# Patient Record
Sex: MALE | Race: OTHER | HISPANIC OR LATINO | URBAN - METROPOLITAN AREA
[De-identification: names, ages, dates, MRNs, and addresses within clinical notes are randomized per-mention and may not be internally consistent; named-entity substitution may affect disease eponyms.]

---

## 2017-03-07 ENCOUNTER — INPATIENT (INPATIENT)
Facility: HOSPITAL | Age: 52
LOS: 2 days | Discharge: HOME | End: 2017-03-10
Attending: HOSPITALIST | Admitting: HOSPITALIST
Payer: COMMERCIAL

## 2017-03-07 VITALS
TEMPERATURE: 98 F | HEART RATE: 80 BPM | RESPIRATION RATE: 18 BRPM | DIASTOLIC BLOOD PRESSURE: 88 MMHG | SYSTOLIC BLOOD PRESSURE: 153 MMHG | OXYGEN SATURATION: 98 %

## 2017-03-07 DIAGNOSIS — F10.220 ALCOHOL DEPENDENCE WITH INTOXICATION, UNCOMPLICATED: ICD-10-CM

## 2017-03-07 DIAGNOSIS — H40.9 UNSPECIFIED GLAUCOMA: ICD-10-CM

## 2017-03-07 DIAGNOSIS — Z41.8 ENCOUNTER FOR OTHER PROCEDURES FOR PURPOSES OTHER THAN REMEDYING HEALTH STATE: ICD-10-CM

## 2017-03-07 DIAGNOSIS — F32.9 MAJOR DEPRESSIVE DISORDER, SINGLE EPISODE, UNSPECIFIED: ICD-10-CM

## 2017-03-07 DIAGNOSIS — K29.20 ALCOHOLIC GASTRITIS WITHOUT BLEEDING: ICD-10-CM

## 2017-03-07 DIAGNOSIS — I10 ESSENTIAL (PRIMARY) HYPERTENSION: ICD-10-CM

## 2017-03-07 DIAGNOSIS — F10.239 ALCOHOL DEPENDENCE WITH WITHDRAWAL, UNSPECIFIED: ICD-10-CM

## 2017-03-07 LAB
ALBUMIN SERPL ELPH-MCNC: 4.3 G/DL — SIGNIFICANT CHANGE UP (ref 3.3–5)
ALP SERPL-CCNC: 76 U/L — SIGNIFICANT CHANGE UP (ref 40–120)
ALT FLD-CCNC: 41 U/L — SIGNIFICANT CHANGE UP (ref 4–41)
AMPHET UR-MCNC: NEGATIVE — SIGNIFICANT CHANGE UP
APAP SERPL-MCNC: < 15 UG/ML — LOW (ref 15–25)
APPEARANCE UR: CLEAR — SIGNIFICANT CHANGE UP
AST SERPL-CCNC: 62 U/L — HIGH (ref 4–40)
BACTERIA # UR AUTO: SIGNIFICANT CHANGE UP
BARBITURATES MEASUREMENT: NEGATIVE — SIGNIFICANT CHANGE UP
BARBITURATES UR SCN-MCNC: NEGATIVE — SIGNIFICANT CHANGE UP
BASOPHILS # BLD AUTO: 0.09 K/UL — SIGNIFICANT CHANGE UP (ref 0–0.2)
BASOPHILS NFR BLD AUTO: 1.2 % — SIGNIFICANT CHANGE UP (ref 0–2)
BENZODIAZ SERPL-MCNC: NEGATIVE — SIGNIFICANT CHANGE UP
BENZODIAZ UR-MCNC: NEGATIVE — SIGNIFICANT CHANGE UP
BILIRUB SERPL-MCNC: 2.4 MG/DL — HIGH (ref 0.2–1.2)
BILIRUB UR-MCNC: NEGATIVE — SIGNIFICANT CHANGE UP
BLOOD UR QL VISUAL: NEGATIVE — SIGNIFICANT CHANGE UP
BUN SERPL-MCNC: 15 MG/DL — SIGNIFICANT CHANGE UP (ref 7–23)
CALCIUM SERPL-MCNC: 8.7 MG/DL — SIGNIFICANT CHANGE UP (ref 8.4–10.5)
CANNABINOIDS UR-MCNC: NEGATIVE — SIGNIFICANT CHANGE UP
CHLORIDE SERPL-SCNC: 97 MMOL/L — LOW (ref 98–107)
CK SERPL-CCNC: 559 U/L — HIGH (ref 30–200)
CO2 SERPL-SCNC: 25 MMOL/L — SIGNIFICANT CHANGE UP (ref 22–31)
COCAINE METAB.OTHER UR-MCNC: NEGATIVE — SIGNIFICANT CHANGE UP
COLOR SPEC: SIGNIFICANT CHANGE UP
CREAT SERPL-MCNC: 0.66 MG/DL — SIGNIFICANT CHANGE UP (ref 0.5–1.3)
EOSINOPHIL # BLD AUTO: 0.03 K/UL — SIGNIFICANT CHANGE UP (ref 0–0.5)
EOSINOPHIL NFR BLD AUTO: 0.4 % — SIGNIFICANT CHANGE UP (ref 0–6)
ETHANOL BLD-MCNC: 143 MG/DL — HIGH
GLUCOSE SERPL-MCNC: 144 MG/DL — HIGH (ref 70–99)
GLUCOSE UR-MCNC: NEGATIVE — SIGNIFICANT CHANGE UP
HCT VFR BLD CALC: 41.9 % — SIGNIFICANT CHANGE UP (ref 39–50)
HGB BLD-MCNC: 14.8 G/DL — SIGNIFICANT CHANGE UP (ref 13–17)
IMM GRANULOCYTES NFR BLD AUTO: 0.1 % — SIGNIFICANT CHANGE UP (ref 0–1.5)
KETONES UR-MCNC: NEGATIVE — SIGNIFICANT CHANGE UP
LEUKOCYTE ESTERASE UR-ACNC: NEGATIVE — SIGNIFICANT CHANGE UP
LIDOCAIN IGE QN: 40.3 U/L — SIGNIFICANT CHANGE UP (ref 7–60)
LYMPHOCYTES # BLD AUTO: 2.23 K/UL — SIGNIFICANT CHANGE UP (ref 1–3.3)
LYMPHOCYTES # BLD AUTO: 29 % — SIGNIFICANT CHANGE UP (ref 13–44)
MAGNESIUM SERPL-MCNC: 2 MG/DL — SIGNIFICANT CHANGE UP (ref 1.6–2.6)
MCHC RBC-ENTMCNC: 30.7 PG — SIGNIFICANT CHANGE UP (ref 27–34)
MCHC RBC-ENTMCNC: 35.3 % — SIGNIFICANT CHANGE UP (ref 32–36)
MCV RBC AUTO: 86.9 FL — SIGNIFICANT CHANGE UP (ref 80–100)
METHADONE UR-MCNC: NEGATIVE — SIGNIFICANT CHANGE UP
MONOCYTES # BLD AUTO: 0.41 K/UL — SIGNIFICANT CHANGE UP (ref 0–0.9)
MONOCYTES NFR BLD AUTO: 5.3 % — SIGNIFICANT CHANGE UP (ref 2–14)
NEUTROPHILS # BLD AUTO: 4.91 K/UL — SIGNIFICANT CHANGE UP (ref 1.8–7.4)
NEUTROPHILS NFR BLD AUTO: 64 % — SIGNIFICANT CHANGE UP (ref 43–77)
NITRITE UR-MCNC: NEGATIVE — SIGNIFICANT CHANGE UP
OPIATES UR-MCNC: NEGATIVE — SIGNIFICANT CHANGE UP
OXYCODONE UR-MCNC: NEGATIVE — SIGNIFICANT CHANGE UP
PCP UR-MCNC: NEGATIVE — SIGNIFICANT CHANGE UP
PH UR: 6.5 — SIGNIFICANT CHANGE UP (ref 4.6–8)
PHOSPHATE SERPL-MCNC: 3 MG/DL — SIGNIFICANT CHANGE UP (ref 2.5–4.5)
PLATELET # BLD AUTO: 144 K/UL — LOW (ref 150–400)
PMV BLD: 10.6 FL — SIGNIFICANT CHANGE UP (ref 7–13)
POTASSIUM SERPL-MCNC: 4 MMOL/L — SIGNIFICANT CHANGE UP (ref 3.5–5.3)
POTASSIUM SERPL-SCNC: 4 MMOL/L — SIGNIFICANT CHANGE UP (ref 3.5–5.3)
PROT SERPL-MCNC: 8.2 G/DL — SIGNIFICANT CHANGE UP (ref 6–8.3)
PROT UR-MCNC: 20 — SIGNIFICANT CHANGE UP
RBC # BLD: 4.82 M/UL — SIGNIFICANT CHANGE UP (ref 4.2–5.8)
RBC # FLD: 12.4 % — SIGNIFICANT CHANGE UP (ref 10.3–14.5)
RBC CASTS # UR COMP ASSIST: SIGNIFICANT CHANGE UP (ref 0–?)
SALICYLATES SERPL-MCNC: < 5 MG/DL — LOW (ref 15–30)
SODIUM SERPL-SCNC: 136 MMOL/L — SIGNIFICANT CHANGE UP (ref 135–145)
SP GR SPEC: 1.01 — SIGNIFICANT CHANGE UP (ref 1–1.03)
TROPONIN T SERPL-MCNC: < 0.06 NG/ML — SIGNIFICANT CHANGE UP (ref 0–0.06)
UROBILINOGEN FLD QL: NORMAL E.U. — SIGNIFICANT CHANGE UP (ref 0.1–0.2)
WBC # BLD: 7.68 K/UL — SIGNIFICANT CHANGE UP (ref 3.8–10.5)
WBC # FLD AUTO: 7.68 K/UL — SIGNIFICANT CHANGE UP (ref 3.8–10.5)
WBC UR QL: SIGNIFICANT CHANGE UP (ref 0–?)

## 2017-03-07 PROCEDURE — 99223 1ST HOSP IP/OBS HIGH 75: CPT

## 2017-03-07 PROCEDURE — 71020: CPT | Mod: 26

## 2017-03-07 PROCEDURE — 99223 1ST HOSP IP/OBS HIGH 75: CPT | Mod: AI,GC

## 2017-03-07 RX ORDER — DORZOLAMIDE HYDROCHLORIDE TIMOLOL MALEATE 20; 5 MG/ML; MG/ML
1 SOLUTION/ DROPS OPHTHALMIC
Qty: 0 | Refills: 0 | Status: DISCONTINUED | OUTPATIENT
Start: 2017-03-07 | End: 2017-03-10

## 2017-03-07 RX ORDER — AMLODIPINE BESYLATE 2.5 MG/1
2.5 TABLET ORAL DAILY
Qty: 0 | Refills: 0 | Status: DISCONTINUED | OUTPATIENT
Start: 2017-03-07 | End: 2017-03-10

## 2017-03-07 RX ORDER — SODIUM CHLORIDE 9 MG/ML
1000 INJECTION, SOLUTION INTRAVENOUS
Qty: 0 | Refills: 0 | Status: DISCONTINUED | OUTPATIENT
Start: 2017-03-07 | End: 2017-03-08

## 2017-03-07 RX ORDER — FAMOTIDINE 10 MG/ML
20 INJECTION INTRAVENOUS ONCE
Qty: 0 | Refills: 0 | Status: COMPLETED | OUTPATIENT
Start: 2017-03-07 | End: 2017-03-07

## 2017-03-07 RX ORDER — ASPIRIN/CALCIUM CARB/MAGNESIUM 324 MG
162 TABLET ORAL ONCE
Qty: 0 | Refills: 0 | Status: COMPLETED | OUTPATIENT
Start: 2017-03-07 | End: 2017-03-07

## 2017-03-07 RX ORDER — THIAMINE MONONITRATE (VIT B1) 100 MG
500 TABLET ORAL
Qty: 0 | Refills: 0 | Status: DISCONTINUED | OUTPATIENT
Start: 2017-03-07 | End: 2017-03-10

## 2017-03-07 RX ORDER — SODIUM CHLORIDE 9 MG/ML
1000 INJECTION INTRAMUSCULAR; INTRAVENOUS; SUBCUTANEOUS ONCE
Qty: 0 | Refills: 0 | Status: COMPLETED | OUTPATIENT
Start: 2017-03-07 | End: 2017-03-07

## 2017-03-07 RX ORDER — LATANOPROST 0.05 MG/ML
1 SOLUTION/ DROPS OPHTHALMIC; TOPICAL AT BEDTIME
Qty: 0 | Refills: 0 | Status: DISCONTINUED | OUTPATIENT
Start: 2017-03-07 | End: 2017-03-10

## 2017-03-07 RX ORDER — SODIUM CHLORIDE 9 MG/ML
1000 INJECTION, SOLUTION INTRAVENOUS
Qty: 0 | Refills: 0 | Status: DISCONTINUED | OUTPATIENT
Start: 2017-03-07 | End: 2017-03-07

## 2017-03-07 RX ORDER — THIAMINE MONONITRATE (VIT B1) 100 MG
100 TABLET ORAL ONCE
Qty: 0 | Refills: 0 | Status: DISCONTINUED | OUTPATIENT
Start: 2017-03-07 | End: 2017-03-07

## 2017-03-07 RX ORDER — QUETIAPINE FUMARATE 200 MG/1
50 TABLET, FILM COATED ORAL AT BEDTIME
Qty: 0 | Refills: 0 | Status: DISCONTINUED | OUTPATIENT
Start: 2017-03-07 | End: 2017-03-10

## 2017-03-07 RX ORDER — PANTOPRAZOLE SODIUM 20 MG/1
40 TABLET, DELAYED RELEASE ORAL DAILY
Qty: 0 | Refills: 0 | Status: DISCONTINUED | OUTPATIENT
Start: 2017-03-07 | End: 2017-03-10

## 2017-03-07 RX ORDER — FOLIC ACID 0.8 MG
1 TABLET ORAL ONCE
Qty: 0 | Refills: 0 | Status: COMPLETED | OUTPATIENT
Start: 2017-03-07 | End: 2017-03-07

## 2017-03-07 RX ORDER — ENOXAPARIN SODIUM 100 MG/ML
40 INJECTION SUBCUTANEOUS EVERY 24 HOURS
Qty: 0 | Refills: 0 | Status: DISCONTINUED | OUTPATIENT
Start: 2017-03-07 | End: 2017-03-07

## 2017-03-07 RX ADMIN — Medication 1 TABLET(S): at 12:13

## 2017-03-07 RX ADMIN — DORZOLAMIDE HYDROCHLORIDE TIMOLOL MALEATE 1 DROP(S): 20; 5 SOLUTION/ DROPS OPHTHALMIC at 17:58

## 2017-03-07 RX ADMIN — QUETIAPINE FUMARATE 50 MILLIGRAM(S): 200 TABLET, FILM COATED ORAL at 22:26

## 2017-03-07 RX ADMIN — SODIUM CHLORIDE 1000 MILLILITER(S): 9 INJECTION INTRAMUSCULAR; INTRAVENOUS; SUBCUTANEOUS at 11:42

## 2017-03-07 RX ADMIN — Medication 50 MILLIGRAM(S): at 16:37

## 2017-03-07 RX ADMIN — Medication 30 MILLILITER(S): at 11:42

## 2017-03-07 RX ADMIN — Medication 162 MILLIGRAM(S): at 11:42

## 2017-03-07 RX ADMIN — SODIUM CHLORIDE 1000 MILLILITER(S): 9 INJECTION INTRAMUSCULAR; INTRAVENOUS; SUBCUTANEOUS at 12:12

## 2017-03-07 RX ADMIN — SODIUM CHLORIDE 75 MILLILITER(S): 9 INJECTION, SOLUTION INTRAVENOUS at 17:00

## 2017-03-07 RX ADMIN — Medication 2 MILLIGRAM(S): at 11:37

## 2017-03-07 RX ADMIN — LATANOPROST 1 DROP(S): 0.05 SOLUTION/ DROPS OPHTHALMIC; TOPICAL at 22:26

## 2017-03-07 RX ADMIN — FAMOTIDINE 20 MILLIGRAM(S): 10 INJECTION INTRAVENOUS at 11:42

## 2017-03-07 RX ADMIN — Medication 1 MILLIGRAM(S): at 12:07

## 2017-03-07 RX ADMIN — Medication 50 MILLIGRAM(S): at 17:57

## 2017-03-07 NOTE — H&P ADULT. - PROBLEM SELECTOR PLAN 2
- Binge drinking over the last 10 days with 10-12 drinks   - Drinking due to depression   - Libirum taper   - CIWA low risk   - IVF with thaimine/folic acid/Multivitamin   - Symptom trigger Ativan

## 2017-03-07 NOTE — H&P ADULT. - RS GEN PE MLT RESP DETAILS PC
respirations non-labored/good air movement/airway patent/clear to auscultation bilaterally/breath sounds equal/no intercostal retractions/no chest wall tenderness

## 2017-03-07 NOTE — ED ADULT NURSE NOTE - OBJECTIVE STATEMENT
pt arrived from home c/o diffuse abd pain. pt states he has been drinking alcohol. last drink was this am. pt stated a " few beers". in an outpt program.  stressed about his responsibilities at home. changed into gown, obtained urine specimen, attached to monitor, obtained vs. started iv 20g right ac. blood work drawn.

## 2017-03-07 NOTE — H&P ADULT. - PROBLEM SELECTOR PLAN 1
- Continue with librium taper  - Thiamine/folate  - ETOH abuse counseling - Patient complaining of epigastric pain and multiple episodes of emesis, with poor PO intake  - Had a small amount of blood in the vomit yesterday or earlier this AM   - Started on Protonix 40mg IV qd   - Monitor CBC for possible bleed   - Will consult GI if CBC or VS become unstable   - Clear liquid diet, advance diet as tolerated.

## 2017-03-07 NOTE — H&P ADULT. - LAB RESULTS AND INTERPRETATION
BMP WNL. CBC WNL. Tox serum: elevated EtOH 148mg/dl. Lipase 40 BMP WNL. CBC WNL. Tox serum: elevated EtOH 148mg/dl. Lipase 40 WNL. CK elevated to 559.

## 2017-03-07 NOTE — H&P ADULT. - NEUROLOGICAL DETAILS
responds to verbal commands/sensation intact/deep reflexes intact/cranial nerves intact/alert and oriented x 3

## 2017-03-07 NOTE — ED PROVIDER NOTE - MEDICAL DECISION MAKING DETAILS
pt with epigastric abdominal pain + ETOH withdrawal, r/o pancreatitis vs gastritis. Also mild CP, will check cardaics, EKG.  likely admit for ETOH withdrawal pending labs. pt with epigastric abdominal pain + ETOH withdrawal, r/o pancreatitis vs gastritis. Also mild CP, will check cardaics, EKG.  likely admit for ETOH withdrawal pending labs.  CIWA 11 points

## 2017-03-07 NOTE — H&P ADULT. - ASSESSMENT
51 y.o. man with history of ETOH abuse, essential hypertension, and glaucoma who came to the ER for EtOH abuse and epigastric pain following multiple episodes of emesis, recently with 51 y.o. man with history of ETOH abuse, essential hypertension, and glaucoma who came to the ER for EtOH abuse and epigastric pain following multiple episodes of emesis due to EtOH-induced gastritis.

## 2017-03-07 NOTE — ED PROVIDER NOTE - OBJECTIVE STATEMENT
50yo m pmh ETOH abuse, glaucoma, HTN, p/w epigastric abdominal pain + ETOH withdrawal. Pt has been binge drinking for past 10 days. 10-12 beers per day. No hx of withdrawal seizures. + epigastric pain, unable to tolerate foods. No hx of pancreatitis. Pt vomits every time he eats, also noticed one spot of blood in the emesis the other day. Also endorses yellow watery diarrhea. Mild intermittent CP and palpitations. Last felt palpitation this AM. No cough, fevers or chills. Pt also endorsees nausea with mild headache .

## 2017-03-07 NOTE — PATIENT PROFILE ADULT. - VISION (WITH CORRECTIVE LENSES IF THE PATIENT USUALLY WEARS THEM):
Partially impaired: cannot see medication labels or newsprint, but can see obstacles in path, and the surrounding layout; can count fingers at arm's length/glaucoma rt eye

## 2017-03-07 NOTE — ED PROVIDER NOTE - ATTENDING CONTRIBUTION TO CARE
ED Attending Dr. Walsh: 52 yo male with HTN, EtOH dependence (intermittent drinking)--recently drinking "10-12 beers a day" for "10 days", last drink 2 hours ago, denies withdrawal/seizure history--in ED with epigastric abdominal pain and N/V for few days.  Pt states he cannot eat because of this.  On exam pt uncomfortable appearing, in mild distress, heart RRR, lungs CTAB, abd moderate epigastric TTP, extremities without swelling, strength 5/5 in all extremities and skin without rash.  Mild tremor in extremities and +tongue fasciculation.

## 2017-03-07 NOTE — H&P ADULT. - HISTORY OF PRESENT ILLNESS
51 y.o. man with history of ETOH abuse, essential hypertension, and glaucoma who came to the ER for EtOH abuse and epigastric pain. The patient was previously admitted for EtOH withdrawal in 12/16/16-12/20/16, completing a librium taper and being discharged to outpatient rehab. Patient states he was binge drinking over the last 10 days "up to 10-12 beers a day, along with some tequila and wine." Over the last 4 days the patient has had decreased appetite and eating very little food only and mainly drinking. States whenever he tries to eat, he ends up feeling nauseous  and vomits. Over the last few days the patient has had multiple episodes of vomiting. Today the patient developed severe epigastric pain, stating it was a burning pain "8-9/10" on severity, with no radiation. The patient continued to have episodes of vomiting, with today showing a small amount of blood after vomiting, prompting him to go to the ED. The patient states he started drinking again after feeling depressed about his work and financial situation. Expresses desire to quit again and to stop drinking.       In the ED:  VS: T36.9 HR 80 /88 RR 18 So2 98%    The patient received Ativan 2mg for witnessed tremors of upper extremity In addition received Maalox, Pepcid ac for the epigastric pain, which helped immensely per the patient, making the pain 5/10. 2L of NS bolus also given as well. Serum Tox showed EtOH level of 143 mg/dl. CXR showed clear lungs, no signs of perforation

## 2017-03-07 NOTE — H&P ADULT. - PROBLEM SELECTOR PLAN 3
- Patient endorses feeling depressed   - Was started on hydroxyzine for depression by an outside physician 2 weeks ago, but stopped   - Psych consulted

## 2017-03-07 NOTE — H&P ADULT. - LYMPHATIC
anterior cervical R/supraclavicular R/anterior cervical L/posterior cervical R/posterior cervical L/supraclavicular L

## 2017-03-08 LAB
ALBUMIN SERPL ELPH-MCNC: 3.6 G/DL — SIGNIFICANT CHANGE UP (ref 3.3–5)
ALP SERPL-CCNC: 60 U/L — SIGNIFICANT CHANGE UP (ref 40–120)
ALT FLD-CCNC: 44 U/L — HIGH (ref 4–41)
APTT BLD: 30 SEC — SIGNIFICANT CHANGE UP (ref 27.5–37.4)
AST SERPL-CCNC: 82 U/L — HIGH (ref 4–40)
BILIRUB SERPL-MCNC: 3 MG/DL — HIGH (ref 0.2–1.2)
BUN SERPL-MCNC: 9 MG/DL — SIGNIFICANT CHANGE UP (ref 7–23)
CALCIUM SERPL-MCNC: 8.4 MG/DL — SIGNIFICANT CHANGE UP (ref 8.4–10.5)
CHLORIDE SERPL-SCNC: 102 MMOL/L — SIGNIFICANT CHANGE UP (ref 98–107)
CK SERPL-CCNC: 348 U/L — HIGH (ref 30–200)
CO2 SERPL-SCNC: 22 MMOL/L — SIGNIFICANT CHANGE UP (ref 22–31)
CREAT SERPL-MCNC: 0.57 MG/DL — SIGNIFICANT CHANGE UP (ref 0.5–1.3)
GLUCOSE SERPL-MCNC: 88 MG/DL — SIGNIFICANT CHANGE UP (ref 70–99)
HCT VFR BLD CALC: 37.5 % — LOW (ref 39–50)
HGB BLD-MCNC: 13.9 G/DL — SIGNIFICANT CHANGE UP (ref 13–17)
INR BLD: 0.96 — SIGNIFICANT CHANGE UP (ref 0.87–1.18)
MAGNESIUM SERPL-MCNC: 1.9 MG/DL — SIGNIFICANT CHANGE UP (ref 1.6–2.6)
MCHC RBC-ENTMCNC: 33.2 PG — SIGNIFICANT CHANGE UP (ref 27–34)
MCHC RBC-ENTMCNC: 37.1 % — HIGH (ref 32–36)
MCV RBC AUTO: 89.5 FL — SIGNIFICANT CHANGE UP (ref 80–100)
PHOSPHATE SERPL-MCNC: 3.2 MG/DL — SIGNIFICANT CHANGE UP (ref 2.5–4.5)
PLATELET # BLD AUTO: 121 K/UL — LOW (ref 150–400)
PMV BLD: 12.1 FL — SIGNIFICANT CHANGE UP (ref 7–13)
POTASSIUM SERPL-MCNC: 4.1 MMOL/L — SIGNIFICANT CHANGE UP (ref 3.5–5.3)
POTASSIUM SERPL-SCNC: 4.1 MMOL/L — SIGNIFICANT CHANGE UP (ref 3.5–5.3)
PROT SERPL-MCNC: 7 G/DL — SIGNIFICANT CHANGE UP (ref 6–8.3)
PROTHROM AB SERPL-ACNC: 10.9 SEC — SIGNIFICANT CHANGE UP (ref 10–13.1)
RBC # BLD: 4.19 M/UL — LOW (ref 4.2–5.8)
RBC # FLD: 12.5 % — SIGNIFICANT CHANGE UP (ref 10.3–14.5)
SODIUM SERPL-SCNC: 139 MMOL/L — SIGNIFICANT CHANGE UP (ref 135–145)
WBC # BLD: 5.04 K/UL — SIGNIFICANT CHANGE UP (ref 3.8–10.5)
WBC # FLD AUTO: 5.04 K/UL — SIGNIFICANT CHANGE UP (ref 3.8–10.5)

## 2017-03-08 PROCEDURE — 99233 SBSQ HOSP IP/OBS HIGH 50: CPT

## 2017-03-08 PROCEDURE — 99233 SBSQ HOSP IP/OBS HIGH 50: CPT | Mod: GC

## 2017-03-08 RX ORDER — FOLIC ACID 0.8 MG
1 TABLET ORAL DAILY
Qty: 0 | Refills: 0 | Status: DISCONTINUED | OUTPATIENT
Start: 2017-03-08 | End: 2017-03-10

## 2017-03-08 RX ORDER — IBUPROFEN 200 MG
200 TABLET ORAL EVERY 6 HOURS
Qty: 0 | Refills: 0 | Status: DISCONTINUED | OUTPATIENT
Start: 2017-03-08 | End: 2017-03-09

## 2017-03-08 RX ORDER — LANOLIN ALCOHOL/MO/W.PET/CERES
3 CREAM (GRAM) TOPICAL AT BEDTIME
Qty: 0 | Refills: 0 | Status: COMPLETED | OUTPATIENT
Start: 2017-03-08 | End: 2017-03-08

## 2017-03-08 RX ADMIN — DORZOLAMIDE HYDROCHLORIDE TIMOLOL MALEATE 1 DROP(S): 20; 5 SOLUTION/ DROPS OPHTHALMIC at 17:59

## 2017-03-08 RX ADMIN — Medication 200 MILLIGRAM(S): at 07:07

## 2017-03-08 RX ADMIN — Medication 50 MILLIGRAM(S): at 21:43

## 2017-03-08 RX ADMIN — QUETIAPINE FUMARATE 50 MILLIGRAM(S): 200 TABLET, FILM COATED ORAL at 21:43

## 2017-03-08 RX ADMIN — Medication 200 MILLIGRAM(S): at 17:19

## 2017-03-08 RX ADMIN — PANTOPRAZOLE SODIUM 40 MILLIGRAM(S): 20 TABLET, DELAYED RELEASE ORAL at 12:06

## 2017-03-08 RX ADMIN — Medication 200 MILLIGRAM(S): at 16:31

## 2017-03-08 RX ADMIN — Medication 105 MILLIGRAM(S): at 06:31

## 2017-03-08 RX ADMIN — Medication 50 MILLIGRAM(S): at 06:32

## 2017-03-08 RX ADMIN — Medication 105 MILLIGRAM(S): at 17:58

## 2017-03-08 RX ADMIN — AMLODIPINE BESYLATE 2.5 MILLIGRAM(S): 2.5 TABLET ORAL at 06:32

## 2017-03-08 RX ADMIN — Medication 50 MILLIGRAM(S): at 00:18

## 2017-03-08 RX ADMIN — LATANOPROST 1 DROP(S): 0.05 SOLUTION/ DROPS OPHTHALMIC; TOPICAL at 21:43

## 2017-03-08 RX ADMIN — DORZOLAMIDE HYDROCHLORIDE TIMOLOL MALEATE 1 DROP(S): 20; 5 SOLUTION/ DROPS OPHTHALMIC at 06:32

## 2017-03-08 RX ADMIN — Medication 3 MILLIGRAM(S): at 22:03

## 2017-03-08 RX ADMIN — Medication 200 MILLIGRAM(S): at 08:00

## 2017-03-08 RX ADMIN — Medication 50 MILLIGRAM(S): at 14:06

## 2017-03-09 LAB
ALBUMIN SERPL ELPH-MCNC: 3.6 G/DL — SIGNIFICANT CHANGE UP (ref 3.3–5)
ALP SERPL-CCNC: 65 U/L — SIGNIFICANT CHANGE UP (ref 40–120)
ALT FLD-CCNC: 49 U/L — HIGH (ref 4–41)
AST SERPL-CCNC: 72 U/L — HIGH (ref 4–40)
BILIRUB SERPL-MCNC: 1.3 MG/DL — HIGH (ref 0.2–1.2)
BUN SERPL-MCNC: 10 MG/DL — SIGNIFICANT CHANGE UP (ref 7–23)
CALCIUM SERPL-MCNC: 8.7 MG/DL — SIGNIFICANT CHANGE UP (ref 8.4–10.5)
CHLORIDE SERPL-SCNC: 103 MMOL/L — SIGNIFICANT CHANGE UP (ref 98–107)
CK SERPL-CCNC: 156 U/L — SIGNIFICANT CHANGE UP (ref 30–200)
CO2 SERPL-SCNC: 24 MMOL/L — SIGNIFICANT CHANGE UP (ref 22–31)
CREAT SERPL-MCNC: 0.62 MG/DL — SIGNIFICANT CHANGE UP (ref 0.5–1.3)
GLUCOSE SERPL-MCNC: 122 MG/DL — HIGH (ref 70–99)
MAGNESIUM SERPL-MCNC: 1.9 MG/DL — SIGNIFICANT CHANGE UP (ref 1.6–2.6)
PHOSPHATE SERPL-MCNC: 3.5 MG/DL — SIGNIFICANT CHANGE UP (ref 2.5–4.5)
POTASSIUM SERPL-MCNC: 3.3 MMOL/L — LOW (ref 3.5–5.3)
POTASSIUM SERPL-SCNC: 3.3 MMOL/L — LOW (ref 3.5–5.3)
PROT SERPL-MCNC: 6.9 G/DL — SIGNIFICANT CHANGE UP (ref 6–8.3)
SODIUM SERPL-SCNC: 140 MMOL/L — SIGNIFICANT CHANGE UP (ref 135–145)

## 2017-03-09 PROCEDURE — 99232 SBSQ HOSP IP/OBS MODERATE 35: CPT | Mod: GC

## 2017-03-09 RX ORDER — AMLODIPINE BESYLATE 2.5 MG/1
1 TABLET ORAL
Qty: 30 | Refills: 0 | OUTPATIENT
Start: 2017-03-09 | End: 2017-04-08

## 2017-03-09 RX ORDER — QUETIAPINE FUMARATE 200 MG/1
1 TABLET, FILM COATED ORAL
Qty: 30 | Refills: 0 | OUTPATIENT
Start: 2017-03-09 | End: 2017-04-08

## 2017-03-09 RX ORDER — POTASSIUM CHLORIDE 20 MEQ
20 PACKET (EA) ORAL
Qty: 0 | Refills: 0 | Status: COMPLETED | OUTPATIENT
Start: 2017-03-09 | End: 2017-03-09

## 2017-03-09 RX ORDER — IBUPROFEN 200 MG
200 TABLET ORAL EVERY 6 HOURS
Qty: 0 | Refills: 0 | Status: DISCONTINUED | OUTPATIENT
Start: 2017-03-09 | End: 2017-03-10

## 2017-03-09 RX ADMIN — Medication 20 MILLIEQUIVALENT(S): at 10:12

## 2017-03-09 RX ADMIN — Medication 200 MILLIGRAM(S): at 10:12

## 2017-03-09 RX ADMIN — DORZOLAMIDE HYDROCHLORIDE TIMOLOL MALEATE 1 DROP(S): 20; 5 SOLUTION/ DROPS OPHTHALMIC at 06:08

## 2017-03-09 RX ADMIN — Medication 20 MILLIEQUIVALENT(S): at 17:36

## 2017-03-09 RX ADMIN — Medication 50 MILLIGRAM(S): at 06:08

## 2017-03-09 RX ADMIN — DORZOLAMIDE HYDROCHLORIDE TIMOLOL MALEATE 1 DROP(S): 20; 5 SOLUTION/ DROPS OPHTHALMIC at 17:36

## 2017-03-09 RX ADMIN — Medication 105 MILLIGRAM(S): at 06:08

## 2017-03-09 RX ADMIN — QUETIAPINE FUMARATE 50 MILLIGRAM(S): 200 TABLET, FILM COATED ORAL at 22:48

## 2017-03-09 RX ADMIN — Medication 105 MILLIGRAM(S): at 17:36

## 2017-03-09 RX ADMIN — Medication 200 MILLIGRAM(S): at 10:45

## 2017-03-09 RX ADMIN — PANTOPRAZOLE SODIUM 40 MILLIGRAM(S): 20 TABLET, DELAYED RELEASE ORAL at 12:15

## 2017-03-09 RX ADMIN — Medication 1 MILLIGRAM(S): at 12:13

## 2017-03-09 RX ADMIN — Medication 20 MILLIEQUIVALENT(S): at 12:13

## 2017-03-09 RX ADMIN — Medication 1 TABLET(S): at 12:13

## 2017-03-09 RX ADMIN — LATANOPROST 1 DROP(S): 0.05 SOLUTION/ DROPS OPHTHALMIC; TOPICAL at 22:48

## 2017-03-09 RX ADMIN — AMLODIPINE BESYLATE 2.5 MILLIGRAM(S): 2.5 TABLET ORAL at 06:08

## 2017-03-09 RX ADMIN — Medication 50 MILLIGRAM(S): at 17:38

## 2017-03-09 NOTE — DISCHARGE NOTE ADULT - PLAN OF CARE
outpatient rehab You were monitored in the hospital for alcohol withdrawal after being found to have elevated blood alcohol level. You symptoms resolved with supportive care. Please continue with amlodipine 2.5 mg and follow up with your primary care physician to recheck your blood pressure.

## 2017-03-09 NOTE — DISCHARGE NOTE ADULT - PATIENT PORTAL LINK FT
“You can access the Follow[facility] Patient Portal, offered by [facility], by registering with the following website: [email or web address]”

## 2017-03-09 NOTE — DIETITIAN INITIAL EVALUATION ADULT. - OTHER INFO
Nutrition consult received for nausea and vomiting >3 days PTA; admitted for ETOH abuse. Met with patient, who reports appetite is improving since admission to hospital. He explains that PO intake was suboptimal PTA due to lack of appetite but denied any GI distress (nausea/vomiting/diarrhea/constipation) or any other contributing factors. Subsequently he reports weight loss from usual of 207# to admission weight of 203# per patient. No issues chewing/swallowing or food allergies stated. As of this morning he reports 100% consumption of breakfast meal. Importance of healthful, balanced diet reinforced and patient did not have any nutrition-related questions or concerns for dietitian at time of visit.

## 2017-03-09 NOTE — DISCHARGE NOTE ADULT - CARE PLAN
Principal Discharge DX:	Alcohol intoxication  Goal:	outpatient rehab  Instructions for follow-up, activity and diet:	You were monitored in the hospital for alcohol withdrawal after being found to have elevated blood alcohol level. You symptoms resolved with supportive care.  Secondary Diagnosis:	HTN (hypertension)  Instructions for follow-up, activity and diet:	Please continue with amlodipine 2.5 mg and follow up with your primary care physician to recheck your blood pressure.

## 2017-03-09 NOTE — DISCHARGE NOTE ADULT - MEDICATION SUMMARY - MEDICATIONS TO TAKE
I will START or STAY ON the medications listed below when I get home from the hospital:    QUEtiapine 50 mg oral tablet  -- 1 tab(s) by mouth once a day (at bedtime)  -- Indication: For insomnia    amLODIPine 2.5 mg oral tablet  -- 1 tab(s) by mouth once a day  -- Indication: For Hypertension    dorzolamide-timolol 2.23%-0.68% ophthalmic solution  -- 1 drop(s) to each affected eye 2 times a day  -- Indication: For Glaucoma of both eyes, unspecified glaucoma    prednisoLONE acetate 1% ophthalmic suspension  -- 1 dose(s) to each affected eye 2 times a day  -- Indication: For Glaucoma of both eyes, unspecified glaucoma    latanoprost 0.005% ophthalmic solution  -- 1 drop(s) to each affected eye once a day (in the evening)  -- Indication: For Glaucoma of both eyes, unspecified glaucoma    Multiple Vitamins oral tablet  -- 1 tab(s) by mouth once a day  -- Indication: For supplemeny    folic acid 1 mg oral tablet  -- 1 tab(s) by mouth once a day  -- Indication: For supplemeny    thiamine 100 mg oral tablet  -- 1 tab(s) by mouth once a day  -- Indication: For supplement I will START or STAY ON the medications listed below when I get home from the hospital:    amLODIPine 2.5 mg oral tablet  -- 1 tab(s) by mouth once a day  -- Indication: For Hypertension    dorzolamide-timolol 2.23%-0.68% ophthalmic solution  -- 1 drop(s) to each affected eye 2 times a day  -- Indication: For Glaucoma of both eyes, unspecified glaucoma    prednisoLONE acetate 1% ophthalmic suspension  -- 1 dose(s) to each affected eye 2 times a day  -- Indication: For Glaucoma of both eyes, unspecified glaucoma    latanoprost 0.005% ophthalmic solution  -- 1 drop(s) to each affected eye once a day (in the evening)  -- Indication: For Glaucoma of both eyes, unspecified glaucoma    Multiple Vitamins oral tablet  -- 1 tab(s) by mouth once a day  -- Indication: For supplemeny    folic acid 1 mg oral tablet  -- 1 tab(s) by mouth once a day  -- Indication: For supplemeny    thiamine 100 mg oral tablet  -- 1 tab(s) by mouth once a day  -- Indication: For supplement I will START or STAY ON the medications listed below when I get home from the hospital:    amLODIPine 2.5 mg oral tablet  -- 1 tab(s) by mouth once a day  -- Indication: For Essential hypertension    dorzolamide-timolol 2.23%-0.68% ophthalmic solution  -- 1 drop(s) to each affected eye 2 times a day  -- Indication: For Glaucoma of both eyes, unspecified glaucoma    prednisoLONE acetate 1% ophthalmic suspension  -- 1 dose(s) to each affected eye 2 times a day  -- Indication: For Glaucoma of both eyes, unspecified glaucoma    latanoprost 0.005% ophthalmic solution  -- 1 drop(s) to each affected eye once a day (in the evening)  -- Indication: For Glaucoma of both eyes, unspecified glaucoma    Multiple Vitamins oral tablet  -- 1 tab(s) by mouth once a day  -- Indication: For Supplementation     folic acid 1 mg oral tablet  -- 1 tab(s) by mouth once a day  -- Indication: For Supplementation     thiamine 100 mg oral tablet  -- 1 tab(s) by mouth once a day  -- Indication: For Supplementation

## 2017-03-09 NOTE — DIETITIAN INITIAL EVALUATION ADULT. - NS AS NUTRI INTERV MEALS SNACK
General/healthful diet/Continue DASH/TLC (cholesterol and sodium restricted) diet which remains appropriate and adequate. Continue Multivitamin 1x daily, Folic Acid, Thiamine supplementation/Diets modified for specific foods and ingredients

## 2017-03-09 NOTE — DISCHARGE NOTE ADULT - VISION (WITH CORRECTIVE LENSES IF THE PATIENT USUALLY WEARS THEM):
glaucoma rt eye/Partially impaired: cannot see medication labels or newsprint, but can see obstacles in path, and the surrounding layout; can count fingers at arm's length

## 2017-03-09 NOTE — DIETITIAN INITIAL EVALUATION ADULT. - PROBLEM SELECTOR PLAN 1
- Patient complaining of epigastric pain and multiple episodes of emesis, with poor PO intake  - Had a small amount of blood in the vomit yesterday or earlier this AM   - Started on Protonix 40mg IV qd   - Monitor CBC for possible bleed   - Will consult GI if CBC or VS become unstable   - Clear liquid diet, advance diet as tolerated.

## 2017-03-09 NOTE — DISCHARGE NOTE ADULT - HOSPITAL COURSE
51 y.o. man with history of ETOH abuse, essential hypertension, and glaucoma who came to the ER for EtOH abuse and epigastric pain. The patient was previously admitted for EtOH withdrawal in 12/16/16-12/20/16, completing a librium taper and being discharged to outpatient rehab. Patient states he was binge drinking over the last 10 days "up to 10-12 beers a day, along with some tequila and wine." Over the last 4 days the patient has had decreased appetite and eating very little food only and mainly drinking. States whenever he tries to eat, he ends up feeling nauseous  and vomits. Over the last few days the patient has had multiple episodes of vomiting. Today the patient developed severe epigastric pain, stating it was a burning pain "8-9/10" on severity, with no radiation. The patient continued to have episodes of vomiting, with today showing a small amount of blood after vomiting, prompting him to go to the ED. The patient states he started drinking again after feeling depressed about his work and financial situation. Expresses desire to quit again and to stop drinking.       In the ED:  VS: T36.9 HR 80 /88 RR 18 So2 98%    The patient received Ativan 2mg for witnessed tremors of upper extremity In addition received Maalox, Pepcid ac for the epigastric pain, which helped immensely per the patient, making the pain 5/10. 2L of NS bolus also given as well. Serum Tox showed EtOH level of 143 mg/dl. CXR showed clear lungs, no signs of perforation       Hospital Course:  Patient admitted to medicine service and monitored for withdrawal. He was started on a five day Librium taper and did not require any prn Ativan. His GI symptoms resolved s/p IVF hydration and he was able to tolerate a regular diet. Psych was consulted for medication recommendations and depression and recommended Seroquel 50 mg QHS, high dose thiamine and referral to [facility] program. Patient medically stable on discharge and will follow up with outpatient alcohol rehab.

## 2017-03-10 VITALS
OXYGEN SATURATION: 99 % | RESPIRATION RATE: 17 BRPM | SYSTOLIC BLOOD PRESSURE: 117 MMHG | DIASTOLIC BLOOD PRESSURE: 79 MMHG | TEMPERATURE: 98 F

## 2017-03-10 LAB
BUN SERPL-MCNC: 7 MG/DL — SIGNIFICANT CHANGE UP (ref 7–23)
CALCIUM SERPL-MCNC: 8.9 MG/DL — SIGNIFICANT CHANGE UP (ref 8.4–10.5)
CHLORIDE SERPL-SCNC: 104 MMOL/L — SIGNIFICANT CHANGE UP (ref 98–107)
CO2 SERPL-SCNC: 23 MMOL/L — SIGNIFICANT CHANGE UP (ref 22–31)
CREAT SERPL-MCNC: 0.66 MG/DL — SIGNIFICANT CHANGE UP (ref 0.5–1.3)
GLUCOSE SERPL-MCNC: 141 MG/DL — HIGH (ref 70–99)
MAGNESIUM SERPL-MCNC: 1.6 MG/DL — SIGNIFICANT CHANGE UP (ref 1.6–2.6)
PHOSPHATE SERPL-MCNC: 3.2 MG/DL — SIGNIFICANT CHANGE UP (ref 2.5–4.5)
POTASSIUM SERPL-MCNC: 3.8 MMOL/L — SIGNIFICANT CHANGE UP (ref 3.5–5.3)
POTASSIUM SERPL-SCNC: 3.8 MMOL/L — SIGNIFICANT CHANGE UP (ref 3.5–5.3)
SODIUM SERPL-SCNC: 140 MMOL/L — SIGNIFICANT CHANGE UP (ref 135–145)

## 2017-03-10 PROCEDURE — 99233 SBSQ HOSP IP/OBS HIGH 50: CPT

## 2017-03-10 PROCEDURE — 99239 HOSP IP/OBS DSCHRG MGMT >30: CPT

## 2017-03-10 RX ORDER — THIAMINE MONONITRATE (VIT B1) 100 MG
1 TABLET ORAL
Qty: 30 | Refills: 5 | OUTPATIENT
Start: 2017-03-10 | End: 2017-09-05

## 2017-03-10 RX ORDER — FOLIC ACID 0.8 MG
1 TABLET ORAL
Qty: 30 | Refills: 0 | OUTPATIENT
Start: 2017-03-10 | End: 2017-04-09

## 2017-03-10 RX ORDER — AMLODIPINE BESYLATE 2.5 MG/1
1 TABLET ORAL
Qty: 30 | Refills: 5
Start: 2017-03-10 | End: 2017-09-05

## 2017-03-10 RX ORDER — MAGNESIUM SULFATE 500 MG/ML
1 VIAL (ML) INJECTION ONCE
Qty: 0 | Refills: 0 | Status: COMPLETED | OUTPATIENT
Start: 2017-03-10 | End: 2017-03-10

## 2017-03-10 RX ADMIN — Medication 105 MILLIGRAM(S): at 05:35

## 2017-03-10 RX ADMIN — Medication 50 MILLIGRAM(S): at 12:04

## 2017-03-10 RX ADMIN — Medication 50 MILLIGRAM(S): at 05:36

## 2017-03-10 RX ADMIN — DORZOLAMIDE HYDROCHLORIDE TIMOLOL MALEATE 1 DROP(S): 20; 5 SOLUTION/ DROPS OPHTHALMIC at 05:36

## 2017-03-10 RX ADMIN — AMLODIPINE BESYLATE 2.5 MILLIGRAM(S): 2.5 TABLET ORAL at 05:36

## 2017-03-10 RX ADMIN — Medication 1 TABLET(S): at 12:02

## 2017-03-10 RX ADMIN — Medication 200 MILLIGRAM(S): at 09:20

## 2017-03-10 RX ADMIN — PANTOPRAZOLE SODIUM 40 MILLIGRAM(S): 20 TABLET, DELAYED RELEASE ORAL at 12:02

## 2017-03-10 RX ADMIN — Medication 200 MILLIGRAM(S): at 08:40

## 2017-03-10 RX ADMIN — Medication 1 MILLIGRAM(S): at 12:02

## 2017-03-10 RX ADMIN — Medication 100 GRAM(S): at 10:19

## 2017-06-19 ENCOUNTER — EMERGENCY (EMERGENCY)
Facility: HOSPITAL | Age: 52
LOS: 1 days | Discharge: HOME | End: 2017-06-19
Attending: EMERGENCY MEDICINE | Admitting: EMERGENCY MEDICINE
Payer: MEDICAID

## 2017-06-19 VITALS
TEMPERATURE: 98 F | RESPIRATION RATE: 18 BRPM | OXYGEN SATURATION: 98 % | SYSTOLIC BLOOD PRESSURE: 162 MMHG | DIASTOLIC BLOOD PRESSURE: 112 MMHG | HEART RATE: 121 BPM

## 2017-06-19 VITALS
RESPIRATION RATE: 20 BRPM | OXYGEN SATURATION: 99 % | DIASTOLIC BLOOD PRESSURE: 58 MMHG | SYSTOLIC BLOOD PRESSURE: 109 MMHG | HEART RATE: 82 BPM

## 2017-06-19 LAB
ALBUMIN SERPL ELPH-MCNC: 4.4 G/DL — SIGNIFICANT CHANGE UP (ref 3.3–5)
ALP SERPL-CCNC: 64 U/L — SIGNIFICANT CHANGE UP (ref 40–120)
ALT FLD-CCNC: 132 U/L — HIGH (ref 4–41)
APAP SERPL-MCNC: < 15 UG/ML — LOW (ref 15–25)
APTT BLD: 29.7 SEC — SIGNIFICANT CHANGE UP (ref 27.5–37.4)
AST SERPL-CCNC: 149 U/L — HIGH (ref 4–40)
BARBITURATES MEASUREMENT: NEGATIVE — SIGNIFICANT CHANGE UP
BASOPHILS # BLD AUTO: 0.1 K/UL — SIGNIFICANT CHANGE UP (ref 0–0.2)
BASOPHILS NFR BLD AUTO: 1.8 % — SIGNIFICANT CHANGE UP (ref 0–2)
BENZODIAZ SERPL-MCNC: POSITIVE — SIGNIFICANT CHANGE UP
BILIRUB SERPL-MCNC: 0.9 MG/DL — SIGNIFICANT CHANGE UP (ref 0.2–1.2)
BUN SERPL-MCNC: 10 MG/DL — SIGNIFICANT CHANGE UP (ref 7–23)
CALCIUM SERPL-MCNC: 8.7 MG/DL — SIGNIFICANT CHANGE UP (ref 8.4–10.5)
CHLORIDE SERPL-SCNC: 101 MMOL/L — SIGNIFICANT CHANGE UP (ref 98–107)
CO2 SERPL-SCNC: 24 MMOL/L — SIGNIFICANT CHANGE UP (ref 22–31)
CREAT SERPL-MCNC: 0.74 MG/DL — SIGNIFICANT CHANGE UP (ref 0.5–1.3)
EOSINOPHIL # BLD AUTO: 0.07 K/UL — SIGNIFICANT CHANGE UP (ref 0–0.5)
EOSINOPHIL NFR BLD AUTO: 1.3 % — SIGNIFICANT CHANGE UP (ref 0–6)
ETHANOL BLD-MCNC: 389 MG/DL — HIGH
GLUCOSE SERPL-MCNC: 111 MG/DL — HIGH (ref 70–99)
HCT VFR BLD CALC: 41.9 % — SIGNIFICANT CHANGE UP (ref 39–50)
HGB BLD-MCNC: 14.2 G/DL — SIGNIFICANT CHANGE UP (ref 13–17)
IMM GRANULOCYTES NFR BLD AUTO: 0.2 % — SIGNIFICANT CHANGE UP (ref 0–1.5)
INR BLD: 0.93 — SIGNIFICANT CHANGE UP (ref 0.88–1.17)
LIDOCAIN IGE QN: 28.6 U/L — SIGNIFICANT CHANGE UP (ref 7–60)
LYMPHOCYTES # BLD AUTO: 2.17 K/UL — SIGNIFICANT CHANGE UP (ref 1–3.3)
LYMPHOCYTES # BLD AUTO: 39.8 % — SIGNIFICANT CHANGE UP (ref 13–44)
MCHC RBC-ENTMCNC: 31.8 PG — SIGNIFICANT CHANGE UP (ref 27–34)
MCHC RBC-ENTMCNC: 33.9 % — SIGNIFICANT CHANGE UP (ref 32–36)
MCV RBC AUTO: 93.7 FL — SIGNIFICANT CHANGE UP (ref 80–100)
MONOCYTES # BLD AUTO: 0.23 K/UL — SIGNIFICANT CHANGE UP (ref 0–0.9)
MONOCYTES NFR BLD AUTO: 4.2 % — SIGNIFICANT CHANGE UP (ref 2–14)
NEUTROPHILS # BLD AUTO: 2.87 K/UL — SIGNIFICANT CHANGE UP (ref 1.8–7.4)
NEUTROPHILS NFR BLD AUTO: 52.7 % — SIGNIFICANT CHANGE UP (ref 43–77)
PLATELET # BLD AUTO: 253 K/UL — SIGNIFICANT CHANGE UP (ref 150–400)
PMV BLD: 9.9 FL — SIGNIFICANT CHANGE UP (ref 7–13)
POTASSIUM SERPL-MCNC: 3.8 MMOL/L — SIGNIFICANT CHANGE UP (ref 3.5–5.3)
POTASSIUM SERPL-SCNC: 3.8 MMOL/L — SIGNIFICANT CHANGE UP (ref 3.5–5.3)
PROT SERPL-MCNC: 8.1 G/DL — SIGNIFICANT CHANGE UP (ref 6–8.3)
PROTHROM AB SERPL-ACNC: 10.4 SEC — SIGNIFICANT CHANGE UP (ref 9.8–13.1)
RBC # BLD: 4.47 M/UL — SIGNIFICANT CHANGE UP (ref 4.2–5.8)
RBC # FLD: 13.5 % — SIGNIFICANT CHANGE UP (ref 10.3–14.5)
SALICYLATES SERPL-MCNC: < 5 MG/DL — LOW (ref 15–30)
SODIUM SERPL-SCNC: 141 MMOL/L — SIGNIFICANT CHANGE UP (ref 135–145)
TROPONIN T SERPL-MCNC: < 0.06 NG/ML — SIGNIFICANT CHANGE UP (ref 0–0.06)
WBC # BLD: 5.45 K/UL — SIGNIFICANT CHANGE UP (ref 3.8–10.5)
WBC # FLD AUTO: 5.45 K/UL — SIGNIFICANT CHANGE UP (ref 3.8–10.5)

## 2017-06-19 PROCEDURE — 99285 EMERGENCY DEPT VISIT HI MDM: CPT

## 2017-06-19 RX ORDER — FAMOTIDINE 10 MG/ML
20 INJECTION INTRAVENOUS ONCE
Qty: 0 | Refills: 0 | Status: COMPLETED | OUTPATIENT
Start: 2017-06-19 | End: 2017-06-19

## 2017-06-19 RX ORDER — METOCLOPRAMIDE HCL 10 MG
10 TABLET ORAL ONCE
Qty: 0 | Refills: 0 | Status: COMPLETED | OUTPATIENT
Start: 2017-06-19 | End: 2017-06-19

## 2017-06-19 RX ORDER — SODIUM CHLORIDE 9 MG/ML
1000 INJECTION INTRAMUSCULAR; INTRAVENOUS; SUBCUTANEOUS ONCE
Qty: 0 | Refills: 0 | Status: COMPLETED | OUTPATIENT
Start: 2017-06-19 | End: 2017-06-19

## 2017-06-19 RX ADMIN — FAMOTIDINE 20 MILLIGRAM(S): 10 INJECTION INTRAVENOUS at 12:35

## 2017-06-19 RX ADMIN — Medication 50 MILLIGRAM(S): at 12:35

## 2017-06-19 RX ADMIN — Medication 30 MILLILITER(S): at 12:35

## 2017-06-19 RX ADMIN — SODIUM CHLORIDE 2000 MILLILITER(S): 9 INJECTION INTRAMUSCULAR; INTRAVENOUS; SUBCUTANEOUS at 12:36

## 2017-06-19 RX ADMIN — Medication 10 MILLIGRAM(S): at 12:36

## 2017-06-19 NOTE — ED PROVIDER NOTE - PROGRESS NOTE DETAILS
Lauren: labs showed nil acute, lipase normal. pt now ciwa=0, no tremors. pain improved, po challenged. pt is clinically sober, walking well, alert and oriented, safe for d/c. The patient is clinically sober. The patient is alert and oriented x 3, is clear and coherent in conversation and has a normal gait and shows no signs of acute intoxication. The patient is safe for discharge.

## 2017-06-19 NOTE — ED PROVIDER NOTE - MEDICAL DECISION MAKING DETAILS
pt with ETOH use, mildly tremulous, will observe to sobriety+ librium, also epigastric pain concerning for pancreatitis vs alcohol gastritis will check basics + GI cocktail

## 2017-06-19 NOTE — ED ADULT NURSE NOTE - OBJECTIVE STATEMENT
pt presents to ED hx of gastritis and pancreatitis. pt drinks daily and last drink was "early" this AM when he drank "some beers". patient appears agitated (see CIWA). c/o upper middle quadrant abd pain that worsens on palpation as well as nausea. no current vomiting or bleeding noted, pt states he has not been eating well the last few days. Denies SI or HI.

## 2017-06-19 NOTE — ED PROVIDER NOTE - OBJECTIVE STATEMENT
53yo m pmh ETOH abuse, essential hypertension, and glaucoma presents to ED with epigastric abdominal pain. Endorses hx of gastritis and pancreatitis. + vomiting for past 4 days. Drinks approx 12 beers a day, 6 beers this AM. Mild nausea. No headache. Denies CP, palpitation. Pt endorses mild withdrawal symptoms.

## 2017-06-19 NOTE — ED ADULT TRIAGE NOTE - CHIEF COMPLAINT QUOTE
p/t  with hx ETOH c/o of tremors and abd pain for few days c/o of nausea and vomiting as well p/t is diaphoretic @ present last alcohol intake this am

## 2017-06-19 NOTE — ED ADULT NURSE NOTE - CHPI ED SYMPTOMS NEG
no diarrhea/no vomiting/no abdominal distension/no dysuria/no burning urination/no fever/no hematuria/no chills

## 2017-06-19 NOTE — ED PROVIDER NOTE - ATTENDING CONTRIBUTION TO CARE
52m, pmhx etoh abuse, glaucoma, h/o withdrawls, h/o pancreatitis and alcoholic gastritis. p/w 4 dayus of epigastric pain and vomiting. last etoh this am. c/o feeling shaky. no h/a. diarrhea x 1 episode 4 days ago, none since, no hematemesis, no f/c. no h/a. denies drugs. exam, tachy in triage but resolved to 80s w/o intervention, other vs wnl, nad. gcs 15. old epistaxis right nare, hs and lungs normal. +epigastric tenderness, no g/r. no hallucinations, dry mouth. tremors only when lifting arms, no spontaneous tremors. pt with alcholic gastritis vs pancreatitis, midl w/d. will give librium, maalox, pepcid, bloods, IVF, r/a.

## 2017-06-21 ENCOUNTER — INPATIENT (INPATIENT)
Facility: HOSPITAL | Age: 52
LOS: 3 days | Discharge: HOME | End: 2017-06-25
Attending: INTERNAL MEDICINE | Admitting: INTERNAL MEDICINE
Payer: MEDICAID

## 2017-06-21 VITALS
SYSTOLIC BLOOD PRESSURE: 144 MMHG | TEMPERATURE: 98 F | RESPIRATION RATE: 18 BRPM | OXYGEN SATURATION: 95 % | HEART RATE: 73 BPM | WEIGHT: 199.96 LBS | DIASTOLIC BLOOD PRESSURE: 100 MMHG | HEIGHT: 68 IN

## 2017-06-21 LAB
ALBUMIN SERPL ELPH-MCNC: 3.7 G/DL — SIGNIFICANT CHANGE UP (ref 3.3–5)
ALP SERPL-CCNC: 67 U/L — SIGNIFICANT CHANGE UP (ref 40–120)
ALT FLD-CCNC: 137 U/L — HIGH (ref 12–78)
ANION GAP SERPL CALC-SCNC: 11 MMOL/L — SIGNIFICANT CHANGE UP (ref 5–17)
AST SERPL-CCNC: 148 U/L — HIGH (ref 15–37)
BASOPHILS # BLD AUTO: 0.1 K/UL — SIGNIFICANT CHANGE UP (ref 0–0.2)
BASOPHILS NFR BLD AUTO: 2.2 % — HIGH (ref 0–2)
BILIRUB SERPL-MCNC: 0.7 MG/DL — SIGNIFICANT CHANGE UP (ref 0.2–1.2)
BUN SERPL-MCNC: 9 MG/DL — SIGNIFICANT CHANGE UP (ref 7–23)
CALCIUM SERPL-MCNC: 8.3 MG/DL — LOW (ref 8.5–10.1)
CHLORIDE SERPL-SCNC: 108 MMOL/L — SIGNIFICANT CHANGE UP (ref 96–108)
CO2 SERPL-SCNC: 26 MMOL/L — SIGNIFICANT CHANGE UP (ref 22–31)
CREAT SERPL-MCNC: 0.65 MG/DL — SIGNIFICANT CHANGE UP (ref 0.5–1.3)
EOSINOPHIL # BLD AUTO: 0.1 K/UL — SIGNIFICANT CHANGE UP (ref 0–0.5)
EOSINOPHIL NFR BLD AUTO: 2 % — SIGNIFICANT CHANGE UP (ref 0–6)
ETHANOL SERPL-MCNC: 302 MG/DL — HIGH (ref 0–10)
GLUCOSE SERPL-MCNC: 91 MG/DL — SIGNIFICANT CHANGE UP (ref 70–99)
HCT VFR BLD CALC: 40.9 % — SIGNIFICANT CHANGE UP (ref 39–50)
HGB BLD-MCNC: 14.6 G/DL — SIGNIFICANT CHANGE UP (ref 13–17)
LACTATE SERPL-SCNC: 2.4 MMOL/L — HIGH (ref 0.7–2)
LIDOCAIN IGE QN: 129 U/L — SIGNIFICANT CHANGE UP (ref 73–393)
LYMPHOCYTES # BLD AUTO: 2.5 K/UL — SIGNIFICANT CHANGE UP (ref 1–3.3)
LYMPHOCYTES # BLD AUTO: 53.8 % — HIGH (ref 13–44)
MCHC RBC-ENTMCNC: 32.7 PG — SIGNIFICANT CHANGE UP (ref 27–34)
MCHC RBC-ENTMCNC: 35.8 GM/DL — SIGNIFICANT CHANGE UP (ref 32–36)
MCV RBC AUTO: 91.4 FL — SIGNIFICANT CHANGE UP (ref 80–100)
MONOCYTES # BLD AUTO: 0.2 K/UL — SIGNIFICANT CHANGE UP (ref 0–0.9)
MONOCYTES NFR BLD AUTO: 4.9 % — SIGNIFICANT CHANGE UP (ref 2–14)
NEUTROPHILS # BLD AUTO: 1.7 K/UL — LOW (ref 1.8–7.4)
NEUTROPHILS NFR BLD AUTO: 37 % — LOW (ref 43–77)
PLATELET # BLD AUTO: 196 K/UL — SIGNIFICANT CHANGE UP (ref 150–400)
POTASSIUM SERPL-MCNC: 4 MMOL/L — SIGNIFICANT CHANGE UP (ref 3.5–5.3)
POTASSIUM SERPL-SCNC: 4 MMOL/L — SIGNIFICANT CHANGE UP (ref 3.5–5.3)
PROT SERPL-MCNC: 8.5 GM/DL — HIGH (ref 6–8.3)
RBC # BLD: 4.47 M/UL — SIGNIFICANT CHANGE UP (ref 4.2–5.8)
RBC # FLD: 12.5 % — SIGNIFICANT CHANGE UP (ref 11–15)
SODIUM SERPL-SCNC: 145 MMOL/L — SIGNIFICANT CHANGE UP (ref 135–145)
WBC # BLD: 4.7 K/UL — SIGNIFICANT CHANGE UP (ref 3.8–10.5)
WBC # FLD AUTO: 4.7 K/UL — SIGNIFICANT CHANGE UP (ref 3.8–10.5)

## 2017-06-21 PROCEDURE — 71010: CPT | Mod: 26

## 2017-06-21 PROCEDURE — 74177 CT ABD & PELVIS W/CONTRAST: CPT | Mod: 26

## 2017-06-21 PROCEDURE — 99285 EMERGENCY DEPT VISIT HI MDM: CPT

## 2017-06-21 RX ORDER — SODIUM CHLORIDE 9 MG/ML
1000 INJECTION, SOLUTION INTRAVENOUS
Qty: 0 | Refills: 0 | Status: COMPLETED | OUTPATIENT
Start: 2017-06-21 | End: 2017-06-21

## 2017-06-21 RX ORDER — FAMOTIDINE 10 MG/ML
20 INJECTION INTRAVENOUS ONCE
Qty: 0 | Refills: 0 | Status: COMPLETED | OUTPATIENT
Start: 2017-06-21 | End: 2017-06-21

## 2017-06-21 RX ADMIN — Medication 25 MILLIGRAM(S): at 23:25

## 2017-06-21 RX ADMIN — FAMOTIDINE 20 MILLIGRAM(S): 10 INJECTION INTRAVENOUS at 20:26

## 2017-06-21 RX ADMIN — Medication 1 MILLIGRAM(S): at 17:32

## 2017-06-21 RX ADMIN — Medication 30 MILLILITER(S): at 20:25

## 2017-06-21 RX ADMIN — SODIUM CHLORIDE 250 MILLILITER(S): 9 INJECTION, SOLUTION INTRAVENOUS at 18:29

## 2017-06-21 NOTE — ED ADULT NURSE NOTE - CHIEF COMPLAINT QUOTE
Found wandering around hospital, admits to drinking for a month, depressed not suicidal or homicidal

## 2017-06-21 NOTE — ED PROVIDER NOTE - CONSTITUTIONAL, MLM
normal... Well appearing, well nourished, awake, alert, oriented to person, place, time/situation and in no apparent distress. Well appearing, well nourished, awake, alert, oriented to person, place, time/situation and noted shaking of hands, appears anxious

## 2017-06-21 NOTE — ED ADULT NURSE REASSESSMENT NOTE - NS ED NURSE REASSESS COMMENT FT1
Received pt with c/o alcohol intox , ct scan done, c/o epigastric pain , medicated as per ordered,pt ax ox3, denies any chest pain , CIWA done, librium given ,admitted for alcohol withdrawal without complication, will monitor.

## 2017-06-21 NOTE — ED PROVIDER NOTE - OBJECTIVE STATEMENT
52 year old male with PMH of HTN, alcoholism, glaucoma presenting to ED due to epigastric pain x 1 day, nausea, no vomiting otherwise. Has been drinking for 1 month, feels depressed but not suicidal, states otherwise wanted to be seen for abdominal discomfort and would like to get help for his drinking. Pt last drank this AM.

## 2017-06-21 NOTE — ED ADULT NURSE NOTE - OBJECTIVE STATEMENT
patient c/o of abdominal pain , with diarrhea and mild difficulty breathing , patient also c/o of chest pain , patient stated " I'm here because I need help I drink to muck , I'm drinking every day for 2 weeks " , c/o of depression , denied Si/Hi at this time

## 2017-06-21 NOTE — ED PROVIDER NOTE - PROGRESS NOTE DETAILS
Patient received on sign out from Dr. Mcmahon.  VSS.  Labs and CT reviewed.  Patient termulous, current CIWA score is 3.  Patient really wishes to quit drinking.  Patient is to be admitted to the hospital and the case was discussed with the admitting physician.  Any changes in plan, additional imaging/labs, and further work up will be at the discretion of the admitting physician.  Patient remained stable in my care.

## 2017-06-22 DIAGNOSIS — K70.0 ALCOHOLIC FATTY LIVER: ICD-10-CM

## 2017-06-22 DIAGNOSIS — H40.9 UNSPECIFIED GLAUCOMA: ICD-10-CM

## 2017-06-22 DIAGNOSIS — K29.20 ALCOHOLIC GASTRITIS WITHOUT BLEEDING: ICD-10-CM

## 2017-06-22 DIAGNOSIS — I10 ESSENTIAL (PRIMARY) HYPERTENSION: ICD-10-CM

## 2017-06-22 DIAGNOSIS — F10.230 ALCOHOL DEPENDENCE WITH WITHDRAWAL, UNCOMPLICATED: ICD-10-CM

## 2017-06-22 LAB
ALBUMIN SERPL ELPH-MCNC: 3.1 G/DL — LOW (ref 3.3–5)
ALP SERPL-CCNC: 61 U/L — SIGNIFICANT CHANGE UP (ref 40–120)
ALT FLD-CCNC: 111 U/L — HIGH (ref 12–78)
ANION GAP SERPL CALC-SCNC: 12 MMOL/L — SIGNIFICANT CHANGE UP (ref 5–17)
AST SERPL-CCNC: 139 U/L — HIGH (ref 15–37)
BILIRUB DIRECT SERPL-MCNC: 0.26 MG/DL — HIGH (ref 0.05–0.2)
BILIRUB INDIRECT FLD-MCNC: 0.7 MG/DL — SIGNIFICANT CHANGE UP (ref 0.2–1)
BILIRUB SERPL-MCNC: 1 MG/DL — SIGNIFICANT CHANGE UP (ref 0.2–1.2)
BUN SERPL-MCNC: 8 MG/DL — SIGNIFICANT CHANGE UP (ref 7–23)
CALCIUM SERPL-MCNC: 7.5 MG/DL — LOW (ref 8.5–10.1)
CHLORIDE SERPL-SCNC: 107 MMOL/L — SIGNIFICANT CHANGE UP (ref 96–108)
CO2 SERPL-SCNC: 25 MMOL/L — SIGNIFICANT CHANGE UP (ref 22–31)
CREAT SERPL-MCNC: 0.6 MG/DL — SIGNIFICANT CHANGE UP (ref 0.5–1.3)
GLUCOSE SERPL-MCNC: 89 MG/DL — SIGNIFICANT CHANGE UP (ref 70–99)
HCT VFR BLD CALC: 35.8 % — LOW (ref 39–50)
HGB BLD-MCNC: 12.9 G/DL — LOW (ref 13–17)
LACTATE SERPL-SCNC: 1.1 MMOL/L — SIGNIFICANT CHANGE UP (ref 0.7–2)
LACTATE SERPL-SCNC: 2.1 MMOL/L — HIGH (ref 0.7–2)
MAGNESIUM SERPL-MCNC: 2 MG/DL — SIGNIFICANT CHANGE UP (ref 1.6–2.6)
MCHC RBC-ENTMCNC: 33 PG — SIGNIFICANT CHANGE UP (ref 27–34)
MCHC RBC-ENTMCNC: 35.9 GM/DL — SIGNIFICANT CHANGE UP (ref 32–36)
MCV RBC AUTO: 91.8 FL — SIGNIFICANT CHANGE UP (ref 80–100)
PHOSPHATE SERPL-MCNC: 2.4 MG/DL — LOW (ref 2.5–4.5)
PLATELET # BLD AUTO: 140 K/UL — LOW (ref 150–400)
POTASSIUM SERPL-MCNC: 3.3 MMOL/L — LOW (ref 3.5–5.3)
POTASSIUM SERPL-SCNC: 3.3 MMOL/L — LOW (ref 3.5–5.3)
PROT SERPL-MCNC: 6.9 GM/DL — SIGNIFICANT CHANGE UP (ref 6–8.3)
RBC # BLD: 3.9 M/UL — LOW (ref 4.2–5.8)
RBC # FLD: 12.4 % — SIGNIFICANT CHANGE UP (ref 11–15)
SODIUM SERPL-SCNC: 144 MMOL/L — SIGNIFICANT CHANGE UP (ref 135–145)
WBC # BLD: 3.9 K/UL — SIGNIFICANT CHANGE UP (ref 3.8–10.5)
WBC # FLD AUTO: 3.9 K/UL — SIGNIFICANT CHANGE UP (ref 3.8–10.5)

## 2017-06-22 PROCEDURE — 99223 1ST HOSP IP/OBS HIGH 75: CPT

## 2017-06-22 RX ORDER — DORZOLAMIDE HYDROCHLORIDE TIMOLOL MALEATE 20; 5 MG/ML; MG/ML
1 SOLUTION/ DROPS OPHTHALMIC
Qty: 0 | Refills: 0 | Status: DISCONTINUED | OUTPATIENT
Start: 2017-06-22 | End: 2017-06-25

## 2017-06-22 RX ORDER — AMLODIPINE BESYLATE 2.5 MG/1
2.5 TABLET ORAL DAILY
Qty: 0 | Refills: 0 | Status: DISCONTINUED | OUTPATIENT
Start: 2017-06-22 | End: 2017-06-23

## 2017-06-22 RX ORDER — POTASSIUM CHLORIDE 20 MEQ
20 PACKET (EA) ORAL ONCE
Qty: 0 | Refills: 0 | Status: COMPLETED | OUTPATIENT
Start: 2017-06-22 | End: 2017-06-22

## 2017-06-22 RX ORDER — SODIUM CHLORIDE 9 MG/ML
1000 INJECTION INTRAMUSCULAR; INTRAVENOUS; SUBCUTANEOUS ONCE
Qty: 0 | Refills: 0 | Status: COMPLETED | OUTPATIENT
Start: 2017-06-22 | End: 2017-06-22

## 2017-06-22 RX ORDER — IBUPROFEN 200 MG
600 TABLET ORAL ONCE
Qty: 0 | Refills: 0 | Status: COMPLETED | OUTPATIENT
Start: 2017-06-22 | End: 2017-06-22

## 2017-06-22 RX ORDER — SODIUM CHLORIDE 9 MG/ML
1000 INJECTION INTRAMUSCULAR; INTRAVENOUS; SUBCUTANEOUS
Qty: 0 | Refills: 0 | Status: DISCONTINUED | OUTPATIENT
Start: 2017-06-22 | End: 2017-06-23

## 2017-06-22 RX ORDER — POTASSIUM PHOSPHATE, MONOBASIC POTASSIUM PHOSPHATE, DIBASIC 236; 224 MG/ML; MG/ML
15 INJECTION, SOLUTION INTRAVENOUS ONCE
Qty: 0 | Refills: 0 | Status: COMPLETED | OUTPATIENT
Start: 2017-06-22 | End: 2017-06-22

## 2017-06-22 RX ORDER — LATANOPROST 0.05 MG/ML
1 SOLUTION/ DROPS OPHTHALMIC; TOPICAL AT BEDTIME
Qty: 0 | Refills: 0 | Status: DISCONTINUED | OUTPATIENT
Start: 2017-06-22 | End: 2017-06-25

## 2017-06-22 RX ORDER — PANTOPRAZOLE SODIUM 20 MG/1
40 TABLET, DELAYED RELEASE ORAL DAILY
Qty: 0 | Refills: 0 | Status: DISCONTINUED | OUTPATIENT
Start: 2017-06-22 | End: 2017-06-22

## 2017-06-22 RX ORDER — HEPARIN SODIUM 5000 [USP'U]/ML
5000 INJECTION INTRAVENOUS; SUBCUTANEOUS EVERY 12 HOURS
Qty: 0 | Refills: 0 | Status: DISCONTINUED | OUTPATIENT
Start: 2017-06-22 | End: 2017-06-24

## 2017-06-22 RX ORDER — PANTOPRAZOLE SODIUM 20 MG/1
40 TABLET, DELAYED RELEASE ORAL
Qty: 0 | Refills: 0 | Status: DISCONTINUED | OUTPATIENT
Start: 2017-06-22 | End: 2017-06-25

## 2017-06-22 RX ORDER — PREDNISOLONE SODIUM PHOSPHATE 1 %
1 DROPS OPHTHALMIC (EYE)
Qty: 0 | Refills: 0 | Status: DISCONTINUED | OUTPATIENT
Start: 2017-06-22 | End: 2017-06-25

## 2017-06-22 RX ORDER — THIAMINE MONONITRATE (VIT B1) 100 MG
100 TABLET ORAL DAILY
Qty: 0 | Refills: 0 | Status: DISCONTINUED | OUTPATIENT
Start: 2017-06-22 | End: 2017-06-25

## 2017-06-22 RX ADMIN — Medication 50 MILLIGRAM(S): at 12:14

## 2017-06-22 RX ADMIN — HEPARIN SODIUM 5000 UNIT(S): 5000 INJECTION INTRAVENOUS; SUBCUTANEOUS at 18:16

## 2017-06-22 RX ADMIN — Medication 50 MILLIGRAM(S): at 05:21

## 2017-06-22 RX ADMIN — PANTOPRAZOLE SODIUM 40 MILLIGRAM(S): 20 TABLET, DELAYED RELEASE ORAL at 00:53

## 2017-06-22 RX ADMIN — Medication 1 DROP(S): at 05:22

## 2017-06-22 RX ADMIN — SODIUM CHLORIDE 1000 MILLILITER(S): 9 INJECTION INTRAMUSCULAR; INTRAVENOUS; SUBCUTANEOUS at 02:13

## 2017-06-22 RX ADMIN — Medication 1 DROP(S): at 18:17

## 2017-06-22 RX ADMIN — Medication 100 MILLIGRAM(S): at 09:52

## 2017-06-22 RX ADMIN — Medication 600 MILLIGRAM(S): at 15:40

## 2017-06-22 RX ADMIN — Medication 1 TABLET(S): at 09:53

## 2017-06-22 RX ADMIN — DORZOLAMIDE HYDROCHLORIDE TIMOLOL MALEATE 1 DROP(S): 20; 5 SOLUTION/ DROPS OPHTHALMIC at 05:22

## 2017-06-22 RX ADMIN — Medication 600 MILLIGRAM(S): at 12:15

## 2017-06-22 RX ADMIN — SODIUM CHLORIDE 125 MILLILITER(S): 9 INJECTION INTRAMUSCULAR; INTRAVENOUS; SUBCUTANEOUS at 09:57

## 2017-06-22 RX ADMIN — HEPARIN SODIUM 5000 UNIT(S): 5000 INJECTION INTRAVENOUS; SUBCUTANEOUS at 05:21

## 2017-06-22 RX ADMIN — Medication 2 MILLIGRAM(S): at 09:51

## 2017-06-22 RX ADMIN — SODIUM CHLORIDE 60 MILLILITER(S): 9 INJECTION INTRAMUSCULAR; INTRAVENOUS; SUBCUTANEOUS at 18:16

## 2017-06-22 RX ADMIN — Medication 50 MILLIGRAM(S): at 18:16

## 2017-06-22 RX ADMIN — Medication 20 MILLIEQUIVALENT(S): at 12:14

## 2017-06-22 RX ADMIN — PANTOPRAZOLE SODIUM 40 MILLIGRAM(S): 20 TABLET, DELAYED RELEASE ORAL at 09:51

## 2017-06-22 RX ADMIN — AMLODIPINE BESYLATE 2.5 MILLIGRAM(S): 2.5 TABLET ORAL at 05:21

## 2017-06-22 RX ADMIN — POTASSIUM PHOSPHATE, MONOBASIC POTASSIUM PHOSPHATE, DIBASIC 63.75 MILLIMOLE(S): 236; 224 INJECTION, SOLUTION INTRAVENOUS at 09:51

## 2017-06-22 RX ADMIN — DORZOLAMIDE HYDROCHLORIDE TIMOLOL MALEATE 1 DROP(S): 20; 5 SOLUTION/ DROPS OPHTHALMIC at 18:17

## 2017-06-22 NOTE — CHART NOTE - NSCHARTNOTEFT_GEN_A_CORE
Seen and examined, VSS. Continue Librium taper per CIWA protocol. Lactate normalized. Decrease IVF to 60/h.  ETOH 389 on arriva.

## 2017-06-22 NOTE — H&P ADULT - PROBLEM SELECTOR PLAN 1
admit telemetry, CIWA protocol with benzodiazepine taper, IV fluids with thiamine  electrolyte replacement

## 2017-06-22 NOTE — H&P ADULT - NSHPPHYSICALEXAM_GEN_ALL_CORE
T(C): 36.8, Max: 36.8 (06-21 @ 18:47)  T(F): 98.2, Max: 98.2 (06-21 @ 18:47)  HR: 71 (71 - 73)  BP: 121/78 (121/78 - 144/100)  BP(mean): --  RR: 19 (18 - 19)  SpO2: 97% (95% - 97%)      PHYSICAL EXAM:  GENERAL: NAD, well-groomed, well-developed  HEAD:  Atraumatic, Normocephalic  EYES: EOMI, PERRLA, conjunctiva and sclera clear  ENMT: No tonsillar erythema, exudates, or enlargement;  No lesions  NECK: Supple, No JVD, Normal thyroid  NERVOUS SYSTEM:  Alert & Oriented X3, Good concentration; Motor Strength 5/5 B/L upper and lower extremities; DTRs 2+ intact and symmetric, + tremors  CHEST/LUNG: Clear to percussion bilaterally; No rales, rhonchi, wheezing, or rubs  HEART: Regular rate and rhythm; No murmurs, rubs, or gallops  ABDOMEN: Soft, tender epigastrium and RUQ, Nondistended; Bowel sounds present  EXTREMITIES:  + Peripheral Pulses, No clubbing, cyanosis, or edema  LYMPH: No lymphadenopathy noted  SKIN: No rashes or lesions

## 2017-06-22 NOTE — H&P ADULT - NSHPREVIEWOFSYSTEMS_GEN_ALL_CORE
REVIEW OF SYSTEMS:  CONSTITUTIONAL: No fever, weight loss, or fatigue  EYES: No eye pain, visual disturbances, or discharge  ENMT:  No difficulty hearing, tinnitus, vertigo; No sinus or throat pain  NECK: No pain or stiffness  RESPIRATORY: No cough, wheezing, chills or hemoptysis; No shortness of breath  CARDIOVASCULAR: No chest pain, palpitations, dizziness, or leg swelling  GASTROINTESTINAL: + epigastric pain. + nausea, no vomiting, or hematemesis; No diarrhea or constipation. No melena or hematochezia.  GENITOURINARY: No dysuria, frequency, hematuria, or incontinence  NEUROLOGICAL: +headaches, no memory loss, loss of strength, numbness, + tremors  SKIN: No itching, burning, rashes, or lesions   LYMPH NODES: No enlarged glands  ENDOCRINE: No heat or cold intolerance; No hair loss  MUSCULOSKELETAL: No joint pain or swelling; No muscle, back, or extremity pain  PSYCHIATRIC: + depression, + anxiety, no mood swings, or difficulty sleeping  HEME/LYMPH: No easy bruising, or bleeding gums  ALLERGY AND IMMUNOLOGIC: No hives or eczema

## 2017-06-22 NOTE — H&P ADULT - ASSESSMENT
51 y/o hypertensive man, chronic alcohol abuser, presents with alcohol withdrawal and epigastric pain and nausea. No evidence of pancreatitis, most likely he has alcoholic gastritis. Lab tests and imaging suggest fatty liver probably due to alcohol. Will admit to telemetry, CIWA protocol with fluids and electrolyte replacement.

## 2017-06-22 NOTE — H&P ADULT - NSHPLABSRESULTS_GEN_ALL_CORE
LABS:                        14.6   4.7   )-----------( 196      ( 21 Jun 2017 17:38 )             40.9     06-21    145  |  108  |  9   ----------------------------<  91  4.0   |  26  |  0.65    Ca    8.3<L>      21 Jun 2017 17:38    TPro  8.5<H>  /  Alb  3.7  /  TBili  0.7  /  DBili  x   /  AST  148<H>  /  ALT  137<H>  /  AlkPhos  67  06-21    Lipase, Serum (06.21.17 @ 17:38)    Lipase, Serum: 129 U/L      CAPILLARY BLOOD GLUCOSE  85 (21 Jun 2017 14:43)      RADIOLOGY & ADDITIONAL TESTS:   CXR: Clear  lungs.  No acute airspace disease suggested.    CT abd: Extensive fatty infiltration of the liver. Small gallstone. No ductal   dilatation seen. Pancreas unremarkable. Scattered calcified granulomata in the mesentery.  No acute GI phlegmon abscess or obstruction.    Imaging Personally Reviewed:  [ x] YES  [ ] NO    EKG: sinus@ 74 no acute ST T changes

## 2017-06-22 NOTE — H&P ADULT - HISTORY OF PRESENT ILLNESS
52 year old male with PMH of HTN, alcoholism, glaucoma presenting to ED due to epigastric pain x 1 day, nausea, no vomiting otherwise. He has history of pancreatitis and alcoholic gastritis, and multiple episodes of alcohol withdrawal. He has been drinking for 1 month, a six pack and shot of vodka daily, and feels he needs to stop drinking.   He feels depressed but not suicidal, states otherwise wanted to be seen for abdominal discomfort and would like to get help for his drinking. Pt last drank this AM.  No fever, chills, cough, SOB, wheezing. Has mild headache.

## 2017-06-23 LAB
ALBUMIN SERPL ELPH-MCNC: 3.1 G/DL — LOW (ref 3.3–5)
ALP SERPL-CCNC: 57 U/L — SIGNIFICANT CHANGE UP (ref 40–120)
ALT FLD-CCNC: 113 U/L — HIGH (ref 12–78)
ANION GAP SERPL CALC-SCNC: 8 MMOL/L — SIGNIFICANT CHANGE UP (ref 5–17)
AST SERPL-CCNC: 132 U/L — HIGH (ref 15–37)
BILIRUB DIRECT SERPL-MCNC: 0.58 MG/DL — HIGH (ref 0.05–0.2)
BILIRUB INDIRECT FLD-MCNC: 1.2 MG/DL — HIGH (ref 0.2–1)
BILIRUB SERPL-MCNC: 1.8 MG/DL — HIGH (ref 0.2–1.2)
BUN SERPL-MCNC: 6 MG/DL — LOW (ref 7–23)
CALCIUM SERPL-MCNC: 8.3 MG/DL — LOW (ref 8.5–10.1)
CHLORIDE SERPL-SCNC: 105 MMOL/L — SIGNIFICANT CHANGE UP (ref 96–108)
CO2 SERPL-SCNC: 27 MMOL/L — SIGNIFICANT CHANGE UP (ref 22–31)
CREAT SERPL-MCNC: 0.6 MG/DL — SIGNIFICANT CHANGE UP (ref 0.5–1.3)
GLUCOSE SERPL-MCNC: 102 MG/DL — HIGH (ref 70–99)
HCT VFR BLD CALC: 35.9 % — LOW (ref 39–50)
HGB BLD-MCNC: 13.1 G/DL — SIGNIFICANT CHANGE UP (ref 13–17)
MAGNESIUM SERPL-MCNC: 1.8 MG/DL — SIGNIFICANT CHANGE UP (ref 1.6–2.6)
MCHC RBC-ENTMCNC: 32.7 PG — SIGNIFICANT CHANGE UP (ref 27–34)
MCHC RBC-ENTMCNC: 36.6 GM/DL — HIGH (ref 32–36)
MCV RBC AUTO: 89.2 FL — SIGNIFICANT CHANGE UP (ref 80–100)
PHOSPHATE SERPL-MCNC: 2.6 MG/DL — SIGNIFICANT CHANGE UP (ref 2.5–4.5)
PLATELET # BLD AUTO: 117 K/UL — LOW (ref 150–400)
POTASSIUM SERPL-MCNC: 3.5 MMOL/L — SIGNIFICANT CHANGE UP (ref 3.5–5.3)
POTASSIUM SERPL-SCNC: 3.5 MMOL/L — SIGNIFICANT CHANGE UP (ref 3.5–5.3)
PROT SERPL-MCNC: 7.2 GM/DL — SIGNIFICANT CHANGE UP (ref 6–8.3)
RBC # BLD: 4.02 M/UL — LOW (ref 4.2–5.8)
RBC # FLD: 12.3 % — SIGNIFICANT CHANGE UP (ref 11–15)
SODIUM SERPL-SCNC: 140 MMOL/L — SIGNIFICANT CHANGE UP (ref 135–145)
WBC # BLD: 3.9 K/UL — SIGNIFICANT CHANGE UP (ref 3.8–10.5)
WBC # FLD AUTO: 3.9 K/UL — SIGNIFICANT CHANGE UP (ref 3.8–10.5)

## 2017-06-23 RX ORDER — IBUPROFEN 200 MG
400 TABLET ORAL ONCE
Qty: 0 | Refills: 0 | Status: COMPLETED | OUTPATIENT
Start: 2017-06-23 | End: 2017-06-23

## 2017-06-23 RX ORDER — AMLODIPINE BESYLATE 2.5 MG/1
5 TABLET ORAL DAILY
Qty: 0 | Refills: 0 | Status: DISCONTINUED | OUTPATIENT
Start: 2017-06-23 | End: 2017-06-25

## 2017-06-23 RX ADMIN — DORZOLAMIDE HYDROCHLORIDE TIMOLOL MALEATE 1 DROP(S): 20; 5 SOLUTION/ DROPS OPHTHALMIC at 17:21

## 2017-06-23 RX ADMIN — HEPARIN SODIUM 5000 UNIT(S): 5000 INJECTION INTRAVENOUS; SUBCUTANEOUS at 05:24

## 2017-06-23 RX ADMIN — AMLODIPINE BESYLATE 2.5 MILLIGRAM(S): 2.5 TABLET ORAL at 05:23

## 2017-06-23 RX ADMIN — DORZOLAMIDE HYDROCHLORIDE TIMOLOL MALEATE 1 DROP(S): 20; 5 SOLUTION/ DROPS OPHTHALMIC at 05:23

## 2017-06-23 RX ADMIN — Medication 50 MILLIGRAM(S): at 22:00

## 2017-06-23 RX ADMIN — Medication 400 MILLIGRAM(S): at 09:06

## 2017-06-23 RX ADMIN — Medication 1 TABLET(S): at 11:52

## 2017-06-23 RX ADMIN — LATANOPROST 1 DROP(S): 0.05 SOLUTION/ DROPS OPHTHALMIC; TOPICAL at 22:00

## 2017-06-23 RX ADMIN — HEPARIN SODIUM 5000 UNIT(S): 5000 INJECTION INTRAVENOUS; SUBCUTANEOUS at 17:22

## 2017-06-23 RX ADMIN — Medication 100 MILLIGRAM(S): at 11:52

## 2017-06-23 RX ADMIN — Medication 1 DROP(S): at 17:21

## 2017-06-23 RX ADMIN — Medication 400 MILLIGRAM(S): at 09:50

## 2017-06-23 RX ADMIN — PANTOPRAZOLE SODIUM 40 MILLIGRAM(S): 20 TABLET, DELAYED RELEASE ORAL at 05:26

## 2017-06-23 RX ADMIN — Medication 50 MILLIGRAM(S): at 05:23

## 2017-06-23 RX ADMIN — SODIUM CHLORIDE 60 MILLILITER(S): 9 INJECTION INTRAMUSCULAR; INTRAVENOUS; SUBCUTANEOUS at 11:56

## 2017-06-23 RX ADMIN — Medication 1 DROP(S): at 05:23

## 2017-06-23 RX ADMIN — LATANOPROST 1 DROP(S): 0.05 SOLUTION/ DROPS OPHTHALMIC; TOPICAL at 00:14

## 2017-06-23 RX ADMIN — Medication 50 MILLIGRAM(S): at 00:13

## 2017-06-23 RX ADMIN — Medication 50 MILLIGRAM(S): at 13:16

## 2017-06-24 DIAGNOSIS — F10.10 ALCOHOL ABUSE, UNCOMPLICATED: ICD-10-CM

## 2017-06-24 DIAGNOSIS — D69.6 THROMBOCYTOPENIA, UNSPECIFIED: ICD-10-CM

## 2017-06-24 DIAGNOSIS — R79.89 OTHER SPECIFIED ABNORMAL FINDINGS OF BLOOD CHEMISTRY: ICD-10-CM

## 2017-06-24 PROCEDURE — 99233 SBSQ HOSP IP/OBS HIGH 50: CPT

## 2017-06-24 RX ORDER — DIPHENHYDRAMINE HCL 50 MG
25 CAPSULE ORAL ONCE
Qty: 0 | Refills: 0 | Status: COMPLETED | OUTPATIENT
Start: 2017-06-24 | End: 2017-06-24

## 2017-06-24 RX ORDER — ACETAMINOPHEN 500 MG
650 TABLET ORAL EVERY 6 HOURS
Qty: 0 | Refills: 0 | Status: DISCONTINUED | OUTPATIENT
Start: 2017-06-24 | End: 2017-06-25

## 2017-06-24 RX ADMIN — Medication 1 DROP(S): at 06:28

## 2017-06-24 RX ADMIN — HEPARIN SODIUM 5000 UNIT(S): 5000 INJECTION INTRAVENOUS; SUBCUTANEOUS at 06:28

## 2017-06-24 RX ADMIN — DORZOLAMIDE HYDROCHLORIDE TIMOLOL MALEATE 1 DROP(S): 20; 5 SOLUTION/ DROPS OPHTHALMIC at 06:28

## 2017-06-24 RX ADMIN — Medication 100 MILLIGRAM(S): at 11:26

## 2017-06-24 RX ADMIN — DORZOLAMIDE HYDROCHLORIDE TIMOLOL MALEATE 1 DROP(S): 20; 5 SOLUTION/ DROPS OPHTHALMIC at 18:23

## 2017-06-24 RX ADMIN — Medication 50 MILLIGRAM(S): at 18:22

## 2017-06-24 RX ADMIN — Medication 25 MILLIGRAM(S): at 22:48

## 2017-06-24 RX ADMIN — Medication 1 DROP(S): at 18:22

## 2017-06-24 RX ADMIN — PANTOPRAZOLE SODIUM 40 MILLIGRAM(S): 20 TABLET, DELAYED RELEASE ORAL at 06:27

## 2017-06-24 RX ADMIN — Medication 1 TABLET(S): at 11:26

## 2017-06-24 RX ADMIN — Medication 650 MILLIGRAM(S): at 11:26

## 2017-06-24 RX ADMIN — LATANOPROST 1 DROP(S): 0.05 SOLUTION/ DROPS OPHTHALMIC; TOPICAL at 22:01

## 2017-06-24 RX ADMIN — AMLODIPINE BESYLATE 5 MILLIGRAM(S): 2.5 TABLET ORAL at 06:30

## 2017-06-24 NOTE — PROGRESS NOTE ADULT - SUBJECTIVE AND OBJECTIVE BOX
CC/F/U for: alcohol withdrawal    HPI:  52 year old male with PMH of HTN, alcoholism, glaucoma presenting to ED due to epigastric pain x 1 day, nausea, no vomiting otherwise. He has history of pancreatitis and alcoholic gastritis, and multiple episodes of alcohol withdrawal. He has been drinking for 1 month, a six pack and shot of vodka daily, and feels he needs to stop drinking.   He feels depressed but not suicidal, states otherwise wanted to be seen for abdominal discomfort and would like to get help for his drinking. Pt last drank this AM.  No fever, chills, cough, SOB, wheezing. Has mild headache. (22 Jun 2017 00:22)        INTERVAL HPI/OVERNIGHT EVENTS:  Pt seen and examined at bedside; sitting in chair; denies pain.     Allergies/Intolerance: No Known Allergies      MEDICATIONS  (STANDING):  chlordiazePOXIDE milliGRAM(s) Oral   chlordiazePOXIDE 50milliGRAM(s) Oral every 12 hours  dorzolamide 2%/timolol 0.5% Ophthalmic Solution 1Drop(s) Both EYES two times a day  latanoprost 0.005% Ophthalmic Solution 1Drop(s) Both EYES at bedtime  prednisoLONE acetate 1% Suspension 1Drop(s) Both EYES two times a day  multivitamin 1Tablet(s) Oral daily  thiamine 100milliGRAM(s) Oral daily  pantoprazole    Tablet 40milliGRAM(s) Oral before breakfast  amLODIPine   Tablet 5milliGRAM(s) Oral daily    MEDICATIONS  (PRN):  LORazepam   Injectable 2milliGRAM(s) IV Push every 2 hours PRN CIWA>8  acetaminophen   Tablet. 650milliGRAM(s) Oral every 6 hours PRN Mild Pain (1 - 3)        ROS: as above; all other systems reviewed and wnl      PHYSICAL EXAMINATION:  Vital Signs Last 24 Hrs  T(C): 35.9, Max: 37.3 (06-23 @ 16:20)  T(F): 96.6, Max: 99.2 (06-23 @ 16:20)  HR: 72 (55 - 72)  BP: 114/79 (114/79 - 133/90)  RR: 18 (18 - 18)  SpO2: 99% (98% - 99%)      GENERAL: middle-aged male; NAD  HEAD:  atraumatic, normocephalic  EYES: sclera anicteric  ENMT: mucous membranes dry  NECK: supple, No JVD  CHEST/LUNG: respirations unlabored; air entry symmetric; clear to auscultation bilaterally; no rales, rhonchi, or wheezing b/l  HEART: normal S1, S2  ABDOMEN: BS+, soft, ND, NT   EXTREMITIES:  pulses palpable; no clubbing, cyanosis, or edema b/l LEs  NEURO: awake, alert, interactive; moves all extremities; no asterixis  SKIN: no rashes or lesions      LABS:                        13.1   3.9   )-----------( 117      ( 23 Jun 2017 06:18 )             35.9     06-23    140  |  105  |  6<L>  ----------------------------<  102<H>  3.5   |  27  |  0.60    Ca    8.3<L>      23 Jun 2017 06:18  Phos  2.6     06-23  Mg     1.8     06-23    TPro  7.2  /  Alb  3.1<L>  /  TBili  1.8<H>  /  DBili  .58<H>  /  AST  132<H>  /  ALT  113<H>  /  AlkPhos  57  06-23      ASSESSMENT AND PLAN:  52y Male, HTN, etoh abuse c/b alcoholic gastritis and pancreatitis, adm w/epigastric pain/nausea, being tx'd for etoh w/dwl syndrome; on benzo/CIWA protocol; also transaminitis, increasing Tbili; CTAP w/fatty liver, but no acute intestinal process; no e/o acute infection, CXR clear    NEURO/PSYCH:  *etoh w/dwl - continue on CIWA, Librium taper; will complete tomorrow, and can likey be d/c'd  *etoh abuse - encourage abstinence; vitamin/mineral supplementation    GI: transaminitis and elev Tbili likely 2/2 fatty liver - monitor trend    CV: htn - BP control w/amlodipine    HEME: thrombocytopenia - platelets wnl on admit; d/c heparin; repeat CBC in AM    OTHER:   -dvt prophy w/IPCs given platelets  -PT eval

## 2017-06-25 VITALS
RESPIRATION RATE: 16 BRPM | SYSTOLIC BLOOD PRESSURE: 120 MMHG | DIASTOLIC BLOOD PRESSURE: 65 MMHG | OXYGEN SATURATION: 98 % | TEMPERATURE: 98 F | HEART RATE: 66 BPM

## 2017-06-25 LAB
ALBUMIN SERPL ELPH-MCNC: 3.4 G/DL — SIGNIFICANT CHANGE UP (ref 3.3–5)
ALP SERPL-CCNC: 61 U/L — SIGNIFICANT CHANGE UP (ref 40–120)
ALT FLD-CCNC: 146 U/L — HIGH (ref 12–78)
ANION GAP SERPL CALC-SCNC: 9 MMOL/L — SIGNIFICANT CHANGE UP (ref 5–17)
AST SERPL-CCNC: 131 U/L — HIGH (ref 15–37)
BILIRUB SERPL-MCNC: 0.5 MG/DL — SIGNIFICANT CHANGE UP (ref 0.2–1.2)
BUN SERPL-MCNC: 8 MG/DL — SIGNIFICANT CHANGE UP (ref 7–23)
CALCIUM SERPL-MCNC: 8.9 MG/DL — SIGNIFICANT CHANGE UP (ref 8.5–10.1)
CHLORIDE SERPL-SCNC: 107 MMOL/L — SIGNIFICANT CHANGE UP (ref 96–108)
CO2 SERPL-SCNC: 28 MMOL/L — SIGNIFICANT CHANGE UP (ref 22–31)
CREAT SERPL-MCNC: 0.69 MG/DL — SIGNIFICANT CHANGE UP (ref 0.5–1.3)
GLUCOSE SERPL-MCNC: 104 MG/DL — HIGH (ref 70–99)
HCT VFR BLD CALC: 41.5 % — SIGNIFICANT CHANGE UP (ref 39–50)
HGB BLD-MCNC: 14.9 G/DL — SIGNIFICANT CHANGE UP (ref 13–17)
MCHC RBC-ENTMCNC: 32.6 PG — SIGNIFICANT CHANGE UP (ref 27–34)
MCHC RBC-ENTMCNC: 36 GM/DL — SIGNIFICANT CHANGE UP (ref 32–36)
MCV RBC AUTO: 90.5 FL — SIGNIFICANT CHANGE UP (ref 80–100)
PLATELET # BLD AUTO: 124 K/UL — LOW (ref 150–400)
POTASSIUM SERPL-MCNC: 3.1 MMOL/L — LOW (ref 3.5–5.3)
POTASSIUM SERPL-SCNC: 3.1 MMOL/L — LOW (ref 3.5–5.3)
PROT SERPL-MCNC: 8 GM/DL — SIGNIFICANT CHANGE UP (ref 6–8.3)
RBC # BLD: 4.59 M/UL — SIGNIFICANT CHANGE UP (ref 4.2–5.8)
RBC # FLD: 12.9 % — SIGNIFICANT CHANGE UP (ref 11–15)
SODIUM SERPL-SCNC: 144 MMOL/L — SIGNIFICANT CHANGE UP (ref 135–145)
WBC # BLD: 5.5 K/UL — SIGNIFICANT CHANGE UP (ref 3.8–10.5)
WBC # FLD AUTO: 5.5 K/UL — SIGNIFICANT CHANGE UP (ref 3.8–10.5)

## 2017-06-25 PROCEDURE — 99239 HOSP IP/OBS DSCHRG MGMT >30: CPT

## 2017-06-25 RX ORDER — POTASSIUM CHLORIDE 20 MEQ
20 PACKET (EA) ORAL
Qty: 0 | Refills: 0 | Status: DISCONTINUED | OUTPATIENT
Start: 2017-06-25 | End: 2017-06-25

## 2017-06-25 RX ORDER — POTASSIUM CHLORIDE 20 MEQ
40 PACKET (EA) ORAL ONCE
Qty: 0 | Refills: 0 | Status: COMPLETED | OUTPATIENT
Start: 2017-06-25 | End: 2017-06-25

## 2017-06-25 RX ADMIN — Medication 50 MILLIGRAM(S): at 07:14

## 2017-06-25 RX ADMIN — Medication 40 MILLIEQUIVALENT(S): at 12:25

## 2017-06-25 RX ADMIN — Medication 650 MILLIGRAM(S): at 12:25

## 2017-06-25 RX ADMIN — AMLODIPINE BESYLATE 5 MILLIGRAM(S): 2.5 TABLET ORAL at 07:13

## 2017-06-25 RX ADMIN — Medication 1 TABLET(S): at 12:25

## 2017-06-25 RX ADMIN — Medication 100 MILLIGRAM(S): at 12:25

## 2017-06-25 RX ADMIN — Medication 20 MILLIEQUIVALENT(S): at 10:43

## 2017-06-25 RX ADMIN — Medication 50 MILLIGRAM(S): at 07:45

## 2017-06-25 RX ADMIN — Medication 1 DROP(S): at 07:14

## 2017-06-25 RX ADMIN — PANTOPRAZOLE SODIUM 40 MILLIGRAM(S): 20 TABLET, DELAYED RELEASE ORAL at 07:16

## 2017-06-25 RX ADMIN — Medication 650 MILLIGRAM(S): at 13:25

## 2017-06-25 RX ADMIN — DORZOLAMIDE HYDROCHLORIDE TIMOLOL MALEATE 1 DROP(S): 20; 5 SOLUTION/ DROPS OPHTHALMIC at 07:15

## 2017-06-25 NOTE — PHYSICAL THERAPY INITIAL EVALUATION ADULT - RISK REDUCTION/PREVENTION, PT EVAL
"  Reason for Disposition   [1] Request for URGENT new prescription or refill of "essential" medication (i.e., likelihood of harm to patient if not taken) AND [2] triager unable to fill per unit policy    Protocols used: ST MEDICATION QUESTION CALL-A-  needs insulin refill    Referred to Dougie Ashley RN  "
n/a

## 2017-06-25 NOTE — DISCHARGE NOTE ADULT - PROVIDER TOKENS
FREE:[LAST:[Followup with PMD within 3 days of discharge; STOP DRINKING ALCOHOL],PHONE:[(   )    -],FAX:[(   )    -]]

## 2017-06-25 NOTE — DISCHARGE NOTE ADULT - CARE PROVIDER_API CALL
Followup with PMD within 3 days of discharge; STOP DRINKING ALCOHOL,   Phone: (   )    -  Fax: (   )    -

## 2017-06-25 NOTE — DISCHARGE NOTE ADULT - MEDICATION SUMMARY - MEDICATIONS TO TAKE
I will START or STAY ON the medications listed below when I get home from the hospital:    amLODIPine 2.5 mg oral tablet  -- 1 tab(s) by mouth once a day  -- Indication: For bp    dorzolamide-timolol 2.23%-0.68% ophthalmic solution  -- 1 drop(s) to each affected eye 2 times a day  -- Indication: For Eyes    prednisoLONE acetate 1% ophthalmic suspension  -- 1 dose(s) to each affected eye 2 times a day  -- Indication: For Eyes    latanoprost 0.005% ophthalmic solution  -- 1 drop(s) to each affected eye once a day (in the evening)  -- Indication: For Eyes    Multiple Vitamins oral tablet  -- 1 tab(s) by mouth once a day  -- Indication: For vitamin    thiamine 100 mg oral tablet  -- 1 tab(s) by mouth once a day  -- Indication: For vitamin

## 2017-06-25 NOTE — DISCHARGE NOTE ADULT - HOSPITAL COURSE
52y Male, HTN, etoh abuse c/b alcoholic gastritis and pancreatitis, adm w/epigastric pain/nausea, tx'd for etoh w/dwl syndrome; on benzo/CIWA protocol; also transaminitis, increased Tbili; CTAP w/fatty liver, but no acute intestinal process; no e/o acute infection, CXR clear.    -etoh w/dwl - completed CIWA/Librium taper; clinically improved; for followup with o/p PMD and o/p etoh rehab/support services.     -etoh abuse - encourage abstinence; vitamin/mineral supplementation    - transaminitis and elev Tbili likely 2/2 fatty liver - decreasing; pt to followup with PMD as outpt and is encouraged to STOP DRINKING ALCOHOL    -htn - BP control w/amlodipine    -thrombocytopenia - platelets wnl on admit; heparin d/c'd, and platelets uptrending by time of discharge    -PT eval and pt w/no acute needs; fully independent.

## 2017-06-25 NOTE — DISCHARGE NOTE ADULT - PATIENT PORTAL LINK FT
“You can access the [facility] Patient Portal, offered by [facility], by registering with the following website: [email or web address]”

## 2017-06-25 NOTE — DISCHARGE NOTE ADULT - PLAN OF CARE
resolved; followup with PMD within 3 days of discharge; STOP DRINKING ALCOHOL followup with PMD within 3 days of discharge; STOP DRINKING ALCOHOL decreasing; followup with PMD within 3 days of discharge; STOP DRINKING ALCOHOL improving; followup with PMD within 3 days of discharge continue medications; followup with PMD within 3 days of discharge

## 2017-06-25 NOTE — DISCHARGE NOTE ADULT - CARE PLAN
Principal Discharge DX:	Alcohol withdrawal syndrome without complication  Goal:	resolved; followup with PMD within 3 days of discharge; STOP DRINKING ALCOHOL  Instructions for follow-up, activity and diet:	resolved; followup with PMD within 3 days of discharge; STOP DRINKING ALCOHOL  Secondary Diagnosis:	Fatty liver, alcoholic  Goal:	followup with PMD within 3 days of discharge; STOP DRINKING ALCOHOL  Instructions for follow-up, activity and diet:	followup with PMD within 3 days of discharge; STOP DRINKING ALCOHOL  Secondary Diagnosis:	Abnormal LFTs (liver function tests)  Goal:	decreasing; followup with PMD within 3 days of discharge; STOP DRINKING ALCOHOL  Instructions for follow-up, activity and diet:	decreasing; followup with PMD within 3 days of discharge; STOP DRINKING ALCOHOL  Secondary Diagnosis:	Thrombocytopenia  Goal:	improving; followup with PMD within 3 days of discharge  Instructions for follow-up, activity and diet:	improving; followup with PMD within 3 days of discharge  Secondary Diagnosis:	Essential hypertension  Goal:	continue medications; followup with PMD within 3 days of discharge  Instructions for follow-up, activity and diet:	continue medications; followup with PMD within 3 days of discharge

## 2017-06-27 DIAGNOSIS — K29.20 ALCOHOLIC GASTRITIS WITHOUT BLEEDING: ICD-10-CM

## 2017-06-27 DIAGNOSIS — F10.230 ALCOHOL DEPENDENCE WITH WITHDRAWAL, UNCOMPLICATED: ICD-10-CM

## 2017-06-27 DIAGNOSIS — K70.0 ALCOHOLIC FATTY LIVER: ICD-10-CM

## 2017-06-27 DIAGNOSIS — R74.0 NONSPECIFIC ELEVATION OF LEVELS OF TRANSAMINASE AND LACTIC ACID DEHYDROGENASE [LDH]: ICD-10-CM

## 2017-06-27 DIAGNOSIS — H40.9 UNSPECIFIED GLAUCOMA: ICD-10-CM

## 2017-06-27 DIAGNOSIS — F10.229 ALCOHOL DEPENDENCE WITH INTOXICATION, UNSPECIFIED: ICD-10-CM

## 2017-06-27 DIAGNOSIS — D69.6 THROMBOCYTOPENIA, UNSPECIFIED: ICD-10-CM

## 2017-06-27 DIAGNOSIS — F32.9 MAJOR DEPRESSIVE DISORDER, SINGLE EPISODE, UNSPECIFIED: ICD-10-CM

## 2017-07-07 NOTE — PROGRESS NOTE ADULT - SUBJECTIVE AND OBJECTIVE BOX
CC/F/U for:ETOH withdrawal and alcoholic hepatitis.     INTERVAL HPI/OVERNIGHT EVENTS:  Pt seen and examined at bedside. Stable in bed on CIWA protocol. No excessive agitation or anxiety.     Allergies/Intolerance: No Known Allergies      MEDICATIONS  (STANDING):    MEDICATIONS  (PRN):        ROS: all systems reviewed and wnl      GENERAL: Stable, lying in bed. IVF to be tapered off. On regular diet. Comfortable.    HEAD:    EYES:   ENMT:   NECK: supple, No JVD  CHEST/LUNG: clear to auscultation bilaterally; no rales, rhonchi, or wheezing b/l  HEART: normal S1, S2  ABDOMEN: BS+, soft, ND, NT   EXTREMITIES:  pulses palpable; no clubbing, cyanosis, or edema b/l LEs  NEURO: awake, alert, interactive; moves all extremities  SKIN: no rashes or lesions      LABS: refer to sunrise. Increased LFT noted on arrival from fatty changes due to alcohol.                   RADIOLOGY & ADDITIONAL TESTS:none      ASSESSMENT AND PLAN:

## 2017-07-07 NOTE — PROGRESS NOTE ADULT - PROBLEM SELECTOR PLAN 1
admit telemetry, CIWA protocol with benzodiazepine taper, IV fluids to be tapered off later today. Continue with thiamine replacement, electrolyte replacement.

## 2017-10-01 ENCOUNTER — OUTPATIENT (OUTPATIENT)
Dept: OUTPATIENT SERVICES | Facility: HOSPITAL | Age: 52
LOS: 1 days | End: 2017-10-01
Payer: MEDICAID

## 2017-10-10 ENCOUNTER — EMERGENCY (EMERGENCY)
Facility: HOSPITAL | Age: 52
LOS: 1 days | Discharge: HOME | End: 2017-10-10
Attending: EMERGENCY MEDICINE | Admitting: EMERGENCY MEDICINE
Payer: MEDICAID

## 2017-10-10 VITALS
SYSTOLIC BLOOD PRESSURE: 169 MMHG | DIASTOLIC BLOOD PRESSURE: 89 MMHG | HEART RATE: 89 BPM | RESPIRATION RATE: 19 BRPM | OXYGEN SATURATION: 95 % | TEMPERATURE: 99 F

## 2017-10-10 VITALS
TEMPERATURE: 99 F | HEART RATE: 81 BPM | OXYGEN SATURATION: 94 % | SYSTOLIC BLOOD PRESSURE: 140 MMHG | RESPIRATION RATE: 19 BRPM | DIASTOLIC BLOOD PRESSURE: 90 MMHG

## 2017-10-10 PROCEDURE — 99283 EMERGENCY DEPT VISIT LOW MDM: CPT

## 2017-10-10 RX ADMIN — Medication 50 MILLIGRAM(S): at 22:39

## 2017-10-10 NOTE — ED PROVIDER NOTE - OBJECTIVE STATEMENT
52 yr old male with hx of alcohol dependence, no hx of of seizures, or admission for W/D presents wanting detoxification.  Last drink was at 6 PM and took bus here.  States feels "bad" when he does not drink which at times means diaphoresis and diarrhea.

## 2017-10-10 NOTE — ED PROVIDER NOTE - MEDICAL DECISION MAKING DETAILS
provided list of inpatient and outpatient centers pt can follow up with; given 50mg of librium for any mild withdrawal symptoms pt may develop; stable for d/c

## 2017-10-10 NOTE — ED ADULT NURSE NOTE - OBJECTIVE STATEMENT
A&Ox3, respirations even and unlabored, ambulatory, diaphoretic, speaks in clear and full sentences, mild tremors. Patient states came to ED in need of alcohol detox, states last drink this morning. Reports watery stools x2 today, denies blood in stool. Denies chest pain, SOB, LOC, denies drug abuse, SI/HI. Hx ETOH.

## 2017-10-10 NOTE — ED ADULT TRIAGE NOTE - CHIEF COMPLAINT QUOTE
pt requesting etoh detox, +AOB, states he drinks beer and vodka everyday for past several months, last drink 6pm, states "I had 12 beers." denies illicit drug use. denies any si/hi/ah/vh. denies any falls/ injury while drinking today, no history of withdrawals, no h/a, dizziness, cp or sob. f/s 117.

## 2017-10-26 ENCOUNTER — INPATIENT (INPATIENT)
Facility: HOSPITAL | Age: 52
LOS: 2 days | Discharge: HOME | End: 2017-10-29
Attending: HOSPITALIST | Admitting: HOSPITALIST
Payer: MEDICAID

## 2017-10-26 VITALS
HEART RATE: 69 BPM | RESPIRATION RATE: 18 BRPM | SYSTOLIC BLOOD PRESSURE: 160 MMHG | TEMPERATURE: 98 F | OXYGEN SATURATION: 100 % | DIASTOLIC BLOOD PRESSURE: 98 MMHG

## 2017-10-26 DIAGNOSIS — F10.230 ALCOHOL DEPENDENCE WITH WITHDRAWAL, UNCOMPLICATED: ICD-10-CM

## 2017-10-26 DIAGNOSIS — F10.239 ALCOHOL DEPENDENCE WITH WITHDRAWAL, UNSPECIFIED: ICD-10-CM

## 2017-10-26 DIAGNOSIS — I10 ESSENTIAL (PRIMARY) HYPERTENSION: ICD-10-CM

## 2017-10-26 DIAGNOSIS — K70.10 ALCOHOLIC HEPATITIS WITHOUT ASCITES: ICD-10-CM

## 2017-10-26 DIAGNOSIS — F41.1 GENERALIZED ANXIETY DISORDER: ICD-10-CM

## 2017-10-26 DIAGNOSIS — F32.9 MAJOR DEPRESSIVE DISORDER, SINGLE EPISODE, UNSPECIFIED: ICD-10-CM

## 2017-10-26 DIAGNOSIS — Z29.9 ENCOUNTER FOR PROPHYLACTIC MEASURES, UNSPECIFIED: ICD-10-CM

## 2017-10-26 DIAGNOSIS — H40.9 UNSPECIFIED GLAUCOMA: ICD-10-CM

## 2017-10-26 LAB
ALBUMIN SERPL ELPH-MCNC: 4.4 G/DL — SIGNIFICANT CHANGE UP (ref 3.3–5)
ALP SERPL-CCNC: 57 U/L — SIGNIFICANT CHANGE UP (ref 40–120)
ALT FLD-CCNC: 135 U/L — HIGH (ref 4–41)
AMPHET UR-MCNC: NEGATIVE — SIGNIFICANT CHANGE UP
APAP SERPL-MCNC: < 15 UG/ML — LOW (ref 15–25)
APPEARANCE UR: CLEAR — SIGNIFICANT CHANGE UP
AST SERPL-CCNC: 61 U/L — HIGH (ref 4–40)
BARBITURATES MEASUREMENT: NEGATIVE — SIGNIFICANT CHANGE UP
BARBITURATES UR SCN-MCNC: NEGATIVE — SIGNIFICANT CHANGE UP
BASE EXCESS BLDV CALC-SCNC: 4.2 MMOL/L — SIGNIFICANT CHANGE UP
BASOPHILS # BLD AUTO: 0.09 K/UL — SIGNIFICANT CHANGE UP (ref 0–0.2)
BASOPHILS NFR BLD AUTO: 1.5 % — SIGNIFICANT CHANGE UP (ref 0–2)
BENZODIAZ SERPL-MCNC: NEGATIVE — SIGNIFICANT CHANGE UP
BENZODIAZ UR-MCNC: NEGATIVE — SIGNIFICANT CHANGE UP
BILIRUB SERPL-MCNC: 0.3 MG/DL — SIGNIFICANT CHANGE UP (ref 0.2–1.2)
BILIRUB UR-MCNC: NEGATIVE — SIGNIFICANT CHANGE UP
BLOOD GAS VENOUS - CREATININE: 0.65 MG/DL — SIGNIFICANT CHANGE UP (ref 0.5–1.3)
BLOOD UR QL VISUAL: NEGATIVE — SIGNIFICANT CHANGE UP
BUN SERPL-MCNC: 8 MG/DL — SIGNIFICANT CHANGE UP (ref 7–23)
CALCIUM SERPL-MCNC: 8.9 MG/DL — SIGNIFICANT CHANGE UP (ref 8.4–10.5)
CANNABINOIDS UR-MCNC: NEGATIVE — SIGNIFICANT CHANGE UP
CHLORIDE BLDV-SCNC: 106 MMOL/L — SIGNIFICANT CHANGE UP (ref 96–108)
CHLORIDE SERPL-SCNC: 106 MMOL/L — SIGNIFICANT CHANGE UP (ref 98–107)
CO2 SERPL-SCNC: 27 MMOL/L — SIGNIFICANT CHANGE UP (ref 22–31)
COCAINE METAB.OTHER UR-MCNC: NEGATIVE — SIGNIFICANT CHANGE UP
COLOR SPEC: SIGNIFICANT CHANGE UP
CREAT SERPL-MCNC: 0.72 MG/DL — SIGNIFICANT CHANGE UP (ref 0.5–1.3)
EOSINOPHIL # BLD AUTO: 0.1 K/UL — SIGNIFICANT CHANGE UP (ref 0–0.5)
EOSINOPHIL NFR BLD AUTO: 1.6 % — SIGNIFICANT CHANGE UP (ref 0–6)
ETHANOL BLD-MCNC: < 10 MG/DL — SIGNIFICANT CHANGE UP
GAS PNL BLDV: 141 MMOL/L — SIGNIFICANT CHANGE UP (ref 136–146)
GLUCOSE BLDV-MCNC: 107 — HIGH (ref 70–99)
GLUCOSE SERPL-MCNC: 103 MG/DL — HIGH (ref 70–99)
GLUCOSE UR-MCNC: NEGATIVE — SIGNIFICANT CHANGE UP
HCO3 BLDV-SCNC: 25 MMOL/L — SIGNIFICANT CHANGE UP (ref 20–27)
HCT VFR BLD CALC: 41.3 % — SIGNIFICANT CHANGE UP (ref 39–50)
HCT VFR BLDV CALC: 45.1 % — SIGNIFICANT CHANGE UP (ref 39–51)
HGB BLD-MCNC: 14.1 G/DL — SIGNIFICANT CHANGE UP (ref 13–17)
HGB BLDV-MCNC: 14.7 G/DL — SIGNIFICANT CHANGE UP (ref 13–17)
IMM GRANULOCYTES # BLD AUTO: 0.03 # — SIGNIFICANT CHANGE UP
IMM GRANULOCYTES NFR BLD AUTO: 0.5 % — SIGNIFICANT CHANGE UP (ref 0–1.5)
KETONES UR-MCNC: NEGATIVE — SIGNIFICANT CHANGE UP
LACTATE BLDV-MCNC: 1.2 MMOL/L — SIGNIFICANT CHANGE UP (ref 0.5–2)
LEUKOCYTE ESTERASE UR-ACNC: NEGATIVE — SIGNIFICANT CHANGE UP
LIDOCAIN IGE QN: 22.4 U/L — SIGNIFICANT CHANGE UP (ref 7–60)
LYMPHOCYTES # BLD AUTO: 1.71 K/UL — SIGNIFICANT CHANGE UP (ref 1–3.3)
LYMPHOCYTES # BLD AUTO: 27.8 % — SIGNIFICANT CHANGE UP (ref 13–44)
MCHC RBC-ENTMCNC: 32.2 PG — SIGNIFICANT CHANGE UP (ref 27–34)
MCHC RBC-ENTMCNC: 34.1 % — SIGNIFICANT CHANGE UP (ref 32–36)
MCV RBC AUTO: 94.3 FL — SIGNIFICANT CHANGE UP (ref 80–100)
METHADONE UR-MCNC: NEGATIVE — SIGNIFICANT CHANGE UP
MONOCYTES # BLD AUTO: 0.65 K/UL — SIGNIFICANT CHANGE UP (ref 0–0.9)
MONOCYTES NFR BLD AUTO: 10.6 % — SIGNIFICANT CHANGE UP (ref 2–14)
NEUTROPHILS # BLD AUTO: 3.57 K/UL — SIGNIFICANT CHANGE UP (ref 1.8–7.4)
NEUTROPHILS NFR BLD AUTO: 58 % — SIGNIFICANT CHANGE UP (ref 43–77)
NITRITE UR-MCNC: NEGATIVE — SIGNIFICANT CHANGE UP
NRBC # FLD: 0 — SIGNIFICANT CHANGE UP
OPIATES UR-MCNC: NEGATIVE — SIGNIFICANT CHANGE UP
OXYCODONE UR-MCNC: NEGATIVE — SIGNIFICANT CHANGE UP
PCO2 BLDV: 57 MMHG — HIGH (ref 41–51)
PCP UR-MCNC: NEGATIVE — SIGNIFICANT CHANGE UP
PH BLDV: 7.34 PH — SIGNIFICANT CHANGE UP (ref 7.32–7.43)
PH UR: 7.5 — SIGNIFICANT CHANGE UP (ref 4.6–8)
PLATELET # BLD AUTO: 326 K/UL — SIGNIFICANT CHANGE UP (ref 150–400)
PMV BLD: 9.9 FL — SIGNIFICANT CHANGE UP (ref 7–13)
PO2 BLDV: < 24 MMHG — LOW (ref 35–40)
POTASSIUM BLDV-SCNC: 3.7 MMOL/L — SIGNIFICANT CHANGE UP (ref 3.4–4.5)
POTASSIUM SERPL-MCNC: 4.2 MMOL/L — SIGNIFICANT CHANGE UP (ref 3.5–5.3)
POTASSIUM SERPL-SCNC: 4.2 MMOL/L — SIGNIFICANT CHANGE UP (ref 3.5–5.3)
PROT SERPL-MCNC: 7.8 G/DL — SIGNIFICANT CHANGE UP (ref 6–8.3)
PROT UR-MCNC: NEGATIVE — SIGNIFICANT CHANGE UP
RBC # BLD: 4.38 M/UL — SIGNIFICANT CHANGE UP (ref 4.2–5.8)
RBC # FLD: 13.2 % — SIGNIFICANT CHANGE UP (ref 10.3–14.5)
SALICYLATES SERPL-MCNC: < 5 MG/DL — LOW (ref 15–30)
SAO2 % BLDV: 26.1 % — LOW (ref 60–85)
SODIUM SERPL-SCNC: 145 MMOL/L — SIGNIFICANT CHANGE UP (ref 135–145)
SP GR SPEC: 1.01 — SIGNIFICANT CHANGE UP (ref 1–1.03)
UROBILINOGEN FLD QL: NORMAL E.U. — SIGNIFICANT CHANGE UP (ref 0.1–0.2)
WBC # BLD: 6.15 K/UL — SIGNIFICANT CHANGE UP (ref 3.8–10.5)
WBC # FLD AUTO: 6.15 K/UL — SIGNIFICANT CHANGE UP (ref 3.8–10.5)
WBC UR QL: SIGNIFICANT CHANGE UP (ref 0–?)

## 2017-10-26 PROCEDURE — 99223 1ST HOSP IP/OBS HIGH 75: CPT

## 2017-10-26 PROCEDURE — 71020: CPT | Mod: 26

## 2017-10-26 RX ORDER — METOCLOPRAMIDE HCL 10 MG
10 TABLET ORAL ONCE
Qty: 0 | Refills: 0 | Status: COMPLETED | OUTPATIENT
Start: 2017-10-26 | End: 2017-10-26

## 2017-10-26 RX ORDER — LATANOPROST 0.05 MG/ML
1 SOLUTION/ DROPS OPHTHALMIC; TOPICAL AT BEDTIME
Qty: 0 | Refills: 0 | Status: DISCONTINUED | OUTPATIENT
Start: 2017-10-26 | End: 2017-10-29

## 2017-10-26 RX ORDER — DORZOLAMIDE HYDROCHLORIDE TIMOLOL MALEATE 20; 5 MG/ML; MG/ML
1 SOLUTION/ DROPS OPHTHALMIC EVERY 12 HOURS
Qty: 0 | Refills: 0 | Status: DISCONTINUED | OUTPATIENT
Start: 2017-10-26 | End: 2017-10-29

## 2017-10-26 RX ORDER — THIAMINE MONONITRATE (VIT B1) 100 MG
500 TABLET ORAL ONCE
Qty: 0 | Refills: 0 | Status: COMPLETED | OUTPATIENT
Start: 2017-10-26 | End: 2017-10-26

## 2017-10-26 RX ORDER — SODIUM CHLORIDE 9 MG/ML
1000 INJECTION INTRAMUSCULAR; INTRAVENOUS; SUBCUTANEOUS ONCE
Qty: 0 | Refills: 0 | Status: DISCONTINUED | OUTPATIENT
Start: 2017-10-26 | End: 2017-10-26

## 2017-10-26 RX ORDER — THIAMINE MONONITRATE (VIT B1) 100 MG
100 TABLET ORAL DAILY
Qty: 0 | Refills: 0 | Status: DISCONTINUED | OUTPATIENT
Start: 2017-10-26 | End: 2017-10-26

## 2017-10-26 RX ORDER — THIAMINE MONONITRATE (VIT B1) 100 MG
500 TABLET ORAL
Qty: 0 | Refills: 0 | Status: COMPLETED | OUTPATIENT
Start: 2017-10-26 | End: 2017-10-29

## 2017-10-26 RX ORDER — SODIUM CHLORIDE 9 MG/ML
2000 INJECTION INTRAMUSCULAR; INTRAVENOUS; SUBCUTANEOUS ONCE
Qty: 0 | Refills: 0 | Status: COMPLETED | OUTPATIENT
Start: 2017-10-26 | End: 2017-10-26

## 2017-10-26 RX ORDER — AMLODIPINE BESYLATE 2.5 MG/1
2.5 TABLET ORAL DAILY
Qty: 0 | Refills: 0 | Status: DISCONTINUED | OUTPATIENT
Start: 2017-10-26 | End: 2017-10-29

## 2017-10-26 RX ORDER — PREDNISOLONE SODIUM PHOSPHATE 1 %
1 DROPS OPHTHALMIC (EYE) EVERY 12 HOURS
Qty: 0 | Refills: 0 | Status: DISCONTINUED | OUTPATIENT
Start: 2017-10-26 | End: 2017-10-29

## 2017-10-26 RX ORDER — FOLIC ACID 0.8 MG
1 TABLET ORAL DAILY
Qty: 0 | Refills: 0 | Status: DISCONTINUED | OUTPATIENT
Start: 2017-10-26 | End: 2017-10-29

## 2017-10-26 RX ADMIN — LATANOPROST 1 DROP(S): 0.05 SOLUTION/ DROPS OPHTHALMIC; TOPICAL at 21:18

## 2017-10-26 RX ADMIN — Medication 10 MILLIGRAM(S): at 10:25

## 2017-10-26 RX ADMIN — DORZOLAMIDE HYDROCHLORIDE TIMOLOL MALEATE 1 DROP(S): 20; 5 SOLUTION/ DROPS OPHTHALMIC at 19:36

## 2017-10-26 RX ADMIN — Medication 50 MILLIGRAM(S): at 21:17

## 2017-10-26 RX ADMIN — Medication 100 MILLIGRAM(S): at 19:36

## 2017-10-26 RX ADMIN — Medication 1 MILLIGRAM(S): at 18:10

## 2017-10-26 RX ADMIN — Medication 50 MILLIGRAM(S): at 18:10

## 2017-10-26 RX ADMIN — Medication 105 MILLIGRAM(S): at 19:37

## 2017-10-26 RX ADMIN — Medication 105 MILLIGRAM(S): at 11:59

## 2017-10-26 RX ADMIN — SODIUM CHLORIDE 2000 MILLILITER(S): 9 INJECTION INTRAMUSCULAR; INTRAVENOUS; SUBCUTANEOUS at 10:25

## 2017-10-26 RX ADMIN — AMLODIPINE BESYLATE 2.5 MILLIGRAM(S): 2.5 TABLET ORAL at 18:10

## 2017-10-26 RX ADMIN — Medication 2 MILLIGRAM(S): at 11:58

## 2017-10-26 RX ADMIN — Medication 2 MILLIGRAM(S): at 10:25

## 2017-10-26 RX ADMIN — Medication 1 DROP(S): at 19:37

## 2017-10-26 NOTE — H&P ADULT - PROBLEM SELECTOR PLAN 5
Patient is low risk for DVT. No indication for chemical ppx. Visual acuity is grossly intact.    Will c/w latanoprost, dorzolamide, and prednisolone drops.

## 2017-10-26 NOTE — ED PROVIDER NOTE - CARE PLAN
Principal Discharge DX:	Alcohol withdrawal  Instructions for follow-up, activity and diet:	CIWA, lorazepam  Secondary Diagnosis:	Alcohol dependence

## 2017-10-26 NOTE — H&P ADULT - PROBLEM SELECTOR PLAN 2
Patient with anxiety, especially surrounding previous job as , for several years. Symptoms may be attributed to either alcohol withdrawal or underlying psychiatry disorder. Psychiatry consult is pending. At present, the patient is calm and cooperative.    -f/u psychiatry recommendations

## 2017-10-26 NOTE — ED ADULT TRIAGE NOTE - CHIEF COMPLAINT QUOTE
Pt recently DC'd from detox, last drink 10 days ago reports feeling dizzy and weak x 6 days. Pt saw his PMD yesterday and was told to come to the ED, states he has difficulty sleeping and walking. Tremors noted.

## 2017-10-26 NOTE — H&P ADULT - ASSESSMENT
52 M PMH alcohol abuse, essential HTN, presents with complaints of anxiety, nausea, tremulousness for the last week. VS without tachycardia. On exam, patient is calm and cooperative. Labs without anemia or electrolyte abnormality. ECG NSR.

## 2017-10-26 NOTE — ED PROVIDER NOTE - ATTENDING CONTRIBUTION TO CARE
I, Jennifer Cabot, MD, have performed a history and physical exam of the patient and discussed their management with the resident. I reviewed the resident's note and agree with the documented findings and plan of care. My medical decision making and observations are found above.    Cabot: 52M with PMH of R glaucoma and ETOH abuse s/p rehab (d/c'd 2 days ago), now here with 6 days of weakness, lightheadedness.  Claims last drink was 10 days ago.  Denies F/C/N/V/D/urinary sx/cough/SOB/CP.  + intermittent midepigastric pain.  No blood BMs.  Reports occipital HA of gradual onset.  No neck stiffness, numbness, weakness, speech or vision changes.  On exam, tremulous, + tongue fasciculations, but HDS, AAOx3, lungs CTAB, heart sounds normal, abd benign, no CVAT, LEs without tenderness or edema.  In withdrawal but will also eval for infection, pancreatitis.  Will hydrate, treat HA, and cover with benzos.  Will admit. I, Jennifer Cabot, MD, have performed a history and physical exam of the patient and discussed their management with the PA.  I reviewed the PA's note and agree with the documented findings and plan of care. My medical decision making and observations are found above.    Cabot: 52M with PMH of R glaucoma and ETOH abuse s/p rehab (d/c'd 2 days ago), now here with 6 days of weakness, lightheadedness.  Claims last drink was 10 days ago.  Denies F/C/N/V/D/urinary sx/cough/SOB/CP.  + intermittent midepigastric pain.  No blood BMs.  Reports occipital HA of gradual onset.  No neck stiffness, numbness, weakness, speech or vision changes.  On exam, tremulous, + tongue fasciculations, but HDS, AAOx3, lungs CTAB, heart sounds normal, abd benign, no CVAT, LEs without tenderness or edema.  In withdrawal but will also eval for infection, pancreatitis.  Will hydrate, treat HA, and cover with benzos.  Will admit.

## 2017-10-26 NOTE — BEHAVIORAL HEALTH ASSESSMENT NOTE - SUMMARY
52yoM, unmarried, originally from [place] (emigrated 2010), employed on competitive horse stable for ~28 years, domiciled in [place] with family friends (Jose Miguel Gabriel,  and coworker, [phone]), with PMHx HTN, with PPHx of alcohol abuse (mainly beers, whiskey; multiple 6 packs/day or bottle of whiskey), cannot recall longest period of sobriety, attended multiple AA meetings but no sponsor, 4 detox (prior 3 effective with Librium, last one 4 days Ativan taper in [facility] last week), in process of admittance to [facility] rehab in [place], and treated for depression and anxiety for 1mo with Vistaril (discontinued by pt 2/2 drowsiness), and no hx PPH/SA/paranoia/SI/HI/AH/VH/withdrawal seizures/abuse/PTSD/OCD sx. Pt admitted with reported 11 days since last drink, a/w tremors and anxiety and given Ativan in ED, consulted for EtOH withdrawal and med mgmt. Experienced anxiety and neurovegetative sx prior to [facility] admission, currently tremulous and no SI/HI/AH/VH.     Upon evaluation, patient was laying in bed, AAOx4, cooperative with tremors but calm having received Ativan. 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 Prior to [facility] admission, pt felt anxious, drank many 6 packs of beer, felt nauseous and tremulous, then drank a bottle of whiskey and was taken to [facility] about 18 days ago by friend 2/2 vomiting. As per patient, [facility] said he could not be detoxified in [facility] and was transferred to [facility] for detox. No reported current legal issues, reported no hx senior living, no hx Xanax or Ativan use before [place] admission.  Reported hx of depression and anxiety since 2010 when he experienced somatic pains responding to anxiousness. Pt reported finishing university in [place], stated he is Islam and Bahai because it uplifts his mood when he is in trouble. No known family psychiatric hx, substance abuse. No current SI/HI/AH/VH elicited. Patient agreeable with recommendations for outpatient care and hospital treatment plan.    52yoM with alcohol withdrawal, and possible substance-induced mood disorder  1- Thiamine 500mg IV TID PO  Folate 1mg PO daily  Librium 100mg PO 8AM, 6PM  Ativan 2mg IV Q1H PRN per CIWA  2 –likely discharge home with outpatient referral to [facility]

## 2017-10-26 NOTE — H&P ADULT - PROBLEM SELECTOR PLAN 4
Visual acuity is grossly intact.    Will c/w latanoprost, dorzolamide, and prednisolone drops. BP above goal. Will resume home dose of amlodipine 2.5mg daily.

## 2017-10-26 NOTE — BEHAVIORAL HEALTH ASSESSMENT NOTE - NSBHSUICPROTECTFACT_PSY_A_CORE
High spirituality/Supportive social network or family/Identifies reasons for living/Future oriented/Engaged in work or school/Responsibility to family and others

## 2017-10-26 NOTE — BEHAVIORAL HEALTH ASSESSMENT NOTE - NSBHCONSULTSUBSTANCEALCOHOL_PSY_A_CORE FT
Thiamine 500mg IV TID PO  Folate 1mg PO daily  Librium 100mg PO 8AM, 6PM  Ativan 2mg IV Q1H PRN per ANT Thiamine 500mg IV BID PO  Folate 1mg PO daily  Librium 100mg PO 8AM, 6PM  Ativan 2mg IV Q1H PRN per ANT

## 2017-10-26 NOTE — H&P ADULT - PROBLEM SELECTOR PLAN 3
BP above goal. Will resume home dose of amlodipine 2.5mg daily. Mildly elevated transaminases, AST:ALT > 2:1 in setting of alcohol abuse, consistent with alcoholic hepatitis. No signs of encephalopathy. No clinical signs of end stage liver disease.    Will continue to trend liver tests. Continue to encourage alcohol cessation.

## 2017-10-26 NOTE — ED PROVIDER NOTE - OBJECTIVE STATEMENT
52 yr old male with hx of HTN, R glaucoma, ETOH abuse s/p rehab ( dc'd 1 week ago from [facility]) presents c/o generalized weakness and lightheadness. Pt claims last drink was oct 14th. Pt reports intermittent midepigastric pain and mild occipital headache. Pt denies any f/c/n/v/d, urinary complaints, chest pain, sob, neck stiffness, vision changes, or numbness. Pt denies any other drug use. Denies hitting head or LOC.

## 2017-10-26 NOTE — ED ADULT NURSE NOTE - OBJECTIVE STATEMENT
pt states 10 days ago he went to [facility] for detox from alcohol. he left 4 days later, has not drank since but has felt dizzy, nausea, weakness and headaches every day since.   pt states he feels anxious and sometimes depressed but no thought of suicide.

## 2017-10-26 NOTE — ED PROVIDER NOTE - NS ED ROS FT
tremulous, + tongue fasciculations, but HDS, AAOx3, lungs CTAB, heart sounds normal, abd benign, no CVAT, LEs without tenderness or edema.

## 2017-10-26 NOTE — ED PROVIDER NOTE - MEDICAL DECISION MAKING DETAILS
Cabot: 52M with PMH of R glaucoma and ETOH abuse s/p rehab (d/c'd 2 days ago), now here with 6 days of weakness, lightheadedness.  Claims last drink was 10 days ago.  Denies F/C/N/V/D/urinary sx/cough/SOB/CP.  + intermittent midepigastric pain.  No blood BMs.  Reports occipital HA of gradual onset.  No neck stiffness, numbness, weakness, speech or vision changes.  On exam, tremulous, + tongue fasciculations, but HDS, AAOx3, lungs CTAB, heart sounds normal, abd benign, no CVAT, LEs without tenderness or edema.  In withdrawal but will also eval for infection, pancreatitis.  Will hydrate, treat HA, and cover with benzos.  Will admit.

## 2017-10-26 NOTE — BEHAVIORAL HEALTH ASSESSMENT NOTE - NSBHCHARTREVIEWVS_PSY_A_CORE FT
Vital Signs Last 24 Hrs  T(C): 36.8 (26 Oct 2017 11:55), Max: 36.8 (26 Oct 2017 09:05)  T(F): 98.3 (26 Oct 2017 11:55), Max: 98.3 (26 Oct 2017 09:05)  HR: 58 (26 Oct 2017 11:55) (58 - 75)  BP: 159/91 (26 Oct 2017 11:55) (145/84 - 198/86)  BP(mean): --  RR: 17 (26 Oct 2017 11:55) (16 - 18)  SpO2: 100% (26 Oct 2017 16:39) (100% - 100%)

## 2017-10-26 NOTE — BEHAVIORAL HEALTH ASSESSMENT NOTE - NSBHCONSULTMEDS_PSY_A_CORE FT
Thiamine 500mg IV TID PO  Folate 1mg PO daily  Librium 100mg PO 8AM, 6PM  Ativan 2mg IV Q1H PRN per ANT Thiamine 500mg IV TID PO  Folate 1mg PO daily  Librium 100mg PO 8AM, 6PM  Ativan 2mg IV Q1H PRN per CIWA  Librium taper

## 2017-10-26 NOTE — H&P ADULT - PROBLEM SELECTOR PLAN 1
Patient with longstanding alcohol abuse. Last drink reportedly 11 days ago. Patient reports tremulousness and anxiety, which may represent alcohol withdrawal but presentation would be protracted if 11 days have truly passed since his last drink. I discussed the case with the on-call psychiatrist to help determine whether there symptoms are secondary to alcohol withdrawal vs underlying anxiety disorder.    -symptom-triggered CIWA  -f/u psychiatry recommendations  -thiamine, folate supplementation

## 2017-10-26 NOTE — PATIENT PROFILE ADULT. - ANESTHESIA, PREVIOUS REACTION, PROFILE
Acute Care - Physical Therapy Treatment Note  [facility]     Patient Name: Audrey Roblero  : 6/3/1922  MRN: [id number]  Today's Date: 2017             Admit Date: 2017    Visit Dx:    ICD-10-CM ICD-9-CM   1. Nausea and vomiting, intractability of vomiting not specified, unspecified vomiting type R11.2 787.01   2. Colitis K52.9 558.9   3. Difficulty walking R26.2 719.7     Patient Active Problem List   Diagnosis   • Chronic diastolic heart failure   • Atrial fibrillation   • MR (mitral regurgitation)   • Nausea and vomiting   • Colitis presumed infectious (viral)   • Severe malnutrition due to chronic illness               Adult Rehabilitation Note       17 1346 17 0942       Rehab Assessment/Intervention    Discipline physical therapy assistant  -DONNIE physical therapist  -CN     Document Type therapy note (daily note)  -DONNIE therapy note (daily note)  -CN     Subjective Information agree to therapy  -DONNIE agree to therapy;no complaints  -CN     Patient Effort, Rehab Treatment good  -DONNIE adequate  -CN     Symptoms Noted During/After Treatment  increased pain;fatigue  -CN     Precautions/Limitations fall precautions  -DONNIE      Recorded by [DONNIE] Aec Arango PTA [CN] Bibiana Arrieta, PT     Vital Signs    Pre SpO2 (%)  96  -CN     O2 Delivery Pre Treatment  room air  -CN     Post SpO2 (%) 95  -DONNIE 95  -CN     O2 Delivery Post Treatment room air  -DONNIE room air  -CN     Recorded by [DONNIE] Ace Arango PTA [CN] Bibiana Arrieta, PT     Pain Assessment    Pain Assessment Advanced Dementia  -DONNIE Advanced Dementia   Pt reports no pain prior to session, however c/o HA with amb  -CN     Recorded by [DONNIE] Ace Arango PTA [CN] Bibiana Arrieta, PT     Cognitive Assessment/Intervention    Current Cognitive/Communication Assessment impaired  -DONNIE impaired  -CN     Orientation Status oriented to;person  -DONNIE disoriented to;place;situation  -CN     Follows Commands/Answers Questions 100% of the  time;able to follow single-step instructions;needs cueing;needs increased time;needs repetition  -DONNIE 75% of the time  -CN     Personal Safety mild impairment  -DONNIE mild impairment  -CN     Personal Safety Interventions fall prevention program maintained;gait belt;nonskid shoes/slippers when out of bed  -DONNIE fall prevention program maintained;gait belt;nonskid shoes/slippers when out of bed  -CN     Recorded by [DONNIE] Ace Arango PTA [CN] Bibiana Arrieta, PT     Bed Mobility, Assessment/Treatment    Bed Mobility, Assistive Device bed rails;head of bed elevated  -DONNIE      Bed Mob, Supine to Sit, [place] contact guard assist;verbal cues required;nonverbal cues required (demo/gesture)  -DONNIE contact guard assist;verbal cues required  -CN     Bed Mob, Sit to Supine, [place] not tested  -DONNIE      Bed Mobility, Safety Issues cognitive deficits limit understanding;decreased use of arms for pushing/pulling;decreased use of legs for bridging/pushing  -DONNIE      Bed Mobility, Impairments strength decreased;impaired balance  -DONNIE      Recorded by [DONNIE] Ace Arango PTA [CN] Bibiana Arrieta, PT     Transfer Assessment/Treatment    Transfers, Sit-Stand [place] contact guard assist;verbal cues required;nonverbal cues required (demo/gesture)  -DONNIE contact guard assist;verbal cues required  -CN     Transfers, Stand-Sit [place] contact guard assist;verbal cues required;nonverbal cues required (demo/gesture)  -DONNIE contact guard assist;verbal cues required  -CN     Transfers, Sit-Stand-Sit, Assist Device rolling walker  -DONNIE rolling walker  -CN     Transfer, Safety Issues balance decreased during turns;sequencing ability decreased;step length decreased;weight-shifting ability decreased;loses balance backward  -DONNIE balance decreased during turns;sequencing ability decreased;step length decreased;weight-shifting ability decreased;loses balance backward  -CN     Transfer, Impairments strength decreased;impaired balance   -DONNIE strength decreased;impaired balance  -CN     Recorded by [DONNIE] Ace Arango PTA [CN] Bibiana Arrieta PT     Gait Assessment/Treatment    Gait, [place] Level contact guard assist  -DONNIE contact guard assist  -CN     Gait, Assistive Device rolling walker  -DONNIE rolling walker  -CN     Gait, Distance (Feet) 20  -DONNIE 12  -CN     Gait, Gait Deviations luisa decreased;forward flexed posture;narrow base;step length decreased;stride length decreased  -DONNIE luisa decreased;forward flexed posture;narrow base;step length decreased;stride length decreased  -CN     Gait, Safety Issues step length decreased;balance decreased during turns;weight-shifting ability decreased  -DONNIE step length decreased;balance decreased during turns;weight-shifting ability decreased  -CN     Gait, Impairments impaired balance;strength decreased  -DONNIE impaired balance;strength decreased  -CN     Recorded by [DONNIE] Ace Arango PTA [CN] Bibiana Arrieta PT     Balance Skills Training    Sitting-Level of Assistance  Close supervision  -CN     Sitting-Balance Support  Feet supported  -CN     Standing-Level of Assistance  Contact guard  -CN     Static Standing Balance Support  assistive device  -CN     Gait Balance-Level of Assistance  Contact guard  -CN     Gait Balance Support  assistive device  -CN     Recorded by  [CN] Bibiana Arrieta PT     Therapy Exercises    Bilateral Lower Extremities AROM:;10 reps;sitting;ankle pumps/circles;LAQ;hip flexion  -DONNIE      Recorded by [DONNIE] Ace Arango PTA      Positioning and Restraints    Pre-Treatment Position in bed  -DONNIE in bed  -CN     Post Treatment Position chair  -DONNIE chair  -CN     In Chair sitting;call light within reach;encouraged to call for assist;exit alarm on  -DONNIE notified nsg;reclined;call light within reach;encouraged to call for assist;exit alarm on;legs elevated  -CN     Recorded by [DONNIE] Ace Arango PTA [CN] Bibiana Arrieta PT       User Key  (r) = Recorded By, (t) =  none Taken By, (c) = Cosigned By    Initials Name Effective Dates    CN Bibiana Arrieta, PT 01/27/16 -     DONNIE Arango, GERI 04/24/15 -                 IP PT Goals       07/22/17 1438          Transfer Training PT LTG    Transfer Training PT LTG, Date Established 07/22/17  -MM      Transfer Training PT LTG, Time to Achieve 1 wk  -MM      Transfer Training PT LTG, Activity Type all transfers  -MM      Transfer Training PT LTG, [place] Level contact guard assist  -MM      Gait Training PT LTG    Gait Training Goal PT LTG, Date Established 07/22/17  -MM      Gait Training Goal PT LTG, Time to Achieve 1 wk  -MM      Gait Training Goal PT LTG, [place] Level contact guard assist  -MM      Gait Training Goal PT LTG, Assist Device walker, rolling  -MM      Gait Training Goal PT LTG, Distance to Achieve 100  -MM        User Key  (r) = Recorded By, (t) = Taken By, (c) = Cosigned By    Initials Name Provider Type    MM Kell Abreu, PT Physical Therapist          Physical Therapy Education     Title: PT OT SLP Therapies (Active)     Topic: Physical Therapy (Active)     Point: Mobility training (Active)    Learning Progress Summary    Learner Readiness Method Response Comment Documented by Status   Patient Acceptance E NR  DONNIE 07/24/17 1412 Active    Acceptance E NR  CN 07/23/17 1000 Active    Acceptance E NR  MM 07/22/17 1437 Active                      User Key     Initials Effective Dates Name Provider Type Discipline    CN 01/27/16 -  Bibiana Arrieta, PT Physical Therapist PT    MM 07/05/16 -  Kell Abreu, PT Physical Therapist PT    DONNIE 04/24/15 -  Ace Arango, PTA Physical Therapy Assistant PT                    PT Recommendation and Plan  Anticipated Discharge Disposition: home with 24/7 care  Planned Therapy Interventions: gait training, strengthening, transfer training  PT Frequency: daily  Plan of Care Review  Plan Of Care Reviewed With: patient  Outcome Summary/Follow up Plan: Pt with  improved upright stability and ambulation endurance this date.  Pt remains confused but easily directed, no new concerns.           Outcome Measures       07/24/17 1400 07/23/17 1000 07/22/17 1400    How much help from another person do you currently need...    Turning from your back to your side while in flat bed without using bedrails? 4  -DONNIE 4  -CN 4  -MM    Moving from lying on back to sitting on the side of a flat bed without bedrails? 3  -DONNIE 3  -CN 3  -MM    Moving to and from a bed to a chair (including a wheelchair)? 3  -DONNIE 3  -CN 3  -MM    Standing up from a chair using your arms (e.g., wheelchair, bedside chair)? 3  -DONNIE 3  -CN 3  -MM    Climbing 3-5 steps with a railing? 2  -DONNIE 2  -CN 2  -MM    To walk in hospital room? 3  -DONNIE 3  -CN 3  -MM    AM-PAC 6 Clicks Score 18  -DONNIE 18  -CN 18  -MM    Functional Assessment    Outcome Measure Options AM-PAC 6 Clicks Basic Mobility (PT)  -DONNIE AM-PAC 6 Clicks Basic Mobility (PT)  -CN AM-PAC 6 Clicks Basic Mobility (PT)  -MM      User Key  (r) = Recorded By, (t) = Taken By, (c) = Cosigned By    Initials Name Provider Type    BEAU Arrieta, PT Physical Therapist    JOCELYNE Abreu, PT Physical Therapist    DONNIE Arango PTA Physical Therapy Assistant           Time Calculation:         PT Charges       07/24/17 1412          Time Calculation    Start Time 1346  -DONNIE      Stop Time 1358  -DONNIE      Time Calculation (min) 12 min  -DONNIE      PT Received On 07/24/17  -DONNIE      PT - Next Appointment 07/25/17  -DONNIE        User Key  (r) = Recorded By, (t) = Taken By, (c) = Cosigned By    Initials Name Provider Type    DONNIE Arango PTA Physical Therapy Assistant          Therapy Charges for Today     Code Description Service Date Service Provider Modifiers Qty    56258714113 HC PT THER PROC EA 15 MIN 7/24/2017 Ace Arango PTA GP 1          PT G-Codes  Outcome Measure Options: AM-PAC 6 Clicks Basic Mobility (PT)    Ace Arango PTA  7/24/2017

## 2017-10-26 NOTE — H&P ADULT - HISTORY OF PRESENT ILLNESS
HPI:  52 M PMH alcohol abuse, essential HTN, presents with complaints of anxiety, nausea, tremulousness for the last week. The patient states he initially presented to [facility] last week where he was admitted for 4 days for detoxification. He believes his symptoms were due to alcohol withdrawal. He states he felt somewhat improved during hospitalization at [place] but was still symptomatic on the day of discharge. He states he was tremulous at home, anxious, and at times, nauseated. He waited for the symptoms to improve but they never did so he presented to the hospital today. He reports his last drink was 11 days ago and consisted of "a lot of beer and a shot of vodka"; he does not remember how much beer he consumed. He states he has been drinking heavily for the last 10 years. He would like to quit drinking and is attending support groups. He reports no history of seizures.     Of note, the patient states he had to leave his job as a  because he found it very stressful. 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 He believes this contributed to his alcohol use. But he continues to feel "anxious and depressed". He reports no thoughts of self-harm or taking his life. He reports no visual or auditory hallucinations. He states he has tried to set up an appointment with a psychiatrist as outpatient but it has been difficult because of his insurance.     ED VS: 98.3F oral, 160/98, 69, 18, 100% RA  ED Course: lorazepam 2mg IV x2, metoclopramide, thiamine, IVNS  PAST MEDICAL & SURGICAL HISTORY:  Alcohol dependence  HTN (hypertension)  Glaucoma  No significant past surgical history      Review of Systems:   CONSTITUTIONAL: No fever, weight loss, or fatigue  EYES: No eye pain, visual disturbances, or discharge  ENMT:  No difficulty hearing, tinnitus, vertigo; No sinus or throat pain  NECK: No pain or stiffness  BREASTS: No pain, masses, or nipple discharge  RESPIRATORY: No cough, wheezing, chills or hemoptysis; No shortness of breath  CARDIOVASCULAR: No chest pain, palpitations, dizziness, or leg swelling  GASTROINTESTINAL: No abdominal or epigastric pain. +nausea, no vomiting, or hematemesis; No diarrhea or constipation. No melena or hematochezia.  GENITOURINARY: No dysuria, frequency, hematuria, or incontinence  NEUROLOGICAL: No headaches, memory loss, loss of strength, numbness, or tremors  SKIN: No itching, burning, rashes, or lesions   LYMPH NODES: No enlarged glands  ENDOCRINE: No heat or cold intolerance; No hair loss  MUSCULOSKELETAL: No joint pain or swelling; No muscle, back, or extremity pain  PSYCHIATRIC: +depression, +anxiety, no mood swings, or difficulty sleeping  HEME/LYMPH: No easy bruising, or bleeding gums  ALLERGY AND IMMUNOLOGIC: No hives or eczema    Allergies    No Known Allergies    Intolerances        Social History:   Originally from [place], came to [place] in the 1980s. Lives alone. Quit job as a  recently. Drinks a lot of beer and vodka for at least last 10 years. Reports no drug use.    FAMILY HISTORY:  No pertinent family history in first degree relatives      MEDICATIONS  (STANDING):    MEDICATIONS  (PRN):      T(C): 36.8 (10-26-17 @ 11:55), Max: 36.8 (10-26-17 @ 09:05)  HR: 58 (10-26-17 @ 11:55) (58 - 75)  BP: 159/91 (10-26-17 @ 11:55) (145/84 - 198/86)  RR: 17 (10-26-17 @ 11:55) (16 - 18)  SpO2: 100% (10-26-17 @ 11:55) (100% - 100%)    CAPILLARY BLOOD GLUCOSE        I&O's Summary      PHYSICAL EXAM:  GENERAL: NAD, well-developed  HEAD:  Atraumatic, Normocephalic  EYES: EOMI, PERRLA, conjunctiva and sclera clear  NECK: Supple, No elevated JVD  CHEST/LUNG: Clear to auscultation bilaterally; No wheeze  HEART: Regular rate and rhythm; No murmurs, rubs, or gallops  ABDOMEN: Soft, Nontender, Nondistended; Bowel sounds present  EXTREMITIES:  2+ Peripheral Pulses, No clubbing, cyanosis, or edema  PSYCH: AAOx3  NEUROLOGY: CN II-XII grossly intact, moving all extremities  SKIN: No rashes or lesions    LABS:                        14.1   6.15  )-----------( 326      ( 26 Oct 2017 10:00 )             41.3     10-26    145  |  106  |  8   ----------------------------<  103<H>  4.2   |  27  |  0.72    Ca    8.9      26 Oct 2017 10:00    TPro  7.8  /  Alb  4.4  /  TBili  0.3  /  DBili  x   /  AST  61<H>  /  ALT  135<H>  /  AlkPhos  57  10          Urinalysis Basic - ( 26 Oct 2017 10:00 )    Color: PLYEL / Appearance: CLEAR / S.007 / pH: 7.5  Gluc: NEGATIVE / Ketone: NEGATIVE  / Bili: NEGATIVE / Urobili: NORMAL E.U.   Blood: NEGATIVE / Protein: NEGATIVE / Nitrite: NEGATIVE   Leuk Esterase: NEGATIVE / RBC: x / WBC 0-2   Sq Epi: x / Non Sq Epi: x / Bacteria: x        RADIOLOGY & ADDITIONAL TESTS:    ECG - NSR, TWI in aVL    Imaging Personally Reviewed:    CXR - clear lungs    Consultant(s) Notes Reviewed:      Care Discussed with Consultants/Other Providers:

## 2017-10-26 NOTE — ED PROVIDER NOTE - PROGRESS NOTE DETAILS
HAYDEE: 52 M with PMH HTN, Glaucoma  etoh dependence w/ withdrawl  p/w generalized weakness and tremor x 2 days. Pt reports last drink 5-6 days ago, was in detox in [place] and was discharged 2 days prior. States he has been having shaking, anxiety, and nausea along with weakness for last 2 days. Reports difficulty sleeping. Denies fever, chills, chest pain, abdominal pain. Exam pt tremulous w/ UE and LE tremor, AAox3. Nml abd exam. Likely etoh withdrawl , cbc, cmp, lipase, vbg, IVF, valium 10mg, Serum tox [place]: Patient is much less tremulous now, no sign of infection or metabolic derangement on labs.  Possible mild ETOH hepatitis.  Will admit after an additional 2mg ativan. Improved still mildly tremulous, will give 2mg iv ativan admit for etoh withdrwal ALE Dong: pt symptoms improved. Pt will be admitted for withdrawal. Hospitalist Dr. Lewis accepted pt. MAR text paged.

## 2017-10-26 NOTE — BEHAVIORAL HEALTH ASSESSMENT NOTE - NSBHCHARTREVIEWLAB_PSY_A_CORE FT
14.1   6.15  )-----------( 326      ( 26 Oct 2017 10:00 )             41.3   10-26    145  |  106  |  8   ----------------------------<  103<H>  4.2   |  27  |  0.72    Ca    8.9      26 Oct 2017 10:00    TPro  7.8  /  Alb  4.4  /  TBili  0.3  /  DBili  x   /  AST  61<H>  /  ALT  135<H>  /  AlkPhos  57  10-26

## 2017-10-27 DIAGNOSIS — R69 ILLNESS, UNSPECIFIED: ICD-10-CM

## 2017-10-27 LAB
ALBUMIN SERPL ELPH-MCNC: 4.2 G/DL — SIGNIFICANT CHANGE UP (ref 3.3–5)
ALP SERPL-CCNC: 56 U/L — SIGNIFICANT CHANGE UP (ref 40–120)
ALT FLD-CCNC: 128 U/L — HIGH (ref 4–41)
AST SERPL-CCNC: 67 U/L — HIGH (ref 4–40)
BASOPHILS # BLD AUTO: 0.11 K/UL — SIGNIFICANT CHANGE UP (ref 0–0.2)
BASOPHILS NFR BLD AUTO: 1.5 % — SIGNIFICANT CHANGE UP (ref 0–2)
BILIRUB SERPL-MCNC: 0.3 MG/DL — SIGNIFICANT CHANGE UP (ref 0.2–1.2)
BUN SERPL-MCNC: 10 MG/DL — SIGNIFICANT CHANGE UP (ref 7–23)
CALCIUM SERPL-MCNC: 9.1 MG/DL — SIGNIFICANT CHANGE UP (ref 8.4–10.5)
CHLORIDE SERPL-SCNC: 107 MMOL/L — SIGNIFICANT CHANGE UP (ref 98–107)
CO2 SERPL-SCNC: 27 MMOL/L — SIGNIFICANT CHANGE UP (ref 22–31)
CREAT SERPL-MCNC: 0.85 MG/DL — SIGNIFICANT CHANGE UP (ref 0.5–1.3)
EOSINOPHIL # BLD AUTO: 0.23 K/UL — SIGNIFICANT CHANGE UP (ref 0–0.5)
EOSINOPHIL NFR BLD AUTO: 3.2 % — SIGNIFICANT CHANGE UP (ref 0–6)
GLUCOSE SERPL-MCNC: 131 MG/DL — HIGH (ref 70–99)
HCT VFR BLD CALC: 44.1 % — SIGNIFICANT CHANGE UP (ref 39–50)
HGB BLD-MCNC: 14.3 G/DL — SIGNIFICANT CHANGE UP (ref 13–17)
IMM GRANULOCYTES # BLD AUTO: 0.03 # — SIGNIFICANT CHANGE UP
IMM GRANULOCYTES NFR BLD AUTO: 0.4 % — SIGNIFICANT CHANGE UP (ref 0–1.5)
LYMPHOCYTES # BLD AUTO: 1.85 K/UL — SIGNIFICANT CHANGE UP (ref 1–3.3)
LYMPHOCYTES # BLD AUTO: 26.1 % — SIGNIFICANT CHANGE UP (ref 13–44)
MCHC RBC-ENTMCNC: 31.6 PG — SIGNIFICANT CHANGE UP (ref 27–34)
MCHC RBC-ENTMCNC: 32.4 % — SIGNIFICANT CHANGE UP (ref 32–36)
MCV RBC AUTO: 97.6 FL — SIGNIFICANT CHANGE UP (ref 80–100)
MONOCYTES # BLD AUTO: 0.72 K/UL — SIGNIFICANT CHANGE UP (ref 0–0.9)
MONOCYTES NFR BLD AUTO: 10.1 % — SIGNIFICANT CHANGE UP (ref 2–14)
NEUTROPHILS # BLD AUTO: 4.16 K/UL — SIGNIFICANT CHANGE UP (ref 1.8–7.4)
NEUTROPHILS NFR BLD AUTO: 58.7 % — SIGNIFICANT CHANGE UP (ref 43–77)
NRBC # FLD: 0 — SIGNIFICANT CHANGE UP
PLATELET # BLD AUTO: 342 K/UL — SIGNIFICANT CHANGE UP (ref 150–400)
PMV BLD: 10 FL — SIGNIFICANT CHANGE UP (ref 7–13)
POTASSIUM SERPL-MCNC: 4.3 MMOL/L — SIGNIFICANT CHANGE UP (ref 3.5–5.3)
POTASSIUM SERPL-SCNC: 4.3 MMOL/L — SIGNIFICANT CHANGE UP (ref 3.5–5.3)
PROT SERPL-MCNC: 7.7 G/DL — SIGNIFICANT CHANGE UP (ref 6–8.3)
RBC # BLD: 4.52 M/UL — SIGNIFICANT CHANGE UP (ref 4.2–5.8)
RBC # FLD: 13.3 % — SIGNIFICANT CHANGE UP (ref 10.3–14.5)
SODIUM SERPL-SCNC: 147 MMOL/L — HIGH (ref 135–145)
WBC # BLD: 7.1 K/UL — SIGNIFICANT CHANGE UP (ref 3.8–10.5)
WBC # FLD AUTO: 7.1 K/UL — SIGNIFICANT CHANGE UP (ref 3.8–10.5)

## 2017-10-27 PROCEDURE — 99233 SBSQ HOSP IP/OBS HIGH 50: CPT

## 2017-10-27 RX ADMIN — Medication 105 MILLIGRAM(S): at 06:14

## 2017-10-27 RX ADMIN — Medication 1 MILLIGRAM(S): at 12:43

## 2017-10-27 RX ADMIN — DORZOLAMIDE HYDROCHLORIDE TIMOLOL MALEATE 1 DROP(S): 20; 5 SOLUTION/ DROPS OPHTHALMIC at 18:38

## 2017-10-27 RX ADMIN — Medication 105 MILLIGRAM(S): at 18:38

## 2017-10-27 RX ADMIN — Medication 1 DROP(S): at 18:38

## 2017-10-27 RX ADMIN — Medication 50 MILLIGRAM(S): at 12:43

## 2017-10-27 RX ADMIN — AMLODIPINE BESYLATE 2.5 MILLIGRAM(S): 2.5 TABLET ORAL at 05:49

## 2017-10-27 RX ADMIN — Medication 1 DROP(S): at 05:37

## 2017-10-27 RX ADMIN — Medication 100 MILLIGRAM(S): at 05:49

## 2017-10-27 RX ADMIN — DORZOLAMIDE HYDROCHLORIDE TIMOLOL MALEATE 1 DROP(S): 20; 5 SOLUTION/ DROPS OPHTHALMIC at 05:37

## 2017-10-27 RX ADMIN — LATANOPROST 1 DROP(S): 0.05 SOLUTION/ DROPS OPHTHALMIC; TOPICAL at 22:22

## 2017-10-27 NOTE — PROGRESS NOTE ADULT - PROBLEM SELECTOR PLAN 1
Patient with longstanding alcohol abuse.   Patient reports tremulousness and anxiety.  D/w Psychiatry  c/w librium taper as ordered  c/w thiamine/folic acid.  symptom-triggered CIWA

## 2017-10-27 NOTE — PROGRESS NOTE BEHAVIORAL HEALTH - NSBHCHARTREVIEWVS_PSY_A_CORE FT
Vital Signs Last 24 Hrs  T(C): 36.7 (27 Oct 2017 13:05), Max: 37.1 (26 Oct 2017 21:00)  T(F): 98.1 (27 Oct 2017 13:05), Max: 98.7 (26 Oct 2017 21:00)  HR: 56 (27 Oct 2017 13:05) (49 - 64)  BP: 110/73 (27 Oct 2017 13:05) (110/73 - 150/84)  BP(mean): --  RR: 18 (27 Oct 2017 13:05) (16 - 18)  SpO2: 100% (27 Oct 2017 13:05) (99% - 100%)

## 2017-10-27 NOTE — PROGRESS NOTE BEHAVIORAL HEALTH - NSBHFUPINTERVALHXFT_PSY_A_CORE
No acute events overnight. Cooperative with staff, compliant with meds. Patient finished Librium 100mg PO Noon/6PM/10PM for 3 doses and now on Librium 50mg PO Noon/6PM/10PM for 3 doses. Pt received no Ativan PRN overnight. Vitals remarkable for bradycardia overnight (50s, 60s) and CIWA formerly 3 (anxiety, tremors) but now 0. Upon evaluation, pt AAOx3, cooperative, and reported good appetite/sleep, no medication side effects reported. He voiced waxing and waning occipital headache that is improving. No current SI/HI/AH/VH ellicited. Stated he was future-oriented to avoiding alcohol, attending AA and finding a sponsor, and attending outpt rehab at [facility] because he thinks it will help his mood symptoms in the long-run.

## 2017-10-27 NOTE — PROGRESS NOTE BEHAVIORAL HEALTH - NSBHCHARTREVIEWLAB_PSY_A_CORE FT
14.3   7.10  )-----------( 342      ( 27 Oct 2017 05:50 )             44.1   10-27    147<H>  |  107  |  10  ----------------------------<  131<H>  4.3   |  27  |  0.85    Ca    9.1      27 Oct 2017 05:50    TPro  7.7  /  Alb  4.2  /  TBili  0.3  /  DBili  x   /  AST  67<H>  /  ALT  128<H>  /  AlkPhos  56  10-27

## 2017-10-27 NOTE — PROGRESS NOTE BEHAVIORAL HEALTH - RISK ASSESSMENT
Pt denies SI/HI, has supportive family friends. He is compliant with medical care. He is not an acute risk to self or others and does not require 1:1 observation at this time. He does not meet criteria for inpatient psychiatric stabilization at this time.

## 2017-10-27 NOTE — PROGRESS NOTE BEHAVIORAL HEALTH - NSBHCONSULTSUBSTANCEALCOHOL_PSY_A_CORE FT
Thiamine 500mg IV TID  Folate 1mg PO daily  Librium 50mg PO Noon/6PM/10PM  Ativan 2mg IV Q1H PRN per CIWA

## 2017-10-27 NOTE — PROGRESS NOTE BEHAVIORAL HEALTH - SUMMARY
52yoM with PMHx HTN, PPHx anxiety vs substance-related mood disorder (treated with Visteril x1mo), admitted recently to [facility] for 4d detox, reported 11 days since last drink, a/w tremors and anxiety and given Ativan in ED, consulted for EtOH withdrawal and med mgmt.    No acute events overnight. Cooperative with staff, compliant with meds. Patient finished Librium 100mg PO Noon/6PM/10PM for 3 doses and now on Librium 50mg PO Noon/6PM/10PM for 3 doses. Pt received no Ativan PRN overnight. Vitals remarkable for bradycardia overnight (50s, 60s) and CIWA formerly 3 (anxiety, tremors) but now 0. Upon evaluation, pt AAOx3, cooperative, and reported good appetite/sleep, no medication side effects reported. He voiced waxing and waning occipital headache that is improving. No current SI/HI/AH/VH ellicited. Stated he was future-oriented to avoiding alcohol, attending AA and finding a sponsor, and attending outpt rehab at [facility] because he thinks it will help his mood symptoms in the long-run.    1 – Continue Thiamine 500mg IV TID  Folate 1mg PO daily  Librium 50mg PO Noon/6PM/10PM  Ativan 2mg IV Q1H PRN per CIWA  2) likely discharge to home ([place]) with outpt rehab at [facility]

## 2017-10-27 NOTE — PROGRESS NOTE ADULT - PROBLEM SELECTOR PLAN 2
Mildly elevated transaminases, AST:ALT > 2:1 in setting of alcohol abuse, consistent with alcoholic hepatitis. No signs of encephalopathy. No clinical signs of end stage liver disease.

## 2017-10-28 LAB
ALBUMIN SERPL ELPH-MCNC: 4 G/DL — SIGNIFICANT CHANGE UP (ref 3.3–5)
ALP SERPL-CCNC: 58 U/L — SIGNIFICANT CHANGE UP (ref 40–120)
ALT FLD-CCNC: 115 U/L — HIGH (ref 4–41)
AST SERPL-CCNC: 48 U/L — HIGH (ref 4–40)
BASOPHILS # BLD AUTO: 0.08 K/UL — SIGNIFICANT CHANGE UP (ref 0–0.2)
BASOPHILS NFR BLD AUTO: 1.1 % — SIGNIFICANT CHANGE UP (ref 0–2)
BILIRUB SERPL-MCNC: 0.2 MG/DL — SIGNIFICANT CHANGE UP (ref 0.2–1.2)
BUN SERPL-MCNC: 15 MG/DL — SIGNIFICANT CHANGE UP (ref 7–23)
CALCIUM SERPL-MCNC: 8.8 MG/DL — SIGNIFICANT CHANGE UP (ref 8.4–10.5)
CHLORIDE SERPL-SCNC: 103 MMOL/L — SIGNIFICANT CHANGE UP (ref 98–107)
CO2 SERPL-SCNC: 26 MMOL/L — SIGNIFICANT CHANGE UP (ref 22–31)
CREAT SERPL-MCNC: 0.73 MG/DL — SIGNIFICANT CHANGE UP (ref 0.5–1.3)
EOSINOPHIL # BLD AUTO: 0.18 K/UL — SIGNIFICANT CHANGE UP (ref 0–0.5)
EOSINOPHIL NFR BLD AUTO: 2.5 % — SIGNIFICANT CHANGE UP (ref 0–6)
GLUCOSE SERPL-MCNC: 123 MG/DL — HIGH (ref 70–99)
HCT VFR BLD CALC: 44.3 % — SIGNIFICANT CHANGE UP (ref 39–50)
HGB BLD-MCNC: 14.5 G/DL — SIGNIFICANT CHANGE UP (ref 13–17)
IMM GRANULOCYTES # BLD AUTO: 0.04 # — SIGNIFICANT CHANGE UP
IMM GRANULOCYTES NFR BLD AUTO: 0.6 % — SIGNIFICANT CHANGE UP (ref 0–1.5)
LYMPHOCYTES # BLD AUTO: 1.93 K/UL — SIGNIFICANT CHANGE UP (ref 1–3.3)
LYMPHOCYTES # BLD AUTO: 27.1 % — SIGNIFICANT CHANGE UP (ref 13–44)
MCHC RBC-ENTMCNC: 31.5 PG — SIGNIFICANT CHANGE UP (ref 27–34)
MCHC RBC-ENTMCNC: 32.7 % — SIGNIFICANT CHANGE UP (ref 32–36)
MCV RBC AUTO: 96.1 FL — SIGNIFICANT CHANGE UP (ref 80–100)
MONOCYTES # BLD AUTO: 0.59 K/UL — SIGNIFICANT CHANGE UP (ref 0–0.9)
MONOCYTES NFR BLD AUTO: 8.3 % — SIGNIFICANT CHANGE UP (ref 2–14)
NEUTROPHILS # BLD AUTO: 4.29 K/UL — SIGNIFICANT CHANGE UP (ref 1.8–7.4)
NEUTROPHILS NFR BLD AUTO: 60.4 % — SIGNIFICANT CHANGE UP (ref 43–77)
NRBC # FLD: 0 — SIGNIFICANT CHANGE UP
PLATELET # BLD AUTO: 356 K/UL — SIGNIFICANT CHANGE UP (ref 150–400)
PMV BLD: 10.3 FL — SIGNIFICANT CHANGE UP (ref 7–13)
POTASSIUM SERPL-MCNC: 4.3 MMOL/L — SIGNIFICANT CHANGE UP (ref 3.5–5.3)
POTASSIUM SERPL-SCNC: 4.3 MMOL/L — SIGNIFICANT CHANGE UP (ref 3.5–5.3)
PROT SERPL-MCNC: 7.6 G/DL — SIGNIFICANT CHANGE UP (ref 6–8.3)
RBC # BLD: 4.61 M/UL — SIGNIFICANT CHANGE UP (ref 4.2–5.8)
RBC # FLD: 13.3 % — SIGNIFICANT CHANGE UP (ref 10.3–14.5)
SODIUM SERPL-SCNC: 141 MMOL/L — SIGNIFICANT CHANGE UP (ref 135–145)
WBC # BLD: 7.11 K/UL — SIGNIFICANT CHANGE UP (ref 3.8–10.5)
WBC # FLD AUTO: 7.11 K/UL — SIGNIFICANT CHANGE UP (ref 3.8–10.5)

## 2017-10-28 PROCEDURE — 99233 SBSQ HOSP IP/OBS HIGH 50: CPT

## 2017-10-28 RX ORDER — LANOLIN ALCOHOL/MO/W.PET/CERES
3 CREAM (GRAM) TOPICAL AT BEDTIME
Qty: 0 | Refills: 0 | Status: DISCONTINUED | OUTPATIENT
Start: 2017-10-28 | End: 2017-10-29

## 2017-10-28 RX ORDER — IBUPROFEN 200 MG
400 TABLET ORAL ONCE
Qty: 0 | Refills: 0 | Status: COMPLETED | OUTPATIENT
Start: 2017-10-28 | End: 2017-10-28

## 2017-10-28 RX ADMIN — Medication 50 MILLIGRAM(S): at 05:40

## 2017-10-28 RX ADMIN — Medication 400 MILLIGRAM(S): at 16:10

## 2017-10-28 RX ADMIN — Medication 105 MILLIGRAM(S): at 17:03

## 2017-10-28 RX ADMIN — Medication 1 DROP(S): at 05:32

## 2017-10-28 RX ADMIN — DORZOLAMIDE HYDROCHLORIDE TIMOLOL MALEATE 1 DROP(S): 20; 5 SOLUTION/ DROPS OPHTHALMIC at 05:32

## 2017-10-28 RX ADMIN — Medication 3 MILLIGRAM(S): at 22:47

## 2017-10-28 RX ADMIN — Medication 50 MILLIGRAM(S): at 11:23

## 2017-10-28 RX ADMIN — LATANOPROST 1 DROP(S): 0.05 SOLUTION/ DROPS OPHTHALMIC; TOPICAL at 21:15

## 2017-10-28 RX ADMIN — Medication 400 MILLIGRAM(S): at 15:42

## 2017-10-28 RX ADMIN — DORZOLAMIDE HYDROCHLORIDE TIMOLOL MALEATE 1 DROP(S): 20; 5 SOLUTION/ DROPS OPHTHALMIC at 17:03

## 2017-10-28 RX ADMIN — Medication 1 DROP(S): at 17:03

## 2017-10-28 RX ADMIN — Medication 1 MILLIGRAM(S): at 11:23

## 2017-10-28 RX ADMIN — Medication 105 MILLIGRAM(S): at 05:40

## 2017-10-28 RX ADMIN — AMLODIPINE BESYLATE 2.5 MILLIGRAM(S): 2.5 TABLET ORAL at 05:31

## 2017-10-28 NOTE — PROGRESS NOTE ADULT - PROBLEM SELECTOR PLAN 2
Mildly elevated transaminases, AST:ALT > 2:1 in setting of alcohol abuse, consistent with alcoholic hepatitis. No signs of encephalopathy. No clinical signs of end stage liver disease. Liver enzymes improving.

## 2017-10-28 NOTE — PROGRESS NOTE ADULT - PROBLEM SELECTOR PLAN 1
Patient with longstanding alcohol abuse with outpatient follow up and AA. Still with tremulousness today; denied anxiety. Not requiring Ativan. D/w nurse: last librium dose today.  Patient reports tremulousness and anxiety.  c/w thiamine/folic acid.  symptom-triggered CIWA.   Headache: motrin 400 mg PO x 1 dose given.

## 2017-10-29 ENCOUNTER — TRANSCRIPTION ENCOUNTER (OUTPATIENT)
Age: 52
End: 2017-10-29

## 2017-10-29 VITALS
HEART RATE: 55 BPM | DIASTOLIC BLOOD PRESSURE: 71 MMHG | OXYGEN SATURATION: 99 % | TEMPERATURE: 98 F | RESPIRATION RATE: 17 BRPM | SYSTOLIC BLOOD PRESSURE: 112 MMHG

## 2017-10-29 PROCEDURE — 99239 HOSP IP/OBS DSCHRG MGMT >30: CPT

## 2017-10-29 RX ORDER — FOLIC ACID 0.8 MG
1 TABLET ORAL
Qty: 30 | Refills: 0
Start: 2017-10-29 | End: 2017-11-28

## 2017-10-29 RX ORDER — FOLIC ACID 0.8 MG
1 TABLET ORAL
Qty: 0 | Refills: 0 | COMMUNITY

## 2017-10-29 RX ORDER — THIAMINE MONONITRATE (VIT B1) 100 MG
1 TABLET ORAL
Qty: 30 | Refills: 5
Start: 2017-10-29 | End: 2018-04-26

## 2017-10-29 RX ADMIN — AMLODIPINE BESYLATE 2.5 MILLIGRAM(S): 2.5 TABLET ORAL at 05:13

## 2017-10-29 RX ADMIN — Medication 1 MILLIGRAM(S): at 12:47

## 2017-10-29 RX ADMIN — Medication 1 DROP(S): at 05:13

## 2017-10-29 RX ADMIN — Medication 105 MILLIGRAM(S): at 06:05

## 2017-10-29 RX ADMIN — DORZOLAMIDE HYDROCHLORIDE TIMOLOL MALEATE 1 DROP(S): 20; 5 SOLUTION/ DROPS OPHTHALMIC at 05:13

## 2017-10-29 NOTE — DISCHARGE NOTE ADULT - CARE PLAN
Principal Discharge DX:	Alcohol dependence  Goal:	Refrain from alcohol use  Instructions for follow-up, activity and diet:	Outpatient referral to [facility] in [place].  Continue with thiamine and folic acid.  Secondary Diagnosis:	Alcoholic hepatitis without ascites  Instructions for follow-up, activity and diet:	Follow up with your PMD within one week of discharge.  Liver enzyme levels stable.  Secondary Diagnosis:	Essential hypertension  Instructions for follow-up, activity and diet:	Continue with home medication.  Secondary Diagnosis:	Glaucoma of right eye, unspecified glaucoma type  Instructions for follow-up, activity and diet:	Continue with eye drops.

## 2017-10-29 NOTE — PROGRESS NOTE ADULT - PROBLEM SELECTOR PROBLEM 4
Glaucoma of right eye, unspecified glaucoma type

## 2017-10-29 NOTE — PROGRESS NOTE ADULT - PROBLEM SELECTOR PLAN 4
c/w latanoprost, dorzolamide, and prednisolone drops.

## 2017-10-29 NOTE — PROGRESS NOTE ADULT - SUBJECTIVE AND OBJECTIVE BOX
Patient is a 52y old  Male who presents with a chief complaint of Anxiety (26 Oct 2017 12:04)      SUBJECTIVE / OVERNIGHT EVENTS:  Feels ok. Still a bit tremulous    MEDICATIONS  (STANDING):  amLODIPine   Tablet 2.5 milliGRAM(s) Oral daily  dorzolamide 2%/timolol 0.5% Ophthalmic Solution 1 Drop(s) Right EYE every 12 hours  folic acid 1 milliGRAM(s) Oral daily  latanoprost 0.005% Ophthalmic Solution 1 Drop(s) Right EYE at bedtime  prednisoLONE acetate 1% Suspension 1 Drop(s) Right EYE every 12 hours  thiamine IVPB 500 milliGRAM(s) IV Intermittent two times a day    MEDICATIONS  (PRN):  LORazepam   Injectable 2 milliGRAM(s) IV Push every 1 hour PRN CIWA-Ar score 8 or greater      Vital Signs Last 24 Hrs  T(C): 36.7 (27 Oct 2017 13:05), Max: 37.1 (26 Oct 2017 21:00)  T(F): 98.1 (27 Oct 2017 13:05), Max: 98.7 (26 Oct 2017 21:00)  HR: 56 (27 Oct 2017 13:05) (49 - 64)  BP: 110/73 (27 Oct 2017 13:05) (110/73 - 150/84)  BP(mean): --  RR: 18 (27 Oct 2017 13:05) (16 - 18)  SpO2: 100% (27 Oct 2017 13:05) (99% - 100%)  CAPILLARY BLOOD GLUCOSE          PHYSICAL EXAM:  GENERAL: NAD, well-developed  EYES: Conjunctiva and sclera clear  NECK: Supple, No JVD  CHEST/LUNG: Clear to auscultation bilaterally; No wheeze  HEART: Regular rate and rhythm; No murmurs, rubs, or gallops  ABDOMEN: Soft, Nontender, Nondistended; Bowel sounds present  EXTREMITIES:  2+ Peripheral Pulses, No clubbing, cyanosis, or edema  PSYCH: AAOx3  NEUROLOGY: fine resting tremor    LABS:                        14.3   7.10  )-----------( 342      ( 27 Oct 2017 05:50 )             44.1     10-    147<H>  |  107  |  10  ----------------------------<  131<H>  4.3   |  27  |  0.85    Ca    9.1      27 Oct 2017 05:50    TPro  7.7  /  Alb  4.2  /  TBili  0.3  /  DBili  x   /  AST  67<H>  /  ALT  128<H>  /  AlkPhos  56  10-27          Urinalysis Basic - ( 26 Oct 2017 10:00 )    Color: PLYEL / Appearance: CLEAR / S.007 / pH: 7.5  Gluc: NEGATIVE / Ketone: NEGATIVE  / Bili: NEGATIVE / Urobili: NORMAL E.U.   Blood: NEGATIVE / Protein: NEGATIVE / Nitrite: NEGATIVE   Leuk Esterase: NEGATIVE / RBC: x / WBC 0-2   Sq Epi: x / Non Sq Epi: x / Bacteria: x
Patient is a 52y old  Male who presents with a chief complaint of Anxiety (26 Oct 2017 12:04)      SUBJECTIVE / OVERNIGHT EVENTS:  No overnight complaints. Patient denied visual/auditory hallucinations. Denied any nausea or vomiting.    MEDICATIONS  (STANDING):  amLODIPine   Tablet 2.5 milliGRAM(s) Oral daily  dorzolamide 2%/timolol 0.5% Ophthalmic Solution 1 Drop(s) Right EYE every 12 hours  folic acid 1 milliGRAM(s) Oral daily  latanoprost 0.005% Ophthalmic Solution 1 Drop(s) Right EYE at bedtime  prednisoLONE acetate 1% Suspension 1 Drop(s) Right EYE every 12 hours    MEDICATIONS  (PRN):  LORazepam   Injectable 2 milliGRAM(s) IV Push every 1 hour PRN CIWA-Ar score 8 or greater  melatonin 3 milliGRAM(s) Oral at bedtime PRN Insomnia    PHYSICAL EXAM:  GENERAL: NAD, well-developed  HEAD:  Atraumatic, Normocephalic  EYES: EOMI, PERRLA, conjunctiva and sclera clear  NECK: Supple, No JVD  CHEST/LUNG: Clear to auscultation bilaterally; No wheeze  HEART: Regular rate and rhythm; No murmurs, rubs, or gallops  ABDOMEN: Soft, Nontender, Nondistended; Bowel sounds present  EXTREMITIES:  2+ Peripheral Pulses, No clubbing, cyanosis, or edema  PSYCH: AAOx3  NEUROLOGY: non-focal  SKIN: No rashes or lesions    LABS:                        14.5   7.11  )-----------( 356      ( 28 Oct 2017 06:25 )             44.3     10-28    141  |  103  |  15  ----------------------------<  123<H>  4.3   |  26  |  0.73    Ca    8.8      28 Oct 2017 06:25    TPro  7.6  /  Alb  4.0  /  TBili  0.2  /  DBili  x   /  AST  48<H>  /  ALT  115<H>  /  AlkPhos  58  10-28
Patient is a 52y old  Male who presents with a chief complaint of Anxiety (26 Oct 2017 12:04)      SUBJECTIVE / OVERNIGHT EVENTS:  Patient doing better today. He reported still having mild tremors in his upper extremities and mild unsteadiness upon ambulation yesterday but now a lot better. He reported a mild headache today but denied blurry vision, auditory/visual hallucinations. He reported tolerating PO diet without pain, nausea, vomiting.     MEDICATIONS  (STANDING):  amLODIPine   Tablet 2.5 milliGRAM(s) Oral daily  dorzolamide 2%/timolol 0.5% Ophthalmic Solution 1 Drop(s) Right EYE every 12 hours  folic acid 1 milliGRAM(s) Oral daily  ibuprofen  Tablet 400 milliGRAM(s) Oral once  latanoprost 0.005% Ophthalmic Solution 1 Drop(s) Right EYE at bedtime  prednisoLONE acetate 1% Suspension 1 Drop(s) Right EYE every 12 hours  thiamine IVPB 500 milliGRAM(s) IV Intermittent two times a day    MEDICATIONS  (PRN):  LORazepam   Injectable 2 milliGRAM(s) IV Push every 1 hour PRN CIWA-Ar score 8 or greater    CAPILLARY BLOOD GLUCOSE        I&O's Summary    PHYSICAL EXAM:  GENERAL: NAD, well-developed  HEAD:  Atraumatic, Normocephalic  EYES: EOMI, PERRLA, conjunctiva and sclera clear  NECK: Supple, No JVD  CHEST/LUNG: Clear to auscultation bilaterally; No wheeze  HEART: Regular rate and rhythm; No murmurs, rubs, or gallops  ABDOMEN: Soft, Nontender, Nondistended; Bowel sounds present  EXTREMITIES:  2+ Peripheral Pulses, No clubbing, cyanosis, or edema  PSYCH: AAOx3  NEUROLOGY: non-focal  SKIN: No rashes or lesions    LABS:                        14.5   7.11  )-----------( 356      ( 28 Oct 2017 06:25 )             44.3     10-28    141  |  103  |  15  ----------------------------<  123<H>  4.3   |  26  |  0.73    Ca    8.8      28 Oct 2017 06:25    TPro  7.6  /  Alb  4.0  /  TBili  0.2  /  DBili  x   /  AST  48<H>  /  ALT  115<H>  /  AlkPhos  58  10-28      Consultant(s) Notes Reviewed:  Psychiatry

## 2017-10-29 NOTE — DISCHARGE NOTE ADULT - PATIENT PORTAL LINK FT
“You can access the [facility] Patient Portal, offered by [facility], by registering with the following website: [email or web address]”

## 2017-10-29 NOTE — DISCHARGE NOTE ADULT - PLAN OF CARE
Refrain from alcohol use Outpatient referral to [facility] in [place].  Continue with thiamine and folic acid. Follow up with your PMD within one week of discharge.  Liver enzyme levels stable. Continue with home medication. Continue with eye drops.

## 2017-10-29 NOTE — PROGRESS NOTE ADULT - PROBLEM SELECTOR PROBLEM 2
Alcoholic hepatitis without ascites

## 2017-10-29 NOTE — DISCHARGE NOTE ADULT - SECONDARY DIAGNOSIS.
Alcoholic hepatitis without ascites Essential hypertension Glaucoma of right eye, unspecified glaucoma type

## 2017-10-29 NOTE — PROGRESS NOTE ADULT - PROBLEM SELECTOR PLAN 5
Patient is low risk for DVT. No indication for chemical ppx.

## 2017-10-29 NOTE — PROGRESS NOTE ADULT - PROBLEM SELECTOR PLAN 1
Patient with longstanding alcohol abuse with outpatient follow up and AA. Denied anxiety. Completed librium taper and not requiring PRN symptom trigger dosing. Patient ready for discharge home with outpatient follow up.  c/w thiamine/folic acid.  d/c planning 30 minutes. Patient with longstanding alcohol abuse. Denied anxiety. Completed librium taper and not requiring PRN symptom trigger dosing. Patient ready for discharge home with outpatient referral to [facility].  c/w thiamine/folic acid.  d/c planning 30 minutes.

## 2017-10-29 NOTE — DISCHARGE NOTE ADULT - HOSPITAL COURSE
52 M PMH alcohol abuse, essential HTN, presents with complaints of anxiety, nausea, tremulousness for one week.Admitted with Alcohol dependence and started on Librium taper which he completed.  - Patient with longstanding alcohol abuse. Last drink reportedly 11 days prior to admission.   -Psych consulted  - Thiamine 500mg IV TID,Folate 1mg PO daily transitioned to oral  - [facility]   - outpt rehab at [facility].     Generalized anxiety disorder  - Psych following  -stable    Alcoholic hepatitis without ascites.   - Mildly elevated transaminases, AST:ALT > 2:1 in setting of alcohol abuse, consistent with alcoholic hepatitis. No signs of encephalopathy. No clinical signs of end stage liver disease. Liver enzymes improving.     Essential hypertension  - c/w Amlodipine 2.5mg daily.     Glaucoma of right eye  - Visual acuity is grossly intact.  - Will c/w latanoprost, dorzolamide, and prednisolone drops. 52 M PMH alcohol abuse, essential HTN, presents with complaints of anxiety, nausea, tremulousness for one week.Admitted with Alcohol dependence and started on Librium taper which he completed.  - Patient with longstanding alcohol abuse. Last drink reportedly 11 days prior to admission. Psych consulted during admission and the patient was continued on Thiamine 500mg IV TID, Folate 1mg PO daily transitioned to oral. He was recommended for outpatient referral to [facility].   His Generalized anxiety disorder remained stable. Transaminases were initially elevated in setting of alcoholic hepatitis but he had no signs of encephalopathy, end stage liver disease. His liver enzymes improved towards day of discharge.    On day of admission, his CIWA score remained at zero, no signs of withdrawal, and not requiring any PRN doses of ativan. He was stable at the time of discharge.

## 2017-10-29 NOTE — PROGRESS NOTE ADULT - ASSESSMENT
52 M PMH alcohol abuse, essential HTN, presents with complaints of anxiety, nausea, tremulousness for the last week admitted for alcohol withdrawal on librium taper (completed yesterday) with CIWA score of zero, not requiring PRN dosing.
52 M PMH alcohol abuse, essential HTN, presents with complaints of anxiety, nausea, tremulousness for the last week. VS without tachycardia. On exam, patient is calm and cooperative. Labs without anemia or electrolyte abnormality. ECG NSR.
52 M PMH alcohol abuse, essential HTN, presents with complaints of anxiety, nausea, tremulousness for the last week admitted for alcohol withdrawal on librium taper. Appreciated Psychiatry notes and recommendations. last dose of librium today and has not required Ativan.

## 2017-10-29 NOTE — PROGRESS NOTE ADULT - PROBLEM SELECTOR PLAN 2
No signs of encephalopathy. No clinical signs of end stage liver disease. Liver enzymes improving. No signs of encephalopathy. No clinical signs of end stage liver disease. Liver enzymes improving. Outpatient follow up with PCP

## 2017-10-29 NOTE — DISCHARGE NOTE ADULT - MEDICATION SUMMARY - MEDICATIONS TO TAKE
I will START or STAY ON the medications listed below when I get home from the hospital:    amLODIPine 2.5 mg oral tablet  -- 1 tab(s) by mouth once a day  -- Indication: For Blood pressure    dorzolamide-timolol 2.23%-0.68% ophthalmic solution  -- 1 drop(s) to each affected eye 2 times a day  -- Indication: For Glaucoma of right eye, unspecified glaucoma type    prednisoLONE acetate 1% ophthalmic suspension  -- 1 dose(s) to each affected eye 2 times a day  -- Indication: For Glaucoma of right eye, unspecified glaucoma type    latanoprost 0.005% ophthalmic solution  -- 1 drop(s) to each affected eye once a day (in the evening)  -- Indication: For Glaucoma of right eye, unspecified glaucoma type    Multiple Vitamins oral tablet  -- 1 tab(s) by mouth once a day  -- Indication: For Supplement    folic acid 1 mg oral tablet  -- 1 tab(s) by mouth once a day  -- Indication: For Supplement    thiamine 100 mg oral tablet  -- 1 tab(s) by mouth once a day  -- Indication: For Supplement

## 2017-12-15 ENCOUNTER — EMERGENCY (EMERGENCY)
Facility: HOSPITAL | Age: 52
LOS: 1 days | Discharge: HOME | End: 2017-12-15
Attending: EMERGENCY MEDICINE | Admitting: EMERGENCY MEDICINE
Payer: MEDICAID

## 2017-12-15 VITALS
DIASTOLIC BLOOD PRESSURE: 77 MMHG | SYSTOLIC BLOOD PRESSURE: 117 MMHG | TEMPERATURE: 98 F | RESPIRATION RATE: 18 BRPM | HEART RATE: 83 BPM

## 2017-12-15 PROCEDURE — 73630 X-RAY EXAM OF FOOT: CPT | Mod: 26,RT

## 2017-12-15 PROCEDURE — 73610 X-RAY EXAM OF ANKLE: CPT | Mod: 26,RT

## 2017-12-15 PROCEDURE — 29515 APPLICATION SHORT LEG SPLINT: CPT | Mod: RT

## 2017-12-15 PROCEDURE — 73590 X-RAY EXAM OF LOWER LEG: CPT | Mod: 26,RT

## 2017-12-15 PROCEDURE — 99284 EMERGENCY DEPT VISIT MOD MDM: CPT | Mod: 25

## 2017-12-15 RX ORDER — ACETAMINOPHEN 500 MG
650 TABLET ORAL ONCE
Qty: 0 | Refills: 0 | Status: COMPLETED | OUTPATIENT
Start: 2017-12-15 | End: 2017-12-15

## 2017-12-15 RX ORDER — KETOROLAC TROMETHAMINE 30 MG/ML
30 SYRINGE (ML) INJECTION ONCE
Qty: 0 | Refills: 0 | Status: DISCONTINUED | OUTPATIENT
Start: 2017-12-15 | End: 2017-12-15

## 2017-12-15 RX ADMIN — Medication 30 MILLIGRAM(S): at 17:28

## 2017-12-15 NOTE — ED PROVIDER NOTE - OBJECTIVE STATEMENT
53y/o M with PMHx of HTN (on amlodipine), glaucoma, presents to the ED s/p R ankle injury yesterday with pain today. Pt was walking in the snow when he slipped on his R foot. He had no pain yesterday and woke up with pain this morning. Pt has been walking on his foot but his pain has been worsening. He has taken advil and tylenol for the pain to some relief. Denies fall, head injury, LOC, any other complaints. NKDA.

## 2017-12-15 NOTE — ED PROVIDER NOTE - PROGRESS NOTE DETAILS
ALE Anderson: Discussed pt's status with ortho Franck, pt does not need splinting, can be sent out with surgical boot and air splint, follow up with ortho outpatient ALE Anderson: Discussed pt's status with ortho Franck, pt does not need splinting, can be sent out with surgical boot follow up with ortho outpatient, Patient verbalizes understanding.

## 2017-12-15 NOTE — ED PROVIDER NOTE - PLAN OF CARE
Rest, ice, elevate area.  Take Motrin 600mg every 8 hrs with food for pain.  Follow up with PMD within 48-72 hrs.  Follow up with Orthopedist within 1 week. Referral list provided. Any worsening pain, swelling, weakness, numbness return to ED. Ortho referral list provided if needed.

## 2017-12-15 NOTE — ED PROVIDER NOTE - CARE PLAN
Principal Discharge DX:	Closed fracture of right ankle, initial encounter  Instructions for follow-up, activity and diet:	Rest, ice, elevate area.  Take Motrin 600mg every 8 hrs with food for pain.  Follow up with PMD within 48-72 hrs.  Follow up with Orthopedist within 1 week. Referral list provided. Any worsening pain, swelling, weakness, numbness return to ED. Ortho referral list provided if needed.

## 2017-12-15 NOTE — ED PROCEDURE NOTE - CPROC ED POST PROC CARE GUIDE1
Instructed patient/caregiver regarding signs and symptoms of infection./Verbal/written post procedure instructions were given to patient/caregiver./Instructed patient/caregiver to follow-up with primary care physician./Elevate the injured extremity as instructed./Keep the cast/splint/dressing clean and dry.

## 2017-12-15 NOTE — ED PROVIDER NOTE - LOWER EXTREMITY EXAM, RIGHT
Foot has no obvious deformity, neurovascularly intact. Ankle with bimalleolar swelling and tenderness./TENDERNESS/SWELLING

## 2018-01-03 ENCOUNTER — EMERGENCY (EMERGENCY)
Facility: HOSPITAL | Age: 53
LOS: 1 days | Discharge: HOME | End: 2018-01-03
Attending: EMERGENCY MEDICINE | Admitting: EMERGENCY MEDICINE
Payer: MEDICAID

## 2018-01-03 VITALS
DIASTOLIC BLOOD PRESSURE: 85 MMHG | OXYGEN SATURATION: 100 % | HEART RATE: 98 BPM | RESPIRATION RATE: 17 BRPM | TEMPERATURE: 98 F | SYSTOLIC BLOOD PRESSURE: 133 MMHG

## 2018-01-03 PROCEDURE — 99282 EMERGENCY DEPT VISIT SF MDM: CPT

## 2018-01-03 RX ORDER — ACETAMINOPHEN 500 MG
2 TABLET ORAL
Qty: 120 | Refills: 0
Start: 2018-01-03 | End: 2018-01-17

## 2018-01-03 RX ORDER — ACETAMINOPHEN WITH CODEINE 300MG-30MG
15 TABLET ORAL
Qty: 200 | Refills: 0 | OUTPATIENT
Start: 2018-01-03 | End: 2018-01-12

## 2018-01-03 NOTE — ED POST DISCHARGE NOTE - OTHER COMMUNICATION
Pt called with [place] translation regarding his medication not sent to pharmacy. States he called his pharmacy and Tylenol and Tylenol & codeine were not sent.  According to the prescrition writer in the system, both medicaiton were e-sent successfully. Instructed the patient to call and verify with pharmacy again. Pt understands and he'll try calling tomorrow.

## 2018-01-03 NOTE — ED PROVIDER NOTE - OBJECTIVE STATEMENT
51 y/o M w/ PMHx of HTN (on Amlodipine) and glaucoma, presents to the ED c/o right ankle pain x3 weeks. Pt states he was walking in the snow, slipped and twisted his right foot. Pt was seen in ED on 12/15/17, dx w/ right fibular fracture and dc'd home in walking boot w/ ortho follow up. Pt presents today for persistent pain. Pt states he has not been able to follow up w/ ortho because he does not have time or money. Denies any other complaints. 53 y/o M w/ PMHx of HTN (on Amlodipine) and glaucoma, presents to the ED c/o right ankle pain x3 weeks. Pt states he was walking in the snow, slipped and twisted his right foot. Pt was seen in ED on 12/15/17, dx w/ right fibular fracture and dc'd home in walking boot w/ ortho follow up. Pt presents today for persistent pain. Pt states he has not been able to follow up w/ ortho because he does not have time or money.  Also, lost his XR report and PCP states unable to provide referral without report.  Pt requesting pain medication as he was given ibuprofen but unable to take due to gastric ulcers. Denies any other complaints.

## 2018-01-05 RX ORDER — ACETAMINOPHEN WITH CODEINE 300MG-30MG
15 TABLET ORAL
Qty: 200 | Refills: 0
Start: 2018-01-05 | End: 2018-01-14

## 2018-01-24 ENCOUNTER — EMERGENCY (EMERGENCY)
Facility: HOSPITAL | Age: 53
LOS: 1 days | Discharge: HOME | End: 2018-01-24
Attending: EMERGENCY MEDICINE
Payer: COMMERCIAL

## 2018-01-24 VITALS
OXYGEN SATURATION: 98 % | TEMPERATURE: 99 F | RESPIRATION RATE: 18 BRPM | SYSTOLIC BLOOD PRESSURE: 136 MMHG | DIASTOLIC BLOOD PRESSURE: 77 MMHG | HEART RATE: 93 BPM

## 2018-01-24 PROCEDURE — 73130 X-RAY EXAM OF HAND: CPT

## 2018-01-24 PROCEDURE — 73110 X-RAY EXAM OF WRIST: CPT | Mod: 26,LT

## 2018-01-24 PROCEDURE — 73110 X-RAY EXAM OF WRIST: CPT

## 2018-01-24 PROCEDURE — 29125 APPL SHORT ARM SPLINT STATIC: CPT | Mod: LT

## 2018-01-24 PROCEDURE — 99284 EMERGENCY DEPT VISIT MOD MDM: CPT

## 2018-01-24 PROCEDURE — 73130 X-RAY EXAM OF HAND: CPT | Mod: 26,LT

## 2018-01-24 PROCEDURE — 99284 EMERGENCY DEPT VISIT MOD MDM: CPT | Mod: 25

## 2018-01-24 RX ORDER — IBUPROFEN 200 MG
600 TABLET ORAL ONCE
Refills: 0 | Status: COMPLETED | OUTPATIENT
Start: 2018-01-24 | End: 2018-01-24

## 2018-01-24 RX ADMIN — Medication 600 MILLIGRAM(S): at 20:14

## 2018-01-24 RX ADMIN — Medication 600 MILLIGRAM(S): at 20:27

## 2018-01-24 NOTE — ED PROVIDER NOTE - UPPER EXTREMITY EXAM, LEFT
swelling to the L wrist without focal point of tenderness; pain with ROM of L wrist; no tenderness to L forearm or L hand

## 2018-01-24 NOTE — ED PROVIDER NOTE - OBJECTIVE STATEMENT
53 y/o M pt with no PMHx and no PSHx presents to ED c/o worsening L wrist pain s/p heavy lifting yesterday. Pt reports taking x2 tablets of Advil last night, as well as x1 tablet of Acetaminophen earlier today with no relief of sx's. Pt denies numbness, tingling, or any other complaints. Pt also denies having dropped an object on his L wrist recently. Pt notes he is R hand dominant. NKDA.

## 2018-01-24 NOTE — ED PROVIDER NOTE - MEDICAL DECISION MAKING DETAILS
51 y/o M pt presents with L wrist pain. Will check X-Ray of L wrist, X-Ray of L hand to r/o fracture/dislocation, and reassess. Possible sprain as well. Pt presents with L wrist in splint. 53 y/o M pt presents with L wrist pain. Will check X-Ray of L wrist, X-Ray of L hand to r/o fracture/dislocation, and reassess. Possible sprain as well. Pt presents with L wrist in splint.

## 2018-01-26 NOTE — CONSULT NOTE ADULT - SUBJECTIVE AND OBJECTIVE BOX
HPI: Patient is a 51 y/o M who presents to ED c/o worsening L wrist pain s/p heavy lifting yesterday. Pt denies numbness, tingling, or any other complaints. Pt also denies having dropped an object on his L wrist recently.       PAST MEDICAL & SURGICAL HISTORY:  No pertinent past medical history  No significant past surgical history      Review of systems: Non Contributory    MEDICATIONS  (STANDING):      Allergies: No known Allergies      Physical Examination:    Musculoskeletal:   Left hand examination shows moderate swelling on the dorsal side. There is pain to palpation of the dorsal side of the hand, wrist, forearm area. Mild erythema is also noted. Range of motion is decrease.       Neurovascularly Intact    RADIOLOGY & ADDITIONAL STUDIES: X-ray of left hand is unremarkable.     ASSESSMENT: Left hand sprain/tendinitis.     PLAN/RECOMMENDATION: Short arm splint/immobilizer was applied. Keep elevated. Apply ice. Sling was given. Anti-inflammatory meds was given by ER.    FOLLOW UP: With office in 3-4 days HPI: Patient is a 53 y/o M who presents to ED c/o worsening L wrist pain s/p heavy lifting yesterday. Pt denies numbness, tingling, or any other complaints. Pt also denies having dropped an object on his L wrist recently.       PAST MEDICAL & SURGICAL HISTORY:  No pertinent past medical history  No significant past surgical history      Review of systems: Non Contributory    MEDICATIONS  (STANDING):      Allergies: No known Allergies      Physical Examination:    Musculoskeletal:   Left hand examination shows moderate swelling on the dorsal side. There is pain to palpation of the dorsal side of the hand, wrist, forearm area. Mild erythema is also noted. Range of motion is decrease.       Neurovascularly Intact    RADIOLOGY & ADDITIONAL STUDIES: X-ray of left hand is unremarkable.     ASSESSMENT: Left hand sprain/tendinitis.     PLAN/RECOMMENDATION: Short arm splint/immobilizer was applied. Keep elevated. Apply ice. Sling was given. Anti-inflammatory meds was given by ER.    FOLLOW UP: With office in 3-4 days

## 2018-03-06 ENCOUNTER — APPOINTMENT (OUTPATIENT)
Dept: ORTHOPEDIC SURGERY | Facility: CLINIC | Age: 53
End: 2018-03-06
Payer: COMMERCIAL

## 2018-03-06 DIAGNOSIS — Z78.9 OTHER SPECIFIED HEALTH STATUS: ICD-10-CM

## 2018-03-06 DIAGNOSIS — S82.831A OTHER FRACTURE OF UPPER AND LOWER END OF RIGHT FIBULA, INITIAL ENCOUNTER FOR CLOSED FRACTURE: ICD-10-CM

## 2018-03-06 DIAGNOSIS — F17.200 NICOTINE DEPENDENCE, UNSPECIFIED, UNCOMPLICATED: ICD-10-CM

## 2018-03-06 DIAGNOSIS — Z60.2 PROBLEMS RELATED TO LIVING ALONE: ICD-10-CM

## 2018-03-06 PROCEDURE — 99203 OFFICE O/P NEW LOW 30 MIN: CPT | Mod: 57

## 2018-03-06 PROCEDURE — 73610 X-RAY EXAM OF ANKLE: CPT | Mod: RT

## 2018-03-06 PROCEDURE — 27780 TREATMENT OF FIBULA FRACTURE: CPT | Mod: RT

## 2018-03-06 SDOH — SOCIAL STABILITY - SOCIAL INSECURITY: PROBLEMS RELATED TO LIVING ALONE: Z60.2

## 2018-03-13 PROBLEM — Z78.9 CONSUMES ALCOHOL OCCASIONALLY: Status: ACTIVE | Noted: 2018-03-06

## 2018-03-13 PROBLEM — Z78.9 EXERCISES OCCASIONALLY: Status: ACTIVE | Noted: 2018-03-06

## 2018-03-13 PROBLEM — F17.200 CURRENT EVERY DAY SMOKER: Status: ACTIVE | Noted: 2018-03-06

## 2018-03-13 RX ORDER — IBUPROFEN 200 MG/1
CAPSULE, LIQUID FILLED ORAL
Refills: 0 | Status: ACTIVE | COMMUNITY

## 2018-04-27 ENCOUNTER — EMERGENCY (EMERGENCY)
Facility: HOSPITAL | Age: 53
LOS: 1 days | Discharge: HOME | End: 2018-04-27
Attending: EMERGENCY MEDICINE
Payer: MEDICAID

## 2018-04-27 VITALS
RESPIRATION RATE: 20 BRPM | WEIGHT: 192.9 LBS | TEMPERATURE: 99 F | HEART RATE: 92 BPM | DIASTOLIC BLOOD PRESSURE: 95 MMHG | OXYGEN SATURATION: 97 % | HEIGHT: 68 IN | SYSTOLIC BLOOD PRESSURE: 158 MMHG

## 2018-04-27 VITALS
SYSTOLIC BLOOD PRESSURE: 146 MMHG | RESPIRATION RATE: 20 BRPM | TEMPERATURE: 98 F | DIASTOLIC BLOOD PRESSURE: 88 MMHG | HEART RATE: 89 BPM | OXYGEN SATURATION: 94 %

## 2018-04-27 PROCEDURE — 99283 EMERGENCY DEPT VISIT LOW MDM: CPT

## 2018-04-27 RX ADMIN — Medication 25 MILLIGRAM(S): at 20:47

## 2018-04-27 NOTE — ED ADULT NURSE NOTE - OBJECTIVE STATEMENT
Presents requesting alcohol detox. Denies any other complaints. Pt reports drinking more than 15 beers per day for approx 4 weeks. A&Ox3. HICKS. Ambulates with steady gait. Denies cp/sob. Respirations even/unlabored. Abd soft. No n/v/d. Denies urinary symptoms. Skin WDI.

## 2018-04-27 NOTE — ED PROVIDER NOTE - OBJECTIVE STATEMENT
53M, hx HTN and EToH abuse presents to ED with chief complaint of requesting alcohol detox placement. patient has no other acute complaints at this time  - not endorsing any fevers or chills, nausea or vomiting, headache, dizziness, blurry vision, chest pain, shortness of breath, abdominal pain, diarrhea or constipation. No tremors. Patient states that he has been drinking >15 beers daily for last 4 weeks. Drank 7-8 beers today. States he was in Alcohol Detox last in Oct 2017.

## 2018-04-27 NOTE — ED PROVIDER NOTE - ATTENDING CONTRIBUTION TO CARE
Pt here for request for long-term detox.  Last drink this morning.  No fever, cp, sob, ab pain, vomiting.  Has h/o withdrawal in past.  Last detox 6mo ago.  Pt awake, alert, walks a straight line, clinically sober.  Explained that detox not available emergently on Friday night, will give sheet for detox resources and provide Rx for withdrawal medication.  Pt understands, will call in AM detox, stable for dc, no signs of withdrawal currently (no tremors, no tachycardia with HR 80 on my exam, no fasciculations).

## 2018-04-27 NOTE — ED PROVIDER NOTE - MEDICAL DECISION MAKING DETAILS
Dr. Mackenzie Note: voluntary detox request, for outpt, meds for withdrawal at home while waiting for detox bed

## 2018-04-27 NOTE — ED ADULT NURSE NOTE - CHIEF COMPLAINT QUOTE
pt states was drinking every day for 3 weeks last drink this morning pt stets vomitng x 2 weeks pt states  he wants to detox from alcohol

## 2018-05-13 ENCOUNTER — EMERGENCY (EMERGENCY)
Facility: HOSPITAL | Age: 53
LOS: 0 days | Discharge: HOME | End: 2018-05-13
Attending: EMERGENCY MEDICINE
Payer: COMMERCIAL

## 2018-05-13 VITALS
HEART RATE: 69 BPM | RESPIRATION RATE: 18 BRPM | SYSTOLIC BLOOD PRESSURE: 126 MMHG | DIASTOLIC BLOOD PRESSURE: 75 MMHG | TEMPERATURE: 98 F | OXYGEN SATURATION: 95 %

## 2018-05-13 VITALS
RESPIRATION RATE: 18 BRPM | TEMPERATURE: 98 F | OXYGEN SATURATION: 96 % | HEIGHT: 69 IN | WEIGHT: 199.96 LBS | HEART RATE: 70 BPM | SYSTOLIC BLOOD PRESSURE: 121 MMHG | DIASTOLIC BLOOD PRESSURE: 65 MMHG
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
## 2018-05-13 DIAGNOSIS — S93.401A SPRAIN OF UNSPECIFIED LIGAMENT OF RIGHT ANKLE, INITIAL ENCOUNTER: ICD-10-CM

## 2018-05-13 DIAGNOSIS — M25.561 PAIN IN RIGHT KNEE: ICD-10-CM

## 2018-05-13 DIAGNOSIS — F10.229 ALCOHOL DEPENDENCE WITH INTOXICATION, UNSPECIFIED: ICD-10-CM

## 2018-05-13 DIAGNOSIS — I10 ESSENTIAL (PRIMARY) HYPERTENSION: ICD-10-CM

## 2018-05-13 DIAGNOSIS — S09.90XA UNSPECIFIED INJURY OF HEAD, INITIAL ENCOUNTER: ICD-10-CM

## 2018-05-13 DIAGNOSIS — H40.9 UNSPECIFIED GLAUCOMA: ICD-10-CM

## 2018-05-13 DIAGNOSIS — M25.571 PAIN IN RIGHT ANKLE AND JOINTS OF RIGHT FOOT: ICD-10-CM

## 2018-05-13 LAB
ALBUMIN SERPL ELPH-MCNC: 3.5 G/DL — SIGNIFICANT CHANGE UP (ref 3.3–5)
ALP SERPL-CCNC: 76 U/L — SIGNIFICANT CHANGE UP (ref 40–120)
ALT FLD-CCNC: 108 U/L — HIGH (ref 12–78)
ANION GAP SERPL CALC-SCNC: 10 MMOL/L — SIGNIFICANT CHANGE UP (ref 5–17)
AST SERPL-CCNC: 88 U/L — HIGH (ref 15–37)
BASOPHILS # BLD AUTO: 0.11 K/UL — SIGNIFICANT CHANGE UP (ref 0–0.2)
BASOPHILS NFR BLD AUTO: 1.5 % — SIGNIFICANT CHANGE UP (ref 0–2)
BILIRUB SERPL-MCNC: 0.5 MG/DL — SIGNIFICANT CHANGE UP (ref 0.2–1.2)
BUN SERPL-MCNC: 9 MG/DL — SIGNIFICANT CHANGE UP (ref 7–23)
CALCIUM SERPL-MCNC: 8 MG/DL — LOW (ref 8.5–10.1)
CHLORIDE SERPL-SCNC: 104 MMOL/L — SIGNIFICANT CHANGE UP (ref 96–108)
CO2 SERPL-SCNC: 25 MMOL/L — SIGNIFICANT CHANGE UP (ref 22–31)
CREAT SERPL-MCNC: 0.8 MG/DL — SIGNIFICANT CHANGE UP (ref 0.5–1.3)
EOSINOPHIL # BLD AUTO: 0.15 K/UL — SIGNIFICANT CHANGE UP (ref 0–0.5)
EOSINOPHIL NFR BLD AUTO: 2 % — SIGNIFICANT CHANGE UP (ref 0–6)
ETHANOL SERPL-MCNC: 293 MG/DL — HIGH (ref 0–10)
GLUCOSE SERPL-MCNC: 93 MG/DL — SIGNIFICANT CHANGE UP (ref 70–99)
HCT VFR BLD CALC: 41.5 % — SIGNIFICANT CHANGE UP (ref 39–50)
HGB BLD-MCNC: 14 G/DL — SIGNIFICANT CHANGE UP (ref 13–17)
IMM GRANULOCYTES NFR BLD AUTO: 0.3 % — SIGNIFICANT CHANGE UP (ref 0–1.5)
LYMPHOCYTES # BLD AUTO: 2.25 K/UL — SIGNIFICANT CHANGE UP (ref 1–3.3)
LYMPHOCYTES # BLD AUTO: 30.2 % — SIGNIFICANT CHANGE UP (ref 13–44)
MCHC RBC-ENTMCNC: 31 PG — SIGNIFICANT CHANGE UP (ref 27–34)
MCHC RBC-ENTMCNC: 33.7 GM/DL — SIGNIFICANT CHANGE UP (ref 32–36)
MCV RBC AUTO: 92 FL — SIGNIFICANT CHANGE UP (ref 80–100)
MONOCYTES # BLD AUTO: 0.45 K/UL — SIGNIFICANT CHANGE UP (ref 0–0.9)
MONOCYTES NFR BLD AUTO: 6 % — SIGNIFICANT CHANGE UP (ref 2–14)
NEUTROPHILS # BLD AUTO: 4.46 K/UL — SIGNIFICANT CHANGE UP (ref 1.8–7.4)
NEUTROPHILS NFR BLD AUTO: 60 % — SIGNIFICANT CHANGE UP (ref 43–77)
PCP SPEC-MCNC: SIGNIFICANT CHANGE UP
PLATELET # BLD AUTO: 420 K/UL — HIGH (ref 150–400)
POTASSIUM SERPL-MCNC: 4.6 MMOL/L — SIGNIFICANT CHANGE UP (ref 3.5–5.3)
POTASSIUM SERPL-SCNC: 4.6 MMOL/L — SIGNIFICANT CHANGE UP (ref 3.5–5.3)
PROT SERPL-MCNC: 8.3 GM/DL — SIGNIFICANT CHANGE UP (ref 6–8.3)
RBC # BLD: 4.51 M/UL — SIGNIFICANT CHANGE UP (ref 4.2–5.8)
RBC # FLD: 13.3 % — SIGNIFICANT CHANGE UP (ref 10.3–14.5)
SODIUM SERPL-SCNC: 139 MMOL/L — SIGNIFICANT CHANGE UP (ref 135–145)
WBC # BLD: 7.44 K/UL — SIGNIFICANT CHANGE UP (ref 3.8–10.5)
WBC # FLD AUTO: 7.44 K/UL — SIGNIFICANT CHANGE UP (ref 3.8–10.5)

## 2018-05-13 PROCEDURE — 72125 CT NECK SPINE W/O DYE: CPT | Mod: 26

## 2018-05-13 PROCEDURE — 73610 X-RAY EXAM OF ANKLE: CPT | Mod: 26,RT

## 2018-05-13 PROCEDURE — 70450 CT HEAD/BRAIN W/O DYE: CPT | Mod: 26

## 2018-05-13 PROCEDURE — 73562 X-RAY EXAM OF KNEE 3: CPT | Mod: 26,RT

## 2018-05-13 PROCEDURE — 99284 EMERGENCY DEPT VISIT MOD MDM: CPT

## 2018-05-13 RX ORDER — SODIUM CHLORIDE 9 MG/ML
1000 INJECTION INTRAMUSCULAR; INTRAVENOUS; SUBCUTANEOUS ONCE
Qty: 0 | Refills: 0 | Status: COMPLETED | OUTPATIENT
Start: 2018-05-13 | End: 2018-05-13

## 2018-05-13 RX ORDER — IBUPROFEN 200 MG
2 TABLET ORAL
Qty: 40 | Refills: 0
Start: 2018-05-13 | End: 2018-05-17

## 2018-05-13 RX ADMIN — SODIUM CHLORIDE 2000 MILLILITER(S): 9 INJECTION INTRAMUSCULAR; INTRAVENOUS; SUBCUTANEOUS at 14:17

## 2018-05-13 NOTE — ED PROVIDER NOTE - MEDICAL DECISION MAKING DETAILS
labs and imaging reviewed. right ankle placed in ace bandage. patient observed in ed. patient now clinically sober and ready to be discharged. patient is to follow up with ortho and pmd. Discussed results and outcome of testing with the patient.  Patient advised to please follow up with their primary care doctor within the next 24 hours and return to the Emergency Department for worsening symptoms or any other concerns.  Patient advised that their doctor may call  to follow up on the specific results of the tests performed today in the emergency department.

## 2018-05-13 NOTE — ED PROVIDER NOTE - OBJECTIVE STATEMENT
Pertinent PMH/PSH/FHx/SHx and Review of Systems contained within:  54 yo m with PMH of alcohol abuse presents in ED c/o right knee pain and right ankle pain s/p trip and fall today. Patient reports drinking pint of vodka. No head trauma, No loc. No aggravating or relieving factors, No fever/chills, No photophobia/eye pain/changes in vision, No ear pain/sore throat/dysphagia, No chest pain/palpitations, no SOB/cough/wheeze/stridor, No abdominal pain, No N/V/D, no dysuria/frequency/discharge, No neck/back pain, no rash, no changes in neurological status/function.

## 2018-05-13 NOTE — ED PROVIDER NOTE - MUSCULOSKELETAL, MLM
range of motion is not limited, right lateral maleolus edema with tenderness, joint stable, patient able to ambulate, rom intact, neurovascularly intact

## 2018-05-13 NOTE — ED PROVIDER NOTE - CARE PLAN
Principal Discharge DX:	Alcohol intoxication  Secondary Diagnosis:	Sprain of ankle, unspecified laterality, unspecified ligament, initial encounter

## 2018-05-13 NOTE — ED ADULT NURSE NOTE - OBJECTIVE STATEMENT
biba s/p trip and fall c/o r knee and r ankle pain + hit head denies loc pt intoxicated admits to multiple beers today ,speech unintelligible @ times

## 2018-05-13 NOTE — ED ADULT NURSE REASSESSMENT NOTE - NS ED NURSE REASSESS COMMENT FT1
Discharged to home. Hep lock removed. No bleeding noted. VSS. Discharge instructions & paperwork discussed & given to pt. Pt verbalized understanding.  Stable to discharge. Left ER ambulatory, steady gait noted

## 2018-05-13 NOTE — ED ADULT TRIAGE NOTE - CHIEF COMPLAINT QUOTE
biba s/p trip and fall c/o r knee and r ankle pain + hit head denies loc pt intoxicated admits to multiple beers today

## 2018-06-01 PROCEDURE — G9001: CPT

## 2018-06-01 PROCEDURE — G9005: CPT

## 2018-07-01 ENCOUNTER — OUTPATIENT (OUTPATIENT)
Dept: OUTPATIENT SERVICES | Facility: HOSPITAL | Age: 53
LOS: 1 days | End: 2018-07-01
Payer: MEDICAID

## 2018-07-16 DIAGNOSIS — Z71.89 OTHER SPECIFIED COUNSELING: ICD-10-CM

## 2018-09-01 NOTE — ED PROVIDER NOTE - CPE EDP SKIN NORM
Pt here with report playing football and throwing a pass and felt a pop in his right elbow.
normal...

## 2018-11-24 ENCOUNTER — EMERGENCY (EMERGENCY)
Facility: HOSPITAL | Age: 53
LOS: 1 days | Discharge: HOME | End: 2018-11-24
Attending: EMERGENCY MEDICINE | Admitting: EMERGENCY MEDICINE
Payer: MEDICAID

## 2018-11-24 VITALS
DIASTOLIC BLOOD PRESSURE: 95 MMHG | TEMPERATURE: 98 F | OXYGEN SATURATION: 95 % | SYSTOLIC BLOOD PRESSURE: 153 MMHG | RESPIRATION RATE: 17 BRPM | HEART RATE: 93 BPM

## 2018-11-24 VITALS
DIASTOLIC BLOOD PRESSURE: 75 MMHG | HEART RATE: 81 BPM | RESPIRATION RATE: 20 BRPM | OXYGEN SATURATION: 99 % | SYSTOLIC BLOOD PRESSURE: 149 MMHG

## 2018-11-24 LAB
ALBUMIN SERPL ELPH-MCNC: 4.5 G/DL — SIGNIFICANT CHANGE UP (ref 3.3–5)
ALP SERPL-CCNC: 99 U/L — SIGNIFICANT CHANGE UP (ref 40–120)
ALT FLD-CCNC: 32 U/L — SIGNIFICANT CHANGE UP (ref 4–41)
APPEARANCE UR: CLEAR — SIGNIFICANT CHANGE UP
AST SERPL-CCNC: 54 U/L — HIGH (ref 4–40)
BASE EXCESS BLDV CALC-SCNC: 1 MMOL/L — SIGNIFICANT CHANGE UP
BASE EXCESS BLDV CALC-SCNC: 1.1 MMOL/L — SIGNIFICANT CHANGE UP
BASOPHILS # BLD AUTO: 0.08 K/UL — SIGNIFICANT CHANGE UP (ref 0–0.2)
BASOPHILS NFR BLD AUTO: 0.7 % — SIGNIFICANT CHANGE UP (ref 0–2)
BILIRUB SERPL-MCNC: 0.8 MG/DL — SIGNIFICANT CHANGE UP (ref 0.2–1.2)
BILIRUB UR-MCNC: NEGATIVE — SIGNIFICANT CHANGE UP
BLOOD GAS VENOUS - CREATININE: 0.56 MG/DL — SIGNIFICANT CHANGE UP (ref 0.5–1.3)
BLOOD UR QL VISUAL: NEGATIVE — SIGNIFICANT CHANGE UP
BUN SERPL-MCNC: 5 MG/DL — LOW (ref 7–23)
CALCIUM SERPL-MCNC: 9.3 MG/DL — SIGNIFICANT CHANGE UP (ref 8.4–10.5)
CHLORIDE BLDV-SCNC: 106 MMOL/L — SIGNIFICANT CHANGE UP (ref 96–108)
CHLORIDE SERPL-SCNC: 98 MMOL/L — SIGNIFICANT CHANGE UP (ref 98–107)
CO2 SERPL-SCNC: 24 MMOL/L — SIGNIFICANT CHANGE UP (ref 22–31)
COLOR SPEC: COLORLESS — SIGNIFICANT CHANGE UP
CREAT SERPL-MCNC: 0.66 MG/DL — SIGNIFICANT CHANGE UP (ref 0.5–1.3)
EOSINOPHIL # BLD AUTO: 0.03 K/UL — SIGNIFICANT CHANGE UP (ref 0–0.5)
EOSINOPHIL NFR BLD AUTO: 0.3 % — SIGNIFICANT CHANGE UP (ref 0–6)
GAS PNL BLDV: 139 MMOL/L — SIGNIFICANT CHANGE UP (ref 136–146)
GAS PNL BLDV: 139 MMOL/L — SIGNIFICANT CHANGE UP (ref 136–146)
GLUCOSE BLDV-MCNC: 100 — HIGH (ref 70–99)
GLUCOSE BLDV-MCNC: 120 — HIGH (ref 70–99)
GLUCOSE SERPL-MCNC: 116 MG/DL — HIGH (ref 70–99)
GLUCOSE UR-MCNC: NEGATIVE — SIGNIFICANT CHANGE UP
HCO3 BLDV-SCNC: 24 MMOL/L — SIGNIFICANT CHANGE UP (ref 20–27)
HCO3 BLDV-SCNC: 25 MMOL/L — SIGNIFICANT CHANGE UP (ref 20–27)
HCT VFR BLD CALC: 39.8 % — SIGNIFICANT CHANGE UP (ref 39–50)
HCT VFR BLDV CALC: 42 % — SIGNIFICANT CHANGE UP (ref 39–51)
HCT VFR BLDV CALC: 44 % — SIGNIFICANT CHANGE UP (ref 39–51)
HGB BLD-MCNC: 14 G/DL — SIGNIFICANT CHANGE UP (ref 13–17)
HGB BLDV-MCNC: 13.7 G/DL — SIGNIFICANT CHANGE UP (ref 13–17)
HGB BLDV-MCNC: 14.4 G/DL — SIGNIFICANT CHANGE UP (ref 13–17)
IMM GRANULOCYTES # BLD AUTO: 0.03 # — SIGNIFICANT CHANGE UP
IMM GRANULOCYTES NFR BLD AUTO: 0.3 % — SIGNIFICANT CHANGE UP (ref 0–1.5)
KETONES UR-MCNC: NEGATIVE — SIGNIFICANT CHANGE UP
LACTATE BLDV-MCNC: 2.8 MMOL/L — HIGH (ref 0.5–2)
LACTATE BLDV-MCNC: 3.4 MMOL/L — HIGH (ref 0.5–2)
LEUKOCYTE ESTERASE UR-ACNC: NEGATIVE — SIGNIFICANT CHANGE UP
LIDOCAIN IGE QN: 20 U/L — SIGNIFICANT CHANGE UP (ref 7–60)
LYMPHOCYTES # BLD AUTO: 2.35 K/UL — SIGNIFICANT CHANGE UP (ref 1–3.3)
LYMPHOCYTES # BLD AUTO: 20.2 % — SIGNIFICANT CHANGE UP (ref 13–44)
MCHC RBC-ENTMCNC: 29.9 PG — SIGNIFICANT CHANGE UP (ref 27–34)
MCHC RBC-ENTMCNC: 35.2 % — SIGNIFICANT CHANGE UP (ref 32–36)
MCV RBC AUTO: 84.9 FL — SIGNIFICANT CHANGE UP (ref 80–100)
MONOCYTES # BLD AUTO: 0.68 K/UL — SIGNIFICANT CHANGE UP (ref 0–0.9)
MONOCYTES NFR BLD AUTO: 5.8 % — SIGNIFICANT CHANGE UP (ref 2–14)
NEUTROPHILS # BLD AUTO: 8.49 K/UL — HIGH (ref 1.8–7.4)
NEUTROPHILS NFR BLD AUTO: 72.7 % — SIGNIFICANT CHANGE UP (ref 43–77)
NITRITE UR-MCNC: NEGATIVE — SIGNIFICANT CHANGE UP
NRBC # FLD: 0 — SIGNIFICANT CHANGE UP
PCO2 BLDV: 43 MMHG — SIGNIFICANT CHANGE UP (ref 41–51)
PCO2 BLDV: 46 MMHG — SIGNIFICANT CHANGE UP (ref 41–51)
PH BLDV: 7.37 PH — SIGNIFICANT CHANGE UP (ref 7.32–7.43)
PH BLDV: 7.39 PH — SIGNIFICANT CHANGE UP (ref 7.32–7.43)
PH UR: 7 — SIGNIFICANT CHANGE UP (ref 5–8)
PLATELET # BLD AUTO: 299 K/UL — SIGNIFICANT CHANGE UP (ref 150–400)
PMV BLD: 9.8 FL — SIGNIFICANT CHANGE UP (ref 7–13)
PO2 BLDV: 33 MMHG — LOW (ref 35–40)
PO2 BLDV: 44 MMHG — HIGH (ref 35–40)
POTASSIUM BLDV-SCNC: 3.7 MMOL/L — SIGNIFICANT CHANGE UP (ref 3.4–4.5)
POTASSIUM BLDV-SCNC: 3.8 MMOL/L — SIGNIFICANT CHANGE UP (ref 3.4–4.5)
POTASSIUM SERPL-MCNC: 3.8 MMOL/L — SIGNIFICANT CHANGE UP (ref 3.5–5.3)
POTASSIUM SERPL-SCNC: 3.8 MMOL/L — SIGNIFICANT CHANGE UP (ref 3.5–5.3)
PROT SERPL-MCNC: 8.8 G/DL — HIGH (ref 6–8.3)
PROT UR-MCNC: NEGATIVE — SIGNIFICANT CHANGE UP
RBC # BLD: 4.69 M/UL — SIGNIFICANT CHANGE UP (ref 4.2–5.8)
RBC # FLD: 12.1 % — SIGNIFICANT CHANGE UP (ref 10.3–14.5)
SAO2 % BLDV: 53.4 % — LOW (ref 60–85)
SAO2 % BLDV: 72.6 % — SIGNIFICANT CHANGE UP (ref 60–85)
SODIUM SERPL-SCNC: 139 MMOL/L — SIGNIFICANT CHANGE UP (ref 135–145)
SP GR SPEC: 1 — LOW (ref 1–1.04)
UROBILINOGEN FLD QL: NORMAL — SIGNIFICANT CHANGE UP
WBC # BLD: 11.66 K/UL — HIGH (ref 3.8–10.5)
WBC # FLD AUTO: 11.66 K/UL — HIGH (ref 3.8–10.5)

## 2018-11-24 PROCEDURE — 99285 EMERGENCY DEPT VISIT HI MDM: CPT

## 2018-11-24 PROCEDURE — 76705 ECHO EXAM OF ABDOMEN: CPT | Mod: 26

## 2018-11-24 PROCEDURE — 71046 X-RAY EXAM CHEST 2 VIEWS: CPT | Mod: 26

## 2018-11-24 RX ORDER — SODIUM CHLORIDE 9 MG/ML
1000 INJECTION, SOLUTION INTRAVENOUS ONCE
Qty: 0 | Refills: 0 | Status: COMPLETED | OUTPATIENT
Start: 2018-11-24 | End: 2018-11-24

## 2018-11-24 RX ORDER — ONDANSETRON 8 MG/1
8 TABLET, FILM COATED ORAL ONCE
Qty: 0 | Refills: 0 | Status: DISCONTINUED | OUTPATIENT
Start: 2018-11-24 | End: 2018-11-24

## 2018-11-24 RX ORDER — ONDANSETRON 8 MG/1
8 TABLET, FILM COATED ORAL ONCE
Qty: 0 | Refills: 0 | Status: COMPLETED | OUTPATIENT
Start: 2018-11-24 | End: 2018-11-24

## 2018-11-24 RX ORDER — SODIUM CHLORIDE 9 MG/ML
1000 INJECTION INTRAMUSCULAR; INTRAVENOUS; SUBCUTANEOUS ONCE
Qty: 0 | Refills: 0 | Status: COMPLETED | OUTPATIENT
Start: 2018-11-24 | End: 2018-11-24

## 2018-11-24 RX ADMIN — SODIUM CHLORIDE 1000 MILLILITER(S): 9 INJECTION INTRAMUSCULAR; INTRAVENOUS; SUBCUTANEOUS at 16:28

## 2018-11-24 RX ADMIN — ONDANSETRON 8 MILLIGRAM(S): 8 TABLET, FILM COATED ORAL at 15:20

## 2018-11-24 RX ADMIN — SODIUM CHLORIDE 1000 MILLILITER(S): 9 INJECTION INTRAMUSCULAR; INTRAVENOUS; SUBCUTANEOUS at 15:49

## 2018-11-24 RX ADMIN — Medication 25 MILLIGRAM(S): at 17:07

## 2018-11-24 RX ADMIN — SODIUM CHLORIDE 2000 MILLILITER(S): 9 INJECTION, SOLUTION INTRAVENOUS at 14:17

## 2018-11-24 RX ADMIN — SODIUM CHLORIDE 1000 MILLILITER(S): 9 INJECTION, SOLUTION INTRAVENOUS at 14:54

## 2018-11-24 RX ADMIN — Medication 30 MILLILITER(S): at 14:17

## 2018-11-24 NOTE — ED PROVIDER NOTE - OBJECTIVE STATEMENT
54 yo M w/PMHX ETOH dependency, HTN, glaucoma rt eye presenting with acute intoxication, epigastric abdominal pain, N/V. Pt reports he drinks daily 12-14 beers. Last had a beer and whisky this AM, and had nausea/vomiting (NBNB), with sharp epigastric abdmominal pain radiating to rest of abdomen. Denies F/C, but felt "hot' and sweaty. Denies hallucinations, tremors, seizures. Mildly dizzy. Cousin called ambulance for concern when pt complained of abdominal pain.

## 2018-11-24 NOTE — ED PROVIDER NOTE - MEDICAL DECISION MAKING DETAILS
52 yo M w/PMHX ETOH dependency, HTN, glaucoma rt eye presenting with acute intoxication, epigastric abdominal pain, N/V. Afebrile. US showing no gallstones, no cholecystitis. Will administer fluids, librium, DC home. Lactated initially elevated, but downtrending. Lipase neg.

## 2018-11-24 NOTE — ED ADULT NURSE NOTE - OBJECTIVE STATEMENT
PT brought into room 25. A&OX4 male presenting to the ED today with abdominal pain focusing on upper right quadrant. Patient admit to Etoh use with 10-12 beers per day. Last drink "this morning." where he states he had 1 shot of whisky and "a few beers." Upon assessment patient is diaphoretic and shaking. Denies hallucination both auditory and visual.  Abdomin is tender and painful to touch. Medications given as ordered. EKG ordered. Pt placed on cardiac monitor. Denies CP/SOB.  20G IV placed in left AC LR infusing well. Labs sent. Will continue to monitor.

## 2018-11-24 NOTE — ED PROVIDER NOTE - NSFOLLOWUPINSTRUCTIONS_ED_ALL_ED_FT
Please refrain from alcohol use. You declined offerings of rehabilitation centers in the ED. For any seizures, hallucinations, please seek medical attention immediately. Please refrain from alcohol use. Social work provided you with rehab resources. For any seizures, hallucinations, please seek medical attention immediately.

## 2018-11-24 NOTE — ED ADULT TRIAGE NOTE - CHIEF COMPLAINT QUOTE
pt states " I vomit to much, I shake to much and I drink to much" pt tremulous in triage. last drink was tequila this am. states he finishes 1 bottle/day. pt requesting detox.

## 2018-11-24 NOTE — ED PROVIDER NOTE - ATTENDING CONTRIBUTION TO CARE
53M h/o etoh abuse presents for withdrawal, nausea, vomiting. States last drink was this morning, tequila. Patient denies headache, vision changes, focal weakness/numbness, neck pain, fever, cough, chest pain, shortness of breath, back pain, abd pain, diarrhea, constipation, blood in stool, dysuria, rash, trauma, falls. Patient is well appearing, conversant, cooperative, smiling, head atraumatic, neck supple with full range of motion, oropharynx clear, lungs clear, no crackles or rales, speaking full sentences, heart clear, no murmurs, distal pulses equal in all 4 extremities, abdomen soft with epigastric tenderness, nondistended with no masses, no leg edema, no calf tenderness, nonfocal neurologic exam. No acute signs of trauma, infection, sepsis, toxidrome, intoxication.    Patient with mild signs of etoh withdrawal. Concern for pancreatitis. Lipase. LFTs. US GB. CXR. IVF. Screenign EKG. GI cocktail. Reassess.    The patient had repeat abdominal examinations.  They did not show peritoneal signs.  There were no signs of ischemic bowel, AAA, or perforations. No evidence of Appendicitis, Diverticulitis, Bowel Obstruction, MI, Torsion, acute biliary tract disease or any other acute abdominal condition.

## 2018-11-24 NOTE — ED ADULT NURSE REASSESSMENT NOTE - NS ED NURSE REASSESS COMMENT FT1
PT remains in room 25. CIWA score of 7. At this time pt is calm. Rash noted on sternum. Small red dried lesions noted as per patient he states he has had this "for a few weeks." pts vitals are stable. waiting results. MD aware of findings.

## 2018-11-24 NOTE — PROVIDER CONTACT NOTE (OTHER) - ASSESSMENT
Medical team referred this case as pt has been medically cleared for discharge but needs a list of DETOX. Writer spoke with the pt in ED- Intake area - Pt is 52 y/o male. Case was discussed with the medical team and pt has history substance abuse - alcohol .  Writer provided with education on ill effect on substance abuse- alcohol, information about DETOX, Rehab was discussed with the pt.  Pt stated “ I just need the list   of inpatient or outpatient substance abuse program and pt agreed to follow up in the community”. Pt denies suicidal or homicidal thoughts during this time of interaction. Pt is alert and oriented X4. Medical team was made aware of this social service intervention and medical team agreed to follow up with the case.

## 2018-11-24 NOTE — ED PROVIDER NOTE - NORMAL, MLM
PRE-PROCEDURE GENERAL GUIDELINES    · No anti-inflammatory medications(e.g. Aspirin, Ibuprofen, Celebrex, etc) for 48 hours before procedure.  No aspirin products for 48 hours before procedure.    · Take all other medications (heart, blood pressure, diabetic, etc) as you normally would unless directed otherwise.    · If you develop an infection, illness or fever, please call before coming to appointment.    · Eat a light breakfast or lunch before procedure  (NOTHING BY MOUTH AFTER MIDNIGHT IF YOU ARE SCHEDULED TO RECEIVE IV SEDATION)    · It is recommended that you have a  available for all procedures. (IT IS REQUIRED TO HAVE A  IF YOU ARE SCHEDULED TO RECEIVE IV SEDATION)    · Bathe or shower the evening before or morning of procedure.    · Follow up with primary care physician for all other medical concerns.          SURGERY SCHEDULING REQUIREMENTS INCLUDE:  Facility: [facility]  Admission Type: Day Surgery   Time Needed: 15 minutes  Anesthesia: Local  Special Equipment: None   Procedure:   Facet Injection: (41685) C/T injection, single level , (61381) C/T injection, second level , (04400) C/T injection, third & any additional levels    DX Code: M47.812  Anticoagulation:   Is the patient on Coumadin/Warfarin, Lovenox, Plavix, or Aspirin/Excedrin? No   Diabetic: No   Pre-Op Visit: No    Special Instructions: Under Fluoroscopy  Right Cervical facet joint nerve block at C3-4,C4-5,C5-6        
edson all pertinent systems normal

## 2018-12-23 NOTE — ED PROVIDER NOTE - CONTEXT
Patient removed from [facility] waitlist after  donor liver transplant. [facility] ID [id number]   Donor PHS increased risk: No.   If Yes, MD documentation NA.  
twisting/known (describe)

## 2019-03-01 ENCOUNTER — OUTPATIENT (OUTPATIENT)
Dept: OUTPATIENT SERVICES | Facility: HOSPITAL | Age: 54
LOS: 1 days | End: 2019-03-01
Payer: MEDICAID

## 2019-03-01 PROCEDURE — H0002: CPT

## 2019-08-13 ENCOUNTER — INPATIENT (INPATIENT)
Facility: HOSPITAL | Age: 54
LOS: 4 days | Discharge: HOME HEALTH | End: 2019-08-18
Attending: HOSPITALIST | Admitting: HOSPITALIST
Payer: MEDICAID

## 2019-08-13 VITALS
SYSTOLIC BLOOD PRESSURE: 155 MMHG | RESPIRATION RATE: 16 BRPM | OXYGEN SATURATION: 98 % | DIASTOLIC BLOOD PRESSURE: 94 MMHG | HEART RATE: 76 BPM

## 2019-08-13 DIAGNOSIS — H40.9 UNSPECIFIED GLAUCOMA: ICD-10-CM

## 2019-08-13 DIAGNOSIS — F10.239 ALCOHOL DEPENDENCE WITH WITHDRAWAL, UNSPECIFIED: ICD-10-CM

## 2019-08-13 DIAGNOSIS — Z29.9 ENCOUNTER FOR PROPHYLACTIC MEASURES, UNSPECIFIED: ICD-10-CM

## 2019-08-13 DIAGNOSIS — R10.13 EPIGASTRIC PAIN: ICD-10-CM

## 2019-08-13 DIAGNOSIS — R51 HEADACHE: ICD-10-CM

## 2019-08-13 DIAGNOSIS — F32.9 MAJOR DEPRESSIVE DISORDER, SINGLE EPISODE, UNSPECIFIED: ICD-10-CM

## 2019-08-13 DIAGNOSIS — I10 ESSENTIAL (PRIMARY) HYPERTENSION: ICD-10-CM

## 2019-08-13 DIAGNOSIS — K70.10 ALCOHOLIC HEPATITIS WITHOUT ASCITES: ICD-10-CM

## 2019-08-13 LAB
ALBUMIN SERPL ELPH-MCNC: 4 G/DL — SIGNIFICANT CHANGE UP (ref 3.3–5)
ALP SERPL-CCNC: 72 U/L — SIGNIFICANT CHANGE UP (ref 40–120)
ALT FLD-CCNC: 49 U/L — HIGH (ref 4–41)
ANION GAP SERPL CALC-SCNC: 16 MMO/L — HIGH (ref 7–14)
APAP SERPL-MCNC: < 15 UG/ML — LOW (ref 15–25)
APPEARANCE UR: SIGNIFICANT CHANGE UP
AST SERPL-CCNC: 79 U/L — HIGH (ref 4–40)
BACTERIA # UR AUTO: NEGATIVE — SIGNIFICANT CHANGE UP
BASE EXCESS BLDV CALC-SCNC: 2.2 MMOL/L — SIGNIFICANT CHANGE UP
BASOPHILS # BLD AUTO: 0.11 K/UL — SIGNIFICANT CHANGE UP (ref 0–0.2)
BASOPHILS NFR BLD AUTO: 2 % — SIGNIFICANT CHANGE UP (ref 0–2)
BILIRUB SERPL-MCNC: 0.9 MG/DL — SIGNIFICANT CHANGE UP (ref 0.2–1.2)
BILIRUB UR-MCNC: NEGATIVE — SIGNIFICANT CHANGE UP
BLOOD GAS VENOUS - CREATININE: 0.69 MG/DL — SIGNIFICANT CHANGE UP (ref 0.5–1.3)
BLOOD GAS VENOUS - FIO2: 21 — SIGNIFICANT CHANGE UP
BLOOD UR QL VISUAL: NEGATIVE — SIGNIFICANT CHANGE UP
BUN SERPL-MCNC: 10 MG/DL — SIGNIFICANT CHANGE UP (ref 7–23)
CALCIUM SERPL-MCNC: 8.3 MG/DL — LOW (ref 8.4–10.5)
CHLORIDE BLDV-SCNC: 108 MMOL/L — SIGNIFICANT CHANGE UP (ref 96–108)
CHLORIDE SERPL-SCNC: 100 MMOL/L — SIGNIFICANT CHANGE UP (ref 98–107)
CO2 SERPL-SCNC: 24 MMOL/L — SIGNIFICANT CHANGE UP (ref 22–31)
COLOR SPEC: SIGNIFICANT CHANGE UP
CREAT SERPL-MCNC: 0.72 MG/DL — SIGNIFICANT CHANGE UP (ref 0.5–1.3)
EOSINOPHIL # BLD AUTO: 0.04 K/UL — SIGNIFICANT CHANGE UP (ref 0–0.5)
EOSINOPHIL NFR BLD AUTO: 0.7 % — SIGNIFICANT CHANGE UP (ref 0–6)
ETHANOL BLD-MCNC: 169 MG/DL — HIGH
GAS PNL BLDV: 138 MMOL/L — SIGNIFICANT CHANGE UP (ref 136–146)
GLUCOSE BLDV-MCNC: 94 MG/DL — SIGNIFICANT CHANGE UP (ref 70–99)
GLUCOSE SERPL-MCNC: 94 MG/DL — SIGNIFICANT CHANGE UP (ref 70–99)
GLUCOSE UR-MCNC: NEGATIVE — SIGNIFICANT CHANGE UP
HCO3 BLDV-SCNC: 26 MMOL/L — SIGNIFICANT CHANGE UP (ref 20–27)
HCT VFR BLD CALC: 41.3 % — SIGNIFICANT CHANGE UP (ref 39–50)
HCT VFR BLDV CALC: 44.2 % — SIGNIFICANT CHANGE UP (ref 39–51)
HGB BLD-MCNC: 14.2 G/DL — SIGNIFICANT CHANGE UP (ref 13–17)
HGB BLDV-MCNC: 14.4 G/DL — SIGNIFICANT CHANGE UP (ref 13–17)
HYALINE CASTS # UR AUTO: NEGATIVE — SIGNIFICANT CHANGE UP
IMM GRANULOCYTES NFR BLD AUTO: 0.6 % — SIGNIFICANT CHANGE UP (ref 0–1.5)
KETONES UR-MCNC: NEGATIVE — SIGNIFICANT CHANGE UP
LACTATE BLDV-MCNC: 3 MMOL/L — HIGH (ref 0.5–2)
LEUKOCYTE ESTERASE UR-ACNC: NEGATIVE — SIGNIFICANT CHANGE UP
LIDOCAIN IGE QN: 22.4 U/L — SIGNIFICANT CHANGE UP (ref 7–60)
LYMPHOCYTES # BLD AUTO: 1.75 K/UL — SIGNIFICANT CHANGE UP (ref 1–3.3)
LYMPHOCYTES # BLD AUTO: 32.2 % — SIGNIFICANT CHANGE UP (ref 13–44)
MCHC RBC-ENTMCNC: 30.3 PG — SIGNIFICANT CHANGE UP (ref 27–34)
MCHC RBC-ENTMCNC: 34.4 % — SIGNIFICANT CHANGE UP (ref 32–36)
MCV RBC AUTO: 88.2 FL — SIGNIFICANT CHANGE UP (ref 80–100)
MONOCYTES # BLD AUTO: 0.43 K/UL — SIGNIFICANT CHANGE UP (ref 0–0.9)
MONOCYTES NFR BLD AUTO: 7.9 % — SIGNIFICANT CHANGE UP (ref 2–14)
NEUTROPHILS # BLD AUTO: 3.07 K/UL — SIGNIFICANT CHANGE UP (ref 1.8–7.4)
NEUTROPHILS NFR BLD AUTO: 56.6 % — SIGNIFICANT CHANGE UP (ref 43–77)
NITRITE UR-MCNC: NEGATIVE — SIGNIFICANT CHANGE UP
NRBC # FLD: 0.08 K/UL — SIGNIFICANT CHANGE UP (ref 0–0)
NRBC FLD-RTO: 1.5 — SIGNIFICANT CHANGE UP
PCO2 BLDV: 41 MMHG — SIGNIFICANT CHANGE UP (ref 41–51)
PH BLDV: 7.42 PH — SIGNIFICANT CHANGE UP (ref 7.32–7.43)
PH UR: 7 — SIGNIFICANT CHANGE UP (ref 5–8)
PLATELET # BLD AUTO: 222 K/UL — SIGNIFICANT CHANGE UP (ref 150–400)
PMV BLD: 10.1 FL — SIGNIFICANT CHANGE UP (ref 7–13)
PO2 BLDV: 57 MMHG — HIGH (ref 35–40)
POTASSIUM BLDV-SCNC: 3.4 MMOL/L — SIGNIFICANT CHANGE UP (ref 3.4–4.5)
POTASSIUM SERPL-MCNC: 3.7 MMOL/L — SIGNIFICANT CHANGE UP (ref 3.5–5.3)
POTASSIUM SERPL-SCNC: 3.7 MMOL/L — SIGNIFICANT CHANGE UP (ref 3.5–5.3)
PROT SERPL-MCNC: 7.8 G/DL — SIGNIFICANT CHANGE UP (ref 6–8.3)
PROT UR-MCNC: NEGATIVE — SIGNIFICANT CHANGE UP
RBC # BLD: 4.68 M/UL — SIGNIFICANT CHANGE UP (ref 4.2–5.8)
RBC # FLD: 13.2 % — SIGNIFICANT CHANGE UP (ref 10.3–14.5)
RBC CASTS # UR COMP ASSIST: SIGNIFICANT CHANGE UP (ref 0–?)
SALICYLATES SERPL-MCNC: < 5 MG/DL — LOW (ref 15–30)
SAO2 % BLDV: 86.3 % — HIGH (ref 60–85)
SODIUM SERPL-SCNC: 140 MMOL/L — SIGNIFICANT CHANGE UP (ref 135–145)
SP GR SPEC: 1.01 — SIGNIFICANT CHANGE UP (ref 1–1.04)
SQUAMOUS # UR AUTO: SIGNIFICANT CHANGE UP
UROBILINOGEN FLD QL: NORMAL — SIGNIFICANT CHANGE UP
WBC # BLD: 5.43 K/UL — SIGNIFICANT CHANGE UP (ref 3.8–10.5)
WBC # FLD AUTO: 5.43 K/UL — SIGNIFICANT CHANGE UP (ref 3.8–10.5)
WBC UR QL: SIGNIFICANT CHANGE UP (ref 0–?)

## 2019-08-13 PROCEDURE — 71045 X-RAY EXAM CHEST 1 VIEW: CPT | Mod: 26

## 2019-08-13 PROCEDURE — 74177 CT ABD & PELVIS W/CONTRAST: CPT | Mod: 26

## 2019-08-13 PROCEDURE — 99223 1ST HOSP IP/OBS HIGH 75: CPT

## 2019-08-13 RX ORDER — FAMOTIDINE 10 MG/ML
20 INJECTION INTRAVENOUS ONCE
Refills: 0 | Status: COMPLETED | OUTPATIENT
Start: 2019-08-13 | End: 2019-08-13

## 2019-08-13 RX ORDER — LATANOPROST 0.05 MG/ML
1 SOLUTION/ DROPS OPHTHALMIC; TOPICAL AT BEDTIME
Refills: 0 | Status: DISCONTINUED | OUTPATIENT
Start: 2019-08-13 | End: 2019-08-18

## 2019-08-13 RX ORDER — TIMOLOL 0.5 %
1 DROPS OPHTHALMIC (EYE)
Refills: 0 | Status: DISCONTINUED | OUTPATIENT
Start: 2019-08-13 | End: 2019-08-18

## 2019-08-13 RX ORDER — DORZOLAMIDE HYDROCHLORIDE 20 MG/ML
1 SOLUTION/ DROPS OPHTHALMIC THREE TIMES A DAY
Refills: 0 | Status: DISCONTINUED | OUTPATIENT
Start: 2019-08-13 | End: 2019-08-18

## 2019-08-13 RX ORDER — THIAMINE MONONITRATE (VIT B1) 100 MG
100 TABLET ORAL DAILY
Refills: 0 | Status: DISCONTINUED | OUTPATIENT
Start: 2019-08-13 | End: 2019-08-14

## 2019-08-13 RX ORDER — KETOROLAC TROMETHAMINE 30 MG/ML
30 SYRINGE (ML) INJECTION ONCE
Refills: 0 | Status: DISCONTINUED | OUTPATIENT
Start: 2019-08-13 | End: 2019-08-13

## 2019-08-13 RX ORDER — SODIUM CHLORIDE 9 MG/ML
1000 INJECTION INTRAMUSCULAR; INTRAVENOUS; SUBCUTANEOUS ONCE
Refills: 0 | Status: COMPLETED | OUTPATIENT
Start: 2019-08-13 | End: 2019-08-13

## 2019-08-13 RX ORDER — ONDANSETRON 8 MG/1
4 TABLET, FILM COATED ORAL ONCE
Refills: 0 | Status: COMPLETED | OUTPATIENT
Start: 2019-08-13 | End: 2019-08-13

## 2019-08-13 RX ORDER — HYDROCHLOROTHIAZIDE 25 MG
12.5 TABLET ORAL DAILY
Refills: 0 | Status: DISCONTINUED | OUTPATIENT
Start: 2019-08-13 | End: 2019-08-15

## 2019-08-13 RX ORDER — SODIUM CHLORIDE 5 %
1 DROPS OPHTHALMIC (EYE)
Refills: 0 | Status: DISCONTINUED | OUTPATIENT
Start: 2019-08-13 | End: 2019-08-14

## 2019-08-13 RX ORDER — ACETAMINOPHEN 500 MG
325 TABLET ORAL EVERY 6 HOURS
Refills: 0 | Status: DISCONTINUED | OUTPATIENT
Start: 2019-08-13 | End: 2019-08-16

## 2019-08-13 RX ORDER — FOLIC ACID 0.8 MG
1 TABLET ORAL DAILY
Refills: 0 | Status: DISCONTINUED | OUTPATIENT
Start: 2019-08-13 | End: 2019-08-18

## 2019-08-13 RX ORDER — ONDANSETRON 8 MG/1
4 TABLET, FILM COATED ORAL EVERY 6 HOURS
Refills: 0 | Status: DISCONTINUED | OUTPATIENT
Start: 2019-08-13 | End: 2019-08-16

## 2019-08-13 RX ADMIN — SODIUM CHLORIDE 1000 MILLILITER(S): 9 INJECTION INTRAMUSCULAR; INTRAVENOUS; SUBCUTANEOUS at 21:17

## 2019-08-13 RX ADMIN — ONDANSETRON 4 MILLIGRAM(S): 8 TABLET, FILM COATED ORAL at 17:39

## 2019-08-13 RX ADMIN — FAMOTIDINE 20 MILLIGRAM(S): 10 INJECTION INTRAVENOUS at 17:38

## 2019-08-13 RX ADMIN — Medication 325 MILLIGRAM(S): at 22:27

## 2019-08-13 RX ADMIN — Medication 50 MILLIGRAM(S): at 21:41

## 2019-08-13 RX ADMIN — Medication 30 MILLIGRAM(S): at 21:17

## 2019-08-13 RX ADMIN — Medication 325 MILLIGRAM(S): at 23:34

## 2019-08-13 RX ADMIN — SODIUM CHLORIDE 1000 MILLILITER(S): 9 INJECTION INTRAMUSCULAR; INTRAVENOUS; SUBCUTANEOUS at 17:38

## 2019-08-13 RX ADMIN — Medication 1 MILLIGRAM(S): at 18:51

## 2019-08-13 RX ADMIN — Medication 30 MILLIGRAM(S): at 17:39

## 2019-08-13 RX ADMIN — Medication 25 MILLIGRAM(S): at 17:38

## 2019-08-13 NOTE — H&P ADULT - HISTORY OF PRESENT ILLNESS
54-year-old male with alcohol abuse, HTN, anxiety/depression, glaucoma, blind in R eye, presenting with 10 days of abdominal pain associated with nausea without vomiting and non-bloody diarrhea (4-6x/day).  Patient reports abdominal pain is crampy, was diffuse however after arrival in ED pain is now mild and located in epigastrum.  He complains also of a holocranial headache without vision changes/diplopia/eye pain, denies falls/trauma, denies neck pain/stiffness.  He reports drinking 10-15beers/day, denies hard liquor use.  Last drink was 5AM this morning.  He denies to me any history of withdrawal however noted previous admissions in EMR and reporting 28 day stay at [facility] 4 months ago during which time he received a Vivitrol injection.  He was supposed to continue taking naltrexone however he stopped taking it when he started drinking again.  Patient reports that he began drinking again 3 weeks ago when he was in [place].      In the ED VS:  76-87  155-177/86-94  16-18  98-99%RA, received NS 1L IVF, chlordiazepoxide 25mg PO x1, famotidine 20mg IV x1, ketorolac 30mg IV x1, lorazepam 1mg IV x1, ondansetron 4mg IV x1 54-year-old male with alcohol abuse, HTN, anxiety/depression, glaucoma, blind in R eye, presenting with 10 days of abdominal pain associated with nausea without vomiting and non-bloody diarrhea (4-6x/day).  Patient reports abdominal pain is crampy, was diffuse however after arrival in ED pain is now mild and located in epigastrum.  He complains also of a holocranial headache without vision changes/diplopia/eye pain, denies falls/trauma, denies neck pain/stiffness.  He reports drinking 10-15beers/day, denies hard liquor use.  Last drink was 5AM this morning.  He denies to me any history of withdrawal however noted previous admissions in EMR and reporting 28 day stay at [facility] 4 months ago during which time he received a Vivitrol injection.  He was supposed to continue taking naltrexone however he stopped taking it when he started drinking again.  Patient reports that he began drinking again 3 weeks ago when he was in [place].      Patient reports diarrhea, 4-6x/day, no melena or hematochezia. No recent travel, sick contacts or changes in diet.    In the ED VS:  76-87  155-177/86-94  16-18  98-99%RA, received NS 1L IVF, chlordiazepoxide 25mg PO x1, famotidine 20mg IV x1, ketorolac 30mg IV x1, lorazepam 1mg IV x1, ondansetron 4mg IV x1 54-year-old male with alcohol abuse, HTN, anxiety/depression, glaucoma, blind in R eye, presenting with 10 days of abdominal pain associated with nausea without vomiting and non-bloody diarrhea (4-6x/day).  Patient reports abdominal pain is crampy, was diffuse however after arrival in ED pain is now mild and located in epigastrum.  He complains also of a holocranial headache without vision changes/diplopia/eye pain, denies falls/trauma, denies neck pain/stiffness.  He reports drinking 10-15beers/day, denies hard liquor use.  Last drink was 5AM this morning.  He denies to me any history of withdrawal however noted previous admissions in EMR and reporting 28 day stay at [facility] 4 months ago during which time he received a Vivitrol injection.  He was supposed to continue taking naltrexone however he stopped taking it when he started drinking again.  Patient reports that he began drinking again 3 weeks ago when he was in [place].      Patient reports diarrhea, 4-6x/day, no melena or hematochezia. No recent travel, sick contacts or changes in diet.    In the ED VS:  97.6  76-87  155-177/86-94  16-18  98-99%RA, received NS 1L IVF, chlordiazepoxide 25mg PO x1, famotidine 20mg IV x1, ketorolac 30mg IV x1, lorazepam 1mg IV x1, ondansetron 4mg IV x1

## 2019-08-13 NOTE — H&P ADULT - NSHPSOCIALHISTORY_GEN_ALL_CORE
Lives with friends  Employed as a   Never smoker, denies illicit drug use  Drinks 10-15beers/day; denies hard liquor use; previously went to AA, however is without sponsor; confides in a  at times however states that he cannot always call him because he is busy; longest period of sobriety 6-8months; interested in treatment for alcohol withdrawal

## 2019-08-13 NOTE — H&P ADULT - NSICDXPASTMEDICALHX_GEN_ALL_CORE_FT
PAST MEDICAL HISTORY:  Alcohol dependence     Blindness of right eye     Glaucoma     HTN (hypertension)

## 2019-08-13 NOTE — ED PROVIDER NOTE - CLINICAL SUMMARY MEDICAL DECISION MAKING FREE TEXT BOX
55yo m PMHX ETOH dependency, HTN, glaucoma rt eye presenting with epigastric abdominal pain, N/V, diarrhea for 10 days.. Pt reports he drinks daily 12-14 beers. Last had a beer this AM, and had nausea/vomiting (NBNB), with sharp epigastric abdmominal pain, will get labs, fluids, ct abd analgesia, also concern for activa withdrawal with tremors, psychomotor agitation, will give ativan ciwa, reassess

## 2019-08-13 NOTE — ED ADULT NURSE NOTE - CHPI ED NUR SYMPTOMS NEG
no abdominal distension/no hematuria/no dysuria/no fever/no blood in stool/no burning urination/no chills

## 2019-08-13 NOTE — ED ADULT NURSE NOTE - OBJECTIVE STATEMENT
patient reports long history of ETOH abuse. patient states he was recently up in [facility] 3 weeks ago which started a kaycee of drinking 10 beers /day. patient states he was doing well before that going to  and  however the bar scene in [place] set him off. patient states for the last week he has been having diarrhea and abd pain. UMQ is TTP and report nausea but no active vomiting at this time. abd otherwise soft. VSS IV 20 g LAC labs sent and meds given as per orders. will record CIWA and monitor. Verbal report to MARTÍNEZ Noyola.

## 2019-08-13 NOTE — H&P ADULT - PROBLEM SELECTOR PLAN 1
CIWA scores 10->10->10  thiamine, MV, folic acid supplementation  ondansetron PRN nausea/vomiting  chlordiazepoxide taper (can increase to 100mg taper if continues to have elevated CIWA scores, previously recommended by  on last [facility] admission)  PRN lorazepam/chlordiazepoxide  fall precautions CIWA scores 10->10->10  thiamine, MV, folic acid supplementation  ondansetron PRN nausea/vomiting  chlordiazepoxide taper (can increase to 100mg taper if continues to have elevated CIWA scores, previously recommended by  on last [facility] admission)  PRN lorazepam/chlordiazepoxide  fall precautions  diarrhea likely secondary to withdrawal - stool studies pending CIWA scores 10->10->10  thiamine, MV, folic acid supplementation  ondansetron PRN nausea/vomiting  chlordiazepoxide taper (can increase to 100mg taper if continues to have elevated CIWA scores, previously recommended by  on last [facility] admission)  PRN lorazepam/chlordiazepoxide  fall precautions  diarrhea likely secondary to withdrawal - stool studies pending (Cdiff, O&P given works with horses, PCR)

## 2019-08-13 NOTE — ED ADULT NURSE NOTE - NSIMPLEMENTINTERV_GEN_ALL_ED
Implemented All Fall Risk Interventions:  [place] to call system. Call bell, personal items and telephone within reach. Instruct patient to call for assistance. Room bathroom lighting operational. Non-slip footwear when patient is off stretcher. Physically safe environment: no spills, clutter or unnecessary equipment. Stretcher in lowest position, wheels locked, appropriate side rails in place. Provide visual cue, wrist band, yellow gown, etc. Monitor gait and stability. Monitor for mental status changes and reorient to person, place, and time. Review medications for side effects contributing to fall risk. Reinforce activity limits and safety measures with patient and family.

## 2019-08-13 NOTE — ED PROVIDER NOTE - NS ED ROS FT
ROS:  GENERAL: No fever, no chills  EYES: no change in vision  HEENT: no trouble swallowing, no trouble speaking  CARDIAC: no chest pain  PULMONARY: no cough, no shortness of breath  GI: +abdominal pain, +nausea, + vomiting, +diarrhea, no constipation  : No dysuria, no frequency, no change in appearance, or odor of urine  SKIN: no rashes  NEURO: no headache, +weakness  MSK: No joint pain  ~Luis Crane D.O. -Resident

## 2019-08-13 NOTE — H&P ADULT - ASSESSMENT
54-year-old male with alcohol abuse, HTN, anxiety/depression, glaucoma, blind in R eye, presenting with 10 days of abdominal pain associated with nausea without vomiting and non-bloody diarrhea (4-6x/day).

## 2019-08-13 NOTE — ED PROVIDER NOTE - ATTENDING CONTRIBUTION TO CARE
Thom: 54 yom with daily alcohol use complaining of 1 week of multiple loose bms and abd pain and nausea and vomiting, no fever. No bleeding. Pt only had 1 beer today. Complaining of feeling shaky and weak.  No cp, no sob, no fever, no cough, no back pain. On exam, pt is diaphoretic, tremors in tongue and hand, clear lungs, normal cardiac, abd soft with epigastric and umbilical tn, trace edema bilateral lower ext. Concern for pancreatitis, infectious, withdrawal. Fluids, benzos, Ct, labs, likely admit.

## 2019-08-13 NOTE — ED PROVIDER NOTE - OBJECTIVE STATEMENT
55yo m PMHX ETOH dependency, HTN, glaucoma rt eye presenting with epigastric abdominal pain, N/V, diarrhea for 10 days.. Pt reports he drinks daily 12-14 beers. Last had a beer this AM, and had nausea/vomiting (NBNB), with sharp epigastric abdmominal pain radiating to rest of abdomen. Denies hallucinations,  seizures, but feels tremors. recently binging the last 3 weeks. diarrhea watery. Denies recent trauma, headache, dizziness, dysuria, freq, hematuria, constipation, chest pain, shortness of breath, cough.

## 2019-08-13 NOTE — H&P ADULT - NSHPREVIEWOFSYSTEMS_GEN_ALL_CORE
REVIEW OF SYSTEMS:    CONSTITUTIONAL: No weakness, fevers or chills, reports waking up diaphoretic for 3 days however relates this to his alcohol use  EYES/ENT: No visual changes;  No dysphagia; blind in R eye  NECK: No pain or stiffness  RESPIRATORY: No cough, wheezing, hemoptysis; No shortness of breath  CARDIOVASCULAR: No chest pain or palpitations; (+) history of lower extremity edema  GASTROINTESTINAL: (+) abdominal/epigastric pain. (+) nausea, No vomiting; (+) diarrhea; No constipation. No melena or hematochezia.  GENITOURINARY: No dysuria or hematuria; frequent urination related to alcohol use  NEUROLOGICAL: No numbness or weakness; No syncope; HA as above  MSK: ambulates without aid, no falls  SKIN: No itching, burning, rashes, or lesions   All other review of systems is negative unless indicated above.

## 2019-08-13 NOTE — H&P ADULT - PROBLEM SELECTOR PLAN 7
was taking Zoloft and Visteril however stopped taking when he started drinking  currently no SI/HI  may benefit from  eval for referral on discharge c/w Zoloft and Vistaril   may benefit from  eval for referral on discharge

## 2019-08-13 NOTE — H&P ADULT - NSHPPHYSICALEXAM_GEN_ALL_CORE
PHYSICAL EXAM:  GENERAL: NAD, well-developed, well-nourished  HEAD:  Atraumatic, Normocephalic  EYES: opacification of R cornea; injected R sclera; L conjunctiva and sclera clear; L eye reactive to light  NECK: Supple, No JVD  CHEST/LUNG: Clear to auscultation bilaterally; No wheezes, rales or rhonchi, good air movement  HEART: Regular rate and rhythm; No murmurs, rubs, or gallops, (+)S1, S2  ABDOMEN: Soft, Nondistended; Normal Bowel sounds; (+)TTP diffusely, most tender in RUQ and epigastrum  EXTREMITIES:  2+ Peripheral Pulses, No clubbing, cyanosis; L ankle edema and lateral dorsal ankle resolving 3x4cm ecchymosis without pain to palpation of malleoli and with full ROM of L ankle   PSYCH: normal mood and affect; denies SI/HI, denies AH/VH  NEUROLOGY: AAOx3, non-focal, tremor on outstretched hands  SKIN: No rashes or lesions, ecchymosis as above

## 2019-08-13 NOTE — ED PROVIDER NOTE - PHYSICAL EXAMINATION
Physical Exam:  Gen: NAD, AOx3, tremulous appearing, able to ambulate without assistance  Head: NCAT  HEENT: EOMI, PEERLA, normal conjunctiva, tongue midline, oral mucosa dry  Lung: CTAB, no respiratory distress, no wheezes/rhonchi/rales B/L, speaking in full sentences  CV: RRR, no murmurs, rubs or gallops  Abd: soft, ttp in epigastric region, ND, no guarding, no rigidity, no rebound tenderness, no CVA tenderness   MSK: no visible deformities, ROM normal in UE/LE, no back pain  Neuro: No focal sensory or motor deficits  Skin: Warm, well perfused, no rash, no leg swelling  Psych: normal affect, calm  ~Luis Crane D.O. -Resident

## 2019-08-13 NOTE — ED ADULT NURSE REASSESSMENT NOTE - NS ED NURSE REASSESS COMMENT FT1
Pt at baseline mental status, AxOx4, calm and cooperative. breathing is even and unlabored. Pt continues to be tremulous, c/o H/A and is nauseous without active vomiting noted. Ativan given as per MD orders. MD at bedside, will continue to monitor.

## 2019-08-13 NOTE — ED PROVIDER NOTE - CARE PLAN
Principal Discharge DX:	Epigastric pain Principal Discharge DX:	Alcohol withdrawal  Secondary Diagnosis:	Epigastric pain

## 2019-08-13 NOTE — H&P ADULT - NSHPLABSRESULTS_GEN_ALL_CORE
140  |  100  |  10  ----------------------------<  94  3.7   |  24  |  0.72    Ca    8.3<L>      13 Aug 2019 18:00    TPro  7.8  /  Alb  4.0  /  TBili  0.9  /  DBili  x   /  AST  79<H>  /  ALT  49<H>  /  AlkPhos  72                          14.2   5.43  )-----------( 222      ( 13 Aug 2019 18:00 )             41.3     Urinalysis Basic - ( 13 Aug 2019 18:00 )  Color: LIGHT YELLOW / Appearance: Lt TURBID / S.010 / pH: 7.0  Gluc: NEGATIVE / Ketone: NEGATIVE  / Bili: NEGATIVE / Urobili: NORMAL   Blood: NEGATIVE / Protein: NEGATIVE / Nitrite: NEGATIVE   Leuk Esterase: NEGATIVE / RBC: 0-2 / WBC 0-2   Sq Epi: OCC / Non Sq Epi: x / Bacteria: NEGATIVE    18:00 - VBG - pH: 7.42  | pCO2: 41    | pO2: 57    | Lactate: 3.0    Toxicology Screen, Drugs of Abuse, Serum (19 @ 18:00)    Acetaminophen Level, Serum: < 15.0 ug/mL    Salicylate Level, Serum: < 5.0 mg/dL    Ethanol, Whole Blood: 169 mg/dL    Lipase, Serum: 22.4 U/L (19 @ 18:00)    < from: CT Abdomen and Pelvis w/ IV Cont (19 @ 18:25) >  LOWER CHEST: Lung bases are clear. The visualized heart is normal in size.  LIVER: Hepatic steatosis.  BILE DUCTS: Normal caliber.  GALLBLADDER: Cholelithiasis.  SPLEEN: Within normal limits.  PANCREAS: Within normal limits.  ADRENALS: Within normal limits.  KIDNEYS/URETERS: Within normal limits.  BLADDER: Within normal limits.  REPRODUCTIVE ORGANS: Prostate within normal limits.  BOWEL: No bowel obstruction. Appendix is normal.  PERITONEUM: Unchanged calcified granuloma in the base of the mesentery.  VESSELS: The aorta is normal in caliber. Atheromatous disease of the right common iliac artery. The major branches of the aorta are proximally patent. The portal vein and hepatic veins are patent.  RETROPERITONEUM/LYMPH NODES: No lymphadenopathy.    ABDOMINAL WALL: Within normal limits.  BONES: Degenerative changes.  IMPRESSION: 1.  No bowel obstruction. 2.  Normal appendix.  < end of copied text >    CXR personally reviewed - no focal consolidations     EKG personally reviewed - 61bpm NSR, Q in III; TWI in V1, V3; QTc 471ms

## 2019-08-13 NOTE — ED ADULT NURSE NOTE - CHIEF COMPLAINT
The patient is a 54y Male complaining of The patient is a 54y Male complaining of diarrhea and ETOH withdrawal

## 2019-08-14 DIAGNOSIS — R19.7 DIARRHEA, UNSPECIFIED: ICD-10-CM

## 2019-08-14 DIAGNOSIS — K76.0 FATTY (CHANGE OF) LIVER, NOT ELSEWHERE CLASSIFIED: ICD-10-CM

## 2019-08-14 LAB
ALBUMIN SERPL ELPH-MCNC: 3.9 G/DL — SIGNIFICANT CHANGE UP (ref 3.3–5)
ALP SERPL-CCNC: 74 U/L — SIGNIFICANT CHANGE UP (ref 40–120)
ALT FLD-CCNC: 58 U/L — HIGH (ref 4–41)
ANION GAP SERPL CALC-SCNC: 14 MMO/L — SIGNIFICANT CHANGE UP (ref 7–14)
AST SERPL-CCNC: 112 U/L — HIGH (ref 4–40)
BILIRUB DIRECT SERPL-MCNC: 0.4 MG/DL — HIGH (ref 0.1–0.2)
BILIRUB SERPL-MCNC: 1.5 MG/DL — HIGH (ref 0.2–1.2)
BUN SERPL-MCNC: 10 MG/DL — SIGNIFICANT CHANGE UP (ref 7–23)
C DIFF TOX GENS STL QL NAA+PROBE: SIGNIFICANT CHANGE UP
CALCIUM SERPL-MCNC: 8 MG/DL — LOW (ref 8.4–10.5)
CHLORIDE SERPL-SCNC: 99 MMOL/L — SIGNIFICANT CHANGE UP (ref 98–107)
CO2 SERPL-SCNC: 25 MMOL/L — SIGNIFICANT CHANGE UP (ref 22–31)
CREAT SERPL-MCNC: 0.73 MG/DL — SIGNIFICANT CHANGE UP (ref 0.5–1.3)
GLUCOSE SERPL-MCNC: 82 MG/DL — SIGNIFICANT CHANGE UP (ref 70–99)
HCT VFR BLD CALC: 43.4 % — SIGNIFICANT CHANGE UP (ref 39–50)
HCV AB S/CO SERPL IA: 0.12 S/CO — SIGNIFICANT CHANGE UP (ref 0–0.99)
HCV AB SERPL-IMP: SIGNIFICANT CHANGE UP
HGB BLD-MCNC: 14.3 G/DL — SIGNIFICANT CHANGE UP (ref 13–17)
INR BLD: 1.11 — SIGNIFICANT CHANGE UP (ref 0.88–1.17)
LACTATE SERPL-SCNC: 1.2 MMOL/L — SIGNIFICANT CHANGE UP (ref 0.5–2)
MAGNESIUM SERPL-MCNC: 1.6 MG/DL — SIGNIFICANT CHANGE UP (ref 1.6–2.6)
MCHC RBC-ENTMCNC: 29.9 PG — SIGNIFICANT CHANGE UP (ref 27–34)
MCHC RBC-ENTMCNC: 32.9 % — SIGNIFICANT CHANGE UP (ref 32–36)
MCV RBC AUTO: 90.8 FL — SIGNIFICANT CHANGE UP (ref 80–100)
NRBC # FLD: 0 K/UL — SIGNIFICANT CHANGE UP (ref 0–0)
PHOSPHATE SERPL-MCNC: 2.6 MG/DL — SIGNIFICANT CHANGE UP (ref 2.5–4.5)
PLATELET # BLD AUTO: 201 K/UL — SIGNIFICANT CHANGE UP (ref 150–400)
PMV BLD: 10.5 FL — SIGNIFICANT CHANGE UP (ref 7–13)
POTASSIUM SERPL-MCNC: 3.7 MMOL/L — SIGNIFICANT CHANGE UP (ref 3.5–5.3)
POTASSIUM SERPL-SCNC: 3.7 MMOL/L — SIGNIFICANT CHANGE UP (ref 3.5–5.3)
PROT SERPL-MCNC: 7.6 G/DL — SIGNIFICANT CHANGE UP (ref 6–8.3)
PROTHROM AB SERPL-ACNC: 12.4 SEC — SIGNIFICANT CHANGE UP (ref 9.8–13.1)
RBC # BLD: 4.78 M/UL — SIGNIFICANT CHANGE UP (ref 4.2–5.8)
RBC # FLD: 12.9 % — SIGNIFICANT CHANGE UP (ref 10.3–14.5)
SODIUM SERPL-SCNC: 138 MMOL/L — SIGNIFICANT CHANGE UP (ref 135–145)
SPECIMEN SOURCE: SIGNIFICANT CHANGE UP
SPECIMEN SOURCE: SIGNIFICANT CHANGE UP
WBC # BLD: 5.88 K/UL — SIGNIFICANT CHANGE UP (ref 3.8–10.5)
WBC # FLD AUTO: 5.88 K/UL — SIGNIFICANT CHANGE UP (ref 3.8–10.5)

## 2019-08-14 PROCEDURE — 99233 SBSQ HOSP IP/OBS HIGH 50: CPT | Mod: GC

## 2019-08-14 RX ORDER — HYDROXYZINE HCL 10 MG
25 TABLET ORAL
Refills: 0 | Status: DISCONTINUED | OUTPATIENT
Start: 2019-08-14 | End: 2019-08-18

## 2019-08-14 RX ORDER — HYDROXYZINE HCL 10 MG
1 TABLET ORAL
Qty: 0 | Refills: 0 | DISCHARGE

## 2019-08-14 RX ORDER — THIAMINE MONONITRATE (VIT B1) 100 MG
500 TABLET ORAL THREE TIMES A DAY
Refills: 0 | Status: COMPLETED | OUTPATIENT
Start: 2019-08-14 | End: 2019-08-15

## 2019-08-14 RX ORDER — SODIUM CHLORIDE 5 %
1 DROPS OPHTHALMIC (EYE)
Refills: 0 | Status: DISCONTINUED | OUTPATIENT
Start: 2019-08-14 | End: 2019-08-18

## 2019-08-14 RX ORDER — SERTRALINE 25 MG/1
50 TABLET, FILM COATED ORAL DAILY
Refills: 0 | Status: DISCONTINUED | OUTPATIENT
Start: 2019-08-14 | End: 2019-08-18

## 2019-08-14 RX ADMIN — Medication 325 MILLIGRAM(S): at 04:57

## 2019-08-14 RX ADMIN — Medication 325 MILLIGRAM(S): at 05:30

## 2019-08-14 RX ADMIN — ONDANSETRON 4 MILLIGRAM(S): 8 TABLET, FILM COATED ORAL at 22:05

## 2019-08-14 RX ADMIN — SERTRALINE 50 MILLIGRAM(S): 25 TABLET, FILM COATED ORAL at 14:30

## 2019-08-14 RX ADMIN — Medication 12.5 MILLIGRAM(S): at 06:01

## 2019-08-14 RX ADMIN — Medication 1 TABLET(S): at 14:30

## 2019-08-14 RX ADMIN — Medication 1 DROP(S): at 18:09

## 2019-08-14 RX ADMIN — Medication 105 MILLIGRAM(S): at 18:09

## 2019-08-14 RX ADMIN — Medication 1 DROP(S): at 06:01

## 2019-08-14 RX ADMIN — LATANOPROST 1 DROP(S): 0.05 SOLUTION/ DROPS OPHTHALMIC; TOPICAL at 03:05

## 2019-08-14 RX ADMIN — Medication 1 MILLIGRAM(S): at 14:30

## 2019-08-14 RX ADMIN — Medication 4 MILLIGRAM(S): at 22:07

## 2019-08-14 RX ADMIN — DORZOLAMIDE HYDROCHLORIDE 1 DROP(S): 20 SOLUTION/ DROPS OPHTHALMIC at 05:59

## 2019-08-14 RX ADMIN — Medication 4 MILLIGRAM(S): at 18:09

## 2019-08-14 RX ADMIN — Medication 1 DROP(S): at 08:29

## 2019-08-14 RX ADMIN — DORZOLAMIDE HYDROCHLORIDE 1 DROP(S): 20 SOLUTION/ DROPS OPHTHALMIC at 22:08

## 2019-08-14 RX ADMIN — ONDANSETRON 4 MILLIGRAM(S): 8 TABLET, FILM COATED ORAL at 10:06

## 2019-08-14 RX ADMIN — LATANOPROST 1 DROP(S): 0.05 SOLUTION/ DROPS OPHTHALMIC; TOPICAL at 22:08

## 2019-08-14 RX ADMIN — Medication 25 MILLIGRAM(S): at 18:09

## 2019-08-14 RX ADMIN — Medication 50 MILLIGRAM(S): at 03:20

## 2019-08-14 RX ADMIN — ONDANSETRON 4 MILLIGRAM(S): 8 TABLET, FILM COATED ORAL at 14:35

## 2019-08-14 RX ADMIN — Medication 325 MILLIGRAM(S): at 22:07

## 2019-08-14 RX ADMIN — DORZOLAMIDE HYDROCHLORIDE 1 DROP(S): 20 SOLUTION/ DROPS OPHTHALMIC at 14:31

## 2019-08-14 RX ADMIN — DORZOLAMIDE HYDROCHLORIDE 1 DROP(S): 20 SOLUTION/ DROPS OPHTHALMIC at 02:58

## 2019-08-14 RX ADMIN — Medication 4 MILLIGRAM(S): at 14:31

## 2019-08-14 RX ADMIN — ONDANSETRON 4 MILLIGRAM(S): 8 TABLET, FILM COATED ORAL at 03:21

## 2019-08-14 RX ADMIN — Medication 325 MILLIGRAM(S): at 23:00

## 2019-08-14 RX ADMIN — Medication 4 MILLIGRAM(S): at 10:05

## 2019-08-14 RX ADMIN — Medication 105 MILLIGRAM(S): at 10:05

## 2019-08-14 NOTE — ED ADULT NURSE REASSESSMENT NOTE - NS ED NURSE REASSESS COMMENT FT1
pt a&Ox4, breathing even & unlabored, VSS, ambulated to bathroom, c/o nausea, zofran administered as per MD order, report given to floor, pending transport pickup, will continue to monitor.

## 2019-08-14 NOTE — PROGRESS NOTE ADULT - PROBLEM SELECTOR PLAN 7
was taking Zoloft and Visteril however stopped taking when he started drinking  currently no SI/HI  may benefit from  eval for referral on discharge DVT ppx: IMPROVE Score 0  Fall precautions  Diet: DASH/TLC

## 2019-08-14 NOTE — PROGRESS NOTE ADULT - ATTENDING COMMENTS
Patient seen and examined by me. Case discussed with resident and agree with the resident's findings and plan as documented in the resident's note. 54M h/o alcohol abuse, previous admissions for withdrawals p/w abdominal pain for 10 days and alcohol withdrawal.     1. Alcohol withdrawal:  -CIWA scores 5  -c/w thiamine, MV, folic acid supplementation  -ondansetron PRN nausea/vomiting  -c/w ativan taper given transaminitis    2. Diarrhea:  -Cdiff negative  -diarrhea likely secondary to withdrawal - f/u stool studies pending (O&P given works with horses, PCR)    3. Abdominal pain:  -CT unremarkable but evidence of gallstone not choledocholithiasis  -if abdominal pain persists, recommend RUQ ultrasound  -trend bilirubin

## 2019-08-14 NOTE — PROGRESS NOTE ADULT - PROBLEM SELECTOR PLAN 6
acetaminophen PRN  will c/w IVF overnight -Restart home Zoloft and Vistaril, currently no SI/HI  -Patient has therapist he sees regularly outside of hospital  -At this time does not appear anxiety/depression are clouding patient's judgement or motivation to seek treatment

## 2019-08-14 NOTE — PROGRESS NOTE ADULT - PROBLEM SELECTOR PLAN 1
CIWA scores 10->10->10  thiamine, MV, folic acid supplementation  ondansetron PRN nausea/vomiting  chlordiazepoxide taper (can increase to 100mg taper if continues to have elevated CIWA scores, previously recommended by  on last [facility] admission)  PRN lorazepam/chlordiazepoxide  fall precautions  diarrhea likely secondary to withdrawal - stool studies pending (Cdiff, O&P given works with horses, PCR) Patient without hallucinations, seizures. CIWA scores 10 x2 on admission, then downtrending. Overnight was 5 x2.   -D/C Chlori  thiamine, MV, folic acid supplementation  ondansetron PRN nausea/vomiting  chlordiazepoxide taper (can increase to 100mg taper if continues to have elevated CIWA scores, previously recommended by  on last [facility] admission)  PRN lorazepam/chlordiazepoxide  fall precautions  diarrhea likely secondary to withdrawal - stool studies pending (Cdiff, O&P given works with horses, PCR) Patient without hallucinations, seizures. CIWA scores 10 x2 on admission, then downtrending. Overnight was 5 x2.   -D/C Chlordiazepoxide, start Ativan taper. Librium longer-acting, may be contributing to elevated LFTs  -500mg IV thiamine for 2 days, then transition to oral  -PRN Ativan available for elevated CIWA scores

## 2019-08-14 NOTE — PROGRESS NOTE ADULT - PROBLEM SELECTOR PLAN 3
c/w eye gtt Likely secondary to prolonged alcohol use. LFT's elevated above those on admission, though previous admissions showed elevated LFTs and baseline likely above normal. Elevated bilirubin, CT A/P showed gallstones.   - Total Bilirubin up to 1.5 from 0.9 on admission, dBili 0.4.   - ?RUQ U/S  - Morning CMP

## 2019-08-14 NOTE — PROGRESS NOTE ADULT - ASSESSMENT
53 y/o M with history of alcohol abuse, previous admissions for withdrawals who presents with nausea, diarrhea, abdominal pain for 10 days. 55 y/o M with history of alcohol abuse, previous admissions for withdrawals who presents with nausea, diarrhea, abdominal pain for 10 days. Likely currently in alcohol withdrawal, on Ativan taper and monitoring CIWA scores.

## 2019-08-14 NOTE — PROGRESS NOTE ADULT - PROBLEM SELECTOR PLAN 2
ondansetron PRN N/V  acetaminophen PRN pain  CT as above  diet as tolerated Likely secondary to alcohol withdrawal, combined with epigastric pain and timeline of alcohol use. Reassuring no blood in stool, no f/c  -CDiff, Stool O&P, GI PCR sent this morning  -Encourage PO given diarrhea

## 2019-08-14 NOTE — PROGRESS NOTE ADULT - SUBJECTIVE AND OBJECTIVE BOX
Subjective:    Mr. Olson is a 54 year old male with a history of alcohol abuse, previous admissions for withdrawal, HTN, anxiety/depression, glaucoma (R eye blindness) who presents with ten day of abdominal pain and nausea. Patient also endorses non-bloody diarrhea 4-6 times per day during this span.     Medications:  acetaminophen   Tablet .. 325 milliGRAM(s) Oral every 6 hours PRN  chlordiazePOXIDE 50 milliGRAM(s) Oral every 6 hours  chlordiazePOXIDE 50 milliGRAM(s) Oral every 8 hours  chlordiazePOXIDE   Oral   chlordiazePOXIDE 50 milliGRAM(s) Oral every 1 hour PRN  dorzolamide 2% Ophthalmic Solution 1 Drop(s) Both EYES three times a day  folic acid 1 milliGRAM(s) Oral daily  hydrochlorothiazide 12.5 milliGRAM(s) Oral daily  hydrOXYzine hydrochloride 25 milliGRAM(s) Oral two times a day  latanoprost 0.005% Ophthalmic Solution 1 Drop(s) Both EYES at bedtime  LORazepam     Tablet 2 milliGRAM(s) Oral every 2 hours PRN  multivitamin 1 Tablet(s) Oral daily  ondansetron Injectable 4 milliGRAM(s) IV Push every 6 hours  sertraline 50 milliGRAM(s) Oral daily  sodium chloride 5% Solution 1 Drop(s) Right EYE two times a day  thiamine IVPB 500 milliGRAM(s) IV Intermittent three times a day  timolol 0.5% Solution 1 Drop(s) Both EYES two times a day      PHYSICAL EXAM:  Vital Signs Last 24 Hrs  T(C): 36.4 (14 Aug 2019 07:57), Max: 36.8 (14 Aug 2019 02:00)  T(F): 97.5 (14 Aug 2019 07:57), Max: 98.2 (14 Aug 2019 02:00)  HR: 66 (14 Aug 2019 07:57) (58 - 87)  BP: 133/75 (14 Aug 2019 07:57) (130/72 - 177/86)  BP(mean): --  RR: 18 (14 Aug 2019 07:57) (16 - 18)  SpO2: 100% (14 Aug 2019 07:57) (97% - 100%)  Daily Height in cm: 172.72 (14 Aug 2019 04:46)    Daily   I&O's Detail      General: A/ox 3, No acute Distress  Neck: Supple, NO JVD  Cardiac: S1 S2, No M/R/G  Pulmonary: CTAB, Breathing unlabored, No Rhonchi/Rales/Wheezing  Abdomen: Soft, Non -tender, +BS   Extremities: No Rashes, No edema  Neuro: A/o x 3, No focal deficits  Psch: normal mood , normal affect    LABS:               14.2   5.43  )-----------( 222      ( 13 Aug 2019 18:00 )             41.3         140  |  100  |  10  ----------------------------<  94  3.7   |  24  |  0.72    Ca    8.3<L>      13 Aug 2019 18:00    TPro  7.8  /  Alb  4.0  /  TBili  0.9  /  DBili  x   /  AST  79<H>  /  ALT  49<H>  /  AlkPhos  72      	Urinalysis Basic - ( 13 Aug 2019 18:00 )  	Color: LIGHT YELLOW / Appearance: Lt TURBID / S.010 / pH: 7.0  	Gluc: NEGATIVE / Ketone: NEGATIVE  / Bili: NEGATIVE / Urobili: NORMAL   	Blood: NEGATIVE / Protein: NEGATIVE / Nitrite: NEGATIVE   	Leuk Esterase: NEGATIVE / RBC: 0-2 / WBC 0-2   	Sq Epi: OCC / Non Sq Epi: x / Bacteria: NEGATIVE      18:00 - VBG - pH: 7.42  | pCO2: 41    | pO2: 57    | Lactate: 3.0    	Toxicology Screen, Drugs of Abuse, Serum (19 @ 18:00)  	  Acetaminophen Level, Serum: < 15.0 ug/mL  	  Salicylate Level, Serum: < 5.0 mg/dL      Ethanol, Whole Blood: 169 mg/dL    IMAGING:    CT A/P w IV Cont:  IMPRESSION: 1.  No bowel obstruction. 2.  Normal appendix. Subjective:    Mr. Olson is a 54 year old male with a history of alcohol abuse, previous admissions for withdrawal, HTN, anxiety/depression, glaucoma (R eye blindness) who presents with ten day of abdominal pain and nausea. Patient also endorses non-bloody diarrhea 4-6 times per day during this span. Says that three weeks ago he began drinking again, around 12 beers per day, after visiting [facility]. Patient has prolonged history of alcohol use, has been in treatment programs in the past, in February spent 28 days inpatient in [facility]. Was taking Vivitrol at that time and was off alcohol for two months but then relapsed.     Has multiple past ED visits for abdominal pain, vomiting, presumed alcohol withdrawal. Says he lives with friends, they do not drink. Has interest in being in an inpatient rehab program and states he is having trouble finding one at this time. This morning, continues to have abdominal pain and diarrhea. Endorses SOB, tremors. States he has never had a seizure or hallucinations in the past for alcohol withdrawal. Currently denies AVH, SI, HI    Otherwise, patient denies f/c, constipation, chest pain.     Medications:  acetaminophen   Tablet .. 325 milliGRAM(s) Oral every 6 hours PRN  chlordiazePOXIDE 50 milliGRAM(s) Oral every 6 hours  chlordiazePOXIDE 50 milliGRAM(s) Oral every 8 hours  chlordiazePOXIDE   Oral   chlordiazePOXIDE 50 milliGRAM(s) Oral every 1 hour PRN  dorzolamide 2% Ophthalmic Solution 1 Drop(s) Both EYES three times a day  folic acid 1 milliGRAM(s) Oral daily  hydrochlorothiazide 12.5 milliGRAM(s) Oral daily  hydrOXYzine hydrochloride 25 milliGRAM(s) Oral two times a day  latanoprost 0.005% Ophthalmic Solution 1 Drop(s) Both EYES at bedtime  LORazepam     Tablet 2 milliGRAM(s) Oral every 2 hours PRN  multivitamin 1 Tablet(s) Oral daily  ondansetron Injectable 4 milliGRAM(s) IV Push every 6 hours  sertraline 50 milliGRAM(s) Oral daily  sodium chloride 5% Solution 1 Drop(s) Right EYE two times a day  thiamine IVPB 500 milliGRAM(s) IV Intermittent three times a day  timolol 0.5% Solution 1 Drop(s) Both EYES two times a day      PHYSICAL EXAM:  Vital Signs Last 24 Hrs  T(C): 36.4 (14 Aug 2019 07:57), Max: 36.8 (14 Aug 2019 02:00)  T(F): 97.5 (14 Aug 2019 07:57), Max: 98.2 (14 Aug 2019 02:00)  HR: 66 (14 Aug 2019 07:57) (58 - 87)  BP: 133/75 (14 Aug 2019 07:57) (130/72 - 177/86)  BP(mean): --  RR: 18 (14 Aug 2019 07:57) (16 - 18)  SpO2: 100% (14 Aug 2019 07:57) (97% - 100%)  Daily Height in cm: 172.72 (14 Aug 2019 04:46)    Daily   I&O's Detail      General: A/ox 3, Tremors bilateral hands when outstretched, tremor of lips  Neck: Supple, NO JVD  Cardiac: S1 S2, No M/R/G  Pulmonary: Breathing unlabored. Inspiratory and Expiratory Wheezing R middle lung, no crackles.   Abdomen: Soft, +BS, tenderness to palpation epigastric. No Bowles's sign. No rebound or guarding  Extremities: No Rashes, No edema  Neuro: A/o x 3, No focal deficits besides aforementioned tremor. Strength 5/5 bilateral upper and LE  Psych: normal mood , normal affect. Expresses he wants to get coordinated with inpatient rehab programs    LABS:  CBC Full  -  ( 14 Aug 2019 07:14 )  WBC Count : 5.88 K/uL  RBC Count : 4.78 M/uL  Hemoglobin : 14.3 g/dL  Hematocrit : 43.4 %  Platelet Count - Automated : 201 K/uL  Mean Cell Volume : 90.8 fL  Mean Cell Hemoglobin : 29.9 pg  Mean Cell Hemoglobin Concentration : 32.9 %        138  |  99  |  10  ----------------------------<  82  3.7   |  25  |  0.73    Ca    8.0<L>      14 Aug 2019 07:14  Phos  2.6     08-14  Mg     1.6     14    TPro  7.6  /  Alb  3.9  /  TBili  1.5<H>  /  DBili  x   /  AST  112<H>  /  ALT  58<H>  /  AlkPhos  74  08-14    LIVER FUNCTIONS - ( 14 Aug 2019 07:14 )  Alb: 3.9 g/dL / Pro: 7.6 g/dL / ALK PHOS: 74 u/L / ALT: 58 u/L / AST: 112 u/L / GGT: x           Urinalysis Basic - ( 13 Aug 2019 18:00 )    Color: LIGHT YELLOW / Appearance: Lt TURBID / S.010 / pH: 7.0  Gluc: NEGATIVE / Ketone: NEGATIVE  / Bili: NEGATIVE / Urobili: NORMAL   Blood: NEGATIVE / Protein: NEGATIVE / Nitrite: NEGATIVE   Leuk Esterase: NEGATIVE / RBC: 0-2 / WBC 0-2   Sq Epi: OCC / Non Sq Epi: x / Bacteria: NEGATIVE        18:00 - VBG - pH: 7.42  | pCO2: 41    | pO2: 57    | Lactate: 3.0-->1.2 this morning    	Toxicology Screen, Drugs of Abuse, Serum (19 @ 18:00)  	  Acetaminophen Level, Serum: < 15.0 ug/mL  	  Salicylate Level, Serum: < 5.0 mg/dL      Ethanol, Whole Blood: 169 mg/dL    IMAGING:    CT A/P w IV Cont:  IMPRESSION: 1.  No bowel obstruction. 2.  Normal appendix. Subjective:    Mr. Olson is a 54 year old male with a history of alcohol abuse, previous admissions for withdrawal, HTN, anxiety/depression, glaucoma (R eye blindness) who presents with ten day of abdominal pain and nausea. Patient also endorses non-bloody diarrhea 4-6 times per day during this span. Says that three weeks ago he began drinking again, around 12 beers per day, after visiting [facility]. Patient has prolonged history of alcohol use, has been in treatment programs in the past, in February spent 28 days inpatient in [facility]. Was taking Vivitrol at that time and was off alcohol for two months but then relapsed.     Has multiple past ED visits for abdominal pain, vomiting, presumed alcohol withdrawal. Says he lives with friends, they do not drink. Has interest in being in an inpatient rehab program and states he is having trouble finding one at this time. This morning, continues to have abdominal pain and diarrhea. Endorses SOB, tremors. States he has never had a seizure or hallucinations in the past for alcohol withdrawal. Currently denies AVH, SI, HI    Otherwise, patient denies f/c, constipation, chest pain.     Medications:  acetaminophen   Tablet .. 325 milliGRAM(s) Oral every 6 hours PRN  chlordiazePOXIDE 50 milliGRAM(s) Oral every 6 hours  chlordiazePOXIDE 50 milliGRAM(s) Oral every 8 hours  chlordiazePOXIDE   Oral   chlordiazePOXIDE 50 milliGRAM(s) Oral every 1 hour PRN  dorzolamide 2% Ophthalmic Solution 1 Drop(s) Both EYES three times a day  folic acid 1 milliGRAM(s) Oral daily  hydrochlorothiazide 12.5 milliGRAM(s) Oral daily  hydrOXYzine hydrochloride 25 milliGRAM(s) Oral two times a day  latanoprost 0.005% Ophthalmic Solution 1 Drop(s) Both EYES at bedtime  LORazepam     Tablet 2 milliGRAM(s) Oral every 2 hours PRN  multivitamin 1 Tablet(s) Oral daily  ondansetron Injectable 4 milliGRAM(s) IV Push every 6 hours  sertraline 50 milliGRAM(s) Oral daily  sodium chloride 5% Solution 1 Drop(s) Right EYE two times a day  thiamine IVPB 500 milliGRAM(s) IV Intermittent three times a day  timolol 0.5% Solution 1 Drop(s) Both EYES two times a day      PHYSICAL EXAM:  Vital Signs Last 24 Hrs  T(C): 36.4 (14 Aug 2019 07:57), Max: 36.8 (14 Aug 2019 02:00)  T(F): 97.5 (14 Aug 2019 07:57), Max: 98.2 (14 Aug 2019 02:00)  HR: 66 (14 Aug 2019 07:57) (58 - 87)  BP: 133/75 (14 Aug 2019 07:57) (130/72 - 177/86)  BP(mean): --  RR: 18 (14 Aug 2019 07:57) (16 - 18)  SpO2: 100% (14 Aug 2019 07:57) (97% - 100%)  Daily Height in cm: 172.72 (14 Aug 2019 04:46)    Daily   I&O's Detail      General: A/ox 3, Tremors bilateral hands when outstretched, tremor of lips  Eyes: R eye erythematous. Pupils dilated bilaterally, reactive to light  Neck: Supple, NO JVD  Cardiac: S1 S2, No M/R/G  Pulmonary: Breathing unlabored. Inspiratory and Expiratory Wheezing R middle lung, no crackles.   Abdomen: Soft, +BS, tenderness to palpation epigastric. No Bowles's sign. No rebound or guarding  Extremities: No Rashes, No edema  Neuro: A/o x 3, No focal deficits besides aforementioned tremor. Strength 5/5 bilateral upper and LE  Psych: normal mood , normal affect. Expresses he wants to get coordinated with inpatient rehab programs    LABS:  CBC Full  -  ( 14 Aug 2019 07:14 )  WBC Count : 5.88 K/uL  RBC Count : 4.78 M/uL  Hemoglobin : 14.3 g/dL  Hematocrit : 43.4 %  Platelet Count - Automated : 201 K/uL  Mean Cell Volume : 90.8 fL  Mean Cell Hemoglobin : 29.9 pg  Mean Cell Hemoglobin Concentration : 32.9 %        138  |  99  |  10  ----------------------------<  82  3.7   |  25  |  0.73    Ca    8.0<L>      14 Aug 2019 07:14  Phos  2.6     08-14  Mg     1.6     -14    TPro  7.6  /  Alb  3.9  /  TBili  1.5<H>  /  DBili  x   /  AST  112<H>  /  ALT  58<H>  /  AlkPhos  74  08-14    LIVER FUNCTIONS - ( 14 Aug 2019 07:14 )  Alb: 3.9 g/dL / Pro: 7.6 g/dL / ALK PHOS: 74 u/L / ALT: 58 u/L / AST: 112 u/L / GGT: x           Urinalysis Basic - ( 13 Aug 2019 18:00 )    Color: LIGHT YELLOW / Appearance: Lt TURBID / S.010 / pH: 7.0  Gluc: NEGATIVE / Ketone: NEGATIVE  / Bili: NEGATIVE / Urobili: NORMAL   Blood: NEGATIVE / Protein: NEGATIVE / Nitrite: NEGATIVE   Leuk Esterase: NEGATIVE / RBC: 0-2 / WBC 0-2   Sq Epi: OCC / Non Sq Epi: x / Bacteria: NEGATIVE        18:00 - VBG - pH: 7.42  | pCO2: 41    | pO2: 57    | Lactate: 3.0-->1.2 this morning    	Toxicology Screen, Drugs of Abuse, Serum (19 @ 18:00)  	  Acetaminophen Level, Serum: < 15.0 ug/mL  	  Salicylate Level, Serum: < 5.0 mg/dL      Ethanol, Whole Blood: 169 mg/dL    IMAGING:    CT A/P w IV Cont:  IMPRESSION: 1.  No bowel obstruction. 2.  Normal appendix.

## 2019-08-15 DIAGNOSIS — E87.6 HYPOKALEMIA: ICD-10-CM

## 2019-08-15 DIAGNOSIS — E80.6 OTHER DISORDERS OF BILIRUBIN METABOLISM: ICD-10-CM

## 2019-08-15 LAB
ALBUMIN SERPL ELPH-MCNC: 3.7 G/DL — SIGNIFICANT CHANGE UP (ref 3.3–5)
ALP SERPL-CCNC: 71 U/L — SIGNIFICANT CHANGE UP (ref 40–120)
ALT FLD-CCNC: 68 U/L — HIGH (ref 4–41)
ANION GAP SERPL CALC-SCNC: 13 MMO/L — SIGNIFICANT CHANGE UP (ref 7–14)
AST SERPL-CCNC: 136 U/L — HIGH (ref 4–40)
BACTERIA UR CULT: SIGNIFICANT CHANGE UP
BILIRUB DIRECT SERPL-MCNC: 0.6 MG/DL — HIGH (ref 0.1–0.2)
BILIRUB SERPL-MCNC: 2.1 MG/DL — HIGH (ref 0.2–1.2)
BILIRUB SERPL-MCNC: 2.1 MG/DL — HIGH (ref 0.2–1.2)
BUN SERPL-MCNC: 8 MG/DL — SIGNIFICANT CHANGE UP (ref 7–23)
CALCIUM SERPL-MCNC: 8.4 MG/DL — SIGNIFICANT CHANGE UP (ref 8.4–10.5)
CHLORIDE SERPL-SCNC: 98 MMOL/L — SIGNIFICANT CHANGE UP (ref 98–107)
CO2 SERPL-SCNC: 25 MMOL/L — SIGNIFICANT CHANGE UP (ref 22–31)
CREAT SERPL-MCNC: 0.76 MG/DL — SIGNIFICANT CHANGE UP (ref 0.5–1.3)
GLUCOSE SERPL-MCNC: 106 MG/DL — HIGH (ref 70–99)
MAGNESIUM SERPL-MCNC: 1.6 MG/DL — SIGNIFICANT CHANGE UP (ref 1.6–2.6)
O+P SPEC CONC: SIGNIFICANT CHANGE UP
PHOSPHATE SERPL-MCNC: 2.9 MG/DL — SIGNIFICANT CHANGE UP (ref 2.5–4.5)
POTASSIUM SERPL-MCNC: 3 MMOL/L — LOW (ref 3.5–5.3)
POTASSIUM SERPL-SCNC: 3 MMOL/L — LOW (ref 3.5–5.3)
PROT SERPL-MCNC: 7.3 G/DL — SIGNIFICANT CHANGE UP (ref 6–8.3)
SODIUM SERPL-SCNC: 136 MMOL/L — SIGNIFICANT CHANGE UP (ref 135–145)
SPECIMEN SOURCE: SIGNIFICANT CHANGE UP

## 2019-08-15 PROCEDURE — 99233 SBSQ HOSP IP/OBS HIGH 50: CPT | Mod: GC

## 2019-08-15 PROCEDURE — 76705 ECHO EXAM OF ABDOMEN: CPT | Mod: 26

## 2019-08-15 RX ORDER — POTASSIUM CHLORIDE 20 MEQ
10 PACKET (EA) ORAL
Refills: 0 | Status: COMPLETED | OUTPATIENT
Start: 2019-08-15 | End: 2019-08-15

## 2019-08-15 RX ORDER — THIAMINE MONONITRATE (VIT B1) 100 MG
100 TABLET ORAL DAILY
Refills: 0 | Status: DISCONTINUED | OUTPATIENT
Start: 2019-08-16 | End: 2019-08-18

## 2019-08-15 RX ORDER — LANOLIN ALCOHOL/MO/W.PET/CERES
3 CREAM (GRAM) TOPICAL AT BEDTIME
Refills: 0 | Status: DISCONTINUED | OUTPATIENT
Start: 2019-08-15 | End: 2019-08-18

## 2019-08-15 RX ORDER — LANOLIN ALCOHOL/MO/W.PET/CERES
1 CREAM (GRAM) TOPICAL AT BEDTIME
Refills: 0 | Status: DISCONTINUED | OUTPATIENT
Start: 2019-08-15 | End: 2019-08-15

## 2019-08-15 RX ORDER — POTASSIUM CHLORIDE 20 MEQ
40 PACKET (EA) ORAL ONCE
Refills: 0 | Status: COMPLETED | OUTPATIENT
Start: 2019-08-15 | End: 2019-08-15

## 2019-08-15 RX ADMIN — Medication 4 MILLIGRAM(S): at 02:00

## 2019-08-15 RX ADMIN — ONDANSETRON 4 MILLIGRAM(S): 8 TABLET, FILM COATED ORAL at 09:25

## 2019-08-15 RX ADMIN — Medication 1 MILLIGRAM(S): at 13:41

## 2019-08-15 RX ADMIN — LATANOPROST 1 DROP(S): 0.05 SOLUTION/ DROPS OPHTHALMIC; TOPICAL at 22:26

## 2019-08-15 RX ADMIN — Medication 325 MILLIGRAM(S): at 23:00

## 2019-08-15 RX ADMIN — Medication 25 MILLIGRAM(S): at 06:20

## 2019-08-15 RX ADMIN — Medication 325 MILLIGRAM(S): at 04:07

## 2019-08-15 RX ADMIN — Medication 3 MILLIGRAM(S): at 17:35

## 2019-08-15 RX ADMIN — Medication 325 MILLIGRAM(S): at 22:24

## 2019-08-15 RX ADMIN — DORZOLAMIDE HYDROCHLORIDE 1 DROP(S): 20 SOLUTION/ DROPS OPHTHALMIC at 13:42

## 2019-08-15 RX ADMIN — DORZOLAMIDE HYDROCHLORIDE 1 DROP(S): 20 SOLUTION/ DROPS OPHTHALMIC at 22:26

## 2019-08-15 RX ADMIN — DORZOLAMIDE HYDROCHLORIDE 1 DROP(S): 20 SOLUTION/ DROPS OPHTHALMIC at 06:21

## 2019-08-15 RX ADMIN — Medication 40 MILLIEQUIVALENT(S): at 09:22

## 2019-08-15 RX ADMIN — ONDANSETRON 4 MILLIGRAM(S): 8 TABLET, FILM COATED ORAL at 22:25

## 2019-08-15 RX ADMIN — Medication 1 DROP(S): at 06:22

## 2019-08-15 RX ADMIN — Medication 100 MILLIEQUIVALENT(S): at 10:54

## 2019-08-15 RX ADMIN — Medication 1 DROP(S): at 17:30

## 2019-08-15 RX ADMIN — Medication 105 MILLIGRAM(S): at 17:29

## 2019-08-15 RX ADMIN — Medication 1 TABLET(S): at 13:41

## 2019-08-15 RX ADMIN — Medication 325 MILLIGRAM(S): at 05:11

## 2019-08-15 RX ADMIN — Medication 1 DROP(S): at 17:29

## 2019-08-15 RX ADMIN — Medication 3 MILLIGRAM(S): at 22:59

## 2019-08-15 RX ADMIN — Medication 100 MILLIEQUIVALENT(S): at 13:42

## 2019-08-15 RX ADMIN — Medication 105 MILLIGRAM(S): at 22:27

## 2019-08-15 RX ADMIN — Medication 100 MILLIEQUIVALENT(S): at 09:22

## 2019-08-15 RX ADMIN — Medication 3 MILLIGRAM(S): at 09:24

## 2019-08-15 RX ADMIN — Medication 12.5 MILLIGRAM(S): at 06:20

## 2019-08-15 RX ADMIN — SERTRALINE 50 MILLIGRAM(S): 25 TABLET, FILM COATED ORAL at 13:41

## 2019-08-15 RX ADMIN — Medication 105 MILLIGRAM(S): at 01:59

## 2019-08-15 RX ADMIN — Medication 1 DROP(S): at 06:21

## 2019-08-15 RX ADMIN — Medication 25 MILLIGRAM(S): at 17:30

## 2019-08-15 RX ADMIN — Medication 4 MILLIGRAM(S): at 06:20

## 2019-08-15 RX ADMIN — Medication 3 MILLIGRAM(S): at 13:45

## 2019-08-15 RX ADMIN — Medication 105 MILLIGRAM(S): at 09:21

## 2019-08-15 RX ADMIN — Medication 3 MILLIGRAM(S): at 22:24

## 2019-08-15 RX ADMIN — ONDANSETRON 4 MILLIGRAM(S): 8 TABLET, FILM COATED ORAL at 02:09

## 2019-08-15 NOTE — PROGRESS NOTE ADULT - PROBLEM SELECTOR PLAN 6
-Restart home Zoloft and Vistaril, currently no SI/HI  -Patient has therapist he sees regularly outside of hospital  -At this time does not appear anxiety/depression are clouding patient's judgement or motivation to seek treatment Right eye blindness  -Continue with home eye medications -Holding home HCTZ given Hypokalemia

## 2019-08-15 NOTE — PROGRESS NOTE ADULT - PROBLEM SELECTOR PLAN 2
Likely secondary to alcohol withdrawal, combined with epigastric pain and timeline of alcohol use. Reassuring no blood in stool, no f/c  -CDiff, Stool O&P, GI PCR sent this morning  -Encourage PO given diarrhea Also has vomiting, differential includes alcohol withdrawal vs. viral gastroenteritis vs. gallbladder pathology given rising bili. Reassuring no blood in stool, no f/c  - CDiff negative  - Stool O&P, GI PCR pending  - Encourage PO given diarrhea, as tolerated

## 2019-08-15 NOTE — PROGRESS NOTE ADULT - SUBJECTIVE AND OBJECTIVE BOX
Subjective:    Mr. Olson is a 53 y/o M with history of alcohol abuse, previous admissions for withdrawals who presents with nausea, diarrhea, abdominal pain for 10 days. Likely currently in alcohol withdrawal, on Ativan taper and monitoring CIWA scores. No acute events overnight. This morning,     Medications:  acetaminophen   Tablet .. 325 milliGRAM(s) Oral every 6 hours PRN  dorzolamide 2% Ophthalmic Solution 1 Drop(s) Both EYES three times a day  folic acid 1 milliGRAM(s) Oral daily  hydrochlorothiazide 12.5 milliGRAM(s) Oral daily  hydrOXYzine hydrochloride 25 milliGRAM(s) Oral two times a day  latanoprost 0.005% Ophthalmic Solution 1 Drop(s) Both EYES at bedtime  LORazepam     Tablet 3 milliGRAM(s) Oral every 4 hours  LORazepam     Tablet   Oral   LORazepam     Tablet 2 milliGRAM(s) Oral every 2 hours PRN  multivitamin 1 Tablet(s) Oral daily  ondansetron Injectable 4 milliGRAM(s) IV Push every 6 hours  sertraline 50 milliGRAM(s) Oral daily  sodium chloride 5% Solution 1 Drop(s) Right EYE two times a day  thiamine IVPB 500 milliGRAM(s) IV Intermittent three times a day  timolol 0.5% Solution 1 Drop(s) Both EYES two times a day      PHYSICAL EXAM:  Vital Signs Last 24 Hrs  T(C): 36.4 (15 Aug 2019 05:55), Max: 36.8 (14 Aug 2019 11:55)  T(F): 97.6 (15 Aug 2019 05:55), Max: 98.2 (14 Aug 2019 11:55)  HR: 62 (15 Aug 2019 05:55) (60 - 72)  BP: 119/77 (15 Aug 2019 05:55) (116/68 - 147/75)  BP(mean): --  RR: 16 (15 Aug 2019 05:55) (16 - 18)  SpO2: 98% (15 Aug 2019 05:55) (96% - 100%)  Daily     Daily   I&O's Detail    14 Aug 2019 07:01  -  15 Aug 2019 07:00  --------------------------------------------------------  IN:  Total IN: 0 mL    OUT:    Stool: 3 mL  Total OUT: 3 mL    Total NET: -3 mL      General: A/ox 3, No acute Distress  Neck: Supple, NO JVD  Cardiac: S1 S2, No M/R/G  Pulmonary: CTAB, Breathing unlabored, No Rhonchi/Rales/Wheezing  Abdomen: Soft, Non -tender, +BS   Extremities: No Rashes, No edema  Neuro: A/o x 3, No focal deficits  Psch: normal mood , normal affect    LABS:                          14.3   5.88  )-----------( 201      ( 14 Aug 2019 07:14 )             43.4     08-14    138  |  99  |  10  ----------------------------<  82  3.7   |  25  |  0.73    Ca    8.0<L>      14 Aug 2019 07:14  Phos  2.6     08-14  Mg     1.6     08-14    TPro  7.6  /  Alb  3.9  /  TBili  1.5<H>  /  DBili  0.4<H>  /  AST  112<H>  /  ALT  58<H>  /  AlkPhos  74  08-14    PT/INR - ( 14 Aug 2019 07:14 )   PT: 12.4 SEC;   INR: 1.11 Subjective:    Mr. Olson is a 55 y/o M with history of alcohol abuse, previous admissions for withdrawals who presents with nausea, diarrhea, abdominal pain for 10 days. Likely currently in alcohol withdrawal, on Ativan taper and monitoring CIWA scores. No acute events overnight. This morning, endorses that he's still having diarrhea. Vomited 3 times yesterday, yellow colored. Received Zofran once overnight. No blood in stool or emesis. This morning endorses mild epigastric abdominal pain. Denies R shoulder pain. Pain not worse after eating.     Medications:  acetaminophen   Tablet .. 325 milliGRAM(s) Oral every 6 hours PRN  dorzolamide 2% Ophthalmic Solution 1 Drop(s) Both EYES three times a day  folic acid 1 milliGRAM(s) Oral daily  hydrochlorothiazide 12.5 milliGRAM(s) Oral daily  hydrOXYzine hydrochloride 25 milliGRAM(s) Oral two times a day  latanoprost 0.005% Ophthalmic Solution 1 Drop(s) Both EYES at bedtime  LORazepam     Tablet 3 milliGRAM(s) Oral every 4 hours  LORazepam     Tablet   Oral   LORazepam     Tablet 2 milliGRAM(s) Oral every 2 hours PRN  multivitamin 1 Tablet(s) Oral daily  ondansetron Injectable 4 milliGRAM(s) IV Push every 6 hours  sertraline 50 milliGRAM(s) Oral daily  sodium chloride 5% Solution 1 Drop(s) Right EYE two times a day  thiamine IVPB 500 milliGRAM(s) IV Intermittent three times a day  timolol 0.5% Solution 1 Drop(s) Both EYES two times a day      PHYSICAL EXAM:  Vital Signs Last 24 Hrs  T(C): 36.4 (15 Aug 2019 05:55), Max: 36.8 (14 Aug 2019 11:55)  T(F): 97.6 (15 Aug 2019 05:55), Max: 98.2 (14 Aug 2019 11:55)  HR: 62 (15 Aug 2019 05:55) (60 - 72)  BP: 119/77 (15 Aug 2019 05:55) (116/68 - 147/75)  BP(mean): --  RR: 16 (15 Aug 2019 05:55) (16 - 18)  SpO2: 98% (15 Aug 2019 05:55) (96% - 100%)  Daily     Daily   I&O's Detail    14 Aug 2019 07:01  -  15 Aug 2019 07:00  --------------------------------------------------------  IN:  Total IN: 0 mL    OUT:    Stool: 3 mL  Total OUT: 3 mL    Total NET: -3 mL      General: A/ox 3, No acute Distress. Not diaphoretic  Neck: Supple, NO JVD  Cardiac: S1 S2, No M/R/G  Pulmonary: CTAB, Breathing unlabored, No Rhonchi/Rales/Wheezing  Abdomen: Soft, Tender to deep palpation in epigastric region. Mild tenderness on right side when taking deep breath, says mostly epigastric pain  Extremities: No Rashes, No edema  Neuro: A/o x 3, Mild tremor of hands when outstretched  Psch: normal mood , normal affect    LABS:                          14.3   5.88  )-----------( 201      ( 14 Aug 2019 07:14 )             43.4     08-14    138  |  99  |  10  ----------------------------<  82  3.7   |  25  |  0.73    Ca    8.0<L>      14 Aug 2019 07:14  Phos  2.6     08-14  Mg     1.6     08-14    TPro  7.6  /  Alb  3.9  /  TBili  1.5<H>  /  DBili  0.4<H>  /  AST  112<H>  /  ALT  58<H>  /  AlkPhos  74  08-14    PT/INR - ( 14 Aug 2019 07:14 )   PT: 12.4 SEC;   INR: 1.11 Subjective:    Mr. Olson is a 55 y/o M with history of alcohol abuse, previous admissions for withdrawals who presents with nausea, diarrhea, abdominal pain for 10 days. Likely currently in alcohol withdrawal, on Ativan taper and monitoring CIWA scores. No acute events overnight. This morning, endorses that he's still having diarrhea. Vomited 3 times yesterday, yellow colored. Received Zofran once overnight. No blood in stool or emesis. This morning endorses mild epigastric abdominal pain. Denies R shoulder pain. Pain not worse after eating.     Medications:  acetaminophen   Tablet .. 325 milliGRAM(s) Oral every 6 hours PRN  dorzolamide 2% Ophthalmic Solution 1 Drop(s) Both EYES three times a day  folic acid 1 milliGRAM(s) Oral daily  hydrochlorothiazide 12.5 milliGRAM(s) Oral daily  hydrOXYzine hydrochloride 25 milliGRAM(s) Oral two times a day  latanoprost 0.005% Ophthalmic Solution 1 Drop(s) Both EYES at bedtime  LORazepam     Tablet 3 milliGRAM(s) Oral every 4 hours  LORazepam     Tablet   Oral   LORazepam     Tablet 2 milliGRAM(s) Oral every 2 hours PRN  multivitamin 1 Tablet(s) Oral daily  ondansetron Injectable 4 milliGRAM(s) IV Push every 6 hours  sertraline 50 milliGRAM(s) Oral daily  sodium chloride 5% Solution 1 Drop(s) Right EYE two times a day  thiamine IVPB 500 milliGRAM(s) IV Intermittent three times a day  timolol 0.5% Solution 1 Drop(s) Both EYES two times a day      PHYSICAL EXAM:  Vital Signs Last 24 Hrs  T(C): 36.4 (15 Aug 2019 05:55), Max: 36.8 (14 Aug 2019 11:55)  T(F): 97.6 (15 Aug 2019 05:55), Max: 98.2 (14 Aug 2019 11:55)  HR: 62 (15 Aug 2019 05:55) (60 - 72)  BP: 119/77 (15 Aug 2019 05:55) (116/68 - 147/75)  BP(mean): --  RR: 16 (15 Aug 2019 05:55) (16 - 18)  SpO2: 98% (15 Aug 2019 05:55) (96% - 100%)  Daily     Daily   I&O's Detail    14 Aug 2019 07:01  -  15 Aug 2019 07:00  --------------------------------------------------------  IN:  Total IN: 0 mL    OUT:    Stool: 3 mL  Total OUT: 3 mL    Total NET: -3 mL      General: A/ox 3, No acute Distress. Not diaphoretic  Neck: Supple, NO JVD  Cardiac: S1 S2, No M/R/G  Pulmonary: CTAB, Breathing unlabored, No Rhonchi/Rales/Wheezing  Abdomen: Soft, Tender to deep palpation in epigastric region. Mild tenderness on right side when taking deep breath, says mostly epigastric pain  Extremities: No Rashes, No edema  Neuro: A/o x 3, Mild tremor of hands when outstretched  Psch: normal mood , normal affect    LABS:  CBC Full  -  ( 14 Aug 2019 07:14 )  WBC Count : 5.88 K/uL  RBC Count : 4.78 M/uL  Hemoglobin : 14.3 g/dL  Hematocrit : 43.4 %  Platelet Count - Automated : 201 K/uL  Mean Cell Volume : 90.8 fL  Mean Cell Hemoglobin : 29.9 pg  Mean Cell Hemoglobin Concentration : 32.9 %  Auto Neutrophil # : x      08-15    136  |  98  |  8   ----------------------------<  106<H>  3.0<L>   |  25  |  0.76    Ca    8.4      15 Aug 2019 07:30  Phos  2.9     -15  Mg     1.6     -15    TPro  7.3  /  Alb  3.7  /  TBili  2.1<H>  /  DBili  0.6<H>  /  AST  136<H>  /  ALT  68<H>  /  AlkPhos  71  -15    LIVER FUNCTIONS - ( 15 Aug 2019 07:30 )  Alb: 3.7 g/dL / Pro: 7.3 g/dL / ALK PHOS: 71 u/L / ALT: 68 u/L / AST: 136 u/L / GGT: x           Urinalysis Basic - ( 13 Aug 2019 18:00 )    Color: LIGHT YELLOW / Appearance: Lt TURBID / S.010 / pH: 7.0  Gluc: NEGATIVE / Ketone: NEGATIVE  / Bili: NEGATIVE / Urobili: NORMAL   Blood: NEGATIVE / Protein: NEGATIVE / Nitrite: NEGATIVE   Leuk Esterase: NEGATIVE / RBC: 0-2 / WBC 0-2   Sq Epi: OCC / Non Sq Epi: x / Bacteria: NEGATIVE    PT/INR - ( 14 Aug 2019 07:14 )   PT: 12.4 SEC;   INR: 1.11

## 2019-08-15 NOTE — PROGRESS NOTE ADULT - PROBLEM SELECTOR PLAN 5
Right eye blindness  -Continue with home eye medications -Continue with home HCTZ, hold for SBP < 110 Likely secondary to prolonged alcohol use. LFT's elevated above those on admission, though previous admissions showed elevated LFTs and baseline likely above normal. Also may be secondary to gallbladder pathology, possible stone moving from gallbladder to duct  - Morning CMP

## 2019-08-15 NOTE — PROGRESS NOTE ADULT - PROBLEM SELECTOR PLAN 7
DVT ppx: IMPROVE Score 0  Fall precautions  Diet: DASH/TLC -Restart home Zoloft and Vistaril, currently no SI/HI  -Patient has therapist he sees regularly outside of hospital  -At this time does not appear anxiety/depression are clouding patient's judgement or motivation to seek treatment Right eye blindness  -Continue with home eye medications

## 2019-08-15 NOTE — PROGRESS NOTE ADULT - PROBLEM SELECTOR PLAN 8
DVT ppx: IMPROVE Score 0  Fall precautions  Diet: DASH/TLC -Restart home Zoloft and Vistaril, currently no SI/HI  -Patient has therapist he sees regularly outside of hospital  -At this time does not appear anxiety/depression are clouding patient's judgement or motivation to seek treatment

## 2019-08-15 NOTE — PROGRESS NOTE ADULT - ATTENDING COMMENTS
Patient seen and examined by me. Case discussed with resident and agree with the resident's findings and plan as documented in the resident's note. 54M h/o alcohol abuse, previous admissions for withdrawals p/w abdominal pain for 10 days and alcohol withdrawal.     1. Alcohol withdrawal:  -CIWA scores 5  -c/w thiamine, MV, folic acid supplementation  -ondansetron PRN nausea/vomiting  -c/w ativan taper given transaminitis day #2/6 (Ends of Monday 8/19)    2. Diarrhea:  -Cdiff negative  -diarrhea likely secondary to withdrawal - f/u stool studies pending (O&P given works with horses, PCR)    3. Abdominal pain:  -CT unremarkable but evidence of gallstone not choledocholithiasis  -f/u RUQ ultrasound; if CBD dilated will likely need ERCP and GI consult  -bilirubin rising    4. Hypokalemia:  -d/c HCTZ for now  -will give 2gm of MagS04 followed KCL 10MEq x3 runs + 40mEq Ktab PO

## 2019-08-15 NOTE — PROGRESS NOTE ADULT - PROBLEM SELECTOR PLAN 1
Patient without hallucinations, seizures. CIWA scores 10 x2 on admission, then downtrending. Overnight was 5 x2.   -D/C Chlordiazepoxide, start Ativan taper. Librium longer-acting, may be contributing to elevated LFTs  -500mg IV thiamine for 2 days, then transition to oral  -PRN Ativan available for elevated CIWA scores

## 2019-08-15 NOTE — PROGRESS NOTE ADULT - ASSESSMENT
55 y/o M with history of alcohol abuse, previous admissions for withdrawals who presents with nausea, diarrhea, abdominal pain for 10 days. Likely currently in alcohol withdrawal, on Ativan taper and monitoring CIWA scores. 53 y/o M with history of alcohol abuse, previous admissions for withdrawals who presents with nausea, diarrhea, abdominal pain for 10 days. Likely currently in alcohol withdrawal, on Ativan taper and monitoring CIWA scores. 1-6 overnight, no extra doses of ativan given for CIWA scores

## 2019-08-15 NOTE — PROGRESS NOTE ADULT - PROBLEM SELECTOR PLAN 4
-Continue with home HCTZ, hold for SBP < 110 Likely secondary to prolonged alcohol use. LFT's elevated above those on admission, though previous admissions showed elevated LFTs and baseline likely above normal. Also may be secondary to gallbladder pathology, possible stone moving from gallbladder to duct  - Morning CMP Down from 3.7-->3.0 this morning, likely in the setting of re-feeding like picture. Patient has not been eating much at all last three weeks, just drinking  -Repleting  -Morning CMP

## 2019-08-15 NOTE — PROGRESS NOTE ADULT - PROBLEM SELECTOR PLAN 3
Likely secondary to prolonged alcohol use. LFT's elevated above those on admission, though previous admissions showed elevated LFTs and baseline likely above normal. Elevated bilirubin, CT A/P showed gallstones.   - Total Bilirubin up to 1.5 from 0.9 on admission, dBili 0.4.   - ?RUQ U/S  - Morning CMP Elevated bilirubin, CT A/P showed gallstones. tBili up to 2.1 this morning from 1.5. Direct 0.6.  -RUQ U/S  -Morning CMP Elevated bilirubin, CT A/P showed gallstones. tBili up to 2.1 this morning from 1.5. Direct 0.6.  -RUQ U/S today, pending read  -May require MRCP + GI Consultation if RUQ U/S + for dilated CBD  -Morning CMP

## 2019-08-16 LAB
ALBUMIN SERPL ELPH-MCNC: 3.6 G/DL — SIGNIFICANT CHANGE UP (ref 3.3–5)
ALP SERPL-CCNC: 67 U/L — SIGNIFICANT CHANGE UP (ref 40–120)
ALT FLD-CCNC: 96 U/L — HIGH (ref 4–41)
ANION GAP SERPL CALC-SCNC: 13 MMO/L — SIGNIFICANT CHANGE UP (ref 7–14)
AST SERPL-CCNC: 179 U/L — HIGH (ref 4–40)
BILIRUB DIRECT SERPL-MCNC: 0.5 MG/DL — HIGH (ref 0.1–0.2)
BILIRUB SERPL-MCNC: 1.4 MG/DL — HIGH (ref 0.2–1.2)
BUN SERPL-MCNC: 9 MG/DL — SIGNIFICANT CHANGE UP (ref 7–23)
CALCIUM SERPL-MCNC: 8.7 MG/DL — SIGNIFICANT CHANGE UP (ref 8.4–10.5)
CHLORIDE SERPL-SCNC: 100 MMOL/L — SIGNIFICANT CHANGE UP (ref 98–107)
CO2 SERPL-SCNC: 23 MMOL/L — SIGNIFICANT CHANGE UP (ref 22–31)
CREAT SERPL-MCNC: 0.75 MG/DL — SIGNIFICANT CHANGE UP (ref 0.5–1.3)
GLUCOSE SERPL-MCNC: 107 MG/DL — HIGH (ref 70–99)
HCT VFR BLD CALC: 40.1 % — SIGNIFICANT CHANGE UP (ref 39–50)
HGB BLD-MCNC: 14.1 G/DL — SIGNIFICANT CHANGE UP (ref 13–17)
MAGNESIUM SERPL-MCNC: 1.7 MG/DL — SIGNIFICANT CHANGE UP (ref 1.6–2.6)
MCHC RBC-ENTMCNC: 31.1 PG — SIGNIFICANT CHANGE UP (ref 27–34)
MCHC RBC-ENTMCNC: 35.2 % — SIGNIFICANT CHANGE UP (ref 32–36)
MCV RBC AUTO: 88.3 FL — SIGNIFICANT CHANGE UP (ref 80–100)
NRBC # FLD: 0 K/UL — SIGNIFICANT CHANGE UP (ref 0–0)
PHOSPHATE SERPL-MCNC: 3.4 MG/DL — SIGNIFICANT CHANGE UP (ref 2.5–4.5)
PLATELET # BLD AUTO: 152 K/UL — SIGNIFICANT CHANGE UP (ref 150–400)
PMV BLD: 10.7 FL — SIGNIFICANT CHANGE UP (ref 7–13)
POTASSIUM SERPL-MCNC: 3.4 MMOL/L — LOW (ref 3.5–5.3)
POTASSIUM SERPL-SCNC: 3.4 MMOL/L — LOW (ref 3.5–5.3)
PROT SERPL-MCNC: 7.4 G/DL — SIGNIFICANT CHANGE UP (ref 6–8.3)
RBC # BLD: 4.54 M/UL — SIGNIFICANT CHANGE UP (ref 4.2–5.8)
RBC # FLD: 13 % — SIGNIFICANT CHANGE UP (ref 10.3–14.5)
SODIUM SERPL-SCNC: 136 MMOL/L — SIGNIFICANT CHANGE UP (ref 135–145)
WBC # BLD: 6.84 K/UL — SIGNIFICANT CHANGE UP (ref 3.8–10.5)
WBC # FLD AUTO: 6.84 K/UL — SIGNIFICANT CHANGE UP (ref 3.8–10.5)

## 2019-08-16 PROCEDURE — 99233 SBSQ HOSP IP/OBS HIGH 50: CPT | Mod: GC

## 2019-08-16 RX ORDER — POTASSIUM CHLORIDE 20 MEQ
40 PACKET (EA) ORAL ONCE
Refills: 0 | Status: COMPLETED | OUTPATIENT
Start: 2019-08-16 | End: 2019-08-16

## 2019-08-16 RX ORDER — ONDANSETRON 8 MG/1
4 TABLET, FILM COATED ORAL EVERY 6 HOURS
Refills: 0 | Status: DISCONTINUED | OUTPATIENT
Start: 2019-08-16 | End: 2019-08-18

## 2019-08-16 RX ADMIN — Medication 1 MILLIGRAM(S): at 13:33

## 2019-08-16 RX ADMIN — Medication 40 MILLIEQUIVALENT(S): at 09:26

## 2019-08-16 RX ADMIN — Medication 25 MILLIGRAM(S): at 17:27

## 2019-08-16 RX ADMIN — DORZOLAMIDE HYDROCHLORIDE 1 DROP(S): 20 SOLUTION/ DROPS OPHTHALMIC at 06:12

## 2019-08-16 RX ADMIN — Medication 1 TABLET(S): at 13:33

## 2019-08-16 RX ADMIN — Medication 25 MILLIGRAM(S): at 06:11

## 2019-08-16 RX ADMIN — Medication 2 MILLIGRAM(S): at 09:26

## 2019-08-16 RX ADMIN — DORZOLAMIDE HYDROCHLORIDE 1 DROP(S): 20 SOLUTION/ DROPS OPHTHALMIC at 22:33

## 2019-08-16 RX ADMIN — LATANOPROST 1 DROP(S): 0.05 SOLUTION/ DROPS OPHTHALMIC; TOPICAL at 22:32

## 2019-08-16 RX ADMIN — Medication 1 DROP(S): at 17:27

## 2019-08-16 RX ADMIN — Medication 2 MILLIGRAM(S): at 22:32

## 2019-08-16 RX ADMIN — Medication 2 MILLIGRAM(S): at 17:27

## 2019-08-16 RX ADMIN — ONDANSETRON 4 MILLIGRAM(S): 8 TABLET, FILM COATED ORAL at 02:05

## 2019-08-16 RX ADMIN — Medication 3 MILLIGRAM(S): at 06:11

## 2019-08-16 RX ADMIN — Medication 325 MILLIGRAM(S): at 07:00

## 2019-08-16 RX ADMIN — Medication 100 MILLIGRAM(S): at 13:33

## 2019-08-16 RX ADMIN — Medication 2 MILLIGRAM(S): at 13:32

## 2019-08-16 RX ADMIN — DORZOLAMIDE HYDROCHLORIDE 1 DROP(S): 20 SOLUTION/ DROPS OPHTHALMIC at 13:33

## 2019-08-16 RX ADMIN — Medication 3 MILLIGRAM(S): at 02:08

## 2019-08-16 RX ADMIN — SERTRALINE 50 MILLIGRAM(S): 25 TABLET, FILM COATED ORAL at 13:33

## 2019-08-16 RX ADMIN — Medication 3 MILLIGRAM(S): at 22:32

## 2019-08-16 RX ADMIN — Medication 325 MILLIGRAM(S): at 06:11

## 2019-08-16 RX ADMIN — Medication 1 DROP(S): at 06:15

## 2019-08-16 RX ADMIN — Medication 1 DROP(S): at 06:12

## 2019-08-16 NOTE — PROGRESS NOTE ADULT - PROBLEM SELECTOR PLAN 8
-Restart home Zoloft and Vistaril, currently no SI/HI  -Patient has therapist he sees regularly outside of hospital  -At this time does not appear anxiety/depression are clouding patient's judgement or motivation to seek treatment

## 2019-08-16 NOTE — PROGRESS NOTE ADULT - PROBLEM SELECTOR PLAN 3
Elevated bilirubin, CT A/P showed gallstones. tBili down to 1.4 this morning  -RUQ U/S yesterday normal, without CBD dilation  -Morning CMP

## 2019-08-16 NOTE — PROGRESS NOTE ADULT - SUBJECTIVE AND OBJECTIVE BOX
Subjective:    55 y/o M with history of alcohol abuse, previous admissions for withdrawals who presents with nausea, diarrhea, abdominal pain for 10 days. No acute events overnight. CIWA scores 1-5, did not require extra Ativan doses. Denies current abdominal pain, f/c, d/c, n/v, chest pain, SOB.     Medications:  acetaminophen   Tablet .. 325 milliGRAM(s) Oral every 6 hours PRN  dorzolamide 2% Ophthalmic Solution 1 Drop(s) Both EYES three times a day  folic acid 1 milliGRAM(s) Oral daily  hydrOXYzine hydrochloride 25 milliGRAM(s) Oral two times a day  latanoprost 0.005% Ophthalmic Solution 1 Drop(s) Both EYES at bedtime  LORazepam     Tablet 2 milliGRAM(s) Oral every 4 hours  LORazepam     Tablet   Oral   LORazepam     Tablet 2 milliGRAM(s) Oral every 2 hours PRN  melatonin 3 milliGRAM(s) Oral at bedtime  multivitamin 1 Tablet(s) Oral daily  ondansetron Injectable 4 milliGRAM(s) IV Push every 6 hours  potassium chloride    Tablet ER 40 milliEquivalent(s) Oral once  sertraline 50 milliGRAM(s) Oral daily  sodium chloride 5% Solution 1 Drop(s) Right EYE two times a day  thiamine 100 milliGRAM(s) Oral daily  timolol 0.5% Solution 1 Drop(s) Both EYES two times a day      PHYSICAL EXAM:  Vital Signs Last 24 Hrs  T(C): 36.3 (16 Aug 2019 06:00), Max: 36.6 (15 Aug 2019 13:49)  T(F): 97.4 (16 Aug 2019 06:00), Max: 97.9 (15 Aug 2019 22:00)  HR: 61 (16 Aug 2019 06:00) (59 - 72)  BP: 116/71 (16 Aug 2019 06:00) (115/67 - 131/71)  BP(mean): --  RR: 16 (16 Aug 2019 06:00) (16 - 20)  SpO2: 100% (16 Aug 2019 06:00) (98% - 100%)  Daily     Daily   I&O's Detail      General: A/ox 3, No acute Distress  Neck: Supple, NO JVD  Cardiac: S1 S2, No M/R/G  Pulmonary: CTAB, Breathing unlabored, No Rhonchi/Rales/Wheezing  Abdomen: Soft, Non -tender, +BS   Extremities: No Rashes, No edema  Neuro: A/o x 3, No focal deficits  Psch: normal mood , normal affect    LABS:                          14.1   6.84  )-----------( 152      ( 16 Aug 2019 04:55 )             40.1     08-16    136  |  100  |  9   ----------------------------<  107<H>  3.4<L>   |  23  |  0.75    Ca    8.7      16 Aug 2019 04:55  Phos  3.4     08-16  Mg     1.7     08-16    TPro  7.4  /  Alb  3.6  /  TBili  1.4<H>  /  DBili  0.5<H>  /  AST  179<H>  /  ALT  96<H>  /  AlkPhos  67  08-16      IMAGING:  U/S Abdomen:  FINDINGS:    Liver: Steatosis.  Bile ducts: Normal caliber. Common hepatic duct measures 3 mm.   Gallbladder: Cholelithiasis. No gallbladder wall thickening or para   cholecystic fluid. Negative sonographic Bowles sign.      Pancreas: Obscured by overlying bowel gas.  Right kidney: 11.0 cm. No hydronephrosis.  Ascites: None.  IVC: Visualized portions are within normal limits.    IMPRESSION:     Cholelithiasis.

## 2019-08-16 NOTE — PROGRESS NOTE ADULT - PROBLEM SELECTOR PLAN 1
Patient without hallucinations, seizures. CIWA scores 10 x2 on admission, then downtrending. Overnight was 1-5.   -Continue Ativan taper, to finish 8/19.   -Transition to oral thiamine today  -PRN Ativan available for elevated CIWA scores

## 2019-08-16 NOTE — PROGRESS NOTE ADULT - PROBLEM SELECTOR PLAN 5
Likely secondary to prolonged alcohol use. LFT's elevated above those on admission, though previous admissions showed elevated LFTs and baseline likely above normal. Also may be secondary to gallbladder pathology, possible stone moving from gallbladder to duct  - Morning CMP

## 2019-08-16 NOTE — PROGRESS NOTE ADULT - PROBLEM SELECTOR PLAN 2
Resolving, no vomiting or diarrhea since mid-day yesterday. differential includes alcohol withdrawal vs. viral gastroenteritis vs. less likely gallbladder pathology. Reassuring no blood in stool, no f/c.   - CDiff negative  - Stool O&P negative x1, GI PCR pending  - Encourage PO given diarrhea, as tolerated

## 2019-08-16 NOTE — PROGRESS NOTE ADULT - ASSESSMENT
53 y/o M with history of alcohol abuse, previous admissions for withdrawals who presents with nausea, diarrhea, abdominal pain for 10 days. Likely currently in alcohol withdrawal, on Ativan taper and monitoring CIWA scores. 1-5 overnight, no extra doses of ativan given for CIWA scores

## 2019-08-16 NOTE — PROGRESS NOTE ADULT - ATTENDING COMMENTS
Patient seen and examined by me. Case discussed with resident and agree with the resident's findings and plan as documented in the resident's note. 54M h/o alcohol abuse, previous admissions for withdrawals p/w abdominal pain for 10 days and alcohol withdrawal.     1. Alcohol withdrawal:  -CIWA scores 1-5  -c/w thiamine, MV, folic acid supplementation  -ondansetron PRN nausea/vomiting  -c/w ativan taper given transaminitis day #3/6 (Ends of Monday 8/19)    2. 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 Abdominal pain:  -resolved  -CT unremarkable but evidence of gallstone not choledocholithiasis  -abdominal ultrasound - reviewed and WNL; CBD not dilated   -bilirubin decreasing  -trend LFTs    4. Hypokalemia:  -d/c HCTZ for now  -will give 2gm of MagS04 followed + 40mEq Ktab PO

## 2019-08-17 ENCOUNTER — TRANSCRIPTION ENCOUNTER (OUTPATIENT)
Age: 54
End: 2019-08-17

## 2019-08-17 LAB
ALBUMIN SERPL ELPH-MCNC: 3.6 G/DL — SIGNIFICANT CHANGE UP (ref 3.3–5)
ALP SERPL-CCNC: 59 U/L — SIGNIFICANT CHANGE UP (ref 40–120)
ALT FLD-CCNC: 113 U/L — HIGH (ref 4–41)
ANION GAP SERPL CALC-SCNC: 10 MMO/L — SIGNIFICANT CHANGE UP (ref 7–14)
AST SERPL-CCNC: 152 U/L — HIGH (ref 4–40)
BILIRUB SERPL-MCNC: 1 MG/DL — SIGNIFICANT CHANGE UP (ref 0.2–1.2)
BUN SERPL-MCNC: 10 MG/DL — SIGNIFICANT CHANGE UP (ref 7–23)
CALCIUM SERPL-MCNC: 8.7 MG/DL — SIGNIFICANT CHANGE UP (ref 8.4–10.5)
CHLORIDE SERPL-SCNC: 102 MMOL/L — SIGNIFICANT CHANGE UP (ref 98–107)
CO2 SERPL-SCNC: 25 MMOL/L — SIGNIFICANT CHANGE UP (ref 22–31)
CREAT SERPL-MCNC: 0.69 MG/DL — SIGNIFICANT CHANGE UP (ref 0.5–1.3)
GLUCOSE SERPL-MCNC: 99 MG/DL — SIGNIFICANT CHANGE UP (ref 70–99)
MAGNESIUM SERPL-MCNC: 1.6 MG/DL — SIGNIFICANT CHANGE UP (ref 1.6–2.6)
PHOSPHATE SERPL-MCNC: 3.3 MG/DL — SIGNIFICANT CHANGE UP (ref 2.5–4.5)
POTASSIUM SERPL-MCNC: 3.2 MMOL/L — LOW (ref 3.5–5.3)
POTASSIUM SERPL-SCNC: 3.2 MMOL/L — LOW (ref 3.5–5.3)
PROT SERPL-MCNC: 7.1 G/DL — SIGNIFICANT CHANGE UP (ref 6–8.3)
SODIUM SERPL-SCNC: 137 MMOL/L — SIGNIFICANT CHANGE UP (ref 135–145)
TRI STN SPEC: SIGNIFICANT CHANGE UP

## 2019-08-17 PROCEDURE — 99232 SBSQ HOSP IP/OBS MODERATE 35: CPT | Mod: GC

## 2019-08-17 RX ORDER — POTASSIUM CHLORIDE 20 MEQ
10 PACKET (EA) ORAL
Refills: 0 | Status: COMPLETED | OUTPATIENT
Start: 2019-08-17 | End: 2019-08-17

## 2019-08-17 RX ORDER — POTASSIUM CHLORIDE 20 MEQ
40 PACKET (EA) ORAL ONCE
Refills: 0 | Status: COMPLETED | OUTPATIENT
Start: 2019-08-17 | End: 2019-08-17

## 2019-08-17 RX ADMIN — Medication 1 TABLET(S): at 12:54

## 2019-08-17 RX ADMIN — Medication 1 MILLIGRAM(S): at 22:02

## 2019-08-17 RX ADMIN — DORZOLAMIDE HYDROCHLORIDE 1 DROP(S): 20 SOLUTION/ DROPS OPHTHALMIC at 14:41

## 2019-08-17 RX ADMIN — SERTRALINE 50 MILLIGRAM(S): 25 TABLET, FILM COATED ORAL at 12:54

## 2019-08-17 RX ADMIN — Medication 25 MILLIGRAM(S): at 17:21

## 2019-08-17 RX ADMIN — Medication 1 MILLIGRAM(S): at 12:54

## 2019-08-17 RX ADMIN — DORZOLAMIDE HYDROCHLORIDE 1 DROP(S): 20 SOLUTION/ DROPS OPHTHALMIC at 06:20

## 2019-08-17 RX ADMIN — Medication 1 MILLIGRAM(S): at 14:40

## 2019-08-17 RX ADMIN — Medication 100 MILLIEQUIVALENT(S): at 09:51

## 2019-08-17 RX ADMIN — Medication 2 MILLIGRAM(S): at 06:19

## 2019-08-17 RX ADMIN — LATANOPROST 1 DROP(S): 0.05 SOLUTION/ DROPS OPHTHALMIC; TOPICAL at 22:03

## 2019-08-17 RX ADMIN — Medication 1 DROP(S): at 06:20

## 2019-08-17 RX ADMIN — Medication 1 MILLIGRAM(S): at 09:56

## 2019-08-17 RX ADMIN — Medication 2 MILLIGRAM(S): at 02:15

## 2019-08-17 RX ADMIN — Medication 1 DROP(S): at 17:20

## 2019-08-17 RX ADMIN — Medication 25 MILLIGRAM(S): at 06:19

## 2019-08-17 RX ADMIN — Medication 40 MILLIEQUIVALENT(S): at 07:48

## 2019-08-17 RX ADMIN — Medication 3 MILLIGRAM(S): at 22:02

## 2019-08-17 RX ADMIN — Medication 100 MILLIGRAM(S): at 12:54

## 2019-08-17 RX ADMIN — Medication 100 MILLIEQUIVALENT(S): at 08:51

## 2019-08-17 RX ADMIN — Medication 100 MILLIEQUIVALENT(S): at 10:51

## 2019-08-17 RX ADMIN — DORZOLAMIDE HYDROCHLORIDE 1 DROP(S): 20 SOLUTION/ DROPS OPHTHALMIC at 22:03

## 2019-08-17 RX ADMIN — Medication 1 MILLIGRAM(S): at 17:46

## 2019-08-17 NOTE — PROGRESS NOTE ADULT - PROBLEM SELECTOR PLAN 1
Patient without hallucinations, seizures. CIWA scores 10 x2 on admission, then downtrending. Overnight was 1-2.   -Continue Ativan taper, to finish 8/19--may be able to finish tomorrow as patient's CIWA and sxs currently sigificantly improved since admission.   -Continue oral thiamine 100mg daily  -PRN Ativan available for elevated CIWA scores

## 2019-08-17 NOTE — PROGRESS NOTE ADULT - PROBLEM SELECTOR PLAN 3
Resolved: Elevated bilirubin, CT A/P showed gallstones. tBili down to 1.0 this morning  -RUQ U/S yesterday normal, without CBD dilation  -Morning CMP

## 2019-08-17 NOTE — PROGRESS NOTE ADULT - ATTENDING COMMENTS
Patient seen and examined by me. Case discussed with resident and agree with the resident's findings and plan as documented in the resident's note. 54M h/o alcohol abuse, previous admissions for withdrawals p/w abdominal pain for 10 days and alcohol withdrawal.     1. Alcohol withdrawal:  -CIWA scores 1-2  -c/w thiamine, MV, folic acid supplementation  -ondansetron PRN nausea/vomiting  -c/w ativan taper given transaminitis day #4/6 (Ends of Monday 8/19)  -f/u with AA meetings    2. 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 Abdominal pain:  -resolved  -CT unremarkable but evidence of gallstone not choledocholithiasis  -abdominal ultrasound - reviewed and WNL; CBD not dilated   -bilirubin decreasing  -trend LFTs  -f/u with GI as outpatient to monitor intermittent episodes of vomiting if persisting    4. Hypokalemia:  -d/c HCTZ for now  -will replete 2gm of MagS04 followed + KCL x3 runs + 40mEq Ktab PO

## 2019-08-17 NOTE — PROGRESS NOTE ADULT - PROBLEM SELECTOR PLAN 2
Resolving, did have three episodes of small yellow vomit yesterday. No blood or bile noted. Reassuring no blood in stool, no f/c. Vomiting likely 2/2 resolving gastroenteritis vs withdrawal. Expect it to continue to improve going forward  - CDiff negative  - Stool O&P negative x1, GI PCR pending  - Zofran PRN

## 2019-08-17 NOTE — PROGRESS NOTE ADULT - PROBLEM SELECTOR PLAN 5
Likely secondary to prolonged alcohol use and hepatocyte injury. LFT's downtrending. previous admissions showed elevated LFTs and baseline likely above normal.   - Morning CMP

## 2019-08-17 NOTE — PROGRESS NOTE ADULT - SUBJECTIVE AND OBJECTIVE BOX
Subjective:    Mr. Olson is a 55 y/o M with history of alcohol abuse, previous admissions for withdrawals who presents with nausea, diarrhea, abdominal pain for 10 days. CIWA scores 1-2 overnight, no extra doses of ativan given for CIWA. Reports he had three episodes of small yellow vomiting yesterday, denies current n/v, abdominal pain, d/c, f/c. No chest pain or SOB. Denies dizziness.    Medications:  dorzolamide 2% Ophthalmic Solution 1 Drop(s) Both EYES three times a day  folic acid 1 milliGRAM(s) Oral daily  hydrOXYzine hydrochloride 25 milliGRAM(s) Oral two times a day  latanoprost 0.005% Ophthalmic Solution 1 Drop(s) Both EYES at bedtime  LORazepam     Tablet 1 milliGRAM(s) Oral every 4 hours  LORazepam     Tablet   Oral   LORazepam     Tablet 2 milliGRAM(s) Oral every 2 hours PRN  melatonin 3 milliGRAM(s) Oral at bedtime  multivitamin 1 Tablet(s) Oral daily  ondansetron Injectable 4 milliGRAM(s) IV Push every 6 hours PRN  potassium chloride    Tablet ER 40 milliEquivalent(s) Oral once  potassium chloride  10 mEq/100 mL IVPB 10 milliEquivalent(s) IV Intermittent every 1 hour  sertraline 50 milliGRAM(s) Oral daily  sodium chloride 5% Solution 1 Drop(s) Right EYE two times a day  thiamine 100 milliGRAM(s) Oral daily  timolol 0.5% Solution 1 Drop(s) Both EYES two times a day      PHYSICAL EXAM:  Vital Signs Last 24 Hrs  T(C): 36.8 (17 Aug 2019 06:00), Max: 36.9 (16 Aug 2019 22:00)  T(F): 98.2 (17 Aug 2019 06:00), Max: 98.4 (16 Aug 2019 22:00)  HR: 60 (17 Aug 2019 06:00) (55 - 62)  BP: 110/68 (17 Aug 2019 06:00) (105/70 - 125/80)  BP(mean): --  RR: 18 (17 Aug 2019 06:00) (16 - 18)  SpO2: 97% (17 Aug 2019 06:00) (97% - 100%)  Daily     Daily   I&O's Detail      General: A/ox 3, No acute Distress.   Neck: Supple, NO JVD  Cardiac: S1 S2, No M/R/G  Pulmonary: CTAB, Breathing unlabored, No Rhonchi/Rales/Wheezing  Abdomen: Soft, Non -tender, +BS   Extremities: No Rashes, No edema  Neuro: A/o x 3. Mild tremor of hands when outstretched. No other tremors noted  Psch: normal mood , normal affect    LABS:                          14.1   6.84  )-----------( 152      ( 16 Aug 2019 04:55 )             40.1     08-17    137  |  102  |  10  ----------------------------<  99  3.2<L>   |  25  |  0.69    Ca    8.7      17 Aug 2019 04:30  Phos  3.3     08-17  Mg     1.6     08-17    TPro  7.1  /  Alb  3.6  /  TBili  1.0  /  DBili  x   /  AST  152<H>  /  ALT  113<H>  /  AlkPhos  59  08-17

## 2019-08-17 NOTE — DISCHARGE NOTE PROVIDER - NSDCFUADDINST_GEN_ALL_CORE_FT
Please follow up with Dr. Miranda within a week of discharge. Please attend AA meetings as they have helped you in the past. If you notice tremors, abdominal pain then please return to the ED.

## 2019-08-17 NOTE — DISCHARGE NOTE PROVIDER - NSDCCPCAREPLAN_GEN_ALL_CORE_FT
PRINCIPAL DISCHARGE DIAGNOSIS  Diagnosis: Alcohol withdrawal  Assessment and Plan of Treatment: You presented to the hospital with signs and symptoms consistent with alcohol withdrawal. We treated you with an Ativan taper and your symptoms significantly improved during your stay. You did not have any seizures or hallucinations related to your alcohol withdrawal. Our  worked with you and discussed how best to help you treat your alcohol use disorder, and you wanted to re-start attending AA meetings which had helped you in the past. Signs of withdrawing include tremors, hallucinations, headache, fast heart rate or seizures      SECONDARY DISCHARGE DIAGNOSES  Diagnosis: Hyperbilirubinemia  Assessment and Plan of Treatment: Your bilirubin was initially elevated on admission, which can sometimes signify that a gallstone is in one of the ducts and causing blockage there. However after performing an ultrasound and seeing your bilirubin level return to normal, it told us that it was high most likely because of alcohol induced injury to your liver. Alcohol use has caused your liver to have some fatty deposits--damage which is reversible if you stop drinking. If you have increasing right-sided or abdominal pain, can't tolerate a normal diet, light colored greasy stools, pain after eating, then please call your doctor.    Diagnosis: Diarrhea, unspecified type  Assessment and Plan of Treatment: Your diarrhea was likely caused from withdrawal, although it may have also been a non-specific GI bug. It improved during your stay and was reassuring that there was never blood. When you are withdrawing from alcohol, you can have increased GI activity, including diarrhea and vomiting.    Diagnosis: Glaucoma  Assessment and Plan of Treatment: We continued your home eyedrop medications for your Right eye glaucoma PRINCIPAL DISCHARGE DIAGNOSIS  Diagnosis: Alcohol withdrawal  Assessment and Plan of Treatment: You presented to the hospital with signs and symptoms consistent with alcohol withdrawal. We treated you with an Ativan taper and your symptoms significantly improved during your stay. You did not have any seizures or hallucinations related to your alcohol withdrawal. Our  worked with you and discussed how best to help you treat your alcohol use disorder, and you wanted to re-start attending AA meetings which had helped you in the past. Signs of withdrawing include tremors, hallucinations, headache, fast heart rate or seizures. Please continue to take thiamine, folic acid, multivitamin for nutrition supplement.      SECONDARY DISCHARGE DIAGNOSES  Diagnosis: Glaucoma  Assessment and Plan of Treatment: We continued your home eyedrop medications for your Right eye glaucoma    Diagnosis: Diarrhea, unspecified type  Assessment and Plan of Treatment: Your diarrhea was likely caused from withdrawal, although it may have also been a non-specific GI bug. It improved during your stay and was reassuring that there was never blood. When you are withdrawing from alcohol, you can have increased GI activity, including diarrhea and vomiting.    Diagnosis: Hypokalemia  Assessment and Plan of Treatment: You were found to have low potassium due to diarrhea as well as poor oral intake. We have hold your home medication Hydrochlorothiazide during your admission due to this medication can potentially lower your potassium further. Please follow up with your primary care doctor and repeat potassium level before resume this medication.    Diagnosis: Hyperbilirubinemia  Assessment and Plan of Treatment: Your bilirubin was initially elevated on admission, which can sometimes signify that a gallstone is in one of the ducts and causing blockage there. However after performing an ultrasound and seeing your bilirubin level return to normal, it told us that it was high most likely because of alcohol induced injury to your liver. Alcohol use has caused your liver to have some fatty deposits--damage which is reversible if you stop drinking. If you have increasing right-sided or abdominal pain, can't tolerate a normal diet, light colored greasy stools, pain after eating, then please call your doctor. Please follow up with your primary care doctor for further monitoring of your liver enzymes.

## 2019-08-18 ENCOUNTER — TRANSCRIPTION ENCOUNTER (OUTPATIENT)
Age: 54
End: 2019-08-18

## 2019-08-18 VITALS
SYSTOLIC BLOOD PRESSURE: 138 MMHG | OXYGEN SATURATION: 98 % | RESPIRATION RATE: 18 BRPM | DIASTOLIC BLOOD PRESSURE: 77 MMHG | HEART RATE: 52 BPM | TEMPERATURE: 97 F

## 2019-08-18 LAB
ALBUMIN SERPL ELPH-MCNC: 3.7 G/DL — SIGNIFICANT CHANGE UP (ref 3.3–5)
ALP SERPL-CCNC: 60 U/L — SIGNIFICANT CHANGE UP (ref 40–120)
ALT FLD-CCNC: 136 U/L — HIGH (ref 4–41)
ANION GAP SERPL CALC-SCNC: 13 MMO/L — SIGNIFICANT CHANGE UP (ref 7–14)
AST SERPL-CCNC: 145 U/L — HIGH (ref 4–40)
BACTERIA BLD CULT: SIGNIFICANT CHANGE UP
BACTERIA BLD CULT: SIGNIFICANT CHANGE UP
BILIRUB SERPL-MCNC: 0.7 MG/DL — SIGNIFICANT CHANGE UP (ref 0.2–1.2)
BUN SERPL-MCNC: 7 MG/DL — SIGNIFICANT CHANGE UP (ref 7–23)
CALCIUM SERPL-MCNC: 9 MG/DL — SIGNIFICANT CHANGE UP (ref 8.4–10.5)
CHLORIDE SERPL-SCNC: 102 MMOL/L — SIGNIFICANT CHANGE UP (ref 98–107)
CO2 SERPL-SCNC: 23 MMOL/L — SIGNIFICANT CHANGE UP (ref 22–31)
CREAT SERPL-MCNC: 0.66 MG/DL — SIGNIFICANT CHANGE UP (ref 0.5–1.3)
GLUCOSE SERPL-MCNC: 106 MG/DL — HIGH (ref 70–99)
MAGNESIUM SERPL-MCNC: 1.7 MG/DL — SIGNIFICANT CHANGE UP (ref 1.6–2.6)
PHOSPHATE SERPL-MCNC: 2.8 MG/DL — SIGNIFICANT CHANGE UP (ref 2.5–4.5)
POTASSIUM SERPL-MCNC: 3.7 MMOL/L — SIGNIFICANT CHANGE UP (ref 3.5–5.3)
POTASSIUM SERPL-SCNC: 3.7 MMOL/L — SIGNIFICANT CHANGE UP (ref 3.5–5.3)
PROT SERPL-MCNC: 7.1 G/DL — SIGNIFICANT CHANGE UP (ref 6–8.3)
SODIUM SERPL-SCNC: 138 MMOL/L — SIGNIFICANT CHANGE UP (ref 135–145)

## 2019-08-18 PROCEDURE — 99239 HOSP IP/OBS DSCHRG MGMT >30: CPT

## 2019-08-18 RX ORDER — FOLIC ACID 0.8 MG
1 TABLET ORAL
Qty: 30 | Refills: 0
Start: 2019-08-18 | End: 2019-09-16

## 2019-08-18 RX ORDER — THIAMINE MONONITRATE (VIT B1) 100 MG
1 TABLET ORAL
Qty: 30 | Refills: 0
Start: 2019-08-18 | End: 2019-09-16

## 2019-08-18 RX ADMIN — Medication 1 MILLIGRAM(S): at 01:52

## 2019-08-18 RX ADMIN — Medication 1 MILLIGRAM(S): at 12:01

## 2019-08-18 RX ADMIN — SERTRALINE 50 MILLIGRAM(S): 25 TABLET, FILM COATED ORAL at 12:00

## 2019-08-18 RX ADMIN — Medication 1 DROP(S): at 06:02

## 2019-08-18 RX ADMIN — Medication 25 MILLIGRAM(S): at 06:02

## 2019-08-18 RX ADMIN — DORZOLAMIDE HYDROCHLORIDE 1 DROP(S): 20 SOLUTION/ DROPS OPHTHALMIC at 13:13

## 2019-08-18 RX ADMIN — Medication 100 MILLIGRAM(S): at 12:00

## 2019-08-18 RX ADMIN — DORZOLAMIDE HYDROCHLORIDE 1 DROP(S): 20 SOLUTION/ DROPS OPHTHALMIC at 06:01

## 2019-08-18 RX ADMIN — Medication 1 TABLET(S): at 12:00

## 2019-08-18 RX ADMIN — Medication 1 MILLIGRAM(S): at 06:02

## 2019-08-18 RX ADMIN — Medication 1 DROP(S): at 06:03

## 2019-08-18 NOTE — PROGRESS NOTE ADULT - ASSESSMENT
Patient is a 54 year old man with history of alcohol abuse, previous admissions for withdrawals who presented with nausea, diarrhea, abdominal pain for 10 days, admitted for alcohol withdrawal, on Ativan taper and monitoring CIWA scores. Currently CIWA 1-2. Pending discharge today.

## 2019-08-18 NOTE — PROGRESS NOTE ADULT - PROBLEM SELECTOR PLAN 5
- Likely secondary to prolonged alcohol use and hepatocyte injury. LFT's downtrending. previous admissions showed elevated LFTs and baseline likely above normal.   - Monitor outpatient

## 2019-08-18 NOTE — PROGRESS NOTE ADULT - PROBLEM SELECTOR PLAN 1
- Patient without hallucinations, seizures. CIWA scores 10 on admission, now 1-2  - Continue Ativan taper, to finish 8/19   - Continue oral thiamine 100mg daily  - PRN Ativan available for elevated CIWA scores

## 2019-08-18 NOTE — PROGRESS NOTE ADULT - ATTENDING COMMENTS
Patient seen and examined by me. Case discussed with resident and agree with the resident's findings and plan as documented in the resident's note. 54M h/o alcohol abuse, previous admissions for withdrawals p/w abdominal pain for 10 days and alcohol withdrawal.     1. Alcohol withdrawal:  -CIWA scores 1-2  -c/w thiamine, MV, folic acid supplementation  -ondansetron PRN nausea/vomiting  -c/w ativan taper given transaminitis day #4/6 (Ends of Monday 8/19)  -f/u with AA meetings    2. 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 Abdominal pain:  -resolved  -CT unremarkable but evidence of gallstone not choledocholithiasis  -abdominal ultrasound - reviewed and WNL; CBD not dilated   -bilirubin decreasing  -trend LFTs  -f/u with GI as outpatient to monitor intermittent episodes of vomiting if persisting    4. Hypokalemia:  -d/c HCTZ for now  -will replete 2gm of MagS04 followed + KCL x3 runs + 40mEq Ktab PO Patient seen and examined by me. Case discussed with resident and agree with the resident's findings and plan as documented in the resident's note. 54M h/o alcohol abuse, previous admissions for withdrawals p/w abdominal pain for 10 days and alcohol withdrawal.     1. Alcohol withdrawal:  -CIWA scores 1-2  -c/w thiamine, MV, folic acid supplementation  -ondansetron PRN nausea/vomiting  -c/w ativan taper given transaminitis day #5/6  -f/u with AA meetings    2. 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 Abdominal pain:  -resolved  -CT unremarkable but evidence of gallstone not choledocholithiasis  -abdominal ultrasound - reviewed and WNL; CBD not dilated   -bilirubin decreasing  -LFTs improving  -f/u with GI as outpatient to monitor intermittent episodes of vomiting if persisting    4. Hypokalemia:  -d/c HCTZ for now; f/u with PCP regarding whether to resume or not  -resolved    D/C home to day  -time spent discharging patient = 38min  -see discharge summary in sunrise

## 2019-08-18 NOTE — PROGRESS NOTE ADULT - SUBJECTIVE AND OBJECTIVE BOX
CHIEF COMPLAINT: Alcohol withdrawal, abdominal pain     Interval Events: No acute event overnight. Patient is afebrile and hemodynamically stable overnight. Patient is seen and examined at the bedside this morning. He feels well in general, denies abdominal pain, still endorses intermittent nausea but no vomit. He denies fever or chills. He is tolerating diet well. CIWA has been 1-2.     REVIEW OF SYSTEMS:  Constitutional: No fever, or chills.    HEENT:   No postnasal drip or nasal congestion.  CV: No chest pain   Resp: No cough, or sputum production. No shortness of breath   GI: No nausea or vomiting. No diarrhea or constipation. No abdominal pain.    Skin: No rash     OBJECTIVE:  Vital Signs Last 24 Hrs  T(C): 36.4 (18 Aug 2019 09:49), Max: 36.7 (17 Aug 2019 21:38)  T(F): 97.6 (18 Aug 2019 09:49), Max: 98.1 (17 Aug 2019 21:38)  HR: 52 (18 Aug 2019 09:49) (52 - 65)  BP: 126/69 (18 Aug 2019 09:49) (107/64 - 128/83)  BP(mean): --  RR: 18 (18 Aug 2019 09:49) (16 - 18)  SpO2: 98% (18 Aug 2019 09:49) (97% - 100%)    CAPILLARY BLOOD GLUCOSE          PHYSICAL EXAM:  General: Alert and cooperative. Not in acute stress. Well developed, well nourished.   Head: Normocephalic    Neck: No lymphadenopathy   Respiratory: Bilateral lung clear to auscultation, no crackles, no wheezes, no rhonchi.   Cardiovascular: S1/S2 auscultated, no murmur, or gallop. Rhythm is regular. There is no peripheral edema   Abdomen: Soft, non-tender, nondistended, no guarding or rebound tenderness. Active bowel sounds in all 4 quadrants   Musculoskeletal: Adequately aligned spine   Skin: Intact, no rash   Neurological: AOAx4. CN2-12 grosslly intact.  Minimum tremor in bilateral hands, no tongue fasciculations     LINES:    HOSPITAL MEDICATIONS:            hydrOXYzine hydrochloride 25 milliGRAM(s) Oral two times a day  LORazepam     Tablet   Oral   LORazepam     Tablet 1 milliGRAM(s) Oral every 12 hours  LORazepam     Tablet 2 milliGRAM(s) Oral every 2 hours PRN  melatonin 3 milliGRAM(s) Oral at bedtime  ondansetron Injectable 4 milliGRAM(s) IV Push every 6 hours PRN  sertraline 50 milliGRAM(s) Oral daily          folic acid 1 milliGRAM(s) Oral daily  multivitamin 1 Tablet(s) Oral daily  thiamine 100 milliGRAM(s) Oral daily      dorzolamide 2% Ophthalmic Solution 1 Drop(s) Both EYES three times a day  latanoprost 0.005% Ophthalmic Solution 1 Drop(s) Both EYES at bedtime  sodium chloride 5% Solution 1 Drop(s) Right EYE two times a day  timolol 0.5% Solution 1 Drop(s) Both EYES two times a day        LABS:    Hgb Trend: 14.1<--, 14.3<--, 14.2<--  08-18    138  |  102  |  7   ----------------------------<  106<H>  3.7   |  23  |  0.66    Ca    9.0      18 Aug 2019 06:18  Phos  2.8     08-18  Mg     1.7     08-18    TPro  7.1  /  Alb  3.7  /  TBili  0.7  /  DBili  x   /  AST  145<H>  /  ALT  136<H>  /  AlkPhos  60  08-18    Creatinine Trend: 0.66<--, 0.69<--, 0.75<--, 0.76<--, 0.73<--, 0.72<--             RADIOLOGY:   < from: US Abdomen Limited (08.15.19 @ 10:29) >  FINDINGS:    Liver: Steatosis.  Bile ducts: Normal caliber. Common hepatic duct measures 3 mm.   Gallbladder: Cholelithiasis. No gallbladder wall thickening or para   cholecystic fluid. Negative sonographic Bowles sign.      Pancreas: Obscured by overlying bowel gas.  Right kidney: 11.0 cm. No hydronephrosis.  Ascites: None.  IVC: Visualized portions are within normal limits.    IMPRESSION:     Cholelithiasis.      < end of copied text >

## 2019-08-18 NOTE — PROGRESS NOTE ADULT - PROBLEM SELECTOR PLAN 8
- Currently no SI/HI  - Continue with home Zoloft and Vistaril  - Patient has therapist he sees regularly outside of hospital  - At this time does not appear anxiety/depression are clouding patient's judgement or motivation to seek treatment

## 2019-08-18 NOTE — PROGRESS NOTE ADULT - PROBLEM SELECTOR PLAN 3
- Resolved  - Elevated bilirubin, CT A/P showed gallstones. tBili down to 1.0   - RUQ U/S normal, without CBD dilation  - ALT/AST still mildly elevated  - Monitor LFT as outpatient

## 2019-08-18 NOTE — PROGRESS NOTE ADULT - REASON FOR ADMISSION
alcohol withdrawal, abdominal pain

## 2019-08-18 NOTE — DISCHARGE NOTE NURSING/CASE MANAGEMENT/SOCIAL WORK - NSDCPNINST_GEN_ALL_CORE
Pt admitted with nausea, vomiting, non-bloody diarrhea and abdominal pain which as resolved. Patient was on CIWA which improved with Ativan taper. Pt stable for discharge. Discharge instruction given

## 2019-08-18 NOTE — PROGRESS NOTE ADULT - PROBLEM SELECTOR PLAN 2
- Resolved   - Likely in the setting of gastroenteritis vs withdrawal  - GI PCR pending, Stool O&P negative x1   - Continue with Zofran PRN - Resolved   - Likely in the setting of gastroenteritis vs withdrawal  - GI PCR pending (called the lab, the sample is lost on their end), Stool O&P negative x1   - Continue with Zofran PRN

## 2019-08-18 NOTE — PROGRESS NOTE ADULT - PROBLEM SELECTOR PLAN 9
- DVT prophylaxis: IMPROVE Score 0, no need for pharm VTE  - Fall precautions  - Diet: DASH/TLC  - Dispo: Discharge to home today    Jesusita Keith PGY-3  Internal medicine [facility]  [id number]
DVT ppx: IMPROVE Score 0  Fall precautions  Diet: DASH/TLC

## 2019-08-20 LAB — GI PCR PANEL, STOOL: SIGNIFICANT CHANGE UP

## 2019-09-11 NOTE — ED PROCEDURE NOTE - CPROC ED TOLERANCE1
Anesthesia ROS/Med Hx    Overall Review:  Pts. EKG was reviewed   Pt. has history of anesthetic complications (PONV)    Pulmonary Review:    Negative for pulmonary    Neuro/Psych Review:    Negative for all neuro/psych ROS    Cardiovascular Review:    Pt. positive for hypertension  Pt. positive for hyperlipidemia    GI/HEPATIC/RENAL Review:    Pt. positive for GERD - well controlled    End/Other Review:    Pt. negative for endo/other ROS      Anesthesia Plan     ASA Status: 2  Anesthesia Type: MAC  Reviewed: Medications, Problem List, Past Med History, NPO Status, Pre-Induction Reassessment, Patient Summary and Allergies  The proposed anesthetic plan, including its risks and benefits, have been discussed with the Patient and Spouse - along with the risks and benefits of alternatives.  Questions were encouraged and answered and the patient and/or representative agrees to proceed.  Plan Comments: EXPLAINED TO PATIENT THAT IV PROPOFOL WOULD KEEP HIM UNAWARE DURING SURGERY, PLUS HOPEFULLY HELP PREVENT POST OP N/V.  MINIMIZE NARCS.  PRE-OP MEDS:  VERSED, DECADRON  GIVE ZOFRAN IN CASE    PATIENT CANCELLED BECAUSE K IS 2.8.  HE UNDERSTANDS THE RATIONALLE.  WE ARE CONTACTING DR. RICARDO'S OFFICE SO THAT HE CAN SUPPLEMENT.      Physical Exam  Mallampati: I  TM Distance: >3 FB  Neck ROM: Full  cardiovascular exam normal  pulmonary exam normal  abdominal exam normal  dental exam normal                  
Patient tolerated procedure well.

## 2019-10-01 PROCEDURE — G9005: CPT

## 2019-10-02 PROBLEM — Z60.2 PERSON LIVING ALONE: Status: ACTIVE | Noted: 2018-03-06

## 2019-10-29 DIAGNOSIS — Z71.89 OTHER SPECIFIED COUNSELING: ICD-10-CM

## 2020-02-28 NOTE — ED PROCEDURE NOTE - NS ED FORMED SPLINTS 1
-- DO NOT REPLY / DO NOT REPLY ALL --  -- Message is from the [facility]--    General Patient Message      Reason for Call: patient's daughter has called back 3 times, plaese call back today with lab results.    Caller Information       Type Contact Phone    02/28/2020 12:54 PM Phone (Incoming) EDY ROMANO (Emergency Contact) [phone]          Alternative phone number: none    Turnaround time given to caller:   \"This message will be sent to [state Provider's name]. The clinical team will fulfill your request as soon as they review your message.\"}     surgical boot/Other

## 2020-08-25 NOTE — ED PROVIDER NOTE - CROS ED NEURO ALL NEG
60 feet ; steady gait,  O2 sat mostly 92-97% during ambulation; after ambulating 60 feet O2 sat was 90% for a few seconds and then quickly estiven to 97-98% on room air - - -

## 2020-09-27 NOTE — PROGRESS NOTE ADULT - PROBLEM/PLAN-8
(1) Oriented to own ability
DISPLAY PLAN FREE TEXT

## 2020-11-19 NOTE — ED ADULT TRIAGE NOTE - BMI (KG/M2)
[Curative] : Goals of care discussed with patient: Curative [FreeTextEntry1] : She is a 57 y/o AAF Stage III left invasive lobular carcinoma who was on anastrozole while awaiting surgery s/p lumpectomy with axillary lymph node dissection in 5/5/2020 followed by mastectomy 6/30/2020 with additional lymph nodes removed. She has tolerated AC dose dense x 4. WAs to start weekly paclitaxel on 11/4. Treatment held secondary to neutropenia.\par CBC reviewed: Counts ok today and ok to proceed with treatment.\par AGain reviewed with her the potential toxicities from taxol. Will give IV dexamethasone pre-treatment today. Advised to call for any new/acute symptoms.\par F/up vsiit in 2 weeks, sooner if needed. 29.3

## 2020-12-18 NOTE — PHYSICAL THERAPY INITIAL EVALUATION ADULT - DIAGNOSIS, PT EVAL
97.2
Performs ADLs including ambulation and transfers independently and safely without need of any assistive device.

## 2021-05-03 NOTE — ED ADULT TRIAGE NOTE - CHIEF COMPLAINT QUOTE
Pt c/o diarrhea ,  epigastric pain , N/V and weakness  x 1 week;.  Pt drank 10 beers today./  Pt with tremors at triage and c/o headache.  Pt was called twice by previous RN, but no answer.
23

## 2021-07-30 ENCOUNTER — INPATIENT (INPATIENT)
Facility: HOSPITAL | Age: 56
LOS: 3 days | Discharge: HOME | End: 2021-08-03
Attending: HOSPITALIST | Admitting: HOSPITALIST
Payer: MEDICAID

## 2021-07-30 VITALS
TEMPERATURE: 98 F | HEIGHT: 68 IN | OXYGEN SATURATION: 95 % | DIASTOLIC BLOOD PRESSURE: 105 MMHG | HEART RATE: 97 BPM | RESPIRATION RATE: 20 BRPM | SYSTOLIC BLOOD PRESSURE: 152 MMHG

## 2021-07-30 DIAGNOSIS — F10.230 ALCOHOL DEPENDENCE WITH WITHDRAWAL, UNCOMPLICATED: ICD-10-CM

## 2021-07-30 DIAGNOSIS — F10.239 ALCOHOL DEPENDENCE WITH WITHDRAWAL, UNSPECIFIED: ICD-10-CM

## 2021-07-30 DIAGNOSIS — I10 ESSENTIAL (PRIMARY) HYPERTENSION: ICD-10-CM

## 2021-07-30 DIAGNOSIS — Z29.9 ENCOUNTER FOR PROPHYLACTIC MEASURES, UNSPECIFIED: ICD-10-CM

## 2021-07-30 DIAGNOSIS — R21 RASH AND OTHER NONSPECIFIC SKIN ERUPTION: ICD-10-CM

## 2021-07-30 DIAGNOSIS — R60.0 LOCALIZED EDEMA: ICD-10-CM

## 2021-07-30 PROBLEM — H54.40 BLINDNESS, ONE EYE, UNSPECIFIED EYE: Chronic | Status: ACTIVE | Noted: 2019-08-13

## 2021-07-30 LAB
ALBUMIN SERPL ELPH-MCNC: 4.5 G/DL — SIGNIFICANT CHANGE UP (ref 3.3–5)
ALP SERPL-CCNC: 58 U/L — SIGNIFICANT CHANGE UP (ref 40–120)
ALT FLD-CCNC: 59 U/L — HIGH (ref 4–41)
ANION GAP SERPL CALC-SCNC: 18 MMOL/L — HIGH (ref 7–14)
AST SERPL-CCNC: 65 U/L — HIGH (ref 4–40)
BASOPHILS # BLD AUTO: 0.11 K/UL — SIGNIFICANT CHANGE UP (ref 0–0.2)
BASOPHILS NFR BLD AUTO: 1.8 % — SIGNIFICANT CHANGE UP (ref 0–2)
BILIRUB SERPL-MCNC: 0.6 MG/DL — SIGNIFICANT CHANGE UP (ref 0.2–1.2)
BUN SERPL-MCNC: 9 MG/DL — SIGNIFICANT CHANGE UP (ref 7–23)
CALCIUM SERPL-MCNC: 8.4 MG/DL — SIGNIFICANT CHANGE UP (ref 8.4–10.5)
CHLORIDE SERPL-SCNC: 103 MMOL/L — SIGNIFICANT CHANGE UP (ref 98–107)
CO2 SERPL-SCNC: 19 MMOL/L — LOW (ref 22–31)
CREAT SERPL-MCNC: 0.68 MG/DL — SIGNIFICANT CHANGE UP (ref 0.5–1.3)
EOSINOPHIL # BLD AUTO: 0.27 K/UL — SIGNIFICANT CHANGE UP (ref 0–0.5)
EOSINOPHIL NFR BLD AUTO: 4.5 % — SIGNIFICANT CHANGE UP (ref 0–6)
GLUCOSE SERPL-MCNC: 138 MG/DL — HIGH (ref 70–99)
HCT VFR BLD CALC: 39.4 % — SIGNIFICANT CHANGE UP (ref 39–50)
HGB BLD-MCNC: 13.3 G/DL — SIGNIFICANT CHANGE UP (ref 13–17)
IANC: 2.45 K/UL — SIGNIFICANT CHANGE UP (ref 1.5–8.5)
IMM GRANULOCYTES NFR BLD AUTO: 0.3 % — SIGNIFICANT CHANGE UP (ref 0–1.5)
LYMPHOCYTES # BLD AUTO: 2.59 K/UL — SIGNIFICANT CHANGE UP (ref 1–3.3)
LYMPHOCYTES # BLD AUTO: 43.5 % — SIGNIFICANT CHANGE UP (ref 13–44)
MCHC RBC-ENTMCNC: 30.4 PG — SIGNIFICANT CHANGE UP (ref 27–34)
MCHC RBC-ENTMCNC: 33.8 GM/DL — SIGNIFICANT CHANGE UP (ref 32–36)
MCV RBC AUTO: 90 FL — SIGNIFICANT CHANGE UP (ref 80–100)
MONOCYTES # BLD AUTO: 0.52 K/UL — SIGNIFICANT CHANGE UP (ref 0–0.9)
MONOCYTES NFR BLD AUTO: 8.7 % — SIGNIFICANT CHANGE UP (ref 2–14)
NEUTROPHILS # BLD AUTO: 2.45 K/UL — SIGNIFICANT CHANGE UP (ref 1.8–7.4)
NEUTROPHILS NFR BLD AUTO: 41.2 % — LOW (ref 43–77)
NRBC # BLD: 0 /100 WBCS — SIGNIFICANT CHANGE UP
NRBC # FLD: 0 K/UL — SIGNIFICANT CHANGE UP
NT-PROBNP SERPL-SCNC: 13 PG/ML — SIGNIFICANT CHANGE UP
PLATELET # BLD AUTO: 311 K/UL — SIGNIFICANT CHANGE UP (ref 150–400)
POTASSIUM SERPL-MCNC: 3.8 MMOL/L — SIGNIFICANT CHANGE UP (ref 3.5–5.3)
POTASSIUM SERPL-SCNC: 3.8 MMOL/L — SIGNIFICANT CHANGE UP (ref 3.5–5.3)
PROT SERPL-MCNC: 7.8 G/DL — SIGNIFICANT CHANGE UP (ref 6–8.3)
RBC # BLD: 4.38 M/UL — SIGNIFICANT CHANGE UP (ref 4.2–5.8)
RBC # FLD: 13.5 % — SIGNIFICANT CHANGE UP (ref 10.3–14.5)
SARS-COV-2 RNA SPEC QL NAA+PROBE: SIGNIFICANT CHANGE UP
SODIUM SERPL-SCNC: 140 MMOL/L — SIGNIFICANT CHANGE UP (ref 135–145)
WBC # BLD: 5.96 K/UL — SIGNIFICANT CHANGE UP (ref 3.8–10.5)
WBC # FLD AUTO: 5.96 K/UL — SIGNIFICANT CHANGE UP (ref 3.8–10.5)

## 2021-07-30 PROCEDURE — 99221 1ST HOSP IP/OBS SF/LOW 40: CPT

## 2021-07-30 PROCEDURE — 99223 1ST HOSP IP/OBS HIGH 75: CPT | Mod: GC

## 2021-07-30 PROCEDURE — 99285 EMERGENCY DEPT VISIT HI MDM: CPT

## 2021-07-30 PROCEDURE — 70450 CT HEAD/BRAIN W/O DYE: CPT | Mod: 26

## 2021-07-30 PROCEDURE — 93970 EXTREMITY STUDY: CPT | Mod: 26

## 2021-07-30 RX ORDER — IBUPROFEN 200 MG
600 TABLET ORAL EVERY 6 HOURS
Refills: 0 | Status: DISCONTINUED | OUTPATIENT
Start: 2021-07-30 | End: 2021-07-30

## 2021-07-30 RX ORDER — SERTRALINE 25 MG/1
1 TABLET, FILM COATED ORAL
Qty: 0 | Refills: 0 | DISCHARGE

## 2021-07-30 RX ORDER — CALAMINE AND ZINC OXIDE AND PHENOL 160; 10 MG/ML; MG/ML
1 LOTION TOPICAL THREE TIMES A DAY
Refills: 0 | Status: DISCONTINUED | OUTPATIENT
Start: 2021-07-30 | End: 2021-08-03

## 2021-07-30 RX ORDER — FOLIC ACID 0.8 MG
1 TABLET ORAL DAILY
Refills: 0 | Status: DISCONTINUED | OUTPATIENT
Start: 2021-07-30 | End: 2021-08-03

## 2021-07-30 RX ORDER — IBUPROFEN 200 MG
400 TABLET ORAL ONCE
Refills: 0 | Status: COMPLETED | OUTPATIENT
Start: 2021-07-30 | End: 2021-07-30

## 2021-07-30 RX ORDER — DORZOLAMIDE HYDROCHLORIDE 20 MG/ML
1 SOLUTION/ DROPS OPHTHALMIC
Qty: 0 | Refills: 0 | DISCHARGE

## 2021-07-30 RX ORDER — ACETAMINOPHEN 500 MG
650 TABLET ORAL EVERY 6 HOURS
Refills: 0 | Status: DISCONTINUED | OUTPATIENT
Start: 2021-07-30 | End: 2021-08-01

## 2021-07-30 RX ORDER — THIAMINE MONONITRATE (VIT B1) 100 MG
100 TABLET ORAL DAILY
Refills: 0 | Status: DISCONTINUED | OUTPATIENT
Start: 2021-07-30 | End: 2021-07-31

## 2021-07-30 RX ORDER — HYDROXYZINE HCL 10 MG
1 TABLET ORAL
Qty: 0 | Refills: 0 | DISCHARGE

## 2021-07-30 RX ORDER — THIAMINE MONONITRATE (VIT B1) 100 MG
100 TABLET ORAL DAILY
Refills: 0 | Status: DISCONTINUED | OUTPATIENT
Start: 2021-07-30 | End: 2021-07-30

## 2021-07-30 RX ORDER — LATANOPROST 0.05 MG/ML
1 SOLUTION/ DROPS OPHTHALMIC; TOPICAL
Qty: 0 | Refills: 0 | DISCHARGE

## 2021-07-30 RX ORDER — ACETAMINOPHEN 500 MG
650 TABLET ORAL ONCE
Refills: 0 | Status: COMPLETED | OUTPATIENT
Start: 2021-07-30 | End: 2021-07-30

## 2021-07-30 RX ORDER — PERMETHRIN CREAM 5% W/W 50 MG/G
1 CREAM TOPICAL ONCE
Refills: 0 | Status: COMPLETED | OUTPATIENT
Start: 2021-07-30 | End: 2021-07-30

## 2021-07-30 RX ORDER — ENOXAPARIN SODIUM 100 MG/ML
40 INJECTION SUBCUTANEOUS DAILY
Refills: 0 | Status: DISCONTINUED | OUTPATIENT
Start: 2021-07-30 | End: 2021-07-31

## 2021-07-30 RX ORDER — TIMOLOL 0.5 %
1 DROPS OPHTHALMIC (EYE)
Qty: 0 | Refills: 0 | DISCHARGE

## 2021-07-30 RX ORDER — ACETAMINOPHEN 500 MG
650 TABLET ORAL EVERY 6 HOURS
Refills: 0 | Status: DISCONTINUED | OUTPATIENT
Start: 2021-07-30 | End: 2021-07-30

## 2021-07-30 RX ORDER — FOLIC ACID 0.8 MG
1 TABLET ORAL DAILY
Refills: 0 | Status: DISCONTINUED | OUTPATIENT
Start: 2021-07-30 | End: 2021-07-30

## 2021-07-30 RX ORDER — SODIUM CHLORIDE 5 %
1 DROPS OPHTHALMIC (EYE)
Qty: 0 | Refills: 0 | DISCHARGE

## 2021-07-30 RX ADMIN — PERMETHRIN CREAM 5% W/W 1 APPLICATION(S): 50 CREAM TOPICAL at 23:27

## 2021-07-30 RX ADMIN — CALAMINE AND ZINC OXIDE AND PHENOL 1 APPLICATION(S): 160; 10 LOTION TOPICAL at 17:43

## 2021-07-30 RX ADMIN — Medication 400 MILLIGRAM(S): at 08:09

## 2021-07-30 RX ADMIN — Medication 650 MILLIGRAM(S): at 23:00

## 2021-07-30 RX ADMIN — Medication 650 MILLIGRAM(S): at 22:04

## 2021-07-30 RX ADMIN — Medication 2 MILLIGRAM(S): at 08:09

## 2021-07-30 RX ADMIN — CALAMINE AND ZINC OXIDE AND PHENOL 1 APPLICATION(S): 160; 10 LOTION TOPICAL at 22:27

## 2021-07-30 RX ADMIN — Medication 2 MILLIGRAM(S): at 22:03

## 2021-07-30 RX ADMIN — Medication 2 MILLIGRAM(S): at 13:49

## 2021-07-30 RX ADMIN — ENOXAPARIN SODIUM 40 MILLIGRAM(S): 100 INJECTION SUBCUTANEOUS at 13:46

## 2021-07-30 RX ADMIN — Medication 2 MILLIGRAM(S): at 18:04

## 2021-07-30 RX ADMIN — Medication 650 MILLIGRAM(S): at 08:09

## 2021-07-30 RX ADMIN — Medication 1 TABLET(S): at 18:04

## 2021-07-30 NOTE — ED ADULT TRIAGE NOTE - CHIEF COMPLAINT QUOTE
Bilateral leg pain and swelling since two weeks ago. Legs are grossly swollen. Had been taking pain medicines at home ( Tylenol/ Advil) without any relief. PMH of HTN, glaucoma right eye

## 2021-07-30 NOTE — ED ADULT NURSE NOTE - NSIMPLEMENTINTERV_GEN_ALL_ED
Implemented All Universal Safety Interventions:  [place] to call system. Call bell, personal items and telephone within reach. Instruct patient to call for assistance. Room bathroom lighting operational. Non-slip footwear when patient is off stretcher. Physically safe environment: no spills, clutter or unnecessary equipment. Stretcher in lowest position, wheels locked, appropriate side rails in place.

## 2021-07-30 NOTE — ED PROVIDER NOTE - ATTENDING CONTRIBUTION TO CARE
57yo M PMHX etoh abuse with prior withdrawal, HTN, and R eye blindness, p/w b/l leg pain and swelling as swell as etoh withdrawal (last drink ~6hrs ago).  Pt states he has been sober for 2yrs from etoh but leg pain was so bad and made it difficult to sleep so he began drinking again to manage the pain and also stopped his HCTZ he takes for HTN.  No sob or chest pains. Normal urine output. No fevers    General: Patient with mild tremors, otherwise in no distress, AAO x 3  Skin: Dry and intact. b/l lower legs with shiny appearance but no significant redness  HEENT: Oral mucosa moist. +tongue fasciculations  Eyes: Conjunctiva normal  Cardiac: Regular rhythm and rate.  2+ pretibial edema b/l with symmetric leg tenderness  Respiratory: Lungs clear b/l and symmetric. No respiratory distress. Able to speak in complete sentences.  Gastrointestinal: Abdomen soft, nondistended, nontender  Musculoskeletal: Moves all extremities spontaneously  Neurological: alert and oriented to person, place, and time  Psychiatric: Cooperative    a/p  etoh withdrawl- benzos  leg swelling- ?from liver or renal dysfunction vs venous insufficiency vs DVTs less likely or stopping HCTZ  will get labs with liver function, doppler venous ultrasound  likely will need admission d/t withdrawal

## 2021-07-30 NOTE — H&P ADULT - ASSESSMENT
56 year old male w/ past medical hx significant for HTN, R. eye glaucoma, R. eye blindness, ?venous insufficiency, etoh dependence, undomiciled p/w b/l calf tenderness admitted for etoh withdrawal and LE edema work up.

## 2021-07-30 NOTE — ED PROVIDER NOTE - PROGRESS NOTE DETAILS
Dr. Landon: Pt was signed out to me awaiting labs, US, and CT. Pt was noted to have some tongue fasciculations on initial exam and given ativan. Will continue to monitor. US WNL. Slight elevated LFTs. Will admit for alcohol withdrawal.

## 2021-07-30 NOTE — ED ADULT NURSE REASSESSMENT NOTE - NS ED NURSE REASSESS COMMENT FT1
Report received from night RN. Pt A&Ox4. Sleeping on initial encounter but arousal. No visible tremors. Denies CP, SOB, any pain. Legs swollen and red. NSR on cardiac monitor. Will continue to monitor.

## 2021-07-30 NOTE — H&P ADULT - PROBLEM SELECTOR PLAN 1
initial CIWA 4, although given +b/l hand tremors, tongue fasciculations, HA, anxiety start standing high risk CIWA w/ PO lorazepam taper  MVI/folic acid/thiamine  SW consult  monitor CIWA closely  c/w pulse ox initial CIWA 4, although given +b/l hand tremors, tongue fasciculations, HA, anxiety start standing high risk CIWA w/ PO lorazepam taper  MVI/folic acid/thiamine  SW consult  monitor CIWA closely  Check HIV  c/w pulse ox initial CIWA 4, although given +b/l hand tremors, tongue fasciculations, HA, anxiety start standing high risk CIWA w/ PO lorazepam taper  MVI/folic acid/thiamine  SW consult  monitor CIWA closely  Check HIV and Hep C  c/w pulse ox

## 2021-07-30 NOTE — PHARMACOTHERAPY INTERVENTION NOTE - COMMENTS
Medication history is incomplete. Medication history confirmed with [facility]. Patient presents with several medications at bedside, however reports he has not taken any prescription medications in several weeks. Please call spectra [phone] if you have any questions.

## 2021-07-30 NOTE — H&P ADULT - PROBLEM SELECTOR PLAN 3
possibly lice vs bed bug rash on UE and LE  calamine lotion prn for pruritus  dermatology consult possibly lice vs bed bug rash vs scabies on UE and LE  calamine lotion prn for pruritus  dermatology consult

## 2021-07-30 NOTE — CONSULT NOTE ADULT - ASSESSMENT
1. Dermatitis, given significantly pruritic excoriated papules on forearms, and undomiciled history would treat empirically for scabies, though no burrows seen clinically on exam. Can also consider porphyria cutanea tarda given erosion and involvement of photosensitive location on dorsal hands but no scarring or milia seen on exam today. Risk factors for PCT include use of alcohol, HIV, chronic HCV infection. Pts with oxidative liver damage (ie hepatic steatosis 2/2 drinking) are also at risk for development of PCT.  DDx can also include actinic prurigo which is a photo-induced disorder triggered by exposure to UV radiation, and presents with intensely pruritic papules, plaques, or nodules that are typically excoriated  - Please treat empirically for scabies with permethrin 5% neck down to entire body surface (including interwebs, under nails, perineum) today and repeat in 1 week.  - Please obtain serum and urine porphyrins to r/o PCT. Also recommend obtaining hepatitis c serologies, HIV testing.     2. Chronic venous stasis of b/l LE- Stasis dermatitis is a type of eczema that occurs in the setting of venous valve insufficiency and venous hypertension, which leads to an inflammatory response and results in acute or chronic dermatitis and pigmentary change of the lower legs.  - If no concern for peripheral arterial disease, recommend starting daily compression stockings  - After pt has completed treatment for scabies, recommend starting triamcinolone 0.1% ointment to affected areas twice daily for up to 2 weeks at a time.   - Elevate legs when possible      The patient's chart was reviewed in addition to being seen and examined at bedside with the dermatology attending Dr. Jacquelyn Jarrett. Recommendations were communicated with the primary team.  Please page [phone] w/10 digit call back number for further related questions.    Nayla Reynoso MD  Resident Physician, PGY3  [facility]  Pager: [phone]  Office: [phone]

## 2021-07-30 NOTE — H&P ADULT - PROBLEM SELECTOR PLAN 2
possibly d/t undiagnosed venous insufficiency vs new onset systolic vs diastolic HF vs liver disease vs nephrotic syndrome  VA duplex showed no evidence of DVT above the knee b/l, unable to visualize calf veins therefore check D-dimer, if significantly elevated can consider repeating VA duplex for below the knee  check TTE   Check US abdomen to evaluate signs of cirrhosis  check UA and Urine protein/scr ratio to r/o nephrotic syndrome  Check TSH to evaluate myxedema  Check A1c  Check EKG STAT  hold off on diuretics until clear diagnosis  b/l lower extremity compression stockings ordered possibly d/t undiagnosed venous insufficiency vs new onset systolic vs diastolic HF vs liver disease vs nephrotic syndrome vs PAD  VA duplex showed no evidence of DVT above the knee b/l, unable to visualize calf veins therefore check D-dimer, if significantly elevated can consider repeating VA duplex for below the knee  check TTE   Check US abdomen to evaluate signs of cirrhosis  Check ANDREINA/PVR  check UA and Urine protein/scr ratio to r/o nephrotic syndrome  Check TSH to evaluate myxedema  Check A1c  Check EKG STAT  hold off on diuretics until clear diagnosis  b/l lower extremity compression stockings ordered

## 2021-07-30 NOTE — PATIENT PROFILE ADULT - VISION (WITH CORRECTIVE LENSES IF THE PATIENT USUALLY WEARS THEM):
Normal vision: sees adequately in most situations; can see medication labels, newsprint R eye blindness/Partially impaired: cannot see medication labels or newsprint, but can see obstacles in path, and the surrounding layout; can count fingers at arm's length

## 2021-07-30 NOTE — H&P ADULT - NSHPPHYSICALEXAM_GEN_ALL_CORE
Vital Signs Last 24 Hrs  T(C): 36.6 (30 Jul 2021 11:53), Max: 36.9 (30 Jul 2021 08:10)  T(F): 97.8 (30 Jul 2021 11:53), Max: 98.5 (30 Jul 2021 08:10)  HR: 79 (30 Jul 2021 11:53) (79 - 97)  BP: 117/67 (30 Jul 2021 11:53) (117/67 - 152/105)  BP(mean): --  RR: 16 (30 Jul 2021 11:53) (16 - 20)  SpO2: 98% (30 Jul 2021 11:53) (95% - 99%)    General: disheveled, no acute respiratory distress   Neurology: A&Ox3, nonfocal, HICKS x 4, +tongue fasciculation, +b/l hand tremors  Eyes: PERRL on L. eye, R. eye blindness w/ R. eye ptosis  ENT/Neck: Neck supple, trachea midline, No gross JVD  Respiratory: CTA B/L, No wheezing, rales, rhonchi  CV: RRR, S1S2, no murmurs, rubs or gallops  Abdominal: Soft, NT, ND +BS,   Extremities: 2+ pitting edema b/l LE upto knee +DP pulses present b/l  Skin: +b/l distal forearm w/ small pustules w/ excoriation. +b/l anterior LE w/ vesiluar rash, no overlying cellulitis, no ulcers  Psych: mildly anxious, coherent speech, normal mood and affect, denies SI/HI

## 2021-07-30 NOTE — ED PROVIDER NOTE - PHYSICAL EXAMINATION
Gen: AAOx3, non-toxic  Head: Small abrasion on the left forehead.   HEENT: EOMI, oral mucosa moist, normal conjunctiva. Dilated pupils with cataracts on the R side.   Lung: CTAB, no respiratory distress, no wheezes/rhonchi/rales B/L, speaking in full sentences  CV: RRR, no murmurs, rubs or gallops. Blt symmetrical pitting edema  Abd: soft, NTND, no guarding, no CVA tenderness  MSK: No c-spine ttp.   Neuro: No focal sensory or motor deficits. Blt hand tremors.   Skin: Blt anterior lower leg erythema with no warmth.   Psych: normal affect.

## 2021-07-30 NOTE — H&P ADULT - HISTORY OF PRESENT ILLNESS
56 year old male w/ past medical hx significant for HTN, R. eye glaucoma, R. eye blindness, ?venous insufficiency, etoh dependence, undomiciled p/w worsening b/l calf pain. Pt is a poor historian often changing duration of symptoms and reports inconsistent social history. Pt mentioned he has been experiencing b/l LE edema for 2-3 months, it has been worsening and for past 3 weeks has severe b/l calf pain which prompted him to resume etoh consumption. He reports undergoing "vein injection 2 months ago on both legs" for ?venous insufficiency. He also reports being sober for 2 years however for past 3 weeks d/t pain has resumed drinking 7-9 beers daily. He reports prior hx of withdrawal requiring hospitalization, denied seizure, DTs, intubation, ICU admission. His last drink was 2 AM 7/30/21. He reports slight HA, anxiety, denies n/v/abd pain. In addition he states "my doctor says my heart function is slightly low", he experiences RICHTER, can walk only 2 blocks at a time, denies orthopnea, chest pain, cough, fever, palpitations, lightheadedness. He was on HCTZ for HTN although stopped taking medication several months ago. In addition endorses b/l upper distal forearm pruritus with excoriation and b/l anterior LE pruritus w/ vesicular rash. He takes no medications currently.    In the ED CIWA 4, /67, HR 85, Tmax 97.8 RR 16 SaO2 98% on RA  He received IV ativan x 2, iburpofen 400 mg x 1, tylenol 675 mg  x 1 w/ improvement in b/l calf pain

## 2021-07-30 NOTE — H&P ADULT - NSHPSOCIALHISTORY_GEN_ALL_CORE
Unemployed, homeless, lives on the streets in [place], estranged from family, drinks 7-9 beers daily x 3 weeks, last drink 2 AM 7/30/21, denies using marijuana, cocaine, heroin or other illicit drugs, denies tobacco use.

## 2021-07-30 NOTE — ED PROVIDER NOTE - OBJECTIVE STATEMENT
55 yo with hx of alcohol use, htn, sober for 2 years, began drinking alcohol 2 weeks ago after developing blt leg edema and pain. Reports a blt burning pain and edema with no SOB, chest pain, hx of renal or liver disease. Denies fevers or chills. No hx of DVTs, smoking, recent travel or surgeries. Haf a fall 2 weeks ago hitting the left side of his head, no LOC. Drinks 4-6 beers daily. Last drink was at 2AM today.

## 2021-07-30 NOTE — ED ADULT NURSE NOTE - OBJECTIVE STATEMENT
55yo male received in room 2. pt A&Ox3, hx of glaucoma to right eye and htn, c/o bilateral calf swelling and pain for three weeks. legs appears red and swollen. pt denies trauma/injury. no apparent injury noted. Pt offered no other complains at present. Pt denies chest pain, shortness of breath, LOC, syncope, fevers, chills, numbness, tingling, headache, change in vision, dizziness, lightheadedness. Denies dysuria, hematuria. respiration even and non-labored. Side rails up, bed at lowest position, call bell within reach, patient oriented to the unit, safety maintained. 57yo male received in room 2. pt A&Ox3, hx of glaucoma to right eye and htn, c/o bilateral calf swelling and pain for three weeks. legs appears red and swollen, states taking pain med without relief. pt denies trauma/injury. no apparent injury noted. Pt offered no other complains at present. Pt denies chest pain, shortness of breath, LOC, syncope, fevers, chills, numbness, tingling, headache, change in vision, dizziness, lightheadedness. Denies dysuria, hematuria. respiration even and non-labored. Side rails up, bed at lowest position, call bell within reach, patient oriented to the unit, safety maintained. 57yo male received in room 2. pt A&Ox3, hx of glaucoma to right eye, htn, and alcohol dependence, states he haven't drank for the past three years, and recently started drinking due to pain, last drank was last night.  c/o bilateral calf swelling and pain for three weeks. legs appears red and swollen, states taking pain med without relief. pt denies trauma/injury. no apparent injury noted. Pt offered no other complains at present. Pt denies chest pain, shortness of breath, LOC, syncope, fevers, chills, numbness, tingling, headache, change in vision, dizziness, lightheadedness. Denies dysuria, hematuria. no withdrawal symptoms noted. respiration even and non-labored. Side rails up, bed at lowest position, call bell within reach, patient oriented to the unit, safety maintained. 57yo male received in room 2. pt A&Ox3, hx of glaucoma to right eye, htn, and alcohol dependence, states he haven't drank for the past three years, and recently started drinking due to pain, last drank was last night.  c/o bilateral calf swelling and pain for three weeks. legs appears red and swollen, states taking pain med without relief. pt denies trauma/injury. no apparent injury noted. Pt offered no other complains at present. pt appears anxious and slight trembling noted. respiration even and non-labored. Side rails up, bed at lowest position, call bell within reach, patient oriented to the unit, safety maintained.

## 2021-07-30 NOTE — H&P ADULT - NSHPREVIEWOFSYSTEMS_GEN_ALL_CORE
ROS:    Constitutional: [ ] fevers [ ] chills [ ] weight loss [ ] weight gain  HEENT: [ ] dry eyes [ ] eye irritation [ ] postnasal drip [ ] nasal congestion  CV: [ ] chest pain [ ] orthopnea [ ] palpitations [ ] murmur  Resp: [ ] cough [ X] shortness of breath [ ] dyspnea [ ] wheezing [ ] sputum [ ] hemoptysis  GI: [ ] nausea [ ] vomiting [ ] diarrhea [ ] constipation [ ] abd pain [ ] dysphagia   : [ ] dysuria [ ] nocturia [ ] hematuria [ ] increased urinary frequency  Musculoskeletal: [X ] back pain [ ] myalgias [ ] arthralgias [ ] fracture  Skin: [X ] rash [X] itch  Neurological: [X ] headache [ ] dizziness [ ] syncope [ ] weakness [ ] numbness  Psychiatric: [ ] anxiety [ ] depression  Endocrine: [ ] diabetes [ ] thyroid problem  Hematologic/Lymphatic: [ ] anemia [ ] bleeding problem  Allergic/Immunologic: [ ] itchy eyes [ ] nasal discharge [ ] hives [ ] angioedema  [ ] All other systems negative  [ ] Unable to assess ROS because ________

## 2021-07-30 NOTE — ED PROVIDER NOTE - CLINICAL SUMMARY MEDICAL DECISION MAKING FREE TEXT BOX
55 yo with hx of alcohol use, htn, sober for 2 years, began drinking alcohol 2 weeks ago after developing blt leg edema and pain. VS WNL Small abrasion to left forehead, blt hand tremors and tongue fasciculations. Blt pitting edema with no signs of infection. Ddx edema likely secondary to varicose veins vs liver disease vs less likely renal related. Do not suspect CHF. Less likely DVT.  Also with signs of mild alcohol withdrawal. Labs, UA, head CT, blt duplex, benzos, pain control, likely admit.

## 2021-07-30 NOTE — CONSULT NOTE ADULT - SUBJECTIVE AND OBJECTIVE BOX
HPI:  56 year old male w/ past medical hx significant for HTN, R. eye glaucoma, R. eye blindness, ?venous insufficiency, etoh dependence, undomiciled p/w worsening b/l calf pain. Pt is a poor historian often changing duration of symptoms and reports inconsistent social history. Pt mentioned he has been experiencing b/l LE edema for 2-3 months, it has been worsening and for past 3 weeks has severe b/l calf pain which prompted him to resume etoh consumption. He reports undergoing "vein injection 2 months ago on both legs" for ?venous insufficiency. He also reports being sober for 2 years however for past 3 weeks d/t pain has resumed drinking 7-9 beers daily. He reports prior hx of withdrawal requiring hospitalization, denied seizure, DTs, intubation, ICU admission. His last drink was 2 AM 7/30/21. He reports slight HA, anxiety, denies n/v/abd pain. In addition he states "my doctor says my heart function is slightly low", he experiences RICHTER, can walk only 2 blocks at a time, denies orthopnea, chest pain, cough, fever, palpitations, lightheadedness. He was on HCTZ for HTN although stopped taking medication several months ago. In addition endorses b/l upper distal forearm pruritus with excoriation and b/l anterior LE pruritus w/ vesicular rash. He takes no medications currently.    In the ED CIWA 4, /67, HR 85, Tmax 97.8 RR 16 SaO2 98% on RA  He received IV ativan x 2, iburpofen 400 mg x 1, tylenol 675 mg  x 1 w/ improvement in b/l calf pain (30 Jul 2021 13:09)    DERM CONSULT/HPI: Dermatology consulted for rash x unknown duration, pt poor historian, unsure how long it has been there. Describes rash to be extremely pruritic on b/l forearms, and b/l lower legs. Pt feels that rash on forearms gets worse with sunlight. Notes recent fall at which time he sustained wound to R knee and R hand. Pt has noted increased swelling of b/l LE over the past few months. Denies rash in the groin. Pt unsure of what medications he takes at home.     Labs reviewed:   wbc 5.96, hgb 13, plt 311, abs eos 0.27  cr 0.68, AST 65, ALT 59, AlK 58, total serum bilirubin 0.6  COVID 19 neg     PAST MEDICAL & SURGICAL HISTORY:  Glaucoma    HTN (hypertension)    Alcohol dependence    Blindness of right eye    No significant past surgical history        REVIEW OF SYSTEMS:  General: no fevers/chills; no lethargy  Skin/Breast: see HPI  Ophthalmologic: no eye pain or changes in vision  ENMT: no dysphagia or changes in haering  Respiratory: no SOB or cough  Cardiovascular: no palpitations or chest pain  Gastroinestinal: no abdominal pain or blood in stool  Genitourinary: no dysuria or frequency  Musculoskeletal: no joint pains or weakness  Neurological: no weakness, numbness, or tingling    MEDICATIONS  (STANDING):  calamine/zinc oxide Lotion 1 Application(s) Topical three times a day  enoxaparin Injectable 40 milliGRAM(s) SubCutaneous daily  folic acid 1 milliGRAM(s) Oral daily  LORazepam     Tablet   Oral   LORazepam     Tablet 2 milliGRAM(s) Oral every 4 hours  multivitamin 1 Tablet(s) Oral daily  thiamine 100 milliGRAM(s) Oral daily    MEDICATIONS  (PRN):  acetaminophen   Tablet .. 650 milliGRAM(s) Oral every 6 hours PRN Mild Pain (1 - 3), Moderate Pain (4 - 6)  acetaminophen   Tablet .. 650 milliGRAM(s) Oral every 6 hours PRN Mild Pain (1 - 3), Moderate Pain (4 - 6)      Allergies    No Known Allergies    Intolerances        SOCIAL HISTORY:    FAMILY HISTORY:  FH: type 2 diabetes        Vital Signs Last 24 Hrs  T(C): 36.8 (30 Jul 2021 13:42), Max: 36.9 (30 Jul 2021 08:10)  T(F): 98.3 (30 Jul 2021 13:42), Max: 98.5 (30 Jul 2021 08:10)  HR: 95 (30 Jul 2021 13:42) (79 - 97)  BP: 141/76 (30 Jul 2021 13:42) (117/67 - 152/105)  BP(mean): --  RR: 16 (30 Jul 2021 13:42) (16 - 20)  SpO2: 98% (30 Jul 2021 13:42) (95% - 99%)    PHYSICAL EXAM:  The patient was AOx3, well nourished, and in NAD.  OP showed no ulcerations.  There was no visible LAD.  Conjunctiva were non-injected.  There was no clubbing or edema of extremities.   The scalp, hair, face, eyebrows, lips, OP, neck, chest, back, extremities x4, and nails were examined.  There was no hyperhidrosis or bromhidrosis.     Of note on skin exam:   excoriated papules w/hemorrhagic crusts scattered on b/l sun-exposed forearms and dorsal hands, erosion noted on R thumb  excoriated plaque on R knee w/overlying hemorrhagic crusts  pink erythematous plaques on background of 2+ pitting edema on b/l LE   face, palms, soles, trunk spared     LABS:                        13.3   5.96  )-----------( 311      ( 30 Jul 2021 08:23 )             39.4     07-30    140  |  103  |  9   ----------------------------<  138<H>  3.8   |  19<L>  |  0.68    Ca    8.4      30 Jul 2021 08:23    TPro  7.8  /  Alb  4.5  /  TBili  0.6  /  DBili  x   /  AST  65<H>  /  ALT  59<H>  /  AlkPhos  58  07-30          RADIOLOGY & ADDITIONAL STUDIES:

## 2021-07-31 DIAGNOSIS — R74.01 ELEVATION OF LEVELS OF LIVER TRANSAMINASE LEVELS: ICD-10-CM

## 2021-07-31 DIAGNOSIS — H40.9 UNSPECIFIED GLAUCOMA: ICD-10-CM

## 2021-07-31 DIAGNOSIS — F10.239 ALCOHOL DEPENDENCE WITH WITHDRAWAL, UNSPECIFIED: ICD-10-CM

## 2021-07-31 LAB
A1C WITH ESTIMATED AVERAGE GLUCOSE RESULT: 6.3 % — HIGH (ref 4–5.6)
ALBUMIN SERPL ELPH-MCNC: 3.7 G/DL — SIGNIFICANT CHANGE UP (ref 3.3–5)
ALP SERPL-CCNC: 48 U/L — SIGNIFICANT CHANGE UP (ref 40–120)
ALT FLD-CCNC: 49 U/L — HIGH (ref 4–41)
ANION GAP SERPL CALC-SCNC: 14 MMOL/L — SIGNIFICANT CHANGE UP (ref 7–14)
APTT BLD: 28.5 SEC — SIGNIFICANT CHANGE UP (ref 27–36.3)
AST SERPL-CCNC: 49 U/L — HIGH (ref 4–40)
BILIRUB SERPL-MCNC: 0.9 MG/DL — SIGNIFICANT CHANGE UP (ref 0.2–1.2)
BUN SERPL-MCNC: 11 MG/DL — SIGNIFICANT CHANGE UP (ref 7–23)
CALCIUM SERPL-MCNC: 8.1 MG/DL — LOW (ref 8.4–10.5)
CHLORIDE SERPL-SCNC: 100 MMOL/L — SIGNIFICANT CHANGE UP (ref 98–107)
CO2 SERPL-SCNC: 22 MMOL/L — SIGNIFICANT CHANGE UP (ref 22–31)
COVID-19 SPIKE DOMAIN AB INTERP: POSITIVE
COVID-19 SPIKE DOMAIN ANTIBODY RESULT: >250 U/ML — HIGH
CREAT SERPL-MCNC: 0.69 MG/DL — SIGNIFICANT CHANGE UP (ref 0.5–1.3)
D DIMER BLD IA.RAPID-MCNC: <150 NG/ML DDU — SIGNIFICANT CHANGE UP
ESTIMATED AVERAGE GLUCOSE: 134 — SIGNIFICANT CHANGE UP
GLUCOSE SERPL-MCNC: 174 MG/DL — HIGH (ref 70–99)
HCV AB S/CO SERPL IA: 0.16 S/CO — SIGNIFICANT CHANGE UP (ref 0–0.99)
HCV AB SERPL-IMP: SIGNIFICANT CHANGE UP
HIV 1+2 AB+HIV1 P24 AG SERPL QL IA: SIGNIFICANT CHANGE UP
INR BLD: 1 RATIO — SIGNIFICANT CHANGE UP (ref 0.88–1.16)
LACTATE SERPL-SCNC: 1.7 MMOL/L — SIGNIFICANT CHANGE UP (ref 0.5–2)
MAGNESIUM SERPL-MCNC: 2 MG/DL — SIGNIFICANT CHANGE UP (ref 1.6–2.6)
PHOSPHATE SERPL-MCNC: 1.9 MG/DL — LOW (ref 2.5–4.5)
POTASSIUM SERPL-MCNC: 3.5 MMOL/L — SIGNIFICANT CHANGE UP (ref 3.5–5.3)
POTASSIUM SERPL-SCNC: 3.5 MMOL/L — SIGNIFICANT CHANGE UP (ref 3.5–5.3)
PROT SERPL-MCNC: 6.9 G/DL — SIGNIFICANT CHANGE UP (ref 6–8.3)
PROTHROM AB SERPL-ACNC: 11.5 SEC — SIGNIFICANT CHANGE UP (ref 10.6–13.6)
SARS-COV-2 IGG+IGM SERPL QL IA: >250 U/ML — HIGH
SARS-COV-2 IGG+IGM SERPL QL IA: POSITIVE
SODIUM SERPL-SCNC: 136 MMOL/L — SIGNIFICANT CHANGE UP (ref 135–145)
TSH SERPL-MCNC: 2.54 UIU/ML — SIGNIFICANT CHANGE UP (ref 0.27–4.2)

## 2021-07-31 PROCEDURE — 99233 SBSQ HOSP IP/OBS HIGH 50: CPT | Mod: GC

## 2021-07-31 RX ORDER — DONEPEZIL HYDROCHLORIDE 10 MG/1
5 TABLET, FILM COATED ORAL AT BEDTIME
Refills: 0 | Status: DISCONTINUED | OUTPATIENT
Start: 2021-07-31 | End: 2021-08-03

## 2021-07-31 RX ORDER — DORZOLAMIDE HYDROCHLORIDE TIMOLOL MALEATE 20; 5 MG/ML; MG/ML
1 SOLUTION/ DROPS OPHTHALMIC EVERY 12 HOURS
Refills: 0 | Status: DISCONTINUED | OUTPATIENT
Start: 2021-07-31 | End: 2021-08-03

## 2021-07-31 RX ORDER — THIAMINE MONONITRATE (VIT B1) 100 MG
500 TABLET ORAL EVERY 8 HOURS
Refills: 0 | Status: COMPLETED | OUTPATIENT
Start: 2021-07-31 | End: 2021-08-03

## 2021-07-31 RX ORDER — POTASSIUM CHLORIDE 20 MEQ
20 PACKET (EA) ORAL ONCE
Refills: 0 | Status: COMPLETED | OUTPATIENT
Start: 2021-07-31 | End: 2021-07-31

## 2021-07-31 RX ORDER — SODIUM,POTASSIUM PHOSPHATES 278-250MG
1 POWDER IN PACKET (EA) ORAL
Refills: 0 | Status: COMPLETED | OUTPATIENT
Start: 2021-07-31 | End: 2021-08-01

## 2021-07-31 RX ORDER — LATANOPROST 0.05 MG/ML
1 SOLUTION/ DROPS OPHTHALMIC; TOPICAL AT BEDTIME
Refills: 0 | Status: DISCONTINUED | OUTPATIENT
Start: 2021-07-31 | End: 2021-08-03

## 2021-07-31 RX ORDER — CHOLECALCIFEROL (VITAMIN D3) 125 MCG
2000 CAPSULE ORAL DAILY
Refills: 0 | Status: DISCONTINUED | OUTPATIENT
Start: 2021-07-31 | End: 2021-08-03

## 2021-07-31 RX ADMIN — CALAMINE AND ZINC OXIDE AND PHENOL 1 APPLICATION(S): 160; 10 LOTION TOPICAL at 06:06

## 2021-07-31 RX ADMIN — Medication 2 MILLIGRAM(S): at 02:00

## 2021-07-31 RX ADMIN — Medication 2 MILLIGRAM(S): at 09:52

## 2021-07-31 RX ADMIN — Medication 1 TABLET(S): at 13:47

## 2021-07-31 RX ADMIN — CALAMINE AND ZINC OXIDE AND PHENOL 1 APPLICATION(S): 160; 10 LOTION TOPICAL at 13:48

## 2021-07-31 RX ADMIN — Medication 20 MILLIEQUIVALENT(S): at 09:56

## 2021-07-31 RX ADMIN — Medication 2 MILLIGRAM(S): at 06:06

## 2021-07-31 RX ADMIN — Medication 1 MILLIGRAM(S): at 13:46

## 2021-07-31 RX ADMIN — Medication 1 DROP(S): at 13:47

## 2021-07-31 RX ADMIN — Medication 1 PACKET(S): at 13:46

## 2021-07-31 RX ADMIN — LATANOPROST 1 DROP(S): 0.05 SOLUTION/ DROPS OPHTHALMIC; TOPICAL at 21:55

## 2021-07-31 RX ADMIN — Medication 650 MILLIGRAM(S): at 14:46

## 2021-07-31 RX ADMIN — Medication 1.5 MILLIGRAM(S): at 22:02

## 2021-07-31 RX ADMIN — DORZOLAMIDE HYDROCHLORIDE TIMOLOL MALEATE 1 DROP(S): 20; 5 SOLUTION/ DROPS OPHTHALMIC at 18:00

## 2021-07-31 RX ADMIN — Medication 1 DROP(S): at 18:00

## 2021-07-31 RX ADMIN — Medication 1 PACKET(S): at 21:54

## 2021-07-31 RX ADMIN — DONEPEZIL HYDROCHLORIDE 5 MILLIGRAM(S): 10 TABLET, FILM COATED ORAL at 21:54

## 2021-07-31 RX ADMIN — Medication 650 MILLIGRAM(S): at 13:46

## 2021-07-31 RX ADMIN — Medication 1 PACKET(S): at 09:52

## 2021-07-31 RX ADMIN — Medication 105 MILLIGRAM(S): at 21:56

## 2021-07-31 RX ADMIN — CALAMINE AND ZINC OXIDE AND PHENOL 1 APPLICATION(S): 160; 10 LOTION TOPICAL at 21:55

## 2021-07-31 RX ADMIN — Medication 650 MILLIGRAM(S): at 06:06

## 2021-07-31 RX ADMIN — Medication 2000 UNIT(S): at 13:51

## 2021-07-31 RX ADMIN — Medication 1.5 MILLIGRAM(S): at 18:00

## 2021-07-31 RX ADMIN — Medication 650 MILLIGRAM(S): at 07:06

## 2021-07-31 RX ADMIN — Medication 1.5 MILLIGRAM(S): at 13:46

## 2021-07-31 RX ADMIN — Medication 100 MILLIGRAM(S): at 13:47

## 2021-07-31 NOTE — PROGRESS NOTE ADULT - ATTENDING COMMENTS
56 yr old undomiciled man PMH HTN, R. eye glaucoma and right eye blindness, ?venous insufficiency, ETOH dependence p/w b/l calf tenderness and swelling. Admitted for ETOH withdrawal and workup of LE edema.    obese man with right eye ptosis and cloudy lens  LE with equal b/l swelling and erythematous plaques, skin excoriations  2+ pedal pulses intact b/l    c/w CIWA protocol. Switch to IV thiamine 500mg TID x 2-3 days, IV Ativan taper (low threshold to increase to high risk taper if CIWA rising)  proBNP 13, no JVD, lower suspicion for acute heart failure. Hold off TTE for now  LE swelling with rash appears to be consistent with chronic venous stasis dermatitis. LE US negative for DVT. No need for Abx presently  Keep LE elevated b/l, add compression stockings, f/u ANDREINA/PVR  C/w empiric treatment of scabies with permethrin per derm and f/u serum and urine porphyrins to r/o PCT per derm recs  F/U PT recs

## 2021-07-31 NOTE — PROGRESS NOTE ADULT - PROBLEM SELECTOR PLAN 4
BP improved with pain control  hold off on HCTZ. - BP improved with pain control  - hold off on HCTZ. - Remote History of Hepatic Steatosis  - AST/ALT on admission 65/59. Trended down to 49/49

## 2021-07-31 NOTE — PROGRESS NOTE ADULT - PROBLEM SELECTOR PLAN 6
DVT ppx: lovenox subqdaily  Diet: low sodium diet  PT consult. DVT ppx: Pt up and out of bed  Diet: low sodium diet  PT consult. - Right eye Glaucoma with complete blindness  - On eye drops. Timolol? Takes one drop In each eye in the morning and at night

## 2021-07-31 NOTE — PROGRESS NOTE ADULT - SUBJECTIVE AND OBJECTIVE BOX
NAEO overnight. He complains of B/L lower leg pain but states pain has improved a bit since being admitted. He denies any confusion, changes in vision, CP, SOB, fever, chills, nausea, numbness and tingling in the extremities.     REVIEW OF SYSTEMS:  EYES: PEERLA  ENT: No visual changes;  No vertigo or throat pain   RESPIRATORY: No cough, wheezing, hemoptysis; No shortness of breath  CARDIOVASCULAR: No chest pain or palpitations  GASTROINTESTINAL: No abdominal or epigastric pain. No nausea, vomiting, or hematemesis; No diarrhea or constipation. No melena or hematochezia  SKIN: No itching, + rashes      MEDICATIONS  (STANDING):  artificial  tears Solution 1 Drop(s) Both EYES four times a day  calamine/zinc oxide Lotion 1 Application(s) Topical three times a day  cholecalciferol 2000 Unit(s) Oral daily  donepezil 5 milliGRAM(s) Oral at bedtime  dorzolamide 2%/timolol 0.5% Ophthalmic Solution 1 Drop(s) Both EYES every 12 hours  enoxaparin Injectable 40 milliGRAM(s) SubCutaneous daily  folic acid 1 milliGRAM(s) Oral daily  latanoprost 0.005% Ophthalmic Solution 1 Drop(s) Both EYES at bedtime  LORazepam     Tablet 2 milliGRAM(s) Oral every 4 hours  LORazepam     Tablet 1.5 milliGRAM(s) Oral every 4 hours  LORazepam     Tablet   Oral   multivitamin 1 Tablet(s) Oral daily  thiamine 100 milliGRAM(s) Oral daily    MEDICATIONS  (PRN):  acetaminophen   Tablet .. 650 milliGRAM(s) Oral every 6 hours PRN Mild Pain (1 - 3), Moderate Pain (4 - 6)    VITALS:   T(C): 36.4 (07-31-21 @ 07:45), Max: 37 (07-30-21 @ 17:44)  HR: 74 (07-31-21 @ 07:45) (74 - 95)  BP: 122/78 (07-31-21 @ 07:45) (117/67 - 149/95)  RR: 17 (07-31-21 @ 07:45) (16 - 19)  SpO2: 99% (07-31-21 @ 07:45) (95% - 100%)    GENERAL: NAD, lying in bed comfortably  EYES: Mild conjunctival injection bilaterally. Right eyelid is slighting drooping. No discharge. No tearing.  HEART: Regular rate and rhythm, no murmurs, rubs, or gallops  LUNGS: Unlabored respirations.  Clear to auscultation bilaterally, no crackles, wheezing, or rhonchi  EXTREMITIES: 2+ peripheral pulses bilaterally. 2+ Bilateral pitting edema of the up to the mid calf  NERVOUS SYSTEM:  A&Ox3. Bilateral tremors of the UE. No LE tremors. Strength 5/5 in both upper and lower extremities.    SKIN: Warmth of the B/L lower legs with some healing scabs. Some tenderness present to B/L anterior and posterior lower leg. No streaking. B/L erythematous and mildly scaly rash on upper and lower extremities.  Psych: Normal. normal behavior. normal speech    .  LABS:                         13.3   5.96  )-----------( 311      ( 30 Jul 2021 08:23 )             39.4     07-31    136  |  100  |  x   ----------------------------<  x   3.5   |  x   |  x     Ca    8.4      30 Jul 2021 08:23  Phos  1.9     07-31  Mg     2.00     07-31    TPro  7.8  /  Alb  4.5  /  TBili  0.6  /  DBili  x   /  AST  65<H>  /  ALT  59<H>  /  AlkPhos  58  07-30    PT/INR - ( 31 Jul 2021 07:30 )   PT: 11.5 sec;   INR: 1.00 ratio         PTT - ( 31 Jul 2021 07:30 )  PTT:28.5 sec      Lactate, Blood: 1.7 mmol/L (07-31 @ 07:30)      RADIOLOGY, EKG & ADDITIONAL TESTS: Reviewed.      NAEO overnight. He complains of B/L lower leg pain but states pain has improved a bit since being admitted after getting Tylenol. He denies any confusion, changes in vision, CP, SOB, fever, chills, nausea, numbness and tingling in the extremities.     REVIEW OF SYSTEMS:  EYES: PEERLA  ENT: No visual changes;  No vertigo or throat pain   RESPIRATORY: No cough, wheezing, hemoptysis; No shortness of breath  CARDIOVASCULAR: No chest pain or palpitations  GASTROINTESTINAL: No abdominal or epigastric pain. No nausea, vomiting, or hematemesis; No diarrhea or constipation. No melena or hematochezia  SKIN: No itching, + rashes      MEDICATIONS  (STANDING):  artificial  tears Solution 1 Drop(s) Both EYES four times a day  calamine/zinc oxide Lotion 1 Application(s) Topical three times a day  cholecalciferol 2000 Unit(s) Oral daily  donepezil 5 milliGRAM(s) Oral at bedtime  dorzolamide 2%/timolol 0.5% Ophthalmic Solution 1 Drop(s) Both EYES every 12 hours  enoxaparin Injectable 40 milliGRAM(s) SubCutaneous daily  folic acid 1 milliGRAM(s) Oral daily  latanoprost 0.005% Ophthalmic Solution 1 Drop(s) Both EYES at bedtime  LORazepam     Tablet 2 milliGRAM(s) Oral every 4 hours  LORazepam     Tablet 1.5 milliGRAM(s) Oral every 4 hours  LORazepam     Tablet   Oral   multivitamin 1 Tablet(s) Oral daily  thiamine 100 milliGRAM(s) Oral daily    MEDICATIONS  (PRN):  acetaminophen   Tablet .. 650 milliGRAM(s) Oral every 6 hours PRN Mild Pain (1 - 3), Moderate Pain (4 - 6)    VITALS:   T(C): 36.4 (07-31-21 @ 07:45), Max: 37 (07-30-21 @ 17:44)  HR: 74 (07-31-21 @ 07:45) (74 - 95)  BP: 122/78 (07-31-21 @ 07:45) (117/67 - 149/95)  RR: 17 (07-31-21 @ 07:45) (16 - 19)  SpO2: 99% (07-31-21 @ 07:45) (95% - 100%)    GENERAL: NAD, lying in bed comfortably  EYES: Mild conjunctival injection bilaterally. Right eyelid is slighting drooping. No discharge. No tearing.  HEART: Regular rate and rhythm, no murmurs, rubs, or gallops  LUNGS: Unlabored respirations.  Clear to auscultation bilaterally, no crackles, wheezing, or rhonchi  EXTREMITIES: 2+ peripheral pulses bilaterally. 2+ Bilateral pitting edema of the up to the mid calf  NERVOUS SYSTEM:  A&Ox3. Bilateral tremors of the UE. No LE tremors. Strength 5/5 in both upper and lower extremities.    SKIN: Warmth of the B/L lower legs with some healing scabs. Some tenderness present to B/L anterior and posterior lower leg. No streaking. B/L erythematous and mildly scaly rash on upper and lower extremities.  Psych: Normal. normal behavior. normal speech    .  LABS:                         13.3   5.96  )-----------( 311      ( 30 Jul 2021 08:23 )             39.4     07-31    136  |  100  |  x   ----------------------------<  x   3.5   |  x   |  x     Ca    8.4      30 Jul 2021 08:23  Phos  1.9     07-31  Mg     2.00     07-31    TPro  7.8  /  Alb  4.5  /  TBili  0.6  /  DBili  x   /  AST  65<H>  /  ALT  59<H>  /  AlkPhos  58  07-30    PT/INR - ( 31 Jul 2021 07:30 )   PT: 11.5 sec;   INR: 1.00 ratio         PTT - ( 31 Jul 2021 07:30 )  PTT:28.5 sec      Lactate, Blood: 1.7 mmol/L (07-31 @ 07:30)      RADIOLOGY, EKG & ADDITIONAL TESTS: Reviewed.      NAEO overnight. He complains of B/L lower leg pain but states pain has improved a bit since being admitted after getting Tylenol. He denies any confusion, changes in vision, CP, SOB, fever, chills, nausea, numbness and tingling in the extremities.     REVIEW OF SYSTEMS:  EYES: PEERLA  ENT: No visual changes;  No vertigo or throat pain   RESPIRATORY: No cough, wheezing, hemoptysis; No shortness of breath  CARDIOVASCULAR: No chest pain or palpitations  GASTROINTESTINAL: No abdominal or epigastric pain. No nausea, vomiting, or hematemesis; No diarrhea or constipation. No melena or hematochezia  SKIN: No itching, + rashes      MEDICATIONS  (STANDING):  artificial  tears Solution 1 Drop(s) Both EYES four times a day  calamine/zinc oxide Lotion 1 Application(s) Topical three times a day  cholecalciferol 2000 Unit(s) Oral daily  donepezil 5 milliGRAM(s) Oral at bedtime  dorzolamide 2%/timolol 0.5% Ophthalmic Solution 1 Drop(s) Both EYES every 12 hours  enoxaparin Injectable 40 milliGRAM(s) SubCutaneous daily  folic acid 1 milliGRAM(s) Oral daily  latanoprost 0.005% Ophthalmic Solution 1 Drop(s) Both EYES at bedtime  LORazepam     Tablet 2 milliGRAM(s) Oral every 4 hours  LORazepam     Tablet 1.5 milliGRAM(s) Oral every 4 hours  LORazepam     Tablet   Oral   multivitamin 1 Tablet(s) Oral daily  thiamine 100 milliGRAM(s) Oral daily    MEDICATIONS  (PRN):  acetaminophen   Tablet .. 650 milliGRAM(s) Oral every 6 hours PRN Mild Pain (1 - 3), Moderate Pain (4 - 6)    VITALS:   T(C): 36.4 (07-31-21 @ 07:45), Max: 37 (07-30-21 @ 17:44)  HR: 74 (07-31-21 @ 07:45) (74 - 95)  BP: 122/78 (07-31-21 @ 07:45) (117/67 - 149/95)  RR: 17 (07-31-21 @ 07:45) (16 - 19)  SpO2: 99% (07-31-21 @ 07:45) (95% - 100%)    GENERAL: NAD, lying in bed comfortably  EYES: Mild conjunctival injection bilaterally. Right eyelid is slighting drooping with cloudy lens. No discharge. No tearing.  HEART: Regular rate and rhythm, no murmurs, rubs, or gallops  LUNGS: Unlabored respirations.  Clear to auscultation bilaterally, no crackles, wheezing, or rhonchi  EXTREMITIES: 2+ peripheral pulses bilaterally. 2+ Bilateral pitting edema of the up to the mid calf  NERVOUS SYSTEM:  A&Ox3. Bilateral tremors of the UE. No LE tremors. Strength 5/5 in both upper and lower extremities.    SKIN: Warmth of the B/L lower legs with some healing scabs. Some tenderness present to B/L anterior and posterior lower leg. No streaking. B/L erythematous and mildly scaly rash on upper and lower extremities.  Psych: Normal. normal behavior. normal speech    .  LABS:                         13.3   5.96  )-----------( 311      ( 30 Jul 2021 08:23 )             39.4     07-31    136  |  100  |  x   ----------------------------<  x   3.5   |  x   |  x     Ca    8.4      30 Jul 2021 08:23  Phos  1.9     07-31  Mg     2.00     07-31    TPro  7.8  /  Alb  4.5  /  TBili  0.6  /  DBili  x   /  AST  65<H>  /  ALT  59<H>  /  AlkPhos  58  07-30    PT/INR - ( 31 Jul 2021 07:30 )   PT: 11.5 sec;   INR: 1.00 ratio         PTT - ( 31 Jul 2021 07:30 )  PTT:28.5 sec      Lactate, Blood: 1.7 mmol/L (07-31 @ 07:30)      RADIOLOGY, EKG & ADDITIONAL TESTS: Reviewed.

## 2021-07-31 NOTE — PHYSICAL THERAPY INITIAL EVALUATION ADULT - ADDITIONAL COMMENTS
Patient undomiciled. Patient reports being independent in ADLs and ambulation prior to admission.    Patient was left safely in bed, all lines/tubes intact and call bell within reach, RN aware

## 2021-07-31 NOTE — PHYSICAL THERAPY INITIAL EVALUATION ADULT - PERTINENT HX OF CURRENT PROBLEM, REHAB EVAL
Patient is a 51 year old  admitted to [facility] with worsening b/l calf pain. PMH: HTN, Right eye glaucoma, Right eye blindness, ?venous insufficiency, etoh dependence.

## 2021-07-31 NOTE — PROGRESS NOTE ADULT - PROBLEM SELECTOR PLAN 3
possibly lice vs bed bug rash vs scabies on UE and LE  calamine lotion prn for pruritus  dermatology consult. - Possibly bed bug rash vs scabies on UE and LE considering hx of undomiciled  - Calamine lotion prn for pruritus  - Dermatology onboard. Per recs, concerning for scabies and will need to start Permethrin powder applied on total body surface area from head to toe.   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

## 2021-07-31 NOTE — PROGRESS NOTE ADULT - PROBLEM SELECTOR PLAN 1
Initial CIWA 4, although given +b/l hand tremors, tongue fasciculations, HA, anxiety start standing high risk CIWA w/ PO lorazepam taper  MVI/folic acid/thiamine  SW consult  Monitor CIWA closely  Check HIV and Hep C  c/w pulse ox. - Initial CIWA 4, although given +b/l hand tremors, tongue fasciculations, HA, anxiety start standing high risk CIWA w/ PO lorazepam taper  - MVI/folic acid/thiamine  - SW consult  - Monitor CIWA closely  - Check HIV and Hep C - Pt with relapse of alcohol dependence. Initially sober for about 2 years per history. Relapsed due to intolerable leg pain. Was in AA for 2 years and completed program  - Initial CIWA 4 on admission, +b/l hand tremors, tongue fasciculations, HA, anxiety start standing high risk CIWA w/ PO lorazepam taper  - MVI/folic acid/thiamine  - SW consult  - Monitor CIWA closely

## 2021-07-31 NOTE — PROGRESS NOTE ADULT - PROBLEM SELECTOR PLAN 2
possibly d/t undiagnosed venous insufficiency resulting in venous stasis dermatitis vs new onset systolic vs diastolic HF vs liver disease vs nephrotic syndrome vs PAD  VA duplex showed no evidence of DVT above the knee b/l, unable to visualize calf veins therefore check D-dimer, if significantly elevated can consider repeating VA duplex for below the knee  check TTE   Check US abdomen to evaluate signs of cirrhosis  Check ANDREINA/PVR  check UA and Urine protein/scr ratio to r/o nephrotic syndrome  Check TSH to evaluate myxedema  Check A1c  Check EKG STAT  hold off on diuretics until clear diagnosis  b/l lower extremity compression stockings ordered. - Associated with worsening pain whenn walking.  - Possibly d/t undiagnosed venous insufficiency resulting in venous stasis dermatitis vs new onset systolic vs diastolic HF vs liver disease vs nephrotic syndrome vs PAD  VA duplex showed no evidence of DVT above the knee b/l, unable to visualize calf veins therefore check D-dimer, if significantly elevated can consider repeating VA duplex for below the knee  check TTE   Check US abdomen to evaluate signs of cirrhosis  Check ANDREINA/PVR  check UA and Urine protein/scr ratio to r/o nephrotic syndrome  Check TSH to evaluate myxedema  Check A1c  Check EKG STAT  hold off on diuretics until clear diagnosis  b/l lower extremity compression stockings ordered. - Associated with worsening pain when walking.  - Possibly d/t undiagnosed venous insufficiency resulting in venous stasis dermatitis vs new onset systolic vs diastolic HF vs liver disease vs nephrotic syndrome vs PAD  VA duplex showed no evidence of DVT above the knee b/l, unable to visualize calf veins therefore check D-dimer, if significantly elevated can consider repeating VA duplex for below the knee  - Check ANDREINA/PVR  Out patient workup to consider:          - Check US abdomen to evaluate signs of cirrhosis          - Check UA and Urine protein/scr ratio to r/o nephrotic syndrome          - hold off on diuretics until clear diagnosis  - b/l lower extremity compression stockings ordered.  - Elevate b/l legs - Associated with intermittent claudication.  - Possibly d/t undiagnosed venous insufficiency resulting in venous stasis dermatitis vs new onset systolic vs diastolic HF vs liver disease vs nephrotic syndrome vs PAD  VA duplex showed no evidence of DVT above the knee b/l, unable to visualize calf veins therefore check D-dimer, if significantly elevated can consider repeating VA duplex for below the knee  - Check ANDREINA/PVR  Out patient workup to consider:          - Check US abdomen to evaluate signs of cirrhosis          - Check UA and Urine protein/scr ratio to r/o nephrotic syndrome          - hold off on diuretics until clear diagnosis  - b/l lower extremity compression stockings ordered.  - Elevate b/l legs

## 2021-07-31 NOTE — PROGRESS NOTE ADULT - PROBLEM SELECTOR PLAN 5
DVT ppx: lovenox subqdaily  Diet: low sodium diet  PT consult. - Right eye Glaucoma with complete blindness  - On eye drops. Timolol? Takes one drop In each eye in the morning and at night - BP improved with pain control  - hold off on HCTZ.

## 2021-07-31 NOTE — PHYSICAL THERAPY INITIAL EVALUATION ADULT - PATIENT PROFILE REVIEW, REHAB EVAL
PT orders received: no formal activity order. Consult with MARTÍNEZ SEBASTIAN, pt may participate in PT evaluation./yes

## 2021-08-01 LAB
ANION GAP SERPL CALC-SCNC: 13 MMOL/L — SIGNIFICANT CHANGE UP (ref 7–14)
APPEARANCE UR: CLEAR — SIGNIFICANT CHANGE UP
APTT BLD: 27.4 SEC — SIGNIFICANT CHANGE UP (ref 27–36.3)
BILIRUB UR-MCNC: NEGATIVE — SIGNIFICANT CHANGE UP
BUN SERPL-MCNC: 8 MG/DL — SIGNIFICANT CHANGE UP (ref 7–23)
CALCIUM SERPL-MCNC: 8.2 MG/DL — LOW (ref 8.4–10.5)
CHLORIDE SERPL-SCNC: 103 MMOL/L — SIGNIFICANT CHANGE UP (ref 98–107)
CO2 SERPL-SCNC: 21 MMOL/L — LOW (ref 22–31)
COLOR SPEC: COLORLESS — SIGNIFICANT CHANGE UP
CREAT SERPL-MCNC: 0.64 MG/DL — SIGNIFICANT CHANGE UP (ref 0.5–1.3)
DIFF PNL FLD: NEGATIVE — SIGNIFICANT CHANGE UP
GLUCOSE SERPL-MCNC: 124 MG/DL — HIGH (ref 70–99)
GLUCOSE UR QL: NEGATIVE — SIGNIFICANT CHANGE UP
HCT VFR BLD CALC: 36.7 % — LOW (ref 39–50)
HGB BLD-MCNC: 12.4 G/DL — LOW (ref 13–17)
INR BLD: 0.99 RATIO — SIGNIFICANT CHANGE UP (ref 0.88–1.16)
KETONES UR-MCNC: NEGATIVE — SIGNIFICANT CHANGE UP
LEUKOCYTE ESTERASE UR-ACNC: NEGATIVE — SIGNIFICANT CHANGE UP
MAGNESIUM SERPL-MCNC: 2 MG/DL — SIGNIFICANT CHANGE UP (ref 1.6–2.6)
MCHC RBC-ENTMCNC: 30.9 PG — SIGNIFICANT CHANGE UP (ref 27–34)
MCHC RBC-ENTMCNC: 33.8 GM/DL — SIGNIFICANT CHANGE UP (ref 32–36)
MCV RBC AUTO: 91.5 FL — SIGNIFICANT CHANGE UP (ref 80–100)
NITRITE UR-MCNC: NEGATIVE — SIGNIFICANT CHANGE UP
NRBC # BLD: 0 /100 WBCS — SIGNIFICANT CHANGE UP
NRBC # FLD: 0 K/UL — SIGNIFICANT CHANGE UP
PH UR: 7.5 — SIGNIFICANT CHANGE UP (ref 5–8)
PHOSPHATE SERPL-MCNC: 2.3 MG/DL — LOW (ref 2.5–4.5)
PLATELET # BLD AUTO: 250 K/UL — SIGNIFICANT CHANGE UP (ref 150–400)
POTASSIUM SERPL-MCNC: 3.6 MMOL/L — SIGNIFICANT CHANGE UP (ref 3.5–5.3)
POTASSIUM SERPL-SCNC: 3.6 MMOL/L — SIGNIFICANT CHANGE UP (ref 3.5–5.3)
PROT UR-MCNC: NEGATIVE — SIGNIFICANT CHANGE UP
PROTHROM AB SERPL-ACNC: 11.3 SEC — SIGNIFICANT CHANGE UP (ref 10.6–13.6)
RBC # BLD: 4.01 M/UL — LOW (ref 4.2–5.8)
RBC # FLD: 13.4 % — SIGNIFICANT CHANGE UP (ref 10.3–14.5)
SODIUM SERPL-SCNC: 137 MMOL/L — SIGNIFICANT CHANGE UP (ref 135–145)
SP GR SPEC: 1 — LOW (ref 1.01–1.02)
UROBILINOGEN FLD QL: SIGNIFICANT CHANGE UP
WBC # BLD: 6.32 K/UL — SIGNIFICANT CHANGE UP (ref 3.8–10.5)
WBC # FLD AUTO: 6.32 K/UL — SIGNIFICANT CHANGE UP (ref 3.8–10.5)

## 2021-08-01 PROCEDURE — 99233 SBSQ HOSP IP/OBS HIGH 50: CPT | Mod: GC

## 2021-08-01 RX ORDER — ACETAMINOPHEN 500 MG
650 TABLET ORAL EVERY 6 HOURS
Refills: 0 | Status: DISCONTINUED | OUTPATIENT
Start: 2021-08-01 | End: 2021-08-03

## 2021-08-01 RX ORDER — POLYETHYLENE GLYCOL 3350 17 G/17G
17 POWDER, FOR SOLUTION ORAL DAILY
Refills: 0 | Status: DISCONTINUED | OUTPATIENT
Start: 2021-08-01 | End: 2021-08-03

## 2021-08-01 RX ORDER — SODIUM,POTASSIUM PHOSPHATES 278-250MG
1 POWDER IN PACKET (EA) ORAL ONCE
Refills: 0 | Status: COMPLETED | OUTPATIENT
Start: 2021-08-01 | End: 2021-08-01

## 2021-08-01 RX ORDER — POTASSIUM CHLORIDE 20 MEQ
40 PACKET (EA) ORAL ONCE
Refills: 0 | Status: COMPLETED | OUTPATIENT
Start: 2021-08-01 | End: 2021-08-01

## 2021-08-01 RX ORDER — SENNA PLUS 8.6 MG/1
2 TABLET ORAL AT BEDTIME
Refills: 0 | Status: DISCONTINUED | OUTPATIENT
Start: 2021-08-01 | End: 2021-08-03

## 2021-08-01 RX ADMIN — CALAMINE AND ZINC OXIDE AND PHENOL 1 APPLICATION(S): 160; 10 LOTION TOPICAL at 14:05

## 2021-08-01 RX ADMIN — Medication 1.5 MILLIGRAM(S): at 10:20

## 2021-08-01 RX ADMIN — Medication 1 TABLET(S): at 12:17

## 2021-08-01 RX ADMIN — Medication 1 MILLIGRAM(S): at 22:42

## 2021-08-01 RX ADMIN — SENNA PLUS 2 TABLET(S): 8.6 TABLET ORAL at 23:37

## 2021-08-01 RX ADMIN — Medication 2000 UNIT(S): at 12:18

## 2021-08-01 RX ADMIN — LATANOPROST 1 DROP(S): 0.05 SOLUTION/ DROPS OPHTHALMIC; TOPICAL at 22:45

## 2021-08-01 RX ADMIN — Medication 1.5 MILLIGRAM(S): at 02:02

## 2021-08-01 RX ADMIN — Medication 1 DROP(S): at 18:18

## 2021-08-01 RX ADMIN — Medication 1 PACKET(S): at 09:42

## 2021-08-01 RX ADMIN — Medication 1.5 MILLIGRAM(S): at 06:21

## 2021-08-01 RX ADMIN — Medication 1 PACKET(S): at 02:02

## 2021-08-01 RX ADMIN — Medication 1 DROP(S): at 00:13

## 2021-08-01 RX ADMIN — CALAMINE AND ZINC OXIDE AND PHENOL 1 APPLICATION(S): 160; 10 LOTION TOPICAL at 06:20

## 2021-08-01 RX ADMIN — Medication 1 DROP(S): at 06:21

## 2021-08-01 RX ADMIN — CALAMINE AND ZINC OXIDE AND PHENOL 1 APPLICATION(S): 160; 10 LOTION TOPICAL at 22:44

## 2021-08-01 RX ADMIN — Medication 105 MILLIGRAM(S): at 06:21

## 2021-08-01 RX ADMIN — Medication 1 MILLIGRAM(S): at 14:05

## 2021-08-01 RX ADMIN — DORZOLAMIDE HYDROCHLORIDE TIMOLOL MALEATE 1 DROP(S): 20; 5 SOLUTION/ DROPS OPHTHALMIC at 06:22

## 2021-08-01 RX ADMIN — Medication 40 MILLIEQUIVALENT(S): at 09:42

## 2021-08-01 RX ADMIN — Medication 105 MILLIGRAM(S): at 14:05

## 2021-08-01 RX ADMIN — Medication 1 DROP(S): at 12:17

## 2021-08-01 RX ADMIN — Medication 105 MILLIGRAM(S): at 23:37

## 2021-08-01 RX ADMIN — Medication 1 MILLIGRAM(S): at 18:19

## 2021-08-01 RX ADMIN — DONEPEZIL HYDROCHLORIDE 5 MILLIGRAM(S): 10 TABLET, FILM COATED ORAL at 22:42

## 2021-08-01 RX ADMIN — DORZOLAMIDE HYDROCHLORIDE TIMOLOL MALEATE 1 DROP(S): 20; 5 SOLUTION/ DROPS OPHTHALMIC at 18:18

## 2021-08-01 RX ADMIN — POLYETHYLENE GLYCOL 3350 17 GRAM(S): 17 POWDER, FOR SOLUTION ORAL at 18:19

## 2021-08-01 RX ADMIN — Medication 1 MILLIGRAM(S): at 12:18

## 2021-08-01 NOTE — PROGRESS NOTE ADULT - PROBLEM SELECTOR PLAN 3
LM for pt asking her to call office to go over echo results. Did say that there was a new finding and made sure to say if pt was feeling any sx to go ahead and go to ER in the meantime. Also informed her that Dr Lisbeth Wheat would further read her echo on Wednesday.  LM with office number to call if before 5pm. - Possibly bed bug rash vs scabies on UE and LE considering hx of undomiciled  - Calamine lotion prn for pruritus  - Dermatology onboard. Per recs, concerning for scabies and will need to start Permethrin powder applied on total body surface area from head to toe.   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

## 2021-08-01 NOTE — PROGRESS NOTE ADULT - PROBLEM SELECTOR PLAN 2
- Associated with intermittent claudication.  - Possibly d/t undiagnosed venous insufficiency resulting in venous stasis dermatitis vs new onset systolic vs diastolic HF vs liver disease vs nephrotic syndrome vs PAD  VA duplex showed no evidence of DVT above the knee b/l, unable to visualize calf veins therefore check D-dimer, if significantly elevated can consider repeating VA duplex for below the knee  - Check ANDREINA/PVR  Out patient workup to consider:          - Check US abdomen to evaluate signs of cirrhosis          - Check UA and Urine protein/scr ratio to r/o nephrotic syndrome          - hold off on diuretics until clear diagnosis  - b/l lower extremity compression stockings ordered.  - Elevate b/l legs

## 2021-08-01 NOTE — PROGRESS NOTE ADULT - PROBLEM SELECTOR PLAN 6
- Right eye Glaucoma with complete blindness  - On eye drops. Timolol? Takes one drop In each eye in the morning and at night

## 2021-08-01 NOTE — PROGRESS NOTE ADULT - SUBJECTIVE AND OBJECTIVE BOX
Malcolm Byrd MD  Internal Medicine, PGY3  Universal pager: [phone] / [facility]: [phone]  Page Night Float after 19:00    Patient is a 56y old  Male who presents with a chief complaint of Worsening LE edema (31 Jul 2021 08:39)    OVERNIGHT EVENTS: NAEON    SUBJECTIVE:  - denies F/C, lightheadedness, HA, CP, SOB, abd pain, N/V/D/C, burning w/ urination, leg swelling    focused ROS as above    MEDICATIONS  (STANDING):  artificial  tears Solution 1 Drop(s) Both EYES four times a day  calamine/zinc oxide Lotion 1 Application(s) Topical three times a day  cholecalciferol 2000 Unit(s) Oral daily  donepezil 5 milliGRAM(s) Oral at bedtime  dorzolamide 2%/timolol 0.5% Ophthalmic Solution 1 Drop(s) Both EYES every 12 hours  folic acid 1 milliGRAM(s) Oral daily  latanoprost 0.005% Ophthalmic Solution 1 Drop(s) Both EYES at bedtime  LORazepam   Injectable   IV Push   LORazepam   Injectable 1.5 milliGRAM(s) IV Push every 4 hours  LORazepam   Injectable 1 milliGRAM(s) IV Push every 4 hours  multivitamin 1 Tablet(s) Oral daily  thiamine IVPB 500 milliGRAM(s) IV Intermittent every 8 hours    MEDICATIONS  (PRN):  acetaminophen   Tablet .. 650 milliGRAM(s) Oral every 6 hours PRN Mild Pain (1 - 3), Moderate Pain (4 - 6)      OBJECTIVE:  T(C): 36.9 (08-01-21 @ 02:00), Max: 36.9 (07-31-21 @ 13:42)  HR: 68 (08-01-21 @ 02:00) (68 - 80)  BP: 142/94 (08-01-21 @ 02:00) (122/78 - 152/90)  RR: 18 (08-01-21 @ 02:00) (17 - 18)  SpO2: 98% (08-01-21 @ 02:00) (96% - 100%)    PHYSICAL EXAM  GENERAL: NAD   HEAD:  Atraumatic, normocephalic  EYES: EOMI, sclera clear  CHEST/LUNG: Clear to auscultation bilaterally; no wheeze  HEART: RRR; S1, S2; no M/R/G  ABDOMEN: soft, non-distended; non-tender; BS present  EXTREMITIES:  2+ Peripheral Pulses; no edema  NEURO: non-focal, AAOx  PSYCH: appropriate affect  SKIN: No rashes or lesions    LABS:  CAPILLARY BLOOD GLUCOSE        I&O's Summary    30 Jul 2021 07:01  -  31 Jul 2021 07:00  --------------------------------------------------------  IN: 240 mL / OUT: 200 mL / NET: 40 mL    31 Jul 2021 07:01  -  01 Aug 2021 05:43  --------------------------------------------------------  IN: 0 mL / OUT: 600 mL / NET: -600 mL                            13.3   5.96  )-----------( 311      ( 30 Jul 2021 08:23 )             39.4     07-31    136  |  100  |  11  ----------------------------<  174<H>  3.5   |  22  |  0.69    Ca    8.1<L>      31 Jul 2021 07:30  Phos  1.9     07-31  Mg     2.00     07-31    TPro  6.9  /  Alb  3.7  /  TBili  0.9  /  DBili  x   /  AST  49<H>  /  ALT  49<H>  /  AlkPhos  48  07-31    PT/INR - ( 31 Jul 2021 07:30 )   PT: 11.5 sec;   INR: 1.00 ratio         PTT - ( 31 Jul 2021 07:30 )  PTT:28.5 sec          Microbiology:      RADIOLOGY & ADDITIONAL TESTS:    Imaging Personally Reviewed:  Consultant(s) Notes Reviewed:    Care Discussed with Consultants/Other Providers:

## 2021-08-01 NOTE — PROGRESS NOTE ADULT - ATTENDING COMMENTS
56 yr old undomiciled man PMH HTN, R. eye glaucoma and right eye blindness, ?venous insufficiency, ETOH dependence p/w b/l calf tenderness and swelling. Admitted for ETOH withdrawal and workup of LE edema. DVT ruled out    obese man with right eye ptosis and cloudy lens  LE with equal b/l swelling and erythematous plaques, skin excoriations  2+ pedal pulses intact b/l    c/w CIWA protocol, c/w IV thiamine 500mg TID x 2-3 days, IV Ativan taper (low threshold to increase to high risk taper if CIWA rising)  Add Ativan PRN symptom triggered for increase in CIWA  LE swelling with rash appears to be consistent with chronic venous stasis dermatitis. No need for Abx presently  Keep LE elevated b/l, add compression stockings, f/u ANDREINA/PVR  C/w empiric treatment of scabies with permethrin per derm and f/u serum and urine porphyrins to r/o PCT per derm recs  F/U PT recs: awaiting gait assessment  Add bowel regimen for constipation 56 yr old undomiciled man PMH HTN, R. eye glaucoma and right eye blindness, ?venous insufficiency, ETOH dependence p/w b/l calf tenderness and swelling. Admitted for ETOH withdrawal and workup of LE edema. DVT ruled out    Patient is awake, alert, + asterixis  LE erythema appears slightly improved, + 2-3 pitting edema    c/w CIWA protocol: CIWA 4, c/w IV thiamine 500mg TID x 2-3 days, IV Ativan taper (low threshold to increase to high risk taper if CIWA rising)  Add Ativan PRN symptom triggered for increase in CIWA  LE swelling with rash appears to be consistent with chronic venous stasis dermatitis. No need for Abx presently  Keep LE elevated b/l, add compression stockings, f/u ANDREINA/PVR  C/w empiric treatment of scabies with permethrin per derm and f/u serum and urine porphyrins to r/o PCT per derm recs  F/U PT recs: awaiting gait assessment  Add bowel regimen for constipation

## 2021-08-02 ENCOUNTER — TRANSCRIPTION ENCOUNTER (OUTPATIENT)
Age: 56
End: 2021-08-02

## 2021-08-02 DIAGNOSIS — I10 ESSENTIAL (PRIMARY) HYPERTENSION: ICD-10-CM

## 2021-08-02 PROCEDURE — 93923 UPR/LXTR ART STDY 3+ LVLS: CPT | Mod: 26

## 2021-08-02 PROCEDURE — 99232 SBSQ HOSP IP/OBS MODERATE 35: CPT | Mod: GC

## 2021-08-02 PROCEDURE — 99233 SBSQ HOSP IP/OBS HIGH 50: CPT | Mod: GC

## 2021-08-02 RX ORDER — LANOLIN ALCOHOL/MO/W.PET/CERES
3 CREAM (GRAM) TOPICAL AT BEDTIME
Refills: 0 | Status: DISCONTINUED | OUTPATIENT
Start: 2021-08-02 | End: 2021-08-03

## 2021-08-02 RX ORDER — LACTULOSE 10 G/15ML
20 SOLUTION ORAL ONCE
Refills: 0 | Status: COMPLETED | OUTPATIENT
Start: 2021-08-02 | End: 2021-08-02

## 2021-08-02 RX ADMIN — Medication 1 MILLIGRAM(S): at 02:11

## 2021-08-02 RX ADMIN — LATANOPROST 1 DROP(S): 0.05 SOLUTION/ DROPS OPHTHALMIC; TOPICAL at 21:30

## 2021-08-02 RX ADMIN — Medication 0.5 MILLIGRAM(S): at 13:56

## 2021-08-02 RX ADMIN — LACTULOSE 20 GRAM(S): 10 SOLUTION ORAL at 10:07

## 2021-08-02 RX ADMIN — Medication 105 MILLIGRAM(S): at 13:54

## 2021-08-02 RX ADMIN — Medication 1 DROP(S): at 02:11

## 2021-08-02 RX ADMIN — CALAMINE AND ZINC OXIDE AND PHENOL 1 APPLICATION(S): 160; 10 LOTION TOPICAL at 06:19

## 2021-08-02 RX ADMIN — Medication 1 DROP(S): at 06:19

## 2021-08-02 RX ADMIN — DORZOLAMIDE HYDROCHLORIDE TIMOLOL MALEATE 1 DROP(S): 20; 5 SOLUTION/ DROPS OPHTHALMIC at 06:20

## 2021-08-02 RX ADMIN — Medication 1 MILLIGRAM(S): at 06:18

## 2021-08-02 RX ADMIN — Medication 105 MILLIGRAM(S): at 21:30

## 2021-08-02 RX ADMIN — Medication 1 DROP(S): at 17:31

## 2021-08-02 RX ADMIN — Medication 1 DROP(S): at 13:54

## 2021-08-02 RX ADMIN — DORZOLAMIDE HYDROCHLORIDE TIMOLOL MALEATE 1 DROP(S): 20; 5 SOLUTION/ DROPS OPHTHALMIC at 17:31

## 2021-08-02 RX ADMIN — Medication 2000 UNIT(S): at 13:55

## 2021-08-02 RX ADMIN — DONEPEZIL HYDROCHLORIDE 5 MILLIGRAM(S): 10 TABLET, FILM COATED ORAL at 21:29

## 2021-08-02 RX ADMIN — CALAMINE AND ZINC OXIDE AND PHENOL 1 APPLICATION(S): 160; 10 LOTION TOPICAL at 13:55

## 2021-08-02 RX ADMIN — POLYETHYLENE GLYCOL 3350 17 GRAM(S): 17 POWDER, FOR SOLUTION ORAL at 13:54

## 2021-08-02 RX ADMIN — Medication 1 MILLIGRAM(S): at 10:07

## 2021-08-02 RX ADMIN — Medication 105 MILLIGRAM(S): at 06:18

## 2021-08-02 RX ADMIN — Medication 1 TABLET(S): at 13:55

## 2021-08-02 RX ADMIN — Medication 1 MILLIGRAM(S): at 13:55

## 2021-08-02 RX ADMIN — Medication 3 MILLIGRAM(S): at 23:37

## 2021-08-02 RX ADMIN — CALAMINE AND ZINC OXIDE AND PHENOL 1 APPLICATION(S): 160; 10 LOTION TOPICAL at 21:28

## 2021-08-02 RX ADMIN — Medication 1 DROP(S): at 23:38

## 2021-08-02 NOTE — DISCHARGE NOTE PROVIDER - HOSPITAL COURSE
History of Presenting Illness:  56 year old male w/ past medical hx significant for HTN, R. eye glaucoma, R. eye blindness, remote hx of venous insufficiency, ETOH dependence, undomiciled p/w worsening b/l calf pain. Pt is a poor historian often changing duration of symptoms and reports inconsistent social history. Pt mentioned he has been experiencing b/l LE edema for 2-3 months, it has been worsening and for past 3 weeks has severe b/l calf pain which prompted him to resume ETOH consumption. Leg pain is worse with walking and he can not walk for extended periods/distances. He reports undergoing "vein injection 2 months ago on both legs" for ?venous insufficiency. He also reports being sober for 2 years however for past 3 weeks d/t pain has resumed drinking 7-9 beers daily. He reports prior hx of withdrawal requiring hospitalization, denied seizure, DTs, intubation, ICU admission. His last drink was 2 AM 7/30/21. He reports slight HA, anxiety, denies n/v/abd pain. In addition he states "my doctor says my heart function is slightly low", he experiences RICHTER, can walk only 2 blocks at a time, denies orthopnea, chest pain, cough, fever, palpitations, lightheadedness. He was on HCTZ for HTN although stopped taking medication several months ago. In addition endorses b/l upper distal forearm pruritus with excoriation and b/l anterior LE pruritus w/ vesicular rash. He takes no medications currently. In the ED CIWA 4, /67, HR 85, T.max 97.8 RR 16 SaO2 98% on RA He received IV ativan x 2, and ibuprofen 400 mg x 1, Tylenol 675 mg  x 1 w/ improvement in b/l calf pain.    HOSPITAL COURSE:  He was admitted to Medicine and started on CIWA protocol with Ativan Taper 2mg. He was also started on IV Thiamine and folic acid. Dermatology was consulted for diffuse rash and recommendation was for empiric treatment for Scabies with Permethrin powder. Pt also underwent workup for Porphyria Cutanea Tarda due to distribution of rash in photosensitive areas. His CIWA remained between 0-4 for the duration of hospitalization, due continuing tremors. He was accessed for DVT with venous doppler of B/L lower extremities which was found to be to be negative, and he was given compression stockings. Considering symptoms of intermittent claudication, ankle brachial index study was conducted and vessels in B/L leg were noted as patent with no findings of PAD. Permethrin was deescalated after further examination of rashes proved not concerning for scabies, and he was started on triamcinolone ointment for venous stasis dermatitis. Patient was discharged safely once detoxification was completed.    History of Presenting Illness:  56 year old male w/ past medical hx significant for HTN, R. eye glaucoma, R. eye blindness, remote hx of venous insufficiency, ETOH dependence, undomiciled p/w worsening b/l calf pain. Pt is a poor historian often changing duration of symptoms and reports inconsistent social history. Pt mentioned he has been experiencing b/l LE edema for 2-3 months, it has been worsening and for past 3 weeks has severe b/l calf pain which prompted him to resume ETOH consumption. Leg pain is worse with walking and he can not walk for extended periods/distances. He reports undergoing "vein injection 2 months ago on both legs" for ?venous insufficiency. He also reports being sober for 2 years however for past 3 weeks d/t pain has resumed drinking 7-9 beers daily. He reports prior hx of withdrawal requiring hospitalization, denied seizure, DTs, intubation, ICU admission. His last drink was 2 AM 7/30/21. He reports slight HA, anxiety, denies n/v/abd pain. In addition he states "my doctor says my heart function is slightly low", he experiences RICHTER, can walk only 2 blocks at a time, denies orthopnea, chest pain, cough, fever, palpitations, lightheadedness. He was on HCTZ for HTN although stopped taking medication several months ago. In addition endorses b/l upper distal forearm pruritus with excoriation and b/l anterior LE pruritus w/ vesicular rash. He takes no medications currently. In the ED CIWA 4, /67, HR 85, T.max 97.8 RR 16 SaO2 98% on RA He received IV ativan x 2, and ibuprofen 400 mg x 1, Tylenol 675 mg  x 1 w/ improvement in b/l calf pain.    HOSPITAL COURSE:  He was admitted to Medicine and started on CIWA protocol with Ativan Taper 2mg. He was also started on IV Thiamine and folic acid. Dermatology was consulted for diffuse rash and recommendation was for empiric treatment for Scabies with Permethrin powder. Pt also underwent workup for Porphyria Cutanea Tarda due to distribution of rash in photosensitive areas. His CIWA remained between 0-4 for the duration of hospitalization, due to continuing tremors. He was assessed for DVT with venous doppler of B/L lower extremities which was found to be to be negative, and he was given compression stockings. Considering symptoms of intermittent claudication, ankle brachial index study was conducted and vessels in B/L leg were noted as patent with no findings of PAD. Permethrin was deescalated after further examination of rashes proved not concerning for scabies, and he was started on triamcinolone ointment for venous stasis dermatitis. Patient was discharged safely to a [facility] house once detoxification was completed.    History of Presenting Illness:  56 year old male w/ past medical hx significant for HTN, R. eye glaucoma, R. eye blindness, remote hx of venous insufficiency, ETOH dependence, undomiciled p/w worsening b/l calf pain and b/l LE edema for 2-3 months, worsening for past 3 weeks has severe b/l calf pain which prompted him to resume ETOH consumption.     HOSPITAL COURSE:  He was started on CIWA protocol with Ativan Taper with IV Thiamine and folic acid. Dermatology was consulted for diffuse rash and recommendation was for empiric treatment for Scabies with Permethrin powder and underwent workup for Porphyria Cutanea Tarda due to distribution of rash in photosensitive areas. His CIWA remained between 0-4 for the duration of hospitalization, due to continuing tremors. Venous doppler of B/L lower extremities was negative for blood clots, and he was given compression stockings. Considering symptoms of intermittent claudication, ankle brachial index study was conducted and vessels in B/L leg were noted as patent with no findings of PAD. Permethrin was deescalated after further examination of rashes proved not concerning for scabies, and he was started on triamcinolone ointment for venous stasis dermatitis. Patient was discharged safely to a friends house once detoxification was completed.

## 2021-08-02 NOTE — PROGRESS NOTE ADULT - PROBLEM SELECTOR PLAN 3
Possibly bed bug rash vs scabies on UE and LE considering hx of undomiciled  - Calamine lotion prn for pruritus  - Dermatology onboard. Per recs, concerning for scabies and will need to start Permethrin powder applied on total body surface area from head to toe.   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

## 2021-08-02 NOTE — DISCHARGE NOTE PROVIDER - CARE PROVIDER_API CALL
[facility],   [address] [place] [address]  Phone: (   )    -  Fax: (   )    -  Follow Up Time:

## 2021-08-02 NOTE — PROGRESS NOTE ADULT - PROBLEM SELECTOR PLAN 4
- Right eye Glaucoma with complete blindness  - Per patient, On eye drops. Timolol? Takes one drop In each eye in the morning and at night.  - Currently on Latanoprost and Dorzolamide. Will continue

## 2021-08-02 NOTE — DISCHARGE NOTE PROVIDER - NSDCCPTREATMENT_GEN_ALL_CORE_FT
PRINCIPAL PROCEDURE  Procedure: ANDREINA (ankle brachial index)  Findings and Treatment: Right Findings: The ankle-brachial index (ANDREINA) on the  right is normal (1.29).  The toe-brachial index (TBI) on the right is normal  (1.09).  The right toe pressure is 153 mmHg.  Pulse volume recording (PVR) waveforms appear triphasic  with normal amplitudes throughout the right lower  extremity.  There is no significant decrease in segmental  pressures between arterial segments.  Left Findings: The ankle-brachial index (ANDREINA) on the left  is normal (1.27).  The toe-brachial index (TBI) on the left is normal (1.17).   The left toe pressure is 164 mmHg.  Pulse volume recording (PVR) waveforms appear triphasic  with normal amplitudes throughout the left lower  extremity.   There is no significant decrease in segmental  pressures between arterial segments.  ------------------------------------------------------------------------  Summary/Impressions:  Normal ANDREINA/PVR study.        SECONDARY PROCEDURE  Procedure: Venous doppler lower extremity  Findings and Treatment: RIGHT:  Normal compressibility of the RIGHT common femoral, femoral and popliteal veins.  Doppler examination shows normal spontaneous and phasic flow.  Right calf veins were unable to visualized.  LEFT:  Normal compressibility of the LEFT common femoral, femoral and popliteal veins.  Doppler examination shows normal spontaneous and phasic flow.  Left calf veins were unable to be visualized.  IMPRESSION:  No evidence of deep venous thrombosis in either proximal lower extremity. Calf veins cannot be visualized bilaterally.

## 2021-08-02 NOTE — PROGRESS NOTE ADULT - ATTENDING COMMENTS
56 yr old undomiciled man PMH HTN, R. eye glaucoma and right eye blindness, ?venous insufficiency, ETOH dependence p/w b/l calf tenderness and swelling. Admitted for ETOH withdrawal and workup of LE edema. DVT ruled out    Patient is awake, alert, tremors of hands is improving  LE erythema appears improved, + 2-3 pitting edema    c/w CIWA protocol: CIWA 4 from tremors, c/w IV thiamine 500mg TID x 2-3 days, shorten Ativan taper  LE swelling with rash appears to be consistent with chronic venous stasis dermatitis. No need for Abx presently  Keep LE elevated b/l, compression stockings, ANDREINA/PVR normal  PT recs: outpatient PT, rolling walker  DC planning for tmw. Patient to stay with friend in [place] at time of DC per  notes

## 2021-08-02 NOTE — PROGRESS NOTE ADULT - SUBJECTIVE AND OBJECTIVE BOX
INTERVAL HPI/OVERNIGHT EVENTS:  Patient notes significant improvement in pruritus, only minimal pruritus of b/l lower legs  S/p permethrin 1x application n   Using calamine lotion which helps pruritus   Hiv, hep C negative   Urine porphyrins wnl    MEDICATIONS  (STANDING):  artificial  tears Solution 1 Drop(s) Both EYES four times a day  calamine/zinc oxide Lotion 1 Application(s) Topical three times a day  cholecalciferol 2000 Unit(s) Oral daily  donepezil 5 milliGRAM(s) Oral at bedtime  dorzolamide 2%/timolol 0.5% Ophthalmic Solution 1 Drop(s) Both EYES every 12 hours  folic acid 1 milliGRAM(s) Oral daily  latanoprost 0.005% Ophthalmic Solution 1 Drop(s) Both EYES at bedtime  multivitamin 1 Tablet(s) Oral daily  polyethylene glycol 3350 17 Gram(s) Oral daily  senna 2 Tablet(s) Oral at bedtime  thiamine IVPB 500 milliGRAM(s) IV Intermittent every 8 hours    MEDICATIONS  (PRN):  acetaminophen   Tablet .. 650 milliGRAM(s) Oral every 6 hours PRN Mild Pain (1 - 3), Moderate Pain (4 - 6)  LORazepam     Tablet 2 milliGRAM(s) Oral every 2 hours PRN Symptom-triggered 2 point increase in CIWA-Ar  LORazepam   Injectable 2 milliGRAM(s) IV Push every 1 hour PRN Symptom-triggered: each CIWA -Ar score 8 or GREATER  triamcinolone 0.1% Ointment 1 Application(s) Topical two times a day PRN For itching      Allergies    No Known Allergies    Intolerances        REVIEW OF SYSTEMS      General: no fevers/chills, no NS	    Skin: see HPI    Musculoskeletal: no joint pains or weakness	    Neurological: no weakness or tingling          Vital Signs Last 24 Hrs  T(C): 36.2 (02 Aug 2021 14:00), Max: 36.6 (01 Aug 2021 22:40)  T(F): 97.2 (02 Aug 2021 14:00), Max: 97.9 (02 Aug 2021 06:15)  HR: 70 (02 Aug 2021 14:00) (64 - 73)  BP: 121/85 (02 Aug 2021 14:00) (121/85 - 140/85)  BP(mean): --  RR: 17 (02 Aug 2021 14:00) (16 - 17)  SpO2: 100% (02 Aug 2021 14:00) (97% - 100%)    PHYSICAL EXAM:   The patient was alert and oriented X 3, well nourished, and in no apparent distress.  There was no visible lymphadenopathy.  Conjunctiva were non injected  There was no clubbing or edema of extremities.    Of note on skin exam:   Few pink papules scattered over b/l dorsal forearms and dorsal hands, difficult to appreciate given overlying dried calamine lotion   Base of right thumb with patch of hemorrhagic crusting  B/l lower legs with hyperpigmented patches and 1+ pitting edema b/l   Dorsalis pedis pulses appreciated b/l     LABS:                        12.4   6.32  )-----------( 250      ( 01 Aug 2021 07:46 )             36.7     08-01    137  |  103  |  8   ----------------------------<  124<H>  3.6   |  21<L>  |  0.64    Ca    8.2<L>      01 Aug 2021 07:46  Phos  2.3     08-  Mg     2.00     08-01      PT/INR - ( 01 Aug 2021 07:46 )   PT: 11.3 sec;   INR: 0.99 ratio         PTT - ( 01 Aug 2021 07:46 )  PTT:27.4 sec  Urinalysis Basic - ( 01 Aug 2021 15:51 )    Color: Colorless / Appearance: Clear / S.004 / pH: x  Gluc: x / Ketone: Negative  / Bili: Negative / Urobili: <2 mg/dL   Blood: x / Protein: Negative / Nitrite: Negative   Leuk Esterase: Negative / RBC: x / WBC x   Sq Epi: x / Non Sq Epi: x / Bacteria: x        RADIOLOGY & ADDITIONAL TESTS:           INTERVAL HPI/OVERNIGHT EVENTS:  Patient notes significant improvement in pruritus, only minimal pruritus of b/l lower legs  S/p permethrin 1x application n   Using calamine lotion which helps pruritus   Hiv, hep C negative   Urine porphyrins wnl    MEDICATIONS  (STANDING):  artificial  tears Solution 1 Drop(s) Both EYES four times a day  calamine/zinc oxide Lotion 1 Application(s) Topical three times a day  cholecalciferol 2000 Unit(s) Oral daily  donepezil 5 milliGRAM(s) Oral at bedtime  dorzolamide 2%/timolol 0.5% Ophthalmic Solution 1 Drop(s) Both EYES every 12 hours  folic acid 1 milliGRAM(s) Oral daily  latanoprost 0.005% Ophthalmic Solution 1 Drop(s) Both EYES at bedtime  multivitamin 1 Tablet(s) Oral daily  polyethylene glycol 3350 17 Gram(s) Oral daily  senna 2 Tablet(s) Oral at bedtime  thiamine IVPB 500 milliGRAM(s) IV Intermittent every 8 hours    MEDICATIONS  (PRN):  acetaminophen   Tablet .. 650 milliGRAM(s) Oral every 6 hours PRN Mild Pain (1 - 3), Moderate Pain (4 - 6)  LORazepam     Tablet 2 milliGRAM(s) Oral every 2 hours PRN Symptom-triggered 2 point increase in CIWA-Ar  LORazepam   Injectable 2 milliGRAM(s) IV Push every 1 hour PRN Symptom-triggered: each CIWA -Ar score 8 or GREATER  triamcinolone 0.1% Ointment 1 Application(s) Topical two times a day PRN For itching      Allergies    No Known Allergies    Intolerances        REVIEW OF SYSTEMS      General: no fevers/chills, no NS	    Skin: see HPI    Musculoskeletal: no joint pains or weakness	    Neurological: no weakness or tingling          Vital Signs Last 24 Hrs  T(C): 36.2 (02 Aug 2021 14:00), Max: 36.6 (01 Aug 2021 22:40)  T(F): 97.2 (02 Aug 2021 14:00), Max: 97.9 (02 Aug 2021 06:15)  HR: 70 (02 Aug 2021 14:00) (64 - 73)  BP: 121/85 (02 Aug 2021 14:00) (121/85 - 140/85)  BP(mean): --  RR: 17 (02 Aug 2021 14:00) (16 - 17)  SpO2: 100% (02 Aug 2021 14:00) (97% - 100%)    PHYSICAL EXAM:   The patient was alert and oriented X 3, well nourished, and in no apparent distress.  There was no visible lymphadenopathy.  Conjunctiva were non injected  There was no clubbing or edema of extremities.    Of note on skin exam:   Few pink papules scattered over b/l dorsal forearms and dorsal hands, difficult to appreciate given overlying dried calamine lotion   Base of right thumb with patch of hemorrhagic crusting  B/l lower legs with hyperpigmented patches and 1+ pitting edema b/l   Dorsalis pedis pulses appreciated b/l     LABS:                        12.4   6.32  )-----------( 250      ( 01 Aug 2021 07:46 )             36.7     08-01    137  |  103  |  8   ----------------------------<  124<H>  3.6   |  21<L>  |  0.64    Ca    8.2<L>      01 Aug 2021 07:46  Phos  2.3     08-  Mg     2.00     08-01      PT/INR - ( 01 Aug 2021 07:46 )   PT: 11.3 sec;   INR: 0.99 ratio         PTT - ( 01 Aug 2021 07:46 )  PTT:27.4 sec  Urinalysis Basic - ( 01 Aug 2021 15:51 )    Color: Colorless / Appearance: Clear / S.004 / pH: x  Gluc: x / Ketone: Negative  / Bili: Negative / Urobili: <2 mg/dL   Blood: x / Protein: Negative / Nitrite: Negative   Leuk Esterase: Negative / RBC: x / WBC x   Sq Epi: x / Non Sq Epi: x / Bacteria: x        RADIOLOGY & ADDITIONAL TESTS:  no additional relevant studies

## 2021-08-02 NOTE — PROGRESS NOTE ADULT - PROBLEM SELECTOR PLAN 7
- Pt up and out of bed  - Diet: low sodium diet  - PT on board. Appreciate recs  - Dispo: Will need  on board for safe discharge
DVT ppx: Pt up and out of bed  Diet: low sodium diet  PT consult.
DVT ppx: Pt up and out of bed  Diet: low sodium diet  PT consult.

## 2021-08-02 NOTE — PROGRESS NOTE ADULT - PROBLEM SELECTOR PLAN 2
- Associated with intermittent claudication.  - Possibly d/t undiagnosed venous insufficiency resulting in venous stasis dermatitis vs new onset systolic vs diastolic HF vs liver disease vs nephrotic syndrome vs PAD  VA duplex showed no evidence of DVT above the knee b/l, unable to visualize calf veins therefore check D-dimer, if significantly elevated can consider repeating VA duplex for below the knee  - Check ANDREINA/PVR  Out patient workup to consider:          - Check US abdomen to evaluate signs of cirrhosis          - Check UA and Urine protein/scr ratio to r/o nephrotic syndrome          - hold off on diuretics until clear diagnosis  - b/l lower extremity compression stockings ordered.  - Elevate b/l legs.

## 2021-08-02 NOTE — PROGRESS NOTE ADULT - PROBLEM SELECTOR PLAN 1
- Pt with relapse of alcohol dependence. Initially sober for about 2 years per history. Relapsed due to intolerable leg pain. Was in AA for 2 years and completed program  - Initial CIWA 4 on admission, +b/l hand tremors, tongue fasciculations, HA, anxiety start standing high risk CIWA w/ PO lorazepam taper  - MVI/folic acid/thiamine  - SW consult  - Monitor CIWA closely.

## 2021-08-02 NOTE — DISCHARGE NOTE PROVIDER - NSDCCPCAREPLAN_GEN_ALL_CORE_FT
PRINCIPAL DISCHARGE DIAGNOSIS  Diagnosis: Alcohol withdrawal  Assessment and Plan of Treatment:       SECONDARY DISCHARGE DIAGNOSES  Diagnosis: Leg edema  Assessment and Plan of Treatment:     Diagnosis: Venous stasis dermatitis  Assessment and Plan of Treatment:      PRINCIPAL DISCHARGE DIAGNOSIS  Diagnosis: Alcohol withdrawal  Assessment and Plan of Treatment: You were admittd to the hospital so you could safely and effectively withdraw from  Alcohol withdrawal can have dangerous effects on your health. Common symptoms can include tremors, loss of balance, and agitation. Other more serious symptoms can include, halluicinations, seizures, confusion, cerebellar dysfunction, and Delerium tremens. Delirium tremens is a life threatening condition. Delirium Tremens usually starts two to five days after the last drink, and it can be fatal. Symptoms include confusion, high blood pressure, fever, and hallucinations. It is safest to withdraw under close observation. You were started on some medication to help control your symptoms, and to prevent further complications of withdrwal . Once you were deemed to no longer be in withdrawal, we coordinated with social work to ensure safe discharge.  If you ever have concerns about your alcohol dependence please go to the crisis center to get help. You can walk in anytime.   We held your welbutrin medication to to its abiity to lower your seizure risk while withdrawal from  Please follow up with your PCP regarding adjusting your current dose      SECONDARY DISCHARGE DIAGNOSES  Diagnosis: Venous stasis dermatitis  Assessment and Plan of Treatment: Leg swelling, also called edema can be due to many causes, somoe of which can be chrionic or even life threatening,. While you were admitted we conducted to laboratory tests to evaluate the cause of your leg swelling. Some of the things we considered blood clots, infection, arterial and venous disease. We ran test to help rule out some of these conditions. In your case, we determined your leg swelling was due to a venous disease called venous stasis dermatitis which causes inflammation of the surreounding skin and your were given compression stockings to help alleviate these symptoms and prevent reccurence.  If your develop severe worsening leg pain, fever, weakness, numbness and tingling in your legs please proceed to the ED for further evaluation

## 2021-08-02 NOTE — DISCHARGE NOTE PROVIDER - NSFOLLOWUPCLINICS_GEN_ALL_ED_FT
[facility] [place]  Internal Medicine  [address] [place] [address]  Phone: [phone]  Fax: [phone]    [facility]  Behavioral Health  [address] [place] [address]  Phone: [phone]  Fax:

## 2021-08-02 NOTE — PROGRESS NOTE ADULT - ATTENDING COMMENTS
Venous stasis dermatitis and non-specific pruritic eruption. s/p 1 course of permetherin. Can finish 2nd course of permetherin and use triamcinolone prn itch. Overall improved today. Please reconsult dermatology if rash recurs.    Almas Hughes MD, PharmD, MPH  Co-Director, Inpatient Dermatology Consultation Service, [facility]

## 2021-08-02 NOTE — PROGRESS NOTE ADULT - SUBJECTIVE AND OBJECTIVE BOX
PROGRESS NOTE:   Authored by Sherwin Thompson MD  Pager:  [phone]    Patient is a 56y old  Male who presents with a chief complaint of worsening LE edema (01 Aug 2021 05:43)      SUBJECTIVE / OVERNIGHT EVENTS:  No acute Events overnight    ADDITIONAL REVIEW OF SYSTEMS:    REVIEW OF SYSTEMS:    CONSTITUTIONAL: No weakness, fevers or chills  RESPIRATORY: No cough, wheezing, hemoptysis; No shortness of breath  CARDIOVASCULAR: No chest pain or palpitations  GASTROINTESTINAL: No abdominal or epigastric pain. No nausea, vomiting, or hematemesis; No diarrhea or constipation. No melena or hematochezia.  SKIN: No itching, rashes      MEDICATIONS  (STANDING):  artificial  tears Solution 1 Drop(s) Both EYES four times a day  calamine/zinc oxide Lotion 1 Application(s) Topical three times a day  cholecalciferol 2000 Unit(s) Oral daily  donepezil 5 milliGRAM(s) Oral at bedtime  dorzolamide 2%/timolol 0.5% Ophthalmic Solution 1 Drop(s) Both EYES every 12 hours  folic acid 1 milliGRAM(s) Oral daily  latanoprost 0.005% Ophthalmic Solution 1 Drop(s) Both EYES at bedtime  LORazepam   Injectable 0.5 milliGRAM(s) IV Push every 4 hours  LORazepam   Injectable   IV Push   LORazepam   Injectable 1 milliGRAM(s) IV Push every 4 hours  multivitamin 1 Tablet(s) Oral daily  polyethylene glycol 3350 17 Gram(s) Oral daily  senna 2 Tablet(s) Oral at bedtime  thiamine IVPB 500 milliGRAM(s) IV Intermittent every 8 hours    MEDICATIONS  (PRN):  acetaminophen   Tablet .. 650 milliGRAM(s) Oral every 6 hours PRN Mild Pain (1 - 3), Moderate Pain (4 - 6)  LORazepam     Tablet 2 milliGRAM(s) Oral every 2 hours PRN Symptom-triggered 2 point increase in CIWA-Ar  LORazepam   Injectable 2 milliGRAM(s) IV Push every 1 hour PRN Symptom-triggered: each CIWA -Ar score 8 or GREATER      CAPILLARY BLOOD GLUCOSE        I&O's Summary    01 Aug 2021 07:01  -  02 Aug 2021 07:00  --------------------------------------------------------  IN: 0 mL / OUT: 700 mL / NET: -700 mL        PHYSICAL EXAM:  Vital Signs Last 24 Hrs  T(C): 36.6 (02 Aug 2021 06:15), Max: 36.6 (01 Aug 2021 22:40)  T(F): 97.9 (02 Aug 2021 06:15), Max: 97.9 (02 Aug 2021 06:15)  HR: 68 (02 Aug 2021 06:15) (68 - 73)  BP: 132/90 (02 Aug 2021 06:15) (129/76 - 152/92)  BP(mean): --  RR: 16 (02 Aug 2021 06:15) (16 - 18)  SpO2: 99% (02 Aug 2021 06:15) (96% - 99%)    GENERAL: No acute distress, well-developed  CHEST/LUNG: CTAB; No wheezes, rales, or rhonchi  HEART: Regular rate and rhythm; No murmurs, rubs, or gallops  ABDOMEN: Soft, non-tender, non-distended; normal bowel sounds, no organomegaly  EXTREMITIES:  2+ peripheral pulses b/l, No clubbing, cyanosis, or edema  NEUROLOGY: A&O x 3, no focal deficits  SKIN: No rashes or lesions    LABS:                        12.4   6.32  )-----------( 250      ( 01 Aug 2021 07:46 )             36.7     08-01    137  |  103  |  8   ----------------------------<  124<H>  3.6   |  21<L>  |  0.64    Ca    8.2<L>      01 Aug 2021 07:46  Phos  2.3     08-  Mg     2.00     08-01      PT/INR - ( 01 Aug 2021 07:46 )   PT: 11.3 sec;   INR: 0.99 ratio         PTT - ( 01 Aug 2021 07:46 )  PTT:27.4 sec      Urinalysis Basic - ( 01 Aug 2021 15:51 )    Color: Colorless / Appearance: Clear / S.004 / pH: x  Gluc: x / Ketone: Negative  / Bili: Negative / Urobili: <2 mg/dL   Blood: x / Protein: Negative / Nitrite: Negative   Leuk Esterase: Negative / RBC: x / WBC x   Sq Epi: x / Non Sq Epi: x / Bacteria: x          RADIOLOGY & ADDITIONAL TESTS:  Results Reviewed:   Imaging Personally Reviewed:  Electrocardiogram Personally Reviewed:    COORDINATION OF CARE:  Care Discussed with Consultants/Other Providers [Y/N]:  Prior or Outpatient Records Reviewed [Y/N]:   PROGRESS NOTE:   Authored by Sherwin Thompson MD  Pager:  [phone]    Patient is a 56y old  Male who presents with a chief complaint of worsening LE edema (01 Aug 2021 05:43)      SUBJECTIVE / OVERNIGHT EVENTS:  No acute Events overnight    ADDITIONAL REVIEW OF SYSTEMS:    REVIEW OF SYSTEMS:    CONSTITUTIONAL: No weakness, fevers or chills  RESPIRATORY: No cough, wheezing, hemoptysis; No shortness of breath  CARDIOVASCULAR: No chest pain or palpitations  GASTROINTESTINAL: No abdominal or epigastric pain. No nausea, vomiting, or hematemesis; No diarrhea or constipation. No melena or hematochezia.  SKIN: No itching, rashes      MEDICATIONS  (STANDING):  artificial  tears Solution 1 Drop(s) Both EYES four times a day  calamine/zinc oxide Lotion 1 Application(s) Topical three times a day  cholecalciferol 2000 Unit(s) Oral daily  donepezil 5 milliGRAM(s) Oral at bedtime  dorzolamide 2%/timolol 0.5% Ophthalmic Solution 1 Drop(s) Both EYES every 12 hours  folic acid 1 milliGRAM(s) Oral daily  latanoprost 0.005% Ophthalmic Solution 1 Drop(s) Both EYES at bedtime  LORazepam   Injectable 0.5 milliGRAM(s) IV Push every 4 hours  LORazepam   Injectable   IV Push   LORazepam   Injectable 1 milliGRAM(s) IV Push every 4 hours  multivitamin 1 Tablet(s) Oral daily  polyethylene glycol 3350 17 Gram(s) Oral daily  senna 2 Tablet(s) Oral at bedtime  thiamine IVPB 500 milliGRAM(s) IV Intermittent every 8 hours    MEDICATIONS  (PRN):  acetaminophen   Tablet .. 650 milliGRAM(s) Oral every 6 hours PRN Mild Pain (1 - 3), Moderate Pain (4 - 6)  LORazepam     Tablet 2 milliGRAM(s) Oral every 2 hours PRN Symptom-triggered 2 point increase in CIWA-Ar  LORazepam   Injectable 2 milliGRAM(s) IV Push every 1 hour PRN Symptom-triggered: each CIWA -Ar score 8 or GREATER      CAPILLARY BLOOD GLUCOSE        I&O's Summary    01 Aug 2021 07:01  -  02 Aug 2021 07:00  --------------------------------------------------------  IN: 0 mL / OUT: 700 mL / NET: -700 mL        PHYSICAL EXAM:  Vital Signs Last 24 Hrs  T(C): 36.6 (02 Aug 2021 06:15), Max: 36.6 (01 Aug 2021 22:40)  T(F): 97.9 (02 Aug 2021 06:15), Max: 97.9 (02 Aug 2021 06:15)  HR: 68 (02 Aug 2021 06:15) (68 - 73)  BP: 132/90 (02 Aug 2021 06:15) (129/76 - 152/92)  BP(mean): --  RR: 16 (02 Aug 2021 06:15) (16 - 18)  SpO2: 99% (02 Aug 2021 06:15) (96% - 99%)    GENERAL: No acute distress, well-developed  CHEST/LUNG: CTAB; No wheezes, rales, or rhonchi  HEART: Regular rate and rhythm; No murmurs, rubs, or gallops  ABDOMEN: Soft, non-tender, non-distended; normal bowel sounds, no organomegaly  EXTREMITIES:  2+ peripheral pulses b/l, No clubbing, cyanosis, or edema  NEUROLOGY: A&Ox3. Bilateral tremors of the B/L UE. No LE tremors. Strength 5/5 in both upper and lower extremities. No asterixes appreciated  SKIN: B/L lower legs with some healing scabs. No erythema. minimal tenderness present to B/L anterior shin. No streaking. B/L erythematous and mildly scaly rash on upper and lower extremities. No elena, No blisters. No drainage/discharge    LABS:                        12.4   6.32  )-----------( 250      ( 01 Aug 2021 07:46 )             36.7     08-01    137  |  103  |  8   ----------------------------<  124<H>  3.6   |  21<L>  |  0.64    Ca    8.2<L>      01 Aug 2021 07:46  Phos  2.3     08-  Mg     2.00     08-      PT/INR - ( 01 Aug 2021 07:46 )   PT: 11.3 sec;   INR: 0.99 ratio         PTT - ( 01 Aug 2021 07:46 )  PTT:27.4 sec      Urinalysis Basic - ( 01 Aug 2021 15:51 )    Color: Colorless / Appearance: Clear / S.004 / pH: x  Gluc: x / Ketone: Negative  / Bili: Negative / Urobili: <2 mg/dL   Blood: x / Protein: Negative / Nitrite: Negative   Leuk Esterase: Negative / RBC: x / WBC x   Sq Epi: x / Non Sq Epi: x / Bacteria: x          RADIOLOGY & ADDITIONAL TESTS:  Results Reviewed:   Imaging Personally Reviewed:  Electrocardiogram Personally Reviewed:    COORDINATION OF CARE:  Care Discussed with Consultants/Other Providers [Y/N]:  Prior or Outpatient Records Reviewed [Y/N]:   PROGRESS NOTE:   Authored by Sherwin Thompson MD  Pager:  [phone]    Patient is a 56y old  Male who presents with a chief complaint of worsening LE edema (01 Aug 2021 05:43)      SUBJECTIVE / OVERNIGHT EVENTS:  No acute Events overnight. Says he feels better overall. Though he still endorses some leg pain, he says it is greatly improved from admission    ADDITIONAL REVIEW OF SYSTEMS:    REVIEW OF SYSTEMS:    CONSTITUTIONAL: No weakness, fevers or chills  RESPIRATORY: No cough, wheezing, hemoptysis; No shortness of breath  CARDIOVASCULAR: No chest pain or palpitations  GASTROINTESTINAL: No abdominal or epigastric pain. No nausea, vomiting, or hematemesis; No diarrhea or constipation. No melena or hematochezia.  SKIN: +Rash. No discharge      MEDICATIONS  (STANDING):  artificial  tears Solution 1 Drop(s) Both EYES four times a day  calamine/zinc oxide Lotion 1 Application(s) Topical three times a day  cholecalciferol 2000 Unit(s) Oral daily  donepezil 5 milliGRAM(s) Oral at bedtime  dorzolamide 2%/timolol 0.5% Ophthalmic Solution 1 Drop(s) Both EYES every 12 hours  folic acid 1 milliGRAM(s) Oral daily  latanoprost 0.005% Ophthalmic Solution 1 Drop(s) Both EYES at bedtime  LORazepam   Injectable 0.5 milliGRAM(s) IV Push every 4 hours  LORazepam   Injectable   IV Push   LORazepam   Injectable 1 milliGRAM(s) IV Push every 4 hours  multivitamin 1 Tablet(s) Oral daily  polyethylene glycol 3350 17 Gram(s) Oral daily  senna 2 Tablet(s) Oral at bedtime  thiamine IVPB 500 milliGRAM(s) IV Intermittent every 8 hours    MEDICATIONS  (PRN):  acetaminophen   Tablet .. 650 milliGRAM(s) Oral every 6 hours PRN Mild Pain (1 - 3), Moderate Pain (4 - 6)  LORazepam     Tablet 2 milliGRAM(s) Oral every 2 hours PRN Symptom-triggered 2 point increase in CIWA-Ar  LORazepam   Injectable 2 milliGRAM(s) IV Push every 1 hour PRN Symptom-triggered: each CIWA -Ar score 8 or GREATER      CAPILLARY BLOOD GLUCOSE        I&O's Summary    01 Aug 2021 07:01  -  02 Aug 2021 07:00  --------------------------------------------------------  IN: 0 mL / OUT: 700 mL / NET: -700 mL        PHYSICAL EXAM:  Vital Signs Last 24 Hrs  T(C): 36.6 (02 Aug 2021 06:15), Max: 36.6 (01 Aug 2021 22:40)  T(F): 97.9 (02 Aug 2021 06:15), Max: 97.9 (02 Aug 2021 06:15)  HR: 68 (02 Aug 2021 06:15) (68 - 73)  BP: 132/90 (02 Aug 2021 06:15) (129/76 - 152/92)  BP(mean): --  RR: 16 (02 Aug 2021 06:15) (16 - 18)  SpO2: 99% (02 Aug 2021 06:15) (96% - 99%)    GENERAL: No acute distress, well-developed  CHEST/LUNG: CTAB; No wheezes, rales, or rhonchi  HEART: Regular rate and rhythm; No murmurs, rubs, or gallops  ABDOMEN: Soft, non-tender, non-distended; normal bowel sounds, no organomegaly  EXTREMITIES:  2+ peripheral pulses b/l, No clubbing, cyanosis, or edema  NEUROLOGY: A&Ox3. Bilateral tremors of the B/L UE. No LE tremors. Strength 5/5 in both upper and lower extremities. No asterixes appreciated  SKIN: B/L lower legs with some healing scabs. No erythema. minimal tenderness present to B/L anterior shin. No streaking. B/L erythematous and mildly scaly rash on upper and lower extremities. No elena, No blisters. No drainage/discharge    LABS:                        12.4   6.32  )-----------( 250      ( 01 Aug 2021 07:46 )             36.7     08-    137  |  103  |  8   ----------------------------<  124<H>  3.6   |  21<L>  |  0.64    Ca    8.2<L>      01 Aug 2021 07:46  Phos  2.3     08-01  Mg     2.00     08-      PT/INR - ( 01 Aug 2021 07:46 )   PT: 11.3 sec;   INR: 0.99 ratio         PTT - ( 01 Aug 2021 07:46 )  PTT:27.4 sec      Urinalysis Basic - ( 01 Aug 2021 15:51 )    Color: Colorless / Appearance: Clear / S.004 / pH: x  Gluc: x / Ketone: Negative  / Bili: Negative / Urobili: <2 mg/dL   Blood: x / Protein: Negative / Nitrite: Negative   Leuk Esterase: Negative / RBC: x / WBC x   Sq Epi: x / Non Sq Epi: x / Bacteria: x          RADIOLOGY & ADDITIONAL TESTS:  Results Reviewed:   Imaging Personally Reviewed:  Electrocardiogram Personally Reviewed:    COORDINATION OF CARE:  Care Discussed with Consultants/Other Providers [Y/N]:  Prior or Outpatient Records Reviewed [Y/N]:   PROGRESS NOTE:   Authored by Sherwin Thompson MD  Pager:  [phone]    Patient is a 56y old  Male who presents with a chief complaint of worsening LE edema (01 Aug 2021 05:43)      SUBJECTIVE / OVERNIGHT EVENTS:  No acute Events overnight. Says he feels better overall. Though he still endorses some leg pain, he says it is greatly improved from admission. He is complaining of constipation    ADDITIONAL REVIEW OF SYSTEMS:    REVIEW OF SYSTEMS:    CONSTITUTIONAL: No weakness, fevers or chills  RESPIRATORY: No cough, wheezing, hemoptysis; No shortness of breath  CARDIOVASCULAR: No chest pain or palpitations  GASTROINTESTINAL: No abdominal or epigastric pain. No nausea, vomiting, or hematemesis; No diarrhea or constipation. No melena or hematochezia.  SKIN: +Rash. No discharge      MEDICATIONS  (STANDING):  artificial  tears Solution 1 Drop(s) Both EYES four times a day  calamine/zinc oxide Lotion 1 Application(s) Topical three times a day  cholecalciferol 2000 Unit(s) Oral daily  donepezil 5 milliGRAM(s) Oral at bedtime  dorzolamide 2%/timolol 0.5% Ophthalmic Solution 1 Drop(s) Both EYES every 12 hours  folic acid 1 milliGRAM(s) Oral daily  latanoprost 0.005% Ophthalmic Solution 1 Drop(s) Both EYES at bedtime  LORazepam   Injectable 0.5 milliGRAM(s) IV Push every 4 hours  LORazepam   Injectable   IV Push   LORazepam   Injectable 1 milliGRAM(s) IV Push every 4 hours  multivitamin 1 Tablet(s) Oral daily  polyethylene glycol 3350 17 Gram(s) Oral daily  senna 2 Tablet(s) Oral at bedtime  thiamine IVPB 500 milliGRAM(s) IV Intermittent every 8 hours    MEDICATIONS  (PRN):  acetaminophen   Tablet .. 650 milliGRAM(s) Oral every 6 hours PRN Mild Pain (1 - 3), Moderate Pain (4 - 6)  LORazepam     Tablet 2 milliGRAM(s) Oral every 2 hours PRN Symptom-triggered 2 point increase in CIWA-Ar  LORazepam   Injectable 2 milliGRAM(s) IV Push every 1 hour PRN Symptom-triggered: each CIWA -Ar score 8 or GREATER      CAPILLARY BLOOD GLUCOSE        I&O's Summary    01 Aug 2021 07:01  -  02 Aug 2021 07:00  --------------------------------------------------------  IN: 0 mL / OUT: 700 mL / NET: -700 mL        PHYSICAL EXAM:  Vital Signs Last 24 Hrs  T(C): 36.6 (02 Aug 2021 06:15), Max: 36.6 (01 Aug 2021 22:40)  T(F): 97.9 (02 Aug 2021 06:15), Max: 97.9 (02 Aug 2021 06:15)  HR: 68 (02 Aug 2021 06:15) (68 - 73)  BP: 132/90 (02 Aug 2021 06:15) (129/76 - 152/92)  BP(mean): --  RR: 16 (02 Aug 2021 06:15) (16 - 18)  SpO2: 99% (02 Aug 2021 06:15) (96% - 99%)    GENERAL: No acute distress, well-developed  CHEST/LUNG: CTAB; No wheezes, rales, or rhonchi  HEART: Regular rate and rhythm; No murmurs, rubs, or gallops  ABDOMEN: Soft, non-tender, non-distended; normal bowel sounds, no organomegaly  EXTREMITIES:  2+ peripheral pulses b/l, No clubbing, cyanosis, or edema  NEUROLOGY: A&Ox3. Bilateral tremors of the B/L UE. No LE tremors. Strength 5/5 in both upper and lower extremities. No asterixes appreciated  SKIN: B/L lower legs with some healing scabs. No erythema. minimal tenderness present to B/L anterior shin. No streaking. B/L erythematous and mildly scaly rash on upper and lower extremities. No elena, No blisters. No drainage/discharge    LABS:                        12.4   6.32  )-----------( 250      ( 01 Aug 2021 07:46 )             36.7     08-    137  |  103  |  8   ----------------------------<  124<H>  3.6   |  21<L>  |  0.64    Ca    8.2<L>      01 Aug 2021 07:46  Phos  2.3     08  Mg     2.00     08-      PT/INR - ( 01 Aug 2021 07:46 )   PT: 11.3 sec;   INR: 0.99 ratio         PTT - ( 01 Aug 2021 07:46 )  PTT:27.4 sec      Urinalysis Basic - ( 01 Aug 2021 15:51 )    Color: Colorless / Appearance: Clear / S.004 / pH: x  Gluc: x / Ketone: Negative  / Bili: Negative / Urobili: <2 mg/dL   Blood: x / Protein: Negative / Nitrite: Negative   Leuk Esterase: Negative / RBC: x / WBC x   Sq Epi: x / Non Sq Epi: x / Bacteria: x          RADIOLOGY & ADDITIONAL TESTS:  Results Reviewed:   Imaging Personally Reviewed:  Electrocardiogram Personally Reviewed:    COORDINATION OF CARE:  Care Discussed with Consultants/Other Providers [Y/N]:  Prior or Outpatient Records Reviewed [Y/N]:

## 2021-08-02 NOTE — DISCHARGE NOTE PROVIDER - NSDCMRMEDTOKEN_GEN_ALL_CORE_FT
Cosopt 2.23%-0.68% ophthalmic solution: 1 drop(s) to each affected eye 2 times a day  donepezil 5 mg oral tablet: 1 tab(s) orally once a day (at bedtime)  hydroCHLOROthiazide 12.5 mg oral capsule: 1 cap(s) orally once a day  hydrOXYzine pamoate 50 mg oral capsule: 1 cap(s) orally once a day (at bedtime), As Needed  latanoprost 0.005% ophthalmic solution: 1 drop(s) to each affected eye once a day (in the evening)  Systane ophthalmic solution: 1 drop(s) to each affected eye 2 -3 times daily as needed for dry eyes  thiamine 100 mg oral tablet: 1 tab(s) orally once a day  Vitamin D3 50 mcg (2000 intl units) oral tablet: 1 tab(s) orally once a day  Wellbutrin  mg/24 hours oral tablet, extended release: 1 tab(s) orally every 24 hours   calamine topical lotion: 1 application topically 3 times a day  Cosopt 2.23%-0.68% ophthalmic solution: 1 drop(s) to each affected eye 2 times a day  donepezil 5 mg oral tablet: 1 tab(s) orally once a day (at bedtime)  folic acid 1 mg oral tablet: 1 tab(s) orally once a day  latanoprost 0.005% ophthalmic solution: 1 drop(s) to each affected eye once a day (in the evening)  Multiple Vitamins oral tablet: 1 tab(s) orally once a day  Systane ophthalmic solution: 1 drop(s) to each affected eye 3 times a day  2 -3 times daily as needed for dry eyes   thiamine 100 mg oral tablet: 1 tab(s) orally once a day  triamcinolone 0.1% topical ointment: 1 application topically 2 times a day, As needed, For itching  Vitamin D3 50 mcg (2000 intl units) oral tablet: 1 tab(s) orally once a day

## 2021-08-03 ENCOUNTER — TRANSCRIPTION ENCOUNTER (OUTPATIENT)
Age: 56
End: 2021-08-03

## 2021-08-03 VITALS
DIASTOLIC BLOOD PRESSURE: 85 MMHG | OXYGEN SATURATION: 97 % | SYSTOLIC BLOOD PRESSURE: 133 MMHG | RESPIRATION RATE: 18 BRPM | HEART RATE: 73 BPM | TEMPERATURE: 98 F

## 2021-08-03 PROCEDURE — 99239 HOSP IP/OBS DSCHRG MGMT >30: CPT | Mod: GC

## 2021-08-03 RX ORDER — CHOLECALCIFEROL (VITAMIN D3) 125 MCG
1 CAPSULE ORAL
Qty: 30 | Refills: 0
Start: 2021-08-03 | End: 2021-09-01

## 2021-08-03 RX ORDER — LATANOPROST 0.05 MG/ML
1 SOLUTION/ DROPS OPHTHALMIC; TOPICAL
Qty: 1 | Refills: 0
Start: 2021-08-03 | End: 2021-09-01

## 2021-08-03 RX ORDER — DONEPEZIL HYDROCHLORIDE 10 MG/1
1 TABLET, FILM COATED ORAL
Qty: 30 | Refills: 0
Start: 2021-08-03 | End: 2021-09-01

## 2021-08-03 RX ORDER — DORZOLAMIDE HYDROCHLORIDE TIMOLOL MALEATE 20; 5 MG/ML; MG/ML
1 SOLUTION/ DROPS OPHTHALMIC
Qty: 1 | Refills: 0
Start: 2021-08-03 | End: 2021-09-01

## 2021-08-03 RX ORDER — THIAMINE MONONITRATE (VIT B1) 100 MG
1 TABLET ORAL
Qty: 30 | Refills: 0
Start: 2021-08-03 | End: 2021-09-01

## 2021-08-03 RX ORDER — FOLIC ACID 0.8 MG
1 TABLET ORAL
Qty: 30 | Refills: 0
Start: 2021-08-03 | End: 2021-09-01

## 2021-08-03 RX ORDER — CALAMINE AND ZINC OXIDE AND PHENOL 160; 10 MG/ML; MG/ML
1 LOTION TOPICAL
Qty: 1 | Refills: 0
Start: 2021-08-03 | End: 2021-09-01

## 2021-08-03 RX ORDER — CHOLECALCIFEROL (VITAMIN D3) 125 MCG
1 CAPSULE ORAL
Qty: 0 | Refills: 0 | DISCHARGE

## 2021-08-03 RX ORDER — THIAMINE MONONITRATE (VIT B1) 100 MG
1 TABLET ORAL
Qty: 0 | Refills: 0 | DISCHARGE

## 2021-08-03 RX ORDER — HYDROXYZINE HCL 10 MG
1 TABLET ORAL
Qty: 0 | Refills: 0 | DISCHARGE

## 2021-08-03 RX ORDER — BUPROPION HYDROCHLORIDE 150 MG/1
1 TABLET, EXTENDED RELEASE ORAL
Qty: 0 | Refills: 0 | DISCHARGE

## 2021-08-03 RX ORDER — DONEPEZIL HYDROCHLORIDE 10 MG/1
1 TABLET, FILM COATED ORAL
Qty: 0 | Refills: 0 | DISCHARGE

## 2021-08-03 RX ORDER — DORZOLAMIDE HYDROCHLORIDE TIMOLOL MALEATE 20; 5 MG/ML; MG/ML
1 SOLUTION/ DROPS OPHTHALMIC
Qty: 0 | Refills: 0 | DISCHARGE

## 2021-08-03 RX ORDER — LATANOPROST 0.05 MG/ML
1 SOLUTION/ DROPS OPHTHALMIC; TOPICAL
Qty: 0 | Refills: 0 | DISCHARGE

## 2021-08-03 RX ADMIN — Medication 2000 UNIT(S): at 12:01

## 2021-08-03 RX ADMIN — Medication 105 MILLIGRAM(S): at 06:18

## 2021-08-03 RX ADMIN — Medication 0.5 MILLIGRAM(S): at 06:17

## 2021-08-03 RX ADMIN — Medication 1 TABLET(S): at 12:01

## 2021-08-03 RX ADMIN — POLYETHYLENE GLYCOL 3350 17 GRAM(S): 17 POWDER, FOR SOLUTION ORAL at 12:02

## 2021-08-03 RX ADMIN — DORZOLAMIDE HYDROCHLORIDE TIMOLOL MALEATE 1 DROP(S): 20; 5 SOLUTION/ DROPS OPHTHALMIC at 06:18

## 2021-08-03 RX ADMIN — Medication 105 MILLIGRAM(S): at 13:08

## 2021-08-03 RX ADMIN — CALAMINE AND ZINC OXIDE AND PHENOL 1 APPLICATION(S): 160; 10 LOTION TOPICAL at 06:17

## 2021-08-03 RX ADMIN — Medication 1 DROP(S): at 12:00

## 2021-08-03 RX ADMIN — Medication 1 MILLIGRAM(S): at 12:02

## 2021-08-03 RX ADMIN — CALAMINE AND ZINC OXIDE AND PHENOL 1 APPLICATION(S): 160; 10 LOTION TOPICAL at 13:08

## 2021-08-03 RX ADMIN — Medication 1 DROP(S): at 06:17

## 2021-08-03 NOTE — PROGRESS NOTE ADULT - PROBLEM SELECTOR PLAN 2
- Associated with intermittent claudication.  - Most likelye 2/2 venous stasis dermatitis. Abx unnecessary  - VA duplex showed no evidence of DVT above the knee b/l, unable to visualize calf veins  - ANDREINA/PRV with normal findings  Out patient workup to consider:          - Check US abdomen to evaluate signs of cirrhosis          - Check UA and Urine protein/scr ratio to r/o nephrotic syndrome  - b/l lower extremity compression stockings  - Elevate b/l legs when possible

## 2021-08-03 NOTE — DISCHARGE NOTE NURSING/CASE MANAGEMENT/SOCIAL WORK - PATIENT PORTAL LINK FT
You can access the FollowMyHealth Patient Portal offered by [facility] by registering at the following website: [email or web address]. By joining [facility]’s FollowMyHealth portal, you will also be able to view your health information using other applications (apps) compatible with our system.

## 2021-08-03 NOTE — PROGRESS NOTE ADULT - ATTENDING COMMENTS
56 yr old undomiciled man PMH HTN, R. eye glaucoma and right eye blindness, ?venous insufficiency, ETOH dependence p/w b/l calf tenderness and swelling. Admitted for ETOH withdrawal and workup of LE edema. DVT ruled out    Patient is awake, alert, tremors of hands improved  LE erythema  improved, + 2 pitting edema    CIWA improved to 2 and mostly from tremors, will complete Ativan taper today, c/w folic acid, thiamine, MVI  LE swelling with rash consistent with chronic venous stasis dermatitis. C/w compression stockings, elevate legs, c/w triamcinolone per derm recs  ANDREINA/PVR normal  PT recs: outpatient PT, rolling walker  DC planning for tmw. Patient plans to stay with friend in [place]  Recommended followup with PCP: Dr. Carlita Lyles within 1 week of DC.  Stable for DC home. DC time: 32 min

## 2021-08-03 NOTE — PROVIDER CONTACT NOTE (OTHER) - ASSESSMENT
Per , adrianna Millard will be picked up by Donna Lima at 5:15 p at [facility].  Floor Rn made aware.
alert and oriented x4; last CIWA at 1745: score 4; LE red and +3 edema. denies any chills, SOB chest pain or HA. aferile.

## 2021-08-03 NOTE — PROGRESS NOTE ADULT - PROBLEM SELECTOR PLAN 3
- Initially treated empirically for Scabies with Permethrin. Deescalated to triamcinolone ointment as scabies less likely.   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  - Per derm diffusely distributed rash most likely, non-specific pruritic eruption.  - Porphyrin labs pending

## 2021-08-03 NOTE — PROGRESS NOTE ADULT - PROBLEM SELECTOR PLAN 4
Pt up and out of bed  - Diet: low sodium diet  - PT on board. Appreciate recs  - Dispo: SW on board. Patient will be discharged to a [facility] in [place]. Transportation arranged

## 2021-08-03 NOTE — PROGRESS NOTE ADULT - PROBLEM SELECTOR PLAN 1
- Pt with relapse of alcohol dependence. Initially sober for about 2 years per history. Relapsed due to intolerable leg pain. Was in AA for 2 years and completed program  - Initial CIWA 4 on admission, +b/l hand tremors, tongue fasciculations, HA, anxiety start standing high risk CIWA w/ PO lorazepam taper  - MVI/folic acid/thiamine  - SW on SIRBT on board, working with patient for safe dispo and oupatient help with Alcohol dependence   - Okay to diascharge

## 2021-08-03 NOTE — PROGRESS NOTE ADULT - ASSESSMENT
1. Dermatitis- clinically dermatitis appears non-specific, with ddx including xerosis with subsequent excoriations/early prurigo nodules vs actinic prurigo vs polymorphous light eruption (a photo-induced disorder) vs less likely scabies or porphyria cutanea tarda.  Given significantly pruritic excoriated papules on forearms, and undomiciled history would treat empirically for scabies, though no burrows seen clinically on exam and distribution is atypical. Urine porphyrins wnl, hepatitis C and HIV negative.  - Please treat empirically for scabies with permethrin 5% neck down to entire body surface (including interwebs, under nails, perineum). Patient received first treatment on 7/30. Please repeat in one week  - F/u serum porphyrins  - Can start triamcinolone 0.1% ointment to AA bid as needed for itch for up to 2 weeks at a time    2. Chronic venous stasis of b/l LE- Stasis dermatitis is a type of eczema that occurs in the setting of venous valve insufficiency and venous hypertension, which leads to an inflammatory response and results in acute or chronic dermatitis and pigmentary change of the lower legs.  - If no concern for peripheral arterial disease (b/l DP pulses palpable), recommend starting daily compression stockings  - Elevate legs when possible  - Can start triamcinolone 0.1% oint to AA bid as needed for itch for up to 2 weeks at a time    Patient can follow up with us in the [facility] located at [address] [place] [address]. [address] [address] upon discharge. Our office will call to schedule an appointment but if patient does not hear from us within a few days of discharge, please instruct patient to call our office. Office phone number is [phone].    Ami Napoles MD  Resident Physician, PGY3  [facility]  Pager: [phone]  Office: [phone]    The patient's chart was reviewed in addition to being seen and examined at bedside with the dermatology attending Dr. Almas Hughes  Recommendations were communicated with the primary team.  Please page [phone] for further related questions.    
56 yr old undomiciled man PMH HTN, R. eye glaucoma and right eye blindness, ?venous insufficiency, ETOH dependence p/w b/l calf tenderness and swelling. Admitted for ETOH withdrawal and workup of LE edema. DVT ruled out
56 yr old undomiciled man PMH HTN, R. eye glaucoma and right eye blindness, ?venous insufficiency, ETOH dependence p/w b/l calf tenderness and swelling. Admitted for ETOH withdrawal and workup of LE edema. DVT ruled out
56 year old male w/ past medical hx significant for HTN, R. eye glaucoma, R. eye blindness, ?venous insufficiency, ETOH dependence, undomiciled p/w b/l calf tenderness admitted for ETOH withdrawal,  LE edema work up, and scaly rash concerning for scabies being treated with Permethrin 
56 year old male w/ past medical hx significant for HTN, R. eye glaucoma, R. eye blindness, ?venous insufficiency, ETOH dependence, undomiciled p/w b/l calf tenderness admitted for ETOH withdrawal,  LE edema work up, and scaly rash concerning for scabies being treated with Permethrin

## 2021-08-03 NOTE — PROGRESS NOTE ADULT - SUBJECTIVE AND OBJECTIVE BOX
PROGRESS NOTE:   Authored by Sherwin Thompson MD  Pager:  [phone]    Patient is a 56y old  Male who presents with a chief complaint of worsening LE edema (02 Aug 2021 17:38)      SUBJECTIVE / OVERNIGHT EVENTS:    ADDITIONAL REVIEW OF SYSTEMS:    MEDICATIONS  (STANDING):  artificial  tears Solution 1 Drop(s) Both EYES four times a day  calamine/zinc oxide Lotion 1 Application(s) Topical three times a day  cholecalciferol 2000 Unit(s) Oral daily  donepezil 5 milliGRAM(s) Oral at bedtime  dorzolamide 2%/timolol 0.5% Ophthalmic Solution 1 Drop(s) Both EYES every 12 hours  folic acid 1 milliGRAM(s) Oral daily  latanoprost 0.005% Ophthalmic Solution 1 Drop(s) Both EYES at bedtime  LORazepam     Tablet 0.5 milliGRAM(s) Oral two times a day  melatonin 3 milliGRAM(s) Oral at bedtime  multivitamin 1 Tablet(s) Oral daily  polyethylene glycol 3350 17 Gram(s) Oral daily  senna 2 Tablet(s) Oral at bedtime  thiamine IVPB 500 milliGRAM(s) IV Intermittent every 8 hours    MEDICATIONS  (PRN):  acetaminophen   Tablet .. 650 milliGRAM(s) Oral every 6 hours PRN Mild Pain (1 - 3), Moderate Pain (4 - 6)  LORazepam     Tablet 2 milliGRAM(s) Oral every 2 hours PRN Symptom-triggered 2 point increase in CIWA-Ar  LORazepam   Injectable 2 milliGRAM(s) IV Push every 1 hour PRN Symptom-triggered: each CIWA -Ar score 8 or GREATER  triamcinolone 0.1% Ointment 1 Application(s) Topical two times a day PRN For itching      CAPILLARY BLOOD GLUCOSE        I&O's Summary    02 Aug 2021 07:01  -  03 Aug 2021 07:00  --------------------------------------------------------  IN: 0 mL / OUT: 600 mL / NET: -600 mL        PHYSICAL EXAM:  Vital Signs Last 24 Hrs  T(C): 36.1 (03 Aug 2021 06:13), Max: 36.9 (02 Aug 2021 21:22)  T(F): 97 (03 Aug 2021 06:13), Max: 98.5 (02 Aug 2021 21:22)  HR: 60 (03 Aug 2021 06:13) (60 - 71)  BP: 115/78 (03 Aug 2021 06:13) (115/78 - 140/86)  BP(mean): --  RR: 18 (03 Aug 2021 06:13) (17 - 18)  SpO2: 97% (03 Aug 2021 06:13) (96% - 100%)    GENERAL: No acute distress, well-developed  HEAD:  Atraumatic, Normocephalic  EYES: EOMI, PERRLA, conjunctiva and sclera clear  NECK: Supple, no lymphadenopathy, no JVD  CHEST/LUNG: CTAB; No wheezes, rales, or rhonchi  HEART: Regular rate and rhythm; No murmurs, rubs, or gallops  ABDOMEN: Soft, non-tender, non-distended; normal bowel sounds, no organomegaly  EXTREMITIES:  2+ peripheral pulses b/l, No clubbing, cyanosis, or edema  NEUROLOGY: A&O x 3, no focal deficits  SKIN: No rashes or lesions    LABS:                        12.4   6.32  )-----------( 250      ( 01 Aug 2021 07:46 )             36.7     08-01    137  |  103  |  8   ----------------------------<  124<H>  3.6   |  21<L>  |  0.64    Ca    8.2<L>      01 Aug 2021 07:46  Phos  2.3     08-  Mg     2.00     08-01      PT/INR - ( 01 Aug 2021 07:46 )   PT: 11.3 sec;   INR: 0.99 ratio         PTT - ( 01 Aug 2021 07:46 )  PTT:27.4 sec      Urinalysis Basic - ( 01 Aug 2021 15:51 )    Color: Colorless / Appearance: Clear / S.004 / pH: x  Gluc: x / Ketone: Negative  / Bili: Negative / Urobili: <2 mg/dL   Blood: x / Protein: Negative / Nitrite: Negative   Leuk Esterase: Negative / RBC: x / WBC x   Sq Epi: x / Non Sq Epi: x / Bacteria: x          RADIOLOGY & ADDITIONAL TESTS:  Results Reviewed:   Imaging Personally Reviewed:  Electrocardiogram Personally Reviewed:    COORDINATION OF CARE:  Care Discussed with Consultants/Other Providers [Y/N]:  Prior or Outpatient Records Reviewed [Y/N]:   PROGRESS NOTE:   Authored by Sherwin Thompson MD  Pager:  [phone]    Patient is a 56y old  Male who presents with a chief complaint of worsening LE edema (02 Aug 2021 17:38)      SUBJECTIVE / OVERNIGHT EVENTS:  NAEO. Pt does not have any particular complaints this morning. He endorses a bowel movement yesterday and resolution of his constipation.     ADDITIONAL REVIEW OF SYSTEMS:    REVIEW OF SYSTEMS:    CONSTITUTIONAL: No weakness, fevers or chills  RESPIRATORY: No cough, wheezing, hemoptysis; No shortness of breath  CARDIOVASCULAR: No chest pain or palpitations  GASTROINTESTINAL: No abdominal or epigastric pain. No nausea, vomiting, or hematemesis; No diarrhea or constipation. No melena or hematochezia.  SKIN: No itching, + rashes      MEDICATIONS  (STANDING):  artificial  tears Solution 1 Drop(s) Both EYES four times a day  calamine/zinc oxide Lotion 1 Application(s) Topical three times a day  cholecalciferol 2000 Unit(s) Oral daily  donepezil 5 milliGRAM(s) Oral at bedtime  dorzolamide 2%/timolol 0.5% Ophthalmic Solution 1 Drop(s) Both EYES every 12 hours  folic acid 1 milliGRAM(s) Oral daily  latanoprost 0.005% Ophthalmic Solution 1 Drop(s) Both EYES at bedtime  LORazepam     Tablet 0.5 milliGRAM(s) Oral two times a day  melatonin 3 milliGRAM(s) Oral at bedtime  multivitamin 1 Tablet(s) Oral daily  polyethylene glycol 3350 17 Gram(s) Oral daily  senna 2 Tablet(s) Oral at bedtime  thiamine IVPB 500 milliGRAM(s) IV Intermittent every 8 hours    MEDICATIONS  (PRN):  acetaminophen   Tablet .. 650 milliGRAM(s) Oral every 6 hours PRN Mild Pain (1 - 3), Moderate Pain (4 - 6)  LORazepam     Tablet 2 milliGRAM(s) Oral every 2 hours PRN Symptom-triggered 2 point increase in CIWA-Ar  LORazepam   Injectable 2 milliGRAM(s) IV Push every 1 hour PRN Symptom-triggered: each CIWA -Ar score 8 or GREATER  triamcinolone 0.1% Ointment 1 Application(s) Topical two times a day PRN For itching      CAPILLARY BLOOD GLUCOSE        I&O's Summary    02 Aug 2021 07:01  -  03 Aug 2021 07:00  --------------------------------------------------------  IN: 0 mL / OUT: 600 mL / NET: -600 mL        PHYSICAL EXAM:  Vital Signs Last 24 Hrs  T(C): 36.1 (03 Aug 2021 06:13), Max: 36.9 (02 Aug 2021 21:22)  T(F): 97 (03 Aug 2021 06:13), Max: 98.5 (02 Aug 2021 21:22)  HR: 60 (03 Aug 2021 06:13) (60 - 71)  BP: 115/78 (03 Aug 2021 06:13) (115/78 - 140/86)  BP(mean): --  RR: 18 (03 Aug 2021 06:13) (17 - 18)  SpO2: 97% (03 Aug 2021 06:13) (96% - 100%)    GENERAL: No acute distress, well-developed  CHEST/LUNG: CTAB; No wheezes, rales, or rhonchi  HEART: Regular rate and rhythm; No murmurs, rubs, or gallops  ABDOMEN: Soft, non-tender, non-distended; normal bowel sounds, no organomegaly  NEUROLOGY: A&O x 3, no focal deficits, No tremors  SKIN: Chronic skin changes with dimpling noted on the B/L lower extremities, no erythema. No warmth. Multiple erythematous, and excoriated skin lesions spread over the B/L upper and lower extremities.     LABS:                        12.4   6.32  )-----------( 250      ( 01 Aug 2021 07:46 )             36.7     08-    137  |  103  |  8   ----------------------------<  124<H>  3.6   |  21<L>  |  0.64    Ca    8.2<L>      01 Aug 2021 07:46  Phos  2.3     08-  Mg     2.00     08-      PT/INR - ( 01 Aug 2021 07:46 )   PT: 11.3 sec;   INR: 0.99 ratio         PTT - ( 01 Aug 2021 07:46 )  PTT:27.4 sec      Urinalysis Basic - ( 01 Aug 2021 15:51 )    Color: Colorless / Appearance: Clear / S.004 / pH: x  Gluc: x / Ketone: Negative  / Bili: Negative / Urobili: <2 mg/dL   Blood: x / Protein: Negative / Nitrite: Negative   Leuk Esterase: Negative / RBC: x / WBC x   Sq Epi: x / Non Sq Epi: x / Bacteria: x          RADIOLOGY & ADDITIONAL TESTS:  Results Reviewed:     COORDINATION OF CARE:  Care Discussed with Consultants/Other Providers [Y]:

## 2021-08-04 LAB
CLINICAL BIOCHEMIST REVIEW: SIGNIFICANT CHANGE UP
PORPHYRINS SERPL-MCNC: <1 MCG/DL — SIGNIFICANT CHANGE UP
PTP REVIEWED BY: SIGNIFICANT CHANGE UP

## 2021-08-07 LAB
COPROPORPHYRIN I - RANDOM URINE: 68.3 — HIGH (ref 6.5–33.2)
COPROPORPHYRIN III - RANDOM URINE: 88.5 — SIGNIFICANT CHANGE UP (ref 4.8–88.6)
HEPTA-CP UR-MCNC: 1.1 — SIGNIFICANT CHANGE UP
HEXA-CP UR-MCNC: SIGNIFICANT CHANGE UP
PENTACARBOXYPORPHRIN, RANDOM URINE: 0.6 — SIGNIFICANT CHANGE UP
PORPHYRINS RANDOM URINE INTERPRETATION: SIGNIFICANT CHANGE UP
PORPHYRINS UR-MCNC: 179.7 — HIGH (ref 27–153.6)
UROPORPHYRIN I - RANDOM URINE: 17 — SIGNIFICANT CHANGE UP (ref 3.6–21.1)
UROPORPHYRIN III - RANDOM URINE: 4.2 — SIGNIFICANT CHANGE UP

## 2021-09-15 NOTE — PROGRESS NOTE ADULT - PROBLEM SELECTOR PROBLEM 2
Asma en niños, cuidados ambulatorios   INFORMACIÓN GENERAL:   El asma  es gema enfermedad pulmonar que hace que a roach chandni se le dificulte la respiración  La inflamación crónica y las reacciones intensas a los desencadenantes hacen que las vías respiratorias de los pulmones se ino más pequeñas  Si el asma de roach chandni no se controla, puede que sharron síntomas se conviertan en crónicos o mortales  Síntomas comunes incluyen los siguientes:   · Falta de aliento    · Presión en el pecho    · Tos     · Sibilancias  Busque cuidados inmediatos para los siguientes síntomas:   · Los números del flujo kev del chandni están más bajos de lo que deberían estar (en la trung abi de roach plan para el asma)     · Dificultad para hablar o caminar debido a la falta de aliento     · La falta de aliento es tan severa que roach chandni no puede dormir o hacer sharron actividades usuales     · La falta de aliento es igual o peor aún después que roach chandni se eldon los medicamentos    · Los labios o uñas del chandni se ponen azules o grises     · La piel del colton y la caja torácica de roach chandni se hunden con cada respiro  El tratamiento para el asma  puede incluir cualquiera de los siguientes:  · Los medicamentos  disminuyen la inflamación, abren las vías respiratorias y facilitan la respiración  Puede que roach chandni necesite medicamentos que funcionan rápidamente madeline un ataque de asma, o que funcionan con el tiempo para prevenir ataques  Asegúrese de que roach chandni sepa cómo usar un inhalador  Programe gema alexandra con el proveedor de kaylin de roach chandni para asegurarse de que roach chandni continúe usando el inhalador correctamente  · Los exámenes de alergias  pueden revelar los desencadenantes de un ataque de asma  Es probable que roach chandni necesite inyecciones o medicamentos para controlar las alergias que empeoran roach asma  Maneje el asma de roach chandni:   · Siga el plan de acción del asma (AAP, por sharron siglas en inglés)    El plan explica cuáles medicamentos necesita rivera chandni y cuándo cambiar las dosis si es necesario  Además explica perico usted y rivera chandni pueden monitorear síntomas y usar un medidor de flujo kev  El medidor mide lo waldemar que el aire se mueve dentro y fuera de los pulmones de rivera chandni  · Comparta el plan de acción del asma con los proveedores de kaylin de rivera chandni  El plan de acción da instrucciones sobre lo que se debe hacer en clarisa de un ataque de asma  · Identifique y evite los desencadenantes conocidos  Mantenga rivera casa lane[place] de [place], ácaros y moho  · Explíquele a rivera chandni los peligros de fumar  El humo del tabaco aumenta el riesgo de que rivera chandni tenga ataques de asma  Mantenga a rivera chandni alejado del tabaquismo indirecto  · [address] rievra chandni  [place], obesidad y reflujo gástrico pueden empeorar el asma  · Pregunte sobre vacunas  Puede que rivera chandni necesite de gema vacuna anual para la gripe  La gripe puede empeorar el asma de rivera chandni  Programe gema alexandra con rivera proveedor de kaylin de rivera chandni perico se le haya indicado: Anote sharron preguntas para que se acuerde de [employer] citas de rivera chandni  ACUERDOS SOBRE RIVERA CUIDADO:   Usted tiene el derecho de participar en la planificación del cuidado de rivera chandni  Informarse acerca del Naeem de kaylin del chandni y Vivi Luther la forma perico puede tratarse  Discuta con los médicos de rivera chandni las opciones de tratamiento para decidir el cuidado que se usted desea para él  Esta información es sólo para uso en educación  Rivera intención no es darle un consejo médico sobre enfermedades o tratamientos  Colsulte con rivera Benuel Soriano farmacéutico antes de seguir cualquier régimen médico para saber si es seguro y efectivo para usted  © 2014 [address] is for End User's use only and may not be sold, redistributed or otherwise used for commercial purposes   All illustrations and images included in CareNotes® are the copyrighted property of [employer]  or Micheal Connor  Epigastric pain Diarrhea, unspecified type

## 2021-10-04 NOTE — ED ADULT NURSE NOTE - PRIMARY CARE PROVIDER
Due to patient being non-English speaking/uses sign language, an  was used for this visit. Only for face-to-face interpretation by an external agency, date and length of interpretation can be found on the scanned worksheet.     name: Ania Manjarrez  Language: Ghanaian  Agency: [employer]  Phone number:   Type of interpretation: Telephone, spoken          
.

## 2021-10-10 ENCOUNTER — INPATIENT (INPATIENT)
Facility: HOSPITAL | Age: 56
LOS: 3 days | Discharge: HOME | End: 2021-10-14
Attending: INTERNAL MEDICINE | Admitting: INTERNAL MEDICINE
Payer: MEDICAID

## 2021-10-10 VITALS
HEIGHT: 68 IN | HEART RATE: 84 BPM | TEMPERATURE: 98 F | RESPIRATION RATE: 16 BRPM | DIASTOLIC BLOOD PRESSURE: 88 MMHG | SYSTOLIC BLOOD PRESSURE: 162 MMHG | WEIGHT: 197.98 LBS

## 2021-10-10 DIAGNOSIS — F10.230 ALCOHOL DEPENDENCE WITH WITHDRAWAL, UNCOMPLICATED: ICD-10-CM

## 2021-10-10 DIAGNOSIS — L03.119 CELLULITIS OF UNSPECIFIED PART OF LIMB: ICD-10-CM

## 2021-10-10 LAB
ALBUMIN SERPL ELPH-MCNC: 3.5 G/DL — SIGNIFICANT CHANGE UP (ref 3.3–5)
ALP SERPL-CCNC: 81 U/L — SIGNIFICANT CHANGE UP (ref 40–120)
ALT FLD-CCNC: 86 U/L — HIGH (ref 12–78)
AMPHET UR-MCNC: NEGATIVE — SIGNIFICANT CHANGE UP
ANION GAP SERPL CALC-SCNC: 10 MMOL/L — SIGNIFICANT CHANGE UP (ref 5–17)
APPEARANCE UR: CLEAR — SIGNIFICANT CHANGE UP
APTT BLD: 30.5 SEC — SIGNIFICANT CHANGE UP (ref 27.5–35.5)
AST SERPL-CCNC: 79 U/L — HIGH (ref 15–37)
BARBITURATES UR SCN-MCNC: NEGATIVE — SIGNIFICANT CHANGE UP
BASOPHILS # BLD AUTO: 0.13 K/UL — SIGNIFICANT CHANGE UP (ref 0–0.2)
BASOPHILS NFR BLD AUTO: 1.5 % — SIGNIFICANT CHANGE UP (ref 0–2)
BENZODIAZ UR-MCNC: NEGATIVE — SIGNIFICANT CHANGE UP
BILIRUB SERPL-MCNC: 1.1 MG/DL — SIGNIFICANT CHANGE UP (ref 0.2–1.2)
BILIRUB UR-MCNC: NEGATIVE — SIGNIFICANT CHANGE UP
BUN SERPL-MCNC: 14 MG/DL — SIGNIFICANT CHANGE UP (ref 7–23)
CALCIUM SERPL-MCNC: 8.1 MG/DL — LOW (ref 8.5–10.1)
CHLORIDE SERPL-SCNC: 104 MMOL/L — SIGNIFICANT CHANGE UP (ref 96–108)
CO2 SERPL-SCNC: 24 MMOL/L — SIGNIFICANT CHANGE UP (ref 22–31)
COCAINE METAB.OTHER UR-MCNC: NEGATIVE — SIGNIFICANT CHANGE UP
COLOR SPEC: YELLOW — SIGNIFICANT CHANGE UP
CREAT SERPL-MCNC: 0.85 MG/DL — SIGNIFICANT CHANGE UP (ref 0.5–1.3)
DIFF PNL FLD: NEGATIVE — SIGNIFICANT CHANGE UP
EOSINOPHIL # BLD AUTO: 0.44 K/UL — SIGNIFICANT CHANGE UP (ref 0–0.5)
EOSINOPHIL NFR BLD AUTO: 5.2 % — SIGNIFICANT CHANGE UP (ref 0–6)
ETHANOL SERPL-MCNC: 230 MG/DL — HIGH (ref 0–10)
FLUAV AG NPH QL: SIGNIFICANT CHANGE UP
FLUBV AG NPH QL: SIGNIFICANT CHANGE UP
GLUCOSE BLDC GLUCOMTR-MCNC: 105 MG/DL — HIGH (ref 70–99)
GLUCOSE SERPL-MCNC: 106 MG/DL — HIGH (ref 70–99)
GLUCOSE UR QL: NEGATIVE MG/DL — SIGNIFICANT CHANGE UP
HCT VFR BLD CALC: 39 % — SIGNIFICANT CHANGE UP (ref 39–50)
HGB BLD-MCNC: 13.9 G/DL — SIGNIFICANT CHANGE UP (ref 13–17)
IMM GRANULOCYTES NFR BLD AUTO: 0.2 % — SIGNIFICANT CHANGE UP (ref 0–1.5)
INR BLD: 0.99 RATIO — SIGNIFICANT CHANGE UP (ref 0.88–1.16)
KETONES UR-MCNC: NEGATIVE — SIGNIFICANT CHANGE UP
LACTATE SERPL-SCNC: 2.8 MMOL/L — HIGH (ref 0.7–2)
LEUKOCYTE ESTERASE UR-ACNC: NEGATIVE — SIGNIFICANT CHANGE UP
LYMPHOCYTES # BLD AUTO: 3.26 K/UL — SIGNIFICANT CHANGE UP (ref 1–3.3)
LYMPHOCYTES # BLD AUTO: 38.5 % — SIGNIFICANT CHANGE UP (ref 13–44)
MAGNESIUM SERPL-MCNC: 2.1 MG/DL — SIGNIFICANT CHANGE UP (ref 1.6–2.6)
MCHC RBC-ENTMCNC: 31.4 PG — SIGNIFICANT CHANGE UP (ref 27–34)
MCHC RBC-ENTMCNC: 35.6 GM/DL — SIGNIFICANT CHANGE UP (ref 32–36)
MCV RBC AUTO: 88 FL — SIGNIFICANT CHANGE UP (ref 80–100)
METHADONE UR-MCNC: NEGATIVE — SIGNIFICANT CHANGE UP
MONOCYTES # BLD AUTO: 0.55 K/UL — SIGNIFICANT CHANGE UP (ref 0–0.9)
MONOCYTES NFR BLD AUTO: 6.5 % — SIGNIFICANT CHANGE UP (ref 2–14)
NEUTROPHILS # BLD AUTO: 4.06 K/UL — SIGNIFICANT CHANGE UP (ref 1.8–7.4)
NEUTROPHILS NFR BLD AUTO: 48.1 % — SIGNIFICANT CHANGE UP (ref 43–77)
NITRITE UR-MCNC: NEGATIVE — SIGNIFICANT CHANGE UP
NRBC # BLD: 0 /100 WBCS — SIGNIFICANT CHANGE UP (ref 0–0)
OPIATES UR-MCNC: NEGATIVE — SIGNIFICANT CHANGE UP
PCP SPEC-MCNC: SIGNIFICANT CHANGE UP
PCP UR-MCNC: NEGATIVE — SIGNIFICANT CHANGE UP
PH UR: 6.5 — SIGNIFICANT CHANGE UP (ref 5–8)
PLATELET # BLD AUTO: 212 K/UL — SIGNIFICANT CHANGE UP (ref 150–400)
POTASSIUM SERPL-MCNC: 3.6 MMOL/L — SIGNIFICANT CHANGE UP (ref 3.5–5.3)
POTASSIUM SERPL-SCNC: 3.6 MMOL/L — SIGNIFICANT CHANGE UP (ref 3.5–5.3)
PROCALCITONIN SERPL-MCNC: 0.05 NG/ML — SIGNIFICANT CHANGE UP (ref 0.02–0.1)
PROT SERPL-MCNC: 8.4 GM/DL — HIGH (ref 6–8.3)
PROT UR-MCNC: NEGATIVE MG/DL — SIGNIFICANT CHANGE UP
PROTHROM AB SERPL-ACNC: 11.5 SEC — SIGNIFICANT CHANGE UP (ref 10.6–13.6)
RBC # BLD: 4.43 M/UL — SIGNIFICANT CHANGE UP (ref 4.2–5.8)
RBC # FLD: 13.2 % — SIGNIFICANT CHANGE UP (ref 10.3–14.5)
SARS-COV-2 RNA SPEC QL NAA+PROBE: SIGNIFICANT CHANGE UP
SODIUM SERPL-SCNC: 138 MMOL/L — SIGNIFICANT CHANGE UP (ref 135–145)
SP GR SPEC: 1 — LOW (ref 1.01–1.02)
THC UR QL: NEGATIVE — SIGNIFICANT CHANGE UP
TROPONIN I SERPL-MCNC: <.015 NG/ML — SIGNIFICANT CHANGE UP (ref 0.01–0.04)
UROBILINOGEN FLD QL: NEGATIVE MG/DL — SIGNIFICANT CHANGE UP
WBC # BLD: 8.46 K/UL — SIGNIFICANT CHANGE UP (ref 3.8–10.5)
WBC # FLD AUTO: 8.46 K/UL — SIGNIFICANT CHANGE UP (ref 3.8–10.5)

## 2021-10-10 PROCEDURE — 71045 X-RAY EXAM CHEST 1 VIEW: CPT | Mod: 26

## 2021-10-10 PROCEDURE — 99291 CRITICAL CARE FIRST HOUR: CPT

## 2021-10-10 RX ORDER — THIAMINE MONONITRATE (VIT B1) 100 MG
100 TABLET ORAL DAILY
Refills: 0 | Status: COMPLETED | OUTPATIENT
Start: 2021-10-10 | End: 2021-10-13

## 2021-10-10 RX ORDER — INFLUENZA VIRUS VACCINE 15; 15; 15; 15 UG/.5ML; UG/.5ML; UG/.5ML; UG/.5ML
0.5 SUSPENSION INTRAMUSCULAR ONCE
Refills: 0 | Status: COMPLETED | OUTPATIENT
Start: 2021-10-10 | End: 2021-10-14

## 2021-10-10 RX ORDER — PANTOPRAZOLE SODIUM 20 MG/1
40 TABLET, DELAYED RELEASE ORAL DAILY
Refills: 0 | Status: DISCONTINUED | OUTPATIENT
Start: 2021-10-10 | End: 2021-10-14

## 2021-10-10 RX ORDER — AMPICILLIN SODIUM AND SULBACTAM SODIUM 250; 125 MG/ML; MG/ML
3 INJECTION, POWDER, FOR SUSPENSION INTRAMUSCULAR; INTRAVENOUS EVERY 6 HOURS
Refills: 0 | Status: DISCONTINUED | OUTPATIENT
Start: 2021-10-10 | End: 2021-10-14

## 2021-10-10 RX ORDER — LATANOPROST 0.05 MG/ML
1 SOLUTION/ DROPS OPHTHALMIC; TOPICAL AT BEDTIME
Refills: 0 | Status: DISCONTINUED | OUTPATIENT
Start: 2021-10-10 | End: 2021-10-14

## 2021-10-10 RX ORDER — FOLIC ACID 0.8 MG
1 TABLET ORAL DAILY
Refills: 0 | Status: DISCONTINUED | OUTPATIENT
Start: 2021-10-10 | End: 2021-10-14

## 2021-10-10 RX ORDER — THIAMINE MONONITRATE (VIT B1) 100 MG
500 TABLET ORAL ONCE
Refills: 0 | Status: COMPLETED | OUTPATIENT
Start: 2021-10-10 | End: 2021-10-10

## 2021-10-10 RX ORDER — PHENOBARBITAL 60 MG
260 TABLET ORAL ONCE
Refills: 0 | Status: DISCONTINUED | OUTPATIENT
Start: 2021-10-10 | End: 2021-10-10

## 2021-10-10 RX ORDER — ONDANSETRON 8 MG/1
4 TABLET, FILM COATED ORAL EVERY 6 HOURS
Refills: 0 | Status: DISCONTINUED | OUTPATIENT
Start: 2021-10-10 | End: 2021-10-14

## 2021-10-10 RX ORDER — SODIUM CHLORIDE 9 MG/ML
2000 INJECTION, SOLUTION INTRAVENOUS ONCE
Refills: 0 | Status: COMPLETED | OUTPATIENT
Start: 2021-10-10 | End: 2021-10-10

## 2021-10-10 RX ORDER — SODIUM CHLORIDE 9 MG/ML
1000 INJECTION INTRAMUSCULAR; INTRAVENOUS; SUBCUTANEOUS
Refills: 0 | Status: DISCONTINUED | OUTPATIENT
Start: 2021-10-10 | End: 2021-10-14

## 2021-10-10 RX ORDER — ENOXAPARIN SODIUM 100 MG/ML
40 INJECTION SUBCUTANEOUS DAILY
Refills: 0 | Status: DISCONTINUED | OUTPATIENT
Start: 2021-10-10 | End: 2021-10-14

## 2021-10-10 RX ORDER — AMPICILLIN SODIUM AND SULBACTAM SODIUM 250; 125 MG/ML; MG/ML
3 INJECTION, POWDER, FOR SUSPENSION INTRAMUSCULAR; INTRAVENOUS ONCE
Refills: 0 | Status: COMPLETED | OUTPATIENT
Start: 2021-10-10 | End: 2021-10-10

## 2021-10-10 RX ADMIN — Medication 1 MILLIGRAM(S): at 14:25

## 2021-10-10 RX ADMIN — Medication 500 MILLIGRAM(S): at 13:04

## 2021-10-10 RX ADMIN — PANTOPRAZOLE SODIUM 40 MILLIGRAM(S): 20 TABLET, DELAYED RELEASE ORAL at 14:25

## 2021-10-10 RX ADMIN — Medication 1 DROP(S): at 21:37

## 2021-10-10 RX ADMIN — LATANOPROST 1 DROP(S): 0.05 SOLUTION/ DROPS OPHTHALMIC; TOPICAL at 21:37

## 2021-10-10 RX ADMIN — Medication 105 MILLIGRAM(S): at 11:55

## 2021-10-10 RX ADMIN — Medication 2 MILLIGRAM(S): at 18:04

## 2021-10-10 RX ADMIN — Medication 2 MILLIGRAM(S): at 14:25

## 2021-10-10 RX ADMIN — Medication 2 MILLIGRAM(S): at 21:36

## 2021-10-10 RX ADMIN — AMPICILLIN SODIUM AND SULBACTAM SODIUM 200 GRAM(S): 250; 125 INJECTION, POWDER, FOR SUSPENSION INTRAMUSCULAR; INTRAVENOUS at 13:06

## 2021-10-10 RX ADMIN — Medication 260 MILLIGRAM(S): at 11:47

## 2021-10-10 RX ADMIN — SODIUM CHLORIDE 2000 MILLILITER(S): 9 INJECTION, SOLUTION INTRAVENOUS at 13:02

## 2021-10-10 RX ADMIN — SODIUM CHLORIDE 100 MILLILITER(S): 9 INJECTION INTRAMUSCULAR; INTRAVENOUS; SUBCUTANEOUS at 14:33

## 2021-10-10 RX ADMIN — Medication 1 TABLET(S): at 14:25

## 2021-10-10 RX ADMIN — SODIUM CHLORIDE 2000 MILLILITER(S): 9 INJECTION, SOLUTION INTRAVENOUS at 11:47

## 2021-10-10 RX ADMIN — ENOXAPARIN SODIUM 40 MILLIGRAM(S): 100 INJECTION SUBCUTANEOUS at 18:03

## 2021-10-10 RX ADMIN — AMPICILLIN SODIUM AND SULBACTAM SODIUM 200 GRAM(S): 250; 125 INJECTION, POWDER, FOR SUSPENSION INTRAMUSCULAR; INTRAVENOUS at 18:04

## 2021-10-10 NOTE — ED PROVIDER NOTE - CRITICAL CARE ATTENDING CONTRIBUTION TO CARE
active etoh withdrawal ciwa 19, fluids, thiamine, phenobarb active etoh withdrawal ciwa 19, fluids, thiamine, phenobarb, cellulitis abx.

## 2021-10-10 NOTE — ED ADULT NURSE NOTE - SUICIDE SCREENING QUESTION 3
Dr Dilcia Guardado office called to request this patient be called to schedule with Dr. Deana Shah for continued care. Please check todays office notes.
No

## 2021-10-10 NOTE — ED PROVIDER NOTE - CLINICAL SUMMARY MEDICAL DECISION MAKING FREE TEXT BOX
pt pw etoh withdrawal. will need fluids, thiamine, phenobarb. has bl le cellulitis, start abx. admit  I read ekg as nsr rate 88, no st elevation or depression, qtc 462, narrow qrs, normal axis.

## 2021-10-10 NOTE — PATIENT PROFILE ADULT - NSPROPTRIGHTNOTIFY_GEN_A_NUR
Goal Outcome Evaluation:   Pt is resting in bed. PRN pain meds given to relieve pain. Pt has been shaky today, vomiting still, zofran has helped. Pt states her nerves make her shake. BP has been elevated today, decreased with Meds. No other issues at this time. Will continue to monitor                declines

## 2021-10-10 NOTE — ED ADULT NURSE NOTE - NSSEPSISCRITERIAMET_ED_A_ED
i'm ok to refill till next appt but we'll have to discuss what to do after that.  This is a mess.   Abnormal Lactate

## 2021-10-10 NOTE — ED ADULT NURSE NOTE - NSIMPLEMENTINTERV_GEN_ALL_ED
Implemented All Fall with Harm Risk Interventions:  [place] to call system. Call bell, personal items and telephone within reach. Instruct patient to call for assistance. Room bathroom lighting operational. Non-slip footwear when patient is off stretcher. Physically safe environment: no spills, clutter or unnecessary equipment. Stretcher in lowest position, wheels locked, appropriate side rails in place. Provide visual cue, wrist band, yellow gown, etc. Monitor gait and stability. Monitor for mental status changes and reorient to person, place, and time. Review medications for side effects contributing to fall risk. Reinforce activity limits and safety measures with patient and family. Provide visual clues: red socks.

## 2021-10-10 NOTE — ED PROVIDER NOTE - OBJECTIVE STATEMENT
56m hx htn and daily etoh consumption pw weakness and feeling ill. notes didn't want to drink this morning but had to because he was going to withdrawal. he drinks beers and vodka. heavy drinking last night, nothing much this morning. notes trembling. no cp, sob, fever, chills, vomiting. says he feels bad in his mind but no si. ros neg for ha, vision loss, rhinorrhea, rash, bleeding, numbness, dysuria, testicular pain.

## 2021-10-10 NOTE — PATIENT PROFILE ADULT - LEGAL HELP
Subjective   Patient ID: Adria is a 21 month old male who is accompanied by:mother     Well Child Assessment:  History was provided by the mother. Adria lives with his mother and brother.   Nutrition  Types of intake include cereals, cow's milk, eggs, fruits, juices, meats, junk food and vegetables. Junk food includes candy and chips.   Dental  The patient does not have a dental home.   Elimination  Elimination problems do not include diarrhea.   Sleep  The patient sleeps in his crib. There are no sleep problems.   Safety  Home is child-proofed? yes. There is no smoking in the home. Home has working smoke alarms? yes. Home has working carbon monoxide alarms? yes. There is an appropriate car seat in use.   Screening  Immunizations are up-to-date. There are no risk factors for hearing loss. There are no risk factors for anemia. There are no risk factors for tuberculosis.       21 month male for HSV.  He will start  in the future.    Additional concerns today: None     Review of Systems   Constitutional: Negative for activity change, appetite change, crying, fatigue, fever and irritability.   HENT: Negative for congestion and ear pain.    Eyes: Negative for discharge and redness.   Respiratory: Negative for cough.    Cardiovascular: Negative for chest pain and palpitations.   Gastrointestinal: Negative for abdominal distention, abdominal pain, diarrhea, nausea and vomiting.   Endocrine: Negative for polydipsia, polyphagia and polyuria.   Genitourinary: Negative for enuresis and frequency.   Musculoskeletal: Negative for back pain and gait problem.   Skin: Negative for rash.   Allergic/Immunologic: Negative for environmental allergies and food allergies.   Neurological: Negative for seizures, syncope, weakness and headaches.   Hematological: Does not bruise/bleed easily.   Psychiatric/Behavioral: Negative for behavioral problems and sleep disturbance.       Adria has a past medical history of Allergy and No  known health problems.  Adria has Encounter for routine child health examination without abnormal findings and Need for vaccination on their problem list.  Adria has a past surgical history that includes Circumcision, primary.  His family history includes Diabetes in his maternal grandmother.  Adria reports that he has never smoked. He has never used smokeless tobacco.  Adria currently has no medications in their medication list.  Adria   No current outpatient medications on file.     No current facility-administered medications for this visit.     Adria has No Known Allergies.    Objective   Vitals: Temp 97.8 °F (36.6 °C) (Tympanic)   Ht 35.04\" (89 cm)   Wt (!) 14.9 kg (32 lb 13.6 oz)   HC 51.5 cm (20.28\")   BMI 18.81 kg/m²   BSA 0.59 m²   Growth parameters are noted and are appropriate for age.  Physical Exam  Vitals and nursing note reviewed.   Constitutional:       General: He is active.      Appearance: Normal appearance. He is well-developed and normal weight.   HENT:      Head: Normocephalic and atraumatic.      Right Ear: Tympanic membrane normal.      Left Ear: Tympanic membrane normal.      Nose: Nose normal.      Mouth/Throat:      Mouth: Mucous membranes are moist.      Pharynx: Oropharynx is clear.   Eyes:      Conjunctiva/sclera: Conjunctivae normal.      Pupils: Pupils are equal, round, and reactive to light.   Cardiovascular:      Rate and Rhythm: Normal rate and regular rhythm.      Pulses: Normal pulses.      Heart sounds: Normal heart sounds, S1 normal and S2 normal.   Pulmonary:      Effort: Pulmonary effort is normal.      Breath sounds: Normal breath sounds.   Abdominal:      Palpations: Abdomen is soft. There is no mass.      Tenderness: There is no abdominal tenderness.      Hernia: No hernia is present.   Genitourinary:     Penis: Normal and circumcised.       Testes: Normal.   Musculoskeletal:         General: No deformity. Normal range of motion.      Cervical back: Neck supple.    Skin:     General: Skin is warm.      Capillary Refill: Capillary refill takes less than 2 seconds.      Findings: No rash.   Neurological:      General: No focal deficit present.      Mental Status: He is alert.      Motor: No weakness.      Gait: Gait normal.         Assessment   Problem List Items Addressed This Visit        Other    Encounter for routine child health examination without abnormal findings - Primary    Relevant Orders    CBC WITH DIFFERENTIAL    LEAD BLOOD/VENOUS    SICKLING SCREEN W/O REFLEX    Need for vaccination    Relevant Orders    HEPATITIS A VACCINE PEDIATRIC / ADOLESCENT 2 DOSE IM (Completed)          Screening tests  ASQ Score: score in chart  MCHAT-R Score: score in chart  Developmental milestones were reviewed and were: normal based on age    Immunizations: per orders.  History of previous adverse reactions to immunizations? no  Immunizations given today and vaccine counseling including benefits, risks, and adverse reactions were provided by myself during the visit.    Well-Child Checkup: 18 Months     Put latches on cabinet doors to help keep your child safe.    At the 18-month checkup, your healthcare provider will examine your child and ask how it’s going at home. This sheet describes some of what you can expect.   Development and milestones  The healthcare provider will ask questions about your child. He or she will observe your toddler to get an idea of the child’s development. By this visit, your child is likely doing some of the following:   · Pointing at things so you know what he or she wants  · Shaking head to mean \"no\"  · Using a spoon  · Drinking from a cup  · Following 1-step commands (such as \"please bring me a toy\")  · Walking alone, and may be running  · Becoming more stubborn. For example, crying for no apparent reason, getting angry, or acting out.  · Being afraid of strangers  Feeding tips  You may have noticed your child becoming pickier about food. This is  normal. How much your child eats at one meal or in one day is less important than the pattern over a few days or weeks. It’s also normal for a child of this age to thin out and look leaner, as long as he or she isn’t losing weight. If you have concerns about your child’s weight or eating habits, bring these up with the healthcare provider. Here are some tips for feeding your child:   · Keep serving a variety of finger foods at meals. Don't give up on offering new foods. It often takes several tries before a child starts to like a new taste.  · If your child is hungry between meals, offer healthy foods. Cut-up vegetables and fruit, cheese, peanut butter, and crackers are good choices. Save snack foods, such as chips or cookies, for a special treat.  · Your child may prefer to eat small amounts often throughout the day instead of sitting down for a full meal. This is normal.  · Don’t force your child to eat. A child of this age will eat when hungry. He or she will likely eat more some days than others.  · Your child should drink less of whole milk each day. Most calories should be from solid foods.  · Besides drinking milk, water is best. Limit fruit juice. It should be 100% juice. You can also add water to the juice. And don’t give your toddler soda.  · Don’t let your child walk around with food or bottles. This is a choking risk and can also lead to overeating as your child gets older.  Hygiene tips  · Brush your child’s teeth at least once a day. Twice a day is ideal, such as after breakfast and before bed. Use a small amount of fluoride toothpaste, no larger than a grain of rice. Use a baby’s toothbrush with soft bristles.  · Ask the healthcare provider when your child should have his or her first dental visit. Most pediatric dentists recommend that the first dental visit happen within 6 months after the first tooth erupts above the gums, but no later than the child's first birthday.     Sleeping tips  By 18  months of age, your child may be down to 1 nap and is likely sleeping about 10 to 12 hours at night. If he or she sleeps more or less than this but seems healthy, it’s not a concern. To help your child sleep:   · See that your child gets enough physical activity during the day. This helps your child sleep well. Talk with the healthcare provider if you need ideas for active types of play.  · Follow a bedtime routine each night, such as brushing teeth followed by reading a book. Try to stick to the same bedtime each night.  · Don't put your child to bed with anything to drink.  · If getting your child to sleep through the night is a problem, ask the healthcare provider for tips.  Safety tips  Recommendations for keeping your child safe include:    · Don’t let your child play outdoors without supervision. Teach caution around cars. Your child should always hold an adult’s hand when crossing the street or in a parking lot.  · Protect your toddler from falls with sturdy screens on windows and ng at the tops and bottoms of staircases. Supervise the child on the stairs.  · If you have a swimming pool, it should be fenced. Ng or doors leading to the pool should be closed and locked.  · At this age, children are very curious. They are likely to get into items that can be dangerous. Keep latches on cabinets. Keep products like cleansers and medicines out of reach.  · Watch out for items that are small enough to choke on. As a rule, an item small enough to fit inside a toilet paper tube can cause a child to choke.  · In the car, always put your child in a car seat in the back seat. Babies and toddlers should ride in a rear-facing car safety seat for as long as possible,. That means until they reach the top weight or height allowed by their seat. Check your safety seat instructions. Most convertible safety seats have height and weight limits that will allow children to ride rear-facing for 2 years or more.  · Teach your  child to be gentle and cautious with dogs, cats, and other animals. Always supervise your child around animals, even familiar family pets.  · Keep this Poison Control phone number in an easy-to-see place, such as on the refrigerator: 291.986.6743.  Vaccines  Based on recommendations from the CDC, at this visit your child may receive the following vaccines:   · Diphtheria, tetanus, and pertussis  · Hepatitis A  · Hepatitis B  · Influenza (flu)  · Polio  Get ready for the “terrible twos”  You’ve probably heard stories about the “terrible twos.” Many children become fussier and harder to handle at around age 2. In fact, you may have started to notice behavior changes already. Here’s some of what you can expect, and tips for coping:   · Your child will become more independent and more stubborn. It’s common to test limits, to see just how much he or she can get away with. You may hear the word “no” a lot, even when the child seems to mean yes! Be clear and consistent. Keep in mind that you’re the parent, and you make the rules. Remember, you're the adult, so try to maintain a calm temper even when your child is having a tantrum.  · This is an age when children often don’t have the words to ask for what they want. Instead, they may respond with frustration. Your child may whine, cry, scream, kick, bite, or hit. Depending on the child’s personality, tantrums may be rare or often. Tantrums happen less as children learn how to express themselves with words. Most tantrums last only a few minutes. If your child’s tantrums last much longer than this, talk to the healthcare provider.  · Do your best to ignore a tantrum. See that the child is in a safe place and keep an eye on him or her. But don’t interact until the tantrum is over. This teaches the child that throwing a tantrum is not the way to get attention. Often moving your child to a private area away from the attention of others will help resolve the tantrum.   · Keep  your cool and try not to get angry. Remember, you’re the adult. Set a good example of how to behave when frustrated. Never hit or yell at your child during or after a tantrum.  · When you want your child to stop what he or she is doing, try distracting him or her with a new activity or object. You could also  the child and move him or her to another place.  · Choose your battles. Not everything is worth a fight. An issue is most important if the health or safety of your child or another child is at risk.  · Talk with the healthcare provider for other tips on dealing with your child’s behavior.  [employer] last reviewed this educational content on 5/1/2020  © 6964-0185 [employer]. All rights reserved. This information is not intended as a substitute for professional medical care. Always follow your healthcare professional's instructions.         ANTICIPATORY GUIDANCE BASED ON ABOVE HO PROVIDED TO PATIENT/PARENT DISCUSSED IN DETAIL WITH PARENT/PATIENT    Follow-up visit in 6 months for the 24 month well child visit, or sooner as needed.   no

## 2021-10-10 NOTE — H&P ADULT - NSHPLABSRESULTS_GEN_ALL_CORE
LABS:                        13.9   8.46  )-----------( 212      ( 10 Oct 2021 12:03 )             39.0     10-10    138  |  104  |  14  ----------------------------<  106<H>  3.6   |  24  |  0.85    Ca    8.1<L>      10 Oct 2021 12:03  Mg     2.1     10-10    TPro  8.4<H>  /  Alb  3.5  /  TBili  1.1  /  DBili  x   /  AST  79<H>  /  ALT  86<H>  /  AlkPhos  81  10-10    PT/INR - ( 10 Oct 2021 12:03 )   PT: 11.5 sec;   INR: 0.99 ratio         PTT - ( 10 Oct 2021 12:03 )  PTT:30.5 sec  Urinalysis Basic - ( 10 Oct 2021 12:04 )    Color: Yellow / Appearance: Clear / S.005 / pH: x  Gluc: x / Ketone: Negative  / Bili: Negative / Urobili: Negative mg/dL   Blood: x / Protein: Negative mg/dL / Nitrite: Negative   Leuk Esterase: Negative / RBC: x / WBC x   Sq Epi: x / Non Sq Epi: x / Bacteria: x      CAPILLARY BLOOD GLUCOSE      POCT Blood Glucose.: 105 mg/dL (10 Oct 2021 11:42)    CARDIAC MARKERS ( 10 Oct 2021 12:03 )  <.015 ng/mL / x     / x     / x     / x      Lactate, Blood: 2.8: Elevated lactate. Consider ordering follow-up lactate to trend. mmol/L (10.10.21 @ 12:03)   Alcohol, Blood: 230: TOXIC CONCENTRATIONS (mg/dL):   Procalcitonin, Serum: 0.05: Test      < from: Xray Chest 1 View-PORTABLE IMMEDIATE (Xray Chest 1 View-PORTABLE IMMEDIATE .) (10.10.21 @ 12:38) >    COMPARISON: 2019    The cardiomediastinal silhouette is normal and the mike are not enlarged. The trachea is midline. There is no focal infiltrate or pleural effusion. The osseous structures are intact. Degenerative changes of the visualized spine.    IMPRESSION:    Unremarkable frontal chest x ray    < end of copied text >

## 2021-10-10 NOTE — ED ADULT NURSE NOTE - OBJECTIVE STATEMENT
55 y/o male comes in with alcohol withdrawal, CIWA score 19. PMH of R eye blindness, alcohol dependence, HTN, glaucoma. pt appears diaphoretic and anxious. tremors noted. wound noted on L lower extremity. denies chest pain or sob.

## 2021-10-10 NOTE — H&P ADULT - HISTORY OF PRESENT ILLNESS
57yo,  with h/o Alcohol Use, HTN presents with  weakness and feeling ill for several days.  Notes  didn't want to drink this morning but had to because he was going to withdrawal and drinks beers and vodka. States  heavy drinking last night, nothing much this morning. Notes N/V and shaking.  Also noted increasing redness, and pain in legs for weeks.  Denies CP, SOB, PND, cough, palpitation,, fever, chills,  abdominal pain ,dysuria ,HA ,dizziness.

## 2021-10-11 LAB
ALBUMIN SERPL ELPH-MCNC: 2.6 G/DL — LOW (ref 3.3–5)
ALP SERPL-CCNC: 61 U/L — SIGNIFICANT CHANGE UP (ref 40–120)
ALT FLD-CCNC: 75 U/L — SIGNIFICANT CHANGE UP (ref 12–78)
ANION GAP SERPL CALC-SCNC: 6 MMOL/L — SIGNIFICANT CHANGE UP (ref 5–17)
AST SERPL-CCNC: 105 U/L — HIGH (ref 15–37)
BILIRUB SERPL-MCNC: 1.3 MG/DL — HIGH (ref 0.2–1.2)
BUN SERPL-MCNC: 8 MG/DL — SIGNIFICANT CHANGE UP (ref 7–23)
CALCIUM SERPL-MCNC: 6.8 MG/DL — LOW (ref 8.5–10.1)
CHLORIDE SERPL-SCNC: 106 MMOL/L — SIGNIFICANT CHANGE UP (ref 96–108)
CO2 SERPL-SCNC: 28 MMOL/L — SIGNIFICANT CHANGE UP (ref 22–31)
COVID-19 SPIKE DOMAIN AB INTERP: POSITIVE
COVID-19 SPIKE DOMAIN ANTIBODY RESULT: >250 U/ML — HIGH
CREAT SERPL-MCNC: 0.6 MG/DL — SIGNIFICANT CHANGE UP (ref 0.5–1.3)
GLUCOSE SERPL-MCNC: 92 MG/DL — SIGNIFICANT CHANGE UP (ref 70–99)
HCT VFR BLD CALC: 33 % — LOW (ref 39–50)
HGB BLD-MCNC: 11.5 G/DL — LOW (ref 13–17)
LACTATE SERPL-SCNC: 0.5 MMOL/L — LOW (ref 0.7–2)
MCHC RBC-ENTMCNC: 30.9 PG — SIGNIFICANT CHANGE UP (ref 27–34)
MCHC RBC-ENTMCNC: 34.8 GM/DL — SIGNIFICANT CHANGE UP (ref 32–36)
MCV RBC AUTO: 88.7 FL — SIGNIFICANT CHANGE UP (ref 80–100)
NRBC # BLD: 0 /100 WBCS — SIGNIFICANT CHANGE UP (ref 0–0)
PLATELET # BLD AUTO: 143 K/UL — LOW (ref 150–400)
POTASSIUM SERPL-MCNC: 3.1 MMOL/L — LOW (ref 3.5–5.3)
POTASSIUM SERPL-SCNC: 3.1 MMOL/L — LOW (ref 3.5–5.3)
PROT SERPL-MCNC: 6.6 GM/DL — SIGNIFICANT CHANGE UP (ref 6–8.3)
RBC # BLD: 3.72 M/UL — LOW (ref 4.2–5.8)
RBC # FLD: 13.2 % — SIGNIFICANT CHANGE UP (ref 10.3–14.5)
SARS-COV-2 IGG+IGM SERPL QL IA: >250 U/ML — HIGH
SARS-COV-2 IGG+IGM SERPL QL IA: POSITIVE
SODIUM SERPL-SCNC: 140 MMOL/L — SIGNIFICANT CHANGE UP (ref 135–145)
WBC # BLD: 4.85 K/UL — SIGNIFICANT CHANGE UP (ref 3.8–10.5)
WBC # FLD AUTO: 4.85 K/UL — SIGNIFICANT CHANGE UP (ref 3.8–10.5)

## 2021-10-11 RX ORDER — ACETAMINOPHEN 500 MG
650 TABLET ORAL EVERY 6 HOURS
Refills: 0 | Status: DISCONTINUED | OUTPATIENT
Start: 2021-10-11 | End: 2021-10-14

## 2021-10-11 RX ORDER — POTASSIUM CHLORIDE 20 MEQ
40 PACKET (EA) ORAL EVERY 4 HOURS
Refills: 0 | Status: COMPLETED | OUTPATIENT
Start: 2021-10-11 | End: 2021-10-11

## 2021-10-11 RX ADMIN — Medication 1.5 MILLIGRAM(S): at 22:13

## 2021-10-11 RX ADMIN — Medication 1 MILLIGRAM(S): at 11:46

## 2021-10-11 RX ADMIN — Medication 2 MILLIGRAM(S): at 01:10

## 2021-10-11 RX ADMIN — Medication 1 DROP(S): at 11:47

## 2021-10-11 RX ADMIN — Medication 1 DROP(S): at 05:55

## 2021-10-11 RX ADMIN — Medication 40 MILLIEQUIVALENT(S): at 11:46

## 2021-10-11 RX ADMIN — AMPICILLIN SODIUM AND SULBACTAM SODIUM 200 GRAM(S): 250; 125 INJECTION, POWDER, FOR SUSPENSION INTRAMUSCULAR; INTRAVENOUS at 01:10

## 2021-10-11 RX ADMIN — ENOXAPARIN SODIUM 40 MILLIGRAM(S): 100 INJECTION SUBCUTANEOUS at 11:46

## 2021-10-11 RX ADMIN — AMPICILLIN SODIUM AND SULBACTAM SODIUM 200 GRAM(S): 250; 125 INJECTION, POWDER, FOR SUSPENSION INTRAMUSCULAR; INTRAVENOUS at 11:46

## 2021-10-11 RX ADMIN — AMPICILLIN SODIUM AND SULBACTAM SODIUM 200 GRAM(S): 250; 125 INJECTION, POWDER, FOR SUSPENSION INTRAMUSCULAR; INTRAVENOUS at 05:55

## 2021-10-11 RX ADMIN — AMPICILLIN SODIUM AND SULBACTAM SODIUM 200 GRAM(S): 250; 125 INJECTION, POWDER, FOR SUSPENSION INTRAMUSCULAR; INTRAVENOUS at 17:24

## 2021-10-11 RX ADMIN — Medication 1 TABLET(S): at 11:46

## 2021-10-11 RX ADMIN — Medication 650 MILLIGRAM(S): at 19:37

## 2021-10-11 RX ADMIN — SODIUM CHLORIDE 100 MILLILITER(S): 9 INJECTION INTRAMUSCULAR; INTRAVENOUS; SUBCUTANEOUS at 22:14

## 2021-10-11 RX ADMIN — Medication 1.5 MILLIGRAM(S): at 14:48

## 2021-10-11 RX ADMIN — Medication 1 DROP(S): at 22:13

## 2021-10-11 RX ADMIN — Medication 650 MILLIGRAM(S): at 08:23

## 2021-10-11 RX ADMIN — LATANOPROST 1 DROP(S): 0.05 SOLUTION/ DROPS OPHTHALMIC; TOPICAL at 22:14

## 2021-10-11 RX ADMIN — SODIUM CHLORIDE 100 MILLILITER(S): 9 INJECTION INTRAMUSCULAR; INTRAVENOUS; SUBCUTANEOUS at 11:47

## 2021-10-11 RX ADMIN — Medication 2 MILLIGRAM(S): at 11:46

## 2021-10-11 RX ADMIN — Medication 650 MILLIGRAM(S): at 20:22

## 2021-10-11 RX ADMIN — PANTOPRAZOLE SODIUM 40 MILLIGRAM(S): 20 TABLET, DELAYED RELEASE ORAL at 11:46

## 2021-10-11 RX ADMIN — Medication 650 MILLIGRAM(S): at 09:23

## 2021-10-11 RX ADMIN — Medication 2 MILLIGRAM(S): at 05:55

## 2021-10-11 RX ADMIN — Medication 100 MILLIGRAM(S): at 11:47

## 2021-10-11 RX ADMIN — Medication 40 MILLIEQUIVALENT(S): at 08:24

## 2021-10-11 RX ADMIN — AMPICILLIN SODIUM AND SULBACTAM SODIUM 200 GRAM(S): 250; 125 INJECTION, POWDER, FOR SUSPENSION INTRAMUSCULAR; INTRAVENOUS at 23:16

## 2021-10-11 RX ADMIN — Medication 1.5 MILLIGRAM(S): at 17:23

## 2021-10-12 LAB
ANION GAP SERPL CALC-SCNC: 5 MMOL/L — SIGNIFICANT CHANGE UP (ref 5–17)
BUN SERPL-MCNC: 5 MG/DL — LOW (ref 7–23)
CALCIUM SERPL-MCNC: 7.7 MG/DL — LOW (ref 8.5–10.1)
CHLORIDE SERPL-SCNC: 107 MMOL/L — SIGNIFICANT CHANGE UP (ref 96–108)
CO2 SERPL-SCNC: 27 MMOL/L — SIGNIFICANT CHANGE UP (ref 22–31)
CREAT SERPL-MCNC: 0.4 MG/DL — LOW (ref 0.5–1.3)
GLUCOSE SERPL-MCNC: 115 MG/DL — HIGH (ref 70–99)
HCT VFR BLD CALC: 34 % — LOW (ref 39–50)
HGB BLD-MCNC: 11.4 G/DL — LOW (ref 13–17)
MCHC RBC-ENTMCNC: 30.7 PG — SIGNIFICANT CHANGE UP (ref 27–34)
MCHC RBC-ENTMCNC: 33.5 GM/DL — SIGNIFICANT CHANGE UP (ref 32–36)
MCV RBC AUTO: 91.6 FL — SIGNIFICANT CHANGE UP (ref 80–100)
NRBC # BLD: 0 /100 WBCS — SIGNIFICANT CHANGE UP (ref 0–0)
PLATELET # BLD AUTO: 147 K/UL — LOW (ref 150–400)
POTASSIUM SERPL-MCNC: 3.2 MMOL/L — LOW (ref 3.5–5.3)
POTASSIUM SERPL-SCNC: 3.2 MMOL/L — LOW (ref 3.5–5.3)
RBC # BLD: 3.71 M/UL — LOW (ref 4.2–5.8)
RBC # FLD: 13.2 % — SIGNIFICANT CHANGE UP (ref 10.3–14.5)
SODIUM SERPL-SCNC: 139 MMOL/L — SIGNIFICANT CHANGE UP (ref 135–145)
WBC # BLD: 3.83 K/UL — SIGNIFICANT CHANGE UP (ref 3.8–10.5)
WBC # FLD AUTO: 3.83 K/UL — SIGNIFICANT CHANGE UP (ref 3.8–10.5)

## 2021-10-12 RX ORDER — POTASSIUM CHLORIDE 20 MEQ
40 PACKET (EA) ORAL EVERY 4 HOURS
Refills: 0 | Status: COMPLETED | OUTPATIENT
Start: 2021-10-12 | End: 2021-10-12

## 2021-10-12 RX ADMIN — Medication 40 MILLIEQUIVALENT(S): at 11:52

## 2021-10-12 RX ADMIN — Medication 1 DROP(S): at 22:09

## 2021-10-12 RX ADMIN — Medication 1 MILLIGRAM(S): at 11:52

## 2021-10-12 RX ADMIN — Medication 1.5 MILLIGRAM(S): at 11:52

## 2021-10-12 RX ADMIN — Medication 1 DROP(S): at 11:53

## 2021-10-12 RX ADMIN — Medication 40 MILLIEQUIVALENT(S): at 17:46

## 2021-10-12 RX ADMIN — AMPICILLIN SODIUM AND SULBACTAM SODIUM 200 GRAM(S): 250; 125 INJECTION, POWDER, FOR SUSPENSION INTRAMUSCULAR; INTRAVENOUS at 23:19

## 2021-10-12 RX ADMIN — PANTOPRAZOLE SODIUM 40 MILLIGRAM(S): 20 TABLET, DELAYED RELEASE ORAL at 11:52

## 2021-10-12 RX ADMIN — Medication 1.5 MILLIGRAM(S): at 06:16

## 2021-10-12 RX ADMIN — Medication 1 MILLIGRAM(S): at 17:46

## 2021-10-12 RX ADMIN — SODIUM CHLORIDE 100 MILLILITER(S): 9 INJECTION INTRAMUSCULAR; INTRAVENOUS; SUBCUTANEOUS at 23:21

## 2021-10-12 RX ADMIN — Medication 1 DROP(S): at 06:17

## 2021-10-12 RX ADMIN — Medication 1.5 MILLIGRAM(S): at 02:12

## 2021-10-12 RX ADMIN — Medication 100 MILLIGRAM(S): at 11:52

## 2021-10-12 RX ADMIN — Medication 650 MILLIGRAM(S): at 07:25

## 2021-10-12 RX ADMIN — Medication 1 TABLET(S): at 11:52

## 2021-10-12 RX ADMIN — AMPICILLIN SODIUM AND SULBACTAM SODIUM 200 GRAM(S): 250; 125 INJECTION, POWDER, FOR SUSPENSION INTRAMUSCULAR; INTRAVENOUS at 06:17

## 2021-10-12 RX ADMIN — Medication 1 MILLIGRAM(S): at 21:59

## 2021-10-12 RX ADMIN — AMPICILLIN SODIUM AND SULBACTAM SODIUM 200 GRAM(S): 250; 125 INJECTION, POWDER, FOR SUSPENSION INTRAMUSCULAR; INTRAVENOUS at 17:46

## 2021-10-12 RX ADMIN — ENOXAPARIN SODIUM 40 MILLIGRAM(S): 100 INJECTION SUBCUTANEOUS at 11:53

## 2021-10-12 RX ADMIN — Medication 1 MILLIGRAM(S): at 15:26

## 2021-10-12 RX ADMIN — LATANOPROST 1 DROP(S): 0.05 SOLUTION/ DROPS OPHTHALMIC; TOPICAL at 22:00

## 2021-10-12 RX ADMIN — Medication 650 MILLIGRAM(S): at 06:25

## 2021-10-12 RX ADMIN — AMPICILLIN SODIUM AND SULBACTAM SODIUM 200 GRAM(S): 250; 125 INJECTION, POWDER, FOR SUSPENSION INTRAMUSCULAR; INTRAVENOUS at 11:53

## 2021-10-13 ENCOUNTER — TRANSCRIPTION ENCOUNTER (OUTPATIENT)
Age: 56
End: 2021-10-13

## 2021-10-13 LAB
ANION GAP SERPL CALC-SCNC: 4 MMOL/L — LOW (ref 5–17)
BUN SERPL-MCNC: 5 MG/DL — LOW (ref 7–23)
CALCIUM SERPL-MCNC: 7.8 MG/DL — LOW (ref 8.5–10.1)
CHLORIDE SERPL-SCNC: 109 MMOL/L — HIGH (ref 96–108)
CO2 SERPL-SCNC: 28 MMOL/L — SIGNIFICANT CHANGE UP (ref 22–31)
CREAT SERPL-MCNC: 0.74 MG/DL — SIGNIFICANT CHANGE UP (ref 0.5–1.3)
GLUCOSE SERPL-MCNC: 116 MG/DL — HIGH (ref 70–99)
HCT VFR BLD CALC: 34.4 % — LOW (ref 39–50)
HGB BLD-MCNC: 11.8 G/DL — LOW (ref 13–17)
MCHC RBC-ENTMCNC: 31.3 PG — SIGNIFICANT CHANGE UP (ref 27–34)
MCHC RBC-ENTMCNC: 34.3 GM/DL — SIGNIFICANT CHANGE UP (ref 32–36)
MCV RBC AUTO: 91.2 FL — SIGNIFICANT CHANGE UP (ref 80–100)
NRBC # BLD: 0 /100 WBCS — SIGNIFICANT CHANGE UP (ref 0–0)
PLATELET # BLD AUTO: 154 K/UL — SIGNIFICANT CHANGE UP (ref 150–400)
POTASSIUM SERPL-MCNC: 4.5 MMOL/L — SIGNIFICANT CHANGE UP (ref 3.5–5.3)
POTASSIUM SERPL-SCNC: 4.5 MMOL/L — SIGNIFICANT CHANGE UP (ref 3.5–5.3)
RBC # BLD: 3.77 M/UL — LOW (ref 4.2–5.8)
RBC # FLD: 13.6 % — SIGNIFICANT CHANGE UP (ref 10.3–14.5)
SODIUM SERPL-SCNC: 141 MMOL/L — SIGNIFICANT CHANGE UP (ref 135–145)
WBC # BLD: 4.9 K/UL — SIGNIFICANT CHANGE UP (ref 3.8–10.5)
WBC # FLD AUTO: 4.9 K/UL — SIGNIFICANT CHANGE UP (ref 3.8–10.5)

## 2021-10-13 RX ORDER — POTASSIUM CHLORIDE 20 MEQ
20 PACKET (EA) ORAL
Refills: 0 | Status: COMPLETED | OUTPATIENT
Start: 2021-10-13 | End: 2021-10-13

## 2021-10-13 RX ADMIN — Medication 1 MILLIGRAM(S): at 12:40

## 2021-10-13 RX ADMIN — Medication 650 MILLIGRAM(S): at 16:26

## 2021-10-13 RX ADMIN — Medication 0.5 MILLIGRAM(S): at 14:19

## 2021-10-13 RX ADMIN — Medication 0.5 MILLIGRAM(S): at 18:02

## 2021-10-13 RX ADMIN — Medication 20 MILLIEQUIVALENT(S): at 12:54

## 2021-10-13 RX ADMIN — LATANOPROST 1 DROP(S): 0.05 SOLUTION/ DROPS OPHTHALMIC; TOPICAL at 21:37

## 2021-10-13 RX ADMIN — ENOXAPARIN SODIUM 40 MILLIGRAM(S): 100 INJECTION SUBCUTANEOUS at 12:40

## 2021-10-13 RX ADMIN — Medication 1 TABLET(S): at 12:40

## 2021-10-13 RX ADMIN — SODIUM CHLORIDE 100 MILLILITER(S): 9 INJECTION INTRAMUSCULAR; INTRAVENOUS; SUBCUTANEOUS at 18:19

## 2021-10-13 RX ADMIN — Medication 1 MILLIGRAM(S): at 10:02

## 2021-10-13 RX ADMIN — Medication 100 MILLIGRAM(S): at 12:40

## 2021-10-13 RX ADMIN — Medication 1 MILLIGRAM(S): at 02:43

## 2021-10-13 RX ADMIN — Medication 1 DROP(S): at 05:29

## 2021-10-13 RX ADMIN — SODIUM CHLORIDE 100 MILLILITER(S): 9 INJECTION INTRAMUSCULAR; INTRAVENOUS; SUBCUTANEOUS at 08:52

## 2021-10-13 RX ADMIN — Medication 650 MILLIGRAM(S): at 15:56

## 2021-10-13 RX ADMIN — Medication 1 DROP(S): at 14:24

## 2021-10-13 RX ADMIN — AMPICILLIN SODIUM AND SULBACTAM SODIUM 200 GRAM(S): 250; 125 INJECTION, POWDER, FOR SUSPENSION INTRAMUSCULAR; INTRAVENOUS at 18:02

## 2021-10-13 RX ADMIN — Medication 20 MILLIEQUIVALENT(S): at 08:52

## 2021-10-13 RX ADMIN — Medication 0.5 MILLIGRAM(S): at 21:36

## 2021-10-13 RX ADMIN — AMPICILLIN SODIUM AND SULBACTAM SODIUM 200 GRAM(S): 250; 125 INJECTION, POWDER, FOR SUSPENSION INTRAMUSCULAR; INTRAVENOUS at 12:40

## 2021-10-13 RX ADMIN — Medication 20 MILLIEQUIVALENT(S): at 10:02

## 2021-10-13 RX ADMIN — PANTOPRAZOLE SODIUM 40 MILLIGRAM(S): 20 TABLET, DELAYED RELEASE ORAL at 12:54

## 2021-10-13 RX ADMIN — Medication 1 DROP(S): at 21:37

## 2021-10-13 RX ADMIN — AMPICILLIN SODIUM AND SULBACTAM SODIUM 200 GRAM(S): 250; 125 INJECTION, POWDER, FOR SUSPENSION INTRAMUSCULAR; INTRAVENOUS at 05:29

## 2021-10-13 RX ADMIN — Medication 1 MILLIGRAM(S): at 05:29

## 2021-10-13 NOTE — DISCHARGE NOTE PROVIDER - CARE PROVIDER_API CALL
[place] [address]  Phone: [phone]  Fax: [phone]  Follow Up Time:

## 2021-10-13 NOTE — DISCHARGE NOTE PROVIDER - HOSPITAL COURSE
55yo,  with h/o Alcohol Use, HTN presents with  weakness and feeling ill for several days.  Notes  didn't want to drink this morning but had to because he was going to withdrawal and drinks beers and vodka. States  heavy drinking last night, nothing much this morning. Notes N/V and shaking.  Also noted increasing redness, and pain in legs for weeks.  Denies CP, SOB, PND, cough, palpitation,, fever, chills,  abdominal pain ,dysuria ,HA ,dizziness.     In ED: IV fluids, thiamine, phenobarb, b/l LE cellulitis, start abx, admit ekg as nsr rate 88, no ST elevation/depression, qtc 462, narrow qrs. normal axis.    CIWA protocol, ativan taper, LLE Cellulitis on Unasyn, 57yo,  with h/o Alcohol Use, HTN presents with  weakness and feeling ill for several days.  Notes  didn't want to drink this morning but had to because he was going to withdrawal and drinks beers and vodka. States  heavy drinking last night, nothing much this morning. Notes N/V and shaking.  Also noted increasing redness, and pain in legs for weeks.  Denies CP, SOB, PND, cough, palpitation,, fever, chills,  abdominal pain ,dysuria ,HA ,dizziness.     In ED: IV fluids, thiamine, phenobarb, b/l LE cellulitis, start abx, admit ekg as nsr rate 88, no ST elevation/depression, qtc 462, narrow qrs. normal axis.    CIWA protocol, ativan taper, LLE Cellulitis on Unasyn for 3 days, will be discharged on Keflex -      55yo,  with h/o Alcohol Use, HTN presents with  weakness and feeling ill for several days.  Notes  didn't want to drink this morning but had to because he was going to withdrawal and drinks beers and vodka. States  heavy drinking last night, nothing much this morning. Notes N/V and shaking.  Also noted increasing redness, and pain in legs for weeks.  Denies CP, SOB, PND, cough, palpitation,, fever, chills,  abdominal pain ,dysuria ,HA ,dizziness.     In ED: IV fluids, thiamine, phenobarb, b/l LE cellulitis, start abx, admit ekg as nsr rate 88, no ST elevation/depression, qtc 462, narrow qrs. normal axis.    CIWA protocol, ativan taper, LLE Cellulitis on Unasyn for 3 days, will be discharged on Keflex 500mg QID for 1 week

## 2021-10-13 NOTE — DISCHARGE NOTE PROVIDER - NSDCCPCAREPLAN_GEN_ALL_CORE_FT
PRINCIPAL DISCHARGE DIAGNOSIS  Diagnosis: Uncomplicated alcohol withdrawal  Assessment and Plan of Treatment: CIWA, thiamine, folate        SECONDARY DISCHARGE DIAGNOSES  Diagnosis: Cellulitis, leg  Assessment and Plan of Treatment:

## 2021-10-13 NOTE — DISCHARGE NOTE PROVIDER - NSDCFUADDAPPT_GEN_ALL_CORE_FT
It is important to see your primary physician as well as the physicians noted below within the next week to perform a comprehensive medical review.  Call their offices for an appointment as soon as you leave the hospital.  You will also need to see them for renewal of your medications.  If you do not have a primary physician, contact the [facility] Physician Referral Service at [phone].  To obtain your results, you can access the [facility] Patient Portal at [email or web address].  Your medical issues appear to be stable at this time, but if your symptoms recur or worsen, contact your physicians and/or return to the hospital if necessary.  If you encounter any issues or questions with your medication, call your physicians before stopping the medication.

## 2021-10-13 NOTE — DISCHARGE NOTE PROVIDER - NSDCMRMEDTOKEN_GEN_ALL_CORE_FT
calamine topical lotion: 1 application topically 3 times a day  Cosopt 2.23%-0.68% ophthalmic solution: 1 drop(s) to each affected eye 2 times a day  donepezil 5 mg oral tablet: 1 tab(s) orally once a day (at bedtime)  folic acid 1 mg oral tablet: 1 tab(s) orally once a day  latanoprost 0.005% ophthalmic solution: 1 drop(s) to each affected eye once a day (in the evening)  Multiple Vitamins oral tablet: 1 tab(s) orally once a day  Systane ophthalmic solution: 1 drop(s) to each affected eye 3 times a day  2 -3 times daily as needed for dry eyes   thiamine 100 mg oral tablet: 1 tab(s) orally once a day  triamcinolone 0.1% topical ointment: 1 application topically 2 times a day, As needed, For itching  Vitamin D3 50 mcg (2000 intl units) oral tablet: 1 tab(s) orally once a day   calamine topical lotion: 1 application topically 3 times a day  Cosopt 2.23%-0.68% ophthalmic solution: 1 drop(s) to each affected eye 2 times a day  donepezil 5 mg oral tablet: 1 tab(s) orally once a day (at bedtime)  folic acid 1 mg oral tablet: 1 tab(s) orally once a day  latanoprost 0.005% ophthalmic solution: 1 drop(s) to each affected eye once a day (in the evening)  Multiple Vitamins oral tablet: 1 tab(s) orally once a day  ocular lubricant ophthalmic solution: 1 drop(s) to each affected eye 3 times a day  thiamine 100 mg oral tablet: 1 tab(s) orally once a day  triamcinolone 0.1% topical ointment: 1 application topically 2 times a day, As needed, For itching  Vitamin D3 50 mcg (2000 intl units) oral tablet: 1 tab(s) orally once a day

## 2021-10-13 NOTE — PROGRESS NOTE ADULT - PROBLEM SELECTOR PROBLEM 1
Alcohol withdrawal syndrome, uncomplicated

## 2021-10-13 NOTE — DISCHARGE NOTE PROVIDER - NSDCACTIVITY_GEN_ALL_CORE
SW  Note. Pt has no insurance. Pt completed [place] application with [facility], per report the application has been faxed to [facility]. Rosemarie COSME, [employer], [facility] [phone]     No restrictions

## 2021-10-13 NOTE — PROGRESS NOTE ADULT - SUBJECTIVE AND OBJECTIVE BOX
Patient is a 56y old  Male who presents with a chief complaint of Weakness and tremors (11 Oct 2021 13:25)      SUBJECTIVE/OBJECTIVE:    Without complaints. Patient resting comfortably.     MEDICATIONS  (STANDING):  ampicillin/sulbactam  IVPB 3 Gram(s) IV Intermittent every 6 hours  artificial  tears Solution 1 Drop(s) Both EYES three times a day  enoxaparin Injectable 40 milliGRAM(s) SubCutaneous daily  folic acid 1 milliGRAM(s) Oral daily  influenza   Vaccine 0.5 milliLiter(s) IntraMuscular once  latanoprost 0.005% Ophthalmic Solution 1 Drop(s) Both EYES at bedtime  LORazepam   Injectable   IV Push   LORazepam   Injectable 1 milliGRAM(s) IV Push every 4 hours  multivitamin 1 Tablet(s) Oral daily  pantoprazole  Injectable 40 milliGRAM(s) IV Push daily  potassium chloride    Tablet ER 40 milliEquivalent(s) Oral every 4 hours  sodium chloride 0.9%. 1000 milliLiter(s) (100 mL/Hr) IV Continuous <Continuous>  thiamine 100 milliGRAM(s) Oral daily    MEDICATIONS  (PRN):  acetaminophen   Tablet .. 650 milliGRAM(s) Oral every 6 hours PRN Mild Pain (1 - 3), Moderate Pain (4 - 6)  ondansetron Injectable 4 milliGRAM(s) IV Push every 6 hours PRN Nausea and/or Vomiting      Allergies    Ativan (Vomiting)    Intolerances          Vital Signs Last 24 Hrs  T(C): 36.6 (12 Oct 2021 12:06), Max: 37.5 (11 Oct 2021 16:55)  T(F): 97.9 (12 Oct 2021 12:06), Max: 99.5 (11 Oct 2021 16:55)  HR: 70 (12 Oct 2021 12:06) (68 - 82)  BP: 116/76 (12 Oct 2021 12:06) (110/71 - 125/77)  BP(mean): --  RR: 19 (12 Oct 2021 12:06) (18 - 19)  SpO2: 100% (12 Oct 2021 12:06) (96% - 100%)    PHYSICAL EXAM:  GENERAL: NAD, well-groomed, well-developed  HEAD:  Atraumatic, Normocephalic  EYES: EOMI, PERRLA, conjunctiva and sclera clear  ENMT: No tonsillar erythema, exudates, or enlargement; Moist mucous membranes, No lesions  NECK: Supple, No JVD, Normal thyroid  NERVOUS SYSTEM:  Alert & Oriented X3; Motor Strength 5/5 B/L; + Tremor  CHEST/LUNG: Clear bilaterally; No rales, rhonchi, wheezing, or rubs  HEART: Regular rate and rhythm; No murmurs, rubs, or gallops  ABDOMEN: Soft, Nontender, Nondistended; Bowel sounds present  EXTREMITIES:   No clubbing, cyanosis, or edema  SKIN: No rashes or lesions    LABS:                        11.4   3.83  )-----------( 147      ( 12 Oct 2021 07:33 )             34.0     10-12    139  |  107  |  5<L>  ----------------------------<  115<H>  3.2<L>   |  27  |  0.40<L>    Ca    7.7<L>      12 Oct 2021 07:33    TPro  6.6  /  Alb  2.6<L>  /  TBili  1.3<H>  /  DBili  x   /  AST  105<H>  /  ALT  75  /  AlkPhos  61  10-11        CAPILLARY BLOOD GLUCOSE              RADIOLOGY & ADDITIONAL TESTS:        Consultant(s) Notes Reviewed:  [ ] YES  [ ] NO  
Patient is a 56y old  Male who presents with a chief complaint of Weakness and tremors (12 Oct 2021 12:46)      SUBJECTIVE/OBJECTIVE:    Without complaints. Patient resting comfortably.     MEDICATIONS  (STANDING):  ampicillin/sulbactam  IVPB 3 Gram(s) IV Intermittent every 6 hours  artificial  tears Solution 1 Drop(s) Both EYES three times a day  enoxaparin Injectable 40 milliGRAM(s) SubCutaneous daily  folic acid 1 milliGRAM(s) Oral daily  influenza   Vaccine 0.5 milliLiter(s) IntraMuscular once  latanoprost 0.005% Ophthalmic Solution 1 Drop(s) Both EYES at bedtime  LORazepam   Injectable   IV Push   LORazepam   Injectable 0.5 milliGRAM(s) IV Push every 4 hours  multivitamin 1 Tablet(s) Oral daily  pantoprazole  Injectable 40 milliGRAM(s) IV Push daily  potassium chloride    Tablet ER 20 milliEquivalent(s) Oral every 2 hours  sodium chloride 0.9%. 1000 milliLiter(s) (100 mL/Hr) IV Continuous <Continuous>  thiamine 100 milliGRAM(s) Oral daily    MEDICATIONS  (PRN):  acetaminophen   Tablet .. 650 milliGRAM(s) Oral every 6 hours PRN Mild Pain (1 - 3), Moderate Pain (4 - 6)  ondansetron Injectable 4 milliGRAM(s) IV Push every 6 hours PRN Nausea and/or Vomiting      Allergies    Ativan (Vomiting)    Intolerances          Vital Signs Last 24 Hrs  T(C): 36.4 (13 Oct 2021 11:10), Max: 36.7 (12 Oct 2021 16:24)  T(F): 97.6 (13 Oct 2021 11:10), Max: 98 (12 Oct 2021 16:24)  HR: 65 (13 Oct 2021 11:10) (65 - 73)  BP: 112/73 (13 Oct 2021 11:10) (112/73 - 148/90)  BP(mean): --  RR: 18 (13 Oct 2021 11:10) (17 - 18)  SpO2: 98% (13 Oct 2021 11:10) (95% - 98%)    PHYSICAL EXAM:  GENERAL: NAD, well-groomed, well-developed  HEAD:  Atraumatic, Normocephalic  EYES: EOMI, PERRLA, conjunctiva and sclera clear  ENMT: No tonsillar erythema, exudates, or enlargement; Moist mucous membranes, No lesions  NECK: Supple, No JVD, Normal thyroid  NERVOUS SYSTEM:  Alert & Oriented X3; Motor Strength 5/5; Slight tremor  CHEST/LUNG: Clear bilaterally; No rales, rhonchi, wheezing, or rubs  HEART: Regular rate and rhythm; No murmurs, rubs, or gallops  ABDOMEN: Soft, Nontender, Nondistended; Bowel sounds present  EXTREMITIES:  2+ Peripheral Pulses, No clubbing, cyanosis, or edema  LYMPH: No lymphadenopathy noted  SKIN: No rashes or lesions    LABS:                        11.8   4.90  )-----------( 154      ( 13 Oct 2021 07:24 )             34.4     10-13    141  |  109<H>  |  5<L>  ----------------------------<  116<H>  4.5   |  28  |  0.74    Ca    7.8<L>      13 Oct 2021 07:24          CAPILLARY BLOOD GLUCOSE              RADIOLOGY & ADDITIONAL TESTS:        Consultant(s) Notes Reviewed:  [ ] YES  [ ] NO  
Patient is a 56y old  Male who presents with a chief complaint of Weakness and tremors (10 Oct 2021 18:17)      SUBJECTIVE/OBJECTIVE:      Without complaints. Patient resting comfortably.     MEDICATIONS  (STANDING):  ampicillin/sulbactam  IVPB 3 Gram(s) IV Intermittent every 6 hours  artificial  tears Solution 1 Drop(s) Both EYES three times a day  enoxaparin Injectable 40 milliGRAM(s) SubCutaneous daily  folic acid 1 milliGRAM(s) Oral daily  influenza   Vaccine 0.5 milliLiter(s) IntraMuscular once  latanoprost 0.005% Ophthalmic Solution 1 Drop(s) Both EYES at bedtime  LORazepam   Injectable   IV Push   LORazepam   Injectable 1.5 milliGRAM(s) IV Push every 4 hours  multivitamin 1 Tablet(s) Oral daily  pantoprazole  Injectable 40 milliGRAM(s) IV Push daily  sodium chloride 0.9%. 1000 milliLiter(s) (100 mL/Hr) IV Continuous <Continuous>  thiamine 100 milliGRAM(s) Oral daily    MEDICATIONS  (PRN):  acetaminophen   Tablet .. 650 milliGRAM(s) Oral every 6 hours PRN Mild Pain (1 - 3), Moderate Pain (4 - 6)  ondansetron Injectable 4 milliGRAM(s) IV Push every 6 hours PRN Nausea and/or Vomiting      Allergies    Ativan (Vomiting)    Intolerances          Vital Signs Last 24 Hrs  T(C): 36.8 (11 Oct 2021 10:54), Max: 37 (11 Oct 2021 04:33)  T(F): 98.2 (11 Oct 2021 10:54), Max: 98.6 (11 Oct 2021 04:33)  HR: 84 (11 Oct 2021 10:54) (75 - 84)  BP: 113/74 (11 Oct 2021 10:54) (105/70 - 137/78)  BP(mean): --  RR: 17 (11 Oct 2021 10:54) (17 - 20)  SpO2: 98% (11 Oct 2021 10:54) (95% - 99%)    PHYSICAL EXAM:      Physical Exam: PHYSICAL EXAM:  GENERAL: NAD, well-groomed, well-developed  HEAD:  Atraumatic, Normocephalic  EYES: PERRLA, conjunctiva and sclera clear  ENMT: Intact  NECK: Supple,   NERVOUS SYSTEM:  Alert & Oriented X3; Motor Strength 5/5; + Tremors  CHEST/LUNG: Clear bilaterally; No rales, rhonchi, wheezing, or rubs  HEART: Regular rate and rhythm; No murmurs, rubs, or gallops  ABDOMEN: Soft, Nontender, Nondistended; Bowel sounds present  EXTREMITIES:  2+ Peripheral Pulses, No clubbing, cyanosis.   2 +edema with erthyma and tenderness R/LLE  SKIN: Cellulitis    LABS:                        11.5   4.85  )-----------( 143      ( 11 Oct 2021 07:16 )             33.0     10-11    140  |  106  |  8   ----------------------------<  92  3.1<L>   |  28  |  0.60    Ca    6.8<L>      11 Oct 2021 07:16  Mg     2.1     10-10    TPro  6.6  /  Alb  2.6<L>  /  TBili  1.3<H>  /  DBili  x   /  AST  105<H>  /  ALT  75  /  AlkPhos  61  10-11    PT/INR - ( 10 Oct 2021 12:03 )   PT: 11.5 sec;   INR: 0.99 ratio         PTT - ( 10 Oct 2021 12:03 )  PTT:30.5 sec  Urinalysis Basic - ( 10 Oct 2021 12:04 )    Color: Yellow / Appearance: Clear / S.005 / pH: x  Gluc: x / Ketone: Negative  / Bili: Negative / Urobili: Negative mg/dL   Blood: x / Protein: Negative mg/dL / Nitrite: Negative   Leuk Esterase: Negative / RBC: x / WBC x   Sq Epi: x / Non Sq Epi: x / Bacteria: x      CAPILLARY BLOOD GLUCOSE        CARDIAC MARKERS ( 10 Oct 2021 12:03 )  <.015 ng/mL / x     / x     / x     / x            RADIOLOGY & ADDITIONAL TESTS:        Consultant(s) Notes Reviewed:  [ ] YES  [ ] NO

## 2021-10-13 NOTE — PROGRESS NOTE ADULT - PROBLEM SELECTOR PLAN 1
Ativan protocol  IVF  MVI, Thiamine, Folate  Monitoring CIWA

## 2021-10-13 NOTE — PROGRESS NOTE ADULT - ASSESSMENT
Admitted for Alcohol Withdrawal and Bilateral Cellulitis Lower Extremities

## 2021-10-13 NOTE — PROGRESS NOTE ADULT - TIME BILLING
K replacement  Continue current care and treatment.
Continue Ativan protocol  Discharge planning  Continue current care and treatment.
Continue current care and treatment.

## 2021-10-13 NOTE — PROGRESS NOTE ADULT - PROBLEM SELECTOR PLAN 2
IN Unasyn  Venous Doppler   Resolving
IN Unasyn  Venous Doppler
IN Unasyn  Venous Doppler   Resolving

## 2021-10-14 ENCOUNTER — TRANSCRIPTION ENCOUNTER (OUTPATIENT)
Age: 56
End: 2021-10-14

## 2021-10-14 VITALS
RESPIRATION RATE: 19 BRPM | TEMPERATURE: 98 F | HEART RATE: 63 BPM | OXYGEN SATURATION: 97 % | DIASTOLIC BLOOD PRESSURE: 76 MMHG | SYSTOLIC BLOOD PRESSURE: 116 MMHG

## 2021-10-14 RX ADMIN — Medication 1 DROP(S): at 05:32

## 2021-10-14 RX ADMIN — PANTOPRAZOLE SODIUM 40 MILLIGRAM(S): 20 TABLET, DELAYED RELEASE ORAL at 11:35

## 2021-10-14 RX ADMIN — AMPICILLIN SODIUM AND SULBACTAM SODIUM 200 GRAM(S): 250; 125 INJECTION, POWDER, FOR SUSPENSION INTRAMUSCULAR; INTRAVENOUS at 01:15

## 2021-10-14 RX ADMIN — Medication 0.5 MILLIGRAM(S): at 01:34

## 2021-10-14 RX ADMIN — Medication 0.5 MILLIGRAM(S): at 05:35

## 2021-10-14 RX ADMIN — SODIUM CHLORIDE 100 MILLILITER(S): 9 INJECTION INTRAMUSCULAR; INTRAVENOUS; SUBCUTANEOUS at 08:08

## 2021-10-14 RX ADMIN — Medication 1 DROP(S): at 13:23

## 2021-10-14 RX ADMIN — Medication 0.5 MILLIGRAM(S): at 09:59

## 2021-10-14 RX ADMIN — Medication 1 MILLIGRAM(S): at 11:35

## 2021-10-14 RX ADMIN — Medication 650 MILLIGRAM(S): at 10:10

## 2021-10-14 RX ADMIN — AMPICILLIN SODIUM AND SULBACTAM SODIUM 200 GRAM(S): 250; 125 INJECTION, POWDER, FOR SUSPENSION INTRAMUSCULAR; INTRAVENOUS at 05:32

## 2021-10-14 RX ADMIN — Medication 1 TABLET(S): at 11:35

## 2021-10-14 RX ADMIN — INFLUENZA VIRUS VACCINE 0.5 MILLILITER(S): 15; 15; 15; 15 SUSPENSION INTRAMUSCULAR at 13:35

## 2021-10-14 RX ADMIN — AMPICILLIN SODIUM AND SULBACTAM SODIUM 200 GRAM(S): 250; 125 INJECTION, POWDER, FOR SUSPENSION INTRAMUSCULAR; INTRAVENOUS at 11:35

## 2021-10-14 RX ADMIN — Medication 650 MILLIGRAM(S): at 11:00

## 2021-10-14 RX ADMIN — ENOXAPARIN SODIUM 40 MILLIGRAM(S): 100 INJECTION SUBCUTANEOUS at 11:35

## 2021-10-14 NOTE — DISCHARGE NOTE NURSING/CASE MANAGEMENT/SOCIAL WORK - NSDCFUADDAPPT_GEN_ALL_CORE_FT
It is important to see your primary physician as well as the physicians noted below within the next week to perform a comprehensive medical review.  Call their offices for an appointment as soon as you leave the hospital.  You will also need to see them for renewal of your medications.  If you do not have a primary physician, contact the [facility] Physician Referral Service at [phone].  To obtain your results, you can access the [facility] Patient Portal at [email or web address].  Your medical issues appear to be stable at this time, but if your symptoms recur or worsen, contact your physicians and/or return to the hospital if necessary.  If you encounter any issues or questions with your medication, call your physicians before stopping the medication.

## 2021-10-14 NOTE — DISCHARGE NOTE NURSING/CASE MANAGEMENT/SOCIAL WORK - PATIENT PORTAL LINK FT
You can access the FollowMyHealth Patient Portal offered by [facility] by registering at the following website: [email or web address]. By joining [employer]’s FollowMyHealth portal, you will also be able to view your health information using other applications (apps) compatible with our system.

## 2021-10-15 LAB
CULTURE RESULTS: SIGNIFICANT CHANGE UP
CULTURE RESULTS: SIGNIFICANT CHANGE UP
SPECIMEN SOURCE: SIGNIFICANT CHANGE UP
SPECIMEN SOURCE: SIGNIFICANT CHANGE UP

## 2021-10-20 DIAGNOSIS — F10.239 ALCOHOL DEPENDENCE WITH WITHDRAWAL, UNSPECIFIED: ICD-10-CM

## 2021-10-20 DIAGNOSIS — H40.9 UNSPECIFIED GLAUCOMA: ICD-10-CM

## 2021-10-20 DIAGNOSIS — L03.115 CELLULITIS OF RIGHT LOWER LIMB: ICD-10-CM

## 2021-10-20 DIAGNOSIS — L03.116 CELLULITIS OF LEFT LOWER LIMB: ICD-10-CM

## 2021-10-20 DIAGNOSIS — H54.7 UNSPECIFIED VISUAL LOSS: ICD-10-CM

## 2021-10-20 DIAGNOSIS — I10 ESSENTIAL (PRIMARY) HYPERTENSION: ICD-10-CM

## 2021-11-15 NOTE — ED PROVIDER NOTE - DATA REVIEWED, MDM
BENEFITS: LT L4-5 MARSHA    Insurance: [facility]  Phone: [phone]  Contact Name: Rita Martinez  Effective Date: 1.1.2021     Plan year: YES-CALENDAR  Deductible: 1000.00      Deductible Met: 1000.00  Allowed/benefits paid at: 100% AFTER DEDUCTIBLE  OOP: 3000.00 MET $3000.00  Freq Limits: [id number] ON MEDICAL NECESSITY  Prior Auth Requirement: NO    Notes: NO PRE-EX CLAUSE    Call Reference #: [id number]    Time of call: 7:40AM
vital signs

## 2021-11-19 ENCOUNTER — INPATIENT (INPATIENT)
Facility: HOSPITAL | Age: 56
LOS: 4 days | Discharge: HOME | End: 2021-11-24
Attending: STUDENT IN AN ORGANIZED HEALTH CARE EDUCATION/TRAINING PROGRAM | Admitting: STUDENT IN AN ORGANIZED HEALTH CARE EDUCATION/TRAINING PROGRAM
Payer: MEDICAID

## 2021-11-19 VITALS
TEMPERATURE: 98 F | DIASTOLIC BLOOD PRESSURE: 125 MMHG | SYSTOLIC BLOOD PRESSURE: 180 MMHG | OXYGEN SATURATION: 100 % | HEART RATE: 122 BPM | HEIGHT: 68 IN | RESPIRATION RATE: 17 BRPM

## 2021-11-19 LAB
ALBUMIN SERPL ELPH-MCNC: 4.1 G/DL — SIGNIFICANT CHANGE UP (ref 3.3–5)
ALP SERPL-CCNC: 71 U/L — SIGNIFICANT CHANGE UP (ref 40–120)
ALT FLD-CCNC: 59 U/L — HIGH (ref 4–41)
ANION GAP SERPL CALC-SCNC: 19 MMOL/L — HIGH (ref 7–14)
AST SERPL-CCNC: 94 U/L — HIGH (ref 4–40)
BASE EXCESS BLDV CALC-SCNC: -2.2 MMOL/L — LOW (ref -2–3)
BASOPHILS # BLD AUTO: 0.13 K/UL — SIGNIFICANT CHANGE UP (ref 0–0.2)
BASOPHILS NFR BLD AUTO: 2.2 % — HIGH (ref 0–2)
BILIRUB SERPL-MCNC: 0.6 MG/DL — SIGNIFICANT CHANGE UP (ref 0.2–1.2)
BLOOD GAS VENOUS COMPREHENSIVE RESULT: SIGNIFICANT CHANGE UP
BLOOD GAS VENOUS COMPREHENSIVE RESULT: SIGNIFICANT CHANGE UP
BUN SERPL-MCNC: 9 MG/DL — SIGNIFICANT CHANGE UP (ref 7–23)
CALCIUM SERPL-MCNC: 8.4 MG/DL — SIGNIFICANT CHANGE UP (ref 8.4–10.5)
CHLORIDE BLDV-SCNC: 106 MMOL/L — SIGNIFICANT CHANGE UP (ref 96–108)
CHLORIDE SERPL-SCNC: 102 MMOL/L — SIGNIFICANT CHANGE UP (ref 98–107)
CO2 BLDV-SCNC: 24.5 MMOL/L — SIGNIFICANT CHANGE UP (ref 22–26)
CO2 SERPL-SCNC: 20 MMOL/L — LOW (ref 22–31)
CREAT SERPL-MCNC: 0.85 MG/DL — SIGNIFICANT CHANGE UP (ref 0.5–1.3)
EOSINOPHIL # BLD AUTO: 0.14 K/UL — SIGNIFICANT CHANGE UP (ref 0–0.5)
EOSINOPHIL NFR BLD AUTO: 2.4 % — SIGNIFICANT CHANGE UP (ref 0–6)
ETHANOL SERPL-MCNC: 325 MG/DL — HIGH
GAS PNL BLDV: 141 MMOL/L — SIGNIFICANT CHANGE UP (ref 136–145)
GLUCOSE BLDV-MCNC: 109 MG/DL — HIGH (ref 70–99)
GLUCOSE SERPL-MCNC: 108 MG/DL — HIGH (ref 70–99)
HCO3 BLDV-SCNC: 23 MMOL/L — SIGNIFICANT CHANGE UP (ref 22–29)
HCT VFR BLD CALC: 40.6 % — SIGNIFICANT CHANGE UP (ref 39–50)
HCT VFR BLDA CALC: 43 % — SIGNIFICANT CHANGE UP (ref 39–51)
HGB BLD CALC-MCNC: 14.2 G/DL — SIGNIFICANT CHANGE UP (ref 13–17)
HGB BLD-MCNC: 13.9 G/DL — SIGNIFICANT CHANGE UP (ref 13–17)
IANC: 2.35 K/UL — SIGNIFICANT CHANGE UP (ref 1.5–8.5)
IMM GRANULOCYTES NFR BLD AUTO: 0.2 % — SIGNIFICANT CHANGE UP (ref 0–1.5)
LACTATE BLDV-MCNC: 4.6 MMOL/L — CRITICAL HIGH (ref 0.5–2)
LIDOCAIN IGE QN: 23 U/L — SIGNIFICANT CHANGE UP (ref 7–60)
LYMPHOCYTES # BLD AUTO: 2.83 K/UL — SIGNIFICANT CHANGE UP (ref 1–3.3)
LYMPHOCYTES # BLD AUTO: 48.3 % — HIGH (ref 13–44)
MAGNESIUM SERPL-MCNC: 1.9 MG/DL — SIGNIFICANT CHANGE UP (ref 1.6–2.6)
MCHC RBC-ENTMCNC: 31 PG — SIGNIFICANT CHANGE UP (ref 27–34)
MCHC RBC-ENTMCNC: 34.2 GM/DL — SIGNIFICANT CHANGE UP (ref 32–36)
MCV RBC AUTO: 90.4 FL — SIGNIFICANT CHANGE UP (ref 80–100)
MONOCYTES # BLD AUTO: 0.4 K/UL — SIGNIFICANT CHANGE UP (ref 0–0.9)
MONOCYTES NFR BLD AUTO: 6.8 % — SIGNIFICANT CHANGE UP (ref 2–14)
NEUTROPHILS # BLD AUTO: 2.35 K/UL — SIGNIFICANT CHANGE UP (ref 1.8–7.4)
NEUTROPHILS NFR BLD AUTO: 40.1 % — LOW (ref 43–77)
NRBC # BLD: 0 /100 WBCS — SIGNIFICANT CHANGE UP
NRBC # FLD: 0 K/UL — SIGNIFICANT CHANGE UP
PCO2 BLDV: 41 MMHG — LOW (ref 42–55)
PH BLDV: 7.36 — SIGNIFICANT CHANGE UP (ref 7.32–7.43)
PHOSPHATE SERPL-MCNC: 2.7 MG/DL — SIGNIFICANT CHANGE UP (ref 2.5–4.5)
PLATELET # BLD AUTO: 231 K/UL — SIGNIFICANT CHANGE UP (ref 150–400)
PO2 BLDV: 78 MMHG — SIGNIFICANT CHANGE UP
POTASSIUM BLDV-SCNC: 3.7 MMOL/L — SIGNIFICANT CHANGE UP (ref 3.5–5.1)
POTASSIUM SERPL-MCNC: 3.6 MMOL/L — SIGNIFICANT CHANGE UP (ref 3.5–5.3)
POTASSIUM SERPL-SCNC: 3.6 MMOL/L — SIGNIFICANT CHANGE UP (ref 3.5–5.3)
PROT SERPL-MCNC: 7.7 G/DL — SIGNIFICANT CHANGE UP (ref 6–8.3)
RBC # BLD: 4.49 M/UL — SIGNIFICANT CHANGE UP (ref 4.2–5.8)
RBC # FLD: 13.5 % — SIGNIFICANT CHANGE UP (ref 10.3–14.5)
SAO2 % BLDV: 95.1 % — SIGNIFICANT CHANGE UP
SODIUM SERPL-SCNC: 141 MMOL/L — SIGNIFICANT CHANGE UP (ref 135–145)
WBC # BLD: 5.86 K/UL — SIGNIFICANT CHANGE UP (ref 3.8–10.5)
WBC # FLD AUTO: 5.86 K/UL — SIGNIFICANT CHANGE UP (ref 3.8–10.5)

## 2021-11-19 PROCEDURE — 99285 EMERGENCY DEPT VISIT HI MDM: CPT

## 2021-11-19 RX ORDER — ONDANSETRON 8 MG/1
4 TABLET, FILM COATED ORAL ONCE
Refills: 0 | Status: COMPLETED | OUTPATIENT
Start: 2021-11-19 | End: 2021-11-19

## 2021-11-19 RX ORDER — SODIUM CHLORIDE 9 MG/ML
1000 INJECTION, SOLUTION INTRAVENOUS ONCE
Refills: 0 | Status: COMPLETED | OUTPATIENT
Start: 2021-11-19 | End: 2021-11-19

## 2021-11-19 RX ORDER — THIAMINE MONONITRATE (VIT B1) 100 MG
100 TABLET ORAL ONCE
Refills: 0 | Status: COMPLETED | OUTPATIENT
Start: 2021-11-19 | End: 2021-11-19

## 2021-11-19 RX ORDER — DIAZEPAM 5 MG
10 TABLET ORAL ONCE
Refills: 0 | Status: DISCONTINUED | OUTPATIENT
Start: 2021-11-19 | End: 2021-11-19

## 2021-11-19 RX ORDER — FOLIC ACID 0.8 MG
1 TABLET ORAL ONCE
Refills: 0 | Status: COMPLETED | OUTPATIENT
Start: 2021-11-19 | End: 2021-11-19

## 2021-11-19 RX ADMIN — Medication 10 MILLIGRAM(S): at 19:00

## 2021-11-19 RX ADMIN — SODIUM CHLORIDE 1000 MILLILITER(S): 9 INJECTION, SOLUTION INTRAVENOUS at 21:00

## 2021-11-19 RX ADMIN — Medication 100 MILLIGRAM(S): at 19:00

## 2021-11-19 RX ADMIN — Medication 1 MILLIGRAM(S): at 19:20

## 2021-11-19 RX ADMIN — ONDANSETRON 4 MILLIGRAM(S): 8 TABLET, FILM COATED ORAL at 19:00

## 2021-11-19 RX ADMIN — SODIUM CHLORIDE 1000 MILLILITER(S): 9 INJECTION, SOLUTION INTRAVENOUS at 18:59

## 2021-11-19 NOTE — ED ADULT NURSE NOTE - OBJECTIVE STATEMENT
Pt rec'd in 22, c/o of being in alcohol withdrawal, reports daily drinking of vodka and tequila. Reports last drink this morning. Tremulous in room, c/o headache and nausea, alert and oriented.

## 2021-11-19 NOTE — ED PROVIDER NOTE - NS ED MD DISPO DIVISION
Patient called and wants to know if there is a generic to the viibryd or if you can prescribe something similar. Insurance is not paying for it and it's $300 for this medication. Please advise, thank you!   RICH

## 2021-11-19 NOTE — ED PROVIDER NOTE - PHYSICAL EXAMINATION
General: tremulous  HEENT: NCAT, R eye w/ lateral deviation (states chronic)  Cardiac: Tachy, no murmurs, 2+ peripheral pulses  Chest: CTA  Abdomen: Distended but non-tender, soft.   Extremities: no peripheral edema, calf tenderness, or leg size discrepancies  Skin: no rashes  Neuro: AAOx3, motor and sensory grossly intact, +tremulous  Psych: mood and affect appropriate

## 2021-11-19 NOTE — ED PROVIDER NOTE - OBJECTIVE STATEMENT
55yo M hx of ETOH abuse comes to ED for withdraw. Pt normally drinks at least a bottle of hard liquor a day, has chronic L/epigastic abdominal pain. Today had nausea and vomiting, unable to keep down his alcohol, now shaky and tachy. Has had hospitalization for withdraw before, no seizures. Denies fevers, cp, change in urine or bowel. No other drugs.

## 2021-11-19 NOTE — ED PROVIDER NOTE - CLINICAL SUMMARY MEDICAL DECISION MAKING FREE TEXT BOX
Chronic alcohol w/ withdraw. Tachy. Exam w/ tremulous pt. Will give valium (ativan allergy is not a true allergy, he vomited oral ativanx1), labs, IVF, ekg, covid, tba.

## 2021-11-19 NOTE — ED PROVIDER NOTE - ATTENDING CONTRIBUTION TO CARE
I performed a face-to-face evaluation of the patient and performed a history and physical examination. I agree with the history and physical examination.    H/o ETOH abuse and withdrawal. P/w chronic LUQ abd pain, tremors, tachycardia. Suspect pancreatitis or ETOH gastritis complicating alcohol withdrawal. Give benzos. Check labs. IVF. Admit.

## 2021-11-19 NOTE — ED PROVIDER NOTE - PROGRESS NOTE DETAILS
GREGG Quiñonez (PGY-2) - pt improved s/p valium 10mg. HR 80s, less tremulous. Resting comfortably Tom PGY3: Patient developed tremulousness and ha. Valium 10mg given. Will admit for alcohol withdrawal.

## 2021-11-19 NOTE — ED ADULT TRIAGE NOTE - CHIEF COMPLAINT QUOTE
Pt c/o alcohol withdrawal. Pt tremulous in triage. Pt states last drink was a few minuets before arrival to ED. Pt admits to drinking everyday

## 2021-11-19 NOTE — ED ADULT NURSE REASSESSMENT NOTE - NS ED NURSE REASSESS COMMENT FT1
Received report from day shift RN. Patient received A&Ox4 with 20G IV. Pt offering no complaints and is in no acute distress at this time.  CIWA as per flowsheet at this time. Pt with calm affect. Remains on cardiac monitor. Respirations even and unlabored.  Stretcher in lowest position, wheels locked, side rails up as per protocol. Vital signs are per flowsheet.

## 2021-11-20 DIAGNOSIS — H40.9 UNSPECIFIED GLAUCOMA: ICD-10-CM

## 2021-11-20 DIAGNOSIS — Z29.9 ENCOUNTER FOR PROPHYLACTIC MEASURES, UNSPECIFIED: ICD-10-CM

## 2021-11-20 DIAGNOSIS — F10.239 ALCOHOL DEPENDENCE WITH WITHDRAWAL, UNSPECIFIED: ICD-10-CM

## 2021-11-20 DIAGNOSIS — R10.9 UNSPECIFIED ABDOMINAL PAIN: ICD-10-CM

## 2021-11-20 DIAGNOSIS — I10 ESSENTIAL (PRIMARY) HYPERTENSION: ICD-10-CM

## 2021-11-20 LAB
ANION GAP SERPL CALC-SCNC: 14 MMOL/L — SIGNIFICANT CHANGE UP (ref 7–14)
BASE EXCESS BLDV CALC-SCNC: -0.4 MMOL/L — SIGNIFICANT CHANGE UP (ref -2–3)
BLOOD GAS VENOUS COMPREHENSIVE RESULT: SIGNIFICANT CHANGE UP
BUN SERPL-MCNC: 8 MG/DL — SIGNIFICANT CHANGE UP (ref 7–23)
CALCIUM SERPL-MCNC: 8 MG/DL — LOW (ref 8.4–10.5)
CHLORIDE BLDV-SCNC: 106 MMOL/L — SIGNIFICANT CHANGE UP (ref 96–108)
CHLORIDE SERPL-SCNC: 101 MMOL/L — SIGNIFICANT CHANGE UP (ref 98–107)
CO2 BLDV-SCNC: 26.8 MMOL/L — HIGH (ref 22–26)
CO2 SERPL-SCNC: 25 MMOL/L — SIGNIFICANT CHANGE UP (ref 22–31)
CREAT SERPL-MCNC: 0.83 MG/DL — SIGNIFICANT CHANGE UP (ref 0.5–1.3)
GAS PNL BLDV: 137 MMOL/L — SIGNIFICANT CHANGE UP (ref 136–145)
GLUCOSE BLDV-MCNC: 72 MG/DL — SIGNIFICANT CHANGE UP (ref 70–99)
GLUCOSE SERPL-MCNC: 78 MG/DL — SIGNIFICANT CHANGE UP (ref 70–99)
HCO3 BLDV-SCNC: 25 MMOL/L — SIGNIFICANT CHANGE UP (ref 22–29)
HCT VFR BLDA CALC: 38 % — LOW (ref 39–51)
HGB BLD CALC-MCNC: 12.6 G/DL — LOW (ref 13–17)
LACTATE BLDV-MCNC: 3 MMOL/L — HIGH (ref 0.5–2)
MAGNESIUM SERPL-MCNC: 1.7 MG/DL — SIGNIFICANT CHANGE UP (ref 1.6–2.6)
PCO2 BLDV: 45 MMHG — SIGNIFICANT CHANGE UP (ref 42–55)
PH BLDV: 7.36 — SIGNIFICANT CHANGE UP (ref 7.32–7.43)
PHOSPHATE SERPL-MCNC: 2.5 MG/DL — SIGNIFICANT CHANGE UP (ref 2.5–4.5)
PO2 BLDV: 57 MMHG — SIGNIFICANT CHANGE UP
POTASSIUM BLDV-SCNC: 3.6 MMOL/L — SIGNIFICANT CHANGE UP (ref 3.5–5.1)
POTASSIUM SERPL-MCNC: 3.8 MMOL/L — SIGNIFICANT CHANGE UP (ref 3.5–5.3)
POTASSIUM SERPL-SCNC: 3.8 MMOL/L — SIGNIFICANT CHANGE UP (ref 3.5–5.3)
SAO2 % BLDV: 87.4 % — SIGNIFICANT CHANGE UP
SARS-COV-2 RNA SPEC QL NAA+PROBE: SIGNIFICANT CHANGE UP
SODIUM SERPL-SCNC: 140 MMOL/L — SIGNIFICANT CHANGE UP (ref 135–145)

## 2021-11-20 PROCEDURE — 12345: CPT | Mod: NC

## 2021-11-20 PROCEDURE — 99223 1ST HOSP IP/OBS HIGH 75: CPT

## 2021-11-20 RX ORDER — THIAMINE MONONITRATE (VIT B1) 100 MG
100 TABLET ORAL DAILY
Refills: 0 | Status: DISCONTINUED | OUTPATIENT
Start: 2021-11-20 | End: 2021-11-24

## 2021-11-20 RX ORDER — LATANOPROST 0.05 MG/ML
1 SOLUTION/ DROPS OPHTHALMIC; TOPICAL AT BEDTIME
Refills: 0 | Status: DISCONTINUED | OUTPATIENT
Start: 2021-11-20 | End: 2021-11-24

## 2021-11-20 RX ORDER — DIAZEPAM 5 MG
10 TABLET ORAL ONCE
Refills: 0 | Status: DISCONTINUED | OUTPATIENT
Start: 2021-11-20 | End: 2021-11-20

## 2021-11-20 RX ORDER — DORZOLAMIDE HYDROCHLORIDE TIMOLOL MALEATE 20; 5 MG/ML; MG/ML
1 SOLUTION/ DROPS OPHTHALMIC
Refills: 0 | Status: DISCONTINUED | OUTPATIENT
Start: 2021-11-20 | End: 2021-11-24

## 2021-11-20 RX ORDER — ACETAMINOPHEN 500 MG
650 TABLET ORAL EVERY 6 HOURS
Refills: 0 | Status: DISCONTINUED | OUTPATIENT
Start: 2021-11-20 | End: 2021-11-24

## 2021-11-20 RX ORDER — ONDANSETRON 8 MG/1
4 TABLET, FILM COATED ORAL EVERY 8 HOURS
Refills: 0 | Status: DISCONTINUED | OUTPATIENT
Start: 2021-11-20 | End: 2021-11-24

## 2021-11-20 RX ORDER — SODIUM CHLORIDE 9 MG/ML
1000 INJECTION, SOLUTION INTRAVENOUS
Refills: 0 | Status: DISCONTINUED | OUTPATIENT
Start: 2021-11-20 | End: 2021-11-21

## 2021-11-20 RX ORDER — FOLIC ACID 0.8 MG
1 TABLET ORAL DAILY
Refills: 0 | Status: DISCONTINUED | OUTPATIENT
Start: 2021-11-20 | End: 2021-11-24

## 2021-11-20 RX ORDER — DONEPEZIL HYDROCHLORIDE 10 MG/1
5 TABLET, FILM COATED ORAL AT BEDTIME
Refills: 0 | Status: DISCONTINUED | OUTPATIENT
Start: 2021-11-20 | End: 2021-11-24

## 2021-11-20 RX ADMIN — DONEPEZIL HYDROCHLORIDE 5 MILLIGRAM(S): 10 TABLET, FILM COATED ORAL at 21:59

## 2021-11-20 RX ADMIN — DORZOLAMIDE HYDROCHLORIDE TIMOLOL MALEATE 1 DROP(S): 20; 5 SOLUTION/ DROPS OPHTHALMIC at 17:49

## 2021-11-20 RX ADMIN — DORZOLAMIDE HYDROCHLORIDE TIMOLOL MALEATE 1 DROP(S): 20; 5 SOLUTION/ DROPS OPHTHALMIC at 06:51

## 2021-11-20 RX ADMIN — Medication 650 MILLIGRAM(S): at 09:08

## 2021-11-20 RX ADMIN — Medication 50 MILLIGRAM(S): at 08:14

## 2021-11-20 RX ADMIN — Medication 50 MILLIGRAM(S): at 17:28

## 2021-11-20 RX ADMIN — Medication 650 MILLIGRAM(S): at 22:21

## 2021-11-20 RX ADMIN — LATANOPROST 1 DROP(S): 0.05 SOLUTION/ DROPS OPHTHALMIC; TOPICAL at 22:50

## 2021-11-20 RX ADMIN — Medication 50 MILLIGRAM(S): at 06:51

## 2021-11-20 RX ADMIN — Medication 650 MILLIGRAM(S): at 22:51

## 2021-11-20 RX ADMIN — Medication 10 MILLIGRAM(S): at 00:33

## 2021-11-20 RX ADMIN — Medication 1 MILLIGRAM(S): at 12:09

## 2021-11-20 RX ADMIN — Medication 1 TABLET(S): at 12:09

## 2021-11-20 RX ADMIN — SODIUM CHLORIDE 100 MILLILITER(S): 9 INJECTION, SOLUTION INTRAVENOUS at 05:19

## 2021-11-20 RX ADMIN — Medication 100 MILLIGRAM(S): at 12:09

## 2021-11-20 RX ADMIN — Medication 1 DROP(S): at 22:00

## 2021-11-20 RX ADMIN — Medication 650 MILLIGRAM(S): at 09:49

## 2021-11-20 RX ADMIN — Medication 1 DROP(S): at 06:51

## 2021-11-20 RX ADMIN — Medication 50 MILLIGRAM(S): at 12:09

## 2021-11-20 RX ADMIN — Medication 50 MILLIGRAM(S): at 05:13

## 2021-11-20 RX ADMIN — ONDANSETRON 4 MILLIGRAM(S): 8 TABLET, FILM COATED ORAL at 06:51

## 2021-11-20 NOTE — H&P ADULT - NSHPLABSRESULTS_GEN_ALL_CORE
I have personally reviewed this patient's labs below:                        13.9   5.86  )-----------( 231      ( 19 Nov 2021 19:16 )             40.6     11-19-21 @ 19:16    141  |  102  |  9             --------------------------< 108<H>     3.6  |  20<L>  | 0.85    eGFR AA: 111  eGFR N-AA: 96    Calcium: 8.4  Phosphorus: 2.7  Magnesium: 1.90    AST: 94<H>    ALT: 59<H>  AlkPhos: 71  Protein: 7.7  Albumin: 4.1  TBili: 0.6  D-Bili: --    VBG - ( 19 Nov 2021 22:49 )  pH: 7.36  /  pCO2: 45    /  pO2: 67    / HCO3: 25    / Base Excess: -0.3  /  SvO2: 92.0  / Lactate: 3.8            I have personally reviewed this patient's EKG and my independent interpretation is sinus tachycardia without ST changes

## 2021-11-20 NOTE — H&P ADULT - NSHPREVIEWOFSYSTEMS_GEN_ALL_CORE
ROS:    Constitutional: [ ] fevers [ ] chills   HEENT: [ ] dry eyes [ ] eye irritation   CV: [ ] chest pain [ ] orthopnea [ ] palpitations   Resp: [ ] cough [ ] shortness of breath [ ] dyspnea [ ] wheezing   GI: [x ] nausea [x ] vomiting [ ] diarrhea [ ] constipation [x ] abd pain   : [ ] dysuria [ ] nocturia [ ] hematuria   Musculoskeletal: [ ] back pain [ ] myalgias  Skin: [ ] rash [ ] itch  Neurological: [ ] headache [ ] dizziness [ ] syncope   Endocrine: [ ] diabetes [ ] thyroid problem  Hematologic/Lymphatic: [ ] anemia [ ] bleeding problem  Allergic/Immunologic: [ ] itchy eyes [ ] nasal discharge   [x ] All other systems negative

## 2021-11-20 NOTE — H&P ADULT - HISTORY OF PRESENT ILLNESS
56M with PMHx ETOH abuse, glaucoma, R eye blindness, venous insufficiency, HTN presenting with alcohol withdrawal symptoms and abdominal pain. Patient began having worsened epigastric abdominal pain today, nonradiating, worse with PO intake and multiple episodes of NBNB vomiting. He has been drinking about 2-3 pints of hard liquor per day for the past several weeks. He feels as though this alcohol intake has caused his abdominal pain and he wants to detox so that he can get back to work. His last drink was earlier today. He now has arm tremors, HA and nausea. His abdominal pain has resolved. He notes an intolerance to ativan in the past with not feeling well, HA, vomiting. Denies allergies to librium.    In ED CIWA 13--6--10--11  EKG sinus tachycardia. Given IV valium 10mg x2 with some improvement. S/p 2L IVF

## 2021-11-20 NOTE — H&P ADULT - PROBLEM SELECTOR PLAN 1
CIWA ~11. Severe ETOH withdrawal with HA, tremors, nausea. ETOH 325  -high risk CIWA monitoring  -librium taper  -patient states he has an intolerance to ativan - HA, vomiting and not feeling well and prefers not to have this. However it appears he tolerated it well on prior admission  -MV, folic acid, thiamine  -MICU consult if CIWA uptrends >15 on above regimen

## 2021-11-20 NOTE — ED ADULT NURSE REASSESSMENT NOTE - NS ED NURSE REASSESS COMMENT FT1
ACP PA made aware of CIWA score at this time. Pt remains in no acute distress. Respirations even and unlabored. Vitals per flowsheet.  Awaiting further orders at this time from ACP provider.

## 2021-11-20 NOTE — H&P ADULT - ASSESSMENT
56M with PMHx ETOH abuse, glaucoma, R eye blindness, venous insufficiency, HTN presenting with alcohol withdrawal symptoms and abdominal pain.

## 2021-11-20 NOTE — H&P ADULT - NSHPPHYSICALEXAM_GEN_ALL_CORE
PHYSICAL EXAM:  Vital Signs Last 24 Hrs  T(C): 36.7 (11-20-21 @ 03:47)  T(F): 98 (11-20-21 @ 03:47), Max: 98.5 (11-19-21 @ 22:32)  HR: 74 (11-20-21 @ 03:47) (74 - 122)  BP: 132/80 (11-20-21 @ 03:47)  BP(mean): --  RR: 18 (11-20-21 @ 03:47) (17 - 20)  SpO2: 100% (11-20-21 @ 03:47) (98% - 100%)  Wt(kg): --    Constitutional: NAD, awake and alert, well developed  EYES: EOMI, conjunctiva clear  ENT:  Normal Hearing, no tonsillar exudates   Neck: Soft and supple , no thyromegaly   Respiratory: Breath sounds are clear bilaterally, No wheezing, rales or rhonchi, no tachypnea, no accessory muscle use  Cardiovascular: S1 and S2, regular rate and rhythm, no Murmurs, gallops or rubs, no JVD, no leg edema  Gastrointestinal: Bowel Sounds present, soft, nontender, nondistended, no guarding, no rebound  Extremities: No cyanosis or clubbing; warm to touch  Vascular: 2+ peripheral pulses lower ex  Neurological: No focal deficits, sensation to light touch intact in all extremities. Pupils are equally reactive to light and symmetrical in size.  R eye laterally deviated and blind (chronic)  Arm tremors with extension   Musculoskeletal: 5/5 strength b/l upper and lower extremities; no joint swelling.  Skin: Chronic venous insufficiency skin changes. No ulcers

## 2021-11-21 LAB
COVID-19 SPIKE DOMAIN AB INTERP: POSITIVE
COVID-19 SPIKE DOMAIN ANTIBODY RESULT: >250 U/ML — HIGH
SARS-COV-2 IGG+IGM SERPL QL IA: >250 U/ML — HIGH
SARS-COV-2 IGG+IGM SERPL QL IA: POSITIVE

## 2021-11-21 PROCEDURE — 99233 SBSQ HOSP IP/OBS HIGH 50: CPT

## 2021-11-21 RX ADMIN — Medication 1 MILLIGRAM(S): at 13:09

## 2021-11-21 RX ADMIN — Medication 50 MILLIGRAM(S): at 13:20

## 2021-11-21 RX ADMIN — Medication 1 DROP(S): at 21:19

## 2021-11-21 RX ADMIN — DONEPEZIL HYDROCHLORIDE 5 MILLIGRAM(S): 10 TABLET, FILM COATED ORAL at 21:19

## 2021-11-21 RX ADMIN — Medication 100 MILLIGRAM(S): at 13:08

## 2021-11-21 RX ADMIN — DORZOLAMIDE HYDROCHLORIDE TIMOLOL MALEATE 1 DROP(S): 20; 5 SOLUTION/ DROPS OPHTHALMIC at 17:19

## 2021-11-21 RX ADMIN — Medication 50 MILLIGRAM(S): at 00:25

## 2021-11-21 RX ADMIN — Medication 50 MILLIGRAM(S): at 21:19

## 2021-11-21 RX ADMIN — Medication 650 MILLIGRAM(S): at 10:45

## 2021-11-21 RX ADMIN — LATANOPROST 1 DROP(S): 0.05 SOLUTION/ DROPS OPHTHALMIC; TOPICAL at 21:20

## 2021-11-21 RX ADMIN — Medication 650 MILLIGRAM(S): at 21:34

## 2021-11-21 RX ADMIN — Medication 1 TABLET(S): at 13:09

## 2021-11-21 RX ADMIN — Medication 650 MILLIGRAM(S): at 06:05

## 2021-11-21 RX ADMIN — Medication 1 DROP(S): at 13:11

## 2021-11-21 RX ADMIN — DORZOLAMIDE HYDROCHLORIDE TIMOLOL MALEATE 1 DROP(S): 20; 5 SOLUTION/ DROPS OPHTHALMIC at 05:31

## 2021-11-21 RX ADMIN — Medication 650 MILLIGRAM(S): at 11:30

## 2021-11-21 RX ADMIN — Medication 1 DROP(S): at 05:31

## 2021-11-21 RX ADMIN — Medication 650 MILLIGRAM(S): at 05:35

## 2021-11-22 ENCOUNTER — TRANSCRIPTION ENCOUNTER (OUTPATIENT)
Age: 56
End: 2021-11-22

## 2021-11-22 DIAGNOSIS — F41.9 ANXIETY DISORDER, UNSPECIFIED: ICD-10-CM

## 2021-11-22 LAB
ANION GAP SERPL CALC-SCNC: 10 MMOL/L — SIGNIFICANT CHANGE UP (ref 7–14)
BUN SERPL-MCNC: 9 MG/DL — SIGNIFICANT CHANGE UP (ref 7–23)
CALCIUM SERPL-MCNC: 8.8 MG/DL — SIGNIFICANT CHANGE UP (ref 8.4–10.5)
CHLORIDE SERPL-SCNC: 103 MMOL/L — SIGNIFICANT CHANGE UP (ref 98–107)
CO2 SERPL-SCNC: 23 MMOL/L — SIGNIFICANT CHANGE UP (ref 22–31)
CREAT SERPL-MCNC: 0.7 MG/DL — SIGNIFICANT CHANGE UP (ref 0.5–1.3)
GLUCOSE SERPL-MCNC: 113 MG/DL — HIGH (ref 70–99)
HCT VFR BLD CALC: 40.6 % — SIGNIFICANT CHANGE UP (ref 39–50)
HGB BLD-MCNC: 13.6 G/DL — SIGNIFICANT CHANGE UP (ref 13–17)
MAGNESIUM SERPL-MCNC: 1.8 MG/DL — SIGNIFICANT CHANGE UP (ref 1.6–2.6)
MCHC RBC-ENTMCNC: 30.8 PG — SIGNIFICANT CHANGE UP (ref 27–34)
MCHC RBC-ENTMCNC: 33.5 GM/DL — SIGNIFICANT CHANGE UP (ref 32–36)
MCV RBC AUTO: 92.1 FL — SIGNIFICANT CHANGE UP (ref 80–100)
NRBC # BLD: 0 /100 WBCS — SIGNIFICANT CHANGE UP
NRBC # FLD: 0 K/UL — SIGNIFICANT CHANGE UP
PHOSPHATE SERPL-MCNC: 2.5 MG/DL — SIGNIFICANT CHANGE UP (ref 2.5–4.5)
PLATELET # BLD AUTO: 146 K/UL — LOW (ref 150–400)
POTASSIUM SERPL-MCNC: 3.2 MMOL/L — LOW (ref 3.5–5.3)
POTASSIUM SERPL-SCNC: 3.2 MMOL/L — LOW (ref 3.5–5.3)
RBC # BLD: 4.41 M/UL — SIGNIFICANT CHANGE UP (ref 4.2–5.8)
RBC # FLD: 13 % — SIGNIFICANT CHANGE UP (ref 10.3–14.5)
SODIUM SERPL-SCNC: 136 MMOL/L — SIGNIFICANT CHANGE UP (ref 135–145)
WBC # BLD: 5.03 K/UL — SIGNIFICANT CHANGE UP (ref 3.8–10.5)
WBC # FLD AUTO: 5.03 K/UL — SIGNIFICANT CHANGE UP (ref 3.8–10.5)

## 2021-11-22 PROCEDURE — 99232 SBSQ HOSP IP/OBS MODERATE 35: CPT

## 2021-11-22 RX ORDER — POTASSIUM CHLORIDE 20 MEQ
40 PACKET (EA) ORAL ONCE
Refills: 0 | Status: COMPLETED | OUTPATIENT
Start: 2021-11-22 | End: 2021-11-22

## 2021-11-22 RX ADMIN — Medication 100 MILLIGRAM(S): at 12:07

## 2021-11-22 RX ADMIN — Medication 650 MILLIGRAM(S): at 09:47

## 2021-11-22 RX ADMIN — Medication 1 DROP(S): at 21:15

## 2021-11-22 RX ADMIN — Medication 650 MILLIGRAM(S): at 10:30

## 2021-11-22 RX ADMIN — Medication 1 DROP(S): at 05:15

## 2021-11-22 RX ADMIN — DORZOLAMIDE HYDROCHLORIDE TIMOLOL MALEATE 1 DROP(S): 20; 5 SOLUTION/ DROPS OPHTHALMIC at 17:31

## 2021-11-22 RX ADMIN — Medication 40 MILLIEQUIVALENT(S): at 09:47

## 2021-11-22 RX ADMIN — Medication 50 MILLIGRAM(S): at 17:32

## 2021-11-22 RX ADMIN — Medication 50 MILLIGRAM(S): at 05:20

## 2021-11-22 RX ADMIN — Medication 1 DROP(S): at 13:54

## 2021-11-22 RX ADMIN — DORZOLAMIDE HYDROCHLORIDE TIMOLOL MALEATE 1 DROP(S): 20; 5 SOLUTION/ DROPS OPHTHALMIC at 05:15

## 2021-11-22 RX ADMIN — LATANOPROST 1 DROP(S): 0.05 SOLUTION/ DROPS OPHTHALMIC; TOPICAL at 21:15

## 2021-11-22 RX ADMIN — DONEPEZIL HYDROCHLORIDE 5 MILLIGRAM(S): 10 TABLET, FILM COATED ORAL at 21:14

## 2021-11-22 RX ADMIN — Medication 1 TABLET(S): at 12:07

## 2021-11-22 RX ADMIN — Medication 650 MILLIGRAM(S): at 17:32

## 2021-11-22 RX ADMIN — Medication 1 MILLIGRAM(S): at 12:07

## 2021-11-22 NOTE — DISCHARGE NOTE NURSING/CASE MANAGEMENT/SOCIAL WORK - NSDCVIVACCINE_GEN_ALL_CORE_FT
influenza, injectable, quadrivalent, preservative free; 14-Oct-2021 13:35; Nany Guerrero (RN); [employer]; [id number] (Exp. Date: 30-Jun-2022); IntraMuscular; Deltoid Right.; 0.5 milliLiter(s); VIS (VIS Published: 06-Aug-2021, VIS Presented: 14-Oct-2021);

## 2021-11-22 NOTE — DISCHARGE NOTE NURSING/CASE MANAGEMENT/SOCIAL WORK - NSDPDISTO_GEN_ALL_CORE
Pt A+O X 4, VS stable Afebrile. No episodes of  seizure activity. pt with positive bowel sounds erum po diet./Sub-Acute rehab Pt A+O X 4, VS stable Afebrile. No episodes of  seizure activity. pt with positive bowel sounds erum po diet./Home

## 2021-11-22 NOTE — DISCHARGE NOTE NURSING/CASE MANAGEMENT/SOCIAL WORK - NSDCPNINST_GEN_ALL_CORE
Follow-up with your PMD for health management and continuity of care. Limit and/or Avoid consuming beverages containing alcohol. Call MD with any signs of alcohol withdrawal, such as seizures, hallucinations, or if you vomit blood.  For support resources and more information:  *Alcoholics Anonymous at http://www.aa.org  * Substance Abuse and Mental Health Services Administration     [address] [place] [address] or http://www.sama.gov

## 2021-11-22 NOTE — DISCHARGE NOTE NURSING/CASE MANAGEMENT/SOCIAL WORK - NSDCPECAREGIVERED_GEN_ALL_CORE
Medline and carenotes for Alcohol Withdrawal, Librium, as well as DC Medications and side effects literature for patient reference.

## 2021-11-22 NOTE — DISCHARGE NOTE NURSING/CASE MANAGEMENT/SOCIAL WORK - PATIENT PORTAL LINK FT
You can access the FollowMyHealth Patient Portal offered by [facility] by registering at the following website: [email or web address]. By joining [employer]’s FollowMyHealth portal, you will also be able to view your health information using other applications (apps) compatible with our system.

## 2021-11-23 ENCOUNTER — TRANSCRIPTION ENCOUNTER (OUTPATIENT)
Age: 56
End: 2021-11-23

## 2021-11-23 LAB
ANION GAP SERPL CALC-SCNC: 12 MMOL/L — SIGNIFICANT CHANGE UP (ref 7–14)
BUN SERPL-MCNC: 11 MG/DL — SIGNIFICANT CHANGE UP (ref 7–23)
CALCIUM SERPL-MCNC: 8.9 MG/DL — SIGNIFICANT CHANGE UP (ref 8.4–10.5)
CHLORIDE SERPL-SCNC: 106 MMOL/L — SIGNIFICANT CHANGE UP (ref 98–107)
CO2 SERPL-SCNC: 20 MMOL/L — LOW (ref 22–31)
COVID-19 NUCLEOCAPSID GAM AB INTERP: POSITIVE
COVID-19 NUCLEOCAPSID TOTAL GAM ANTIBODY RESULT: 43.5 INDEX — HIGH
CREAT SERPL-MCNC: 0.64 MG/DL — SIGNIFICANT CHANGE UP (ref 0.5–1.3)
GLUCOSE SERPL-MCNC: 125 MG/DL — HIGH (ref 70–99)
HCT VFR BLD CALC: 39.7 % — SIGNIFICANT CHANGE UP (ref 39–50)
HGB BLD-MCNC: 13.4 G/DL — SIGNIFICANT CHANGE UP (ref 13–17)
MAGNESIUM SERPL-MCNC: 1.6 MG/DL — SIGNIFICANT CHANGE UP (ref 1.6–2.6)
MCHC RBC-ENTMCNC: 30.9 PG — SIGNIFICANT CHANGE UP (ref 27–34)
MCHC RBC-ENTMCNC: 33.8 GM/DL — SIGNIFICANT CHANGE UP (ref 32–36)
MCV RBC AUTO: 91.7 FL — SIGNIFICANT CHANGE UP (ref 80–100)
NRBC # BLD: 0 /100 WBCS — SIGNIFICANT CHANGE UP
NRBC # FLD: 0 K/UL — SIGNIFICANT CHANGE UP
PHOSPHATE SERPL-MCNC: 2.5 MG/DL — SIGNIFICANT CHANGE UP (ref 2.5–4.5)
PLATELET # BLD AUTO: 159 K/UL — SIGNIFICANT CHANGE UP (ref 150–400)
POTASSIUM SERPL-MCNC: 3.5 MMOL/L — SIGNIFICANT CHANGE UP (ref 3.5–5.3)
POTASSIUM SERPL-SCNC: 3.5 MMOL/L — SIGNIFICANT CHANGE UP (ref 3.5–5.3)
RBC # BLD: 4.33 M/UL — SIGNIFICANT CHANGE UP (ref 4.2–5.8)
RBC # FLD: 13.1 % — SIGNIFICANT CHANGE UP (ref 10.3–14.5)
SARS-COV-2 IGG+IGM SERPL QL IA: 43.5 INDEX — HIGH
SARS-COV-2 IGG+IGM SERPL QL IA: POSITIVE
SODIUM SERPL-SCNC: 138 MMOL/L — SIGNIFICANT CHANGE UP (ref 135–145)
WBC # BLD: 5.07 K/UL — SIGNIFICANT CHANGE UP (ref 3.8–10.5)
WBC # FLD AUTO: 5.07 K/UL — SIGNIFICANT CHANGE UP (ref 3.8–10.5)

## 2021-11-23 PROCEDURE — 99232 SBSQ HOSP IP/OBS MODERATE 35: CPT

## 2021-11-23 RX ORDER — MAGNESIUM SULFATE 500 MG/ML
2 VIAL (ML) INJECTION ONCE
Refills: 0 | Status: COMPLETED | OUTPATIENT
Start: 2021-11-23 | End: 2021-11-23

## 2021-11-23 RX ORDER — POTASSIUM CHLORIDE 20 MEQ
40 PACKET (EA) ORAL ONCE
Refills: 0 | Status: COMPLETED | OUTPATIENT
Start: 2021-11-23 | End: 2021-11-23

## 2021-11-23 RX ORDER — BUPROPION HYDROCHLORIDE 150 MG/1
1 TABLET, EXTENDED RELEASE ORAL
Qty: 30 | Refills: 0
Start: 2021-11-23 | End: 2021-12-22

## 2021-11-23 RX ORDER — AMLODIPINE BESYLATE 2.5 MG/1
1 TABLET ORAL
Qty: 0 | Refills: 0 | DISCHARGE

## 2021-11-23 RX ORDER — ACETAMINOPHEN 500 MG
2 TABLET ORAL
Qty: 0 | Refills: 0 | DISCHARGE
Start: 2021-11-23

## 2021-11-23 RX ADMIN — Medication 1 MILLIGRAM(S): at 11:58

## 2021-11-23 RX ADMIN — Medication 40 MILLIEQUIVALENT(S): at 10:11

## 2021-11-23 RX ADMIN — Medication 50 GRAM(S): at 10:11

## 2021-11-23 RX ADMIN — DORZOLAMIDE HYDROCHLORIDE TIMOLOL MALEATE 1 DROP(S): 20; 5 SOLUTION/ DROPS OPHTHALMIC at 06:16

## 2021-11-23 RX ADMIN — Medication 100 MILLIGRAM(S): at 11:57

## 2021-11-23 RX ADMIN — Medication 1 TABLET(S): at 11:58

## 2021-11-23 RX ADMIN — Medication 1 DROP(S): at 06:16

## 2021-11-23 RX ADMIN — DORZOLAMIDE HYDROCHLORIDE TIMOLOL MALEATE 1 DROP(S): 20; 5 SOLUTION/ DROPS OPHTHALMIC at 17:22

## 2021-11-23 RX ADMIN — Medication 50 MILLIGRAM(S): at 06:16

## 2021-11-23 RX ADMIN — DONEPEZIL HYDROCHLORIDE 5 MILLIGRAM(S): 10 TABLET, FILM COATED ORAL at 22:39

## 2021-11-23 RX ADMIN — Medication 1 DROP(S): at 21:22

## 2021-11-23 RX ADMIN — Medication 650 MILLIGRAM(S): at 21:23

## 2021-11-23 RX ADMIN — LATANOPROST 1 DROP(S): 0.05 SOLUTION/ DROPS OPHTHALMIC; TOPICAL at 21:23

## 2021-11-23 NOTE — DISCHARGE NOTE PROVIDER - NSDCMRMEDTOKEN_GEN_ALL_CORE_FT
acetaminophen 325 mg oral tablet: 2 tab(s) orally every 6 hours, As needed, Temp greater or equal to 38C (100.4F), Mild Pain (1 - 3)  amLODIPine 10 mg oral tablet: 1 tab(s) orally once a day  Cosopt 2.23%-0.68% ophthalmic solution: 1 drop(s) to each affected eye 2 times a day  donepezil 5 mg oral tablet: 1 tab(s) orally once a day (at bedtime)  folic acid 1 mg oral tablet: 1 tab(s) orally once a day  latanoprost 0.005% ophthalmic solution: 1 drop(s) to each affected eye once a day (in the evening)  Multiple Vitamins oral tablet: 1 tab(s) orally once a day  ocular lubricant ophthalmic solution: 1 drop(s) to each affected eye 3 times a day  thiamine 100 mg oral tablet: 1 tab(s) orally once a day   acetaminophen 325 mg oral tablet: 2 tab(s) orally every 6 hours, As needed, Temp greater or equal to 38C (100.4F), Mild Pain (1 - 3)  Cosopt 2.23%-0.68% ophthalmic solution: 1 drop(s) to each affected eye 2 times a day  donepezil 5 mg oral tablet: 1 tab(s) orally once a day (at bedtime)  folic acid 1 mg oral tablet: 1 tab(s) orally once a day  latanoprost 0.005% ophthalmic solution: 1 drop(s) to each affected eye once a day (in the evening)  Multiple Vitamins oral tablet: 1 tab(s) orally once a day  ocular lubricant ophthalmic solution: 1 drop(s) to each affected eye 3 times a day  thiamine 100 mg oral tablet: 1 tab(s) orally once a day  Wellbutrin  mg/24 hours oral tablet, extended release: 1 tab(s) orally once a day

## 2021-11-23 NOTE — DISCHARGE NOTE PROVIDER - CARE PROVIDER_API CALL
Damir Montalvo (MD)  Neurosurgery  [address], [address]  [place] [address]  Phone: [phone]  Fax: [phone]  Follow Up Time: 1 week   Your PCP,   Phone: (   )    -  Fax: (   )    -  Follow Up Time:

## 2021-11-23 NOTE — DISCHARGE NOTE PROVIDER - HOSPITAL COURSE
56M with PMHx ETOH abuse, glaucoma, R eye blindness, venous insufficiency, HTN presenting with alcohol withdrawal symptoms and abdominal pain.     Alcohol withdrawal.   s/p Librium taper   -MV, folic acid, thiamine  - no depressive symptoms or SI.    Anxiety.    Hx of anxiety, previously on Wellbutrin 100, ran out of medication about 1 month ago - resumed upon DC.     Abdominal pain.   Now resolved and likely in the setting of excessive ETOH use. Abdominal exam benign  -zofran PRN.    HTN (hypertension).    BP elevated initially and now normotensive. Will hold off on resuming amlodipine for now.     Glaucoma.    Cosopt, latanoprost, artificial tears.    On 11/23/21 this case was reviewed with Dr. Merlin Mathew, the patient is medically stable and optimized for discharge. All medications were reviewed and prescriptions were sent to mutually agreed upon pharmacy. The patient agrees to follow up with providers as recommended.       56M with PMHx ETOH abuse, glaucoma, R eye blindness, venous insufficiency, HTN presenting with alcohol withdrawal symptoms and abdominal pain.     Alcohol withdrawal.   s/p Librium taper   -MV, folic acid, thiamine  - no depressive symptoms or SI.    Anxiety.    Hx of anxiety, previously on Wellbutrin, ran out of medication about 1 month ago - resumed upon DC. Rx sent to Pharmacy     Abdominal pain.   Now resolved and likely in the setting of excessive ETOH use. Abdominal exam benign  -zofran PRN.    HTN (hypertension).    BP elevated initially and now normotensive. Will hold off on resuming amlodipine upon DC.     Glaucoma.    Cosopt, latanoprost, artificial tears.    On 11/24/21 this case was reviewed with ______________ the patient is medically stable and optimized for discharge. All medications were reviewed and prescriptions were sent to mutually agreed upon pharmacy. The patient agrees to follow up with providers as recommended.       56M with PMHx ETOH abuse, glaucoma, R eye blindness, venous insufficiency, HTN presenting with alcohol withdrawal symptoms and abdominal pain.     Alcohol withdrawal- s/p Librium taper   -MV, folic acid, thiamine  - no depressive symptoms or SI.    Anxiety.    Hx of anxiety, previously on Wellbutrin, ran out of medication about 1 month ago - resumed upon DC. Rx sent to Pharmacy     Abdominal pain.   Now resolved and likely in the setting of excessive ETOH use. Abdominal exam benign  -zofran PRN.    HTN (hypertension).    BP elevated initially and now normotensive. Will hold off on resuming amlodipine upon DC.    On 11/24/21 this case was reviewed with Dr. Merlin, the patient is medically stable and optimized for discharge. All medications were reviewed and prescriptions were sent to mutually agreed upon pharmacy. The patient agrees to follow up with providers as recommended. 56M with PMHx ETOH abuse, glaucoma, R eye blindness, venous insufficiency, HTN presenting with alcohol withdrawal. Patient completed a Librium taper. Patient was previously on Wellbutrin for anxiety, was advised to resume a lower dose the day after discharge.      Alcohol withdrawal- s/p Librium taper   -MV, folic acid, thiamine  - no depressive symptoms or SI.    Anxiety.    Hx of anxiety, previously on Wellbutrin, ran out of medication about 1 month ago - resumed upon DC. Rx sent to Pharmacy     Abdominal pain.   Now resolved and likely in the setting of excessive ETOH use. Abdominal exam benign  -zofran PRN.    HTN (hypertension).    BP elevated initially and now normotensive. Will hold off on resuming amlodipine upon DC.    On 11/24/21 this case was reviewed with Dr. Merlin, the patient is medically stable and optimized for discharge. All medications were reviewed and prescriptions were sent to mutually agreed upon pharmacy. The patient agrees to follow up with providers as recommended.

## 2021-11-23 NOTE — DISCHARGE NOTE PROVIDER - DISCHARGE DATE
Chief Complaint   Patient presents with    Physical     1. Have you been to the ER, urgent care clinic since your last visit? Hospitalized since your last visit? No    2. Have you seen or consulted any other health care providers outside of the [address] [place] [address] since your last visit? Include any pap smears or colon screening.  No 23-Nov-2021 24-Nov-2021

## 2021-11-23 NOTE — DISCHARGE NOTE PROVIDER - NSDCFUADDAPPT_GEN_ALL_CORE_FT
If you are in need of a general medicine physician and post-discharge medical follow-up for further care/recommendations you may contact the [facility] for an appointment [phone]

## 2021-11-23 NOTE — DISCHARGE NOTE PROVIDER - PROVIDER TOKENS
PROVIDER:[TOKEN:[[id number]],FOLLOWUP:[1 week]] FREE:[LAST:[Your PCP],PHONE:[(   )    -],FAX:[(   )    -]]

## 2021-11-23 NOTE — DISCHARGE NOTE PROVIDER - NSDCCPCAREPLAN_GEN_ALL_CORE_FT
PRINCIPAL DISCHARGE DIAGNOSIS  Diagnosis: Alcohol withdrawal  Assessment and Plan of Treatment: At [facility] you were treated with a Librium taper for alcohol withdrawal. You are encouraged to quit using alcohol. Please consider going to Alcoholics Anonymous for support & help in quitting.      SECONDARY DISCHARGE DIAGNOSES  Diagnosis: Anxiety  Assessment and Plan of Treatment: You had been taking Wellbutrin outpatient but stopped as you had run out of your prescription. We are starting you on Wellbutrin 100mg daily. Please see your PCP outpatient for potential dose uptitration.     PRINCIPAL DISCHARGE DIAGNOSIS  Diagnosis: Alcohol withdrawal  Assessment and Plan of Treatment: At [facility] you were treated with a Librium taper for alcohol withdrawal. You are encouraged to quit using alcohol. Please consider going to Alcoholics Anonymous for support & help in quitting.      SECONDARY DISCHARGE DIAGNOSES  Diagnosis: HTN (hypertension)  Assessment and Plan of Treatment: Please do not take Amlodipine as you had been taking previously as your blood pressure has been well controlled. Please have your PCP monitor your blood pressure outpatient and resume Amlodipine if deemed necessary.    Diagnosis: Anxiety  Assessment and Plan of Treatment: You had been taking Wellbutrin outpatient but stopped as you had run out of your prescription. We are starting you on Wellbutrin 150mg daily. Please see your PCP outpatient for potential dose uptitration.

## 2021-11-24 VITALS
DIASTOLIC BLOOD PRESSURE: 78 MMHG | SYSTOLIC BLOOD PRESSURE: 133 MMHG | TEMPERATURE: 98 F | HEART RATE: 60 BPM | OXYGEN SATURATION: 100 % | RESPIRATION RATE: 18 BRPM

## 2021-11-24 LAB
ALBUMIN SERPL ELPH-MCNC: 3.7 G/DL — SIGNIFICANT CHANGE UP (ref 3.3–5)
ALP SERPL-CCNC: 53 U/L — SIGNIFICANT CHANGE UP (ref 40–120)
ALT FLD-CCNC: 98 U/L — HIGH (ref 4–41)
ANION GAP SERPL CALC-SCNC: 10 MMOL/L — SIGNIFICANT CHANGE UP (ref 7–14)
AST SERPL-CCNC: 132 U/L — HIGH (ref 4–40)
BILIRUB SERPL-MCNC: 0.5 MG/DL — SIGNIFICANT CHANGE UP (ref 0.2–1.2)
BUN SERPL-MCNC: 15 MG/DL — SIGNIFICANT CHANGE UP (ref 7–23)
CALCIUM SERPL-MCNC: 8.6 MG/DL — SIGNIFICANT CHANGE UP (ref 8.4–10.5)
CHLORIDE SERPL-SCNC: 106 MMOL/L — SIGNIFICANT CHANGE UP (ref 98–107)
CO2 SERPL-SCNC: 22 MMOL/L — SIGNIFICANT CHANGE UP (ref 22–31)
CREAT SERPL-MCNC: 0.67 MG/DL — SIGNIFICANT CHANGE UP (ref 0.5–1.3)
GLUCOSE SERPL-MCNC: 129 MG/DL — HIGH (ref 70–99)
HCT VFR BLD CALC: 40.2 % — SIGNIFICANT CHANGE UP (ref 39–50)
HGB BLD-MCNC: 13.6 G/DL — SIGNIFICANT CHANGE UP (ref 13–17)
MAGNESIUM SERPL-MCNC: 2 MG/DL — SIGNIFICANT CHANGE UP (ref 1.6–2.6)
MCHC RBC-ENTMCNC: 31.6 PG — SIGNIFICANT CHANGE UP (ref 27–34)
MCHC RBC-ENTMCNC: 33.8 GM/DL — SIGNIFICANT CHANGE UP (ref 32–36)
MCV RBC AUTO: 93.3 FL — SIGNIFICANT CHANGE UP (ref 80–100)
NRBC # BLD: 0 /100 WBCS — SIGNIFICANT CHANGE UP
NRBC # FLD: 0 K/UL — SIGNIFICANT CHANGE UP
PHOSPHATE SERPL-MCNC: 2.7 MG/DL — SIGNIFICANT CHANGE UP (ref 2.5–4.5)
PLATELET # BLD AUTO: 145 K/UL — LOW (ref 150–400)
POTASSIUM SERPL-MCNC: 3.6 MMOL/L — SIGNIFICANT CHANGE UP (ref 3.5–5.3)
POTASSIUM SERPL-SCNC: 3.6 MMOL/L — SIGNIFICANT CHANGE UP (ref 3.5–5.3)
PROT SERPL-MCNC: 7.1 G/DL — SIGNIFICANT CHANGE UP (ref 6–8.3)
RBC # BLD: 4.31 M/UL — SIGNIFICANT CHANGE UP (ref 4.2–5.8)
RBC # FLD: 13.2 % — SIGNIFICANT CHANGE UP (ref 10.3–14.5)
SODIUM SERPL-SCNC: 138 MMOL/L — SIGNIFICANT CHANGE UP (ref 135–145)
WBC # BLD: 5.95 K/UL — SIGNIFICANT CHANGE UP (ref 3.8–10.5)
WBC # FLD AUTO: 5.95 K/UL — SIGNIFICANT CHANGE UP (ref 3.8–10.5)

## 2021-11-24 PROCEDURE — 99238 HOSP IP/OBS DSCHRG MGMT 30/<: CPT

## 2021-11-24 RX ORDER — BUPROPION HYDROCHLORIDE 150 MG/1
1 TABLET, EXTENDED RELEASE ORAL
Qty: 30 | Refills: 0
Start: 2021-11-24 | End: 2021-12-23

## 2021-11-24 RX ADMIN — Medication 1 MILLIGRAM(S): at 11:40

## 2021-11-24 RX ADMIN — DORZOLAMIDE HYDROCHLORIDE TIMOLOL MALEATE 1 DROP(S): 20; 5 SOLUTION/ DROPS OPHTHALMIC at 05:48

## 2021-11-24 RX ADMIN — Medication 100 MILLIGRAM(S): at 11:40

## 2021-11-24 RX ADMIN — Medication 1 DROP(S): at 05:48

## 2021-11-24 RX ADMIN — Medication 50 MILLIGRAM(S): at 05:48

## 2021-11-24 RX ADMIN — Medication 1 TABLET(S): at 11:40

## 2021-11-24 NOTE — PROGRESS NOTE ADULT - PROBLEM SELECTOR PLAN 3
BP elevated initially and now normotensive. Will hold off on resuming amlodipine for now.
BP elevated initially and now normotensive. Will hold off on resuming amlodipine for now but if
Now resolved and likely in the setting of excessive ETOH use. Abdominal exam benign  -zofran PRN

## 2021-11-24 NOTE — PROGRESS NOTE ADULT - PROBLEM SELECTOR PLAN 1
-high risk CIWA monitoring  -librium taper, cw/ at this time still in window for w/d  -MV, folic acid, thiamine  -MICU consult if CIWA uptrends >15 on above regimen  -  eval for rehab  - no depressive symptoms or SI
Completed Librium taper, CIWA monitoring   -MV, folic acid, thiamine  - no depressive symptoms or SI  - Patient does not want to drink when he returns home
-high risk CIWA monitoring  -librium taper  -MV, folic acid, thiamine  -MICU consult if CIWA uptrends >15 on above regimen  -  eval for rehab  - no depressive symptoms or SI
Cont with Librium taper, CIWA monitoring   -MV, folic acid, thiamine  [ ] SBIRT   - no depressive symptoms or SI
Cont with Librium taper, CIWA monitoring   -MV, folic acid, thiamine  [ ] SBIRT   - no depressive symptoms or SI

## 2021-11-24 NOTE — PROGRESS NOTE ADULT - PROBLEM SELECTOR PLAN 5
Cosopt, latanoprost, artificial tears
Lovenox 40mg qd  DASH/TLC diet
Lovenox 40mg qd  DASH/TLC diet

## 2021-11-24 NOTE — PROGRESS NOTE ADULT - PROBLEM SELECTOR PLAN 2
Hx of anxiety, previously on Wellbutrin , ran out of medication about 1 month ago. In setting of etoh withdrawal, will hold off on re-starting.  - D/w patient- can re-start Wellbutrin  on Thursday AM
Now resolved and likely in the setting of excessive ETOH use. Abdominal exam benign  -zofran PRN
Hx of anxiety, previously on Wellbutrin 100, ran out of medication about 1 month ago. In setting of etoh withdrawal, will hold off on re-starting. Can resume on DC
Hx of anxiety, previously on Wellbutrin , ran out of medication about 1 month ago.   - D/w patient- can re-start Wellbutrin  on Thursday AM
Now resolved and likely in the setting of excessive ETOH use. Abdominal exam benign  -zofran PRN

## 2021-11-24 NOTE — PROGRESS NOTE ADULT - PROBLEM SELECTOR PLAN 4
Cosopt, latanoprost, artificial tears
BP elevated initially and now normotensive. Will hold off on resuming amlodipine for now.
Cosopt, latanoprost, artificial tears
BP elevated initially and now normotensive. Will hold off on resuming amlodipine for now.
BP elevated initially and now normotensive. Will hold off on resuming amlodipine for now.

## 2021-11-24 NOTE — PROGRESS NOTE ADULT - SUBJECTIVE AND OBJECTIVE BOX
Patient is a 56y old  Male who presents with a chief complaint of ETOH withdrawal (20 Nov 2021 16:59)      SUBJECTIVE / OVERNIGHT EVENTS:    Patient feels well, reporting anxiety to nursing staff. Was previously on Wellbutrin but ran out of medication about 1 month ago. Is interested in restarting to manage anxiety.     MEDICATIONS  (STANDING):  artificial  tears Solution 1 Drop(s) Both EYES three times a day  chlordiazePOXIDE 50 milliGRAM(s) Oral every 8 hours  chlordiazePOXIDE   Oral   donepezil 5 milliGRAM(s) Oral at bedtime  dorzolamide 2%/timolol 0.5% Ophthalmic Solution 1 Drop(s) Both EYES two times a day  folic acid 1 milliGRAM(s) Oral daily  latanoprost 0.005% Ophthalmic Solution 1 Drop(s) Both EYES at bedtime  multivitamin 1 Tablet(s) Oral daily  thiamine 100 milliGRAM(s) Oral daily    MEDICATIONS  (PRN):  acetaminophen     Tablet .. 650 milliGRAM(s) Oral every 6 hours PRN Temp greater or equal to 38C (100.4F), Mild Pain (1 - 3)  aluminum hydroxide/magnesium hydroxide/simethicone Suspension 30 milliLiter(s) Oral every 4 hours PRN Dyspepsia  chlordiazePOXIDE 50 milliGRAM(s) Oral every 1 hour PRN Symptom-triggered: each CIWA -Ar score 8 or GREATER  ondansetron Injectable 4 milliGRAM(s) IV Push every 8 hours PRN Nausea and/or Vomiting      Vital Signs Last 24 Hrs  T(C): 36.6 (21 Nov 2021 13:21), Max: 36.9 (20 Nov 2021 22:00)  T(F): 97.9 (21 Nov 2021 13:21), Max: 98.5 (20 Nov 2021 22:00)  HR: 63 (21 Nov 2021 13:21) (59 - 79)  BP: 125/73 (21 Nov 2021 13:21) (120/67 - 146/78)  BP(mean): --  RR: 17 (21 Nov 2021 13:21) (17 - 18)  SpO2: 100% (21 Nov 2021 13:21) (95% - 100%)  CAPILLARY BLOOD GLUCOSE        I&O's Summary    20 Nov 2021 07:01  -  21 Nov 2021 07:00  --------------------------------------------------------  IN: 0 mL / OUT: 950 mL / NET: -950 mL    21 Nov 2021 07:01  -  21 Nov 2021 16:09  --------------------------------------------------------  IN: 480 mL / OUT: 0 mL / NET: 480 mL        PHYSICAL EXAM:  Vital Signs Last 24 Hrs  T(C): 36.6 (11-21-21 @ 13:21)  T(F): 97.9 (11-21-21 @ 13:21), Max: 98.5 (11-20-21 @ 22:00)  HR: 63 (11-21-21 @ 13:21) (59 - 79)  BP: 125/73 (11-21-21 @ 13:21)  BP(mean): --  RR: 17 (11-21-21 @ 13:21) (17 - 18)  SpO2: 100% (11-21-21 @ 13:21) (95% - 100%)  Wt(kg): --    11-20 @ 07:01  -  11-21 @ 07:00  --------------------------------------------------------  IN: 0 mL / OUT: 950 mL / NET: -950 mL    11-21 @ 07:01  -  11-21 @ 16:09  --------------------------------------------------------  IN: 480 mL / OUT: 0 mL / NET: 480 mL      Constitutional: NAD, awake and alert  EYES: EOMI  ENT:  Normal Hearing, no tonsillar exudates   Neck: Soft and supple , no thyromegaly   Respiratory: Breath sounds are clear bilaterally, No wheezing, rales or rhonchi  Cardiovascular: S1 and S2, regular rate and rhythm, no Murmurs, gallops or rubs, no JVD,    Gastrointestinal: Bowel Sounds present, soft, nontender, nondistended, no guarding, no rebound  Extremities: No cyanosis or clubbing; warm to touch  Vascular: 2+ peripheral pulses lower ex  Neurological: No focal deficits, CN II-XII intact bilaterally, sensation to light touch intact in all extremities. Pupils are equally reactive to light and symmetrical in size.   Musculoskeletal: mild tremors and tongue fasciculations   Skin: No rashes  Psych: no depression or anhedonia, AAOx3  HEME: no bruises, no nose bleeds    LABS:                        13.6   5.03  )-----------( 146      ( 22 Nov 2021 06:56 )             40.6     22 Nov 2021 06:56    136    |  103    |  9      ----------------------------<  113    3.2     |  23     |  0.70     Ca    8.8        22 Nov 2021 06:56  Phos  2.5       22 Nov 2021 06:56  Mg     1.80      22 Nov 2021 06:56          RADIOLOGY & ADDITIONAL TESTS:    Imaging Personally Reviewed:    Consultant(s) Notes Reviewed:      Care Discussed with Consultants/Other Providers:  
Patient is a 56y old  Male who presents with a chief complaint of ETOH withdrawal (20 Nov 2021 16:59)      SUBJECTIVE / OVERNIGHT EVENTS:    Patient seen and examined at bedside. Mild tremors. No CP/dyspnea.     MEDICATIONS  (STANDING):  artificial  tears Solution 1 Drop(s) Both EYES three times a day  chlordiazePOXIDE 50 milliGRAM(s) Oral every 8 hours  chlordiazePOXIDE   Oral   donepezil 5 milliGRAM(s) Oral at bedtime  dorzolamide 2%/timolol 0.5% Ophthalmic Solution 1 Drop(s) Both EYES two times a day  folic acid 1 milliGRAM(s) Oral daily  latanoprost 0.005% Ophthalmic Solution 1 Drop(s) Both EYES at bedtime  multivitamin 1 Tablet(s) Oral daily  thiamine 100 milliGRAM(s) Oral daily    MEDICATIONS  (PRN):  acetaminophen     Tablet .. 650 milliGRAM(s) Oral every 6 hours PRN Temp greater or equal to 38C (100.4F), Mild Pain (1 - 3)  aluminum hydroxide/magnesium hydroxide/simethicone Suspension 30 milliLiter(s) Oral every 4 hours PRN Dyspepsia  chlordiazePOXIDE 50 milliGRAM(s) Oral every 1 hour PRN Symptom-triggered: each CIWA -Ar score 8 or GREATER  ondansetron Injectable 4 milliGRAM(s) IV Push every 8 hours PRN Nausea and/or Vomiting      Vital Signs Last 24 Hrs  T(C): 36.6 (21 Nov 2021 13:21), Max: 36.9 (20 Nov 2021 22:00)  T(F): 97.9 (21 Nov 2021 13:21), Max: 98.5 (20 Nov 2021 22:00)  HR: 63 (21 Nov 2021 13:21) (59 - 79)  BP: 125/73 (21 Nov 2021 13:21) (120/67 - 146/78)  BP(mean): --  RR: 17 (21 Nov 2021 13:21) (17 - 18)  SpO2: 100% (21 Nov 2021 13:21) (95% - 100%)  CAPILLARY BLOOD GLUCOSE        I&O's Summary    20 Nov 2021 07:01  -  21 Nov 2021 07:00  --------------------------------------------------------  IN: 0 mL / OUT: 950 mL / NET: -950 mL    21 Nov 2021 07:01  -  21 Nov 2021 16:09  --------------------------------------------------------  IN: 480 mL / OUT: 0 mL / NET: 480 mL        PHYSICAL EXAM:  Vital Signs Last 24 Hrs  T(C): 36.6 (11-21-21 @ 13:21)  T(F): 97.9 (11-21-21 @ 13:21), Max: 98.5 (11-20-21 @ 22:00)  HR: 63 (11-21-21 @ 13:21) (59 - 79)  BP: 125/73 (11-21-21 @ 13:21)  BP(mean): --  RR: 17 (11-21-21 @ 13:21) (17 - 18)  SpO2: 100% (11-21-21 @ 13:21) (95% - 100%)  Wt(kg): --    11-20 @ 07:01  -  11-21 @ 07:00  --------------------------------------------------------  IN: 0 mL / OUT: 950 mL / NET: -950 mL    11-21 @ 07:01  -  11-21 @ 16:09  --------------------------------------------------------  IN: 480 mL / OUT: 0 mL / NET: 480 mL      Constitutional: NAD, awake and alert  EYES: EOMI  ENT:  Normal Hearing, no tonsillar exudates   Neck: Soft and supple , no thyromegaly   Respiratory: Breath sounds are clear bilaterally, No wheezing, rales or rhonchi  Cardiovascular: S1 and S2, regular rate and rhythm, no Murmurs, gallops or rubs, no JVD,    Gastrointestinal: Bowel Sounds present, soft, nontender, nondistended, no guarding, no rebound  Extremities: No cyanosis or clubbing; warm to touch  Vascular: 2+ peripheral pulses lower ex  Neurological: No focal deficits, CN II-XII intact bilaterally, sensation to light touch intact in all extremities. Pupils are equally reactive to light and symmetrical in size.   Musculoskeletal: mild tremors and tongue fasciculations   Skin: No rashes  Psych: no depression or anhedonia, AAOx3  HEME: no bruises, no nose bleeds      LABS:                        13.9   5.86  )-----------( 231      ( 19 Nov 2021 19:16 )             40.6     11-20    140  |  101  |  8   ----------------------------<  78  3.8   |  25  |  0.83    Ca    8.0<L>      20 Nov 2021 07:01  Phos  2.5     11-20  Mg     1.70     11-20    TPro  7.7  /  Alb  4.1  /  TBili  0.6  /  DBili  x   /  AST  94<H>  /  ALT  59<H>  /  AlkPhos  71  11-19              RADIOLOGY & ADDITIONAL TESTS:    Imaging Personally Reviewed:    Consultant(s) Notes Reviewed:      Care Discussed with Consultants/Other Providers:  
Patient is a 56y old  Male who presents with a chief complaint of ETOH withdrawal (20 Nov 2021 16:59)      SUBJECTIVE / OVERNIGHT EVENTS:  Reports anxiety to nursing staff. Wants to resume Wellbutrin   MEDICATIONS  (STANDING):  artificial  tears Solution 1 Drop(s) Both EYES three times a day  chlordiazePOXIDE 50 milliGRAM(s) Oral every 8 hours  chlordiazePOXIDE   Oral   donepezil 5 milliGRAM(s) Oral at bedtime  dorzolamide 2%/timolol 0.5% Ophthalmic Solution 1 Drop(s) Both EYES two times a day  folic acid 1 milliGRAM(s) Oral daily  latanoprost 0.005% Ophthalmic Solution 1 Drop(s) Both EYES at bedtime  multivitamin 1 Tablet(s) Oral daily  thiamine 100 milliGRAM(s) Oral daily    MEDICATIONS  (PRN):  acetaminophen     Tablet .. 650 milliGRAM(s) Oral every 6 hours PRN Temp greater or equal to 38C (100.4F), Mild Pain (1 - 3)  aluminum hydroxide/magnesium hydroxide/simethicone Suspension 30 milliLiter(s) Oral every 4 hours PRN Dyspepsia  chlordiazePOXIDE 50 milliGRAM(s) Oral every 1 hour PRN Symptom-triggered: each CIWA -Ar score 8 or GREATER  ondansetron Injectable 4 milliGRAM(s) IV Push every 8 hours PRN Nausea and/or Vomiting      Vital Signs Last 24 Hrs  T(C): 36.6 (21 Nov 2021 13:21), Max: 36.9 (20 Nov 2021 22:00)  T(F): 97.9 (21 Nov 2021 13:21), Max: 98.5 (20 Nov 2021 22:00)  HR: 63 (21 Nov 2021 13:21) (59 - 79)  BP: 125/73 (21 Nov 2021 13:21) (120/67 - 146/78)  BP(mean): --  RR: 17 (21 Nov 2021 13:21) (17 - 18)  SpO2: 100% (21 Nov 2021 13:21) (95% - 100%)  CAPILLARY BLOOD GLUCOSE        I&O's Summary    20 Nov 2021 07:01  -  21 Nov 2021 07:00  --------------------------------------------------------  IN: 0 mL / OUT: 950 mL / NET: -950 mL    21 Nov 2021 07:01  -  21 Nov 2021 16:09  --------------------------------------------------------  IN: 480 mL / OUT: 0 mL / NET: 480 mL        PHYSICAL EXAM:  Vital Signs Last 24 Hrs  T(C): 36.6 (11-21-21 @ 13:21)  T(F): 97.9 (11-21-21 @ 13:21), Max: 98.5 (11-20-21 @ 22:00)  HR: 63 (11-21-21 @ 13:21) (59 - 79)  BP: 125/73 (11-21-21 @ 13:21)  BP(mean): --  RR: 17 (11-21-21 @ 13:21) (17 - 18)  SpO2: 100% (11-21-21 @ 13:21) (95% - 100%)  Wt(kg): --    11-20 @ 07:01  -  11-21 @ 07:00  --------------------------------------------------------  IN: 0 mL / OUT: 950 mL / NET: -950 mL    11-21 @ 07:01  -  11-21 @ 16:09  --------------------------------------------------------  IN: 480 mL / OUT: 0 mL / NET: 480 mL      Constitutional: NAD, awake and alert  EYES: EOMI  ENT:  Normal Hearing, no tonsillar exudates   Neck: Soft and supple , no thyromegaly   Respiratory: Breath sounds are clear bilaterally, No wheezing, rales or rhonchi  Cardiovascular: S1 and S2, regular rate and rhythm, no Murmurs, gallops or rubs, no JVD,    Gastrointestinal: Bowel Sounds present, soft, nontender, nondistended, no guarding, no rebound  Extremities: No cyanosis or clubbing; warm to touch  Vascular: 2+ peripheral pulses lower ex  Neurological: No focal deficits, CN II-XII intact bilaterally, sensation to light touch intact in all extremities. Pupils are equally reactive to light and symmetrical in size.   Musculoskeletal: mild tremors and tongue fasciculations   Skin: No rashes  Psych: no depression or anhedonia, AAOx3  HEME: no bruises, no nose bleeds    LABS:                        13.4   5.07  )-----------( 159      ( 23 Nov 2021 08:24 )             39.7     23 Nov 2021 08:24    138    |  106    |  11     ----------------------------<  125    3.5     |  20     |  0.64     Ca    8.9        23 Nov 2021 08:24  Phos  2.5       23 Nov 2021 08:24  Mg     1.60      23 Nov 2021 08:24            RADIOLOGY & ADDITIONAL TESTS:    Imaging Personally Reviewed:    Consultant(s) Notes Reviewed:      Care Discussed with Consultants/Other Providers:  
Patient is a 56y old  Male who presents with a chief complaint of ETOH withdrawal (20 Nov 2021 04:40)      SUBJECTIVE / OVERNIGHT EVENTS:    Patient seen and examined at bedside. Mild tremors, no N/V, no abd pain. No CP.    MEDICATIONS  (STANDING):  artificial  tears Solution 1 Drop(s) Both EYES three times a day  chlordiazePOXIDE   Oral   chlordiazePOXIDE 50 milliGRAM(s) Oral every 6 hours  donepezil 5 milliGRAM(s) Oral at bedtime  dorzolamide 2%/timolol 0.5% Ophthalmic Solution 1 Drop(s) Both EYES two times a day  folic acid 1 milliGRAM(s) Oral daily  lactated ringers. 1000 milliLiter(s) (100 mL/Hr) IV Continuous <Continuous>  latanoprost 0.005% Ophthalmic Solution 1 Drop(s) Both EYES at bedtime  multivitamin 1 Tablet(s) Oral daily  thiamine 100 milliGRAM(s) Oral daily    MEDICATIONS  (PRN):  acetaminophen     Tablet .. 650 milliGRAM(s) Oral every 6 hours PRN Temp greater or equal to 38C (100.4F), Mild Pain (1 - 3)  aluminum hydroxide/magnesium hydroxide/simethicone Suspension 30 milliLiter(s) Oral every 4 hours PRN Dyspepsia  chlordiazePOXIDE 50 milliGRAM(s) Oral every 1 hour PRN Symptom-triggered: each CIWA -Ar score 8 or GREATER  ondansetron Injectable 4 milliGRAM(s) IV Push every 8 hours PRN Nausea and/or Vomiting      Vital Signs Last 24 Hrs  T(C): 37.1 (20 Nov 2021 13:16), Max: 37.1 (20 Nov 2021 11:19)  T(F): 98.8 (20 Nov 2021 13:16), Max: 98.8 (20 Nov 2021 11:19)  HR: 88 (20 Nov 2021 13:16) (74 - 105)  BP: 131/70 (20 Nov 2021 13:16) (115/76 - 155/85)  BP(mean): --  RR: 17 (20 Nov 2021 13:16) (17 - 20)  SpO2: 97% (20 Nov 2021 13:16) (97% - 100%)  CAPILLARY BLOOD GLUCOSE        I&O's Summary    20 Nov 2021 07:01  -  20 Nov 2021 17:00  --------------------------------------------------------  IN: 0 mL / OUT: 400 mL / NET: -400 mL        PHYSICAL EXAM:  Vital Signs Last 24 Hrs  T(C): 37.1 (11-20-21 @ 13:16)  T(F): 98.8 (11-20-21 @ 13:16), Max: 98.8 (11-20-21 @ 11:19)  HR: 88 (11-20-21 @ 13:16) (74 - 105)  BP: 131/70 (11-20-21 @ 13:16)  BP(mean): --  RR: 17 (11-20-21 @ 13:16) (17 - 20)  SpO2: 97% (11-20-21 @ 13:16) (97% - 100%)  Wt(kg): --    11-20 @ 07:01  -  11-20 @ 17:00  --------------------------------------------------------  IN: 0 mL / OUT: 400 mL / NET: -400 mL      Constitutional: NAD, awake and alert  EYES: EOMI  ENT:  Normal Hearing, no tonsillar exudates   Neck: Soft and supple , no thyromegaly   Respiratory: Breath sounds are clear bilaterally, No wheezing, rales or rhonchi  Cardiovascular: S1 and S2, regular rate and rhythm, no Murmurs, gallops or rubs, no JVD,    Gastrointestinal: Bowel Sounds present, soft, nontender, nondistended, no guarding, no rebound  Extremities: No cyanosis or clubbing; warm to touch  Vascular: 2+ peripheral pulses lower ex  Neurological: No focal deficits, CN II-XII intact bilaterally, sensation to light touch intact in all extremities, t. Pupils are equally reactive to light and symmetrical in size. Mild tremors and tongue fasciculations  Musculoskeletal: 5/5 strength b/l upper and lower extremities; no joint swelling.  Skin: No rashes  Psych: no depression or anhedonia, AAOx3  HEME: no bruises, no nose bleeds      LABS:                        13.9   5.86  )-----------( 231      ( 19 Nov 2021 19:16 )             40.6     11-20    140  |  101  |  8   ----------------------------<  78  3.8   |  25  |  0.83    Ca    8.0<L>      20 Nov 2021 07:01  Phos  2.5     11-20  Mg     1.70     11-20    TPro  7.7  /  Alb  4.1  /  TBili  0.6  /  DBili  x   /  AST  94<H>  /  ALT  59<H>  /  AlkPhos  71  11-19              RADIOLOGY & ADDITIONAL TESTS:    Imaging Personally Reviewed:    Consultant(s) Notes Reviewed:      Care Discussed with Consultants/Other Providers:  
Patient is a 56y old  Male who presents with a chief complaint of ETOH withdrawal (20 Nov 2021 16:59)      SUBJECTIVE / OVERNIGHT EVENTS:  Feels well, received last dose of Librium   Wants to resume Wellbutrin tomorrow   No concerns or complaints     MEDICATIONS  (STANDING):  artificial  tears Solution 1 Drop(s) Both EYES three times a day  chlordiazePOXIDE 50 milliGRAM(s) Oral every 8 hours  chlordiazePOXIDE   Oral   donepezil 5 milliGRAM(s) Oral at bedtime  dorzolamide 2%/timolol 0.5% Ophthalmic Solution 1 Drop(s) Both EYES two times a day  folic acid 1 milliGRAM(s) Oral daily  latanoprost 0.005% Ophthalmic Solution 1 Drop(s) Both EYES at bedtime  multivitamin 1 Tablet(s) Oral daily  thiamine 100 milliGRAM(s) Oral daily    MEDICATIONS  (PRN):  acetaminophen     Tablet .. 650 milliGRAM(s) Oral every 6 hours PRN Temp greater or equal to 38C (100.4F), Mild Pain (1 - 3)  aluminum hydroxide/magnesium hydroxide/simethicone Suspension 30 milliLiter(s) Oral every 4 hours PRN Dyspepsia  chlordiazePOXIDE 50 milliGRAM(s) Oral every 1 hour PRN Symptom-triggered: each CIWA -Ar score 8 or GREATER  ondansetron Injectable 4 milliGRAM(s) IV Push every 8 hours PRN Nausea and/or Vomiting      Vital Signs Last 24 Hrs  T(C): 36.6 (21 Nov 2021 13:21), Max: 36.9 (20 Nov 2021 22:00)  T(F): 97.9 (21 Nov 2021 13:21), Max: 98.5 (20 Nov 2021 22:00)  HR: 63 (21 Nov 2021 13:21) (59 - 79)  BP: 125/73 (21 Nov 2021 13:21) (120/67 - 146/78)  BP(mean): --  RR: 17 (21 Nov 2021 13:21) (17 - 18)  SpO2: 100% (21 Nov 2021 13:21) (95% - 100%)  CAPILLARY BLOOD GLUCOSE        I&O's Summary    20 Nov 2021 07:01  -  21 Nov 2021 07:00  --------------------------------------------------------  IN: 0 mL / OUT: 950 mL / NET: -950 mL    21 Nov 2021 07:01  -  21 Nov 2021 16:09  --------------------------------------------------------  IN: 480 mL / OUT: 0 mL / NET: 480 mL        PHYSICAL EXAM:  Vital Signs Last 24 Hrs  T(C): 36.6 (11-21-21 @ 13:21)  T(F): 97.9 (11-21-21 @ 13:21), Max: 98.5 (11-20-21 @ 22:00)  HR: 63 (11-21-21 @ 13:21) (59 - 79)  BP: 125/73 (11-21-21 @ 13:21)  BP(mean): --  RR: 17 (11-21-21 @ 13:21) (17 - 18)  SpO2: 100% (11-21-21 @ 13:21) (95% - 100%)  Wt(kg): --    11-20 @ 07:01  -  11-21 @ 07:00  --------------------------------------------------------  IN: 0 mL / OUT: 950 mL / NET: -950 mL    11-21 @ 07:01  -  11-21 @ 16:09  --------------------------------------------------------  IN: 480 mL / OUT: 0 mL / NET: 480 mL      Constitutional: NAD, awake and alert  EYES: EOMI  ENT:  Normal Hearing, no tonsillar exudates   Neck: Soft and supple , no thyromegaly   Respiratory: Breath sounds are clear bilaterally, No wheezing, rales or rhonchi  Cardiovascular: S1 and S2, regular rate and rhythm, no Murmurs, gallops or rubs, no JVD,    Gastrointestinal: Bowel Sounds present, soft, nontender, nondistended, no guarding, no rebound  Extremities: No cyanosis or clubbing; warm to touch  Vascular: 2+ peripheral pulses lower ex  Neurological: No focal deficits, CN II-XII intact bilaterally, sensation to light touch intact in all extremities. Pupils are equally reactive to light and symmetrical in size.   Musculoskeletal: mild tremors and tongue fasciculations   Skin: No rashes  Psych: no depression or anhedonia, AAOx3  HEME: no bruises, no nose bleeds    LABS:                        13.6   5.95  )-----------( 145      ( 24 Nov 2021 07:54 )             40.2     24 Nov 2021 07:54    138    |  106    |  15     ----------------------------<  129    3.6     |  22     |  0.67     Ca    8.6        24 Nov 2021 07:54  Phos  2.7       24 Nov 2021 07:54  Mg     2.00      24 Nov 2021 07:54    TPro  7.1    /  Alb  3.7    /  TBili  0.5    /  DBili  x      /  AST  132    /  ALT  98     /  AlkPhos  53     24 Nov 2021 07:54            RADIOLOGY & ADDITIONAL TESTS:    Imaging Personally Reviewed:    Consultant(s) Notes Reviewed:      Care Discussed with Consultants/Other Providers:

## 2021-12-22 ENCOUNTER — INPATIENT (INPATIENT)
Facility: HOSPITAL | Age: 56
LOS: 5 days | Discharge: HOME | End: 2021-12-28
Attending: INTERNAL MEDICINE | Admitting: INTERNAL MEDICINE
Payer: MEDICAID

## 2021-12-22 VITALS
DIASTOLIC BLOOD PRESSURE: 95 MMHG | OXYGEN SATURATION: 97 % | SYSTOLIC BLOOD PRESSURE: 155 MMHG | WEIGHT: 149.91 LBS | HEIGHT: 68 IN | HEART RATE: 70 BPM | TEMPERATURE: 98 F | RESPIRATION RATE: 20 BRPM

## 2021-12-22 LAB
ALBUMIN SERPL ELPH-MCNC: 3.6 G/DL — SIGNIFICANT CHANGE UP (ref 3.3–5)
ALP SERPL-CCNC: 116 U/L — SIGNIFICANT CHANGE UP (ref 40–120)
ALT FLD-CCNC: 63 U/L — SIGNIFICANT CHANGE UP (ref 12–78)
ANION GAP SERPL CALC-SCNC: 10 MMOL/L — SIGNIFICANT CHANGE UP (ref 5–17)
APPEARANCE UR: CLEAR — SIGNIFICANT CHANGE UP
AST SERPL-CCNC: 69 U/L — HIGH (ref 15–37)
BASOPHILS # BLD AUTO: 0.12 K/UL — SIGNIFICANT CHANGE UP (ref 0–0.2)
BASOPHILS NFR BLD AUTO: 1.9 % — SIGNIFICANT CHANGE UP (ref 0–2)
BILIRUB SERPL-MCNC: 0.4 MG/DL — SIGNIFICANT CHANGE UP (ref 0.2–1.2)
BILIRUB UR-MCNC: NEGATIVE — SIGNIFICANT CHANGE UP
BUN SERPL-MCNC: 7 MG/DL — SIGNIFICANT CHANGE UP (ref 7–23)
CALCIUM SERPL-MCNC: 8.6 MG/DL — SIGNIFICANT CHANGE UP (ref 8.5–10.1)
CHLORIDE SERPL-SCNC: 107 MMOL/L — SIGNIFICANT CHANGE UP (ref 96–108)
CO2 SERPL-SCNC: 25 MMOL/L — SIGNIFICANT CHANGE UP (ref 22–31)
COLOR SPEC: YELLOW — SIGNIFICANT CHANGE UP
CREAT SERPL-MCNC: 0.79 MG/DL — SIGNIFICANT CHANGE UP (ref 0.5–1.3)
DIFF PNL FLD: NEGATIVE — SIGNIFICANT CHANGE UP
EOSINOPHIL # BLD AUTO: 0.09 K/UL — SIGNIFICANT CHANGE UP (ref 0–0.5)
EOSINOPHIL NFR BLD AUTO: 1.4 % — SIGNIFICANT CHANGE UP (ref 0–6)
FLUAV AG NPH QL: SIGNIFICANT CHANGE UP
FLUBV AG NPH QL: SIGNIFICANT CHANGE UP
GLUCOSE BLDC GLUCOMTR-MCNC: 128 MG/DL — HIGH (ref 70–99)
GLUCOSE SERPL-MCNC: 123 MG/DL — HIGH (ref 70–99)
GLUCOSE UR QL: NEGATIVE MG/DL — SIGNIFICANT CHANGE UP
HCT VFR BLD CALC: 48.7 % — SIGNIFICANT CHANGE UP (ref 39–50)
HGB BLD-MCNC: 16.3 G/DL — SIGNIFICANT CHANGE UP (ref 13–17)
IMM GRANULOCYTES NFR BLD AUTO: 0.2 % — SIGNIFICANT CHANGE UP (ref 0–1.5)
KETONES UR-MCNC: NEGATIVE — SIGNIFICANT CHANGE UP
LEUKOCYTE ESTERASE UR-ACNC: NEGATIVE — SIGNIFICANT CHANGE UP
LIDOCAIN IGE QN: 92 U/L — SIGNIFICANT CHANGE UP (ref 73–393)
LYMPHOCYTES # BLD AUTO: 2.74 K/UL — SIGNIFICANT CHANGE UP (ref 1–3.3)
LYMPHOCYTES # BLD AUTO: 42.9 % — SIGNIFICANT CHANGE UP (ref 13–44)
MAGNESIUM SERPL-MCNC: 2.3 MG/DL — SIGNIFICANT CHANGE UP (ref 1.6–2.6)
MCHC RBC-ENTMCNC: 30.9 PG — SIGNIFICANT CHANGE UP (ref 27–34)
MCHC RBC-ENTMCNC: 33.5 GM/DL — SIGNIFICANT CHANGE UP (ref 32–36)
MCV RBC AUTO: 92.2 FL — SIGNIFICANT CHANGE UP (ref 80–100)
MONOCYTES # BLD AUTO: 0.21 K/UL — SIGNIFICANT CHANGE UP (ref 0–0.9)
MONOCYTES NFR BLD AUTO: 3.3 % — SIGNIFICANT CHANGE UP (ref 2–14)
NEUTROPHILS # BLD AUTO: 3.21 K/UL — SIGNIFICANT CHANGE UP (ref 1.8–7.4)
NEUTROPHILS NFR BLD AUTO: 50.3 % — SIGNIFICANT CHANGE UP (ref 43–77)
NITRITE UR-MCNC: NEGATIVE — SIGNIFICANT CHANGE UP
NRBC # BLD: 0 /100 WBCS — SIGNIFICANT CHANGE UP (ref 0–0)
PCP SPEC-MCNC: SIGNIFICANT CHANGE UP
PH UR: 6 — SIGNIFICANT CHANGE UP (ref 5–8)
PLATELET # BLD AUTO: 338 K/UL — SIGNIFICANT CHANGE UP (ref 150–400)
POTASSIUM SERPL-MCNC: 4.7 MMOL/L — SIGNIFICANT CHANGE UP (ref 3.5–5.3)
POTASSIUM SERPL-SCNC: 4.7 MMOL/L — SIGNIFICANT CHANGE UP (ref 3.5–5.3)
PROT SERPL-MCNC: 9.3 GM/DL — HIGH (ref 6–8.3)
PROT UR-MCNC: NEGATIVE MG/DL — SIGNIFICANT CHANGE UP
RBC # BLD: 5.28 M/UL — SIGNIFICANT CHANGE UP (ref 4.2–5.8)
RBC # FLD: 13.8 % — SIGNIFICANT CHANGE UP (ref 10.3–14.5)
SARS-COV-2 RNA SPEC QL NAA+PROBE: SIGNIFICANT CHANGE UP
SODIUM SERPL-SCNC: 142 MMOL/L — SIGNIFICANT CHANGE UP (ref 135–145)
SP GR SPEC: 1.01 — SIGNIFICANT CHANGE UP (ref 1.01–1.02)
UROBILINOGEN FLD QL: NEGATIVE MG/DL — SIGNIFICANT CHANGE UP
WBC # BLD: 6.38 K/UL — SIGNIFICANT CHANGE UP (ref 3.8–10.5)
WBC # FLD AUTO: 6.38 K/UL — SIGNIFICANT CHANGE UP (ref 3.8–10.5)

## 2021-12-22 PROCEDURE — 99285 EMERGENCY DEPT VISIT HI MDM: CPT

## 2021-12-22 PROCEDURE — 70450 CT HEAD/BRAIN W/O DYE: CPT | Mod: 26,MG

## 2021-12-22 PROCEDURE — G1004: CPT

## 2021-12-22 PROCEDURE — 73130 X-RAY EXAM OF HAND: CPT | Mod: 26,LT

## 2021-12-22 PROCEDURE — 73564 X-RAY EXAM KNEE 4 OR MORE: CPT | Mod: 26,50

## 2021-12-22 PROCEDURE — 93010 ELECTROCARDIOGRAM REPORT: CPT

## 2021-12-22 RX ORDER — DIAZEPAM 5 MG
5 TABLET ORAL ONCE
Refills: 0 | Status: DISCONTINUED | OUTPATIENT
Start: 2021-12-22 | End: 2021-12-22

## 2021-12-22 RX ORDER — TETANUS TOXOID, REDUCED DIPHTHERIA TOXOID AND ACELLULAR PERTUSSIS VACCINE, ADSORBED 5; 2.5; 8; 8; 2.5 [IU]/.5ML; [IU]/.5ML; UG/.5ML; UG/.5ML; UG/.5ML
0.5 SUSPENSION INTRAMUSCULAR ONCE
Refills: 0 | Status: COMPLETED | OUTPATIENT
Start: 2021-12-22 | End: 2021-12-22

## 2021-12-22 RX ADMIN — Medication 50 MILLIGRAM(S): at 19:30

## 2021-12-22 RX ADMIN — TETANUS TOXOID, REDUCED DIPHTHERIA TOXOID AND ACELLULAR PERTUSSIS VACCINE, ADSORBED 0.5 MILLILITER(S): 5; 2.5; 8; 8; 2.5 SUSPENSION INTRAMUSCULAR at 19:23

## 2021-12-22 NOTE — ED PROVIDER NOTE - SKIN COLOR
abrasions with scabbing to bilateral knees; right knee with 4x3cm. <1cm abrasion to right fifth finger with dried blood. ecchymosis to left thigh
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
## 2021-12-22 NOTE — ED PROVIDER NOTE - PROGRESS NOTE DETAILS
SILVIA [place]: arms now tremulous - librium ordered NP Santiago: arms now tremulous - librium ordered. Knees cleansed with normal saline; left knee scabbed - bandaide placed; right knee dressed with bacitracin and gauze. Fingers cleansed with water.

## 2021-12-22 NOTE — ED PROVIDER NOTE - CLINICAL SUMMARY MEDICAL DECISION MAKING FREE TEXT BOX
55 yo m with PMH ETOH abuse, glaucoma, R eye blindness, venous insufficiency, HTN, anxiety presents after being [place] free[place]. On exam, unkempt, oriented x2-3, b/l knee wounds. Unsure of last tetanus - will update. Altered mental status will order CT head.

## 2021-12-22 NOTE — ED PROVIDER NOTE - PELVIS
H/O lithotripsy    H/O:  Section x 1  in   History of Cholecystectomy  in   Kidney stone on right side  Oct 2016  S/P nasal septoplasty  in 
stable

## 2021-12-22 NOTE — ED PROVIDER NOTE - ATTENDING CONTRIBUTION TO CARE
55 yo m with PMH ETOH abuse, glaucoma, R eye blindness, venous insufficiency, HTN, anxiety presents [place].  Pt admits to drinking alcohol. Pt has bad abrasions to b/l knee, states felll 2 weeks ago. Pt has no complaints. + mild swelling to rt head. + mild tremors.  Pt currently AOX3.     Gen: Alert, Well appearing. NAD    Head: NC, AT, PERRL, normal lids/conjunctiva   ENT: patent oropharynx without erythema/exudate, uvula midline  Neck: supple, no tenderness/meningismus  Pulm: Bilateral clear BS, normal resp effort  CV: RRR, no M/R/G, +dist pulses   Abd: soft, NT/ND, +BS, no guarding/rebound tenderness  Mskel: + abrasions b/l knees,   Skin: no rash, no bruising  Neuro: AAOx3, no sensory/motor deficits, CN 2-12 intact , + mild tremors    plan: pt is clinically sober, but likely early withdrawal. pending labs, ct, xray knee, reassess. Patient signed out to incoming physician.  All decisions regarding the progression of care will be made at their discretion.

## 2021-12-22 NOTE — ED PROVIDER NOTE - NSFOLLOWUPINSTRUCTIONS_ED_ALL_ED_FT
Advance activity as tolerated.      Continue all previously prescribed medications as directed.      It is highly recommended that you taper down and quit drinking alcohol - please reach out for assistance.    Follow up with your primary care provider in 48-72 hours - bring copies of your results.      Return to the ER for worsening or persistent symptoms, and/or ANY NEW OR CONCERNING SYMPTOMS.     If you have issues obtaining follow up, please call: [phone] to obtain a doctor or specialist who takes your insurance in your area.  You may call [phone] to make an appointment with the internal medicine clinic.

## 2021-12-23 DIAGNOSIS — F41.9 ANXIETY DISORDER, UNSPECIFIED: ICD-10-CM

## 2021-12-23 DIAGNOSIS — F10.230 ALCOHOL DEPENDENCE WITH WITHDRAWAL, UNCOMPLICATED: ICD-10-CM

## 2021-12-23 DIAGNOSIS — H40.9 UNSPECIFIED GLAUCOMA: ICD-10-CM

## 2021-12-23 DIAGNOSIS — G30.9 ALZHEIMER'S DISEASE, UNSPECIFIED: ICD-10-CM

## 2021-12-23 DIAGNOSIS — Z29.9 ENCOUNTER FOR PROPHYLACTIC MEASURES, UNSPECIFIED: ICD-10-CM

## 2021-12-23 LAB
ANION GAP SERPL CALC-SCNC: 7 MMOL/L — SIGNIFICANT CHANGE UP (ref 5–17)
BUN SERPL-MCNC: 10 MG/DL — SIGNIFICANT CHANGE UP (ref 7–23)
CALCIUM SERPL-MCNC: 8.2 MG/DL — LOW (ref 8.5–10.1)
CHLORIDE SERPL-SCNC: 107 MMOL/L — SIGNIFICANT CHANGE UP (ref 96–108)
CO2 SERPL-SCNC: 28 MMOL/L — SIGNIFICANT CHANGE UP (ref 22–31)
CREAT SERPL-MCNC: 0.82 MG/DL — SIGNIFICANT CHANGE UP (ref 0.5–1.3)
GLUCOSE SERPL-MCNC: 106 MG/DL — HIGH (ref 70–99)
POTASSIUM SERPL-MCNC: 3.8 MMOL/L — SIGNIFICANT CHANGE UP (ref 3.5–5.3)
POTASSIUM SERPL-SCNC: 3.8 MMOL/L — SIGNIFICANT CHANGE UP (ref 3.5–5.3)
SODIUM SERPL-SCNC: 142 MMOL/L — SIGNIFICANT CHANGE UP (ref 135–145)

## 2021-12-23 PROCEDURE — 99232 SBSQ HOSP IP/OBS MODERATE 35: CPT

## 2021-12-23 PROCEDURE — 99222 1ST HOSP IP/OBS MODERATE 55: CPT

## 2021-12-23 RX ORDER — ACETAMINOPHEN 500 MG
650 TABLET ORAL EVERY 6 HOURS
Refills: 0 | Status: DISCONTINUED | OUTPATIENT
Start: 2021-12-23 | End: 2021-12-28

## 2021-12-23 RX ORDER — BUPROPION HYDROCHLORIDE 150 MG/1
150 TABLET, EXTENDED RELEASE ORAL DAILY
Refills: 0 | Status: DISCONTINUED | OUTPATIENT
Start: 2021-12-23 | End: 2021-12-28

## 2021-12-23 RX ORDER — LATANOPROST 0.05 MG/ML
1 SOLUTION/ DROPS OPHTHALMIC; TOPICAL AT BEDTIME
Refills: 0 | Status: DISCONTINUED | OUTPATIENT
Start: 2021-12-23 | End: 2021-12-28

## 2021-12-23 RX ORDER — DONEPEZIL HYDROCHLORIDE 10 MG/1
5 TABLET, FILM COATED ORAL AT BEDTIME
Refills: 0 | Status: DISCONTINUED | OUTPATIENT
Start: 2021-12-23 | End: 2021-12-26

## 2021-12-23 RX ORDER — THIAMINE MONONITRATE (VIT B1) 100 MG
100 TABLET ORAL DAILY
Refills: 0 | Status: DISCONTINUED | OUTPATIENT
Start: 2021-12-23 | End: 2021-12-28

## 2021-12-23 RX ORDER — ENOXAPARIN SODIUM 100 MG/ML
40 INJECTION SUBCUTANEOUS DAILY
Refills: 0 | Status: DISCONTINUED | OUTPATIENT
Start: 2021-12-23 | End: 2021-12-28

## 2021-12-23 RX ORDER — DORZOLAMIDE HYDROCHLORIDE TIMOLOL MALEATE 20; 5 MG/ML; MG/ML
1 SOLUTION/ DROPS OPHTHALMIC
Refills: 0 | Status: DISCONTINUED | OUTPATIENT
Start: 2021-12-23 | End: 2021-12-28

## 2021-12-23 RX ORDER — FOLIC ACID 0.8 MG
1 TABLET ORAL DAILY
Refills: 0 | Status: DISCONTINUED | OUTPATIENT
Start: 2021-12-23 | End: 2021-12-28

## 2021-12-23 RX ADMIN — Medication 1 TABLET(S): at 11:22

## 2021-12-23 RX ADMIN — Medication 1 DROP(S): at 13:33

## 2021-12-23 RX ADMIN — LATANOPROST 1 DROP(S): 0.05 SOLUTION/ DROPS OPHTHALMIC; TOPICAL at 21:24

## 2021-12-23 RX ADMIN — Medication 100 MILLIGRAM(S): at 11:22

## 2021-12-23 RX ADMIN — ENOXAPARIN SODIUM 40 MILLIGRAM(S): 100 INJECTION SUBCUTANEOUS at 11:23

## 2021-12-23 RX ADMIN — BUPROPION HYDROCHLORIDE 150 MILLIGRAM(S): 150 TABLET, EXTENDED RELEASE ORAL at 13:33

## 2021-12-23 RX ADMIN — DORZOLAMIDE HYDROCHLORIDE TIMOLOL MALEATE 1 DROP(S): 20; 5 SOLUTION/ DROPS OPHTHALMIC at 05:08

## 2021-12-23 RX ADMIN — Medication 50 MILLIGRAM(S): at 05:08

## 2021-12-23 RX ADMIN — Medication 1 DROP(S): at 05:08

## 2021-12-23 RX ADMIN — Medication 650 MILLIGRAM(S): at 15:48

## 2021-12-23 RX ADMIN — Medication 50 MILLIGRAM(S): at 17:08

## 2021-12-23 RX ADMIN — Medication 50 MILLIGRAM(S): at 11:22

## 2021-12-23 RX ADMIN — Medication 50 MILLIGRAM(S): at 01:45

## 2021-12-23 RX ADMIN — DORZOLAMIDE HYDROCHLORIDE TIMOLOL MALEATE 1 DROP(S): 20; 5 SOLUTION/ DROPS OPHTHALMIC at 17:09

## 2021-12-23 RX ADMIN — Medication 650 MILLIGRAM(S): at 16:40

## 2021-12-23 RX ADMIN — DONEPEZIL HYDROCHLORIDE 5 MILLIGRAM(S): 10 TABLET, FILM COATED ORAL at 21:24

## 2021-12-23 RX ADMIN — Medication 1 DROP(S): at 21:25

## 2021-12-23 RX ADMIN — Medication 2 MILLIGRAM(S): at 03:29

## 2021-12-23 RX ADMIN — Medication 1 MILLIGRAM(S): at 11:23

## 2021-12-23 NOTE — PATIENT PROFILE ADULT - DOMESTIC TRAVEL HIGH RISK QUESTION
Encounter Date: 2017       History     Chief Complaint   Patient presents with    Numbness     reports shooting pain in the left eye w/intermittent numbness to LUE and LLE x 1 week. left sided h/a reported. reports recently had blood work drawn to check electrolytes and they were WNL     Review of patient's allergies indicates:   Allergen Reactions    Keflex [cephalexin] Itching     HPI Comments: Patient presenting with 1 week of intermittent left-sided headache that is sharp in nature and radiates from the left retro-orbital area to the left occipital area.  Not associated with nausea, nuchal rigidity, fever or facial droop.  She does report associated left upper and left lower extremity subjective numbness that is also intermittent.  One day ago she reports an additional symptom of left lower extremity subjective weakness.  Her symptoms have completely resolved PTA.  She has no past medical history of vascular disease, stroke or heart disease.    The history is provided by the patient.     Past Medical History   Diagnosis Date    Abnormal Pap smear      pt states 13yrs ago colpo was done    Anemia     Fibroid     Hypertension      No past medical history pertinent negatives.  Past Surgical History   Procedure Laterality Date    Tubal ligation      Tonsillectomy       section       Family History   Problem Relation Age of Onset    Arthritis Mother     Diabetes Mother     Heart disease Mother      CHF, CAD , 2 stents    Hypertension Mother     Hyperlipidemia Mother     No Known Problems Sister     Hypertension Brother     No Known Problems Daughter     Asthma Daughter      Social History   Substance Use Topics    Smoking status: Never Smoker    Smokeless tobacco: Never Used    Alcohol use 0.0 oz/week     0 Standard drinks or equivalent per week      Comment: socially     Review of Systems   Constitutional: Negative for chills and fever.   HENT: Negative for facial swelling.          No jaw claudication with chewing.   Eyes: Negative for redness and visual disturbance.   Respiratory: Negative for shortness of breath.    Cardiovascular: Negative for chest pain.   Gastrointestinal: Negative for vomiting.   Genitourinary: Negative for dysuria.   Musculoskeletal: Negative for gait problem.   Skin: Negative for pallor.   Neurological: Positive for weakness, numbness and headaches. Negative for facial asymmetry.   All other systems reviewed and are negative.      Physical Exam   Initial Vitals   BP Pulse Resp Temp SpO2   02/02/17 1655 02/02/17 1655 02/02/17 1655 02/02/17 1655 02/02/17 1655   157/77 80 18 97.6 °F (36.4 °C) 100 %     Physical Exam    Nursing note and vitals reviewed.  Constitutional: She is not diaphoretic. No distress.   HENT:   Head: Normocephalic.   No pain over her temporal artery.   Eyes: EOM are normal.   Neck: Normal range of motion.   Pulmonary/Chest: No respiratory distress.   Abdominal: She exhibits no distension.   Musculoskeletal: Normal range of motion.   Neurological: She is alert and oriented to person, place, and time. No cranial nerve deficit.   Normal finger to nose bilaterally.    5 out of 5 strength in bilateral hip flexion, bilateral dorsiflexion and bilateral plantar flexion.    Normal sensation in both upper and both lower extremities.   Skin: Skin is warm and dry.   Psychiatric: She has a normal mood and affect.         ED Course   Procedures  Labs Reviewed   POCT CBC   POCT URINE PREGNANCY   POCT CMP   POCT CMP          Update: After patient was informed of her negative results and the plan for discharge, with close outpatient follow-up.  She walked up to the nurse's station demanding to be discharged immediately, refusing to be touched by one of the nurses who she felt have been reviewed.  I apologized again extensively for her delay in care today and removed her IV personally.  She was discharged with her follow-up information and prescription for  Shaniqua.    Gallo Rey MD,   Emergency Medicine  02/02/2017 9:38 PM    [This note was written with voice recognition software.  Please excuse any grammatical errors.]        Medical Decision Making:   Initial Assessment:   Patient presenting with a left retro-orbital headache and reports of numbness to her left upper and left lower extremity.  Differential Diagnosis:   Atypical migraine, headache, TIA, neurological disorder NOS.  ED Management:  I discussed at length with the patient that in light of her normal neurological exam and lack of vascular risk factors, the likelihood that a TIA is the source of her symptoms is very rare.  However the patient stated again her concerned that the symptoms, although completely resolved, are more than a simple headache.  I obtained a head CT which was negative for any acute or previous ischemic incident.    I explained at length to the patient that a negative head CT and a normal neuro exam still did not rule out the possibility of a TIA, however because she is extremely low risk I did not feel that she warranted emergent transfer for neurologic consultation and admission for a TIA workup.    I gave her strict return precautions and instructed her to follow up with her PCP tomorrow to discuss outpatient workup.  She stated understanding and was discharged in stable condition.    Gallo Rey MD,   Emergency Medicine  02/03/2017 5:51 AM    (This note was written with voice recognition software.  Please excuse any grammatical errors.)                    ED Course   Comment By Time   CMP all within normal limits, apart from a mildly low albumin and mildly elevated total protein.CBC significant for a very mildly elevated white blood cell count at 10.6 and an H&H of 11.1 and 33.6.EKG does not show any evidence of A. fib or arrhythmias, nor any evidence of acute ischemia. Gallo Rey MD 02/02 2023     Clinical Impression:   The primary encounter diagnosis was  Nonintractable headache, unspecified chronicity pattern, unspecified headache type. A diagnosis of Left sided numbness was also pertinent to this visit.          Gallo Rey MD  02/03/17 0533     No

## 2021-12-23 NOTE — H&P ADULT - NSHPREVIEWOFSYSTEMS_GEN_ALL_CORE
Constitutional: no fever, chills, night sweats  Ears: no hearing changes or ear pain,   Nose: no nasal congestion, sinus pain, or rhinorrhea  Cardio: no chest pain, orthopnea, edema, or palpitations  Resp: no dyspnea, cough, wheezing  GI: no nausea, vomiting, diarrhea, constipation, hematochezia, or melena  : no dysuria, urinary frequency, hematuria  MSK: no back pain, neck pain  Skin: no rash, pruritis   Neuro: tremulousness positive.  No weakness, dizziness, lightheadedness, syncope   Heme/Lymph: no bruising or bleeding
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
## 2021-12-23 NOTE — H&P ADULT - NSHPLABSRESULTS_GEN_ALL_CORE
Recent Vitals  T(C): 36.7 (21 @ 01:04), Max: 36.8 (21 @ 23:16)  HR: 99 (21 @ 01:04) (70 - 99)  BP: 108/75 (21 @ 01:04) (108/75 - 158/100)  RR: 18 (21 @ 01:04) (18 - 20)  SpO2: 98% (21 @ 01:04) (97% - 98%)                        16.3   6.38  )-----------( 338      ( 22 Dec 2021 19:13 )             48.7         142  |  107  |  7   ----------------------------<  123<H>  4.7   |  25  |  0.79    Ca    8.6      22 Dec 2021 19:13  Mg     2.3         TPro  9.3<H>  /  Alb  3.6  /  TBili  0.4  /  DBili  x   /  AST  69<H>  /  ALT  63  /  AlkPhos  116  12-      LIVER FUNCTIONS - ( 22 Dec 2021 19:13 )  Alb: 3.6 g/dL / Pro: 9.3 gm/dL / ALK PHOS: 116 U/L / ALT: 63 U/L / AST: 69 U/L / GGT: x           Urinalysis Basic - ( 22 Dec 2021 20:18 )    Color: Yellow / Appearance: Clear / S.010 / pH: x  Gluc: x / Ketone: Negative  / Bili: Negative / Urobili: Negative mg/dL   Blood: x / Protein: Negative mg/dL / Nitrite: Negative   Leuk Esterase: Negative / RBC: x / WBC x   Sq Epi: x / Non Sq Epi: x / Bacteria: x        Home Medications:  acetaminophen 325 mg oral tablet: 2 tab(s) orally every 6 hours, As needed, Temp greater or equal to 38C (100.4F), Mild Pain (1 - 3) (2021 10:59)  ocular lubricant ophthalmic solution: 1 drop(s) to each affected eye 3 times a day (2021 04:30)

## 2021-12-23 NOTE — H&P ADULT - NSHPPHYSICALEXAM_GEN_ALL_CORE
Physical exam:  General: patient in no acute distress, resting comfortably  Head:  Atraumatic, Normocephalic  Eyes: EOMI, PERRLA, clear sclera  Neck: Supple, thyroid nontender, non enlarged  Cardio: S1/S2 +ve, regular rate and rhythm, no M/G/R  Resp: clear to ausculation bilaterally, no rales or wheezes  GI: abdomen soft, nontender, non distended, no guarding, BS +ve x 4  Ext: no significant pedal edema  Neuro: CN 2-12 intact, no significant motor or sensory deficits.  Skin: dry open scratches to knees and shins bilaterally

## 2021-12-23 NOTE — H&P ADULT - PROBLEM SELECTOR PLAN 1
ANT currently 3, recently received librium   Will continue withdrawal protocol with Librium 50 q6 standing  Ativan 2 mg IVP PRN for acute withdrawal symptoms  Thiamine, Folate, MVI daily

## 2021-12-23 NOTE — PATIENT PROFILE ADULT - FALL HARM RISK - HARM RISK INTERVENTIONS
Assistance with ambulation/Assistance OOB with selected safe patient handling equipment/Communicate Risk of Fall with Harm to all staff/Discuss with provider need for PT consult/Monitor for mental status changes/Monitor gait and stability/Move patient closer to nurses' station/Reinforce activity limits and safety measures with patient and family/Reorient to person, place and time as needed/Tailored Fall Risk Interventions/Toileting schedule using arm’s reach rule for commode and bathroom/Use of alarms - bed, chair and/or voice tab/Visual Cue: Yellow wristband and red socks/Bed in lowest position, wheels locked, appropriate side rails in place/Call bell, personal items and telephone in reach/Instruct patient to call for assistance before getting out of bed or chair/Non-slip footwear when patient is out of bed/[place] to call system/Physically safe environment - no spills, clutter or unnecessary equipment/Purposeful Proactive Rounding/Room/bathroom lighting operational, light cord in reach

## 2021-12-24 DIAGNOSIS — T14.8XXA OTHER INJURY OF UNSPECIFIED BODY REGION, INITIAL ENCOUNTER: ICD-10-CM

## 2021-12-24 LAB
ALBUMIN SERPL ELPH-MCNC: 2.8 G/DL — LOW (ref 3.3–5)
ALP SERPL-CCNC: 93 U/L — SIGNIFICANT CHANGE UP (ref 40–120)
ALT FLD-CCNC: 59 U/L — SIGNIFICANT CHANGE UP (ref 12–78)
ANION GAP SERPL CALC-SCNC: 9 MMOL/L — SIGNIFICANT CHANGE UP (ref 5–17)
AST SERPL-CCNC: 68 U/L — HIGH (ref 15–37)
BILIRUB SERPL-MCNC: 1.2 MG/DL — SIGNIFICANT CHANGE UP (ref 0.2–1.2)
BUN SERPL-MCNC: 12 MG/DL — SIGNIFICANT CHANGE UP (ref 7–23)
CALCIUM SERPL-MCNC: 8.4 MG/DL — LOW (ref 8.5–10.1)
CHLORIDE SERPL-SCNC: 101 MMOL/L — SIGNIFICANT CHANGE UP (ref 96–108)
CO2 SERPL-SCNC: 27 MMOL/L — SIGNIFICANT CHANGE UP (ref 22–31)
CREAT SERPL-MCNC: 0.88 MG/DL — SIGNIFICANT CHANGE UP (ref 0.5–1.3)
GLUCOSE SERPL-MCNC: 87 MG/DL — SIGNIFICANT CHANGE UP (ref 70–99)
MAGNESIUM SERPL-MCNC: 2 MG/DL — SIGNIFICANT CHANGE UP (ref 1.6–2.6)
PHOSPHATE SERPL-MCNC: 2.5 MG/DL — SIGNIFICANT CHANGE UP (ref 2.5–4.5)
POTASSIUM SERPL-MCNC: 3.4 MMOL/L — LOW (ref 3.5–5.3)
POTASSIUM SERPL-SCNC: 3.4 MMOL/L — LOW (ref 3.5–5.3)
PROT SERPL-MCNC: 7.4 GM/DL — SIGNIFICANT CHANGE UP (ref 6–8.3)
SODIUM SERPL-SCNC: 137 MMOL/L — SIGNIFICANT CHANGE UP (ref 135–145)

## 2021-12-24 PROCEDURE — 99232 SBSQ HOSP IP/OBS MODERATE 35: CPT

## 2021-12-24 RX ORDER — POTASSIUM CHLORIDE 20 MEQ
10 PACKET (EA) ORAL
Refills: 0 | Status: COMPLETED | OUTPATIENT
Start: 2021-12-24 | End: 2021-12-25

## 2021-12-24 RX ADMIN — LATANOPROST 1 DROP(S): 0.05 SOLUTION/ DROPS OPHTHALMIC; TOPICAL at 21:24

## 2021-12-24 RX ADMIN — Medication 50 MILLIGRAM(S): at 05:26

## 2021-12-24 RX ADMIN — BUPROPION HYDROCHLORIDE 150 MILLIGRAM(S): 150 TABLET, EXTENDED RELEASE ORAL at 11:02

## 2021-12-24 RX ADMIN — Medication 1 DROP(S): at 21:24

## 2021-12-24 RX ADMIN — Medication 10 MILLIEQUIVALENT(S): at 17:14

## 2021-12-24 RX ADMIN — Medication 1 DROP(S): at 13:12

## 2021-12-24 RX ADMIN — Medication 50 MILLIGRAM(S): at 13:10

## 2021-12-24 RX ADMIN — Medication 650 MILLIGRAM(S): at 07:41

## 2021-12-24 RX ADMIN — Medication 1 MILLIGRAM(S): at 10:59

## 2021-12-24 RX ADMIN — Medication 1 TABLET(S): at 10:59

## 2021-12-24 RX ADMIN — Medication 100 MILLIGRAM(S): at 10:59

## 2021-12-24 RX ADMIN — Medication 650 MILLIGRAM(S): at 08:41

## 2021-12-24 RX ADMIN — Medication 50 MILLIGRAM(S): at 21:25

## 2021-12-24 RX ADMIN — Medication 1 DROP(S): at 05:26

## 2021-12-24 RX ADMIN — ENOXAPARIN SODIUM 40 MILLIGRAM(S): 100 INJECTION SUBCUTANEOUS at 10:59

## 2021-12-24 RX ADMIN — DONEPEZIL HYDROCHLORIDE 5 MILLIGRAM(S): 10 TABLET, FILM COATED ORAL at 21:25

## 2021-12-24 RX ADMIN — DORZOLAMIDE HYDROCHLORIDE TIMOLOL MALEATE 1 DROP(S): 20; 5 SOLUTION/ DROPS OPHTHALMIC at 05:26

## 2021-12-24 RX ADMIN — DORZOLAMIDE HYDROCHLORIDE TIMOLOL MALEATE 1 DROP(S): 20; 5 SOLUTION/ DROPS OPHTHALMIC at 17:13

## 2021-12-24 RX ADMIN — Medication 1 APPLICATION(S): at 18:09

## 2021-12-24 NOTE — PHYSICAL THERAPY INITIAL EVALUATION ADULT - ADDITIONAL COMMENTS
As per pt, he lives in a basement apartment with 1 flight of stairs with left rail going down, He was independent in all functional mobility using no AD, PTA

## 2021-12-24 NOTE — PHYSICAL THERAPY INITIAL EVALUATION ADULT - DID THE PATIENT HAVE SURGERY?
[place], was [place] to ED. PMH : HTN, venous insufficiency, ETOH abuse/yes

## 2021-12-24 NOTE — PHYSICAL THERAPY INITIAL EVALUATION ADULT - CRITERIA FOR SKILLED THERAPEUTIC INTERVENTIONS
Home with outpatient services, No DME needed/impairments found/functional limitations in following categories/risk reduction/prevention/rehab potential/therapy frequency/predicted duration of therapy intervention/anticipated equipment needs at discharge/anticipated discharge recommendation

## 2021-12-25 LAB
ANION GAP SERPL CALC-SCNC: 8 MMOL/L — SIGNIFICANT CHANGE UP (ref 5–17)
BUN SERPL-MCNC: 10 MG/DL — SIGNIFICANT CHANGE UP (ref 7–23)
CALCIUM SERPL-MCNC: 8.3 MG/DL — LOW (ref 8.5–10.1)
CHLORIDE SERPL-SCNC: 104 MMOL/L — SIGNIFICANT CHANGE UP (ref 96–108)
CO2 SERPL-SCNC: 26 MMOL/L — SIGNIFICANT CHANGE UP (ref 22–31)
CREAT SERPL-MCNC: 0.79 MG/DL — SIGNIFICANT CHANGE UP (ref 0.5–1.3)
GLUCOSE SERPL-MCNC: 108 MG/DL — HIGH (ref 70–99)
MAGNESIUM SERPL-MCNC: 2.1 MG/DL — SIGNIFICANT CHANGE UP (ref 1.6–2.6)
POTASSIUM SERPL-MCNC: 3.6 MMOL/L — SIGNIFICANT CHANGE UP (ref 3.5–5.3)
POTASSIUM SERPL-SCNC: 3.6 MMOL/L — SIGNIFICANT CHANGE UP (ref 3.5–5.3)
SODIUM SERPL-SCNC: 138 MMOL/L — SIGNIFICANT CHANGE UP (ref 135–145)

## 2021-12-25 PROCEDURE — 99232 SBSQ HOSP IP/OBS MODERATE 35: CPT

## 2021-12-25 RX ADMIN — Medication 650 MILLIGRAM(S): at 11:23

## 2021-12-25 RX ADMIN — Medication 1 APPLICATION(S): at 05:05

## 2021-12-25 RX ADMIN — ENOXAPARIN SODIUM 40 MILLIGRAM(S): 100 INJECTION SUBCUTANEOUS at 13:31

## 2021-12-25 RX ADMIN — DORZOLAMIDE HYDROCHLORIDE TIMOLOL MALEATE 1 DROP(S): 20; 5 SOLUTION/ DROPS OPHTHALMIC at 17:50

## 2021-12-25 RX ADMIN — Medication 100 MILLIGRAM(S): at 13:29

## 2021-12-25 RX ADMIN — LATANOPROST 1 DROP(S): 0.05 SOLUTION/ DROPS OPHTHALMIC; TOPICAL at 21:24

## 2021-12-25 RX ADMIN — DONEPEZIL HYDROCHLORIDE 5 MILLIGRAM(S): 10 TABLET, FILM COATED ORAL at 21:25

## 2021-12-25 RX ADMIN — Medication 1 DROP(S): at 13:30

## 2021-12-25 RX ADMIN — Medication 1 MILLIGRAM(S): at 13:29

## 2021-12-25 RX ADMIN — DORZOLAMIDE HYDROCHLORIDE TIMOLOL MALEATE 1 DROP(S): 20; 5 SOLUTION/ DROPS OPHTHALMIC at 05:02

## 2021-12-25 RX ADMIN — Medication 1 APPLICATION(S): at 17:50

## 2021-12-25 RX ADMIN — Medication 1 DROP(S): at 05:02

## 2021-12-25 RX ADMIN — Medication 10 MILLIEQUIVALENT(S): at 05:02

## 2021-12-25 RX ADMIN — Medication 50 MILLIGRAM(S): at 19:05

## 2021-12-25 RX ADMIN — BUPROPION HYDROCHLORIDE 150 MILLIGRAM(S): 150 TABLET, EXTENDED RELEASE ORAL at 13:30

## 2021-12-25 RX ADMIN — Medication 1 TABLET(S): at 13:28

## 2021-12-25 RX ADMIN — Medication 650 MILLIGRAM(S): at 10:19

## 2021-12-25 RX ADMIN — Medication 1 DROP(S): at 21:26

## 2021-12-25 NOTE — PROGRESS NOTE ADULT - PROBLEM SELECTOR PLAN 6
DVT prop: Lovenox 40 daily    Disp: home soon with outpatient P.T.; initially we were told he's homeless, but he now tells me he has a friend who owns a restaurant and will allow him to stay in the basement there.
DVT prop: Lovenox 40 daily    Disp: home soon with outpatient P.T.; initially we were told he's homeless, but he now tells, he has a friend who owns a restaurant and will allow him to stay in the basement there.

## 2021-12-25 NOTE — PROGRESS NOTE ADULT - PROBLEM SELECTOR PLAN 1
CIWA currently 0 at times, but about 3 near end of dosing intervals  Will continue withdrawal protocol with Librium 50 q6 and tapering q8, q12, qd...  Ativan 2 mg IVP PRN for acute withdrawal symptoms  Thiamine, Folate, MVI daily  K replaced po prn  Likely will be ready for discharge 12/25
CIWA currently 3-6  Will continue withdrawal protocol with Librium 50 q6 and tapering q8, q12, qd...  Ativan 2 mg IVP PRN for acute withdrawal symptoms  Thiamine, Folate, MVI daily
CIWA currently 0 at times, but last about 2 near end of dosing intervals  Will continue withdrawal protocol with Librium 50 q6 and tapering q8, q12, qd...  Ativan 2 mg IVP PRN for acute withdrawal symptoms  Thiamine, Folate, MVI daily  K replaced po prn

## 2021-12-25 NOTE — PROGRESS NOTE ADULT - PROBLEM SELECTOR PLAN 2
wellbutrin
Multiple, in various sizes and stages of healing; R knee > L knee > shins  PT wound-care eval pending, but staffing over holiday weekend limited  Will treat w/ topical Silvadene which I think will be adequate.   None of the wounds look infected; no weeping.
Multiple, in various sizes and stages of healing; R knee > L knee > shins  PT wound-care eval pending, but staffing over holiday weekend limited  Will treat w/ topical Silvadene which I think will be adequate.   None of the wounds look infected; no weeping.

## 2021-12-26 LAB
ANION GAP SERPL CALC-SCNC: 9 MMOL/L — SIGNIFICANT CHANGE UP (ref 5–17)
BUN SERPL-MCNC: 12 MG/DL — SIGNIFICANT CHANGE UP (ref 7–23)
CALCIUM SERPL-MCNC: 8.6 MG/DL — SIGNIFICANT CHANGE UP (ref 8.5–10.1)
CHLORIDE SERPL-SCNC: 107 MMOL/L — SIGNIFICANT CHANGE UP (ref 96–108)
CO2 SERPL-SCNC: 23 MMOL/L — SIGNIFICANT CHANGE UP (ref 22–31)
CREAT SERPL-MCNC: 0.77 MG/DL — SIGNIFICANT CHANGE UP (ref 0.5–1.3)
GLUCOSE SERPL-MCNC: 123 MG/DL — HIGH (ref 70–99)
POTASSIUM SERPL-MCNC: 3.4 MMOL/L — LOW (ref 3.5–5.3)
POTASSIUM SERPL-SCNC: 3.4 MMOL/L — LOW (ref 3.5–5.3)
SODIUM SERPL-SCNC: 139 MMOL/L — SIGNIFICANT CHANGE UP (ref 135–145)

## 2021-12-26 PROCEDURE — 99232 SBSQ HOSP IP/OBS MODERATE 35: CPT

## 2021-12-26 RX ORDER — SODIUM CHLORIDE 9 MG/ML
1000 INJECTION INTRAMUSCULAR; INTRAVENOUS; SUBCUTANEOUS
Refills: 0 | Status: DISCONTINUED | OUTPATIENT
Start: 2021-12-26 | End: 2021-12-28

## 2021-12-26 RX ORDER — POTASSIUM CHLORIDE 20 MEQ
40 PACKET (EA) ORAL ONCE
Refills: 0 | Status: COMPLETED | OUTPATIENT
Start: 2021-12-26 | End: 2021-12-26

## 2021-12-26 RX ADMIN — Medication 100 MILLIGRAM(S): at 12:07

## 2021-12-26 RX ADMIN — Medication 1 APPLICATION(S): at 17:21

## 2021-12-26 RX ADMIN — DORZOLAMIDE HYDROCHLORIDE TIMOLOL MALEATE 1 DROP(S): 20; 5 SOLUTION/ DROPS OPHTHALMIC at 17:20

## 2021-12-26 RX ADMIN — SODIUM CHLORIDE 70 MILLILITER(S): 9 INJECTION INTRAMUSCULAR; INTRAVENOUS; SUBCUTANEOUS at 13:32

## 2021-12-26 RX ADMIN — BUPROPION HYDROCHLORIDE 150 MILLIGRAM(S): 150 TABLET, EXTENDED RELEASE ORAL at 12:12

## 2021-12-26 RX ADMIN — Medication 40 MILLIEQUIVALENT(S): at 22:11

## 2021-12-26 RX ADMIN — Medication 50 MILLIGRAM(S): at 06:12

## 2021-12-26 RX ADMIN — Medication 1 DROP(S): at 13:20

## 2021-12-26 RX ADMIN — Medication 1 TABLET(S): at 12:07

## 2021-12-26 RX ADMIN — Medication 1 MILLIGRAM(S): at 12:07

## 2021-12-26 RX ADMIN — DORZOLAMIDE HYDROCHLORIDE TIMOLOL MALEATE 1 DROP(S): 20; 5 SOLUTION/ DROPS OPHTHALMIC at 06:15

## 2021-12-26 RX ADMIN — LATANOPROST 1 DROP(S): 0.05 SOLUTION/ DROPS OPHTHALMIC; TOPICAL at 21:04

## 2021-12-26 RX ADMIN — Medication 1 DROP(S): at 21:04

## 2021-12-26 RX ADMIN — Medication 1 DROP(S): at 06:14

## 2021-12-26 RX ADMIN — Medication 1 APPLICATION(S): at 06:15

## 2021-12-26 RX ADMIN — ENOXAPARIN SODIUM 40 MILLIGRAM(S): 100 INJECTION SUBCUTANEOUS at 12:07
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
## 2021-12-27 PROCEDURE — 99232 SBSQ HOSP IP/OBS MODERATE 35: CPT

## 2021-12-27 RX ORDER — LANOLIN ALCOHOL/MO/W.PET/CERES
3 CREAM (GRAM) TOPICAL ONCE
Refills: 0 | Status: COMPLETED | OUTPATIENT
Start: 2021-12-27 | End: 2021-12-27

## 2021-12-27 RX ORDER — POTASSIUM CHLORIDE 20 MEQ
40 PACKET (EA) ORAL ONCE
Refills: 0 | Status: COMPLETED | OUTPATIENT
Start: 2021-12-27 | End: 2021-12-27

## 2021-12-27 RX ADMIN — Medication 1 DROP(S): at 13:25

## 2021-12-27 RX ADMIN — Medication 1 APPLICATION(S): at 05:00

## 2021-12-27 RX ADMIN — Medication 100 MILLIGRAM(S): at 11:53

## 2021-12-27 RX ADMIN — Medication 1 MILLIGRAM(S): at 11:53

## 2021-12-27 RX ADMIN — Medication 1 DROP(S): at 05:00

## 2021-12-27 RX ADMIN — DORZOLAMIDE HYDROCHLORIDE TIMOLOL MALEATE 1 DROP(S): 20; 5 SOLUTION/ DROPS OPHTHALMIC at 05:00

## 2021-12-27 RX ADMIN — Medication 1 APPLICATION(S): at 17:07

## 2021-12-27 RX ADMIN — Medication 50 MILLIGRAM(S): at 11:53

## 2021-12-27 RX ADMIN — BUPROPION HYDROCHLORIDE 150 MILLIGRAM(S): 150 TABLET, EXTENDED RELEASE ORAL at 11:53

## 2021-12-27 RX ADMIN — Medication 1 TABLET(S): at 11:53

## 2021-12-27 RX ADMIN — Medication 1 DROP(S): at 21:36

## 2021-12-27 RX ADMIN — DORZOLAMIDE HYDROCHLORIDE TIMOLOL MALEATE 1 DROP(S): 20; 5 SOLUTION/ DROPS OPHTHALMIC at 17:07

## 2021-12-27 RX ADMIN — ENOXAPARIN SODIUM 40 MILLIGRAM(S): 100 INJECTION SUBCUTANEOUS at 11:53

## 2021-12-27 RX ADMIN — Medication 3 MILLIGRAM(S): at 22:17

## 2021-12-27 RX ADMIN — LATANOPROST 1 DROP(S): 0.05 SOLUTION/ DROPS OPHTHALMIC; TOPICAL at 21:36

## 2021-12-27 RX ADMIN — Medication 40 MILLIEQUIVALENT(S): at 11:53
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
## 2021-12-28 ENCOUNTER — TRANSCRIPTION ENCOUNTER (OUTPATIENT)
Age: 56
End: 2021-12-28

## 2021-12-28 VITALS
DIASTOLIC BLOOD PRESSURE: 77 MMHG | SYSTOLIC BLOOD PRESSURE: 129 MMHG | OXYGEN SATURATION: 99 % | HEART RATE: 61 BPM | RESPIRATION RATE: 18 BRPM

## 2021-12-28 PROCEDURE — 99238 HOSP IP/OBS DSCHRG MGMT 30/<: CPT

## 2021-12-28 RX ADMIN — SODIUM CHLORIDE 70 MILLILITER(S): 9 INJECTION INTRAMUSCULAR; INTRAVENOUS; SUBCUTANEOUS at 05:32

## 2021-12-28 RX ADMIN — Medication 1 DROP(S): at 05:33

## 2021-12-28 RX ADMIN — Medication 1 MILLIGRAM(S): at 11:40

## 2021-12-28 RX ADMIN — BUPROPION HYDROCHLORIDE 150 MILLIGRAM(S): 150 TABLET, EXTENDED RELEASE ORAL at 11:41

## 2021-12-28 RX ADMIN — ENOXAPARIN SODIUM 40 MILLIGRAM(S): 100 INJECTION SUBCUTANEOUS at 11:39

## 2021-12-28 RX ADMIN — DORZOLAMIDE HYDROCHLORIDE TIMOLOL MALEATE 1 DROP(S): 20; 5 SOLUTION/ DROPS OPHTHALMIC at 05:33

## 2021-12-28 RX ADMIN — Medication 1 APPLICATION(S): at 05:33
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
## 2021-12-28 NOTE — DISCHARGE NOTE PROVIDER - NSDCCPCAREPLAN_GEN_ALL_CORE_FT
PRINCIPAL DISCHARGE DIAGNOSIS  Diagnosis: Alcohol intoxication  Assessment and Plan of Treatment: stop drinking alcohol

## 2021-12-28 NOTE — DISCHARGE NOTE PROVIDER - NSDCMRMEDTOKEN_GEN_ALL_CORE_FT
acetaminophen 325 mg oral tablet: 2 tab(s) orally every 6 hours, As needed, Temp greater or equal to 38C (100.4F), Mild Pain (1 - 3)  Cosopt 2.23%-0.68% ophthalmic solution: 1 drop(s) to each affected eye 2 times a day  donepezil 5 mg oral tablet: 1 tab(s) orally once a day (at bedtime)  folic acid 1 mg oral tablet: 1 tab(s) orally once a day  latanoprost 0.005% ophthalmic solution: 1 drop(s) to each affected eye once a day (in the evening)  Multiple Vitamins oral tablet: 1 tab(s) orally once a day  ocular lubricant ophthalmic solution: 1 drop(s) to each affected eye 3 times a day  thiamine 100 mg oral tablet: 1 tab(s) orally once a day  Wellbutrin  mg/24 hours oral tablet, extended release: 1 tab(s) orally once a day    acetaminophen 325 mg oral tablet: 2 tab(s) orally every 6 hours, As needed, Temp greater or equal to 38C (100.4F), Mild Pain (1 - 3)  Cosopt 2.23%-0.68% ophthalmic solution: 1 drop(s) to each affected eye 2 times a day  donepezil 5 mg oral tablet: 1 tab(s) orally once a day (at bedtime)  folic acid 1 mg oral tablet: 1 tab(s) orally once a day  latanoprost 0.005% ophthalmic solution: 1 drop(s) to each affected eye once a day (in the evening)  Multiple Vitamins oral tablet: 1 tab(s) orally once a day  ocular lubricant ophthalmic solution: 1 drop(s) to each affected eye 3 times a day  silver sulfADIAZINE 1% topical cream: 1 application topically 2 times a day  thiamine 100 mg oral tablet: 1 tab(s) orally once a day  Wellbutrin  mg/24 hours oral tablet, extended release: 1 tab(s) orally once a day
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
## 2021-12-28 NOTE — DISCHARGE NOTE PROVIDER - DISCHARGE DATE
28-Dec-2021 Pt is not acutely at risk for purposeful harm to herself. However, given her advanced neurocognitive decline, she is at risk for unintentionally hurting herself.

## 2021-12-28 NOTE — DISCHARGE NOTE NURSING/CASE MANAGEMENT/SOCIAL WORK - NSDCPEFALRISK_GEN_ALL_CORE
For information on Fall & Injury Prevention, visit: [email or web address] OR  [email or web address] OR  https://www.cdc.gov/steadi/patient.html

## 2021-12-28 NOTE — DISCHARGE NOTE NURSING/CASE MANAGEMENT/SOCIAL WORK - PATIENT PORTAL LINK FT
You can access the FollowMyHealth Patient Portal offered by [facility] by registering at the following website: [email or web address]. By joining [facility]’s FollowMyHealth portal, you will also be able to view your health information using other applications (apps) compatible with our system.
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
## 2022-01-04 DIAGNOSIS — I87.2 VENOUS INSUFFICIENCY (CHRONIC) (PERIPHERAL): ICD-10-CM
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
## 2022-01-04 DIAGNOSIS — I10 ESSENTIAL (PRIMARY) HYPERTENSION: ICD-10-CM

## 2022-01-04 DIAGNOSIS — H54.413A BLINDNESS RIGHT EYE CATEGORY 3, NORMAL VISION LEFT EYE: ICD-10-CM

## 2022-01-04 DIAGNOSIS — S81.019A LACERATION WITHOUT FOREIGN BODY, UNSPECIFIED KNEE, INITIAL ENCOUNTER: ICD-10-CM

## 2022-01-04 DIAGNOSIS — F02.80 DEMENTIA IN OTHER DISEASES CLASSIFIED ELSEWHERE, UNSPECIFIED SEVERITY, WITHOUT BEHAVIORAL DISTURBANCE, PSYCHOTIC DISTURBANCE, MOOD DISTURBANCE, AND ANXIETY: ICD-10-CM

## 2022-01-04 DIAGNOSIS — F10.220 ALCOHOL DEPENDENCE WITH INTOXICATION, UNCOMPLICATED: ICD-10-CM

## 2022-01-04 DIAGNOSIS — F41.9 ANXIETY DISORDER, UNSPECIFIED: ICD-10-CM

## 2022-01-04 DIAGNOSIS — Z88.8 ALLERGY STATUS TO OTHER DRUGS, MEDICAMENTS AND BIOLOGICAL SUBSTANCES: ICD-10-CM

## 2022-01-04 DIAGNOSIS — F10.230 ALCOHOL DEPENDENCE WITH WITHDRAWAL, UNCOMPLICATED: ICD-10-CM
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
## 2022-01-04 DIAGNOSIS — G30.9 ALZHEIMER'S DISEASE, UNSPECIFIED: ICD-10-CM

## 2022-01-04 DIAGNOSIS — Z59.00 HOMELESSNESS UNSPECIFIED: ICD-10-CM

## 2022-01-04 DIAGNOSIS — H40.9 UNSPECIFIED GLAUCOMA: ICD-10-CM

## 2022-01-04 DIAGNOSIS — F32.A DEPRESSION, UNSPECIFIED: ICD-10-CM

## 2022-01-04 SDOH — ECONOMIC STABILITY - HOUSING INSECURITY: HOMELESSNESS UNSPECIFIED: Z59.00

## 2022-01-07 ENCOUNTER — OUTPATIENT (OUTPATIENT)
Dept: OUTPATIENT SERVICES | Facility: HOSPITAL | Age: 57
LOS: 1 days | End: 2022-01-07

## 2022-01-18 ENCOUNTER — OUTPATIENT (OUTPATIENT)
Dept: OUTPATIENT SERVICES | Facility: HOSPITAL | Age: 57
LOS: 1 days | End: 2022-01-18

## 2022-01-18 ENCOUNTER — APPOINTMENT (OUTPATIENT)
Age: 57
End: 2022-01-18

## 2022-02-12 ENCOUNTER — INPATIENT (INPATIENT)
Facility: HOSPITAL | Age: 57
LOS: 3 days | Discharge: HOME | End: 2022-02-16
Attending: HOSPITALIST | Admitting: HOSPITALIST
Payer: MEDICAID

## 2022-02-12 VITALS
DIASTOLIC BLOOD PRESSURE: 85 MMHG | TEMPERATURE: 98 F | SYSTOLIC BLOOD PRESSURE: 141 MMHG | HEIGHT: 68 IN | RESPIRATION RATE: 16 BRPM | OXYGEN SATURATION: 100 % | HEART RATE: 92 BPM

## 2022-02-12 DIAGNOSIS — F10.230 ALCOHOL DEPENDENCE WITH WITHDRAWAL, UNCOMPLICATED: ICD-10-CM

## 2022-02-12 LAB
ALBUMIN SERPL ELPH-MCNC: 3.6 G/DL — SIGNIFICANT CHANGE UP (ref 3.3–5)
ALBUMIN SERPL ELPH-MCNC: 4.3 G/DL — SIGNIFICANT CHANGE UP (ref 3.3–5)
ALP SERPL-CCNC: 67 U/L — SIGNIFICANT CHANGE UP (ref 40–120)
ALP SERPL-CCNC: 77 U/L — SIGNIFICANT CHANGE UP (ref 40–120)
ALT FLD-CCNC: 34 U/L — SIGNIFICANT CHANGE UP (ref 4–41)
ALT FLD-CCNC: 40 U/L — SIGNIFICANT CHANGE UP (ref 4–41)
ANION GAP SERPL CALC-SCNC: 15 MMOL/L — HIGH (ref 7–14)
ANION GAP SERPL CALC-SCNC: 17 MMOL/L — HIGH (ref 7–14)
AST SERPL-CCNC: 46 U/L — HIGH (ref 4–40)
AST SERPL-CCNC: 53 U/L — HIGH (ref 4–40)
BASOPHILS # BLD AUTO: 0.1 K/UL — SIGNIFICANT CHANGE UP (ref 0–0.2)
BASOPHILS NFR BLD AUTO: 1.6 % — SIGNIFICANT CHANGE UP (ref 0–2)
BILIRUB DIRECT SERPL-MCNC: <0.2 MG/DL — SIGNIFICANT CHANGE UP (ref 0–0.3)
BILIRUB INDIRECT FLD-MCNC: >0.3 MG/DL — SIGNIFICANT CHANGE UP (ref 0–1)
BILIRUB SERPL-MCNC: 0.4 MG/DL — SIGNIFICANT CHANGE UP (ref 0.2–1.2)
BILIRUB SERPL-MCNC: 0.5 MG/DL — SIGNIFICANT CHANGE UP (ref 0.2–1.2)
BUN SERPL-MCNC: 10 MG/DL — SIGNIFICANT CHANGE UP (ref 7–23)
BUN SERPL-MCNC: 8 MG/DL — SIGNIFICANT CHANGE UP (ref 7–23)
CALCIUM SERPL-MCNC: 7.9 MG/DL — LOW (ref 8.4–10.5)
CALCIUM SERPL-MCNC: 8.7 MG/DL — SIGNIFICANT CHANGE UP (ref 8.4–10.5)
CHLORIDE SERPL-SCNC: 102 MMOL/L — SIGNIFICANT CHANGE UP (ref 98–107)
CHLORIDE SERPL-SCNC: 104 MMOL/L — SIGNIFICANT CHANGE UP (ref 98–107)
CO2 SERPL-SCNC: 22 MMOL/L — SIGNIFICANT CHANGE UP (ref 22–31)
CO2 SERPL-SCNC: 23 MMOL/L — SIGNIFICANT CHANGE UP (ref 22–31)
CREAT SERPL-MCNC: 0.66 MG/DL — SIGNIFICANT CHANGE UP (ref 0.5–1.3)
CREAT SERPL-MCNC: 0.73 MG/DL — SIGNIFICANT CHANGE UP (ref 0.5–1.3)
EOSINOPHIL # BLD AUTO: 0.24 K/UL — SIGNIFICANT CHANGE UP (ref 0–0.5)
EOSINOPHIL NFR BLD AUTO: 3.9 % — SIGNIFICANT CHANGE UP (ref 0–6)
ETHANOL SERPL-MCNC: 294 MG/DL — HIGH
GLUCOSE SERPL-MCNC: 71 MG/DL — SIGNIFICANT CHANGE UP (ref 70–99)
GLUCOSE SERPL-MCNC: 86 MG/DL — SIGNIFICANT CHANGE UP (ref 70–99)
HCT VFR BLD CALC: 40.6 % — SIGNIFICANT CHANGE UP (ref 39–50)
HGB BLD-MCNC: 13.8 G/DL — SIGNIFICANT CHANGE UP (ref 13–17)
IANC: 2.46 K/UL — SIGNIFICANT CHANGE UP (ref 1.5–8.5)
IMM GRANULOCYTES NFR BLD AUTO: 0.3 % — SIGNIFICANT CHANGE UP (ref 0–1.5)
LIDOCAIN IGE QN: 21 U/L — SIGNIFICANT CHANGE UP (ref 7–60)
LYMPHOCYTES # BLD AUTO: 2.82 K/UL — SIGNIFICANT CHANGE UP (ref 1–3.3)
LYMPHOCYTES # BLD AUTO: 46.2 % — HIGH (ref 13–44)
MAGNESIUM SERPL-MCNC: 1.6 MG/DL — SIGNIFICANT CHANGE UP (ref 1.6–2.6)
MAGNESIUM SERPL-MCNC: 1.9 MG/DL — SIGNIFICANT CHANGE UP (ref 1.6–2.6)
MCHC RBC-ENTMCNC: 30.5 PG — SIGNIFICANT CHANGE UP (ref 27–34)
MCHC RBC-ENTMCNC: 34 GM/DL — SIGNIFICANT CHANGE UP (ref 32–36)
MCV RBC AUTO: 89.6 FL — SIGNIFICANT CHANGE UP (ref 80–100)
MONOCYTES # BLD AUTO: 0.46 K/UL — SIGNIFICANT CHANGE UP (ref 0–0.9)
MONOCYTES NFR BLD AUTO: 7.5 % — SIGNIFICANT CHANGE UP (ref 2–14)
NEUTROPHILS # BLD AUTO: 2.46 K/UL — SIGNIFICANT CHANGE UP (ref 1.8–7.4)
NEUTROPHILS NFR BLD AUTO: 40.5 % — LOW (ref 43–77)
NRBC # BLD: 0 /100 WBCS — SIGNIFICANT CHANGE UP
NRBC # FLD: 0 K/UL — SIGNIFICANT CHANGE UP
PHOSPHATE SERPL-MCNC: 2.8 MG/DL — SIGNIFICANT CHANGE UP (ref 2.5–4.5)
PLATELET # BLD AUTO: 275 K/UL — SIGNIFICANT CHANGE UP (ref 150–400)
POTASSIUM SERPL-MCNC: 3.5 MMOL/L — SIGNIFICANT CHANGE UP (ref 3.5–5.3)
POTASSIUM SERPL-MCNC: 3.5 MMOL/L — SIGNIFICANT CHANGE UP (ref 3.5–5.3)
POTASSIUM SERPL-SCNC: 3.5 MMOL/L — SIGNIFICANT CHANGE UP (ref 3.5–5.3)
POTASSIUM SERPL-SCNC: 3.5 MMOL/L — SIGNIFICANT CHANGE UP (ref 3.5–5.3)
PROT SERPL-MCNC: 6.8 G/DL — SIGNIFICANT CHANGE UP (ref 6–8.3)
PROT SERPL-MCNC: 7.8 G/DL — SIGNIFICANT CHANGE UP (ref 6–8.3)
RBC # BLD: 4.53 M/UL — SIGNIFICANT CHANGE UP (ref 4.2–5.8)
RBC # FLD: 13.3 % — SIGNIFICANT CHANGE UP (ref 10.3–14.5)
SARS-COV-2 RNA SPEC QL NAA+PROBE: SIGNIFICANT CHANGE UP
SODIUM SERPL-SCNC: 139 MMOL/L — SIGNIFICANT CHANGE UP (ref 135–145)
SODIUM SERPL-SCNC: 144 MMOL/L — SIGNIFICANT CHANGE UP (ref 135–145)
TOXICOLOGY SCREEN, DRUGS OF ABUSE, SERUM RESULT: SIGNIFICANT CHANGE UP
WBC # BLD: 6.1 K/UL — SIGNIFICANT CHANGE UP (ref 3.8–10.5)
WBC # FLD AUTO: 6.1 K/UL — SIGNIFICANT CHANGE UP (ref 3.8–10.5)

## 2022-02-12 PROCEDURE — 74177 CT ABD & PELVIS W/CONTRAST: CPT | Mod: 26,ME

## 2022-02-12 PROCEDURE — 99223 1ST HOSP IP/OBS HIGH 75: CPT

## 2022-02-12 PROCEDURE — 99285 EMERGENCY DEPT VISIT HI MDM: CPT

## 2022-02-12 PROCEDURE — G1004: CPT

## 2022-02-12 RX ORDER — DORZOLAMIDE HYDROCHLORIDE TIMOLOL MALEATE 20; 5 MG/ML; MG/ML
1 SOLUTION/ DROPS OPHTHALMIC
Refills: 0 | Status: DISCONTINUED | OUTPATIENT
Start: 2022-02-12 | End: 2022-02-16

## 2022-02-12 RX ORDER — ACETAMINOPHEN 500 MG
650 TABLET ORAL EVERY 6 HOURS
Refills: 0 | Status: DISCONTINUED | OUTPATIENT
Start: 2022-02-12 | End: 2022-02-16

## 2022-02-12 RX ORDER — FOLIC ACID 0.8 MG
1 TABLET ORAL ONCE
Refills: 0 | Status: COMPLETED | OUTPATIENT
Start: 2022-02-12 | End: 2022-02-12

## 2022-02-12 RX ORDER — LIDOCAINE 4 G/100G
1 CREAM TOPICAL DAILY
Refills: 0 | Status: DISCONTINUED | OUTPATIENT
Start: 2022-02-12 | End: 2022-02-16

## 2022-02-12 RX ORDER — LATANOPROST 0.05 MG/ML
1 SOLUTION/ DROPS OPHTHALMIC; TOPICAL AT BEDTIME
Refills: 0 | Status: DISCONTINUED | OUTPATIENT
Start: 2022-02-12 | End: 2022-02-16

## 2022-02-12 RX ORDER — ONDANSETRON 8 MG/1
4 TABLET, FILM COATED ORAL ONCE
Refills: 0 | Status: COMPLETED | OUTPATIENT
Start: 2022-02-12 | End: 2022-02-12

## 2022-02-12 RX ORDER — BUPROPION HYDROCHLORIDE 150 MG/1
150 TABLET, EXTENDED RELEASE ORAL DAILY
Refills: 0 | Status: DISCONTINUED | OUTPATIENT
Start: 2022-02-12 | End: 2022-02-16

## 2022-02-12 RX ORDER — SODIUM CHLORIDE 9 MG/ML
1000 INJECTION, SOLUTION INTRAVENOUS ONCE
Refills: 0 | Status: COMPLETED | OUTPATIENT
Start: 2022-02-12 | End: 2022-02-12

## 2022-02-12 RX ORDER — FOLIC ACID 0.8 MG
1 TABLET ORAL DAILY
Refills: 0 | Status: DISCONTINUED | OUTPATIENT
Start: 2022-02-12 | End: 2022-02-16

## 2022-02-12 RX ORDER — THIAMINE MONONITRATE (VIT B1) 100 MG
100 TABLET ORAL DAILY
Refills: 0 | Status: DISCONTINUED | OUTPATIENT
Start: 2022-02-12 | End: 2022-02-16

## 2022-02-12 RX ORDER — LANOLIN ALCOHOL/MO/W.PET/CERES
6 CREAM (GRAM) TOPICAL AT BEDTIME
Refills: 0 | Status: DISCONTINUED | OUTPATIENT
Start: 2022-02-12 | End: 2022-02-16

## 2022-02-12 RX ORDER — THIAMINE MONONITRATE (VIT B1) 100 MG
100 TABLET ORAL ONCE
Refills: 0 | Status: COMPLETED | OUTPATIENT
Start: 2022-02-12 | End: 2022-02-12

## 2022-02-12 RX ORDER — SODIUM CHLORIDE 9 MG/ML
1000 INJECTION INTRAMUSCULAR; INTRAVENOUS; SUBCUTANEOUS ONCE
Refills: 0 | Status: COMPLETED | OUTPATIENT
Start: 2022-02-12 | End: 2022-02-12

## 2022-02-12 RX ADMIN — SODIUM CHLORIDE 1000 MILLILITER(S): 9 INJECTION, SOLUTION INTRAVENOUS at 17:59

## 2022-02-12 RX ADMIN — ONDANSETRON 4 MILLIGRAM(S): 8 TABLET, FILM COATED ORAL at 21:49

## 2022-02-12 RX ADMIN — Medication 2 MILLIGRAM(S): at 21:49

## 2022-02-12 RX ADMIN — Medication 100 MILLIGRAM(S): at 15:29

## 2022-02-12 RX ADMIN — ONDANSETRON 4 MILLIGRAM(S): 8 TABLET, FILM COATED ORAL at 15:29

## 2022-02-12 RX ADMIN — Medication 1 TABLET(S): at 16:19

## 2022-02-12 RX ADMIN — Medication 50 MILLIGRAM(S): at 21:49

## 2022-02-12 RX ADMIN — Medication 1 MILLIGRAM(S): at 15:29

## 2022-02-12 RX ADMIN — Medication 2 MILLIGRAM(S): at 15:27

## 2022-02-12 RX ADMIN — SODIUM CHLORIDE 1000 MILLILITER(S): 9 INJECTION INTRAMUSCULAR; INTRAVENOUS; SUBCUTANEOUS at 16:04

## 2022-02-12 RX ADMIN — SODIUM CHLORIDE 1000 MILLILITER(S): 9 INJECTION, SOLUTION INTRAVENOUS at 21:11

## 2022-02-12 NOTE — H&P ADULT - NSHPLABSRESULTS_GEN_ALL_CORE
Labs:                        13.8   6.10  )-----------( 275      ( 12 Feb 2022 15:28 )             40.6     02-12    139  |  102  |  8   ----------------------------<  71  3.5   |  22  |  0.66    Ca    7.9<L>      12 Feb 2022 23:00  Phos  2.8     02-12  Mg     1.60     02-12    TPro  6.8  /  Alb  3.6  /  TBili  0.5  /  DBili  <0.2  /  AST  46<H>  /  ALT  34  /  AlkPhos  67  02-12      Urinanalysis Basic (02-13-22 @ 00:04):    CAPILLARY BLOOD GLUCOSE:  POCT Blood Glucose: 83 mg/dL (02-12-22 @ 14:16)      COVID-19 PCR:  NotDetec (02-12-22 @ 15:29)  NotDetec (11-19-21 @ 19:59)    < from: CT Abdomen and Pelvis w/ IV Cont (02.12.22 @ 17:45) >    IMPRESSION: No acute abnormality.    < end of copied text >

## 2022-02-12 NOTE — H&P ADULT - ASSESSMENT
57 y/o M w/ PMHx of HTN, R eye glaucoma (blind in right eye), Venous insufficiency, Anxiety/Depression presents with alcohol withdrawal.

## 2022-02-12 NOTE — H&P ADULT - NSHPSOCIALHISTORY_GEN_ALL_CORE
Drinks 8 beers and 1-2 bottles of hard liquor per day. Denies tobacco, illicit drug use  Lives in his friends basement

## 2022-02-12 NOTE — ED PROVIDER NOTE - NS ED ROS FT
GENERAL: no fever, chills  HEENT: no throat pain, cough, congestion, dysphagia  CARDIAC: no chest pain, palpitations  PULM: no shortness of breath, cough, wheezing   GI: +abd pain, nausea, vomiting; no diarrhea, constipation  : no dysuria, frequency  NEURO: +headache; no lightheadedness  MSK: no joint or muscle pain  SKIN: no rashes, no ulcers  HEME: no active bleeding, no supraclavicular LAD

## 2022-02-12 NOTE — ED PROVIDER NOTE - PROGRESS NOTE DETAILS
Resident Shakila: called to bedside by RN for pt endorsing worsening nausea, vomiting, headache, and tremulousness. CIWA > 9. Tremulous at rest, tongue fasiculations, Tachy. Will re-dose ativan 2 mg IV and Librium load. Case endorsed to Hospitalist (MD Amisha). ED Attending: Dwayne Galarza signed out to me from Dr. Dewitt to f/u and reassess.  As pt did not appear to be in withdrawal earlier we observed to assess for sobriety.  Currently pt appears to be in florid withdrawal - nausea , diaphoretic, HA.  Admitted for EtOH w/drawal.

## 2022-02-12 NOTE — ED ADULT TRIAGE NOTE - CHIEF COMPLAINT QUOTE
pt arriving with steady gait, c/o intox. Pt calm and cooperative. States last drink was prior to arrival to ER. Pt not elaborating further. Appears tremulous in triage. pt arriving with steady gait, c/o intox. Pt calm and cooperative. States last drink was prior to arrival to ER. Pt not elaborating further. Appears tremulous in triage. pt brought over to ambay on stretcher and undressed by EDT.

## 2022-02-12 NOTE — H&P ADULT - PROBLEM SELECTOR PLAN 2
- Likely 2/2 acute alcohol withdrawal   - Lipase 21  - CT A/P: Neg for acute abdominal pathology   - Abd pain resolved in ED however still with tenderness on exam, will continue to monitor throughout treatment

## 2022-02-12 NOTE — H&P ADULT - NSHPREVIEWOFSYSTEMS_GEN_ALL_CORE
ROS:    Constitutional: Denies F/C, malaise, fatigue, diaphoresis, weakness, weight loss  Eyes: Denies visual changes, eye pain, double vision  ENT: Denies rhinorrhea, congestion, sinus pain/pressure, ear pain, tinnitus, sore throat, odynophagia  Cardio: Denies CP, palpitations, edema, paroxysmal nocturnal dyspnea, orthopnea   Pulm: Denies cough, wheezing, hemoptysis, SOB  GI: + Nausea, Abd pain Denies vomiting, Diarrhea, constipation,  dysphagia, anorexia, hematemesis, BRBPR, melena  : Denies, dysuria, frequency, incontinence, change in urine color, difficulty urinating  MSK: Denies arthralgia, joint swelling, decreased ROM  Skin: Denies pruritus, rashes, lesions, wounds  Neuro: + HA Denies seizures, paresthesia, numbness, weakness  Psych: + Anxiety, depression, Denies difficulty concentrating, labile mood, lack of energy, trouble sleeping  Endocrine: Denies heat/cold intolerance, polydipsia, polyuria  Hematologic: Denies bleeding, easy bruising, painful/swollen lymph nodes    Positives and pertinent negatives noted, all other systems negative.

## 2022-02-12 NOTE — ED PROVIDER NOTE - PHYSICAL EXAMINATION
CONSTITUTIONAL: appears uncomfortable  HEENT: head atraumatic; normal cephalic shape; no conjunctivitis or scleral icterus; EOMI; neck supple w/ FROM  CARDIAC: regular rate & rhythm; normal S1, S2; no murmurs, rubs or gallops.  RESPIRATORY: breath sounds clear to auscultation bilaterally; no distress present, no crackles, wheezes, rales, rhonchi, retractions, or tachypnea; normal rate and effort.  GASTROINTESTINAL: +abd tender to palpation diffusely; normoactive bowel sounds.  SKIN: skin globally erythematous; cap refill brisk; skin warm, dry and intact; no evidence of rash.  BACK: no vertebral or paraspinal tenderness along entire spine; no CVAT.  MSK: no joint effusion or tenderness; FROM of all joints; no deformities or erythema noted; 2+ peripheral pulses.  NEURO: alert; interactive; no focal deficits.

## 2022-02-12 NOTE — H&P ADULT - HISTORY OF PRESENT ILLNESS
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 Pt reports drinking 6-8 beers and 1-2 bottle of vodka and/or tequila per day. His Last drink was this morning prior to coming to the hospital. He notes moderate tremors, severe abdominal pain, and moderate nausea that resolved after Ativan and Librium in the ED. He states that these are all his typical EtoH withdrawal symptoms. Patient still endorses 8/10 occipital headache. Of note, he also reports a mechanical fall a few weeks ago with residual moderate left sided paraspinal back pain provoked by movement. Patient says he came to the ED because he got a job at the [facility] area and needs help getting sober before he can start. Denies F/C, Vomiting, CP, SOB, palpitations, cough, diaphoresis, vision changes, sore throat, Hx seizure, ICU admission, diarrhea, numbness, weakness, rashes, LE edema, saddle paresthesia, loss of bowel/bladder, dysuria, AVH, SI/HI.     In ED, VSS, CIWA 8-9, received 50 Librium x 1, 2mg Ativan x 2, 3L IVF, and Zofran 4mg x 2.

## 2022-02-12 NOTE — H&P ADULT - PROBLEM SELECTOR PLAN 4
- Not on antihypertensives outpatient  - Highest BP of 150/92 inpatient, possibly in setting of pain, withdrawal, now lower   - Will continue to monitor, consider Amlodipine if persistently hypertensive despite treatment and symptom control

## 2022-02-12 NOTE — ED ADULT NURSE NOTE - CHIEF COMPLAINT QUOTE
pt arriving with steady gait, c/o intox. Pt calm and cooperative. States last drink was prior to arrival to ER. Pt not elaborating further. Appears tremulous in triage. pt brought over to ambay on stretcher and undressed by EDT.

## 2022-02-12 NOTE — H&P ADULT - ATTENDING COMMENTS
56 yr old male with a pmh of HTN, R glaucoma, alcohol abuse on 1 bottle of hard liquor/day who presents with alcohol withdrawal – last drink was earlier today. Wants to stop drinking so he can get a job. Reports headache, nausea, nonbloody emesis, tremors. He also reports having a fall a couple of weeks ago that resulted in back pain.  Denies  headache, dizziness, chest pain, palpitations, SOB,  joint pain, diarrhea/constipation, urinary symptoms.   Vitals in ED: T 98.4, HR 94, /92, RR 20 satting 100% RA  CT abod/pelvis interpreted by radiology: No acute abnormality.    REVIEW OF SYSTEMS:    CONSTITUTIONAL: No weakness, fevers or chills  EYES/ENT: No visual changes;  No dysphagia; No sore throat; No rhinorrhea; No sinus pain/pressure  NECK: No pain or stiffness  RESPIRATORY: No cough, wheezing, hemoptysis; No shortness of breath  CARDIOVASCULAR: No chest pain or palpitations; No lower extremity edema  GASTROINTESTINAL: + abdominal/epigastric pain. + nausea/ vomiting, no hematemesis; No diarrhea or constipation. No melena or hematochezia.  GENITOURINARY: No dysuria, frequency or hematuria  NEUROLOGICAL: No numbness or weakness, + tremors  MSK: ambulates independently + back pain  SKIN: No itching, burning, rashes, or lesions   All other review of systems is negative unless indicated above.    PHYSICAL EXAM:  GENERAL: NAD, well-developed, well-nourished  HEAD:  Atraumatic, Normocephalic  EYES: EOMI, PERRL, conjunctiva and sclera clear  NECK: Supple, No JVD  CHEST/LUNG: Clear to auscultation bilaterally; No wheezes, rales or rhonchi  HEART: Regular rate and rhythm; No murmurs, rubs, or gallops, (+)S1, S2  ABDOMEN: Soft, epigastric tenderness, Nondistended; Normal Bowel sounds   EXTREMITIES:  2+ Peripheral Pulses, No clubbing, cyanosis, or edema  PSYCH: normal mood and affect  NEUROLOGY: AAOx3, non-focal, tremor on exam  MSK: left paraspinal tenderness  SKIN: No rashes or lesions, chronic venous insufficiency changes of lower extremities    Alcohol withdrawal  New  Alcohol level: 294  s/p ativan in the ED  CIWA protocol-Librium taper – previously worked well for the patient   Monitor    Abdominal pain  New  Nausea and nonbloody emesis  CT abdo/pelvis: No acute abnormality.  Antiemetics prn  Monitor    Back pain  New  Mechanical fall within the last couple of weeks  Lumbar paraspinal tenderness   Lidocaine patch     Remainder as above

## 2022-02-12 NOTE — ED PROVIDER NOTE - OBJECTIVE STATEMENT
55 yo M hx of HTN, R glaucoma presenting with alcohol withdrawal. Pt reports normally drinking around 1 bottle of vodka/tequila per day - last drink was prior to coming to the hospital. He states that he does not feel well; endorses headache, nausea, nonbloody vomit, tremors. Also endorses severe epigastric abdominal pain.

## 2022-02-12 NOTE — ED PROVIDER NOTE - ATTENDING CONTRIBUTION TO CARE
56M w h/o HTN, R sided glaucoma, p/w etoh withdrawal. States he drinks 1 bottle of liquor a day, last drink this morning. States he has had n/v, headache, tremors. Reports epigastric pain similar to prior pancreatitis.     Except as documented in the HPI,  all other systems are negative    CONSTITUTIONAL: NAD, awake, alert  HEAD: Normocephalic; atraumatic  ENMT: External appears normal, MMM  NECK: no tenderness, FROM  CARD: Normal Sl, S2; no audible murmurs  RESP: normal wob, lungs ctab  ABD: soft, non-distended; + epigastric tenderness   MSK: no edema, normal ROM in all four extremities  SKIN: Warm, dry, no rashes  NEURO: aaox3, moving all extremities spontaneously, bilateral upper extremity tremors, tongue fasciculations    56M w h/o HTN, R sided glaucoma, p/w etoh withdrawal and abdominal pain, will check labs, lipase, ct a/p, very tender on exam, gastritis vs pancreatitis, given tenderness less likely acs, will provide benzos, antiemetics, to be admitted

## 2022-02-12 NOTE — H&P ADULT - NSHPPHYSICALEXAM_GEN_ALL_CORE
Physical Exam:     General: NAD, A&Ox3, WN/WD  Eyes: PERRLA, EOMI, Vision grossly intact b/l  ENT: MMM, no stridor, no pharyngeal/tonsilar erythema/edema, hearing grossly intact  Neck: Supple, trachea midline, no JVD, no thyromegaly/nodules, no lymphademopathy, no ttp  Resp: CTA b/l, no wheezing, rales, rhonchi  Cardio: RRR, nl S1/2, no murmurs, rubs, or gallops  Abd: moderate epigastric, RUQ tenderness to palpation, Soft, ND, +BS  Extremities: + darkened pigmentation b/l c/w venous stasis changes, no edema, radial/DP pulses 2+ b/l, no acrocyanosis, Cap refill<3 sec  Neuro: Strength 5/5 in UE/LE b/l, sensation equal/intact b/l, CN 2-12 intact  Skin: + [id number] superficial abrasion on R knee and [id number] small superficial skin tear on left anterior lower leg, no erythema, edema, discharge, no rashes, ecchymosis, normal temp, turgur  Lymph: no neck, axilla, groin LAD  Psych: appropriate mood/affect

## 2022-02-12 NOTE — ED PROVIDER NOTE - CLINICAL SUMMARY MEDICAL DECISION MAKING FREE TEXT BOX
55 yo M hx of etoh use disorder, htn, R glaucoma presenting with etoh withdrawal. Pt appears uncomfortable, erythematous, +abd pain. Plan to f/u labs, ativan 2mg, vitamin/fluid resuscitation.

## 2022-02-12 NOTE — ED ADULT NURSE NOTE - OBJECTIVE STATEMENT
Pt to bed room 22A. Pt walked through walk in triage. Pt admits to daily drinking. Pt drank this morning. Pt tremulous, red in the face. Slow to answer questions. Pt medicated as per MD order for withdrawal symptoms. IVL placed. Bloods drawn. Will continue to monitor.

## 2022-02-12 NOTE — H&P ADULT - PROBLEM SELECTOR PLAN 1
- Blood alcohol level 294. Last drink 2/12.   - initial CIWA score 8, received 50 Librium, 2mg Ativan x 2 in ED  - Continue high risk Librium taper and CIWA with symptom-triggered Ativan   - Continue IVF with thiamine, folic acid.  - Thiamine, Folic acid, and multivitamin supplementation  - Monitor for DTs  - SW/CM consult  - fall risk protocol, seizure precautions

## 2022-02-13 DIAGNOSIS — F10.239 ALCOHOL DEPENDENCE WITH WITHDRAWAL, UNSPECIFIED: ICD-10-CM

## 2022-02-13 DIAGNOSIS — M54.9 DORSALGIA, UNSPECIFIED: ICD-10-CM

## 2022-02-13 DIAGNOSIS — H40.9 UNSPECIFIED GLAUCOMA: ICD-10-CM

## 2022-02-13 DIAGNOSIS — F41.9 ANXIETY DISORDER, UNSPECIFIED: ICD-10-CM

## 2022-02-13 DIAGNOSIS — I87.2 VENOUS INSUFFICIENCY (CHRONIC) (PERIPHERAL): ICD-10-CM

## 2022-02-13 DIAGNOSIS — Z29.9 ENCOUNTER FOR PROPHYLACTIC MEASURES, UNSPECIFIED: ICD-10-CM

## 2022-02-13 DIAGNOSIS — I10 ESSENTIAL (PRIMARY) HYPERTENSION: ICD-10-CM

## 2022-02-13 DIAGNOSIS — R10.9 UNSPECIFIED ABDOMINAL PAIN: ICD-10-CM

## 2022-02-13 LAB
A1C WITH ESTIMATED AVERAGE GLUCOSE RESULT: 5.6 % — SIGNIFICANT CHANGE UP (ref 4–5.6)
ALBUMIN SERPL ELPH-MCNC: 3.5 G/DL — SIGNIFICANT CHANGE UP (ref 3.3–5)
ALP SERPL-CCNC: 65 U/L — SIGNIFICANT CHANGE UP (ref 40–120)
ALT FLD-CCNC: 33 U/L — SIGNIFICANT CHANGE UP (ref 4–41)
ANION GAP SERPL CALC-SCNC: 14 MMOL/L — SIGNIFICANT CHANGE UP (ref 7–14)
APAP SERPL-MCNC: <10 UG/ML — LOW (ref 15–25)
AST SERPL-CCNC: 41 U/L — HIGH (ref 4–40)
BILIRUB SERPL-MCNC: 0.8 MG/DL — SIGNIFICANT CHANGE UP (ref 0.2–1.2)
BUN SERPL-MCNC: 7 MG/DL — SIGNIFICANT CHANGE UP (ref 7–23)
CALCIUM SERPL-MCNC: 8 MG/DL — LOW (ref 8.4–10.5)
CHLORIDE SERPL-SCNC: 100 MMOL/L — SIGNIFICANT CHANGE UP (ref 98–107)
CHOLEST SERPL-MCNC: 258 MG/DL — HIGH
CO2 SERPL-SCNC: 23 MMOL/L — SIGNIFICANT CHANGE UP (ref 22–31)
CREAT SERPL-MCNC: 0.71 MG/DL — SIGNIFICANT CHANGE UP (ref 0.5–1.3)
ESTIMATED AVERAGE GLUCOSE: 114 — SIGNIFICANT CHANGE UP
ETHANOL SERPL-MCNC: 127 MG/DL — HIGH
FOLATE SERPL-MCNC: 20 NG/ML — HIGH (ref 3.1–17.5)
GLUCOSE SERPL-MCNC: 101 MG/DL — HIGH (ref 70–99)
HCT VFR BLD CALC: 35.8 % — LOW (ref 39–50)
HDLC SERPL-MCNC: 88 MG/DL — SIGNIFICANT CHANGE UP
HGB BLD-MCNC: 12.5 G/DL — LOW (ref 13–17)
LIPID PNL WITH DIRECT LDL SERPL: 139 MG/DL — HIGH
MAGNESIUM SERPL-MCNC: 1.5 MG/DL — LOW (ref 1.6–2.6)
MCHC RBC-ENTMCNC: 30.9 PG — SIGNIFICANT CHANGE UP (ref 27–34)
MCHC RBC-ENTMCNC: 34.9 GM/DL — SIGNIFICANT CHANGE UP (ref 32–36)
MCV RBC AUTO: 88.6 FL — SIGNIFICANT CHANGE UP (ref 80–100)
NON HDL CHOLESTEROL: 170 MG/DL — HIGH
NRBC # BLD: 0 /100 WBCS — SIGNIFICANT CHANGE UP
NRBC # FLD: 0 K/UL — SIGNIFICANT CHANGE UP
PHOSPHATE SERPL-MCNC: 2.5 MG/DL — SIGNIFICANT CHANGE UP (ref 2.5–4.5)
PLATELET # BLD AUTO: 232 K/UL — SIGNIFICANT CHANGE UP (ref 150–400)
POTASSIUM SERPL-MCNC: 3.8 MMOL/L — SIGNIFICANT CHANGE UP (ref 3.5–5.3)
POTASSIUM SERPL-SCNC: 3.8 MMOL/L — SIGNIFICANT CHANGE UP (ref 3.5–5.3)
PROT SERPL-MCNC: 6.8 G/DL — SIGNIFICANT CHANGE UP (ref 6–8.3)
RBC # BLD: 4.04 M/UL — LOW (ref 4.2–5.8)
RBC # FLD: 13.2 % — SIGNIFICANT CHANGE UP (ref 10.3–14.5)
SALICYLATES SERPL-MCNC: <0.3 MG/DL — LOW (ref 15–30)
SODIUM SERPL-SCNC: 137 MMOL/L — SIGNIFICANT CHANGE UP (ref 135–145)
TRIGL SERPL-MCNC: 156 MG/DL — HIGH
TSH SERPL-MCNC: 2.14 UIU/ML — SIGNIFICANT CHANGE UP (ref 0.27–4.2)
VIT B12 SERPL-MCNC: 381 PG/ML — SIGNIFICANT CHANGE UP (ref 200–900)
WBC # BLD: 5.19 K/UL — SIGNIFICANT CHANGE UP (ref 3.8–10.5)
WBC # FLD AUTO: 5.19 K/UL — SIGNIFICANT CHANGE UP (ref 3.8–10.5)

## 2022-02-13 PROCEDURE — 99233 SBSQ HOSP IP/OBS HIGH 50: CPT

## 2022-02-13 RX ORDER — ENOXAPARIN SODIUM 100 MG/ML
40 INJECTION SUBCUTANEOUS DAILY
Refills: 0 | Status: DISCONTINUED | OUTPATIENT
Start: 2022-02-13 | End: 2022-02-16

## 2022-02-13 RX ORDER — SODIUM CHLORIDE 9 MG/ML
1000 INJECTION, SOLUTION INTRAVENOUS
Refills: 0 | Status: DISCONTINUED | OUTPATIENT
Start: 2022-02-13 | End: 2022-02-16

## 2022-02-13 RX ADMIN — BUPROPION HYDROCHLORIDE 150 MILLIGRAM(S): 150 TABLET, EXTENDED RELEASE ORAL at 14:15

## 2022-02-13 RX ADMIN — Medication 1 TABLET(S): at 11:53

## 2022-02-13 RX ADMIN — Medication 650 MILLIGRAM(S): at 03:02

## 2022-02-13 RX ADMIN — Medication 2 MILLIGRAM(S): at 06:18

## 2022-02-13 RX ADMIN — SODIUM CHLORIDE 75 MILLILITER(S): 9 INJECTION, SOLUTION INTRAVENOUS at 17:02

## 2022-02-13 RX ADMIN — SODIUM CHLORIDE 75 MILLILITER(S): 9 INJECTION, SOLUTION INTRAVENOUS at 02:50

## 2022-02-13 RX ADMIN — Medication 50 MILLIGRAM(S): at 16:55

## 2022-02-13 RX ADMIN — LATANOPROST 1 DROP(S): 0.05 SOLUTION/ DROPS OPHTHALMIC; TOPICAL at 22:51

## 2022-02-13 RX ADMIN — Medication 1 MILLIGRAM(S): at 11:53

## 2022-02-13 RX ADMIN — ENOXAPARIN SODIUM 40 MILLIGRAM(S): 100 INJECTION SUBCUTANEOUS at 11:53

## 2022-02-13 RX ADMIN — Medication 100 MILLIGRAM(S): at 11:53

## 2022-02-13 RX ADMIN — Medication 50 MILLIGRAM(S): at 22:19

## 2022-02-13 RX ADMIN — DORZOLAMIDE HYDROCHLORIDE TIMOLOL MALEATE 1 DROP(S): 20; 5 SOLUTION/ DROPS OPHTHALMIC at 05:48

## 2022-02-13 RX ADMIN — Medication 50 MILLIGRAM(S): at 10:57

## 2022-02-13 RX ADMIN — Medication 650 MILLIGRAM(S): at 15:10

## 2022-02-13 RX ADMIN — Medication 650 MILLIGRAM(S): at 14:18

## 2022-02-13 RX ADMIN — DORZOLAMIDE HYDROCHLORIDE TIMOLOL MALEATE 1 DROP(S): 20; 5 SOLUTION/ DROPS OPHTHALMIC at 17:56

## 2022-02-13 RX ADMIN — Medication 50 MILLIGRAM(S): at 04:16

## 2022-02-13 RX ADMIN — Medication 1 DROP(S): at 05:48

## 2022-02-13 RX ADMIN — Medication 1 DROP(S): at 17:58

## 2022-02-13 RX ADMIN — Medication 650 MILLIGRAM(S): at 02:32

## 2022-02-13 NOTE — PATIENT PROFILE ADULT - VISION (WITH CORRECTIVE LENSES IF THE PATIENT USUALLY WEARS THEM):
Pt have R eye blindness/Partially impaired: cannot see medication labels or newsprint, but can see obstacles in path, and the surrounding layout; can count fingers at arm's length

## 2022-02-13 NOTE — PATIENT PROFILE ADULT - FALL HARM RISK - HARM RISK INTERVENTIONS
Assistance with ambulation/Assistance OOB with selected safe patient handling equipment/Communicate Risk of Fall with Harm to all staff/Discuss with provider need for PT consult/Monitor for mental status changes/Monitor gait and stability/Reinforce activity limits and safety measures with patient and family/Tailored Fall Risk Interventions/Toileting schedule using arm’s reach rule for commode and bathroom/Use of alarms - bed, chair and/or voice tab/Visual Cue: Yellow wristband and red socks/Bed in lowest position, wheels locked, appropriate side rails in place/Call bell, personal items and telephone in reach/Instruct patient to call for assistance before getting out of bed or chair/Non-slip footwear when patient is out of bed/[place] to call system/Physically safe environment - no spills, clutter or unnecessary equipment/Purposeful Proactive Rounding/Room/bathroom lighting operational, light cord in reach

## 2022-02-14 LAB
ALBUMIN SERPL ELPH-MCNC: 3.7 G/DL — SIGNIFICANT CHANGE UP (ref 3.3–5)
ALP SERPL-CCNC: 63 U/L — SIGNIFICANT CHANGE UP (ref 40–120)
ALT FLD-CCNC: 30 U/L — SIGNIFICANT CHANGE UP (ref 4–41)
ANION GAP SERPL CALC-SCNC: 10 MMOL/L — SIGNIFICANT CHANGE UP (ref 7–14)
AST SERPL-CCNC: 35 U/L — SIGNIFICANT CHANGE UP (ref 4–40)
BILIRUB SERPL-MCNC: 1.2 MG/DL — SIGNIFICANT CHANGE UP (ref 0.2–1.2)
BUN SERPL-MCNC: 6 MG/DL — LOW (ref 7–23)
CALCIUM SERPL-MCNC: 8.5 MG/DL — SIGNIFICANT CHANGE UP (ref 8.4–10.5)
CHLORIDE SERPL-SCNC: 103 MMOL/L — SIGNIFICANT CHANGE UP (ref 98–107)
CO2 SERPL-SCNC: 25 MMOL/L — SIGNIFICANT CHANGE UP (ref 22–31)
CREAT SERPL-MCNC: 0.74 MG/DL — SIGNIFICANT CHANGE UP (ref 0.5–1.3)
GLUCOSE SERPL-MCNC: 117 MG/DL — HIGH (ref 70–99)
HCT VFR BLD CALC: 38.8 % — LOW (ref 39–50)
HGB BLD-MCNC: 13 G/DL — SIGNIFICANT CHANGE UP (ref 13–17)
MAGNESIUM SERPL-MCNC: 1.9 MG/DL — SIGNIFICANT CHANGE UP (ref 1.6–2.6)
MCHC RBC-ENTMCNC: 30.2 PG — SIGNIFICANT CHANGE UP (ref 27–34)
MCHC RBC-ENTMCNC: 33.5 GM/DL — SIGNIFICANT CHANGE UP (ref 32–36)
MCV RBC AUTO: 90.2 FL — SIGNIFICANT CHANGE UP (ref 80–100)
NRBC # BLD: 0 /100 WBCS — SIGNIFICANT CHANGE UP
NRBC # FLD: 0 K/UL — SIGNIFICANT CHANGE UP
PHOSPHATE SERPL-MCNC: 2.6 MG/DL — SIGNIFICANT CHANGE UP (ref 2.5–4.5)
PLATELET # BLD AUTO: 199 K/UL — SIGNIFICANT CHANGE UP (ref 150–400)
POTASSIUM SERPL-MCNC: 3.8 MMOL/L — SIGNIFICANT CHANGE UP (ref 3.5–5.3)
POTASSIUM SERPL-SCNC: 3.8 MMOL/L — SIGNIFICANT CHANGE UP (ref 3.5–5.3)
PROT SERPL-MCNC: 6.8 G/DL — SIGNIFICANT CHANGE UP (ref 6–8.3)
RBC # BLD: 4.3 M/UL — SIGNIFICANT CHANGE UP (ref 4.2–5.8)
RBC # FLD: 12.9 % — SIGNIFICANT CHANGE UP (ref 10.3–14.5)
SODIUM SERPL-SCNC: 138 MMOL/L — SIGNIFICANT CHANGE UP (ref 135–145)
WBC # BLD: 4.22 K/UL — SIGNIFICANT CHANGE UP (ref 3.8–10.5)
WBC # FLD AUTO: 4.22 K/UL — SIGNIFICANT CHANGE UP (ref 3.8–10.5)

## 2022-02-14 PROCEDURE — 99233 SBSQ HOSP IP/OBS HIGH 50: CPT

## 2022-02-14 RX ORDER — MAGNESIUM OXIDE 400 MG ORAL TABLET 241.3 MG
400 TABLET ORAL
Refills: 0 | Status: COMPLETED | OUTPATIENT
Start: 2022-02-14 | End: 2022-02-15

## 2022-02-14 RX ADMIN — LATANOPROST 1 DROP(S): 0.05 SOLUTION/ DROPS OPHTHALMIC; TOPICAL at 21:28

## 2022-02-14 RX ADMIN — Medication 50 MILLIGRAM(S): at 05:57

## 2022-02-14 RX ADMIN — Medication 100 MILLIGRAM(S): at 11:26

## 2022-02-14 RX ADMIN — MAGNESIUM OXIDE 400 MG ORAL TABLET 400 MILLIGRAM(S): 241.3 TABLET ORAL at 18:03

## 2022-02-14 RX ADMIN — Medication 50 MILLIGRAM(S): at 21:28

## 2022-02-14 RX ADMIN — Medication 1 DROP(S): at 05:07

## 2022-02-14 RX ADMIN — DORZOLAMIDE HYDROCHLORIDE TIMOLOL MALEATE 1 DROP(S): 20; 5 SOLUTION/ DROPS OPHTHALMIC at 18:02

## 2022-02-14 RX ADMIN — ENOXAPARIN SODIUM 40 MILLIGRAM(S): 100 INJECTION SUBCUTANEOUS at 11:25

## 2022-02-14 RX ADMIN — Medication 6 MILLIGRAM(S): at 21:28

## 2022-02-14 RX ADMIN — Medication 1 DROP(S): at 18:04

## 2022-02-14 RX ADMIN — DORZOLAMIDE HYDROCHLORIDE TIMOLOL MALEATE 1 DROP(S): 20; 5 SOLUTION/ DROPS OPHTHALMIC at 05:08

## 2022-02-14 RX ADMIN — Medication 50 MILLIGRAM(S): at 13:56

## 2022-02-14 RX ADMIN — BUPROPION HYDROCHLORIDE 150 MILLIGRAM(S): 150 TABLET, EXTENDED RELEASE ORAL at 11:25

## 2022-02-14 RX ADMIN — Medication 1 TABLET(S): at 11:26

## 2022-02-14 RX ADMIN — Medication 650 MILLIGRAM(S): at 14:43

## 2022-02-14 RX ADMIN — Medication 650 MILLIGRAM(S): at 13:57

## 2022-02-14 RX ADMIN — Medication 1 MILLIGRAM(S): at 11:25

## 2022-02-15 LAB
ANION GAP SERPL CALC-SCNC: 11 MMOL/L — SIGNIFICANT CHANGE UP (ref 7–14)
BUN SERPL-MCNC: 10 MG/DL — SIGNIFICANT CHANGE UP (ref 7–23)
CALCIUM SERPL-MCNC: 8.9 MG/DL — SIGNIFICANT CHANGE UP (ref 8.4–10.5)
CHLORIDE SERPL-SCNC: 102 MMOL/L — SIGNIFICANT CHANGE UP (ref 98–107)
CO2 SERPL-SCNC: 23 MMOL/L — SIGNIFICANT CHANGE UP (ref 22–31)
CREAT SERPL-MCNC: 0.75 MG/DL — SIGNIFICANT CHANGE UP (ref 0.5–1.3)
GLUCOSE SERPL-MCNC: 112 MG/DL — HIGH (ref 70–99)
MAGNESIUM SERPL-MCNC: 1.9 MG/DL — SIGNIFICANT CHANGE UP (ref 1.6–2.6)
PHOSPHATE SERPL-MCNC: 2.9 MG/DL — SIGNIFICANT CHANGE UP (ref 2.5–4.5)
POTASSIUM SERPL-MCNC: 3.5 MMOL/L — SIGNIFICANT CHANGE UP (ref 3.5–5.3)
POTASSIUM SERPL-SCNC: 3.5 MMOL/L — SIGNIFICANT CHANGE UP (ref 3.5–5.3)
SODIUM SERPL-SCNC: 136 MMOL/L — SIGNIFICANT CHANGE UP (ref 135–145)

## 2022-02-15 PROCEDURE — 93923 UPR/LXTR ART STDY 3+ LVLS: CPT | Mod: 26

## 2022-02-15 PROCEDURE — 99233 SBSQ HOSP IP/OBS HIGH 50: CPT

## 2022-02-15 PROCEDURE — 72020 X-RAY EXAM OF SPINE 1 VIEW: CPT | Mod: 26

## 2022-02-15 PROCEDURE — 72100 X-RAY EXAM L-S SPINE 2/3 VWS: CPT | Mod: 26

## 2022-02-15 RX ADMIN — Medication 650 MILLIGRAM(S): at 09:59

## 2022-02-15 RX ADMIN — Medication 50 MILLIGRAM(S): at 18:43

## 2022-02-15 RX ADMIN — BUPROPION HYDROCHLORIDE 150 MILLIGRAM(S): 150 TABLET, EXTENDED RELEASE ORAL at 13:08

## 2022-02-15 RX ADMIN — Medication 1 MILLIGRAM(S): at 13:07

## 2022-02-15 RX ADMIN — MAGNESIUM OXIDE 400 MG ORAL TABLET 400 MILLIGRAM(S): 241.3 TABLET ORAL at 09:57

## 2022-02-15 RX ADMIN — Medication 100 MILLIGRAM(S): at 13:08

## 2022-02-15 RX ADMIN — LATANOPROST 1 DROP(S): 0.05 SOLUTION/ DROPS OPHTHALMIC; TOPICAL at 22:03

## 2022-02-15 RX ADMIN — DORZOLAMIDE HYDROCHLORIDE TIMOLOL MALEATE 1 DROP(S): 20; 5 SOLUTION/ DROPS OPHTHALMIC at 06:09

## 2022-02-15 RX ADMIN — Medication 2 MILLIGRAM(S): at 10:22

## 2022-02-15 RX ADMIN — Medication 1 TABLET(S): at 13:08

## 2022-02-15 RX ADMIN — ENOXAPARIN SODIUM 40 MILLIGRAM(S): 100 INJECTION SUBCUTANEOUS at 13:09

## 2022-02-15 RX ADMIN — Medication 1 DROP(S): at 06:10

## 2022-02-15 RX ADMIN — Medication 6 MILLIGRAM(S): at 22:02

## 2022-02-15 RX ADMIN — Medication 650 MILLIGRAM(S): at 10:29

## 2022-02-15 NOTE — SBIRT NOTE ADULT - NSSBIRTBRIEFINTDET_GEN_A_CORE
Audit tool completed, results shared. Education provided on healthy guidelines and the benefits of stopping/decreasing usage.   Pt not interested in substance abuse treatment referrals.  Pt focused on being discharged home so he can begin a  new

## 2022-02-16 ENCOUNTER — TRANSCRIPTION ENCOUNTER (OUTPATIENT)
Age: 57
End: 2022-02-16

## 2022-02-16 VITALS
DIASTOLIC BLOOD PRESSURE: 74 MMHG | RESPIRATION RATE: 17 BRPM | SYSTOLIC BLOOD PRESSURE: 116 MMHG | OXYGEN SATURATION: 100 % | TEMPERATURE: 98 F | HEART RATE: 64 BPM

## 2022-02-16 DIAGNOSIS — I10 ESSENTIAL (PRIMARY) HYPERTENSION: ICD-10-CM

## 2022-02-16 LAB
ANION GAP SERPL CALC-SCNC: 13 MMOL/L — SIGNIFICANT CHANGE UP (ref 7–14)
BUN SERPL-MCNC: 12 MG/DL — SIGNIFICANT CHANGE UP (ref 7–23)
CALCIUM SERPL-MCNC: 9 MG/DL — SIGNIFICANT CHANGE UP (ref 8.4–10.5)
CHLORIDE SERPL-SCNC: 102 MMOL/L — SIGNIFICANT CHANGE UP (ref 98–107)
CO2 SERPL-SCNC: 22 MMOL/L — SIGNIFICANT CHANGE UP (ref 22–31)
CREAT SERPL-MCNC: 0.77 MG/DL — SIGNIFICANT CHANGE UP (ref 0.5–1.3)
GLUCOSE SERPL-MCNC: 104 MG/DL — HIGH (ref 70–99)
HCT VFR BLD CALC: 41.1 % — SIGNIFICANT CHANGE UP (ref 39–50)
HGB BLD-MCNC: 14.2 G/DL — SIGNIFICANT CHANGE UP (ref 13–17)
MAGNESIUM SERPL-MCNC: 1.9 MG/DL — SIGNIFICANT CHANGE UP (ref 1.6–2.6)
MCHC RBC-ENTMCNC: 31.2 PG — SIGNIFICANT CHANGE UP (ref 27–34)
MCHC RBC-ENTMCNC: 34.5 GM/DL — SIGNIFICANT CHANGE UP (ref 32–36)
MCV RBC AUTO: 90.3 FL — SIGNIFICANT CHANGE UP (ref 80–100)
NRBC # BLD: 0 /100 WBCS — SIGNIFICANT CHANGE UP
NRBC # FLD: 0 K/UL — SIGNIFICANT CHANGE UP
PHOSPHATE SERPL-MCNC: 2.9 MG/DL — SIGNIFICANT CHANGE UP (ref 2.5–4.5)
PLATELET # BLD AUTO: 202 K/UL — SIGNIFICANT CHANGE UP (ref 150–400)
POTASSIUM SERPL-MCNC: 3.6 MMOL/L — SIGNIFICANT CHANGE UP (ref 3.5–5.3)
POTASSIUM SERPL-SCNC: 3.6 MMOL/L — SIGNIFICANT CHANGE UP (ref 3.5–5.3)
RBC # BLD: 4.55 M/UL — SIGNIFICANT CHANGE UP (ref 4.2–5.8)
RBC # FLD: 12.9 % — SIGNIFICANT CHANGE UP (ref 10.3–14.5)
SODIUM SERPL-SCNC: 137 MMOL/L — SIGNIFICANT CHANGE UP (ref 135–145)
WBC # BLD: 6.47 K/UL — SIGNIFICANT CHANGE UP (ref 3.8–10.5)
WBC # FLD AUTO: 6.47 K/UL — SIGNIFICANT CHANGE UP (ref 3.8–10.5)

## 2022-02-16 PROCEDURE — 99233 SBSQ HOSP IP/OBS HIGH 50: CPT

## 2022-02-16 RX ORDER — THIAMINE MONONITRATE (VIT B1) 100 MG
1 TABLET ORAL
Qty: 0 | Refills: 0 | DISCHARGE
Start: 2022-02-16

## 2022-02-16 RX ORDER — BUPROPION HYDROCHLORIDE 150 MG/1
1 TABLET, EXTENDED RELEASE ORAL
Qty: 30 | Refills: 0
Start: 2022-02-16 | End: 2022-03-17

## 2022-02-16 RX ORDER — FOLIC ACID 0.8 MG
1 TABLET ORAL
Qty: 0 | Refills: 0 | DISCHARGE
Start: 2022-02-16

## 2022-02-16 RX ADMIN — ENOXAPARIN SODIUM 40 MILLIGRAM(S): 100 INJECTION SUBCUTANEOUS at 13:34

## 2022-02-16 RX ADMIN — BUPROPION HYDROCHLORIDE 150 MILLIGRAM(S): 150 TABLET, EXTENDED RELEASE ORAL at 13:34

## 2022-02-16 RX ADMIN — Medication 1 TABLET(S): at 13:34

## 2022-02-16 RX ADMIN — Medication 50 MILLIGRAM(S): at 06:34

## 2022-02-16 RX ADMIN — DORZOLAMIDE HYDROCHLORIDE TIMOLOL MALEATE 1 DROP(S): 20; 5 SOLUTION/ DROPS OPHTHALMIC at 06:35

## 2022-02-16 RX ADMIN — Medication 1 DROP(S): at 06:34

## 2022-02-16 RX ADMIN — Medication 100 MILLIGRAM(S): at 13:33

## 2022-02-16 RX ADMIN — Medication 1 MILLIGRAM(S): at 13:33

## 2022-02-16 NOTE — DISCHARGE NOTE NURSING/CASE MANAGEMENT/SOCIAL WORK - NSDCPNINST_GEN_ALL_CORE
Patient A&Ox4, no c/o pain, patient remained free from fall, patient discharged to home this afternoon, no distress noted.

## 2022-02-16 NOTE — DISCHARGE NOTE PROVIDER - HOSPITAL COURSE
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 Patient monitored on CIWA protocol with librium taper without complications. Will continue thiamine, folic acid and multivitamin on discharge. Is connected with an ETOH counseling group through a friend and will be going on the day of discharge. Complained of abdominal pain which resolved- CTAP negative and likely in setting of ETOH withdrawal. Also complained of back pain following a mechanical fall while intoxicated a few weeks ago. Able to ambulate without difficulty and XR T/L spine with age indeterminant decreased height of T10. Outpatient MRI recommended for further evaluation.     Patient seen and evaluated. Reviewed discharge medications with patient and attending. All new medications requiring new prescriptions were sent to the pharmacy of patient's choice. Reviewed need for prescription for previous home medications and new prescriptions sent if requested. Medically cleared/stable for discharge as per Dr. Huerta on 2/16/22 with appropriate follow up. Patient understands and agrees with plan of care.      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 Patient monitored on CIWA protocol with librium taper without complications. Will continue thiamine, folic acid and multivitamin on discharge. Is connected with an ETOH counseling group through a friend and will be going on the day of discharge. Complained of abdominal pain which resolved- CTAP negative and likely in setting of ETOH withdrawal. Also complained of back pain following a mechanical fall while intoxicated a few weeks ago. Able to ambulate without difficulty and XR T/L spine with age indeterminant decreased height of T10. Outpatient MRI recommended for further evaluation.     Patient seen and evaluated. Reviewed discharge medications with patient and attending. All new medications requiring new prescriptions were sent to the pharmacy of patient's choice. Reviewed need for prescription for previous home medications and new prescriptions sent if requested. Medically cleared/stable for discharge as per Dr. Lebron on 2/16/22 with appropriate follow up. Patient understands and agrees with plan of care.

## 2022-02-16 NOTE — PROGRESS NOTE ADULT - SUBJECTIVE AND OBJECTIVE BOX
Patient is a 56y old  Male who presents with a chief complaint of EtOH withdrawal (14 Feb 2022 14:32)      SUBJECTIVE / OVERNIGHT EVENTS: Pt continues to improve. Ambulating to bathroom without difficulty. Eating more.    MEDICATIONS  (STANDING):  artificial tears (preservative free) Ophthalmic Solution 1 Drop(s) Both EYES two times a day  buPROPion XL (24-Hour) . 150 milliGRAM(s) Oral daily  chlordiazePOXIDE   Oral   chlordiazePOXIDE 50 milliGRAM(s) Oral every 12 hours  dorzolamide 2%/timolol 0.5% Ophthalmic Solution 1 Drop(s) Both EYES two times a day  enoxaparin Injectable 40 milliGRAM(s) SubCutaneous daily  folic acid 1 milliGRAM(s) Oral daily  lactated ringers 1000 milliLiter(s) (75 mL/Hr) IV Continuous <Continuous>  latanoprost 0.005% Ophthalmic Solution 1 Drop(s) Both EYES at bedtime  multivitamin 1 Tablet(s) Oral daily  thiamine 100 milliGRAM(s) Oral daily    MEDICATIONS  (PRN):  acetaminophen     Tablet .. 650 milliGRAM(s) Oral every 6 hours PRN Mild Pain (1 - 3), Moderate Pain (4 - 6)  lidocaine   4% Patch 1 Patch Transdermal daily PRN back pain  LORazepam     Tablet 2 milliGRAM(s) Oral every 2 hours PRN Symptom-triggered 2 point increase in CIWA-Ar  LORazepam   Injectable 2 milliGRAM(s) IV Push every 1 hour PRN Symptom-triggered: each CIWA -Ar score 8 or GREATER  melatonin 6 milliGRAM(s) Oral at bedtime PRN Insomnia      CAPILLARY BLOOD GLUCOSE        I&O's Summary      PHYSICAL EXAM:  Vital Signs Last 24 Hrs  T(C): 37 (15 Feb 2022 09:55), Max: 37 (15 Feb 2022 09:55)  T(F): 98.6 (15 Feb 2022 09:55), Max: 98.6 (15 Feb 2022 09:55)  HR: 59 (15 Feb 2022 09:55) (58 - 61)  BP: 139/78 (15 Feb 2022 09:55) (117/76 - 150/81)  BP(mean): --  RR: 17 (15 Feb 2022 09:55) (17 - 19)  SpO2: 97% (15 Feb 2022 09:55) (97% - 99%)  CONSTITUTIONAL: NAD, well-developed, well-groomed  EYES: PERRLA; conjunctiva and sclera clear  ENMT: Moist oral mucosa, no pharyngeal injection or exudates; normal dentition  NECK: Supple, no palpable masses; no thyromegaly  RESPIRATORY: Normal respiratory effort; lungs are clear to auscultation bilaterally  CARDIOVASCULAR: Regular rate and rhythm, normal S1 and S2, no murmur/rub/gallop; No lower extremity edema; Peripheral pulses are 2+ bilaterally  ABDOMEN: Nontender to palpation, normoactive bowel sounds, no rebound/guarding; No hepatosplenomegaly    LABS:                        13.0   4.22  )-----------( 199      ( 14 Feb 2022 07:09 )             38.8     02-15    136  |  102  |  10  ----------------------------<  112<H>  3.5   |  23  |  0.75    Ca    8.9      15 Feb 2022 07:10  Phos  2.9     02-15  Mg     1.90     02-15    TPro  6.8  /  Alb  3.7  /  TBili  1.2  /  DBili  x   /  AST  35  /  ALT  30  /  AlkPhos  63  02-14            
Patient is a 56y old  Male who presents with a chief complaint of EtOH withdrawal (12 Feb 2022 23:30)      Interval History: Pt examined at bedside this morning.  Denies fevers, chills, nausea or vomiting.  Denies headaches this morning.  Patient states he wants to go back to AA this morning.    MEDICATIONS  (STANDING):  artificial tears (preservative free) Ophthalmic Solution 1 Drop(s) Both EYES two times a day  buPROPion XL (24-Hour) . 150 milliGRAM(s) Oral daily  chlordiazePOXIDE   Oral   chlordiazePOXIDE 50 milliGRAM(s) Oral every 6 hours  dorzolamide 2%/timolol 0.5% Ophthalmic Solution 1 Drop(s) Both EYES two times a day  enoxaparin Injectable 40 milliGRAM(s) SubCutaneous daily  folic acid 1 milliGRAM(s) Oral daily  lactated ringers 1000 milliLiter(s) (75 mL/Hr) IV Continuous <Continuous>  latanoprost 0.005% Ophthalmic Solution 1 Drop(s) Both EYES at bedtime  multivitamin 1 Tablet(s) Oral daily  thiamine 100 milliGRAM(s) Oral daily    MEDICATIONS  (PRN):  acetaminophen     Tablet .. 650 milliGRAM(s) Oral every 6 hours PRN Mild Pain (1 - 3), Moderate Pain (4 - 6)  lidocaine   4% Patch 1 Patch Transdermal daily PRN back pain  LORazepam     Tablet 2 milliGRAM(s) Oral every 2 hours PRN Symptom-triggered 2 point increase in CIWA-Ar  LORazepam   Injectable 2 milliGRAM(s) IV Push every 1 hour PRN Symptom-triggered: each CIWA -Ar score 8 or GREATER  melatonin 6 milliGRAM(s) Oral at bedtime PRN Insomnia      Objective    Vital Signs Last 24 Hrs  T(C): 36.7 (13 Feb 2022 15:00), Max: 37 (12 Feb 2022 23:18)  T(F): 98 (13 Feb 2022 15:00), Max: 98.6 (12 Feb 2022 23:18)  HR: 63 (13 Feb 2022 15:00) (63 - 94)  BP: 150/76 (13 Feb 2022 15:00) (119/71 - 150/92)  BP(mean): --  RR: 18 (13 Feb 2022 15:00) (16 - 20)  SpO2: 95% (13 Feb 2022 15:00) (95% - 100%)      CAPILLARY BLOOD GLUCOSE              Appearance: Sitting in bed, NAD  Cardiovascular: RRR, No murmurs, gallops or rubs appreciated  Respiratory: Lungs clear to auscultation bilaterally, no wheezes, crackles appreciated  Gastrointestinal:  Soft, nondistended, minimally tender to palpation in epigastric and RUQ  Skin: excoriations noted on skin  Neurologic: AOx3, tremor noted in hands and tongue  Extremities: Moving all extremities equally  Vascular: no edema  Psych:  depressed mood this morning, anxious, stating he would like to get help and get back to AA      02-13    137  |  100  |  7   ----------------------------<  101<H>  3.8   |  23  |  0.71  02-12    139  |  102  |  8   ----------------------------<  71  3.5   |  22  |  0.66  02-12    144  |  104  |  10  ----------------------------<  86  3.5   |  23  |  0.73    Ca    8.0<L>      13 Feb 2022 07:38  Ca    7.9<L>      12 Feb 2022 23:00  Ca    8.7      12 Feb 2022 15:28  Phos  2.5     02-13  Mg     1.50     02-13    TPro  6.8  /  Alb  3.5  /  TBili  0.8  /  DBili  x   /  AST  41<H>  /  ALT  33  /  AlkPhos  65  02-13  TPro  6.8  /  Alb  3.6  /  TBili  0.5  /  DBili  <0.2  /  AST  46<H>  /  ALT  34  /  AlkPhos  67  02-12  TPro  7.8  /  Alb  4.3  /  TBili  0.4  /  DBili  x   /  AST  53<H>  /  ALT  40  /  AlkPhos  77  02-12                                              12.5   5.19  )-----------( 232      ( 13 Feb 2022 07:38 )             35.8                         13.8   6.10  )-----------( 275      ( 12 Feb 2022 15:28 )             40.6     CAPILLARY BLOOD GLUCOSE              Radiology & Imaging    Imaging Personally Reviewed:    Consultant Notes Reviewed
    Patient is a 56y old  Male who presents with a chief complaint of EtOH withdrawal (13 Feb 2022 16:27)      SUBJECTIVE / OVERNIGHT EVENTS: Pt feeling much better today. Eating a little, drinking water, ambulating without difficulty to the bathroom.    MEDICATIONS  (STANDING):  artificial tears (preservative free) Ophthalmic Solution 1 Drop(s) Both EYES two times a day  buPROPion XL (24-Hour) . 150 milliGRAM(s) Oral daily  chlordiazePOXIDE 50 milliGRAM(s) Oral every 8 hours  chlordiazePOXIDE   Oral   dorzolamide 2%/timolol 0.5% Ophthalmic Solution 1 Drop(s) Both EYES two times a day  enoxaparin Injectable 40 milliGRAM(s) SubCutaneous daily  folic acid 1 milliGRAM(s) Oral daily  lactated ringers 1000 milliLiter(s) (75 mL/Hr) IV Continuous <Continuous>  latanoprost 0.005% Ophthalmic Solution 1 Drop(s) Both EYES at bedtime  multivitamin 1 Tablet(s) Oral daily  thiamine 100 milliGRAM(s) Oral daily    MEDICATIONS  (PRN):  acetaminophen     Tablet .. 650 milliGRAM(s) Oral every 6 hours PRN Mild Pain (1 - 3), Moderate Pain (4 - 6)  lidocaine   4% Patch 1 Patch Transdermal daily PRN back pain  LORazepam     Tablet 2 milliGRAM(s) Oral every 2 hours PRN Symptom-triggered 2 point increase in CIWA-Ar  LORazepam   Injectable 2 milliGRAM(s) IV Push every 1 hour PRN Symptom-triggered: each CIWA -Ar score 8 or GREATER  melatonin 6 milliGRAM(s) Oral at bedtime PRN Insomnia      CAPILLARY BLOOD GLUCOSE        I&O's Summary    13 Feb 2022 07:01  -  14 Feb 2022 07:00  --------------------------------------------------------  IN: 0 mL / OUT: 400 mL / NET: -400 mL        PHYSICAL EXAM:  Vital Signs Last 24 Hrs  T(C): 36.6 (14 Feb 2022 05:00), Max: 36.7 (13 Feb 2022 15:00)  T(F): 97.8 (14 Feb 2022 05:00), Max: 98 (13 Feb 2022 15:00)  HR: 63 (14 Feb 2022 05:00) (61 - 65)  BP: 130/80 (14 Feb 2022 05:00) (114/78 - 150/76)  BP(mean): --  RR: 18 (14 Feb 2022 05:56) (16 - 19)  SpO2: 98% (14 Feb 2022 05:00) (95% - 100%)  CONSTITUTIONAL: NAD, well-developed, well-groomed  EYES: PERRLA; conjunctiva and sclera clear  ENMT: Moist oral mucosa, no pharyngeal injection or exudates; normal dentition  NECK: Supple, no palpable masses; no thyromegaly  RESPIRATORY: Normal respiratory effort; lungs are clear to auscultation bilaterally  CARDIOVASCULAR: Regular rate and rhythm, normal S1 and S2, no murmur/rub/gallop; No lower extremity edema; Peripheral pulses are 2+ bilaterally  ABDOMEN: Nontender to palpation, normoactive bowel sounds, no rebound/guarding; No hepatosplenomegaly    LABS:                        13.0   4.22  )-----------( 199      ( 14 Feb 2022 07:09 )             38.8     02-14    138  |  103  |  6<L>  ----------------------------<  117<H>  3.8   |  25  |  0.74    Ca    8.5      14 Feb 2022 07:09  Phos  2.6     02-14  Mg     1.90     02-14    TPro  6.8  /  Alb  3.7  /  TBili  1.2  /  DBili  x   /  AST  35  /  ALT  30  /  AlkPhos  63  02-14                RADIOLOGY & ADDITIONAL TESTS:  Results Reviewed:   Imaging Personally Reviewed:  Electrocardiogram Personally Reviewed:    COORDINATION OF CARE:  Care Discussed with Consultants/Other Providers [Y/N]:  Prior or Outpatient Records Reviewed [Y/N]:  
    Patient is a 56y old  Male who presents with a chief complaint of EtOH withdrawal (16 Feb 2022 11:21)      SUBJECTIVE / OVERNIGHT EVENTS: Pt feeling well without complaint. Eating full diet, ambulating without difficulty.    MEDICATIONS  (STANDING):  artificial tears (preservative free) Ophthalmic Solution 1 Drop(s) Both EYES two times a day  buPROPion XL (24-Hour) . 150 milliGRAM(s) Oral daily  dorzolamide 2%/timolol 0.5% Ophthalmic Solution 1 Drop(s) Both EYES two times a day  enoxaparin Injectable 40 milliGRAM(s) SubCutaneous daily  folic acid 1 milliGRAM(s) Oral daily  lactated ringers 1000 milliLiter(s) (75 mL/Hr) IV Continuous <Continuous>  latanoprost 0.005% Ophthalmic Solution 1 Drop(s) Both EYES at bedtime  multivitamin 1 Tablet(s) Oral daily  thiamine 100 milliGRAM(s) Oral daily    MEDICATIONS  (PRN):  acetaminophen     Tablet .. 650 milliGRAM(s) Oral every 6 hours PRN Mild Pain (1 - 3), Moderate Pain (4 - 6)  lidocaine   4% Patch 1 Patch Transdermal daily PRN back pain  LORazepam     Tablet 2 milliGRAM(s) Oral every 2 hours PRN Symptom-triggered 2 point increase in CIWA-Ar  LORazepam   Injectable 2 milliGRAM(s) IV Push every 1 hour PRN Symptom-triggered: each CIWA -Ar score 8 or GREATER  melatonin 6 milliGRAM(s) Oral at bedtime PRN Insomnia      CAPILLARY BLOOD GLUCOSE        I&O's Summary      PHYSICAL EXAM:  Vital Signs Last 24 Hrs  T(C): 36.9 (16 Feb 2022 09:55), Max: 36.9 (15 Feb 2022 13:55)  T(F): 98.4 (16 Feb 2022 09:55), Max: 98.5 (15 Feb 2022 13:55)  HR: 64 (16 Feb 2022 09:55) (59 - 71)  BP: 116/74 (16 Feb 2022 09:55) (116/74 - 133/68)  BP(mean): --  RR: 17 (16 Feb 2022 09:55) (16 - 18)  SpO2: 100% (16 Feb 2022 09:55) (97% - 100%)  CONSTITUTIONAL: NAD, well-developed, well-groomed  EYES: PERRLA; conjunctiva and sclera clear  ENMT: Moist oral mucosa, no pharyngeal injection or exudates; normal dentition  RESPIRATORY: Normal respiratory effort; lungs are clear to auscultation bilaterally  CARDIOVASCULAR: Regular rate and rhythm, normal S1 and S2, no murmur/rub/gallop; No lower extremity edema; Peripheral pulses are 2+ bilaterally  ABDOMEN: Nontender to palpation, normoactive bowel sounds, no rebound/guarding; No hepatosplenomegaly    LABS:                        14.2   6.47  )-----------( 202      ( 16 Feb 2022 07:44 )             41.1     02-16    137  |  102  |  12  ----------------------------<  104<H>  3.6   |  22  |  0.77    Ca    9.0      16 Feb 2022 07:44  Phos  2.9     02-16  Mg     1.90     02-16

## 2022-02-16 NOTE — DISCHARGE NOTE PROVIDER - NSDCCPCAREPLAN_GEN_ALL_CORE_FT
PRINCIPAL DISCHARGE DIAGNOSIS  Diagnosis: Alcohol dependence with withdrawal, uncomplicated  Assessment and Plan of Treatment: You were treated for alcohol withdrawal with a librium taper and hae improved. Please attend counseling and stop drinking alcohol. Take a multivitamin and vitamins folic acid and thiamine daily.      SECONDARY DISCHARGE DIAGNOSES  Diagnosis: Back pain  Assessment and Plan of Treatment: You complained of back pain after sufferring a fall a few weeks ago. You are able to walk without difficulty. Your XR of the spine shows an age-indeterminate slightly decreased height of T10 (one of the spine vertebra). This can be further evaluated with an MRI as outpatient if needed.       Diagnosis: Abdominal pain  Assessment and Plan of Treatment: This has resolved. Your CT scan was negative. This was likely from your alcohol use. If abdominal pain recurs, please follow up with your primary care provider.

## 2022-02-16 NOTE — DISCHARGE NOTE NURSING/CASE MANAGEMENT/SOCIAL WORK - NSDCVIVACCINE_GEN_ALL_CORE_FT
influenza, injectable, quadrivalent, preservative free; 14-Oct-2021 13:35; Nany Guerrero (RN); [employer]; [id number] (Exp. Date: 30-Jun-2022); IntraMuscular; Deltoid Right.; 0.5 milliLiter(s); VIS (VIS Published: 06-Aug-2021, VIS Presented: 14-Oct-2021);   Tdap; 22-Dec-2021 19:23; Clementina Koehler (MARTÍNEZ); [employer]; [id number] (Exp. Date: 09-Sep-2023); IntraMuscular; Deltoid Left.; 0.5 milliLiter(s); VIS (VIS Published: 09-May-2013, VIS Presented: 22-Dec-2021);

## 2022-02-16 NOTE — PROGRESS NOTE ADULT - PROBLEM SELECTOR PLAN 3
- s/p mechanical fall while intoxicated, weeks ago   - low suspicion for fracture  - Lumbar/Thoracic XRay still pending.  - Lidocaine Patch, pain control   - Able to ambulate without difficulty, no indication for Pt c/s currently
- s/p mechanical fall while intoxicated, weeks ago   - low suspicion for fracture  - Lumbar/Thoracic XRay still pending.  - Lidocaine Patch, pain control   - Able to ambulate without difficulty, no indication for PT currently

## 2022-02-16 NOTE — PROGRESS NOTE ADULT - PROBLEM SELECTOR PLAN 7
- ANDREINA/PVR  - consider Compression stockings

## 2022-02-16 NOTE — PROGRESS NOTE ADULT - PROBLEM SELECTOR PLAN 1
- Blood alcohol level 294. Last drink 2/12.   - initial CIWA score 8, received 50 Librium, 2mg Ativan x 2 in ED, now scores around 1  - Continue high risk Librium taper and CIWA with symptom-triggered Ativan   - Continue IVF with thiamine, folic acid.  - Thiamine, Folic acid, and multivitamin supplementation  - Monitor for DTs  - SW/CM consult  - fall risk protocol, seizure precautions
- Low CIWA scores x 48 hours on librium taper  - Pt to be discharged home today with outpatient EtOH counseling
- Blood alcohol level 294. Last drink 2/12.   - initial CIWA score 8, received 50 Librium, 2mg Ativan x 2 in ED  - Continue high risk Librium taper and CIWA with symptom-triggered Ativan   - Continue IVF with thiamine, folic acid.  - Thiamine, Folic acid, and multivitamin supplementation  - Monitor for DTs  - SW/CM consult  - fall risk protocol, seizure precautions
- Blood alcohol level on presentation 294. Last drink 2/12.   - initial CIWA score 8, received 50 Librium, 2mg Ativan x 2 in ED, now scores around 1  - Continue high risk Librium taper and CIWA with symptom-triggered Ativan   - Continue IVF with thiamine, folic acid.  - Thiamine, Folic acid, and multivitamin supplementation  - Monitor for DTs  - Appreciate SW/CM consult -- pt states he has been connected to group EtOH counseling via a friend  - fall risk protocol, seizure precautions

## 2022-02-16 NOTE — PROGRESS NOTE ADULT - PROBLEM SELECTOR PLAN 6
- C/w home Wellbutrin   - Outpatient Psych f/u

## 2022-02-16 NOTE — DISCHARGE NOTE PROVIDER - NSDCMRMEDTOKEN_GEN_ALL_CORE_FT
Cosopt 2.23%-0.68% ophthalmic solution: 1 drop(s) to each affected eye 2 times a day  folic acid 1 mg oral tablet: 1 tab(s) orally once a day  latanoprost 0.005% ophthalmic solution: 1 drop(s) to each affected eye once a day (in the evening)  Multiple Vitamins oral tablet: 1 tab(s) orally once a day  ocular lubricant ophthalmic solution: 1 drop(s) to each affected eye 3 times a day  thiamine 100 mg oral tablet: 1 tab(s) orally once a day  Wellbutrin  mg/24 hours oral tablet, extended release: 1 tab(s) orally once a day

## 2022-02-16 NOTE — DISCHARGE NOTE NURSING/CASE MANAGEMENT/SOCIAL WORK - NSDCPEFALRISK_GEN_ALL_CORE
For information on Fall & Injury Prevention, visit: [email or web address] OR  [email or web address] OR  https://www.cdc.gov/steadi/patient.html

## 2022-02-16 NOTE — PROGRESS NOTE ADULT - ASSESSMENT
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 

## 2022-02-16 NOTE — PROGRESS NOTE ADULT - PROBLEM SELECTOR PLAN 5
- Continue home Latanoprost, Dorzolamide/Timolol eye drops  - Outpatient Ophtho f/u

## 2022-02-16 NOTE — PROGRESS NOTE ADULT - PROBLEM SELECTOR PLAN 8
DVT ppx: Lovenox SQ
DVT ppx: Lovenox SQ
DVT ppx: Lovenox SQ    Time planning discharge 35 minutes.
DVT ppx: Lovenox SQ

## 2022-02-16 NOTE — PROGRESS NOTE ADULT - PROBLEM SELECTOR PLAN 2
- Likely 2/2 acute alcohol withdrawal, no evidence of alcoholic hepatitis, pancreatitis  - CT A/P: Neg for acute abdominal pathology

## 2022-02-16 NOTE — PROGRESS NOTE ADULT - PROBLEM SELECTOR PROBLEM 7
Venous insufficiency of lower extremity

## 2022-02-16 NOTE — DISCHARGE NOTE NURSING/CASE MANAGEMENT/SOCIAL WORK - PATIENT PORTAL LINK FT
You can access the FollowMyHealth Patient Portal offered by [facility] by registering at the following website: [email or web address]. By joining [employer]’s FollowMyHealth portal, you will also be able to view your health information using other applications (apps) compatible with our system.

## 2022-02-16 NOTE — PROGRESS NOTE ADULT - PROBLEM SELECTOR PLAN 4
- Not on antihypertensives outpatient  - Highest BP of 150/92 inpatient, possibly in setting of pain, withdrawal  - Will continue to monitor, consider Amlodipine if persistently hypertensive despite treatment and symptom control

## 2022-02-18 ENCOUNTER — APPOINTMENT (OUTPATIENT)
Age: 57
End: 2022-02-18
Payer: MEDICAID

## 2022-02-18 ENCOUNTER — OUTPATIENT (OUTPATIENT)
Dept: OUTPATIENT SERVICES | Facility: HOSPITAL | Age: 57
LOS: 1 days | End: 2022-02-18

## 2022-02-18 ENCOUNTER — RESULT CHARGE (OUTPATIENT)
Age: 57
End: 2022-02-18

## 2022-02-18 DIAGNOSIS — R39.9 UNSPECIFIED SYMPTOMS AND SIGNS INVOLVING THE GENITOURINARY SYSTEM: ICD-10-CM

## 2022-02-18 DIAGNOSIS — Z20.822 CONTACT WITH AND (SUSPECTED) EXPOSURE TO COVID-19: ICD-10-CM

## 2022-02-18 LAB — SARS-COV-2 AG RESP QL IA.RAPID: NEGATIVE

## 2022-02-18 PROCEDURE — 99213 OFFICE O/P EST LOW 20 MIN: CPT

## 2022-02-24 ENCOUNTER — APPOINTMENT (OUTPATIENT)
Age: 57
End: 2022-02-24

## 2022-03-03 NOTE — DISCHARGE NOTE NURSING/CASE MANAGEMENT/SOCIAL WORK - NSDCDPATPORTLINK_GEN_ALL_CORE
You can access the [facility], offered by [facility], by registering with the following website: [email or web address] Adequate: hears normal conversation without difficulty

## 2022-03-09 ENCOUNTER — INPATIENT (INPATIENT)
Facility: HOSPITAL | Age: 57
LOS: 1 days | Discharge: HOME | End: 2022-03-11
Attending: STUDENT IN AN ORGANIZED HEALTH CARE EDUCATION/TRAINING PROGRAM | Admitting: STUDENT IN AN ORGANIZED HEALTH CARE EDUCATION/TRAINING PROGRAM
Payer: MEDICAID

## 2022-03-09 VITALS
DIASTOLIC BLOOD PRESSURE: 84 MMHG | OXYGEN SATURATION: 96 % | TEMPERATURE: 99 F | HEART RATE: 93 BPM | HEIGHT: 68 IN | SYSTOLIC BLOOD PRESSURE: 181 MMHG | WEIGHT: 179.9 LBS | RESPIRATION RATE: 19 BRPM

## 2022-03-09 LAB
ALBUMIN SERPL ELPH-MCNC: 3.9 G/DL — SIGNIFICANT CHANGE UP (ref 3.3–5)
ALP SERPL-CCNC: 73 U/L — SIGNIFICANT CHANGE UP (ref 40–120)
ALT FLD-CCNC: 108 U/L — HIGH (ref 12–78)
ANION GAP SERPL CALC-SCNC: 4 MMOL/L — LOW (ref 5–17)
AST SERPL-CCNC: 71 U/L — HIGH (ref 15–37)
BASOPHILS # BLD AUTO: 0.11 K/UL — SIGNIFICANT CHANGE UP (ref 0–0.2)
BASOPHILS NFR BLD AUTO: 2.2 % — HIGH (ref 0–2)
BILIRUB SERPL-MCNC: 0.3 MG/DL — SIGNIFICANT CHANGE UP (ref 0.2–1.2)
BUN SERPL-MCNC: 7 MG/DL — SIGNIFICANT CHANGE UP (ref 7–23)
CALCIUM SERPL-MCNC: 8.9 MG/DL — SIGNIFICANT CHANGE UP (ref 8.5–10.1)
CHLORIDE SERPL-SCNC: 109 MMOL/L — HIGH (ref 96–108)
CO2 SERPL-SCNC: 30 MMOL/L — SIGNIFICANT CHANGE UP (ref 22–31)
CREAT SERPL-MCNC: 0.78 MG/DL — SIGNIFICANT CHANGE UP (ref 0.5–1.3)
EGFR: 103 ML/MIN/1.73M2 — SIGNIFICANT CHANGE UP
EOSINOPHIL # BLD AUTO: 0.37 K/UL — SIGNIFICANT CHANGE UP (ref 0–0.5)
EOSINOPHIL NFR BLD AUTO: 7.5 % — HIGH (ref 0–6)
FLUAV AG NPH QL: SIGNIFICANT CHANGE UP
FLUBV AG NPH QL: SIGNIFICANT CHANGE UP
GLUCOSE BLDC GLUCOMTR-MCNC: 111 MG/DL — HIGH (ref 70–99)
GLUCOSE SERPL-MCNC: 108 MG/DL — HIGH (ref 70–99)
HCT VFR BLD CALC: 45.6 % — SIGNIFICANT CHANGE UP (ref 39–50)
HGB BLD-MCNC: 15.3 G/DL — SIGNIFICANT CHANGE UP (ref 13–17)
IMM GRANULOCYTES NFR BLD AUTO: 0.2 % — SIGNIFICANT CHANGE UP (ref 0–1.5)
LIDOCAIN IGE QN: 71 U/L — LOW (ref 73–393)
LYMPHOCYTES # BLD AUTO: 2.61 K/UL — SIGNIFICANT CHANGE UP (ref 1–3.3)
LYMPHOCYTES # BLD AUTO: 52.6 % — HIGH (ref 13–44)
MCHC RBC-ENTMCNC: 30.7 PG — SIGNIFICANT CHANGE UP (ref 27–34)
MCHC RBC-ENTMCNC: 33.6 G/DL — SIGNIFICANT CHANGE UP (ref 32–36)
MCV RBC AUTO: 91.6 FL — SIGNIFICANT CHANGE UP (ref 80–100)
MONOCYTES # BLD AUTO: 0.42 K/UL — SIGNIFICANT CHANGE UP (ref 0–0.9)
MONOCYTES NFR BLD AUTO: 8.5 % — SIGNIFICANT CHANGE UP (ref 2–14)
NEUTROPHILS # BLD AUTO: 1.44 K/UL — LOW (ref 1.8–7.4)
NEUTROPHILS NFR BLD AUTO: 29 % — LOW (ref 43–77)
NRBC # BLD: 0 /100 WBCS — SIGNIFICANT CHANGE UP (ref 0–0)
PLATELET # BLD AUTO: 302 K/UL — SIGNIFICANT CHANGE UP (ref 150–400)
POTASSIUM SERPL-MCNC: 4.1 MMOL/L — SIGNIFICANT CHANGE UP (ref 3.5–5.3)
POTASSIUM SERPL-SCNC: 4.1 MMOL/L — SIGNIFICANT CHANGE UP (ref 3.5–5.3)
PROT SERPL-MCNC: 8.7 GM/DL — HIGH (ref 6–8.3)
RBC # BLD: 4.98 M/UL — SIGNIFICANT CHANGE UP (ref 4.2–5.8)
RBC # FLD: 14.5 % — SIGNIFICANT CHANGE UP (ref 10.3–14.5)
SARS-COV-2 RNA SPEC QL NAA+PROBE: SIGNIFICANT CHANGE UP
SODIUM SERPL-SCNC: 143 MMOL/L — SIGNIFICANT CHANGE UP (ref 135–145)
WBC # BLD: 4.96 K/UL — SIGNIFICANT CHANGE UP (ref 3.8–10.5)
WBC # FLD AUTO: 4.96 K/UL — SIGNIFICANT CHANGE UP (ref 3.8–10.5)

## 2022-03-09 PROCEDURE — G1004: CPT

## 2022-03-09 PROCEDURE — 99285 EMERGENCY DEPT VISIT HI MDM: CPT

## 2022-03-09 PROCEDURE — 70450 CT HEAD/BRAIN W/O DYE: CPT | Mod: 26,MG

## 2022-03-09 PROCEDURE — 93010 ELECTROCARDIOGRAM REPORT: CPT

## 2022-03-09 PROCEDURE — 74177 CT ABD & PELVIS W/CONTRAST: CPT | Mod: 26,MG

## 2022-03-09 PROCEDURE — 99222 1ST HOSP IP/OBS MODERATE 55: CPT

## 2022-03-09 RX ORDER — FAMOTIDINE 10 MG/ML
20 INJECTION INTRAVENOUS ONCE
Refills: 0 | Status: COMPLETED | OUTPATIENT
Start: 2022-03-09 | End: 2022-03-09

## 2022-03-09 RX ORDER — THIAMINE MONONITRATE (VIT B1) 100 MG
100 TABLET ORAL DAILY
Refills: 0 | Status: DISCONTINUED | OUTPATIENT
Start: 2022-03-09 | End: 2022-03-11

## 2022-03-09 RX ORDER — BUPROPION HYDROCHLORIDE 150 MG/1
150 TABLET, EXTENDED RELEASE ORAL DAILY
Refills: 0 | Status: DISCONTINUED | OUTPATIENT
Start: 2022-03-09 | End: 2022-03-11

## 2022-03-09 RX ORDER — SODIUM CHLORIDE 9 MG/ML
1000 INJECTION INTRAMUSCULAR; INTRAVENOUS; SUBCUTANEOUS ONCE
Refills: 0 | Status: COMPLETED | OUTPATIENT
Start: 2022-03-09 | End: 2022-03-09

## 2022-03-09 RX ORDER — FOLIC ACID 0.8 MG
1 TABLET ORAL DAILY
Refills: 0 | Status: DISCONTINUED | OUTPATIENT
Start: 2022-03-09 | End: 2022-03-11

## 2022-03-09 RX ORDER — ONDANSETRON 8 MG/1
8 TABLET, FILM COATED ORAL
Refills: 0 | Status: DISCONTINUED | OUTPATIENT
Start: 2022-03-09 | End: 2022-03-11

## 2022-03-09 RX ORDER — SODIUM CHLORIDE 9 MG/ML
1000 INJECTION INTRAMUSCULAR; INTRAVENOUS; SUBCUTANEOUS
Refills: 0 | Status: DISCONTINUED | OUTPATIENT
Start: 2022-03-09 | End: 2022-03-11

## 2022-03-09 RX ORDER — PANTOPRAZOLE SODIUM 20 MG/1
40 TABLET, DELAYED RELEASE ORAL
Refills: 0 | Status: DISCONTINUED | OUTPATIENT
Start: 2022-03-09 | End: 2022-03-11

## 2022-03-09 RX ADMIN — Medication 30 MILLILITER(S): at 22:13

## 2022-03-09 RX ADMIN — SODIUM CHLORIDE 1000 MILLILITER(S): 9 INJECTION INTRAMUSCULAR; INTRAVENOUS; SUBCUTANEOUS at 17:25

## 2022-03-09 RX ADMIN — Medication 2 MILLIGRAM(S): at 22:58

## 2022-03-09 RX ADMIN — FAMOTIDINE 20 MILLIGRAM(S): 10 INJECTION INTRAVENOUS at 22:13

## 2022-03-09 RX ADMIN — Medication 50 MILLIGRAM(S): at 22:36

## 2022-03-09 NOTE — ED ADULT NURSE REASSESSMENT NOTE - NS ED NURSE REASSESS COMMENT FT1
Patient aox3. Received patient form Brennen Ma. Nursing care continue. Safety measures maintained. VSS

## 2022-03-09 NOTE — H&P ADULT - ASSESSMENT
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    1) Alcohol Withdrawal, Alcohol gastritis  - CIWA w/ ativan taper  - IVF  - clears and adv as tolerated  - zofran prn  - PPI  - thiamine, folic acid    2) Anxiety/Depression  - c/w Bupropion    3) DVT ppx - Lovneox subq

## 2022-03-09 NOTE — H&P ADULT - NSHPPHYSICALEXAM_GEN_ALL_CORE
PHYSICAL EXAM:    Vital Signs Last 24 Hrs  T(C): 36.8 (10 Mar 2022 05:11), Max: 37.1 (09 Mar 2022 16:31)  T(F): 98.2 (10 Mar 2022 05:11), Max: 98.7 (09 Mar 2022 16:31)  HR: 73 (10 Mar 2022 05:11) (73 - 93)  BP: 103/63 (10 Mar 2022 05:11) (103/63 - 181/84)  BP(mean): --  RR: 17 (10 Mar 2022 05:11) (16 - 20)  SpO2: 98% (10 Mar 2022 05:11) (96% - 98%)    GENERAL: Pt lying in bed poor hygiene  HEENT:  Atraumatic, EOMI, MMM  NECK: Supple  CHEST/LUNG: Clear to auscultation bilaterally  HEART: Regular rate and rhythm;  ABDOMEN: Bowel sounds present; Soft, tender in epigastrium, Nondistended. No guarding or rigidity    EXTREMITIES:  2+ Peripheral Pulses, brisk capillary refill. No edema  NEUROLOGICAL:  Alert & Oriented X3, No deficits   MSK: FROM x 4 extremities   SKIN: No rashes or lesions

## 2022-03-09 NOTE — ED ADULT TRIAGE NOTE - CHIEF COMPLAINT QUOTE
pt biba from the street c/o feeling cold nausea and vomiting pt admits to drinking everyday last drink was this morning

## 2022-03-09 NOTE — H&P ADULT - HISTORY OF PRESENT ILLNESS
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 Pt reports he been drinks at least 10 beers and 2 large bottles of vodka daily with intermittent cocaine usage (reported to the ED staff, denied to me) last drink was earlier today. Pt reports nauseas, NB/NB emesis as 2 days and noted "the shakes today". Pt also c/o epigastric pain, denies diarrhea, fever or chills.

## 2022-03-09 NOTE — ED PROVIDER NOTE - ATTENDING CONTRIBUTION TO CARE
57 yo m with PMH HTN, ETOH abuse, anxiety/depression, venous insufficiency, glaucoma and R eye blindness presents with nausea, vomiting and abdominal pain- CTH s/p fall, CT abd r/o intra-abdominal pathology, dispo per findings, likely d home when clinically sober

## 2022-03-09 NOTE — ED PROVIDER NOTE - CARE PLAN
1 Principal Discharge DX:	Nausea & vomiting  Secondary Diagnosis:	Epigastric abdominal pain   Principal Discharge DX:	Alcohol withdrawal  Secondary Diagnosis:	Epigastric abdominal pain

## 2022-03-09 NOTE — ED PROVIDER NOTE - CLINICAL SUMMARY MEDICAL DECISION MAKING FREE TEXT BOX
55 yo m with PMH HTN, ETOH abuse, anxiety/depression, venous insufficiency, glaucoma and R eye blindness presents with nausea, vomiting and abdominal pain. On exam, arms tremulous when outstretched, epigastric tenderness, superficial abrasions to forehead. Likely gastritis secondary to ETOH use. Will CT head to r/o ICH.

## 2022-03-09 NOTE — ED ADULT NURSE NOTE - OBJECTIVE STATEMENT
Patient received via stretcher c/o abdominal pain and vomiting since this afternoon. Patient states he drinks alcohol daily about 10 beers today and vodka. Patient is currently aox3 v/s wnl lung sounds  clear helio, abdomen soft with generalized  tenderness on palpation, skin warm, dry and intact with abrasion to forehead and patient is flushed. Patient is being monitored closely at present  time.

## 2022-03-09 NOTE — SBIRT NOTE ADULT - NSSBIRTALCPASSREFTXDET_GEN_A_CORE
Provided SBIRT services: Full screen positive. Patient reports daily drinking since discharge June 14th from 2-year involvement with [facility] (residential, long-term [facility] program). Referral to Treatment attempted. Screening results were reviewed with the patient and patient was provided information about healthy guidelines and potential negative consequences associated with level of risk. Motivation and readiness to reduce or stop use was discussed and goals and activities to make changes were suggested/offered. Patient reports wanting to stop drinking and that he has been attending AA/NA weekly. Options discussed for further evaluation and treatment (specifically detox), but referral to treatment was not completed because: Patient refused- states he wants to either be admitted to [facility] or go home. States he has supportive friends in [place] that he can stay with while continuing to attend AA/NA meetings 2-3 times a week.

## 2022-03-09 NOTE — H&P ADULT - NSHPLABSRESULTS_GEN_ALL_CORE
T(C): 36.8 (03-10-22 @ 05:11), Max: 37.1 (03-09-22 @ 16:31)  HR: 73 (03-10-22 @ 05:11) (73 - 93)  BP: 103/63 (03-10-22 @ 05:11) (103/63 - 181/84)  RR: 17 (03-10-22 @ 05:11) (16 - 20)  SpO2: 98% (03-10-22 @ 05:11) (96% - 98%)                        13.3   5.15  )-----------( 256      ( 10 Mar 2022 07:45 )             39.8     03-10    143  |  108  |  7   ----------------------------<  73  3.9   |  28  |  0.71    Ca    7.5<L>      10 Mar 2022 07:45  Phos  2.1     03-10  Mg     1.9     03-10    TPro  7.1  /  Alb  3.1<L>  /  TBili  0.6  /  DBili  x   /  AST  59<H>  /  ALT  82<H>  /  AlkPhos  64  03-10    LIVER FUNCTIONS - ( 10 Mar 2022 07:45 )  Alb: 3.1 g/dL / Pro: 7.1 gm/dL / ALK PHOS: 64 U/L / ALT: 82 U/L / AST: 59 U/L / GGT: x             < from: CT Abdomen and Pelvis w/ IV Cont (03.09.22 @ 19:40) >    IMPRESSION:  Cholelithiasis  Markedly distended bladder      < end of copied text >    artificial  tears Solution 1 Drop(s) Right EYE three times a day  buPROPion XL (24-Hour) . 150 milliGRAM(s) Oral daily  dorzolamide 2%/timolol 0.5% Ophthalmic Solution 1 Drop(s) Right EYE two times a day  folic acid 1 milliGRAM(s) Oral daily  latanoprost 0.005% Ophthalmic Solution 1 Drop(s) Right EYE at bedtime  LORazepam     Tablet 2 milliGRAM(s) Oral every 4 hours  LORazepam     Tablet 1.5 milliGRAM(s) Oral every 4 hours  LORazepam     Tablet   Oral   LORazepam   Injectable 2 milliGRAM(s) IV Push every 1 hour PRN  multivitamin 1 Tablet(s) Oral daily  ondansetron Injectable 8 milliGRAM(s) IV Push two times a day  pantoprazole    Tablet 40 milliGRAM(s) Oral two times a day  sodium chloride 0.9%. 1000 milliLiter(s) IV Continuous <Continuous>  thiamine 100 milliGRAM(s) Oral daily

## 2022-03-09 NOTE — ED PROVIDER NOTE - PROGRESS NOTE DETAILS
Mingo Worley DO: pt signed out to me by day team pending CT results. Making CIWA of 8-10 on my assessment. endorses past admissions for etoh withdrawal, will medicate with libirum and admit.

## 2022-03-09 NOTE — ED PROVIDER NOTE - OBJECTIVE STATEMENT
57 yo m with PMH HTN, ETOH abuse, anxiety/depression, venous insufficiency, glaucoma and R eye blindness presents with nausea, vomiting and abdominal pain. Pt reports he has been drinking 10 beers and 2 large bottles of vodka daily with intermittent cocaine usage and sleeping on the streets. 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 He began having n/v yesterday with ~2 episodes of yellow emesis today and 2 loose yellow stools today, also c/o epigastric discomfort and inability to tolerate food, and decreased urine output with dysuria. Denies CP, SOB, fever, chills, recent illness, SI/HI. 57 yo m with PMH HTN, ETOH abuse, anxiety/depression, venous insufficiency, glaucoma and R eye blindness presents with nausea, vomiting and abdominal pain. Pt reports he has been drinking 10 beers and 2 large bottles of vodka daily with intermittent cocaine usage and sleeping on the streets. 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 He began having n/v yesterday with ~2 episodes of yellow emesis today and 2 loose yellow stools today, also c/o epigastric discomfort and inability to tolerate food, and decreased urine output with dysuria. Denies CP, SOB, fever, chills, recent illness, SI/HI.

## 2022-03-09 NOTE — ED PROVIDER NOTE - NS ED ATTENDING STATEMENT MOD
This was a shared visit with the CAMILO. I reviewed and verified the documentation and independently performed the documented:

## 2022-03-09 NOTE — ED ADULT NURSE NOTE - NSIMPLEMENTINTERV_GEN_ALL_ED
Implemented All Fall Risk Interventions:  [place] to call system. Call bell, personal items and telephone within reach. Instruct patient to call for assistance. Room bathroom lighting operational. Non-slip footwear when patient is off stretcher. Physically safe environment: no spills, clutter or unnecessary equipment. Stretcher in lowest position, wheels locked, appropriate side rails in place. Provide visual cue, wrist band, yellow gown, etc. Monitor gait and stability. Monitor for mental status changes and reorient to person, place, and time. Review medications for side effects contributing to fall risk. Reinforce activity limits and safety measures with patient and family.

## 2022-03-10 DIAGNOSIS — K29.70 GASTRITIS, UNSPECIFIED, WITHOUT BLEEDING: ICD-10-CM

## 2022-03-10 DIAGNOSIS — F10.239 ALCOHOL DEPENDENCE WITH WITHDRAWAL, UNSPECIFIED: ICD-10-CM

## 2022-03-10 LAB
ALBUMIN SERPL ELPH-MCNC: 3.1 G/DL — LOW (ref 3.3–5)
ALP SERPL-CCNC: 64 U/L — SIGNIFICANT CHANGE UP (ref 40–120)
ALT FLD-CCNC: 82 U/L — HIGH (ref 12–78)
ANION GAP SERPL CALC-SCNC: 7 MMOL/L — SIGNIFICANT CHANGE UP (ref 5–17)
AST SERPL-CCNC: 59 U/L — HIGH (ref 15–37)
BASOPHILS # BLD AUTO: 0.08 K/UL — SIGNIFICANT CHANGE UP (ref 0–0.2)
BASOPHILS NFR BLD AUTO: 1.6 % — SIGNIFICANT CHANGE UP (ref 0–2)
BILIRUB SERPL-MCNC: 0.6 MG/DL — SIGNIFICANT CHANGE UP (ref 0.2–1.2)
BUN SERPL-MCNC: 7 MG/DL — SIGNIFICANT CHANGE UP (ref 7–23)
CALCIUM SERPL-MCNC: 7.5 MG/DL — LOW (ref 8.5–10.1)
CHLORIDE SERPL-SCNC: 108 MMOL/L — SIGNIFICANT CHANGE UP (ref 96–108)
CO2 SERPL-SCNC: 28 MMOL/L — SIGNIFICANT CHANGE UP (ref 22–31)
CREAT SERPL-MCNC: 0.71 MG/DL — SIGNIFICANT CHANGE UP (ref 0.5–1.3)
EGFR: 106 ML/MIN/1.73M2 — SIGNIFICANT CHANGE UP
EOSINOPHIL # BLD AUTO: 0.42 K/UL — SIGNIFICANT CHANGE UP (ref 0–0.5)
EOSINOPHIL NFR BLD AUTO: 8.2 % — HIGH (ref 0–6)
GLUCOSE SERPL-MCNC: 73 MG/DL — SIGNIFICANT CHANGE UP (ref 70–99)
HCT VFR BLD CALC: 39.8 % — SIGNIFICANT CHANGE UP (ref 39–50)
HGB BLD-MCNC: 13.3 G/DL — SIGNIFICANT CHANGE UP (ref 13–17)
IMM GRANULOCYTES NFR BLD AUTO: 0.2 % — SIGNIFICANT CHANGE UP (ref 0–1.5)
LYMPHOCYTES # BLD AUTO: 1.61 K/UL — SIGNIFICANT CHANGE UP (ref 1–3.3)
LYMPHOCYTES # BLD AUTO: 31.3 % — SIGNIFICANT CHANGE UP (ref 13–44)
MAGNESIUM SERPL-MCNC: 1.9 MG/DL — SIGNIFICANT CHANGE UP (ref 1.6–2.6)
MCHC RBC-ENTMCNC: 30.9 PG — SIGNIFICANT CHANGE UP (ref 27–34)
MCHC RBC-ENTMCNC: 33.4 G/DL — SIGNIFICANT CHANGE UP (ref 32–36)
MCV RBC AUTO: 92.3 FL — SIGNIFICANT CHANGE UP (ref 80–100)
MONOCYTES # BLD AUTO: 0.67 K/UL — SIGNIFICANT CHANGE UP (ref 0–0.9)
MONOCYTES NFR BLD AUTO: 13 % — SIGNIFICANT CHANGE UP (ref 2–14)
NEUTROPHILS # BLD AUTO: 2.36 K/UL — SIGNIFICANT CHANGE UP (ref 1.8–7.4)
NEUTROPHILS NFR BLD AUTO: 45.7 % — SIGNIFICANT CHANGE UP (ref 43–77)
NRBC # BLD: 0 /100 WBCS — SIGNIFICANT CHANGE UP (ref 0–0)
PHOSPHATE SERPL-MCNC: 2.1 MG/DL — LOW (ref 2.5–4.5)
PLATELET # BLD AUTO: 256 K/UL — SIGNIFICANT CHANGE UP (ref 150–400)
POTASSIUM SERPL-MCNC: 3.9 MMOL/L — SIGNIFICANT CHANGE UP (ref 3.5–5.3)
POTASSIUM SERPL-SCNC: 3.9 MMOL/L — SIGNIFICANT CHANGE UP (ref 3.5–5.3)
PROT SERPL-MCNC: 7.1 GM/DL — SIGNIFICANT CHANGE UP (ref 6–8.3)
RBC # BLD: 4.31 M/UL — SIGNIFICANT CHANGE UP (ref 4.2–5.8)
RBC # FLD: 14.4 % — SIGNIFICANT CHANGE UP (ref 10.3–14.5)
SODIUM SERPL-SCNC: 143 MMOL/L — SIGNIFICANT CHANGE UP (ref 135–145)
WBC # BLD: 5.15 K/UL — SIGNIFICANT CHANGE UP (ref 3.8–10.5)
WBC # FLD AUTO: 5.15 K/UL — SIGNIFICANT CHANGE UP (ref 3.8–10.5)

## 2022-03-10 PROCEDURE — 99233 SBSQ HOSP IP/OBS HIGH 50: CPT

## 2022-03-10 RX ORDER — SODIUM,POTASSIUM PHOSPHATES 278-250MG
2 POWDER IN PACKET (EA) ORAL ONCE
Refills: 0 | Status: COMPLETED | OUTPATIENT
Start: 2022-03-10 | End: 2022-03-10

## 2022-03-10 RX ORDER — DORZOLAMIDE HYDROCHLORIDE TIMOLOL MALEATE 20; 5 MG/ML; MG/ML
1 SOLUTION/ DROPS OPHTHALMIC
Refills: 0 | Status: DISCONTINUED | OUTPATIENT
Start: 2022-03-10 | End: 2022-03-11

## 2022-03-10 RX ORDER — LATANOPROST 0.05 MG/ML
1 SOLUTION/ DROPS OPHTHALMIC; TOPICAL AT BEDTIME
Refills: 0 | Status: DISCONTINUED | OUTPATIENT
Start: 2022-03-10 | End: 2022-03-11

## 2022-03-10 RX ORDER — ENOXAPARIN SODIUM 100 MG/ML
40 INJECTION SUBCUTANEOUS EVERY 24 HOURS
Refills: 0 | Status: DISCONTINUED | OUTPATIENT
Start: 2022-03-10 | End: 2022-03-11

## 2022-03-10 RX ORDER — TAMSULOSIN HYDROCHLORIDE 0.4 MG/1
0.4 CAPSULE ORAL ONCE
Refills: 0 | Status: COMPLETED | OUTPATIENT
Start: 2022-03-10 | End: 2022-03-10

## 2022-03-10 RX ADMIN — Medication 2 MILLIGRAM(S): at 05:53

## 2022-03-10 RX ADMIN — Medication 1 MILLIGRAM(S): at 11:35

## 2022-03-10 RX ADMIN — SODIUM CHLORIDE 150 MILLILITER(S): 9 INJECTION INTRAMUSCULAR; INTRAVENOUS; SUBCUTANEOUS at 02:18

## 2022-03-10 RX ADMIN — Medication 1 DROP(S): at 14:34

## 2022-03-10 RX ADMIN — Medication 2 PACKET(S): at 10:02

## 2022-03-10 RX ADMIN — Medication 1.5 MILLIGRAM(S): at 21:48

## 2022-03-10 RX ADMIN — Medication 2 MILLIGRAM(S): at 02:18

## 2022-03-10 RX ADMIN — DORZOLAMIDE HYDROCHLORIDE TIMOLOL MALEATE 1 DROP(S): 20; 5 SOLUTION/ DROPS OPHTHALMIC at 05:53

## 2022-03-10 RX ADMIN — Medication 1 DROP(S): at 21:49

## 2022-03-10 RX ADMIN — Medication 1 DROP(S): at 05:54

## 2022-03-10 RX ADMIN — Medication 2 MILLIGRAM(S): at 18:00

## 2022-03-10 RX ADMIN — Medication 1 TABLET(S): at 11:35

## 2022-03-10 RX ADMIN — SODIUM CHLORIDE 150 MILLILITER(S): 9 INJECTION INTRAMUSCULAR; INTRAVENOUS; SUBCUTANEOUS at 18:00

## 2022-03-10 RX ADMIN — Medication 2 MILLIGRAM(S): at 11:36

## 2022-03-10 RX ADMIN — DORZOLAMIDE HYDROCHLORIDE TIMOLOL MALEATE 1 DROP(S): 20; 5 SOLUTION/ DROPS OPHTHALMIC at 18:00

## 2022-03-10 RX ADMIN — ENOXAPARIN SODIUM 40 MILLIGRAM(S): 100 INJECTION SUBCUTANEOUS at 10:02

## 2022-03-10 RX ADMIN — BUPROPION HYDROCHLORIDE 150 MILLIGRAM(S): 150 TABLET, EXTENDED RELEASE ORAL at 11:36

## 2022-03-10 RX ADMIN — Medication 100 MILLIGRAM(S): at 11:35

## 2022-03-10 RX ADMIN — PANTOPRAZOLE SODIUM 40 MILLIGRAM(S): 20 TABLET, DELAYED RELEASE ORAL at 18:00

## 2022-03-10 RX ADMIN — TAMSULOSIN HYDROCHLORIDE 0.4 MILLIGRAM(S): 0.4 CAPSULE ORAL at 10:01

## 2022-03-10 RX ADMIN — PANTOPRAZOLE SODIUM 40 MILLIGRAM(S): 20 TABLET, DELAYED RELEASE ORAL at 05:55

## 2022-03-10 RX ADMIN — Medication 2 MILLIGRAM(S): at 14:34

## 2022-03-10 NOTE — PATIENT PROFILE ADULT - VISION (WITH CORRECTIVE LENSES IF THE PATIENT USUALLY WEARS THEM):
right eye blind due to glaucoma as per pt/Partially impaired: cannot see medication labels or newsprint, but can see obstacles in path, and the surrounding layout; can count fingers at arm's length

## 2022-03-10 NOTE — PROGRESS NOTE ADULT - SUBJECTIVE AND OBJECTIVE BOX
Patient is a 56y old  Male who presents with a chief complaint of Alcohol Withdrawal, Alcohol gastritis (09 Mar 2022 21:34)    INTERVAL HPI/OVERNIGHT EVENTS: Patients seen and examined at bedside this morning. No acute events overnight. Pt reports    MEDICATIONS  (STANDING):  artificial  tears Solution 1 Drop(s) Right EYE three times a day  buPROPion XL (24-Hour) . 150 milliGRAM(s) Oral daily  dorzolamide 2%/timolol 0.5% Ophthalmic Solution 1 Drop(s) Right EYE two times a day  enoxaparin Injectable 40 milliGRAM(s) SubCutaneous every 24 hours  folic acid 1 milliGRAM(s) Oral daily  latanoprost 0.005% Ophthalmic Solution 1 Drop(s) Right EYE at bedtime  LORazepam     Tablet 2 milliGRAM(s) Oral every 4 hours  LORazepam     Tablet 1.5 milliGRAM(s) Oral every 4 hours  LORazepam     Tablet   Oral   multivitamin 1 Tablet(s) Oral daily  ondansetron Injectable 8 milliGRAM(s) IV Push two times a day  pantoprazole    Tablet 40 milliGRAM(s) Oral two times a day  sodium chloride 0.9%. 1000 milliLiter(s) (150 mL/Hr) IV Continuous <Continuous>  thiamine 100 milliGRAM(s) Oral daily    MEDICATIONS  (PRN):  LORazepam   Injectable 2 milliGRAM(s) IV Push every 1 hour PRN Symptom-triggered: each CIWA -Ar score 8 or GREATER    Allergies    No Known Allergies    Intolerances      REVIEW OF SYSTEMS:  All other systems reviewed and are negative    Vital Signs Last 24 Hrs  T(C): 36.5 (10 Mar 2022 10:40), Max: 37.1 (09 Mar 2022 16:31)  T(F): 97.7 (10 Mar 2022 10:40), Max: 98.7 (09 Mar 2022 16:31)  HR: 87 (10 Mar 2022 10:40) (73 - 93)  BP: 122/68 (10 Mar 2022 10:40) (103/63 - 181/84)  BP(mean): --  RR: 17 (10 Mar 2022 10:40) (16 - 20)  SpO2: 95% (10 Mar 2022 10:40) (95% - 98%)  Daily Height in cm: 172.72 (10 Mar 2022 01:55)    Daily   I&O's Summary    09 Mar 2022 07:01  -  10 Mar 2022 07:00  --------------------------------------------------------  IN: 1180 mL / OUT: 400 mL / NET: 780 mL      CAPILLARY BLOOD GLUCOSE      POCT Blood Glucose.: 111 mg/dL (09 Mar 2022 16:37)    PHYSICAL EXAM:  GENERAL: NAD, well-groomed, well-developed  HEAD:  Atraumatic, Normocephalic  EYES: EOMI, PERRLA, conjunctiva and sclera clear, Right eye blindness   ENMT: No tonsillar erythema, exudates, or enlargement; Moist mucous membranes, Good dentition, No lesions. + tongue fasciculations   NECK: Supple, No JVD, Normal thyroid  NERVOUS SYSTEM:  Alert & Oriented X3, Poor concentration; Motor Strength 5/5 B/L upper and lower extremities; DTRs 2+ intact and symmetric  CHEST/LUNG: Clear to percussion bilaterally; No rales, rhonchi, wheezing, or rubs  HEART: Regular rate and rhythm; 3/6 SM   ABDOMEN: Soft, Nontender, Nondistended; Bowel sounds present  EXTREMITIES:  2+ Peripheral Pulses, No clubbing, cyanosis, or edema. b/l hand tremors  LYMPH: No lymphadenopathy noted  SKIN: No rashes or lesions    Labs                          13.3   5.15  )-----------( 256      ( 10 Mar 2022 07:45 )             39.8     03-10    143  |  108  |  7   ----------------------------<  73  3.9   |  28  |  0.71    Ca    7.5<L>      10 Mar 2022 07:45  Phos  2.1     03-10  Mg     1.9     03-10    TPro  7.1  /  Alb  3.1<L>  /  TBili  0.6  /  DBili  x   /  AST  59<H>  /  ALT  82<H>  /  AlkPhos  64  03-10

## 2022-03-11 ENCOUNTER — TRANSCRIPTION ENCOUNTER (OUTPATIENT)
Age: 57
End: 2022-03-11

## 2022-03-11 VITALS
SYSTOLIC BLOOD PRESSURE: 130 MMHG | TEMPERATURE: 98 F | RESPIRATION RATE: 18 BRPM | DIASTOLIC BLOOD PRESSURE: 70 MMHG | OXYGEN SATURATION: 98 % | HEART RATE: 70 BPM

## 2022-03-11 LAB
ALBUMIN SERPL ELPH-MCNC: 2.5 G/DL — LOW (ref 3.3–5)
ALP SERPL-CCNC: 52 U/L — SIGNIFICANT CHANGE UP (ref 40–120)
ALT FLD-CCNC: 63 U/L — SIGNIFICANT CHANGE UP (ref 12–78)
ANION GAP SERPL CALC-SCNC: 5 MMOL/L — SIGNIFICANT CHANGE UP (ref 5–17)
AST SERPL-CCNC: 44 U/L — HIGH (ref 15–37)
BILIRUB SERPL-MCNC: 0.8 MG/DL — SIGNIFICANT CHANGE UP (ref 0.2–1.2)
BUN SERPL-MCNC: 5 MG/DL — LOW (ref 7–23)
CALCIUM SERPL-MCNC: 8 MG/DL — LOW (ref 8.5–10.1)
CHLORIDE SERPL-SCNC: 106 MMOL/L — SIGNIFICANT CHANGE UP (ref 96–108)
CO2 SERPL-SCNC: 27 MMOL/L — SIGNIFICANT CHANGE UP (ref 22–31)
CREAT SERPL-MCNC: 0.71 MG/DL — SIGNIFICANT CHANGE UP (ref 0.5–1.3)
EGFR: 106 ML/MIN/1.73M2 — SIGNIFICANT CHANGE UP
GLUCOSE SERPL-MCNC: 99 MG/DL — SIGNIFICANT CHANGE UP (ref 70–99)
POTASSIUM SERPL-MCNC: 3.4 MMOL/L — LOW (ref 3.5–5.3)
POTASSIUM SERPL-SCNC: 3.4 MMOL/L — LOW (ref 3.5–5.3)
PROT SERPL-MCNC: 6.3 GM/DL — SIGNIFICANT CHANGE UP (ref 6–8.3)
SODIUM SERPL-SCNC: 138 MMOL/L — SIGNIFICANT CHANGE UP (ref 135–145)

## 2022-03-11 PROCEDURE — 99239 HOSP IP/OBS DSCHRG MGMT >30: CPT

## 2022-03-11 RX ORDER — FOLIC ACID 0.8 MG
1 TABLET ORAL
Qty: 30 | Refills: 0
Start: 2022-03-11 | End: 2022-04-09

## 2022-03-11 RX ORDER — BUPROPION HYDROCHLORIDE 150 MG/1
1 TABLET, EXTENDED RELEASE ORAL
Qty: 30 | Refills: 0
Start: 2022-03-11 | End: 2022-04-09

## 2022-03-11 RX ORDER — THIAMINE MONONITRATE (VIT B1) 100 MG
1 TABLET ORAL
Qty: 30 | Refills: 0
Start: 2022-03-11 | End: 2022-04-09

## 2022-03-11 RX ORDER — PANTOPRAZOLE SODIUM 20 MG/1
1 TABLET, DELAYED RELEASE ORAL
Qty: 60 | Refills: 0
Start: 2022-03-11 | End: 2022-04-09

## 2022-03-11 RX ORDER — ACETAMINOPHEN 500 MG
650 TABLET ORAL EVERY 6 HOURS
Refills: 0 | Status: DISCONTINUED | OUTPATIENT
Start: 2022-03-11 | End: 2022-03-11

## 2022-03-11 RX ORDER — POTASSIUM CHLORIDE 20 MEQ
40 PACKET (EA) ORAL ONCE
Refills: 0 | Status: COMPLETED | OUTPATIENT
Start: 2022-03-11 | End: 2022-03-11

## 2022-03-11 RX ADMIN — Medication 1 DROP(S): at 05:55

## 2022-03-11 RX ADMIN — Medication 1.5 MILLIGRAM(S): at 05:54

## 2022-03-11 RX ADMIN — Medication 1.5 MILLIGRAM(S): at 15:01

## 2022-03-11 RX ADMIN — BUPROPION HYDROCHLORIDE 150 MILLIGRAM(S): 150 TABLET, EXTENDED RELEASE ORAL at 11:08

## 2022-03-11 RX ADMIN — DORZOLAMIDE HYDROCHLORIDE TIMOLOL MALEATE 1 DROP(S): 20; 5 SOLUTION/ DROPS OPHTHALMIC at 05:55

## 2022-03-11 RX ADMIN — Medication 650 MILLIGRAM(S): at 15:30

## 2022-03-11 RX ADMIN — SODIUM CHLORIDE 150 MILLILITER(S): 9 INJECTION INTRAMUSCULAR; INTRAVENOUS; SUBCUTANEOUS at 01:08

## 2022-03-11 RX ADMIN — Medication 1 MILLIGRAM(S): at 11:08

## 2022-03-11 RX ADMIN — Medication 1.5 MILLIGRAM(S): at 11:08

## 2022-03-11 RX ADMIN — ENOXAPARIN SODIUM 40 MILLIGRAM(S): 100 INJECTION SUBCUTANEOUS at 11:08

## 2022-03-11 RX ADMIN — PANTOPRAZOLE SODIUM 40 MILLIGRAM(S): 20 TABLET, DELAYED RELEASE ORAL at 05:55

## 2022-03-11 RX ADMIN — Medication 1.5 MILLIGRAM(S): at 01:08

## 2022-03-11 RX ADMIN — LATANOPROST 1 DROP(S): 0.05 SOLUTION/ DROPS OPHTHALMIC; TOPICAL at 00:27

## 2022-03-11 RX ADMIN — Medication 100 MILLIGRAM(S): at 11:08

## 2022-03-11 RX ADMIN — Medication 40 MILLIEQUIVALENT(S): at 09:01

## 2022-03-11 RX ADMIN — Medication 650 MILLIGRAM(S): at 15:00

## 2022-03-11 NOTE — DISCHARGE NOTE NURSING/CASE MANAGEMENT/SOCIAL WORK - NSDCPEFALRISK_GEN_ALL_CORE
For information on Fall & Injury Prevention, visit: [email or web address] OR  [email or web address] OR  https://www.cdc.gov/steadi/patient.html

## 2022-03-11 NOTE — DISCHARGE NOTE PROVIDER - ATTENDING DISCHARGE PHYSICAL EXAMINATION:
GENERAL: NAD, well-groomed, well-developed  HEAD:  Atraumatic, Normocephalic  EYES: EOMI, PERRLA, conjunctiva and sclera clear, Right eye blindness   ENMT: No tonsillar erythema, exudates, or enlargement; Moist mucous membranes, Good dentition, No lesions. + tongue fasciculations   NECK: Supple, No JVD, Normal thyroid  NERVOUS SYSTEM:  Alert & Oriented X3, Poor concentration; Motor Strength 5/5 B/L upper and lower extremities; DTRs 2+ intact and symmetric  CHEST/LUNG: Clear to percussion bilaterally; No rales, rhonchi, wheezing, or rubs  HEART: Regular rate and rhythm; 3/6 SM   ABDOMEN: Soft, Nontender, Nondistended; Bowel sounds present  EXTREMITIES:  2+ Peripheral Pulses, No clubbing, cyanosis, or edema. b/l hand tremors  LYMPH: No lymphadenopathy noted  SKIN: No rashes or lesions

## 2022-03-11 NOTE — DISCHARGE NOTE NURSING/CASE MANAGEMENT/SOCIAL WORK - NSDCVIVACCINE_GEN_ALL_CORE_FT
influenza, injectable, quadrivalent, preservative free; 14-Oct-2021 13:35; Nany Guerrero (RN); [employer]; [id number] (Exp. Date: 30-Jun-2022); IntraMuscular; Deltoid Right.; 0.5 milliLiter(s); VIS (VIS Published: 06-Aug-2021, VIS Presented: 14-Oct-2021);   Tdap; 22-Dec-2021 19:23; Clementina Koehler (MARTÍNEZ); [employer]; [id number] (Exp. Date: 09-Sep-2023); IntraMuscular; Deltoid Left.; 0.5 milliLiter(s); VIS (VIS Published: 09-May-2013, VIS Presented: 22-Dec-2021);

## 2022-03-11 NOTE — DISCHARGE NOTE NURSING/CASE MANAGEMENT/SOCIAL WORK - PATIENT PORTAL LINK FT
You can access the FollowMyHealth Patient Portal offered by [facility] by registering at the following website: [email or web address]. By joining [employer]’s FollowMyHealth portal, you will also be able to view your health information using other applications (apps) compatible with our system.

## 2022-03-11 NOTE — DISCHARGE NOTE PROVIDER - NSDCCPCAREPLAN_GEN_ALL_CORE_FT
PRINCIPAL DISCHARGE DIAGNOSIS  Diagnosis: Alcohol withdrawal  Assessment and Plan of Treatment:       SECONDARY DISCHARGE DIAGNOSES  Diagnosis: Epigastric abdominal pain  Assessment and Plan of Treatment:

## 2022-03-11 NOTE — DISCHARGE NOTE PROVIDER - NSDCMRMEDTOKEN_GEN_ALL_CORE_FT
buPROPion 150 mg/24 hours (XL) oral tablet, extended release: 1 tab(s) orally once a day  Cosopt 2.23%-0.68% ophthalmic solution: 1 drop(s) to each affected eye 2 times a day  folic acid 1 mg oral tablet: 1 tab(s) orally once a day  latanoprost 0.005% ophthalmic solution: 1 drop(s) to each affected eye once a day (in the evening)  ocular lubricant ophthalmic solution: 1 drop(s) to each affected eye 3 times a day  pantoprazole 40 mg oral delayed release tablet: 1 tab(s) orally 2 times a day  thiamine 100 mg oral tablet: 1 tab(s) orally once a day

## 2022-03-11 NOTE — DISCHARGE NOTE PROVIDER - HOSPITAL COURSE
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 Patient educated on the importance of abstaining from alcohol. Patient to follow up with outpatient alcohol anonymous and PCP.     1) Alcohol Withdrawal, Alcohol gastritis  - CIWA w/ ativan taper  - IVF  - clears and adv as tolerated  - zofran prn  - PPI  - thiamine, folic acid    2) Anxiety/Depression  - c/w Bupropion    3) DVT ppx - Lovneox subq     57 y/o M with PMHx of HTN, R eye glaucoma (blind in right eye), Venous insufficiency, Anxiety/Depression presents with alcohol withdrawal and gastritis. Patient educated on the importance of abstaining from alcohol. Patient to follow up with outpatient alcohol anonymous and PCP.     1) Alcohol Withdrawal, Alcohol gastritis  - CIWA w/ ativan taper  - IVF  - clears and adv as tolerated  - zofran prn  - PPI  - thiamine, folic acid    2) Anxiety/Depression  - c/w Bupropion    3) Hypophosphatemia  repleted    DVT ppx - Lovneox subq

## 2022-03-15 DIAGNOSIS — I10 ESSENTIAL (PRIMARY) HYPERTENSION: ICD-10-CM

## 2022-03-15 DIAGNOSIS — E83.39 OTHER DISORDERS OF PHOSPHORUS METABOLISM: ICD-10-CM

## 2022-03-15 DIAGNOSIS — F10.239 ALCOHOL DEPENDENCE WITH WITHDRAWAL, UNSPECIFIED: ICD-10-CM

## 2022-03-15 DIAGNOSIS — F41.9 ANXIETY DISORDER, UNSPECIFIED: ICD-10-CM

## 2022-03-15 DIAGNOSIS — R39.9 UNSPECIFIED SYMPTOMS AND SIGNS INVOLVING THE GENITOURINARY SYSTEM: ICD-10-CM

## 2022-03-15 DIAGNOSIS — H54.61 UNQUALIFIED VISUAL LOSS, RIGHT EYE, NORMAL VISION LEFT EYE: ICD-10-CM

## 2022-03-15 DIAGNOSIS — F32.A DEPRESSION, UNSPECIFIED: ICD-10-CM

## 2022-03-15 DIAGNOSIS — H40.9 UNSPECIFIED GLAUCOMA: ICD-10-CM

## 2022-03-15 DIAGNOSIS — Z20.822 CONTACT WITH AND (SUSPECTED) EXPOSURE TO COVID-19: ICD-10-CM

## 2022-03-15 DIAGNOSIS — I87.2 VENOUS INSUFFICIENCY (CHRONIC) (PERIPHERAL): ICD-10-CM

## 2022-03-15 DIAGNOSIS — K29.20 ALCOHOLIC GASTRITIS WITHOUT BLEEDING: ICD-10-CM

## 2022-03-15 NOTE — HISTORY OF PRESENT ILLNESS
[FreeTextEntry8] : 57 YO M sent from [facility] office for COVID testing as well as complaint of urinary discomfort. He denies fever, chills.

## 2022-03-15 NOTE — PLAN
[FreeTextEntry1] : 56 y o m sent from [facility] office for a COVID testing\par \par 1. Encounter for testing for COVID \par - Binax Antigen testing was performed\par - Patient provided with test results - COVID NEGATIVE\par - f/u in 1 wk if symptoms develop \par \par 2. Urinary symptom or sign\par - Patient reports urinary discomfort but no dysuria\par - Urine was collected \par - Patient afebrile today \par - Patient told to return if symptoms persist

## 2022-03-24 ENCOUNTER — APPOINTMENT (OUTPATIENT)
Age: 57
End: 2022-03-24

## 2022-04-12 ENCOUNTER — EMERGENCY (EMERGENCY)
Facility: HOSPITAL | Age: 57
LOS: 0 days | Discharge: HOME | End: 2022-04-12
Attending: STUDENT IN AN ORGANIZED HEALTH CARE EDUCATION/TRAINING PROGRAM
Payer: MEDICAID

## 2022-04-12 VITALS
DIASTOLIC BLOOD PRESSURE: 93 MMHG | OXYGEN SATURATION: 99 % | HEART RATE: 83 BPM | TEMPERATURE: 98 F | HEIGHT: 68 IN | SYSTOLIC BLOOD PRESSURE: 156 MMHG | WEIGHT: 195.11 LBS | RESPIRATION RATE: 18 BRPM

## 2022-04-12 VITALS
SYSTOLIC BLOOD PRESSURE: 119 MMHG | OXYGEN SATURATION: 100 % | DIASTOLIC BLOOD PRESSURE: 75 MMHG | TEMPERATURE: 98 F | RESPIRATION RATE: 18 BRPM | HEART RATE: 67 BPM

## 2022-04-12 DIAGNOSIS — M79.605 PAIN IN LEFT LEG: ICD-10-CM

## 2022-04-12 DIAGNOSIS — Z87.891 PERSONAL HISTORY OF NICOTINE DEPENDENCE: ICD-10-CM

## 2022-04-12 DIAGNOSIS — H40.9 UNSPECIFIED GLAUCOMA: ICD-10-CM

## 2022-04-12 DIAGNOSIS — R06.02 SHORTNESS OF BREATH: ICD-10-CM

## 2022-04-12 DIAGNOSIS — R10.11 RIGHT UPPER QUADRANT PAIN: ICD-10-CM

## 2022-04-12 DIAGNOSIS — M79.604 PAIN IN RIGHT LEG: ICD-10-CM

## 2022-04-12 DIAGNOSIS — F41.8 OTHER SPECIFIED ANXIETY DISORDERS: ICD-10-CM

## 2022-04-12 DIAGNOSIS — Z20.822 CONTACT WITH AND (SUSPECTED) EXPOSURE TO COVID-19: ICD-10-CM

## 2022-04-12 DIAGNOSIS — F10.129 ALCOHOL ABUSE WITH INTOXICATION, UNSPECIFIED: ICD-10-CM

## 2022-04-12 LAB
ALBUMIN SERPL ELPH-MCNC: 3.5 G/DL — SIGNIFICANT CHANGE UP (ref 3.3–5)
ALP SERPL-CCNC: 84 U/L — SIGNIFICANT CHANGE UP (ref 40–120)
ALT FLD-CCNC: 51 U/L — SIGNIFICANT CHANGE UP (ref 12–78)
ANION GAP SERPL CALC-SCNC: 11 MMOL/L — SIGNIFICANT CHANGE UP (ref 5–17)
APAP SERPL-MCNC: < 2 UG/ML (ref 10–30)
AST SERPL-CCNC: 80 U/L — HIGH (ref 15–37)
BASOPHILS # BLD AUTO: 0.11 K/UL — SIGNIFICANT CHANGE UP (ref 0–0.2)
BASOPHILS NFR BLD AUTO: 1.8 % — SIGNIFICANT CHANGE UP (ref 0–2)
BILIRUB SERPL-MCNC: 0.5 MG/DL — SIGNIFICANT CHANGE UP (ref 0.2–1.2)
BUN SERPL-MCNC: 12 MG/DL — SIGNIFICANT CHANGE UP (ref 7–23)
CALCIUM SERPL-MCNC: 8 MG/DL — LOW (ref 8.5–10.1)
CHLORIDE SERPL-SCNC: 104 MMOL/L — SIGNIFICANT CHANGE UP (ref 96–108)
CO2 SERPL-SCNC: 27 MMOL/L — SIGNIFICANT CHANGE UP (ref 22–31)
CREAT SERPL-MCNC: 0.92 MG/DL — SIGNIFICANT CHANGE UP (ref 0.5–1.3)
D DIMER BLD IA.RAPID-MCNC: 335 NG/ML DDU — HIGH
EGFR: 96 ML/MIN/1.73M2 — SIGNIFICANT CHANGE UP
EOSINOPHIL # BLD AUTO: 0.19 K/UL — SIGNIFICANT CHANGE UP (ref 0–0.5)
EOSINOPHIL NFR BLD AUTO: 3.2 % — SIGNIFICANT CHANGE UP (ref 0–6)
ETHANOL SERPL-MCNC: 287 MG/DL — HIGH (ref 0–10)
FLUAV AG NPH QL: SIGNIFICANT CHANGE UP
FLUBV AG NPH QL: SIGNIFICANT CHANGE UP
GLUCOSE SERPL-MCNC: 83 MG/DL — SIGNIFICANT CHANGE UP (ref 70–99)
HCT VFR BLD CALC: 38.8 % — LOW (ref 39–50)
HGB BLD-MCNC: 13.3 G/DL — SIGNIFICANT CHANGE UP (ref 13–17)
IMM GRANULOCYTES NFR BLD AUTO: 0.2 % — SIGNIFICANT CHANGE UP (ref 0–1.5)
LYMPHOCYTES # BLD AUTO: 1.8 K/UL — SIGNIFICANT CHANGE UP (ref 1–3.3)
LYMPHOCYTES # BLD AUTO: 30.2 % — SIGNIFICANT CHANGE UP (ref 13–44)
MAGNESIUM SERPL-MCNC: 2.2 MG/DL — SIGNIFICANT CHANGE UP (ref 1.6–2.6)
MCHC RBC-ENTMCNC: 31.4 PG — SIGNIFICANT CHANGE UP (ref 27–34)
MCHC RBC-ENTMCNC: 34.3 G/DL — SIGNIFICANT CHANGE UP (ref 32–36)
MCV RBC AUTO: 91.7 FL — SIGNIFICANT CHANGE UP (ref 80–100)
MONOCYTES # BLD AUTO: 0.39 K/UL — SIGNIFICANT CHANGE UP (ref 0–0.9)
MONOCYTES NFR BLD AUTO: 6.5 % — SIGNIFICANT CHANGE UP (ref 2–14)
NEUTROPHILS # BLD AUTO: 3.47 K/UL — SIGNIFICANT CHANGE UP (ref 1.8–7.4)
NEUTROPHILS NFR BLD AUTO: 58.1 % — SIGNIFICANT CHANGE UP (ref 43–77)
NRBC # BLD: 0 /100 WBCS — SIGNIFICANT CHANGE UP (ref 0–0)
NT-PROBNP SERPL-SCNC: 20 PG/ML — SIGNIFICANT CHANGE UP (ref 0–125)
PLATELET # BLD AUTO: 209 K/UL — SIGNIFICANT CHANGE UP (ref 150–400)
POTASSIUM SERPL-MCNC: 3.7 MMOL/L — SIGNIFICANT CHANGE UP (ref 3.5–5.3)
POTASSIUM SERPL-SCNC: 3.7 MMOL/L — SIGNIFICANT CHANGE UP (ref 3.5–5.3)
PROT SERPL-MCNC: 8.4 GM/DL — HIGH (ref 6–8.3)
RBC # BLD: 4.23 M/UL — SIGNIFICANT CHANGE UP (ref 4.2–5.8)
RBC # FLD: 14.6 % — HIGH (ref 10.3–14.5)
SARS-COV-2 RNA SPEC QL NAA+PROBE: SIGNIFICANT CHANGE UP
SODIUM SERPL-SCNC: 142 MMOL/L — SIGNIFICANT CHANGE UP (ref 135–145)
TROPONIN I, HIGH SENSITIVITY RESULT: 12.4 NG/L — SIGNIFICANT CHANGE UP
WBC # BLD: 5.97 K/UL — SIGNIFICANT CHANGE UP (ref 3.8–10.5)
WBC # FLD AUTO: 5.97 K/UL — SIGNIFICANT CHANGE UP (ref 3.8–10.5)

## 2022-04-12 PROCEDURE — 71045 X-RAY EXAM CHEST 1 VIEW: CPT | Mod: 26

## 2022-04-12 PROCEDURE — 76705 ECHO EXAM OF ABDOMEN: CPT | Mod: 26

## 2022-04-12 PROCEDURE — 99285 EMERGENCY DEPT VISIT HI MDM: CPT

## 2022-04-12 PROCEDURE — 93970 EXTREMITY STUDY: CPT | Mod: 26

## 2022-04-12 RX ADMIN — Medication 100 MILLIGRAM(S): at 07:36

## 2022-04-12 NOTE — ED PROVIDER NOTE - NS ED ROS FT
Constitutional: See HPI.  Eyes: No visual changes, eye pain or discharge. No Photophobia  ENMT: No hearing changes, pain, discharge or infections. No neck pain or stiffness. No limited ROM  Cardiac: see hpi   Respiratory: No cough or respiratory distress.   GI: No nausea, vomiting, diarrhea or abdominal pain.  : No dysuria, frequency or burning. No Discharge  MS: see hpi   Neuro: No headache or weakness. No LOC.  Skin: see hpi   Except as documented in the HPI, all other systems are negative.

## 2022-04-12 NOTE — ED ADULT TRIAGE NOTE - CHIEF COMPLAINT QUOTE
Patient c/o headache, abdominal pain, n/v. Bilateral edema x 2 weeks. Admits to drinking daily. Last drink 2:00am this morning. Visible tremors noted in triage. Blind in right eye due to glaucoma.

## 2022-04-12 NOTE — ED ADULT NURSE NOTE - CAS TRG GEN SKIN COLOR
Chief complaint:   Chief Complaint   Patient presents with   • Pacemaker     Medtronic dual chamber       Vitals:  Visit Vitals  /60   Ht 5' 11\" (1.803 m)   Wt 96.1 kg   BMI 29.54 kg/m²       HISTORY OF PRESENT ILLNESS     HPI    Other significant problems:  Patient Active Problem List    Diagnosis Date Noted   • Complete heart block (CMS/HCC) 10/03/2017     Priority: Low   • Dual Chamber Pacemaker-Medtronic 10/03/2017     Priority: Low     Implanted for complete heart block.       • Current use of long term anticoagulation 09/15/2017     Priority: Low   • Elevated prolactin level (CMS/HCC) 11/07/2016     Priority: Low   • Cerebrovascular accident (CVA), unspecified mechanism (CMS/HCC) 08/18/2016     Priority: Low   • Cerebral infarction (CMS/HCC) 04/08/2014     Priority: Low   • PFO (patent foramen ovale) 02/21/2014     Priority: Low   • Malignant neoplasm of prostate (CMS/HCC) 01/25/2013     Priority: Low   • CVA (cerebral infarction) 06/18/2012     Priority: Low   • CVA (cerebral vascular accident) (CMS/HCC) 09/29/2011     Priority: Low   • Hypertension      Priority: Low   • Lower extremity edema      Priority: Low   • S/P patent foramen ovale closure      Priority: Low   • At risk for falls 08/15/2011     Priority: Low   • Pain      Priority: Low       PAST MEDICAL, FAMILY AND SOCIAL HISTORY     Medications:  Current Outpatient Prescriptions   Medication   • glimepiride (AMARYL) 1 MG tablet   • cabergoline (DOSTINEX) 0.5 MG tablet   • hydrALAZINE (APRESOLINE) 25 MG tablet   • warfarin (COUMADIN) 5 MG tablet   • hydrochlorothiazide (HYDRODIURIL) 50 MG tablet   • COZAAR 100 MG tablet   • PRANDIN 2 MG tablet   • carbidopa-levodopa (SINEMET)  MG per tablet   • cholecalciferol (VITAMIN D3) 1000 UNITS tablet     No current facility-administered medications for this visit.        Allergies:  ALLERGIES:   Allergen Reactions   • Metformin SWELLING   • Glucotrol [Glipizide]      Abdominal pain.    • Keppra       Confusion     • Lipitor [Atorvastatin Calcium] SWELLING   • Lisinopril SWELLING   • Zetia [Ezetimibe] SWELLING   • Questran    • Simvastatin        Past Medical  History/Surgeries:  Past Medical History:   Diagnosis Date   • Arterial ischemic stroke, vertebrobasilar, brainstem, remote, resolved 2008    multiple strokes, R side weakness   • Atrial fibrillation (CMS/HCC)    • Blood clot associated with vein wall inflammation 1985    Kidney    • Chronic renal failure    • Diabetes mellitus (CMS/HCC)    • Esophageal reflux    • Failed moderate sedation during procedure    • HTN (hypertension)     SP PFO closure in 2008   • Hyperlipidemia    • Hypertension    • Inguinal hernia     right side   • Lower extremity edema    • PFO (patent foramen ovale)     closure   • Prostate carcinoma (CMS/HCC)    • S/P patent foramen ovale closure    • Swelling of extremity     lower       Past Surgical History:   Procedure Laterality Date   • CARDIAC SURGERY  11/2008    PFO closure   • HERNIA REPAIR     • KNEE SCOPE,DIAGNOSTIC          • MYOCARDIAL PERFUSION REST OR STRESS MULT ST  2/14/2014   • PROSTATE SURGERY  2010       Family History:  Family History   Problem Relation Age of Onset   • Hypertension Mother    • Hypertension Sister    • Stroke Maternal Uncle        Social History:  Social History   Substance Use Topics   • Smoking status: Former Smoker     Years: 5.00     Types: Cigarettes     Quit date: 5/9/1972   • Smokeless tobacco: Never Used   • Alcohol use No       REVIEW OF SYSTEMS     Review of Systems    PHYSICAL EXAM     Physical Exam    ASSESSMENT/PLAN     ***   Flushed

## 2022-04-12 NOTE — ED ADULT NURSE REASSESSMENT NOTE - NS ED NURSE REASSESS COMMENT FT1
Patient been and back from US; reports feeling better. Placed back on cardiac monitor. All needs attended.

## 2022-04-12 NOTE — ED PROVIDER NOTE - PHYSICAL EXAMINATION
VITAL SIGNS: I have reviewed nursing notes and confirm.  CONSTITUTIONAL: well-appearing, non-toxic  SKIN: Warm dry, normal skin turgor  HEAD: NCAT  EYES: EOMI, PERRLA, no scleral icterus  ENT: Moist mucous membranes, normal pharynx with no erythema or exudates  NECK: Supple; non tender. Full ROM. No cervical LAD  CARD: RRR, no murmurs, rubs or gallops  RESP: clear to ausculation b/l.  No rales, rhonchi, or wheezing.  ABD: soft, + BS, non-tender, non-distended, no rebound or guarding. No CVA tenderness  EXT: Full ROM, no bony tenderness, b/l leg swelling R>L, left shin eschar   NEURO: normal motor. normal sensory. CN II-XII intact. Cerebellar testing normal. Normal gait. b/l tremors, tongue fasciculations   PSYCH: Cooperative, appropriate.

## 2022-04-12 NOTE — ED ADULT NURSE NOTE - OBJECTIVE STATEMENT
Pt AOx4 came in for pain in legs that has started gradually during the last few months, unknown amount of time. 2020 Doctor visit, injection in veins on legs for varicose veins. Pt appears flushed with b/l leg swelling with known alcohol intox hx. Reports last drink was 7am yesterday for pain. Uncompliant with medications for past few weeks, pt states because he did not want interaction with alcohol. Hasn't sleep well for past two weeks.

## 2022-04-12 NOTE — ED PROVIDER NOTE - OBJECTIVE STATEMENT
57M with hx of alcohol abuse drinks daily for many years with stated last drink 2 am on triage note but notes yesterday on initial evaluation, hx of HTN, glaucoma in right eye, hx of anxiety and depression non-adherent with medications, poor historian, no reported withdrawal seizures in the past presenting to ED for complaints of b/l leg pain ongoing for several days - unable to recall how long swelling has been going on in legs, reports shortness of breath w/ exertion, right upper quadrant abdominal pain. denies fever, chills, nausea, vomiting, chest pain.

## 2022-04-12 NOTE — ED ADULT TRIAGE NOTE - HEIGHT IN INCHES
- On periactin at home for urethral pain and retention. Was seen by urology last admission  - Seen in urology clinic 9/25 for incomplete bladder emptying; hennessy removed on 10/6. While at rehab, UA with positive nitrate, 2+ leukocytes, and many bacteria (10/7). Pt was on Cipro.   - Repeat UA 10/15 with nitrites only. Will defer further abx treatment for now, monitor pt for any symptoms.   8

## 2022-04-12 NOTE — ED PROVIDER NOTE - PATIENT PORTAL LINK FT
You can access the FollowMyHealth Patient Portal offered by [facility] by registering at the following website: [email or web address]. By joining [employer]’s FollowMyHealth portal, you will also be able to view your health information using other applications (apps) compatible with our system.

## 2022-05-07 ENCOUNTER — EMERGENCY (EMERGENCY)
Facility: HOSPITAL | Age: 57
LOS: 0 days | Discharge: LEFT BEFORE DISCHARGE | End: 2022-05-07
Attending: EMERGENCY MEDICINE
Payer: MEDICAID

## 2022-05-07 VITALS
HEART RATE: 89 BPM | OXYGEN SATURATION: 98 % | WEIGHT: 199.96 LBS | TEMPERATURE: 98 F | SYSTOLIC BLOOD PRESSURE: 153 MMHG | RESPIRATION RATE: 18 BRPM | HEIGHT: 68 IN | DIASTOLIC BLOOD PRESSURE: 98 MMHG

## 2022-05-07 DIAGNOSIS — F10.129 ALCOHOL ABUSE WITH INTOXICATION, UNSPECIFIED: ICD-10-CM
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
## 2022-05-07 DIAGNOSIS — I10 ESSENTIAL (PRIMARY) HYPERTENSION: ICD-10-CM

## 2022-05-07 DIAGNOSIS — Z53.29 PROCEDURE AND TREATMENT NOT CARRIED OUT BECAUSE OF PATIENT'S DECISION FOR OTHER REASONS: ICD-10-CM
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
## 2022-05-07 DIAGNOSIS — S00.83XA CONTUSION OF OTHER PART OF HEAD, INITIAL ENCOUNTER: ICD-10-CM

## 2022-05-07 PROCEDURE — 71045 X-RAY EXAM CHEST 1 VIEW: CPT | Mod: 26

## 2022-05-07 PROCEDURE — 99285 EMERGENCY DEPT VISIT HI MDM: CPT

## 2022-05-07 PROCEDURE — 72125 CT NECK SPINE W/O DYE: CPT | Mod: 26,MA

## 2022-05-07 PROCEDURE — 72170 X-RAY EXAM OF PELVIS: CPT | Mod: 26

## 2022-05-07 PROCEDURE — 70450 CT HEAD/BRAIN W/O DYE: CPT | Mod: 26,MA

## 2022-05-07 NOTE — ED ADULT TRIAGE NOTE - CHIEF COMPLAINT QUOTE
biba c/o alcohol intoxication drank vodka today admits to daily alcohol use denies other drug use hx etoh abuse hx htn non compliant with rx amlodipine c/o ruq/r ribs area pain

## 2022-05-07 NOTE — ED PROVIDER NOTE - CLINICAL SUMMARY MEDICAL DECISION MAKING FREE TEXT BOX
DDx: EtOH intoxication  Plan: CT Head, C-spine, CXR, Pelvic X-ray, watch to clinical sobriety, reassess.

## 2022-05-07 NOTE — ED ADULT TRIAGE NOTE - NS ED NURSE AMBULANCES
D/C with Keflex. Final sensitivities pending.   Carmelina Rothman PA-C [facility]

## 2022-05-07 NOTE — ED ADULT NURSE NOTE - OBJECTIVE STATEMENT
pt presents to the ED with c/o intoxication, states he fell, abrasions present to the hands, states he is having rib pain, and head pain

## 2022-05-07 NOTE — ED PROVIDER NOTE - OBJECTIVE STATEMENT
56 y/o M with PMHx of HTN (Amlodipine) and EtOH abuse presents to the ED BIBEMS for EtOH intoxication. As per pt, drank some vodka today, drinks everyday but is unsure if he gets withdrawal tremors. As per pt, states he fell and endorses head-strike but denies ingestion of other drugs or any other complaints at this time. Pt is noncompliant with his daily medications today.

## 2022-05-07 NOTE — ED ADULT NURSE NOTE - NSIMPLEMENTINTERV_GEN_ALL_ED
Implemented All Fall with Harm Risk Interventions:  [place] to call system. Call bell, personal items and telephone within reach. Instruct patient to call for assistance. Room bathroom lighting operational. Non-slip footwear when patient is off stretcher. Physically safe environment: no spills, clutter or unnecessary equipment. Stretcher in lowest position, wheels locked, appropriate side rails in place. Provide visual cue, wrist band, yellow gown, etc. Monitor gait and stability. Monitor for mental status changes and reorient to person, place, and time. Review medications for side effects contributing to fall risk. Reinforce activity limits and safety measures with patient and family. Provide visual clues: red socks.

## 2022-05-07 NOTE — ED ADULT NURSE REASSESSMENT NOTE - NS ED NURSE REASSESS COMMENT FT1
pt resting comfortably in bed at this time. pt doesn't appear to be in any acute distress. discharge pending sobriety.

## 2022-05-11 ENCOUNTER — INPATIENT (INPATIENT)
Facility: HOSPITAL | Age: 57
LOS: 4 days | Discharge: HOME | End: 2022-05-16
Attending: HOSPITALIST | Admitting: HOSPITALIST
Payer: MEDICAID

## 2022-05-11 VITALS
DIASTOLIC BLOOD PRESSURE: 94 MMHG | HEART RATE: 100 BPM | TEMPERATURE: 97 F | RESPIRATION RATE: 18 BRPM | OXYGEN SATURATION: 100 % | SYSTOLIC BLOOD PRESSURE: 147 MMHG

## 2022-05-11 DIAGNOSIS — Z29.9 ENCOUNTER FOR PROPHYLACTIC MEASURES, UNSPECIFIED: ICD-10-CM

## 2022-05-11 DIAGNOSIS — F10.239 ALCOHOL DEPENDENCE WITH WITHDRAWAL, UNSPECIFIED: ICD-10-CM

## 2022-05-11 DIAGNOSIS — R10.9 UNSPECIFIED ABDOMINAL PAIN: ICD-10-CM

## 2022-05-11 DIAGNOSIS — I10 ESSENTIAL (PRIMARY) HYPERTENSION: ICD-10-CM

## 2022-05-11 DIAGNOSIS — F10.10 ALCOHOL ABUSE, UNCOMPLICATED: ICD-10-CM

## 2022-05-11 DIAGNOSIS — R79.89 OTHER SPECIFIED ABNORMAL FINDINGS OF BLOOD CHEMISTRY: ICD-10-CM

## 2022-05-11 DIAGNOSIS — M79.89 OTHER SPECIFIED SOFT TISSUE DISORDERS: ICD-10-CM

## 2022-05-11 LAB
ALBUMIN SERPL ELPH-MCNC: 3.9 G/DL — SIGNIFICANT CHANGE UP (ref 3.3–5)
ALP SERPL-CCNC: 60 U/L — SIGNIFICANT CHANGE UP (ref 40–120)
ALT FLD-CCNC: 56 U/L — HIGH (ref 4–41)
ANION GAP SERPL CALC-SCNC: 15 MMOL/L — HIGH (ref 7–14)
APPEARANCE UR: CLEAR — SIGNIFICANT CHANGE UP
AST SERPL-CCNC: 90 U/L — HIGH (ref 4–40)
BASOPHILS # BLD AUTO: 0.11 K/UL — SIGNIFICANT CHANGE UP (ref 0–0.2)
BASOPHILS NFR BLD AUTO: 2.3 % — HIGH (ref 0–2)
BILIRUB SERPL-MCNC: 0.4 MG/DL — SIGNIFICANT CHANGE UP (ref 0.2–1.2)
BILIRUB UR-MCNC: NEGATIVE — SIGNIFICANT CHANGE UP
BUN SERPL-MCNC: 8 MG/DL — SIGNIFICANT CHANGE UP (ref 7–23)
CALCIUM SERPL-MCNC: 8.1 MG/DL — LOW (ref 8.4–10.5)
CHLORIDE SERPL-SCNC: 103 MMOL/L — SIGNIFICANT CHANGE UP (ref 98–107)
CO2 SERPL-SCNC: 25 MMOL/L — SIGNIFICANT CHANGE UP (ref 22–31)
COLOR SPEC: COLORLESS — SIGNIFICANT CHANGE UP
CREAT SERPL-MCNC: 0.73 MG/DL — SIGNIFICANT CHANGE UP (ref 0.5–1.3)
DIFF PNL FLD: NEGATIVE — SIGNIFICANT CHANGE UP
EGFR: 105 ML/MIN/1.73M2 — SIGNIFICANT CHANGE UP
EOSINOPHIL # BLD AUTO: 0.26 K/UL — SIGNIFICANT CHANGE UP (ref 0–0.5)
EOSINOPHIL NFR BLD AUTO: 5.5 % — SIGNIFICANT CHANGE UP (ref 0–6)
FLUAV AG NPH QL: SIGNIFICANT CHANGE UP
FLUBV AG NPH QL: SIGNIFICANT CHANGE UP
GLUCOSE SERPL-MCNC: 112 MG/DL — HIGH (ref 70–99)
GLUCOSE UR QL: NEGATIVE — SIGNIFICANT CHANGE UP
HCT VFR BLD CALC: 39 % — SIGNIFICANT CHANGE UP (ref 39–50)
HGB BLD-MCNC: 13.1 G/DL — SIGNIFICANT CHANGE UP (ref 13–17)
HIV 1+2 AB+HIV1 P24 AG SERPL QL IA: SIGNIFICANT CHANGE UP
IANC: 1.94 K/UL — SIGNIFICANT CHANGE UP (ref 1.8–7.4)
IMM GRANULOCYTES NFR BLD AUTO: 0 % — SIGNIFICANT CHANGE UP (ref 0–1.5)
KETONES UR-MCNC: NEGATIVE — SIGNIFICANT CHANGE UP
LEUKOCYTE ESTERASE UR-ACNC: NEGATIVE — SIGNIFICANT CHANGE UP
LIDOCAIN IGE QN: 31 U/L — SIGNIFICANT CHANGE UP (ref 7–60)
LYMPHOCYTES # BLD AUTO: 2.01 K/UL — SIGNIFICANT CHANGE UP (ref 1–3.3)
LYMPHOCYTES # BLD AUTO: 42.3 % — SIGNIFICANT CHANGE UP (ref 13–44)
MCHC RBC-ENTMCNC: 31.3 PG — SIGNIFICANT CHANGE UP (ref 27–34)
MCHC RBC-ENTMCNC: 33.6 GM/DL — SIGNIFICANT CHANGE UP (ref 32–36)
MCV RBC AUTO: 93.3 FL — SIGNIFICANT CHANGE UP (ref 80–100)
MONOCYTES # BLD AUTO: 0.43 K/UL — SIGNIFICANT CHANGE UP (ref 0–0.9)
MONOCYTES NFR BLD AUTO: 9.1 % — SIGNIFICANT CHANGE UP (ref 2–14)
NEUTROPHILS # BLD AUTO: 1.94 K/UL — SIGNIFICANT CHANGE UP (ref 1.8–7.4)
NEUTROPHILS NFR BLD AUTO: 40.8 % — LOW (ref 43–77)
NITRITE UR-MCNC: NEGATIVE — SIGNIFICANT CHANGE UP
NRBC # BLD: 0 /100 WBCS — SIGNIFICANT CHANGE UP
NRBC # FLD: 0 K/UL — SIGNIFICANT CHANGE UP
PCP SPEC-MCNC: SIGNIFICANT CHANGE UP
PH UR: 6.5 — SIGNIFICANT CHANGE UP (ref 5–8)
PLATELET # BLD AUTO: 217 K/UL — SIGNIFICANT CHANGE UP (ref 150–400)
POTASSIUM SERPL-MCNC: 4 MMOL/L — SIGNIFICANT CHANGE UP (ref 3.5–5.3)
POTASSIUM SERPL-SCNC: 4 MMOL/L — SIGNIFICANT CHANGE UP (ref 3.5–5.3)
PROT SERPL-MCNC: 7.8 G/DL — SIGNIFICANT CHANGE UP (ref 6–8.3)
PROT UR-MCNC: NEGATIVE — SIGNIFICANT CHANGE UP
RBC # BLD: 4.18 M/UL — LOW (ref 4.2–5.8)
RBC # FLD: 13.8 % — SIGNIFICANT CHANGE UP (ref 10.3–14.5)
RSV RNA NPH QL NAA+NON-PROBE: SIGNIFICANT CHANGE UP
SARS-COV-2 RNA SPEC QL NAA+PROBE: SIGNIFICANT CHANGE UP
SODIUM SERPL-SCNC: 143 MMOL/L — SIGNIFICANT CHANGE UP (ref 135–145)
SP GR SPEC: 1.01 — SIGNIFICANT CHANGE UP (ref 1–1.05)
TOXICOLOGY SCREEN, DRUGS OF ABUSE, SERUM RESULT: SIGNIFICANT CHANGE UP
TSH SERPL-MCNC: 1.28 UIU/ML — SIGNIFICANT CHANGE UP (ref 0.27–4.2)
UROBILINOGEN FLD QL: SIGNIFICANT CHANGE UP
WBC # BLD: 4.75 K/UL — SIGNIFICANT CHANGE UP (ref 3.8–10.5)
WBC # FLD AUTO: 4.75 K/UL — SIGNIFICANT CHANGE UP (ref 3.8–10.5)

## 2022-05-11 PROCEDURE — 93970 EXTREMITY STUDY: CPT | Mod: 26

## 2022-05-11 PROCEDURE — 99285 EMERGENCY DEPT VISIT HI MDM: CPT

## 2022-05-11 PROCEDURE — 76700 US EXAM ABDOM COMPLETE: CPT | Mod: 26

## 2022-05-11 PROCEDURE — 99223 1ST HOSP IP/OBS HIGH 75: CPT

## 2022-05-11 RX ORDER — AMLODIPINE BESYLATE 2.5 MG/1
10 TABLET ORAL DAILY
Refills: 0 | Status: DISCONTINUED | OUTPATIENT
Start: 2022-05-11 | End: 2022-05-16

## 2022-05-11 RX ORDER — DORZOLAMIDE HYDROCHLORIDE TIMOLOL MALEATE 20; 5 MG/ML; MG/ML
1 SOLUTION/ DROPS OPHTHALMIC
Refills: 0 | Status: DISCONTINUED | OUTPATIENT
Start: 2022-05-11 | End: 2022-05-16

## 2022-05-11 RX ORDER — PREGABALIN 225 MG/1
1000 CAPSULE ORAL DAILY
Refills: 0 | Status: DISCONTINUED | OUTPATIENT
Start: 2022-05-11 | End: 2022-05-16

## 2022-05-11 RX ORDER — ENOXAPARIN SODIUM 100 MG/ML
40 INJECTION SUBCUTANEOUS EVERY 24 HOURS
Refills: 0 | Status: DISCONTINUED | OUTPATIENT
Start: 2022-05-11 | End: 2022-05-16

## 2022-05-11 RX ORDER — SODIUM CHLORIDE 9 MG/ML
1000 INJECTION INTRAMUSCULAR; INTRAVENOUS; SUBCUTANEOUS ONCE
Refills: 0 | Status: COMPLETED | OUTPATIENT
Start: 2022-05-11 | End: 2022-05-11

## 2022-05-11 RX ORDER — BUPROPION HYDROCHLORIDE 150 MG/1
100 TABLET, EXTENDED RELEASE ORAL DAILY
Refills: 0 | Status: DISCONTINUED | OUTPATIENT
Start: 2022-05-11 | End: 2022-05-16

## 2022-05-11 RX ORDER — ACETAMINOPHEN 500 MG
650 TABLET ORAL EVERY 6 HOURS
Refills: 0 | Status: DISCONTINUED | OUTPATIENT
Start: 2022-05-11 | End: 2022-05-16

## 2022-05-11 RX ORDER — LATANOPROST 0.05 MG/ML
1 SOLUTION/ DROPS OPHTHALMIC; TOPICAL AT BEDTIME
Refills: 0 | Status: DISCONTINUED | OUTPATIENT
Start: 2022-05-11 | End: 2022-05-16

## 2022-05-11 RX ORDER — THIAMINE MONONITRATE (VIT B1) 100 MG
100 TABLET ORAL DAILY
Refills: 0 | Status: DISCONTINUED | OUTPATIENT
Start: 2022-05-14 | End: 2022-05-16

## 2022-05-11 RX ORDER — THIAMINE MONONITRATE (VIT B1) 100 MG
500 TABLET ORAL EVERY 8 HOURS
Refills: 0 | Status: COMPLETED | OUTPATIENT
Start: 2022-05-11 | End: 2022-05-14

## 2022-05-11 RX ORDER — SODIUM CHLORIDE 9 MG/ML
1000 INJECTION INTRAMUSCULAR; INTRAVENOUS; SUBCUTANEOUS
Refills: 0 | Status: DISCONTINUED | OUTPATIENT
Start: 2022-05-11 | End: 2022-05-16

## 2022-05-11 RX ORDER — FOLIC ACID 0.8 MG
1 TABLET ORAL DAILY
Refills: 0 | Status: DISCONTINUED | OUTPATIENT
Start: 2022-05-11 | End: 2022-05-16

## 2022-05-11 RX ORDER — SIMVASTATIN 20 MG/1
10 TABLET, FILM COATED ORAL AT BEDTIME
Refills: 0 | Status: DISCONTINUED | OUTPATIENT
Start: 2022-05-11 | End: 2022-05-15

## 2022-05-11 RX ORDER — CHOLECALCIFEROL (VITAMIN D3) 125 MCG
2000 CAPSULE ORAL DAILY
Refills: 0 | Status: DISCONTINUED | OUTPATIENT
Start: 2022-05-11 | End: 2022-05-16

## 2022-05-11 RX ORDER — PANTOPRAZOLE SODIUM 20 MG/1
40 TABLET, DELAYED RELEASE ORAL
Refills: 0 | Status: DISCONTINUED | OUTPATIENT
Start: 2022-05-11 | End: 2022-05-16

## 2022-05-11 RX ADMIN — Medication 1 TABLET(S): at 14:41

## 2022-05-11 RX ADMIN — SODIUM CHLORIDE 75 MILLILITER(S): 9 INJECTION INTRAMUSCULAR; INTRAVENOUS; SUBCUTANEOUS at 14:42

## 2022-05-11 RX ADMIN — Medication 1 DROP(S): at 17:48

## 2022-05-11 RX ADMIN — Medication 2 MILLIGRAM(S): at 10:54

## 2022-05-11 RX ADMIN — SODIUM CHLORIDE 1000 MILLILITER(S): 9 INJECTION INTRAMUSCULAR; INTRAVENOUS; SUBCUTANEOUS at 07:41

## 2022-05-11 RX ADMIN — Medication 105 MILLIGRAM(S): at 22:13

## 2022-05-11 RX ADMIN — SIMVASTATIN 10 MILLIGRAM(S): 20 TABLET, FILM COATED ORAL at 23:00

## 2022-05-11 RX ADMIN — Medication 1 MILLIGRAM(S): at 07:41

## 2022-05-11 RX ADMIN — Medication 105 MILLIGRAM(S): at 15:34

## 2022-05-11 RX ADMIN — SODIUM CHLORIDE 1000 MILLILITER(S): 9 INJECTION INTRAMUSCULAR; INTRAVENOUS; SUBCUTANEOUS at 09:05

## 2022-05-11 RX ADMIN — Medication 650 MILLIGRAM(S): at 15:30

## 2022-05-11 RX ADMIN — LATANOPROST 1 DROP(S): 0.05 SOLUTION/ DROPS OPHTHALMIC; TOPICAL at 22:14

## 2022-05-11 RX ADMIN — PANTOPRAZOLE SODIUM 40 MILLIGRAM(S): 20 TABLET, DELAYED RELEASE ORAL at 14:42

## 2022-05-11 RX ADMIN — Medication 2 MILLIGRAM(S): at 14:44

## 2022-05-11 RX ADMIN — Medication 2 MILLIGRAM(S): at 23:22

## 2022-05-11 RX ADMIN — Medication 2000 UNIT(S): at 14:41

## 2022-05-11 RX ADMIN — BUPROPION HYDROCHLORIDE 100 MILLIGRAM(S): 150 TABLET, EXTENDED RELEASE ORAL at 17:48

## 2022-05-11 RX ADMIN — Medication 650 MILLIGRAM(S): at 14:41

## 2022-05-11 RX ADMIN — DORZOLAMIDE HYDROCHLORIDE TIMOLOL MALEATE 1 DROP(S): 20; 5 SOLUTION/ DROPS OPHTHALMIC at 17:48

## 2022-05-11 RX ADMIN — Medication 1 MILLIGRAM(S): at 14:41

## 2022-05-11 RX ADMIN — PREGABALIN 1000 MICROGRAM(S): 225 CAPSULE ORAL at 17:48

## 2022-05-11 RX ADMIN — AMLODIPINE BESYLATE 10 MILLIGRAM(S): 2.5 TABLET ORAL at 17:48

## 2022-05-11 NOTE — ED ADULT TRIAGE NOTE - CHIEF COMPLAINT QUOTE
pt requesting rehab for ETOH abuse. Pt states last drink was "big bottle" of tequila this morning. Pt appears intoxicated, endorses hx withdrawals. C/o b/l leg pain

## 2022-05-11 NOTE — ED PROVIDER NOTE - MUSCULOSKELETAL, MLM
Spine appears normal, range of motion is not limited, no muscle or joint tenderness. Abrasions noted to helio shins, pt attributes to "work in construction"

## 2022-05-11 NOTE — ED PROVIDER NOTE - ATTENDING APP SHARED VISIT CONTRIBUTION OF CARE
I performed a face-to-face evaluation of the patient and performed a history and physical examination along with the resident or ACP, and/or medical student above.  I agree with the history and physical examination as documented by the resident or ACP, and/or medical student above.  Youssef:  58yo undomiciled M w/ pmh inclusive of htn and etoh abuse w/ prior hospitalizations for etoh withdrawal, presents feeling anxious and tremulous. Drinks vodka daily, last vodka consumption was last night and then had 2 beers this morning because he was "feeling shaky". +tongue fasciculations and tremulous at rest, tachycardic. H&P most consistent w/ acute withdrawal. meds, labs, ecg, hydration, CIWA, tba.

## 2022-05-11 NOTE — ED PROVIDER NOTE - CPE EDP [facility] NORM
Bruno Candelario is a 48year old male. HPI:   Patient presents for recheck of  Hypertension, HLD., insomnia. , hypokalemia.  Pt has been taking medications as instructed, no medication side effects, home BP monitoring in the range of 711F systolic an exertion  CARDIOVASCULAR: denies chest pain on exertion, dizzy spells, PND. GI: denies abdominal pain and denies heartburn  NEURO: denies headaches  MSK : pain of left shin during jogging.   EXAM:   /80 (BP Location: Right arm, Patient Position: Sitt unusual low back pain  Avoid heavy weight lifting of more than 15 pounds. The patient indicates understanding of these issues and agrees to the plan. The patient is asked to return in 6 mos . normal...

## 2022-05-11 NOTE — ED ADULT NURSE NOTE - OBJECTIVE STATEMENT
Flor RN: pt received in rm #23, 57Y M AOx4, ambulatory requesting detox. PMHX ETOH abuse. Pt received in hospital gown. Belongings previously secured by cabinet by rm #21. Pt reports last drank "a big bottle of tequila this morning". Admits to hx of ETOH withdrawal. 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 Denies recent falls. VS as charted. Report to primary RN. Flor RN: pt received in rm #23, 57Y M AOx4, ambulatory requesting detox. PMHX ETOH abuse. Pt received in hospital gown. Belongings previously secured by cabinet by rm #21. Pt reports last drank "a big bottle of tequila this morning". Admits to hx of ETOH withdrawal. 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 Denies recent falls. VS as charted. IV placed, labs sent, Report to primary RN.

## 2022-05-11 NOTE — H&P ADULT - PROBLEM SELECTOR PLAN 1
- CIWA score 7 --> 3 --> 7, s/p ativan 1 mg x2  - continue CIWA monitoring  - ativan PRN   - MVI, folic acid  - thiamine 500 mg IV q8 for 3 days then daily

## 2022-05-11 NOTE — H&P ADULT - NSHPSOCIALHISTORY_GEN_ALL_CORE
Social history:  Smoke - never smoked   Drugs - denies illicit drug use   ETOH - drinks approximate 1 pint of pf tequila or vodka with 24 ounces of beer.    Family history:  Mother and Father  from old age.

## 2022-05-11 NOTE — PATIENT PROFILE ADULT - BILL PAYMENT
Team Conference held today. Team reviewed progress and goals. Team reports he is walking without device. Team recommends DC to home tomorrow 4- with home care orders for SN/PT/OT. LSW met with patient to review. He agrees with this plan. He reports he is to have his dialysis today. Discussion held regarding NEw HD spot at [facility] starting on Wednesday. He reports it is very close to his home. He will have transportation from friends and family. Discussion held regarding home care orders; he wants to resume [facility]. IMM letter presented. Modesta Morales [facility]     Case Management   [phone]    4/15/2019  2:49 PM  Call placed to [facility] Phan to confirm HD spot.   Modesta Morales [facility]     Case Management   [phone]    4/15/2019  2:49 PM no
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
## 2022-05-11 NOTE — H&P ADULT - NS ATTEND AMEND GEN_ALL_CORE FT
57M hx etoh abuse admitted for etoh w/d  -no hx complicated withdrawal, [facility]  [facility] for [facility]

## 2022-05-11 NOTE — ED ADULT NURSE REASSESSMENT NOTE - NS ED NURSE REASSESS COMMENT FT1
DONALWA score 7 at this time. Ativan 2 mg oral dose given as ordered for score 7 or less. Pepe So NP made aware. NP currently in exam room evaluating pt.
Pt belongings collected in ambulance bay and placed across from room 21 in closet.
SBIRT member here to larry welsh
Pt sleeping in stretcher. NS bolus completed at this time. Pt awoken via verbal stimuli of calling pt's name 3 times before pt opened eyes. Pt oriented x 4, answering questions appropriately. Vitals stable at this time. When pt asked if he is having any pain, pt states he is having a headache. Pt declines any pain meds at this time.

## 2022-05-11 NOTE — PATIENT PROFILE ADULT - FALL HARM RISK - HARM RISK INTERVENTIONS
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
## 2022-05-12 LAB
ALBUMIN SERPL ELPH-MCNC: 3.6 G/DL — SIGNIFICANT CHANGE UP (ref 3.3–5)
ALP SERPL-CCNC: 59 U/L — SIGNIFICANT CHANGE UP (ref 40–120)
ALT FLD-CCNC: 46 U/L — HIGH (ref 4–41)
ANION GAP SERPL CALC-SCNC: 13 MMOL/L — SIGNIFICANT CHANGE UP (ref 7–14)
AST SERPL-CCNC: 64 U/L — HIGH (ref 4–40)
BILIRUB SERPL-MCNC: 0.9 MG/DL — SIGNIFICANT CHANGE UP (ref 0.2–1.2)
BUN SERPL-MCNC: 6 MG/DL — LOW (ref 7–23)
CALCIUM SERPL-MCNC: 7.9 MG/DL — LOW (ref 8.4–10.5)
CHLORIDE SERPL-SCNC: 102 MMOL/L — SIGNIFICANT CHANGE UP (ref 98–107)
CO2 SERPL-SCNC: 23 MMOL/L — SIGNIFICANT CHANGE UP (ref 22–31)
CREAT SERPL-MCNC: 0.74 MG/DL — SIGNIFICANT CHANGE UP (ref 0.5–1.3)
EGFR: 104 ML/MIN/1.73M2 — SIGNIFICANT CHANGE UP
GLUCOSE SERPL-MCNC: 75 MG/DL — SIGNIFICANT CHANGE UP (ref 70–99)
HAV IGM SER-ACNC: SIGNIFICANT CHANGE UP
HBV CORE IGM SER-ACNC: SIGNIFICANT CHANGE UP
HBV SURFACE AG SER-ACNC: SIGNIFICANT CHANGE UP
HCT VFR BLD CALC: 36.5 % — LOW (ref 39–50)
HCV AB S/CO SERPL IA: 0.13 S/CO — SIGNIFICANT CHANGE UP (ref 0–0.99)
HCV AB S/CO SERPL IA: 0.14 S/CO — SIGNIFICANT CHANGE UP (ref 0–0.99)
HCV AB SERPL-IMP: SIGNIFICANT CHANGE UP
HCV AB SERPL-IMP: SIGNIFICANT CHANGE UP
HGB BLD-MCNC: 12.3 G/DL — LOW (ref 13–17)
MAGNESIUM SERPL-MCNC: 1.7 MG/DL — SIGNIFICANT CHANGE UP (ref 1.6–2.6)
MCHC RBC-ENTMCNC: 31.6 PG — SIGNIFICANT CHANGE UP (ref 27–34)
MCHC RBC-ENTMCNC: 33.7 GM/DL — SIGNIFICANT CHANGE UP (ref 32–36)
MCV RBC AUTO: 93.8 FL — SIGNIFICANT CHANGE UP (ref 80–100)
NRBC # BLD: 0 /100 WBCS — SIGNIFICANT CHANGE UP
NRBC # FLD: 0 K/UL — SIGNIFICANT CHANGE UP
PHOSPHATE SERPL-MCNC: 2.1 MG/DL — LOW (ref 2.5–4.5)
PLATELET # BLD AUTO: 160 K/UL — SIGNIFICANT CHANGE UP (ref 150–400)
POTASSIUM SERPL-MCNC: 3.4 MMOL/L — LOW (ref 3.5–5.3)
POTASSIUM SERPL-SCNC: 3.4 MMOL/L — LOW (ref 3.5–5.3)
PROT SERPL-MCNC: 6.7 G/DL — SIGNIFICANT CHANGE UP (ref 6–8.3)
RBC # BLD: 3.89 M/UL — LOW (ref 4.2–5.8)
RBC # FLD: 13.3 % — SIGNIFICANT CHANGE UP (ref 10.3–14.5)
SODIUM SERPL-SCNC: 138 MMOL/L — SIGNIFICANT CHANGE UP (ref 135–145)
WBC # BLD: 4.6 K/UL — SIGNIFICANT CHANGE UP (ref 3.8–10.5)
WBC # FLD AUTO: 4.6 K/UL — SIGNIFICANT CHANGE UP (ref 3.8–10.5)

## 2022-05-12 PROCEDURE — 99233 SBSQ HOSP IP/OBS HIGH 50: CPT

## 2022-05-12 RX ORDER — LANOLIN ALCOHOL/MO/W.PET/CERES
3 CREAM (GRAM) TOPICAL ONCE
Refills: 0 | Status: COMPLETED | OUTPATIENT
Start: 2022-05-12 | End: 2022-05-12

## 2022-05-12 RX ORDER — SODIUM,POTASSIUM PHOSPHATES 278-250MG
1 POWDER IN PACKET (EA) ORAL
Refills: 0 | Status: COMPLETED | OUTPATIENT
Start: 2022-05-12 | End: 2022-05-13

## 2022-05-12 RX ORDER — LANOLIN ALCOHOL/MO/W.PET/CERES
5 CREAM (GRAM) TOPICAL AT BEDTIME
Refills: 0 | Status: DISCONTINUED | OUTPATIENT
Start: 2022-05-12 | End: 2022-05-12

## 2022-05-12 RX ORDER — POTASSIUM CHLORIDE 20 MEQ
40 PACKET (EA) ORAL ONCE
Refills: 0 | Status: COMPLETED | OUTPATIENT
Start: 2022-05-12 | End: 2022-05-12

## 2022-05-12 RX ADMIN — DORZOLAMIDE HYDROCHLORIDE TIMOLOL MALEATE 1 DROP(S): 20; 5 SOLUTION/ DROPS OPHTHALMIC at 17:35

## 2022-05-12 RX ADMIN — Medication 40 MILLIEQUIVALENT(S): at 13:19

## 2022-05-12 RX ADMIN — AMLODIPINE BESYLATE 10 MILLIGRAM(S): 2.5 TABLET ORAL at 06:19

## 2022-05-12 RX ADMIN — Medication 650 MILLIGRAM(S): at 06:41

## 2022-05-12 RX ADMIN — SIMVASTATIN 10 MILLIGRAM(S): 20 TABLET, FILM COATED ORAL at 22:10

## 2022-05-12 RX ADMIN — Medication 105 MILLIGRAM(S): at 22:10

## 2022-05-12 RX ADMIN — Medication 1 MILLIGRAM(S): at 13:12

## 2022-05-12 RX ADMIN — Medication 1 DROP(S): at 17:35

## 2022-05-12 RX ADMIN — Medication 3 MILLIGRAM(S): at 22:16

## 2022-05-12 RX ADMIN — BUPROPION HYDROCHLORIDE 100 MILLIGRAM(S): 150 TABLET, EXTENDED RELEASE ORAL at 17:34

## 2022-05-12 RX ADMIN — Medication 2 MILLIGRAM(S): at 13:11

## 2022-05-12 RX ADMIN — Medication 2 MILLIGRAM(S): at 10:46

## 2022-05-12 RX ADMIN — Medication 1 PACKET(S): at 17:34

## 2022-05-12 RX ADMIN — Medication 1 DROP(S): at 06:20

## 2022-05-12 RX ADMIN — Medication 2 MILLIGRAM(S): at 22:10

## 2022-05-12 RX ADMIN — ENOXAPARIN SODIUM 40 MILLIGRAM(S): 100 INJECTION SUBCUTANEOUS at 06:19

## 2022-05-12 RX ADMIN — PREGABALIN 1000 MICROGRAM(S): 225 CAPSULE ORAL at 13:12

## 2022-05-12 RX ADMIN — Medication 105 MILLIGRAM(S): at 13:11

## 2022-05-12 RX ADMIN — Medication 2 MILLIGRAM(S): at 17:34

## 2022-05-12 RX ADMIN — LATANOPROST 1 DROP(S): 0.05 SOLUTION/ DROPS OPHTHALMIC; TOPICAL at 22:11

## 2022-05-12 RX ADMIN — Medication 105 MILLIGRAM(S): at 06:11

## 2022-05-12 RX ADMIN — DORZOLAMIDE HYDROCHLORIDE TIMOLOL MALEATE 1 DROP(S): 20; 5 SOLUTION/ DROPS OPHTHALMIC at 06:21

## 2022-05-12 RX ADMIN — PANTOPRAZOLE SODIUM 40 MILLIGRAM(S): 20 TABLET, DELAYED RELEASE ORAL at 06:19

## 2022-05-12 RX ADMIN — SODIUM CHLORIDE 75 MILLILITER(S): 9 INJECTION INTRAMUSCULAR; INTRAVENOUS; SUBCUTANEOUS at 06:09

## 2022-05-12 RX ADMIN — Medication 2000 UNIT(S): at 13:12

## 2022-05-12 RX ADMIN — Medication 1 TABLET(S): at 13:12

## 2022-05-12 NOTE — PROVIDER CONTACT NOTE (CHANGE IN STATUS NOTIFICATION) - ASSESSMENT
Pt presenting with increased anxiety. 8/10. Increased CIWA score from an 8 to now 11. Visible tremors. Denies visual/auditory disturbances

## 2022-05-12 NOTE — PROGRESS NOTE ADULT - PROBLEM SELECTOR PLAN 2
- he has diffuse abdominal pain with occasional N/V  - check US abdomen   - start PPI - reportedly on admission pt had  diffuse abdominal pain with occasional N/V, pt at this time denies abd pain, nausea and vomiting, reports 5 loose, BM, denies outside food,   - US abdomen noted, Cholelithiasis, without evidence of acute cholecystitis. Fatty infiltration of liver.  -will check stool studies   - start PPI

## 2022-05-12 NOTE — PROVIDER CONTACT NOTE (CHANGE IN STATUS NOTIFICATION) - ASSESSMENT
Pt states there have been an improvement in anxiety. 5/10 of anxiety. Tremors still visibly present. Vital signs stable

## 2022-05-12 NOTE — PROGRESS NOTE ADULT - SUBJECTIVE AND OBJECTIVE BOX
Patient is a 57y old  Male who presents with a chief complaint of "I drink too much." (11 May 2022 11:25)      SUBJECTIVE / OVERNIGHT EVENTS:    MEDICATIONS  (STANDING):  amLODIPine   Tablet 10 milliGRAM(s) Oral daily  artificial tears (preservative free) Ophthalmic Solution 1 Drop(s) Both EYES two times a day  buPROPion SR (12-Hour) 100 milliGRAM(s) Oral daily  cholecalciferol 2000 Unit(s) Oral daily  cyanocobalamin 1000 MICROGram(s) Oral daily  dorzolamide 2%/timolol 0.5% Ophthalmic Solution 1 Drop(s) Both EYES two times a day  enoxaparin Injectable 40 milliGRAM(s) SubCutaneous every 24 hours  folic acid 1 milliGRAM(s) Oral daily  latanoprost 0.005% Ophthalmic Solution 1 Drop(s) Both EYES at bedtime  multivitamin 1 Tablet(s) Oral daily  pantoprazole    Tablet 40 milliGRAM(s) Oral before breakfast  potassium chloride    Tablet ER 40 milliEquivalent(s) Oral once  potassium phosphate / sodium phosphate Powder (PHOS-NaK) 1 Packet(s) Oral two times a day  simvastatin 10 milliGRAM(s) Oral at bedtime  sodium chloride 0.9%. 1000 milliLiter(s) (75 mL/Hr) IV Continuous <Continuous>  thiamine IVPB 500 milliGRAM(s) IV Intermittent every 8 hours    MEDICATIONS  (PRN):  acetaminophen     Tablet .. 650 milliGRAM(s) Oral every 6 hours PRN Temp greater or equal to 38C (100.4F), Mild Pain (1 - 3)  LORazepam     Tablet 2 milliGRAM(s) Oral every 2 hours PRN CIWA-Ar score increase by 2 points and a total score of 7 or less  LORazepam   Injectable 2 milliGRAM(s) IV Push every 1 hour PRN CIWA-Ar score 8 or greater      Vital Signs Last 24 Hrs  T(C): 37 (12 May 2022 06:05), Max: 37 (12 May 2022 06:05)  T(F): 98.6 (12 May 2022 06:05), Max: 98.6 (12 May 2022 06:05)  HR: 72 (12 May 2022 06:05) (62 - 91)  BP: 132/73 (12 May 2022 06:05) (116/68 - 151/94)  BP(mean): --  RR: 18 (12 May 2022 06:05) (16 - 18)  SpO2: 100% (12 May 2022 06:05) (100% - 100%)  CAPILLARY BLOOD GLUCOSE        I&O's Summary      PHYSICAL EXAM:  GENERAL: NAD, well-developed  HEAD:  Atraumatic, Normocephalic  EYES: EOMI, PERRLA, conjunctiva and sclera clear  NECK: Supple, No JVD  CHEST/LUNG: Clear to auscultation bilaterally; No wheeze  HEART: Regular rate and rhythm; No murmurs, rubs, or gallops  ABDOMEN: Soft, Nontender, Nondistended; Bowel sounds present  EXTREMITIES:  2+ Peripheral Pulses, No clubbing, cyanosis, or edema  PSYCH: AAOx3  NEUROLOGY: non-focal  SKIN: No rashes or lesions    LABS:                        12.3   4.60  )-----------( 160      ( 12 May 2022 05:33 )             36.5     05-12    138  |  102  |  6<L>  ----------------------------<  75  3.4<L>   |  23  |  0.74    Ca    7.9<L>      12 May 2022 05:33  Phos  2.1     05-12  Mg     1.70     -12    TPro  6.7  /  Alb  3.6  /  TBili  0.9  /  DBili  x   /  AST  64<H>  /  ALT  46<H>  /  AlkPhos  59  05-12          Urinalysis Basic - ( 11 May 2022 07:51 )    Color: Colorless / Appearance: Clear / S.006 / pH: x  Gluc: x / Ketone: Negative  / Bili: Negative / Urobili: <2 mg/dL   Blood: x / Protein: Negative / Nitrite: Negative   Leuk Esterase: Negative / RBC: x / WBC x   Sq Epi: x / Non Sq Epi: x / Bacteria: x        RADIOLOGY & ADDITIONAL TESTS:    Imaging Personally Reviewed:    Consultant(s) Notes Reviewed:      Care Discussed with Consultants/Other Providers:   Patient is a 57y old  Male who presents with a chief complaint of "I drink too much." (11 May 2022 11:25)      SUBJECTIVE / OVERNIGHT EVENTS: patient seen and examined by bedside, pt reports feeling better, reports 5 loose BM, denies fever, chills, headache, dizziness, SOB, CP, Palpitations , N/V/, abdominal pain  CIWA8-11     MEDICATIONS  (STANDING):  amLODIPine   Tablet 10 milliGRAM(s) Oral daily  artificial tears (preservative free) Ophthalmic Solution 1 Drop(s) Both EYES two times a day  buPROPion SR (12-Hour) 100 milliGRAM(s) Oral daily  cholecalciferol 2000 Unit(s) Oral daily  cyanocobalamin 1000 MICROGram(s) Oral daily  dorzolamide 2%/timolol 0.5% Ophthalmic Solution 1 Drop(s) Both EYES two times a day  enoxaparin Injectable 40 milliGRAM(s) SubCutaneous every 24 hours  folic acid 1 milliGRAM(s) Oral daily  latanoprost 0.005% Ophthalmic Solution 1 Drop(s) Both EYES at bedtime  multivitamin 1 Tablet(s) Oral daily  pantoprazole    Tablet 40 milliGRAM(s) Oral before breakfast  potassium chloride    Tablet ER 40 milliEquivalent(s) Oral once  potassium phosphate / sodium phosphate Powder (PHOS-NaK) 1 Packet(s) Oral two times a day  simvastatin 10 milliGRAM(s) Oral at bedtime  sodium chloride 0.9%. 1000 milliLiter(s) (75 mL/Hr) IV Continuous <Continuous>  thiamine IVPB 500 milliGRAM(s) IV Intermittent every 8 hours    MEDICATIONS  (PRN):  acetaminophen     Tablet .. 650 milliGRAM(s) Oral every 6 hours PRN Temp greater or equal to 38C (100.4F), Mild Pain (1 - 3)  LORazepam     Tablet 2 milliGRAM(s) Oral every 2 hours PRN CIWA-Ar score increase by 2 points and a total score of 7 or less  LORazepam   Injectable 2 milliGRAM(s) IV Push every 1 hour PRN CIWA-Ar score 8 or greater      Vital Signs Last 24 Hrs  T(C): 37 (12 May 2022 06:05), Max: 37 (12 May 2022 06:05)  T(F): 98.6 (12 May 2022 06:05), Max: 98.6 (12 May 2022 06:05)  HR: 72 (12 May 2022 06:05) (62 - 91)  BP: 132/73 (12 May 2022 06:05) (116/68 - 151/94)  BP(mean): --  RR: 18 (12 May 2022 06:05) (16 - 18)  SpO2: 100% (12 May 2022 06:05) (100% - 100%)  CAPILLARY BLOOD GLUCOSE        I&O's Summary      PHYSICAL EXAM:  GENERAL: NAD, well-developed  HEAD:  Atraumatic, Normocephalic  EYES: EOMI, PERRLA, conjunctiva and sclera clear  NECK: Supple, No JVD  CHEST/LUNG: Clear to auscultation bilaterally; No wheeze  HEART: Regular rate and rhythm; No murmurs, rubs, or gallops  ABDOMEN: Soft, Nontender, Nondistended; Bowel sounds present  EXTREMITIES:  2+ Peripheral Pulses, No clubbing, cyanosis, or edema  PSYCH: AAOx3  NEUROLOGY: non-focal, tremors++  SKIN:  old healed lacerations on B/L shins with scabs     LABS:                        12.3   4.60  )-----------( 160      ( 12 May 2022 05:33 )             36.5     05-12    138  |  102  |  6<L>  ----------------------------<  75  3.4<L>   |  23  |  0.74    Ca    7.9<L>      12 May 2022 05:33  Phos  2.1     12  Mg     1.70         TPro  6.7  /  Alb  3.6  /  TBili  0.9  /  DBili  x   /  AST  64<H>  /  ALT  46<H>  /  AlkPhos  59  05-12          Urinalysis Basic - ( 11 May 2022 07:51 )    Color: Colorless / Appearance: Clear / S.006 / pH: x  Gluc: x / Ketone: Negative  / Bili: Negative / Urobili: <2 mg/dL   Blood: x / Protein: Negative / Nitrite: Negative   Leuk Esterase: Negative / RBC: x / WBC x   Sq Epi: x / Non Sq Epi: x / Bacteria: x        RADIOLOGY & ADDITIONAL TESTS:  < from: US Duplex Venous Lower Ext Complete, Bilateral (22 @ 14:13) >    RIGHT:  Normal compressibility of the RIGHT common femoral, femoral and popliteal   veins.  Doppler examination shows normal spontaneous and phasic flow.  No RIGHT calf vein thrombosis is detected.    LEFT:  Normal compressibility of the LEFT common femoral, femoral and popliteal   veins.  Doppler examination shows normal spontaneous and phasic flow.  No LEFT calf vein thrombosis is detected.    IMPRESSION:  No evidence of deep venous thrombosis in either lower extremity.    < end of copied text >  < from: US Abdomen Complete (US Abdomen Complete .) (22 @ 14:14) >  Liver: Fatty infiltration of the liver.  Bile ducts: Normal caliber. Common bile duct measures 3 mm.  Gallbladder: Cholelithiasis. No wall thickening, pericholecystic fluid,   or localized tenderness.  Pancreas: Poorly visualized.  Spleen: 8.9 cm.Within normal limits.  Right kidney: 10.3 cm. No hydronephrosis.  Left kidney: 11.9 cm. No hydronephrosis.  Ascites: None.  Aorta and IVC: Visualized portions are within normal limits.    IMPRESSION:  Cholelithiasis, without evidence of acute cholecystitis.  Fatty infiltration of liver.    < end of copied text >    Imaging Personally Reviewed:    Consultant(s) Notes Reviewed:      Care Discussed with Consultants/Other Providers:

## 2022-05-12 NOTE — PROGRESS NOTE ADULT - PROBLEM SELECTOR PLAN 1
- CIWA score on admission 7 --> 3 --> 7, s/p ativan 1 mg x2   -CIWA score overnight 11-8  - continue CIWA monitoring  - will start on Ativan taper   - MVI, folic acid  - thiamine 500 mg IV q8 for 3 days then daily - CIWA score on admission 7 --> 3 --> 7, s/p ativan 1 mg x2   -CIWA score overnight 11-8  - continue CIWA monitoring  - will start on Ativan taper   - MVI, folic acid  - thiamine 500 mg IV q8 for 3 days then daily  - hypokalemia and hypophosphatemia; will supplement , monitor BMP

## 2022-05-12 NOTE — PROGRESS NOTE ADULT - PROBLEM SELECTOR PLAN 3
- likely due to alcohol abuse  - check hepatitis panel  - check US abdomen - likely due to alcohol abuse  - hepatitis panel negative   -  US abdomen  noted as above

## 2022-05-12 NOTE — ADVANCED PRACTICE NURSE CONSULT - ASSESSMENT
A&Ox4, ambulates independently, shaky, continent of urine and stool. Skin warm, dry adequate skin turgor, scattered areas of hyperpigmentation and hypopigmentation.     Vascular: Bilateral lower extremities with scattered areas of hyper and hypopigmentation. Thickened toenails. No temperature changes noted. Trace Edema. Capillary refill <3 seconds.  (+)2 DP/PT pulses.     Bilateral lower legs with intact serous stable scabs- no open wounds noted. No s/s of infection.

## 2022-05-12 NOTE — PROGRESS NOTE ADULT - PROBLEM SELECTOR PLAN 4
- B/L LE swelling and pain R>L  - check US LE to eval for DVT  - wound care consult for LE wounds - B/L LE swelling and pain R>L  - doppler negative for  DVT  -mild swelling may be secondary to amlodipine, will continue to monitor   - wound care consult for LE wounds

## 2022-05-12 NOTE — ADVANCED PRACTICE NURSE CONSULT - REASON FOR CONSULT
Patient seen on skin care rounds after wound care referral received for assessment of skin impairment and recommendations of topical management for bilateral LE abrasions. 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  Patient with H/O HTN, glaucoma, alcohol abuse is here for alcohol withdrawal symptoms.

## 2022-05-12 NOTE — ADVANCED PRACTICE NURSE CONSULT - RECOMMEDATIONS
Topical recommendations:     BLE scabs- apply liquid barrier film daily.     Plan discussed with patient. Patient educated on topical wound therapy. Questions answered.     Please contact Wound/Ostomy Care Service Line if we can be of further assistance (ext [phone]).

## 2022-05-13 ENCOUNTER — TRANSCRIPTION ENCOUNTER (OUTPATIENT)
Age: 57
End: 2022-05-13

## 2022-05-13 LAB
ALBUMIN SERPL ELPH-MCNC: 3.8 G/DL — SIGNIFICANT CHANGE UP (ref 3.3–5)
ALP SERPL-CCNC: 57 U/L — SIGNIFICANT CHANGE UP (ref 40–120)
ALT FLD-CCNC: 39 U/L — SIGNIFICANT CHANGE UP (ref 4–41)
ANION GAP SERPL CALC-SCNC: 13 MMOL/L — SIGNIFICANT CHANGE UP (ref 7–14)
AST SERPL-CCNC: 56 U/L — HIGH (ref 4–40)
BILIRUB DIRECT SERPL-MCNC: 0.3 MG/DL — SIGNIFICANT CHANGE UP (ref 0–0.3)
BILIRUB INDIRECT FLD-MCNC: 0.7 MG/DL — SIGNIFICANT CHANGE UP (ref 0–1)
BILIRUB SERPL-MCNC: 1 MG/DL — SIGNIFICANT CHANGE UP (ref 0.2–1.2)
BUN SERPL-MCNC: 7 MG/DL — SIGNIFICANT CHANGE UP (ref 7–23)
C DIFF BY PCR RESULT: SIGNIFICANT CHANGE UP
C DIFF TOX GENS STL QL NAA+PROBE: SIGNIFICANT CHANGE UP
CALCIUM SERPL-MCNC: 8.4 MG/DL — SIGNIFICANT CHANGE UP (ref 8.4–10.5)
CHLORIDE SERPL-SCNC: 99 MMOL/L — SIGNIFICANT CHANGE UP (ref 98–107)
CO2 SERPL-SCNC: 21 MMOL/L — LOW (ref 22–31)
CREAT SERPL-MCNC: 0.68 MG/DL — SIGNIFICANT CHANGE UP (ref 0.5–1.3)
EGFR: 107 ML/MIN/1.73M2 — SIGNIFICANT CHANGE UP
GLUCOSE SERPL-MCNC: 114 MG/DL — HIGH (ref 70–99)
HCT VFR BLD CALC: 38.3 % — LOW (ref 39–50)
HGB BLD-MCNC: 13 G/DL — SIGNIFICANT CHANGE UP (ref 13–17)
MAGNESIUM SERPL-MCNC: 1.7 MG/DL — SIGNIFICANT CHANGE UP (ref 1.6–2.6)
MCHC RBC-ENTMCNC: 31.9 PG — SIGNIFICANT CHANGE UP (ref 27–34)
MCHC RBC-ENTMCNC: 33.9 GM/DL — SIGNIFICANT CHANGE UP (ref 32–36)
MCV RBC AUTO: 93.9 FL — SIGNIFICANT CHANGE UP (ref 80–100)
NRBC # BLD: 0 /100 WBCS — SIGNIFICANT CHANGE UP
NRBC # FLD: 0 K/UL — SIGNIFICANT CHANGE UP
PHOSPHATE SERPL-MCNC: 3.1 MG/DL — SIGNIFICANT CHANGE UP (ref 2.5–4.5)
PLATELET # BLD AUTO: 154 K/UL — SIGNIFICANT CHANGE UP (ref 150–400)
POTASSIUM SERPL-MCNC: 3.3 MMOL/L — LOW (ref 3.5–5.3)
POTASSIUM SERPL-SCNC: 3.3 MMOL/L — LOW (ref 3.5–5.3)
PROT SERPL-MCNC: 7.2 G/DL — SIGNIFICANT CHANGE UP (ref 6–8.3)
RBC # BLD: 4.08 M/UL — LOW (ref 4.2–5.8)
RBC # FLD: 12.9 % — SIGNIFICANT CHANGE UP (ref 10.3–14.5)
SODIUM SERPL-SCNC: 133 MMOL/L — LOW (ref 135–145)
WBC # BLD: 4.43 K/UL — SIGNIFICANT CHANGE UP (ref 3.8–10.5)
WBC # FLD AUTO: 4.43 K/UL — SIGNIFICANT CHANGE UP (ref 3.8–10.5)

## 2022-05-13 PROCEDURE — 99233 SBSQ HOSP IP/OBS HIGH 50: CPT

## 2022-05-13 RX ORDER — POTASSIUM CHLORIDE 20 MEQ
40 PACKET (EA) ORAL
Refills: 0 | Status: COMPLETED | OUTPATIENT
Start: 2022-05-13 | End: 2022-05-14

## 2022-05-13 RX ORDER — LANOLIN ALCOHOL/MO/W.PET/CERES
6 CREAM (GRAM) TOPICAL AT BEDTIME
Refills: 0 | Status: DISCONTINUED | OUTPATIENT
Start: 2022-05-13 | End: 2022-05-16

## 2022-05-13 RX ADMIN — DORZOLAMIDE HYDROCHLORIDE TIMOLOL MALEATE 1 DROP(S): 20; 5 SOLUTION/ DROPS OPHTHALMIC at 17:48

## 2022-05-13 RX ADMIN — Medication 105 MILLIGRAM(S): at 21:53

## 2022-05-13 RX ADMIN — Medication 2000 UNIT(S): at 10:38

## 2022-05-13 RX ADMIN — LATANOPROST 1 DROP(S): 0.05 SOLUTION/ DROPS OPHTHALMIC; TOPICAL at 21:55

## 2022-05-13 RX ADMIN — Medication 2 MILLIGRAM(S): at 05:43

## 2022-05-13 RX ADMIN — Medication 1 TABLET(S): at 10:37

## 2022-05-13 RX ADMIN — Medication 6 MILLIGRAM(S): at 21:53

## 2022-05-13 RX ADMIN — Medication 105 MILLIGRAM(S): at 14:40

## 2022-05-13 RX ADMIN — Medication 1 DROP(S): at 05:46

## 2022-05-13 RX ADMIN — Medication 1 MILLIGRAM(S): at 10:37

## 2022-05-13 RX ADMIN — AMLODIPINE BESYLATE 10 MILLIGRAM(S): 2.5 TABLET ORAL at 05:45

## 2022-05-13 RX ADMIN — ENOXAPARIN SODIUM 40 MILLIGRAM(S): 100 INJECTION SUBCUTANEOUS at 05:45

## 2022-05-13 RX ADMIN — Medication 105 MILLIGRAM(S): at 05:40

## 2022-05-13 RX ADMIN — PANTOPRAZOLE SODIUM 40 MILLIGRAM(S): 20 TABLET, DELAYED RELEASE ORAL at 05:45

## 2022-05-13 RX ADMIN — Medication 1.5 MILLIGRAM(S): at 21:54

## 2022-05-13 RX ADMIN — DORZOLAMIDE HYDROCHLORIDE TIMOLOL MALEATE 1 DROP(S): 20; 5 SOLUTION/ DROPS OPHTHALMIC at 05:40

## 2022-05-13 RX ADMIN — SIMVASTATIN 10 MILLIGRAM(S): 20 TABLET, FILM COATED ORAL at 21:54

## 2022-05-13 RX ADMIN — Medication 2 MILLIGRAM(S): at 02:31

## 2022-05-13 RX ADMIN — Medication 650 MILLIGRAM(S): at 14:39

## 2022-05-13 RX ADMIN — BUPROPION HYDROCHLORIDE 100 MILLIGRAM(S): 150 TABLET, EXTENDED RELEASE ORAL at 17:48

## 2022-05-13 RX ADMIN — Medication 1 PACKET(S): at 05:46

## 2022-05-13 RX ADMIN — Medication 40 MILLIEQUIVALENT(S): at 17:47

## 2022-05-13 RX ADMIN — PREGABALIN 1000 MICROGRAM(S): 225 CAPSULE ORAL at 10:38

## 2022-05-13 RX ADMIN — Medication 1.5 MILLIGRAM(S): at 14:13

## 2022-05-13 RX ADMIN — Medication 1.5 MILLIGRAM(S): at 17:47

## 2022-05-13 RX ADMIN — Medication 650 MILLIGRAM(S): at 15:09

## 2022-05-13 RX ADMIN — Medication 2 MILLIGRAM(S): at 10:42

## 2022-05-13 RX ADMIN — Medication 2 MILLIGRAM(S): at 05:39

## 2022-05-13 RX ADMIN — Medication 1 DROP(S): at 17:48

## 2022-05-13 NOTE — PROGRESS NOTE ADULT - PROBLEM SELECTOR PLAN 2
- reportedly on admission pt had  diffuse abdominal pain with occasional N/V, pt at this time denies abd pain, nausea and vomiting, reports 5 loose, BM, denies outside food,   - US abdomen noted, Cholelithiasis, without evidence of acute cholecystitis. Fatty infiltration of liver.  -will check stool studies   - start PPI - reportedly on admission pt had  diffuse abdominal pain with occasional N/V, pt at this time denies abd pain, nausea and vomiting, reports 3 loose, BM, denies outside food,   - US abdomen noted, Cholelithiasis, without evidence of acute cholecystitis. Fatty infiltration of liver.  -will check stool studies   - c/w PPI

## 2022-05-13 NOTE — DISCHARGE NOTE PROVIDER - CARE PROVIDER_API CALL
Carlita Miranda  FAMILY MEDICINE  [address] [place] [address]  Phone: [phone]  Fax: [phone]  Established Patient  Follow Up Time: 1 week

## 2022-05-13 NOTE — PROGRESS NOTE ADULT - PROBLEM SELECTOR PLAN 3
- likely due to alcohol abuse  - hepatitis panel negative   -  US abdomen  noted as above - likely due to alcohol abuse  - hepatitis panel negative   -  US abdomen  noted as above  -trend LFTs

## 2022-05-13 NOTE — PROGRESS NOTE ADULT - PROBLEM SELECTOR PLAN 1
- CIWA score on admission 7 --> 3 --> 7, s/p ativan 1 mg x2   -CIWA score overnight 11-8  - continue CIWA monitoring  - will start on Ativan taper   - MVI, folic acid  - thiamine 500 mg IV q8 for 3 days then daily  - hypokalemia and hypophosphatemia; will supplement , monitor BMP - CIWA score on admission 7 --> 3 --> 7, s/p ativan 1 mg x2   -CIWA score # 5-9  - continue CIWA monitoring  - c/w Ativan taper   - MVI, folic acid  - thiamine 500 mg IV q8 for 3 days then daily  - hypokalemia will supplement , and hypophosphatemia resolved  monitor BMP

## 2022-05-13 NOTE — PROGRESS NOTE ADULT - PROBLEM SELECTOR PLAN 4
- B/L LE swelling and pain R>L  - doppler negative for  DVT  -mild swelling may be secondary to amlodipine, will continue to monitor   - wound care consult for LE wounds

## 2022-05-13 NOTE — PROGRESS NOTE ADULT - SUBJECTIVE AND OBJECTIVE BOX
Patient is a 57y old  Male who presents with a chief complaint of "I drink too much." (13 May 2022 07:06)      SUBJECTIVE / OVERNIGHT EVENTS:  CIWA #5-9  MEDICATIONS  (STANDING):  amLODIPine   Tablet 10 milliGRAM(s) Oral daily  artificial tears (preservative free) Ophthalmic Solution 1 Drop(s) Both EYES two times a day  buPROPion SR (12-Hour) 100 milliGRAM(s) Oral daily  cholecalciferol 2000 Unit(s) Oral daily  cyanocobalamin 1000 MICROGram(s) Oral daily  dorzolamide 2%/timolol 0.5% Ophthalmic Solution 1 Drop(s) Both EYES two times a day  enoxaparin Injectable 40 milliGRAM(s) SubCutaneous every 24 hours  folic acid 1 milliGRAM(s) Oral daily  latanoprost 0.005% Ophthalmic Solution 1 Drop(s) Both EYES at bedtime  LORazepam     Tablet   Oral   LORazepam     Tablet 2 milliGRAM(s) Oral every 4 hours  LORazepam     Tablet 1.5 milliGRAM(s) Oral every 4 hours  multivitamin 1 Tablet(s) Oral daily  pantoprazole    Tablet 40 milliGRAM(s) Oral before breakfast  simvastatin 10 milliGRAM(s) Oral at bedtime  sodium chloride 0.9%. 1000 milliLiter(s) (75 mL/Hr) IV Continuous <Continuous>  thiamine IVPB 500 milliGRAM(s) IV Intermittent every 8 hours    MEDICATIONS  (PRN):  acetaminophen     Tablet .. 650 milliGRAM(s) Oral every 6 hours PRN Temp greater or equal to 38C (100.4F), Mild Pain (1 - 3)  LORazepam     Tablet 2 milliGRAM(s) Oral every 2 hours PRN CIWA-Ar score increase by 2 points and a total score of 7 or less  LORazepam   Injectable 2 milliGRAM(s) IV Push every 1 hour PRN CIWA-Ar score 8 or greater      Vital Signs Last 24 Hrs  T(C): 36.9 (13 May 2022 05:45), Max: 37 (12 May 2022 10:45)  T(F): 98.5 (13 May 2022 05:45), Max: 98.6 (12 May 2022 10:45)  HR: 68 (13 May 2022 05:45) (63 - 74)  BP: 132/86 (13 May 2022 05:45) (115/74 - 138/78)  BP(mean): --  RR: 17 (13 May 2022 05:45) (16 - 18)  SpO2: 98% (13 May 2022 05:45) (98% - 99%)  CAPILLARY BLOOD GLUCOSE        I&O's Summary      PHYSICAL EXAM:  GENERAL: NAD, well-developed  HEAD:  Atraumatic, Normocephalic  EYES: EOMI, PERRLA, conjunctiva and sclera clear  NECK: Supple, No JVD  CHEST/LUNG: Clear to auscultation bilaterally; No wheeze  HEART: Regular rate and rhythm; No murmurs, rubs, or gallops  ABDOMEN: Soft, Nontender, Nondistended; Bowel sounds present  EXTREMITIES:  2+ Peripheral Pulses, No clubbing, cyanosis, or edema  PSYCH: AAOx3  NEUROLOGY: non-focal  SKIN: No rashes or lesions    LABS:                        13.0   4.43  )-----------( 154      ( 13 May 2022 06:30 )             38.3     05-13    133<L>  |  99  |  7   ----------------------------<  114<H>  3.3<L>   |  21<L>  |  0.68    Ca    8.4      13 May 2022 06:30  Phos  3.1     05-13  Mg     1.70     05-13    TPro  6.7  /  Alb  3.6  /  TBili  0.9  /  DBili  x   /  AST  64<H>  /  ALT  46<H>  /  AlkPhos  59  05-12              RADIOLOGY & ADDITIONAL TESTS:    Imaging Personally Reviewed:    Consultant(s) Notes Reviewed:      Care Discussed with Consultants/Other Providers:   Patient is a 57y old  Male who presents with a chief complaint of "I drink too much." (13 May 2022 07:06)      SUBJECTIVE / OVERNIGHT EVENTS: patient seen and examined by bedside, pt feeling better overall , pt had 3 loose  BM since morning, watery, denies blood, , denies abdominal pain, nausea and  vomiting   CIWA #5-9  MEDICATIONS  (STANDING):  amLODIPine   Tablet 10 milliGRAM(s) Oral daily  artificial tears (preservative free) Ophthalmic Solution 1 Drop(s) Both EYES two times a day  buPROPion SR (12-Hour) 100 milliGRAM(s) Oral daily  cholecalciferol 2000 Unit(s) Oral daily  cyanocobalamin 1000 MICROGram(s) Oral daily  dorzolamide 2%/timolol 0.5% Ophthalmic Solution 1 Drop(s) Both EYES two times a day  enoxaparin Injectable 40 milliGRAM(s) SubCutaneous every 24 hours  folic acid 1 milliGRAM(s) Oral daily  latanoprost 0.005% Ophthalmic Solution 1 Drop(s) Both EYES at bedtime  LORazepam     Tablet   Oral   LORazepam     Tablet 2 milliGRAM(s) Oral every 4 hours  LORazepam     Tablet 1.5 milliGRAM(s) Oral every 4 hours  multivitamin 1 Tablet(s) Oral daily  pantoprazole    Tablet 40 milliGRAM(s) Oral before breakfast  simvastatin 10 milliGRAM(s) Oral at bedtime  sodium chloride 0.9%. 1000 milliLiter(s) (75 mL/Hr) IV Continuous <Continuous>  thiamine IVPB 500 milliGRAM(s) IV Intermittent every 8 hours    MEDICATIONS  (PRN):  acetaminophen     Tablet .. 650 milliGRAM(s) Oral every 6 hours PRN Temp greater or equal to 38C (100.4F), Mild Pain (1 - 3)  LORazepam     Tablet 2 milliGRAM(s) Oral every 2 hours PRN CIWA-Ar score increase by 2 points and a total score of 7 or less  LORazepam   Injectable 2 milliGRAM(s) IV Push every 1 hour PRN CIWA-Ar score 8 or greater      Vital Signs Last 24 Hrs  T(C): 36.9 (13 May 2022 05:45), Max: 37 (12 May 2022 10:45)  T(F): 98.5 (13 May 2022 05:45), Max: 98.6 (12 May 2022 10:45)  HR: 68 (13 May 2022 05:45) (63 - 74)  BP: 132/86 (13 May 2022 05:45) (115/74 - 138/78)  BP(mean): --  RR: 17 (13 May 2022 05:45) (16 - 18)  SpO2: 98% (13 May 2022 05:45) (98% - 99%)      PHYSICAL EXAM:  GENERAL: NAD, well-developed  HEAD:  Atraumatic, Normocephalic  EYES: EOMI, PERRLA, conjunctiva and sclera clear  NECK: Supple, No JVD  CHEST/LUNG: Clear to auscultation bilaterally; No wheeze  HEART: Regular rate and rhythm; No murmurs, rubs, or gallops  ABDOMEN: Soft, Nontender, Nondistended; Bowel sounds present  EXTREMITIES:  2+ Peripheral Pulses, No clubbing, cyanosis, or edema  PSYCH: AAOx3  NEUROLOGY: non-focal, tremors++  SKIN:  old healed lacerations on B/L shins with scabs     LABS:                        13.0   4.43  )-----------( 154      ( 13 May 2022 06:30 )             38.3     05-13    133<L>  |  99  |  7   ----------------------------<  114<H>  3.3<L>   |  21<L>  |  0.68    Ca    8.4      13 May 2022 06:30  Phos  3.1     05-13  Mg     1.70     05-13    TPro  6.7  /  Alb  3.6  /  TBili  0.9  /  DBili  x   /  AST  64<H>  /  ALT  46<H>  /  AlkPhos  59  05-12              RADIOLOGY & ADDITIONAL TESTS:    Imaging Personally Reviewed:    Consultant(s) Notes Reviewed:      Care Discussed with Consultants/Other Providers:

## 2022-05-13 NOTE — DISCHARGE NOTE PROVIDER - NSDCFUADDAPPT_GEN_ALL_CORE_FT
Follow up with your primary care physician for further monitoring in 1-2 weeks. Please call to arrange appointment.

## 2022-05-13 NOTE — DISCHARGE NOTE PROVIDER - NSDCMRMEDTOKEN_GEN_ALL_CORE_FT
buPROPion 100 mg/12 hours (SR) oral tablet, extended release: 1 tab(s) orally once a day  citalopram 20 mg oral tablet: 1 tab(s) orally once a day  folic acid 1 mg oral tablet: 1 tab(s) orally once a day  latanoprost 0.005% ophthalmic solution: 1 drop(s) to each affected eye once a day (in the evening)  Norvasc 10 mg oral tablet: 1 tab(s) orally once a day  Vitamin B-12 100 mcg oral tablet: 1 tab(s) orally once a day  Vitamin B12 1000 mcg oral tablet: 1 tab(s) orally once a day  Vitamin D3 50 mcg (2000 intl units) oral tablet: 1 tab(s) orally once a day  Zocor 10 mg oral tablet: 1 tab(s) orally once a day (at bedtime)   acetaminophen 325 mg oral tablet: 2 tab(s) orally every 6 hours, As needed, Temp greater or equal to 38C (100.4F), Mild Pain (1 - 3)  buPROPion 100 mg/12 hours (SR) oral tablet, extended release: 1 tab(s) orally once a day  citalopram 20 mg oral tablet: 1 tab(s) orally once a day  folic acid 1 mg oral tablet: 1 tab(s) orally once a day  latanoprost 0.005% ophthalmic solution: 1 drop(s) to each affected eye once a day (in the evening)  melatonin 3 mg oral tablet: 2 tab(s) orally once a day (at bedtime)  Multiple Vitamins oral tablet: 1 tab(s) orally once a day  Norvasc 10 mg oral tablet: 1 tab(s) orally once a day  ocular lubricant ophthalmic solution: 1 drop(s) to each affected eye 2 times a day  pantoprazole 40 mg oral delayed release tablet: 1 tab(s) orally once a day (before a meal)  thiamine 100 mg oral tablet: 1 tab(s) orally once a day  Vitamin B-12 100 mcg oral tablet: 1 tab(s) orally once a day  Vitamin B12 1000 mcg oral tablet: 1 tab(s) orally once a day  Vitamin D3 50 mcg (2000 intl units) oral tablet: 1 tab(s) orally once a day   acetaminophen 325 mg oral tablet: 2 tab(s) orally every 6 hours, As needed, Temp greater or equal to 38C (100.4F), Mild Pain (1 - 3)  buPROPion 100 mg/12 hours (SR) oral tablet, extended release: 1 tab(s) orally once a day  citalopram 20 mg oral tablet: 1 tab(s) orally once a day  folic acid 1 mg oral tablet: 1 tab(s) orally once a day  latanoprost 0.005% ophthalmic solution: 1 drop(s) to each affected eye once a day (in the evening)  melatonin 3 mg oral tablet: 2 tab(s) orally once a day (at bedtime)  Multiple Vitamins oral tablet: 1 tab(s) orally once a day  Norvasc 10 mg oral tablet: 1 tab(s) orally once a day  ocular lubricant ophthalmic solution: 1 drop(s) to each affected eye 2 times a day  pantoprazole 40 mg oral delayed release tablet: 1 tab(s) orally once a day (before a meal)  Vitamin B12 1000 mcg oral tablet: 1 tab(s) orally once a day  Vitamin D3 50 mcg (2000 intl units) oral tablet: 1 tab(s) orally once a day

## 2022-05-13 NOTE — DISCHARGE NOTE PROVIDER - NSDCCPCAREPLAN_GEN_ALL_CORE_FT
PRINCIPAL DISCHARGE DIAGNOSIS  Diagnosis: Alcohol withdrawal  Assessment and Plan of Treatment: abstain from alcohol      SECONDARY DISCHARGE DIAGNOSES  Diagnosis: Leg swelling  Assessment and Plan of Treatment: BLE scabs- apply liquid barrier film daily.       Diagnosis: Elevated LFTs  Assessment and Plan of Treatment:      PRINCIPAL DISCHARGE DIAGNOSIS  Diagnosis: Alcohol withdrawal  Assessment and Plan of Treatment: You were treated with an Ativan taper. Abstain from alcohol.      SECONDARY DISCHARGE DIAGNOSES  Diagnosis: Leg swelling  Assessment and Plan of Treatment: You had an ultrasound of the legs which was negative for blood clot. Apply liquid barrier film daily to lower extremity scabs.       Diagnosis: Elevated LFTs  Assessment and Plan of Treatment: Your statin therapy was discontinued due to elevated liver enzymes.    Diagnosis: HTN (hypertension)  Assessment and Plan of Treatment: Low sodium and fat diet, continue anti-hypertensive medication norvasc, and follow up with primary care physician.      Diagnosis: Abdominal pain  Assessment and Plan of Treatment: Your stool studies were negative for infection.     PRINCIPAL DISCHARGE DIAGNOSIS  Diagnosis: Alcohol withdrawal  Assessment and Plan of Treatment: You were treated with an Ativan taper. Abstain from alcohol.      SECONDARY DISCHARGE DIAGNOSES  Diagnosis: Leg swelling  Assessment and Plan of Treatment: You had an ultrasound of the legs which was negative for blood clot. Apply liquid barrier film daily to lower extremity scabs.       Diagnosis: Elevated LFTs  Assessment and Plan of Treatment: Your statin therapy was discontinued due to elevated liver enzymes. Follow up with your doctor within 1 week for repeat blood work to monitor for resolution and to resume medication    Diagnosis: HTN (hypertension)  Assessment and Plan of Treatment: Low sodium and fat diet, continue anti-hypertensive medication norvasc, and follow up with primary care physician.      Diagnosis: Abdominal pain  Assessment and Plan of Treatment: Your stool studies were negative for infection.     PRINCIPAL DISCHARGE DIAGNOSIS  Diagnosis: Alcohol withdrawal  Assessment and Plan of Treatment: You were treated with an Ativan taper. Please refrain from alcohol as much as possible to optimize your health and prevent adverse events. Return to the ED if you develop any of the following: Shaking/tremors, confusion, irritability, difficulty staying awake/sleeping, fevers, hallucinations, or seizures. Continue taking your daily multivitamins as directed and follow up with your PCP or psychiatrist for further evaluation and medical management. If you need immediate assistance with substance abuse you may contact the [facility] by calling [phone] open 9am-7pm.        SECONDARY DISCHARGE DIAGNOSES  Diagnosis: Leg swelling  Assessment and Plan of Treatment: You had an ultrasound of the legs which was negative for blood clot. Apply liquid barrier film daily to lower extremity scabs. Follow up with your primary doctor. Return to ER if any calf pain , redness, chest pain or SOB       Diagnosis: Elevated LFTs  Assessment and Plan of Treatment: Your Zocor was discontinued due to elevated liver enzymes. Follow up with your doctor within 1 week for repeat blood work to monitor for resolution and to resume medication    Diagnosis: HTN (hypertension)  Assessment and Plan of Treatment: Continue blood pressure medication regimen as directed. Monitor for any visual changes, headaches or dizziness.  Monitor blood pressure regularly.  Follow up with your PCP for further management for high blood pressure.      Diagnosis: Abdominal pain  Assessment and Plan of Treatment: Your stool studies were negative for infection. Return to ER if any diarrhea, constipation , nausea vomiting or abdominal pain , rectal bleeding or blood from mouth, follow up with your primary doctor in 3 days     PRINCIPAL DISCHARGE DIAGNOSIS  Diagnosis: Alcohol withdrawal  Assessment and Plan of Treatment: You were treated with an Ativan taper. Please refrain from alcohol as much as possible to optimize your health and prevent adverse events. Return to the ED if you develop any of the following: Shaking/tremors, confusion, irritability, difficulty staying awake/sleeping, fevers, hallucinations, or seizures. Continue taking your daily multivitamins as directed and follow up with your PCP or psychiatrist for further evaluation and medical management. If you need immediate assistance with substance abuse you may contact the [facility] by calling [phone] open 9am-7pm.        SECONDARY DISCHARGE DIAGNOSES  Diagnosis: Leg swelling  Assessment and Plan of Treatment: You had an ultrasound of the legs which was negative for blood clot. Apply liquid barrier film daily to lower extremity scabs. Follow up with your primary doctor. Return to ER if any calf pain , redness, chest pain or SOB       Diagnosis: Elevated LFTs  Assessment and Plan of Treatment: Your Zocor was discontinued due to elevated liver enzymes. Follow up with your doctor within 1 week for repeat blood work to monitor for resolution and to resume medication    Diagnosis: HTN (hypertension)  Assessment and Plan of Treatment: Continue blood pressure medication regimen as directed. Monitor for any visual changes, headaches or dizziness.  Monitor blood pressure regularly.  Follow up with your PCP for further management for high blood pressure.      Diagnosis: Abdominal pain  Assessment and Plan of Treatment: Your stool studies were negative for infection. Return to ER if any diarrhea, constipation , nausea vomiting or abdominal pain , rectal bleeding or blood from mouth, follow up with your primary doctor in 3 days    Diagnosis: Major depression  Assessment and Plan of Treatment: Please follow up with your primary care within in 1-2 weeks for further medical management. Go to ER immediately if you feel suicidal or if you need to talk to someone.  Exercise 30 minutes daily as tolerated.

## 2022-05-13 NOTE — DISCHARGE NOTE PROVIDER - HOSPITAL COURSE
57 year old male with PMH of HTN, glaucoma, alcohol abuse is here for alcohol withdrawal symptoms. 57 year old male with PMH of HTN, glaucoma, alcohol abuse is here for alcohol withdrawal symptoms.       ETOH abuse   - CIWA protocol w/ ativan taper 5/13-5/16, ativan PRN   - MVI, folic acid  - thiamine 500 mg IV q8 for 3 days then daily.    Abdominal pain  - US abdomen - Cholelithiasis, without evidence of acute cholecystitis. Fatty infiltration of liver.  - stool studies negative   - started PPI    Elevated LFTs  - likely due to alcohol abuse  - hepatitis panel negative   - US abdomen - Cholelithiasis, without evidence of acute cholecystitis. Fatty infiltration of liver.  - statin discontinued     Leg swelling  - B/L LE swelling and pain R>L  - US LE - no DVT    HTN  - norvasc     On ___ this case was reviewed with  ____, the patient is medically stable and optimized for discharge. All medications were reviewed and prescriptions were sent to mutually agreed upon pharmacy. 57 year old male with PMH of HTN, glaucoma, alcohol abuse is here for alcohol withdrawal symptoms.       ETOH abuse   - CIWA protocol w/ ativan taper 5/13-5/16, ativan PRN   - MVI, folic acid  - thiamine 500 mg IV every 8 hours for 3 days then daily.    Abdominal pain  - US abdomen - Cholelithiasis, without evidence of acute cholecystitis. Fatty infiltration of liver.  - stool studies negative   - started PPI    Elevated LFTs  - likely due to alcohol abuse  - hepatitis panel negative   - US abdomen - Cholelithiasis, without evidence of acute cholecystitis. Fatty infiltration of liver.  - statin discontinued     Leg swelling  - B/L LE swelling and pain R>L  - US LE - no DVT    HTN  - norvasc     On 5/16/22 this case was reviewed with Dr. Watts , the patient is medically stable and optimized for discharge. All medications were reviewed and prescriptions were sent to mutually agreed upon pharmacy. 57 year old male with PMH of HTN, glaucoma, alcohol abuse is here for alcohol withdrawal symptoms.       ETOH abuse   - CIWA protocol w/ ativan taper 5/13-5/16, ativan PRN   - MVI, folic acid  - thiamine 500 mg IV every 8 hours for 3 days then daily.    Abdominal pain  - US abdomen - Cholelithiasis, without evidence of acute cholecystitis. Fatty infiltration of liver.  - stool studies negative   - started PPI    Elevated LFTs  - likely due to alcohol abuse  - hepatitis panel negative   - US abdomen - Cholelithiasis, without evidence of acute cholecystitis. Fatty infiltration of liver.  - statin discontinued     Leg swelling  - B/L LE swelling and pain R>L  - US LE - no DVT    HTN  - Continue norvasc     On 5/16/22 this case was reviewed with Dr. Watts , the patient is medically stable and optimized for discharge. All medications were reviewed and prescriptions were sent to mutually agreed upon pharmacy. 3 year old male with PMH of HTN, glaucoma, alcohol abuse is here for alcohol withdrawal symptoms.       ETOH abuse   - CIWA protocol w/ ativan taper 5/13-5/16, ativan PRN   - MVI, folic acid  - thiamine 500 mg IV every 8 hours for 3 days then daily.    Abdominal pain  - US abdomen - Cholelithiasis, without evidence of acute cholecystitis. Fatty infiltration of liver.  - stool studies negative   - started PPI    Elevated LFTs  - likely due to alcohol abuse  - hepatitis panel negative   - US abdomen - Cholelithiasis, without evidence of acute cholecystitis. Fatty infiltration of liver.  - statin discontinued     Leg swelling  - B/L LE swelling and pain R>L  - US LE - no DVT    HTN  - Continue norvasc     On 5/16/22 this case was reviewed with Dr. Watts , the patient is medically stable and optimized for discharge. All medications were reviewed and prescriptions were sent to mutually agreed upon pharmacy.

## 2022-05-14 LAB
ALBUMIN SERPL ELPH-MCNC: 3.8 G/DL — SIGNIFICANT CHANGE UP (ref 3.3–5)
ALP SERPL-CCNC: 58 U/L — SIGNIFICANT CHANGE UP (ref 40–120)
ALT FLD-CCNC: 54 U/L — HIGH (ref 4–41)
ANION GAP SERPL CALC-SCNC: 13 MMOL/L — SIGNIFICANT CHANGE UP (ref 7–14)
AST SERPL-CCNC: 93 U/L — HIGH (ref 4–40)
BILIRUB SERPL-MCNC: 0.6 MG/DL — SIGNIFICANT CHANGE UP (ref 0.2–1.2)
BUN SERPL-MCNC: 8 MG/DL — SIGNIFICANT CHANGE UP (ref 7–23)
CALCIUM SERPL-MCNC: 8.5 MG/DL — SIGNIFICANT CHANGE UP (ref 8.4–10.5)
CHLORIDE SERPL-SCNC: 104 MMOL/L — SIGNIFICANT CHANGE UP (ref 98–107)
CO2 SERPL-SCNC: 20 MMOL/L — LOW (ref 22–31)
CREAT SERPL-MCNC: 0.73 MG/DL — SIGNIFICANT CHANGE UP (ref 0.5–1.3)
EGFR: 105 ML/MIN/1.73M2 — SIGNIFICANT CHANGE UP
GLUCOSE SERPL-MCNC: 98 MG/DL — SIGNIFICANT CHANGE UP (ref 70–99)
MAGNESIUM SERPL-MCNC: 1.7 MG/DL — SIGNIFICANT CHANGE UP (ref 1.6–2.6)
PHOSPHATE SERPL-MCNC: 2.9 MG/DL — SIGNIFICANT CHANGE UP (ref 2.5–4.5)
POTASSIUM SERPL-MCNC: 3.4 MMOL/L — LOW (ref 3.5–5.3)
POTASSIUM SERPL-SCNC: 3.4 MMOL/L — LOW (ref 3.5–5.3)
PROT SERPL-MCNC: 7.4 G/DL — SIGNIFICANT CHANGE UP (ref 6–8.3)
SODIUM SERPL-SCNC: 137 MMOL/L — SIGNIFICANT CHANGE UP (ref 135–145)

## 2022-05-14 PROCEDURE — 99232 SBSQ HOSP IP/OBS MODERATE 35: CPT

## 2022-05-14 RX ORDER — POTASSIUM CHLORIDE 20 MEQ
40 PACKET (EA) ORAL ONCE
Refills: 0 | Status: COMPLETED | OUTPATIENT
Start: 2022-05-14 | End: 2022-05-14

## 2022-05-14 RX ORDER — MAGNESIUM OXIDE 400 MG ORAL TABLET 241.3 MG
400 TABLET ORAL
Refills: 0 | Status: COMPLETED | OUTPATIENT
Start: 2022-05-14 | End: 2022-05-15

## 2022-05-14 RX ADMIN — Medication 650 MILLIGRAM(S): at 19:43

## 2022-05-14 RX ADMIN — Medication 1 MILLIGRAM(S): at 12:11

## 2022-05-14 RX ADMIN — PREGABALIN 1000 MICROGRAM(S): 225 CAPSULE ORAL at 12:12

## 2022-05-14 RX ADMIN — Medication 40 MILLIEQUIVALENT(S): at 09:45

## 2022-05-14 RX ADMIN — MAGNESIUM OXIDE 400 MG ORAL TABLET 400 MILLIGRAM(S): 241.3 TABLET ORAL at 13:34

## 2022-05-14 RX ADMIN — AMLODIPINE BESYLATE 10 MILLIGRAM(S): 2.5 TABLET ORAL at 05:59

## 2022-05-14 RX ADMIN — Medication 40 MILLIEQUIVALENT(S): at 05:57

## 2022-05-14 RX ADMIN — Medication 1.5 MILLIGRAM(S): at 02:14

## 2022-05-14 RX ADMIN — Medication 1 MILLIGRAM(S): at 21:46

## 2022-05-14 RX ADMIN — Medication 1 MILLIGRAM(S): at 17:31

## 2022-05-14 RX ADMIN — Medication 2000 UNIT(S): at 12:11

## 2022-05-14 RX ADMIN — Medication 1.5 MILLIGRAM(S): at 05:57

## 2022-05-14 RX ADMIN — Medication 1 DROP(S): at 17:27

## 2022-05-14 RX ADMIN — Medication 1 TABLET(S): at 12:10

## 2022-05-14 RX ADMIN — Medication 650 MILLIGRAM(S): at 20:15

## 2022-05-14 RX ADMIN — BUPROPION HYDROCHLORIDE 100 MILLIGRAM(S): 150 TABLET, EXTENDED RELEASE ORAL at 19:43

## 2022-05-14 RX ADMIN — LATANOPROST 1 DROP(S): 0.05 SOLUTION/ DROPS OPHTHALMIC; TOPICAL at 21:46

## 2022-05-14 RX ADMIN — MAGNESIUM OXIDE 400 MG ORAL TABLET 400 MILLIGRAM(S): 241.3 TABLET ORAL at 17:27

## 2022-05-14 RX ADMIN — SIMVASTATIN 10 MILLIGRAM(S): 20 TABLET, FILM COATED ORAL at 21:46

## 2022-05-14 RX ADMIN — PANTOPRAZOLE SODIUM 40 MILLIGRAM(S): 20 TABLET, DELAYED RELEASE ORAL at 06:01

## 2022-05-14 RX ADMIN — Medication 1 DROP(S): at 05:59

## 2022-05-14 RX ADMIN — ENOXAPARIN SODIUM 40 MILLIGRAM(S): 100 INJECTION SUBCUTANEOUS at 05:58

## 2022-05-14 RX ADMIN — Medication 1 MILLIGRAM(S): at 13:34

## 2022-05-14 RX ADMIN — Medication 100 MILLIGRAM(S): at 12:11

## 2022-05-14 RX ADMIN — DORZOLAMIDE HYDROCHLORIDE TIMOLOL MALEATE 1 DROP(S): 20; 5 SOLUTION/ DROPS OPHTHALMIC at 17:27

## 2022-05-14 RX ADMIN — Medication 105 MILLIGRAM(S): at 05:57

## 2022-05-14 RX ADMIN — Medication 6 MILLIGRAM(S): at 21:46

## 2022-05-14 RX ADMIN — Medication 1.5 MILLIGRAM(S): at 09:44

## 2022-05-14 RX ADMIN — DORZOLAMIDE HYDROCHLORIDE TIMOLOL MALEATE 1 DROP(S): 20; 5 SOLUTION/ DROPS OPHTHALMIC at 06:00

## 2022-05-14 NOTE — PROGRESS NOTE ADULT - PROBLEM SELECTOR PLAN 2
- reportedly on admission pt had  diffuse abdominal pain with occasional N/V, pt at this time denies abd pain, nausea and vomiting, reports 3 loose, BM, denies outside food,   - US abdomen noted, Cholelithiasis, without evidence of acute cholecystitis. Fatty infiltration of liver.  -will check stool studies   - c/w PPI - reportedly on admission pt had  diffuse abdominal pain with occasional N/V, pt at this time denies abd pain, nausea and vomiting, reports 3 loose, BM, denies outside food,   - US abdomen noted, Cholelithiasis, without evidence of acute cholecystitis. Fatty infiltration of liver.  -will check stool studies , stool for CDIFF negative, f/u stool cx   - c/w PPI

## 2022-05-14 NOTE — PROGRESS NOTE ADULT - SUBJECTIVE AND OBJECTIVE BOX
Patient is a 57y old  Male who presents with a chief complaint of "I drink too much." (13 May 2022 08:31)      SUBJECTIVE / OVERNIGHT EVENTS:    MEDICATIONS  (STANDING):  amLODIPine   Tablet 10 milliGRAM(s) Oral daily  artificial tears (preservative free) Ophthalmic Solution 1 Drop(s) Both EYES two times a day  buPROPion SR (12-Hour) 100 milliGRAM(s) Oral daily  cholecalciferol 2000 Unit(s) Oral daily  cyanocobalamin 1000 MICROGram(s) Oral daily  dorzolamide 2%/timolol 0.5% Ophthalmic Solution 1 Drop(s) Both EYES two times a day  enoxaparin Injectable 40 milliGRAM(s) SubCutaneous every 24 hours  folic acid 1 milliGRAM(s) Oral daily  latanoprost 0.005% Ophthalmic Solution 1 Drop(s) Both EYES at bedtime  LORazepam     Tablet   Oral   LORazepam     Tablet 1.5 milliGRAM(s) Oral every 4 hours  LORazepam     Tablet 1 milliGRAM(s) Oral every 4 hours  melatonin 6 milliGRAM(s) Oral at bedtime  multivitamin 1 Tablet(s) Oral daily  pantoprazole    Tablet 40 milliGRAM(s) Oral before breakfast  simvastatin 10 milliGRAM(s) Oral at bedtime  sodium chloride 0.9%. 1000 milliLiter(s) (75 mL/Hr) IV Continuous <Continuous>  thiamine 100 milliGRAM(s) Oral daily    MEDICATIONS  (PRN):  acetaminophen     Tablet .. 650 milliGRAM(s) Oral every 6 hours PRN Temp greater or equal to 38C (100.4F), Mild Pain (1 - 3)  LORazepam     Tablet 2 milliGRAM(s) Oral every 2 hours PRN CIWA-Ar score increase by 2 points and a total score of 7 or less  LORazepam   Injectable 2 milliGRAM(s) IV Push every 1 hour PRN CIWA-Ar score 8 or greater      Vital Signs Last 24 Hrs  T(C): 36.9 (14 May 2022 05:53), Max: 36.9 (14 May 2022 02:00)  T(F): 98.5 (14 May 2022 05:53), Max: 98.5 (14 May 2022 05:53)  HR: 64 (14 May 2022 05:53) (62 - 73)  BP: 114/62 (14 May 2022 05:53) (107/65 - 128/74)  BP(mean): --  RR: 17 (14 May 2022 05:53) (17 - 18)  SpO2: 97% (14 May 2022 05:53) (97% - 99%)  CAPILLARY BLOOD GLUCOSE        I&O's Summary      PHYSICAL EXAM:  GENERAL: NAD, well-developed  HEAD:  Atraumatic, Normocephalic  EYES: EOMI, PERRLA, conjunctiva and sclera clear  NECK: Supple, No JVD  CHEST/LUNG: Clear to auscultation bilaterally; No wheeze  HEART: Regular rate and rhythm; No murmurs, rubs, or gallops  ABDOMEN: Soft, Nontender, Nondistended; Bowel sounds present  EXTREMITIES:  2+ Peripheral Pulses, No clubbing, cyanosis, or edema  PSYCH: AAOx3  NEUROLOGY: non-focal  SKIN: No rashes or lesions    LABS:                        13.0   4.43  )-----------( 154      ( 13 May 2022 06:30 )             38.3     05-14    137  |  104  |  8   ----------------------------<  98  3.4<L>   |  20<L>  |  0.73    Ca    8.5      14 May 2022 06:00  Phos  2.9     05-14  Mg     1.70     05-14    TPro  7.4  /  Alb  3.8  /  TBili  0.6  /  DBili  x   /  AST  93<H>  /  ALT  54<H>  /  AlkPhos  58  05-14              RADIOLOGY & ADDITIONAL TESTS:    Imaging Personally Reviewed:    Consultant(s) Notes Reviewed:      Care Discussed with Consultants/Other Providers:   Patient is a 57y old  Male who presents with a chief complaint of "I drink too much." (13 May 2022 08:31)      SUBJECTIVE / OVERNIGHT EVENTS: patient seen and examined by bedside, feeling better , diarrhea improving denies headache, dizziness, SOB, CP, Palpitations , N/V/, abdominal pain  CIWA# 2-5      MEDICATIONS  (STANDING):  amLODIPine   Tablet 10 milliGRAM(s) Oral daily  artificial tears (preservative free) Ophthalmic Solution 1 Drop(s) Both EYES two times a day  buPROPion SR (12-Hour) 100 milliGRAM(s) Oral daily  cholecalciferol 2000 Unit(s) Oral daily  cyanocobalamin 1000 MICROGram(s) Oral daily  dorzolamide 2%/timolol 0.5% Ophthalmic Solution 1 Drop(s) Both EYES two times a day  enoxaparin Injectable 40 milliGRAM(s) SubCutaneous every 24 hours  folic acid 1 milliGRAM(s) Oral daily  latanoprost 0.005% Ophthalmic Solution 1 Drop(s) Both EYES at bedtime  LORazepam     Tablet   Oral   LORazepam     Tablet 1.5 milliGRAM(s) Oral every 4 hours  LORazepam     Tablet 1 milliGRAM(s) Oral every 4 hours  melatonin 6 milliGRAM(s) Oral at bedtime  multivitamin 1 Tablet(s) Oral daily  pantoprazole    Tablet 40 milliGRAM(s) Oral before breakfast  simvastatin 10 milliGRAM(s) Oral at bedtime  sodium chloride 0.9%. 1000 milliLiter(s) (75 mL/Hr) IV Continuous <Continuous>  thiamine 100 milliGRAM(s) Oral daily    MEDICATIONS  (PRN):  acetaminophen     Tablet .. 650 milliGRAM(s) Oral every 6 hours PRN Temp greater or equal to 38C (100.4F), Mild Pain (1 - 3)  LORazepam     Tablet 2 milliGRAM(s) Oral every 2 hours PRN CIWA-Ar score increase by 2 points and a total score of 7 or less  LORazepam   Injectable 2 milliGRAM(s) IV Push every 1 hour PRN CIWA-Ar score 8 or greater      Vital Signs Last 24 Hrs  T(C): 36.9 (14 May 2022 05:53), Max: 36.9 (14 May 2022 02:00)  T(F): 98.5 (14 May 2022 05:53), Max: 98.5 (14 May 2022 05:53)  HR: 64 (14 May 2022 05:53) (62 - 73)  BP: 114/62 (14 May 2022 05:53) (107/65 - 128/74)  BP(mean): --  RR: 17 (14 May 2022 05:53) (17 - 18)  SpO2: 97% (14 May 2022 05:53) (97% - 99%)      PHYSICAL EXAM:  GENERAL: NAD, well-developed  HEAD:  Atraumatic, Normocephalic  EYES: EOMI, PERRLA, conjunctiva and sclera clear  NECK: Supple, No JVD  CHEST/LUNG: Clear to auscultation bilaterally; No wheeze  HEART: Regular rate and rhythm; No murmurs, rubs, or gallops  ABDOMEN: Soft, Nontender, Nondistended; Bowel sounds present  EXTREMITIES:  2+ Peripheral Pulses, No clubbing, cyanosis, or edema  PSYCH: AAOx3  NEUROLOGY: non-focal, tremors+  SKIN:  old healed lacerations on B/L shins with scabs       LABS:                        13.0   4.43  )-----------( 154      ( 13 May 2022 06:30 )             38.3     05-14    137  |  104  |  8   ----------------------------<  98  3.4<L>   |  20<L>  |  0.73    Ca    8.5      14 May 2022 06:00  Phos  2.9     05-14  Mg     1.70     05-14    TPro  7.4  /  Alb  3.8  /  TBili  0.6  /  DBili  x   /  AST  93<H>  /  ALT  54<H>  /  AlkPhos  58  05-14              RADIOLOGY & ADDITIONAL TESTS:    Imaging Personally Reviewed:    Consultant(s) Notes Reviewed:      Care Discussed with Consultants/Other Providers:

## 2022-05-14 NOTE — PROGRESS NOTE ADULT - PROBLEM SELECTOR PLAN 1
Refill Authorization Note   Winnie Ngo  is requesting a refill authorization.  Brief Assessment and Rationale for Refill:  Approve     Medication Therapy Plan:       Medication Reconciliation Completed: No   Comments:   --->Care Gap information included below if applicable.   Orders Placed This Encounter    pantoprazole (PROTONIX) 20 MG tablet      Requested Prescriptions   Signed Prescriptions Disp Refills    pantoprazole (PROTONIX) 20 MG tablet 90 tablet 3     Sig: Take 1 tablet (20 mg total) by mouth once daily.       Gastroenterology: Proton Pump Inhibitors 2 Passed - 12/30/2021  9:57 AM        Passed - Patient is at least 18 years old        Passed - Osteoporosis is not on problem list        Passed - An appropriate indication is on the problem list        Passed - Office visit in past 12 months.     Recent Visits  Date Type Provider Dept   11/08/21 Office Visit Manuela Merchant MD [facility]   05/06/21 Office Visit Manuela Merchant MD [facility]   08/19/20 Office Visit Manuela Merchant MD [facility]   Showing recent visits within past 720 days and meeting all other requirements  Future Appointments  No visits were found meeting these conditions.  Showing future appointments within next 150 days and meeting all other requirements      Future Appointments              In 3 days [facility] DEXA1 LIMIT [facility] [place] [facility]    In 1 week ISAAC SALDIVAR MAMMO4 [facility], [place]    In 3 weeks Gill Campbell MD [place] [facility]    In 1 month [facility] [place] [facility]                    Appointments  past 12m or future 3m with PCP    Date Provider   Last Visit   11/8/2021 Manuela Merchant MD   Next Visit   12/30/2021 Manuela Merchant MD   ED visits in past 90 days: 0     Note composed:6:12 PM 01/01/2022          - CIWA score on admission 7 --> 3 --> 7, s/p ativan 1 mg x2   -CIWA score # 5-9  - continue CIWA monitoring  - c/w Ativan taper   - MVI, folic acid  - thiamine 500 mg IV q8 for 3 days then daily  - hypokalemia will supplement , and hypophosphatemia resolved  monitor BMP - CIWA score on admission 7 --> 3 --> 7, s/p ativan 1 mg x2   -CIWA score # 2-5   - continue CIWA monitoring  - c/w Ativan taper   - MVI, folic acid  - thiamine 500 mg IV q8 for 3 days then daily  - hypokalemia will supplement ,  monitor BMP

## 2022-05-14 NOTE — PROGRESS NOTE ADULT - PROBLEM SELECTOR PLAN 3
- likely due to alcohol abuse  - hepatitis panel negative   -  US abdomen  noted as above  -trend LFTs

## 2022-05-14 NOTE — PROGRESS NOTE ADULT - NSPROGADDITIONALINFOA_GEN_ALL_CORE
D/w pt and ACP
dispo : after completing ativan taper   case d/w pt and ACP
plan of care d/w pt and ACP

## 2022-05-14 NOTE — PROGRESS NOTE ADULT - PROBLEM SELECTOR PLAN 4
- B/L LE swelling and pain R>L  - doppler negative for  DVT  -mild swelling may be secondary to amlodipine, will continue to monitor   - wound care consult for LE wounds - B/L LE swelling and pain R>L  - doppler negative for  DVT  -mild swelling may be secondary to amlodipine, will continue to monitor

## 2022-05-15 LAB
ALBUMIN SERPL ELPH-MCNC: 3.9 G/DL — SIGNIFICANT CHANGE UP (ref 3.3–5)
ALP SERPL-CCNC: 59 U/L — SIGNIFICANT CHANGE UP (ref 40–120)
ALT FLD-CCNC: 111 U/L — HIGH (ref 4–41)
ANION GAP SERPL CALC-SCNC: 12 MMOL/L — SIGNIFICANT CHANGE UP (ref 7–14)
AST SERPL-CCNC: 166 U/L — HIGH (ref 4–40)
BILIRUB SERPL-MCNC: 0.5 MG/DL — SIGNIFICANT CHANGE UP (ref 0.2–1.2)
BUN SERPL-MCNC: 9 MG/DL — SIGNIFICANT CHANGE UP (ref 7–23)
CALCIUM SERPL-MCNC: 9 MG/DL — SIGNIFICANT CHANGE UP (ref 8.4–10.5)
CHLORIDE SERPL-SCNC: 102 MMOL/L — SIGNIFICANT CHANGE UP (ref 98–107)
CO2 SERPL-SCNC: 21 MMOL/L — LOW (ref 22–31)
CREAT SERPL-MCNC: 0.68 MG/DL — SIGNIFICANT CHANGE UP (ref 0.5–1.3)
CULTURE RESULTS: SIGNIFICANT CHANGE UP
EGFR: 107 ML/MIN/1.73M2 — SIGNIFICANT CHANGE UP
GLUCOSE SERPL-MCNC: 93 MG/DL — SIGNIFICANT CHANGE UP (ref 70–99)
MAGNESIUM SERPL-MCNC: 1.8 MG/DL — SIGNIFICANT CHANGE UP (ref 1.6–2.6)
PHOSPHATE SERPL-MCNC: 3.3 MG/DL — SIGNIFICANT CHANGE UP (ref 2.5–4.5)
POTASSIUM SERPL-MCNC: 3.2 MMOL/L — LOW (ref 3.5–5.3)
POTASSIUM SERPL-SCNC: 3.2 MMOL/L — LOW (ref 3.5–5.3)
PROT SERPL-MCNC: 7.5 G/DL — SIGNIFICANT CHANGE UP (ref 6–8.3)
SODIUM SERPL-SCNC: 135 MMOL/L — SIGNIFICANT CHANGE UP (ref 135–145)
SPECIMEN SOURCE: SIGNIFICANT CHANGE UP

## 2022-05-15 PROCEDURE — 99232 SBSQ HOSP IP/OBS MODERATE 35: CPT

## 2022-05-15 RX ORDER — POTASSIUM CHLORIDE 20 MEQ
40 PACKET (EA) ORAL ONCE
Refills: 0 | Status: COMPLETED | OUTPATIENT
Start: 2022-05-15 | End: 2022-05-15

## 2022-05-15 RX ORDER — POTASSIUM CHLORIDE 20 MEQ
40 PACKET (EA) ORAL EVERY 4 HOURS
Refills: 0 | Status: DISCONTINUED | OUTPATIENT
Start: 2022-05-15 | End: 2022-05-16

## 2022-05-15 RX ADMIN — Medication 1 DROP(S): at 06:10

## 2022-05-15 RX ADMIN — MAGNESIUM OXIDE 400 MG ORAL TABLET 400 MILLIGRAM(S): 241.3 TABLET ORAL at 12:16

## 2022-05-15 RX ADMIN — Medication 0.5 MILLIGRAM(S): at 13:39

## 2022-05-15 RX ADMIN — Medication 40 MILLIEQUIVALENT(S): at 10:40

## 2022-05-15 RX ADMIN — Medication 650 MILLIGRAM(S): at 23:00

## 2022-05-15 RX ADMIN — Medication 2000 UNIT(S): at 13:39

## 2022-05-15 RX ADMIN — Medication 650 MILLIGRAM(S): at 06:09

## 2022-05-15 RX ADMIN — ENOXAPARIN SODIUM 40 MILLIGRAM(S): 100 INJECTION SUBCUTANEOUS at 06:10

## 2022-05-15 RX ADMIN — PANTOPRAZOLE SODIUM 40 MILLIGRAM(S): 20 TABLET, DELAYED RELEASE ORAL at 06:10

## 2022-05-15 RX ADMIN — AMLODIPINE BESYLATE 10 MILLIGRAM(S): 2.5 TABLET ORAL at 06:10

## 2022-05-15 RX ADMIN — Medication 1 TABLET(S): at 12:16

## 2022-05-15 RX ADMIN — Medication 650 MILLIGRAM(S): at 07:02

## 2022-05-15 RX ADMIN — LATANOPROST 1 DROP(S): 0.05 SOLUTION/ DROPS OPHTHALMIC; TOPICAL at 22:24

## 2022-05-15 RX ADMIN — Medication 6 MILLIGRAM(S): at 22:24

## 2022-05-15 RX ADMIN — Medication 0.5 MILLIGRAM(S): at 22:20

## 2022-05-15 RX ADMIN — Medication 1 MILLIGRAM(S): at 06:08

## 2022-05-15 RX ADMIN — PREGABALIN 1000 MICROGRAM(S): 225 CAPSULE ORAL at 12:16

## 2022-05-15 RX ADMIN — DORZOLAMIDE HYDROCHLORIDE TIMOLOL MALEATE 1 DROP(S): 20; 5 SOLUTION/ DROPS OPHTHALMIC at 06:11

## 2022-05-15 RX ADMIN — Medication 650 MILLIGRAM(S): at 22:19

## 2022-05-15 RX ADMIN — BUPROPION HYDROCHLORIDE 100 MILLIGRAM(S): 150 TABLET, EXTENDED RELEASE ORAL at 17:36

## 2022-05-15 RX ADMIN — Medication 1 MILLIGRAM(S): at 02:25

## 2022-05-15 RX ADMIN — Medication 100 MILLIGRAM(S): at 12:16

## 2022-05-15 RX ADMIN — Medication 0.5 MILLIGRAM(S): at 17:34

## 2022-05-15 RX ADMIN — Medication 1 MILLIGRAM(S): at 12:17

## 2022-05-15 RX ADMIN — Medication 1 MILLIGRAM(S): at 10:39

## 2022-05-15 RX ADMIN — Medication 40 MILLIEQUIVALENT(S): at 13:40

## 2022-05-15 RX ADMIN — DORZOLAMIDE HYDROCHLORIDE TIMOLOL MALEATE 1 DROP(S): 20; 5 SOLUTION/ DROPS OPHTHALMIC at 17:35

## 2022-05-15 NOTE — PROGRESS NOTE ADULT - PROBLEM SELECTOR PLAN 5
- blood pressure stable  - resume norvasc with hold parameters

## 2022-05-15 NOTE — PROGRESS NOTE ADULT - PROBLEM SELECTOR PLAN 2
- reportedly on admission pt had  diffuse abdominal pain with occasional N/V, pt at this time denies abd pain, nausea and vomiting, reports 3 loose, BM, denies outside food,   - US abdomen noted, Cholelithiasis, without evidence of acute cholecystitis. Fatty infiltration of liver.  -will check stool studies , stool for CDIFF negative, f/u stool cx   - c/w PPI - reportedly on admission pt had  diffuse abdominal pain with occasional N/V, pt at this time denies abd pain, nausea and vomiting, reports 3 loose, BM, denies outside food,   - US abdomen noted, Cholelithiasis, without evidence of acute cholecystitis. Fatty infiltration of liver.  -will check stool studies , stool for CDIFF negative, f/u stool cx , prelim negative   - c/w PPI

## 2022-05-15 NOTE — PROGRESS NOTE ADULT - SUBJECTIVE AND OBJECTIVE BOX
Patient is a 57y old  Male who presents with a chief complaint of "I drink too much." (14 May 2022 08:07)      SUBJECTIVE / OVERNIGHT EVENTS:    MEDICATIONS  (STANDING):  amLODIPine   Tablet 10 milliGRAM(s) Oral daily  artificial tears (preservative free) Ophthalmic Solution 1 Drop(s) Both EYES two times a day  buPROPion SR (12-Hour) 100 milliGRAM(s) Oral daily  cholecalciferol 2000 Unit(s) Oral daily  cyanocobalamin 1000 MICROGram(s) Oral daily  dorzolamide 2%/timolol 0.5% Ophthalmic Solution 1 Drop(s) Both EYES two times a day  enoxaparin Injectable 40 milliGRAM(s) SubCutaneous every 24 hours  folic acid 1 milliGRAM(s) Oral daily  latanoprost 0.005% Ophthalmic Solution 1 Drop(s) Both EYES at bedtime  LORazepam     Tablet   Oral   LORazepam     Tablet 1 milliGRAM(s) Oral every 4 hours  LORazepam     Tablet 0.5 milliGRAM(s) Oral every 4 hours  magnesium oxide 400 milliGRAM(s) Oral three times a day with meals  melatonin 6 milliGRAM(s) Oral at bedtime  multivitamin 1 Tablet(s) Oral daily  pantoprazole    Tablet 40 milliGRAM(s) Oral before breakfast  potassium chloride    Tablet ER 40 milliEquivalent(s) Oral every 4 hours  simvastatin 10 milliGRAM(s) Oral at bedtime  sodium chloride 0.9%. 1000 milliLiter(s) (75 mL/Hr) IV Continuous <Continuous>  thiamine 100 milliGRAM(s) Oral daily    MEDICATIONS  (PRN):  acetaminophen     Tablet .. 650 milliGRAM(s) Oral every 6 hours PRN Temp greater or equal to 38C (100.4F), Mild Pain (1 - 3)  LORazepam     Tablet 2 milliGRAM(s) Oral every 2 hours PRN CIWA-Ar score increase by 2 points and a total score of 7 or less  LORazepam   Injectable 2 milliGRAM(s) IV Push every 1 hour PRN CIWA-Ar score 8 or greater      Vital Signs Last 24 Hrs  T(C): 36.5 (15 May 2022 06:00), Max: 37.1 (14 May 2022 18:00)  T(F): 97.7 (15 May 2022 06:00), Max: 98.7 (14 May 2022 18:00)  HR: 61 (15 May 2022 06:00) (60 - 70)  BP: 107/80 (15 May 2022 06:00) (107/75 - 125/82)  BP(mean): --  RR: 17 (15 May 2022 06:00) (16 - 17)  SpO2: 98% (15 May 2022 06:00) (97% - 100%)  CAPILLARY BLOOD GLUCOSE        I&O's Summary      PHYSICAL EXAM:  GENERAL: NAD, well-developed  HEAD:  Atraumatic, Normocephalic  EYES: EOMI, PERRLA, conjunctiva and sclera clear  NECK: Supple, No JVD  CHEST/LUNG: Clear to auscultation bilaterally; No wheeze  HEART: Regular rate and rhythm; No murmurs, rubs, or gallops  ABDOMEN: Soft, Nontender, Nondistended; Bowel sounds present  EXTREMITIES:  2+ Peripheral Pulses, No clubbing, cyanosis, or edema  PSYCH: AAOx3  NEUROLOGY: non-focal  SKIN: No rashes or lesions    LABS:    05-15    135  |  102  |  9   ----------------------------<  93  3.2<L>   |  21<L>  |  0.68    Ca    9.0      15 May 2022 05:30  Phos  3.3     05-15  Mg     1.80     05-15    TPro  7.5  /  Alb  3.9  /  TBili  0.5  /  DBili  x   /  AST  166<H>  /  ALT  111<H>  /  AlkPhos  59  05-15              RADIOLOGY & ADDITIONAL TESTS:    Imaging Personally Reviewed:    Consultant(s) Notes Reviewed:      Care Discussed with Consultants/Other Providers:   Patient is a 57y old  Male who presents with a chief complaint of "I drink too much." (14 May 2022 08:07)      SUBJECTIVE / OVERNIGHT EVENTS: patient seen and examined by bedside, pt feeling better , no loose  BM today , pt had 3-4 loose BM yesterday, denies headache, dizziness, SOB, CP, Palpitations , N/V/, abdominal pain  CIWA #1-2     MEDICATIONS  (STANDING):  amLODIPine   Tablet 10 milliGRAM(s) Oral daily  artificial tears (preservative free) Ophthalmic Solution 1 Drop(s) Both EYES two times a day  buPROPion SR (12-Hour) 100 milliGRAM(s) Oral daily  cholecalciferol 2000 Unit(s) Oral daily  cyanocobalamin 1000 MICROGram(s) Oral daily  dorzolamide 2%/timolol 0.5% Ophthalmic Solution 1 Drop(s) Both EYES two times a day  enoxaparin Injectable 40 milliGRAM(s) SubCutaneous every 24 hours  folic acid 1 milliGRAM(s) Oral daily  latanoprost 0.005% Ophthalmic Solution 1 Drop(s) Both EYES at bedtime  LORazepam     Tablet   Oral   LORazepam     Tablet 1 milliGRAM(s) Oral every 4 hours  LORazepam     Tablet 0.5 milliGRAM(s) Oral every 4 hours  magnesium oxide 400 milliGRAM(s) Oral three times a day with meals  melatonin 6 milliGRAM(s) Oral at bedtime  multivitamin 1 Tablet(s) Oral daily  pantoprazole    Tablet 40 milliGRAM(s) Oral before breakfast  potassium chloride    Tablet ER 40 milliEquivalent(s) Oral every 4 hours  simvastatin 10 milliGRAM(s) Oral at bedtime  sodium chloride 0.9%. 1000 milliLiter(s) (75 mL/Hr) IV Continuous <Continuous>  thiamine 100 milliGRAM(s) Oral daily    MEDICATIONS  (PRN):  acetaminophen     Tablet .. 650 milliGRAM(s) Oral every 6 hours PRN Temp greater or equal to 38C (100.4F), Mild Pain (1 - 3)  LORazepam     Tablet 2 milliGRAM(s) Oral every 2 hours PRN CIWA-Ar score increase by 2 points and a total score of 7 or less  LORazepam   Injectable 2 milliGRAM(s) IV Push every 1 hour PRN CIWA-Ar score 8 or greater      Vital Signs Last 24 Hrs  T(C): 36.5 (15 May 2022 06:00), Max: 37.1 (14 May 2022 18:00)  T(F): 97.7 (15 May 2022 06:00), Max: 98.7 (14 May 2022 18:00)  HR: 61 (15 May 2022 06:00) (60 - 70)  BP: 107/80 (15 May 2022 06:00) (107/75 - 125/82)  BP(mean): --  RR: 17 (15 May 2022 06:00) (16 - 17)  SpO2: 98% (15 May 2022 06:00) (97% - 100%)  CAPILLARY BLOOD GLUCOSE        I&O's Summary    PHYSICAL EXAM:  GENERAL: NAD, well-developed  HEAD:  Atraumatic, Normocephalic  EYES: EOMI, PERRLA, conjunctiva and sclera clear  NECK: Supple, No JVD  CHEST/LUNG: Clear to auscultation bilaterally; No wheeze  HEART: Regular rate and rhythm; No murmurs, rubs, or gallops  ABDOMEN: Soft, Nontender, Nondistended; Bowel sounds present  EXTREMITIES:  2+ Peripheral Pulses, No clubbing, cyanosis, or edema  PSYCH: AAOx3  NEUROLOGY: non-focal, tremors+  SKIN:  old healed lacerations on B/L shins with scabs       LABS:    05-15    135  |  102  |  9   ----------------------------<  93  3.2<L>   |  21<L>  |  0.68    Ca    9.0      15 May 2022 05:30  Phos  3.3     05-15  Mg     1.80     05-15    TPro  7.5  /  Alb  3.9  /  TBili  0.5  /  DBili  x   /  AST  166<H>  /  ALT  111<H>  /  AlkPhos  59  05-15              RADIOLOGY & ADDITIONAL TESTS:    Imaging Personally Reviewed:    Consultant(s) Notes Reviewed:      Care Discussed with Consultants/Other Providers:

## 2022-05-15 NOTE — PROGRESS NOTE ADULT - PROBLEM SELECTOR PLAN 3
- likely due to alcohol abuse  - hepatitis panel negative   -  US abdomen  noted as above  -trend LFTs, Liver enzymes trending up, will hold statin for now - likely due to alcohol abuse, worsening Liver enzymes   - hepatitis panel negative   -  US abdomen  noted as above  -trend LFTs, Liver enzymes trending up, will hold statin for now  -pt counselled about risk of continued alcohol abuse with irreversible liver injury , also recommend low fat diet as pt with Fatty infiltration of liver on US

## 2022-05-15 NOTE — PROGRESS NOTE ADULT - ASSESSMENT
57 year old male with PMH of HTN, glaucoma, alcohol abuse is here for alcohol withdrawal symptoms. 

## 2022-05-15 NOTE — PROGRESS NOTE ADULT - PROBLEM SELECTOR PLAN 4
- B/L LE swelling and pain R>L  - doppler negative for  DVT  -mild swelling may be secondary to amlodipine, will continue to monitor

## 2022-05-15 NOTE — PROGRESS NOTE ADULT - PROBLEM SELECTOR PLAN 1
- CIWA score on admission 7 --> 3 --> 7, s/p ativan 1 mg x2   -CIWA score # 2-5   - continue CIWA monitoring  - c/w Ativan taper   - MVI, folic acid  - thiamine 500 mg IV q8 for 3 days then daily  - hypokalemia will supplement ,  monitor BMP - CIWA score on admission 7 --> 3 --> 7, s/p ativan 1 mg x2   -CIWA score # 1-2   - continue CIWA monitoring  - c/w Ativan taper , to finish on 5/16   - MVI, folic acid  - thiamine 500 mg IV q8 for 3 days then daily  - hypokalemia will supplement ,  monitor BMP

## 2022-05-16 ENCOUNTER — TRANSCRIPTION ENCOUNTER (OUTPATIENT)
Age: 57
End: 2022-05-16

## 2022-05-16 VITALS
HEART RATE: 70 BPM | RESPIRATION RATE: 17 BRPM | DIASTOLIC BLOOD PRESSURE: 62 MMHG | OXYGEN SATURATION: 99 % | TEMPERATURE: 98 F | SYSTOLIC BLOOD PRESSURE: 110 MMHG

## 2022-05-16 LAB
ALBUMIN SERPL ELPH-MCNC: 4 G/DL — SIGNIFICANT CHANGE UP (ref 3.3–5)
ALP SERPL-CCNC: 62 U/L — SIGNIFICANT CHANGE UP (ref 40–120)
ALT FLD-CCNC: 126 U/L — HIGH (ref 4–41)
ANION GAP SERPL CALC-SCNC: 13 MMOL/L — SIGNIFICANT CHANGE UP (ref 7–14)
AST SERPL-CCNC: 123 U/L — HIGH (ref 4–40)
BILIRUB SERPL-MCNC: 0.4 MG/DL — SIGNIFICANT CHANGE UP (ref 0.2–1.2)
BUN SERPL-MCNC: 12 MG/DL — SIGNIFICANT CHANGE UP (ref 7–23)
CALCIUM SERPL-MCNC: 9 MG/DL — SIGNIFICANT CHANGE UP (ref 8.4–10.5)
CHLORIDE SERPL-SCNC: 103 MMOL/L — SIGNIFICANT CHANGE UP (ref 98–107)
CO2 SERPL-SCNC: 20 MMOL/L — LOW (ref 22–31)
CREAT SERPL-MCNC: 0.78 MG/DL — SIGNIFICANT CHANGE UP (ref 0.5–1.3)
CULTURE RESULTS: SIGNIFICANT CHANGE UP
EGFR: 103 ML/MIN/1.73M2 — SIGNIFICANT CHANGE UP
GLUCOSE SERPL-MCNC: 105 MG/DL — HIGH (ref 70–99)
MAGNESIUM SERPL-MCNC: 1.8 MG/DL — SIGNIFICANT CHANGE UP (ref 1.6–2.6)
PHOSPHATE SERPL-MCNC: 4.1 MG/DL — SIGNIFICANT CHANGE UP (ref 2.5–4.5)
POTASSIUM SERPL-MCNC: 3.6 MMOL/L — SIGNIFICANT CHANGE UP (ref 3.5–5.3)
POTASSIUM SERPL-SCNC: 3.6 MMOL/L — SIGNIFICANT CHANGE UP (ref 3.5–5.3)
PROT SERPL-MCNC: 7.6 G/DL — SIGNIFICANT CHANGE UP (ref 6–8.3)
SODIUM SERPL-SCNC: 136 MMOL/L — SIGNIFICANT CHANGE UP (ref 135–145)
SPECIMEN SOURCE: SIGNIFICANT CHANGE UP

## 2022-05-16 PROCEDURE — 99239 HOSP IP/OBS DSCHRG MGMT >30: CPT

## 2022-05-16 RX ORDER — FOLIC ACID 0.8 MG
1 TABLET ORAL
Qty: 30 | Refills: 0
Start: 2022-05-16 | End: 2022-06-14

## 2022-05-16 RX ORDER — PREGABALIN 225 MG/1
1 CAPSULE ORAL
Qty: 30 | Refills: 0
Start: 2022-05-16 | End: 2022-06-14

## 2022-05-16 RX ORDER — LATANOPROST 0.05 MG/ML
1 SOLUTION/ DROPS OPHTHALMIC; TOPICAL
Qty: 1 | Refills: 0
Start: 2022-05-16 | End: 2022-06-14

## 2022-05-16 RX ORDER — BUPROPION HYDROCHLORIDE 150 MG/1
1 TABLET, EXTENDED RELEASE ORAL
Qty: 30 | Refills: 0
Start: 2022-05-16 | End: 2022-06-14

## 2022-05-16 RX ORDER — ACETAMINOPHEN 500 MG
2 TABLET ORAL
Qty: 0 | Refills: 0 | DISCHARGE
Start: 2022-05-16

## 2022-05-16 RX ORDER — LANOLIN ALCOHOL/MO/W.PET/CERES
2 CREAM (GRAM) TOPICAL
Qty: 0 | Refills: 0 | DISCHARGE
Start: 2022-05-16

## 2022-05-16 RX ORDER — CITALOPRAM 10 MG/1
1 TABLET, FILM COATED ORAL
Qty: 30 | Refills: 0
Start: 2022-05-16 | End: 2022-06-14

## 2022-05-16 RX ORDER — AMLODIPINE BESYLATE 2.5 MG/1
1 TABLET ORAL
Qty: 30 | Refills: 0
Start: 2022-05-16 | End: 2022-06-14

## 2022-05-16 RX ORDER — CHOLECALCIFEROL (VITAMIN D3) 125 MCG
1 CAPSULE ORAL
Qty: 30 | Refills: 0
Start: 2022-05-16 | End: 2022-06-14

## 2022-05-16 RX ORDER — PANTOPRAZOLE SODIUM 20 MG/1
1 TABLET, DELAYED RELEASE ORAL
Qty: 30 | Refills: 0
Start: 2022-05-16 | End: 2022-06-14

## 2022-05-16 RX ORDER — THIAMINE MONONITRATE (VIT B1) 100 MG
1 TABLET ORAL
Qty: 30 | Refills: 0
Start: 2022-05-16 | End: 2022-06-14

## 2022-05-16 RX ADMIN — Medication 0.5 MILLIGRAM(S): at 02:18

## 2022-05-16 RX ADMIN — DORZOLAMIDE HYDROCHLORIDE TIMOLOL MALEATE 1 DROP(S): 20; 5 SOLUTION/ DROPS OPHTHALMIC at 17:38

## 2022-05-16 RX ADMIN — AMLODIPINE BESYLATE 10 MILLIGRAM(S): 2.5 TABLET ORAL at 06:22

## 2022-05-16 RX ADMIN — ENOXAPARIN SODIUM 40 MILLIGRAM(S): 100 INJECTION SUBCUTANEOUS at 06:20

## 2022-05-16 RX ADMIN — Medication 1 MILLIGRAM(S): at 11:19

## 2022-05-16 RX ADMIN — DORZOLAMIDE HYDROCHLORIDE TIMOLOL MALEATE 1 DROP(S): 20; 5 SOLUTION/ DROPS OPHTHALMIC at 06:21

## 2022-05-16 RX ADMIN — Medication 2000 UNIT(S): at 11:19

## 2022-05-16 RX ADMIN — Medication 0.5 MILLIGRAM(S): at 11:19

## 2022-05-16 RX ADMIN — Medication 1 TABLET(S): at 11:20

## 2022-05-16 RX ADMIN — PREGABALIN 1000 MICROGRAM(S): 225 CAPSULE ORAL at 11:20

## 2022-05-16 RX ADMIN — PANTOPRAZOLE SODIUM 40 MILLIGRAM(S): 20 TABLET, DELAYED RELEASE ORAL at 06:22

## 2022-05-16 RX ADMIN — Medication 1 DROP(S): at 06:21

## 2022-05-16 RX ADMIN — Medication 0.5 MILLIGRAM(S): at 06:20

## 2022-05-16 RX ADMIN — BUPROPION HYDROCHLORIDE 100 MILLIGRAM(S): 150 TABLET, EXTENDED RELEASE ORAL at 17:38

## 2022-05-16 RX ADMIN — Medication 100 MILLIGRAM(S): at 11:19

## 2022-05-16 RX ADMIN — Medication 1 DROP(S): at 17:38

## 2022-05-16 NOTE — DISCHARGE NOTE NURSING/CASE MANAGEMENT/SOCIAL WORK - PATIENT PORTAL LINK FT
You can access the FollowMyHealth Patient Portal offered by [facility] by registering at the following website: [email or web address]. By joining [facility]’s FollowMyHealth portal, you will also be able to view your health information using other applications (apps) compatible with our system.

## 2022-05-16 NOTE — CHART NOTE - NSCHARTNOTEFT_GEN_A_CORE
pt seen and examined by me  dressed and ready to go  states he plans to f/u with outpt program to assist in maintaining sobriety  VSS  feeling well, no tremors  eating well, +BM  improvement of LFT, but will need outpt f/u for resolution  asking for refill for thiamine, amlodipine, wellbutrin; seems pt is non compliant with meds; upon outpt review, no recent refills since Feb/March, other larger gaps noted  statin on hold due to elevated LFTs, will need to f/u with PMD for resolution of LFT and restart of statin  pt can f/u in [facility] medical clinic  pt is medically stable for dc  33 min spent with dc planning  would give pt number to [facility] medical clinic for f/u and routine maintenance

## 2022-05-16 NOTE — DISCHARGE NOTE NURSING/CASE MANAGEMENT/SOCIAL WORK - NSDCPEFALRISK_GEN_ALL_CORE
For information on Fall & Injury Prevention, visit: [email or web address] OR  [email or web address] OR  https://www.cdc.gov/steadi/patient.html

## 2022-05-27 ENCOUNTER — INPATIENT (INPATIENT)
Facility: HOSPITAL | Age: 57
LOS: 3 days | Discharge: HOME | End: 2022-05-31
Attending: GENERAL ACUTE CARE HOSPITAL | Admitting: GENERAL ACUTE CARE HOSPITAL
Payer: MEDICAID

## 2022-05-27 VITALS
RESPIRATION RATE: 18 BRPM | WEIGHT: 160.06 LBS | SYSTOLIC BLOOD PRESSURE: 156 MMHG | DIASTOLIC BLOOD PRESSURE: 97 MMHG | HEIGHT: 68 IN | TEMPERATURE: 99 F | HEART RATE: 96 BPM | OXYGEN SATURATION: 97 %

## 2022-05-27 LAB
ALBUMIN SERPL ELPH-MCNC: 3.3 G/DL — SIGNIFICANT CHANGE UP (ref 3.3–5)
ALP SERPL-CCNC: 68 U/L — SIGNIFICANT CHANGE UP (ref 40–120)
ALT FLD-CCNC: 98 U/L — HIGH (ref 12–78)
AMPHET UR-MCNC: NEGATIVE — SIGNIFICANT CHANGE UP
ANION GAP SERPL CALC-SCNC: 16 MMOL/L — SIGNIFICANT CHANGE UP (ref 5–17)
APPEARANCE UR: CLEAR — SIGNIFICANT CHANGE UP
AST SERPL-CCNC: 122 U/L — HIGH (ref 15–37)
BACTERIA # UR AUTO: ABNORMAL
BARBITURATES UR SCN-MCNC: NEGATIVE — SIGNIFICANT CHANGE UP
BASOPHILS # BLD AUTO: 0.08 K/UL — SIGNIFICANT CHANGE UP (ref 0–0.2)
BASOPHILS NFR BLD AUTO: 1.4 % — SIGNIFICANT CHANGE UP (ref 0–2)
BENZODIAZ UR-MCNC: POSITIVE — SIGNIFICANT CHANGE UP
BILIRUB SERPL-MCNC: 1 MG/DL — SIGNIFICANT CHANGE UP (ref 0.2–1.2)
BILIRUB UR-MCNC: NEGATIVE — SIGNIFICANT CHANGE UP
BUN SERPL-MCNC: 8 MG/DL — SIGNIFICANT CHANGE UP (ref 7–23)
CALCIUM SERPL-MCNC: 8 MG/DL — LOW (ref 8.5–10.1)
CHLORIDE SERPL-SCNC: 101 MMOL/L — SIGNIFICANT CHANGE UP (ref 96–108)
CO2 SERPL-SCNC: 23 MMOL/L — SIGNIFICANT CHANGE UP (ref 22–31)
COCAINE METAB.OTHER UR-MCNC: NEGATIVE — SIGNIFICANT CHANGE UP
COLOR SPEC: YELLOW — SIGNIFICANT CHANGE UP
CREAT SERPL-MCNC: 0.78 MG/DL — SIGNIFICANT CHANGE UP (ref 0.5–1.3)
DIFF PNL FLD: ABNORMAL
EGFR: 103 ML/MIN/1.73M2 — SIGNIFICANT CHANGE UP
EOSINOPHIL # BLD AUTO: 0.02 K/UL — SIGNIFICANT CHANGE UP (ref 0–0.5)
EOSINOPHIL NFR BLD AUTO: 0.4 % — SIGNIFICANT CHANGE UP (ref 0–6)
EPI CELLS # UR: SIGNIFICANT CHANGE UP
ETHANOL SERPL-MCNC: 286 MG/DL — HIGH (ref 0–10)
FLUAV AG NPH QL: SIGNIFICANT CHANGE UP
FLUBV AG NPH QL: SIGNIFICANT CHANGE UP
GLUCOSE SERPL-MCNC: 92 MG/DL — SIGNIFICANT CHANGE UP (ref 70–99)
GLUCOSE UR QL: NEGATIVE MG/DL — SIGNIFICANT CHANGE UP
HCT VFR BLD CALC: 36.4 % — LOW (ref 39–50)
HGB BLD-MCNC: 12.5 G/DL — LOW (ref 13–17)
IMM GRANULOCYTES NFR BLD AUTO: 0.2 % — SIGNIFICANT CHANGE UP (ref 0–1.5)
KETONES UR-MCNC: ABNORMAL
LEUKOCYTE ESTERASE UR-ACNC: NEGATIVE — SIGNIFICANT CHANGE UP
LIDOCAIN IGE QN: 80 U/L — SIGNIFICANT CHANGE UP (ref 73–393)
LYMPHOCYTES # BLD AUTO: 1.46 K/UL — SIGNIFICANT CHANGE UP (ref 1–3.3)
LYMPHOCYTES # BLD AUTO: 25.6 % — SIGNIFICANT CHANGE UP (ref 13–44)
MAGNESIUM SERPL-MCNC: 1.7 MG/DL — SIGNIFICANT CHANGE UP (ref 1.6–2.6)
MCHC RBC-ENTMCNC: 31.4 PG — SIGNIFICANT CHANGE UP (ref 27–34)
MCHC RBC-ENTMCNC: 34.3 G/DL — SIGNIFICANT CHANGE UP (ref 32–36)
MCV RBC AUTO: 91.5 FL — SIGNIFICANT CHANGE UP (ref 80–100)
METHADONE UR-MCNC: NEGATIVE — SIGNIFICANT CHANGE UP
MONOCYTES # BLD AUTO: 0.3 K/UL — SIGNIFICANT CHANGE UP (ref 0–0.9)
MONOCYTES NFR BLD AUTO: 5.3 % — SIGNIFICANT CHANGE UP (ref 2–14)
NEUTROPHILS # BLD AUTO: 3.83 K/UL — SIGNIFICANT CHANGE UP (ref 1.8–7.4)
NEUTROPHILS NFR BLD AUTO: 67.1 % — SIGNIFICANT CHANGE UP (ref 43–77)
NITRITE UR-MCNC: NEGATIVE — SIGNIFICANT CHANGE UP
NRBC # BLD: 0 /100 WBCS — SIGNIFICANT CHANGE UP (ref 0–0)
OPIATES UR-MCNC: NEGATIVE — SIGNIFICANT CHANGE UP
PCP SPEC-MCNC: SIGNIFICANT CHANGE UP
PCP UR-MCNC: NEGATIVE — SIGNIFICANT CHANGE UP
PH UR: 7 — SIGNIFICANT CHANGE UP (ref 5–8)
PHOSPHATE SERPL-MCNC: 2.2 MG/DL — LOW (ref 2.5–4.5)
PLATELET # BLD AUTO: 153 K/UL — SIGNIFICANT CHANGE UP (ref 150–400)
POTASSIUM SERPL-MCNC: 3.8 MMOL/L — SIGNIFICANT CHANGE UP (ref 3.5–5.3)
POTASSIUM SERPL-SCNC: 3.8 MMOL/L — SIGNIFICANT CHANGE UP (ref 3.5–5.3)
PROT SERPL-MCNC: 7.6 GM/DL — SIGNIFICANT CHANGE UP (ref 6–8.3)
PROT UR-MCNC: 30 MG/DL
RBC # BLD: 3.98 M/UL — LOW (ref 4.2–5.8)
RBC # FLD: 13.4 % — SIGNIFICANT CHANGE UP (ref 10.3–14.5)
RBC CASTS # UR COMP ASSIST: ABNORMAL /HPF (ref 0–4)
SARS-COV-2 RNA SPEC QL NAA+PROBE: SIGNIFICANT CHANGE UP
SODIUM SERPL-SCNC: 140 MMOL/L — SIGNIFICANT CHANGE UP (ref 135–145)
SP GR SPEC: 1.01 — SIGNIFICANT CHANGE UP (ref 1.01–1.02)
THC UR QL: NEGATIVE — SIGNIFICANT CHANGE UP
UROBILINOGEN FLD QL: 4 MG/DL
WBC # BLD: 5.7 K/UL — SIGNIFICANT CHANGE UP (ref 3.8–10.5)
WBC # FLD AUTO: 5.7 K/UL — SIGNIFICANT CHANGE UP (ref 3.8–10.5)
WBC UR QL: SIGNIFICANT CHANGE UP

## 2022-05-27 PROCEDURE — 99291 CRITICAL CARE FIRST HOUR: CPT

## 2022-05-27 PROCEDURE — 70450 CT HEAD/BRAIN W/O DYE: CPT | Mod: 26

## 2022-05-27 PROCEDURE — 93010 ELECTROCARDIOGRAM REPORT: CPT

## 2022-05-27 PROCEDURE — 99223 1ST HOSP IP/OBS HIGH 75: CPT

## 2022-05-27 RX ORDER — MAGNESIUM OXIDE 400 MG ORAL TABLET 241.3 MG
400 TABLET ORAL ONCE
Refills: 0 | Status: COMPLETED | OUTPATIENT
Start: 2022-05-27 | End: 2022-05-27

## 2022-05-27 RX ORDER — POTASSIUM CHLORIDE 20 MEQ
40 PACKET (EA) ORAL EVERY 4 HOURS
Refills: 0 | Status: COMPLETED | OUTPATIENT
Start: 2022-05-27 | End: 2022-05-28

## 2022-05-27 RX ORDER — FOLIC ACID 0.8 MG
1 TABLET ORAL DAILY
Refills: 0 | Status: DISCONTINUED | OUTPATIENT
Start: 2022-05-27 | End: 2022-05-31

## 2022-05-27 RX ORDER — HEPARIN SODIUM 5000 [USP'U]/ML
5000 INJECTION INTRAVENOUS; SUBCUTANEOUS EVERY 12 HOURS
Refills: 0 | Status: DISCONTINUED | OUTPATIENT
Start: 2022-05-27 | End: 2022-05-31

## 2022-05-27 RX ORDER — SODIUM,POTASSIUM PHOSPHATES 278-250MG
1 POWDER IN PACKET (EA) ORAL THREE TIMES A DAY
Refills: 0 | Status: COMPLETED | OUTPATIENT
Start: 2022-05-27 | End: 2022-05-29

## 2022-05-27 RX ORDER — DORZOLAMIDE HYDROCHLORIDE TIMOLOL MALEATE 20; 5 MG/ML; MG/ML
1 SOLUTION/ DROPS OPHTHALMIC
Refills: 0 | Status: DISCONTINUED | OUTPATIENT
Start: 2022-05-27 | End: 2022-05-31

## 2022-05-27 RX ORDER — SODIUM CHLORIDE 9 MG/ML
3000 INJECTION, SOLUTION INTRAVENOUS ONCE
Refills: 0 | Status: COMPLETED | OUTPATIENT
Start: 2022-05-27 | End: 2022-05-27

## 2022-05-27 RX ORDER — PANTOPRAZOLE SODIUM 20 MG/1
40 TABLET, DELAYED RELEASE ORAL
Refills: 0 | Status: DISCONTINUED | OUTPATIENT
Start: 2022-05-27 | End: 2022-05-31

## 2022-05-27 RX ORDER — LATANOPROST 0.05 MG/ML
1 SOLUTION/ DROPS OPHTHALMIC; TOPICAL AT BEDTIME
Refills: 0 | Status: DISCONTINUED | OUTPATIENT
Start: 2022-05-27 | End: 2022-05-31

## 2022-05-27 RX ORDER — THIAMINE MONONITRATE (VIT B1) 100 MG
100 TABLET ORAL DAILY
Refills: 0 | Status: DISCONTINUED | OUTPATIENT
Start: 2022-05-27 | End: 2022-05-31

## 2022-05-27 RX ORDER — SODIUM,POTASSIUM PHOSPHATES 278-250MG
1 POWDER IN PACKET (EA) ORAL ONCE
Refills: 0 | Status: COMPLETED | OUTPATIENT
Start: 2022-05-27 | End: 2022-05-27

## 2022-05-27 RX ORDER — ACETAMINOPHEN 500 MG
650 TABLET ORAL ONCE
Refills: 0 | Status: COMPLETED | OUTPATIENT
Start: 2022-05-27 | End: 2022-05-27

## 2022-05-27 RX ORDER — THIAMINE MONONITRATE (VIT B1) 100 MG
500 TABLET ORAL ONCE
Refills: 0 | Status: COMPLETED | OUTPATIENT
Start: 2022-05-27 | End: 2022-05-27

## 2022-05-27 RX ORDER — BUPROPION HYDROCHLORIDE 150 MG/1
150 TABLET, EXTENDED RELEASE ORAL DAILY
Refills: 0 | Status: DISCONTINUED | OUTPATIENT
Start: 2022-05-27 | End: 2022-05-31

## 2022-05-27 RX ADMIN — MAGNESIUM OXIDE 400 MG ORAL TABLET 400 MILLIGRAM(S): 241.3 TABLET ORAL at 21:33

## 2022-05-27 RX ADMIN — Medication 4 MILLIGRAM(S): at 21:36

## 2022-05-27 RX ADMIN — Medication 4 MILLIGRAM(S): at 17:41

## 2022-05-27 RX ADMIN — SODIUM CHLORIDE 2000 MILLILITER(S): 9 INJECTION, SOLUTION INTRAVENOUS at 17:41

## 2022-05-27 RX ADMIN — Medication 105 MILLIGRAM(S): at 17:48

## 2022-05-27 RX ADMIN — LATANOPROST 1 DROP(S): 0.05 SOLUTION/ DROPS OPHTHALMIC; TOPICAL at 22:02

## 2022-05-27 RX ADMIN — Medication 1 PACKET(S): at 22:02

## 2022-05-27 RX ADMIN — Medication 1 TABLET(S): at 21:33

## 2022-05-27 RX ADMIN — Medication 40 MILLIEQUIVALENT(S): at 22:02

## 2022-05-27 NOTE — ED ADULT NURSE NOTE - OBJECTIVE STATEMENT
Pt BIBA, presents to ED with tremors and general malaise, pt states that he drank every day x 3 weeks, last drink last night.  PMH: HTN, glaucoma. Pt BIBA, presents to ED with tremors and general malaise, pt states that he drank every day x 3 weeks, last drink last night. Pt states he drinks "too much". Pt admits to right sided chest pain, headache. Denies shortness of breath, dyspnea, nausea, vomiting, abdominal pain at this time.    PMH: HTN, glaucoma.

## 2022-05-27 NOTE — ED ADULT NURSE NOTE - SUICIDE SCREENING DEPRESSION
78 yo F     h/o   dementia and   ? CHF per EMS,/  no other  hx  is  available      presenting via EMS from home for few days of weakness, followed by development of SOB and episode of substernal CP this morning.     Pt is a poor historian and unable to provide significant history    all ROS negative upon questioning.     Lives at home with brother - he reports concern for pt's increased work of breathing.      pt with   weakness/  sob  from  covid/  pna   CT  chest, with  GOO   on decadron/  remdisivir   *  Dementia   *    HTN,   *  acute  diastolic chf  on   lasix  card  dr davis  on  dvt ppx.  bmi  is  20  tele,   s/p vtach/ on  toprol,  card to  f/p   *  Afib,   was on iv heparin  v tach  on tele.  card following  tele, afib.  s/p  Vtach  22  beats. , had  subsequent vtach also,   cardiac cath was  deferred  due to  fevers  *  E  coli  bacteremia,   on iv rocephin,. rpt bcx  are negative  cath  deferred  for  now, will perform at  a later date,  when cleared by    pt  with  tachycardia and  Vtach on tele,  card  following  pt   u/s     right  thigh  hematoma, 5  cm ,  seen by  vascular,  no  intervention  a/c  on  hold  for  now.  hb is  stable  remains  off a/c.  cath at  a later  time.  right  groin  ecchymoses, /  ha s been off  a/c   difficult  situation, plan is  cath  next  week, pe r card          spoke  with  brothersammi/ pt  is , has  no  children/ states,   pt  is  full code    pmd  allie longoria< from: CT Angio Chest PE Protocol w/ IV Cont (01.21.22 @ 16:01) >  IMPRESSION:  No main, right, left, lobar, or proximal segmental pulmonary embolus.  Patchy bilateral groundglass opacities noted throughout both lungs likely   representing infection or alveolar component of edema. COVID 19 can also   have this appearance. Correlate with RT PCR    --- End of Report --  < end of copied text >     Positive

## 2022-05-27 NOTE — H&P ADULT - NSHPPHYSICALEXAM_GEN_ALL_CORE
PHYSICAL EXAMINATION:  Vital Signs Last 24 Hrs  T(C): 36.6 (27 May 2022 19:56), Max: 37.2 (27 May 2022 16:55)  T(F): 97.9 (27 May 2022 19:56), Max: 98.9 (27 May 2022 16:55)  HR: 87 (27 May 2022 19:56) (87 - 96)  BP: 130/72 (27 May 2022 19:56) (125/80 - 156/97)  BP(mean): 92 (27 May 2022 19:56) (92 - 92)  RR: 16 (27 May 2022 19:56) (16 - 18)  SpO2: 97% (27 May 2022 19:56) (97% - 97%)  CAPILLARY BLOOD GLUCOSE          GENERAL: NAD,   ENMT: mucous membranes moist  NECK: supple, No JVD  CHEST/LUNG: clear to auscultation bilaterally; no rales, rhonchi, or wheezing b/l  HEART: normal S1, S2  ABDOMEN: BS+, soft, ND, NT   EXTREMITIES:  pulses palpable; no clubbing, cyanosis, or edema b/l LEs  NEURO: awake, alert, interactive; moves all extremities  SKIN: no rashes or lesions PHYSICAL EXAMINATION:  Vital Signs Last 24 Hrs  T(C): 36.6 (27 May 2022 19:56), Max: 37.2 (27 May 2022 16:55)  T(F): 97.9 (27 May 2022 19:56), Max: 98.9 (27 May 2022 16:55)  HR: 87 (27 May 2022 19:56) (87 - 96)  BP: 130/72 (27 May 2022 19:56) (125/80 - 156/97)  BP(mean): 92 (27 May 2022 19:56) (92 - 92)  RR: 16 (27 May 2022 19:56) (16 - 18)  SpO2: 97% (27 May 2022 19:56) (97% - 97%)  CAPILLARY BLOOD GLUCOSE          GENERAL: NAD, seen in 1-D, answers all questions, no SOB or CP  ENMT: mucous membranes moist  NECK: supple, No JVD  CHEST/LUNG: clear to auscultation bilaterally; no rales, rhonchi, or wheezing b/l  HEART: normal S1, S2  ABDOMEN: BS+, soft, ND, NT   EXTREMITIES:  pulses palpable; no clubbing, cyanosis, or edema b/l LEs  NEURO: awake, alert, interactive; moves all extremities  SKIN: no rashes or lesions

## 2022-05-27 NOTE — H&P ADULT - NSHPLABSRESULTS_GEN_ALL_CORE
LABS:                        12.5   5.70  )-----------( 153      ( 27 May 2022 18:01 )             36.4         140  |  101  |  8   ----------------------------<  92  3.8   |  23  |  0.78    Ca    8.0<L>      27 May 2022 18:01  Phos  2.2       Mg     1.7         TPro  7.6  /  Alb  3.3  /  TBili  1.0  /  DBili  x   /  AST  122<H>  /  ALT  98<H>  /  AlkPhos  68        Urinalysis Basic - ( 27 May 2022 19:03 )    Color: Yellow / Appearance: Clear / S.010 / pH: x  Gluc: x / Ketone: Large  / Bili: Negative / Urobili: 4 mg/dL   Blood: x / Protein: 30 mg/dL / Nitrite: Negative   Leuk Esterase: Negative / RBC: x / WBC x   Sq Epi: x / Non Sq Epi: x / Bacteria: x          RADIOLOGY & ADDITIONAL TESTS:

## 2022-05-27 NOTE — PATIENT PROFILE ADULT - FALL HARM RISK - HARM RISK INTERVENTIONS
Assistance with ambulation/Assistance OOB with selected safe patient handling equipment/Communicate Risk of Fall with Harm to all staff/Monitor for mental status changes/Monitor gait and stability/Reinforce activity limits and safety measures with patient and family/Tailored Fall Risk Interventions/Toileting schedule using arm’s reach rule for commode and bathroom/Use of alarms - bed, chair and/or voice tab/Visual Cue: Yellow wristband and red socks/Bed in lowest position, wheels locked, appropriate side rails in place/Call bell, personal items and telephone in reach/Instruct patient to call for assistance before getting out of bed or chair/Non-slip footwear when patient is out of bed/[place] to call system/Physically safe environment - no spills, clutter or unnecessary equipment/Purposeful Proactive Rounding/Room/bathroom lighting operational, light cord in reach

## 2022-05-27 NOTE — H&P ADULT - HISTORY OF PRESENT ILLNESS
57 year old male no relevant PMH p/w ETOH withdrawal.  Notes ETOH consumption in heavy quants for the past 3 weeks. Last drink this am at 2 00 AM. Patient wants to stop drinking.  Notes tremors and increased anxiety. ROS neg for cp, sob, abd pain, vomiting, fever, chills, bleeding, dysuria.  57 year old male no relevant PMH p/w ETOH withdrawal.  Notes ETOH consumption in heavy quants for the past 3 weeks. Last drink this am at 2 00 AM. Patient wants to stop drinking.  Drinks at least a bottle of Vodka and Tequila a day then some beer. Notes tremors and increased anxiety this AM. ROS neg for cp, sob, abd pain, vomiting, fever, chills, bleeding, dysuria.  57 year old male no relevant PMH p/w ETOH withdrawal.  Notes ETOH consumption in heavy quants for the past 3 weeks. Last drink this am at 2 00 AM. Patient wants to stop drinking.  Drinks at least a bottle of Vodka and Tequila a day then some beer. Notes tremors and increased anxiety this AM. ROS neg for cp, sob, abd pain, vomiting, fever, chills, bleeding, dysuria.     ETOH level 286 on arrival. Multiple admits in the past.

## 2022-05-27 NOTE — ED PROVIDER NOTE - CLINICAL SUMMARY MEDICAL DECISION MAKING FREE TEXT BOX
alcohol withdrawal. acute. fluids. benzos, thiamine. admit. alcohol withdrawal. acute. fluids. benzos, thiamine. admit.  I read ekg as nsr rate 96, no st elevation or depression, qtc 490, inferior q waves. alcohol intox vs withdrawal. desire to quit permanently. fluids. benzos, thiamine. admit.  I read ekg as nsr rate 96, no st elevation or depression, qtc 490, inferior q waves.

## 2022-05-27 NOTE — ED PROVIDER NOTE - PHYSICAL EXAMINATION
Gen: Alert, NAD  Head: NC, AT   Eyes: vision impairment bl   ENT: normal hearing, patent oropharynx without erythema/exudate, uvula midline  Neck: supple, no tenderness, Trachea midline  Pulm: Bilateral BS, normal resp effort, no wheeze/stridor/retractions  CV: tachy. no M/R/G, 2+ radial and dp pulses bl, no edema  Abd: soft, NT/ND, +BS, no hepatosplenomegaly  Mskel: extremities x4 with normal ROM and no joint effusions. no ctl spine ttp.   Skin: kaden skin.   Neuro: AAOx3, no sensory/motor deficits, CN 2-12 intact  Psych: anxious

## 2022-05-27 NOTE — ED PROVIDER NOTE - OBJECTIVE STATEMENT
57M no relevant med hx pw a etoh withdrawal. notes etoh consumption in heavy quants for the past 3 weeks. last drink this am at 0200. pt wants to stop drinking. notes tremors, anxiety. ros neg for ha, vision loss ,rhinorrhea, cp, sob, abd pain, vomiting, fever, chills, rash, bleeding, dysuria, testicular pain, si

## 2022-05-27 NOTE — ED ADULT TRIAGE NOTE - CHIEF COMPLAINT QUOTE
BIBA as per EMS patient c/o tremors and general malaise, Pt states drank every day x 3 weeks, last drink last night.  Hx: HTN, glaucoma.

## 2022-05-27 NOTE — ED ADULT TRIAGE NOTE - BRAND OF FIRST COVID-19 BOOSTER
Patient requesting refill of oxycodone/APAP 10-325mg tablets.  Reviewed with Dr. Steele upon his return to the office on 2/22/17.  Authorized refill of same medication, # 84 tablets with no additional refills.  Patient informed.    Patient will come to [facility] to : filled prescription.   Patient was advised of location and hours: Yes.   Patient was advised to bring photo ID: Yes.   Patient elects another party to  item: yes, name: Gabriela, Daughter.       
[employer]

## 2022-05-27 NOTE — ED ADULT NURSE NOTE - NSIMPLEMENTINTERV_GEN_ALL_ED
Implemented All Fall Risk Interventions:  [place] to call system. Call bell, personal items and telephone within reach. Instruct patient to call for assistance. Room bathroom lighting operational. Non-slip footwear when patient is off stretcher. Physically safe environment: no spills, clutter or unnecessary equipment. Stretcher in lowest position, wheels locked, appropriate side rails in place. Provide visual cue, wrist band, yellow gown, etc. Monitor gait and stability. Monitor for mental status changes and reorient to person, place, and time. Review medications for side effects contributing to fall risk. Reinforce activity limits and safety measures with patient and family.

## 2022-05-27 NOTE — ED ADULT NURSE REASSESSMENT NOTE - NS ED NURSE REASSESS COMMENT FT1
Pt AAOx3. Pt laying in bed comfortably, states no complaints at this time per pt. No tremors noted at this time.

## 2022-05-27 NOTE — H&P ADULT - ASSESSMENT
57 year old male no relevant PMH p/w ETOH withdrawal.  Notes ETOH consumption in heavy quants for the past 3 weeks. Last drink this am at 2 00 AM. Patient wants to stop drinking.  Notes tremors and increased anxiety. ROS neg for cp, sob, abd pain, vomiting, fever, chills, bleeding, dysuria.     Plan:  57 year old male no relevant PMH p/w ETOH withdrawal.  Notes ETOH consumption in heavy quants for the past 3 weeks. Last drink this am at 2 00 AM. Patient wants to stop drinking.  Notes tremors and increased anxiety. ROS neg for cp, sob, abd pain, vomiting, fever, chills, bleeding, dysuria.     Plan: Admit to medicine for ETOH withdrawal. CIWA score is 9 on arrival. CIWA protocol with IV Ativan, IV Thiamine, folic acid.       Replaced phos, K orally. Recheck in AM with magnesium.     Continue home meds for depression Wellbutrin, two eye drops for glaucoma. Legally blind in right eye.    57 year old male no relevant PMH p/w ETOH withdrawal.  Notes ETOH consumption in heavy quants for the past 3 weeks. Last drink this am at 2 00 AM. Patient wants to stop drinking.  Notes tremors and increased anxiety. ROS neg for cp, sob, abd pain, vomiting, fever, chills, bleeding, dysuria.     Plan: Admit to medicine for ETOH withdrawal. CIWA score is 9 on arrival. CIWA protocol with IV Ativan, IV Thiamine, folic acid.   Abdominal sonogram to eval increased LFT.     Replaced phos, K orally. Recheck in AM with magnesium.     Continue home meds for depression Wellbutrin, two eye drops for glaucoma. Legally blind in right eye.

## 2022-05-27 NOTE — SBIRT NOTE ADULT - NSSBIRTALCPASSREFTXDET_GEN_A_CORE
Referral for complete assessment and level of care determination at a certified treatment facility was completed by giving the patient information for treatment facilities that met their needs and encouraging them to call for an appointment. A call was not made to a facility because: Patient likely being admitted to [facility] for ETOH withdrawal. Pt agreeable to OP referral. Email sent to [employer] for referral.

## 2022-05-27 NOTE — ED ADULT NURSE NOTE - ED STAT RN HANDOFF DETAILS 2
Report received from MARTÍNEZ Palacios. Safety checks completed this shift. Safety rounds completed hourly.  IV sites checked Q2+remains WDL. Fall & skin precautions in place. Pending admission.

## 2022-05-27 NOTE — ED ADULT NURSE NOTE - ED STAT RN HANDOFF DETAILS 3
Report endorsed to ED hold MARTÍNEZ Bates. Safety checks completed this shift. Safety rounds completed hourly.  IV sites checked Q2+remains WDL. Fall & skin precautions in place. Any issues endorsed to ED hold RN for follow up.

## 2022-05-28 LAB
ANION GAP SERPL CALC-SCNC: 6 MMOL/L — SIGNIFICANT CHANGE UP (ref 5–17)
ANION GAP SERPL CALC-SCNC: 9 MMOL/L — SIGNIFICANT CHANGE UP (ref 5–17)
BUN SERPL-MCNC: 13 MG/DL — SIGNIFICANT CHANGE UP (ref 7–23)
BUN SERPL-MCNC: 9 MG/DL — SIGNIFICANT CHANGE UP (ref 7–23)
CALCIUM SERPL-MCNC: 7.8 MG/DL — LOW (ref 8.5–10.1)
CALCIUM SERPL-MCNC: 8.6 MG/DL — SIGNIFICANT CHANGE UP (ref 8.5–10.1)
CHLORIDE SERPL-SCNC: 102 MMOL/L — SIGNIFICANT CHANGE UP (ref 96–108)
CHLORIDE SERPL-SCNC: 103 MMOL/L — SIGNIFICANT CHANGE UP (ref 96–108)
CO2 SERPL-SCNC: 27 MMOL/L — SIGNIFICANT CHANGE UP (ref 22–31)
CO2 SERPL-SCNC: 28 MMOL/L — SIGNIFICANT CHANGE UP (ref 22–31)
CREAT SERPL-MCNC: 0.7 MG/DL — SIGNIFICANT CHANGE UP (ref 0.5–1.3)
CREAT SERPL-MCNC: 0.79 MG/DL — SIGNIFICANT CHANGE UP (ref 0.5–1.3)
EGFR: 102 ML/MIN/1.73M2 — SIGNIFICANT CHANGE UP
EGFR: 106 ML/MIN/1.73M2 — SIGNIFICANT CHANGE UP
GLUCOSE SERPL-MCNC: 101 MG/DL — HIGH (ref 70–99)
GLUCOSE SERPL-MCNC: 108 MG/DL — HIGH (ref 70–99)
MAGNESIUM SERPL-MCNC: 1.7 MG/DL — SIGNIFICANT CHANGE UP (ref 1.6–2.6)
PHOSPHATE SERPL-MCNC: 2.6 MG/DL — SIGNIFICANT CHANGE UP (ref 2.5–4.5)
POTASSIUM SERPL-MCNC: 3.5 MMOL/L — SIGNIFICANT CHANGE UP (ref 3.5–5.3)
POTASSIUM SERPL-MCNC: 3.5 MMOL/L — SIGNIFICANT CHANGE UP (ref 3.5–5.3)
POTASSIUM SERPL-SCNC: 3.5 MMOL/L — SIGNIFICANT CHANGE UP (ref 3.5–5.3)
POTASSIUM SERPL-SCNC: 3.5 MMOL/L — SIGNIFICANT CHANGE UP (ref 3.5–5.3)
SODIUM SERPL-SCNC: 136 MMOL/L — SIGNIFICANT CHANGE UP (ref 135–145)
SODIUM SERPL-SCNC: 139 MMOL/L — SIGNIFICANT CHANGE UP (ref 135–145)
VIT B12 SERPL-MCNC: 655 PG/ML — SIGNIFICANT CHANGE UP (ref 232–1245)

## 2022-05-28 PROCEDURE — 76700 US EXAM ABDOM COMPLETE: CPT | Mod: 26

## 2022-05-28 PROCEDURE — 99233 SBSQ HOSP IP/OBS HIGH 50: CPT

## 2022-05-28 RX ORDER — SODIUM CHLORIDE 9 MG/ML
1000 INJECTION, SOLUTION INTRAVENOUS
Refills: 0 | Status: DISCONTINUED | OUTPATIENT
Start: 2022-05-28 | End: 2022-05-31

## 2022-05-28 RX ORDER — PREGABALIN 225 MG/1
1000 CAPSULE ORAL DAILY
Refills: 0 | Status: DISCONTINUED | OUTPATIENT
Start: 2022-05-28 | End: 2022-05-31

## 2022-05-28 RX ORDER — CALCIUM GLUCONATE 100 MG/ML
1 VIAL (ML) INTRAVENOUS ONCE
Refills: 0 | Status: COMPLETED | OUTPATIENT
Start: 2022-05-28 | End: 2022-05-28

## 2022-05-28 RX ORDER — ONDANSETRON 8 MG/1
4 TABLET, FILM COATED ORAL ONCE
Refills: 0 | Status: COMPLETED | OUTPATIENT
Start: 2022-05-28 | End: 2022-05-28

## 2022-05-28 RX ADMIN — Medication 1 MILLIGRAM(S): at 11:47

## 2022-05-28 RX ADMIN — HEPARIN SODIUM 5000 UNIT(S): 5000 INJECTION INTRAVENOUS; SUBCUTANEOUS at 17:03

## 2022-05-28 RX ADMIN — Medication 100 GRAM(S): at 17:02

## 2022-05-28 RX ADMIN — Medication 1 PACKET(S): at 11:49

## 2022-05-28 RX ADMIN — Medication 50 MILLIGRAM(S): at 18:10

## 2022-05-28 RX ADMIN — PANTOPRAZOLE SODIUM 40 MILLIGRAM(S): 20 TABLET, DELAYED RELEASE ORAL at 11:46

## 2022-05-28 RX ADMIN — PREGABALIN 1000 MICROGRAM(S): 225 CAPSULE ORAL at 18:10

## 2022-05-28 RX ADMIN — HEPARIN SODIUM 5000 UNIT(S): 5000 INJECTION INTRAVENOUS; SUBCUTANEOUS at 06:30

## 2022-05-28 RX ADMIN — Medication 4 MILLIGRAM(S): at 02:24

## 2022-05-28 RX ADMIN — Medication 4 MILLIGRAM(S): at 15:07

## 2022-05-28 RX ADMIN — ONDANSETRON 4 MILLIGRAM(S): 8 TABLET, FILM COATED ORAL at 14:08

## 2022-05-28 RX ADMIN — SODIUM CHLORIDE 65 MILLILITER(S): 9 INJECTION, SOLUTION INTRAVENOUS at 18:11

## 2022-05-28 RX ADMIN — Medication 100 MILLIGRAM(S): at 14:06

## 2022-05-28 RX ADMIN — Medication 40 MILLIEQUIVALENT(S): at 02:24

## 2022-05-28 RX ADMIN — Medication 4 MILLIGRAM(S): at 11:46

## 2022-05-28 RX ADMIN — Medication 650 MILLIGRAM(S): at 01:00

## 2022-05-28 RX ADMIN — BUPROPION HYDROCHLORIDE 150 MILLIGRAM(S): 150 TABLET, EXTENDED RELEASE ORAL at 14:07

## 2022-05-28 RX ADMIN — DORZOLAMIDE HYDROCHLORIDE TIMOLOL MALEATE 1 DROP(S): 20; 5 SOLUTION/ DROPS OPHTHALMIC at 06:31

## 2022-05-28 RX ADMIN — DORZOLAMIDE HYDROCHLORIDE TIMOLOL MALEATE 1 DROP(S): 20; 5 SOLUTION/ DROPS OPHTHALMIC at 17:03

## 2022-05-28 RX ADMIN — Medication 1 PACKET(S): at 15:08

## 2022-05-28 RX ADMIN — BUPROPION HYDROCHLORIDE 150 MILLIGRAM(S): 150 TABLET, EXTENDED RELEASE ORAL at 00:06

## 2022-05-28 RX ADMIN — Medication 650 MILLIGRAM(S): at 00:06

## 2022-05-28 RX ADMIN — Medication 4 MILLIGRAM(S): at 06:30

## 2022-05-28 RX ADMIN — Medication 1 TABLET(S): at 18:11

## 2022-05-28 NOTE — PROGRESS NOTE ADULT - SUBJECTIVE AND OBJECTIVE BOX
HPI:  57 year old male no relevant PMH p/w ETOH withdrawal.  Notes ETOH consumption in heavy quants for the past 3 weeks. Last drink this am at 2 00 AM. Patient wants to stop drinking.  Drinks at least a bottle of Vodka and Tequila a day then some beer. Notes tremors and increased anxiety this AM. ROS neg for cp, sob, abd pain, vomiting, fever, chills, bleeding, dysuria.     ETOH level 286 on arrival. Multiple admits in the past.  (27 May 2022 20:11)      SUBJECTIVE & OBJECTIVE:   Pt seen and examined at bedside.   no overnight events.   complaining of tremors     Denies fever, chills, N/V, dizziness, HA, cough, CP, palpitations, SOB, abdominal pain, dysuria, diarrhea, constipation.         PHYSICAL EXAM:  Vitals stable.       GENERAL: NAD, , no increased WOB, + tremors   HEAD:  Atraumatic, Normocephalic  EYES: EOMI, PERRLA, conjunctiva and sclera clear  ENMT: Moist mucous membranes  NECK: Supple, No JVD  NERVOUS SYSTEM:  Alert & Oriented X3, no focal neuro deficits   CHEST/LUNG: Clear to auscultation bilaterally; No rales, rhonchi, wheezing, or rubs  HEART: Regular rate and rhythm; No murmurs, rubs, or gallops  ABDOMEN: Soft, Nontender, Nondistended; Bowel sounds present  EXTREMITIES:  2+ Peripheral Pulses b/l, No clubbing, cyanosis, calf tenderness or edema b/l    MEDICATIONS  (STANDING):  buPROPion XL (24-Hour) . 150 milliGRAM(s) Oral daily  chlordiazePOXIDE 50 milliGRAM(s) Oral every 6 hours  chlordiazePOXIDE 50 milliGRAM(s) Oral every 8 hours  chlordiazePOXIDE   Oral   cyanocobalamin 1000 MICROGram(s) Oral daily  dorzolamide 2%/timolol 0.5% Ophthalmic Solution 1 Drop(s) Both EYES two times a day  folic acid 1 milliGRAM(s) Oral daily  heparin   Injectable 5000 Unit(s) SubCutaneous every 12 hours  lactated ringers. 1000 milliLiter(s) (65 mL/Hr) IV Continuous <Continuous>  latanoprost 0.005% Ophthalmic Solution 1 Drop(s) Both EYES at bedtime  multivitamin 1 Tablet(s) Oral daily  pantoprazole    Tablet 40 milliGRAM(s) Oral before breakfast  potassium phosphate / sodium phosphate Powder (PHOS-NaK) 1 Packet(s) Oral three times a day  thiamine Injectable 100 milliGRAM(s) IV Push daily    MEDICATIONS  (PRN):  acetaminophen     Tablet .. 650 milliGRAM(s) Oral every 6 hours PRN Mild Pain (1 - 3)  LORazepam   Injectable 2 milliGRAM(s) IV Push every 3 hours PRN for CIWA >8      LABS:                        12.5   5.70  )-----------( 153      ( 27 May 2022 18:01 )             36.4         136  |  102  |  13  ----------------------------<  101<H>  3.5   |  28  |  0.79    Ca    8.6      28 May 2022 19:10  Phos  2.6       Mg     1.7         TPro  7.6  /  Alb  3.3  /  TBili  1.0  /  DBili  x   /  AST  122<H>  /  ALT  98<H>  /  AlkPhos  68        Urinalysis Basic - ( 27 May 2022 19:03 )    Color: Yellow / Appearance: Clear / S.010 / pH: x  Gluc: x / Ketone: Large  / Bili: Negative / Urobili: 4 mg/dL   Blood: x / Protein: 30 mg/dL / Nitrite: Negative   Leuk Esterase: Negative / RBC: 3-5 /HPF / WBC 0-2   Sq Epi: x / Non Sq Epi: Occasional / Bacteria: Few      Magnesium, Serum: 1.7 mg/dL ( @ 06:56)          Imaging Personally Reviewed:  [ ] YES  [ ] NO    Consultant(s) Notes Reviewed:  [x ] YES  [ ] NO    Care Discussed with Consultants/Other Providers [x ] YES  [ ] NO  Care discussed in detail with patient.  All questions and concerns addressed

## 2022-05-29 LAB
ANION GAP SERPL CALC-SCNC: 11 MMOL/L — SIGNIFICANT CHANGE UP (ref 5–17)
BUN SERPL-MCNC: 11 MG/DL — SIGNIFICANT CHANGE UP (ref 7–23)
CALCIUM SERPL-MCNC: 8.5 MG/DL — SIGNIFICANT CHANGE UP (ref 8.5–10.1)
CHLORIDE SERPL-SCNC: 99 MMOL/L — SIGNIFICANT CHANGE UP (ref 96–108)
CO2 SERPL-SCNC: 26 MMOL/L — SIGNIFICANT CHANGE UP (ref 22–31)
CREAT SERPL-MCNC: 0.62 MG/DL — SIGNIFICANT CHANGE UP (ref 0.5–1.3)
EGFR: 110 ML/MIN/1.73M2 — SIGNIFICANT CHANGE UP
GLUCOSE SERPL-MCNC: 75 MG/DL — SIGNIFICANT CHANGE UP (ref 70–99)
LIDOCAIN IGE QN: 189 U/L — SIGNIFICANT CHANGE UP (ref 73–393)
POTASSIUM SERPL-MCNC: 3.3 MMOL/L — LOW (ref 3.5–5.3)
POTASSIUM SERPL-SCNC: 3.3 MMOL/L — LOW (ref 3.5–5.3)
SODIUM SERPL-SCNC: 136 MMOL/L — SIGNIFICANT CHANGE UP (ref 135–145)

## 2022-05-29 PROCEDURE — 99232 SBSQ HOSP IP/OBS MODERATE 35: CPT

## 2022-05-29 RX ORDER — ACETAMINOPHEN 500 MG
650 TABLET ORAL EVERY 6 HOURS
Refills: 0 | Status: DISCONTINUED | OUTPATIENT
Start: 2022-05-29 | End: 2022-05-31

## 2022-05-29 RX ORDER — POTASSIUM CHLORIDE 20 MEQ
20 PACKET (EA) ORAL ONCE
Refills: 0 | Status: COMPLETED | OUTPATIENT
Start: 2022-05-29 | End: 2022-05-29

## 2022-05-29 RX ORDER — POTASSIUM CHLORIDE 20 MEQ
40 PACKET (EA) ORAL ONCE
Refills: 0 | Status: COMPLETED | OUTPATIENT
Start: 2022-05-29 | End: 2022-05-29

## 2022-05-29 RX ORDER — LOPERAMIDE HCL 2 MG
2 TABLET ORAL ONCE
Refills: 0 | Status: COMPLETED | OUTPATIENT
Start: 2022-05-29 | End: 2022-05-29

## 2022-05-29 RX ADMIN — DORZOLAMIDE HYDROCHLORIDE TIMOLOL MALEATE 1 DROP(S): 20; 5 SOLUTION/ DROPS OPHTHALMIC at 17:25

## 2022-05-29 RX ADMIN — Medication 1 MILLIGRAM(S): at 12:13

## 2022-05-29 RX ADMIN — Medication 2 MILLIGRAM(S): at 16:19

## 2022-05-29 RX ADMIN — DORZOLAMIDE HYDROCHLORIDE TIMOLOL MALEATE 1 DROP(S): 20; 5 SOLUTION/ DROPS OPHTHALMIC at 07:08

## 2022-05-29 RX ADMIN — Medication 650 MILLIGRAM(S): at 01:00

## 2022-05-29 RX ADMIN — Medication 1 TABLET(S): at 12:12

## 2022-05-29 RX ADMIN — Medication 1 PACKET(S): at 00:13

## 2022-05-29 RX ADMIN — PANTOPRAZOLE SODIUM 40 MILLIGRAM(S): 20 TABLET, DELAYED RELEASE ORAL at 08:46

## 2022-05-29 RX ADMIN — BUPROPION HYDROCHLORIDE 150 MILLIGRAM(S): 150 TABLET, EXTENDED RELEASE ORAL at 14:13

## 2022-05-29 RX ADMIN — Medication 1 PACKET(S): at 14:14

## 2022-05-29 RX ADMIN — Medication 1 PACKET(S): at 07:08

## 2022-05-29 RX ADMIN — SODIUM CHLORIDE 65 MILLILITER(S): 9 INJECTION, SOLUTION INTRAVENOUS at 12:24

## 2022-05-29 RX ADMIN — HEPARIN SODIUM 5000 UNIT(S): 5000 INJECTION INTRAVENOUS; SUBCUTANEOUS at 07:08

## 2022-05-29 RX ADMIN — Medication 40 MILLIEQUIVALENT(S): at 12:12

## 2022-05-29 RX ADMIN — Medication 50 MILLIGRAM(S): at 12:12

## 2022-05-29 RX ADMIN — HEPARIN SODIUM 5000 UNIT(S): 5000 INJECTION INTRAVENOUS; SUBCUTANEOUS at 17:25

## 2022-05-29 RX ADMIN — Medication 50 MILLIGRAM(S): at 07:08

## 2022-05-29 RX ADMIN — PREGABALIN 1000 MICROGRAM(S): 225 CAPSULE ORAL at 12:12

## 2022-05-29 RX ADMIN — Medication 50 MILLIGRAM(S): at 21:49

## 2022-05-29 RX ADMIN — Medication 100 MILLIGRAM(S): at 12:13

## 2022-05-29 RX ADMIN — Medication 50 MILLIGRAM(S): at 00:13

## 2022-05-29 RX ADMIN — LATANOPROST 1 DROP(S): 0.05 SOLUTION/ DROPS OPHTHALMIC; TOPICAL at 00:13

## 2022-05-29 RX ADMIN — LATANOPROST 1 DROP(S): 0.05 SOLUTION/ DROPS OPHTHALMIC; TOPICAL at 21:56

## 2022-05-29 NOTE — PROGRESS NOTE ADULT - SUBJECTIVE AND OBJECTIVE BOX
HPI:  57 year old male no relevant PMH p/w ETOH withdrawal.  Notes ETOH consumption in heavy quants for the past 3 weeks. Last drink this am at 2 00 AM. Patient wants to stop drinking.  Drinks at least a bottle of Vodka and Tequila a day then some beer. Notes tremors and increased anxiety this AM. ROS neg for cp, sob, abd pain, vomiting, fever, chills, bleeding, dysuria.     ETOH level 286 on arrival. Multiple admits in the past.  (27 May 2022 20:11)      SUBJECTIVE & OBJECTIVE:   Pt seen and examined at bedside.   no overnight events.    complaining of 3 episodes of nonbloody, not dark  loose stool today   CIWA 4-6     Denies fever, chills, N/V, dizziness, HA, cough, CP, palpitations, SOB, abdominal pain, dysuria          PHYSICAL EXAM:  Vital Signs Last 24 Hrs  T(C): 36.6 (29 May 2022 12:12), Max: 37.3 (28 May 2022 17:00)  T(F): 97.8 (29 May 2022 12:12), Max: 99.1 (28 May 2022 17:00)  HR: 67 (29 May 2022 12:12) (64 - 67)  BP: 148/83 (29 May 2022 12:12) (144/87 - 153/81)  BP(mean): --  RR: 17 (29 May 2022 12:12) (17 - 18)  SpO2: 97% (29 May 2022 12:12) (96% - 98%)      GENERAL: NAD, , no increased WOB, + tremors   HEAD:  Atraumatic, Normocephalic  EYES: EOMI, PERRLA, conjunctiva and sclera clear  ENMT: Moist mucous membranes  NECK: Supple, No JVD  NERVOUS SYSTEM:  Alert & Oriented X3, no focal neuro deficits   CHEST/LUNG: Clear to auscultation bilaterally; No rales, rhonchi, wheezing, or rubs  HEART: Regular rate and rhythm; No murmurs, rubs, or gallops  ABDOMEN: Soft, Nontender, Nondistended; Bowel sounds present  EXTREMITIES:  2+ Peripheral Pulses b/l, No clubbing, cyanosis, calf tenderness or edema b/l    MEDICATIONS  (STANDING):  buPROPion XL (24-Hour) . 150 milliGRAM(s) Oral daily  chlordiazePOXIDE 50 milliGRAM(s) Oral every 6 hours  chlordiazePOXIDE 50 milliGRAM(s) Oral every 8 hours  chlordiazePOXIDE   Oral   cyanocobalamin 1000 MICROGram(s) Oral daily  dorzolamide 2%/timolol 0.5% Ophthalmic Solution 1 Drop(s) Both EYES two times a day  folic acid 1 milliGRAM(s) Oral daily  heparin   Injectable 5000 Unit(s) SubCutaneous every 12 hours  lactated ringers. 1000 milliLiter(s) (65 mL/Hr) IV Continuous <Continuous>  latanoprost 0.005% Ophthalmic Solution 1 Drop(s) Both EYES at bedtime  multivitamin 1 Tablet(s) Oral daily  pantoprazole    Tablet 40 milliGRAM(s) Oral before breakfast  potassium phosphate / sodium phosphate Powder (PHOS-NaK) 1 Packet(s) Oral three times a day  thiamine Injectable 100 milliGRAM(s) IV Push daily    MEDICATIONS  (PRN):  acetaminophen     Tablet .. 650 milliGRAM(s) Oral every 6 hours PRN Mild Pain (1 - 3)  LORazepam   Injectable 2 milliGRAM(s) IV Push every 3 hours PRN for CIWA >8      LABS:                                   12.5   5.70  )-----------( 153      ( 27 May 2022 18:01 )             36.4   -    136  |  99  |  11  ----------------------------<  75  3.3<L>   |  26  |  0.62    Ca    8.5      29 May 2022 06:43  Phos  2.6       Mg     1.7         TPro  7.6  /  Alb  3.3  /  TBili  1.0  /  DBili  x   /  AST  122<H>  /  ALT  98<H>  /  AlkPhos  68          Urinalysis Basic - ( 27 May 2022 19:03 )    Color: Yellow / Appearance: Clear / S.010 / pH: x  Gluc: x / Ketone: Large  / Bili: Negative / Urobili: 4 mg/dL   Blood: x / Protein: 30 mg/dL / Nitrite: Negative   Leuk Esterase: Negative / RBC: 3-5 /HPF / WBC 0-2   Sq Epi: x / Non Sq Epi: Occasional / Bacteria: Few      Magnesium, Serum: 1.7 mg/dL ( @ 06:56)      < from: US Abdomen Complete (US Abdomen Complete .) (22 @ 09:25) >      INTERPRETATION:  CLINICAL INFORMATION: Abnormal liver function tests.    COMPARISON: Abdominal ultrasound dated 2022 and abdominal CT dated  2022    TECHNIQUE: Sonography of the abdomen.    FINDINGS:  Liver: Moderate increased echogenicity consistent with hepatic steatosis.   No focal hepatic lesions are identified.  Bile ducts: Normal caliber. Common bile duct measures 4 mm.  Gallbladder: Small gallstone. Gallbladder is otherwise unremarkable.   There is no sonographic Bowles sign.  Pancreas: Visualized portions are within normal limits.  Spleen: 8.9 cm. Within normal limits.  Right kidney: 11.4 cm. No hydronephrosis.  Left kidney: 11.5 cm. No hydronephrosis.  Ascites: None.  Aorta and IVC: Visualized portions are within normal limits.    IMPRESSION:  Hepatic steatosis.    Cholelithiasis. No ultrasound evidence of acute cholecystitis.    < end of copied text >      Imaging Personally Reviewed:  [ ] YES  [ ] NO    Consultant(s) Notes Reviewed:  [x ] YES  [ ] NO    Care Discussed with Consultants/Other Providers [x ] YES  [ ] NO  Care discussed in detail with patient.  All questions and concerns addressed

## 2022-05-30 LAB
ALBUMIN SERPL ELPH-MCNC: 2.8 G/DL — LOW (ref 3.3–5)
ALP SERPL-CCNC: 60 U/L — SIGNIFICANT CHANGE UP (ref 40–120)
ALT FLD-CCNC: 64 U/L — SIGNIFICANT CHANGE UP (ref 12–78)
ANION GAP SERPL CALC-SCNC: 13 MMOL/L — SIGNIFICANT CHANGE UP (ref 5–17)
AST SERPL-CCNC: 58 U/L — HIGH (ref 15–37)
BILIRUB DIRECT SERPL-MCNC: 0.3 MG/DL — SIGNIFICANT CHANGE UP (ref 0–0.3)
BILIRUB INDIRECT FLD-MCNC: 0.6 MG/DL — SIGNIFICANT CHANGE UP (ref 0.2–1)
BILIRUB SERPL-MCNC: 0.9 MG/DL — SIGNIFICANT CHANGE UP (ref 0.2–1.2)
BUN SERPL-MCNC: 8 MG/DL — SIGNIFICANT CHANGE UP (ref 7–23)
CALCIUM SERPL-MCNC: 8.4 MG/DL — LOW (ref 8.5–10.1)
CHLORIDE SERPL-SCNC: 100 MMOL/L — SIGNIFICANT CHANGE UP (ref 96–108)
CO2 SERPL-SCNC: 22 MMOL/L — SIGNIFICANT CHANGE UP (ref 22–31)
CREAT SERPL-MCNC: 0.61 MG/DL — SIGNIFICANT CHANGE UP (ref 0.5–1.3)
EGFR: 111 ML/MIN/1.73M2 — SIGNIFICANT CHANGE UP
GLUCOSE SERPL-MCNC: 85 MG/DL — SIGNIFICANT CHANGE UP (ref 70–99)
HCT VFR BLD CALC: 37.5 % — LOW (ref 39–50)
HGB BLD-MCNC: 12.8 G/DL — LOW (ref 13–17)
MAGNESIUM SERPL-MCNC: 1.7 MG/DL — SIGNIFICANT CHANGE UP (ref 1.6–2.6)
MCHC RBC-ENTMCNC: 31.4 PG — SIGNIFICANT CHANGE UP (ref 27–34)
MCHC RBC-ENTMCNC: 34.1 G/DL — SIGNIFICANT CHANGE UP (ref 32–36)
MCV RBC AUTO: 91.9 FL — SIGNIFICANT CHANGE UP (ref 80–100)
NRBC # BLD: 0 /100 WBCS — SIGNIFICANT CHANGE UP (ref 0–0)
PHOSPHATE SERPL-MCNC: 3 MG/DL — SIGNIFICANT CHANGE UP (ref 2.5–4.5)
PLATELET # BLD AUTO: 102 K/UL — LOW (ref 150–400)
POTASSIUM SERPL-MCNC: 3.2 MMOL/L — LOW (ref 3.5–5.3)
POTASSIUM SERPL-SCNC: 3.2 MMOL/L — LOW (ref 3.5–5.3)
PROT SERPL-MCNC: 7 GM/DL — SIGNIFICANT CHANGE UP (ref 6–8.3)
RBC # BLD: 4.08 M/UL — LOW (ref 4.2–5.8)
RBC # FLD: 13.2 % — SIGNIFICANT CHANGE UP (ref 10.3–14.5)
SODIUM SERPL-SCNC: 135 MMOL/L — SIGNIFICANT CHANGE UP (ref 135–145)
WBC # BLD: 5.56 K/UL — SIGNIFICANT CHANGE UP (ref 3.8–10.5)
WBC # FLD AUTO: 5.56 K/UL — SIGNIFICANT CHANGE UP (ref 3.8–10.5)

## 2022-05-30 PROCEDURE — 99232 SBSQ HOSP IP/OBS MODERATE 35: CPT

## 2022-05-30 RX ORDER — POTASSIUM CHLORIDE 20 MEQ
40 PACKET (EA) ORAL ONCE
Refills: 0 | Status: DISCONTINUED | OUTPATIENT
Start: 2022-05-30 | End: 2022-05-30

## 2022-05-30 RX ORDER — POTASSIUM CHLORIDE 20 MEQ
40 PACKET (EA) ORAL EVERY 4 HOURS
Refills: 0 | Status: COMPLETED | OUTPATIENT
Start: 2022-05-30 | End: 2022-05-30

## 2022-05-30 RX ORDER — MAGNESIUM SULFATE 500 MG/ML
1 VIAL (ML) INJECTION ONCE
Refills: 0 | Status: COMPLETED | OUTPATIENT
Start: 2022-05-30 | End: 2022-05-30

## 2022-05-30 RX ADMIN — LATANOPROST 1 DROP(S): 0.05 SOLUTION/ DROPS OPHTHALMIC; TOPICAL at 21:08

## 2022-05-30 RX ADMIN — DORZOLAMIDE HYDROCHLORIDE TIMOLOL MALEATE 1 DROP(S): 20; 5 SOLUTION/ DROPS OPHTHALMIC at 19:01

## 2022-05-30 RX ADMIN — BUPROPION HYDROCHLORIDE 150 MILLIGRAM(S): 150 TABLET, EXTENDED RELEASE ORAL at 11:30

## 2022-05-30 RX ADMIN — HEPARIN SODIUM 5000 UNIT(S): 5000 INJECTION INTRAVENOUS; SUBCUTANEOUS at 19:01

## 2022-05-30 RX ADMIN — HEPARIN SODIUM 5000 UNIT(S): 5000 INJECTION INTRAVENOUS; SUBCUTANEOUS at 05:56

## 2022-05-30 RX ADMIN — PANTOPRAZOLE SODIUM 40 MILLIGRAM(S): 20 TABLET, DELAYED RELEASE ORAL at 08:17

## 2022-05-30 RX ADMIN — Medication 1 MILLIGRAM(S): at 11:30

## 2022-05-30 RX ADMIN — Medication 1 TABLET(S): at 11:30

## 2022-05-30 RX ADMIN — DORZOLAMIDE HYDROCHLORIDE TIMOLOL MALEATE 1 DROP(S): 20; 5 SOLUTION/ DROPS OPHTHALMIC at 05:58

## 2022-05-30 RX ADMIN — PREGABALIN 1000 MICROGRAM(S): 225 CAPSULE ORAL at 12:54

## 2022-05-30 RX ADMIN — Medication 100 MILLIGRAM(S): at 12:53

## 2022-05-30 RX ADMIN — Medication 40 MILLIEQUIVALENT(S): at 14:15

## 2022-05-30 RX ADMIN — Medication 40 MILLIEQUIVALENT(S): at 11:26

## 2022-05-30 RX ADMIN — SODIUM CHLORIDE 65 MILLILITER(S): 9 INJECTION, SOLUTION INTRAVENOUS at 04:40

## 2022-05-30 RX ADMIN — Medication 100 GRAM(S): at 11:24

## 2022-05-30 RX ADMIN — Medication 50 MILLIGRAM(S): at 05:55

## 2022-05-30 RX ADMIN — Medication 50 MILLIGRAM(S): at 14:15

## 2022-05-30 NOTE — PROGRESS NOTE ADULT - SUBJECTIVE AND OBJECTIVE BOX
HPI:  57 year old male no relevant PMH p/w ETOH withdrawal.  Notes ETOH consumption in heavy quants for the past 3 weeks. Last drink this am at 2 00 AM. Patient wants to stop drinking.  Drinks at least a bottle of Vodka and Tequila a day then some beer. Notes tremors and increased anxiety this AM. ROS neg for cp, sob, abd pain, vomiting, fever, chills, bleeding, dysuria.     ETOH level 286 on arrival. Multiple admits in the past.  (27 May 2022 20:11)      SUBJECTIVE & OBJECTIVE:   Pt seen and examined at bedside.   no overnight events.   no further loose stools , requesting regular diet   CIWA 4    Denies fever, chills, N/V, dizziness, HA, cough, CP, palpitations, SOB, abdominal pain, dysuria          PHYSICAL EXAM:  Vital Signs Last 24 Hrs  T(C): 36.9 (30 May 2022 16:10), Max: 37.3 (29 May 2022 23:26)  T(F): 98.5 (30 May 2022 16:10), Max: 99.1 (29 May 2022 23:26)  HR: 64 (30 May 2022 16:10) (61 - 75)  BP: 156/93 (30 May 2022 16:10) (124/78 - 156/93)  BP(mean): --  RR: 18 (30 May 2022 16:10) (18 - 18)  SpO2: 96% (30 May 2022 16:10) (96% - 100%)      GENERAL: NAD, , no increased WOB, + tremors --improved   HEAD:  Atraumatic, Normocephalic  EYES: EOMI, PERRLA, conjunctiva and sclera clear  ENMT: Moist mucous membranes  NECK: Supple, No JVD  NERVOUS SYSTEM:  Alert & Oriented X3, no focal neuro deficits   CHEST/LUNG: Clear to auscultation bilaterally; No rales, rhonchi, wheezing, or rubs  HEART: Regular rate and rhythm; No murmurs, rubs, or gallops  ABDOMEN: Soft, Nontender, Nondistended; Bowel sounds present  EXTREMITIES:  2+ Peripheral Pulses b/l, No clubbing, cyanosis, calf tenderness or edema b/l    MEDICATIONS  (STANDING):  buPROPion XL (24-Hour) . 150 milliGRAM(s) Oral daily  chlordiazePOXIDE 50 milliGRAM(s) Oral every 6 hours  chlordiazePOXIDE 50 milliGRAM(s) Oral every 8 hours  chlordiazePOXIDE   Oral   cyanocobalamin 1000 MICROGram(s) Oral daily  dorzolamide 2%/timolol 0.5% Ophthalmic Solution 1 Drop(s) Both EYES two times a day  folic acid 1 milliGRAM(s) Oral daily  heparin   Injectable 5000 Unit(s) SubCutaneous every 12 hours  lactated ringers. 1000 milliLiter(s) (65 mL/Hr) IV Continuous <Continuous>  latanoprost 0.005% Ophthalmic Solution 1 Drop(s) Both EYES at bedtime  multivitamin 1 Tablet(s) Oral daily  pantoprazole    Tablet 40 milliGRAM(s) Oral before breakfast  potassium phosphate / sodium phosphate Powder (PHOS-NaK) 1 Packet(s) Oral three times a day  thiamine Injectable 100 milliGRAM(s) IV Push daily    MEDICATIONS  (PRN):  acetaminophen     Tablet .. 650 milliGRAM(s) Oral every 6 hours PRN Mild Pain (1 - 3)  LORazepam   Injectable 2 milliGRAM(s) IV Push every 3 hours PRN for CIWA >8      LABS:                                   12.8   5.56  )-----------( 102      ( 30 May 2022 06:39 )             37.5   05-30    135  |  100  |  8   ----------------------------<  85  3.2<L>   |  22  |  0.61    Ca    8.4<L>      30 May 2022 06:39  Phos  3.0     05-30  Mg     1.7     -30    TPro  7.0  /  Alb  2.8<L>  /  TBili  0.9  /  DBili  0.3  /  AST  58<H>  /  ALT  64  /  AlkPhos  60  05-30          Urinalysis Basic - ( 27 May 2022 19:03 )    Color: Yellow / Appearance: Clear / S.010 / pH: x  Gluc: x / Ketone: Large  / Bili: Negative / Urobili: 4 mg/dL   Blood: x / Protein: 30 mg/dL / Nitrite: Negative   Leuk Esterase: Negative / RBC: 3-5 /HPF / WBC 0-2   Sq Epi: x / Non Sq Epi: Occasional / Bacteria: Few      Magnesium, Serum: 1.7 mg/dL ( @ 06:56)      < from: US Abdomen Complete (US Abdomen Complete .) (22 @ 09:25) >      INTERPRETATION:  CLINICAL INFORMATION: Abnormal liver function tests.    COMPARISON: Abdominal ultrasound dated 2022 and abdominal CT dated  2022    TECHNIQUE: Sonography of the abdomen.    FINDINGS:  Liver: Moderate increased echogenicity consistent with hepatic steatosis.   No focal hepatic lesions are identified.  Bile ducts: Normal caliber. Common bile duct measures 4 mm.  Gallbladder: Small gallstone. Gallbladder is otherwise unremarkable.   There is no sonographic Bowles sign.  Pancreas: Visualized portions are within normal limits.  Spleen: 8.9 cm. Within normal limits.  Right kidney: 11.4 cm. No hydronephrosis.  Left kidney: 11.5 cm. No hydronephrosis.  Ascites: None.  Aorta and IVC: Visualized portions are within normal limits.    IMPRESSION:  Hepatic steatosis.    Cholelithiasis. No ultrasound evidence of acute cholecystitis.    < end of copied text >      Imaging Personally Reviewed:  [ ] YES  [ ] NO    Consultant(s) Notes Reviewed:  [x ] YES  [ ] NO    Care Discussed with Consultants/Other Providers [x ] YES  [ ] NO  Care discussed in detail with patient.  All questions and concerns addressed

## 2022-05-30 NOTE — PROGRESS NOTE ADULT - ASSESSMENT
57 year old male no relevant PMH p/w ETOH withdrawal.  Notes ETOH consumption in heavy quants for the past 3 weeks. Last drink this am at 2 00 AM. Patient wants to stop drinking.  Notes tremors and increased anxiety. ROS neg for cp, sob, abd pain, vomiting, fever, chills, bleeding, dysuria.       Assessment and Plan:     ETOH withdrawal. CIWA score is 9 on arrival.   CIWA protocol with librium taper, symptom trigger ativan IV   5/29 CIWA 4-6   continue with  Thiamine, folic acid, MVI   Abd US:   hepatic steatosis   Utox + benzo received here)   monitor electrolytes     Hypokalemia --replete and monitor     Continue home meds for depression Wellbutrin, denies SI/HI    two eye drops for glaucoma. Legally blind in right eye.       Preventative measures   Heparin SQ-dvt ppx  fall precautions 
57 year old male no relevant PMH p/w ETOH withdrawal.  Notes ETOH consumption in heavy quants for the past 3 weeks. Last drink this am at 2 00 AM. Patient wants to stop drinking.  Notes tremors and increased anxiety. ROS neg for cp, sob, abd pain, vomiting, fever, chills, bleeding, dysuria.       Assessment and Plan:     ETOH withdrawal. CIWA score is 9 on arrival.   CIWA protocol with librium taper, symptom trigger ativan IV   5/29 CIWA 4-6  5/30 CIWA 4    continue with  Thiamine, folic acid, MVI   Abd US:   hepatic steatosis   Utox + benzo (received here)   monitor electrolytes     thrombocytopenia --most likely sec to ETOH, no e/o bleeding or bruising   monitor     Hypokalemia --replete and monitor     Continue home meds for depression Wellbutrin, denies SI/HI    two eye drops for glaucoma. Legally blind in right eye.       Preventative measures   Heparin SQ-dvt ppx  fall precautions 
57 year old male no relevant PMH p/w ETOH withdrawal.  Notes ETOH consumption in heavy quants for the past 3 weeks. Last drink this am at 2 00 AM. Patient wants to stop drinking.  Notes tremors and increased anxiety. ROS neg for cp, sob, abd pain, vomiting, fever, chills, bleeding, dysuria.       Assessment and Plan:     ETOH withdrawal. CIWA score is 9 on arrival.   CIWA protocol with librium taper, symptom trigger ativan IV   IV Thiamine, folic acid, MVI   Abdominal sonogram to eval increased LFT.     Replaced phos, K orally. Recheck in AM with magnesium.     Continue home meds for depression Wellbutrin, two eye drops for glaucoma. Legally blind in right eye.       Heparin SQ-dvt ppx  fall precautions

## 2022-05-31 ENCOUNTER — TRANSCRIPTION ENCOUNTER (OUTPATIENT)
Age: 57
End: 2022-05-31

## 2022-05-31 VITALS
SYSTOLIC BLOOD PRESSURE: 114 MMHG | RESPIRATION RATE: 18 BRPM | OXYGEN SATURATION: 95 % | TEMPERATURE: 98 F | DIASTOLIC BLOOD PRESSURE: 75 MMHG | HEART RATE: 78 BPM

## 2022-05-31 LAB
ANION GAP SERPL CALC-SCNC: 9 MMOL/L — SIGNIFICANT CHANGE UP (ref 5–17)
BUN SERPL-MCNC: 6 MG/DL — LOW (ref 7–23)
CALCIUM SERPL-MCNC: 8.9 MG/DL — SIGNIFICANT CHANGE UP (ref 8.5–10.1)
CHLORIDE SERPL-SCNC: 103 MMOL/L — SIGNIFICANT CHANGE UP (ref 96–108)
CLOSURE TME COLL+EPINEP BLD: 102 K/UL — LOW (ref 150–400)
CO2 SERPL-SCNC: 24 MMOL/L — SIGNIFICANT CHANGE UP (ref 22–31)
CREAT SERPL-MCNC: 0.64 MG/DL — SIGNIFICANT CHANGE UP (ref 0.5–1.3)
EGFR: 109 ML/MIN/1.73M2 — SIGNIFICANT CHANGE UP
GLUCOSE SERPL-MCNC: 101 MG/DL — HIGH (ref 70–99)
HCT VFR BLD CALC: 38.7 % — LOW (ref 39–50)
HGB BLD-MCNC: 13.1 G/DL — SIGNIFICANT CHANGE UP (ref 13–17)
MAGNESIUM SERPL-MCNC: 1.9 MG/DL — SIGNIFICANT CHANGE UP (ref 1.6–2.6)
MCHC RBC-ENTMCNC: 31.4 PG — SIGNIFICANT CHANGE UP (ref 27–34)
MCHC RBC-ENTMCNC: 33.9 G/DL — SIGNIFICANT CHANGE UP (ref 32–36)
MCV RBC AUTO: 92.8 FL — SIGNIFICANT CHANGE UP (ref 80–100)
NRBC # BLD: 0 /100 WBCS — SIGNIFICANT CHANGE UP (ref 0–0)
PHOSPHATE SERPL-MCNC: 2.9 MG/DL — SIGNIFICANT CHANGE UP (ref 2.5–4.5)
PLATELET # BLD AUTO: 110 K/UL — LOW (ref 150–400)
POTASSIUM SERPL-MCNC: 3.4 MMOL/L — LOW (ref 3.5–5.3)
POTASSIUM SERPL-SCNC: 3.4 MMOL/L — LOW (ref 3.5–5.3)
RBC # BLD: 4.17 M/UL — LOW (ref 4.2–5.8)
RBC # FLD: 13.2 % — SIGNIFICANT CHANGE UP (ref 10.3–14.5)
SODIUM SERPL-SCNC: 136 MMOL/L — SIGNIFICANT CHANGE UP (ref 135–145)
WBC # BLD: 5.21 K/UL — SIGNIFICANT CHANGE UP (ref 3.8–10.5)
WBC # FLD AUTO: 5.21 K/UL — SIGNIFICANT CHANGE UP (ref 3.8–10.5)

## 2022-05-31 PROCEDURE — 99239 HOSP IP/OBS DSCHRG MGMT >30: CPT

## 2022-05-31 RX ORDER — BUPROPION HYDROCHLORIDE 150 MG/1
1 TABLET, EXTENDED RELEASE ORAL
Qty: 0 | Refills: 0 | DISCHARGE
Start: 2022-05-31

## 2022-05-31 RX ORDER — FOLIC ACID 0.8 MG
1 TABLET ORAL
Qty: 30 | Refills: 0
Start: 2022-05-31 | End: 2022-06-29

## 2022-05-31 RX ORDER — LATANOPROST 0.05 MG/ML
1 SOLUTION/ DROPS OPHTHALMIC; TOPICAL
Qty: 0 | Refills: 0 | DISCHARGE
Start: 2022-05-31

## 2022-05-31 RX ORDER — PANTOPRAZOLE SODIUM 20 MG/1
1 TABLET, DELAYED RELEASE ORAL
Qty: 30 | Refills: 0
Start: 2022-05-31 | End: 2022-06-29

## 2022-05-31 RX ORDER — ACETAMINOPHEN 500 MG
2 TABLET ORAL
Qty: 0 | Refills: 0 | DISCHARGE
Start: 2022-05-31

## 2022-05-31 RX ORDER — POTASSIUM CHLORIDE 20 MEQ
20 PACKET (EA) ORAL ONCE
Refills: 0 | Status: COMPLETED | OUTPATIENT
Start: 2022-05-31 | End: 2022-05-31

## 2022-05-31 RX ORDER — PREGABALIN 225 MG/1
1 CAPSULE ORAL
Qty: 30 | Refills: 0
Start: 2022-05-31 | End: 2022-06-29

## 2022-05-31 RX ORDER — DORZOLAMIDE HYDROCHLORIDE TIMOLOL MALEATE 20; 5 MG/ML; MG/ML
1 SOLUTION/ DROPS OPHTHALMIC
Qty: 0 | Refills: 0 | DISCHARGE
Start: 2022-05-31

## 2022-05-31 RX ORDER — THIAMINE MONONITRATE (VIT B1) 100 MG
1 TABLET ORAL
Qty: 30 | Refills: 0
Start: 2022-05-31 | End: 2022-06-29

## 2022-05-31 RX ADMIN — PANTOPRAZOLE SODIUM 40 MILLIGRAM(S): 20 TABLET, DELAYED RELEASE ORAL at 07:59

## 2022-05-31 RX ADMIN — PREGABALIN 1000 MICROGRAM(S): 225 CAPSULE ORAL at 11:58

## 2022-05-31 RX ADMIN — HEPARIN SODIUM 5000 UNIT(S): 5000 INJECTION INTRAVENOUS; SUBCUTANEOUS at 05:09

## 2022-05-31 RX ADMIN — DORZOLAMIDE HYDROCHLORIDE TIMOLOL MALEATE 1 DROP(S): 20; 5 SOLUTION/ DROPS OPHTHALMIC at 05:10

## 2022-05-31 RX ADMIN — Medication 50 MILLIGRAM(S): at 05:10

## 2022-05-31 RX ADMIN — Medication 20 MILLIEQUIVALENT(S): at 09:50

## 2022-05-31 RX ADMIN — Medication 1 TABLET(S): at 11:58

## 2022-05-31 RX ADMIN — BUPROPION HYDROCHLORIDE 150 MILLIGRAM(S): 150 TABLET, EXTENDED RELEASE ORAL at 11:59

## 2022-05-31 RX ADMIN — Medication 100 MILLIGRAM(S): at 11:59

## 2022-05-31 RX ADMIN — Medication 1 MILLIGRAM(S): at 11:59

## 2022-05-31 NOTE — DISCHARGE NOTE NURSING/CASE MANAGEMENT/SOCIAL WORK - NSDCVIVACCINE_GEN_ALL_CORE_FT
influenza, injectable, quadrivalent, preservative free; 14-Oct-2021 13:35; Nany Guerrero (RN); [employer]; [id number] (Exp. Date: 30-Jun-2022); IntraMuscular; Deltoid Right.; 0.5 milliLiter(s); VIS (VIS Published: 06-Aug-2021, VIS Presented: 14-Oct-2021);   Tdap; 22-Dec-2021 19:23; Clementina Koehler (MARTÍNEZ); [employer]; [id number] (Exp. Date: 09-Sep-2023); IntraMuscular; Deltoid Left.; 0.5 milliLiter(s); VIS (VIS Published: 09-May-2013, VIS Presented: 22-Dec-2021);

## 2022-05-31 NOTE — DISCHARGE NOTE NURSING/CASE MANAGEMENT/SOCIAL WORK - NSDCPEFALRISK_GEN_ALL_CORE
For information on Fall & Injury Prevention, visit: [email or web address] OR  [email or web address] OR  https://www.cdc.gov/steadi/patient.html

## 2022-05-31 NOTE — DISCHARGE NOTE PROVIDER - HOSPITAL COURSE
57 year old male no relevant PMH p/w ETOH withdrawal.  Notes ETOH consumption in heavy quants for the past 3 weeks. Last drink this am at 2 00 AM. Patient wants to stop drinking.  Notes tremors and increased anxiety. ROS neg for cp, sob, abd pain, vomiting, fever, chills, bleeding, dysuria.     Vital Signs Last 24 Hrs  T(C): 36.9 (31 May 2022 11:04), Max: 36.9 (30 May 2022 16:10)  T(F): 98.5 (31 May 2022 11:04), Max: 98.5 (30 May 2022 16:10)  HR: 78 (31 May 2022 11:04) (60 - 78)  BP: 114/75 (31 May 2022 11:04) (113/70 - 156/93)  BP(mean): --  RR: 18 (31 May 2022 11:04) (17 - 18)  SpO2: 95% (31 May 2022 11:04) (95% - 96%)    GENERAL: NAD, , no increased WOB, tremors resolved , sitting in chair comfortably. tolerated regular diet without issues . denies abd pain, no diarrhea or constipation.   HEAD:  Atraumatic, Normocephalic  EYES: EOMI, PERRLA, conjunctiva and sclera clear  ENMT: Moist mucous membranes  NECK: Supple, No JVD  NERVOUS SYSTEM:  Alert & Oriented X3, no focal neuro deficits   CHEST/LUNG: Clear to auscultation bilaterally; No rales, rhonchi, wheezing, or rubs  HEART: Regular rate and rhythm; No murmurs, rubs, or gallops  ABDOMEN: Soft, Nontender, Nondistended; Bowel sounds present  EXTREMITIES:  2+ Peripheral Pulses b/l, No clubbing, cyanosis, calf tenderness or edema b/l      Assessment and Plan:     ETOH withdrawal. CIWA score is 9 on arrival.   CT head: no acute findings   5/29 CIWA 4-6  5/30 CIWA 4    5/31 CIWA 2   Abd US:   hepatic steatosis   Utox + benzo (received here)   continue with  Thiamine, folic acid, MVI   on librium taper, will Rx librium for 3 more doses. at length discussion on not drinking ETOH AT ALL while taking librium as may lead to respiratory depression and death. teachback method applied,   patient independent and ambulatory ,, no issues   PPI       thrombocytopenia --most likely sec to ETOH, no e/o bleeding or bruising , stable     Hypokalemia --repleted    Continue home meds for depression Wellbutrin, denies SI/HI        two eye drops for glaucoma. Legally blind in right eye.      microscopic hematuria --outpatient follow-up   patient aware     Discharge time : 40 min     RETURN PARAMETERS DISCUSSED WITH PATIENT, PATIENT EXPRESSED UNDERSTANDING AND IS AGREEABLE. DISCUSSED WITH PATIENT ON REFRAINING FROM DRIVING UNTIL FOLLOW-UP/ CLEARED BY PMD. PATIENT EXPRESSED UNDERSTANDING.    57 year old male no relevant PMH p/w ETOH withdrawal.  Notes ETOH consumption in heavy quants for the past 3 weeks. Last drink this am at 2 00 AM. Patient wants to stop drinking.  Notes tremors and increased anxiety. ROS neg for cp, sob, abd pain, vomiting, fever, chills, bleeding, dysuria.     Vital Signs Last 24 Hrs  T(C): 36.9 (31 May 2022 11:04), Max: 36.9 (30 May 2022 16:10)  T(F): 98.5 (31 May 2022 11:04), Max: 98.5 (30 May 2022 16:10)  HR: 78 (31 May 2022 11:04) (60 - 78)  BP: 114/75 (31 May 2022 11:04) (113/70 - 156/93)  BP(mean): --  RR: 18 (31 May 2022 11:04) (17 - 18)  SpO2: 95% (31 May 2022 11:04) (95% - 96%)    GENERAL: NAD, , no increased WOB, tremors resolved , sitting in chair comfortably. tolerated regular diet without issues . denies abd pain, no diarrhea or constipation.   HEAD:  Atraumatic, Normocephalic  EYES: EOMI, PERRLA, conjunctiva and sclera clear  ENMT: Moist mucous membranes  NECK: Supple, No JVD  NERVOUS SYSTEM:  Alert & Oriented X3, no focal neuro deficits   CHEST/LUNG: Clear to auscultation bilaterally; No rales, rhonchi, wheezing, or rubs  HEART: Regular rate and rhythm; No murmurs, rubs, or gallops  ABDOMEN: Soft, Nontender, Nondistended; Bowel sounds present  EXTREMITIES:  2+ Peripheral Pulses b/l, No clubbing, cyanosis, calf tenderness or edema b/l      Assessment and Plan:     ETOH withdrawal. CIWA score is 9 on arrival.   ETOH related gastritis   CT head: no acute findings   5/29 CIWA 4-6  5/30 CIWA 4    5/31 CIWA 2   Abd US:   hepatic steatosis   Utox + benzo (received here)   continue with  Thiamine, folic acid, MVI   on librium taper, will Rx librium for 3 more doses. at length discussion on not drinking ETOH AT ALL while taking librium as may lead to respiratory depression and death. teachback method applied,   patient independent and ambulatory ,, no issues   PPI       thrombocytopenia --most likely sec to ETOH, no e/o bleeding or bruising , stable     Hypokalemia --repleted    Continue home meds for depression Wellbutrin, denies SI/HI        two eye drops for glaucoma. Legally blind in right eye.      microscopic hematuria --outpatient follow-up   patient aware     Discharge time : 40 min     RETURN PARAMETERS DISCUSSED WITH PATIENT, PATIENT EXPRESSED UNDERSTANDING AND IS AGREEABLE. DISCUSSED WITH PATIENT ON REFRAINING FROM DRIVING UNTIL FOLLOW-UP/ CLEARED BY PMD. PATIENT EXPRESSED UNDERSTANDING.

## 2022-05-31 NOTE — DISCHARGE NOTE PROVIDER - NSDCFUADDAPPT_GEN_ALL_CORE_FT
It is important to see your primary physician as well as other necessary consultants within the next week to perform a comprehensive medical review.  Call their offices for an appointment as soon as you leave the hospital.  If you do not have a primary physician or cant reach him/her, contact the [facility] Physician Referral Service at [phone].  Your medical issues appear to be stable at this time, but if your symptoms recur or worsen, contact your physicians and/or return to the hospital if necessary.  If you encounter any issues or questions with your medication, call your physicians before stopping the medication.

## 2022-05-31 NOTE — DISCHARGE NOTE PROVIDER - NSDCCPCAREPLAN_GEN_ALL_CORE_FT
PRINCIPAL DISCHARGE DIAGNOSIS  Diagnosis: Alcohol withdrawal, uncomplicated  Assessment and Plan of Treatment:       SECONDARY DISCHARGE DIAGNOSES  Diagnosis: Major depression  Assessment and Plan of Treatment:

## 2022-05-31 NOTE — DISCHARGE NOTE PROVIDER - NSDCMRMEDTOKEN_GEN_ALL_CORE_FT
acetaminophen 325 mg oral tablet: 2 tab(s) orally every 6 hours, As needed, Mild Pain (1 - 3)  buPROPion 150 mg/24 hours (XL) oral tablet, extended release: 1 tab(s) orally once a day  chlordiazePOXIDE 25 mg oral capsule: 1 cap(s) orally 2 times a day for 2 doses and then 1 cap once daily for 1 day. DO NOT COMBINE WITH ALCOHOL CONSUMPTION. MDD:2 TABS  cyanocobalamin 1000 mcg oral tablet: 1 tab(s) orally once a day  dorzolamide-timolol 2%-0.5% preservative-free ophthalmic solution: 1 drop(s) to each affected eye 2 times a day  folic acid 1 mg oral tablet: 1 tab(s) orally once a day  latanoprost 0.005% ophthalmic solution: 1 drop(s) to each affected eye once a day (at bedtime)  Multiple Vitamins oral tablet: 1 tab(s) orally once a day  ocular lubricant ophthalmic solution: 1 drop(s) to each affected eye 3 times a day  pantoprazole 40 mg oral delayed release tablet: 1 tab(s) orally once a day (before a meal)  thiamine 100 mg oral tablet: 1 tab(s) orally once a day

## 2022-05-31 NOTE — DISCHARGE NOTE NURSING/CASE MANAGEMENT/SOCIAL WORK - NSDCFUADDAPPT_GEN_ALL_CORE_FT
It is important to see your primary physician as well as other necessary consultants within the next week to perform a comprehensive medical review.  Call their offices for an appointment as soon as you leave the hospital.  If you do not have a primary physician or cant reach him/her, contact the [facility] Physician Referral Service at [phone].  Your medical issues appear to be stable at this time, but if your symptoms recur or worsen, contact your physicians and/or return to the hospital if necessary.  If you encounter any issues or questions with your medication, call your physicians before stopping the medication.

## 2022-05-31 NOTE — DISCHARGE NOTE NURSING/CASE MANAGEMENT/SOCIAL WORK - PATIENT PORTAL LINK FT
You can access the FollowMyHealth Patient Portal offered by [facility] by registering at the following website: [email or web address]. By joining [employer]’s FollowMyHealth portal, you will also be able to view your health information using other applications (apps) compatible with our system.

## 2022-06-01 LAB
CULTURE RESULTS: SIGNIFICANT CHANGE UP
SPECIMEN SOURCE: SIGNIFICANT CHANGE UP

## 2022-06-04 DIAGNOSIS — F10.230 ALCOHOL DEPENDENCE WITH WITHDRAWAL, UNCOMPLICATED: ICD-10-CM

## 2022-06-04 DIAGNOSIS — K76.0 FATTY (CHANGE OF) LIVER, NOT ELSEWHERE CLASSIFIED: ICD-10-CM

## 2022-06-04 DIAGNOSIS — I10 ESSENTIAL (PRIMARY) HYPERTENSION: ICD-10-CM

## 2022-06-04 DIAGNOSIS — E87.6 HYPOKALEMIA: ICD-10-CM

## 2022-06-04 DIAGNOSIS — F41.9 ANXIETY DISORDER, UNSPECIFIED: ICD-10-CM

## 2022-06-04 DIAGNOSIS — R31.29 OTHER MICROSCOPIC HEMATURIA: ICD-10-CM

## 2022-06-04 DIAGNOSIS — H40.9 UNSPECIFIED GLAUCOMA: ICD-10-CM

## 2022-06-04 DIAGNOSIS — Z79.899 OTHER LONG TERM (CURRENT) DRUG THERAPY: ICD-10-CM

## 2022-06-04 DIAGNOSIS — D69.59 OTHER SECONDARY THROMBOCYTOPENIA: ICD-10-CM

## 2022-06-04 DIAGNOSIS — F32.9 MAJOR DEPRESSIVE DISORDER, SINGLE EPISODE, UNSPECIFIED: ICD-10-CM

## 2022-06-04 DIAGNOSIS — H54.8 LEGAL BLINDNESS, AS DEFINED IN USA: ICD-10-CM
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
## 2022-06-07 ENCOUNTER — OUTPATIENT (OUTPATIENT)
Dept: OUTPATIENT SERVICES | Facility: HOSPITAL | Age: 57
LOS: 1 days | Discharge: HOME | End: 2022-06-07

## 2022-06-30 NOTE — ED ADULT TRIAGE NOTE - HEIGHT IN INCHES
[facility]:  I discussed with the patient the risks of golimumab including but not limited to myelosuppression, immunosuppression, autoimmune hepatitis, demyelinating diseases, lymphoma, and serious infections.  The patient understands that monitoring is required including a PPD at baseline and must alert us or the primary physician if symptoms of infection or other concerning signs are noted.

## 2022-07-05 ENCOUNTER — EMERGENCY (EMERGENCY)
Facility: HOSPITAL | Age: 57
LOS: 1 days | Discharge: HOME | End: 2022-07-05
Attending: STUDENT IN AN ORGANIZED HEALTH CARE EDUCATION/TRAINING PROGRAM | Admitting: EMERGENCY MEDICINE

## 2022-07-05 VITALS
HEART RATE: 80 BPM | RESPIRATION RATE: 20 BRPM | DIASTOLIC BLOOD PRESSURE: 83 MMHG | OXYGEN SATURATION: 100 % | TEMPERATURE: 98 F | SYSTOLIC BLOOD PRESSURE: 131 MMHG | HEIGHT: 68 IN

## 2022-07-05 VITALS
SYSTOLIC BLOOD PRESSURE: 144 MMHG | HEART RATE: 78 BPM | OXYGEN SATURATION: 98 % | DIASTOLIC BLOOD PRESSURE: 75 MMHG | RESPIRATION RATE: 18 BRPM

## 2022-07-05 PROCEDURE — 99284 EMERGENCY DEPT VISIT MOD MDM: CPT

## 2022-07-05 RX ADMIN — Medication 50 MILLIGRAM(S): at 17:25

## 2022-07-05 NOTE — PROVIDER CONTACT NOTE (OTHER) - ASSESSMENT
Per Yadira MONTES  pt will be going to [address] [place] [address] for inpatient Detox Rehab. She asked me to arrange a medicaid taxi. I arranged AA taxi [phone]. [facility] Auth# call Inv# [id number]. P/U 6:30.

## 2022-07-05 NOTE — ED PROVIDER NOTE - NS ED ROS FT
Gen: No F/C/NS  Head: +fall  Eyes: No changes in vision   Resp: No cough or trouble breathing  Cardiovascular: No chest pain or palpitation  Gastroenteric: No N/V/D  :  No change in urine output, dysuria or hematuria   MS: No joint or muscle pain  Neuro: No headache   Skin: No new rash

## 2022-07-05 NOTE — ED PROVIDER NOTE - PROGRESS NOTE DETAILS
Brenda Best MD (PGY3) -  SBIRT consulted, pt will be getting a bed at the detox center in [place]

## 2022-07-05 NOTE — ED PROVIDER NOTE - CLINICAL SUMMARY MEDICAL DECISION MAKING FREE TEXT BOX
58yo M PMH HTN, alcohol abuse presents today with desire to detox. 56yo M PMH HTN, alcohol abuse presents today with desire to detox. Plan to give librium, POC glucose, SW/SBIRT c/s

## 2022-07-05 NOTE — SBIRT NOTE ADULT - NSSBIRTALCACTIVEREFTXDET_GEN_A_CORE
Provided SBIRT services: Full screen positive. Referral to Treatment Performed. Screening results were reviewed with the patient and patient was provided information about healthy guidelines and potential negative consequences associated with level of risk. Motivation and readiness to reduce or stop use was discussed and goals and activities to make changes were suggested/offered.   Referral for complete assessment and level of care determination at a certified treatment facility was completed by contacting the treatment facility via phone. Appt details noted below:

## 2022-07-05 NOTE — ED PROVIDER NOTE - PATIENT PORTAL LINK FT
You can access the FollowMyHealth Patient Portal offered by [facility] by registering at the following website: [email or web address]. By joining [employer]’s FollowMyHealth portal, you will also be able to view your health information using other applications (apps) compatible with our system.

## 2022-07-05 NOTE — SBIRT NOTE ADULT - NSSBIRTSAVECONSULT_GEN_A_CORE
Chief Complaint   Patient presents with   • Gyn Exam     annual exam       31 year old  female. Declined a chaperone.    Patient's last menstrual period was 06/15/2019 (exact date).    HPI: desires BC and STD testing    OB History    Para Term  AB Living   2 0 0 0 2 0   SAB TAB Ectopic Molar Multiple Live Births   0 2 0 0 0 0       Gyne hx: reg/4d/ 6PPD, HPV, h/o colpo    Social History     Tobacco Use   • Smoking status: Never Smoker   • Smokeless tobacco: Never Used   Substance Use Topics   • Alcohol use: Yes     Comment: socailly   • Drug use: Never       ALLERGIES: no known allergies.    All systems reviewed and negative except for those mentioned in the history of present illness    Visit Vitals  /70   Temp 99.3 °F (37.4 °C)   Ht 5' 3\" (1.6 m)   Wt 61.2 kg (135 lb)   LMP 06/15/2019 (Exact Date)   BMI 23.91 kg/m²       Constitutional Exam: well-groomed, pleasant, in no acute distress    Head and neck:  No thyromegaly, tenderness  Lungs:      CTA B/L. No wheezes, rales  Heart :      RRR. No murmurs  Supraclavicular Lymph Nodes:  normal   Axillary Lymph Nodes:  normal  Breasts:     -Tenderness: No  -Mass:No  -Skin changes: No  Abdomen: soft, non tender, No masses    PELVIC EXAM:  Vulva: grossly unremarkable,no lesions or masses noted  Vagina: normal size & caliber, no abnormal discharge  Cervix: Normal in appearance,no lesions or masses,No cervical motion tenderness  Uterus: AV, firm, non-tender,normal size,normal shape,   Adnexa: unremarkable,non-tender,no fullness noted,no masses noted    Extremities: normal, No edema/erythema    ASSESSMENT/PLAN: Routine Gyne Exam     -PAP with HRHPV was Done .  H/O hpv+, COLPO    -Daily MV/Calcium/Vit D & weight bearing exercises for osteopenia/osteoporosis prevention.       -Discussed healthy lifestyle, diet and daily exercise.     -STD/HPV prevention precautions including but not limited to limiting sexual partners, using female latex condoms,  healthy lifestyle modification to boost immune system were discussed with patient at length. accepted STD testing.     -Risks & benefits of reversible BC options (CBCP, LARCS) discussed. Patient accepted BC nUVARING , prescribed for one year. DVT precautions given. To start on CD#1    -Recommend monthly SBE, demonstrated. RTC if new breast abnormalities are noted.     -F/U with primary MD for RHM.     -All questions answered     -considering gardasil, will call insurance for coverage    Return in about 1 year (around 6/20/2020) for annual exam.    Elenita Rodriguez MD   Complete

## 2022-07-05 NOTE — ED ADULT TRIAGE NOTE - CHIEF COMPLAINT QUOTE
Pt is a daily drinker. Pt states that his last drink was 8 am. Unclear of the exact reason that he came in. States that he wants to "throw away the bottle".

## 2022-07-05 NOTE — ED PROVIDER NOTE - OBJECTIVE STATEMENT
56yo M PMH HTN, alcohol abuse presents today with desire to detox. Reports daily drinking for the past 1 mo after being sober for some time. Has been to rehab multiple times. States he relapses when he stops going to the group meetings which he has not been able to attend secondary to work. Last drink before arrival 2 tequilas. Usually drinks 2-3 tequilas, 2 bottles of vodka and 6 24oz beer. States he spends all of his money on etoh. Fall yesterday morning, no LOC.

## 2022-07-05 NOTE — ED PROVIDER NOTE - NSFOLLOWUPINSTRUCTIONS_ED_ALL_ED_FT
-- Por favor, no vicki alcohol en exceso. Busque ayuda para bertrand consumo problemático y/o frecuente de alcohol y nunca conduzca, tome decisiones importantes o utilice maquinaria después de consumir cualquier cantidad de alcohol. Tampoco tome tylenol (también llamado acetaminofén) con ninguna cantidad de alcohol, ya que esta combinación puede causar daño hepático permanente.    La intoxicación por alcohol ocurre cuando la cantidad de alcohol que alice persona ha consumido afecta bertrand capacidad para funcionar mental y físicamente. El consumo crónico de alcohol también puede conducir a alice variedad de problemas de chip que incluyen enfermedades neurológicas, enfermedades estomacales, enfermedades cardíacas, enfermedades hepáticas, etc. No conduzca después de beber alcohol. Beber suficiente alcohol para terminar en alice payam de emergencias sugiere que puede tener un problema de abuso de alcohol. Busque ayuda en un centro de adicción a las drogas.    BUSQUE ATENCIÓN MÉDICA DE INMEDIATO SI TIENE ALGUNO DE LOS SIGUIENTES SÍNTOMAS: convulsiones, vómitos con beverly, beverly en las heces, aturdimiento/mareos, o temblor cuando alexander de beber.    -- Please do not drink alcohol in excess.  Please seek help for your problematic and/or frequent alcohol use and never drive, make important decisions or use machinery after consuming any amount of alcohol.  Please also do not take tylenol (also called acetaminophen) with any amount of alcohol as this combination can cause permanent liver damage.    Alcohol intoxication occurs when the amount of alcohol that a person has consumed impairs his or her ability to mentally and physically function. Chronic alcohol consumption can also lead to a variety of health issues including neurological disease, stomach disease, heart disease, liver disease, etc. Do not drive after drinking alcohol. Drinking enough alcohol to end up in an Emergency Room suggests you may have an alcohol abuse problem. Seek help at a drug addiction center.    SEEK IMMEDIATE MEDICAL CARE IF YOU HAVE ANY OF THE FOLLOWING SYMPTOMS: seizures, vomiting blood, blood in your stool, lightheadedness/dizziness, or becoming shaky to tremulous when you stop drinking.

## 2022-07-05 NOTE — ED PROVIDER NOTE - PHYSICAL EXAMINATION
G: nontoxic appearing male in NAD, cooperative with exam, tremors noted BL arm, tongue fasciculations   H: NCAT  E: EOMI   M: Mucous membranes moist   R: CTABL, nWOB  C: RRR  A: Soft, NT/ND, no rebound/guarding   MSK: no calf tenderness, BL LE edema (states they are chronic in nature)

## 2022-07-05 NOTE — ED PROVIDER NOTE - ATTENDING CONTRIBUTION TO CARE
Dr. Lara, Attending Physician-  I performed a face to face bedside interview with patient regarding history of present illness, review of symptoms and past medical history. I completed an independent physical exam.  I have discussed patient's plan of care with the resident.    57M, HTN, ETOH use disorder who presents requesting detox. Has had to be admitted in the past for what sounds like ETOH withdrawal. Reports that he has relapsed the past 1-2 months - has been drinking daily (tequila, vodka). Denies hx of seizures, intubations. At this time, feels generally unwell and tremulous. Physical: afebrile, not tachycardic, mild tongue tremors and hand tremors, rrr, ctabl, abdomen soft, no LE edema. Plan: mild ETOH withdrawal at this time. Mentating. No evidence of ACUTE head trauma. Vitals wnl. Will provide librium. Social work eval for detox resources.

## 2022-07-05 NOTE — ED PROVIDER NOTE - NSFOLLOWUPCLINICS_GEN_ALL_ED_FT
[facility] - Primary Care  Primary Care  [address] [place] [address]  Phone: [phone]  Fax:

## 2022-07-07 ENCOUNTER — EMERGENCY (EMERGENCY)
Facility: HOSPITAL | Age: 57
LOS: 0 days | Discharge: HOME | End: 2022-07-07
Attending: STUDENT IN AN ORGANIZED HEALTH CARE EDUCATION/TRAINING PROGRAM

## 2022-07-07 VITALS
TEMPERATURE: 98 F | RESPIRATION RATE: 18 BRPM | DIASTOLIC BLOOD PRESSURE: 63 MMHG | OXYGEN SATURATION: 95 % | SYSTOLIC BLOOD PRESSURE: 119 MMHG | HEART RATE: 65 BPM

## 2022-07-07 VITALS
HEIGHT: 68 IN | DIASTOLIC BLOOD PRESSURE: 84 MMHG | TEMPERATURE: 98 F | OXYGEN SATURATION: 97 % | SYSTOLIC BLOOD PRESSURE: 134 MMHG | HEART RATE: 80 BPM | WEIGHT: 220.02 LBS | RESPIRATION RATE: 17 BRPM

## 2022-07-07 DIAGNOSIS — R51.9 HEADACHE, UNSPECIFIED: ICD-10-CM

## 2022-07-07 DIAGNOSIS — F10.129 ALCOHOL ABUSE WITH INTOXICATION, UNSPECIFIED: ICD-10-CM

## 2022-07-07 DIAGNOSIS — R25.1 TREMOR, UNSPECIFIED: ICD-10-CM

## 2022-07-07 DIAGNOSIS — R11.0 NAUSEA: ICD-10-CM

## 2022-07-07 DIAGNOSIS — I10 ESSENTIAL (PRIMARY) HYPERTENSION: ICD-10-CM

## 2022-07-07 LAB
ALBUMIN SERPL ELPH-MCNC: 3.4 G/DL — SIGNIFICANT CHANGE UP (ref 3.3–5)
ALP SERPL-CCNC: 73 U/L — SIGNIFICANT CHANGE UP (ref 40–120)
ALT FLD-CCNC: 69 U/L — SIGNIFICANT CHANGE UP (ref 12–78)
ANION GAP SERPL CALC-SCNC: 7 MMOL/L — SIGNIFICANT CHANGE UP (ref 5–17)
AST SERPL-CCNC: 83 U/L — HIGH (ref 15–37)
BASOPHILS # BLD AUTO: 0.1 K/UL — SIGNIFICANT CHANGE UP (ref 0–0.2)
BASOPHILS NFR BLD AUTO: 1.3 % — SIGNIFICANT CHANGE UP (ref 0–2)
BILIRUB SERPL-MCNC: 0.5 MG/DL — SIGNIFICANT CHANGE UP (ref 0.2–1.2)
BUN SERPL-MCNC: 9 MG/DL — SIGNIFICANT CHANGE UP (ref 7–23)
CALCIUM SERPL-MCNC: 7.9 MG/DL — LOW (ref 8.5–10.1)
CHLORIDE SERPL-SCNC: 111 MMOL/L — HIGH (ref 96–108)
CO2 SERPL-SCNC: 26 MMOL/L — SIGNIFICANT CHANGE UP (ref 22–31)
CREAT SERPL-MCNC: 0.71 MG/DL — SIGNIFICANT CHANGE UP (ref 0.5–1.3)
EGFR: 106 ML/MIN/1.73M2 — SIGNIFICANT CHANGE UP
EOSINOPHIL # BLD AUTO: 0.38 K/UL — SIGNIFICANT CHANGE UP (ref 0–0.5)
EOSINOPHIL NFR BLD AUTO: 5.1 % — SIGNIFICANT CHANGE UP (ref 0–6)
ETHANOL SERPL-MCNC: 280 MG/DL — HIGH (ref 0–10)
GLUCOSE BLDC GLUCOMTR-MCNC: 103 MG/DL — HIGH (ref 70–99)
GLUCOSE SERPL-MCNC: 109 MG/DL — HIGH (ref 70–99)
HCT VFR BLD CALC: 38.6 % — LOW (ref 39–50)
HGB BLD-MCNC: 13.1 G/DL — SIGNIFICANT CHANGE UP (ref 13–17)
IMM GRANULOCYTES NFR BLD AUTO: 0.3 % — SIGNIFICANT CHANGE UP (ref 0–1.5)
LYMPHOCYTES # BLD AUTO: 1.78 K/UL — SIGNIFICANT CHANGE UP (ref 1–3.3)
LYMPHOCYTES # BLD AUTO: 23.9 % — SIGNIFICANT CHANGE UP (ref 13–44)
MCHC RBC-ENTMCNC: 31.1 PG — SIGNIFICANT CHANGE UP (ref 27–34)
MCHC RBC-ENTMCNC: 33.9 G/DL — SIGNIFICANT CHANGE UP (ref 32–36)
MCV RBC AUTO: 91.7 FL — SIGNIFICANT CHANGE UP (ref 80–100)
MONOCYTES # BLD AUTO: 0.46 K/UL — SIGNIFICANT CHANGE UP (ref 0–0.9)
MONOCYTES NFR BLD AUTO: 6.2 % — SIGNIFICANT CHANGE UP (ref 2–14)
NEUTROPHILS # BLD AUTO: 4.7 K/UL — SIGNIFICANT CHANGE UP (ref 1.8–7.4)
NEUTROPHILS NFR BLD AUTO: 63.2 % — SIGNIFICANT CHANGE UP (ref 43–77)
NRBC # BLD: 0 /100 WBCS — SIGNIFICANT CHANGE UP (ref 0–0)
PLATELET # BLD AUTO: 240 K/UL — SIGNIFICANT CHANGE UP (ref 150–400)
POTASSIUM SERPL-MCNC: 3.9 MMOL/L — SIGNIFICANT CHANGE UP (ref 3.5–5.3)
POTASSIUM SERPL-SCNC: 3.9 MMOL/L — SIGNIFICANT CHANGE UP (ref 3.5–5.3)
PROT SERPL-MCNC: 7.8 GM/DL — SIGNIFICANT CHANGE UP (ref 6–8.3)
RBC # BLD: 4.21 M/UL — SIGNIFICANT CHANGE UP (ref 4.2–5.8)
RBC # FLD: 13.9 % — SIGNIFICANT CHANGE UP (ref 10.3–14.5)
SODIUM SERPL-SCNC: 144 MMOL/L — SIGNIFICANT CHANGE UP (ref 135–145)
WBC # BLD: 7.44 K/UL — SIGNIFICANT CHANGE UP (ref 3.8–10.5)
WBC # FLD AUTO: 7.44 K/UL — SIGNIFICANT CHANGE UP (ref 3.8–10.5)

## 2022-07-07 PROCEDURE — 99284 EMERGENCY DEPT VISIT MOD MDM: CPT

## 2022-07-07 RX ORDER — SODIUM CHLORIDE 9 MG/ML
1000 INJECTION, SOLUTION INTRAVENOUS ONCE
Refills: 0 | Status: COMPLETED | OUTPATIENT
Start: 2022-07-07 | End: 2022-07-07

## 2022-07-07 RX ORDER — ONDANSETRON 8 MG/1
4 TABLET, FILM COATED ORAL ONCE
Refills: 0 | Status: COMPLETED | OUTPATIENT
Start: 2022-07-07 | End: 2022-07-07

## 2022-07-07 RX ADMIN — ONDANSETRON 4 MILLIGRAM(S): 8 TABLET, FILM COATED ORAL at 10:22

## 2022-07-07 RX ADMIN — Medication 50 MILLIGRAM(S): at 10:21

## 2022-07-07 RX ADMIN — SODIUM CHLORIDE 1000 MILLILITER(S): 9 INJECTION, SOLUTION INTRAVENOUS at 10:11

## 2022-07-07 NOTE — ED ADULT NURSE NOTE - CAS EDN DISCHARGE ASSESSMENT
ambulatory with steady gait ,/Alert and oriented to person, place and time/Patient baseline mental status/Awake/Symptoms improved/No adverse reaction to first time med in ED

## 2022-07-07 NOTE — ED PROVIDER NOTE - OBJECTIVE STATEMENT
58 y/o M pmhx HTN, alcohol use disorder c/o of drinking too much. he complains of nausea, headache, and tremors. last drink this morning four 24oz bottles of alcohol. he states that he has been binge drinking for the last 6 weeks and has had multiple 58 y/o M pmhx HTN, alcohol use disorder c/o of drinking too much. he complains of nausea, headache, and tremors. last drink this morning four 24oz bottles of alcohol. he states that he has been binge drinking for the last 6 weeks without much eating and has had multiple admissions for alcohol dependance and withdrawal. denies h xof seizures. he was seen and released in [facility] ER yesterday. denies chest pain, sob, abdominal pain, fever, chills

## 2022-07-07 NOTE — ED PROVIDER NOTE - CLINICAL SUMMARY MEDICAL DECISION MAKING FREE TEXT BOX
56 y/o M pmhx HTN, alcohol use disorder c/o of drinking too much. he complains of nausea, headache, and tremors. last drink this morning four 24oz bottles of alcohol. he states that he has been binge drinking for the last 6 weeks without much eating and has had multiple admissions for alcohol dependance and withdrawal. denies h xof seizures. he was seen and released in [facility] ER yesterday. denies chest pain, sob, abdominal pain, fever, chills-- patient with tremor, labs, meds, fluids, SBIRT for follow-up

## 2022-07-07 NOTE — ED PROVIDER NOTE - PROGRESS NOTE DETAILS
SILVIA Angel: patient had long talk with SBIRT. he has an appt tomorrow at 11 at a detox center. stable for dicharge.

## 2022-07-07 NOTE — ED ADULT TRIAGE NOTE - CHIEF COMPLAINT QUOTE
as per patient states "drinking to much". Pt states last drink at 4am, had 3 "bottles". Visible tremors to both arms. Pt is alert and answering questions. Everyday drinker.  Hx: HTN, anxiety, depression, ETOH Withdrawal.

## 2022-07-07 NOTE — ED PROVIDER NOTE - NSFOLLOWUPINSTRUCTIONS_ED_ALL_ED_FT
Advance activity as tolerated.  Continue all previously prescribed medications as directed unless otherwise instructed.      Follow up with your primary care physician  Return to the ER for worsening or persistent symptoms, and/or ANY NEW OR CONCERNING SYMPTOMS. If you have issues obtaining follow up, please call: [phone] to obtain a doctor or specialist who takes your insurance in your area.  You may call [phone] to make an appointment with the internal medicine clinic.     YOU HAVE AN APPOINTMENT AT THE DETOX CENTER TOMORROW YOU WILL BE CONTACTED AT 11AM TO SET UP TRANSPORTATION TO THE CENTER.     The address to the treatment facility is:    [address] [place] [address]

## 2022-07-07 NOTE — SBIRT NOTE ADULT - NSSBIRTALCACTIVEREFTXDET_GEN_A_CORE
Provided SBIRT services: Full screen positive. Referral to Treatment Performed. Screening results were reviewed with the patient and patient was provided information about healthy guidelines and potential negative consequences associated with level of risk. Motivation and readiness to reduce or stop use was discussed and goals and activities to make changes were suggested/offered. Referral for complete assessment and level of care determination at a certified treatment facility was completed by contacting the treatment facility via phone.  Patient to receive call tomorrow at 11a from arlene Stauffer. Insurance verification pending.

## 2022-07-07 NOTE — ED ADULT NURSE NOTE - OBJECTIVE STATEMENT
58yo M PMH HTN, alcohol abuse presents today with desire to detox. Reports daily drinking for the past 1 mo after being sober for some time. Has been to rehab multiple times. States he relapses when he stops going to the group meetings which he has not been able to attend secondary to work. Last drink before arrival 2 tequilas. Usually drinks 2-3 tequilas, 2 bottles of vodka and 6 24oz beer. States he spends all of his money on etoh. Fall yesterday morning, no LOC.

## 2022-07-07 NOTE — ED PROVIDER NOTE - PHYSICAL EXAMINATION
Gen: Awake, Alert, NAD  Head:  NC/AT  Eyes:  PERRL, EOMI, Conjunctiva pink, lids normal, no scleral icterus, right eye glaucoma  ENT: OP clear, no exudates, moist mucus membranes no tongue vesiculations   Neck: supple, nontender, no meningismus, no JVD, trachea midline  Cardiac/CV:  S1 S2, RRR, no M/G/R  Respiratory/Pulm:  CTAB, good air movement, normal resp effort, no wheezes/stridor/retractions/rales/rhonchi  Gastrointestinal/Abdomen:  Soft, non tender , nondistended, +BS, no rebound/guarding  Back:  no CVAT,  no MLT  Ext:  warm, well perfused, moving all extremities spontaneously, no peripheral edema, distal pulses intact  Skin: intact, no rash

## 2022-07-07 NOTE — ED PROVIDER NOTE - PATIENT PORTAL LINK FT
You can access the FollowMyHealth Patient Portal offered by [facility] by registering at the following website: [email or web address]. By joining [employer]’s FollowMyHealth portal, you will also be able to view your health information using other applications (apps) compatible with our system.

## 2022-07-07 NOTE — ED PROVIDER NOTE - NS ED ATTENDING STATEMENT MOD
I have seen and examined this patient and fully participated in the care of this patient as the teaching attending.  The service was shared with the CAMILO.  I reviewed and verified the documentation and independently performed the documented:

## 2022-07-13 NOTE — DISCHARGE NOTE NURSING/CASE MANAGEMENT/SOCIAL WORK - NSDCPETBCESMAN_GEN_ALL_CORE
What Type Of Note Output Would You Prefer (Optional)?: Standard Output How Severe Are Your Spot(S)?: mild Have Your Spot(S) Been Treated In The Past?: has not been treated Hpi Title: Evaluation of Skin Lesions If you are a smoker, it is important for your health to stop smoking. Please be aware that second hand smoke is also harmful.

## 2022-07-21 ENCOUNTER — EMERGENCY (EMERGENCY)
Facility: HOSPITAL | Age: 57
LOS: 0 days | Discharge: HOME | End: 2022-07-22
Attending: EMERGENCY MEDICINE

## 2022-07-21 VITALS
DIASTOLIC BLOOD PRESSURE: 70 MMHG | HEIGHT: 68 IN | RESPIRATION RATE: 18 BRPM | HEART RATE: 75 BPM | TEMPERATURE: 99 F | SYSTOLIC BLOOD PRESSURE: 114 MMHG | OXYGEN SATURATION: 95 % | WEIGHT: 164.91 LBS

## 2022-07-21 DIAGNOSIS — F10.929 ALCOHOL USE, UNSPECIFIED WITH INTOXICATION, UNSPECIFIED: ICD-10-CM

## 2022-07-21 DIAGNOSIS — F10.229 ALCOHOL DEPENDENCE WITH INTOXICATION, UNSPECIFIED: ICD-10-CM

## 2022-07-21 DIAGNOSIS — I10 ESSENTIAL (PRIMARY) HYPERTENSION: ICD-10-CM

## 2022-07-21 PROCEDURE — 99284 EMERGENCY DEPT VISIT MOD MDM: CPT

## 2022-07-21 NOTE — ED PROVIDER NOTE - NSFOLLOWUPINSTRUCTIONS_ED_ALL_ED_FT
Alcohol Intoxication      Alcohol intoxication occurs when a person no longer thinks clearly or functions well (becomes impaired) after drinking alcohol. Intoxication can occur with just one drink. The legal definition of alcohol intoxication depends on the amount of alcohol in the blood (blood alcohol concentration, ORI). ORI of 80–100 mg/dL or higher is commonly considered legally intoxicated. The level of impairment depends on:  •The amount of alcohol the person had.      •The person's age, gender, and weight.      •How often the person drinks.      •Whether the person has other medical conditions, such as diabetes, seizures, or a heart condition.      Alcohol intoxication can range from mild to severe. The condition can be dangerous, especially if the person:  •Also took certain drugs or prescription medicines.    •Drinks a large amount of alcohol in a short period of time (binge drinks).  •For women, binge drinking is having four or more drinks at one time.      •For men, binge drinking is having five or more drinks at one time.        If you or anyone around you appears intoxicated, speak up and act.      What are the causes?    This condition is caused by drinking alcohol.      What increases the risk?    The following factors may make you more likely to develop this condition:  •Peer pressure in young adults.      •Difficulty managing stress.      •History of drug or alcohol abuse.      •Combining alcohol with drugs.      •Family history of drug or alcohol abuse.      •Low body weight.      •Binge drinking.        What are the signs or symptoms?    Symptoms of alcohol intoxication can vary from person to person. Symptoms can be mild, moderate, or severe.    Symptoms of mild alcohol intoxication may include:  •Feeling relaxed or sleepy.      •Having mild difficulty with coordination, speech, memory, or attention.      Symptoms of moderate alcohol intoxication may include:  •Extreme emotions, like anger or sadness.      •Moderate difficulty with coordination, speech, memory, or attention.      Symptoms of severe alcohol intoxication may include:  •Severe difficulty with coordination, speech, memory, or attention.      •Passing out.      •Vomiting.      •Confusion.      •Slow breathing.      •Coma.      Intoxication can change quickly from mild to severe. It can cause coma or death, especially in people who are not exposed to alcohol often.      How is this diagnosed?    Your health care provider will ask you how much alcohol you drank and what kind you had. Intoxication may also be diagnosed based on:  •Your symptoms and medical history.      •A physical exam.      •A blood test that measures ORI.      •A smell of alcohol on your breath.        How is this treated?    Treatment for alcohol intoxication may include:  •Being monitored in an emergency department, hospital, or treatment center until your ORI comes down and it is safe for you to go home.      •IV fluids to prevent or treat loss of fluid in the body (dehydration).      •Medicine to treat nausea or vomiting or to get rid of alcohol in the body.      •Counseling (brief intervention) about the dangers of using alcohol.      •Treatment for substance use disorder.      •Oxygen therapy or a breathing machine (ventilator).      Long-term (chronic) exposure to alcohol can have long-term effects on your brain, heart, and gastrointestinal system. These effects can be serious and may also require treatment.      Follow these instructions at home:       Eating and drinking    • Do not drink alcohol if:  •Your health care provider tells you not to drink.      •You are pregnant, may be pregnant, or are planning to become pregnant.      •You are under the legal drinking age (21 years old in the [place]      •You are taking medicines that should not be taken with alcohol.      •You have a medical condition, and alcohol makes it worse.      •You need to drive or perform activities that require you to be alert.      •You have substance use disorder.      •Ask your health care provider if alcohol is safe for you. If your health care provider allows you to drink alcohol, limit how much you have. You may drink:  •0–1 drink a day for women.     • 0–2 drinks a day for men.   •Be aware of how much alcohol is in your drink. In the [place] one drink equals one 12 oz bottle of beer (355 mL), one 5 oz glass of wine (148 mL), or one 1½ oz shot of hard liquor (44 mL).          •Avoid drinking alcohol on an empty stomach.      •Stay hydrated. Drink enough fluid to keep your urine pale yellow. Avoid caffeine because it can dehydrate you.      •Avoid drinking more than one drink per hour.      •When having multiple drinks, drink water or a non-alcoholic beverage between alcoholic drinks.      General instructions     •Take over-the-counter and prescription medicines only as told by your health care provider.      • Do not drive after drinking any amount of alcohol. Plan for a designated  or another way to go home.      •Have someone responsible stay with you while you are intoxicated. You should not be left alone.      •Keep all follow-up visits as told by your health care provider. This is important.        Contact a health care provider if:    •You do not feel better after a few days.      •You have problems at work, at school, or at home due to drinking.        Get help right away if:  •You have any of the following:  •Moderate to severe trouble with coordination, speech, memory, or attention.      •Trouble staying awake.      •Severe confusion.      •A seizure.      •Light-headedness.      •Fainting.      •Vomiting bright red blood or material that looks like coffee grounds.      •Bloody stool (feces). The blood may make your stool bright red, black, or tarry. It may also smell bad.      •Shakiness when trying to stop drinking.      •Thoughts about hurting yourself or others.        If you ever feel like you may hurt yourself or others, or have thoughts about taking your own life, get help right away. You can go to your nearest emergency department or call:   • Your local emergency services (911 in the [place]       • A suicide crisis helpline, such as the National Suicide Prevention Lifeline at 1-206.749.1169. This is open 24 hours a day.         Summary    •Alcohol intoxication occurs when a person no longer thinks clearly or functions well after drinking alcohol.      •If your health care provider says that alcohol is safe for you, limit alcohol intake to no more than 1 drink a day for women (no drinks if you are pregnant) and 2 drinks a day for men. One drink equals 12 oz of beer, 5 oz of wine, or 1½ oz of hard liquor.      •Contact your health care provider if drinking has caused you problems at work, school, or home.      •Get help right away if you have thoughts about hurting yourself or others.      This information is not intended to replace advice given to you by your health care provider. Make sure you discuss any questions you have with your health care provider.

## 2022-07-21 NOTE — ED PROVIDER NOTE - CONSTITUTIONAL, MLM
normal... Well appearing, awake, alert, oriented to person, place, time/situation and alcohol on breath noted, not in any distress

## 2022-07-21 NOTE — ED ADULT TRIAGE NOTE - CHIEF COMPLAINT QUOTE
[facility]. Unknown amount. Patient was found on someone's lawn napping. Patient has abrasion on left forehead.

## 2022-07-21 NOTE — ED PROVIDER NOTE - PATIENT PORTAL LINK FT
You can access the FollowMyHealth Patient Portal offered by [facility] by registering at the following website: [email or web address]. By joining [employer]’s FollowMyHealth portal, you will also be able to view your health information using other applications (apps) compatible with our system.

## 2022-07-21 NOTE — ED PROVIDER NOTE - OBJECTIVE STATEMENT
57 year old male with alcohol dependence hx, HTN, blind in R eye glaucoma hx presenting to ED due to alcohol intoxication - pt had fallen asleep on someone's lawn and EMS was called to bring patient to ED for evaluation. Pt in triage is awake and answering questions.

## 2022-07-21 NOTE — ED ADULT TRIAGE NOTE - ESI TRIAGE ACUITY LEVEL, MLM
Anesthesia Evaluation     Patient summary reviewed and Nursing notes reviewed                Airway   Mallampati: II  Dental      Pulmonary - negative pulmonary ROS   Cardiovascular     ECG reviewed  Rate: normal    (+) hypertension, valvular problems/murmurs murmur,       Neuro/Psych- negative ROS  GI/Hepatic/Renal/Endo - negative ROS     Musculoskeletal (-) negative ROS    Abdominal    Substance History - negative use     OB/GYN negative ob/gyn ROS         Other                        Anesthesia Plan    ASA 3     regional   (Right pop/fem blocks with intra op propofol as needed)  intravenous induction     Anesthetic plan, all risks, benefits, and alternatives have been provided, discussed and informed consent has been obtained with: patient.      
2

## 2022-07-22 VITALS
RESPIRATION RATE: 17 BRPM | OXYGEN SATURATION: 96 % | DIASTOLIC BLOOD PRESSURE: 84 MMHG | HEART RATE: 75 BPM | TEMPERATURE: 98 F | SYSTOLIC BLOOD PRESSURE: 137 MMHG

## 2022-07-22 NOTE — ED ADULT NURSE NOTE - CHIEF COMPLAINT QUOTE
Patient BIB [facility] EMS for intox. Unknown amount. Patient was found on someone's lawn napping. Patient has abrasion on left forehead.

## 2022-07-22 NOTE — ED ADULT NURSE NOTE - OBJECTIVE STATEMENT
covering for primary RN. Patient BIBA for intox, sleeping in bed 12. Patient sleeping but responds to verbal stimuli. patient admits to drinking alcohol today (unknown amt). denies nausea,, vomiting, dizziness, headache, falls, blurry vision. Patient would like to sleep. Safety measures maintained, side rails up. Pt wil historian.

## 2022-07-25 RX ORDER — SIMVASTATIN 20 MG/1
1 TABLET, FILM COATED ORAL
Qty: 0 | Refills: 0 | DISCHARGE

## 2022-07-25 RX ORDER — LATANOPROST 0.05 MG/ML
1 SOLUTION/ DROPS OPHTHALMIC; TOPICAL
Qty: 0 | Refills: 0 | DISCHARGE

## 2022-07-25 RX ORDER — BUPROPION HYDROCHLORIDE 150 MG/1
1 TABLET, EXTENDED RELEASE ORAL
Qty: 0 | Refills: 0 | DISCHARGE

## 2022-07-25 RX ORDER — CHOLECALCIFEROL (VITAMIN D3) 125 MCG
1 CAPSULE ORAL
Qty: 0 | Refills: 0 | DISCHARGE

## 2022-07-25 RX ORDER — PREGABALIN 225 MG/1
1 CAPSULE ORAL
Qty: 0 | Refills: 0 | DISCHARGE

## 2022-07-25 RX ORDER — FOLIC ACID 0.8 MG
1 TABLET ORAL
Qty: 0 | Refills: 0 | DISCHARGE

## 2022-07-25 RX ORDER — CITALOPRAM 10 MG/1
1 TABLET, FILM COATED ORAL
Qty: 0 | Refills: 0 | DISCHARGE

## 2022-07-25 RX ORDER — AMLODIPINE BESYLATE 2.5 MG/1
1 TABLET ORAL
Qty: 0 | Refills: 0 | DISCHARGE

## 2022-07-31 ENCOUNTER — INPATIENT (INPATIENT)
Facility: HOSPITAL | Age: 57
LOS: 7 days | Discharge: HOME | End: 2022-08-08
Attending: INTERNAL MEDICINE | Admitting: INTERNAL MEDICINE

## 2022-07-31 VITALS
HEART RATE: 90 BPM | TEMPERATURE: 98 F | SYSTOLIC BLOOD PRESSURE: 146 MMHG | RESPIRATION RATE: 19 BRPM | WEIGHT: 199.96 LBS | HEIGHT: 68 IN | DIASTOLIC BLOOD PRESSURE: 81 MMHG | OXYGEN SATURATION: 96 %

## 2022-07-31 DIAGNOSIS — F10.239 ALCOHOL DEPENDENCE WITH WITHDRAWAL, UNSPECIFIED: ICD-10-CM

## 2022-07-31 DIAGNOSIS — I10 ESSENTIAL (PRIMARY) HYPERTENSION: ICD-10-CM

## 2022-07-31 PROBLEM — E78.5 HYPERLIPIDEMIA, UNSPECIFIED: Chronic | Status: ACTIVE | Noted: 2022-05-11

## 2022-07-31 LAB
ALBUMIN SERPL ELPH-MCNC: 3.2 G/DL — LOW (ref 3.3–5)
ALP SERPL-CCNC: 66 U/L — SIGNIFICANT CHANGE UP (ref 40–120)
ALT FLD-CCNC: 57 U/L — SIGNIFICANT CHANGE UP (ref 12–78)
ANION GAP SERPL CALC-SCNC: 8 MMOL/L — SIGNIFICANT CHANGE UP (ref 5–17)
APTT BLD: 33.3 SEC — SIGNIFICANT CHANGE UP (ref 27.5–35.5)
AST SERPL-CCNC: 99 U/L — HIGH (ref 15–37)
BASOPHILS # BLD AUTO: 0.05 K/UL — SIGNIFICANT CHANGE UP (ref 0–0.2)
BASOPHILS NFR BLD AUTO: 1.4 % — SIGNIFICANT CHANGE UP (ref 0–2)
BILIRUB SERPL-MCNC: 0.6 MG/DL — SIGNIFICANT CHANGE UP (ref 0.2–1.2)
BUN SERPL-MCNC: 8 MG/DL — SIGNIFICANT CHANGE UP (ref 7–23)
CALCIUM SERPL-MCNC: 8.1 MG/DL — LOW (ref 8.5–10.1)
CHLORIDE SERPL-SCNC: 112 MMOL/L — HIGH (ref 96–108)
CO2 SERPL-SCNC: 26 MMOL/L — SIGNIFICANT CHANGE UP (ref 22–31)
CREAT SERPL-MCNC: 0.65 MG/DL — SIGNIFICANT CHANGE UP (ref 0.5–1.3)
EGFR: 109 ML/MIN/1.73M2 — SIGNIFICANT CHANGE UP
EOSINOPHIL # BLD AUTO: 0.16 K/UL — SIGNIFICANT CHANGE UP (ref 0–0.5)
EOSINOPHIL NFR BLD AUTO: 4.4 % — SIGNIFICANT CHANGE UP (ref 0–6)
ETHANOL SERPL-MCNC: 352 MG/DL — HIGH (ref 0–10)
FLUAV AG NPH QL: SIGNIFICANT CHANGE UP
FLUBV AG NPH QL: SIGNIFICANT CHANGE UP
GLUCOSE SERPL-MCNC: 108 MG/DL — HIGH (ref 70–99)
HCT VFR BLD CALC: 36.5 % — LOW (ref 39–50)
HGB BLD-MCNC: 12.6 G/DL — LOW (ref 13–17)
IMM GRANULOCYTES NFR BLD AUTO: 0.3 % — SIGNIFICANT CHANGE UP (ref 0–1.5)
INR BLD: 0.93 RATIO — SIGNIFICANT CHANGE UP (ref 0.88–1.16)
LYMPHOCYTES # BLD AUTO: 1.58 K/UL — SIGNIFICANT CHANGE UP (ref 1–3.3)
LYMPHOCYTES # BLD AUTO: 43.1 % — SIGNIFICANT CHANGE UP (ref 13–44)
MCHC RBC-ENTMCNC: 31.1 PG — SIGNIFICANT CHANGE UP (ref 27–34)
MCHC RBC-ENTMCNC: 34.5 G/DL — SIGNIFICANT CHANGE UP (ref 32–36)
MCV RBC AUTO: 90.1 FL — SIGNIFICANT CHANGE UP (ref 80–100)
MONOCYTES # BLD AUTO: 0.32 K/UL — SIGNIFICANT CHANGE UP (ref 0–0.9)
MONOCYTES NFR BLD AUTO: 8.7 % — SIGNIFICANT CHANGE UP (ref 2–14)
NEUTROPHILS # BLD AUTO: 1.55 K/UL — LOW (ref 1.8–7.4)
NEUTROPHILS NFR BLD AUTO: 42.1 % — LOW (ref 43–77)
NRBC # BLD: 0 /100 WBCS — SIGNIFICANT CHANGE UP (ref 0–0)
PLATELET # BLD AUTO: 101 K/UL — LOW (ref 150–400)
POTASSIUM SERPL-MCNC: 4.1 MMOL/L — SIGNIFICANT CHANGE UP (ref 3.5–5.3)
POTASSIUM SERPL-SCNC: 4.1 MMOL/L — SIGNIFICANT CHANGE UP (ref 3.5–5.3)
PROT SERPL-MCNC: 7.7 GM/DL — SIGNIFICANT CHANGE UP (ref 6–8.3)
PROTHROM AB SERPL-ACNC: 11.2 SEC — SIGNIFICANT CHANGE UP (ref 10.5–13.4)
RBC # BLD: 4.05 M/UL — LOW (ref 4.2–5.8)
RBC # FLD: 14.4 % — SIGNIFICANT CHANGE UP (ref 10.3–14.5)
SARS-COV-2 RNA SPEC QL NAA+PROBE: SIGNIFICANT CHANGE UP
SODIUM SERPL-SCNC: 146 MMOL/L — HIGH (ref 135–145)
WBC # BLD: 3.67 K/UL — LOW (ref 3.8–10.5)
WBC # FLD AUTO: 3.67 K/UL — LOW (ref 3.8–10.5)

## 2022-07-31 PROCEDURE — 99284 EMERGENCY DEPT VISIT MOD MDM: CPT

## 2022-07-31 PROCEDURE — 93010 ELECTROCARDIOGRAM REPORT: CPT

## 2022-07-31 PROCEDURE — 71045 X-RAY EXAM CHEST 1 VIEW: CPT | Mod: 26

## 2022-07-31 RX ORDER — DORZOLAMIDE HYDROCHLORIDE TIMOLOL MALEATE 20; 5 MG/ML; MG/ML
1 SOLUTION/ DROPS OPHTHALMIC
Refills: 0 | Status: DISCONTINUED | OUTPATIENT
Start: 2022-07-31 | End: 2022-08-08

## 2022-07-31 RX ORDER — SODIUM CHLORIDE 9 MG/ML
1000 INJECTION, SOLUTION INTRAVENOUS
Refills: 0 | Status: DISCONTINUED | OUTPATIENT
Start: 2022-07-31 | End: 2022-07-31

## 2022-07-31 RX ORDER — FOLIC ACID 0.8 MG
1 TABLET ORAL DAILY
Refills: 0 | Status: DISCONTINUED | OUTPATIENT
Start: 2022-08-01 | End: 2022-08-08

## 2022-07-31 RX ORDER — AMLODIPINE BESYLATE 2.5 MG/1
10 TABLET ORAL DAILY
Refills: 0 | Status: DISCONTINUED | OUTPATIENT
Start: 2022-08-01 | End: 2022-08-08

## 2022-07-31 RX ORDER — SENNA PLUS 8.6 MG/1
2 TABLET ORAL AT BEDTIME
Refills: 0 | Status: DISCONTINUED | OUTPATIENT
Start: 2022-07-31 | End: 2022-08-08

## 2022-07-31 RX ORDER — CITALOPRAM 10 MG/1
20 TABLET, FILM COATED ORAL DAILY
Refills: 0 | Status: DISCONTINUED | OUTPATIENT
Start: 2022-07-31 | End: 2022-08-08

## 2022-07-31 RX ORDER — BUPROPION HYDROCHLORIDE 150 MG/1
150 TABLET, EXTENDED RELEASE ORAL DAILY
Refills: 0 | Status: DISCONTINUED | OUTPATIENT
Start: 2022-08-01 | End: 2022-08-08

## 2022-07-31 RX ORDER — THIAMINE MONONITRATE (VIT B1) 100 MG
100 TABLET ORAL DAILY
Refills: 0 | Status: COMPLETED | OUTPATIENT
Start: 2022-07-31 | End: 2022-08-02

## 2022-07-31 RX ORDER — LATANOPROST 0.05 MG/ML
1 SOLUTION/ DROPS OPHTHALMIC; TOPICAL AT BEDTIME
Refills: 0 | Status: DISCONTINUED | OUTPATIENT
Start: 2022-07-31 | End: 2022-08-08

## 2022-07-31 RX ORDER — SODIUM CHLORIDE 9 MG/ML
1000 INJECTION INTRAMUSCULAR; INTRAVENOUS; SUBCUTANEOUS ONCE
Refills: 0 | Status: COMPLETED | OUTPATIENT
Start: 2022-07-31 | End: 2022-07-31

## 2022-07-31 RX ADMIN — DORZOLAMIDE HYDROCHLORIDE TIMOLOL MALEATE 1 DROP(S): 20; 5 SOLUTION/ DROPS OPHTHALMIC at 22:01

## 2022-07-31 RX ADMIN — Medication 1 TABLET(S): at 17:59

## 2022-07-31 RX ADMIN — CITALOPRAM 20 MILLIGRAM(S): 10 TABLET, FILM COATED ORAL at 22:10

## 2022-07-31 RX ADMIN — Medication 2 MILLIGRAM(S): at 14:03

## 2022-07-31 RX ADMIN — SODIUM CHLORIDE 1000 MILLILITER(S): 9 INJECTION INTRAMUSCULAR; INTRAVENOUS; SUBCUTANEOUS at 14:04

## 2022-07-31 RX ADMIN — Medication 1 DROP(S): at 22:01

## 2022-07-31 RX ADMIN — SODIUM CHLORIDE 1000 MILLILITER(S): 9 INJECTION INTRAMUSCULAR; INTRAVENOUS; SUBCUTANEOUS at 16:31

## 2022-07-31 RX ADMIN — LATANOPROST 1 DROP(S): 0.05 SOLUTION/ DROPS OPHTHALMIC; TOPICAL at 22:01

## 2022-07-31 RX ADMIN — Medication 1 MILLIGRAM(S): at 22:06

## 2022-07-31 RX ADMIN — Medication 2 MILLIGRAM(S): at 18:00

## 2022-07-31 RX ADMIN — Medication 100 MILLIGRAM(S): at 17:59

## 2022-07-31 NOTE — ED PROVIDER NOTE - OBJECTIVE STATEMENT
56 y/o M with PMHx of HLD, Glaucoma, HTN, Blindness of Rt eye and EtOH dependence presents to the ED BIBEMS for EtOH ingestion. As per pt, has been drinking everyday for about x2-3 months reporting use of tequila and states pt has been depressed which brought the excess use and would like to be rehabilitated. pt reports last drink was x3 hours ago, endorses tremors, but denies CP, SOB, N/V/D or other complaints at this time. Pt is fully vaccinated against COVID but not boosted. Patient denies EtOH/tobacco/illicit substance use.

## 2022-07-31 NOTE — ED PROVIDER NOTE - EYES, MLM
Rt eye blindness noted with cloudiness around cornea and redness to eye, congruent with previous glaucoma issue as per pt.

## 2022-07-31 NOTE — ED PROVIDER NOTE - CLINICAL SUMMARY MEDICAL DECISION MAKING FREE TEXT BOX
56 y/o M with PMHx of EtOH abuse presenting for withdrawal symptoms, would like for admission for Rehab/treatment, will admit pt for further care.

## 2022-07-31 NOTE — H&P ADULT - NSHPLABSRESULTS_GEN_ALL_CORE
12.6                 146  | 26   | 8            3.67  >-----------< 101     ------------------------< 108                   36.5                 4.1  | 112  | 0.65                                         Ca 8.1   Mg x     Ph x      PT/INR - ( 31 Jul 2022 14:10 )   PT: 11.2 sec;   INR: 0.93 ratio         PTT - ( 31 Jul 2022 14:10 )  PTT:33.3 sec    ekg sinus rhythm

## 2022-07-31 NOTE — ED ADULT NURSE NOTE - NSFALLRSKINDICTYPE_ED_ALL_ED
Discharge Instructions    Discharged to home by car with relative. Discharged via wheelchair, hospital escort: Yes.  Special equipment needed: Walker    Be sure to schedule a follow-up appointment with your primary care doctor or any specialists as instructed.     Discharge Plan:   Diet Plan: Discussed  Activity Level: Discussed  Confirmed Follow up Appointment: Patient to Call and Schedule Appointment  Confirmed Symptoms Management: Discussed  Medication Reconciliation Updated: Yes  Influenza Vaccine Indication: Not indicated: Previously immunized this influenza season and > 8 years of age    I understand that a diet low in cholesterol, fat, and sodium is recommended for good health. Unless I have been given specific instructions below for another diet, I accept this instruction as my diet prescription.   Other diet: Regular diet as tolerated     MD Instructions:  Patient is instructed to ambulate and weight bear as tolerated with the use of an assistive device, and to continue physical therapy exercises given during this hospital stay.   Patient is to ice and elevate the surgical leg regularly, with pillows under the ankle, nothing is to be placed under the knee.   Patient was given detailed wound care instructions, and will leave the silver dressing on until first post-op visit.   Aspirin twice daily for DVT prophylaxis.  Patient is to follow up with Dr. Oliveira's office in 1-2 weeks.    Special Instructions: Discharge instructions for the Orthopedic Patient    Follow up with Primary Care Physician within 2 weeks of discharge to home, regarding:  Review of medications and diagnostic testing.  Surveillance for medical complications.  Workup and treatment of osteoporosis, if appropriate.     -Is this a Joint Replacement patient? Yes Total Joint Knee Replacement Discharge Instructions    Pain  - The goal is to slowly wean off the prescription pain medicine.  - Ice can be used for pain control.  20 minutes at a time  is recommended, and never directly against your skin or incision.  - Most patients are off the pain pills by 3 weeks; others may require a low level of pain medications for many months.  If your pain continues to be severe, follow up with your physician.  Infection    Knee joint infections; occur in fewer than 2% of patients. The most common causes of infection following total knee replacement surgery are from bacteria that enter the bloodstream during dental procedures, urinary tract infections, or skin infections. These bacteria can lodge around your knee replacement and cause an infection.  - Keep the incision as clean and dry as possible.  - Always wash your hands before touching your incision.  - Skin infections tend to develop around 7-10 days after surgery; most can be treated with oral antibiotics.  - Dental Care should be delayed for 3 months after surgery, your surgeon recommends taking a dose of antibiotics 1 hour prior to any dental procedure. After 2 years, most surgeons recommend antibiotics only before an extensive procedure.  Ask your surgeon what he recommends.  - Signs and symptoms of infection can include:  low grade fever, redness, pain, swelling and drainage from your incision.  Notify your surgeon immediately if you develop any of these symptoms.  Other instructions  - Bowel habits - constipation is extremely common and is caused by a combination of anesthesia, lack of mobility and pain medicine.  Use stool softeners or laxatives if necessary. It is important not to ignore this problem, as bowel obstructions can be a serious complication after joint replacement surgery.  - Mood/Energy Level - Many patients experience a lack of energy and endurance for up to 2-3 months after surgery.  Some may also feel down and can even become depressed.  This is likely due to the postoperative anemia, change in activity level, lack of sleep, pain medicine and just the emotional reaction to the surgery itself  that is a big disruption in a person’s life.  This usually passes.  If symptoms persist, follow up with your primary physician.  - Returning to work - Your surgeon will give you more specific instructions. Depending on the type of activities you perform, it may be 6 to 8 weeks before you return to work.   Generally, if you work a sedentary job requiring little standing or walking, most patients may return within 2-6 weeks.  Manual labor jobs involving walking, lifting and standing may take longer. Your surgeon’s office can provide a release to part-time or light duty work early on in your recovery and progress you to full duty as able.    - Driving - If your left knee was replaced and you have an automatic transmission, you may be able to begin driving in a week or so, provided you are no longer taking narcotic pain medication. If your right knee was replaced, avoid driving for 6 to 8 weeks. Remember that your reflexes may not be as sharp as before your surgery. Ask your surgeon for specific instructions.   - Avoiding falls - A fall during the first few weeks after surgery can damage your new knee and may result in a need for further surgery.   throw rugs and tack down loose carpeting.  Be aware of floor hazards such as pets, small objects or uneven surfaces.    - [employer] Metal Detectors - The sensitivity of metal detectors varies and it is likely that your prosthesis will cause an alarm.  Inform the  of your artificial joint.  Diet  - Resume your normal diet as tolerated.  - It is important to achieve a healthy nutritional status by eating a well balanced diet on a regular basis.  - Your physician may recommend that you take iron and vitamin supplements.   - Continue to drink plenty of fluids.  Shower/Bathing  - You may shower as soon as you get home from the hospital unless otherwise instructed.  - Keep your incision out of water.  To keep the incision dry when showering, cover it with a  plastic bag or plastic wrap.  - Pat incision dry if it gets wet.  Don’t rub.  - Do not submerge in a bath until staples are out and the incision is completely healed. (Approximately 6-8 weeks)  Dressing Change:  Procedure (if recommended by your physician)  - Wash hands.  - Open all new dressing change materials.  - Remove old dressing and discard.  - Inspect incision for redness, increase in clear drainage, yellow/green drainage, odor and surrounding skin hot to touch.  -  ABD (large gauze) pad or “island dressing” by one corner and lay over the incision.  Be careful not to touch the inside of the dressing that will lay over the incision.  - Secure in place as instructed (Ace wrap or tape).    Swelling/Bruising    - Swelling can last from 3-6 months.  - Elevate your leg higher than your heart while reclining.   The first week you are home you should elevate your leg an equal amount of time, as you are active.    - Anti-inflammatory pills can be taken once you have stopped the blood thinners.  - The swelling is usually worse after you go home since you are upright for longer periods of time.  - Bruising is common and can involve the entire leg including the thigh, calf and even foot. Bruising often does not appear until after you arrive home and it can be quite dramatic- purple, black, and green.  The bruising you can see is not usually concerning and will subside without any treatment.      Blood Clot Prevention  Blood clots in the legs and the less common, but frightening, clots that travel to the lungs are a real focus of our preventative. Most patients are at standard risk for them, but those patients who are at higher risk include people who have had previous clots, a family history of clotting, smoking, diabetes, obesity, advanced age, use of estrogen and a sedentary lifestyle.    - Signs of blood clots in legs - Swelling in thigh, calf or ankle that does not go down with elevation.  Pain, heat and  tenderness in calf, back of calf or groin area.  NOTE: blood clots can occur in either leg.  - You have been receiving anticoagulant therapy (blood thinners) in the hospital and you may be instructed to continue at home depending on your risk factors.  - Your risk for developing a clot continues for up to 2-3 months after surgery.  You should avoid prolonged sitting and dehydration during that time (long air trips and car trips).  If you do take a trip during this time, please get up and move around every 1- 1.5 hours.  - If you are prescribed blood-thinning medication for home, follow instructions as directed. (Handouts provided if applicable).      Activity  Once home, you should continue to stay active. The key is to remember not to overdo it! While you can expect some good days and some bad days, you should notice a gradual improvement and a gradual increase in your endurance over the next 6 to 12 months. Exercise is a critical component of home care, particularly during the first few weeks after surgery.     - Normal activities of daily living You should be able to resume most within 3 to 6 weeks following surgery. Some pain with activity and at night is common for several weeks after surgery  -  Physical Therapy Exercises - Continue to do the exercises prescribed for at least two months after surgery. Riding a stationary bicycle can help maintain muscle tone and keep your knee flexible. Try to achieve the maximum degree of bending and extension possible. (handout provided by Therapist).  - Sexual Activity -. Your surgeon can tell you when it safe to resume sexual activity.    - Sleeping Positions - You can safely sleep on your back, on either side, or on your stomach.   - Other Activities - Walk as much as you like, but remember that walking is no substitute for the exercises your doctor and physical therapist will prescribe. Lower impact activities are preferred.  If you have specific questions, consult your  Surgeon.    When to Call the Doctor   Call the physician if:   - Fever over 100.5? F  - Increased pain, drainage, redness, odor or heat around the incision area  - Shaking chills  - Increased knee pain with activity and rest  - Increased pain in calf, tenderness or redness above or below the knee  - Increased swelling of calf, ankle, foot  - Sudden increased shortness of breath, sudden onset of chest pain, localized chest pain with coughing  - Incision opening  Or, if there are any questions or concerns about medications or care.       -Is this patient being discharged with medication to prevent blood clots?  Yes, Aspirin Aspirin, ASA oral tablets  What is this medicine?  ASPIRIN (AS pir in) is a pain reliever. It is used to treat mild pain and fever. This medicine is also used as directed by a doctor to prevent and to treat heart attacks, to prevent strokes, and to treat arthritis or inflammation.  This medicine may be used for other purposes; ask your health care provider or pharmacist if you have questions.  COMMON BRAND NAME(S): Aspir-Low, Aspir-Fara, Aspirtab, Raghu Advanced Aspirin, Raghu Aspirin, Raghu Aspirin Extra Strength, Raghu Aspirin Plus, Raghu Extra Strength, Raghu Extra Strength Plus, Raghu Genuine Aspirin, Raghu Womens Aspirin, Bufferin, Bufferin Extra Strength, Bufferin Low Dose  What should I tell my health care provider before I take this medicine?  They need to know if you have any of these conditions:  -anemia  -asthma  -bleeding problems  -child with chickenpox, the flu, or other viral infection  -diabetes  -gout  -if you frequently drink alcohol containing drinks  -kidney disease  -liver disease  -low level of vitamin K  -lupus  -smoke tobacco  -stomach ulcers or other problems  -an unusual or allergic reaction to aspirin, tartrazine dye, other medicines, dyes, or preservatives  -pregnant or trying to get pregnant  -breast-feeding  How should I use this medicine?  Take this medicine by mouth  with a glass of water. Follow the directions on the package or prescription label. You can take this medicine with or without food. If it upsets your stomach, take it with food. Do not take your medicine more often than directed.  Talk to your pediatrician regarding the use of this medicine in children. While this drug may be prescribed for children as young as 12 years of age for selected conditions, precautions do apply. Children and teenagers should not use this medicine to treat chicken pox or flu symptoms unless directed by a doctor.  Patients over 65 years old may have a stronger reaction and need a smaller dose.  Overdosage: If you think you have taken too much of this medicine contact a poison control center or emergency room at once.  NOTE: This medicine is only for you. Do not share this medicine with others.  What if I miss a dose?  If you are taking this medicine on a regular schedule and miss a dose, take it as soon as you can. If it is almost time for your next dose, take only that dose. Do not take double or extra doses.  What may interact with this medicine?  Do not take this medicine with any of the following medications:  -cidofovir  -ketorolac  -probenecid  This medicine may also interact with the following medications:  -alcohol  -alendronate  -bismuth subsalicylate  -flavocoxid  -herbal supplements like feverfew, garlic, rupal, ginkgo biloba, horse chestnut  -medicines for diabetes or glaucoma like acetazolamide, methazolamide  -medicines for gout  -medicines that treat or prevent blood clots like enoxaparin, heparin, ticlopidine, warfarin  -other aspirin and aspirin-like medicines  -NSAIDs, medicines for pain and inflammation, like ibuprofen or naproxen  -pemetrexed  -sulfinpyrazone  -varicella live vaccine  This list may not describe all possible interactions. Give your health care provider a list of all the medicines, herbs, non-prescription drugs, or dietary supplements you use. Also tell  them if you smoke, drink alcohol, or use illegal drugs. Some items may interact with your medicine.  What should I watch for while using this medicine?  If you are treating yourself for pain, tell your doctor or health care professional if the pain lasts more than 10 days, if it gets worse, or if there is a new or different kind of pain. Tell your doctor if you see redness or swelling. Also, check with your doctor if you have a fever that lasts for more than 3 days. Only take this medicine to prevent heart attacks or blood clotting if prescribed by your doctor or health care professional.  Do not take aspirin or aspirin-like medicines with this medicine. Too much aspirin can be dangerous. Always read the labels carefully.  This medicine can irritate your stomach or cause bleeding problems. Do not smoke cigarettes or drink alcohol while taking this medicine. Do not lie down for 30 minutes after taking this medicine to prevent irritation to your throat.  If you are scheduled for any medical or dental procedure, tell your healthcare provider that you are taking this medicine. You may need to stop taking this medicine before the procedure.  This medicine may be used to treat migraines. If you take migraine medicines for 10 or more days a month, your migraines may get worse. Keep a diary of headache days and medicine use. Contact your healthcare professional if your migraine attacks occur more frequently.  What side effects may I notice from receiving this medicine?  Side effects that you should report to your doctor or health care professional as soon as possible:  -allergic reactions like skin rash, itching or hives, swelling of the face, lips, or tongue  -breathing problems  -changes in hearing, ringing in the ears  -confusion  -general ill feeling or flu-like symptoms  -pain on swallowing  -redness, blistering, peeling or loosening of the skin, including inside the mouth or nose  -signs and symptoms of bleeding such  as bloody or black, tarry stools; red or dark-brown urine; spitting up blood or brown material that looks like coffee grounds; red spots on the skin; unusual bruising or bleeding from the eye, gums, or nose  -trouble passing urine or change in the amount of urine  -unusually weak or tired  -yellowing of the eyes or skin  Side effects that usually do not require medical attention (report to your doctor or health care professional if they continue or are bothersome):  -diarrhea or constipation  -headache  -nausea, vomiting  -stomach gas, heartburn  This list may not describe all possible side effects. Call your doctor for medical advice about side effects. You may report side effects to FDA at 2-159-AUC-2718.  Where should I keep my medicine?  Keep out of the reach of children.  Store at room temperature between 15 and 30 degrees C (59 and 86 degrees F). Protect from heat and moisture. Do not use this medicine if it has a strong vinegar smell. Throw away any unused medicine after the expiration date.  NOTE: This sheet is a summary. It may not cover all possible information. If you have questions about this medicine, talk to your doctor, pharmacist, or health care provider.  © 2018 [employer] (2014-08-19 11:30:31)      · Is patient discharged on Warfarin / Coumadin?   No     Total Knee Replacement, Care After  These instructions give you information about caring for yourself after your procedure. Your doctor may also give you more specific instructions. Call your doctor if you have any problems or questions after your procedure.  Follow these instructions at home:  Medicines  · Take over-the-counter and prescription medicines only as told by your doctor.  · If you were prescribed an antibiotic medicine, take it as told by your doctor. Do not stop taking the antibiotic even if you start to feel better.  · If you were prescribed a blood thinner (anticoagulant), take it as told by your doctor.  If you have a  splint or brace:  · Wear the splint or brace as told by your doctor. Remove it only as told by your doctor.  · Loosen the splint or brace if your toes tingle, get numb, or turn cold and blue.  · Do not let your splint or brace get wet if it is not waterproof.  · Keep the splint or brace clean.  Bathing  · Do not take baths, swim, or use a hot tub until your doctor says it is okay. Ask your doctor if you can take showers. You may only be allowed to take sponge baths for bathing.  · If you have a splint or brace that is not waterproof, cover it with a watertight covering when you take a bath or a shower.  · Keep your bandage (dressing) dry until your doctor says it can be taken off.  Incision care and drain care  · Check your cut from surgery (incision) and your drain every day for signs of infection. Check for:  ¨ More redness, swelling, or pain.  ¨ More fluid or blood.  ¨ Warmth.  ¨ Pus or a bad smell.  · Follow instructions from your doctor about how to take care of your cut from surgery. Make sure you:  ¨ Wash your hands with soap and water before you change your bandage. If you cannot use soap and water, use hand .  ¨ Change your bandage as told by your doctor.  ¨ Leave stitches (sutures), skin glue, or skin tape (adhesive) strips in place. They may need to stay in place for 2 weeks or longer. If tape strips get loose and curl up, you may trim the loose edges. Do not remove tape strips completely unless your doctor says it is okay.  · If you have a drain, follow instructions from your doctor about caring for it. Do not remove the drain tube or any bandages unless your doctor says it is okay.  Managing pain, stiffness, and swelling  · If directed, put ice on your knee.  ¨ Put ice in a plastic bag.  ¨ Place a towel between your skin and the bag.  ¨ Leave the ice on for 20 minutes, 2-3 times per day.  · If directed, apply heat to the affected area as often as told by your doctor. Use the heat source that  your doctor recommends, such as a moist heat pack or a heating pad.  ¨ Place a towel between your skin and the heat source.  ¨ Leave the heat on for 20-30 minutes.  ¨ Remove the heat if your skin turns bright red. This is especially important if you are unable to feel pain, heat, or cold. You may have a greater risk of getting burned.  · Move your toes often to avoid stiffness and to lessen swelling.  · Raise (elevate) your knee above the level of your heart while you are sitting or lying down.  · Wear elastic knee support for as long as told by your doctor.  Driving  · Do not drive until your doctor says it is okay. Ask your doctor when it is safe to drive if you have a splint or brace on your knee.  · Do not drive or use heavy machinery while taking prescription pain medicine.  · Do not drive for 24 hours if you received a sedative.  Activity  · Do not lift anything that is heavier than 10 lb (4.5 kg) until your doctor says it is okay.  · Do not play contact sports until your doctor says it is okay.  · Avoid high-impact activities, including running, jumping rope, and jumping jacks.  · Avoid sitting for a long time without moving. Get up and move around at least every few hours.  · If physical therapy was prescribed, do exercises as told by your doctor.  · Return to your normal activities as told by your doctor. Ask your doctor what activities are safe for you.  Safety  · Do not use your leg to support your body weight until your doctor says that you can. Use crutches or a walker as told by your doctor.  General instructions  · Do not have any dental work done for at least 3 months after your surgery. When you do have dental work done, tell your dentist about your joint replacement.  · Do not use any tobacco products, such as cigarettes, chewing tobacco, or e-cigarettes. If you need help quitting, ask your doctor.  · Wear special socks (compression stockings) as told by your doctor.  · If you have been sent home  with a knee joint motion machine (continuous passive motion machine), use it as told by your doctor.  · Drink enough fluid to keep your pee (urine) clear or pale yellow.  · If you have been told to lose weight, follow instructions from your doctor about how to do this safely.  · Keep all follow-up visits as told by your doctor. This is important.  Contact a doctor if:  · You have more redness, swelling, or pain around your cut from surgery or your drain.  · You have more fluid or blood coming from your cut from surgery or your drain.  · Your cut from surgery or your drain area feels warm to the touch.  · You have pus or a bad smell coming from your cut from surgery or your drain.  · You have a fever.  · Your cut breaks open after your doctor removes your stitches, skin glue, or skin tape strips.  · Your new joint feels loose.  · You have knee pain that does not go away.  Get help right away if:  · You have a rash.  · You have pain in your calf or thigh.  · You have swelling in your calf or thigh.  · You have shortness of breath.  · You have trouble breathing.  · You have chest pain.  · Your ability to move your knee is getting worse.  This information is not intended to replace advice given to you by your health care provider. Make sure you discuss any questions you have with your health care provider.  Document Released: 03/11/2013 Document Revised: 08/21/2017 Document Reviewed: 11/23/2016  [employer] Interactive Patient Education © 2017 [employer]    How can pain medicine affect me?  You were prescribed pain medicine. This medicine may:  · Make you tired or sleepy.  · Make you feel dizzy.  · Affect how well you can:  ¨ Drive  ¨ Do certain activities.  Pain medicine may not make all of your pain go away. You should be comfortable enough to:  · Move.  · Breathe.  · Take care of yourself.  How often should I take pain medicine and how much should I take?  · Take pain medicine only as told by your doctor and only as  needed for pain.  · You do not need to take pain medicine if you are not having pain, unless your doctor tells you to do that.  · You can take less than the prescribed dose if you find that less medicine helps your pain.  · If you have very bad (severe) pain, call your doctor. Do not take more pills than told by your doctor. Do not take pills more often than told by your doctor.  What should I avoid while I am taking pain medicine?  Follow these instructions after you start taking pain medicine, while you are taking the medicine, and for 8 hours after you stop taking the medicine:  · Do not drive.  · Do not use machinery.  · Do not use power tools.  · Do not sign legal documents.  · Do not drink alcohol.  · Do not take sleeping pills.  · Do not take care of children by yourself.  · Do not do any activities that involve climbing or being in high places.  · Do not go into any body of water unless there is an adult nearby who can watch and help you. This includes:  ¨ Lakes.  ¨ Rivers.  ¨ Oceans.  ¨ Spas.  ¨ Swimming pools.  How can I keep others safe while I am taking pain medicine?  · Store your pain medicine as told by your doctor. Make sure that you keep it where children and pets cannot reach it.  · Do not share your pain medicine with anyone.  · Do not save any leftover pills. If you have any leftover pain medicine, get rid of it or destroy it as told by your doctor.  What else do I need to know about taking pain medicine?  · Use a poop (stool) softener if you have trouble pooping (constipation) because of your pain medicine. Eating more fruits and vegetables also helps with constipation.  · Write down the times when you take your pain medicine. Look at the times before you take your next dose of medicine.  · Your pain medicine might have acetaminophen in it. Do not take any other acetaminophen while you are taking this medicine. An overdose of acetaminophen can do very bad damage to your liver. If you are taking  any medicines in addition to your pain medicine, check the active ingredients on those medicines to see if acetaminophen is listed.  When should I call my doctor?  · Your medicine is not helping the pain.  · You do either of these soon after you take the medicine:  ¨ Throw up (vomit).  ¨ Have watery poop (diarrhea).  · You have new pain in areas that did not hurt before.  · You have an allergic reaction to your medicine. This may include:  ¨ Feeling itchy.  ¨ Swelling.  ¨ Feeling dizzy.  ¨ Getting a new rash.  · You cannot put up with feeling:  ¨ Dizzy.  ¨ Sick to your stomach (nauseous).  When should I call 911 or go to the emergency room?  · You pass out (faint).  · You feel very confused.  · You throw up again and again.  · Your skin or lips turn pale or bluish in color.  · You are:  ¨ Short of breath.  ¨ Breathing much more slowly than usual.  · You have a very bad allergic reaction to your medicine. This includes:  ¨ Developing a swollen tongue.  ¨ Having trouble breathing.  This information is not intended to replace advice given to you by your health care provider. Make sure you discuss any questions you have with your health care provider.  Document Released: 06/05/2009 Document Revised: 08/24/2017 Document Reviewed: 10/22/2015  © 2017 [employer]    Depression / Suicide Risk    As you are discharged from this [facility] facility, it is important to learn how to keep safe from harming yourself.    Recognize the warning signs:  · Abrupt changes in personality, positive or negative- including increase in energy   · Giving away possessions  · Change in eating patterns- significant weight changes-  positive or negative  · Change in sleeping patterns- unable to sleep or sleeping all the time   · Unwillingness or inability to communicate  · Depression  · Unusual sadness, discouragement and loneliness  · Talk of wanting to die  · Neglect of personal appearance   · Rebelliousness- reckless behavior  · Withdrawal  from people/activities they love  · Confusion- inability to concentrate     If you or a loved one observes any of these behaviors or has concerns about self-harm, here's what you can do:  · Talk about it- your feelings and reasons for harming yourself  · Remove any means that you might use to hurt yourself (examples: pills, rope, extension cords, firearm)  · Get professional help from the community (Mental Health, Substance Abuse, psychological counseling)  · Do not be alone:Call your Safe Contact- someone whom you trust who will be there for you.  · Call your local CRISIS HOTLINE 383-3506 or 559-814-6992  · Call your local Children's Mobile Crisis Response Team [place] (581) 525-3990 or www."[employer]  · Call the toll free National Suicide Prevention Hotlines   · National Suicide Prevention Lifeline 713-987-ITHS (2552)  · National Hope Line Network 800-SUICIDE (251-6700)       Intoxication

## 2022-07-31 NOTE — ED PROVIDER NOTE - NSICDXPASTMEDICALHX_GEN_ALL_CORE_FT
PAST MEDICAL HISTORY:  Alcohol dependence     Blindness of right eye     Glaucoma     Glaucoma     HLD (hyperlipidemia)     HTN (hypertension)     HTN (hypertension)

## 2022-07-31 NOTE — H&P ADULT - HISTORY OF PRESENT ILLNESS
56 y/o M with PMHx of HLD, Glaucoma, HTN, Blindness of Rt eye and EtOH dependence presents to the ED BIBEMS for EtOH ingestion. As per pt, has been drinking everyday for about x2-3 months reporting use of tequila and states pt has been depressed which brought the excess use and would like to be rehabilitated. pt reports last drink was x3 hours ago, endorses tremors, but denies CP, SOB, N/V/D or other complaints at this time. Pt is fully vaccinated against COVID but not boosted. Patient denies EtOH/tobacco/illicit substance use.   patient was managed in the Ed for intoxication, but started to develop withdrawal symptoms with agitation.

## 2022-07-31 NOTE — H&P ADULT - NSHPPHYSICALEXAM_GEN_ALL_CORE
General: A/ox 3, No acute Distress. slightly agitatd, tremulous. not hallucinating  skin : Normal  Head. Blind in rt eye. No lacerations  Neck: Supple, NO JVD  Cardiac: S1 S2, No M/R/G  Pulmonary: CTAP, Breathing unlabored, No Rhonchi/Rales/Wheezing  Abdomen: Soft, Non -tender, +BS x 4 quads  Neuro: A/o x 3, No focal deficits. Normal Motor and sensory exam  Vascular: Normal distal pulses.  Extremities: . No rashes. No edema  Decubiti ; None

## 2022-07-31 NOTE — ED ADULT NURSE NOTE - OBJECTIVE STATEMENT
as per patient " coming from home, my last drink was 4 hours ago, don't feel well. my arms are shaking" [hx of anxiety and glaucoma to right eye and depression]

## 2022-07-31 NOTE — ED ADULT TRIAGE NOTE - CHIEF COMPLAINT QUOTE
Patient BIBA: "I need help." Patient reports drinking "for past 3 months. Drinking all the time last 4 days. I can't stop shaking." CIWA 10. Last drink "1 Hour ago." Asked what was drank: replies "everything " Bloodshot right eye. Scabs to Forehead. Unsteady gait. Poor hygiene.

## 2022-08-01 LAB
ALBUMIN SERPL ELPH-MCNC: 3.4 G/DL — SIGNIFICANT CHANGE UP (ref 3.3–5)
ALBUMIN SERPL ELPH-MCNC: 3.5 G/DL — SIGNIFICANT CHANGE UP (ref 3.3–5)
ALP SERPL-CCNC: 75 U/L — SIGNIFICANT CHANGE UP (ref 40–120)
ALP SERPL-CCNC: 83 U/L — SIGNIFICANT CHANGE UP (ref 40–120)
ALT FLD-CCNC: 56 U/L — SIGNIFICANT CHANGE UP (ref 12–78)
ALT FLD-CCNC: 56 U/L — SIGNIFICANT CHANGE UP (ref 12–78)
ANION GAP SERPL CALC-SCNC: 6 MMOL/L — SIGNIFICANT CHANGE UP (ref 5–17)
ANION GAP SERPL CALC-SCNC: 7 MMOL/L — SIGNIFICANT CHANGE UP (ref 5–17)
AST SERPL-CCNC: 80 U/L — HIGH (ref 15–37)
AST SERPL-CCNC: 87 U/L — HIGH (ref 15–37)
BILIRUB DIRECT SERPL-MCNC: 0.5 MG/DL — HIGH (ref 0–0.3)
BILIRUB INDIRECT FLD-MCNC: 1.1 MG/DL — HIGH (ref 0.2–1)
BILIRUB SERPL-MCNC: 1.4 MG/DL — HIGH (ref 0.2–1.2)
BILIRUB SERPL-MCNC: 1.6 MG/DL — HIGH (ref 0.2–1.2)
BUN SERPL-MCNC: 7 MG/DL — SIGNIFICANT CHANGE UP (ref 7–23)
BUN SERPL-MCNC: 8 MG/DL — SIGNIFICANT CHANGE UP (ref 7–23)
CALCIUM SERPL-MCNC: 8.4 MG/DL — LOW (ref 8.5–10.1)
CALCIUM SERPL-MCNC: 8.6 MG/DL — SIGNIFICANT CHANGE UP (ref 8.5–10.1)
CHLORIDE SERPL-SCNC: 105 MMOL/L — SIGNIFICANT CHANGE UP (ref 96–108)
CHLORIDE SERPL-SCNC: 106 MMOL/L — SIGNIFICANT CHANGE UP (ref 96–108)
CO2 SERPL-SCNC: 26 MMOL/L — SIGNIFICANT CHANGE UP (ref 22–31)
CO2 SERPL-SCNC: 28 MMOL/L — SIGNIFICANT CHANGE UP (ref 22–31)
CREAT SERPL-MCNC: 0.61 MG/DL — SIGNIFICANT CHANGE UP (ref 0.5–1.3)
CREAT SERPL-MCNC: 0.63 MG/DL — SIGNIFICANT CHANGE UP (ref 0.5–1.3)
EGFR: 110 ML/MIN/1.73M2 — SIGNIFICANT CHANGE UP
EGFR: 111 ML/MIN/1.73M2 — SIGNIFICANT CHANGE UP
GLUCOSE SERPL-MCNC: 101 MG/DL — HIGH (ref 70–99)
GLUCOSE SERPL-MCNC: 98 MG/DL — SIGNIFICANT CHANGE UP (ref 70–99)
HCT VFR BLD CALC: 39 % — SIGNIFICANT CHANGE UP (ref 39–50)
HGB BLD-MCNC: 13.4 G/DL — SIGNIFICANT CHANGE UP (ref 13–17)
MAGNESIUM SERPL-MCNC: 1.8 MG/DL — SIGNIFICANT CHANGE UP (ref 1.6–2.6)
MCHC RBC-ENTMCNC: 30.9 PG — SIGNIFICANT CHANGE UP (ref 27–34)
MCHC RBC-ENTMCNC: 34.4 G/DL — SIGNIFICANT CHANGE UP (ref 32–36)
MCV RBC AUTO: 89.9 FL — SIGNIFICANT CHANGE UP (ref 80–100)
NRBC # BLD: 0 /100 WBCS — SIGNIFICANT CHANGE UP (ref 0–0)
PHOSPHATE SERPL-MCNC: 2.6 MG/DL — SIGNIFICANT CHANGE UP (ref 2.5–4.5)
PLATELET # BLD AUTO: 96 K/UL — LOW (ref 150–400)
POTASSIUM SERPL-MCNC: 3.7 MMOL/L — SIGNIFICANT CHANGE UP (ref 3.5–5.3)
POTASSIUM SERPL-MCNC: 3.8 MMOL/L — SIGNIFICANT CHANGE UP (ref 3.5–5.3)
POTASSIUM SERPL-SCNC: 3.7 MMOL/L — SIGNIFICANT CHANGE UP (ref 3.5–5.3)
POTASSIUM SERPL-SCNC: 3.8 MMOL/L — SIGNIFICANT CHANGE UP (ref 3.5–5.3)
PROT SERPL-MCNC: 8 GM/DL — SIGNIFICANT CHANGE UP (ref 6–8.3)
PROT SERPL-MCNC: 8.1 GM/DL — SIGNIFICANT CHANGE UP (ref 6–8.3)
RBC # BLD: 4.34 M/UL — SIGNIFICANT CHANGE UP (ref 4.2–5.8)
RBC # FLD: 14.1 % — SIGNIFICANT CHANGE UP (ref 10.3–14.5)
SODIUM SERPL-SCNC: 138 MMOL/L — SIGNIFICANT CHANGE UP (ref 135–145)
SODIUM SERPL-SCNC: 140 MMOL/L — SIGNIFICANT CHANGE UP (ref 135–145)
WBC # BLD: 4 K/UL — SIGNIFICANT CHANGE UP (ref 3.8–10.5)
WBC # FLD AUTO: 4 K/UL — SIGNIFICANT CHANGE UP (ref 3.8–10.5)

## 2022-08-01 RX ORDER — TRAMADOL HYDROCHLORIDE 50 MG/1
50 TABLET ORAL ONCE
Refills: 0 | Status: DISCONTINUED | OUTPATIENT
Start: 2022-08-01 | End: 2022-08-01

## 2022-08-01 RX ORDER — ACETAMINOPHEN 500 MG
650 TABLET ORAL EVERY 6 HOURS
Refills: 0 | Status: DISCONTINUED | OUTPATIENT
Start: 2022-08-01 | End: 2022-08-08

## 2022-08-01 RX ADMIN — DORZOLAMIDE HYDROCHLORIDE TIMOLOL MALEATE 1 DROP(S): 20; 5 SOLUTION/ DROPS OPHTHALMIC at 17:27

## 2022-08-01 RX ADMIN — Medication 1 MILLIGRAM(S): at 13:01

## 2022-08-01 RX ADMIN — AMLODIPINE BESYLATE 10 MILLIGRAM(S): 2.5 TABLET ORAL at 05:31

## 2022-08-01 RX ADMIN — SENNA PLUS 2 TABLET(S): 8.6 TABLET ORAL at 22:40

## 2022-08-01 RX ADMIN — Medication 1 DROP(S): at 22:43

## 2022-08-01 RX ADMIN — Medication 650 MILLIGRAM(S): at 02:35

## 2022-08-01 RX ADMIN — Medication 0.5 MILLIGRAM(S): at 22:41

## 2022-08-01 RX ADMIN — Medication 1 MILLIGRAM(S): at 13:31

## 2022-08-01 RX ADMIN — Medication 1 MILLIGRAM(S): at 06:00

## 2022-08-01 RX ADMIN — Medication 100 MILLIGRAM(S): at 17:57

## 2022-08-01 RX ADMIN — Medication 1 TABLET(S): at 17:57

## 2022-08-01 RX ADMIN — Medication 0.5 MILLIGRAM(S): at 17:33

## 2022-08-01 RX ADMIN — TRAMADOL HYDROCHLORIDE 50 MILLIGRAM(S): 50 TABLET ORAL at 05:32

## 2022-08-01 RX ADMIN — Medication 1 DROP(S): at 14:54

## 2022-08-01 RX ADMIN — LATANOPROST 1 DROP(S): 0.05 SOLUTION/ DROPS OPHTHALMIC; TOPICAL at 22:41

## 2022-08-01 RX ADMIN — Medication 1 MILLIGRAM(S): at 02:35

## 2022-08-01 RX ADMIN — CITALOPRAM 20 MILLIGRAM(S): 10 TABLET, FILM COATED ORAL at 14:34

## 2022-08-01 RX ADMIN — DORZOLAMIDE HYDROCHLORIDE TIMOLOL MALEATE 1 DROP(S): 20; 5 SOLUTION/ DROPS OPHTHALMIC at 05:33

## 2022-08-01 RX ADMIN — Medication 1 DROP(S): at 05:33

## 2022-08-01 RX ADMIN — BUPROPION HYDROCHLORIDE 150 MILLIGRAM(S): 150 TABLET, EXTENDED RELEASE ORAL at 14:34

## 2022-08-01 RX ADMIN — TRAMADOL HYDROCHLORIDE 50 MILLIGRAM(S): 50 TABLET ORAL at 06:44

## 2022-08-01 RX ADMIN — Medication 650 MILLIGRAM(S): at 03:50

## 2022-08-01 NOTE — PATIENT PROFILE ADULT - FUNCTIONAL ASSESSMENT - DAILY ACTIVITY ASSESSMENT TYPE
Problem: Hemodialysis  Goal: Dialysis: Safe, effective, and comfortable hemodialysis treatment (Hemodialysis nurse only)  Outcome: Outcome Met, Continue evaluating goal progress toward completion  tx time/goal achieved this tx. Pt restless pre tx and throughout tx intermittently. Post weight reflects fluid removed. Pt had hypotension 1/2 into tx MD aware. Spa and 200 saline given. MD increased UF today from 1.5 hrs to 3 hrs. Able to remove 3 kg.   Goal: Dialysis: Free of complications related to initiation/termination of dialysis (Hemodialysis nurse only)  Outcome: Outcome Met, Continue evaluating goal progress toward completion  Able to achieve and maintain prescribed BFR this tx. No difficulty cannulating left arm AVG. Post bleeding time less than 15 minutes.        Admission

## 2022-08-01 NOTE — ED ADULT NURSE REASSESSMENT NOTE - NS ED NURSE REASSESS COMMENT FT1
Pt aox3, in stable condition, CIWA 5, on cardiac monitor, endorsed to MARTÍNEZ Rosenbaum. Width Of Defect Perpendicular To Closure In Cm (Required): 0.7

## 2022-08-01 NOTE — PATIENT PROFILE ADULT - FALL HARM RISK - HARM RISK INTERVENTIONS
Assistance with ambulation/Assistance OOB with selected safe patient handling equipment/Communicate Risk of Fall with Harm to all staff/Discuss with provider need for PT consult/Monitor for mental status changes/Monitor gait and stability/Reinforce activity limits and safety measures with patient and family/Tailored Fall Risk Interventions/Toileting schedule using arm’s reach rule for commode and bathroom/Use of alarms - bed, chair and/or voice tab/Visual Cue: Yellow wristband and red socks/Bed in lowest position, wheels locked, appropriate side rails in place/Call bell, personal items and telephone in reach/Instruct patient to call for assistance before getting out of bed or chair/Non-slip footwear when patient is out of bed/[place] to call system/Physically safe environment - no spills, clutter or unnecessary equipment/Purposeful Proactive Rounding/Room/bathroom lighting operational, light cord in reach

## 2022-08-02 LAB
ANION GAP SERPL CALC-SCNC: 9 MMOL/L — SIGNIFICANT CHANGE UP (ref 5–17)
BUN SERPL-MCNC: 12 MG/DL — SIGNIFICANT CHANGE UP (ref 7–23)
CALCIUM SERPL-MCNC: 9 MG/DL — SIGNIFICANT CHANGE UP (ref 8.5–10.1)
CHLORIDE SERPL-SCNC: 102 MMOL/L — SIGNIFICANT CHANGE UP (ref 96–108)
CO2 SERPL-SCNC: 25 MMOL/L — SIGNIFICANT CHANGE UP (ref 22–31)
CREAT SERPL-MCNC: 0.65 MG/DL — SIGNIFICANT CHANGE UP (ref 0.5–1.3)
EGFR: 109 ML/MIN/1.73M2 — SIGNIFICANT CHANGE UP
GLUCOSE SERPL-MCNC: 83 MG/DL — SIGNIFICANT CHANGE UP (ref 70–99)
HCT VFR BLD CALC: 40.2 % — SIGNIFICANT CHANGE UP (ref 39–50)
HGB BLD-MCNC: 13.6 G/DL — SIGNIFICANT CHANGE UP (ref 13–17)
MCHC RBC-ENTMCNC: 30.8 PG — SIGNIFICANT CHANGE UP (ref 27–34)
MCHC RBC-ENTMCNC: 33.8 G/DL — SIGNIFICANT CHANGE UP (ref 32–36)
MCV RBC AUTO: 91.2 FL — SIGNIFICANT CHANGE UP (ref 80–100)
NRBC # BLD: 0 /100 WBCS — SIGNIFICANT CHANGE UP (ref 0–0)
PLATELET # BLD AUTO: 99 K/UL — LOW (ref 150–400)
POTASSIUM SERPL-MCNC: 3.2 MMOL/L — LOW (ref 3.5–5.3)
POTASSIUM SERPL-SCNC: 3.2 MMOL/L — LOW (ref 3.5–5.3)
RBC # BLD: 4.41 M/UL — SIGNIFICANT CHANGE UP (ref 4.2–5.8)
RBC # FLD: 13.6 % — SIGNIFICANT CHANGE UP (ref 10.3–14.5)
SODIUM SERPL-SCNC: 136 MMOL/L — SIGNIFICANT CHANGE UP (ref 135–145)
WBC # BLD: 4.76 K/UL — SIGNIFICANT CHANGE UP (ref 3.8–10.5)
WBC # FLD AUTO: 4.76 K/UL — SIGNIFICANT CHANGE UP (ref 3.8–10.5)

## 2022-08-02 RX ORDER — IBUPROFEN 200 MG
200 TABLET ORAL ONCE
Refills: 0 | Status: COMPLETED | OUTPATIENT
Start: 2022-08-02 | End: 2022-08-02

## 2022-08-02 RX ORDER — POTASSIUM CHLORIDE 20 MEQ
20 PACKET (EA) ORAL
Refills: 0 | Status: COMPLETED | OUTPATIENT
Start: 2022-08-02 | End: 2022-08-02

## 2022-08-02 RX ORDER — POTASSIUM CHLORIDE 20 MEQ
40 PACKET (EA) ORAL EVERY 4 HOURS
Refills: 0 | Status: COMPLETED | OUTPATIENT
Start: 2022-08-02 | End: 2022-08-02

## 2022-08-02 RX ADMIN — Medication 0.5 MILLIGRAM(S): at 02:30

## 2022-08-02 RX ADMIN — Medication 1 DROP(S): at 05:47

## 2022-08-02 RX ADMIN — Medication 40 MILLIEQUIVALENT(S): at 11:11

## 2022-08-02 RX ADMIN — Medication 40 MILLIEQUIVALENT(S): at 14:40

## 2022-08-02 RX ADMIN — Medication 1 DROP(S): at 21:36

## 2022-08-02 RX ADMIN — Medication 20 MILLIEQUIVALENT(S): at 11:12

## 2022-08-02 RX ADMIN — Medication 1 TABLET(S): at 17:48

## 2022-08-02 RX ADMIN — DORZOLAMIDE HYDROCHLORIDE TIMOLOL MALEATE 1 DROP(S): 20; 5 SOLUTION/ DROPS OPHTHALMIC at 19:07

## 2022-08-02 RX ADMIN — Medication 1 DROP(S): at 14:06

## 2022-08-02 RX ADMIN — CITALOPRAM 20 MILLIGRAM(S): 10 TABLET, FILM COATED ORAL at 12:43

## 2022-08-02 RX ADMIN — BUPROPION HYDROCHLORIDE 150 MILLIGRAM(S): 150 TABLET, EXTENDED RELEASE ORAL at 12:43

## 2022-08-02 RX ADMIN — Medication 0.5 MILLIGRAM(S): at 08:48

## 2022-08-02 RX ADMIN — SENNA PLUS 2 TABLET(S): 8.6 TABLET ORAL at 21:35

## 2022-08-02 RX ADMIN — Medication 1 MILLIGRAM(S): at 17:48

## 2022-08-02 RX ADMIN — Medication 1 MILLIGRAM(S): at 23:34

## 2022-08-02 RX ADMIN — LATANOPROST 1 DROP(S): 0.05 SOLUTION/ DROPS OPHTHALMIC; TOPICAL at 21:37

## 2022-08-02 RX ADMIN — Medication 100 MILLIGRAM(S): at 17:48

## 2022-08-02 RX ADMIN — Medication 1 MILLIGRAM(S): at 12:48

## 2022-08-02 RX ADMIN — Medication 1 MILLIGRAM(S): at 12:42

## 2022-08-02 RX ADMIN — Medication 20 MILLIEQUIVALENT(S): at 12:43

## 2022-08-02 RX ADMIN — Medication 200 MILLIGRAM(S): at 08:42

## 2022-08-02 RX ADMIN — DORZOLAMIDE HYDROCHLORIDE TIMOLOL MALEATE 1 DROP(S): 20; 5 SOLUTION/ DROPS OPHTHALMIC at 05:47

## 2022-08-02 RX ADMIN — Medication 200 MILLIGRAM(S): at 07:39

## 2022-08-02 RX ADMIN — AMLODIPINE BESYLATE 10 MILLIGRAM(S): 2.5 TABLET ORAL at 05:47

## 2022-08-03 LAB
ANION GAP SERPL CALC-SCNC: 5 MMOL/L — SIGNIFICANT CHANGE UP (ref 5–17)
BUN SERPL-MCNC: 14 MG/DL — SIGNIFICANT CHANGE UP (ref 7–23)
CALCIUM SERPL-MCNC: 8.9 MG/DL — SIGNIFICANT CHANGE UP (ref 8.5–10.1)
CHLORIDE SERPL-SCNC: 103 MMOL/L — SIGNIFICANT CHANGE UP (ref 96–108)
CO2 SERPL-SCNC: 26 MMOL/L — SIGNIFICANT CHANGE UP (ref 22–31)
CREAT SERPL-MCNC: 0.76 MG/DL — SIGNIFICANT CHANGE UP (ref 0.5–1.3)
EGFR: 104 ML/MIN/1.73M2 — SIGNIFICANT CHANGE UP
GLUCOSE SERPL-MCNC: 139 MG/DL — HIGH (ref 70–99)
POTASSIUM SERPL-MCNC: 3.8 MMOL/L — SIGNIFICANT CHANGE UP (ref 3.5–5.3)
POTASSIUM SERPL-SCNC: 3.8 MMOL/L — SIGNIFICANT CHANGE UP (ref 3.5–5.3)
SODIUM SERPL-SCNC: 134 MMOL/L — LOW (ref 135–145)

## 2022-08-03 RX ORDER — POTASSIUM CHLORIDE 20 MEQ
40 PACKET (EA) ORAL EVERY 4 HOURS
Refills: 0 | Status: DISCONTINUED | OUTPATIENT
Start: 2022-08-03 | End: 2022-08-03

## 2022-08-03 RX ADMIN — DORZOLAMIDE HYDROCHLORIDE TIMOLOL MALEATE 1 DROP(S): 20; 5 SOLUTION/ DROPS OPHTHALMIC at 17:29

## 2022-08-03 RX ADMIN — Medication 1 MILLIGRAM(S): at 12:18

## 2022-08-03 RX ADMIN — Medication 1 DROP(S): at 21:34

## 2022-08-03 RX ADMIN — Medication 650 MILLIGRAM(S): at 22:00

## 2022-08-03 RX ADMIN — BUPROPION HYDROCHLORIDE 150 MILLIGRAM(S): 150 TABLET, EXTENDED RELEASE ORAL at 12:18

## 2022-08-03 RX ADMIN — LATANOPROST 1 DROP(S): 0.05 SOLUTION/ DROPS OPHTHALMIC; TOPICAL at 21:33

## 2022-08-03 RX ADMIN — Medication 1 MILLIGRAM(S): at 05:30

## 2022-08-03 RX ADMIN — Medication 1 TABLET(S): at 17:28

## 2022-08-03 RX ADMIN — Medication 1 MILLIGRAM(S): at 17:29

## 2022-08-03 RX ADMIN — CITALOPRAM 20 MILLIGRAM(S): 10 TABLET, FILM COATED ORAL at 12:18

## 2022-08-03 RX ADMIN — AMLODIPINE BESYLATE 10 MILLIGRAM(S): 2.5 TABLET ORAL at 05:30

## 2022-08-03 RX ADMIN — Medication 1 MILLIGRAM(S): at 20:53

## 2022-08-03 RX ADMIN — SENNA PLUS 2 TABLET(S): 8.6 TABLET ORAL at 21:34

## 2022-08-03 RX ADMIN — DORZOLAMIDE HYDROCHLORIDE TIMOLOL MALEATE 1 DROP(S): 20; 5 SOLUTION/ DROPS OPHTHALMIC at 05:35

## 2022-08-03 RX ADMIN — Medication 1 DROP(S): at 12:18

## 2022-08-03 RX ADMIN — Medication 1 MILLIGRAM(S): at 12:17

## 2022-08-03 RX ADMIN — Medication 1 DROP(S): at 05:33

## 2022-08-03 RX ADMIN — Medication 650 MILLIGRAM(S): at 20:54

## 2022-08-03 NOTE — PHYSICAL THERAPY INITIAL EVALUATION ADULT - PERTINENT HX OF CURRENT PROBLEM, REHAB EVAL
This is the hx of CASTILLO a 56 y/o male patient who was admitted to [facility] due to complications of Alcohol Withdrawal affecting medical condition and with subsequent affection on functional mobility.

## 2022-08-03 NOTE — PHYSICAL THERAPY INITIAL EVALUATION ADULT - GENERAL OBSERVATIONS, REHAB EVAL
Chart reviewed, pt encountered on supine, AxOx4, cooperative, Tremors visible, + Cardiac monitor, as per MARTÍNEZ Sprague he just received Ativan.

## 2022-08-03 NOTE — PHYSICAL THERAPY INITIAL EVALUATION ADULT - GAIT TRAINING, PT EVAL
To be able to perform ambulation independently using home for 200 feet, using proper technique using AD, with proper posture and functional distance at home in 2 weeks.

## 2022-08-03 NOTE — PHYSICAL THERAPY INITIAL EVALUATION ADULT - DID THE PATIENT HAVE SURGERY?
This is the hx of CHELSEA. a 56 y/o male patient who was admitted to [facility] due to complications of Alcohol Withdrawal affecting medical condition and with subsequent affection on functional mobility./n/a

## 2022-08-03 NOTE — PHYSICAL THERAPY INITIAL EVALUATION ADULT - ADDITIONAL COMMENTS
As per pt, he lives in [facility] In a basement apartment with 12 stairs with left rail going down, He was independent in all functional mobility using no AD. works in construction

## 2022-08-03 NOTE — PHYSICAL THERAPY INITIAL EVALUATION ADULT - CRITERIA FOR SKILLED THERAPEUTIC INTERVENTIONS
Pending further assessment/impairments found/functional limitations in following categories/risk reduction/prevention/rehab potential/therapy frequency/predicted duration of therapy intervention/anticipated discharge recommendation

## 2022-08-04 LAB
ANION GAP SERPL CALC-SCNC: 8 MMOL/L — SIGNIFICANT CHANGE UP (ref 5–17)
BUN SERPL-MCNC: 14 MG/DL — SIGNIFICANT CHANGE UP (ref 7–23)
CALCIUM SERPL-MCNC: 8.9 MG/DL — SIGNIFICANT CHANGE UP (ref 8.5–10.1)
CHLORIDE SERPL-SCNC: 102 MMOL/L — SIGNIFICANT CHANGE UP (ref 96–108)
CO2 SERPL-SCNC: 25 MMOL/L — SIGNIFICANT CHANGE UP (ref 22–31)
CREAT SERPL-MCNC: 0.62 MG/DL — SIGNIFICANT CHANGE UP (ref 0.5–1.3)
EGFR: 110 ML/MIN/1.73M2 — SIGNIFICANT CHANGE UP
GLUCOSE SERPL-MCNC: 90 MG/DL — SIGNIFICANT CHANGE UP (ref 70–99)
HCT VFR BLD CALC: 40.9 % — SIGNIFICANT CHANGE UP (ref 39–50)
HGB BLD-MCNC: 14 G/DL — SIGNIFICANT CHANGE UP (ref 13–17)
MCHC RBC-ENTMCNC: 30.9 PG — SIGNIFICANT CHANGE UP (ref 27–34)
MCHC RBC-ENTMCNC: 34.2 G/DL — SIGNIFICANT CHANGE UP (ref 32–36)
MCV RBC AUTO: 90.3 FL — SIGNIFICANT CHANGE UP (ref 80–100)
NRBC # BLD: 0 /100 WBCS — SIGNIFICANT CHANGE UP (ref 0–0)
PLATELET # BLD AUTO: 108 K/UL — LOW (ref 150–400)
POTASSIUM SERPL-MCNC: 3 MMOL/L — LOW (ref 3.5–5.3)
POTASSIUM SERPL-SCNC: 3 MMOL/L — LOW (ref 3.5–5.3)
RBC # BLD: 4.53 M/UL — SIGNIFICANT CHANGE UP (ref 4.2–5.8)
RBC # FLD: 13.6 % — SIGNIFICANT CHANGE UP (ref 10.3–14.5)
SODIUM SERPL-SCNC: 135 MMOL/L — SIGNIFICANT CHANGE UP (ref 135–145)
WBC # BLD: 4.9 K/UL — SIGNIFICANT CHANGE UP (ref 3.8–10.5)
WBC # FLD AUTO: 4.9 K/UL — SIGNIFICANT CHANGE UP (ref 3.8–10.5)

## 2022-08-04 RX ORDER — POTASSIUM CHLORIDE 20 MEQ
10 PACKET (EA) ORAL ONCE
Refills: 0 | Status: COMPLETED | OUTPATIENT
Start: 2022-08-04 | End: 2022-08-04

## 2022-08-04 RX ORDER — POTASSIUM CHLORIDE 20 MEQ
40 PACKET (EA) ORAL EVERY 4 HOURS
Refills: 0 | Status: COMPLETED | OUTPATIENT
Start: 2022-08-04 | End: 2022-08-04

## 2022-08-04 RX ORDER — POTASSIUM CHLORIDE 20 MEQ
10 PACKET (EA) ORAL ONCE
Refills: 0 | Status: DISCONTINUED | OUTPATIENT
Start: 2022-08-04 | End: 2022-08-04

## 2022-08-04 RX ADMIN — BUPROPION HYDROCHLORIDE 150 MILLIGRAM(S): 150 TABLET, EXTENDED RELEASE ORAL at 11:31

## 2022-08-04 RX ADMIN — CITALOPRAM 20 MILLIGRAM(S): 10 TABLET, FILM COATED ORAL at 11:31

## 2022-08-04 RX ADMIN — Medication 1 TABLET(S): at 18:01

## 2022-08-04 RX ADMIN — Medication 1 MILLIGRAM(S): at 11:31

## 2022-08-04 RX ADMIN — Medication 40 MILLIEQUIVALENT(S): at 13:06

## 2022-08-04 RX ADMIN — Medication 1 MILLIGRAM(S): at 18:00

## 2022-08-04 RX ADMIN — Medication 40 MILLIEQUIVALENT(S): at 10:24

## 2022-08-04 RX ADMIN — Medication 1 DROP(S): at 13:06

## 2022-08-04 RX ADMIN — AMLODIPINE BESYLATE 10 MILLIGRAM(S): 2.5 TABLET ORAL at 05:46

## 2022-08-04 RX ADMIN — DORZOLAMIDE HYDROCHLORIDE TIMOLOL MALEATE 1 DROP(S): 20; 5 SOLUTION/ DROPS OPHTHALMIC at 05:48

## 2022-08-04 RX ADMIN — LATANOPROST 1 DROP(S): 0.05 SOLUTION/ DROPS OPHTHALMIC; TOPICAL at 23:36

## 2022-08-04 RX ADMIN — Medication 1 DROP(S): at 05:47

## 2022-08-04 RX ADMIN — SENNA PLUS 2 TABLET(S): 8.6 TABLET ORAL at 23:35

## 2022-08-04 RX ADMIN — DORZOLAMIDE HYDROCHLORIDE TIMOLOL MALEATE 1 DROP(S): 20; 5 SOLUTION/ DROPS OPHTHALMIC at 18:01

## 2022-08-04 RX ADMIN — Medication 1 MILLIGRAM(S): at 23:35

## 2022-08-04 RX ADMIN — Medication 100 MILLIEQUIVALENT(S): at 11:31

## 2022-08-04 RX ADMIN — Medication 1 DROP(S): at 23:36

## 2022-08-05 LAB
ALBUMIN SERPL ELPH-MCNC: 3.3 G/DL — SIGNIFICANT CHANGE UP (ref 3.3–5)
ALP SERPL-CCNC: 69 U/L — SIGNIFICANT CHANGE UP (ref 40–120)
ALT FLD-CCNC: 342 U/L — HIGH (ref 12–78)
ANION GAP SERPL CALC-SCNC: 10 MMOL/L — SIGNIFICANT CHANGE UP (ref 5–17)
AST SERPL-CCNC: 366 U/L — HIGH (ref 15–37)
BILIRUB DIRECT SERPL-MCNC: 0.3 MG/DL — SIGNIFICANT CHANGE UP (ref 0–0.3)
BILIRUB INDIRECT FLD-MCNC: 0.6 MG/DL — SIGNIFICANT CHANGE UP (ref 0.2–1)
BILIRUB SERPL-MCNC: 0.9 MG/DL — SIGNIFICANT CHANGE UP (ref 0.2–1.2)
BUN SERPL-MCNC: 14 MG/DL — SIGNIFICANT CHANGE UP (ref 7–23)
CALCIUM SERPL-MCNC: 9 MG/DL — SIGNIFICANT CHANGE UP (ref 8.5–10.1)
CHLORIDE SERPL-SCNC: 106 MMOL/L — SIGNIFICANT CHANGE UP (ref 96–108)
CO2 SERPL-SCNC: 22 MMOL/L — SIGNIFICANT CHANGE UP (ref 22–31)
CREAT SERPL-MCNC: 0.64 MG/DL — SIGNIFICANT CHANGE UP (ref 0.5–1.3)
EGFR: 109 ML/MIN/1.73M2 — SIGNIFICANT CHANGE UP
GLUCOSE SERPL-MCNC: 99 MG/DL — SIGNIFICANT CHANGE UP (ref 70–99)
MAGNESIUM SERPL-MCNC: 2.2 MG/DL — SIGNIFICANT CHANGE UP (ref 1.6–2.6)
PHOSPHATE SERPL-MCNC: 3.3 MG/DL — SIGNIFICANT CHANGE UP (ref 2.5–4.5)
POTASSIUM SERPL-MCNC: 3.6 MMOL/L — SIGNIFICANT CHANGE UP (ref 3.5–5.3)
POTASSIUM SERPL-SCNC: 3.6 MMOL/L — SIGNIFICANT CHANGE UP (ref 3.5–5.3)
PROT SERPL-MCNC: 8 GM/DL — SIGNIFICANT CHANGE UP (ref 6–8.3)
SODIUM SERPL-SCNC: 138 MMOL/L — SIGNIFICANT CHANGE UP (ref 135–145)

## 2022-08-05 RX ADMIN — SENNA PLUS 2 TABLET(S): 8.6 TABLET ORAL at 21:27

## 2022-08-05 RX ADMIN — LATANOPROST 1 DROP(S): 0.05 SOLUTION/ DROPS OPHTHALMIC; TOPICAL at 21:26

## 2022-08-05 RX ADMIN — BUPROPION HYDROCHLORIDE 150 MILLIGRAM(S): 150 TABLET, EXTENDED RELEASE ORAL at 11:37

## 2022-08-05 RX ADMIN — Medication 1 MILLIGRAM(S): at 11:37

## 2022-08-05 RX ADMIN — DORZOLAMIDE HYDROCHLORIDE TIMOLOL MALEATE 1 DROP(S): 20; 5 SOLUTION/ DROPS OPHTHALMIC at 17:42

## 2022-08-05 RX ADMIN — DORZOLAMIDE HYDROCHLORIDE TIMOLOL MALEATE 1 DROP(S): 20; 5 SOLUTION/ DROPS OPHTHALMIC at 05:55

## 2022-08-05 RX ADMIN — Medication 1 TABLET(S): at 17:42

## 2022-08-05 RX ADMIN — CITALOPRAM 20 MILLIGRAM(S): 10 TABLET, FILM COATED ORAL at 11:38

## 2022-08-05 RX ADMIN — Medication 1 MILLIGRAM(S): at 17:42

## 2022-08-05 RX ADMIN — Medication 1 DROP(S): at 21:28

## 2022-08-05 RX ADMIN — Medication 1 MILLIGRAM(S): at 23:24

## 2022-08-05 RX ADMIN — Medication 1 DROP(S): at 13:34

## 2022-08-05 RX ADMIN — Medication 1 DROP(S): at 05:55

## 2022-08-06 LAB
ANION GAP SERPL CALC-SCNC: 6 MMOL/L — SIGNIFICANT CHANGE UP (ref 5–17)
BUN SERPL-MCNC: 16 MG/DL — SIGNIFICANT CHANGE UP (ref 7–23)
CALCIUM SERPL-MCNC: 8.8 MG/DL — SIGNIFICANT CHANGE UP (ref 8.5–10.1)
CHLORIDE SERPL-SCNC: 104 MMOL/L — SIGNIFICANT CHANGE UP (ref 96–108)
CO2 SERPL-SCNC: 28 MMOL/L — SIGNIFICANT CHANGE UP (ref 22–31)
CREAT SERPL-MCNC: 0.78 MG/DL — SIGNIFICANT CHANGE UP (ref 0.5–1.3)
EGFR: 103 ML/MIN/1.73M2 — SIGNIFICANT CHANGE UP
GLUCOSE SERPL-MCNC: 94 MG/DL — SIGNIFICANT CHANGE UP (ref 70–99)
MAGNESIUM SERPL-MCNC: 1.9 MG/DL — SIGNIFICANT CHANGE UP (ref 1.6–2.6)
PHOSPHATE SERPL-MCNC: 3.5 MG/DL — SIGNIFICANT CHANGE UP (ref 2.5–4.5)
POTASSIUM SERPL-MCNC: 3.4 MMOL/L — LOW (ref 3.5–5.3)
POTASSIUM SERPL-SCNC: 3.4 MMOL/L — LOW (ref 3.5–5.3)
SODIUM SERPL-SCNC: 138 MMOL/L — SIGNIFICANT CHANGE UP (ref 135–145)

## 2022-08-06 RX ORDER — POTASSIUM CHLORIDE 20 MEQ
20 PACKET (EA) ORAL
Refills: 0 | Status: COMPLETED | OUTPATIENT
Start: 2022-08-06 | End: 2022-08-06

## 2022-08-06 RX ADMIN — Medication 1 MILLIGRAM(S): at 11:25

## 2022-08-06 RX ADMIN — Medication 20 MILLIEQUIVALENT(S): at 09:09

## 2022-08-06 RX ADMIN — Medication 1 TABLET(S): at 17:10

## 2022-08-06 RX ADMIN — CITALOPRAM 20 MILLIGRAM(S): 10 TABLET, FILM COATED ORAL at 11:24

## 2022-08-06 RX ADMIN — Medication 1 MILLIGRAM(S): at 11:24

## 2022-08-06 RX ADMIN — Medication 1 DROP(S): at 05:44

## 2022-08-06 RX ADMIN — Medication 1 MILLIGRAM(S): at 05:42

## 2022-08-06 RX ADMIN — DORZOLAMIDE HYDROCHLORIDE TIMOLOL MALEATE 1 DROP(S): 20; 5 SOLUTION/ DROPS OPHTHALMIC at 17:10

## 2022-08-06 RX ADMIN — Medication 20 MILLIEQUIVALENT(S): at 11:23

## 2022-08-06 RX ADMIN — Medication 1 MILLIGRAM(S): at 17:09

## 2022-08-06 RX ADMIN — DORZOLAMIDE HYDROCHLORIDE TIMOLOL MALEATE 1 DROP(S): 20; 5 SOLUTION/ DROPS OPHTHALMIC at 05:46

## 2022-08-06 RX ADMIN — LATANOPROST 1 DROP(S): 0.05 SOLUTION/ DROPS OPHTHALMIC; TOPICAL at 23:04

## 2022-08-06 RX ADMIN — BUPROPION HYDROCHLORIDE 150 MILLIGRAM(S): 150 TABLET, EXTENDED RELEASE ORAL at 11:24

## 2022-08-06 RX ADMIN — Medication 1 DROP(S): at 23:04

## 2022-08-06 RX ADMIN — SENNA PLUS 2 TABLET(S): 8.6 TABLET ORAL at 23:03

## 2022-08-06 RX ADMIN — Medication 1 DROP(S): at 13:28

## 2022-08-07 LAB
FLUAV AG NPH QL: SIGNIFICANT CHANGE UP
FLUBV AG NPH QL: SIGNIFICANT CHANGE UP
SARS-COV-2 RNA SPEC QL NAA+PROBE: SIGNIFICANT CHANGE UP

## 2022-08-07 RX ADMIN — Medication 1 MILLIGRAM(S): at 08:59

## 2022-08-07 RX ADMIN — CITALOPRAM 20 MILLIGRAM(S): 10 TABLET, FILM COATED ORAL at 11:13

## 2022-08-07 RX ADMIN — Medication 1 DROP(S): at 05:58

## 2022-08-07 RX ADMIN — Medication 650 MILLIGRAM(S): at 22:34

## 2022-08-07 RX ADMIN — Medication 650 MILLIGRAM(S): at 21:30

## 2022-08-07 RX ADMIN — Medication 1 MILLIGRAM(S): at 21:29

## 2022-08-07 RX ADMIN — Medication 1 TABLET(S): at 17:30

## 2022-08-07 RX ADMIN — DORZOLAMIDE HYDROCHLORIDE TIMOLOL MALEATE 1 DROP(S): 20; 5 SOLUTION/ DROPS OPHTHALMIC at 17:30

## 2022-08-07 RX ADMIN — SENNA PLUS 2 TABLET(S): 8.6 TABLET ORAL at 21:29

## 2022-08-07 RX ADMIN — Medication 1 MILLIGRAM(S): at 17:30

## 2022-08-07 RX ADMIN — BUPROPION HYDROCHLORIDE 150 MILLIGRAM(S): 150 TABLET, EXTENDED RELEASE ORAL at 11:12

## 2022-08-07 RX ADMIN — DORZOLAMIDE HYDROCHLORIDE TIMOLOL MALEATE 1 DROP(S): 20; 5 SOLUTION/ DROPS OPHTHALMIC at 05:57

## 2022-08-07 RX ADMIN — Medication 1 DROP(S): at 21:30

## 2022-08-07 RX ADMIN — Medication 1 DROP(S): at 14:57

## 2022-08-07 RX ADMIN — LATANOPROST 1 DROP(S): 0.05 SOLUTION/ DROPS OPHTHALMIC; TOPICAL at 21:30

## 2022-08-07 RX ADMIN — Medication 1 MILLIGRAM(S): at 11:13

## 2022-08-07 NOTE — PROGRESS NOTE ADULT - REASON FOR ADMISSION
alcohol withdrawal/ depression

## 2022-08-07 NOTE — PROGRESS NOTE ADULT - SUBJECTIVE AND OBJECTIVE BOX
Patient is a 57y old  Male who presents with a chief complaint of alcohol withdrawal/ depression (02 Aug 2022 11:28)  alert. no distress, not hallucinating. ciwa -4  still tremulous, but much improved    INTERVAL HPI/OVERNIGHT EVENTS:  PAST MEDICAL & SURGICAL HISTORY:  Glaucoma      Alcohol dependence      Blindness of right eye      HTN (hypertension)      Glaucoma      HLD (hyperlipidemia)      HTN (hypertension)      No significant past surgical history      No significant past surgical history          MEDICATIONS  (STANDING):  amLODIPine   Tablet 10 milliGRAM(s) Oral daily  artificial  tears Solution 1 Drop(s) Both EYES three times a day  buPROPion XL (24-Hour) 150 milliGRAM(s) Oral daily  citalopram 20 milliGRAM(s) Oral daily  dorzolamide 2%/timolol 0.5% Ophthalmic Solution 1 Drop(s) Both EYES two times a day  folic acid 1 milliGRAM(s) Oral daily  latanoprost 0.005% Ophthalmic Solution 1 Drop(s) Both EYES at bedtime  LORazepam   Injectable 1 milliGRAM(s) IV Push every 6 hours  multivitamin 1 Tablet(s) Oral daily  senna 2 Tablet(s) Oral at bedtime    MEDICATIONS  (PRN):  acetaminophen     Tablet .. 650 milliGRAM(s) Oral every 6 hours PRN Temp greater or equal to 38C (100.4F), Mild Pain (1 - 3), Moderate Pain (4 - 6), Severe Pain (7 - 10)      Allergies    No Known Allergies    Intolerances        REVIEW OF SYSTEMS:  CONSTITUTIONAL: No fever, weight loss, or fatigue  EYES: No eye pain, visual disturbances, or discharge  ENMT:  No difficulty hearing, tinnitus, vertigo; No sinus or throat pain  NECK: No pain or stiffness  BREASTS: No pain, masses, or nipple discharge  RESPIRATORY: No cough, wheezing, chills or hemoptysis; No shortness of breath  CARDIOVASCULAR: No chest pain, palpitations, dizziness, or leg swelling  GASTROINTESTINAL: No abdominal or epigastric pain. No nausea, vomiting, or hematemesis; No diarrhea or constipation. No melena or hematochezia.  GENITOURINARY: No dysuria, frequency, hematuria, or incontinence  NEUROLOGICAL: No headaches, memory loss, loss of strength, numbness, or tremors  SKIN: No itching, burning, rashes, or lesions   LYMPH NODES: No enlarged glands  ENDOCRINE: No heat or cold intolerance; No hair loss  MUSCULOSKELETAL: No joint pain or swelling; No muscle, back, or extremity pain  PSYCHIATRIC: No depression, anxiety, mood swings, or difficulty sleeping  HEME/LYMPH: No easy bruising, or bleeding gums  ALLERY AND IMMUNOLOGIC: No hives or eczema    Vital Signs Last 24 Hrs  T(C): 36.8 (03 Aug 2022 10:20), Max: 36.8 (03 Aug 2022 10:20)  T(F): 98.3 (03 Aug 2022 10:20), Max: 98.3 (03 Aug 2022 10:20)  HR: 62 (03 Aug 2022 10:20) (56 - 67)  BP: 121/78 (03 Aug 2022 10:20) (121/78 - 127/76)  BP(mean): --  RR: 19 (03 Aug 2022 10:20) (18 - 19)  SpO2: 97% (03 Aug 2022 10:20) (95% - 99%)    Parameters below as of 03 Aug 2022 10:20  Patient On (Oxygen Delivery Method): room air        PHYSICAL EXAM:  GENERAL: NAD, well-groomed, well-developed  HEAD:  Atraumatic, Normocephalic  EYES: EOMI, PERRLA, conjunctiva and sclera clear  ENMT: No tonsillar erythema, exudates, or enlargement; Moist mucous membranes, Good dentition, No lesions  NECK: Supple, No JVD, Normal thyroid  NERVOUS SYSTEM:  Alert & Oriented X3, Good concentration; Motor Strength 5/5 B/L upper and lower extremities; DTRs 2+ intact and symmetric  CHEST/LUNG: Clear to percussion bilaterally; No rales, rhonchi, wheezing, or rubs  HEART: Regular rate and rhythm; No murmurs, rubs, or gallops  ABDOMEN: Soft, Nontender, Nondistended; Bowel sounds present  EXTREMITIES:  2+ Peripheral Pulses, No clubbing, cyanosis, or edema  LYMPH: No lymphadenopathy noted  SKIN: No rashes or lesions    LABS:                        13.6   4.76  )-----------( 99       ( 02 Aug 2022 06:00 )             40.2     08-02    136  |  102  |  12  ----------------------------<  83  3.2<L>   |  25  |  0.65    Ca    9.0      02 Aug 2022 06:00            CAPILLARY BLOOD GLUCOSE                    RADIOLOGY & ADDITIONAL TESTS:    Imaging Personally Reviewed:  [ ] YES  [ ] NO    Consultant(s) Notes Reviewed:  [ ] YES  [ ] NO    Care Discussed with Consultants/Other Providers [ ] YES  [ ] NO    Care discussed with family,         [  ]   yes  [  ]  No    imp:    stable[x ]    unstable[  ]     improving [x   ]       unchanged  [  ]                Plans:  Continue present plans  [x  ] will contue lorazepam, may need rehab.                 New consult [  ]   specialty  .......               order test[  ]    test name. labs in am                 Discharge Planning  [  ]           
Patient is a 57y old  Male who presents with a chief complaint of alcohol withdrawal/ depression (03 Aug 2022 11:11)  alert, feeling better   still tremulous/ not hallucinating    vitals stable   labs reviewed- hypokalemic    INTERVAL HPI/OVERNIGHT EVENTS: ciwa went up to 8 and pt placed back on detox protocol  PAST MEDICAL & SURGICAL HISTORY:  Glaucoma      Alcohol dependence      Blindness of right eye      HTN (hypertension)      Glaucoma      HLD (hyperlipidemia)      HTN (hypertension)      No significant past surgical history      No significant past surgical history          MEDICATIONS  (STANDING):  amLODIPine   Tablet 10 milliGRAM(s) Oral daily  artificial  tears Solution 1 Drop(s) Both EYES three times a day  buPROPion XL (24-Hour) 150 milliGRAM(s) Oral daily  citalopram 20 milliGRAM(s) Oral daily  dorzolamide 2%/timolol 0.5% Ophthalmic Solution 1 Drop(s) Both EYES two times a day  folic acid 1 milliGRAM(s) Oral daily  latanoprost 0.005% Ophthalmic Solution 1 Drop(s) Both EYES at bedtime  LORazepam   Injectable 1 milliGRAM(s) IV Push every 6 hours  multivitamin 1 Tablet(s) Oral daily  potassium chloride    Tablet ER 40 milliEquivalent(s) Oral every 4 hours  potassium chloride  10 mEq/100 mL IVPB 10 milliEquivalent(s) IV Intermittent once  senna 2 Tablet(s) Oral at bedtime    MEDICATIONS  (PRN):  acetaminophen     Tablet .. 650 milliGRAM(s) Oral every 6 hours PRN Temp greater or equal to 38C (100.4F), Mild Pain (1 - 3), Moderate Pain (4 - 6), Severe Pain (7 - 10)      Allergies    No Known Allergies    Intolerances        REVIEW OF SYSTEMS:  CONSTITUTIONAL: No fever, weight loss, or fatigue  EYES: No eye pain, visual disturbances, or discharge  ENMT:  No difficulty hearing, tinnitus, vertigo; No sinus or throat pain  NECK: No pain or stiffness  BREASTS: No pain, masses, or nipple discharge  RESPIRATORY: No cough, wheezing, chills or hemoptysis; No shortness of breath  CARDIOVASCULAR: No chest pain, palpitations, dizziness, or leg swelling  GASTROINTESTINAL: No abdominal or epigastric pain. No nausea, vomiting, or hematemesis; No diarrhea or constipation. No melena or hematochezia.  GENITOURINARY: No dysuria, frequency, hematuria, or incontinence  NEUROLOGICAL: No headaches, memory loss, loss of strength, numbness, or tremors  SKIN: No itching, burning, rashes, or lesions   LYMPH NODES: No enlarged glands  ENDOCRINE: No heat or cold intolerance; No hair loss  MUSCULOSKELETAL: No joint pain or swelling; No muscle, back, or extremity pain  PSYCHIATRIC: No depression, anxiety, mood swings, or difficulty sleeping  HEME/LYMPH: No easy bruising, or bleeding gums  ALLERY AND IMMUNOLOGIC: No hives or eczema    Vital Signs Last 24 Hrs  T(C): 36.6 (04 Aug 2022 05:15), Max: 36.8 (03 Aug 2022 10:20)  T(F): 97.9 (04 Aug 2022 05:15), Max: 98.3 (03 Aug 2022 10:20)  HR: 58 (04 Aug 2022 08:00) (58 - 67)  BP: 117/80 (04 Aug 2022 05:15) (117/80 - 128/79)  BP(mean): --  RR: 19 (04 Aug 2022 05:15) (18 - 19)  SpO2: 96% (04 Aug 2022 05:15) (94% - 99%)    Parameters below as of 04 Aug 2022 05:15  Patient On (Oxygen Delivery Method): room air        PHYSICAL EXAM:  GENERAL: NAD, well-groomed, well-developed. still tremulous  HEAD:  Atraumatic, Normocephalic  EYES: EOMI, PERRLA, conjunctiva and sclera clear  ENMT: No tonsillar erythema, exudates, or enlargement; Moist mucous membranes, Good dentition, No lesions  NECK: Supple, No JVD, Normal thyroid  NERVOUS SYSTEM:  Alert & Oriented X3, Good concentration; Motor Strength 5/5 B/L upper and lower extremities; DTRs 2+ intact and symmetric.  tremors of hand snoted  CHEST/LUNG: Clear to percussion bilaterally; No rales, rhonchi, wheezing, or rubs  HEART: Regular rate and rhythm; No murmurs, rubs, or gallops  ABDOMEN: Soft, Nontender, Nondistended; Bowel sounds present  EXTREMITIES:  2+ Peripheral Pulses, No clubbing, cyanosis, or edema  LYMPH: No lymphadenopathy noted  SKIN: No rashes or lesions    LABS:                        14.0   4.90  )-----------( 108      ( 04 Aug 2022 06:24 )             40.9     08-04    135  |  102  |  14  ----------------------------<  90  3.0<L>   |  25  |  0.62    Ca    8.9      04 Aug 2022 06:24            CAPILLARY BLOOD GLUCOSE                    RADIOLOGY & ADDITIONAL TESTS:    Imaging Personally Reviewed:  [ ] YES  [ ] NO    Consultant(s) Notes Reviewed:  [ ] YES  [ ] NO    Care Discussed with Consultants/Other Providers [ ] YES  [ ] NO    Care discussed with family,         [  ]   yes  [  ]  No    imp:    stable[ ]    unstable[  ]     improving [   x]       unchanged  [  ]                Plans:  Continue present plans  [ x ] on detox protocol. replete potassium               New consult [  ]   specialty  .......               order test[  ]    test name.                  Discharge Planning  [  ]           
Patient is a 57y old  Male who presents with a chief complaint of alcohol withdrawal/ depression (05 Aug 2022 10:02)  Alert. oriented X3, no distress, still has some tremors , much improved.   Vitals stable   labs reviewed   hypoalemic.      INTERVAL HPI/OVERNIGHT EVENTS: un eventful  PAST MEDICAL & SURGICAL HISTORY:  Glaucoma      Alcohol dependence      Blindness of right eye      HTN (hypertension)      Glaucoma      HLD (hyperlipidemia)      HTN (hypertension)      No significant past surgical history      No significant past surgical history          MEDICATIONS  (STANDING):  amLODIPine   Tablet 10 milliGRAM(s) Oral daily  artificial  tears Solution 1 Drop(s) Both EYES three times a day  buPROPion XL (24-Hour) 150 milliGRAM(s) Oral daily  citalopram 20 milliGRAM(s) Oral daily  dorzolamide 2%/timolol 0.5% Ophthalmic Solution 1 Drop(s) Both EYES two times a day  folic acid 1 milliGRAM(s) Oral daily  latanoprost 0.005% Ophthalmic Solution 1 Drop(s) Both EYES at bedtime  LORazepam   Injectable 1 milliGRAM(s) IV Push every 6 hours  multivitamin 1 Tablet(s) Oral daily  potassium chloride    Tablet ER 20 milliEquivalent(s) Oral every 2 hours  senna 2 Tablet(s) Oral at bedtime    MEDICATIONS  (PRN):  acetaminophen     Tablet .. 650 milliGRAM(s) Oral every 6 hours PRN Temp greater or equal to 38C (100.4F), Mild Pain (1 - 3), Moderate Pain (4 - 6), Severe Pain (7 - 10)  LORazepam   Injectable 1 milliGRAM(s) IV Push every 1 hour PRN Symptom-triggered: each CIWA -Ar score 8 or GREATER      Allergies    No Known Allergies    Intolerances        REVIEW OF SYSTEMS:  CONSTITUTIONAL: No fever, weight loss, or fatigue  EYES: No eye pain, visual disturbances, or discharge  ENMT:  No difficulty hearing, tinnitus, vertigo; No sinus or throat pain  NECK: No pain or stiffness  BREASTS: No pain, masses, or nipple discharge  RESPIRATORY: No cough, wheezing, chills or hemoptysis; No shortness of breath  CARDIOVASCULAR: No chest pain, palpitations, dizziness, or leg swelling  GASTROINTESTINAL: No abdominal or epigastric pain. No nausea, vomiting, or hematemesis; No diarrhea or constipation. No melena or hematochezia.  GENITOURINARY: No dysuria, frequency, hematuria, or incontinence  NEUROLOGICAL: No headaches, memory loss, loss of strength, numbness, or tremors  SKIN: No itching, burning, rashes, or lesions   LYMPH NODES: No enlarged glands  ENDOCRINE: No heat or cold intolerance; No hair loss  MUSCULOSKELETAL: No joint pain or swelling; No muscle, back, or extremity pain  PSYCHIATRIC: No depression, anxiety, mood swings, or difficulty sleeping  HEME/LYMPH: No easy bruising, or bleeding gums  ALLERY AND IMMUNOLOGIC: No hives or eczema    Vital Signs Last 24 Hrs  T(C): 36.3 (06 Aug 2022 04:50), Max: 37.1 (05 Aug 2022 16:39)  T(F): 97.4 (06 Aug 2022 04:50), Max: 98.8 (05 Aug 2022 16:39)  HR: 58 (06 Aug 2022 04:50) (56 - 62)  BP: 102/62 (06 Aug 2022 04:50) (102/62 - 111/69)  BP(mean): --  RR: 18 (06 Aug 2022 04:50) (18 - 19)  SpO2: 98% (06 Aug 2022 04:50) (96% - 99%)    Parameters below as of 06 Aug 2022 04:50  Patient On (Oxygen Delivery Method): room air        PHYSICAL EXAM:  GENERAL: NAD, well-groomed, well-developed  HEAD:  Atraumatic, Normocephalic  EYES: EOMI, PERRLA, conjunctiva and sclera clear  ENMT: No tonsillar erythema, exudates, or enlargement; Moist mucous membranes, Good dentition, No lesions  NECK: Supple, No JVD, Normal thyroid  NERVOUS SYSTEM:  Alert & Oriented X3, Good concentration; Motor Strength 5/5 B/L upper and lower extremities; DTRs 2+ intact and symmetric. Mild tremors of the hands  CHEST/LUNG: Clear to percussion bilaterally; No rales, rhonchi, wheezing, or rubs  HEART: Regular rate and rhythm; No murmurs, rubs, or gallops  ABDOMEN: Soft, Nontender, Nondistended; Bowel sounds present  EXTREMITIES:  2+ Peripheral Pulses, No clubbing, cyanosis, or edema  LYMPH: No lymphadenopathy noted  SKIN: No rashes or lesions    LABS:    08-06    138  |  104  |  16  ----------------------------<  94  3.4<L>   |  28  |  0.78    Ca    8.8      06 Aug 2022 06:05  Phos  3.5     08-06  Mg     1.9     08-06    TPro  8.0  /  Alb  3.3  /  TBili  0.9  /  DBili  0.3  /  AST  366<H>  /  ALT  342<H>  /  AlkPhos  69  08-05          CAPILLARY BLOOD GLUCOSE                    RADIOLOGY & ADDITIONAL TESTS:    Imaging Personally Reviewed:  [ ] YES  [ ] NO    Consultant(s) Notes Reviewed:  [ ] YES  [ ] NO    Care Discussed with Consultants/Other Providers [ ] YES  [ ] NO    Care discussed with family,         [  ]   yes  [  ]  No    imp:    stable[x]    unstable[  ]     improving [  x]       unchanged  [  ]                Plans:  Continue present plans  [ x] Potassium repletion   Consult  ]   specialty  .......               order test[  ]    test name.                  Discharge Planning  [  ]           
Patient is a 57y old  Male who presents with a chief complaint of alcohol withdrawal/ depression (31 Jul 2022 17:08)  alert. still trulous   vitals stable   no acute distress, not hallucinating    INTERVAL HPI/OVERNIGHT EVENTS:  PAST MEDICAL & SURGICAL HISTORY:  Glaucoma      Alcohol dependence      Blindness of right eye      HTN (hypertension)      Glaucoma      HLD (hyperlipidemia)      HTN (hypertension)      No significant past surgical history      No significant past surgical history          MEDICATIONS  (STANDING):  amLODIPine   Tablet 10 milliGRAM(s) Oral daily  artificial  tears Solution 1 Drop(s) Both EYES three times a day  buPROPion XL (24-Hour) 150 milliGRAM(s) Oral daily  citalopram 20 milliGRAM(s) Oral daily  dorzolamide 2%/timolol 0.5% Ophthalmic Solution 1 Drop(s) Both EYES two times a day  folic acid 1 milliGRAM(s) Oral daily  latanoprost 0.005% Ophthalmic Solution 1 Drop(s) Both EYES at bedtime  LORazepam   Injectable   IV Push   LORazepam   Injectable 0.5 milliGRAM(s) IV Push every 4 hours  LORazepam   Injectable 1 milliGRAM(s) IV Push once  multivitamin 1 Tablet(s) Oral daily  senna 2 Tablet(s) Oral at bedtime  thiamine 100 milliGRAM(s) Oral daily    MEDICATIONS  (PRN):  acetaminophen     Tablet .. 650 milliGRAM(s) Oral every 6 hours PRN Temp greater or equal to 38C (100.4F), Mild Pain (1 - 3), Moderate Pain (4 - 6), Severe Pain (7 - 10)      Allergies    No Known Allergies    Intolerances        REVIEW OF SYSTEMS:  CONSTITUTIONAL: No fever, weight loss, or fatigue  EYES: No eye pain, visual disturbances, or discharge  ENMT:  No difficulty hearing, tinnitus, vertigo; No sinus or throat pain  NECK: No pain or stiffness  BREASTS: No pain, masses, or nipple discharge  RESPIRATORY: No cough, wheezing, chills or hemoptysis; No shortness of breath  CARDIOVASCULAR: No chest pain, palpitations, dizziness, or leg swelling  GASTROINTESTINAL: No abdominal or epigastric pain. No nausea, vomiting, or hematemesis; No diarrhea or constipation. No melena or hematochezia.  GENITOURINARY: No dysuria, frequency, hematuria, or incontinence  NEUROLOGICAL: No headaches, memory loss, loss of strength, numbness, or tremors  SKIN: No itching, burning, rashes, or lesions   LYMPH NODES: No enlarged glands  ENDOCRINE: No heat or cold intolerance; No hair loss  MUSCULOSKELETAL: No joint pain or swelling; No muscle, back, or extremity pain  PSYCHIATRIC: No depression, anxiety, mood swings, or difficulty sleeping  HEME/LYMPH: No easy bruising, or bleeding gums  ALLERY AND IMMUNOLOGIC: No hives or eczema    Vital Signs Last 24 Hrs  T(C): 37.1 (01 Aug 2022 11:48), Max: 37.3 (01 Aug 2022 04:47)  T(F): 98.8 (01 Aug 2022 11:48), Max: 99.1 (01 Aug 2022 04:47)  HR: 72 (01 Aug 2022 11:48) (63 - 90)  BP: 141/84 (01 Aug 2022 11:48) (111/71 - 158/78)  BP(mean): --  RR: 18 (01 Aug 2022 11:48) (15 - 20)  SpO2: 96% (01 Aug 2022 11:48) (94% - 98%)    Parameters below as of 01 Aug 2022 11:48  Patient On (Oxygen Delivery Method): room air        PHYSICAL EXAM:  GENERAL: NAD, well-groomed, well-developed  HEAD:  Atraumatic, Normocephalic  EYES: EOMI, PERRLA, conjunctiva and sclera clear  ENMT: No tonsillar erythema, exudates, or enlargement; Moist mucous membranes, Good dentition, No lesions  NECK: Supple, No JVD, Normal thyroid  NERVOUS SYSTEM:  Alert & Oriented X3, Good concentration; Motor Strength 5/5 B/L upper and lower extremities; DTRs 2+ intact and symmetric  CHEST/LUNG: Clear to percussion bilaterally; No rales, rhonchi, wheezing, or rubs  HEART: Regular rate and rhythm; No murmurs, rubs, or gallops  ABDOMEN: Soft, Nontender, Nondistended; Bowel sounds present  EXTREMITIES:  2+ Peripheral Pulses, No clubbing, cyanosis, or edema  LYMPH: No lymphadenopathy noted  SKIN: No rashes or lesions    LABS:                        13.4   4.00  )-----------( 96       ( 01 Aug 2022 05:45 )             39.0     08-01    138  |  105  |  8   ----------------------------<  101<H>  3.8   |  26  |  0.61    Ca    8.4<L>      01 Aug 2022 10:40  Phos  2.6     08-01  Mg     1.8     08-01    TPro  8.1  /  Alb  3.5  /  TBili  1.6<H>  /  DBili  0.5<H>  /  AST  80<H>  /  ALT  56  /  AlkPhos  83  08-01      PT/INR - ( 31 Jul 2022 14:10 )   PT: 11.2 sec;   INR: 0.93 ratio         PTT - ( 31 Jul 2022 14:10 )  PTT:33.3 sec    CAPILLARY BLOOD GLUCOSE                    RADIOLOGY & ADDITIONAL TESTS:    Imaging Personally Reviewed:  [ ] YES  [ ] NO    Consultant(s) Notes Reviewed:  [ ] YES  [ ] NO    Care Discussed with Consultants/Other Providers [ ] YES  [ ] NO    Care discussed with family,         [  ]   yes  [  ]  No    imp:    stable[ ]    unstable[  ]     improving [x   ]       unchanged  [  ]                Plans:  Continue present plans  [ x ]               New consult [  ]   specialty  .......               order test[  ]    test name.                  Discharge Planning  [  ]           
Patient is a 57y old  Male who presents with a chief complaint of alcohol withdrawal/ depression (06 Aug 2022 10:40)  alert, able  to ambulate without help. Less tremulous.  Vitals stable   oriented x3.   possible discharge tomorrow.      INTERVAL HPI/OVERNIGHT EVENTS:  PAST MEDICAL & SURGICAL HISTORY:  Glaucoma      Alcohol dependence      Blindness of right eye      HTN (hypertension)      Glaucoma      HLD (hyperlipidemia)      HTN (hypertension)      No significant past surgical history      No significant past surgical history          MEDICATIONS  (STANDING):  amLODIPine   Tablet 10 milliGRAM(s) Oral daily  artificial  tears Solution 1 Drop(s) Both EYES three times a day  buPROPion XL (24-Hour) 150 milliGRAM(s) Oral daily  citalopram 20 milliGRAM(s) Oral daily  dorzolamide 2%/timolol 0.5% Ophthalmic Solution 1 Drop(s) Both EYES two times a day  folic acid 1 milliGRAM(s) Oral daily  latanoprost 0.005% Ophthalmic Solution 1 Drop(s) Both EYES at bedtime  LORazepam     Tablet 1 milliGRAM(s) Oral every 12 hours  multivitamin 1 Tablet(s) Oral daily  senna 2 Tablet(s) Oral at bedtime    MEDICATIONS  (PRN):  acetaminophen     Tablet .. 650 milliGRAM(s) Oral every 6 hours PRN Temp greater or equal to 38C (100.4F), Mild Pain (1 - 3), Moderate Pain (4 - 6), Severe Pain (7 - 10)  LORazepam   Injectable 1 milliGRAM(s) IV Push every 1 hour PRN Symptom-triggered: each CIWA -Ar score 8 or GREATER      Allergies    No Known Allergies    Intolerances        REVIEW OF SYSTEMS:  CONSTITUTIONAL: No fever, weight loss, or fatigue  EYES: No eye pain, visual disturbances, or discharge  ENMT:  No difficulty hearing, tinnitus, vertigo; No sinus or throat pain  NECK: No pain or stiffness  BREASTS: No pain, masses, or nipple discharge  RESPIRATORY: No cough, wheezing, chills or hemoptysis; No shortness of breath  CARDIOVASCULAR: No chest pain, palpitations, dizziness, or leg swelling  GASTROINTESTINAL: No abdominal or epigastric pain. No nausea, vomiting, or hematemesis; No diarrhea or constipation. No melena or hematochezia.  GENITOURINARY: No dysuria, frequency, hematuria, or incontinence  NEUROLOGICAL: No headaches, memory loss, loss of strength, numbness, or tremors  SKIN: No itching, burning, rashes, or lesions   LYMPH NODES: No enlarged glands  ENDOCRINE: No heat or cold intolerance; No hair loss  MUSCULOSKELETAL: No joint pain or swelling; No muscle, back, or extremity pain  PSYCHIATRIC: No depression, anxiety, mood swings, or difficulty sleeping  HEME/LYMPH: No easy bruising, or bleeding gums  ALLERY AND IMMUNOLOGIC: No hives or eczema    Vital Signs Last 24 Hrs  T(C): 36.7 (07 Aug 2022 05:11), Max: 36.8 (06 Aug 2022 23:27)  T(F): 98 (07 Aug 2022 05:11), Max: 98.2 (06 Aug 2022 23:27)  HR: 57 (07 Aug 2022 05:11) (57 - 57)  BP: 102/61 (07 Aug 2022 05:11) (102/61 - 123/79)  BP(mean): --  RR: 18 (07 Aug 2022 05:11) (18 - 18)  SpO2: 97% (07 Aug 2022 05:11) (96% - 99%)    Parameters below as of 07 Aug 2022 05:11  Patient On (Oxygen Delivery Method): room air        PHYSICAL EXAM:  GENERAL: NAD, well-groomed, well-developed  HEAD:  Atraumatic, Normocephalic  EYES: EOMI, PERRLA, conjunctiva and sclera clear  ENMT: No tonsillar erythema, exudates, or enlargement; Moist mucous membranes, Good dentition, No lesions  NECK: Supple, No JVD, Normal thyroid  NERVOUS SYSTEM:  Alert & Oriented X3, Good concentration; Motor Strength 5/5 B/L upper and lower extremities; DTRs 2+ intact and symmetric. Minimal tremore noted in hands. able to ambulate on own  CHEST/LUNG: Clear to percussion bilaterally; No rales, rhonchi, wheezing, or rubs  HEART: Regular rate and rhythm; No murmurs, rubs, or gallops  ABDOMEN: Soft, Nontender, Nondistended; Bowel sounds present  EXTREMITIES:  2+ Peripheral Pulses, No clubbing, cyanosis, or edema  LYMPH: No lymphadenopathy noted  SKIN: No rashes or lesions    LABS:    08-06    138  |  104  |  16  ----------------------------<  94  3.4<L>   |  28  |  0.78    Ca    8.8      06 Aug 2022 06:05  Phos  3.5     08-06  Mg     1.9     08-06            CAPILLARY BLOOD GLUCOSE                    RADIOLOGY & ADDITIONAL TESTS:    Imaging Personally Reviewed:  [ ] YES  [ ] NO    Consultant(s) Notes Reviewed:  [ ] YES  [ ] NO    Care Discussed with Consultants/Other Providers [ ] YES  [ ] NO    Care discussed with family,         [  ]   yes  [  ]  No    imp:    stable[x ]    unstable[  ]     improving [   ]       unchanged  [  ]                Plans:  Continue present plans  [ x ] reduce ativan to 1 mg po BID               New consult [  ]   specialty  .......               order test[  ]    test name.  labs in am                Discharge Planning  [  ]           
Patient is a 57y old  Male who presents with a chief complaint of alcohol withdrawal/ depression (04 Aug 2022 10:16)  alert, still has tremors, ciwa score of 8 last night.  vitals stable   no acute distress, not hallucinating    INTERVAL HPI/OVERNIGHT EVENTS:  PAST MEDICAL & SURGICAL HISTORY:  Glaucoma      Alcohol dependence      Blindness of right eye      HTN (hypertension)      Glaucoma      HLD (hyperlipidemia)      HTN (hypertension)      No significant past surgical history      No significant past surgical history          MEDICATIONS  (STANDING):  amLODIPine   Tablet 10 milliGRAM(s) Oral daily  artificial  tears Solution 1 Drop(s) Both EYES three times a day  buPROPion XL (24-Hour) 150 milliGRAM(s) Oral daily  citalopram 20 milliGRAM(s) Oral daily  dorzolamide 2%/timolol 0.5% Ophthalmic Solution 1 Drop(s) Both EYES two times a day  folic acid 1 milliGRAM(s) Oral daily  latanoprost 0.005% Ophthalmic Solution 1 Drop(s) Both EYES at bedtime  LORazepam   Injectable 1 milliGRAM(s) IV Push every 6 hours  multivitamin 1 Tablet(s) Oral daily  senna 2 Tablet(s) Oral at bedtime    MEDICATIONS  (PRN):  acetaminophen     Tablet .. 650 milliGRAM(s) Oral every 6 hours PRN Temp greater or equal to 38C (100.4F), Mild Pain (1 - 3), Moderate Pain (4 - 6), Severe Pain (7 - 10)  LORazepam   Injectable 1 milliGRAM(s) IV Push every 1 hour PRN Symptom-triggered: each CIWA -Ar score 8 or GREATER      Allergies    No Known Allergies    Intolerances        REVIEW OF SYSTEMS:  CONSTITUTIONAL: No fever, weight loss, or fatigue  EYES: No eye pain, visual disturbances, or discharge  ENMT:  No difficulty hearing, tinnitus, vertigo; No sinus or throat pain  NECK: No pain or stiffness  BREASTS: No pain, masses, or nipple discharge  RESPIRATORY: No cough, wheezing, chills or hemoptysis; No shortness of breath  CARDIOVASCULAR: No chest pain, palpitations, dizziness, or leg swelling  GASTROINTESTINAL: No abdominal or epigastric pain. No nausea, vomiting, or hematemesis; No diarrhea or constipation. No melena or hematochezia.  GENITOURINARY: No dysuria, frequency, hematuria, or incontinence  NEUROLOGICAL: No headaches, memory loss, loss of strength, numbness, or tremors  SKIN: No itching, burning, rashes, or lesions   LYMPH NODES: No enlarged glands  ENDOCRINE: No heat or cold intolerance; No hair loss  MUSCULOSKELETAL: No joint pain or swelling; No muscle, back, or extremity pain  PSYCHIATRIC: No depression, anxiety, mood swings, or difficulty sleeping  HEME/LYMPH: No easy bruising, or bleeding gums  ALLERY AND IMMUNOLOGIC: No hives or eczema    Vital Signs Last 24 Hrs  T(C): 36.5 (05 Aug 2022 05:08), Max: 36.5 (05 Aug 2022 05:08)  T(F): 97.7 (05 Aug 2022 05:08), Max: 97.7 (05 Aug 2022 05:08)  HR: 84 (05 Aug 2022 05:08) (60 - 84)  BP: 102/69 (05 Aug 2022 05:08) (102/69 - 121/78)  BP(mean): --  RR: 20 (05 Aug 2022 05:08) (18 - 20)  SpO2: 96% (05 Aug 2022 05:08) (94% - 98%)    Parameters below as of 05 Aug 2022 05:08  Patient On (Oxygen Delivery Method): room air        PHYSICAL EXAM:  GENERAL: NAD, well-groomed, well-developed  HEAD:  Atraumatic, Normocephalic  EYES: EOMI, PERRLA, conjunctiva and sclera clear  ENMT: No tonsillar erythema, exudates, or enlargement; Moist mucous membranes, Good dentition, No lesions  NECK: Supple, No JVD, Normal thyroid  NERVOUS SYSTEM:  Alert & Oriented X3, Good concentration; Motor Strength 5/5 B/L upper and lower extremities; DTRs 2+ intact and symmetric, has tremors of hands  CHEST/LUNG: Clear to percussion bilaterally; No rales, rhonchi, wheezing, or rubs  HEART: Regular rate and rhythm; No murmurs, rubs, or gallops  ABDOMEN: Soft, Nontender, Nondistended; Bowel sounds present  EXTREMITIES:  2+ Peripheral Pulses, No clubbing, cyanosis, or edema  LYMPH: No lymphadenopathy noted  SKIN: No rashes or lesions    LABS:                        14.0   4.90  )-----------( 108      ( 04 Aug 2022 06:24 )             40.9     08-05    138  |  106  |  14  ----------------------------<  99  3.6   |  22  |  0.64    Ca    9.0      05 Aug 2022 08:59  Phos  3.3     08-05  Mg     2.2     08-05            CAPILLARY BLOOD GLUCOSE                    RADIOLOGY & ADDITIONAL TESTS:    Imaging Personally Reviewed:  [ ] YES  [ ] NO    Consultant(s) Notes Reviewed:  [ ] YES  [ ] NO    Care Discussed with Consultants/Other Providers [ ] YES  [ ] NO    Care discussed with family,         [  ]   yes  [  ]  No    imp:    stable[ ]    unstable[  ]     improving [ x  ]       unchanged  [  ]                Plans:  Continue present plans  [x  ] Atvan  dosing, taper for 48 hours               New consult [  ]   specialty  .......               order test[  ]    test name.                  Discharge Planning  [  ]           
Patient is a 57y old  Male who presents with a chief complaint of alcohol withdrawal/ depression (01 Aug 2022 12:50)  alert, tremulous. not confused, not diaphoretic  vitals stable  labs reviewed   hypoklemic    INTERVAL HPI/OVERNIGHT EVENTS:  PAST MEDICAL & SURGICAL HISTORY:  Glaucoma      Alcohol dependence      Blindness of right eye      HTN (hypertension)      Glaucoma      HLD (hyperlipidemia)      HTN (hypertension)      No significant past surgical history      No significant past surgical history          MEDICATIONS  (STANDING):  amLODIPine   Tablet 10 milliGRAM(s) Oral daily  artificial  tears Solution 1 Drop(s) Both EYES three times a day  buPROPion XL (24-Hour) 150 milliGRAM(s) Oral daily  citalopram 20 milliGRAM(s) Oral daily  dorzolamide 2%/timolol 0.5% Ophthalmic Solution 1 Drop(s) Both EYES two times a day  folic acid 1 milliGRAM(s) Oral daily  latanoprost 0.005% Ophthalmic Solution 1 Drop(s) Both EYES at bedtime  LORazepam   Injectable 1 milliGRAM(s) IV Push every 6 hours  multivitamin 1 Tablet(s) Oral daily  potassium chloride    Tablet ER 20 milliEquivalent(s) Oral every 2 hours  potassium chloride   Powder 40 milliEquivalent(s) Oral every 4 hours  senna 2 Tablet(s) Oral at bedtime  thiamine 100 milliGRAM(s) Oral daily    MEDICATIONS  (PRN):  acetaminophen     Tablet .. 650 milliGRAM(s) Oral every 6 hours PRN Temp greater or equal to 38C (100.4F), Mild Pain (1 - 3), Moderate Pain (4 - 6), Severe Pain (7 - 10)      Allergies    No Known Allergies    Intolerances        REVIEW OF SYSTEMS:  CONSTITUTIONAL: No fever, weight loss, or fatigue  EYES: No eye pain, visual disturbances, or discharge  ENMT:  No difficulty hearing, tinnitus, vertigo; No sinus or throat pain  NECK: No pain or stiffness  BREASTS: No pain, masses, or nipple discharge  RESPIRATORY: No cough, wheezing, chills or hemoptysis; No shortness of breath  CARDIOVASCULAR: No chest pain, palpitations, dizziness, or leg swelling  GASTROINTESTINAL: No abdominal or epigastric pain. No nausea, vomiting, or hematemesis; No diarrhea or constipation. No melena or hematochezia.  GENITOURINARY: No dysuria, frequency, hematuria, or incontinence  NEUROLOGICAL: No headaches, memory loss, loss of strength, numbness, or tremors  SKIN: No itching, burning, rashes, or lesions   LYMPH NODES: No enlarged glands  ENDOCRINE: No heat or cold intolerance; No hair loss  MUSCULOSKELETAL: No joint pain or swelling; No muscle, back, or extremity pain  PSYCHIATRIC: No depression, anxiety, mood swings, or difficulty sleeping  HEME/LYMPH: No easy bruising, or bleeding gums  ALLERY AND IMMUNOLOGIC: No hives or eczema    Vital Signs Last 24 Hrs  T(C): 36.8 (02 Aug 2022 06:04), Max: 37.1 (01 Aug 2022 11:48)  T(F): 98.3 (02 Aug 2022 06:04), Max: 98.8 (01 Aug 2022 11:48)  HR: 63 (02 Aug 2022 07:00) (61 - 72)  BP: 137/74 (02 Aug 2022 06:04) (126/84 - 158/82)  BP(mean): --  RR: 19 (02 Aug 2022 06:04) (18 - 20)  SpO2: 99% (02 Aug 2022 07:00) (95% - 99%)    Parameters below as of 02 Aug 2022 07:00  Patient On (Oxygen Delivery Method): room air        PHYSICAL EXAM:  GENERAL: NAD, well-groomed, well-developed  HEAD:  Atraumatic, Normocephalic  EYES: EOMI, PERRLA, conjunctiva and sclera clear  ENMT: No tonsillar erythema, exudates, or enlargement; Moist mucous membranes, Good dentition, No lesions  NECK: Supple, No JVD, Normal thyroid  NERVOUS SYSTEM:  Alert & Oriented X3, Good concentration; Motor Strength 5/5 B/L upper and lower extremities; DTRs 2+ intact and symmetric  CHEST/LUNG: Clear to percussion bilaterally; No rales, rhonchi, wheezing, or rubs  HEART: Regular rate and rhythm; No murmurs, rubs, or gallops  ABDOMEN: Soft, Nontender, Nondistended; Bowel sounds present  EXTREMITIES:  2+ Peripheral Pulses, No clubbing, cyanosis, or edema  LYMPH: No lymphadenopathy noted  SKIN: No rashes or lesions    LABS:                        13.6   4.76  )-----------( 99       ( 02 Aug 2022 06:00 )             40.2     08-02    136  |  102  |  12  ----------------------------<  83  3.2<L>   |  25  |  0.65    Ca    9.0      02 Aug 2022 06:00  Phos  2.6     08-01  Mg     1.8     08-01    TPro  8.1  /  Alb  3.5  /  TBili  1.6<H>  /  DBili  0.5<H>  /  AST  80<H>  /  ALT  56  /  AlkPhos  83  08-01      PT/INR - ( 31 Jul 2022 14:10 )   PT: 11.2 sec;   INR: 0.93 ratio         PTT - ( 31 Jul 2022 14:10 )  PTT:33.3 sec    CAPILLARY BLOOD GLUCOSE                    RADIOLOGY & ADDITIONAL TESTS:    Imaging Personally Reviewed:  [ ] YES  [ ] NO    Consultant(s) Notes Reviewed:  [ ] YES  [ ] NO    Care Discussed with Consultants/Other Providers [x ] YES  [ ] NO    Care discussed with family,         [  ]   yes  [  ]  No    imp:    stable[ ]    unstable[  ]     improving [   ]       unchanged  [  ]                Plans:  Continue present plans  [ x ] ativan 1 mg q6h round the clock, Potassium supplementation.               New consult [  ]   specialty  .......               order test[  ]    test name.                  Discharge Planning  [  ]

## 2022-08-07 NOTE — PROGRESS NOTE ADULT - TIME BILLING
clinical eval, review labs, discuss with patient/ team
clinical eval, review labs, discuss with patient/ team/RN
clinical eval, review labs, discuss with patient/Rn/team
clinical eval, review labs, discuss with patient/ team
clinical eval, review labs , discuss with patient/ team
clinical exam, review labs  discuss with patient at bedside/ discuss with team
clinical eval, review labs,  discuss with patient/ team/ rn

## 2022-08-07 NOTE — PROGRESS NOTE ADULT - ASSESSMENT
alcohol withdrawal
alcohol withdrawal  Depression
alcohol withdrawal.
alcohol withdrawal
alcohol withdrawal.
alcohol withdrawal   chronic depression
alcohol withdrawal.   hypokalemia

## 2022-08-08 ENCOUNTER — TRANSCRIPTION ENCOUNTER (OUTPATIENT)
Age: 57
End: 2022-08-08

## 2022-08-08 VITALS
TEMPERATURE: 97 F | RESPIRATION RATE: 17 BRPM | OXYGEN SATURATION: 98 % | SYSTOLIC BLOOD PRESSURE: 101 MMHG | HEART RATE: 62 BPM | DIASTOLIC BLOOD PRESSURE: 67 MMHG

## 2022-08-08 LAB
ANION GAP SERPL CALC-SCNC: 4 MMOL/L — LOW (ref 5–17)
BUN SERPL-MCNC: 12 MG/DL — SIGNIFICANT CHANGE UP (ref 7–23)
CALCIUM SERPL-MCNC: 9.1 MG/DL — SIGNIFICANT CHANGE UP (ref 8.5–10.1)
CHLORIDE SERPL-SCNC: 104 MMOL/L — SIGNIFICANT CHANGE UP (ref 96–108)
CO2 SERPL-SCNC: 31 MMOL/L — SIGNIFICANT CHANGE UP (ref 22–31)
CREAT SERPL-MCNC: 0.83 MG/DL — SIGNIFICANT CHANGE UP (ref 0.5–1.3)
EGFR: 101 ML/MIN/1.73M2 — SIGNIFICANT CHANGE UP
GLUCOSE SERPL-MCNC: 98 MG/DL — SIGNIFICANT CHANGE UP (ref 70–99)
HCT VFR BLD CALC: 40.9 % — SIGNIFICANT CHANGE UP (ref 39–50)
HGB BLD-MCNC: 14 G/DL — SIGNIFICANT CHANGE UP (ref 13–17)
MCHC RBC-ENTMCNC: 31.8 PG — SIGNIFICANT CHANGE UP (ref 27–34)
MCHC RBC-ENTMCNC: 34.2 G/DL — SIGNIFICANT CHANGE UP (ref 32–36)
MCV RBC AUTO: 93 FL — SIGNIFICANT CHANGE UP (ref 80–100)
NRBC # BLD: 0 /100 WBCS — SIGNIFICANT CHANGE UP (ref 0–0)
PLATELET # BLD AUTO: 187 K/UL — SIGNIFICANT CHANGE UP (ref 150–400)
POTASSIUM SERPL-MCNC: 3.9 MMOL/L — SIGNIFICANT CHANGE UP (ref 3.5–5.3)
POTASSIUM SERPL-SCNC: 3.9 MMOL/L — SIGNIFICANT CHANGE UP (ref 3.5–5.3)
RBC # BLD: 4.4 M/UL — SIGNIFICANT CHANGE UP (ref 4.2–5.8)
RBC # FLD: 14.1 % — SIGNIFICANT CHANGE UP (ref 10.3–14.5)
SODIUM SERPL-SCNC: 139 MMOL/L — SIGNIFICANT CHANGE UP (ref 135–145)
WBC # BLD: 4.51 K/UL — SIGNIFICANT CHANGE UP (ref 3.8–10.5)
WBC # FLD AUTO: 4.51 K/UL — SIGNIFICANT CHANGE UP (ref 3.8–10.5)

## 2022-08-08 RX ORDER — THIAMINE MONONITRATE (VIT B1) 100 MG
1 TABLET ORAL
Qty: 30 | Refills: 0
Start: 2022-08-08 | End: 2022-09-06

## 2022-08-08 RX ORDER — PREGABALIN 225 MG/1
1 CAPSULE ORAL
Qty: 30 | Refills: 0
Start: 2022-08-08 | End: 2022-09-06

## 2022-08-08 RX ORDER — FOLIC ACID 0.8 MG
1 TABLET ORAL
Qty: 30 | Refills: 0
Start: 2022-08-08 | End: 2022-09-06

## 2022-08-08 RX ORDER — CHOLECALCIFEROL (VITAMIN D3) 125 MCG
1 CAPSULE ORAL
Qty: 30 | Refills: 0
Start: 2022-08-08 | End: 2022-09-06

## 2022-08-08 RX ADMIN — AMLODIPINE BESYLATE 10 MILLIGRAM(S): 2.5 TABLET ORAL at 05:14

## 2022-08-08 RX ADMIN — DORZOLAMIDE HYDROCHLORIDE TIMOLOL MALEATE 1 DROP(S): 20; 5 SOLUTION/ DROPS OPHTHALMIC at 05:14

## 2022-08-08 RX ADMIN — BUPROPION HYDROCHLORIDE 150 MILLIGRAM(S): 150 TABLET, EXTENDED RELEASE ORAL at 11:19

## 2022-08-08 RX ADMIN — CITALOPRAM 20 MILLIGRAM(S): 10 TABLET, FILM COATED ORAL at 11:19

## 2022-08-08 RX ADMIN — Medication 1 DROP(S): at 05:14

## 2022-08-08 RX ADMIN — Medication 1 MILLIGRAM(S): at 11:18

## 2022-08-08 RX ADMIN — Medication 1 MILLIGRAM(S): at 05:14

## 2022-08-08 NOTE — DISCHARGE NOTE PROVIDER - NSDCCPCAREPLAN_GEN_ALL_CORE_FT
PRINCIPAL DISCHARGE DIAGNOSIS  Diagnosis: Alcohol withdrawal  Assessment and Plan of Treatment: 56 y/o M with PMHx of HLD, Glaucoma, HTN, Blindness of Rt eye and EtOH dependence presents to the ED BIBEMS for EtOH ingestion. As per pt, has been drinking everyday for about x2-3 months reporting use of tequila and states pt has been depressed which brought the excess use and would like to be rehabilitated. pt reports last drink was x3 hours ago, endorses tremors, but denies CP, SOB, N/V/D or other complaints at this time. Pt is fully vaccinated against COVID but not boosted. Patient denies EtOH/tobacco/illicit substance use. Patient was managed in the Ed for intoxication, but started to develop withdrawal symptoms with agitation.  Pt treated on librium taper. Doing well. Now d/c on Librium taper x 4 days. Follow up with PMD x 1 weeks. Alcohol cessation discussed.

## 2022-08-08 NOTE — DISCHARGE NOTE PROVIDER - NSDCMRMEDTOKEN_GEN_ALL_CORE_FT
acetaminophen 325 mg oral tablet: 2 tab(s) orally every 6 hours, As needed, Mild Pain (1 - 3)  acetaminophen 325 mg oral tablet: 2 tab(s) orally every 6 hours, As needed, Temp greater or equal to 38C (100.4F), Mild Pain (1 - 3)  buPROPion 100 mg/12 hours (SR) oral tablet, extended release: 1 tab(s) orally once a day  buPROPion 150 mg/24 hours (XL) oral tablet, extended release: 1 tab(s) orally once a day  chlordiazePOXIDE 25 mg oral capsule: 1 cap(s) orally 3 times a day x 3 days MDD:3  citalopram 20 mg oral tablet: 1 tab(s) orally once a day  cyanocobalamin 1000 mcg oral tablet: 1 tab(s) orally once a day  Daily Multiple Vitamins oral tablet: 1 tab(s) orally once a day   dorzolamide-timolol 2%-0.5% preservative-free ophthalmic solution: 1 drop(s) to each affected eye 2 times a day  folic acid 1 mg oral tablet: 1 tab(s) orally once a day  latanoprost 0.005% ophthalmic solution: 1 drop(s) to each affected eye once a day (at bedtime)  Norvasc 10 mg oral tablet: 1 tab(s) orally once a day  ocular lubricant ophthalmic solution: 1 drop(s) to each affected eye 3 times a day  thiamine 100 mg oral tablet: 1 tab(s) orally once a day  Vitamin D3 50 mcg (2000 intl units) oral tablet: 1 tab(s) orally once a day

## 2022-08-08 NOTE — DISCHARGE NOTE PROVIDER - HOSPITAL COURSE
58 y/o M with PMHx of HLD, Glaucoma, HTN, Blindness of Rt eye and EtOH dependence presents to the ED BIBEMS for EtOH ingestion. As per pt, has been drinking everyday for about x2-3 months reporting use of tequila and states pt has been depressed which brought the excess use and would like to be rehabilitated. pt reports last drink was x3 hours ago, endorses tremors, but denies CP, SOB, N/V/D or other complaints at this time. Pt is fully vaccinated against COVID but not boosted. Patient denies EtOH/tobacco/illicit substance use. Patient was managed in the Ed for intoxication, but started to develop withdrawal symptoms with agitation.  Pt treated on librium taper. Doing well. Now d/c on Librium taper x 4 days. Follow up with PMD x 1 weeks. Alcohol cessation discussed.

## 2022-08-08 NOTE — DISCHARGE NOTE NURSING/CASE MANAGEMENT/SOCIAL WORK - NSDCVIVACCINE_GEN_ALL_CORE_FT
influenza, injectable, quadrivalent, preservative free; 14-Oct-2021 13:35; Nany Guerrero (RN); [employer]; [id number] (Exp. Date: 30-Jun-2022); IntraMuscular; Deltoid Right.; 0.5 milliLiter(s); VIS (VIS Published: 06-Aug-2021, VIS Presented: 14-Oct-2021);   Tdap; 22-Dec-2021 19:23; Clementina Koehler (MARTÍNEZ); [employer]; [id number] (Exp. Date: 09-Sep-2023); IntraMuscular; Deltoid Left.; 0.5 milliLiter(s); VIS (VIS Published: 09-May-2013, VIS Presented: 22-Dec-2021);

## 2022-08-08 NOTE — DISCHARGE NOTE NURSING/CASE MANAGEMENT/SOCIAL WORK - NSDCPEFALRISK_GEN_ALL_CORE
For information on Fall & Injury Prevention, visit: [email or web address] OR  [email or web address] OR  https://www.cdc.gov/steadi/patient.html

## 2022-08-08 NOTE — DISCHARGE NOTE PROVIDER - NSDCHC_MEDRECSTATUS_GEN_ALL_CORE
[email or web address] Admission Reconciliation is Completed  Discharge Reconciliation is Completed ,[email or web address]

## 2022-08-11 DIAGNOSIS — F32.9 MAJOR DEPRESSIVE DISORDER, SINGLE EPISODE, UNSPECIFIED: ICD-10-CM

## 2022-08-11 DIAGNOSIS — F10.239 ALCOHOL DEPENDENCE WITH WITHDRAWAL, UNSPECIFIED: ICD-10-CM

## 2022-08-11 DIAGNOSIS — F10.229 ALCOHOL DEPENDENCE WITH INTOXICATION, UNSPECIFIED: ICD-10-CM

## 2022-08-11 DIAGNOSIS — E78.5 HYPERLIPIDEMIA, UNSPECIFIED: ICD-10-CM

## 2022-08-11 DIAGNOSIS — H40.9 UNSPECIFIED GLAUCOMA: ICD-10-CM

## 2022-08-11 DIAGNOSIS — H54.40 BLINDNESS, ONE EYE, UNSPECIFIED EYE: ICD-10-CM

## 2022-08-11 DIAGNOSIS — I10 ESSENTIAL (PRIMARY) HYPERTENSION: ICD-10-CM

## 2022-08-11 DIAGNOSIS — E87.6 HYPOKALEMIA: ICD-10-CM
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
## 2022-08-15 ENCOUNTER — EMERGENCY (EMERGENCY)
Facility: HOSPITAL | Age: 57
LOS: 0 days | Discharge: HOME | End: 2022-08-16
Attending: STUDENT IN AN ORGANIZED HEALTH CARE EDUCATION/TRAINING PROGRAM

## 2022-08-15 VITALS
HEART RATE: 79 BPM | OXYGEN SATURATION: 96 % | TEMPERATURE: 99 F | DIASTOLIC BLOOD PRESSURE: 78 MMHG | WEIGHT: 179.9 LBS | SYSTOLIC BLOOD PRESSURE: 131 MMHG | RESPIRATION RATE: 16 BRPM

## 2022-08-15 DIAGNOSIS — S00.83XA CONTUSION OF OTHER PART OF HEAD, INITIAL ENCOUNTER: ICD-10-CM

## 2022-08-15 DIAGNOSIS — S50.311A ABRASION OF RIGHT ELBOW, INITIAL ENCOUNTER: ICD-10-CM
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
## 2022-08-15 DIAGNOSIS — F10.929 ALCOHOL USE, UNSPECIFIED WITH INTOXICATION, UNSPECIFIED: ICD-10-CM

## 2022-08-15 DIAGNOSIS — Z23 ENCOUNTER FOR IMMUNIZATION: ICD-10-CM

## 2022-08-15 DIAGNOSIS — Z59.00 HOMELESSNESS UNSPECIFIED: ICD-10-CM

## 2022-08-15 DIAGNOSIS — S80.212A ABRASION, LEFT KNEE, INITIAL ENCOUNTER: ICD-10-CM

## 2022-08-15 LAB
ALBUMIN SERPL ELPH-MCNC: 3.3 G/DL — SIGNIFICANT CHANGE UP (ref 3.3–5)
ALP SERPL-CCNC: 71 U/L — SIGNIFICANT CHANGE UP (ref 40–120)
ALT FLD-CCNC: 97 U/L — HIGH (ref 12–78)
ANION GAP SERPL CALC-SCNC: 4 MMOL/L — LOW (ref 5–17)
APPEARANCE UR: CLEAR — SIGNIFICANT CHANGE UP
AST SERPL-CCNC: 59 U/L — HIGH (ref 15–37)
BASOPHILS # BLD AUTO: 0.13 K/UL — SIGNIFICANT CHANGE UP (ref 0–0.2)
BASOPHILS NFR BLD AUTO: 2.1 % — HIGH (ref 0–2)
BILIRUB SERPL-MCNC: 0.3 MG/DL — SIGNIFICANT CHANGE UP (ref 0.2–1.2)
BILIRUB UR-MCNC: NEGATIVE — SIGNIFICANT CHANGE UP
BUN SERPL-MCNC: 6 MG/DL — LOW (ref 7–23)
CALCIUM SERPL-MCNC: 8.2 MG/DL — LOW (ref 8.5–10.1)
CHLORIDE SERPL-SCNC: 114 MMOL/L — HIGH (ref 96–108)
CO2 SERPL-SCNC: 30 MMOL/L — SIGNIFICANT CHANGE UP (ref 22–31)
COLOR SPEC: YELLOW — SIGNIFICANT CHANGE UP
CREAT SERPL-MCNC: 0.85 MG/DL — SIGNIFICANT CHANGE UP (ref 0.5–1.3)
DIFF PNL FLD: NEGATIVE — SIGNIFICANT CHANGE UP
EGFR: 100 ML/MIN/1.73M2 — SIGNIFICANT CHANGE UP
EOSINOPHIL # BLD AUTO: 0.04 K/UL — SIGNIFICANT CHANGE UP (ref 0–0.5)
EOSINOPHIL NFR BLD AUTO: 0.7 % — SIGNIFICANT CHANGE UP (ref 0–6)
ETHANOL SERPL-MCNC: 476 MG/DL — HIGH (ref 0–10)
GLUCOSE SERPL-MCNC: 96 MG/DL — SIGNIFICANT CHANGE UP (ref 70–99)
GLUCOSE UR QL: NEGATIVE MG/DL — SIGNIFICANT CHANGE UP
HCT VFR BLD CALC: 41.1 % — SIGNIFICANT CHANGE UP (ref 39–50)
HGB BLD-MCNC: 13.8 G/DL — SIGNIFICANT CHANGE UP (ref 13–17)
IMM GRANULOCYTES NFR BLD AUTO: 0.2 % — SIGNIFICANT CHANGE UP (ref 0–1.5)
KETONES UR-MCNC: NEGATIVE — SIGNIFICANT CHANGE UP
LEUKOCYTE ESTERASE UR-ACNC: NEGATIVE — SIGNIFICANT CHANGE UP
LYMPHOCYTES # BLD AUTO: 2.74 K/UL — SIGNIFICANT CHANGE UP (ref 1–3.3)
LYMPHOCYTES # BLD AUTO: 45 % — HIGH (ref 13–44)
MCHC RBC-ENTMCNC: 31.4 PG — SIGNIFICANT CHANGE UP (ref 27–34)
MCHC RBC-ENTMCNC: 33.6 G/DL — SIGNIFICANT CHANGE UP (ref 32–36)
MCV RBC AUTO: 93.6 FL — SIGNIFICANT CHANGE UP (ref 80–100)
MONOCYTES # BLD AUTO: 0.4 K/UL — SIGNIFICANT CHANGE UP (ref 0–0.9)
MONOCYTES NFR BLD AUTO: 6.6 % — SIGNIFICANT CHANGE UP (ref 2–14)
NEUTROPHILS # BLD AUTO: 2.77 K/UL — SIGNIFICANT CHANGE UP (ref 1.8–7.4)
NEUTROPHILS NFR BLD AUTO: 45.4 % — SIGNIFICANT CHANGE UP (ref 43–77)
NITRITE UR-MCNC: NEGATIVE — SIGNIFICANT CHANGE UP
NRBC # BLD: 0 /100 WBCS — SIGNIFICANT CHANGE UP (ref 0–0)
PH UR: 7 — SIGNIFICANT CHANGE UP (ref 5–8)
PLATELET # BLD AUTO: 378 K/UL — SIGNIFICANT CHANGE UP (ref 150–400)
POTASSIUM SERPL-MCNC: 3.6 MMOL/L — SIGNIFICANT CHANGE UP (ref 3.5–5.3)
POTASSIUM SERPL-SCNC: 3.6 MMOL/L — SIGNIFICANT CHANGE UP (ref 3.5–5.3)
PROT SERPL-MCNC: 7.7 GM/DL — SIGNIFICANT CHANGE UP (ref 6–8.3)
PROT UR-MCNC: NEGATIVE MG/DL — SIGNIFICANT CHANGE UP
RBC # BLD: 4.39 M/UL — SIGNIFICANT CHANGE UP (ref 4.2–5.8)
RBC # FLD: 14.6 % — HIGH (ref 10.3–14.5)
RBC CASTS # UR COMP ASSIST: NEGATIVE /HPF — SIGNIFICANT CHANGE UP (ref 0–4)
SODIUM SERPL-SCNC: 148 MMOL/L — HIGH (ref 135–145)
SP GR SPEC: 1 — LOW (ref 1.01–1.02)
UROBILINOGEN FLD QL: NEGATIVE MG/DL — SIGNIFICANT CHANGE UP
WBC # BLD: 6.09 K/UL — SIGNIFICANT CHANGE UP (ref 3.8–10.5)
WBC # FLD AUTO: 6.09 K/UL — SIGNIFICANT CHANGE UP (ref 3.8–10.5)
WBC UR QL: NEGATIVE — SIGNIFICANT CHANGE UP

## 2022-08-15 PROCEDURE — 72125 CT NECK SPINE W/O DYE: CPT | Mod: 26,MA

## 2022-08-15 PROCEDURE — 73200 CT UPPER EXTREMITY W/O DYE: CPT | Mod: 26,RT,MA

## 2022-08-15 PROCEDURE — 70450 CT HEAD/BRAIN W/O DYE: CPT | Mod: 26,MA

## 2022-08-15 PROCEDURE — 72050 X-RAY EXAM NECK SPINE 4/5VWS: CPT | Mod: 26

## 2022-08-15 PROCEDURE — 99285 EMERGENCY DEPT VISIT HI MDM: CPT

## 2022-08-15 PROCEDURE — 74177 CT ABD & PELVIS W/CONTRAST: CPT | Mod: 26,MA

## 2022-08-15 PROCEDURE — 73562 X-RAY EXAM OF KNEE 3: CPT | Mod: 26,LT

## 2022-08-15 PROCEDURE — 73080 X-RAY EXAM OF ELBOW: CPT | Mod: 26,RT

## 2022-08-15 PROCEDURE — 72080 X-RAY EXAM THORACOLMB 2/> VW: CPT | Mod: 26

## 2022-08-15 PROCEDURE — 71260 CT THORAX DX C+: CPT | Mod: 26,MA

## 2022-08-15 RX ORDER — TETANUS TOXOID, REDUCED DIPHTHERIA TOXOID AND ACELLULAR PERTUSSIS VACCINE, ADSORBED 5; 2.5; 8; 8; 2.5 [IU]/.5ML; [IU]/.5ML; UG/.5ML; UG/.5ML; UG/.5ML
0.5 SUSPENSION INTRAMUSCULAR ONCE
Refills: 0 | Status: COMPLETED | OUTPATIENT
Start: 2022-08-15 | End: 2022-08-15

## 2022-08-15 RX ADMIN — TETANUS TOXOID, REDUCED DIPHTHERIA TOXOID AND ACELLULAR PERTUSSIS VACCINE, ADSORBED 0.5 MILLILITER(S): 5; 2.5; 8; 8; 2.5 SUSPENSION INTRAMUSCULAR at 16:08

## 2022-08-15 SDOH — ECONOMIC STABILITY - HOUSING INSECURITY: HOMELESSNESS UNSPECIFIED: Z59.00

## 2022-08-15 NOTE — ED ADULT NURSE NOTE - NSFALLRSKPSTHSTOCCUR_ED_ALL_ED
Patient denies any complaints related to anticoagulation therapy at this time. Patient reports no change in medication, diet or health. Reinforced with patient to call clinic with any medication changes as this can impact INR. Reinforced signs and symptoms bleeding/clotting with patient.  Patient aware to seek medical care if signs and symptoms develop. Advised that if patient falls and/or hits their head, they should seek medical attention. Verbalizes understanding. Clinic number provided [phone]    Dosing instructions given to patient verbally over the phone. Advised to call the clinic with any questions or concerns. Patient verbalizes understanding. Clinic number provided.    Anticoagulation Summary  As of 1/15/2018    INR goal:   2.0-3.0   TTR:   64.2 % (2 y)   Today's INR:   2.7 (1/12/2018)   Maintenance plan:   4 mg (2 mg x 2) on Tu; 2 mg (2 mg x 1) all other days   Weekly total:   16 mg   Plan last modified:   Anyi Encarnacion RN (8/8/2017)   Next INR check:   1/29/2018   Priority:   Follow-Up - 2 Weeks   Target end date:   Indefinite    Indications    Chronic atrial fibrillation (CMS/HCC) (Resolved) [I48.2]  Paroxysmal atrial fibrillation (CMS/HCC) (Resolved) [I48.0]  Persistent atrial fibrillation (CMS/HCC) [I48.1]  Current use of long term anticoagulation [Z79.01]  S/P MVR (mitral valve repair) [Z98.890]             Anticoagulation Episode Summary     INR check location:   [facility]    Preferred lab:       Send INR reminders to:   ANTICOAG (OPEN ENROLLMENT) ACS CARD/EP    Comments:   New STAC ordered 8/24/17; Home Monitor; 2 mg tablets; Per Dr Ascencio ok to hold 5 days prior to GI procedure 11/6      Anticoagulation Care Providers     Provider Role Specialty Phone number    Riley Ascencio MD Responsible Internal Medicine - Clinical Cardiac Electrophysiology [phone]           Multiple Falls

## 2022-08-15 NOTE — ED ADULT NURSE REASSESSMENT NOTE - NS ED NURSE REASSESS COMMENT FT1
Report received from MARTÍNEZ Bates at 7pm. Assessment available on [facility]. will continue to monitor

## 2022-08-15 NOTE — CONSULT NOTE ADULT - SUBJECTIVE AND OBJECTIVE BOX
Patient is a 57yMale [facility] without assistive devices who presents to ED after being found down, +HS. Patient states some new tenderness at neck. Denies radiation of pain elsewhere. States ability to ambulate immediately following the injury. Denies pain down legs, denies numbness/tingling/paresthesias/weakness, denies incontinence of bowel/bladder.  Denies having any other pain elsewhere. Denies any previous orthopaedic history. No other orthopaedic concerns at this time.       Vital Signs Last 24 Hrs  T(C): 36.7 (15 Aug 2022 15:50), Max: 37 (15 Aug 2022 13:47)  T(F): 98 (15 Aug 2022 15:50), Max: 98.6 (15 Aug 2022 13:47)  HR: 68 (15 Aug 2022 15:50) (68 - 79)  BP: 132/74 (15 Aug 2022 15:50) (131/78 - 132/74)  BP(mean): 93 (15 Aug 2022 15:50) (93 - 93)  RR: 18 (15 Aug 2022 15:50) (16 - 18)  SpO2: 96% (15 Aug 2022 15:50) (96% - 96%)    Parameters below as of 15 Aug 2022 13:47  Patient On (Oxygen Delivery Method): room air  LABS:                        13.8   6.09  )-----------( 378      ( 15 Aug 2022 16:07 )             41.1     08-15    148<H>  |  114<H>  |  6<L>  ----------------------------<  96  3.6   |  30  |  0.85    Ca    8.2<L>      15 Aug 2022 16:07    TPro  7.7  /  Alb  3.3  /  TBili  0.3  /  DBili  x   /  AST  59<H>  /  ALT  97<H>  /  AlkPhos  71  08-15      PHYSICAL EXAM:  General; Awake and alert, Oriented x 3  Spine: TTP spinous process C7. Otherwise, NTTP over spinous processes or paraspinal muscles at C/T/L spine. No palpable step off.     Able to actively SLR BL.   Ambulating w/o assistance/pain     Motor exam:        Elbow Flex (C5)      Wrist Ext (C6)       Elbow Ext (C7)       Finger Flex (C8)       Finger ABd (T1)   R           5/5                           5/5                         5/5                           5/5                            5/5  L           5/5                            5/5                         5/5                           5/5                           5/5         Hip Flex (L2)        Knee Ext (L3)       Ankle Dorsi (L4)       Great Toe Ext (L5)      Ankle Plantar (S1)  R        5/5                          5/5                        5/5                              5/5                               5/5  L         5/5                          5/5                        5/5                              5/5                              5/5    Sensory:  (0=absent, 1=impaired, 2=normal, NT=not testable)      C5    C6   C7   C8   T1         R   2     2      2     2      2  L    2     2      2     2      2        L2    L3   L4   L5   S1   R   2     2     2     2     2  L    2     2     2     2     2    BL Upper extremity:   Negative East  +Rad Pulse  Compartments soft and compressible    BL Lower Extremity:   SILT L2-S1  Negative Clonus  Negative Babinski  +DP  Compartments soft and compressible    Secondary  3cm abrasion at R posterior elbow and 4cm abrasion at L anterior knee. No erythema/ecchymosis. No TTP over bony prominences, SILT, palpable pulses, full/painless A/PROM, compartments soft. No other injuries or complaints. Negative logroll/heelstrike BL.     Imaging:  CT of CSp/Abdomen demonstrating T1 nondisplaced super articular facet fracture and subacute isolated L2 L TP process fracture.                                            A/P :   Patient is a 57yMale found down yesterday + HS.     -Clinical presentation and physical exam are not consistent w/ acute cord compression/cauda equina. Does not demonstrate red flag symptoms such as bowel/bladder incontinence, saddle anesthesia, fevers/chills, or weight loss.  -Plan for Madison collar and abdominal binder for T1 nondisplaced super articular facet fracture and subacute isolated L2 L TP process fracture.  -Patient was extensively educated about the signs and symptoms of osteomyelitis, epidural abscess, discitis, acute cord compression. The patient was advised to return to the ED should he develop neurological symptoms such as weakness, numbness/tingling/paresthesias, worsening pain, bowel/bladder incontinence, or fevers/chills.  -No heavy lifting/twisting  -Multimodal analgesia - recommend low dose opioids, acetaminophen, muscle relaxant as tolerated  -Recommend follow up w/ Dr. Sky outpatient in 1-2 weeks. Call office to schedule appointment.  -All patient's questions answered. Patient understands and agrees w/ above plan.  -Imaging and clinical presentation discussed w/ Dr. Sky who is aware and agrees w/ above plan. Patient is a 57yMale [place] community without assistive devices who presents to ED after being found down, +HS. Patient states some new tenderness at neck. Denies radiation of pain elsewhere. States ability to ambulate immediately following the injury. Denies pain down legs, denies numbness/tingling/paresthesias/weakness, denies incontinence of bowel/bladder.  Denies having any other pain elsewhere. Denies any previous orthopaedic history. No other orthopaedic concerns at this time.       Vital Signs Last 24 Hrs  T(C): 36.7 (15 Aug 2022 15:50), Max: 37 (15 Aug 2022 13:47)  T(F): 98 (15 Aug 2022 15:50), Max: 98.6 (15 Aug 2022 13:47)  HR: 68 (15 Aug 2022 15:50) (68 - 79)  BP: 132/74 (15 Aug 2022 15:50) (131/78 - 132/74)  BP(mean): 93 (15 Aug 2022 15:50) (93 - 93)  RR: 18 (15 Aug 2022 15:50) (16 - 18)  SpO2: 96% (15 Aug 2022 15:50) (96% - 96%)    Parameters below as of 15 Aug 2022 13:47  Patient On (Oxygen Delivery Method): room air  LABS:                        13.8   6.09  )-----------( 378      ( 15 Aug 2022 16:07 )             41.1     08-15    148<H>  |  114<H>  |  6<L>  ----------------------------<  96  3.6   |  30  |  0.85    Ca    8.2<L>      15 Aug 2022 16:07    TPro  7.7  /  Alb  3.3  /  TBili  0.3  /  DBili  x   /  AST  59<H>  /  ALT  97<H>  /  AlkPhos  71  08-15      PHYSICAL EXAM:  General; Awake and alert, Oriented x 3  Spine: TTP spinous process C7/T1. Otherwise, NTTP over spinous processes or paraspinal muscles at C/T/L spine. No palpable step off.     Able to actively SLR BL.   Ambulating w/o assistance/pain     Motor exam:        Elbow Flex (C5)      Wrist Ext (C6)       Elbow Ext (C7)       Finger Flex (C8)       Finger ABd (T1)   R           5/5                           5/5                         5/5                           5/5                            5/5  L           5/5                            5/5                         5/5                           5/5                           5/5         Hip Flex (L2)        Knee Ext (L3)       Ankle Dorsi (L4)       Great Toe Ext (L5)      Ankle Plantar (S1)  R        5/5                          5/5                        5/5                              5/5                               5/5  L         5/5                          5/5                        5/5                              5/5                              5/5    Sensory:  (0=absent, 1=impaired, 2=normal, NT=not testable)      C5    C6   C7   C8   T1         R   2     2      2     2      2  L    2     2      2     2      2        L2    L3   L4   L5   S1   R   2     2     2     2     2  L    2     2     2     2     2    BL Upper extremity:   Negative East  +Rad Pulse  Compartments soft and compressible    BL Lower Extremity:   SILT L2-S1  Negative Clonus  Negative Babinski  +DP  Compartments soft and compressible    Secondary  3cm superficial abrasion at R posterior elbow and 4cm superficial abrasion at L anterior knee. No erythema/ecchymosis. No TTP over bony prominences, SILT, palpable pulses, full/painless A/PROM, compartments soft. No other injuries or complaints. Negative logroll/heelstrike BL.     Imaging:  X-ray: R elbow demonstrating no obvious fracture, dislocation, tumor  X-ray: L knee demonstrating no fracture, dislocation, tumor  CT R Elbow: Neg, follow up official radiology report  CT CSp: officially read as negative, possible ND R T1 Super-articular facet fx  CT A/P: ?Subacute L L2 TP fx, please refer to full report for more detail  XR C/LSp (ordered)                                          A/P :   Patient is a 57yMale found down yesterday + HS w/ possible ND R T1 SAF Fx,  L2 L TP Fx, likely chronic from repeated falls     -Clinical presentation and physical exam are not consistent w/ acute cord compression/cauda equina. Does not demonstrate red flag symptoms such as bowel/bladder incontinence, saddle anesthesia, fevers/chills, or weight loss.  -Plan for Madison collar and abdominal binder for T1 nondisplaced super articular facet fracture and subacute isolated L2 L TP process fracture --> for comfort   -Patient was extensively educated about the signs and symptoms of osteomyelitis, epidural abscess, discitis, acute cord compression. The patient was advised to return to the ED should he develop neurological symptoms such as weakness, numbness/tingling/paresthesias, worsening pain, bowel/bladder incontinence, or fevers/chills.  -No heavy lifting/twisting  -Multimodal analgesia - recommend low dose opioids, acetaminophen, muscle relaxant as tolerated  -Recommend follow up w/ Dr. Sky outpatient in 1-2 weeks. Call office to schedule appointment.  -Please refer to general orthopaedic consult note for elbow and knee contusions  -All patient's questions answered. Patient understands and agrees w/ above plan.  -Imaging and clinical presentation discussed w/ Dr. Sky who is aware and agrees w/ above plan.  -Orthopaedically stable for d/c   -No acute orthopaedic surgical intervention indicated

## 2022-08-15 NOTE — ED PROVIDER NOTE - WR ORDER DATE AND TIME 3
15-Aug-2022 15:04 Double O-Z Plasty Text: The defect edges were debeveled with a #15 scalpel blade.  Given the location of the defect, shape of the defect and the proximity to free margins a Double O-Z plasty (double transposition flap) was deemed most appropriate.  Using a sterile surgical marker, the appropriate transposition flaps were drawn incorporating the defect and placing the expected incisions within the relaxed skin tension lines where possible. The area thus outlined was incised deep to adipose tissue with a #15 scalpel blade.  The skin margins were undermined to an appropriate distance in all directions utilizing iris scissors.  Hemostasis was achieved with electrocautery.  The flaps were then transposed into place, one clockwise and the other counterclockwise, and anchored with interrupted buried subcutaneous sutures.

## 2022-08-15 NOTE — ED PROVIDER NOTE - PATIENT PORTAL LINK FT
You can access the FollowMyHealth Patient Portal offered by [facility] by registering at the following website: [email or web address]. By joining [employer]’s FollowMyHealth portal, you will also be able to view your health information using other applications (apps) compatible with our system.

## 2022-08-15 NOTE — ED PROVIDER NOTE - PHYSICAL EXAMINATION
GENERAL: Awake, alert, appears intoxicated  HEENT: NC/AT, moist mucous membranes, PERRL, ecchymosis to L sided forehead  LUNGS: CTAB, no wheezes or crackles   CARDIAC: RRR, no m/r/g  ABDOMEN: Soft, normal BS, non tender, non distended, no rebound, no guarding  BACK: No midline spinal tenderness, no CVA tenderness  EXT: b/l lower extremity edema, +abrasion overlying L knee, +abrasion with swelling and tenderness to R elbow  NEURO: A&Ox1. Moving all extremities.  SKIN: Warm and dry. No rash.  PSYCH: Normal affect.

## 2022-08-15 NOTE — ED ADULT NURSE NOTE - OBJECTIVE STATEMENT
57m presents to the ED intoxicated, patient states that he drinks everyday, reports depression because he lives alone. denies intent to harm

## 2022-08-15 NOTE — ED PROVIDER NOTE - CLINICAL SUMMARY MEDICAL DECISION MAKING FREE TEXT BOX
56 y/o M presenting to the ED with acute intox. Vitals stable. He is acutely intoxicated on exam. He has obvious signs of head trauma and abrasions overlying multiple extremities. Will get trauma scans and dedicated xray of L knee and R elbow. Reassess. MTF.

## 2022-08-15 NOTE — CONSULT NOTE ADULT - SUBJECTIVE AND OBJECTIVE BOX
Orthopaedic Surgery Consult Note    57yMale RHD? presents after being found down, +HS. Patient has 3 cm posterior abrasion at R elbow. ED concerned for fracture. However, patient states having "no pain" at elbow. Denies any numbness or tingling. Denies injury elsewhere.    Patient states drinks "three bottles of vodka per day."    Allergies    No Known Allergies    Intolerances    Vital Signs Last 24 Hrs  T(C): 36.7 (15 Aug 2022 15:50), Max: 37 (15 Aug 2022 13:47)  T(F): 98 (15 Aug 2022 15:50), Max: 98.6 (15 Aug 2022 13:47)  HR: 68 (15 Aug 2022 15:50) (68 - 79)  BP: 132/74 (15 Aug 2022 15:50) (131/78 - 132/74)  BP(mean): 93 (15 Aug 2022 15:50) (93 - 93)  RR: 18 (15 Aug 2022 15:50) (16 - 18)  SpO2: 96% (15 Aug 2022 15:50) (96% - 96%)              13.8   6.09  )-----------( 378      ( 15 Aug 2022 16:07 )             41.1     08-15    148<H>  |  114<H>  |  6<L>  ----------------------------<  96  3.6   |  30  |  0.85    Ca    8.2<L>      15 Aug 2022 16:07    TPro  7.7  /  Alb  3.3  /  TBili  0.3  /  DBili  x   /  AST  59<H>  /  ALT  97<H>  /  AlkPhos  71  08-15    Parameters below as of 15 Aug 2022 13:47  Patient On (Oxygen Delivery Method): room air    Physical Exam:  General: NAD  RUE:  3 cm posterior elbow abrasion, with swelling   TTP at abrasion site  Elbow swollen  Biceps/triceps firing, pronation/supination, wrist flex/ext, hand intrinsics intact  Strength testing limited by pain  SILT in ax/m/u/r distributions  Radial pulse palpable    Secondary Assessment:    L UE:   - NTTP of Shoulders, Elbows, Wrists, Hands  - NT with AROM/PROM of Shoulders, Elbows, Wrists, Hands  - AIN/PIN/Med/Uln/Msc/Rad/Ax intact  - SILT C4-T1 b/l  - Rad pulses 2+ b/l  LEs:   - L knee TTP and abrasion   Otherwise:  - Able to SLR  - NT with Log Roll  - NT with Heel Strike  - NTTP of Hips, Knees, Ankles, Feet  - NT with AROM/PROM of Hips, Knees, Ankles, Feet  - EHL, FHL, TA, Gsc  - DP 2+ b/l    Imaging:  X-ray R elbow demonstrating no fracture, dislocation    A/P: 57yMale presents after being found down s/p R elbow and L knee abrasion.    -FU R elbow CT 2/2 poor lateral visualization of elbow on XR  -Sensation and motor intact in all nerve distributions. Stable elbow ROM in flexion/extension/pronosupination.  -WBAT RUE in posterior slab splint and sling  -Pain control per ED  -F/u in office in one week  -Return if any new numbness or tingling  -Discussed with Dr. Ragland who agrees with plan. Will advise if plan changes.    Orthopaedic Surgery Consult Note    57yMale RHD presents after being found down, +HS. Patient states having pain at elbow. Patient has 3 cm posterior abrasion at R elbow. Also, patient has 4 cm abrasion over L anterior knee. Denies any numbness or tingling. Denies any other orthopaedic complaint.     Patient states drinks "three bottles of vodka per day."      Allergies    No Known Allergies    Vital Signs Last 24 Hrs  T(C): 36.7 (15 Aug 2022 15:50), Max: 37 (15 Aug 2022 13:47)  T(F): 98 (15 Aug 2022 15:50), Max: 98.6 (15 Aug 2022 13:47)  HR: 68 (15 Aug 2022 15:50) (68 - 79)  BP: 132/74 (15 Aug 2022 15:50) (131/78 - 132/74)  BP(mean): 93 (15 Aug 2022 15:50) (93 - 93)  RR: 18 (15 Aug 2022 15:50) (16 - 18)  SpO2: 96% (15 Aug 2022 15:50) (96% - 96%)              13.8   6.09  )-----------( 378      ( 15 Aug 2022 16:07 )             41.1     08-15    148<H>  |  114<H>  |  6<L>  ----------------------------<  96  3.6   |  30  |  0.85    Ca    8.2<L>      15 Aug 2022 16:07    TPro  7.7  /  Alb  3.3  /  TBili  0.3  /  DBili  x   /  AST  59<H>  /  ALT  97<H>  /  AlkPhos  71  08-15    Parameters below as of 15 Aug 2022 13:47  Patient On (Oxygen Delivery Method): room air    Physical Exam:  General: NAD  RUE:  3 cm posterior elbow abrasion  TTP at abrasion site  Biceps/triceps, pronation/supination, wrist flex/ext, hand intrinsics intact  SILT in ax/m/u/r distributions  Radial pulse palpable    Secondary Assessment:  - TTP at spinous process of C7. Otherwise, NTTP C/T/L-Spine    L UE:   - NTTP of Clavicles, Shoulders, Elbows, Wrists, Hands  - NT with AROM/PROM of Shoulders, Elbows, Wrists, Hands  - AIN/PIN/Med/Uln/Msc/Rad/Ax intact  - SILT C4-T1 b/l  - Rad pulses 2+ b/l  LEs:   - L anterior knee TTP and abrasion   Otherwise:  - Able to SLR  - NT with Log Roll  - NT with Heel Strike  - NTTP of Hips, Knees, Ankles, Feet  - NT with AROM/PROM of Hips, Knees, Ankles, Feet  - EHL, FHL, TA, Gsc  - DP 2+ b/l    Imaging:  X-ray: R elbow demonstrating no fracture, dislocation  X-ray: L knee demonstrating no fracture, dislocation    A/P: 57yMale presents after being found down s/p R elbow and L knee abrasion.    -FU R elbow CT 2/2 poor lateral visualization of elbow on XR  -Sensation and motor intact in all nerve distributions. Stable elbow ROM in flexion/extension/pronosupination.  -WBAT RUE and LLE  -Pain control per ED  -F/u in office as needed   -Return if any new numbness or tingling  -Discussed with Dr. Ragland who agrees with plan. Will advise if plan changes.    Orthopaedic Surgery Consult Note    57yMale RHD presents after being found down, +HS. Patient states having pain at right elbow. Patient has 3 cm posterior superficial abrasion at R elbow. Also, patient has 4 cm superficial abrasion over L anterior knee. Denies any numbness or tingling. Denies any other orthopaedic complaint.     Patient states drinks "three bottles of vodka per day."      Allergies    No Known Allergies    Vital Signs Last 24 Hrs  T(C): 36.7 (15 Aug 2022 15:50), Max: 37 (15 Aug 2022 13:47)  T(F): 98 (15 Aug 2022 15:50), Max: 98.6 (15 Aug 2022 13:47)  HR: 68 (15 Aug 2022 15:50) (68 - 79)  BP: 132/74 (15 Aug 2022 15:50) (131/78 - 132/74)  BP(mean): 93 (15 Aug 2022 15:50) (93 - 93)  RR: 18 (15 Aug 2022 15:50) (16 - 18)  SpO2: 96% (15 Aug 2022 15:50) (96% - 96%)              13.8   6.09  )-----------( 378      ( 15 Aug 2022 16:07 )             41.1     08-15    148<H>  |  114<H>  |  6<L>  ----------------------------<  96  3.6   |  30  |  0.85    Ca    8.2<L>      15 Aug 2022 16:07    TPro  7.7  /  Alb  3.3  /  TBili  0.3  /  DBili  x   /  AST  59<H>  /  ALT  97<H>  /  AlkPhos  71  08-15    Parameters below as of 15 Aug 2022 13:47  Patient On (Oxygen Delivery Method): room air    Physical Exam:  General: NAD  RUE:  3 cm posterior elbow abrasion, superficial, no active bleeding  TTP at abrasion site  Biceps/triceps, pronation/supination, wrist flex/ext, hand intrinsics intact  SILT C5-T2 distributions  Radial pulse palpable  +AIN/PIN/Axillary/Msk/U/R/Med N    Secondary Assessment:  - TTP at spinous process of C7/T1. Otherwise, NTTP C/T/L-Spine    L UE:   - NTTP of Clavicles, Shoulders, Elbows, Wrists, Hands  - NT with AROM/PROM of Shoulders, Elbows, Wrists, Hands  - AIN/PIN/Med/Uln/Msc/Rad/Ax intact  - SILT C4-T1 b/l  - Rad pulses 2+ b/l    LEs:   - L anterior knee TTP and abrasion   Otherwise:  - Able to SLR  - NT with Log Roll  - NT with Heel Strike  - NTTP of Hips, Knees, Ankles, Feet  - NT with AROM/PROM of Hips, Knees, Ankles, Feet  - EHL, FHL, TA, Gsc  - DP+ b/l    Imaging:  X-ray: R elbow demonstrating no obvious fracture, dislocation, tumor  X-ray: L knee demonstrating no fracture, dislocation, tumor  CT R Elbow: Neg, follow up official radiology report  CT CSp: officially read as negative, possible ND R T1 Super-articular facet fx  CT A/P: ?Subacute L L2 TP fx, please refer to full report for more detail    A/P: 57yMale presents after being found down s/p R elbow and L knee superificial abrasion w/ R Elbow OA / L Knee OA.    -FU Official read of R elbow, no obvious fx/dislocation/tumor  -Sensation and motor intact in all nerve distributions. Stable elbow ROM in flexion/extension/pronosupination.  -Full Painless AROM L Knee   -WBAT B/l UE / LEs  -Pain control per ED  -Would Rec course of oral abx upon d/c for superficial wounds as ppx to prevent skin infection  -F/u in office as needed   -Return if any new numbness or tingling  -Please refer to full orthopedic spine c/s note for further details  -?T1 R SAF ND Fx ?acuity, would rec artem collar for comfort until follow up  -L L2 TP fx ?subacute, would rec abd binder for comfort when ambulating  -Spine recommendations per Dr. Sky, please refer to full note for complete details, can follow up outpatient w/ Dr. Sky in 2 weeks  -Discussed with Dr. Ragland who agrees with plan.   -Follow up in PRN manner with Dr. Ragland, would advise return to ED or PCP if concern for skin infection develops  -Otherwise can follow up w/ Dr. Ragland if patient is having continued elbow or knee pain that does not resolve to the patient's satisfaction  -Will advise if plan changes.    Orthopaedic Surgery Consult Note    57yMale RHD presents after being found down, +HS. Patient states having pain at right elbow. Patient has 3 cm posterior superficial abrasion at R elbow. Also, patient has 4 cm superficial abrasion over L anterior knee. Denies any numbness or tingling. Denies any other orthopaedic complaint.     Patient states drinks "three bottles of vodka per day."      Allergies    No Known Allergies    Vital Signs Last 24 Hrs  T(C): 36.7 (15 Aug 2022 15:50), Max: 37 (15 Aug 2022 13:47)  T(F): 98 (15 Aug 2022 15:50), Max: 98.6 (15 Aug 2022 13:47)  HR: 68 (15 Aug 2022 15:50) (68 - 79)  BP: 132/74 (15 Aug 2022 15:50) (131/78 - 132/74)  BP(mean): 93 (15 Aug 2022 15:50) (93 - 93)  RR: 18 (15 Aug 2022 15:50) (16 - 18)  SpO2: 96% (15 Aug 2022 15:50) (96% - 96%)              13.8   6.09  )-----------( 378      ( 15 Aug 2022 16:07 )             41.1     08-15    148<H>  |  114<H>  |  6<L>  ----------------------------<  96  3.6   |  30  |  0.85    Ca    8.2<L>      15 Aug 2022 16:07    TPro  7.7  /  Alb  3.3  /  TBili  0.3  /  DBili  x   /  AST  59<H>  /  ALT  97<H>  /  AlkPhos  71  08-15    Parameters below as of 15 Aug 2022 13:47  Patient On (Oxygen Delivery Method): room air    Physical Exam:  General: NAD  RUE:  3 cm posterior elbow abrasion, superficial, no active bleeding  TTP at abrasion site  Biceps/triceps, pronation/supination, wrist flex/ext, hand intrinsics intact  SILT C5-T2 distributions  Radial pulse palpable  +AIN/PIN/Axillary/Msk/U/R/Med N    Secondary Assessment:  - TTP at spinous process of C7/T1. Otherwise, NTTP C/T/L-Spine    L UE:   - NTTP of Clavicles, Shoulders, Elbows, Wrists, Hands  - NT with AROM/PROM of Shoulders, Elbows, Wrists, Hands  - AIN/PIN/Med/Uln/Msc/Rad/Ax intact  - SILT C4-T1 b/l  - Rad pulses 2+ b/l    LEs:   - L anterior knee TTP and abrasion   Otherwise:  - Able to SLR  - NT with Log Roll  - NT with Heel Strike  - NTTP of Hips, Knees, Ankles, Feet  - NT with AROM/PROM of Hips, Knees, Ankles, Feet  - EHL, FHL, TA, Gsc  - DP+ b/l    Imaging:  X-ray: R elbow demonstrating no obvious fracture, dislocation, tumor  X-ray: L knee demonstrating no fracture, dislocation, tumor  CT R Elbow: Neg, follow up official radiology report  CT CSp: officially read as negative, possible ND R T1 Super-articular facet fx  CT A/P: ?Subacute L L2 TP fx, please refer to full report for more detail    A/P: 57yMale presents after being found down s/p R elbow and L knee superificial abrasion w/ R Elbow OA / L Knee OA.    -FU Official read of R elbow, no obvious fx/dislocation/tumor  -Sensation and motor intact in all nerve distributions. Stable elbow ROM in flexion/extension/pronosupination.  -Full Painless AROM L Knee   -WBAT B/l UE / LEs  -Pain control per ED  -Would Rec course of oral abx upon d/c for superficial wounds as ppx to prevent skin infection  -F/u in office as needed   -Return if any new numbness or tingling  -Please refer to full orthopedic spine c/s note for further details  -?T1 R SAF ND Fx ?acuity, would rec artem collar for comfort until follow up  -L L2 TP fx ?subacute, would rec abd binder for comfort when ambulating  -Spine recommendations per Dr. Sky, please refer to full note for complete details, can follow up outpatient w/ Dr. Sky in 2 weeks  -Discussed with Dr. Ragland who agrees with plan.   -Follow up in PRN manner with Dr. Ragland, would advise return to ED or PCP if concern for skin infection develops  -Otherwise can follow up w/ Dr. Ragland if patient is having continued elbow or knee pain that does not resolve to the patient's satisfaction  -Orthopaedically stable for d/c  -No acute orthopaedic surgical intervention indicated  -Will advise if plan changes.

## 2022-08-15 NOTE — ED PROVIDER NOTE - CARE PROVIDER_API CALL
Hanny Sky (DO)  Orthopaedic Surgery Surgery  [address] [facility], [address] [place] [address]  Phone: [phone]  Fax: [phone]  Follow Up Time:

## 2022-08-15 NOTE — ED PROVIDER NOTE - OBJECTIVE STATEMENT
58 y/o M, reportedly undomiciled, presents to the ED with intox. Patient endorses drinking multiple beers today. He has notable abrasions overlying L knee and R elbow. He has bruising to the L sided forehead. He is unsure if he fell. Unable to provide further hx secondary to intoxicated state.

## 2022-08-15 NOTE — CONSULT NOTE ADULT - PROVIDER SPECIALTY LIST ADULT
Orthopedics Consent (Ear)/Introductory Paragraph: The rationale for Mohs was explained to the patient and consent was obtained. The risks, benefits and alternatives to therapy were discussed in detail. Specifically, the risks of ear deformity, infection, scarring, bleeding, prolonged wound healing, incomplete removal, allergy to anesthesia, nerve injury and recurrence were addressed. Prior to the procedure, the treatment site was clearly identified and confirmed by the patient. All components of Universal Protocol/PAUSE Rule completed.

## 2022-08-16 VITALS
TEMPERATURE: 98 F | RESPIRATION RATE: 20 BRPM | HEART RATE: 80 BPM | DIASTOLIC BLOOD PRESSURE: 89 MMHG | OXYGEN SATURATION: 98 % | SYSTOLIC BLOOD PRESSURE: 135 MMHG

## 2022-08-16 RX ADMIN — Medication 25 MILLIGRAM(S): at 03:50

## 2022-09-30 NOTE — ED PROVIDER NOTE - CPE EDP [facility] NORM
- - - JOHN LIMON          MRN-[id number]            ( 1930)    HPI:  89 y/o M with a PMHx of Dementia, anemia, bladder CA, CVA, heart failure, Afib on Eliquis, chronic kidney disease, BPH, recent admission for GI bleed, presents to ED from nursing home for AMS and poor PO intake. Patient says "yes" when asked if he's in pain and says "everywhere" when asked where. Unable to obtain further history due to baseline dementia.     ED Course:  T: 97.1 (rectal), , BP 93/50, RR 18, 96% on room air   Labs: Hgb 9.4, Na 151, CO2 16, AG 18, , Cr 6.40, AST/ALT 65/46, Lactate 2.3  Imaging: Xray Chest Moderate right-sided pleural effusion. CT Head: No definite acute intracranial pathology on this motion degraded examination. (29 Sep 2022 22:41)    PAST MEDICAL & SURGICAL HISTORY:  Chronic CHF    Hypertension    Hyperlipidemia    Chronic kidney disease (CKD)    BPH (benign prostatic hyperplasia)    Chronic atrial fibrillation    FAMILY HISTORY:   Reviewed and found non contributory in mother or father    SOCIAL HISTORY: Unable to obtain due to Cognitive impairment     PSYCHOSOCIAL ASSESSMENT:  Mu-ism/Spiritual practice: Unknown   Role of organized Yarsanism [ ] important [ ] some [x ] unable to assess dt pt mentation   Coping: [ ] well [ ] with difficulty [ ] poor coping [x ] unable to assess dt pt mentation  Support system: [ ] strong [x ] adequate [ ] inadequate    ROS:    Unable to attain due to:  Cognitive impairment     Dyspnea (Jose Enrique 0-10):     0                   N/V (Y/N):      N                        Secretions (Y/N) :  N              Agitation(Y/N): N  Pain (Y/N):    Yes, evidenced by patient moaning and grimacing  -Provocation/Palliation: n/a   -Quality/Quantity: n/a  -Radiating: n/a  -Severity: n/a  -Timing/Frequency: n/a   -Impact on ADLs: n/a     General:  unable to obtain  HEENT:     unable to obtain  Neck:   unable to obtain  CVS:  unable to obtain  Resp:   unable to obtain  GI:   unable to obtain  :  unable to obtain  Musc:  unable to obtain  Neuro:   unable to obtain  Psych:   unable to obtain  Skin:   unable to obtain  Lymph: unable to obtain    Allergies    No Known Allergies    Intolerances      Opiate Naive (Y/N):  Y  -iStop reviewed (Y/N):Y  (Ref#:     [id number]      )    Medications:      MEDICATIONS  (STANDING):  ascorbic acid 500 milliGRAM(s) Oral daily  finasteride 5 milliGRAM(s) Oral daily  lidocaine   4% Patch 1 Patch Transdermal daily  pantoprazole    Tablet 40 milliGRAM(s) Oral before breakfast  polyethylene glycol 3350 17 Gram(s) Oral daily  QUEtiapine 25 milliGRAM(s) Oral at bedtime  senna 2 Tablet(s) Oral at bedtime  sertraline 50 milliGRAM(s) Oral daily  tamsulosin 0.4 milliGRAM(s) Oral at bedtime    MEDICATIONS  (PRN):  artificial  tears Solution 2 Drop(s) Both EYES every 1 hour PRN Dry Eyes  glycopyrrolate Injectable 0.4 milliGRAM(s) IV Push four times a day PRN Secretions  HYDROmorphone  Injectable 0.5 milliGRAM(s) IV Push every 4 hours PRN Moderate pain (4-6), Severe pain (7-10), Respiratory rate greater than 22  LORazepam   Injectable 0.5 milliGRAM(s) IV Push every 4 hours PRN Anxiety  ondansetron Injectable 4 milliGRAM(s) IV Push every 6 hours PRN Nausea and/or Vomiting    Labs:    CBC:                        9.4    9.29  )-----------( 305      ( 29 Sep 2022 18:29 )             30.2     CMP:        153<H>  |  122<H>  |  117<H>  ----------------------------<  97  4.4   |  16<L>  |  5.99<H>    Ca    7.6<L>      29 Sep 2022 21:42    TPro  4.2<L>  /  Alb  1.6<L>  /  TBili  1.2  /  DBili  x   /  AST  57<H>  /  ALT  38  /  AlkPhos  67      PT/INR - ( 29 Sep 2022 18:29 )   PT: 14.2 sec;   INR: 1.19          PTT - ( 29 Sep 2022 18:29 )  PTT:24.3 sec  Urinalysis Basic - ( 29 Sep 2022 20:28 )    Color: Yellow / Appearance: Clear / S.025 / pH: x  Gluc: x / Ketone: NEGATIVE  / Bili: Negative / Urobili: 0.2 E.U./dL   Blood: x / Protein: 30 mg/dL / Nitrite: NEGATIVE   Leuk Esterase: NEGATIVE / RBC: < 5 /HPF / WBC < 5 /HPF   Sq Epi: x / Non Sq Epi: 0-5 /HPF / Bacteria: Present /HPF    Imaging:    < from: CT Head No Cont (22 @ 20:51) >    ACC: [id number] EXAM:  CT BRAIN                          PROCEDURE DATE:  2022    IMPRESSION: No definite acute intracranial pathology on this motion   degraded examination.    < end of copied text >    < from: Xray Chest 1 View-PORTABLE IMMEDIATE (22 @ 18:58) >  ACC: [id number] EXAM:  XR CHEST PORTABLE IMMED 1V                          PROCEDURE DATE:  2022    IMPRESSION: Moderate right-sided pleural effusion.    < end of copied text >      PEx:  T(C): 37.2 (22 @ 22:30), Max: 37.2 (22 @ 22:30)  HR: 72 (22 @ 01:27) (72 - 123)  BP: 65/47 (22 @ 01:27) (65/47 - 110/58)  RR: 16 (22 @ 01:27) (16 - 18)  SpO2: 96% (22 @ 01:27) (85% - 100%)  Wt(kg): 68kG  Daily Height in cm: 180.34 (29 Sep 2022 18:03)    Daily   CAPILLARY BLOOD GLUCOSE    POCT Blood Glucose.: 95 mg/dL (29 Sep 2022 18:00)    I&O's Summary    GENERAL:  [x ]Alert  [ ]Oriented x   [ ]Lethargic  [ ]Cachexia  [ ]Unarousable  [ ]Verbal  [ x]Non-Verbal  Behavioral:   [ ] Anxiety  [ ] Delirium [ ] Agitation [x ] Other - calm   HEENT:  [ ]Normal   [ x]Dry mouth   [ ]ET Tube/Trach  [ ]Oral lesions  PULMONARY:   [x ]Clear anteriorly  [ ]Tachypnea  [ ]Audible excessive secretions   [x]Rhonchi        [ ]Right [ ]Left [ ]Bilateral  [ ]Crackles        [ ]Right [ ]Left [ ]Bilateral  [ ]Wheezing     [ ]Right [ ]Left [ ]Bilateral  CARDIOVASCULAR:    [ x]Regular [ ]Irregular [ ]Tachy  [ ]Ariel [ ]Murmur [ ]Other  GASTROINTESTINAL:  [x ]Soft  [ ]Distended   [ ]+BS  [x ]Non tender [ ]Tender  [ ]PEG [ ]OGT/ NGT  Last BM:   GENITOURINARY:  [ ]Normal [ ] Incontinent   [ ]Oliguria/Anuria   [ x]Epstein  MUSCULOSKELETAL:   [ ]Normal   [ ]Weakness  [x ]Bed/Wheelchair bound [ ]Edema  NEUROLOGIC:   [ ]No focal deficits  [ x] Cognitive impairment  [ ] Dysphagia [ ]Dysarthria [ ] Paresis [ ]Other   SKIN:   [ ]Normal   [x ]Pressure ulcer(s) - multiple wounds, please se RN documentation  [ ]Rash      Preadmit Karnofsky: 30 %           Current Karnofsky:     30%  Cachexia (Y/N): N  BMI: 20.9kg/m2    Advanced Directives:     DNR/DNI     MOLST    DECISION MAKER: Patient is unable to make any decisions for himself  LEGAL SURROGATE: [place] [phone] and Emerita [phone]    GOALS OF CARE DISCUSSION       Palliative care info/counseling provided	           Family meeting       Advanced Directives addressed please see Advance Care Planning Note	           See previous Palliative Medicine Note       Documentation of GOC: 	DNR/DNI          REFERRALS	        Palliative medicine SW        Hospice

## 2022-10-24 NOTE — ED PROVIDER NOTE - ENMT, MLM
[address] [address]  Dept: [phone]      EMTALA TRANSFER CONSENT    NAME Landry Zhang                                         1959                              MRN [id number]    I have been informed of my rights regarding examination, treatment, and transfer   by Dr Frederick Huffman MD    Benefits: Specialized equipment and/or services available at the receiving facility (Include comment)________________________    Risks: Potential for delay in receiving treatment, Potential deterioration of medical condition, Increased discomfort during transfer, Possible worsening of condition or death during transfer      Consent for Transfer:  I acknowledge that my medical condition has been evaluated and explained to me by the emergency department physician or other qualified medical person and/or my attending physician, who has recommended that I be transferred to the service of  Accepting Physician: Dr Lavell Ovalle at [address] [place] [address] Name, [facility] : [place], [place]  The above potential benefits of such transfer, the potential risks associated with such transfer, and the probable risks of not being transferred have been explained to me, and I fully understand them  The doctor has explained that, in my case, the benefits of transfer outweigh the risks  I agree to be transferred  I authorize the performance of emergency medical procedures and treatments upon me in both transit and upon arrival at the receiving facility  Additionally, I authorize the release of any and all medical records to the receiving facility and request they be transported with me, if possible  I understand that the safest mode of transportation during a medical emergency is an ambulance and that the Hospital advocates the use of this mode of transport   Risks of traveling to the receiving facility by car, including absence of medical control, life sustaining equipment, such as oxygen, and medical personnel has been explained to me and I fully understand them  (DEREK CORRECT BOX BELOW)  [  ]  I consent to the stated transfer and to be transported by ambulance/helicopter  [  ]  I consent to the stated transfer, but refuse transportation by ambulance and accept full responsibility for my transportation by car  I understand the risks of non-ambulance transfers and I exonerate the Hospital and its staff from any deterioration in my condition that results from this refusal     X___________________________________________    DATE  10/24/22  TIME________  Signature of patient or legally responsible individual signing on patient behalf           RELATIONSHIP TO PATIENT_________________________          Provider Certification    NAME Amy Dozier                                        [facility] 1959                              MRN [id number]    A medical screening exam was performed on the above named patient  Based on the examination:    Condition Necessitating Transfer The encounter diagnosis was Alcohol abuse      Patient Condition: The patient has been stabilized such that within reasonable medical probability, no material deterioration of the patient condition or the condition of the unborn child(nick) is likely to result from the transfer    Reason for Transfer: Level of Care needed not available at this facility    Transfer Requirements:  [address]   · Space available and qualified personnel available for treatment as acknowledged by    · Agreed to accept transfer and to provide appropriate medical treatment as acknowledged by       Dr Juan Marie  · Appropriate medical records of the examination and treatment of the patient are provided at the time of transfer   [address] _______  · Transfer will be performed by qualified personnel from    and appropriate transfer equipment as required, including the use of necessary and appropriate life support measures  Provider Certification: I have examined the patient and explained the following risks and benefits of being transferred/refusing transfer to the patient/family:  General risk, such as traffic hazards, adverse weather conditions, rough terrain or turbulence, possible failure of equipment (including vehicle or aircraft), or consequences of actions of persons outside the control of the transport personnel, Unanticipated needs of medical equipment and personnel during transport, Risk of worsening condition, The possibility of a transport vehicle being unavailable      Based on these reasonable risks and benefits to the patient and/or the unborn child(nick), and based upon the information available at the time of the patient’s examination, I certify that the medical benefits reasonably to be expected from the provision of appropriate medical treatments at another medical facility outweigh the increasing risks, if any, to the individual’s medical condition, and in the case of labor to the unborn child, from effecting the transfer      X____________________________________________ DATE 10/24/22        TIME_______      ORIGINAL - SEND TO MEDICAL RECORDS   COPY - SEND WITH PATIENT DURING TRANSFER Airway patent,

## 2022-10-27 NOTE — ED PROVIDER NOTE - MEDICAL DECISION MAKING DETAILS
Quality 110: Preventive Care And Screening: Influenza Immunization: Influenza Immunization previously received during influenza season Detail Level: Detailed Quality 431: Preventive Care And Screening: Unhealthy Alcohol Use - Screening: Patient not identified as an unhealthy alcohol user when screened for unhealthy alcohol use using a systematic screening method 53 y/o M w/ right fibular fracture. Will give pain meds, and follow up w/ ortho or podiatry. 53 y/o M w/ right fibular fracture.   - Will give pain meds (tylenol and tylenol #3)  - Reiterated importance of follow up w/ ortho or podiatry; given referrals  - printed copy of XR report for patient's PCP

## 2022-11-04 ENCOUNTER — INPATIENT (INPATIENT)
Facility: HOSPITAL | Age: 57
LOS: 3 days | Discharge: HOME | End: 2022-11-08
Attending: STUDENT IN AN ORGANIZED HEALTH CARE EDUCATION/TRAINING PROGRAM | Admitting: STUDENT IN AN ORGANIZED HEALTH CARE EDUCATION/TRAINING PROGRAM

## 2022-11-04 VITALS
RESPIRATION RATE: 18 BRPM | SYSTOLIC BLOOD PRESSURE: 126 MMHG | HEART RATE: 102 BPM | TEMPERATURE: 98 F | OXYGEN SATURATION: 98 % | DIASTOLIC BLOOD PRESSURE: 67 MMHG

## 2022-11-04 DIAGNOSIS — R79.89 OTHER SPECIFIED ABNORMAL FINDINGS OF BLOOD CHEMISTRY: ICD-10-CM

## 2022-11-04 DIAGNOSIS — Z29.9 ENCOUNTER FOR PROPHYLACTIC MEASURES, UNSPECIFIED: ICD-10-CM
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
## 2022-11-04 DIAGNOSIS — F10.239 ALCOHOL DEPENDENCE WITH WITHDRAWAL, UNSPECIFIED: ICD-10-CM

## 2022-11-04 DIAGNOSIS — H40.9 UNSPECIFIED GLAUCOMA: ICD-10-CM

## 2022-11-04 DIAGNOSIS — I10 ESSENTIAL (PRIMARY) HYPERTENSION: ICD-10-CM

## 2022-11-04 LAB
ALBUMIN SERPL ELPH-MCNC: 3.8 G/DL — SIGNIFICANT CHANGE UP (ref 3.3–5)
ALP SERPL-CCNC: 61 U/L — SIGNIFICANT CHANGE UP (ref 40–120)
ALT FLD-CCNC: 72 U/L — HIGH (ref 4–41)
ANION GAP SERPL CALC-SCNC: 15 MMOL/L — HIGH (ref 7–14)
AST SERPL-CCNC: 52 U/L — HIGH (ref 4–40)
BASE EXCESS BLDV CALC-SCNC: 2.1 MMOL/L — SIGNIFICANT CHANGE UP (ref -2–3)
BASOPHILS # BLD AUTO: 0.1 K/UL — SIGNIFICANT CHANGE UP (ref 0–0.2)
BASOPHILS NFR BLD AUTO: 2.2 % — HIGH (ref 0–2)
BILIRUB SERPL-MCNC: 0.6 MG/DL — SIGNIFICANT CHANGE UP (ref 0.2–1.2)
BLOOD GAS VENOUS COMPREHENSIVE RESULT: SIGNIFICANT CHANGE UP
BUN SERPL-MCNC: 12 MG/DL — SIGNIFICANT CHANGE UP (ref 7–23)
CALCIUM SERPL-MCNC: 8.1 MG/DL — LOW (ref 8.4–10.5)
CHLORIDE BLDV-SCNC: 106 MMOL/L — SIGNIFICANT CHANGE UP (ref 96–108)
CHLORIDE SERPL-SCNC: 105 MMOL/L — SIGNIFICANT CHANGE UP (ref 98–107)
CO2 BLDV-SCNC: 28.7 MMOL/L — HIGH (ref 22–26)
CO2 SERPL-SCNC: 24 MMOL/L — SIGNIFICANT CHANGE UP (ref 22–31)
CREAT SERPL-MCNC: 0.79 MG/DL — SIGNIFICANT CHANGE UP (ref 0.5–1.3)
EGFR: 102 ML/MIN/1.73M2 — SIGNIFICANT CHANGE UP
EOSINOPHIL # BLD AUTO: 0.05 K/UL — SIGNIFICANT CHANGE UP (ref 0–0.5)
EOSINOPHIL NFR BLD AUTO: 1.1 % — SIGNIFICANT CHANGE UP (ref 0–6)
FLUAV AG NPH QL: SIGNIFICANT CHANGE UP
FLUBV AG NPH QL: SIGNIFICANT CHANGE UP
GAS PNL BLDV: 142 MMOL/L — SIGNIFICANT CHANGE UP (ref 136–145)
GAS PNL BLDV: SIGNIFICANT CHANGE UP
GLUCOSE BLDV-MCNC: 97 MG/DL — SIGNIFICANT CHANGE UP (ref 70–99)
GLUCOSE SERPL-MCNC: 88 MG/DL — SIGNIFICANT CHANGE UP (ref 70–99)
HCO3 BLDV-SCNC: 27 MMOL/L — SIGNIFICANT CHANGE UP (ref 22–29)
HCT VFR BLD CALC: 35.3 % — LOW (ref 39–50)
HCT VFR BLDA CALC: 36 % — LOW (ref 39–51)
HGB BLD CALC-MCNC: 12.1 G/DL — LOW (ref 13–17)
HGB BLD-MCNC: 12.1 G/DL — LOW (ref 13–17)
IANC: 1.67 K/UL — LOW (ref 1.8–7.4)
IMM GRANULOCYTES NFR BLD AUTO: 0.2 % — SIGNIFICANT CHANGE UP (ref 0–0.9)
LACTATE BLDV-MCNC: 3.1 MMOL/L — HIGH (ref 0.5–2)
LYMPHOCYTES # BLD AUTO: 2.18 K/UL — SIGNIFICANT CHANGE UP (ref 1–3.3)
LYMPHOCYTES # BLD AUTO: 49 % — HIGH (ref 13–44)
MCHC RBC-ENTMCNC: 30.9 PG — SIGNIFICANT CHANGE UP (ref 27–34)
MCHC RBC-ENTMCNC: 34.3 GM/DL — SIGNIFICANT CHANGE UP (ref 32–36)
MCV RBC AUTO: 90.1 FL — SIGNIFICANT CHANGE UP (ref 80–100)
MONOCYTES # BLD AUTO: 0.44 K/UL — SIGNIFICANT CHANGE UP (ref 0–0.9)
MONOCYTES NFR BLD AUTO: 9.9 % — SIGNIFICANT CHANGE UP (ref 2–14)
NEUTROPHILS # BLD AUTO: 1.67 K/UL — LOW (ref 1.8–7.4)
NEUTROPHILS NFR BLD AUTO: 37.6 % — LOW (ref 43–77)
NRBC # BLD: 0 /100 WBCS — SIGNIFICANT CHANGE UP (ref 0–0)
NRBC # FLD: 0 K/UL — SIGNIFICANT CHANGE UP (ref 0–0)
NT-PROBNP SERPL-SCNC: 31 PG/ML — SIGNIFICANT CHANGE UP
PCO2 BLDV: 44 MMHG — SIGNIFICANT CHANGE UP (ref 42–55)
PH BLDV: 7.4 — SIGNIFICANT CHANGE UP (ref 7.32–7.43)
PLATELET # BLD AUTO: 241 K/UL — SIGNIFICANT CHANGE UP (ref 150–400)
PO2 BLDV: 64 MMHG — SIGNIFICANT CHANGE UP
POTASSIUM BLDV-SCNC: 3.7 MMOL/L — SIGNIFICANT CHANGE UP (ref 3.5–5.1)
POTASSIUM SERPL-MCNC: 3.8 MMOL/L — SIGNIFICANT CHANGE UP (ref 3.5–5.3)
POTASSIUM SERPL-SCNC: 3.8 MMOL/L — SIGNIFICANT CHANGE UP (ref 3.5–5.3)
PROT SERPL-MCNC: 7.2 G/DL — SIGNIFICANT CHANGE UP (ref 6–8.3)
RBC # BLD: 3.92 M/UL — LOW (ref 4.2–5.8)
RBC # FLD: 14.1 % — SIGNIFICANT CHANGE UP (ref 10.3–14.5)
RSV RNA NPH QL NAA+NON-PROBE: SIGNIFICANT CHANGE UP
SAO2 % BLDV: 90.8 % — SIGNIFICANT CHANGE UP
SARS-COV-2 RNA SPEC QL NAA+PROBE: SIGNIFICANT CHANGE UP
SODIUM SERPL-SCNC: 144 MMOL/L — SIGNIFICANT CHANGE UP (ref 135–145)
TROPONIN T, HIGH SENSITIVITY RESULT: 11 NG/L — SIGNIFICANT CHANGE UP
WBC # BLD: 4.45 K/UL — SIGNIFICANT CHANGE UP (ref 3.8–10.5)
WBC # FLD AUTO: 4.45 K/UL — SIGNIFICANT CHANGE UP (ref 3.8–10.5)

## 2022-11-04 PROCEDURE — 70486 CT MAXILLOFACIAL W/O DYE: CPT | Mod: 26

## 2022-11-04 PROCEDURE — 99223 1ST HOSP IP/OBS HIGH 75: CPT

## 2022-11-04 PROCEDURE — 70450 CT HEAD/BRAIN W/O DYE: CPT | Mod: 26,MA

## 2022-11-04 PROCEDURE — 99285 EMERGENCY DEPT VISIT HI MDM: CPT

## 2022-11-04 PROCEDURE — 72125 CT NECK SPINE W/O DYE: CPT | Mod: 26

## 2022-11-04 RX ORDER — SODIUM CHLORIDE 9 MG/ML
1000 INJECTION INTRAMUSCULAR; INTRAVENOUS; SUBCUTANEOUS ONCE
Refills: 0 | Status: COMPLETED | OUTPATIENT
Start: 2022-11-04 | End: 2022-11-04

## 2022-11-04 RX ORDER — FOLIC ACID 0.8 MG
1 TABLET ORAL DAILY
Refills: 0 | Status: DISCONTINUED | OUTPATIENT
Start: 2022-11-04 | End: 2022-11-08

## 2022-11-04 RX ORDER — THIAMINE MONONITRATE (VIT B1) 100 MG
100 TABLET ORAL DAILY
Refills: 0 | Status: COMPLETED | OUTPATIENT
Start: 2022-11-04 | End: 2022-11-07

## 2022-11-04 RX ORDER — DORZOLAMIDE HYDROCHLORIDE 20 MG/ML
1 SOLUTION/ DROPS OPHTHALMIC THREE TIMES A DAY
Refills: 0 | Status: DISCONTINUED | OUTPATIENT
Start: 2022-11-04 | End: 2022-11-08

## 2022-11-04 RX ORDER — METOCLOPRAMIDE HCL 10 MG
10 TABLET ORAL ONCE
Refills: 0 | Status: COMPLETED | OUTPATIENT
Start: 2022-11-04 | End: 2022-11-04

## 2022-11-04 RX ORDER — LATANOPROST 0.05 MG/ML
1 SOLUTION/ DROPS OPHTHALMIC; TOPICAL AT BEDTIME
Refills: 0 | Status: DISCONTINUED | OUTPATIENT
Start: 2022-11-04 | End: 2022-11-08

## 2022-11-04 RX ORDER — DIAZEPAM 5 MG
5 TABLET ORAL ONCE
Refills: 0 | Status: DISCONTINUED | OUTPATIENT
Start: 2022-11-04 | End: 2022-11-04

## 2022-11-04 RX ADMIN — Medication 10 MILLIGRAM(S): at 16:36

## 2022-11-04 RX ADMIN — Medication 1 MILLIGRAM(S): at 15:19

## 2022-11-04 RX ADMIN — SODIUM CHLORIDE 1000 MILLILITER(S): 9 INJECTION INTRAMUSCULAR; INTRAVENOUS; SUBCUTANEOUS at 12:51

## 2022-11-04 RX ADMIN — Medication 25 MILLIGRAM(S): at 12:39

## 2022-11-04 RX ADMIN — Medication 5 MILLIGRAM(S): at 16:36

## 2022-11-04 RX ADMIN — Medication 25 MILLIGRAM(S): at 15:19

## 2022-11-04 NOTE — ED PROVIDER NOTE - OBJECTIVE STATEMENT
57-year-old male patient past medical history of alcohol use disorder and high blood pressure who presents to the emergency department for alcohol withdrawal symptoms.  Patient endorsing palpitations, hand tremors, anxiety and mild shortness of breath.  Last withdrawal 1 week ago seen at [facility].  Was started on Librium and was admitted for 6 days.  Does endorse history of seizures secondary to withdrawal.  Patient is homeless.  Patient drinks vodka, tequila, and whiskey.  Last drink was this morning.

## 2022-11-04 NOTE — ED ADULT NURSE NOTE - NSIMPLEMENTINTERV_GEN_ALL_ED
Implemented All Fall Risk Interventions:  [place] to call system. Call bell, personal items and telephone within reach. Instruct patient to call for assistance. Room bathroom lighting operational. Non-slip footwear when patient is off stretcher. Physically safe environment: no spills, clutter or unnecessary equipment. Stretcher in lowest position, wheels locked, appropriate side rails in place. Provide visual cue, wrist band, yellow gown, etc. Monitor gait and stability. Monitor for mental status changes and reorient to person, place, and time. Review medications for side effects contributing to fall risk. Reinforce activity limits and safety measures with patient and family.

## 2022-11-04 NOTE — H&P ADULT - NSHPLABSRESULTS_GEN_ALL_CORE
12.1   4.45  )-----------( 241      ( 04 Nov 2022 12:44 )             35.3     11-04    144  |  105  |  12  ----------------------------<  88  3.8   |  24  |  0.79    Ca    8.1<L>      04 Nov 2022 12:44  Phos  3.7     11-04  Mg     1.80     11-04    TPro  7.2  /  Alb  3.8  /  TBili  0.6  /  DBili  x   /  AST  52<H>  /  ALT  72<H>  /  AlkPhos  61  11-04    Troponin T, High Sensitivity Result: 11 ng/L (11.04.22 @ 12:44)    Serum Pro-Brain Natriuretic Peptide: 31 pg/mL (11.04.22 @ 12:44)    12:44 - VBG - pH: 7.40  | pCO2: 44    | pO2: 64    | Lactate: 3.1    < from: CT Head No Cont (11.04.22 @ 14:55) >  VENTRICLES, SULCI AND BASAL CISTERNS:  Sulcal atrophy and mildly prominent ventricles for age, unchanged.  INTRA-AXIAL:  No mass, blood or abnormal attenuation is seen. Redemonstrated periventricular white matter ischemic changes.  EXTRA-AXIAL:  No mass or collection is seen.  VISUALIZED SINUSES:  Clear.  VISUALIZED MASTOIDS:  Clear.  CALVARIUM:  Normal.  ORBITS: Only partially visualized, within normal limits.  MISCELLANEOUS:  None.  IMPRESSION: No acute intracranial hemorrhage, mass effect or midline shift. Redemonstrated sulcal atrophy, mildly prominent ventricles and periventricular white matter ischemic changes for age, likely secondary to alcohol use disorder.  < end of copied text >    EKG personally reviewed - 92bpm NSR, Q in III, QTc 469ms

## 2022-11-04 NOTE — H&P ADULT - NSICDXPASTMEDICALHX_GEN_ALL_CORE_FT
PAST MEDICAL HISTORY:  Alcohol dependence     Blindness of right eye     Glaucoma     HLD (hyperlipidemia)     HTN (hypertension)

## 2022-11-04 NOTE — ED ADULT NURSE REASSESSMENT NOTE - NS ED NURSE REASSESS COMMENT FT1
Patient is alert and oriented x 3. General condition stable. No acute distress noted. Appears calm. Breathing with ease. Safety maintained. Awaiting CT scan of head. Will continue to monitor.

## 2022-11-04 NOTE — ED PROVIDER NOTE - PHYSICAL EXAMINATION
GENERAL: Awake, alert, NAD  HEENT: NC/AT, moist mucous membranes, PERRL, EOMI. Found with tongue fasciculations  LUNGS: CTAB, no wheezes or crackles   CARDIAC: RRR, no m/r/g  ABDOMEN: Soft, non tender, non distended, no rebound, no guarding  EXT: Bilateral leg pitting edema, PT pulses intact 2+  NEURO: A&Ox3. Moving all extremities.  SKIN: Warm and dry. No rash.

## 2022-11-04 NOTE — H&P ADULT - HISTORY OF PRESENT ILLNESS
57-year-old male with HTN, alcohol dependence, presenting from home with alcohol withdrawal symptoms including nausea and tremulousness. He reports last drink was 0400 this AM. He drinks 1 bottle of tequila and 10-11 beers daily, last hospitalization for alcohol withdrawal was 3 months ago, denies any prior history of seizures or DTs. Patient reports chest pain, left sided, described as a constant, pounding sensation x2 days, last felt this AM, since resolved, unrelated to exertion. He reports a fall earlier this AM while intoxicated with (+)head trauma and ?LOC.    In the ED VS: 98.8  56-91  118-127/59-66  17-20  %RA, received 1L NS IVF, chlordiazepoxide 25mg PO x2, diazepam 5mg IV x1, lorazepam 1mg IV x1 and metoclopramide 10mg IV x1

## 2022-11-04 NOTE — H&P ADULT - PROBLEM SELECTOR PLAN 2
given report of intermittent LE edema, will hold amlodipine for now, check d-dimer with AM labs given pitting edema on exam  BP currently well controlled

## 2022-11-04 NOTE — ED ADULT NURSE NOTE - OBJECTIVE STATEMENT
56 y/o male presenting to room 26. Patient AAOx4, ambulatory at baseline. Patient coming to ER complaining of ETOH. Patient states he has over 10 drinks/day. Patient noted to be tremulous of bilateral upper extremities at this time. Noted to be diaphoretic on assessment. Patient complaining of headache. Denies chest pain, headache, and dizziness. Breathing even and unlabored. noted to be ST on cardiac monitor. Medical history of HTN, HLD, blind in right eye. Patient denies drug use and tobacco use. #20g placed to RAC. Labs drawn and sent as per Md order. Report given to primary RN.

## 2022-11-04 NOTE — H&P ADULT - PROBLEM SELECTOR PLAN 6
enoxaparin for DVT ppx pending imaging studies    #CP earlier this AM  -troponin 11  -repeat with AM labs  -f/u CXR  -pro-bnp wnl, ekg as above

## 2022-11-04 NOTE — SBIRT NOTE ADULT - NSSBIRTBRIEFINTDET_GEN_A_CORE
Provided SBIRT services: Full screen positive. Brief Intervention Performed. Screening results were reviewed with the patient and patient was provided information about healthy guidelines and potential negative consequences associated with level of risk. Motivation and readiness to reduce or stop use was discussed and goals and activities to make changes were suggested/offered.    Pt expressing motivation to detox, and a referral to inpatient rehab.

## 2022-11-04 NOTE — ED PROVIDER NOTE - NS ED ROS FT
CONST: no fevers, no chills  EYES: no pain, no vision changes  ENT: no sore throat, no ear pain, no change in hearing  CV: no chest pain. +Leg edema, Palpitations.  RESP: + Shortness of breath.  ABD: no abdominal pain, no nausea, no vomiting, no diarrhea  : no dysuria, no flank pain, no hematuria  MSK: no back pain, no extremity pain  NEURO: no headache. + Hand tremors, tongue fasciculations.  SKIN:  no rash

## 2022-11-04 NOTE — ED PROVIDER NOTE - CLINICAL SUMMARY MEDICAL DECISION MAKING FREE TEXT BOX
57-year-old male patient past medical history of alcohol use disorder and high blood pressure who presents to the emergency department for alcohol withdrawal symptoms including hand tremors, tongue fasciculations, anxiety, palpitations, and shortness of breath.  Will assess patient with blood work, EKG, chest x-ray, CT head.  History of seizures secondary to alcohol withdrawal.  Will start on Librium.  Will do CIWA protocol.  Will admit to medicine for alcohol withdrawal.

## 2022-11-04 NOTE — ED PROVIDER NOTE - ATTENDING CONTRIBUTION TO CARE
Patient is a 57-year-old male with history of hypertension, hyperlipidemia, glaucoma c/b blindness of the right eye, history of EtOH abuse, here for evaluation of ETOH withdrawal.  Patient states he was recently admitted to [facility] and upon discharge he had a seizure and fell hitting his head.  He states that he has not followed up with a physician since then.  He reports he has been drinking daily since his discharge stated to be at least 2 weeks ago.  His last drink was 4 AM.  He states he drinks at least a bottle of liquor daily.  Patient denies any chest pain shortness of breath.  He reports he has abdominal discomfort.  Patient states that he would like to go through detox.    VS noted  Gen. no acute distress, Non toxic, tremulous  HEENT: EOMI, mmm, abrasion on right forehead, blind in right eye. + tongue fasciculations  Lungs: CTAB/L no C/ W /R   CVS: tachycardic  Abd; Soft non tender, non distended   Ext: no edema, abrasion in both knees.   Skin: no rash  Neuro AAOx3 non focal clear speech  a/p: ETOH withdrawal - plan for librium, labs, CT Head. Will give IVF. Patient will likely need admissions.   - Mihaela LEBLANC

## 2022-11-04 NOTE — H&P ADULT - PROBLEM SELECTOR PLAN 1
start on high risk CIWA monitoring  chlordiazepoxide taper with PRN benzodiazepines available  folic acid, MV, thiamine supplementation

## 2022-11-04 NOTE — H&P ADULT - PROBLEM SELECTOR PLAN 5
CTH as above, no acute path  reports ?recent fracture points to neck, reports some neck pain with movement, no bony spinal TTP  check CT c-spine  periorbital edema R>L, check CT maxillofacial CTH as above, no acute path  reports ?recent fracture points to neck, reports some neck pain with movement, no bony spinal TTP (on prior admission was noted to have T1 nondisplaced super articular facet fracture and subacute isolated L2 L TP process fracture, was placed in collar for comfort at that time, unclear if this is what he is referring to)  check CT c-spine  periorbital edema R>L, check CT maxillofacial

## 2022-11-04 NOTE — H&P ADULT - NSHPPHYSICALEXAM_GEN_ALL_CORE
Vital Signs Last 24 Hrs  T(C): 37.1 (04 Nov 2022 20:30), Max: 37.1 (04 Nov 2022 20:30)  T(F): 98.8 (04 Nov 2022 20:30), Max: 98.8 (04 Nov 2022 20:30)  HR: 91 (04 Nov 2022 20:30) (56 - 102)  BP: 127/59 (04 Nov 2022 20:30) (107/70 - 127/59)  BP(mean): 70 (04 Nov 2022 16:18) (70 - 70)  RR: 17 (04 Nov 2022 20:30) (17 - 20)  SpO2: 97% (04 Nov 2022 20:30) (97% - 100%)    Parameters below as of 04 Nov 2022 20:30  Patient On (Oxygen Delivery Method): room air    PHYSICAL EXAM:  GENERAL: NAD, well-developed, well-nourished  HEAD:  healing excoriation to R forehead without surrounding erythema or drainage; Normocephalic  EYES: EOMI, ; R periorbital edema>L; opacification R cornea; slightly injected sclera R  NECK: Supple, reports some pain with movement; no bony spinal TTP; No JVD  CHEST/LUNG: Clear to auscultation bilaterally; No wheezes, rales or rhonchi; normal work of breathing, speaking in full sentences  HEART: Regular rate and rhythm; No murmurs, rubs, or gallops, (+)S1, S2  ABDOMEN: Soft, Nondistended; Normal Bowel sounds; slight TTP RUQ  EXTREMITIES:  2+ Peripheral Pulses, No clubbing, cyanosis; 1+ b/l LE edema  PSYCH: normal mood and affect, A&Ox3  NEUROLOGY: no focal neuro deficits, tremor b/l hands, no asterixis  SKIN: limited exam in ED hallway: No rashes; healing excoriations b/l knees without drainage or surrounding erythema

## 2022-11-04 NOTE — H&P ADULT - NSHPREVIEWOFSYSTEMS_GEN_ALL_CORE
REVIEW OF SYSTEMS:    CONSTITUTIONAL: No weakness, fevers or chills  EYES/ENT: No visual changes (blind in R eye); No dysphagia; No sore throat; No rhinorrhea; No sinus pain/pressure  NECK: (+) pain, No stiffness  RESPIRATORY: No cough, wheezing, hemoptysis; No shortness of breath  CARDIOVASCULAR: (+) chest pain this AM, resolved; (+)intermittent LE edema; No history of VTE  GASTROINTESTINAL: No abdominal or epigastric pain. (+) nausea; No vomiting, or hematemesis; No diarrhea or constipation. No melena or hematochezia.  GENITOURINARY: No dysuria, frequency or hematuria  NEUROLOGICAL: No numbness, paresthesias, or weakness; No HA; No LH/dizziness  MSK: ambulates with cane; fall earlier this AM  SKIN: No itching, burning, rashes, or lesions   All other review of systems is negative unless indicated above.

## 2022-11-04 NOTE — H&P ADULT - ASSESSMENT
57-year-old male with HTN, alcohol dependence, presenting from home with alcohol withdrawal symptoms including nausea and tremulousness.

## 2022-11-05 LAB
ALBUMIN SERPL ELPH-MCNC: 3.5 G/DL — SIGNIFICANT CHANGE UP (ref 3.3–5)
ALP SERPL-CCNC: 59 U/L — SIGNIFICANT CHANGE UP (ref 40–120)
ALT FLD-CCNC: 59 U/L — HIGH (ref 4–41)
ANION GAP SERPL CALC-SCNC: 18 MMOL/L — HIGH (ref 7–14)
APTT BLD: 28 SEC — SIGNIFICANT CHANGE UP (ref 27–36.3)
AST SERPL-CCNC: 48 U/L — HIGH (ref 4–40)
BILIRUB SERPL-MCNC: 1.5 MG/DL — HIGH (ref 0.2–1.2)
BUN SERPL-MCNC: 10 MG/DL — SIGNIFICANT CHANGE UP (ref 7–23)
CALCIUM SERPL-MCNC: 7.7 MG/DL — LOW (ref 8.4–10.5)
CHLORIDE SERPL-SCNC: 100 MMOL/L — SIGNIFICANT CHANGE UP (ref 98–107)
CO2 SERPL-SCNC: 20 MMOL/L — LOW (ref 22–31)
CREAT SERPL-MCNC: 0.7 MG/DL — SIGNIFICANT CHANGE UP (ref 0.5–1.3)
D DIMER BLD IA.RAPID-MCNC: 348 NG/ML DDU — HIGH
EGFR: 106 ML/MIN/1.73M2 — SIGNIFICANT CHANGE UP
GLUCOSE SERPL-MCNC: 60 MG/DL — LOW (ref 70–99)
HCT VFR BLD CALC: 35.3 % — LOW (ref 39–50)
HGB BLD-MCNC: 11.8 G/DL — LOW (ref 13–17)
INR BLD: 1.01 RATIO — SIGNIFICANT CHANGE UP (ref 0.88–1.16)
LACTATE SERPL-SCNC: 0.9 MMOL/L — SIGNIFICANT CHANGE UP (ref 0.5–2)
MAGNESIUM SERPL-MCNC: 1.7 MG/DL — SIGNIFICANT CHANGE UP (ref 1.6–2.6)
MCHC RBC-ENTMCNC: 30.6 PG — SIGNIFICANT CHANGE UP (ref 27–34)
MCHC RBC-ENTMCNC: 33.4 GM/DL — SIGNIFICANT CHANGE UP (ref 32–36)
MCV RBC AUTO: 91.7 FL — SIGNIFICANT CHANGE UP (ref 80–100)
NRBC # BLD: 0 /100 WBCS — SIGNIFICANT CHANGE UP (ref 0–0)
NRBC # FLD: 0 K/UL — SIGNIFICANT CHANGE UP (ref 0–0)
PHOSPHATE SERPL-MCNC: 2.5 MG/DL — SIGNIFICANT CHANGE UP (ref 2.5–4.5)
PLATELET # BLD AUTO: 208 K/UL — SIGNIFICANT CHANGE UP (ref 150–400)
POTASSIUM SERPL-MCNC: 3.5 MMOL/L — SIGNIFICANT CHANGE UP (ref 3.5–5.3)
POTASSIUM SERPL-SCNC: 3.5 MMOL/L — SIGNIFICANT CHANGE UP (ref 3.5–5.3)
PROT SERPL-MCNC: 6.6 G/DL — SIGNIFICANT CHANGE UP (ref 6–8.3)
PROTHROM AB SERPL-ACNC: 11.7 SEC — SIGNIFICANT CHANGE UP (ref 10.5–13.4)
RBC # BLD: 3.85 M/UL — LOW (ref 4.2–5.8)
RBC # FLD: 14 % — SIGNIFICANT CHANGE UP (ref 10.3–14.5)
SODIUM SERPL-SCNC: 138 MMOL/L — SIGNIFICANT CHANGE UP (ref 135–145)
TROPONIN T, HIGH SENSITIVITY RESULT: 9 NG/L — SIGNIFICANT CHANGE UP
WBC # BLD: 6.35 K/UL — SIGNIFICANT CHANGE UP (ref 3.8–10.5)
WBC # FLD AUTO: 6.35 K/UL — SIGNIFICANT CHANGE UP (ref 3.8–10.5)

## 2022-11-05 PROCEDURE — 93970 EXTREMITY STUDY: CPT | Mod: 26

## 2022-11-05 PROCEDURE — 99232 SBSQ HOSP IP/OBS MODERATE 35: CPT

## 2022-11-05 PROCEDURE — 71045 X-RAY EXAM CHEST 1 VIEW: CPT | Mod: 26

## 2022-11-05 RX ORDER — ENOXAPARIN SODIUM 100 MG/ML
40 INJECTION SUBCUTANEOUS EVERY 24 HOURS
Refills: 0 | Status: DISCONTINUED | OUTPATIENT
Start: 2022-11-05 | End: 2022-11-08

## 2022-11-05 RX ORDER — INFLUENZA VIRUS VACCINE 15; 15; 15; 15 UG/.5ML; UG/.5ML; UG/.5ML; UG/.5ML
0.5 SUSPENSION INTRAMUSCULAR ONCE
Refills: 0 | Status: DISCONTINUED | OUTPATIENT
Start: 2022-11-05 | End: 2022-11-08

## 2022-11-05 RX ADMIN — Medication 2 MILLIGRAM(S): at 13:23

## 2022-11-05 RX ADMIN — ENOXAPARIN SODIUM 40 MILLIGRAM(S): 100 INJECTION SUBCUTANEOUS at 05:38

## 2022-11-05 RX ADMIN — Medication 1 TABLET(S): at 11:22

## 2022-11-05 RX ADMIN — LATANOPROST 1 DROP(S): 0.05 SOLUTION/ DROPS OPHTHALMIC; TOPICAL at 22:40

## 2022-11-05 RX ADMIN — Medication 50 MILLIGRAM(S): at 05:37

## 2022-11-05 RX ADMIN — Medication 50 MILLIGRAM(S): at 00:29

## 2022-11-05 RX ADMIN — DORZOLAMIDE HYDROCHLORIDE 1 DROP(S): 20 SOLUTION/ DROPS OPHTHALMIC at 06:09

## 2022-11-05 RX ADMIN — Medication 100 MILLIGRAM(S): at 11:21

## 2022-11-05 RX ADMIN — Medication 50 MILLIGRAM(S): at 22:38

## 2022-11-05 RX ADMIN — DORZOLAMIDE HYDROCHLORIDE 1 DROP(S): 20 SOLUTION/ DROPS OPHTHALMIC at 13:27

## 2022-11-05 RX ADMIN — DORZOLAMIDE HYDROCHLORIDE 1 DROP(S): 20 SOLUTION/ DROPS OPHTHALMIC at 22:50

## 2022-11-05 RX ADMIN — Medication 50 MILLIGRAM(S): at 11:21

## 2022-11-05 RX ADMIN — Medication 1 MILLIGRAM(S): at 11:21

## 2022-11-05 NOTE — PATIENT PROFILE ADULT - NSPROGENOTHERPROVIDER_GEN_A_NUR
Admission Medication History Completed by Pharmacy    See [facility] Admission Navigator for allergy information, preferred outpatient pharmacy, prior to admission medications and immunization status.     Medication History Sources:     Patient/chart review    Changes made to PTA medication list (reason):    Added: None    Deleted: None    Changed: Naproxen updated to be 220 mg twice daily as needed    Additional Information:    None    Prior to Admission medications    Medication Sig Last Dose Taking? Auth Provider   naproxen sodium (ANAPROX) 220 MG tablet Take 220 mg by mouth 2 times daily as needed for moderate pain  Yes Unknown, Entered By History       Date completed: 10/05/21    Medication history completed by: Susan Bundy, PharmD, BCPPS            
none

## 2022-11-05 NOTE — PATIENT PROFILE ADULT - FALL HARM RISK - HARM RISK INTERVENTIONS
Assistance with ambulation/Assistance OOB with selected safe patient handling equipment/Communicate Risk of Fall with Harm to all staff/Monitor for mental status changes/Monitor gait and stability/Reinforce activity limits and safety measures with patient and family/Tailored Fall Risk Interventions/Toileting schedule using arm’s reach rule for commode and bathroom/Use of alarms - bed, chair and/or voice tab/Visual Cue: Yellow wristband and red socks/Bed in lowest position, wheels locked, appropriate side rails in place/Call bell, personal items and telephone in reach/Instruct patient to call for assistance before getting out of bed or chair/Non-slip footwear when patient is out of bed/[place] to call system/Physically safe environment - no spills, clutter or unnecessary equipment/Purposeful Proactive Rounding/Room/bathroom lighting operational, light cord in reach

## 2022-11-05 NOTE — PROVIDER CONTACT NOTE (OTHER) - ACTION/TREATMENT ORDERED:
ACP Chantel Marmolejo aware. OK to give PRN ativan at this time as per JULIÁN Golden. Safety maintained.

## 2022-11-06 LAB
ALBUMIN SERPL ELPH-MCNC: 3.6 G/DL — SIGNIFICANT CHANGE UP (ref 3.3–5)
ALP SERPL-CCNC: 59 U/L — SIGNIFICANT CHANGE UP (ref 40–120)
ALT FLD-CCNC: 52 U/L — HIGH (ref 4–41)
ANION GAP SERPL CALC-SCNC: 12 MMOL/L — SIGNIFICANT CHANGE UP (ref 7–14)
AST SERPL-CCNC: 50 U/L — HIGH (ref 4–40)
BILIRUB SERPL-MCNC: 1.3 MG/DL — HIGH (ref 0.2–1.2)
BUN SERPL-MCNC: 9 MG/DL — SIGNIFICANT CHANGE UP (ref 7–23)
CALCIUM SERPL-MCNC: 8.3 MG/DL — LOW (ref 8.4–10.5)
CHLORIDE SERPL-SCNC: 103 MMOL/L — SIGNIFICANT CHANGE UP (ref 98–107)
CO2 SERPL-SCNC: 23 MMOL/L — SIGNIFICANT CHANGE UP (ref 22–31)
CREAT SERPL-MCNC: 0.58 MG/DL — SIGNIFICANT CHANGE UP (ref 0.5–1.3)
EGFR: 112 ML/MIN/1.73M2 — SIGNIFICANT CHANGE UP
GLUCOSE SERPL-MCNC: 104 MG/DL — HIGH (ref 70–99)
HCT VFR BLD CALC: 35.4 % — LOW (ref 39–50)
HGB BLD-MCNC: 12.1 G/DL — LOW (ref 13–17)
MAGNESIUM SERPL-MCNC: 1.8 MG/DL — SIGNIFICANT CHANGE UP (ref 1.6–2.6)
MCHC RBC-ENTMCNC: 30.9 PG — SIGNIFICANT CHANGE UP (ref 27–34)
MCHC RBC-ENTMCNC: 34.2 GM/DL — SIGNIFICANT CHANGE UP (ref 32–36)
MCV RBC AUTO: 90.5 FL — SIGNIFICANT CHANGE UP (ref 80–100)
NRBC # BLD: 0 /100 WBCS — SIGNIFICANT CHANGE UP (ref 0–0)
NRBC # FLD: 0 K/UL — SIGNIFICANT CHANGE UP (ref 0–0)
PHOSPHATE SERPL-MCNC: 2.4 MG/DL — LOW (ref 2.5–4.5)
PLATELET # BLD AUTO: 196 K/UL — SIGNIFICANT CHANGE UP (ref 150–400)
POTASSIUM SERPL-MCNC: 3.3 MMOL/L — LOW (ref 3.5–5.3)
POTASSIUM SERPL-SCNC: 3.3 MMOL/L — LOW (ref 3.5–5.3)
PROT SERPL-MCNC: 6.9 G/DL — SIGNIFICANT CHANGE UP (ref 6–8.3)
RBC # BLD: 3.91 M/UL — LOW (ref 4.2–5.8)
RBC # FLD: 13.5 % — SIGNIFICANT CHANGE UP (ref 10.3–14.5)
SODIUM SERPL-SCNC: 138 MMOL/L — SIGNIFICANT CHANGE UP (ref 135–145)
WBC # BLD: 4.45 K/UL — SIGNIFICANT CHANGE UP (ref 3.8–10.5)
WBC # FLD AUTO: 4.45 K/UL — SIGNIFICANT CHANGE UP (ref 3.8–10.5)

## 2022-11-06 PROCEDURE — 99232 SBSQ HOSP IP/OBS MODERATE 35: CPT

## 2022-11-06 RX ORDER — LANOLIN ALCOHOL/MO/W.PET/CERES
3 CREAM (GRAM) TOPICAL AT BEDTIME
Refills: 0 | Status: DISCONTINUED | OUTPATIENT
Start: 2022-11-06 | End: 2022-11-08

## 2022-11-06 RX ORDER — POTASSIUM CHLORIDE 20 MEQ
40 PACKET (EA) ORAL ONCE
Refills: 0 | Status: COMPLETED | OUTPATIENT
Start: 2022-11-06 | End: 2022-11-06

## 2022-11-06 RX ORDER — ACETAMINOPHEN 500 MG
650 TABLET ORAL EVERY 6 HOURS
Refills: 0 | Status: DISCONTINUED | OUTPATIENT
Start: 2022-11-06 | End: 2022-11-08

## 2022-11-06 RX ADMIN — Medication 3 MILLIGRAM(S): at 22:11

## 2022-11-06 RX ADMIN — ENOXAPARIN SODIUM 40 MILLIGRAM(S): 100 INJECTION SUBCUTANEOUS at 05:43

## 2022-11-06 RX ADMIN — Medication 100 MILLIGRAM(S): at 14:09

## 2022-11-06 RX ADMIN — DORZOLAMIDE HYDROCHLORIDE 1 DROP(S): 20 SOLUTION/ DROPS OPHTHALMIC at 14:13

## 2022-11-06 RX ADMIN — Medication 650 MILLIGRAM(S): at 17:46

## 2022-11-06 RX ADMIN — Medication 1 TABLET(S): at 14:09

## 2022-11-06 RX ADMIN — Medication 50 MILLIGRAM(S): at 14:09

## 2022-11-06 RX ADMIN — Medication 50 MILLIGRAM(S): at 05:41

## 2022-11-06 RX ADMIN — DORZOLAMIDE HYDROCHLORIDE 1 DROP(S): 20 SOLUTION/ DROPS OPHTHALMIC at 05:41

## 2022-11-06 RX ADMIN — Medication 40 MILLIEQUIVALENT(S): at 17:55

## 2022-11-06 RX ADMIN — Medication 650 MILLIGRAM(S): at 18:46

## 2022-11-06 RX ADMIN — LATANOPROST 1 DROP(S): 0.05 SOLUTION/ DROPS OPHTHALMIC; TOPICAL at 21:21

## 2022-11-06 RX ADMIN — DORZOLAMIDE HYDROCHLORIDE 1 DROP(S): 20 SOLUTION/ DROPS OPHTHALMIC at 21:22

## 2022-11-06 RX ADMIN — Medication 2 MILLIGRAM(S): at 10:57

## 2022-11-06 RX ADMIN — Medication 1 MILLIGRAM(S): at 14:09

## 2022-11-07 LAB
ALBUMIN SERPL ELPH-MCNC: 3.7 G/DL — SIGNIFICANT CHANGE UP (ref 3.3–5)
ALP SERPL-CCNC: 59 U/L — SIGNIFICANT CHANGE UP (ref 40–120)
ALT FLD-CCNC: 65 U/L — HIGH (ref 4–41)
ANION GAP SERPL CALC-SCNC: 17 MMOL/L — HIGH (ref 7–14)
AST SERPL-CCNC: 68 U/L — HIGH (ref 4–40)
BILIRUB SERPL-MCNC: 0.7 MG/DL — SIGNIFICANT CHANGE UP (ref 0.2–1.2)
BUN SERPL-MCNC: 11 MG/DL — SIGNIFICANT CHANGE UP (ref 7–23)
CALCIUM SERPL-MCNC: 8.9 MG/DL — SIGNIFICANT CHANGE UP (ref 8.4–10.5)
CHLORIDE SERPL-SCNC: 105 MMOL/L — SIGNIFICANT CHANGE UP (ref 98–107)
CO2 SERPL-SCNC: 18 MMOL/L — LOW (ref 22–31)
CREAT SERPL-MCNC: 0.64 MG/DL — SIGNIFICANT CHANGE UP (ref 0.5–1.3)
EGFR: 109 ML/MIN/1.73M2 — SIGNIFICANT CHANGE UP
GLUCOSE SERPL-MCNC: 98 MG/DL — SIGNIFICANT CHANGE UP (ref 70–99)
MAGNESIUM SERPL-MCNC: 1.7 MG/DL — SIGNIFICANT CHANGE UP (ref 1.6–2.6)
PHOSPHATE SERPL-MCNC: 3.4 MG/DL — SIGNIFICANT CHANGE UP (ref 2.5–4.5)
POTASSIUM SERPL-MCNC: 3.4 MMOL/L — LOW (ref 3.5–5.3)
POTASSIUM SERPL-SCNC: 3.4 MMOL/L — LOW (ref 3.5–5.3)
PROT SERPL-MCNC: 7 G/DL — SIGNIFICANT CHANGE UP (ref 6–8.3)
SODIUM SERPL-SCNC: 140 MMOL/L — SIGNIFICANT CHANGE UP (ref 135–145)

## 2022-11-07 PROCEDURE — 99232 SBSQ HOSP IP/OBS MODERATE 35: CPT

## 2022-11-07 RX ORDER — POTASSIUM CHLORIDE 20 MEQ
40 PACKET (EA) ORAL ONCE
Refills: 0 | Status: COMPLETED | OUTPATIENT
Start: 2022-11-07 | End: 2022-11-07

## 2022-11-07 RX ORDER — MAGNESIUM SULFATE 500 MG/ML
1 VIAL (ML) INJECTION ONCE
Refills: 0 | Status: COMPLETED | OUTPATIENT
Start: 2022-11-07 | End: 2022-11-07

## 2022-11-07 RX ADMIN — Medication 650 MILLIGRAM(S): at 08:25

## 2022-11-07 RX ADMIN — Medication 50 MILLIGRAM(S): at 06:14

## 2022-11-07 RX ADMIN — Medication 40 MILLIEQUIVALENT(S): at 12:46

## 2022-11-07 RX ADMIN — DORZOLAMIDE HYDROCHLORIDE 1 DROP(S): 20 SOLUTION/ DROPS OPHTHALMIC at 12:48

## 2022-11-07 RX ADMIN — Medication 3 MILLIGRAM(S): at 22:04

## 2022-11-07 RX ADMIN — DORZOLAMIDE HYDROCHLORIDE 1 DROP(S): 20 SOLUTION/ DROPS OPHTHALMIC at 21:49

## 2022-11-07 RX ADMIN — Medication 100 MILLIGRAM(S): at 12:46

## 2022-11-07 RX ADMIN — DORZOLAMIDE HYDROCHLORIDE 1 DROP(S): 20 SOLUTION/ DROPS OPHTHALMIC at 06:14

## 2022-11-07 RX ADMIN — LATANOPROST 1 DROP(S): 0.05 SOLUTION/ DROPS OPHTHALMIC; TOPICAL at 21:49

## 2022-11-07 RX ADMIN — ENOXAPARIN SODIUM 40 MILLIGRAM(S): 100 INJECTION SUBCUTANEOUS at 06:12

## 2022-11-07 RX ADMIN — Medication 50 MILLIGRAM(S): at 18:11

## 2022-11-07 RX ADMIN — Medication 100 GRAM(S): at 12:47

## 2022-11-07 RX ADMIN — Medication 1 TABLET(S): at 12:47

## 2022-11-07 RX ADMIN — Medication 1 MILLIGRAM(S): at 12:46

## 2022-11-08 ENCOUNTER — TRANSCRIPTION ENCOUNTER (OUTPATIENT)
Age: 57
End: 2022-11-08

## 2022-11-08 VITALS
TEMPERATURE: 98 F | SYSTOLIC BLOOD PRESSURE: 118 MMHG | HEART RATE: 86 BPM | OXYGEN SATURATION: 100 % | RESPIRATION RATE: 18 BRPM | DIASTOLIC BLOOD PRESSURE: 83 MMHG

## 2022-11-08 LAB
ALBUMIN SERPL ELPH-MCNC: 3.6 G/DL — SIGNIFICANT CHANGE UP (ref 3.3–5)
ALP SERPL-CCNC: 60 U/L — SIGNIFICANT CHANGE UP (ref 40–120)
ALT FLD-CCNC: 68 U/L — HIGH (ref 4–41)
ANION GAP SERPL CALC-SCNC: 15 MMOL/L — HIGH (ref 7–14)
AST SERPL-CCNC: 61 U/L — HIGH (ref 4–40)
BILIRUB SERPL-MCNC: 0.5 MG/DL — SIGNIFICANT CHANGE UP (ref 0.2–1.2)
BUN SERPL-MCNC: 11 MG/DL — SIGNIFICANT CHANGE UP (ref 7–23)
CALCIUM SERPL-MCNC: 9.1 MG/DL — SIGNIFICANT CHANGE UP (ref 8.4–10.5)
CHLORIDE SERPL-SCNC: 104 MMOL/L — SIGNIFICANT CHANGE UP (ref 98–107)
CO2 SERPL-SCNC: 16 MMOL/L — LOW (ref 22–31)
CREAT SERPL-MCNC: 0.65 MG/DL — SIGNIFICANT CHANGE UP (ref 0.5–1.3)
EGFR: 109 ML/MIN/1.73M2 — SIGNIFICANT CHANGE UP
GLUCOSE SERPL-MCNC: 111 MG/DL — HIGH (ref 70–99)
MAGNESIUM SERPL-MCNC: 1.8 MG/DL — SIGNIFICANT CHANGE UP (ref 1.6–2.6)
PHOSPHATE SERPL-MCNC: 3.9 MG/DL — SIGNIFICANT CHANGE UP (ref 2.5–4.5)
POTASSIUM SERPL-MCNC: 3.3 MMOL/L — LOW (ref 3.5–5.3)
POTASSIUM SERPL-SCNC: 3.3 MMOL/L — LOW (ref 3.5–5.3)
PROT SERPL-MCNC: 7 G/DL — SIGNIFICANT CHANGE UP (ref 6–8.3)
SODIUM SERPL-SCNC: 135 MMOL/L — SIGNIFICANT CHANGE UP (ref 135–145)

## 2022-11-08 PROCEDURE — 99239 HOSP IP/OBS DSCHRG MGMT >30: CPT

## 2022-11-08 RX ORDER — FOLIC ACID 0.8 MG
1 TABLET ORAL
Qty: 30 | Refills: 0
Start: 2022-11-08 | End: 2022-12-07

## 2022-11-08 RX ORDER — POTASSIUM CHLORIDE 20 MEQ
40 PACKET (EA) ORAL ONCE
Refills: 0 | Status: DISCONTINUED | OUTPATIENT
Start: 2022-11-08 | End: 2022-11-08

## 2022-11-08 RX ADMIN — ENOXAPARIN SODIUM 40 MILLIGRAM(S): 100 INJECTION SUBCUTANEOUS at 05:04

## 2022-11-08 RX ADMIN — DORZOLAMIDE HYDROCHLORIDE 1 DROP(S): 20 SOLUTION/ DROPS OPHTHALMIC at 05:04

## 2022-11-08 NOTE — PROGRESS NOTE ADULT - SUBJECTIVE AND OBJECTIVE BOX
[facility] Division of Hospital Medicine  Nahomy Starr MD  Pager [phone]    Patient is a 57y old  Male who presents with a chief complaint of alcohol withdrawal (05 Nov 2022 15:33)      SUBJECTIVE / OVERNIGHT EVENTS: seen at 1130a- felt better today- less anxious still tremulous; no neck pain from fall; still w bump on right side of forehead- some pain  ADDITIONAL REVIEW OF SYSTEMS:    MEDICATIONS  (STANDING):  chlordiazePOXIDE   Oral   dorzolamide 2% Ophthalmic Solution 1 Drop(s) Both EYES three times a day  enoxaparin Injectable 40 milliGRAM(s) SubCutaneous every 24 hours  folic acid 1 milliGRAM(s) Oral daily  influenza   Vaccine 0.5 milliLiter(s) IntraMuscular once  latanoprost 0.005% Ophthalmic Solution 1 Drop(s) Both EYES at bedtime  multivitamin 1 Tablet(s) Oral daily  potassium chloride    Tablet ER 40 milliEquivalent(s) Oral once  thiamine 100 milliGRAM(s) Oral daily    MEDICATIONS  (PRN):  artificial tears (preservative free) Ophthalmic Solution 1 Drop(s) Both EYES four times a day PRN Dry Eyes  chlordiazePOXIDE 50 milliGRAM(s) Oral every 1 hour PRN Symptom-triggered: each CIWA -Ar score 8 or GREATER  LORazepam     Tablet 2 milliGRAM(s) Oral every 2 hours PRN Symptom-triggered 2 point increase in CIWA-Ar      CAPILLARY BLOOD GLUCOSE        I&O's Summary      PHYSICAL EXAM:  Vital Signs Last 24 Hrs  T(C): 37.1 (06 Nov 2022 14:00), Max: 37.2 (06 Nov 2022 06:00)  T(F): 98.7 (06 Nov 2022 14:00), Max: 98.9 (06 Nov 2022 06:00)  HR: 88 (06 Nov 2022 14:00) (63 - 88)  BP: 146/88 (06 Nov 2022 14:00) (115/72 - 156/86)  BP(mean): --  RR: 18 (06 Nov 2022 14:00) (18 - 20)  SpO2: 99% (06 Nov 2022 14:00) (96% - 99%)    Parameters below as of 06 Nov 2022 14:00  Patient On (Oxygen Delivery Method): room air      CONSTITUTIONAL: NAD, well-developed, well-groomed  EYES: conjunctiva and sclera clear  NECK: Supple  RESPIRATORY: Normal respiratory effort; lungs are clear to auscultation bilaterally  CARDIOVASCULAR: Regular rate and rhythm, normal S1 and S2, no murmur/rub/gallop; No lower extremity edema;   ABDOMEN: Nontender to palpation, normoactive bowel sounds, not distended;   MUSCULOSKELETAL:   no clubbing or cyanosis of digits; no joint swelling or tenderness to palpation      LABS:                        12.1   4.45  )-----------( 196      ( 06 Nov 2022 05:49 )             35.4     11-06    138  |  103  |  9   ----------------------------<  104<H>  3.3<L>   |  23  |  0.58    Ca    8.3<L>      06 Nov 2022 05:49  Phos  2.4     11-06  Mg     1.80     11-06    TPro  6.9  /  Alb  3.6  /  TBili  1.3<H>  /  DBili  x   /  AST  50<H>  /  ALT  52<H>  /  AlkPhos  59  11-06    PT/INR - ( 05 Nov 2022 05:43 )   PT: 11.7 sec;   INR: 1.01 ratio         PTT - ( 05 Nov 2022 05:43 )  PTT:28.0 sec            RADIOLOGY & ADDITIONAL TESTS:  Results Reviewed:   Imaging Personally Reviewed:  Electrocardiogram Personally Reviewed:    COORDINATION OF CARE:  Care Discussed with Consultants/Other Providers [Y/N]:  Prior or Outpatient Records Reviewed [Y/N]:  
[facility] Division of Hospital Medicine  Nahomy Starr MD  Pager [phone]    Patient is a 57y old  Male who presents with a chief complaint of alcohol withdrawal (04 Nov 2022 21:34)      SUBJECTIVE / OVERNIGHT EVENTS: pt c/o feeling anxious and shaky when seen  ADDITIONAL REVIEW OF SYSTEMS:    MEDICATIONS  (STANDING):  chlordiazePOXIDE 50 milliGRAM(s) Oral every 8 hours  chlordiazePOXIDE   Oral   dorzolamide 2% Ophthalmic Solution 1 Drop(s) Both EYES three times a day  enoxaparin Injectable 40 milliGRAM(s) SubCutaneous every 24 hours  folic acid 1 milliGRAM(s) Oral daily  influenza   Vaccine 0.5 milliLiter(s) IntraMuscular once  latanoprost 0.005% Ophthalmic Solution 1 Drop(s) Both EYES at bedtime  multivitamin 1 Tablet(s) Oral daily  thiamine 100 milliGRAM(s) Oral daily    MEDICATIONS  (PRN):  artificial tears (preservative free) Ophthalmic Solution 1 Drop(s) Both EYES four times a day PRN Dry Eyes  chlordiazePOXIDE 50 milliGRAM(s) Oral every 1 hour PRN Symptom-triggered: each CIWA -Ar score 8 or GREATER  LORazepam     Tablet 2 milliGRAM(s) Oral every 2 hours PRN Symptom-triggered 2 point increase in CIWA-Ar      CAPILLARY BLOOD GLUCOSE      POCT Blood Glucose.: 103 mg/dL (05 Nov 2022 12:04)    I&O's Summary      PHYSICAL EXAM:  Vital Signs Last 24 Hrs  T(C): 37.1 (05 Nov 2022 22:00), Max: 37.1 (05 Nov 2022 22:00)  T(F): 98.7 (05 Nov 2022 22:00), Max: 98.7 (05 Nov 2022 22:00)  HR: 64 (05 Nov 2022 22:00) (64 - 85)  BP: 141/70 (05 Nov 2022 22:00) (113/65 - 141/70)  BP(mean): --  RR: 18 (05 Nov 2022 22:00) (18 - 18)  SpO2: 98% (05 Nov 2022 22:00) (96% - 98%)    Parameters below as of 05 Nov 2022 22:00  Patient On (Oxygen Delivery Method): room air      CONSTITUTIONAL: NAD  EYES: conjunctiva and sclera clear  RESPIRATORY: Normal respiratory effort; lungs are clear to auscultation bilaterally  CARDIOVASCULAR: Regular rate and rhythm, normal S1 and S2, no murmur/rub/gallop; No lower extremity edema;   ABDOMEN: Nontender to palpation, normoactive bowel sounds, not distended;   MUSCULOSKELETAL:  no clubbing or cyanosis of digits; no joint swelling or tenderness to palpation      LABS:                        11.8   6.35  )-----------( 208      ( 05 Nov 2022 05:43 )             35.3     11-05    138  |  100  |  10  ----------------------------<  60<L>  3.5   |  20<L>  |  0.70    Ca    7.7<L>      05 Nov 2022 05:43  Phos  2.5     11-05  Mg     1.70     11-05    TPro  6.6  /  Alb  3.5  /  TBili  1.5<H>  /  DBili  x   /  AST  48<H>  /  ALT  59<H>  /  AlkPhos  59  11-05    PT/INR - ( 05 Nov 2022 05:43 )   PT: 11.7 sec;   INR: 1.01 ratio         PTT - ( 05 Nov 2022 05:43 )  PTT:28.0 sec            RADIOLOGY & ADDITIONAL TESTS:  Results Reviewed:   Imaging Personally Reviewed:  Electrocardiogram Personally Reviewed:    COORDINATION OF CARE:  Care Discussed with Consultants/Other Providers [Y/N]:  Prior or Outpatient Records Reviewed [Y/N]:  
Patient is a 57y old  Male who presents with a chief complaint of alcohol withdrawal (06 Nov 2022 16:08)      SUBJECTIVE / OVERNIGHT EVENTS: feels better and states R eye Ptosis chronic - per nursing tremors are improving, CIWA 5      ROS:  No HA/DZ  No Vision changes   No CP, SOB  No N/V/D  No Edema  No Rash  NO weakness, numbness    MEDICATIONS  (STANDING):  chlordiazePOXIDE 50 milliGRAM(s) Oral every 12 hours  chlordiazePOXIDE   Oral   chlordiazePOXIDE 50 milliGRAM(s) Oral once  dorzolamide 2% Ophthalmic Solution 1 Drop(s) Both EYES three times a day  enoxaparin Injectable 40 milliGRAM(s) SubCutaneous every 24 hours  folic acid 1 milliGRAM(s) Oral daily  influenza   Vaccine 0.5 milliLiter(s) IntraMuscular once  latanoprost 0.005% Ophthalmic Solution 1 Drop(s) Both EYES at bedtime  magnesium sulfate  IVPB 1 Gram(s) IV Intermittent once  multivitamin 1 Tablet(s) Oral daily  potassium chloride    Tablet ER 40 milliEquivalent(s) Oral once  thiamine 100 milliGRAM(s) Oral daily    MEDICATIONS  (PRN):  acetaminophen     Tablet .. 650 milliGRAM(s) Oral every 6 hours PRN Mild Pain (1 - 3), Moderate Pain (4 - 6)  artificial tears (preservative free) Ophthalmic Solution 1 Drop(s) Both EYES four times a day PRN Dry Eyes  chlordiazePOXIDE 50 milliGRAM(s) Oral every 1 hour PRN Symptom-triggered: each CIWA -Ar score 8 or GREATER  LORazepam     Tablet 2 milliGRAM(s) Oral every 2 hours PRN Symptom-triggered 2 point increase in CIWA-Ar  melatonin 3 milliGRAM(s) Oral at bedtime PRN Insomnia      T(C): 36.7 (11-07-22 @ 08:00)  HR: 64 (11-07-22 @ 08:00)  BP: 117/77 (11-07-22 @ 08:00)  RR: 17 (11-07-22 @ 08:00)  SpO2: 99% (11-07-22 @ 08:00)  CAPILLARY BLOOD GLUCOSE        I&O's Summary      PHYSICAL EXAM:  GENERAL: NAD, well-developed, AOx3  HEAD:  Atraumatic, Normocephalic  EYES: EOMI, PERRL, conjunctiva and sclera clear, R eye Ptosis   NECK: Supple, No JVD  CHEST/LUNG: Clear to auscultation bilaterally  HEART: Regular rate and rhythm; No murmurs, rubs, or gallops, No Edema  ABDOMEN: Soft, Nontender, Nondistended; Bowel sounds present  EXTREMITIES:  2+ Peripheral Pulses, No clubbing, cyanosis  PSYCH: No SI/HI  NEUROLOGY: non-focal, + tremors   SKIN: No rashes or lesions    LABS:                        12.1   4.45  )-----------( 196      ( 06 Nov 2022 05:49 )             35.4     11-07    140  |  105  |  11  ----------------------------<  98  3.4<L>   |  18<L>  |  0.64    Ca    8.9      07 Nov 2022 07:01  Phos  3.4     11-07  Mg     1.70     11-07    TPro  7.0  /  Alb  3.7  /  TBili  0.7  /  DBili  x   /  AST  68<H>  /  ALT  65<H>  /  AlkPhos  59  11-07                  RADIOLOGY & ADDITIONAL TESTS:    Imaging Personally Reviewed:    Consultant(s) Notes Reviewed:      Care Discussed with Consultants/Other Providers:  
Patient is a 57y old  Male who presents with a chief complaint of alcohol withdrawal (07 Nov 2022 11:33)      SUBJECTIVE / OVERNIGHT EVENTS: doing well, CIWA down to 1    ROS:  No HA/DZ  No Vision changes   No CP, SOB  No N/V/D  No Edema  No Rash  NO weakness, numbness    MEDICATIONS  (STANDING):  chlordiazePOXIDE   Oral   chlordiazePOXIDE 50 milliGRAM(s) Oral once  dorzolamide 2% Ophthalmic Solution 1 Drop(s) Both EYES three times a day  enoxaparin Injectable 40 milliGRAM(s) SubCutaneous every 24 hours  folic acid 1 milliGRAM(s) Oral daily  influenza   Vaccine 0.5 milliLiter(s) IntraMuscular once  latanoprost 0.005% Ophthalmic Solution 1 Drop(s) Both EYES at bedtime  multivitamin 1 Tablet(s) Oral daily    MEDICATIONS  (PRN):  acetaminophen     Tablet .. 650 milliGRAM(s) Oral every 6 hours PRN Mild Pain (1 - 3), Moderate Pain (4 - 6)  artificial tears (preservative free) Ophthalmic Solution 1 Drop(s) Both EYES four times a day PRN Dry Eyes  chlordiazePOXIDE 50 milliGRAM(s) Oral every 1 hour PRN Symptom-triggered: each CIWA -Ar score 8 or GREATER  LORazepam     Tablet 2 milliGRAM(s) Oral every 2 hours PRN Symptom-triggered 2 point increase in CIWA-Ar  melatonin 3 milliGRAM(s) Oral at bedtime PRN Insomnia      T(C): 36.4 (11-08-22 @ 08:12)  HR: 64 (11-08-22 @ 08:12)  BP: 125/75 (11-08-22 @ 08:12)  RR: 18 (11-08-22 @ 08:12)  SpO2: 100% (11-08-22 @ 08:12)  CAPILLARY BLOOD GLUCOSE      POCT Blood Glucose.: 115 mg/dL (08 Nov 2022 08:54)    I&O's Summary    08 Nov 2022 07:01  -  08 Nov 2022 11:11  --------------------------------------------------------  IN: 240 mL / OUT: 0 mL / NET: 240 mL        PHYSICAL EXAM:  GENERAL: NAD, well-developed, AOx3  HEAD:  Atraumatic, Normocephalic  EYES: EOMI, PERRL, conjunctiva and sclera clear  NECK: Supple, No JVD  CHEST/LUNG: Clear to auscultation bilaterally  HEART: Regular rate and rhythm; No murmurs, rubs, or gallops, No Edema  ABDOMEN: Soft, Nontender, Nondistended; Bowel sounds present  EXTREMITIES:  2+ Peripheral Pulses, No clubbing, cyanosis  PSYCH: No SI/HI  NEUROLOGY: non-focal  SKIN: No rashes or lesions    LABS:    11-08    135  |  104  |  11  ----------------------------<  111<H>  3.3<L>   |  16<L>  |  0.65    Ca    9.1      08 Nov 2022 03:24  Phos  3.9     11-08  Mg     1.80     11-08    TPro  7.0  /  Alb  3.6  /  TBili  0.5  /  DBili  x   /  AST  61<H>  /  ALT  68<H>  /  AlkPhos  60  11-08                  RADIOLOGY & ADDITIONAL TESTS:    Imaging Personally Reviewed:    Consultant(s) Notes Reviewed:      Care Discussed with Consultants/Other Providers:

## 2022-11-08 NOTE — PROGRESS NOTE ADULT - PROBLEM SELECTOR PLAN 2
given report of intermittent LE edema, will hold amlodipine for now, ch  BP currently well controlled
pt w/ leg edema-amlodipine held  if BP still >140 consider low dose arb- losartan 25 daily  for now controlled
pt w/ leg edema-amlodipine held  if BP still >140 consider low dose arb- losartan 25 daily  for now controlled
pt w/ leg edema-amlodipine held  if BP still >140 consider low dose arb- losartan 25 daily

## 2022-11-08 NOTE — DISCHARGE NOTE PROVIDER - PROVIDER TOKENS
FREE:[LAST:[Dr. Carlita Cheung],PHONE:[(   )    -],FAX:[(   )    -],ADDRESS:[[phone]],FOLLOWUP:[1 week],ESTABLISHEDPATIENT:[T]]

## 2022-11-08 NOTE — PROGRESS NOTE ADULT - PROBLEM SELECTOR PLAN 5
CT C spine negative for fracture
CT C spine negative for fracture  pt did not report fall to writer

## 2022-11-08 NOTE — PROGRESS NOTE ADULT - ASSESSMENT
57M w/ h/o Etoh abuse, HTN here for alcohol withdrawal; also reports anxiety ?underlying depression

## 2022-11-08 NOTE — DISCHARGE NOTE PROVIDER - CARE PROVIDER_API CALL
Dr. Carlita Cheung,   [phone]  Phone: (   )    -  Fax: (   )    -  Established Patient  Follow Up Time: 1 week

## 2022-11-08 NOTE — DISCHARGE NOTE PROVIDER - NSDCCPCAREPLAN_GEN_ALL_CORE_FT
PRINCIPAL DISCHARGE DIAGNOSIS  Diagnosis: Alcohol dependence with withdrawal  Assessment and Plan of Treatment: Attend alcoholism treatment program, practice the Twelve Steps of AA.  Go to meetings to help you cope with uncomfortable feelings, and to help you develop a relapse prevention plan to prevent complications associated with alcohol intoxication.      SECONDARY DISCHARGE DIAGNOSES  Diagnosis: Essential hypertension  Assessment and Plan of Treatment: Low sodium and fat diet, continue anti-hypertensive medications, and follow up with primary care physician.

## 2022-11-08 NOTE — PROGRESS NOTE ADULT - PROBLEM SELECTOR PLAN 1
on CIWA taper, finishing course brian - CIWA improving   chlordiazepoxide taper with PRN benzodiazepines   folic acid, MV, thiamine supplementation  [employer]
start on high risk CIWA monitoring  chlordiazepoxide taper with PRN benzodiazepines available  folic acid, MV, thiamine supplementation
on CIWA taper, completed taper, resolved
start on high risk CIWA monitoring  chlordiazepoxide taper with PRN benzodiazepines available  folic acid, MV, thiamine supplementation

## 2022-11-08 NOTE — DISCHARGE NOTE PROVIDER - HOSPITAL COURSE
57M w/ h/o Etoh abuse, HTN here for alcohol withdrawal; also reports anxiety ?underlying depression.  Pt. completed a Librium taper.  Amlodipine was held due to LE edema.  Pt. was stable for DC on 11/8/22.  D/W attending.

## 2022-11-08 NOTE — DISCHARGE NOTE NURSING/CASE MANAGEMENT/SOCIAL WORK - PATIENT PORTAL LINK FT
You can access the FollowMyHealth Patient Portal offered by [facility] by registering at the following website: [email or web address]. By joining [employer]’s FollowMyHealth portal, you will also be able to view your health information using other applications (apps) compatible with our system.

## 2022-11-08 NOTE — PROGRESS NOTE ADULT - PROBLEM SELECTOR PROBLEM 1
Alcohol dependence with withdrawal

## 2022-11-08 NOTE — DISCHARGE NOTE PROVIDER - NSDCMRMEDTOKEN_GEN_ALL_CORE_FT
Artificial Tears ophthalmic solution: 1 drop(s) to each affected eye 4 times a day, As Needed  folic acid 1 mg oral tablet: 1 tab(s) orally once a day  latanoprost 0.005% ophthalmic solution: 1 drop(s) to each affected eye once a day (at bedtime)  Multiple Vitamins oral tablet: 1 tab(s) orally once a day  Trusopt 2% ophthalmic solution: 1 drop(s) to each affected eye 2 times a day

## 2022-11-08 NOTE — DISCHARGE NOTE NURSING/CASE MANAGEMENT/SOCIAL WORK - NSDCVIVACCINE_GEN_ALL_CORE_FT
influenza, injectable, quadrivalent, preservative free; 14-Oct-2021 13:35; Nany Guerrero (RN); [employer]; [id number] (Exp. Date: 30-Jun-2022); IntraMuscular; Deltoid Right.; 0.5 milliLiter(s); VIS (VIS Published: 06-Aug-2021, VIS Presented: 14-Oct-2021);   Tdap; 22-Dec-2021 19:23; Clementina Koehler (RN); [employer]; [id number] (Exp. Date: 09-Sep-2023); IntraMuscular; Deltoid Left.; 0.5 milliLiter(s); VIS (VIS Published: 09-May-2013, VIS Presented: 22-Dec-2021);   Tdap; 15-Aug-2022 16:08; Marianne Pollock (RN); [employer]; [id number]   (Exp. Date: 18-Apr-2024); IntraMuscular; Deltoid Left.; 0.5 milliLiter(s); VIS (VIS Published: 09-May-2013, VIS Presented: 15-Aug-2022);

## 2022-11-15 ENCOUNTER — EMERGENCY (EMERGENCY)
Facility: HOSPITAL | Age: 57
LOS: 0 days | Discharge: HOME | End: 2022-11-15
Attending: EMERGENCY MEDICINE

## 2022-11-15 VITALS
OXYGEN SATURATION: 95 % | HEART RATE: 102 BPM | WEIGHT: 184.97 LBS | SYSTOLIC BLOOD PRESSURE: 177 MMHG | HEIGHT: 70 IN | RESPIRATION RATE: 17 BRPM | TEMPERATURE: 98 F | DIASTOLIC BLOOD PRESSURE: 80 MMHG

## 2022-11-15 VITALS
SYSTOLIC BLOOD PRESSURE: 110 MMHG | HEART RATE: 92 BPM | TEMPERATURE: 98 F | DIASTOLIC BLOOD PRESSURE: 68 MMHG | OXYGEN SATURATION: 97 % | RESPIRATION RATE: 17 BRPM

## 2022-11-15 DIAGNOSIS — F10.129 ALCOHOL ABUSE WITH INTOXICATION, UNSPECIFIED: ICD-10-CM

## 2022-11-15 DIAGNOSIS — I16.0 HYPERTENSIVE URGENCY: ICD-10-CM

## 2022-11-15 DIAGNOSIS — I10 ESSENTIAL (PRIMARY) HYPERTENSION: ICD-10-CM

## 2022-11-15 DIAGNOSIS — E78.5 HYPERLIPIDEMIA, UNSPECIFIED: ICD-10-CM

## 2022-11-15 LAB — GLUCOSE BLDC GLUCOMTR-MCNC: 147 MG/DL — HIGH (ref 70–99)

## 2022-11-15 PROCEDURE — 99284 EMERGENCY DEPT VISIT MOD MDM: CPT

## 2022-11-15 RX ORDER — ACETAMINOPHEN 500 MG
650 TABLET ORAL ONCE
Refills: 0 | Status: COMPLETED | OUTPATIENT
Start: 2022-11-15 | End: 2022-11-15

## 2022-11-15 RX ADMIN — Medication 650 MILLIGRAM(S): at 05:08

## 2022-11-15 NOTE — ED PROVIDER NOTE - PHYSICAL EXAMINATION
[facility]  Progress Note    Heriberto Romo Patient Status:  Inpatient    1938 MRN [id number]   [facility] 6NE-A Attending Herson Seay MD   Hosp Day # 7 PCP Evaristo Chopra MD     STATUS UPDATE: Over the course of the day yest appears symmetrical.  Some rhonchi noted. Nursing reports thick secretions. Chest wall: No tenderness or deformity. Heart: Regular rate and rhythm, sinus bradycardia, normal S1S2, no murmur.    Abdomen: Soft, obese, non-tender, non-distended, no masses decompensation likely due to COPD exacerbation and fluid overload-subacute PE present on CTA for which anticoagulation will continue.   Now with further deterioration and evidence of new pulmonary emboli and evolving shock which we are ascribing to bleeding Vitals: htn at 177/80, tachy at 102  Gen: AAOx3, NAD, sitting comfortably in stretcher, alcohol on breath, drowsy but arousable to voice   Head: ncat, perrla, eomi b/l  Neck: supple, no lymphadenopathy, no midline deviation  Heart: rrr, no m/r/g  Lungs: CTA b/l, no rales/ronchi/wheezes  Abd: soft, nontender, non-distended, no rebound or guarding  Ext: no clubbing/cyanosis/edema  Neuro: sensation and muscle strength intact b/l, unsteady gait, no focal weakness or sensory loss

## 2022-11-15 NOTE — ED PROVIDER NOTE - CARE PLAN
1 Principal Discharge DX:	Alcohol intoxication, uncomplicated  Secondary Diagnosis:	Hypertensive urgency

## 2022-11-15 NOTE — ED ADULT NURSE NOTE - OBJECTIVE STATEMENT
Pt presents with a headache. pt states he has a headache. pt says he drinks vodka everyday and he is having headache. pt states he needs a metro card

## 2022-11-15 NOTE — ED PROVIDER NOTE - PROGRESS NOTE DETAILS
Pt. reports feeling better after sleep in ER, now clinically sober, ambulating with steady gait, demonstrates decision-making capacity, no complaints   pt. agrees to f/u with primary care outpt. asap; also educated pt. on alcohol abuse   pt. understands to return to ED if symptoms worsen; will d/c

## 2022-11-15 NOTE — ED PROVIDER NOTE - OBJECTIVE STATEMENT
56 yo M with alcohol intox.  Pt. admits to drinking vodka tonight.  He complains of pain at triage, but now admit that this is chronic and not caused by trauma.  Pt. feels otherwise well.  No further complaints.   ROS: negative for fever, cough, headache, chest pain, shortness of breath, abd pain, nausea, vomiting, diarrhea, rash, paresthesia, and weakness--all other systems reviewed are negative.   PMH: R eye blindness, glaucoma, HLD, HTN; Meds: See EMR for list; SH: + chronic EtOH abuse

## 2022-11-15 NOTE — ED PROVIDER NOTE - PATIENT PORTAL LINK FT
You can access the FollowMyHealth Patient Portal offered by [facility] by registering at the following website: [email or web address]. By joining [employer]’s FollowMyHealth portal, you will also be able to view your health information using other applications (apps) compatible with our system.

## 2022-11-15 NOTE — ED ADULT TRIAGE NOTE - CHIEF COMPLAINT QUOTE
BIBA,  alcohol intoxication, homeless, pt walked up to police aguirre and stated he wasn't feeling well.   pt admits to drinking daily at least 1pt of vodka x 30 days.  pt also c/o generalized body pain

## 2022-11-15 NOTE — ED PROVIDER NOTE - CLINICAL SUMMARY MEDICAL DECISION MAKING FREE TEXT BOX
56 yo M with alcohol abuse, intoxicated, hypertensive  -clonidine for pressure, sober d/c home, no indication for labs/imaging at this time, tylenol prn pain

## 2022-11-16 ENCOUNTER — EMERGENCY (EMERGENCY)
Facility: HOSPITAL | Age: 57
LOS: 0 days | Discharge: HOME | End: 2022-11-16
Attending: STUDENT IN AN ORGANIZED HEALTH CARE EDUCATION/TRAINING PROGRAM

## 2022-11-16 VITALS
SYSTOLIC BLOOD PRESSURE: 144 MMHG | OXYGEN SATURATION: 98 % | DIASTOLIC BLOOD PRESSURE: 78 MMHG | RESPIRATION RATE: 20 BRPM | HEART RATE: 97 BPM

## 2022-11-16 VITALS
TEMPERATURE: 99 F | WEIGHT: 184.97 LBS | SYSTOLIC BLOOD PRESSURE: 134 MMHG | HEIGHT: 70 IN | DIASTOLIC BLOOD PRESSURE: 73 MMHG | OXYGEN SATURATION: 96 % | HEART RATE: 106 BPM | RESPIRATION RATE: 20 BRPM

## 2022-11-16 DIAGNOSIS — Z59.00 HOMELESSNESS UNSPECIFIED: ICD-10-CM

## 2022-11-16 DIAGNOSIS — R25.1 TREMOR, UNSPECIFIED: ICD-10-CM

## 2022-11-16 DIAGNOSIS — I10 ESSENTIAL (PRIMARY) HYPERTENSION: ICD-10-CM
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
## 2022-11-16 DIAGNOSIS — R10.31 RIGHT LOWER QUADRANT PAIN: ICD-10-CM

## 2022-11-16 DIAGNOSIS — S09.90XA UNSPECIFIED INJURY OF HEAD, INITIAL ENCOUNTER: ICD-10-CM

## 2022-11-16 DIAGNOSIS — E78.5 HYPERLIPIDEMIA, UNSPECIFIED: ICD-10-CM

## 2022-11-16 DIAGNOSIS — Z23 ENCOUNTER FOR IMMUNIZATION: ICD-10-CM

## 2022-11-16 DIAGNOSIS — F10.239 ALCOHOL DEPENDENCE WITH WITHDRAWAL, UNSPECIFIED: ICD-10-CM

## 2022-11-16 LAB
ALBUMIN SERPL ELPH-MCNC: 3.1 G/DL — LOW (ref 3.3–5)
ALP SERPL-CCNC: 71 U/L — SIGNIFICANT CHANGE UP (ref 40–120)
ALT FLD-CCNC: 54 U/L — SIGNIFICANT CHANGE UP (ref 12–78)
ANION GAP SERPL CALC-SCNC: 9 MMOL/L — SIGNIFICANT CHANGE UP (ref 5–17)
AST SERPL-CCNC: 45 U/L — HIGH (ref 15–37)
BASOPHILS # BLD AUTO: 0.08 K/UL — SIGNIFICANT CHANGE UP (ref 0–0.2)
BASOPHILS NFR BLD AUTO: 1.7 % — SIGNIFICANT CHANGE UP (ref 0–2)
BILIRUB SERPL-MCNC: 0.4 MG/DL — SIGNIFICANT CHANGE UP (ref 0.2–1.2)
BUN SERPL-MCNC: 7 MG/DL — SIGNIFICANT CHANGE UP (ref 7–23)
CALCIUM SERPL-MCNC: 8.3 MG/DL — LOW (ref 8.5–10.1)
CHLORIDE SERPL-SCNC: 108 MMOL/L — SIGNIFICANT CHANGE UP (ref 96–108)
CO2 SERPL-SCNC: 26 MMOL/L — SIGNIFICANT CHANGE UP (ref 22–31)
CREAT SERPL-MCNC: 0.87 MG/DL — SIGNIFICANT CHANGE UP (ref 0.5–1.3)
EGFR: 99 ML/MIN/1.73M2 — SIGNIFICANT CHANGE UP
EOSINOPHIL # BLD AUTO: 0.03 K/UL — SIGNIFICANT CHANGE UP (ref 0–0.5)
EOSINOPHIL NFR BLD AUTO: 0.6 % — SIGNIFICANT CHANGE UP (ref 0–6)
ETHANOL SERPL-MCNC: 263 MG/DL — HIGH (ref 0–10)
GLUCOSE SERPL-MCNC: 123 MG/DL — HIGH (ref 70–99)
HCT VFR BLD CALC: 35.2 % — LOW (ref 39–50)
HGB BLD-MCNC: 11.9 G/DL — LOW (ref 13–17)
IMM GRANULOCYTES NFR BLD AUTO: 0.2 % — SIGNIFICANT CHANGE UP (ref 0–0.9)
LYMPHOCYTES # BLD AUTO: 1.73 K/UL — SIGNIFICANT CHANGE UP (ref 1–3.3)
LYMPHOCYTES # BLD AUTO: 36.9 % — SIGNIFICANT CHANGE UP (ref 13–44)
MAGNESIUM SERPL-MCNC: 2 MG/DL — SIGNIFICANT CHANGE UP (ref 1.6–2.6)
MCHC RBC-ENTMCNC: 30.9 PG — SIGNIFICANT CHANGE UP (ref 27–34)
MCHC RBC-ENTMCNC: 33.8 G/DL — SIGNIFICANT CHANGE UP (ref 32–36)
MCV RBC AUTO: 91.4 FL — SIGNIFICANT CHANGE UP (ref 80–100)
MONOCYTES # BLD AUTO: 0.4 K/UL — SIGNIFICANT CHANGE UP (ref 0–0.9)
MONOCYTES NFR BLD AUTO: 8.5 % — SIGNIFICANT CHANGE UP (ref 2–14)
NEUTROPHILS # BLD AUTO: 2.44 K/UL — SIGNIFICANT CHANGE UP (ref 1.8–7.4)
NEUTROPHILS NFR BLD AUTO: 52.1 % — SIGNIFICANT CHANGE UP (ref 43–77)
NRBC # BLD: 0 /100 WBCS — SIGNIFICANT CHANGE UP (ref 0–0)
PLATELET # BLD AUTO: 241 K/UL — SIGNIFICANT CHANGE UP (ref 150–400)
POTASSIUM SERPL-MCNC: 3.8 MMOL/L — SIGNIFICANT CHANGE UP (ref 3.5–5.3)
POTASSIUM SERPL-SCNC: 3.8 MMOL/L — SIGNIFICANT CHANGE UP (ref 3.5–5.3)
PROT SERPL-MCNC: 7.5 GM/DL — SIGNIFICANT CHANGE UP (ref 6–8.3)
RBC # BLD: 3.85 M/UL — LOW (ref 4.2–5.8)
RBC # FLD: 15 % — HIGH (ref 10.3–14.5)
SODIUM SERPL-SCNC: 143 MMOL/L — SIGNIFICANT CHANGE UP (ref 135–145)
WBC # BLD: 4.69 K/UL — SIGNIFICANT CHANGE UP (ref 3.8–10.5)
WBC # FLD AUTO: 4.69 K/UL — SIGNIFICANT CHANGE UP (ref 3.8–10.5)

## 2022-11-16 PROCEDURE — 99285 EMERGENCY DEPT VISIT HI MDM: CPT

## 2022-11-16 PROCEDURE — 70450 CT HEAD/BRAIN W/O DYE: CPT | Mod: 26,MA

## 2022-11-16 RX ORDER — THIAMINE MONONITRATE (VIT B1) 100 MG
100 TABLET ORAL ONCE
Refills: 0 | Status: COMPLETED | OUTPATIENT
Start: 2022-11-16 | End: 2022-11-16

## 2022-11-16 RX ORDER — TETANUS TOXOID, REDUCED DIPHTHERIA TOXOID AND ACELLULAR PERTUSSIS VACCINE, ADSORBED 5; 2.5; 8; 8; 2.5 [IU]/.5ML; [IU]/.5ML; UG/.5ML; UG/.5ML; UG/.5ML
0.5 SUSPENSION INTRAMUSCULAR ONCE
Refills: 0 | Status: COMPLETED | OUTPATIENT
Start: 2022-11-16 | End: 2022-11-16

## 2022-11-16 RX ORDER — DIAZEPAM 5 MG
10 TABLET ORAL ONCE
Refills: 0 | Status: DISCONTINUED | OUTPATIENT
Start: 2022-11-16 | End: 2022-11-16

## 2022-11-16 RX ORDER — SODIUM CHLORIDE 9 MG/ML
1000 INJECTION INTRAMUSCULAR; INTRAVENOUS; SUBCUTANEOUS ONCE
Refills: 0 | Status: COMPLETED | OUTPATIENT
Start: 2022-11-16 | End: 2022-11-16

## 2022-11-16 RX ORDER — FOLIC ACID 0.8 MG
1 TABLET ORAL ONCE
Refills: 0 | Status: COMPLETED | OUTPATIENT
Start: 2022-11-16 | End: 2022-11-16

## 2022-11-16 RX ADMIN — Medication 1 MILLIGRAM(S): at 19:29

## 2022-11-16 RX ADMIN — TETANUS TOXOID, REDUCED DIPHTHERIA TOXOID AND ACELLULAR PERTUSSIS VACCINE, ADSORBED 0.5 MILLILITER(S): 5; 2.5; 8; 8; 2.5 SUSPENSION INTRAMUSCULAR at 15:48

## 2022-11-16 RX ADMIN — Medication 1 MILLIGRAM(S): at 15:50

## 2022-11-16 RX ADMIN — Medication 100 MILLIGRAM(S): at 15:47

## 2022-11-16 RX ADMIN — Medication 10 MILLIGRAM(S): at 15:48

## 2022-11-16 RX ADMIN — Medication 1 TABLET(S): at 16:17

## 2022-11-16 RX ADMIN — SODIUM CHLORIDE 1000 MILLILITER(S): 9 INJECTION INTRAMUSCULAR; INTRAVENOUS; SUBCUTANEOUS at 15:48

## 2022-11-16 SDOH — ECONOMIC STABILITY - HOUSING INSECURITY: HOMELESSNESS UNSPECIFIED: Z59.00

## 2022-11-16 NOTE — ED ADULT NURSE NOTE - NSIMPLEMENTINTERV_GEN_ALL_ED
Implemented All Fall Risk Interventions:  [place] to call system. Call bell, personal items and telephone within reach. Instruct patient to call for assistance. Room bathroom lighting operational. Non-slip footwear when patient is off stretcher. Physically safe environment: no spills, clutter or unnecessary equipment. Stretcher in lowest position, wheels locked, appropriate side rails in place. Provide visual cue, wrist band, yellow gown, etc. Monitor gait and stability. Monitor for mental status changes and reorient to person, place, and time. Review medications for side effects contributing to fall risk. Reinforce activity limits and safety measures with patient and family.

## 2022-11-16 NOTE — ED PROVIDER NOTE - PROGRESS NOTE DETAILS
Attending Jihan: alcohol level elevated. remaining labs w/o acute findings. CTH w/o acute findings. [facility]  contacted detox center in [place] ([facility]) who has bed and is willing to accept pt. pt agreeable w/ detox tonight. uber arranged by praneeth to take pt directly to facility.

## 2022-11-16 NOTE — SBIRT NOTE ADULT - NSSBIRTALCACTION/INTER_GEN_A_CORE
will call facilities pt has bed available at the moment. CT scan pending Active referral to treatment

## 2022-11-16 NOTE — ED ADULT NURSE NOTE - OBJECTIVE STATEMENT
as per patient " drinking everyday for 6-7 months, my last drink was this morning at 3:30am. Admitted drinking beer/ vodka/ tequila/ whisky. Complains of bump to the top of head, and right lower abdomen pain, noted upper extremity tremors"

## 2022-11-16 NOTE — ED PROVIDER NOTE - NSFOLLOWUPINSTRUCTIONS_ED_ALL_ED_FT
1) Follow up with your doctor this week  2) Return to the ED immediately for new or worsening symptoms   3) Please continue to take any home medications as prescribed  4) Your test results from your ED visit were discussed with you prior to discharge  5) You were provided with a copy of your test results    Alcohol Abuse    Alcohol intoxication occurs when the amount of alcohol that a person has consumed impairs his or her ability to mentally and physically function. Chronic alcohol consumption can also lead to a variety of health issues including neurological disease, stomach disease, heart disease, liver disease, etc. Do not drive after drinking alcohol. Drinking enough alcohol to end up in an Emergency Room suggests you may have an alcohol abuse problem. Seek help at a drug addiction center.    SEEK IMMEDIATE MEDICAL CARE IF YOU HAVE ANY OF THE FOLLOWING SYMPTOMS: seizures, vomiting blood, blood in your stool, lightheadedness/dizziness, or becoming shaky to tremulous when you stop drinking.

## 2022-11-16 NOTE — ED PROVIDER NOTE - NS_ ATTENDINGSCRIBEDETAILS _ED_A_ED_FT
Attending Jihan: The scribe's documentation has been prepared under my direction and personally reviewed by me in its entirety. I confirm that the note above accurately reflects all work, treatment, procedures, and medical decision making performed by me.

## 2022-11-16 NOTE — ED PROVIDER NOTE - OBJECTIVE STATEMENT
56 y/o Male w/PMH of HLD, HTN, and glaucoma presents to the ED with tremors. Pt is having tremors due to alcohol withdraw. Pt reports his last drink to be around 3am this morning. Pt usually drinks rum, vodka, and beer. Pt says he drinks everyday. Pt reports having falling and having slight trauma to his head from alcohol consumption. Pt drinks 10 24oz beers and a large bottle of vodka. Pt denies chest pain. Pt is homeless. 56 y/o Male w/PMH of HLD, HTN, and glaucoma presents to the ED with tremors. Pt reports having tremors due to alcohol withdrawal. Pt reports his last drink to be around 3am this morning. Pt usually drinks rum, vodka, and beer. Pt says he drinks everyday. Pt reports falling recently and hitting his head after drinking. Pt drinks 10 24oz beers and a large bottle of vodka. Pt denies chest pain. Pt is homeless.

## 2022-11-16 NOTE — SBIRT NOTE ADULT - NSSBIRTALCACTIVEREFTXDET_GEN_A_CORE
Provided SBIRT services: Full screen positive. Referral to Treatment Performed. Screening results were reviewed with the patient and patient was provided information about healthy guidelines and potential negative consequences associated with level of risk. Motivation and readiness to reduce or stop use was discussed and goals and activities to make changes were suggested/offered. Referral for complete assessment and level of care determination at a certified treatment facility was completed by contacting the treatment facility via phone. Pt agreeable with tx plan to send him to detox facility.

## 2022-11-16 NOTE — ED PROVIDER NOTE - CLINICAL SUMMARY MEDICAL DECISION MAKING FREE TEXT BOX
56 y/o M w/ PMH as above presenting w/ hand tremor and concern for alcohol withdrawal. Pt overall well appearing, no acute distress. Last drink this morning, possible early withdrawal at this time. Will give benzos. W/ fall and head trauma, will obtain CTH. Nursing note stating RLQ pain, however pt denying that to me and has soft/nontender abdomen. Plan for labs, imaging, meds, IVF. Will reassess the need for additional interventions as clinically warranted.

## 2022-11-16 NOTE — ED PROVIDER NOTE - PHYSICAL EXAMINATION
Gen: NAD, AOx3, able to make needs known, non-toxic  Head: NC, old appearing abrasion to back of head  HEENT: EOMI, oral mucosa moist, normal conjunctiva  Lung: CTAB, no respiratory distress, no wheezes/rhonchi/rales B/L, speaking in full sentences  CV: tachycardic, no murmurs  Abd: non distended, soft, nontender, no guarding, no CVA tenderness  MSK: no visible deformities. slight tremor UE bilaterally  Neuro: Appears non focal  Skin: Warm, well perfused, no rash  Psych: normal affect

## 2022-12-04 ENCOUNTER — EMERGENCY (EMERGENCY)
Facility: HOSPITAL | Age: 57
LOS: 0 days | Discharge: HOME | End: 2022-12-05
Attending: EMERGENCY MEDICINE

## 2022-12-04 VITALS
SYSTOLIC BLOOD PRESSURE: 106 MMHG | OXYGEN SATURATION: 94 % | HEART RATE: 78 BPM | WEIGHT: 169.98 LBS | RESPIRATION RATE: 60 BRPM | HEIGHT: 70 IN | DIASTOLIC BLOOD PRESSURE: 69 MMHG | TEMPERATURE: 98 F

## 2022-12-04 DIAGNOSIS — R10.814 LEFT LOWER QUADRANT ABDOMINAL TENDERNESS: ICD-10-CM

## 2022-12-04 DIAGNOSIS — F10.229 ALCOHOL DEPENDENCE WITH INTOXICATION, UNSPECIFIED: ICD-10-CM

## 2022-12-04 DIAGNOSIS — I10 ESSENTIAL (PRIMARY) HYPERTENSION: ICD-10-CM

## 2022-12-04 DIAGNOSIS — E78.5 HYPERLIPIDEMIA, UNSPECIFIED: ICD-10-CM

## 2022-12-04 DIAGNOSIS — Z20.822 CONTACT WITH AND (SUSPECTED) EXPOSURE TO COVID-19: ICD-10-CM

## 2022-12-04 LAB
ALBUMIN SERPL ELPH-MCNC: 3.6 G/DL — SIGNIFICANT CHANGE UP (ref 3.3–5)
ALBUMIN SERPL ELPH-MCNC: 3.8 G/DL — SIGNIFICANT CHANGE UP (ref 3.3–5)
ALP SERPL-CCNC: 74 U/L — SIGNIFICANT CHANGE UP (ref 40–120)
ALP SERPL-CCNC: 76 U/L — SIGNIFICANT CHANGE UP (ref 40–120)
ALT FLD-CCNC: 100 U/L — HIGH (ref 12–78)
ALT FLD-CCNC: 104 U/L — HIGH (ref 12–78)
AMMONIA BLD-MCNC: 16 UMOL/L — SIGNIFICANT CHANGE UP (ref 11–32)
AMPHET UR-MCNC: NEGATIVE — SIGNIFICANT CHANGE UP
ANION GAP SERPL CALC-SCNC: 4 MMOL/L — LOW (ref 5–17)
ANION GAP SERPL CALC-SCNC: 7 MMOL/L — SIGNIFICANT CHANGE UP (ref 5–17)
APPEARANCE UR: CLEAR — SIGNIFICANT CHANGE UP
AST SERPL-CCNC: 62 U/L — HIGH (ref 15–37)
AST SERPL-CCNC: 64 U/L — HIGH (ref 15–37)
BARBITURATES UR SCN-MCNC: NEGATIVE — SIGNIFICANT CHANGE UP
BASE EXCESS BLDA CALC-SCNC: 2.2 MMOL/L — SIGNIFICANT CHANGE UP (ref -2–3)
BASOPHILS # BLD AUTO: 0.1 K/UL — SIGNIFICANT CHANGE UP (ref 0–0.2)
BASOPHILS NFR BLD AUTO: 2.3 % — HIGH (ref 0–2)
BENZODIAZ UR-MCNC: POSITIVE — SIGNIFICANT CHANGE UP
BILIRUB SERPL-MCNC: 0.3 MG/DL — SIGNIFICANT CHANGE UP (ref 0.2–1.2)
BILIRUB SERPL-MCNC: 0.3 MG/DL — SIGNIFICANT CHANGE UP (ref 0.2–1.2)
BILIRUB UR-MCNC: NEGATIVE — SIGNIFICANT CHANGE UP
BLOOD GAS COMMENTS ARTERIAL: SIGNIFICANT CHANGE UP
BUN SERPL-MCNC: 6 MG/DL — LOW (ref 7–23)
BUN SERPL-MCNC: 6 MG/DL — LOW (ref 7–23)
CALCIUM SERPL-MCNC: 8.3 MG/DL — LOW (ref 8.5–10.1)
CALCIUM SERPL-MCNC: 8.3 MG/DL — LOW (ref 8.5–10.1)
CHLORIDE SERPL-SCNC: 110 MMOL/L — HIGH (ref 96–108)
CHLORIDE SERPL-SCNC: 111 MMOL/L — HIGH (ref 96–108)
CO2 BLDA-SCNC: 29 MMOL/L — HIGH (ref 19–24)
CO2 SERPL-SCNC: 25 MMOL/L — SIGNIFICANT CHANGE UP (ref 22–31)
CO2 SERPL-SCNC: 32 MMOL/L — HIGH (ref 22–31)
COCAINE METAB.OTHER UR-MCNC: NEGATIVE — SIGNIFICANT CHANGE UP
COLOR SPEC: YELLOW — SIGNIFICANT CHANGE UP
CREAT SERPL-MCNC: 0.78 MG/DL — SIGNIFICANT CHANGE UP (ref 0.5–1.3)
CREAT SERPL-MCNC: 0.79 MG/DL — SIGNIFICANT CHANGE UP (ref 0.5–1.3)
DIFF PNL FLD: NEGATIVE — SIGNIFICANT CHANGE UP
EGFR: 102 ML/MIN/1.73M2 — SIGNIFICANT CHANGE UP
EGFR: 103 ML/MIN/1.73M2 — SIGNIFICANT CHANGE UP
EOSINOPHIL # BLD AUTO: 0.16 K/UL — SIGNIFICANT CHANGE UP (ref 0–0.5)
EOSINOPHIL NFR BLD AUTO: 3.7 % — SIGNIFICANT CHANGE UP (ref 0–6)
ETHANOL SERPL-MCNC: 343 MG/DL — HIGH (ref 0–10)
ETHANOL SERPL-MCNC: <10 MG/DL — SIGNIFICANT CHANGE UP (ref 0–10)
FLUAV AG NPH QL: SIGNIFICANT CHANGE UP
FLUBV AG NPH QL: SIGNIFICANT CHANGE UP
GAS PNL BLDA: SIGNIFICANT CHANGE UP
GLUCOSE BLDC GLUCOMTR-MCNC: 100 MG/DL — HIGH (ref 70–99)
GLUCOSE SERPL-MCNC: 92 MG/DL — SIGNIFICANT CHANGE UP (ref 70–99)
GLUCOSE SERPL-MCNC: 93 MG/DL — SIGNIFICANT CHANGE UP (ref 70–99)
GLUCOSE UR QL: NEGATIVE MG/DL — SIGNIFICANT CHANGE UP
HCO3 BLDA-SCNC: 27 MMOL/L — SIGNIFICANT CHANGE UP (ref 21–28)
HCT VFR BLD CALC: 41.7 % — SIGNIFICANT CHANGE UP (ref 39–50)
HGB BLD-MCNC: 13.9 G/DL — SIGNIFICANT CHANGE UP (ref 13–17)
HOROWITZ INDEX BLDA+IHG-RTO: 21 — SIGNIFICANT CHANGE UP
IMM GRANULOCYTES NFR BLD AUTO: 0.2 % — SIGNIFICANT CHANGE UP (ref 0–0.9)
KETONES UR-MCNC: NEGATIVE — SIGNIFICANT CHANGE UP
LEUKOCYTE ESTERASE UR-ACNC: NEGATIVE — SIGNIFICANT CHANGE UP
LIDOCAIN IGE QN: 55 U/L — LOW (ref 73–393)
LYMPHOCYTES # BLD AUTO: 2.32 K/UL — SIGNIFICANT CHANGE UP (ref 1–3.3)
LYMPHOCYTES # BLD AUTO: 54.2 % — HIGH (ref 13–44)
MAGNESIUM SERPL-MCNC: 2.2 MG/DL — SIGNIFICANT CHANGE UP (ref 1.6–2.6)
MCHC RBC-ENTMCNC: 30.7 PG — SIGNIFICANT CHANGE UP (ref 27–34)
MCHC RBC-ENTMCNC: 33.3 G/DL — SIGNIFICANT CHANGE UP (ref 32–36)
MCV RBC AUTO: 92.1 FL — SIGNIFICANT CHANGE UP (ref 80–100)
METHADONE UR-MCNC: NEGATIVE — SIGNIFICANT CHANGE UP
MONOCYTES # BLD AUTO: 0.37 K/UL — SIGNIFICANT CHANGE UP (ref 0–0.9)
MONOCYTES NFR BLD AUTO: 8.6 % — SIGNIFICANT CHANGE UP (ref 2–14)
NEUTROPHILS # BLD AUTO: 1.32 K/UL — LOW (ref 1.8–7.4)
NEUTROPHILS NFR BLD AUTO: 31 % — LOW (ref 43–77)
NITRITE UR-MCNC: NEGATIVE — SIGNIFICANT CHANGE UP
NRBC # BLD: 0 /100 WBCS — SIGNIFICANT CHANGE UP (ref 0–0)
OPIATES UR-MCNC: NEGATIVE — SIGNIFICANT CHANGE UP
PCO2 BLDA: 43 MMHG — SIGNIFICANT CHANGE UP (ref 32–46)
PCP SPEC-MCNC: SIGNIFICANT CHANGE UP
PCP UR-MCNC: NEGATIVE — SIGNIFICANT CHANGE UP
PH BLDA: 7.41 — SIGNIFICANT CHANGE UP (ref 7.35–7.45)
PH UR: 6 — SIGNIFICANT CHANGE UP (ref 5–8)
PLATELET # BLD AUTO: 332 K/UL — SIGNIFICANT CHANGE UP (ref 150–400)
PO2 BLDA: 91 MMHG — SIGNIFICANT CHANGE UP (ref 83–108)
POTASSIUM SERPL-MCNC: 4.2 MMOL/L — SIGNIFICANT CHANGE UP (ref 3.5–5.3)
POTASSIUM SERPL-MCNC: 4.2 MMOL/L — SIGNIFICANT CHANGE UP (ref 3.5–5.3)
POTASSIUM SERPL-SCNC: 4.2 MMOL/L — SIGNIFICANT CHANGE UP (ref 3.5–5.3)
POTASSIUM SERPL-SCNC: 4.2 MMOL/L — SIGNIFICANT CHANGE UP (ref 3.5–5.3)
PROT SERPL-MCNC: 8.4 GM/DL — HIGH (ref 6–8.3)
PROT SERPL-MCNC: 8.6 GM/DL — HIGH (ref 6–8.3)
PROT UR-MCNC: NEGATIVE MG/DL — SIGNIFICANT CHANGE UP
RBC # BLD: 4.53 M/UL — SIGNIFICANT CHANGE UP (ref 4.2–5.8)
RBC # FLD: 14.5 % — SIGNIFICANT CHANGE UP (ref 10.3–14.5)
RBC CASTS # UR COMP ASSIST: NEGATIVE /HPF — SIGNIFICANT CHANGE UP (ref 0–4)
SAO2 % BLDA: 97.6 % — SIGNIFICANT CHANGE UP (ref 94–98)
SARS-COV-2 RNA SPEC QL NAA+PROBE: SIGNIFICANT CHANGE UP
SODIUM SERPL-SCNC: 143 MMOL/L — SIGNIFICANT CHANGE UP (ref 135–145)
SODIUM SERPL-SCNC: 146 MMOL/L — HIGH (ref 135–145)
SP GR SPEC: 1.01 — SIGNIFICANT CHANGE UP (ref 1.01–1.02)
THC UR QL: NEGATIVE — SIGNIFICANT CHANGE UP
UROBILINOGEN FLD QL: NEGATIVE MG/DL — SIGNIFICANT CHANGE UP
WBC # BLD: 4.28 K/UL — SIGNIFICANT CHANGE UP (ref 3.8–10.5)
WBC # FLD AUTO: 4.28 K/UL — SIGNIFICANT CHANGE UP (ref 3.8–10.5)
WBC UR QL: NEGATIVE — SIGNIFICANT CHANGE UP

## 2022-12-04 PROCEDURE — 99285 EMERGENCY DEPT VISIT HI MDM: CPT

## 2022-12-04 PROCEDURE — 70450 CT HEAD/BRAIN W/O DYE: CPT | Mod: 26,MA

## 2022-12-04 PROCEDURE — 72125 CT NECK SPINE W/O DYE: CPT | Mod: 26,MA

## 2022-12-04 PROCEDURE — 74177 CT ABD & PELVIS W/CONTRAST: CPT | Mod: 26,MA

## 2022-12-04 RX ORDER — PANTOPRAZOLE SODIUM 20 MG/1
40 TABLET, DELAYED RELEASE ORAL ONCE
Refills: 0 | Status: COMPLETED | OUTPATIENT
Start: 2022-12-04 | End: 2022-12-04

## 2022-12-04 RX ORDER — SODIUM CHLORIDE 9 MG/ML
1000 INJECTION INTRAMUSCULAR; INTRAVENOUS; SUBCUTANEOUS ONCE
Refills: 0 | Status: COMPLETED | OUTPATIENT
Start: 2022-12-04 | End: 2022-12-04

## 2022-12-04 RX ADMIN — SODIUM CHLORIDE 1000 MILLILITER(S): 9 INJECTION INTRAMUSCULAR; INTRAVENOUS; SUBCUTANEOUS at 18:43

## 2022-12-04 RX ADMIN — Medication 50 MILLIGRAM(S): at 18:43

## 2022-12-04 RX ADMIN — PANTOPRAZOLE SODIUM 40 MILLIGRAM(S): 20 TABLET, DELAYED RELEASE ORAL at 18:44

## 2022-12-04 NOTE — ED PROVIDER NOTE - CLINICAL SUMMARY MEDICAL DECISION MAKING FREE TEXT BOX
hx, exam, labs, pt's ct head/cervical spine and ct abd/pelvis and care are signed out to the next team. Pt's initial etoh level was < 10 but pt had unsteady gaits with hx of alcohol abuse so etoh repeat now is > 300.

## 2022-12-04 NOTE — ED PROVIDER NOTE - CONSTITUTIONAL, MLM
normal... Well appearing, awake, alert, oriented to person, place, time/situation and in no apparent distress. no nasal flaring no shoulders retractions no diaphoresis

## 2022-12-04 NOTE — ED PROVIDER NOTE - OBJECTIVE STATEMENT
57 years old male by ems who sts pt was found sleeping on the street pt sts he had too much to drink this morning, Pt denies headache, dizziness, blurred visions, light sensitivities, focal/distal weakness or numbness, neck/back/hips/calfs pain, cough, sob, chest pain, nausea, vomiting, fever, chills, abd pain, dysuria or irregular bowel movements.

## 2022-12-04 NOTE — ED PROVIDER NOTE - WORK/EXCUSE FORM DATE
Electrodesiccation And Curettage Text: The wound bed was treated with electrodesiccation and curettage after the biopsy was performed. 05-Dec-2022

## 2022-12-04 NOTE — ED PROVIDER NOTE - PATIENT PORTAL LINK FT
You can access the FollowMyHealth Patient Portal offered by [facility] by registering at the following website: [email or web address]. By joining [employer]’s FollowMyHealth portal, you will also be able to view your health information using other applications (apps) compatible with our system.

## 2022-12-04 NOTE — ED ADULT NURSE NOTE - CHIEF COMPLAINT QUOTE
pt [place] speaking, bystander called, patient sleeping on street. admits to ETOH ingestion. no obvious sign of injury or trauma. unsteady gait.

## 2022-12-04 NOTE — ED ADULT NURSE NOTE - OBJECTIVE STATEMENT
Patient brought in by EMS, found on street laying down. Patient unable to give history, patient is intoxicated.

## 2022-12-04 NOTE — ED ADULT NURSE REASSESSMENT NOTE - NS ED NURSE REASSESS COMMENT FT1
Patient received with no IV at this time. Dr. Calvo made aware of alcohol level <10 but patient slurred speech, unsteady gait and disorientation. CIWA 4. Dr. Calvo requested to hold Librium and repeat labs to be sent.

## 2022-12-04 NOTE — ED ADULT NURSE NOTE - ED STAT RN HANDOFF DETAILS
Report received from RN at this time. Assessment available on [facility]. will continue to monitor

## 2022-12-04 NOTE — ED ADULT TRIAGE NOTE - CHIEF COMPLAINT QUOTE
pt Hebrew speaking, bystander called, patient sleeping on street. admits to ETOH ingestion. no obvious sign of injury or trauma. unsteady gait.

## 2022-12-04 NOTE — ED ADULT NURSE REASSESSMENT NOTE - NS ED NURSE REASSESS COMMENT FT1
Repeat labs sent as ordered and sent. Librium given now as ordered as rpt alcohol results +. Ambulated to bathroom with this RN assistance and unsteady gait. offered food and refused but tray provided and witnessed eating. Pt resting comfortably at this time. No s/s of pain noted. Awaiting test results

## 2022-12-04 NOTE — ED PROVIDER NOTE - ENMT NEGATIVE STATEMENT, MLM
requires elixir form Ears: no ear pain and no hearing problems. Nose: no nasal congestion and no nasal drainage. Mouth/Throat: no dysphagia, no hoarseness and no throat pain. Neck: no lumps, no pain, no stiffness and no swollen glands.

## 2022-12-04 NOTE — ED PROVIDER NOTE - PROGRESS NOTE DETAILS
leemd: The patient is clinically sober. The patient is alert and oriented x 3, is clear and coherent in conversation and has a normal gait and shows no signs of acute intoxication. The patient is safe for discharge.

## 2022-12-05 VITALS
SYSTOLIC BLOOD PRESSURE: 106 MMHG | DIASTOLIC BLOOD PRESSURE: 63 MMHG | HEART RATE: 85 BPM | OXYGEN SATURATION: 97 % | RESPIRATION RATE: 18 BRPM | TEMPERATURE: 98 F

## 2022-12-05 LAB
CULTURE RESULTS: SIGNIFICANT CHANGE UP
SPECIMEN SOURCE: SIGNIFICANT CHANGE UP

## 2022-12-05 RX ADMIN — Medication 50 MILLIGRAM(S): at 06:42

## 2022-12-08 ENCOUNTER — EMERGENCY (EMERGENCY)
Facility: HOSPITAL | Age: 57
LOS: 0 days | Discharge: HOME | End: 2022-12-09
Attending: EMERGENCY MEDICINE

## 2022-12-08 VITALS
DIASTOLIC BLOOD PRESSURE: 85 MMHG | HEIGHT: 70 IN | SYSTOLIC BLOOD PRESSURE: 128 MMHG | OXYGEN SATURATION: 95 % | TEMPERATURE: 98 F | WEIGHT: 185.19 LBS | HEART RATE: 76 BPM | RESPIRATION RATE: 14 BRPM

## 2022-12-08 DIAGNOSIS — E78.5 HYPERLIPIDEMIA, UNSPECIFIED: ICD-10-CM

## 2022-12-08 DIAGNOSIS — F10.220 ALCOHOL DEPENDENCE WITH INTOXICATION, UNCOMPLICATED: ICD-10-CM

## 2022-12-08 DIAGNOSIS — H54.7 UNSPECIFIED VISUAL LOSS: ICD-10-CM

## 2022-12-08 DIAGNOSIS — R40.0 SOMNOLENCE: ICD-10-CM

## 2022-12-08 DIAGNOSIS — I10 ESSENTIAL (PRIMARY) HYPERTENSION: ICD-10-CM

## 2022-12-08 LAB
ALBUMIN SERPL ELPH-MCNC: 3.5 G/DL — SIGNIFICANT CHANGE UP (ref 3.3–5)
ALP SERPL-CCNC: 79 U/L — SIGNIFICANT CHANGE UP (ref 40–120)
ALT FLD-CCNC: 50 U/L — SIGNIFICANT CHANGE UP (ref 12–78)
ANION GAP SERPL CALC-SCNC: 9 MMOL/L — SIGNIFICANT CHANGE UP (ref 5–17)
AST SERPL-CCNC: 43 U/L — HIGH (ref 15–37)
BASOPHILS # BLD AUTO: 0.08 K/UL — SIGNIFICANT CHANGE UP (ref 0–0.2)
BASOPHILS NFR BLD AUTO: 1.6 % — SIGNIFICANT CHANGE UP (ref 0–2)
BILIRUB SERPL-MCNC: 0.3 MG/DL — SIGNIFICANT CHANGE UP (ref 0.2–1.2)
BUN SERPL-MCNC: 6 MG/DL — LOW (ref 7–23)
CALCIUM SERPL-MCNC: 8.6 MG/DL — SIGNIFICANT CHANGE UP (ref 8.5–10.1)
CHLORIDE SERPL-SCNC: 109 MMOL/L — HIGH (ref 96–108)
CO2 SERPL-SCNC: 28 MMOL/L — SIGNIFICANT CHANGE UP (ref 22–31)
CREAT SERPL-MCNC: 0.75 MG/DL — SIGNIFICANT CHANGE UP (ref 0.5–1.3)
EGFR: 104 ML/MIN/1.73M2 — SIGNIFICANT CHANGE UP
EOSINOPHIL # BLD AUTO: 0.08 K/UL — SIGNIFICANT CHANGE UP (ref 0–0.5)
EOSINOPHIL NFR BLD AUTO: 1.6 % — SIGNIFICANT CHANGE UP (ref 0–6)
ETHANOL SERPL-MCNC: >325 MG/DL — HIGH (ref 0–10)
GLUCOSE SERPL-MCNC: 92 MG/DL — SIGNIFICANT CHANGE UP (ref 70–99)
HCT VFR BLD CALC: 43.4 % — SIGNIFICANT CHANGE UP (ref 39–50)
HGB BLD-MCNC: 14.3 G/DL — SIGNIFICANT CHANGE UP (ref 13–17)
IMM GRANULOCYTES NFR BLD AUTO: 0.2 % — SIGNIFICANT CHANGE UP (ref 0–0.9)
LYMPHOCYTES # BLD AUTO: 2.44 K/UL — SIGNIFICANT CHANGE UP (ref 1–3.3)
LYMPHOCYTES # BLD AUTO: 47.7 % — HIGH (ref 13–44)
MAGNESIUM SERPL-MCNC: 2.2 MG/DL — SIGNIFICANT CHANGE UP (ref 1.6–2.6)
MCHC RBC-ENTMCNC: 30.2 PG — SIGNIFICANT CHANGE UP (ref 27–34)
MCHC RBC-ENTMCNC: 32.9 G/DL — SIGNIFICANT CHANGE UP (ref 32–36)
MCV RBC AUTO: 91.6 FL — SIGNIFICANT CHANGE UP (ref 80–100)
MONOCYTES # BLD AUTO: 0.4 K/UL — SIGNIFICANT CHANGE UP (ref 0–0.9)
MONOCYTES NFR BLD AUTO: 7.8 % — SIGNIFICANT CHANGE UP (ref 2–14)
NEUTROPHILS # BLD AUTO: 2.1 K/UL — SIGNIFICANT CHANGE UP (ref 1.8–7.4)
NEUTROPHILS NFR BLD AUTO: 41.1 % — LOW (ref 43–77)
NRBC # BLD: 0 /100 WBCS — SIGNIFICANT CHANGE UP (ref 0–0)
PLATELET # BLD AUTO: 267 K/UL — SIGNIFICANT CHANGE UP (ref 150–400)
POTASSIUM SERPL-MCNC: 4.4 MMOL/L — SIGNIFICANT CHANGE UP (ref 3.5–5.3)
POTASSIUM SERPL-SCNC: 4.4 MMOL/L — SIGNIFICANT CHANGE UP (ref 3.5–5.3)
PROT SERPL-MCNC: 8 GM/DL — SIGNIFICANT CHANGE UP (ref 6–8.3)
RBC # BLD: 4.74 M/UL — SIGNIFICANT CHANGE UP (ref 4.2–5.8)
RBC # FLD: 14.5 % — SIGNIFICANT CHANGE UP (ref 10.3–14.5)
SODIUM SERPL-SCNC: 146 MMOL/L — HIGH (ref 135–145)
WBC # BLD: 5.11 K/UL — SIGNIFICANT CHANGE UP (ref 3.8–10.5)
WBC # FLD AUTO: 5.11 K/UL — SIGNIFICANT CHANGE UP (ref 3.8–10.5)

## 2022-12-08 PROCEDURE — 70450 CT HEAD/BRAIN W/O DYE: CPT | Mod: 26,MA

## 2022-12-08 PROCEDURE — 72125 CT NECK SPINE W/O DYE: CPT | Mod: 26,MA

## 2022-12-08 PROCEDURE — 99285 EMERGENCY DEPT VISIT HI MDM: CPT

## 2022-12-08 NOTE — ED PROVIDER NOTE - PATIENT PORTAL LINK FT
You can access the FollowMyHealth Patient Portal offered by [facility] by registering at the following website: [email or web address]. By joining [email or web address]’s FollowMyHealth portal, you will also be able to view your health information using other applications (apps) compatible with our system.

## 2022-12-08 NOTE — ED ADULT TRIAGE NOTE - CHIEF COMPLAINT QUOTE
BIBA for alcohol intoxication, as per emt a bystander called for patient, patient admits to drinking beer, alert in triage, emt states has unsteady gait, emt fs 117

## 2022-12-08 NOTE — ED PROVIDER NOTE - PHYSICAL EXAMINATION
Gen: Drowsy, no distress  Head: NC, AT, PERRL, EOMI, normal lids/conjunctiva  ENT: normal hearing, patent oropharynx without erythema/exudate, uvula midline  Neck: +supple, +Trachea midline  Pulm: Bilateral BS, normal resp effort, no wheeze/stridor/retractions  CV: RRR, no M/R/G, +dist pulses  Abd: soft, ND, Negative [place] signs, +BS, no palpable masses  Mskel: no edema/erythema/cyanosis  Skin: no rash, warm/dry  Neuro: Moving all extremities

## 2022-12-08 NOTE — ED PROVIDER NOTE - CLINICAL SUMMARY MEDICAL DECISION MAKING FREE TEXT BOX
Patient with frequent visits to ER here for alcohol intoxication, BAL >325, no other lab abnormalities.  No injuries appreciated.  VSS.  CT imaging unremarkable.  Awake, alert x3 this am with steady gait, CIWA 0, ready for discharge.

## 2022-12-08 NOTE — ED PROVIDER NOTE - OBJECTIVE STATEMENT
Pertinent PMH/PSH/FHx/SHx and Review of Systems contained within:  Patient presents to the ED for alcohol intoxication.  EMS said to have responded to bystander call, unclear circumstances in the field.  Patient drowsy and limited historian, no signs of injury.  Chart notes prior history of alcohol abuse.

## 2022-12-08 NOTE — ED ADULT NURSE NOTE - NSIMPLEMENTINTERV_GEN_ALL_ED
Implemented All Fall Risk Interventions:  [place] to call system. Call bell, personal items and telephone within reach. Instruct patient to call for assistance. Room bathroom lighting operational. Non-slip footwear when patient is off stretcher. Physically safe environment: no spills, clutter or unnecessary equipment. Stretcher in lowest position, wheels locked, appropriate side rails in place. Provide visual cue, wrist band, yellow gown, etc. Monitor gait and stability. Monitor for mental status changes and reorient to person, place, and time. Review medications for side effects contributing to fall risk. Reinforce activity limits and safety measures with patient and family.

## 2022-12-08 NOTE — ED ADULT NURSE NOTE - OBJECTIVE STATEMENT
Pt BIBEMS s/p alcohol intoxication. Pt lying on stretcher, appears to be sleeping but responsive to verbal stimuli. Pt states he was drinking a lot of vodka today, unknown amount. Poor historian at this time. V/s stable and respirations appear equal and unlabored. NAD noted.

## 2022-12-09 ENCOUNTER — INPATIENT (INPATIENT)
Facility: HOSPITAL | Age: 57
LOS: 5 days | Discharge: HOME | End: 2022-12-15
Attending: INTERNAL MEDICINE | Admitting: INTERNAL MEDICINE

## 2022-12-09 VITALS
RESPIRATION RATE: 18 BRPM | SYSTOLIC BLOOD PRESSURE: 123 MMHG | HEART RATE: 89 BPM | TEMPERATURE: 98 F | OXYGEN SATURATION: 98 % | DIASTOLIC BLOOD PRESSURE: 82 MMHG

## 2022-12-09 VITALS
TEMPERATURE: 97 F | OXYGEN SATURATION: 98 % | RESPIRATION RATE: 18 BRPM | DIASTOLIC BLOOD PRESSURE: 75 MMHG | SYSTOLIC BLOOD PRESSURE: 119 MMHG | HEART RATE: 80 BPM | HEIGHT: 70 IN

## 2022-12-09 DIAGNOSIS — F10.239 ALCOHOL DEPENDENCE WITH WITHDRAWAL, UNSPECIFIED: ICD-10-CM

## 2022-12-09 PROCEDURE — 99285 EMERGENCY DEPT VISIT HI MDM: CPT

## 2022-12-09 RX ORDER — THIAMINE MONONITRATE (VIT B1) 100 MG
500 TABLET ORAL ONCE
Refills: 0 | Status: COMPLETED | OUTPATIENT
Start: 2022-12-09 | End: 2022-12-09

## 2022-12-09 RX ORDER — SODIUM CHLORIDE 9 MG/ML
1000 INJECTION INTRAMUSCULAR; INTRAVENOUS; SUBCUTANEOUS ONCE
Refills: 0 | Status: COMPLETED | OUTPATIENT
Start: 2022-12-09 | End: 2022-12-09

## 2022-12-09 RX ADMIN — Medication 50 MILLIGRAM(S): at 13:23
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
## 2022-12-09 NOTE — ED PROVIDER NOTE - PHYSICAL EXAMINATION
GENERAL: no acute distress, non-toxic appearing  HEAD: normocephalic  HEENT: normal conjunctiva, oral mucosa moist, neck supple  CARDIAC: regular rate and rhythm, normal S1 and S2,  no appreciable murmurs  PULM: clear to ascultation bilaterally, no crackles, rales, rhonchi, or wheezing  GI: abdomen nondistended, soft, nontender, no guarding or rebound tenderness  NEURO: alert and oriented x 1, normal speech, +stumbling gait  MSK: no visible deformities, no peripheral edema, calf tenderness/redness/swelling  SKIN: +pt has healed wounds (scabbed over) on nose and L knee/shin, dry, well-perfused

## 2022-12-09 NOTE — ED PROVIDER NOTE - OBJECTIVE STATEMENT
58 yo M PMHx of HTN, glaucoma presents with intoxication. Patient found intoxicated this morning, currently denying falls, injuries however does not remember day's occurrences. Denies drinking anything other than alcohol. Denies CP, SOB, n/v, fevers, abd pain. Patient has had multiple ER visits for intoxication. As per chart review, patient is homeless. 58 yo M PMHx of HTN, glaucoma presents with intoxication. Patient found intoxicated this morning asleep on the ground with a pint of alcohol next to him. Currently denying falls, injuries however does not remember day's events. Denies drinking anything other than alcohol. Denies CP, SOB, n/v, fevers, abd pain. Patient has had multiple ER visits for intoxication. As per chart review, patient is homeless.
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
## 2022-12-09 NOTE — ED PROVIDER NOTE - CLINICAL SUMMARY MEDICAL DECISION MAKING FREE TEXT BOX
56 yo M hx of htn, glaucoma presents with intoxication, currently giving limited history and denying acute complaints. Patient has no new evidence of head strike or trauma - will defer head imaging at this time and allow the patient to metabolize alcohol and reassess at a later time to obtain a better history. Will give librium to prevent alcohol withdrawal.

## 2022-12-09 NOTE — ED ADULT NURSE NOTE - NS ED NURSE RECORD ANOTHER HT AND WT
[phone].   No
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
## 2022-12-09 NOTE — ED ADULT NURSE REASSESSMENT NOTE - NS ED NURSE REASSESS COMMENT FT1
Patient resting in stretcher, no acute distress noted at this time. Patient updated on plan of care, will continue to monitor.
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
## 2022-12-09 NOTE — ED PROVIDER NOTE - ATTENDING CONTRIBUTION TO CARE
Dr. Walsh: This is a 57-year-old male with past medical history glaucoma in right eye, alcohol use disorder, in the emergency department for intoxication.  Patient states that he normally drinks beer and vodka, last drink was approximately 6 to 8 hours before ED presentation, patient presented to the ED for intoxication.  He denies any acute complaints but is noticeably intoxicated upon ED presentation.  Patient was noted to have dried scab on left cheek, had CT head/c-spine yesterday at [facility] after arriving for EtOH intoxication.  On exam pt chronically-ill appearing but in NAD, heart RRR, lungs CTAB, abd NTND, extremities without swelling, strength 5/5 in all extremities and skin without rash.  +scant tongue fasciculations and upper extremity tremor.  Pt's right eye noted to have scleral injection and cloudy ?cataract--pt noted that he has glaucoma and has no acute eye complaints.  EOMI bilaterally.  Midline cervical/thoracic/lumbosacral spine without bony TTP or step off.

## 2022-12-10 DIAGNOSIS — H40.9 UNSPECIFIED GLAUCOMA: ICD-10-CM

## 2022-12-10 DIAGNOSIS — I10 ESSENTIAL (PRIMARY) HYPERTENSION: ICD-10-CM

## 2022-12-10 DIAGNOSIS — F10.239 ALCOHOL DEPENDENCE WITH WITHDRAWAL, UNSPECIFIED: ICD-10-CM

## 2022-12-10 DIAGNOSIS — E78.5 HYPERLIPIDEMIA, UNSPECIFIED: ICD-10-CM

## 2022-12-10 LAB
ALBUMIN SERPL ELPH-MCNC: 4 G/DL — SIGNIFICANT CHANGE UP (ref 3.3–5)
ALP SERPL-CCNC: 79 U/L — SIGNIFICANT CHANGE UP (ref 40–120)
ALT FLD-CCNC: 39 U/L — SIGNIFICANT CHANGE UP (ref 4–41)
ANION GAP SERPL CALC-SCNC: 16 MMOL/L — HIGH (ref 7–14)
ANION GAP SERPL CALC-SCNC: 17 MMOL/L — HIGH (ref 7–14)
AST SERPL-CCNC: 47 U/L — HIGH (ref 4–40)
BASOPHILS # BLD AUTO: 0.06 K/UL — SIGNIFICANT CHANGE UP (ref 0–0.2)
BASOPHILS NFR BLD AUTO: 1.6 % — SIGNIFICANT CHANGE UP (ref 0–2)
BILIRUB SERPL-MCNC: 0.5 MG/DL — SIGNIFICANT CHANGE UP (ref 0.2–1.2)
BUN SERPL-MCNC: 8 MG/DL — SIGNIFICANT CHANGE UP (ref 7–23)
BUN SERPL-MCNC: 8 MG/DL — SIGNIFICANT CHANGE UP (ref 7–23)
CALCIUM SERPL-MCNC: 8 MG/DL — LOW (ref 8.4–10.5)
CALCIUM SERPL-MCNC: 8.4 MG/DL — SIGNIFICANT CHANGE UP (ref 8.4–10.5)
CHLORIDE SERPL-SCNC: 100 MMOL/L — SIGNIFICANT CHANGE UP (ref 98–107)
CHLORIDE SERPL-SCNC: 97 MMOL/L — LOW (ref 98–107)
CK SERPL-CCNC: 187 U/L — SIGNIFICANT CHANGE UP (ref 30–200)
CO2 SERPL-SCNC: 21 MMOL/L — LOW (ref 22–31)
CO2 SERPL-SCNC: 24 MMOL/L — SIGNIFICANT CHANGE UP (ref 22–31)
CREAT SERPL-MCNC: 0.65 MG/DL — SIGNIFICANT CHANGE UP (ref 0.5–1.3)
CREAT SERPL-MCNC: 0.73 MG/DL — SIGNIFICANT CHANGE UP (ref 0.5–1.3)
EGFR: 105 ML/MIN/1.73M2 — SIGNIFICANT CHANGE UP
EGFR: 109 ML/MIN/1.73M2 — SIGNIFICANT CHANGE UP
EOSINOPHIL # BLD AUTO: 0.08 K/UL — SIGNIFICANT CHANGE UP (ref 0–0.5)
EOSINOPHIL NFR BLD AUTO: 2.1 % — SIGNIFICANT CHANGE UP (ref 0–6)
GLUCOSE SERPL-MCNC: 63 MG/DL — LOW (ref 70–99)
GLUCOSE SERPL-MCNC: 64 MG/DL — LOW (ref 70–99)
HCT VFR BLD CALC: 36.6 % — LOW (ref 39–50)
HCT VFR BLD CALC: 41.4 % — SIGNIFICANT CHANGE UP (ref 39–50)
HGB BLD-MCNC: 12.2 G/DL — LOW (ref 13–17)
HGB BLD-MCNC: 13.4 G/DL — SIGNIFICANT CHANGE UP (ref 13–17)
IANC: 2.16 K/UL — SIGNIFICANT CHANGE UP (ref 1.8–7.4)
IMM GRANULOCYTES NFR BLD AUTO: 0 % — SIGNIFICANT CHANGE UP (ref 0–0.9)
LYMPHOCYTES # BLD AUTO: 1.23 K/UL — SIGNIFICANT CHANGE UP (ref 1–3.3)
LYMPHOCYTES # BLD AUTO: 32.2 % — SIGNIFICANT CHANGE UP (ref 13–44)
MAGNESIUM SERPL-MCNC: 1.8 MG/DL — SIGNIFICANT CHANGE UP (ref 1.6–2.6)
MCHC RBC-ENTMCNC: 29.9 PG — SIGNIFICANT CHANGE UP (ref 27–34)
MCHC RBC-ENTMCNC: 30.6 PG — SIGNIFICANT CHANGE UP (ref 27–34)
MCHC RBC-ENTMCNC: 32.4 GM/DL — SIGNIFICANT CHANGE UP (ref 32–36)
MCHC RBC-ENTMCNC: 33.3 GM/DL — SIGNIFICANT CHANGE UP (ref 32–36)
MCV RBC AUTO: 91.7 FL — SIGNIFICANT CHANGE UP (ref 80–100)
MCV RBC AUTO: 92.4 FL — SIGNIFICANT CHANGE UP (ref 80–100)
MONOCYTES # BLD AUTO: 0.29 K/UL — SIGNIFICANT CHANGE UP (ref 0–0.9)
MONOCYTES NFR BLD AUTO: 7.6 % — SIGNIFICANT CHANGE UP (ref 2–14)
NEUTROPHILS # BLD AUTO: 2.16 K/UL — SIGNIFICANT CHANGE UP (ref 1.8–7.4)
NEUTROPHILS NFR BLD AUTO: 56.5 % — SIGNIFICANT CHANGE UP (ref 43–77)
NRBC # BLD: 0 /100 WBCS — SIGNIFICANT CHANGE UP (ref 0–0)
NRBC # BLD: 0 /100 WBCS — SIGNIFICANT CHANGE UP (ref 0–0)
NRBC # FLD: 0 K/UL — SIGNIFICANT CHANGE UP (ref 0–0)
NRBC # FLD: 0 K/UL — SIGNIFICANT CHANGE UP (ref 0–0)
PHOSPHATE SERPL-MCNC: 3.1 MG/DL — SIGNIFICANT CHANGE UP (ref 2.5–4.5)
PLATELET # BLD AUTO: 230 K/UL — SIGNIFICANT CHANGE UP (ref 150–400)
PLATELET # BLD AUTO: 277 K/UL — SIGNIFICANT CHANGE UP (ref 150–400)
POTASSIUM SERPL-MCNC: 3.5 MMOL/L — SIGNIFICANT CHANGE UP (ref 3.5–5.3)
POTASSIUM SERPL-MCNC: 3.5 MMOL/L — SIGNIFICANT CHANGE UP (ref 3.5–5.3)
POTASSIUM SERPL-SCNC: 3.5 MMOL/L — SIGNIFICANT CHANGE UP (ref 3.5–5.3)
POTASSIUM SERPL-SCNC: 3.5 MMOL/L — SIGNIFICANT CHANGE UP (ref 3.5–5.3)
PROT SERPL-MCNC: 7.6 G/DL — SIGNIFICANT CHANGE UP (ref 6–8.3)
RBC # BLD: 3.99 M/UL — LOW (ref 4.2–5.8)
RBC # BLD: 4.48 M/UL — SIGNIFICANT CHANGE UP (ref 4.2–5.8)
RBC # FLD: 14.1 % — SIGNIFICANT CHANGE UP (ref 10.3–14.5)
RBC # FLD: 14.4 % — SIGNIFICANT CHANGE UP (ref 10.3–14.5)
SARS-COV-2 RNA SPEC QL NAA+PROBE: SIGNIFICANT CHANGE UP
SODIUM SERPL-SCNC: 135 MMOL/L — SIGNIFICANT CHANGE UP (ref 135–145)
SODIUM SERPL-SCNC: 140 MMOL/L — SIGNIFICANT CHANGE UP (ref 135–145)
WBC # BLD: 3.82 K/UL — SIGNIFICANT CHANGE UP (ref 3.8–10.5)
WBC # BLD: 5.33 K/UL — SIGNIFICANT CHANGE UP (ref 3.8–10.5)
WBC # FLD AUTO: 3.82 K/UL — SIGNIFICANT CHANGE UP (ref 3.8–10.5)
WBC # FLD AUTO: 5.33 K/UL — SIGNIFICANT CHANGE UP (ref 3.8–10.5)

## 2022-12-10 PROCEDURE — 12345: CPT | Mod: NC,GC

## 2022-12-10 PROCEDURE — 99223 1ST HOSP IP/OBS HIGH 75: CPT

## 2022-12-10 RX ORDER — INFLUENZA VIRUS VACCINE 15; 15; 15; 15 UG/.5ML; UG/.5ML; UG/.5ML; UG/.5ML
0.5 SUSPENSION INTRAMUSCULAR ONCE
Refills: 0 | Status: DISCONTINUED | OUTPATIENT
Start: 2022-12-10 | End: 2022-12-15

## 2022-12-10 RX ORDER — FOLIC ACID 0.8 MG
1 TABLET ORAL DAILY
Refills: 0 | Status: DISCONTINUED | OUTPATIENT
Start: 2022-12-10 | End: 2022-12-15

## 2022-12-10 RX ORDER — ONDANSETRON 8 MG/1
4 TABLET, FILM COATED ORAL EVERY 8 HOURS
Refills: 0 | Status: DISCONTINUED | OUTPATIENT
Start: 2022-12-10 | End: 2022-12-15

## 2022-12-10 RX ORDER — DORZOLAMIDE HYDROCHLORIDE 20 MG/ML
1 SOLUTION/ DROPS OPHTHALMIC
Refills: 0 | Status: DISCONTINUED | OUTPATIENT
Start: 2022-12-10 | End: 2022-12-15

## 2022-12-10 RX ORDER — ENOXAPARIN SODIUM 100 MG/ML
40 INJECTION SUBCUTANEOUS EVERY 24 HOURS
Refills: 0 | Status: DISCONTINUED | OUTPATIENT
Start: 2022-12-10 | End: 2022-12-15

## 2022-12-10 RX ORDER — THIAMINE MONONITRATE (VIT B1) 100 MG
100 TABLET ORAL DAILY
Refills: 0 | Status: DISCONTINUED | OUTPATIENT
Start: 2022-12-10 | End: 2022-12-15

## 2022-12-10 RX ORDER — ACETAMINOPHEN 500 MG
650 TABLET ORAL EVERY 6 HOURS
Refills: 0 | Status: DISCONTINUED | OUTPATIENT
Start: 2022-12-10 | End: 2022-12-15

## 2022-12-10 RX ORDER — SODIUM CHLORIDE 9 MG/ML
1000 INJECTION, SOLUTION INTRAVENOUS
Refills: 0 | Status: DISCONTINUED | OUTPATIENT
Start: 2022-12-10 | End: 2022-12-12

## 2022-12-10 RX ORDER — ATORVASTATIN CALCIUM 80 MG/1
40 TABLET, FILM COATED ORAL AT BEDTIME
Refills: 0 | Status: DISCONTINUED | OUTPATIENT
Start: 2022-12-10 | End: 2022-12-15

## 2022-12-10 RX ORDER — ACETAMINOPHEN 500 MG
650 TABLET ORAL EVERY 6 HOURS
Refills: 0 | Status: DISCONTINUED | OUTPATIENT
Start: 2022-12-10 | End: 2022-12-10

## 2022-12-10 RX ORDER — LATANOPROST 0.05 MG/ML
1 SOLUTION/ DROPS OPHTHALMIC; TOPICAL AT BEDTIME
Refills: 0 | Status: DISCONTINUED | OUTPATIENT
Start: 2022-12-10 | End: 2022-12-15

## 2022-12-10 RX ORDER — LANOLIN ALCOHOL/MO/W.PET/CERES
3 CREAM (GRAM) TOPICAL AT BEDTIME
Refills: 0 | Status: DISCONTINUED | OUTPATIENT
Start: 2022-12-10 | End: 2022-12-15

## 2022-12-10 RX ADMIN — Medication 1 TABLET(S): at 11:10

## 2022-12-10 RX ADMIN — Medication 4 MILLIGRAM(S): at 19:24

## 2022-12-10 RX ADMIN — SODIUM CHLORIDE 75 MILLILITER(S): 9 INJECTION, SOLUTION INTRAVENOUS at 03:54

## 2022-12-10 RX ADMIN — Medication 2 MILLIGRAM(S): at 07:38

## 2022-12-10 RX ADMIN — Medication 100 MILLIGRAM(S): at 11:10

## 2022-12-10 RX ADMIN — Medication 4 MILLIGRAM(S): at 16:29

## 2022-12-10 RX ADMIN — Medication 650 MILLIGRAM(S): at 07:37

## 2022-12-10 RX ADMIN — Medication 650 MILLIGRAM(S): at 08:07

## 2022-12-10 RX ADMIN — Medication 4 MILLIGRAM(S): at 23:35

## 2022-12-10 RX ADMIN — Medication 2 MILLIGRAM(S): at 11:10

## 2022-12-10 RX ADMIN — SODIUM CHLORIDE 75 MILLILITER(S): 9 INJECTION, SOLUTION INTRAVENOUS at 07:38

## 2022-12-10 RX ADMIN — SODIUM CHLORIDE 75 MILLILITER(S): 9 INJECTION, SOLUTION INTRAVENOUS at 23:34

## 2022-12-10 RX ADMIN — DORZOLAMIDE HYDROCHLORIDE 1 DROP(S): 20 SOLUTION/ DROPS OPHTHALMIC at 11:11

## 2022-12-10 RX ADMIN — Medication 1 TABLET(S): at 01:17

## 2022-12-10 RX ADMIN — ATORVASTATIN CALCIUM 40 MILLIGRAM(S): 80 TABLET, FILM COATED ORAL at 23:34

## 2022-12-10 RX ADMIN — Medication 105 MILLIGRAM(S): at 01:19

## 2022-12-10 RX ADMIN — LATANOPROST 1 DROP(S): 0.05 SOLUTION/ DROPS OPHTHALMIC; TOPICAL at 23:34

## 2022-12-10 RX ADMIN — Medication 1 MILLIGRAM(S): at 11:09

## 2022-12-10 RX ADMIN — ENOXAPARIN SODIUM 40 MILLIGRAM(S): 100 INJECTION SUBCUTANEOUS at 01:20

## 2022-12-10 RX ADMIN — DORZOLAMIDE HYDROCHLORIDE 1 DROP(S): 20 SOLUTION/ DROPS OPHTHALMIC at 23:34

## 2022-12-10 RX ADMIN — Medication 2 MILLIGRAM(S): at 03:48

## 2022-12-10 RX ADMIN — Medication 2 MILLIGRAM(S): at 01:33

## 2022-12-10 RX ADMIN — SODIUM CHLORIDE 75 MILLILITER(S): 9 INJECTION, SOLUTION INTRAVENOUS at 11:12

## 2022-12-10 RX ADMIN — SODIUM CHLORIDE 1000 MILLILITER(S): 9 INJECTION INTRAMUSCULAR; INTRAVENOUS; SUBCUTANEOUS at 01:21

## 2022-12-10 NOTE — H&P ADULT - PROBLEM SELECTOR PLAN 1
New  multiple admission for similar presentation  Alcohol level >325  CIWA with taper ordered  LR 75ml/hr  Social work consulted  Monitor

## 2022-12-10 NOTE — H&P ADULT - NSHPPHYSICALEXAM_GEN_ALL_CORE
PHYSICAL EXAM:  GENERAL: NAD, well-developed, well-nourished  HEAD:  Atraumatic, Normocephalic  EYES: EOMI, PERRL, right eye with clouding and redness of the eye noted and ptosis  (pt reports chronic)   NECK: Supple, No JVD  CHEST/LUNG: Clear to auscultation bilaterally; No wheezes, rales or rhonchi  HEART: Regular rate and rhythm; No murmurs, rubs, or gallops, (+)S1, S2  ABDOMEN: Soft, Nontender, Nondistended; Normal Bowel sounds   EXTREMITIES:  2+ Peripheral Pulses, No clubbing, cyanosis, or edema  PSYCH: normal mood and affect  NEUROLOGY: AAOx3, non-focal  SKIN: multiple abrasions throughout body in various stages of healing, none appear infected

## 2022-12-10 NOTE — PATIENT PROFILE ADULT - FALL HARM RISK - HARM RISK INTERVENTIONS
Assistance with ambulation/Assistance OOB with selected safe patient handling equipment/Communicate Risk of Fall with Harm to all staff/Monitor for mental status changes/Monitor gait and stability/Reinforce activity limits and safety measures with patient and family/Tailored Fall Risk Interventions/Toileting schedule using arm’s reach rule for commode and bathroom/Use of alarms - bed, chair and/or voice tab/Visual Cue: Yellow wristband and red socks/Bed in lowest position, wheels locked, appropriate side rails in place/Call bell, personal items and telephone in reach/Instruct patient to call for assistance before getting out of bed or chair/Non-slip footwear when patient is out of bed/[place] to call system/Physically safe environment - no spills, clutter or unnecessary equipment/Purposeful Proactive Rounding/Room/bathroom lighting operational, light cord in reach

## 2022-12-10 NOTE — PROGRESS NOTE ADULT - PROBLEM SELECTOR PLAN 4
Chronic unstable  based on lipid panel in 2/2022 and pt ASCVD risk he should be on a statin  Atorvastatin 40mg daily started

## 2022-12-10 NOTE — H&P ADULT - HISTORY OF PRESENT ILLNESS
57 yr old male with a pmh of alcohol dependence, HTN, HLD, glaucoma, who presents after being found by EMS on the ground (per ED notes). Pt reports he does not fully remember how he got here as he had too much to drink. He was found with an empty bottle of alcohol next to him. Pt states his last drink was a few hours prior to presentation.  Denies  headache, dizziness, chest pain, palpitations, SOB, abdominal pain, joint pain, diarrhea/constipation, urinary symptoms.   Vitals: T 97, HR 88, /61, RR 18 satting 99% RA

## 2022-12-10 NOTE — H&P ADULT - NSHPREVIEWOFSYSTEMS_GEN_ALL_CORE
REVIEW OF SYSTEMS:    CONSTITUTIONAL: No weakness, fevers or chills  EYES/ENT: No visual changes;  No dysphagia; No sore throat; No rhinorrhea; No sinus pain/pressure  NECK: No pain or stiffness  RESPIRATORY: No cough, wheezing, hemoptysis; No shortness of breath  CARDIOVASCULAR: No chest pain or palpitations; No lower extremity edema  GASTROINTESTINAL: No abdominal or epigastric pain. No nausea, vomiting, or hematemesis; No diarrhea or constipation. No melena or hematochezia.  GENITOURINARY: No dysuria, frequency or hematuria  NEUROLOGICAL: No numbness or weakness, +tremors   MSK: ambulates without assistance   SKIN: No itching, burning, rashes, or lesions   All other review of systems is negative unless indicated above.

## 2022-12-10 NOTE — PROGRESS NOTE ADULT - SUBJECTIVE AND OBJECTIVE BOX
Patient is a 57y old  Male who presents with a chief complaint of alcohol withdrawal (10 Dec 2022 00:51)    OVERNIGHT EVENTS: No acute events ON. Intermittent HA, treated with tylenol. CIWAs range 2-6, on taper.         focused ROS as above    MEDICATIONS  (STANDING):  atorvastatin 40 milliGRAM(s) Oral at bedtime  dorzolamide 2% Ophthalmic Solution 1 Drop(s) Both EYES <User Schedule>  enoxaparin Injectable 40 milliGRAM(s) SubCutaneous every 24 hours  folic acid 1 milliGRAM(s) Oral daily  influenza   Vaccine 0.5 milliLiter(s) IntraMuscular once  lactated ringers. 1000 milliLiter(s) (75 mL/Hr) IV Continuous <Continuous>  latanoprost 0.005% Ophthalmic Solution 1 Drop(s) Both EYES at bedtime  LORazepam     Tablet 2 milliGRAM(s) Oral every 4 hours  LORazepam     Tablet 1.5 milliGRAM(s) Oral every 4 hours  LORazepam     Tablet   Oral   multivitamin 1 Tablet(s) Oral daily  thiamine 100 milliGRAM(s) Oral daily    MEDICATIONS  (PRN):  acetaminophen     Tablet .. 650 milliGRAM(s) Oral every 6 hours PRN Temp greater or equal to 38C (100.4F), Mild Pain (1 - 3), Moderate Pain (4 - 6), Severe Pain (7 - 10)  aluminum hydroxide/magnesium hydroxide/simethicone Suspension 30 milliLiter(s) Oral every 4 hours PRN Dyspepsia  artificial  tears Solution 1 Drop(s) Both EYES two times a day PRN Dry Eyes  LORazepam     Tablet 2 milliGRAM(s) Oral every 2 hours PRN Symptom-triggered 2 point increase in CIWA-Ar  melatonin 3 milliGRAM(s) Oral at bedtime PRN Insomnia  ondansetron Injectable 4 milliGRAM(s) IV Push every 8 hours PRN Nausea and/or Vomiting      OBJECTIVE:  T(C): 36.6 (12-10-22 @ 07:00), Max: 36.9 (12-10-22 @ 04:00)  HR: 82 (12-10-22 @ 07:00) (71 - 88)  BP: 134/65 (12-10-22 @ 07:00) (107/62 - 134/65)  RR: 19 (12-10-22 @ 07:00) (16 - 19)  SpO2: 97% (12-10-22 @ 07:00) (97% - 99%)    PHYSICAL EXAM  GENERAL: NAD, well-developed, well-nourished  HEAD:  Atraumatic, Normocephalic  EYES: EOMI, PERRL, right eye with clouding and redness of the eye noted and ptosis  (pt reports chronic)   NECK: Supple, No JVD  CHEST/LUNG: Clear to auscultation bilaterally; No wheezes, rales or rhonchi  HEART: Regular rate and rhythm; No murmurs, rubs, or gallops, (+)S1, S2  ABDOMEN: Soft, Nontender, Nondistended; Normal Bowel sounds   EXTREMITIES:  2+ Peripheral Pulses, No clubbing, cyanosis, or edema  PSYCH: normal mood and affect  NEUROLOGY: AAOx3, non-focal  SKIN: multiple abrasions throughout body in various stages of healing, none appear infected    LABS:  CAPILLARY BLOOD GLUCOSE        I&O's Summary                          13.4   5.33  )-----------( 277      ( 10 Dec 2022 01:30 )             41.4     12-10    140  |  100  |  8   ----------------------------<  64<L>  3.5   |  24  |  0.73    Ca    8.4      10 Dec 2022 01:30  Phos  3.1     12-10  Mg     1.80     12-10    TPro  7.6  /  Alb  4.0  /  TBili  0.5  /  DBili  x   /  AST  47<H>  /  ALT  39  /  AlkPhos  79  12-10              Microbiology:      RADIOLOGY & ADDITIONAL TESTS:    Imaging Personally Reviewed:  Consultant(s) Notes Reviewed:    Care Discussed with Consultants/Other Providers:

## 2022-12-10 NOTE — ED ADULT NURSE REASSESSMENT NOTE - NS ED NURSE REASSESS COMMENT FT1
Break coverage RN. Patient sleeping on stretcher, respirations even and unlabored. Patient states "I feel better and want to sleep". IV line observed, fluids running per orders. Stretcher in lowest position, wheels locked, appropriate side rails in place.

## 2022-12-10 NOTE — H&P ADULT - PROBLEM SELECTOR PLAN 2
Chronic stable  /61  Seems like amlodipine was d/c on admission in November   Monitor and start antihypertensives as appropriate

## 2022-12-10 NOTE — H&P ADULT - NSHPLABSRESULTS_GEN_ALL_CORE
labs reviewed                        14.3   5.11  )-----------( 267      ( 08 Dec 2022 19:59 )             43.4       12-08    146<H>  |  109<H>  |  6<L>  ----------------------------<  92  4.4   |  28  |  0.75    Ca    8.6      08 Dec 2022 19:59  Mg     2.2     12-08    TPro  8.0  /  Alb  3.5  /  TBili  0.3  /  DBili  x   /  AST  43<H>  /  ALT  50  /  AlkPhos  79  12-08  images interpreted by radiology with results reviewed:  CT head: No acute intracranial hemorrhage or mass effect.    CT cervical spine: No evidence for acute displaced fracture or malalignment.      Prior chart notes reviewed

## 2022-12-10 NOTE — ED ADULT NURSE REASSESSMENT NOTE - NS ED NURSE REASSESS COMMENT FT1
Pt is awake and alert, mild tremors noted, thirsty and asking for water, able to void in a urinal, medications administered as ordered, pt resting comfortably in bed, awaiting bed assignment.

## 2022-12-11 ENCOUNTER — TRANSCRIPTION ENCOUNTER (OUTPATIENT)
Age: 57
End: 2022-12-11

## 2022-12-11 DIAGNOSIS — Z00.00 ENCOUNTER FOR GENERAL ADULT MEDICAL EXAMINATION WITHOUT ABNORMAL FINDINGS: ICD-10-CM

## 2022-12-11 PROCEDURE — 99233 SBSQ HOSP IP/OBS HIGH 50: CPT | Mod: GC

## 2022-12-11 RX ORDER — ATORVASTATIN CALCIUM 80 MG/1
1 TABLET, FILM COATED ORAL
Qty: 30 | Refills: 0
Start: 2022-12-11 | End: 2023-01-09

## 2022-12-11 RX ORDER — THIAMINE MONONITRATE (VIT B1) 100 MG
1 TABLET ORAL
Qty: 0 | Refills: 0 | DISCHARGE
Start: 2022-12-11

## 2022-12-11 RX ADMIN — Medication 3 MILLIGRAM(S): at 18:00

## 2022-12-11 RX ADMIN — Medication 3 MILLIGRAM(S): at 13:57

## 2022-12-11 RX ADMIN — Medication 1 TABLET(S): at 11:27

## 2022-12-11 RX ADMIN — LATANOPROST 1 DROP(S): 0.05 SOLUTION/ DROPS OPHTHALMIC; TOPICAL at 22:03

## 2022-12-11 RX ADMIN — ENOXAPARIN SODIUM 40 MILLIGRAM(S): 100 INJECTION SUBCUTANEOUS at 00:49

## 2022-12-11 RX ADMIN — Medication 3 MILLIGRAM(S): at 22:04

## 2022-12-11 RX ADMIN — Medication 100 MILLIGRAM(S): at 11:27

## 2022-12-11 RX ADMIN — ATORVASTATIN CALCIUM 40 MILLIGRAM(S): 80 TABLET, FILM COATED ORAL at 22:03

## 2022-12-11 RX ADMIN — Medication 4 MILLIGRAM(S): at 10:17

## 2022-12-11 RX ADMIN — Medication 4 MILLIGRAM(S): at 03:01

## 2022-12-11 RX ADMIN — Medication 4 MILLIGRAM(S): at 07:04

## 2022-12-11 RX ADMIN — SODIUM CHLORIDE 75 MILLILITER(S): 9 INJECTION, SOLUTION INTRAVENOUS at 13:32

## 2022-12-11 RX ADMIN — Medication 1 MILLIGRAM(S): at 11:27

## 2022-12-11 RX ADMIN — SODIUM CHLORIDE 75 MILLILITER(S): 9 INJECTION, SOLUTION INTRAVENOUS at 22:04

## 2022-12-11 RX ADMIN — DORZOLAMIDE HYDROCHLORIDE 1 DROP(S): 20 SOLUTION/ DROPS OPHTHALMIC at 22:03

## 2022-12-11 RX ADMIN — DORZOLAMIDE HYDROCHLORIDE 1 DROP(S): 20 SOLUTION/ DROPS OPHTHALMIC at 10:15

## 2022-12-11 NOTE — PROGRESS NOTE ADULT - ASSESSMENT
57 yr old male presenting for alcohol withdrawal  57 yr old male presents to the ED after being found down, admitted for alcohol withdrawal.

## 2022-12-11 NOTE — PROGRESS NOTE ADULT - PROBLEM SELECTOR PLAN 1
New  multiple admission for similar presentation  Alcohol level >325  CIWA with taper ordered  LR 75ml/hr  Social work consulted  Monitor Multiple admission for similar presentation  Alcohol level >325,   CIWA with taper ordered  LR 75ml/hr  Social work consulted  Monitor

## 2022-12-11 NOTE — DISCHARGE NOTE PROVIDER - NSDCCPCAREPLAN_GEN_ALL_CORE_FT
PRINCIPAL DISCHARGE DIAGNOSIS  Diagnosis: Alcohol dependence with withdrawal  Assessment and Plan of Treatment: You were admitted with alcohol withdrawal. Your symptoms were treated with medications and you finished the withdrawal period. You spoke to the  regarding options for rehab. When you return home, please refrain from drinking alcohol. Further alcohol use can result in worsening liver function and be detrimental for your health.         SECONDARY DISCHARGE DIAGNOSES  Diagnosis: Glaucoma  Assessment and Plan of Treatment: You have a known diagnosis of glaucoma. Please continue taking your eye drops.    Diagnosis: Hyperlipemia  Assessment and Plan of Treatment: You have an elevated level of cholesterol. We started you on a cholesterol lowering medication called atorvastatin. Please continue taking the current medication and follow up with your primary care doctor for further management.     PRINCIPAL DISCHARGE DIAGNOSIS  Diagnosis: Alcohol dependence with withdrawal  Assessment and Plan of Treatment: You were admitted with alcohol withdrawal. You were treated with a medication called Ativan which helps reduce your withdrawal symptoms and you finished the withdrawal period. You spoke to the  regarding options for rehab. When you return home, please refrain from drinking alcohol. Further alcohol use can result in worsening liver function and be detrimental for your health.   If you would like help reducing your alcohol use, continue to attend AA meetings and see a psychiatrist.         SECONDARY DISCHARGE DIAGNOSES  Diagnosis: Glaucoma  Assessment and Plan of Treatment: You have a known diagnosis of glaucoma. Please continue taking your eye drops.    Diagnosis: Hyperlipemia  Assessment and Plan of Treatment: You have an elevated level of cholesterol. We started you on a cholesterol lowering medication called atorvastatin. Please continue taking the current medication and follow up with your primary care doctor for further management.     PRINCIPAL DISCHARGE DIAGNOSIS  Diagnosis: Alcohol dependence with withdrawal  Assessment and Plan of Treatment: You were admitted with alcohol withdrawal. You were treated with a medication called Ativan which helps reduce your withdrawal symptoms and you finished the withdrawal period. You spoke to the  regarding options for rehab and you chose not to pursue inpatient rehab. Please follow up with outpatient rehab.   When you return home, please refrain from drinking alcohol. Further alcohol use can result in worsening liver function and be detrimental for your health.   If you would like help reducing your alcohol use, continue to attend AA meetings and see a psychiatrist.         SECONDARY DISCHARGE DIAGNOSES  Diagnosis: Glaucoma  Assessment and Plan of Treatment: You have a known diagnosis of glaucoma. Please continue taking your eye drops.    Diagnosis: Hyperlipemia  Assessment and Plan of Treatment: You have an elevated level of cholesterol. We started you on a cholesterol lowering medication called atorvastatin. Please continue taking the current medication and follow up with your primary care doctor for further management.

## 2022-12-11 NOTE — PROGRESS NOTE ADULT - PROBLEM SELECTOR PLAN 4
Chronic unstable  based on lipid panel in 2/2022 and pt ASCVD risk he should be on a statin  Atorvastatin 40mg daily started Based on lipid panel in 2/2022 and pt ASCVD risk he should be on a statin  Atorvastatin 40mg daily started

## 2022-12-11 NOTE — DISCHARGE NOTE PROVIDER - NSDCMRMEDTOKEN_GEN_ALL_CORE_FT
Artificial Tears ophthalmic solution: 1 drop(s) to each affected eye 4 times a day, As Needed  folic acid 1 mg oral tablet: 1 tab(s) orally once a day  latanoprost 0.005% ophthalmic solution: 1 drop(s) to each affected eye once a day (at bedtime)  Multiple Vitamins oral tablet: 1 tab(s) orally once a day  Trusopt 2% ophthalmic solution: 1 drop(s) to each affected eye 2 times a day  Vitamin B1 100 mg oral tablet: 1 tab(s) orally once a day   Artificial Tears ophthalmic solution: 1 drop(s) to each affected eye 4 times a day, As Needed  folic acid 1 mg oral tablet: 1 tab(s) orally once a day  latanoprost 0.005% ophthalmic solution: 1 drop(s) to each affected eye once a day (at bedtime)  Lipitor 40 mg oral tablet: 1 tab(s) orally once a day (at bedtime)  Multiple Vitamins oral tablet: 1 tab(s) orally once a day  Trusopt 2% ophthalmic solution: 1 drop(s) to each affected eye 2 times a day  Vitamin B1 100 mg oral tablet: 1 tab(s) orally once a day   folic acid 1 mg oral tablet: 1 tab(s) orally once a day  Multiple Vitamins oral tablet: 1 tab(s) orally once a day

## 2022-12-11 NOTE — PROGRESS NOTE ADULT - SUBJECTIVE AND OBJECTIVE BOX
Patient is a 57y old  Male who presents with a chief complaint of alcohol withdrawal (10 Dec 2022 00:51)    OVERNIGHT EVENTS: No acute events ON. Intermittent HA, treated with tylenol. CIWAs range 2-6, on taper.         focused ROS as above    MEDICATIONS  (STANDING):  atorvastatin 40 milliGRAM(s) Oral at bedtime  dorzolamide 2% Ophthalmic Solution 1 Drop(s) Both EYES <User Schedule>  enoxaparin Injectable 40 milliGRAM(s) SubCutaneous every 24 hours  folic acid 1 milliGRAM(s) Oral daily  influenza   Vaccine 0.5 milliLiter(s) IntraMuscular once  lactated ringers. 1000 milliLiter(s) (75 mL/Hr) IV Continuous <Continuous>  latanoprost 0.005% Ophthalmic Solution 1 Drop(s) Both EYES at bedtime  LORazepam     Tablet 2 milliGRAM(s) Oral every 4 hours  LORazepam     Tablet 1.5 milliGRAM(s) Oral every 4 hours  LORazepam     Tablet   Oral   multivitamin 1 Tablet(s) Oral daily  thiamine 100 milliGRAM(s) Oral daily    MEDICATIONS  (PRN):  acetaminophen     Tablet .. 650 milliGRAM(s) Oral every 6 hours PRN Temp greater or equal to 38C (100.4F), Mild Pain (1 - 3), Moderate Pain (4 - 6), Severe Pain (7 - 10)  aluminum hydroxide/magnesium hydroxide/simethicone Suspension 30 milliLiter(s) Oral every 4 hours PRN Dyspepsia  artificial  tears Solution 1 Drop(s) Both EYES two times a day PRN Dry Eyes  LORazepam     Tablet 2 milliGRAM(s) Oral every 2 hours PRN Symptom-triggered 2 point increase in CIWA-Ar  melatonin 3 milliGRAM(s) Oral at bedtime PRN Insomnia  ondansetron Injectable 4 milliGRAM(s) IV Push every 8 hours PRN Nausea and/or Vomiting      OBJECTIVE:  Vital Signs Last 24 Hrs  T(C): 36.7 (11 Dec 2022 07:00), Max: 36.9 (11 Dec 2022 01:00)  T(F): 98 (11 Dec 2022 07:00), Max: 98.4 (11 Dec 2022 01:00)  HR: 65 (11 Dec 2022 07:00) (60 - 83)  BP: 142/86 (11 Dec 2022 07:00) (130/71 - 153/70)  BP(mean): --  RR: 16 (11 Dec 2022 07:00) (16 - 18)  SpO2: 99% (11 Dec 2022 07:00) (95% - 100%)    Parameters below as of 11 Dec 2022 07:00  Patient On (Oxygen Delivery Method): room air        PHYSICAL EXAM  GENERAL: NAD, well-developed, well-nourished  HEAD:  Atraumatic, Normocephalic  EYES: EOMI, PERRL, right eye with clouding and redness of the eye noted and ptosis  (pt reports chronic)   NECK: Supple, No JVD  CHEST/LUNG: Clear to auscultation bilaterally; No wheezes, rales or rhonchi  HEART: Regular rate and rhythm; No murmurs, rubs, or gallops, (+)S1, S2  ABDOMEN: Soft, Nontender, Nondistended; Normal Bowel sounds   EXTREMITIES:  2+ Peripheral Pulses, No clubbing, cyanosis, or edema  PSYCH: normal mood and affect  NEUROLOGY: AAOx3, non-focal  SKIN: multiple abrasions throughout body in various stages of healing, none appear infected      LABS:                        12.2   3.82  )-----------( 230      ( 10 Dec 2022 07:23 )             36.6     12-10    135  |  97<L>  |  8   ----------------------------<  63<L>  3.5   |  21<L>  |  0.65    Ca    8.0<L>      10 Dec 2022 07:23  Phos  3.1     12-10  Mg     1.80     12-10    TPro  7.6  /  Alb  4.0  /  TBili  0.5  /  DBili  x   /  AST  47<H>  /  ALT  39  /  AlkPhos  79  12-10      CARDIAC MARKERS ( 10 Dec 2022 07:23 )  x     / x     / 187 U/L / x     / x             Patient is a 57y old  Male who presents with a chief complaint of alcohol withdrawal (10 Dec 2022 00:51)    OVERNIGHT EVENTS: No acute events ON. Intermittent HA, treated with tylenol. CIWAs range 2-6, on taper.         focused ROS as above    MEDICATIONS  (STANDING):  atorvastatin 40 milliGRAM(s) Oral at bedtime  dorzolamide 2% Ophthalmic Solution 1 Drop(s) Both EYES <User Schedule>  enoxaparin Injectable 40 milliGRAM(s) SubCutaneous every 24 hours  folic acid 1 milliGRAM(s) Oral daily  influenza   Vaccine 0.5 milliLiter(s) IntraMuscular once  lactated ringers. 1000 milliLiter(s) (75 mL/Hr) IV Continuous <Continuous>  latanoprost 0.005% Ophthalmic Solution 1 Drop(s) Both EYES at bedtime  LORazepam     Tablet 2 milliGRAM(s) Oral every 4 hours  LORazepam     Tablet 1.5 milliGRAM(s) Oral every 4 hours  LORazepam     Tablet   Oral   multivitamin 1 Tablet(s) Oral daily  thiamine 100 milliGRAM(s) Oral daily    MEDICATIONS  (PRN):  acetaminophen     Tablet .. 650 milliGRAM(s) Oral every 6 hours PRN Temp greater or equal to 38C (100.4F), Mild Pain (1 - 3), Moderate Pain (4 - 6), Severe Pain (7 - 10)  aluminum hydroxide/magnesium hydroxide/simethicone Suspension 30 milliLiter(s) Oral every 4 hours PRN Dyspepsia  artificial  tears Solution 1 Drop(s) Both EYES two times a day PRN Dry Eyes  LORazepam     Tablet 2 milliGRAM(s) Oral every 2 hours PRN Symptom-triggered 2 point increase in CIWA-Ar  melatonin 3 milliGRAM(s) Oral at bedtime PRN Insomnia  ondansetron Injectable 4 milliGRAM(s) IV Push every 8 hours PRN Nausea and/or Vomiting      OBJECTIVE:  Vital Signs Last 24 Hrs  T(C): 36.7 (11 Dec 2022 07:00), Max: 36.9 (11 Dec 2022 01:00)  T(F): 98 (11 Dec 2022 07:00), Max: 98.4 (11 Dec 2022 01:00)  HR: 65 (11 Dec 2022 07:00) (60 - 83)  BP: 142/86 (11 Dec 2022 07:00) (130/71 - 153/70)  BP(mean): --  RR: 16 (11 Dec 2022 07:00) (16 - 18)  SpO2: 99% (11 Dec 2022 07:00) (95% - 100%)    Parameters below as of 11 Dec 2022 07:00  Patient On (Oxygen Delivery Method): room air        PHYSICAL EXAM  GENERAL: NAD, well-developed, well-nourished  HEAD:  Atraumatic, Normocephalic  EYES: EOMI, PERRL, right eye with clouding and redness of the eye noted and ptosis  (pt reports chronic)   NECK: Supple, No JVD  CHEST/LUNG: Clear to auscultation bilaterally; No wheezes, rales or rhonchi  HEART: Regular rate and rhythm; No murmurs, rubs, or gallops, (+)S1, S2  ABDOMEN: Soft, Nontender, Nondistended; Normal Bowel sounds   EXTREMITIES:  2+ Peripheral Pulses, No clubbing, cyanosis, or edema  PSYCH: normal mood and affect  NEUROLOGY: AAOx3, non-focal  SKIN: multiple abrasions throughout body in various stages of healing, none appear infected      LABS:   LABS:                        12.2   3.82  )-----------( 230      ( 10 Dec 2022 07:23 )             36.6     12-10    135  |  97<L>  |  8   ----------------------------<  63<L>  3.5   |  21<L>  |  0.65    Ca    8.0<L>      10 Dec 2022 07:23  Phos  3.1     12-10  Mg     1.80     12-10    TPro  7.6  /  Alb  4.0  /  TBili  0.5  /  DBili  x   /  AST  47<H>  /  ALT  39  /  AlkPhos  79  12-10      CARDIAC MARKERS ( 10 Dec 2022 07:23 )  x     / x     / 187 U/L / x     / x

## 2022-12-11 NOTE — DISCHARGE NOTE PROVIDER - CARE PROVIDER_API CALL
Carlita Miranda  FAMILY MEDICINE  [address] [place] [address]  Phone: [phone]  Fax: [phone]  Follow Up Time:

## 2022-12-11 NOTE — DISCHARGE NOTE PROVIDER - CARE PROVIDERS DIRECT ADDRESSES
[email or web address]

## 2022-12-11 NOTE — PROGRESS NOTE ADULT - PROBLEM SELECTOR PLAN 2
Chronic stable  /61  Seems like amlodipine was d/c on admission in November   Monitor and start antihypertensives as appropriate BPs 130-140s  Seems like amlodipine was d/c on admission in November   BPs during withdrawal period less likely to reflect chronic findings   Outpatient follow up

## 2022-12-11 NOTE — DISCHARGE NOTE PROVIDER - HOSPITAL COURSE
57 yr old male with a pmh of alcohol dependence, HTN, HLD, glaucoma, who presented after being found by EMS on the ground (per ED notes). He was found with an empty bottle of alcohol next to him. Pt does not have recollection of the day, but says last drink was Friday morning. Pt w/o known hx of DT/seizures, but was placed on ativan taper. Ativan taper was upgraded to higher dose range for improved symptom control. Pt was started on atorvastatin, and given hx of glaucoma, was restarted on eye drops. 57 yr old male with a pmh of alcohol dependence, HTN, HLD, glaucoma, who presented after being found by EMS on the ground (per ED notes). He was found with an empty bottle of alcohol next to him. Pt does not have recollection of the day, but says last drink was Friday morning. Pt w/o known hx of DT/seizures, but was placed on ativan taper. Ativan taper was upgraded to higher dose range for improved symptom control. Pt was started on atorvastatin, and given hx of glaucoma, was restarted on eye drops. Pt did not have withdrawal symptoms on discharge. He had a 57 yr old male with a pmh of alcohol dependence, HTN, HLD, glaucoma, who presented after being found by EMS on the ground (per ED notes). He was found with an empty bottle of alcohol next to him. Pt does not have recollection of the day, but says last drink was Friday morning. Pt w/o known hx of DT/seizures, but was placed on ativan taper. Ativan taper was upgraded to higher dose range for improved symptom control. Pt was started on atorvastatin, and given hx of glaucoma, was restarted on eye drops. Pt did not have withdrawal symptoms on discharge. He had a few episodes of diarrhea during his hospitalization which self resolved.      57 yr old male with a pmh of alcohol dependence, HTN, HLD, glaucoma, who presented after being found by EMS on the ground (per ED notes). He was found with an empty bottle of alcohol next to him. Pt does not have recollection of the day, but says last drink was Friday morning. Pt w/o known hx of DT/seizures, but was placed on ativan taper. Ativan taper was upgraded to higher dose range for improved symptom control. Pt was started on atorvastatin, and given hx of glaucoma, was restarted on eye drops. Pt did not have withdrawal symptoms on discharge. He had a few episodes of diarrhea during his hospitalization which self resolved.     Upon discharge the patient the patient was medically optimized and hemodynamically stable.

## 2022-12-12 LAB
ANION GAP SERPL CALC-SCNC: 14 MMOL/L — SIGNIFICANT CHANGE UP (ref 7–14)
BUN SERPL-MCNC: 6 MG/DL — LOW (ref 7–23)
CALCIUM SERPL-MCNC: 8.8 MG/DL — SIGNIFICANT CHANGE UP (ref 8.4–10.5)
CHLORIDE SERPL-SCNC: 103 MMOL/L — SIGNIFICANT CHANGE UP (ref 98–107)
CO2 SERPL-SCNC: 20 MMOL/L — LOW (ref 22–31)
CREAT SERPL-MCNC: 0.61 MG/DL — SIGNIFICANT CHANGE UP (ref 0.5–1.3)
EGFR: 111 ML/MIN/1.73M2 — SIGNIFICANT CHANGE UP
GLUCOSE SERPL-MCNC: 118 MG/DL — HIGH (ref 70–99)
MAGNESIUM SERPL-MCNC: 1.9 MG/DL — SIGNIFICANT CHANGE UP (ref 1.6–2.6)
PHOSPHATE SERPL-MCNC: 3.4 MG/DL — SIGNIFICANT CHANGE UP (ref 2.5–4.5)
POTASSIUM SERPL-MCNC: 3.5 MMOL/L — SIGNIFICANT CHANGE UP (ref 3.5–5.3)
POTASSIUM SERPL-SCNC: 3.5 MMOL/L — SIGNIFICANT CHANGE UP (ref 3.5–5.3)
SODIUM SERPL-SCNC: 137 MMOL/L — SIGNIFICANT CHANGE UP (ref 135–145)

## 2022-12-12 PROCEDURE — 99233 SBSQ HOSP IP/OBS HIGH 50: CPT

## 2022-12-12 RX ADMIN — DORZOLAMIDE HYDROCHLORIDE 1 DROP(S): 20 SOLUTION/ DROPS OPHTHALMIC at 22:12

## 2022-12-12 RX ADMIN — ENOXAPARIN SODIUM 40 MILLIGRAM(S): 100 INJECTION SUBCUTANEOUS at 02:04

## 2022-12-12 RX ADMIN — LATANOPROST 1 DROP(S): 0.05 SOLUTION/ DROPS OPHTHALMIC; TOPICAL at 22:13

## 2022-12-12 RX ADMIN — Medication 3 MILLIGRAM(S): at 06:24

## 2022-12-12 RX ADMIN — ATORVASTATIN CALCIUM 40 MILLIGRAM(S): 80 TABLET, FILM COATED ORAL at 22:12

## 2022-12-12 RX ADMIN — Medication 3 MILLIGRAM(S): at 10:19

## 2022-12-12 RX ADMIN — Medication 3 MILLIGRAM(S): at 02:03

## 2022-12-12 RX ADMIN — Medication 2 MILLIGRAM(S): at 18:00

## 2022-12-12 RX ADMIN — SODIUM CHLORIDE 75 MILLILITER(S): 9 INJECTION, SOLUTION INTRAVENOUS at 11:41

## 2022-12-12 RX ADMIN — Medication 1 TABLET(S): at 11:43

## 2022-12-12 RX ADMIN — Medication 2 MILLIGRAM(S): at 14:30

## 2022-12-12 RX ADMIN — Medication 1 MILLIGRAM(S): at 11:43

## 2022-12-12 RX ADMIN — Medication 3 MILLIGRAM(S): at 22:12

## 2022-12-12 RX ADMIN — Medication 100 MILLIGRAM(S): at 11:43

## 2022-12-12 RX ADMIN — Medication 2 MILLIGRAM(S): at 22:12

## 2022-12-12 RX ADMIN — DORZOLAMIDE HYDROCHLORIDE 1 DROP(S): 20 SOLUTION/ DROPS OPHTHALMIC at 10:19

## 2022-12-12 NOTE — DIETITIAN INITIAL EVALUATION ADULT - OTHER INFO
Per chart, pt is 57 year old male PMH HTN, HLD, glaucoma, EtOH misuse admitted for alcohol withdrawal.     Pt confirms NKFA, denies difficulties chewing/swallowing. Pt denies issues with access to food PTA, reports he is able to cook. Pt with multiple admissions for similar presentation. EtOH use inclusive of multiple beers and 1-2 pints of vodka daily for the past 6 months.     Pt reports fair appetite/PO intake as his glasses are broken. Pt amenable to provision of nutrition supplement. No noted GI distress, no bowel regimen ordered at this time.

## 2022-12-12 NOTE — DIETITIAN INITIAL EVALUATION ADULT - OTHER CALCULATIONS
Apparent ~26 lb (13%) weight loss x10 months, per history obtained via chart: (12/10 dosing) 176.1 lbs, (11/15) 184 lbs, (8/15) 179 lbs, (5/8) 199 lbs, (2/13) 202 lbs.  Pt unable to recall usual body weight/unable to endorse weight loss.  Ideal Body Weight: 154 lbs / 70 kg +/-10%

## 2022-12-12 NOTE — DIETITIAN INITIAL EVALUATION ADULT - ADD RECOMMEND
1) Recommend DASH/TLC diet.  2) RDN to provide Hormel Shake 1 PO daily (provides 520 kcal, 22 gm protein per 8.45oz serving).   3) Recommend continue micronutrient supplementation.

## 2022-12-12 NOTE — PROGRESS NOTE ADULT - SUBJECTIVE AND OBJECTIVE BOX
PROGRESS NOTE:    Stefanie Keating MD  Available on [employer] Teams      Patient is a 57y old  Male who presents with a chief complaint of alcohol withdrawal (11 Dec 2022 12:25)      SUBJECTIVE / OVERNIGHT EVENTS: No acute overnight events. Pt seen and examined. Denies fevers, chills, CP, SOB, Abdominal pain, N/V, Constipation, Diarrhea      MEDICATIONS  (STANDING):  atorvastatin 40 milliGRAM(s) Oral at bedtime  dorzolamide 2% Ophthalmic Solution 1 Drop(s) Both EYES <User Schedule>  enoxaparin Injectable 40 milliGRAM(s) SubCutaneous every 24 hours  folic acid 1 milliGRAM(s) Oral daily  influenza   Vaccine 0.5 milliLiter(s) IntraMuscular once  lactated ringers. 1000 milliLiter(s) (75 mL/Hr) IV Continuous <Continuous>  latanoprost 0.005% Ophthalmic Solution 1 Drop(s) Both EYES at bedtime  LORazepam   Injectable   IV Push   LORazepam   Injectable 3 milliGRAM(s) IV Push every 4 hours  LORazepam   Injectable 2 milliGRAM(s) IV Push every 4 hours  multivitamin 1 Tablet(s) Oral daily  thiamine 100 milliGRAM(s) Oral daily    MEDICATIONS  (PRN):  acetaminophen     Tablet .. 650 milliGRAM(s) Oral every 6 hours PRN Temp greater or equal to 38C (100.4F), Mild Pain (1 - 3), Moderate Pain (4 - 6), Severe Pain (7 - 10)  aluminum hydroxide/magnesium hydroxide/simethicone Suspension 30 milliLiter(s) Oral every 4 hours PRN Dyspepsia  artificial  tears Solution 1 Drop(s) Both EYES two times a day PRN Dry Eyes  LORazepam     Tablet 2 milliGRAM(s) Oral every 2 hours PRN Symptom-triggered 2 point increase in CIWA-Ar  LORazepam   Injectable 2 milliGRAM(s) IV Push every 1 hour PRN Symptom-triggered: each CIWA -Ar score 8 or GREATER  melatonin 3 milliGRAM(s) Oral at bedtime PRN Insomnia  ondansetron Injectable 4 milliGRAM(s) IV Push every 8 hours PRN Nausea and/or Vomiting      I&O's Summary    11 Dec 2022 07:01  -  12 Dec 2022 07:00  --------------------------------------------------------  IN: 0 mL / OUT: 1900 mL / NET: -1900 mL        Vital Signs Last 24 Hrs  T(C): 36.7 (12 Dec 2022 06:00), Max: 36.9 (11 Dec 2022 10:00)  T(F): 98 (12 Dec 2022 06:00), Max: 98.4 (11 Dec 2022 10:00)  HR: 60 (12 Dec 2022 06:00) (60 - 80)  BP: 133/81 (12 Dec 2022 06:00) (120/76 - 168/78)  BP(mean): --  RR: 16 (12 Dec 2022 06:00) (16 - 17)  SpO2: 99% (12 Dec 2022 06:00) (98% - 100%)    Parameters below as of 12 Dec 2022 06:00  Patient On (Oxygen Delivery Method): room air        =================PHYSICAL EXAM=================    GENERAL: NAD, lying in bed comfortably  HEAD:  Atraumatic, Normocephalic  EYES: EOMI, PERRLA, conjunctiva and sclera clear  ENT: Moist mucous membranes  NECK: Supple, No JVD  CHEST/LUNG: Clear to auscultation bilaterally; No rales, rhonchi, wheezing, or rubs. Unlabored respirations  HEART: Regular rate and rhythm; No murmurs, rubs, or gallops  ABDOMEN: Bowel sounds present; Soft, Nontender, Nondistended. No hepatomegally  EXTREMITIES:  2+ Peripheral Pulses, brisk capillary refill. No clubbing, cyanosis, or edema  NERVOUS SYSTEM:  Alert & Oriented X3, speech clear. No deficits   MSK: FROM all 4 extremities, full and equal strength  SKIN: No rashes or lesions    =================================================    LABS:                      RADIOLOGY & ADDITIONAL TESTS:    Imaging Personally Reviewed:    Consultant(s) Notes Reviewed:      Care Discussed with Consultants/Other Providers:   PROGRESS NOTE:    Stefanie Keating MD  Available on [employer] Teams      Patient is a 57y old  Male who presents with a chief complaint of alcohol withdrawal (11 Dec 2022 12:25)      SUBJECTIVE / OVERNIGHT EVENTS: No acute overnight events. Pt seen and examined. Denies fevers, chills, CP, SOB, Abdominal pain, N/V, Constipation, Diarrhea. Reports mild tremor of hands, but general improvement of withdrawal symptoms and no anxiety.      MEDICATIONS  (STANDING):  atorvastatin 40 milliGRAM(s) Oral at bedtime  dorzolamide 2% Ophthalmic Solution 1 Drop(s) Both EYES <User Schedule>  enoxaparin Injectable 40 milliGRAM(s) SubCutaneous every 24 hours  folic acid 1 milliGRAM(s) Oral daily  influenza   Vaccine 0.5 milliLiter(s) IntraMuscular once  lactated ringers. 1000 milliLiter(s) (75 mL/Hr) IV Continuous <Continuous>  latanoprost 0.005% Ophthalmic Solution 1 Drop(s) Both EYES at bedtime  LORazepam   Injectable   IV Push   LORazepam   Injectable 3 milliGRAM(s) IV Push every 4 hours  LORazepam   Injectable 2 milliGRAM(s) IV Push every 4 hours  multivitamin 1 Tablet(s) Oral daily  thiamine 100 milliGRAM(s) Oral daily    MEDICATIONS  (PRN):  acetaminophen     Tablet .. 650 milliGRAM(s) Oral every 6 hours PRN Temp greater or equal to 38C (100.4F), Mild Pain (1 - 3), Moderate Pain (4 - 6), Severe Pain (7 - 10)  aluminum hydroxide/magnesium hydroxide/simethicone Suspension 30 milliLiter(s) Oral every 4 hours PRN Dyspepsia  artificial  tears Solution 1 Drop(s) Both EYES two times a day PRN Dry Eyes  LORazepam     Tablet 2 milliGRAM(s) Oral every 2 hours PRN Symptom-triggered 2 point increase in CIWA-Ar  LORazepam   Injectable 2 milliGRAM(s) IV Push every 1 hour PRN Symptom-triggered: each CIWA -Ar score 8 or GREATER  melatonin 3 milliGRAM(s) Oral at bedtime PRN Insomnia  ondansetron Injectable 4 milliGRAM(s) IV Push every 8 hours PRN Nausea and/or Vomiting      I&O's Summary    11 Dec 2022 07:01  -  12 Dec 2022 07:00  --------------------------------------------------------  IN: 0 mL / OUT: 1900 mL / NET: -1900 mL        Vital Signs Last 24 Hrs  T(C): 36.7 (12 Dec 2022 06:00), Max: 36.9 (11 Dec 2022 10:00)  T(F): 98 (12 Dec 2022 06:00), Max: 98.4 (11 Dec 2022 10:00)  HR: 60 (12 Dec 2022 06:00) (60 - 80)  BP: 133/81 (12 Dec 2022 06:00) (120/76 - 168/78)  BP(mean): --  RR: 16 (12 Dec 2022 06:00) (16 - 17)  SpO2: 99% (12 Dec 2022 06:00) (98% - 100%)    Parameters below as of 12 Dec 2022 06:00  Patient On (Oxygen Delivery Method): room air        =================PHYSICAL EXAM=================    GENERAL: NAD, lying in bed comfortably  HEAD:  Atraumatic, Normocephalic  EYES: EOMI, PERRLA, conjunctiva and sclera clear  ENT: Moist mucous membranes  NECK: Supple, No JVD  CHEST/LUNG: Clear to auscultation bilaterally; No rales, rhonchi, wheezing, or rubs. Unlabored respirations  HEART: Regular rate and rhythm; No murmurs, rubs, or gallops  ABDOMEN: Bowel sounds present; Soft, Nontender, Nondistended. No hepatomegally  EXTREMITIES:  2+ Peripheral Pulses, brisk capillary refill. No clubbing, cyanosis, or edema  NERVOUS SYSTEM:  Alert & Oriented X3, speech clear. No deficits. Mild tremor bilaterally with extended UE. Mild tremor of protruded tongue.   MSK: FROM all 4 extremities, full and equal strength  SKIN: No rashes or lesions    =================================================    LABS:                      RADIOLOGY & ADDITIONAL TESTS:    Imaging Personally Reviewed:    Consultant(s) Notes Reviewed:      Care Discussed with Consultants/Other Providers:   PROGRESS NOTE:    Stefanie Keating MD  Available on [employer] Teams      Patient is a 57y old  Male who presents with a chief complaint of alcohol withdrawal (11 Dec 2022 12:25)      SUBJECTIVE / OVERNIGHT EVENTS: No acute overnight events. Pt seen and examined. Denies fevers, chills, CP, SOB, Abdominal pain, N/V, Constipation, Diarrhea. Reports mild tremor of hands, but general improvement of withdrawal symptoms and no anxiety. CIWA 5 since 2 pm 12/11      MEDICATIONS  (STANDING):  atorvastatin 40 milliGRAM(s) Oral at bedtime  dorzolamide 2% Ophthalmic Solution 1 Drop(s) Both EYES <User Schedule>  enoxaparin Injectable 40 milliGRAM(s) SubCutaneous every 24 hours  folic acid 1 milliGRAM(s) Oral daily  influenza   Vaccine 0.5 milliLiter(s) IntraMuscular once  lactated ringers. 1000 milliLiter(s) (75 mL/Hr) IV Continuous <Continuous>  latanoprost 0.005% Ophthalmic Solution 1 Drop(s) Both EYES at bedtime  LORazepam   Injectable   IV Push   LORazepam   Injectable 3 milliGRAM(s) IV Push every 4 hours  LORazepam   Injectable 2 milliGRAM(s) IV Push every 4 hours  multivitamin 1 Tablet(s) Oral daily  thiamine 100 milliGRAM(s) Oral daily    MEDICATIONS  (PRN):  acetaminophen     Tablet .. 650 milliGRAM(s) Oral every 6 hours PRN Temp greater or equal to 38C (100.4F), Mild Pain (1 - 3), Moderate Pain (4 - 6), Severe Pain (7 - 10)  aluminum hydroxide/magnesium hydroxide/simethicone Suspension 30 milliLiter(s) Oral every 4 hours PRN Dyspepsia  artificial  tears Solution 1 Drop(s) Both EYES two times a day PRN Dry Eyes  LORazepam     Tablet 2 milliGRAM(s) Oral every 2 hours PRN Symptom-triggered 2 point increase in CIWA-Ar  LORazepam   Injectable 2 milliGRAM(s) IV Push every 1 hour PRN Symptom-triggered: each CIWA -Ar score 8 or GREATER  melatonin 3 milliGRAM(s) Oral at bedtime PRN Insomnia  ondansetron Injectable 4 milliGRAM(s) IV Push every 8 hours PRN Nausea and/or Vomiting      I&O's Summary    11 Dec 2022 07:01  -  12 Dec 2022 07:00  --------------------------------------------------------  IN: 0 mL / OUT: 1900 mL / NET: -1900 mL        Vital Signs Last 24 Hrs  T(C): 36.7 (12 Dec 2022 06:00), Max: 36.9 (11 Dec 2022 10:00)  T(F): 98 (12 Dec 2022 06:00), Max: 98.4 (11 Dec 2022 10:00)  HR: 60 (12 Dec 2022 06:00) (60 - 80)  BP: 133/81 (12 Dec 2022 06:00) (120/76 - 168/78)  BP(mean): --  RR: 16 (12 Dec 2022 06:00) (16 - 17)  SpO2: 99% (12 Dec 2022 06:00) (98% - 100%)    Parameters below as of 12 Dec 2022 06:00  Patient On (Oxygen Delivery Method): room air        =================PHYSICAL EXAM=================    GENERAL: NAD, lying in bed comfortably  HEAD:  Atraumatic, Normocephalic  EYES: EOMI, PERRLA, conjunctiva and sclera clear  ENT: Moist mucous membranes  NECK: Supple, No JVD  CHEST/LUNG: Clear to auscultation bilaterally; No rales, rhonchi, wheezing, or rubs. Unlabored respirations  HEART: Regular rate and rhythm; No murmurs, rubs, or gallops  ABDOMEN: Bowel sounds present; Soft, Nontender, Nondistended. No hepatomegally  EXTREMITIES:  2+ Peripheral Pulses, brisk capillary refill. No clubbing, cyanosis, or edema  NERVOUS SYSTEM:  Alert & Oriented X3, speech clear. No deficits. Mild tremor bilaterally with extended UE. Mild tremor of protruded tongue.   MSK: FROM all 4 extremities, full and equal strength  SKIN: No rashes or lesions    =================================================    LABS:                      RADIOLOGY & ADDITIONAL TESTS:    Imaging Personally Reviewed:    Consultant(s) Notes Reviewed:      Care Discussed with Consultants/Other Providers:   PROGRESS NOTE:    Stefanie Keating MD  Available on [employer] Teams      Patient is a 57y old  Male who presents with a chief complaint of alcohol withdrawal (11 Dec 2022 12:25)      SUBJECTIVE / OVERNIGHT EVENTS: No acute overnight events. Pt seen and examined. Denies fevers, chills, CP, SOB, Abdominal pain, N/V, Constipation, Diarrhea. Reports mild tremor of hands and of tongue, but general improvement of withdrawal symptoms and no anxiety. CIWA 4 most recently      MEDICATIONS  (STANDING):  atorvastatin 40 milliGRAM(s) Oral at bedtime  dorzolamide 2% Ophthalmic Solution 1 Drop(s) Both EYES <User Schedule>  enoxaparin Injectable 40 milliGRAM(s) SubCutaneous every 24 hours  folic acid 1 milliGRAM(s) Oral daily  influenza   Vaccine 0.5 milliLiter(s) IntraMuscular once  lactated ringers. 1000 milliLiter(s) (75 mL/Hr) IV Continuous <Continuous>  latanoprost 0.005% Ophthalmic Solution 1 Drop(s) Both EYES at bedtime  LORazepam   Injectable   IV Push   LORazepam   Injectable 3 milliGRAM(s) IV Push every 4 hours  LORazepam   Injectable 2 milliGRAM(s) IV Push every 4 hours  multivitamin 1 Tablet(s) Oral daily  thiamine 100 milliGRAM(s) Oral daily    MEDICATIONS  (PRN):  acetaminophen     Tablet .. 650 milliGRAM(s) Oral every 6 hours PRN Temp greater or equal to 38C (100.4F), Mild Pain (1 - 3), Moderate Pain (4 - 6), Severe Pain (7 - 10)  aluminum hydroxide/magnesium hydroxide/simethicone Suspension 30 milliLiter(s) Oral every 4 hours PRN Dyspepsia  artificial  tears Solution 1 Drop(s) Both EYES two times a day PRN Dry Eyes  LORazepam     Tablet 2 milliGRAM(s) Oral every 2 hours PRN Symptom-triggered 2 point increase in CIWA-Ar  LORazepam   Injectable 2 milliGRAM(s) IV Push every 1 hour PRN Symptom-triggered: each CIWA -Ar score 8 or GREATER  melatonin 3 milliGRAM(s) Oral at bedtime PRN Insomnia  ondansetron Injectable 4 milliGRAM(s) IV Push every 8 hours PRN Nausea and/or Vomiting      I&O's Summary    11 Dec 2022 07:01  -  12 Dec 2022 07:00  --------------------------------------------------------  IN: 0 mL / OUT: 1900 mL / NET: -1900 mL        Vital Signs Last 24 Hrs  T(C): 36.7 (12 Dec 2022 06:00), Max: 36.9 (11 Dec 2022 10:00)  T(F): 98 (12 Dec 2022 06:00), Max: 98.4 (11 Dec 2022 10:00)  HR: 60 (12 Dec 2022 06:00) (60 - 80)  BP: 133/81 (12 Dec 2022 06:00) (120/76 - 168/78)  BP(mean): --  RR: 16 (12 Dec 2022 06:00) (16 - 17)  SpO2: 99% (12 Dec 2022 06:00) (98% - 100%)    Parameters below as of 12 Dec 2022 06:00  Patient On (Oxygen Delivery Method): room air        =================PHYSICAL EXAM=================    GENERAL: NAD, lying in bed comfortably  HEAD:  Atraumatic, Normocephalic  EYES: EOMI, PERRLA, conjunctiva and sclera clear  ENT: Moist mucous membranes  NECK: Supple, No JVD  CHEST/LUNG: Clear to auscultation bilaterally; No rales, rhonchi, wheezing, or rubs. Unlabored respirations  HEART: Regular rate and rhythm; No murmurs, rubs, or gallops  ABDOMEN: Bowel sounds present; Soft, Nontender, Nondistended. No hepatomegally  EXTREMITIES:  2+ Peripheral Pulses, brisk capillary refill. No clubbing, cyanosis, or edema  NERVOUS SYSTEM:  Alert & Oriented X3, speech clear. No deficits. Mild tremor bilaterally with extended UE. Mild tremor of protruded tongue.   MSK: FROM all 4 extremities, full and equal strength  SKIN: No rashes or lesions    =================================================    LABS:      12-12    137  |  103  |  6<L>  ----------------------------<  118<H>  3.5   |  20<L>  |  0.61    Ca    8.8      12 Dec 2022 07:15  Phos  3.4     12-12  Mg     1.90     12-12                    RADIOLOGY & ADDITIONAL TESTS:    Imaging Personally Reviewed:    Consultant(s) Notes Reviewed:      Care Discussed with Consultants/Other Providers:

## 2022-12-12 NOTE — PROGRESS NOTE ADULT - PROBLEM SELECTOR PLAN 1
Multiple admission for similar presentation  Alcohol level >325,   CIWA with taper ordered  LR 75ml/hr  Social work consulted  Monitor Multiple admission for similar presentation  Alcohol level >325,   > CIWA with taper ordered (CIWA 4-5)  > LR 75ml/hr  > Social work consulted Multiple admission for similar presentation  Alcohol level >325,   > CIWA with taper ordered (CIWA 4-5)  > monitor off IVF   > Social work consulted

## 2022-12-12 NOTE — PROGRESS NOTE ADULT - PROBLEM SELECTOR PLAN 5
Diet: Regular  DVT: lovenox  Dispo: home Diet: Regular  DVT: lovenox  Dispo: home; will require transportation home (SW consulted)

## 2022-12-12 NOTE — PROGRESS NOTE ADULT - PROBLEM SELECTOR PLAN 2
BPs 130-140s  Seems like amlodipine was d/c on admission in November   BPs during withdrawal period less likely to reflect chronic findings   Outpatient follow up BPs 130-140s  Seems like amlodipine was d/c on admission in November   BPs during withdrawal period less likely to reflect chronic findings   > Outpatient follow up

## 2022-12-12 NOTE — DIETITIAN INITIAL EVALUATION ADULT - SIGNS/SYMPTOMS
Verified Results  SGOT/AST 07Apr2017 11:10AM MYRANDA HAYWOOD     Test Name Result Flag Reference   GOT/ Units/L H <38     SGPT/ALT 07Apr2017 11:10AM MYRANDA HAYWOOD     Test Name Result Flag Reference   GPT/ALT 78 Units/L  <79     RBC SED RATE 07Apr2017 11:10AM MYRANDA HAYWOOD     Test Name Result Flag Reference   RBC SED RATE 36 mm/hr H 0-20       
PO intake <75% of estimated nutrition needs

## 2022-12-12 NOTE — DIETITIAN INITIAL EVALUATION ADULT - PERTINENT MEDS FT
atorvastatin, folic acid, lactated ringers IV Continuous, multivitamin, thiamine, aluminum hydroxide/magnesium hydroxide/simethicone Suspension PRN, ondansetron IV PRN

## 2022-12-12 NOTE — PROGRESS NOTE ADULT - PROBLEM SELECTOR PLAN 3
Chronic unstable as not using eye drops  Resume eye drops Chronic unstable as not using eye drops  > Resume eye drops

## 2022-12-13 DIAGNOSIS — E87.6 HYPOKALEMIA: ICD-10-CM

## 2022-12-13 LAB
ALBUMIN SERPL ELPH-MCNC: 3.7 G/DL — SIGNIFICANT CHANGE UP (ref 3.3–5)
ALP SERPL-CCNC: 68 U/L — SIGNIFICANT CHANGE UP (ref 40–120)
ALT FLD-CCNC: 47 U/L — HIGH (ref 4–41)
ANION GAP SERPL CALC-SCNC: 10 MMOL/L — SIGNIFICANT CHANGE UP (ref 7–14)
AST SERPL-CCNC: 65 U/L — HIGH (ref 4–40)
BILIRUB SERPL-MCNC: 0.5 MG/DL — SIGNIFICANT CHANGE UP (ref 0.2–1.2)
BUN SERPL-MCNC: 5 MG/DL — LOW (ref 7–23)
CALCIUM SERPL-MCNC: 9 MG/DL — SIGNIFICANT CHANGE UP (ref 8.4–10.5)
CHLORIDE SERPL-SCNC: 103 MMOL/L — SIGNIFICANT CHANGE UP (ref 98–107)
CO2 SERPL-SCNC: 22 MMOL/L — SIGNIFICANT CHANGE UP (ref 22–31)
CREAT SERPL-MCNC: 0.6 MG/DL — SIGNIFICANT CHANGE UP (ref 0.5–1.3)
EGFR: 111 ML/MIN/1.73M2 — SIGNIFICANT CHANGE UP
GLUCOSE SERPL-MCNC: 101 MG/DL — HIGH (ref 70–99)
HCT VFR BLD CALC: 40 % — SIGNIFICANT CHANGE UP (ref 39–50)
HGB BLD-MCNC: 13.1 G/DL — SIGNIFICANT CHANGE UP (ref 13–17)
MAGNESIUM SERPL-MCNC: 1.7 MG/DL — SIGNIFICANT CHANGE UP (ref 1.6–2.6)
MCHC RBC-ENTMCNC: 30.1 PG — SIGNIFICANT CHANGE UP (ref 27–34)
MCHC RBC-ENTMCNC: 32.8 GM/DL — SIGNIFICANT CHANGE UP (ref 32–36)
MCV RBC AUTO: 92 FL — SIGNIFICANT CHANGE UP (ref 80–100)
NRBC # BLD: 0 /100 WBCS — SIGNIFICANT CHANGE UP (ref 0–0)
NRBC # FLD: 0 K/UL — SIGNIFICANT CHANGE UP (ref 0–0)
PHOSPHATE SERPL-MCNC: 3.7 MG/DL — SIGNIFICANT CHANGE UP (ref 2.5–4.5)
PLATELET # BLD AUTO: 176 K/UL — SIGNIFICANT CHANGE UP (ref 150–400)
POTASSIUM SERPL-MCNC: 3.1 MMOL/L — LOW (ref 3.5–5.3)
POTASSIUM SERPL-SCNC: 3.1 MMOL/L — LOW (ref 3.5–5.3)
PROT SERPL-MCNC: 7.2 G/DL — SIGNIFICANT CHANGE UP (ref 6–8.3)
RBC # BLD: 4.35 M/UL — SIGNIFICANT CHANGE UP (ref 4.2–5.8)
RBC # FLD: 13.7 % — SIGNIFICANT CHANGE UP (ref 10.3–14.5)
SODIUM SERPL-SCNC: 135 MMOL/L — SIGNIFICANT CHANGE UP (ref 135–145)
WBC # BLD: 4.73 K/UL — SIGNIFICANT CHANGE UP (ref 3.8–10.5)
WBC # FLD AUTO: 4.73 K/UL — SIGNIFICANT CHANGE UP (ref 3.8–10.5)

## 2022-12-13 PROCEDURE — 99233 SBSQ HOSP IP/OBS HIGH 50: CPT | Mod: GC

## 2022-12-13 RX ORDER — POTASSIUM CHLORIDE 20 MEQ
40 PACKET (EA) ORAL ONCE
Refills: 0 | Status: COMPLETED | OUTPATIENT
Start: 2022-12-13 | End: 2022-12-13

## 2022-12-13 RX ADMIN — Medication 1 MILLIGRAM(S): at 12:12

## 2022-12-13 RX ADMIN — ATORVASTATIN CALCIUM 40 MILLIGRAM(S): 80 TABLET, FILM COATED ORAL at 21:40

## 2022-12-13 RX ADMIN — Medication 1 TABLET(S): at 12:12

## 2022-12-13 RX ADMIN — Medication 650 MILLIGRAM(S): at 18:31

## 2022-12-13 RX ADMIN — DORZOLAMIDE HYDROCHLORIDE 1 DROP(S): 20 SOLUTION/ DROPS OPHTHALMIC at 09:10

## 2022-12-13 RX ADMIN — Medication 100 MILLIGRAM(S): at 12:13

## 2022-12-13 RX ADMIN — Medication 2 MILLIGRAM(S): at 06:25

## 2022-12-13 RX ADMIN — Medication 2 MILLIGRAM(S): at 10:01

## 2022-12-13 RX ADMIN — LATANOPROST 1 DROP(S): 0.05 SOLUTION/ DROPS OPHTHALMIC; TOPICAL at 21:42

## 2022-12-13 RX ADMIN — Medication 3 MILLIGRAM(S): at 21:40

## 2022-12-13 RX ADMIN — DORZOLAMIDE HYDROCHLORIDE 1 DROP(S): 20 SOLUTION/ DROPS OPHTHALMIC at 21:41

## 2022-12-13 RX ADMIN — ENOXAPARIN SODIUM 40 MILLIGRAM(S): 100 INJECTION SUBCUTANEOUS at 02:06

## 2022-12-13 RX ADMIN — Medication 1 MILLIGRAM(S): at 14:10

## 2022-12-13 RX ADMIN — Medication 650 MILLIGRAM(S): at 17:54

## 2022-12-13 RX ADMIN — Medication 1 MILLIGRAM(S): at 19:40

## 2022-12-13 RX ADMIN — Medication 2 MILLIGRAM(S): at 02:06

## 2022-12-13 RX ADMIN — Medication 40 MILLIEQUIVALENT(S): at 12:13

## 2022-12-13 NOTE — PROGRESS NOTE ADULT - PROBLEM SELECTOR PLAN 3
BPs 130-140s  Seems like amlodipine was d/c on admission in November   BPs during withdrawal period less likely to reflect chronic findings   > Outpatient follow up

## 2022-12-13 NOTE — PROGRESS NOTE ADULT - PROBLEM SELECTOR PLAN 5
Based on lipid panel in 2/2022 and pt ASCVD risk he should be on a statin  > Atorvastatin 40mg daily started

## 2022-12-13 NOTE — PROGRESS NOTE ADULT - SUBJECTIVE AND OBJECTIVE BOX
PROGRESS NOTE:    Stefanie Keating MD  Available on [employer] Teams      Patient is a 57y old  Male who presents with a chief complaint of Alcohol dependence with withdrawal     (12 Dec 2022 12:42)      SUBJECTIVE / OVERNIGHT EVENTS: No acute overnight events. Pt seen and examined. Denies fevers, chills, CP, SOB, Abdominal pain, N/V, Constipation, Diarrhea. Diaphoretic at night and continues to have tremors of bilateral hands and tongue. Denies anxiety.       MEDICATIONS  (STANDING):  atorvastatin 40 milliGRAM(s) Oral at bedtime  dorzolamide 2% Ophthalmic Solution 1 Drop(s) Both EYES <User Schedule>  enoxaparin Injectable 40 milliGRAM(s) SubCutaneous every 24 hours  folic acid 1 milliGRAM(s) Oral daily  influenza   Vaccine 0.5 milliLiter(s) IntraMuscular once  latanoprost 0.005% Ophthalmic Solution 1 Drop(s) Both EYES at bedtime  LORazepam   Injectable   IV Push   LORazepam   Injectable 2 milliGRAM(s) IV Push every 4 hours  LORazepam   Injectable 1 milliGRAM(s) IV Push every 4 hours  multivitamin 1 Tablet(s) Oral daily  thiamine 100 milliGRAM(s) Oral daily    MEDICATIONS  (PRN):  acetaminophen     Tablet .. 650 milliGRAM(s) Oral every 6 hours PRN Temp greater or equal to 38C (100.4F), Mild Pain (1 - 3), Moderate Pain (4 - 6), Severe Pain (7 - 10)  aluminum hydroxide/magnesium hydroxide/simethicone Suspension 30 milliLiter(s) Oral every 4 hours PRN Dyspepsia  artificial  tears Solution 1 Drop(s) Both EYES two times a day PRN Dry Eyes  LORazepam     Tablet 2 milliGRAM(s) Oral every 2 hours PRN Symptom-triggered 2 point increase in CIWA-Ar  LORazepam   Injectable 2 milliGRAM(s) IV Push every 1 hour PRN Symptom-triggered: each CIWA -Ar score 8 or GREATER  melatonin 3 milliGRAM(s) Oral at bedtime PRN Insomnia  ondansetron Injectable 4 milliGRAM(s) IV Push every 8 hours PRN Nausea and/or Vomiting      I&O's Summary    12 Dec 2022 07:01  -  13 Dec 2022 07:00  --------------------------------------------------------  IN: 0 mL / OUT: 1001 mL / NET: -1001 mL        Vital Signs Last 24 Hrs  T(C): 36.7 (13 Dec 2022 06:00), Max: 36.9 (12 Dec 2022 10:00)  T(F): 98 (13 Dec 2022 06:00), Max: 98.5 (12 Dec 2022 10:00)  HR: 63 (13 Dec 2022 06:00) (58 - 67)  BP: 114/67 (13 Dec 2022 06:00) (114/67 - 155/85)  BP(mean): --  RR: 16 (13 Dec 2022 06:00) (16 - 17)  SpO2: 100% (13 Dec 2022 06:00) (98% - 100%)    Parameters below as of 13 Dec 2022 06:00  Patient On (Oxygen Delivery Method): room air        =================PHYSICAL EXAM=================    GENERAL: NAD, lying in bed comfortably  HEAD:  Atraumatic, Normocephalic  EYES: EOMI, PERRLA, conjunctiva and sclera clear  ENT: Moist mucous membranes  CHEST/LUNG: Clear to auscultation bilaterally; No rales, rhonchi, wheezing, or rubs. Unlabored respirations  HEART: Regular rate and rhythm; No murmurs, rubs, or gallops  ABDOMEN: Bowel sounds present; Soft, Nontender, Nondistended.   EXTREMITIES:  2+ Peripheral Pulses, brisk capillary refill. No clubbing, cyanosis, or edema  NERVOUS SYSTEM:  Alert & Oriented X3, speech clear. No deficits. Tremor of bilateral outstretched hands and tongue.   MSK: FROM all 4 extremities, full and equal strength    =================================================    LABS:                        13.1   4.73  )-----------( 176      ( 13 Dec 2022 06:45 )             40.0     Auto Eosinophil # x     / Auto Eosinophil % x     / Auto Neutrophil # x     / Auto Neutrophil % x     / BANDS % x        12-13    135  |  103  |  5<L>  ----------------------------<  101<H>  3.1<L>   |  22  |  0.60  12-12    137  |  103  |  6<L>  ----------------------------<  118<H>  3.5   |  20<L>  |  0.61    Ca    9.0      13 Dec 2022 06:45  Mg     1.70     12-13  Phos  3.7     12-13  TPro  7.2  /  Alb  3.7  /  TBili  0.5  /  DBili  x   /  AST  65<H>  /  ALT  47<H>  /  AlkPhos  68  12-13        RADIOLOGY & ADDITIONAL TESTS:    Imaging Personally Reviewed:    Consultant(s) Notes Reviewed:      Care Discussed with Consultants/Other Providers:

## 2022-12-13 NOTE — PROGRESS NOTE ADULT - PROBLEM SELECTOR PLAN 1
Multiple admission for similar presentation  Alcohol level >325,   > CIWA with taper ordered (CIWA 1-4)  > monitor off IVF   > Social work consulted

## 2022-12-14 DIAGNOSIS — R19.7 DIARRHEA, UNSPECIFIED: ICD-10-CM

## 2022-12-14 LAB
ANION GAP SERPL CALC-SCNC: 10 MMOL/L — SIGNIFICANT CHANGE UP (ref 7–14)
BUN SERPL-MCNC: 8 MG/DL — SIGNIFICANT CHANGE UP (ref 7–23)
CALCIUM SERPL-MCNC: 8.9 MG/DL — SIGNIFICANT CHANGE UP (ref 8.4–10.5)
CHLORIDE SERPL-SCNC: 106 MMOL/L — SIGNIFICANT CHANGE UP (ref 98–107)
CO2 SERPL-SCNC: 22 MMOL/L — SIGNIFICANT CHANGE UP (ref 22–31)
CREAT SERPL-MCNC: 0.59 MG/DL — SIGNIFICANT CHANGE UP (ref 0.5–1.3)
EGFR: 112 ML/MIN/1.73M2 — SIGNIFICANT CHANGE UP
GLUCOSE SERPL-MCNC: 117 MG/DL — HIGH (ref 70–99)
MAGNESIUM SERPL-MCNC: 1.6 MG/DL — SIGNIFICANT CHANGE UP (ref 1.6–2.6)
PHOSPHATE SERPL-MCNC: 3.6 MG/DL — SIGNIFICANT CHANGE UP (ref 2.5–4.5)
POTASSIUM SERPL-MCNC: 3.4 MMOL/L — LOW (ref 3.5–5.3)
POTASSIUM SERPL-SCNC: 3.4 MMOL/L — LOW (ref 3.5–5.3)
SODIUM SERPL-SCNC: 138 MMOL/L — SIGNIFICANT CHANGE UP (ref 135–145)

## 2022-12-14 PROCEDURE — 99232 SBSQ HOSP IP/OBS MODERATE 35: CPT | Mod: GC

## 2022-12-14 RX ORDER — POTASSIUM CHLORIDE 20 MEQ
40 PACKET (EA) ORAL ONCE
Refills: 0 | Status: COMPLETED | OUTPATIENT
Start: 2022-12-14 | End: 2022-12-14

## 2022-12-14 RX ADMIN — Medication 650 MILLIGRAM(S): at 10:37

## 2022-12-14 RX ADMIN — Medication 1 MILLIGRAM(S): at 19:20

## 2022-12-14 RX ADMIN — DORZOLAMIDE HYDROCHLORIDE 1 DROP(S): 20 SOLUTION/ DROPS OPHTHALMIC at 22:04

## 2022-12-14 RX ADMIN — ATORVASTATIN CALCIUM 40 MILLIGRAM(S): 80 TABLET, FILM COATED ORAL at 22:05

## 2022-12-14 RX ADMIN — ENOXAPARIN SODIUM 40 MILLIGRAM(S): 100 INJECTION SUBCUTANEOUS at 02:05

## 2022-12-14 RX ADMIN — LATANOPROST 1 DROP(S): 0.05 SOLUTION/ DROPS OPHTHALMIC; TOPICAL at 22:04

## 2022-12-14 RX ADMIN — Medication 100 MILLIGRAM(S): at 11:29

## 2022-12-14 RX ADMIN — Medication 40 MILLIEQUIVALENT(S): at 11:29

## 2022-12-14 RX ADMIN — Medication 1 MILLIGRAM(S): at 02:05

## 2022-12-14 RX ADMIN — Medication 3 MILLIGRAM(S): at 22:05

## 2022-12-14 RX ADMIN — Medication 1 MILLIGRAM(S): at 07:59

## 2022-12-14 RX ADMIN — Medication 650 MILLIGRAM(S): at 11:30

## 2022-12-14 RX ADMIN — Medication 1 MILLIGRAM(S): at 11:29

## 2022-12-14 RX ADMIN — DORZOLAMIDE HYDROCHLORIDE 1 DROP(S): 20 SOLUTION/ DROPS OPHTHALMIC at 10:36

## 2022-12-14 RX ADMIN — Medication 1 TABLET(S): at 11:29

## 2022-12-14 NOTE — PROGRESS NOTE ADULT - SUBJECTIVE AND OBJECTIVE BOX
PROGRESS NOTE:    Stefanie Keating MD  Available on Microsoft Teams      Patient is a 57y old  Male who presents with a chief complaint of alcohol withdrawal (14 Dec 2022 10:07)      SUBJECTIVE / OVERNIGHT EVENTS: No acute overnight events. Pt seen and examined. Pt was having yellow diarrhea every 15- 20 minutes for the last three days, however, he says the diarrhea has resolved and is now having formed bowel movements with less frequency. He was staying well hydrated and drinking water during the diarrheal episodes. Denies fevers, chills, CP, SOB, Abdominal pain, N/V, Constipation.       MEDICATIONS  (STANDING):  atorvastatin 40 milliGRAM(s) Oral at bedtime  dorzolamide 2% Ophthalmic Solution 1 Drop(s) Both EYES <User Schedule>  enoxaparin Injectable 40 milliGRAM(s) SubCutaneous every 24 hours  folic acid 1 milliGRAM(s) Oral daily  influenza   Vaccine 0.5 milliLiter(s) IntraMuscular once  latanoprost 0.005% Ophthalmic Solution 1 Drop(s) Both EYES at bedtime  LORazepam   Injectable 1  IV Push   LORazepam   Injectable 1 milliGRAM(s) IV Push every 12 hours  multivitamin 1 Tablet(s) Oral daily  thiamine 100 milliGRAM(s) Oral daily    MEDICATIONS  (PRN):  acetaminophen     Tablet .. 650 milliGRAM(s) Oral every 6 hours PRN Temp greater or equal to 38C (100.4F), Mild Pain (1 - 3), Moderate Pain (4 - 6), Severe Pain (7 - 10)  aluminum hydroxide/magnesium hydroxide/simethicone Suspension 30 milliLiter(s) Oral every 4 hours PRN Dyspepsia  artificial  tears Solution 1 Drop(s) Both EYES two times a day PRN Dry Eyes  LORazepam     Tablet 2 milliGRAM(s) Oral every 2 hours PRN Symptom-triggered 2 point increase in CIWA-Ar  LORazepam   Injectable 2 milliGRAM(s) IV Push every 1 hour PRN Symptom-triggered: each CIWA -Ar score 8 or GREATER  melatonin 3 milliGRAM(s) Oral at bedtime PRN Insomnia  ondansetron Injectable 4 milliGRAM(s) IV Push every 8 hours PRN Nausea and/or Vomiting      I&O's Summary      Vital Signs Last 24 Hrs  T(C): 36.6 (14 Dec 2022 06:00), Max: 37.2 (13 Dec 2022 22:00)  T(F): 97.8 (14 Dec 2022 06:00), Max: 98.9 (13 Dec 2022 22:00)  HR: 61 (14 Dec 2022 06:00) (61 - 64)  BP: 116/84 (14 Dec 2022 06:00) (110/68 - 142/90)  BP(mean): --  RR: 17 (14 Dec 2022 06:00) (16 - 17)  SpO2: 98% (14 Dec 2022 06:00) (98% - 100%)    Parameters below as of 14 Dec 2022 06:00  Patient On (Oxygen Delivery Method): room air        =================PHYSICAL EXAM=================    GENERAL: NAD, lying in bed comfortably  HEAD:  Atraumatic, Normocephalic  EYES: EOMI, PERRLA, conjunctiva and sclera clear  ENT: Moist mucous membranes  NECK: Supple, No JVD  CHEST/LUNG: Clear to auscultation bilaterally; No rales, rhonchi, wheezing, or rubs. Unlabored respirations  HEART: Regular rate and rhythm; No murmurs, rubs, or gallops  ABDOMEN: Bowel sounds present; Soft, Nontender, Nondistended. No hepatomegally  EXTREMITIES:  2+ Peripheral Pulses, brisk capillary refill. No clubbing, cyanosis, or edema  NERVOUS SYSTEM:  Alert & Oriented X3, speech clear. No deficits   MSK: FROM all 4 extremities, full and equal strength  SKIN: No rashes or lesions    =================================================    LABS:                        13.1   4.73  )-----------( 176      ( 13 Dec 2022 06:45 )             40.0     Auto Eosinophil # x     / Auto Eosinophil % x     / Auto Neutrophil # x     / Auto Neutrophil % x     / BANDS % x        12-14    138  |  106  |  8   ----------------------------<  117<H>  3.4<L>   |  22  |  0.59  12-13    135  |  103  |  5<L>  ----------------------------<  101<H>  3.1<L>   |  22  |  0.60    Ca    8.9      14 Dec 2022 06:43  Mg     1.60     12-14  Phos  3.6     12-14  TPro  7.2  /  Alb  3.7  /  TBili  0.5  /  DBili  x   /  AST  65<H>  /  ALT  47<H>  /  AlkPhos  68  12-13                  RADIOLOGY & ADDITIONAL TESTS:    Imaging Personally Reviewed:    Consultant(s) Notes Reviewed: Yes     Care Discussed with Consultants/Other Providers: Yes

## 2022-12-15 ENCOUNTER — TRANSCRIPTION ENCOUNTER (OUTPATIENT)
Age: 57
End: 2022-12-15

## 2022-12-15 VITALS
RESPIRATION RATE: 17 BRPM | DIASTOLIC BLOOD PRESSURE: 72 MMHG | SYSTOLIC BLOOD PRESSURE: 111 MMHG | TEMPERATURE: 98 F | HEART RATE: 88 BPM | OXYGEN SATURATION: 98 %

## 2022-12-15 LAB
ANION GAP SERPL CALC-SCNC: 10 MMOL/L — SIGNIFICANT CHANGE UP (ref 7–14)
BASOPHILS # BLD AUTO: 0.05 K/UL — SIGNIFICANT CHANGE UP (ref 0–0.2)
BASOPHILS NFR BLD AUTO: 0.9 % — SIGNIFICANT CHANGE UP (ref 0–2)
BUN SERPL-MCNC: 8 MG/DL — SIGNIFICANT CHANGE UP (ref 7–23)
CALCIUM SERPL-MCNC: 9 MG/DL — SIGNIFICANT CHANGE UP (ref 8.4–10.5)
CHLORIDE SERPL-SCNC: 104 MMOL/L — SIGNIFICANT CHANGE UP (ref 98–107)
CO2 SERPL-SCNC: 21 MMOL/L — LOW (ref 22–31)
CREAT SERPL-MCNC: 0.61 MG/DL — SIGNIFICANT CHANGE UP (ref 0.5–1.3)
EGFR: 111 ML/MIN/1.73M2 — SIGNIFICANT CHANGE UP
EOSINOPHIL # BLD AUTO: 0.28 K/UL — SIGNIFICANT CHANGE UP (ref 0–0.5)
EOSINOPHIL NFR BLD AUTO: 5.2 % — SIGNIFICANT CHANGE UP (ref 0–6)
GLUCOSE SERPL-MCNC: 113 MG/DL — HIGH (ref 70–99)
HCT VFR BLD CALC: 39.8 % — SIGNIFICANT CHANGE UP (ref 39–50)
HGB BLD-MCNC: 13.3 G/DL — SIGNIFICANT CHANGE UP (ref 13–17)
IANC: 2.94 K/UL — SIGNIFICANT CHANGE UP (ref 1.8–7.4)
IMM GRANULOCYTES NFR BLD AUTO: 0.2 % — SIGNIFICANT CHANGE UP (ref 0–0.9)
LYMPHOCYTES # BLD AUTO: 1.52 K/UL — SIGNIFICANT CHANGE UP (ref 1–3.3)
LYMPHOCYTES # BLD AUTO: 28.1 % — SIGNIFICANT CHANGE UP (ref 13–44)
MAGNESIUM SERPL-MCNC: 1.6 MG/DL — SIGNIFICANT CHANGE UP (ref 1.6–2.6)
MCHC RBC-ENTMCNC: 30.7 PG — SIGNIFICANT CHANGE UP (ref 27–34)
MCHC RBC-ENTMCNC: 33.4 GM/DL — SIGNIFICANT CHANGE UP (ref 32–36)
MCV RBC AUTO: 91.9 FL — SIGNIFICANT CHANGE UP (ref 80–100)
MONOCYTES # BLD AUTO: 0.6 K/UL — SIGNIFICANT CHANGE UP (ref 0–0.9)
MONOCYTES NFR BLD AUTO: 11.1 % — SIGNIFICANT CHANGE UP (ref 2–14)
NEUTROPHILS # BLD AUTO: 2.94 K/UL — SIGNIFICANT CHANGE UP (ref 1.8–7.4)
NEUTROPHILS NFR BLD AUTO: 54.5 % — SIGNIFICANT CHANGE UP (ref 43–77)
NRBC # BLD: 0 /100 WBCS — SIGNIFICANT CHANGE UP (ref 0–0)
NRBC # FLD: 0 K/UL — SIGNIFICANT CHANGE UP (ref 0–0)
PHOSPHATE SERPL-MCNC: 3.7 MG/DL — SIGNIFICANT CHANGE UP (ref 2.5–4.5)
PLATELET # BLD AUTO: 161 K/UL — SIGNIFICANT CHANGE UP (ref 150–400)
POTASSIUM SERPL-MCNC: 3.4 MMOL/L — LOW (ref 3.5–5.3)
POTASSIUM SERPL-SCNC: 3.4 MMOL/L — LOW (ref 3.5–5.3)
RBC # BLD: 4.33 M/UL — SIGNIFICANT CHANGE UP (ref 4.2–5.8)
RBC # FLD: 14.1 % — SIGNIFICANT CHANGE UP (ref 10.3–14.5)
SODIUM SERPL-SCNC: 135 MMOL/L — SIGNIFICANT CHANGE UP (ref 135–145)
WBC # BLD: 5.4 K/UL — SIGNIFICANT CHANGE UP (ref 3.8–10.5)
WBC # FLD AUTO: 5.4 K/UL — SIGNIFICANT CHANGE UP (ref 3.8–10.5)

## 2022-12-15 PROCEDURE — 99239 HOSP IP/OBS DSCHRG MGMT >30: CPT | Mod: GC

## 2022-12-15 RX ORDER — DORZOLAMIDE HYDROCHLORIDE 20 MG/ML
1 SOLUTION/ DROPS OPHTHALMIC
Qty: 10 | Refills: 0
Start: 2022-12-15 | End: 2023-01-13

## 2022-12-15 RX ORDER — LATANOPROST 0.05 MG/ML
1 SOLUTION/ DROPS OPHTHALMIC; TOPICAL
Qty: 2.5 | Refills: 0
Start: 2022-12-15

## 2022-12-15 RX ORDER — DORZOLAMIDE HYDROCHLORIDE 20 MG/ML
1 SOLUTION/ DROPS OPHTHALMIC
Qty: 0 | Refills: 0 | DISCHARGE

## 2022-12-15 RX ORDER — POTASSIUM CHLORIDE 20 MEQ
40 PACKET (EA) ORAL EVERY 4 HOURS
Refills: 0 | Status: COMPLETED | OUTPATIENT
Start: 2022-12-15 | End: 2022-12-15

## 2022-12-15 RX ORDER — ATORVASTATIN CALCIUM 80 MG/1
1 TABLET, FILM COATED ORAL
Qty: 30 | Refills: 0
Start: 2022-12-15 | End: 2023-01-13

## 2022-12-15 RX ORDER — THIAMINE MONONITRATE (VIT B1) 100 MG
1 TABLET ORAL
Qty: 30 | Refills: 0
Start: 2022-12-15 | End: 2023-01-13

## 2022-12-15 RX ADMIN — DORZOLAMIDE HYDROCHLORIDE 1 DROP(S): 20 SOLUTION/ DROPS OPHTHALMIC at 09:29

## 2022-12-15 RX ADMIN — Medication 100 MILLIGRAM(S): at 11:47

## 2022-12-15 RX ADMIN — Medication 0.5 MILLIGRAM(S): at 15:59

## 2022-12-15 RX ADMIN — Medication 1 TABLET(S): at 11:47

## 2022-12-15 RX ADMIN — ENOXAPARIN SODIUM 40 MILLIGRAM(S): 100 INJECTION SUBCUTANEOUS at 01:30

## 2022-12-15 RX ADMIN — Medication 1 MILLIGRAM(S): at 07:42

## 2022-12-15 RX ADMIN — Medication 40 MILLIEQUIVALENT(S): at 14:27

## 2022-12-15 RX ADMIN — Medication 1 MILLIGRAM(S): at 11:47

## 2022-12-15 RX ADMIN — Medication 40 MILLIEQUIVALENT(S): at 09:28

## 2022-12-15 NOTE — PROGRESS NOTE ADULT - ATTENDING COMMENTS
57 yr old male presenting for alcohol withdrawal   - continues to have sig tremors and tongue fasciculations on exam   - upgrade CIWA and taper to IV   - monitor closely   - Social work consult    Rest of care as above   Discussed with HS    Solis Marks MD  [facility] Division of Hospital Medicine  Pager: [phone]  Available via MS Teams
56 yo M w/ hx alcohol use, glaucoma RT eye blind p/w alcohol WD. CIWA currently 1 for tremor. Thiamine/MVI/ folate. Cont CIWA protocol ativan taper adjusted.
57 yr old male presenting for alcohol withdrawal   - continues to have sig tremors and tongue fasciculations on exam   - CIWA upgraded, improving    - monitor closely    - Social work consult    Rest of care as above   Discussed with HS    Solis Marks MD  [facility] Division of Hospital Medicine  Pager: [phone]  Available via MS Teams
58 yo M w/ hx alcohol use, glaucoma RT eye blind p/w alcohol WD. CIWA currently 1 for tremor. Thiamine/MVI/ folate. Cont CIWA protocol ativan taper adjusted. + diarrhea noted now states resolved if recurs would send GI PCR. repelete elctrolytes as needed. BP stable off meds.
58 yo M w/ hx alcohol use, glaucoma RT eye blind p/w alcohol WD. CIWA currently 5 for tremor. Thiamine/MVI/ folate. Cont CIWA protocol ativan taper. SBIRT.
56 yo M w/ hx alcohol use, glaucoma RT eye blind p/w alcohol WD. CIWA currently 1 for tremor. Thiamine/MVI/ folate. Cont CIWA protocol ativan taper adjusted. discharge 40 min post PM dose of ativan

## 2022-12-15 NOTE — PROGRESS NOTE ADULT - REASON FOR ADMISSION
alcohol withdrawal

## 2022-12-15 NOTE — DISCHARGE NOTE NURSING/CASE MANAGEMENT/SOCIAL WORK - PATIENT PORTAL LINK FT
You can access the FollowMyHealth Patient Portal offered by [facility] by registering at the following website: [email or web address]. By joining "[employer]’s FollowMyHealth portal, you will also be able to view your health information using other applications (apps) compatible with our system.

## 2022-12-15 NOTE — PROGRESS NOTE ADULT - PROBLEM SELECTOR PLAN 6
Based on lipid panel in 2/2022 and pt ASCVD risk he should be on a statin  > Atorvastatin 40mg daily started Diet: Regular  DVT: lovenox  Dispo: home; will require transportation home (SW consulted)

## 2022-12-15 NOTE — DISCHARGE NOTE NURSING/CASE MANAGEMENT/SOCIAL WORK - NSDCPEFALRISK_GEN_ALL_CORE
For information on Fall & Injury Prevention, visit: [email or web address] OR  [email or web address] OR  https://www.cdc.gov/steadi/patient.html

## 2022-12-15 NOTE — PROGRESS NOTE ADULT - PROBLEM SELECTOR PLAN 4
BPs 130-140s  Seems like amlodipine was d/c on admission in November   BPs during withdrawal period less likely to reflect chronic findings   > Outpatient follow up Chronic unstable as not using eye drops  > Resume eye drops, Truspot and Xalatan

## 2022-12-15 NOTE — PROGRESS NOTE ADULT - SUBJECTIVE AND OBJECTIVE BOX
PROGRESS NOTE:    Stefanie Keating MD  Available on Microsoft Teams      Patient is a 57y old  Male who presents with a chief complaint of alcohol withdrawal (14 Dec 2022 10:07)      SUBJECTIVE / OVERNIGHT EVENTS: No acute overnight events. Pt seen and examined. Denies fevers, chills, CP, SOB, Abdominal pain, N/V, Constipation, Diarrhea. Denies tremors, anxiety, and other symptoms of alcohol withdrawal. CIWA has been 1      MEDICATIONS  (STANDING):  atorvastatin 40 milliGRAM(s) Oral at bedtime  dorzolamide 2% Ophthalmic Solution 1 Drop(s) Both EYES <User Schedule>  enoxaparin Injectable 40 milliGRAM(s) SubCutaneous every 24 hours  folic acid 1 milliGRAM(s) Oral daily  influenza   Vaccine 0.5 milliLiter(s) IntraMuscular once  latanoprost 0.005% Ophthalmic Solution 1 Drop(s) Both EYES at bedtime  LORazepam   Injectable 1  IV Push   LORazepam   Injectable 0.5 milliGRAM(s) IV Push every 12 hours  multivitamin 1 Tablet(s) Oral daily  potassium chloride    Tablet ER 40 milliEquivalent(s) Oral every 4 hours  thiamine 100 milliGRAM(s) Oral daily    MEDICATIONS  (PRN):  acetaminophen     Tablet .. 650 milliGRAM(s) Oral every 6 hours PRN Temp greater or equal to 38C (100.4F), Mild Pain (1 - 3), Moderate Pain (4 - 6), Severe Pain (7 - 10)  aluminum hydroxide/magnesium hydroxide/simethicone Suspension 30 milliLiter(s) Oral every 4 hours PRN Dyspepsia  artificial  tears Solution 1 Drop(s) Both EYES two times a day PRN Dry Eyes  LORazepam     Tablet 2 milliGRAM(s) Oral every 2 hours PRN Symptom-triggered 2 point increase in CIWA-Ar  LORazepam   Injectable 2 milliGRAM(s) IV Push every 1 hour PRN Symptom-triggered: each CIWA -Ar score 8 or GREATER  melatonin 3 milliGRAM(s) Oral at bedtime PRN Insomnia  ondansetron Injectable 4 milliGRAM(s) IV Push every 8 hours PRN Nausea and/or Vomiting      I&O's Summary    14 Dec 2022 07:01  -  15 Dec 2022 07:00  --------------------------------------------------------  IN: 0 mL / OUT: 800 mL / NET: -800 mL        Vital Signs Last 24 Hrs  T(C): 36.9 (15 Dec 2022 06:00), Max: 36.9 (14 Dec 2022 21:42)  T(F): 98.4 (15 Dec 2022 06:00), Max: 98.4 (14 Dec 2022 21:42)  HR: 64 (15 Dec 2022 06:00) (59 - 72)  BP: 104/62 (15 Dec 2022 06:00) (104/62 - 147/87)  BP(mean): --  RR: 16 (15 Dec 2022 06:00) (16 - 18)  SpO2: 99% (15 Dec 2022 06:00) (98% - 100%)    Parameters below as of 15 Dec 2022 06:00  Patient On (Oxygen Delivery Method): room air        =================PHYSICAL EXAM=================    GENERAL: NAD, lying in bed comfortably  HEAD:  Atraumatic, Normocephalic  EYES: EOMI, PERRLA, conjunctiva and sclera clear  ENT: Moist mucous membranes  NECK: Supple,   CHEST/LUNG: Clear to auscultation bilaterally; No rales, rhonchi, wheezing, or rubs. Unlabored respirations  HEART: Regular rate and rhythm; No murmurs, rubs, or gallops  ABDOMEN: Bowel sounds present; Soft, Nontender, Nondistended.   EXTREMITIES:  2+ Peripheral Pulses, brisk capillary refill. No clubbing, cyanosis, or edema  NERVOUS SYSTEM:  Alert & Oriented X3, speech clear. No deficits. No tremors, no anxiety, or diaphoresis   MSK: FROM all 4 extremities, full and equal strength  SKIN: No rashes or lesions    =================================================    LABS:                        13.3   5.40  )-----------( 161      ( 15 Dec 2022 06:00 )             39.8     Auto Eosinophil # 0.28  / Auto Eosinophil % 5.2   / Auto Neutrophil # 2.94  / Auto Neutrophil % 54.5  / BANDS % x        12-15    135  |  104  |  8   ----------------------------<  113<H>  3.4<L>   |  21<L>  |  0.61  12-14    138  |  106  |  8   ----------------------------<  117<H>  3.4<L>   |  22  |  0.59    Ca    9.0      15 Dec 2022 06:00  Mg     1.60     12-15  Phos  3.7     12-15                  RADIOLOGY & ADDITIONAL TESTS:    Imaging Personally Reviewed:    Consultant(s) Notes Reviewed:      Care Discussed with Consultants/Other Providers:

## 2022-12-15 NOTE — DISCHARGE NOTE NURSING/CASE MANAGEMENT/SOCIAL WORK - NSDCVIVACCINE_GEN_ALL_CORE_FT
influenza, injectable, quadrivalent, preservative free; 14-Oct-2021 13:35; Nany Guerrero (RN); [employer]; [id number] (Exp. Date: 30-Jun-2022); IntraMuscular; Deltoid Right.; 0.5 milliLiter(s); VIS (VIS Published: 06-Aug-2021, VIS Presented: 14-Oct-2021);   Tdap; 22-Dec-2021 19:23; Clementina Koehler (RN); [employer]; [id number] (Exp. Date: 09-Sep-2023); IntraMuscular; Deltoid Left.; 0.5 milliLiter(s); VIS (VIS Published: 09-May-2013, VIS Presented: 22-Dec-2021);   Tdap; 15-Aug-2022 16:08; Marianne Pollock (RN); [employer]; [id number]   (Exp. Date: 18-Apr-2024); IntraMuscular; Deltoid Left.; 0.5 milliLiter(s); VIS (VIS Published: 09-May-2013, VIS Presented: 15-Aug-2022);   Tdap; 16-Nov-2022 15:48; Parish Avila (RN); [employer]; [id number] (Exp. Date: 01-Jun-2024); IntraMuscular; Deltoid Right.; 0.5 milliLiter(s); VIS (VIS Published: 09-May-2013, VIS Presented: 16-Nov-2022);

## 2022-12-15 NOTE — PROGRESS NOTE ADULT - PROBLEM SELECTOR PLAN 7
Diet: Regular  DVT: lovenox  Dispo: home; will require transportation home (SW consulted)
Diet: Regular  DVT: lovenox  Dispo: home; will require transportation home (SW consulted)

## 2022-12-15 NOTE — PROGRESS NOTE ADULT - PROBLEM SELECTOR PLAN 5
Chronic unstable as not using eye drops  > Resume eye drops Based on lipid panel in 2/2022 and pt ASCVD risk he should be on a statin  > Atorvastatin 40mg daily started

## 2022-12-15 NOTE — PROGRESS NOTE ADULT - PROBLEM SELECTOR PLAN 2
Resolved diarrhea. GI Stool studies sent 12/14   > f/u GI stool studies   > encourage PO hydration Resolved diarrhea. GI Stool studies sent 12/14   > encourage PO hydration

## 2022-12-15 NOTE — PROGRESS NOTE ADULT - PROBLEM SELECTOR PLAN 3
K of 3.1 -> 3.4 after repletion yesterday   > replete x1 KCl 40 mEq PO BPs 130-140s  Seems like amlodipine was d/c on admission in November   BPs during withdrawal period less likely to reflect chronic findings   > Outpatient follow up

## 2022-12-15 NOTE — PROGRESS NOTE ADULT - PROBLEM SELECTOR PROBLEM 1
Alcohol dependence with withdrawal

## 2023-01-09 ENCOUNTER — INPATIENT (INPATIENT)
Facility: HOSPITAL | Age: 58
LOS: 3 days | Discharge: HOME | End: 2023-01-13
Attending: INTERNAL MEDICINE | Admitting: INTERNAL MEDICINE
Payer: MEDICAID

## 2023-01-09 VITALS
WEIGHT: 169.98 LBS | HEART RATE: 110 BPM | HEIGHT: 68 IN | DIASTOLIC BLOOD PRESSURE: 82 MMHG | OXYGEN SATURATION: 96 % | SYSTOLIC BLOOD PRESSURE: 152 MMHG | TEMPERATURE: 99 F | RESPIRATION RATE: 18 BRPM

## 2023-01-09 DIAGNOSIS — I10 ESSENTIAL (PRIMARY) HYPERTENSION: ICD-10-CM

## 2023-01-09 DIAGNOSIS — F10.939 ALCOHOL USE, UNSPECIFIED WITH WITHDRAWAL, UNSPECIFIED: ICD-10-CM

## 2023-01-09 DIAGNOSIS — U07.1 COVID-19: ICD-10-CM

## 2023-01-09 DIAGNOSIS — E78.5 HYPERLIPIDEMIA, UNSPECIFIED: ICD-10-CM

## 2023-01-09 LAB
ALBUMIN SERPL ELPH-MCNC: 3.6 G/DL — SIGNIFICANT CHANGE UP (ref 3.3–5)
ALP SERPL-CCNC: 75 U/L — SIGNIFICANT CHANGE UP (ref 40–120)
ALT FLD-CCNC: 49 U/L — SIGNIFICANT CHANGE UP (ref 12–78)
ANION GAP SERPL CALC-SCNC: 13 MMOL/L — SIGNIFICANT CHANGE UP (ref 5–17)
APPEARANCE UR: CLEAR — SIGNIFICANT CHANGE UP
AST SERPL-CCNC: 59 U/L — HIGH (ref 15–37)
BACTERIA # UR AUTO: ABNORMAL
BASOPHILS # BLD AUTO: 0.07 K/UL — SIGNIFICANT CHANGE UP (ref 0–0.2)
BASOPHILS NFR BLD AUTO: 0.9 % — SIGNIFICANT CHANGE UP (ref 0–2)
BILIRUB SERPL-MCNC: 0.9 MG/DL — SIGNIFICANT CHANGE UP (ref 0.2–1.2)
BILIRUB UR-MCNC: NEGATIVE — SIGNIFICANT CHANGE UP
BUN SERPL-MCNC: 6 MG/DL — LOW (ref 7–23)
CALCIUM SERPL-MCNC: 8.2 MG/DL — LOW (ref 8.5–10.1)
CHLORIDE SERPL-SCNC: 103 MMOL/L — SIGNIFICANT CHANGE UP (ref 96–108)
CO2 SERPL-SCNC: 25 MMOL/L — SIGNIFICANT CHANGE UP (ref 22–31)
COLOR SPEC: YELLOW — SIGNIFICANT CHANGE UP
CREAT SERPL-MCNC: 0.81 MG/DL — SIGNIFICANT CHANGE UP (ref 0.5–1.3)
D DIMER BLD IA.RAPID-MCNC: 393 NG/ML DDU — HIGH
DIFF PNL FLD: NEGATIVE — SIGNIFICANT CHANGE UP
EGFR: 102 ML/MIN/1.73M2 — SIGNIFICANT CHANGE UP
EOSINOPHIL # BLD AUTO: 0.01 K/UL — SIGNIFICANT CHANGE UP (ref 0–0.5)
EOSINOPHIL NFR BLD AUTO: 0.1 % — SIGNIFICANT CHANGE UP (ref 0–6)
EPI CELLS # UR: SIGNIFICANT CHANGE UP
ETHANOL SERPL-MCNC: 253 MG/DL — HIGH (ref 0–10)
FLUAV AG NPH QL: SIGNIFICANT CHANGE UP
FLUBV AG NPH QL: SIGNIFICANT CHANGE UP
GLUCOSE BLDC GLUCOMTR-MCNC: 119 MG/DL — HIGH (ref 70–99)
GLUCOSE SERPL-MCNC: 115 MG/DL — HIGH (ref 70–99)
GLUCOSE UR QL: NEGATIVE MG/DL — SIGNIFICANT CHANGE UP
HCT VFR BLD CALC: 38 % — LOW (ref 39–50)
HGB BLD-MCNC: 12.7 G/DL — LOW (ref 13–17)
IMM GRANULOCYTES NFR BLD AUTO: 0.1 % — SIGNIFICANT CHANGE UP (ref 0–0.9)
INR BLD: 0.93 RATIO — SIGNIFICANT CHANGE UP (ref 0.88–1.16)
KETONES UR-MCNC: NEGATIVE — SIGNIFICANT CHANGE UP
LEUKOCYTE ESTERASE UR-ACNC: NEGATIVE — SIGNIFICANT CHANGE UP
LIDOCAIN IGE QN: 112 U/L — SIGNIFICANT CHANGE UP (ref 73–393)
LYMPHOCYTES # BLD AUTO: 0.88 K/UL — LOW (ref 1–3.3)
LYMPHOCYTES # BLD AUTO: 11 % — LOW (ref 13–44)
MAGNESIUM SERPL-MCNC: 2 MG/DL — SIGNIFICANT CHANGE UP (ref 1.6–2.6)
MCHC RBC-ENTMCNC: 30.2 PG — SIGNIFICANT CHANGE UP (ref 27–34)
MCHC RBC-ENTMCNC: 33.4 G/DL — SIGNIFICANT CHANGE UP (ref 32–36)
MCV RBC AUTO: 90.5 FL — SIGNIFICANT CHANGE UP (ref 80–100)
MONOCYTES # BLD AUTO: 0.36 K/UL — SIGNIFICANT CHANGE UP (ref 0–0.9)
MONOCYTES NFR BLD AUTO: 4.5 % — SIGNIFICANT CHANGE UP (ref 2–14)
NEUTROPHILS # BLD AUTO: 6.69 K/UL — SIGNIFICANT CHANGE UP (ref 1.8–7.4)
NEUTROPHILS NFR BLD AUTO: 83.4 % — HIGH (ref 43–77)
NITRITE UR-MCNC: NEGATIVE — SIGNIFICANT CHANGE UP
NRBC # BLD: 0 /100 WBCS — SIGNIFICANT CHANGE UP (ref 0–0)
PH UR: 7 — SIGNIFICANT CHANGE UP (ref 5–8)
PLATELET # BLD AUTO: 122 K/UL — LOW (ref 150–400)
POTASSIUM SERPL-MCNC: 4 MMOL/L — SIGNIFICANT CHANGE UP (ref 3.5–5.3)
POTASSIUM SERPL-SCNC: 4 MMOL/L — SIGNIFICANT CHANGE UP (ref 3.5–5.3)
PROT SERPL-MCNC: 8.3 GM/DL — SIGNIFICANT CHANGE UP (ref 6–8.3)
PROT UR-MCNC: 15 MG/DL
PROTHROM AB SERPL-ACNC: 11.2 SEC — SIGNIFICANT CHANGE UP (ref 10.5–13.4)
RBC # BLD: 4.2 M/UL — SIGNIFICANT CHANGE UP (ref 4.2–5.8)
RBC # FLD: 14.6 % — HIGH (ref 10.3–14.5)
RBC CASTS # UR COMP ASSIST: NEGATIVE /HPF — SIGNIFICANT CHANGE UP (ref 0–4)
SARS-COV-2 RNA SPEC QL NAA+PROBE: DETECTED
SODIUM SERPL-SCNC: 141 MMOL/L — SIGNIFICANT CHANGE UP (ref 135–145)
SP GR SPEC: 1 — LOW (ref 1.01–1.02)
UROBILINOGEN FLD QL: NEGATIVE MG/DL — SIGNIFICANT CHANGE UP
WBC # BLD: 8.02 K/UL — SIGNIFICANT CHANGE UP (ref 3.8–10.5)
WBC # FLD AUTO: 8.02 K/UL — SIGNIFICANT CHANGE UP (ref 3.8–10.5)
WBC UR QL: NEGATIVE — SIGNIFICANT CHANGE UP

## 2023-01-09 PROCEDURE — 93010 ELECTROCARDIOGRAM REPORT: CPT

## 2023-01-09 PROCEDURE — 99222 1ST HOSP IP/OBS MODERATE 55: CPT

## 2023-01-09 PROCEDURE — 70450 CT HEAD/BRAIN W/O DYE: CPT | Mod: 26,MA

## 2023-01-09 PROCEDURE — 71045 X-RAY EXAM CHEST 1 VIEW: CPT | Mod: 26

## 2023-01-09 PROCEDURE — 99285 EMERGENCY DEPT VISIT HI MDM: CPT

## 2023-01-09 RX ORDER — SODIUM CHLORIDE 9 MG/ML
2000 INJECTION INTRAMUSCULAR; INTRAVENOUS; SUBCUTANEOUS ONCE
Refills: 0 | Status: COMPLETED | OUTPATIENT
Start: 2023-01-09 | End: 2023-01-09

## 2023-01-09 RX ORDER — ATORVASTATIN CALCIUM 80 MG/1
40 TABLET, FILM COATED ORAL AT BEDTIME
Refills: 0 | Status: DISCONTINUED | OUTPATIENT
Start: 2023-01-09 | End: 2023-01-11

## 2023-01-09 RX ORDER — THIAMINE MONONITRATE (VIT B1) 100 MG
100 TABLET ORAL DAILY
Refills: 0 | Status: DISCONTINUED | OUTPATIENT
Start: 2023-01-09 | End: 2023-01-13

## 2023-01-09 RX ORDER — ENOXAPARIN SODIUM 100 MG/ML
40 INJECTION SUBCUTANEOUS EVERY 24 HOURS
Refills: 0 | Status: DISCONTINUED | OUTPATIENT
Start: 2023-01-09 | End: 2023-01-13

## 2023-01-09 RX ORDER — ACETAMINOPHEN 500 MG
650 TABLET ORAL EVERY 6 HOURS
Refills: 0 | Status: DISCONTINUED | OUTPATIENT
Start: 2023-01-09 | End: 2023-01-13

## 2023-01-09 RX ORDER — AMLODIPINE BESYLATE 2.5 MG/1
5 TABLET ORAL DAILY
Refills: 0 | Status: DISCONTINUED | OUTPATIENT
Start: 2023-01-09 | End: 2023-01-13

## 2023-01-09 RX ORDER — ACETAMINOPHEN 500 MG
1000 TABLET ORAL ONCE
Refills: 0 | Status: COMPLETED | OUTPATIENT
Start: 2023-01-09 | End: 2023-01-09

## 2023-01-09 RX ORDER — FOLIC ACID 0.8 MG
1 TABLET ORAL DAILY
Refills: 0 | Status: DISCONTINUED | OUTPATIENT
Start: 2023-01-09 | End: 2023-01-13

## 2023-01-09 RX ORDER — MULTIVIT-MIN/FERROUS GLUCONATE 9 MG/15 ML
1 LIQUID (ML) ORAL DAILY
Refills: 0 | Status: DISCONTINUED | OUTPATIENT
Start: 2023-01-09 | End: 2023-01-13

## 2023-01-09 RX ORDER — ONDANSETRON 8 MG/1
4 TABLET, FILM COATED ORAL ONCE
Refills: 0 | Status: COMPLETED | OUTPATIENT
Start: 2023-01-09 | End: 2023-01-09

## 2023-01-09 RX ORDER — FAMOTIDINE 10 MG/ML
20 INJECTION INTRAVENOUS ONCE
Refills: 0 | Status: COMPLETED | OUTPATIENT
Start: 2023-01-09 | End: 2023-01-09

## 2023-01-09 RX ORDER — ONDANSETRON 8 MG/1
4 TABLET, FILM COATED ORAL EVERY 6 HOURS
Refills: 0 | Status: DISCONTINUED | OUTPATIENT
Start: 2023-01-09 | End: 2023-01-13

## 2023-01-09 RX ORDER — LATANOPROST 0.05 MG/ML
1 SOLUTION/ DROPS OPHTHALMIC; TOPICAL AT BEDTIME
Refills: 0 | Status: DISCONTINUED | OUTPATIENT
Start: 2023-01-09 | End: 2023-01-13

## 2023-01-09 RX ADMIN — Medication 2 MILLIGRAM(S): at 11:03

## 2023-01-09 RX ADMIN — Medication 2 MILLIGRAM(S): at 23:02

## 2023-01-09 RX ADMIN — ONDANSETRON 4 MILLIGRAM(S): 8 TABLET, FILM COATED ORAL at 11:03

## 2023-01-09 RX ADMIN — Medication 1 MILLIGRAM(S): at 17:03

## 2023-01-09 RX ADMIN — ENOXAPARIN SODIUM 40 MILLIGRAM(S): 100 INJECTION SUBCUTANEOUS at 17:04

## 2023-01-09 RX ADMIN — SODIUM CHLORIDE 4000 MILLILITER(S): 9 INJECTION INTRAMUSCULAR; INTRAVENOUS; SUBCUTANEOUS at 10:57

## 2023-01-09 RX ADMIN — LATANOPROST 1 DROP(S): 0.05 SOLUTION/ DROPS OPHTHALMIC; TOPICAL at 23:02

## 2023-01-09 RX ADMIN — Medication 2 MILLIGRAM(S): at 19:46

## 2023-01-09 RX ADMIN — Medication 1 MILLIGRAM(S): at 14:11

## 2023-01-09 RX ADMIN — Medication 50 MILLIGRAM(S): at 10:16

## 2023-01-09 RX ADMIN — Medication 2 MILLIGRAM(S): at 17:41

## 2023-01-09 RX ADMIN — FAMOTIDINE 20 MILLIGRAM(S): 10 INJECTION INTRAVENOUS at 11:02

## 2023-01-09 RX ADMIN — ATORVASTATIN CALCIUM 40 MILLIGRAM(S): 80 TABLET, FILM COATED ORAL at 23:02

## 2023-01-09 RX ADMIN — Medication 100 MILLIGRAM(S): at 17:02

## 2023-01-09 NOTE — H&P ADULT - PROBLEM SELECTOR PLAN 2
COVID +  vaccinated and boosted   Asymptomatic, sat well at [facility]  Check CXR  monitor d-dimer, CRP, LDH, ferritin  supportive care  DVT prophylaxis with lovenox  Isolation per protocol

## 2023-01-09 NOTE — ED ADULT TRIAGE NOTE - HISTORY OF COVID-19 VACCINATION
1. Mysleep - once your out could download this korina and give it a try.   2. Also in the future taking thyroid medicine in AM before taking anything else.     3. Come see us in 3 months unless something is abnormal I will call/send a letter.     Thank you for coming to [facility].    **If you had lab testing today and your results are reassuring or normal they will be be mailed to you or sent to your MyChart and you will be notified by email within 7 days.   **If the lab tests need quick action we will call you with the results.  The phone number we will call with results is # [phone] (home)    (home) . If this is not the best number please call our clinic and change the number.  **If any referrals were ordered today you should be getting a call in the next week.  If you don't hear about this within a week please call one of the following numbers   If your referral is for [facility] please call [phone]  If your referral is to outside [facility] please call the clinic number ([phone]) and ask for your team care coordinator.  If you need any refills please call your pharmacy and they will contact us.  If you have any concerns about today's visit or wish to schedule another appointment  please call our office during normal business hours  [phone] (8-5:00 M-F)  If you have urgent medical concerns please call [phone] at any time of the day.  If you have a medical emergency please call [phone]    Again thank you for choosing [facility] and please let us know how we can best partner with you to improve your and your family's health.  
Vaccine status unknown

## 2023-01-09 NOTE — H&P ADULT - HISTORY OF PRESENT ILLNESS
57 years old male with h/o HTN, HLD, glaucoma, alcohol abuse was brought in to ED with concern for alcohol withdrawal. Bystander called 911 as he was ? standing by bus stop. Patient drink 2 bottle of vodka and 9-10 beer daily. Last drink was today 2:30AM. Denied any nausea, vomiting, abdominal pain  Tachycardic, hypertensive upon ED arrival, appear shaky in triage. No leukocytosis, Plt 122, K 4, Cr 0.81, glucose 115, lipase 112, serum alcohol 253. COVID +. CT head image reviewed, no acute pathology    SH: alcohol use

## 2023-01-09 NOTE — ED PROVIDER NOTE - ATTENDING APP SHARED VISIT CONTRIBUTION OF CARE
Ayla: Pt seen w/ PA, 57M hx HTN, daily EtOH use pw symptoms of withdrawal. CIWA 14. Agree w/ planned w/u, anticipate admisssion.

## 2023-01-09 NOTE — H&P ADULT - NSHPPHYSICALEXAM_GEN_ALL_CORE
CONSTITUTIONAL: alert and cooperative, no acute distress.   ENT: Mucosa moist, tongue normal  NECK: Neck supple, trachea midline, non-tender  CARDIAC: Normal S1 and S2. Regular rate and rhythms. No Pedal edema. Peripheral pulses intact  LUNGS: Equal air entry both lungs. No rales, rhonchi, wheezing. Normal respiratory effort.   ABDOMEN: Soft, nondistended, nontender. No hepatomegaly or splenomegaly. Bowel sound normal. No hernia noted  MUSCULOSKELETAL: Normocephalic, atraumatic. No significant deformity or joint abnormality  NEUROLOGICAL: No gross motor or sensory deficits  SKIN: no lesions or eruptions. Normal turgor  PSYCHIATRIC: A&O x 3,  mildly tremulous

## 2023-01-09 NOTE — H&P ADULT - PROBLEM SELECTOR PLAN 1
tachycardic, hypertensive, tremulous in ED, improved with benzo  h/o multiple admission with alcohol withdrawal  serum alcohol 253  CIWA protocol with symptoms trigger IV ativan for now  will likely need taper tomorrow as blood alcohol level trend down  Monitor for DT  Thiamine, folic acid, multivitamin  Cessation education

## 2023-01-09 NOTE — ED PROVIDER NOTE - NS ED ROS FT
Review of Systems    Constitutional: (-) fever   Eyes/ENT: (-) vision changes  Cardiovascular: (-) chest pain, (-) syncope (-) palpitations  Respiratory: (-) cough, (-) shortness of breath  Gastrointestinal: (+) vomiting, (-) diarrhea (+) abdominal pain  Genitourinary:  (-) dysuria   Musculoskeletal: (-) neck pain, (-) back pain, (-) leg pain/swelling  Integumentary: (-) rash, (-) edema  Neurological: (-) headache, (-) confusion  Hematologic: (-) easy bruising

## 2023-01-09 NOTE — H&P ADULT - ASSESSMENT
Prevention Guidelines, Women Ages 40 to 49  Screening tests and vaccines are an important part of managing your health. Health counseling is essential, too. Below are guidelines for these, for women ages 40 to 49. Talk with your healthcare provider to make sure youre up-to-date on what you need.  Screening Who needs it How often   Type 2 diabetes or prediabetes All adults beginning at age 45 and adults without symptoms at any age who are overweight or obese and have 1 or more additional risk factors for diabetes At least every 3 years   Alcohol misuse All women in this age group At routine exams   Blood pressure All women in this age group Every 2 years if your blood pressure is less than 120/80 mm Hg; yearly if your systolic blood pressure is 120 to 139 mm Hg, or your diastolic blood pressure reading is 80 to 89 mm Hg   Breast cancer All women in this age group Yearly mammogram and clinical breast exam2  Each woman should make her own decision. If a woman decides to have mammograms before age 50, they should be done every 2 years.3   Cervical cancer All women in this age group, except women who have had a complete hysterectomy Pap test every 3 years or Pap test plus human papilloma virus (HPV) test every 5 years   Chlamydia Women at increased risk for infection At routine exams if you're at risk or have symptoms   Depression All women in this age group At routine exams   Gonorrhea Sexually active women at increased risk for infection At routine exams   Hepatitis C Anyone at increased risk; 1 time for those born between 1945 and 1965 At routine exams   High cholesterol or triglycerides All women ages 45 and older who are at risk for coronary artery disease; younger women, talk with your healthcare provider At least every 5 years   HIV All women At routine exams   Obesity All women in this age group At routine exams   Syphilis Women at increased risk for infection - talk with your healthcare provider At routine  exams   Tuberculosis Women at increased risk for infection - talk with your healthcare provider Ask your healthcare provider   Vision All women in this age group Complete exam at age 40 and eye exams every 2 to 4 years. If you have a chronic disease, ask your healthcare provider how often you should have your eyes examined.4   Vaccine Who needs it How often   Chickenpox (varicella) All women in this age group who have no record of this infection or vaccine 2 doses; the second dose should be given at least 4 weeks after the first dose   Hepatitis A Women at increased risk for infection - talk with your healthcare provider 2 doses given 6 months apart   Hepatitis B Women at increased risk for infection - talk with your healthcare provider 3 doses over 6 months; second dose should be given 1 month after the first dose; the third dose should be given at least 2 months after the second dose and at least 4 months after the first dose   Haemophilus influenzae Type B (HIB) Women at increased risk 1 to 3 doses   Influenza (flu) All women in this age group Once a year   Measles, mumps, rubella (MMR) All women in this age group who have no record of these infections or vaccines 1 or 2 doses   Meningococcal Women at increased risk for infection - talk with your healthcare provider 1 or more doses   Pneumococcal conjugate vaccine (PCV13) and pneumococcal polysaccharide vaccine (PPSV23) Women at increased risk for infection - talk with your healthcare provider 1 or 2 doses   Tetanus/diphtheria/pertussis (Td/Tdap) booster All women in this age group A one-time dose of Tdap instead of a Td booster after age 18, then Td every 10 years   Counseling Who needs it How often   BRCA gene mutation testing for breast and ovarian cancer susceptibility Women with increased risk for having gene mutation When your risk is known   Breast cancer and chemoprevention Women at high risk for breast cancer When your risk is known   Diet and exercise  Women who are overweight or obese When diagnosed, and then at routine exams   Domestic violence Women at the age in which they are able to have children At routine exams   Sexually transmitted infection prevention Women at increased risk for infection - talk with your healthcare provider At routine exams   Use of tobacco and the health effects it can cause All women in this age group Every exam   1[facility] [employer]  2American Cancer Society  3[place].S. Preventive Services Task Force  4[facility] [employer] of [employer]  Date Last Reviewed: 8/27/2015  © 8614-8197 [employer]. [address] [place] [address], [place] [id number]. All rights reserved. This information is not intended as a substitute for professional medical care. Always follow your healthcare professional's instructions.         57 years old male with h/o HTN, HLD, glaucoma, alcohol abuse was brought in to ED with concern for alcohol withdrawal. Bystander called 911 as he was ? standing by bus stop. Patient drink 2 bottle of vodka and 9-10 beer daily. Last drink was today 2:30AM. Denied any nausea, vomiting, abdominal pain  Tachycardic, hypertensive upon ED arrival, appear shaky in triage. No leukocytosis, Plt 122, K 4, Cr 0.81, glucose 115, lipase 112, serum alcohol 253. COVID +. CT head image reviewed, no acute pathology    Admitted with alcohol withdrawal

## 2023-01-09 NOTE — PATIENT PROFILE ADULT - FUNCTIONAL ASSESSMENT - BASIC MOBILITY 6.
2-calculated by average/Not able to assess (calculate score using [facility] averaging method)

## 2023-01-09 NOTE — PATIENT PROFILE ADULT - FALL HARM RISK - HARM RISK INTERVENTIONS
Assistance with ambulation/Assistance OOB with selected safe patient handling equipment/Communicate Risk of Fall with Harm to all staff/Discuss with provider need for PT consult/Monitor for mental status changes/Monitor gait and stability/Provide patient with walking aids - walker, cane, crutches/Reinforce activity limits and safety measures with patient and family/Tailored Fall Risk Interventions/Toileting schedule using arm’s reach rule for commode and bathroom/Use of alarms - bed, chair and/or voice tab/Visual Cue: Yellow wristband and red socks/Bed in lowest position, wheels locked, appropriate side rails in place/Call bell, personal items and telephone in reach/Instruct patient to call for assistance before getting out of bed or chair/Non-slip footwear when patient is out of bed/[place] to call system/Physically safe environment - no spills, clutter or unnecessary equipment/Purposeful Proactive Rounding/Room/bathroom lighting operational, light cord in reach

## 2023-01-09 NOTE — H&P ADULT - PROBLEM SELECTOR PLAN 3
BP ok after giving benzo for alcohol withdrawal  resume with amlodipine 5mg, if BP is still elevated, then would increase back to 10mg daily

## 2023-01-09 NOTE — ED PROVIDER NOTE - PHYSICAL EXAMINATION
PHYSICAL EXAM:    GENERAL: Alert, appears stated age, non-toxic  SKIN: Warm, pink and dry.  HEAD: NC, AT, no step offs   EYE: Normal lids/conjunctiva, PERRL, EOMI  ENT: Normal hearing, patent oropharynx   NECK: +supple. No meningismus, or JVD, +Trachea midline. no tenderness/step offs.   Pulm: Bilateral BS, normal resp effort, no wheezes, stridor, or retractions  CV: RRR, no M/R/G, 2+and = radial pulses  Abd: soft, non-tender, non-distended, no hepatosplenomegaly. no CVA tenderness.   Mskel: no erythema, cyanosis, edema. no calf tenderness. from throughout. no ttp throughout.   Neuro: AAOx3, 5/5 strength throughout. +tremulousness, +fidgeting. no tongue fasciculations.

## 2023-01-09 NOTE — ED PROVIDER NOTE - CARE PLAN
1 Principal Discharge DX:	Alcohol dependence with withdrawal  Secondary Diagnosis:	2019 novel coronavirus disease (COVID-19)

## 2023-01-09 NOTE — H&P ADULT - PROBLEM/PLAN-4
Fax[facility] to [facility] and [facility]   DISPLAY PLAN FREE TEXT

## 2023-01-09 NOTE — ED ADULT TRIAGE NOTE - CHIEF COMPLAINT QUOTE
as per ems, pt standing by bus stop, had bystander call 911. pt drinks 1 bottle of vodka daily. pt drank 1 bottle of vodka and 10 beers. last drink 2am. ambulatory by self, arm shaking in triage.

## 2023-01-09 NOTE — ED PROVIDER NOTE - OBJECTIVE STATEMENT
57-year-old male with PMH HTN, HLD, alcohol abuse with history of alcohol withdrawal seizures,  drinks 2L vodka daily and 10 24oz beers daily, last drink 6am, presents with shakiness, nausea, 2 episodes nonbloody non-billious vomiting, alcohol withdrawal.   no pallaiting/provoking factors.   relates wants detox.   ?fall yesterday am with head injury per pt. no loc. no pain anywhere else.

## 2023-01-09 NOTE — ED ADULT NURSE NOTE - OBJECTIVE STATEMENT
Patient received on stretcher with active tremors. States last drink was 6A. Reports 2 bottles of liquor/day.

## 2023-01-10 LAB
ALBUMIN SERPL ELPH-MCNC: 3.2 G/DL — LOW (ref 3.3–5)
ALP SERPL-CCNC: 72 U/L — SIGNIFICANT CHANGE UP (ref 40–120)
ALT FLD-CCNC: 41 U/L — SIGNIFICANT CHANGE UP (ref 12–78)
ANION GAP SERPL CALC-SCNC: 14 MMOL/L — SIGNIFICANT CHANGE UP (ref 5–17)
AST SERPL-CCNC: 51 U/L — HIGH (ref 15–37)
BILIRUB SERPL-MCNC: 1.3 MG/DL — HIGH (ref 0.2–1.2)
BUN SERPL-MCNC: 9 MG/DL — SIGNIFICANT CHANGE UP (ref 7–23)
CALCIUM SERPL-MCNC: 8.4 MG/DL — LOW (ref 8.5–10.1)
CHLORIDE SERPL-SCNC: 97 MMOL/L — SIGNIFICANT CHANGE UP (ref 96–108)
CO2 SERPL-SCNC: 25 MMOL/L — SIGNIFICANT CHANGE UP (ref 22–31)
CREAT SERPL-MCNC: 0.86 MG/DL — SIGNIFICANT CHANGE UP (ref 0.5–1.3)
CRP SERPL-MCNC: 19 MG/L — HIGH
CULTURE RESULTS: SIGNIFICANT CHANGE UP
EGFR: 100 ML/MIN/1.73M2 — SIGNIFICANT CHANGE UP
FERRITIN SERPL-MCNC: 190 NG/ML — SIGNIFICANT CHANGE UP (ref 30–400)
GLUCOSE SERPL-MCNC: 75 MG/DL — SIGNIFICANT CHANGE UP (ref 70–99)
HCT VFR BLD CALC: 41.1 % — SIGNIFICANT CHANGE UP (ref 39–50)
HGB BLD-MCNC: 13.4 G/DL — SIGNIFICANT CHANGE UP (ref 13–17)
LDH SERPL L TO P-CCNC: 306 U/L — HIGH (ref 50–242)
MAGNESIUM SERPL-MCNC: 1.8 MG/DL — SIGNIFICANT CHANGE UP (ref 1.6–2.6)
MCHC RBC-ENTMCNC: 29.5 PG — SIGNIFICANT CHANGE UP (ref 27–34)
MCHC RBC-ENTMCNC: 32.6 G/DL — SIGNIFICANT CHANGE UP (ref 32–36)
MCV RBC AUTO: 90.5 FL — SIGNIFICANT CHANGE UP (ref 80–100)
NRBC # BLD: 0 /100 WBCS — SIGNIFICANT CHANGE UP (ref 0–0)
PHOSPHATE SERPL-MCNC: 2.7 MG/DL — SIGNIFICANT CHANGE UP (ref 2.5–4.5)
PLATELET # BLD AUTO: 86 K/UL — LOW (ref 150–400)
POTASSIUM SERPL-MCNC: 4 MMOL/L — SIGNIFICANT CHANGE UP (ref 3.5–5.3)
POTASSIUM SERPL-SCNC: 4 MMOL/L — SIGNIFICANT CHANGE UP (ref 3.5–5.3)
PROT SERPL-MCNC: 7.9 GM/DL — SIGNIFICANT CHANGE UP (ref 6–8.3)
RBC # BLD: 4.54 M/UL — SIGNIFICANT CHANGE UP (ref 4.2–5.8)
RBC # FLD: 14.3 % — SIGNIFICANT CHANGE UP (ref 10.3–14.5)
SODIUM SERPL-SCNC: 136 MMOL/L — SIGNIFICANT CHANGE UP (ref 135–145)
SPECIMEN SOURCE: SIGNIFICANT CHANGE UP
WBC # BLD: 10 K/UL — SIGNIFICANT CHANGE UP (ref 3.8–10.5)
WBC # FLD AUTO: 10 K/UL — SIGNIFICANT CHANGE UP (ref 3.8–10.5)

## 2023-01-10 PROCEDURE — 99233 SBSQ HOSP IP/OBS HIGH 50: CPT

## 2023-01-10 RX ORDER — BENZOCAINE AND MENTHOL 5; 1 G/100ML; G/100ML
1 LIQUID ORAL EVERY 6 HOURS
Refills: 0 | Status: DISCONTINUED | OUTPATIENT
Start: 2023-01-10 | End: 2023-01-13

## 2023-01-10 RX ORDER — RITONAVIR 100 MG/1
100 TABLET, FILM COATED ORAL
Refills: 0 | Status: DISCONTINUED | OUTPATIENT
Start: 2023-01-10 | End: 2023-01-13

## 2023-01-10 RX ORDER — IBUPROFEN 200 MG
400 TABLET ORAL EVERY 6 HOURS
Refills: 0 | Status: DISCONTINUED | OUTPATIENT
Start: 2023-01-10 | End: 2023-01-13

## 2023-01-10 RX ORDER — CEFAZOLIN SODIUM 1 G
1000 VIAL (EA) INJECTION EVERY 8 HOURS
Refills: 0 | Status: DISCONTINUED | OUTPATIENT
Start: 2023-01-10 | End: 2023-01-13

## 2023-01-10 RX ORDER — CEFAZOLIN SODIUM 1 G
VIAL (EA) INJECTION
Refills: 0 | Status: DISCONTINUED | OUTPATIENT
Start: 2023-01-10 | End: 2023-01-13

## 2023-01-10 RX ORDER — CEFAZOLIN SODIUM 1 G
1000 VIAL (EA) INJECTION ONCE
Refills: 0 | Status: COMPLETED | OUTPATIENT
Start: 2023-01-10 | End: 2023-01-10

## 2023-01-10 RX ORDER — MAGNESIUM HYDROXIDE 400 MG/1
30 TABLET, CHEWABLE ORAL DAILY
Refills: 0 | Status: DISCONTINUED | OUTPATIENT
Start: 2023-01-10 | End: 2023-01-13

## 2023-01-10 RX ADMIN — RITONAVIR 100 MILLIGRAM(S): 100 TABLET, FILM COATED ORAL at 17:13

## 2023-01-10 RX ADMIN — Medication 1 MILLIGRAM(S): at 11:29

## 2023-01-10 RX ADMIN — Medication 2 MILLIGRAM(S): at 05:20

## 2023-01-10 RX ADMIN — Medication 650 MILLIGRAM(S): at 12:44

## 2023-01-10 RX ADMIN — Medication 400 MILLIGRAM(S): at 00:08

## 2023-01-10 RX ADMIN — Medication 400 MILLIGRAM(S): at 17:20

## 2023-01-10 RX ADMIN — Medication 1000 MILLIGRAM(S): at 01:55

## 2023-01-10 RX ADMIN — Medication 100 MILLIGRAM(S): at 11:29

## 2023-01-10 RX ADMIN — AMLODIPINE BESYLATE 5 MILLIGRAM(S): 2.5 TABLET ORAL at 05:22

## 2023-01-10 RX ADMIN — Medication 650 MILLIGRAM(S): at 11:44

## 2023-01-10 RX ADMIN — ATORVASTATIN CALCIUM 40 MILLIGRAM(S): 80 TABLET, FILM COATED ORAL at 21:50

## 2023-01-10 RX ADMIN — ENOXAPARIN SODIUM 40 MILLIGRAM(S): 100 INJECTION SUBCUTANEOUS at 18:24

## 2023-01-10 RX ADMIN — Medication 100 MILLIGRAM(S): at 17:13

## 2023-01-10 RX ADMIN — Medication 1 TABLET(S): at 11:28

## 2023-01-10 RX ADMIN — Medication 2 MILLIGRAM(S): at 07:03

## 2023-01-10 RX ADMIN — Medication 400 MILLIGRAM(S): at 18:33

## 2023-01-10 RX ADMIN — LATANOPROST 1 DROP(S): 0.05 SOLUTION/ DROPS OPHTHALMIC; TOPICAL at 21:52

## 2023-01-10 NOTE — PROGRESS NOTE ADULT - ASSESSMENT
57 years old male with h/o HTN, HLD, glaucoma, alcohol abuse was brought in to ED with concern for alcohol withdrawal. Bystander called 911 as he was ? standing by bus stop. Patient drink 2 bottle of vodka and 9-10 beer daily. Last drink was today 2:30AM. Denied any nausea, vomiting, abdominal pain  Tachycardic, hypertensive upon ED arrival, appear shaky in triage. No leukocytosis, Plt 122, K 4, Cr 0.81, glucose 115, lipase 112, serum alcohol 253. COVID +. CT head image reviewed, no acute pathology    Admitted with alcohol withdrawal     Problem/Plan - 1:  ·  Problem: Alcohol withdrawal syndrome.   ·  Plan: tachycardic, hypertensive, tremulous in ED, improved with benzo  h/o multiple admission with alcohol withdrawal  serum alcohol 253  CIWA protocol with symptoms trigger IV ativan for now  will likely need taper tomorrow as blood alcohol level trend down  Monitor for DT  Thiamine, folic acid, multivitamin  Cessation education.     Problem/Plan - 2:  ·  Problem: COVID-19 virus detected.   ·  Plan: COVID +  vaccinated and boosted   Asymptomatic, sat well at [facility]  Check CXR  monitor d-dimer, CRP, LDH, ferritin  supportive care  DVT prophylaxis with lovenox  Isolation per protocol.     Problem/Plan - 3:  ·  Problem: Benign essential HTN.   ·  Plan: BP ok after giving benzo for alcohol withdrawal  resume with amlodipine 5mg, if BP is still elevated, then would increase back to 10mg daily.     Problem/Plan - 4:  ·  Problem: Hyperlipidemia, unspecified.   ·  Plan: continue statin.   57 years old male with h/o HTN, HLD, glaucoma, alcohol abuse was brought in to ED with concern for alcohol withdrawal. Bystander called 911 as he was ? standing by bus stop. Patient drink 2 bottle of vodka and 9-10 beer daily. Last drink was today 2:30AM. Denied any nausea, vomiting, abdominal pain  Tachycardic, hypertensive upon ED arrival, appear shaky in triage. No leukocytosis, Plt 122, K 4, Cr 0.81, glucose 115, lipase 112, serum alcohol 253. COVID +. CT head image reviewed, no acute pathology    Admitted with alcohol withdrawal    COVID-19 , viral sepsis   ·  Plan: COVID +  vaccinated and boosted   sat well at [facility]   CXR: clear   monitor d-dimer, CRP, LDH, ferritin  supportive care  DVT prophylaxis with lovenox  Isolation per protocol.  added paxlovid     ETOH , with mild w/d   continue with CIWA protocol  ativan prn if CIWA > 8    : Benign essential HTN.   ·  Plan: BP ok after giving benzo for alcohol withdrawal  resume with amlodipine 5mg, if BP is still elevated, then would increase back to 10mg daily.    Hyperlipidemia, unspecified.   ·  Plan: continue statin.   57 years old male with h/o HTN, HLD, glaucoma, alcohol abuse was brought in to ED with concern for alcohol withdrawal. Bystander called 911 as he was ? standing by bus stop. Patient drink 2 bottle of vodka and 9-10 beer daily. Last drink was today 2:30AM. Denied any nausea, vomiting, abdominal pain  Tachycardic, hypertensive upon ED arrival, appear shaky in triage. No leukocytosis, Plt 122, K 4, Cr 0.81, glucose 115, lipase 112, serum alcohol 253. COVID +. CT head image reviewed, no acute pathology    Admitted with alcohol withdrawal    COVID-19 , viral sepsis   ·  Plan: COVID +  vaccinated and boosted   sat well at [facility]   CXR: clear   monitor d-dimer, CRP, LDH, ferritin  supportive care  DVT prophylaxis with lovenox  Isolation per protocol.  added paxlovid     ETOH , with mild w/d   continue with CIWA protocol  ativan prn if CIWA > 8    RLE cellulitis and calf tenderness   started ancef   follow RLE duplex     : Benign essential HTN.   ·  Plan: BP ok after giving benzo for alcohol withdrawal  resume with amlodipine 5mg, if BP is still elevated, then would increase back to 10mg daily.    Hyperlipidemia, unspecified.   ·  Plan: continue statin.    glaucoma , rt eye legally blind   continue with eye drops

## 2023-01-10 NOTE — DISCHARGE NOTE NURSING/CASE MANAGEMENT/SOCIAL WORK - NSPROEXTENSIONSOFSELF_GEN_A_NUR
[FreeTextEntry1] : \par Multiple sclerosis:\par -followed by neurology-Dr Jean\par -on [facility] \par -he remains asymptomatic\par \par HTN:\par -BP mildly elevated today\par -he has gained weight\par -I advised low fat/low cholesterol diet, low salt diet, and weight loss\par -I have adv he decrease caffeine intake\par -he is currently on losartan 50 mg once daily and HCTZ 25 mg daily-refilled today\par \par \par Obesity/Pre-DM:\par -HgA1c 6.2 2022\par -BMI 35\par -he has gained 15 lbs since last visit\par -he should adhere to low fat/low cholesterol diet, low salt diet, and low carbohydrate diet \par -he should avoid fast food\par -Regular aerobic exercise advised.  \par -he should try to maintain a healthy weight\par -he should avoid of sugary drinks\par \par Hypertriglyceridemia:\par -lipids at goal 2022\par \par Vitamin D insufficiency:\par -vitamin D 25-OH was normal 2022\par \par History of elevated LFTS:\par -AST/ALT normal 2022\par -Hepatitis B and C serologies were negative\par -abdominal US showed fatty liver and GB adenomyomatosis-f/u abdominal US in 1 year advised 2023\par -check CMP\par \par \par \par HCM:\par \par CPE: Advised previously\par \par EKG 2022\par \par Depression screenin/10/2023 PHQ 2 score 0\par \par Flu shot: advised.  R/B discussed.  No egg allergy reported.  VIS given.  Flu shot given today 1/10/2023\par \par Tdap:  2016\par \par Covid vaccine (Pfizer x 3)-advised covid 19 bivalent vaccine\par \par HIV testing offered: he declined testing 2022\par \par Hepatitis B: immune 2020\par \par \par F/U 6 weeks for CPE.  labs drawn in office today
eyeglasses

## 2023-01-11 LAB
HCT VFR BLD CALC: 37.9 % — LOW (ref 39–50)
HGB BLD-MCNC: 13 G/DL — SIGNIFICANT CHANGE UP (ref 13–17)
MCHC RBC-ENTMCNC: 30 PG — SIGNIFICANT CHANGE UP (ref 27–34)
MCHC RBC-ENTMCNC: 34.3 G/DL — SIGNIFICANT CHANGE UP (ref 32–36)
MCV RBC AUTO: 87.3 FL — SIGNIFICANT CHANGE UP (ref 80–100)
NRBC # BLD: 0 /100 WBCS — SIGNIFICANT CHANGE UP (ref 0–0)
PLATELET # BLD AUTO: 90 K/UL — LOW (ref 150–400)
RBC # BLD: 4.34 M/UL — SIGNIFICANT CHANGE UP (ref 4.2–5.8)
RBC # FLD: 14.1 % — SIGNIFICANT CHANGE UP (ref 10.3–14.5)
WBC # BLD: 8.72 K/UL — SIGNIFICANT CHANGE UP (ref 3.8–10.5)
WBC # FLD AUTO: 8.72 K/UL — SIGNIFICANT CHANGE UP (ref 3.8–10.5)

## 2023-01-11 PROCEDURE — 99232 SBSQ HOSP IP/OBS MODERATE 35: CPT

## 2023-01-11 RX ORDER — ATORVASTATIN CALCIUM 80 MG/1
40 TABLET, FILM COATED ORAL AT BEDTIME
Refills: 0 | Status: CANCELLED | OUTPATIENT
Start: 2023-01-16 | End: 2023-01-13

## 2023-01-11 RX ADMIN — ENOXAPARIN SODIUM 40 MILLIGRAM(S): 100 INJECTION SUBCUTANEOUS at 18:50

## 2023-01-11 RX ADMIN — Medication 2 MILLIGRAM(S): at 15:35

## 2023-01-11 RX ADMIN — Medication 1 MILLIGRAM(S): at 12:08

## 2023-01-11 RX ADMIN — Medication 100 MILLIGRAM(S): at 12:08

## 2023-01-11 RX ADMIN — Medication 100 MILLIGRAM(S): at 15:24

## 2023-01-11 RX ADMIN — Medication 100 MILLIGRAM(S): at 22:53

## 2023-01-11 RX ADMIN — Medication 650 MILLIGRAM(S): at 15:34

## 2023-01-11 RX ADMIN — AMLODIPINE BESYLATE 5 MILLIGRAM(S): 2.5 TABLET ORAL at 05:23

## 2023-01-11 RX ADMIN — Medication 1 TABLET(S): at 12:08

## 2023-01-11 RX ADMIN — LATANOPROST 1 DROP(S): 0.05 SOLUTION/ DROPS OPHTHALMIC; TOPICAL at 22:55

## 2023-01-11 RX ADMIN — RITONAVIR 100 MILLIGRAM(S): 100 TABLET, FILM COATED ORAL at 05:23

## 2023-01-11 RX ADMIN — Medication 650 MILLIGRAM(S): at 15:35

## 2023-01-11 RX ADMIN — Medication 100 MILLIGRAM(S): at 00:00

## 2023-01-11 RX ADMIN — Medication 100 MILLIGRAM(S): at 05:22

## 2023-01-11 RX ADMIN — RITONAVIR 100 MILLIGRAM(S): 100 TABLET, FILM COATED ORAL at 18:50

## 2023-01-11 NOTE — PROGRESS NOTE ADULT - ASSESSMENT
57 years old male with h/o HTN, HLD, glaucoma, alcohol abuse was brought in to ED with concern for alcohol withdrawal. Bystander called 911 as he was ? standing by bus stop. Patient drink 2 bottle of vodka and 9-10 beer daily. Last drink was today 2:30AM. Denied any nausea, vomiting, abdominal pain  Tachycardic, hypertensive upon ED arrival, appear shaky in triage. No leukocytosis, Plt 122, K 4, Cr 0.81, glucose 115, lipase 112, serum alcohol 253. COVID +. CT head image reviewed, no acute pathology    Admitted with alcohol withdrawal    COVID-19 , viral sepsis   ·  Plan: COVID +  vaccinated and boosted   sat well at [facility]   CXR: clear   monitor d-dimer, CRP, LDH, ferritin  supportive care  DVT prophylaxis with lovenox  Isolation per protocol.  added paxlovid     ETOH , with mild w/d   continue with CIWA protocol  ativan prn if CIWA > 8    thrombocytopenia  2/2 etoh abuse  monitor platelets no active bleeding     RLE cellulitis and calf tenderness   started ancef   follow RLE duplex     : Benign essential HTN.   ·  Plan: BP ok after giving benzo for alcohol withdrawal  resume with amlodipine 5mg, if BP is still elevated, then would increase back to 10mg daily.    Hyperlipidemia, unspecified.   ·  Plan: continue statin.    glaucoma , rt eye legally blind   continue with eye drops

## 2023-01-11 NOTE — PHARMACOTHERAPY INTERVENTION NOTE - COMMENTS
Recommended to discontinue atorvastatin while patient is on Paxlovid, as Paxlovid can increase serum concentrations of atorvastatin. 
Recommended to closely monitor blood pressure while patient is on Paxlovid and amlodipine, as Paxlovid can increase serum concentrations of amlodipine.

## 2023-01-12 LAB
ALBUMIN SERPL ELPH-MCNC: 2.8 G/DL — LOW (ref 3.3–5)
ALP SERPL-CCNC: 68 U/L — SIGNIFICANT CHANGE UP (ref 40–120)
ALT FLD-CCNC: 34 U/L — SIGNIFICANT CHANGE UP (ref 12–78)
ANION GAP SERPL CALC-SCNC: 12 MMOL/L — SIGNIFICANT CHANGE UP (ref 5–17)
AST SERPL-CCNC: 41 U/L — HIGH (ref 15–37)
BILIRUB SERPL-MCNC: 0.7 MG/DL — SIGNIFICANT CHANGE UP (ref 0.2–1.2)
BUN SERPL-MCNC: 10 MG/DL — SIGNIFICANT CHANGE UP (ref 7–23)
CALCIUM SERPL-MCNC: 8.4 MG/DL — LOW (ref 8.5–10.1)
CHLORIDE SERPL-SCNC: 99 MMOL/L — SIGNIFICANT CHANGE UP (ref 96–108)
CLOSURE TME COLL+EPINEP BLD: 77 K/UL — LOW (ref 150–400)
CO2 SERPL-SCNC: 25 MMOL/L — SIGNIFICANT CHANGE UP (ref 22–31)
CREAT SERPL-MCNC: 0.69 MG/DL — SIGNIFICANT CHANGE UP (ref 0.5–1.3)
EGFR: 107 ML/MIN/1.73M2 — SIGNIFICANT CHANGE UP
GLUCOSE SERPL-MCNC: 90 MG/DL — SIGNIFICANT CHANGE UP (ref 70–99)
HCT VFR BLD CALC: 37.9 % — LOW (ref 39–50)
HGB BLD-MCNC: 12.9 G/DL — LOW (ref 13–17)
MCHC RBC-ENTMCNC: 30.3 PG — SIGNIFICANT CHANGE UP (ref 27–34)
MCHC RBC-ENTMCNC: 34 G/DL — SIGNIFICANT CHANGE UP (ref 32–36)
MCV RBC AUTO: 89 FL — SIGNIFICANT CHANGE UP (ref 80–100)
NRBC # BLD: 0 /100 WBCS — SIGNIFICANT CHANGE UP (ref 0–0)
PLATELET # BLD AUTO: 99 K/UL — LOW (ref 150–400)
POTASSIUM SERPL-MCNC: 2.9 MMOL/L — CRITICAL LOW (ref 3.5–5.3)
POTASSIUM SERPL-SCNC: 2.9 MMOL/L — CRITICAL LOW (ref 3.5–5.3)
PROT SERPL-MCNC: 7.4 GM/DL — SIGNIFICANT CHANGE UP (ref 6–8.3)
RBC # BLD: 4.26 M/UL — SIGNIFICANT CHANGE UP (ref 4.2–5.8)
RBC # FLD: 14.2 % — SIGNIFICANT CHANGE UP (ref 10.3–14.5)
SODIUM SERPL-SCNC: 136 MMOL/L — SIGNIFICANT CHANGE UP (ref 135–145)
WBC # BLD: 5.76 K/UL — SIGNIFICANT CHANGE UP (ref 3.8–10.5)
WBC # FLD AUTO: 5.76 K/UL — SIGNIFICANT CHANGE UP (ref 3.8–10.5)

## 2023-01-12 PROCEDURE — 93970 EXTREMITY STUDY: CPT | Mod: 26

## 2023-01-12 PROCEDURE — 99232 SBSQ HOSP IP/OBS MODERATE 35: CPT

## 2023-01-12 RX ORDER — POTASSIUM CHLORIDE 20 MEQ
40 PACKET (EA) ORAL ONCE
Refills: 0 | Status: COMPLETED | OUTPATIENT
Start: 2023-01-12 | End: 2023-01-12

## 2023-01-12 RX ORDER — POTASSIUM CHLORIDE 20 MEQ
10 PACKET (EA) ORAL
Refills: 0 | Status: COMPLETED | OUTPATIENT
Start: 2023-01-12 | End: 2023-01-12

## 2023-01-12 RX ADMIN — Medication 100 MILLIEQUIVALENT(S): at 12:38

## 2023-01-12 RX ADMIN — AMLODIPINE BESYLATE 5 MILLIGRAM(S): 2.5 TABLET ORAL at 06:13

## 2023-01-12 RX ADMIN — Medication 100 MILLIEQUIVALENT(S): at 14:57

## 2023-01-12 RX ADMIN — Medication 10 MILLIGRAM(S): at 16:58

## 2023-01-12 RX ADMIN — Medication 100 MILLIGRAM(S): at 06:13

## 2023-01-12 RX ADMIN — RITONAVIR 100 MILLIGRAM(S): 100 TABLET, FILM COATED ORAL at 06:14

## 2023-01-12 RX ADMIN — Medication 100 MILLIGRAM(S): at 21:24

## 2023-01-12 RX ADMIN — Medication 100 MILLIGRAM(S): at 12:36

## 2023-01-12 RX ADMIN — Medication 2 MILLIGRAM(S): at 04:16

## 2023-01-12 RX ADMIN — Medication 1 MILLIGRAM(S): at 12:36

## 2023-01-12 RX ADMIN — Medication 10 MILLIGRAM(S): at 21:38

## 2023-01-12 RX ADMIN — RITONAVIR 100 MILLIGRAM(S): 100 TABLET, FILM COATED ORAL at 18:16

## 2023-01-12 RX ADMIN — Medication 100 MILLIEQUIVALENT(S): at 10:38

## 2023-01-12 RX ADMIN — Medication 1 TABLET(S): at 13:03

## 2023-01-12 RX ADMIN — Medication 100 MILLIGRAM(S): at 16:59

## 2023-01-12 RX ADMIN — Medication 40 MILLIEQUIVALENT(S): at 13:03

## 2023-01-12 RX ADMIN — LATANOPROST 1 DROP(S): 0.05 SOLUTION/ DROPS OPHTHALMIC; TOPICAL at 21:25

## 2023-01-12 RX ADMIN — ENOXAPARIN SODIUM 40 MILLIGRAM(S): 100 INJECTION SUBCUTANEOUS at 18:15

## 2023-01-12 NOTE — PROGRESS NOTE ADULT - ASSESSMENT
57 years old male with h/o HTN, HLD, glaucoma, alcohol abuse was brought in to ED with concern for alcohol withdrawal. Bystander called 911 as he was ? standing by bus stop. Patient drink 2 bottle of vodka and 9-10 beer daily. Last drink was today 2:30AM. Denied any nausea, vomiting, abdominal pain  Tachycardic, hypertensive upon ED arrival, appear shaky in triage. No leukocytosis, Plt 122, K 4, Cr 0.81, glucose 115, lipase 112, serum alcohol 253. COVID +. CT head image reviewed, no acute pathology    Admitted with alcohol withdrawal    COVID-19 , viral sepsis   ·  Plan: COVID +  vaccinated and boosted   sat well at [facility]   CXR: clear   monitor d-dimer, CRP, LDH, ferritin  supportive care  DVT prophylaxis with lovenox  Isolation per protocol.  added paxlovid     ETOH , with mild w/d   continue with CIWA protocol  ativan prn if CIWA > 8  will give several doses of librium as pt still shaky     thrombocytopenia  2/2 etoh abuse  monitor platelets no active bleeding     RLE cellulitis and calf tenderness    ancef   follow RLE duplex     : Benign essential HTN.   ·  Plan: BP ok after giving benzo for alcohol withdrawal  resume with amlodipine 5mg, if BP is still elevated, then would increase back to 10mg daily.    Hyperlipidemia, unspecified.   ·  Plan: continue statin.    glaucoma , rt eye legally blind   continue with eye drops

## 2023-01-12 NOTE — PROGRESS NOTE ADULT - SUBJECTIVE AND OBJECTIVE BOX
Patient is a 57y old  Male who presents with a chief complaint of alcohol withdrawal (10 Jose M 2023 22:45)    INTERVAL HPI/OVERNIGHT EVENTS:    MEDICATIONS  (STANDING):  amLODIPine   Tablet 5 milliGRAM(s) Oral daily  atorvastatin 40 milliGRAM(s) Oral at bedtime  ceFAZolin   IVPB 1000 milliGRAM(s) IV Intermittent every 8 hours  ceFAZolin   IVPB      enoxaparin Injectable 40 milliGRAM(s) SubCutaneous every 24 hours  folic acid 1 milliGRAM(s) Oral daily  latanoprost 0.005% Ophthalmic Solution 1 Drop(s) Both EYES at bedtime  multivitamin/minerals 1 Tablet(s) Oral daily  nirmatrelvir (EUA) 300 milliGRAM(s) Oral two times a day  ritonavir (EUA) 100 milliGRAM(s) Oral two times a day  thiamine 100 milliGRAM(s) Oral daily    MEDICATIONS  (PRN):  acetaminophen     Tablet .. 650 milliGRAM(s) Oral every 6 hours PRN Temp greater or equal to 38C (100.4F)  aluminum hydroxide/magnesium hydroxide/simethicone Suspension 30 milliLiter(s) Oral every 4 hours PRN Dyspepsia  benzocaine 15 mG/menthol 3.6 mG Lozenge 1 Lozenge Oral every 6 hours PRN Sore Throat  ibuprofen  Tablet. 400 milliGRAM(s) Oral every 6 hours PRN Temp greater or equal to 38.5C (101.3F)  LORazepam   Injectable 2 milliGRAM(s) IV Push every 3 hours PRN if CIWA >8  magnesium hydroxide Suspension 30 milliLiter(s) Oral daily PRN Constipation  ondansetron Injectable 4 milliGRAM(s) IV Push every 6 hours PRN Nausea and/or Vomiting    Allergies    No Known Allergies    Intolerances      REVIEW OF SYSTEMS:  All other systems reviewed and are negative    Vital Signs Last 24 Hrs  T(C): 36.9 (11 Jan 2023 11:13), Max: 38.6 (10 Jose M 2023 17:15)  T(F): 98.5 (11 Jan 2023 11:13), Max: 101.5 (10 Jose M 2023 17:15)  HR: 87 (11 Jan 2023 11:13) (72 - 94)  BP: 105/67 (11 Jan 2023 11:13) (105/67 - 138/75)  BP(mean): --  RR: 17 (11 Jan 2023 11:13) (17 - 18)  SpO2: 97% (11 Jan 2023 11:13) (96% - 97%)      Daily     Daily   I&O's Summary    10 Jose M 2023 07:01  -  11 Jan 2023 07:00  --------------------------------------------------------  IN: 225 mL / OUT: 1100 mL / NET: -875 mL      CAPILLARY BLOOD GLUCOSE        PHYSICAL EXAM:  GENERAL: NAD,    HEAD:  Atraumatic, Normocephalic  EYES: EOMI, PERRLA, conjunctiva and sclera clear  ENMT: No tonsillar erythema, exudates, or enlargement; Moist mucous membranes, Good dentition, No lesions  NECK: Supple, No JVD, Normal thyroid  NERVOUS SYSTEM:  Alert & Oriented X3, Good concentration; Motor Strength 5/5 B/L upper and lower extremities; DTRs 2+ intact and symmetric  CHEST/LUNG: Clear to percussion bilaterally; No rales, rhonchi, wheezing, or rubs  HEART: Regular rate and rhythm; No murmurs, rubs, or gallops  ABDOMEN: Soft, Nontender, Nondistended; Bowel sounds present  EXTREMITIES:  2+ Peripheral Pulses, No clubbing, cyanosis, or edema, RLE erythema   LYMPH: No lymphadenopathy noted  SKIN: No rashes or lesions    Labs                          13.0   8.72  )-----------( 90       ( 11 Jan 2023 10:05 )             37.9     01-10    136  |  97  |  9   ----------------------------<  75  4.0   |  25  |  0.86    Ca    8.4<L>      10 Jose M 2023 05:57  Phos  2.7     01-10  Mg     1.8     01-10    TPro  7.9  /  Alb  3.2<L>  /  TBili  1.3<H>  /  DBili  x   /  AST  51<H>  /  ALT  41  /  AlkPhos  72  01-10    PT/INR - ( 09 Jan 2023 16:25 )   PT: 11.2 sec;   INR: 0.93 ratio                   Culture - Urine (collected 09 Jan 2023 10:50)  Source: Clean Catch Clean Catch (Midstream)  Final Report (10 Jose M 2023 13:19):    <10,000 CFU/mL Normal Urogenital Barbara                DVT prophylaxis: > Lovenox 40mg SQ daily  > Heparin   > SCD's
Patient is a 57y old  Male who presents with a chief complaint of alcohol withdrawal (11 Jan 2023 12:30)    INTERVAL HPI/OVERNIGHT EVENTS: no events     MEDICATIONS  (STANDING):  amLODIPine   Tablet 5 milliGRAM(s) Oral daily  ceFAZolin   IVPB 1000 milliGRAM(s) IV Intermittent every 8 hours  ceFAZolin   IVPB      chlordiazePOXIDE 10 milliGRAM(s) Oral three times a day  enoxaparin Injectable 40 milliGRAM(s) SubCutaneous every 24 hours  folic acid 1 milliGRAM(s) Oral daily  latanoprost 0.005% Ophthalmic Solution 1 Drop(s) Both EYES at bedtime  multivitamin/minerals 1 Tablet(s) Oral daily  nirmatrelvir (EUA) 300 milliGRAM(s) Oral two times a day  potassium chloride    Tablet ER 40 milliEquivalent(s) Oral once  potassium chloride  10 mEq/100 mL IVPB 10 milliEquivalent(s) IV Intermittent every 1 hour  ritonavir (EUA) 100 milliGRAM(s) Oral two times a day  thiamine 100 milliGRAM(s) Oral daily    MEDICATIONS  (PRN):  acetaminophen     Tablet .. 650 milliGRAM(s) Oral every 6 hours PRN Temp greater or equal to 38C (100.4F)  aluminum hydroxide/magnesium hydroxide/simethicone Suspension 30 milliLiter(s) Oral every 4 hours PRN Dyspepsia  benzocaine 15 mG/menthol 3.6 mG Lozenge 1 Lozenge Oral every 6 hours PRN Sore Throat  ibuprofen  Tablet. 400 milliGRAM(s) Oral every 6 hours PRN Temp greater or equal to 38.5C (101.3F)  LORazepam   Injectable 2 milliGRAM(s) IV Push every 3 hours PRN if CIWA >8  magnesium hydroxide Suspension 30 milliLiter(s) Oral daily PRN Constipation  ondansetron Injectable 4 milliGRAM(s) IV Push every 6 hours PRN Nausea and/or Vomiting    Allergies    No Known Allergies    Intolerances      REVIEW OF SYSTEMS:  All other systems reviewed and are negative    Vital Signs Last 24 Hrs  T(C): 37.2 (12 Jan 2023 05:31), Max: 37.5 (11 Jan 2023 17:08)  T(F): 99 (12 Jan 2023 05:31), Max: 99.5 (11 Jan 2023 17:08)  HR: 69 (12 Jan 2023 05:31) (69 - 87)  BP: 115/69 (12 Jan 2023 05:31) (105/67 - 148/75)  BP(mean): --  RR: 18 (12 Jan 2023 05:31) (17 - 18)  SpO2: 96% (12 Jan 2023 05:31) (96% - 97%)    Parameters below as of 12 Jan 2023 05:31  Patient On (Oxygen Delivery Method): room air      Daily     Daily   I&O's Summary    11 Jan 2023 07:01  -  12 Jan 2023 07:00  --------------------------------------------------------  IN: 100 mL / OUT: 0 mL / NET: 100 mL      CAPILLARY BLOOD GLUCOSE        PHYSICAL EXAM:  GENERAL: NAD,    HEAD:  Atraumatic, Normocephalic  EYES: EOMI, PERRLA, conjunctiva and sclera clear  ENMT: No tonsillar erythema, exudates, or enlargement; Moist mucous membranes, Good dentition, No lesions  NECK: Supple, No JVD, Normal thyroid  NERVOUS SYSTEM:  Alert & Oriented X3, Good concentration; Motor Strength 5/5 B/L upper and lower extremities; DTRs 2+ intact and symmetric  CHEST/LUNG: Clear to percussion bilaterally; No rales, rhonchi, wheezing, or rubs  HEART: Regular rate and rhythm; No murmurs, rubs, or gallops  ABDOMEN: Soft, Nontender, Nondistended; Bowel sounds present  EXTREMITIES:  2+ Peripheral Pulses, No clubbing, cyanosis, or edema, RLE erythema   LYMPH: No lymphadenopathy noted  SKIN: No rashes or lesions    Labs                          12.9   5.76  )-----------( 99       ( 12 Jan 2023 05:40 )             37.9     01-12    136  |  99  |  10  ----------------------------<  90  2.9<LL>   |  25  |  0.69    Ca    8.4<L>      12 Jan 2023 05:40    TPro  7.4  /  Alb  2.8<L>  /  TBili  0.7  /  DBili  x   /  AST  41<H>  /  ALT  34  /  AlkPhos  68  01-12              Culture - Blood (collected 10 Jose M 2023 11:15)  Source: .Blood Blood-Peripheral  Preliminary Report (11 Jan 2023 15:08):    No growth to date.    Culture - Blood (collected 10 Jose M 2023 10:45)  Source: .Blood Blood-Peripheral  Preliminary Report (11 Jan 2023 15:08):    No growth to date.    Culture - Urine (collected 09 Jan 2023 10:50)  Source: Clean Catch Clean Catch (Midstream)  Final Report (10 Jose M 2023 13:19):    <10,000 CFU/mL Normal Urogenital Barbara                DVT prophylaxis: > Lovenox 40mg SQ daily  > Heparin   > SCD's
PROGRESS NOTE:     Patient is a 57y old  Male who presents with a chief complaint of alcohol withdrawal (2023 16:40)        SUBJECTIVE & OBJECTIVE:   Pt seen and examined at bedside in AM    no overnight events.   no new complaints    REVIEW OF SYSTEMS: remaining ROS negative     PHYSICAL EXAM:  T(C): 37.4 (01-10-23 @ 19:08), Max: 39.4 (23 @ 23:35)  HR: 94 (01-10-23 @ 18:56) (94 - 114)  BP: 138/75 (01-10-23 @ 18:56) (126/72 - 152/68)  RR: 18 (01-10-23 @ 18:56) (17 - 18)  SpO2: 97% (01-10-23 @ 18:56) (94% - 100%)  Wt(kg): --           GENERAL: NAD,  no increased WOB  HEAD:  Atraumatic, Normocephalic  EYES: EOMI, PERRLA, conjunctiva and sclera clear  ENMT: Moist mucous membranes  NECK: Supple, No JVD  NERVOUS SYSTEM:  Alert & Oriented X3, no focal neuro deficits   CHEST/LUNG: Clear to auscultation bilaterally; No rales, rhonchi, wheezing, or rubs  HEART: Regular rate and rhythm; No murmurs, rubs, or gallops  ABDOMEN: Soft, Nontender, Nondistended; Bowel sounds present  EXTREMITIES:  2+ Peripheral Pulses b/l, no edema b/l   Rt calf tenderness and erythema of RLE   multiple scabs on b/l LE     MEDICATIONS  (STANDING):  amLODIPine   Tablet 5 milliGRAM(s) Oral daily  atorvastatin 40 milliGRAM(s) Oral at bedtime  ceFAZolin   IVPB 1000 milliGRAM(s) IV Intermittent every 8 hours  ceFAZolin   IVPB      enoxaparin Injectable 40 milliGRAM(s) SubCutaneous every 24 hours  folic acid 1 milliGRAM(s) Oral daily  latanoprost 0.005% Ophthalmic Solution 1 Drop(s) Both EYES at bedtime  multivitamin/minerals 1 Tablet(s) Oral daily  nirmatrelvir (EUA) 300 milliGRAM(s) Oral two times a day  ritonavir (EUA) 100 milliGRAM(s) Oral two times a day  thiamine 100 milliGRAM(s) Oral daily    MEDICATIONS  (PRN):  acetaminophen     Tablet .. 650 milliGRAM(s) Oral every 6 hours PRN Temp greater or equal to 38C (100.4F)  aluminum hydroxide/magnesium hydroxide/simethicone Suspension 30 milliLiter(s) Oral every 4 hours PRN Dyspepsia  benzocaine 15 mG/menthol 3.6 mG Lozenge 1 Lozenge Oral every 6 hours PRN Sore Throat  ibuprofen  Tablet. 400 milliGRAM(s) Oral every 6 hours PRN Temp greater or equal to 38.5C (101.3F)  ondansetron Injectable 4 milliGRAM(s) IV Push every 6 hours PRN Nausea and/or Vomiting      LABS:                        13.4   10.00 )-----------( 86       ( 10 Jose M 2023 05:57 )             41.1     01-10    136  |  97  |  9   ----------------------------<  75  4.0   |  25  |  0.86    Ca    8.4<L>      10 Jose M 2023 05:57  Phos  2.7     01-10  Mg     1.8     01-10    TPro  7.9  /  Alb  3.2<L>  /  TBili  1.3<H>  /  DBili  x   /  AST  51<H>  /  ALT  41  /  AlkPhos  72  10    PT/INR - ( 2023 16:25 )   PT: 11.2 sec;   INR: 0.93 ratio           Urinalysis Basic - ( 2023 10:50 )    Color: Yellow / Appearance: Clear / S.005 / pH: x  Gluc: x / Ketone: Negative  / Bili: Negative / Urobili: Negative mg/dL   Blood: x / Protein: 15 mg/dL / Nitrite: Negative   Leuk Esterase: Negative / RBC: Negative /HPF / WBC Negative   Sq Epi: x / Non Sq Epi: Occasional / Bacteria: Occasional      Magnesium, Serum: 1.8 mg/dL (01-10 @ 05:57)    CAPILLARY BLOOD GLUCOSE                RECENT CULTURES:  Urine culture:   @ 10:50 --   <10,000 CFU/mL Normal Urogenital Barbara    Clean Catch Clean Catch (Midstream)   @ 10:50   <10,000 CFU/mL Normal Urogenital Barbara  --  --      Clean Catch Clean Catch (Midstream)   @ 17:20   <10,000 CFU/mL Normal Urogenital Barbara  --  --            RADIOLOGY & ADDITIONAL TESTS:          Imaging Personally Reviewed:  [ ] YES  [ ] NO    Consultant(s) Notes Reviewed:  [x ] YES  [ ] NO    Care Discussed with Consultants/Other Providers [x ] YES  [ ] NO  Care plan and all findings were discussed in detail with patient.  All questions and concerns addressed

## 2023-01-13 ENCOUNTER — TRANSCRIPTION ENCOUNTER (OUTPATIENT)
Age: 58
End: 2023-01-13

## 2023-01-13 VITALS
RESPIRATION RATE: 17 BRPM | TEMPERATURE: 98 F | DIASTOLIC BLOOD PRESSURE: 84 MMHG | SYSTOLIC BLOOD PRESSURE: 131 MMHG | OXYGEN SATURATION: 96 % | HEART RATE: 72 BPM

## 2023-01-13 LAB
ANION GAP SERPL CALC-SCNC: 9 MMOL/L — SIGNIFICANT CHANGE UP (ref 5–17)
BUN SERPL-MCNC: 10 MG/DL — SIGNIFICANT CHANGE UP (ref 7–23)
CALCIUM SERPL-MCNC: 8.6 MG/DL — SIGNIFICANT CHANGE UP (ref 8.5–10.1)
CHLORIDE SERPL-SCNC: 101 MMOL/L — SIGNIFICANT CHANGE UP (ref 96–108)
CO2 SERPL-SCNC: 27 MMOL/L — SIGNIFICANT CHANGE UP (ref 22–31)
CREAT SERPL-MCNC: 0.71 MG/DL — SIGNIFICANT CHANGE UP (ref 0.5–1.3)
EGFR: 106 ML/MIN/1.73M2 — SIGNIFICANT CHANGE UP
GLUCOSE SERPL-MCNC: 111 MG/DL — HIGH (ref 70–99)
POTASSIUM SERPL-MCNC: 3.1 MMOL/L — LOW (ref 3.5–5.3)
POTASSIUM SERPL-SCNC: 3.1 MMOL/L — LOW (ref 3.5–5.3)
SODIUM SERPL-SCNC: 137 MMOL/L — SIGNIFICANT CHANGE UP (ref 135–145)

## 2023-01-13 PROCEDURE — 99239 HOSP IP/OBS DSCHRG MGMT >30: CPT

## 2023-01-13 RX ORDER — POTASSIUM CHLORIDE 20 MEQ
40 PACKET (EA) ORAL EVERY 4 HOURS
Refills: 0 | Status: DISCONTINUED | OUTPATIENT
Start: 2023-01-13 | End: 2023-01-13

## 2023-01-13 RX ORDER — CEPHALEXIN 500 MG
1 CAPSULE ORAL
Qty: 4 | Refills: 0
Start: 2023-01-13 | End: 2023-01-14

## 2023-01-13 RX ADMIN — Medication 100 MILLIGRAM(S): at 05:26

## 2023-01-13 RX ADMIN — Medication 10 MILLIGRAM(S): at 05:27

## 2023-01-13 RX ADMIN — Medication 1 MILLIGRAM(S): at 11:44

## 2023-01-13 RX ADMIN — Medication 1 TABLET(S): at 11:44

## 2023-01-13 RX ADMIN — Medication 40 MILLIEQUIVALENT(S): at 11:43

## 2023-01-13 RX ADMIN — Medication 100 MILLIGRAM(S): at 11:44

## 2023-01-13 RX ADMIN — RITONAVIR 100 MILLIGRAM(S): 100 TABLET, FILM COATED ORAL at 05:27

## 2023-01-13 NOTE — DISCHARGE NOTE PROVIDER - HOSPITAL COURSE
57 years old male with h/o HTN, HLD, glaucoma, alcohol abuse was brought in to ED with concern for alcohol withdrawal. Bystander called 911 as he was ? standing by bus stop. Patient drink 2 bottle of vodka and 9-10 beer daily. Last drink was today 2:30AM. Denied any nausea, vomiting, abdominal pain  Tachycardic, hypertensive upon ED arrival, appear shaky in triage. No leukocytosis, Plt 122, K 4, Cr 0.81, glucose 115, lipase 112, serum alcohol 253. COVID +. CT head image reviewed, no acute pathology    Admitted with alcohol withdrawal    COVID-19 , viral sepsis   ·  Plan: COVID +  vaccinated and boosted   sat well at RA   CXR: clear   supportive care    recieved paxlovid     hypokalemia- replaced     ETOH , with mild w/d   resolved, patient steady doing well     thrombocytopenia  2/2 etoh abuse       RLE cellulitis and calf tenderness    ancef change to po   RLE duplex  normal    : Benign essential HTN.   ·  Plan: c/w home meds     Hyperlipidemia, unspecified.   ·  Plan: continue statin.    glaucoma , rt eye legally blind   continue with eye drops       pt seen and examined 45 min spent on dc planning     Lab test review, Radiology Review, Vitals review, Consultant review and discussion, Physical examination, IDR, Assessment and plan; Plan discussion with patient and family

## 2023-01-13 NOTE — DISCHARGE NOTE NURSING/CASE MANAGEMENT/SOCIAL WORK - NSDCPEFALRISK_GEN_ALL_CORE
For information on Fall & Injury Prevention, visit: [email or web address] OR  [email or web address] OR  https://www.cdc.gov/steadi/patient.html

## 2023-01-13 NOTE — DISCHARGE NOTE PROVIDER - NSDCCPCAREPLAN_GEN_ALL_CORE_FT
PRINCIPAL DISCHARGE DIAGNOSIS  Diagnosis: Alcohol dependence with withdrawal  Assessment and Plan of Treatment: avoid drinking alcohol, go to rehab  finish antibiotics for your skin infection      SECONDARY DISCHARGE DIAGNOSES  Diagnosis: 2019 novel coronavirus disease (COVID-19)  Assessment and Plan of Treatment:

## 2023-01-13 NOTE — DISCHARGE NOTE PROVIDER - NSDCMRMEDTOKEN_GEN_ALL_CORE_FT
amLODIPine 10 mg oral tablet: 1 tab(s) orally once a day  Artificial Tears ophthalmic solution: 1 drop(s) to each affected eye 4 times a day, As Needed  cephalexin 500 mg oral tablet: 1 tab(s) orally 2 times a day   folic acid 1 mg oral tablet: 1 tab(s) orally once a day  latanoprost 0.005% ophthalmic solution: 1 drop(s) to each affected eye once a day (at bedtime)  Lipitor 40 mg oral tablet: 1 tab(s) orally once a day (at bedtime)  Multiple Vitamins oral tablet: 1 tab(s) orally once a day  Trusopt 2% ophthalmic solution: 1 drop(s) to each affected eye 2 times a day  Vitamin B1 100 mg oral tablet: 1 tab(s) orally once a day

## 2023-01-13 NOTE — DISCHARGE NOTE NURSING/CASE MANAGEMENT/SOCIAL WORK - NSDCVIVACCINE_GEN_ALL_CORE_FT
influenza, injectable, quadrivalent, preservative free; 14-Oct-2021 13:35; Nany Guerrero (RN); [employer]; [id number] (Exp. Date: 30-Jun-2022); IntraMuscular; Deltoid Right.; 0.5 milliLiter(s); VIS (VIS Published: 06-Aug-2021, VIS Presented: 14-Oct-2021);   Tdap; 22-Dec-2021 19:23; Clementina Koehler (RN); [employer]; [id number] (Exp. Date: 09-Sep-2023); IntraMuscular; Deltoid Left.; 0.5 milliLiter(s); VIS (VIS Published: 09-May-2013, VIS Presented: 22-Dec-2021);   Tdap; 15-Aug-2022 16:08; Marianne Pollock (RN); [employer]; [id number]   (Exp. Date: 18-Apr-2024); IntraMuscular; Deltoid Left.; 0.5 milliLiter(s); VIS (VIS Published: 09-May-2013, VIS Presented: 15-Aug-2022);   Tdap; 16-Nov-2022 15:48; Parish Avila (RN); [employer]; [id number] (Exp. Date: 01-Jun-2024); IntraMuscular; Deltoid Right.; 0.5 milliLiter(s); VIS (VIS Published: 09-May-2013, VIS Presented: 16-Nov-2022);

## 2023-01-13 NOTE — DISCHARGE NOTE NURSING/CASE MANAGEMENT/SOCIAL WORK - PATIENT PORTAL LINK FT
You can access the FollowMyHealth Patient Portal offered by [facility] by registering at the following website: [email or web address]. By joining [employer]’s FollowMyHealth portal, you will also be able to view your health information using other applications (apps) compatible with our system.

## 2023-01-18 DIAGNOSIS — H54.413A BLINDNESS RIGHT EYE CATEGORY 3, NORMAL VISION LEFT EYE: ICD-10-CM

## 2023-01-18 DIAGNOSIS — E78.5 HYPERLIPIDEMIA, UNSPECIFIED: ICD-10-CM

## 2023-01-18 DIAGNOSIS — F10.239 ALCOHOL DEPENDENCE WITH WITHDRAWAL, UNSPECIFIED: ICD-10-CM

## 2023-01-18 DIAGNOSIS — U07.1 COVID-19: ICD-10-CM

## 2023-01-18 DIAGNOSIS — H40.9 UNSPECIFIED GLAUCOMA: ICD-10-CM

## 2023-01-18 DIAGNOSIS — D69.59 OTHER SECONDARY THROMBOCYTOPENIA: ICD-10-CM

## 2023-01-18 DIAGNOSIS — L03.115 CELLULITIS OF RIGHT LOWER LIMB: ICD-10-CM
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
## 2023-01-18 DIAGNOSIS — I10 ESSENTIAL (PRIMARY) HYPERTENSION: ICD-10-CM

## 2023-01-18 DIAGNOSIS — A41.89 OTHER SPECIFIED SEPSIS: ICD-10-CM

## 2023-01-24 NOTE — DISCHARGE NOTE NURSING/CASE MANAGEMENT/SOCIAL WORK - NSDPDISTO_GEN_ALL_CORE
Impression: Other secondary cataract, bilateral: H26.493. Plan: Discussed posterior capsular opacification. Due to the fact that PCO is advanced and affecting the patient's vision and quality of life, YAG capsulotomy will be scheduled. All risks, benefits and alternatives were explained in detail including: possibility of retinal detachment, dislocated IOL, loss of vision or eye and possible need for further surgery. Patient has appt with Dr Rico in April, recommend yag laser clearance before proceeding. Home

## 2023-02-14 NOTE — DISCHARGE NOTE ADULT - CLICK TO LAUNCH ORM
. Qbrexza Counseling:  I discussed with the patient the risks of Qbrexza including but not limited to headache, mydriasis, blurred vision, dry eyes, nasal dryness, dry mouth, dry throat, dry skin, urinary hesitation, and constipation.  Local skin reactions including erythema, burning, stinging, and itching can also occur.

## 2023-03-19 ENCOUNTER — EMERGENCY (EMERGENCY)
Facility: HOSPITAL | Age: 58
LOS: 0 days | Discharge: HOME | End: 2023-03-20
Attending: STUDENT IN AN ORGANIZED HEALTH CARE EDUCATION/TRAINING PROGRAM
Payer: MEDICAID

## 2023-03-19 VITALS
OXYGEN SATURATION: 98 % | SYSTOLIC BLOOD PRESSURE: 137 MMHG | TEMPERATURE: 98 F | DIASTOLIC BLOOD PRESSURE: 84 MMHG | HEART RATE: 91 BPM | RESPIRATION RATE: 18 BRPM

## 2023-03-19 DIAGNOSIS — R10.9 UNSPECIFIED ABDOMINAL PAIN: ICD-10-CM

## 2023-03-19 DIAGNOSIS — I10 ESSENTIAL (PRIMARY) HYPERTENSION: ICD-10-CM

## 2023-03-19 DIAGNOSIS — H54.413A BLINDNESS RIGHT EYE CATEGORY 3, NORMAL VISION LEFT EYE: ICD-10-CM

## 2023-03-19 DIAGNOSIS — Z86.69 PERSONAL HISTORY OF OTHER DISEASES OF THE NERVOUS SYSTEM AND SENSE ORGANS: ICD-10-CM

## 2023-03-19 DIAGNOSIS — E78.5 HYPERLIPIDEMIA, UNSPECIFIED: ICD-10-CM

## 2023-03-19 DIAGNOSIS — F10.229 ALCOHOL DEPENDENCE WITH INTOXICATION, UNSPECIFIED: ICD-10-CM

## 2023-03-19 LAB
ALBUMIN SERPL ELPH-MCNC: 3.9 G/DL — SIGNIFICANT CHANGE UP (ref 3.3–5)
ALP SERPL-CCNC: 75 U/L — SIGNIFICANT CHANGE UP (ref 40–120)
ALT FLD-CCNC: 51 U/L — SIGNIFICANT CHANGE UP (ref 12–78)
ANION GAP SERPL CALC-SCNC: 11 MMOL/L — SIGNIFICANT CHANGE UP (ref 5–17)
AST SERPL-CCNC: 72 U/L — HIGH (ref 15–37)
BASOPHILS # BLD AUTO: 0.11 K/UL — SIGNIFICANT CHANGE UP (ref 0–0.2)
BASOPHILS NFR BLD AUTO: 1.4 % — SIGNIFICANT CHANGE UP (ref 0–2)
BILIRUB SERPL-MCNC: 1.3 MG/DL — HIGH (ref 0.2–1.2)
BUN SERPL-MCNC: 12 MG/DL — SIGNIFICANT CHANGE UP (ref 7–23)
CALCIUM SERPL-MCNC: 8.5 MG/DL — SIGNIFICANT CHANGE UP (ref 8.5–10.1)
CHLORIDE SERPL-SCNC: 104 MMOL/L — SIGNIFICANT CHANGE UP (ref 96–108)
CO2 SERPL-SCNC: 25 MMOL/L — SIGNIFICANT CHANGE UP (ref 22–31)
CREAT SERPL-MCNC: 0.86 MG/DL — SIGNIFICANT CHANGE UP (ref 0.5–1.3)
EGFR: 100 ML/MIN/1.73M2 — SIGNIFICANT CHANGE UP
EOSINOPHIL # BLD AUTO: 0.01 K/UL — SIGNIFICANT CHANGE UP (ref 0–0.5)
EOSINOPHIL NFR BLD AUTO: 0.1 % — SIGNIFICANT CHANGE UP (ref 0–6)
ETHANOL SERPL-MCNC: 411 MG/DL — SIGNIFICANT CHANGE UP (ref 0–10)
GLUCOSE BLDC GLUCOMTR-MCNC: 114 MG/DL — HIGH (ref 70–99)
GLUCOSE SERPL-MCNC: 120 MG/DL — HIGH (ref 70–99)
HCT VFR BLD CALC: 39.1 % — SIGNIFICANT CHANGE UP (ref 39–50)
HGB BLD-MCNC: 13.1 G/DL — SIGNIFICANT CHANGE UP (ref 13–17)
IMM GRANULOCYTES NFR BLD AUTO: 0.3 % — SIGNIFICANT CHANGE UP (ref 0–0.9)
LIDOCAIN IGE QN: 116 U/L — SIGNIFICANT CHANGE UP (ref 73–393)
LYMPHOCYTES # BLD AUTO: 3.23 K/UL — SIGNIFICANT CHANGE UP (ref 1–3.3)
LYMPHOCYTES # BLD AUTO: 41 % — SIGNIFICANT CHANGE UP (ref 13–44)
MCHC RBC-ENTMCNC: 29.1 PG — SIGNIFICANT CHANGE UP (ref 27–34)
MCHC RBC-ENTMCNC: 33.5 G/DL — SIGNIFICANT CHANGE UP (ref 32–36)
MCV RBC AUTO: 86.9 FL — SIGNIFICANT CHANGE UP (ref 80–100)
MONOCYTES # BLD AUTO: 0.55 K/UL — SIGNIFICANT CHANGE UP (ref 0–0.9)
MONOCYTES NFR BLD AUTO: 7 % — SIGNIFICANT CHANGE UP (ref 2–14)
NEUTROPHILS # BLD AUTO: 3.96 K/UL — SIGNIFICANT CHANGE UP (ref 1.8–7.4)
NEUTROPHILS NFR BLD AUTO: 50.2 % — SIGNIFICANT CHANGE UP (ref 43–77)
NRBC # BLD: 0 /100 WBCS — SIGNIFICANT CHANGE UP (ref 0–0)
PLATELET # BLD AUTO: 271 K/UL — SIGNIFICANT CHANGE UP (ref 150–400)
POTASSIUM SERPL-MCNC: 3.9 MMOL/L — SIGNIFICANT CHANGE UP (ref 3.5–5.3)
POTASSIUM SERPL-SCNC: 3.9 MMOL/L — SIGNIFICANT CHANGE UP (ref 3.5–5.3)
PROT SERPL-MCNC: 8.4 GM/DL — HIGH (ref 6–8.3)
RBC # BLD: 4.5 M/UL — SIGNIFICANT CHANGE UP (ref 4.2–5.8)
RBC # FLD: 14.1 % — SIGNIFICANT CHANGE UP (ref 10.3–14.5)
SODIUM SERPL-SCNC: 140 MMOL/L — SIGNIFICANT CHANGE UP (ref 135–145)
WBC # BLD: 7.88 K/UL — SIGNIFICANT CHANGE UP (ref 3.8–10.5)
WBC # FLD AUTO: 7.88 K/UL — SIGNIFICANT CHANGE UP (ref 3.8–10.5)

## 2023-03-19 PROCEDURE — 99284 EMERGENCY DEPT VISIT MOD MDM: CPT

## 2023-03-19 RX ORDER — FAMOTIDINE 10 MG/ML
20 INJECTION INTRAVENOUS ONCE
Refills: 0 | Status: COMPLETED | OUTPATIENT
Start: 2023-03-19 | End: 2023-03-19

## 2023-03-19 RX ADMIN — FAMOTIDINE 20 MILLIGRAM(S): 10 INJECTION INTRAVENOUS at 16:26

## 2023-03-19 RX ADMIN — Medication 30 MILLILITER(S): at 16:26

## 2023-03-19 NOTE — ED PROVIDER NOTE - CLINICAL SUMMARY MEDICAL DECISION MAKING FREE TEXT BOX
58 y/o M with PMH alcohol abuse, HTN, HLD, glaucoma presenting to the ED s/p fall. Now with abd pain. Vitals stable. Patient is well appearing in NAD. He is intoxicated. Abdomen is non tender, will dose pepcid/maalox and check basic labs. Likely MTF.

## 2023-03-19 NOTE — ED ADULT NURSE REASSESSMENT NOTE - NS ED NURSE REASSESS COMMENT FT1
oob to bathroom via w/c with 1 person assist. pt erum it well, returned to stretcher without incident,.

## 2023-03-19 NOTE — ED PROVIDER NOTE - NS ED ROS FT
CONST: no fevers, no chills  EYES: no pain, no vision changes  ENT: no sore throat, no ear pain, no change in hearing  CV: no chest pain, no leg swelling  RESP: no shortness of breath, no cough  ABD: + abdominal pain, no nausea, no vomiting, no diarrhea  : no dysuria, no flank pain, no hematuria  MSK: no back pain, no extremity pain  NEURO: no headache or additional neurologic complaints  HEME: no easy bleeding  SKIN:  no rash Cosentyx Counseling:  I discussed with the patient the risks of Cosentyx including but not limited to worsening of Crohn's disease, immunosuppression, allergic reactions and infections.  The patient understands that monitoring is required including a PPD at baseline and must alert us or the primary physician if symptoms of infection or other concerning signs are noted.

## 2023-03-19 NOTE — ED ADULT NURSE NOTE - OBJECTIVE STATEMENT
Pt biba  for alcohol intoxation. pt states he drank beer, vodka and tequila today. pt states he fell this morning. also c/o abdominal pain pmh - ETOH abuse . + slurred speech, + AOB, poor historian. no obv deformities noted.

## 2023-03-19 NOTE — ED ADULT TRIAGE NOTE - CHIEF COMPLAINT QUOTE
BIBA for alcohol intoxation. pt states he drank beer, vodka and tequila today. pt states he fell this morning. also c/o abdominal pain. history of alcoholism . pt sleeping in triage. fs 114

## 2023-03-19 NOTE — ED PROVIDER NOTE - WORK/EXCUSE FORM DATE
Pt discharged home with parent/guardian. Pt acting age appropriately, respirations regular and unlabored, cap refill less than two seconds. Skin pink, dry and warm. Lungs clear bilaterally. No further complaints at this time. Parent/guardian verbalized understanding of discharge paperwork and has no further questions at this time. Education provided about continuation of care, follow up care and medication administration. Parent/guardian able to provided teach back about discharge instructions. 20-Mar-2023

## 2023-03-19 NOTE — ED PROVIDER NOTE - PHYSICAL EXAMINATION
GENERAL: Awake, alert, NAD, smells of alcohol  HEENT: NC/AT, moist mucous membranes  LUNGS: CTAB, no wheezes or crackles   CARDIAC: RRR, no m/r/g  ABDOMEN: Soft, normal BS, non tender, non distended, no rebound, no guarding  EXT: No edema, no calf tenderness, 2+ DP pulses bilaterally, no deformities.  NEURO: Appears intoxicated. Moving all extremities.  SKIN: Warm and dry. No rash.

## 2023-03-19 NOTE — ED PROVIDER NOTE - OBJECTIVE STATEMENT
58 y/o M with PMH alcohol abuse, HTN, HLD, glaucoma presenting to the ED s/p fall. No head injury. Patient states he was drinking beer, vodka, and tequila today. Now endorsing abdominal pain. No fever, chills, N/V/D, or other focal complaints.

## 2023-03-19 NOTE — ED ADULT NURSE NOTE - ED STAT RN HANDOFF DETAILS
Assumed care of patient from Holly RN, pt resting in stretcher, axo4, no signs of acute distress, pending discharge.

## 2023-03-19 NOTE — ED ADULT NURSE REASSESSMENT NOTE - NS ED NURSE REASSESS COMMENT FT1
received pt at 1915 pt general condition remains stable sleeping peacefully in bed . will continue to monitor

## 2023-03-19 NOTE — ED PROVIDER NOTE - PROGRESS NOTE DETAILS
Results reported to patient--grossly benign  Pt. reports feeling better after ER stay, clinically sober, ambulating with steady gait, demonstrates decision making capacity   pt. agrees to f/u with primary care outpt. asap, educated pt. on etoh abuse   pt. understands to return to ED if symptoms worsen; will d/c pt. requests librium for withdrawal  pt. starting to display extremity tremor  will give librium  pt. does not want to stay for treatment of withdrawal at this time  pt. is of sound mind and decision-making capacity and clinically stable  will given librium and d/c

## 2023-03-20 VITALS
TEMPERATURE: 98 F | DIASTOLIC BLOOD PRESSURE: 72 MMHG | RESPIRATION RATE: 16 BRPM | OXYGEN SATURATION: 96 % | SYSTOLIC BLOOD PRESSURE: 129 MMHG | HEART RATE: 89 BPM

## 2023-03-20 RX ADMIN — Medication 50 MILLIGRAM(S): at 06:53

## 2023-04-02 ENCOUNTER — INPATIENT (INPATIENT)
Facility: HOSPITAL | Age: 58
LOS: 4 days | Discharge: HOME | End: 2023-04-07
Attending: STUDENT IN AN ORGANIZED HEALTH CARE EDUCATION/TRAINING PROGRAM | Admitting: STUDENT IN AN ORGANIZED HEALTH CARE EDUCATION/TRAINING PROGRAM
Payer: MEDICAID

## 2023-04-02 VITALS
TEMPERATURE: 98 F | OXYGEN SATURATION: 100 % | DIASTOLIC BLOOD PRESSURE: 64 MMHG | RESPIRATION RATE: 18 BRPM | HEART RATE: 101 BPM | SYSTOLIC BLOOD PRESSURE: 123 MMHG

## 2023-04-02 DIAGNOSIS — H40.9 UNSPECIFIED GLAUCOMA: ICD-10-CM

## 2023-04-02 DIAGNOSIS — I10 ESSENTIAL (PRIMARY) HYPERTENSION: ICD-10-CM

## 2023-04-02 DIAGNOSIS — Z29.9 ENCOUNTER FOR PROPHYLACTIC MEASURES, UNSPECIFIED: ICD-10-CM

## 2023-04-02 DIAGNOSIS — F10.239 ALCOHOL DEPENDENCE WITH WITHDRAWAL, UNSPECIFIED: ICD-10-CM

## 2023-04-02 DIAGNOSIS — M79.89 OTHER SPECIFIED SOFT TISSUE DISORDERS: ICD-10-CM

## 2023-04-02 DIAGNOSIS — E78.5 HYPERLIPIDEMIA, UNSPECIFIED: ICD-10-CM

## 2023-04-02 LAB
ALBUMIN SERPL ELPH-MCNC: 4 G/DL — SIGNIFICANT CHANGE UP (ref 3.3–5)
ALP SERPL-CCNC: 61 U/L — SIGNIFICANT CHANGE UP (ref 40–120)
ALT FLD-CCNC: 55 U/L — HIGH (ref 4–41)
ANION GAP SERPL CALC-SCNC: 18 MMOL/L — HIGH (ref 7–14)
AST SERPL-CCNC: 67 U/L — HIGH (ref 4–40)
B PERT DNA SPEC QL NAA+PROBE: SIGNIFICANT CHANGE UP
B PERT+PARAPERT DNA PNL SPEC NAA+PROBE: SIGNIFICANT CHANGE UP
BASE EXCESS BLDV CALC-SCNC: -1.2 MMOL/L — SIGNIFICANT CHANGE UP (ref -2–3)
BASE EXCESS BLDV CALC-SCNC: -2 MMOL/L — SIGNIFICANT CHANGE UP (ref -2–3)
BASOPHILS # BLD AUTO: 0.08 K/UL — SIGNIFICANT CHANGE UP (ref 0–0.2)
BASOPHILS NFR BLD AUTO: 1.3 % — SIGNIFICANT CHANGE UP (ref 0–2)
BILIRUB SERPL-MCNC: 0.5 MG/DL — SIGNIFICANT CHANGE UP (ref 0.2–1.2)
BLOOD GAS VENOUS COMPREHENSIVE RESULT: SIGNIFICANT CHANGE UP
BLOOD GAS VENOUS COMPREHENSIVE RESULT: SIGNIFICANT CHANGE UP
BORDETELLA PARAPERTUSSIS (RAPRVP): SIGNIFICANT CHANGE UP
BUN SERPL-MCNC: 15 MG/DL — SIGNIFICANT CHANGE UP (ref 7–23)
C PNEUM DNA SPEC QL NAA+PROBE: SIGNIFICANT CHANGE UP
CALCIUM SERPL-MCNC: 8.2 MG/DL — LOW (ref 8.4–10.5)
CHLORIDE BLDV-SCNC: 106 MMOL/L — SIGNIFICANT CHANGE UP (ref 96–108)
CHLORIDE BLDV-SCNC: 106 MMOL/L — SIGNIFICANT CHANGE UP (ref 96–108)
CHLORIDE SERPL-SCNC: 100 MMOL/L — SIGNIFICANT CHANGE UP (ref 98–107)
CK SERPL-CCNC: 651 U/L — HIGH (ref 30–200)
CO2 BLDV-SCNC: 24.3 MMOL/L — SIGNIFICANT CHANGE UP (ref 22–26)
CO2 BLDV-SCNC: 24.8 MMOL/L — SIGNIFICANT CHANGE UP (ref 22–26)
CO2 SERPL-SCNC: 19 MMOL/L — LOW (ref 22–31)
CREAT SERPL-MCNC: 0.68 MG/DL — SIGNIFICANT CHANGE UP (ref 0.5–1.3)
EGFR: 107 ML/MIN/1.73M2 — SIGNIFICANT CHANGE UP
EOSINOPHIL # BLD AUTO: 0 K/UL — SIGNIFICANT CHANGE UP (ref 0–0.5)
EOSINOPHIL NFR BLD AUTO: 0 % — SIGNIFICANT CHANGE UP (ref 0–6)
ETHANOL SERPL-MCNC: 200 MG/DL — HIGH
FLUAV SUBTYP SPEC NAA+PROBE: SIGNIFICANT CHANGE UP
FLUBV RNA SPEC QL NAA+PROBE: SIGNIFICANT CHANGE UP
GAS PNL BLDV: 138 MMOL/L — SIGNIFICANT CHANGE UP (ref 136–145)
GAS PNL BLDV: 139 MMOL/L — SIGNIFICANT CHANGE UP (ref 136–145)
GAS PNL BLDV: SIGNIFICANT CHANGE UP
GLUCOSE BLDV-MCNC: 64 MG/DL — LOW (ref 70–99)
GLUCOSE BLDV-MCNC: 76 MG/DL — SIGNIFICANT CHANGE UP (ref 70–99)
GLUCOSE SERPL-MCNC: 73 MG/DL — SIGNIFICANT CHANGE UP (ref 70–99)
HADV DNA SPEC QL NAA+PROBE: SIGNIFICANT CHANGE UP
HCO3 BLDV-SCNC: 23 MMOL/L — SIGNIFICANT CHANGE UP (ref 22–29)
HCO3 BLDV-SCNC: 24 MMOL/L — SIGNIFICANT CHANGE UP (ref 22–29)
HCOV 229E RNA SPEC QL NAA+PROBE: SIGNIFICANT CHANGE UP
HCOV HKU1 RNA SPEC QL NAA+PROBE: SIGNIFICANT CHANGE UP
HCOV NL63 RNA SPEC QL NAA+PROBE: SIGNIFICANT CHANGE UP
HCOV OC43 RNA SPEC QL NAA+PROBE: SIGNIFICANT CHANGE UP
HCT VFR BLD CALC: 35.8 % — LOW (ref 39–50)
HCT VFR BLDA CALC: 34 % — LOW (ref 39–51)
HCT VFR BLDA CALC: 35 % — LOW (ref 39–51)
HGB BLD CALC-MCNC: 11.4 G/DL — LOW (ref 12.6–17.4)
HGB BLD CALC-MCNC: 11.7 G/DL — LOW (ref 12.6–17.4)
HGB BLD-MCNC: 12 G/DL — LOW (ref 13–17)
HMPV RNA SPEC QL NAA+PROBE: SIGNIFICANT CHANGE UP
HPIV1 RNA SPEC QL NAA+PROBE: SIGNIFICANT CHANGE UP
HPIV2 RNA SPEC QL NAA+PROBE: SIGNIFICANT CHANGE UP
HPIV3 RNA SPEC QL NAA+PROBE: SIGNIFICANT CHANGE UP
HPIV4 RNA SPEC QL NAA+PROBE: SIGNIFICANT CHANGE UP
IANC: 3.75 K/UL — SIGNIFICANT CHANGE UP (ref 1.8–7.4)
IMM GRANULOCYTES NFR BLD AUTO: 0.2 % — SIGNIFICANT CHANGE UP (ref 0–0.9)
LACTATE BLDV-MCNC: 3.2 MMOL/L — HIGH (ref 0.5–2)
LACTATE BLDV-MCNC: 4.9 MMOL/L — CRITICAL HIGH (ref 0.5–2)
LIDOCAIN IGE QN: 20 U/L — SIGNIFICANT CHANGE UP (ref 7–60)
LYMPHOCYTES # BLD AUTO: 2.02 K/UL — SIGNIFICANT CHANGE UP (ref 1–3.3)
LYMPHOCYTES # BLD AUTO: 31.8 % — SIGNIFICANT CHANGE UP (ref 13–44)
M PNEUMO DNA SPEC QL NAA+PROBE: SIGNIFICANT CHANGE UP
MAGNESIUM SERPL-MCNC: 1.9 MG/DL — SIGNIFICANT CHANGE UP (ref 1.6–2.6)
MCHC RBC-ENTMCNC: 29.4 PG — SIGNIFICANT CHANGE UP (ref 27–34)
MCHC RBC-ENTMCNC: 33.5 GM/DL — SIGNIFICANT CHANGE UP (ref 32–36)
MCV RBC AUTO: 87.7 FL — SIGNIFICANT CHANGE UP (ref 80–100)
MONOCYTES # BLD AUTO: 0.5 K/UL — SIGNIFICANT CHANGE UP (ref 0–0.9)
MONOCYTES NFR BLD AUTO: 7.9 % — SIGNIFICANT CHANGE UP (ref 2–14)
NEUTROPHILS # BLD AUTO: 3.75 K/UL — SIGNIFICANT CHANGE UP (ref 1.8–7.4)
NEUTROPHILS NFR BLD AUTO: 58.8 % — SIGNIFICANT CHANGE UP (ref 43–77)
NRBC # BLD: 0 /100 WBCS — SIGNIFICANT CHANGE UP (ref 0–0)
NRBC # FLD: 0 K/UL — SIGNIFICANT CHANGE UP (ref 0–0)
NT-PROBNP SERPL-SCNC: 33 PG/ML — SIGNIFICANT CHANGE UP
PCO2 BLDV: 39 MMHG — LOW (ref 42–55)
PCO2 BLDV: 40 MMHG — LOW (ref 42–55)
PH BLDV: 7.37 — SIGNIFICANT CHANGE UP (ref 7.32–7.43)
PH BLDV: 7.39 — SIGNIFICANT CHANGE UP (ref 7.32–7.43)
PLATELET # BLD AUTO: 250 K/UL — SIGNIFICANT CHANGE UP (ref 150–400)
PO2 BLDV: 64 MMHG — HIGH (ref 25–45)
PO2 BLDV: 66 MMHG — HIGH (ref 25–45)
POTASSIUM BLDV-SCNC: 3.6 MMOL/L — SIGNIFICANT CHANGE UP (ref 3.5–5.1)
POTASSIUM BLDV-SCNC: 5 MMOL/L — SIGNIFICANT CHANGE UP (ref 3.5–5.1)
POTASSIUM SERPL-MCNC: 4.5 MMOL/L — SIGNIFICANT CHANGE UP (ref 3.5–5.3)
POTASSIUM SERPL-SCNC: 4.5 MMOL/L — SIGNIFICANT CHANGE UP (ref 3.5–5.3)
PROT SERPL-MCNC: 7.5 G/DL — SIGNIFICANT CHANGE UP (ref 6–8.3)
RAPID RVP RESULT: SIGNIFICANT CHANGE UP
RBC # BLD: 4.08 M/UL — LOW (ref 4.2–5.8)
RBC # FLD: 14.2 % — SIGNIFICANT CHANGE UP (ref 10.3–14.5)
RSV RNA SPEC QL NAA+PROBE: SIGNIFICANT CHANGE UP
RV+EV RNA SPEC QL NAA+PROBE: SIGNIFICANT CHANGE UP
SAO2 % BLDV: 90.5 % — HIGH (ref 67–88)
SAO2 % BLDV: 92.3 % — HIGH (ref 67–88)
SARS-COV-2 RNA SPEC QL NAA+PROBE: SIGNIFICANT CHANGE UP
SODIUM SERPL-SCNC: 137 MMOL/L — SIGNIFICANT CHANGE UP (ref 135–145)
TSH SERPL-MCNC: 1.24 UIU/ML — SIGNIFICANT CHANGE UP (ref 0.27–4.2)
WBC # BLD: 6.36 K/UL — SIGNIFICANT CHANGE UP (ref 3.8–10.5)
WBC # FLD AUTO: 6.36 K/UL — SIGNIFICANT CHANGE UP (ref 3.8–10.5)

## 2023-04-02 PROCEDURE — 99285 EMERGENCY DEPT VISIT HI MDM: CPT

## 2023-04-02 PROCEDURE — 99223 1ST HOSP IP/OBS HIGH 75: CPT | Mod: GC

## 2023-04-02 RX ORDER — THIAMINE MONONITRATE (VIT B1) 100 MG
100 TABLET ORAL ONCE
Refills: 0 | Status: COMPLETED | OUTPATIENT
Start: 2023-04-02 | End: 2023-04-02

## 2023-04-02 RX ORDER — THIAMINE MONONITRATE (VIT B1) 100 MG
200 TABLET ORAL DAILY
Refills: 0 | Status: COMPLETED | OUTPATIENT
Start: 2023-04-03 | End: 2023-04-06

## 2023-04-02 RX ORDER — THIAMINE MONONITRATE (VIT B1) 100 MG
100 TABLET ORAL DAILY
Refills: 0 | Status: DISCONTINUED | OUTPATIENT
Start: 2023-04-07 | End: 2023-04-07

## 2023-04-02 RX ORDER — AMLODIPINE BESYLATE 2.5 MG/1
10 TABLET ORAL DAILY
Refills: 0 | Status: DISCONTINUED | OUTPATIENT
Start: 2023-04-02 | End: 2023-04-04

## 2023-04-02 RX ORDER — LATANOPROST 0.05 MG/ML
1 SOLUTION/ DROPS OPHTHALMIC; TOPICAL AT BEDTIME
Refills: 0 | Status: DISCONTINUED | OUTPATIENT
Start: 2023-04-02 | End: 2023-04-07

## 2023-04-02 RX ORDER — ACETAMINOPHEN 500 MG
650 TABLET ORAL EVERY 6 HOURS
Refills: 0 | Status: DISCONTINUED | OUTPATIENT
Start: 2023-04-02 | End: 2023-04-07

## 2023-04-02 RX ORDER — DORZOLAMIDE HYDROCHLORIDE 20 MG/ML
2 SOLUTION/ DROPS OPHTHALMIC THREE TIMES A DAY
Refills: 0 | Status: DISCONTINUED | OUTPATIENT
Start: 2023-04-02 | End: 2023-04-07

## 2023-04-02 RX ORDER — ATORVASTATIN CALCIUM 80 MG/1
40 TABLET, FILM COATED ORAL AT BEDTIME
Refills: 0 | Status: DISCONTINUED | OUTPATIENT
Start: 2023-04-02 | End: 2023-04-06

## 2023-04-02 RX ORDER — SODIUM CHLORIDE 9 MG/ML
500 INJECTION INTRAMUSCULAR; INTRAVENOUS; SUBCUTANEOUS ONCE
Refills: 0 | Status: COMPLETED | OUTPATIENT
Start: 2023-04-02 | End: 2023-04-02

## 2023-04-02 RX ORDER — FOLIC ACID 0.8 MG
1 TABLET ORAL ONCE
Refills: 0 | Status: COMPLETED | OUTPATIENT
Start: 2023-04-02 | End: 2023-04-02

## 2023-04-02 RX ORDER — FOLIC ACID 0.8 MG
1 TABLET ORAL DAILY
Refills: 0 | Status: DISCONTINUED | OUTPATIENT
Start: 2023-04-02 | End: 2023-04-07

## 2023-04-02 RX ORDER — SODIUM CHLORIDE 9 MG/ML
1000 INJECTION INTRAMUSCULAR; INTRAVENOUS; SUBCUTANEOUS
Refills: 0 | Status: COMPLETED | OUTPATIENT
Start: 2023-04-02 | End: 2023-04-03

## 2023-04-02 RX ADMIN — SODIUM CHLORIDE 75 MILLILITER(S): 9 INJECTION INTRAMUSCULAR; INTRAVENOUS; SUBCUTANEOUS at 18:29

## 2023-04-02 RX ADMIN — ATORVASTATIN CALCIUM 40 MILLIGRAM(S): 80 TABLET, FILM COATED ORAL at 21:27

## 2023-04-02 RX ADMIN — Medication 1 MILLIGRAM(S): at 13:20

## 2023-04-02 RX ADMIN — Medication 4 MILLIGRAM(S): at 20:27

## 2023-04-02 RX ADMIN — Medication 4 MILLIGRAM(S): at 15:42

## 2023-04-02 RX ADMIN — Medication 1 DROP(S): at 23:50

## 2023-04-02 RX ADMIN — Medication 100 MILLIGRAM(S): at 15:00

## 2023-04-02 RX ADMIN — DORZOLAMIDE HYDROCHLORIDE 2 DROP(S): 20 SOLUTION/ DROPS OPHTHALMIC at 21:27

## 2023-04-02 RX ADMIN — SODIUM CHLORIDE 500 MILLILITER(S): 9 INJECTION INTRAMUSCULAR; INTRAVENOUS; SUBCUTANEOUS at 14:14

## 2023-04-02 RX ADMIN — Medication 50 MILLIGRAM(S): at 14:10

## 2023-04-02 RX ADMIN — Medication 1 MILLIGRAM(S): at 15:42

## 2023-04-02 RX ADMIN — Medication 4 MILLIGRAM(S): at 23:49

## 2023-04-02 RX ADMIN — LATANOPROST 1 DROP(S): 0.05 SOLUTION/ DROPS OPHTHALMIC; TOPICAL at 21:27

## 2023-04-02 RX ADMIN — Medication 1 DROP(S): at 18:30

## 2023-04-02 NOTE — H&P ADULT - HISTORY OF PRESENT ILLNESS
57M w/ alcohol use disorder w/ multiple episodes of DTs and prior hospitalizations for alcohol withdrawal, HTN, HLD, and glaucoma who presents w/ nausea, anxiety, and lower extremity swelling.    In the ED, vitals: afebrile, /64, , SpO2 100% on RA. Labs showing mildly elevated LFTs, , blood alcohol level 200. Pt. given Librium 50mg x1, Ativan 1mg x1. 57M w/ alcohol use disorder w/ multiple episodes of DTs and prior hospitalizations for alcohol withdrawal, HTN, HLD, and glaucoma who presents w/ nausea, abdominal pain, anxiety, and lower extremity pain/swelling. Patient endorses drinking 1-2 bottles of vodka and 10-12 beers daily; last drink was this morning at 5AM. He reports feeling abdominal pain, nausea, and anxiety, similar to prior episodes of alcohol withdrawal, prompting him to visit the hospital. Currently, the pt. denies any auditory/visual hallucinations, visual changes, headache, vomiting, hematemesis, chest pain, palpitations, shortness of breath, fevers, chills. He also denies smoking, recent travel, prior hx of DVTs, hx of CHF.     In the ED, vitals: afebrile, /64, , SpO2 100% on RA. Labs showing mildly elevated LFTs, , blood alcohol level 200. Pt. given Librium 50mg x1, Ativan 1mg x1, 500cc IVF bolus. He was subseqently started on high-risk Ativan taper.  57M w/ alcohol use disorder w/ multiple episodes of DTs and prior hospitalizations for alcohol withdrawal, HTN, HLD, and glaucoma who presents w/ nausea, abdominal pain, anxiety, and lower extremity pain/swelling. Patient endorses drinking 1-2 bottles of vodka and 10-12 beers daily; last drink was this morning at 5AM. He reports feeling abdominal pain, nausea, and anxiety, similar to prior episodes of alcohol withdrawal, prompting him to visit the hospital. Currently, the pt. denies any auditory/visual hallucinations, visual changes, headache, vomiting, hematemesis, chest pain, palpitations, shortness of breath, fevers, chills. He also denies smoking, recent travel, prior hx of DVTs, hx of CHF but reports having a fall onto his knees about a week ago.     In the ED, vitals: afebrile, /64, , SpO2 100% on RA. Labs showing mildly elevated LFTs, , blood alcohol level 200. Pt. given Librium 50mg x1, Ativan 1mg x1, 500cc IVF bolus. He was subseqently started on high-risk Ativan taper.

## 2023-04-02 NOTE — PATIENT PROFILE ADULT - VISION (WITH CORRECTIVE LENSES IF THE PATIENT USUALLY WEARS THEM):
right eye blind/Partially impaired: cannot see medication labels or newsprint, but can see obstacles in path, and the surrounding layout; can count fingers at arm's length

## 2023-04-02 NOTE — ED ADULT NURSE NOTE - OBJECTIVE STATEMENT
Pt reporting to the ED for lower leg pain for ~ 1 week. Pt reports drinking vodka every day pmh of etoh withdraw, seizures and intubation due to ETOH  withdraw in the past. PT appears to be tremulous. Bilateral lower legs are swollen and painful to touch, + 2 edema. Pt is AOX4. Pt denies chest pain or SOB. sinus tahc on CM.  PT respirations even an unlabored. . Pt denies fever, chills, n/v/d. Pt denies abdominal pain, dysuria, hematuria. 20 g iv placed in L arm ,labs drawn.

## 2023-04-02 NOTE — H&P ADULT - ATTENDING COMMENTS
57 y.o. Male  w/ Hx alcohol use disorder w/ multiple episodes of DTs and prior hospitalizations for alcohol withdrawal, HTN, HLD, and glaucoma p/w etoh withdrawal and  lower extremity pain/swelling. Will treat alcohol withdrawal with Ativan taper/ thiamine/folate. Check Dopplers of LE r/o DVT.

## 2023-04-02 NOTE — H&P ADULT - PROBLEM SELECTOR PLAN 2
Pt. w/ 1-2+ pitting edema of b/l LE, painful to touch w/ associated venous stasis skin changes  - no tobacco use, recent travel, trauma, CHF hx  - f/u venous dopplers to r/o DVT  - leg elevation, compression stockings  - continue to monitor Pt. w/ 1-2+ pitting edema of b/l LE, painful to touch w/ associated venous stasis skin changes  - no tobacco use, recent travel, CHF hx however reports recent fall onto his knees  - f/u venous dopplers to r/o DVT  - leg elevation, compression stockings  - continue to monitor Pt. w/ 1-2+ pitting edema of b/l LE, painful to touch w/ associated venous stasis skin changes  - no tobacco use, recent travel, CHF hx however reports recent fall onto his knees  - f/u venous dopplers to r/o DVT  - f/u TTE to evaluate for alcohol-induced cardiomyopathy (although proBNP 33)  - leg elevation, compression stockings  - continue to monitor

## 2023-04-02 NOTE — H&P ADULT - PROBLEM SELECTOR PLAN 1
Hx of chronic alcohol use (1-2 bottles of vodka, 10-12 beers daily) w/ hx of DTs, multiple hospitalizations for withdrawal in the past (most recently 1/2023). Currently w/ tremors and fasciculations on exam  - no hallucinations, overt agitation, signs of DT currently  - s/p Ativan 1mg IVP, Librium 50mg PO x1, thiamine 100mg IVP x1, folic acid 1mg IVP x1 in ED  - alcohol level 200  - c/w high-risk CIWA taper w/ Ativan  - c/w IV thiamine 200mg qd for 5d (4/2-4/6) followed by PO thiamine 100mg qd  - c/w folic acid 1mg qd  - c/w multivitamin  - monitor CIWA scores/vital signs Hx of chronic alcohol use (1-2 bottles of vodka, 10-12 beers daily) w/ hx of DTs, multiple hospitalizations for withdrawal in the past (most recently 1/2023). Currently w/ tremors and fasciculations on exam  - no hallucinations, overt agitation, signs of DT currently  - s/p Ativan 1mg IVP, Librium 50mg PO x1, thiamine 100mg IVP x1, folic acid 1mg IVP x1 in ED  - alcohol level 200  - c/w high-risk CIWA taper w/ Ativan  - c/w IV thiamine 200mg qd for 5d (4/2-4/6) followed by PO thiamine 100mg qd  - c/w folic acid 1mg qd  - c/w multivitamin  - rescue PRN Ativan 4mg IVP ordered for severe elevations in CIWA score  - low threshold for phenobarbital +/- MICU consult given prior hx  - monitor CIWA scores/vital signs

## 2023-04-02 NOTE — ED ADULT NURSE NOTE - NSIMPLEMENTINTERV_GEN_ALL_ED
Implemented All Fall Risk Interventions:  [place] to call system. Call bell, personal items and telephone within reach. Instruct patient to call for assistance. Room bathroom lighting operational. Non-slip footwear when patient is off stretcher. Physically safe environment: no spills, clutter or unnecessary equipment. Stretcher in lowest position, wheels locked, appropriate side rails in place. Provide visual cue, wrist band, yellow gown, etc. Monitor gait and stability. Monitor for mental status changes and reorient to person, place, and time. Review medications for side effects contributing to fall risk. Reinforce activity limits and safety measures with patient and family.

## 2023-04-02 NOTE — H&P ADULT - NSHPLABSRESULTS_GEN_ALL_CORE
LABS:                        12.0   6.36  )-----------( 250      ( 02 Apr 2023 13:21 )             35.8     04-02    137  |  100  |  15  ----------------------------<  73  4.5   |  19<L>  |  0.68    Ca    8.2<L>      02 Apr 2023 13:21  Mg     1.90     04-02    TPro  7.5  /  Alb  4.0  /  TBili  0.5  /  DBili  x   /  AST  67<H>  /  ALT  55<H>  /  AlkPhos  61  04-02 LABS:                        12.0   6.36  )-----------( 250      ( 02 Apr 2023 13:21 )             35.8     04-02    137  |  100  |  15  ----------------------------<  73  4.5   |  19<L>  |  0.68    Ca    8.2<L>      02 Apr 2023 13:21  Mg     1.90     04-02    TPro  7.5  /  Alb  4.0  /  TBili  0.5  /  DBili  x   /  AST  67<H>  /  ALT  55<H>  /  AlkPhos  61  04-02    Alcohol, Blood (04.02.23 @ 13:21)    Alcohol, Blood: 200: <10 mg/dL = Negative mg/dL

## 2023-04-02 NOTE — PATIENT PROFILE ADULT - FALL HARM RISK - TYPE OF ASSESSMENT
Health Maintenance Due   Topic Date Due   • Pneumococcal Vaccine 0-64 (1 of 1 - PPSV23) 04/24/1979   • Breast Cancer Screening  07/17/2020   • Medicare Wellness Visit  07/24/2020       Patient is due for topics as listed above but is not proceeding with Immunization(s) Pneumococcal and MWV (Medicare Wellness Visit) at this time.          Admission

## 2023-04-02 NOTE — ED PROVIDER NOTE - OBJECTIVE STATEMENT
57-year-old male with a past medical history of hypertension on amlodipine, chronic alcohol abuse with complex withdrawals involving DTs presents to the ER today complaining of feeling sick.  Patient states that today he drank a lot of alcohol.  States he is a daily drinker.  Denies any falls fevers chills night sweats.  Currently describes feeling very nauseous and anxious.  Also endorsing bilateral lower extremity edema.  No chest pain shortness of breath palpitations.  Denies any other drug use.

## 2023-04-02 NOTE — ED ADULT NURSE REASSESSMENT NOTE - NS ED NURSE REASSESS COMMENT FT1
Report given to [facility] RN. Pt in bed, resting, respirations even and unlabored. No chest pain or SOB.

## 2023-04-02 NOTE — H&P ADULT - ASSESSMENT
57M w/ alcohol use disorder w/ multiple episodes of DTs and prior hospitalizations for alcohol withdrawal, HTN, HLD, and glaucoma who presents w/ nausea, abdominal pain, anxiety, and lower extremity pain/swelling, admitted for alcohol withdrawal being treated w/ Ativan taper.

## 2023-04-02 NOTE — H&P ADULT - PROBLEM SELECTOR PLAN 6
DVT PPx: IMPROVE-DD score 0; chemical VTE ppx not indicated  Diet: DASH/TLC  Dispo: pending clinical improvement  Code Status: full code

## 2023-04-02 NOTE — ED PROVIDER NOTE - ATTENDING CONTRIBUTION TO CARE
I have personally performed a face to face medical and diagnostic evaluation of the patient. I have discussed with and reviewed the Resident's note and agree with the History, ROS, Physical Exam and MDM unless otherwise indicated. A brief summary of my personal evaluation and impression can be found below.    Dionna LEBLANC: 57-year-old male history of hypertension chronic alcohol use with withdrawals in the past multiple prior admissions for similar, presents with a chief complaint of generally feeling unwell with concern for possible alcohol withdrawal, patient notes chronic lower extremity swelling going on for weeks in setting of a prior fall no new fall or trauma today, last reports drinking vodka and beer around 5 in the morning has not had anything since feeling generally "unwell "Daily drinker.  No fevers.  No chest pain or shortness of breath no new abdominal pain notes mild chronic discomfort.    All other ROS negative, except as above and as per HPI and ROS section.    VITALS: Initial triage and subsequent vitals have been reviewed by me.  GEN APPEARANCE: WDWN, alert, non-toxic, Chronically ill-appearing mildly intoxicated? Positive tongue fasciculations  HEAD: Atraumatic.  EYES: PERRLa, EOMI, vision grossly intact.   NECK: Supple  CV: RRR, S1S2, no c/r/m/g. Cap refill <2 seconds. No bruits.   LUNGS: CTAB. No abnormal breath sounds.  ABDOMEN: Soft, Mild diffuse abdominal tenderness  MSK/EXT: No spinal or extremity point tenderness. No CVA ttp. Pelvis stable. +2 bilateral lower extremity peripheral edema with prior scabs and abrasions over her kneecaps.Upper extremity tremors  NEURO: Alert, follows commands. . Speech normal. Sensation and motor normal x4 extremities.   SKIN: Warm, dry and intact. No rash.  PSYCH: Appropriate    Plan/MDM: Exam tachycardic ill but not toxic appearing, chronically ill-appearing, with tremors fasciculations and swelling to lower extremities, perhaps mildly intoxicated on initial assessment, DDx concern for likely acute if not impending alcohol withdrawal, low concern for acute infectious process, low concern for new acute trauma, there is no C-spine tenderness, will check CIWA, start protocol, give meds and benzos as indicated get lower extremity DVT study though suspicion is low, give meds as needed and reassess and anticipate admission.

## 2023-04-02 NOTE — ED PROVIDER NOTE - CADM POA CENTRAL LINE
Subjective:       Patient ID: Sonido Millan is a 57 y.o. male.    Chief Complaint: Urinary Frequency    Sonido Millan is a 56 yo M pt w/ MHx listed below that presents to the clinic w/ complaints of urinary frequency. This has been occurring chronically for him. His symptoms occur throughout the day. He states that it is interfering with his sleep. He denies other urinary symptoms. It has been an extended period since his last annual physical. He has not seen Dr. Upton in two years. He is working on losing weight but admits that he is having trouble doing so. He is hitting the gym and eating healthy. He is not drinking soft drinks and is only drinking water.       Past Medical History:   Diagnosis Date    BPH (benign prostatic hyperplasia)     Colon polyp, hyperplastic 2013    Hyperlipidemia     Rectal bleed        Review of patient's allergies indicates:  No Known Allergies      Current Outpatient Medications:     atorvastatin (LIPITOR) 40 MG tablet, Take 1 tablet (40 mg total) by mouth every evening., Disp: 90 tablet, Rfl: 3    metFORMIN (GLUCOPHAGE-XR) 500 MG ER 24hr tablet, TAKE 1 TABLET(500 MG) BY MOUTH DAILY WITH BREAKFAST (Patient not taking: Reported on 6/6/2022), Disp: 90 tablet, Rfl: 3    psyllium (METAMUCIL) 0.52 gram capsule, Take 1 capsule (0.52 g total) by mouth once daily. (Patient not taking: Reported on 6/6/2022), Disp: , Rfl:     tamsulosin (FLOMAX) 0.4 mg Cap, Take 1 capsule (0.4 mg total) by mouth once daily., Disp: 30 capsule, Rfl: 11    Review of Systems   Constitutional: Positive for fatigue. Negative for activity change, appetite change, chills, diaphoresis, fever and unexpected weight change.   HENT: Negative for congestion, ear pain, postnasal drip, rhinorrhea, sinus pressure, sneezing, sore throat and trouble swallowing.    Eyes: Negative for pain, itching and visual disturbance.   Respiratory: Negative for cough, chest tightness, shortness of breath and wheezing.    Cardiovascular:  "Negative for chest pain, palpitations and leg swelling.   Gastrointestinal: Negative for abdominal distention, abdominal pain, blood in stool, constipation, diarrhea, nausea and vomiting.   Endocrine: Negative for cold intolerance and heat intolerance.   Genitourinary: Positive for frequency. Negative for difficulty urinating, dysuria, hematuria and urgency.   Musculoskeletal: Negative for arthralgias, back pain, myalgias and neck pain.   Skin: Negative for color change, pallor, rash and wound.   Neurological: Negative for dizziness, syncope, weakness, numbness and headaches.   Hematological: Negative for adenopathy.   Psychiatric/Behavioral: Positive for sleep disturbance. Negative for behavioral problems. The patient is not nervous/anxious.        Objective:      /72 (BP Location: Right arm, Patient Position: Sitting, BP Method: Large (Manual))   Pulse 69   Temp 98.4 °F (36.9 °C) (Oral)   Resp 16   Ht 5' 9" (1.753 m)   Wt 92.2 kg (203 lb 4.2 oz)   SpO2 97%   BMI 30.02 kg/m²   Physical Exam  Vitals reviewed.   Constitutional:       General: He is not in acute distress.     Appearance: Normal appearance. He is obese. He is not ill-appearing, toxic-appearing or diaphoretic.   HENT:      Head: Normocephalic.      Right Ear: External ear normal.      Left Ear: External ear normal.      Nose: Nose normal. No congestion or rhinorrhea.      Mouth/Throat:      Mouth: Mucous membranes are moist.      Pharynx: Oropharynx is clear.   Eyes:      General: No scleral icterus.        Right eye: No discharge.         Left eye: No discharge.      Extraocular Movements: Extraocular movements intact.      Conjunctiva/sclera: Conjunctivae normal.   Cardiovascular:      Rate and Rhythm: Normal rate and regular rhythm.      Pulses: Normal pulses.      Heart sounds: Normal heart sounds. No murmur heard.    No friction rub. No gallop.   Pulmonary:      Effort: Pulmonary effort is normal. No respiratory distress.      Breath " sounds: Normal breath sounds. No wheezing, rhonchi or rales.   Chest:      Chest wall: No tenderness.   Abdominal:      General: There is no distension.      Palpations: Abdomen is soft. There is no mass.      Tenderness: There is no abdominal tenderness. There is no guarding.   Musculoskeletal:         General: No swelling, tenderness or deformity. Normal range of motion.      Cervical back: Normal range of motion.      Right lower leg: No edema.      Left lower leg: No edema.   Skin:     General: Skin is warm and dry.      Capillary Refill: Capillary refill takes less than 2 seconds.      Coloration: Skin is not jaundiced.      Findings: No bruising, erythema, lesion or rash.   Neurological:      Mental Status: He is alert and oriented to person, place, and time.      Gait: Gait normal.   Psychiatric:         Mood and Affect: Mood normal.         Behavior: Behavior normal.         Thought Content: Thought content normal.         Judgment: Judgment normal.         Assessment:       1. Benign prostatic hyperplasia with urinary frequency    2. Prediabetes    3. Obesity (BMI 30-39.9)        Plan:       Benign prostatic hyperplasia with urinary frequency  -     tamsulosin (FLOMAX) 0.4 mg Cap; Take 1 capsule (0.4 mg total) by mouth once daily.  Dispense: 30 capsule; Refill: 11  -     PSA, SCREENING; Future; Expected date: 06/06/2022  -     POCT URINE DIPSTICK WITHOUT MICROSCOPE    Prediabetes  -     Microalbumin/Creatinine Ratio, Urine; Future; Expected date: 06/06/2022    Obesity (BMI 30-39.9)        -    Encouraged continued healthy diet, exercise and weight loss. Applauded the pt's progress so far and recommended that he continue with what he is doing as he will be successful if he is persistent.         Pt to follow up w/ Dr. Upton next available for annual        Fred Valencia PA-C  Family Medicine Physician Assistant       I spent a total of 20 minutes on the day of the visit.This includes face to face time and  non-face to face time preparing to see the patient (eg, review of tests), obtaining and/or reviewing separately obtained history, documenting clinical information in the electronic or other health record, independently interpreting results and communicating results to the patient/family/caregiver, or care coordinator.      We have addressed [4] Moderate: 1 or more chronic illnesses with exacerbation, progression, or side effects of treatment / 2 or more stable chronic illnesses / 1 undiagnosed new problem with uncertain prognosis / 1 acute illness with systemic symptoms / 1 acute complicated injury  The complexity of the data reviewed and analyzed for this visit was [3] Limited (Reviewed prior external note, ordered unique testing or reviewed the results of each unique test)   The risk of complications and/or morbidity or mortality are [4] Moderate risk (I.e. prescription drug management / decision regarding minor surgery with identified pt or procedure risk factors / decision regarding elective major surgery without identified pt or procedure risk factors / diagnosis or treatment significantly limited by social determinants of health)   The level of Medical Decision Making for this visit is [4] Moderate          No

## 2023-04-02 NOTE — ED PROVIDER NOTE - PHYSICAL EXAMINATION
General: Patient awake alert NAD. Tremulous. Tongue fasciculations. Flushed skin.   HEENT: normocephalic, atraumatic, EOMI, MMM   Cardiac: RRR, S1, S2, no murmur.   LUNGS: ctab, nwob,  no wheeze, rhonchi, speaking full sentences.     Abdomen: soft NT, ND, no rebound no guarding.   EXT: Moving all extremities, no edema.   Neuro: A&Ox3, no focal neurological deficits   Skin: warm, dry, no rash.

## 2023-04-02 NOTE — H&P ADULT - SOCIAL HISTORY: ALCOHOL USE
Chronic daily alcohol use, drinks 2 bottles of vodka and 9-10 beers/day. Prior hospitalizations for alcohol withdrawal and DTs

## 2023-04-02 NOTE — ED ADULT TRIAGE NOTE - CHIEF COMPLAINT QUOTE
c/o B./l LE edema and leg pain for the past week,  pt poorly groomed with tremors, reports daily ETOH use, last drink this morning at 5:00 am 1 pint of vodka.

## 2023-04-02 NOTE — PATIENT PROFILE ADULT - FALL HARM RISK - HARM RISK INTERVENTIONS
Assistance with ambulation/Assistance OOB with selected safe patient handling equipment/Communicate Risk of Fall with Harm to all staff/Monitor for mental status changes/Monitor gait and stability/Reinforce activity limits and safety measures with patient and family/Tailored Fall Risk Interventions/Toileting schedule using arm’s reach rule for commode and bathroom/Use of alarms - bed, chair and/or voice tab/Visual Cue: Yellow wristband and red socks/Bed in lowest position, wheels locked, appropriate side rails in place/Call bell, personal items and telephone in reach/Instruct patient to call for assistance before getting out of bed or chair/Non-slip footwear when patient is out of bed/[place] to call system/Physically safe environment - no spills, clutter or unnecessary equipment/Purposeful Proactive Rounding/Room/bathroom lighting operational, light cord in reach

## 2023-04-02 NOTE — H&P ADULT - NSHPREVIEWOFSYSTEMS_GEN_ALL_CORE
CONSTITUTIONAL: No weakness, fevers or chills  EYES/ENT: No visual changes;  No vertigo or throat pain   NECK: No pain or stiffness  RESPIRATORY: No cough, wheezing, hemoptysis; No shortness of breath  CARDIOVASCULAR: +LE pain/swelling. No chest pain or palpitations. No orthopnea, paroxysmal nocturnal dysuria  GASTROINTESTINAL: +nausea. No abdominal or epigastric pain. No vomiting, or hematemesis; No diarrhea or constipation. No melena or hematochezia.  GENITOURINARY: No dysuria, frequency or hematuria  NEUROLOGICAL: +tremors, +fasciculations. No numbness, weakness, tingling. No headache, no visual changes  SKIN: No itching, rashes  [x] All other systems negative  [ ] Unable to assess ROS because ________ CONSTITUTIONAL: No weakness, fevers or chills  EYES/ENT: No visual changes;  No vertigo or throat pain   NECK: No pain or stiffness  RESPIRATORY: No cough, wheezing, hemoptysis; No shortness of breath  CARDIOVASCULAR: +LE pain/swelling. No chest pain or palpitations. No orthopnea, paroxysmal nocturnal dysuria  GASTROINTESTINAL: +nausea. + epigastric abdominal pain. No vomiting, or hematemesis; No diarrhea or constipation. No melena or hematochezia.  GENITOURINARY: No dysuria, frequency or hematuria  NEUROLOGICAL: +tremors, +fasciculations. No numbness, weakness, tingling. No headache, no visual changes  SKIN: No itching, rashes  [x] All other systems negative  [ ] Unable to assess ROS because ________

## 2023-04-02 NOTE — H&P ADULT - NSHPPHYSICALEXAM_GEN_ALL_CORE
VITALS:   Vital Signs Last 24 Hrs  T(C): 37 (02 Apr 2023 15:33), Max: 37.2 (02 Apr 2023 12:55)  T(F): 98.6 (02 Apr 2023 15:33), Max: 98.9 (02 Apr 2023 12:55)  HR: 92 (02 Apr 2023 15:33) (88 - 101)  BP: 107/66 (02 Apr 2023 15:33) (107/66 - 123/64)  BP(mean): --  RR: 19 (02 Apr 2023 15:33) (18 - 19)  SpO2: 100% (02 Apr 2023 15:33) (100% - 100%)    Parameters below as of 02 Apr 2023 15:33  Patient On (Oxygen Delivery Method): room air    GENERAL: anxious-appearing  HEENT: +tongue fasciculations  HEART: S1, S2, regular rate and rhythm, no murmurs, rubs, or gallops  LUNGS: Unlabored respirations.  Clear to auscultation bilaterally, no crackles, wheezing, or rhonchi  ABDOMEN: Soft, nontender, nondistended, +BS  EXTREMITIES: 2+ peripheral pulses bilaterally. 1-2+ pitting edema b/l LE, tender to touch  NERVOUS SYSTEM:  A&Ox3, no focal deficits, tremulous   SKIN: No rashes or lesions VITALS:   Vital Signs Last 24 Hrs  T(C): 37 (02 Apr 2023 15:33), Max: 37.2 (02 Apr 2023 12:55)  T(F): 98.6 (02 Apr 2023 15:33), Max: 98.9 (02 Apr 2023 12:55)  HR: 92 (02 Apr 2023 15:33) (88 - 101)  BP: 107/66 (02 Apr 2023 15:33) (107/66 - 123/64)  BP(mean): --  RR: 19 (02 Apr 2023 15:33) (18 - 19)  SpO2: 100% (02 Apr 2023 15:33) (100% - 100%)    Parameters below as of 02 Apr 2023 15:33  Patient On (Oxygen Delivery Method): room air    GENERAL: NAD, appears tired  HEENT: +tongue fasciculations  HEART: S1, S2, regular rate and rhythm, no murmurs, rubs, or gallops  LUNGS: Unlabored respirations.  Clear to auscultation bilaterally, no crackles, wheezing, or rhonchi  ABDOMEN: +epigastric tenderness, no rebound or guading, +BS  EXTREMITIES: 2+ peripheral pulses bilaterally. 1-2+ pitting edema b/l LE, tender to touch  NERVOUS SYSTEM:  A&Ox3, no focal deficits, tremulous   SKIN: chronic venous statis changes VITALS:   Vital Signs Last 24 Hrs  T(C): 37 (02 Apr 2023 15:33), Max: 37.2 (02 Apr 2023 12:55)  T(F): 98.6 (02 Apr 2023 15:33), Max: 98.9 (02 Apr 2023 12:55)  HR: 92 (02 Apr 2023 15:33) (88 - 101)  BP: 107/66 (02 Apr 2023 15:33) (107/66 - 123/64)  BP(mean): --  RR: 19 (02 Apr 2023 15:33) (18 - 19)  SpO2: 100% (02 Apr 2023 15:33) (100% - 100%)    Parameters below as of 02 Apr 2023 15:33  Patient On (Oxygen Delivery Method): room air    GENERAL: NAD, appears tired  HEENT: +tongue fasciculations  HEART: S1, S2, regular rate and rhythm, no murmurs, rubs, or gallops  LUNGS: Unlabored respirations.  Clear to auscultation bilaterally, no crackles, wheezing, or rhonchi  ABDOMEN: +epigastric tenderness, no rebound or guading, +BS  EXTREMITIES: 2+ peripheral pulses bilaterally. 1-2+ pitting edema b/l LE, tender to touch. b/l knees w/ scrapes  NERVOUS SYSTEM:  A&Ox3, no focal deficits, tremulous   SKIN: chronic venous statis changes

## 2023-04-02 NOTE — PATIENT PROFILE ADULT - FALL HARM RISK - DEVICES
Rounded with patient. Offered food. Patient refuses at this time. Continues to refuse the need to void.Pulls blanket over her face.   None

## 2023-04-02 NOTE — ED PROVIDER NOTE - CLINICAL SUMMARY MEDICAL DECISION MAKING FREE TEXT BOX
57-year-old male with a past medical history of glaucoma, hypertension on amlodipine, chronic alcohol abuse with complex withdrawals involving DTs presents to the ER today complaining of feeling sick.  Suspicious patient currently having withdrawal from alcohol patient has a CIWA of 7 on initial presentation.  Patient has tongue fasciculations as well as bilateral tremors.  Plan to give 1 mg IV Ativan IV fluids will obtain bilateral lower extremity Doppler study rule out DVT obtain blood work check electrolytes and admit for alcohol withdrawal.

## 2023-04-03 ENCOUNTER — TRANSCRIPTION ENCOUNTER (OUTPATIENT)
Age: 58
End: 2023-04-03

## 2023-04-03 LAB
ALBUMIN SERPL ELPH-MCNC: 3.4 G/DL — SIGNIFICANT CHANGE UP (ref 3.3–5)
ALP SERPL-CCNC: 62 U/L — SIGNIFICANT CHANGE UP (ref 40–120)
ALT FLD-CCNC: 44 U/L — HIGH (ref 4–41)
ANION GAP SERPL CALC-SCNC: 16 MMOL/L — HIGH (ref 7–14)
AST SERPL-CCNC: 45 U/L — HIGH (ref 4–40)
BILIRUB SERPL-MCNC: 1.1 MG/DL — SIGNIFICANT CHANGE UP (ref 0.2–1.2)
BUN SERPL-MCNC: 18 MG/DL — SIGNIFICANT CHANGE UP (ref 7–23)
CALCIUM SERPL-MCNC: 8 MG/DL — LOW (ref 8.4–10.5)
CHLORIDE SERPL-SCNC: 101 MMOL/L — SIGNIFICANT CHANGE UP (ref 98–107)
CHOLEST SERPL-MCNC: 163 MG/DL — SIGNIFICANT CHANGE UP
CK SERPL-CCNC: 395 U/L — HIGH (ref 30–200)
CO2 SERPL-SCNC: 18 MMOL/L — LOW (ref 22–31)
CREAT SERPL-MCNC: 0.7 MG/DL — SIGNIFICANT CHANGE UP (ref 0.5–1.3)
EGFR: 106 ML/MIN/1.73M2 — SIGNIFICANT CHANGE UP
GLUCOSE SERPL-MCNC: 62 MG/DL — LOW (ref 70–99)
HCT VFR BLD CALC: 36 % — LOW (ref 39–50)
HDLC SERPL-MCNC: 76 MG/DL — SIGNIFICANT CHANGE UP
HGB BLD-MCNC: 11.9 G/DL — LOW (ref 13–17)
LIPID PNL WITH DIRECT LDL SERPL: 45 MG/DL — SIGNIFICANT CHANGE UP
MAGNESIUM SERPL-MCNC: 2 MG/DL — SIGNIFICANT CHANGE UP (ref 1.6–2.6)
MCHC RBC-ENTMCNC: 30 PG — SIGNIFICANT CHANGE UP (ref 27–34)
MCHC RBC-ENTMCNC: 33.1 GM/DL — SIGNIFICANT CHANGE UP (ref 32–36)
MCV RBC AUTO: 90.7 FL — SIGNIFICANT CHANGE UP (ref 80–100)
NON HDL CHOLESTEROL: 87 MG/DL — SIGNIFICANT CHANGE UP
NRBC # BLD: 0 /100 WBCS — SIGNIFICANT CHANGE UP (ref 0–0)
NRBC # FLD: 0 K/UL — SIGNIFICANT CHANGE UP (ref 0–0)
PHOSPHATE SERPL-MCNC: 2.2 MG/DL — LOW (ref 2.5–4.5)
PLATELET # BLD AUTO: 196 K/UL — SIGNIFICANT CHANGE UP (ref 150–400)
POTASSIUM SERPL-MCNC: 4 MMOL/L — SIGNIFICANT CHANGE UP (ref 3.5–5.3)
POTASSIUM SERPL-SCNC: 4 MMOL/L — SIGNIFICANT CHANGE UP (ref 3.5–5.3)
PROT SERPL-MCNC: 6.7 G/DL — SIGNIFICANT CHANGE UP (ref 6–8.3)
RBC # BLD: 3.97 M/UL — LOW (ref 4.2–5.8)
RBC # FLD: 13.9 % — SIGNIFICANT CHANGE UP (ref 10.3–14.5)
SODIUM SERPL-SCNC: 135 MMOL/L — SIGNIFICANT CHANGE UP (ref 135–145)
TRIGL SERPL-MCNC: 212 MG/DL — HIGH
WBC # BLD: 5.27 K/UL — SIGNIFICANT CHANGE UP (ref 3.8–10.5)
WBC # FLD AUTO: 5.27 K/UL — SIGNIFICANT CHANGE UP (ref 3.8–10.5)

## 2023-04-03 PROCEDURE — 93970 EXTREMITY STUDY: CPT | Mod: 26

## 2023-04-03 PROCEDURE — 99233 SBSQ HOSP IP/OBS HIGH 50: CPT

## 2023-04-03 PROCEDURE — 93306 TTE W/DOPPLER COMPLETE: CPT | Mod: 26

## 2023-04-03 RX ORDER — LANOLIN ALCOHOL/MO/W.PET/CERES
3 CREAM (GRAM) TOPICAL AT BEDTIME
Refills: 0 | Status: DISCONTINUED | OUTPATIENT
Start: 2023-04-03 | End: 2023-04-07

## 2023-04-03 RX ORDER — LANOLIN ALCOHOL/MO/W.PET/CERES
5 CREAM (GRAM) TOPICAL AT BEDTIME
Refills: 0 | Status: DISCONTINUED | OUTPATIENT
Start: 2023-04-03 | End: 2023-04-03

## 2023-04-03 RX ADMIN — Medication 1 DROP(S): at 11:26

## 2023-04-03 RX ADMIN — Medication 1 DROP(S): at 06:39

## 2023-04-03 RX ADMIN — Medication 3 MILLIGRAM(S): at 22:22

## 2023-04-03 RX ADMIN — DORZOLAMIDE HYDROCHLORIDE 2 DROP(S): 20 SOLUTION/ DROPS OPHTHALMIC at 06:39

## 2023-04-03 RX ADMIN — LATANOPROST 1 DROP(S): 0.05 SOLUTION/ DROPS OPHTHALMIC; TOPICAL at 21:44

## 2023-04-03 RX ADMIN — DORZOLAMIDE HYDROCHLORIDE 2 DROP(S): 20 SOLUTION/ DROPS OPHTHALMIC at 14:03

## 2023-04-03 RX ADMIN — Medication 3 MILLIGRAM(S): at 14:03

## 2023-04-03 RX ADMIN — Medication 1 TABLET(S): at 11:25

## 2023-04-03 RX ADMIN — DORZOLAMIDE HYDROCHLORIDE 2 DROP(S): 20 SOLUTION/ DROPS OPHTHALMIC at 21:44

## 2023-04-03 RX ADMIN — Medication 3 MILLIGRAM(S): at 18:31

## 2023-04-03 RX ADMIN — Medication 1 MILLIGRAM(S): at 11:25

## 2023-04-03 RX ADMIN — ATORVASTATIN CALCIUM 40 MILLIGRAM(S): 80 TABLET, FILM COATED ORAL at 21:44

## 2023-04-03 RX ADMIN — SODIUM CHLORIDE 75 MILLILITER(S): 9 INJECTION INTRAMUSCULAR; INTRAVENOUS; SUBCUTANEOUS at 06:53

## 2023-04-03 RX ADMIN — Medication 4 MILLIGRAM(S): at 11:25

## 2023-04-03 RX ADMIN — Medication 1 DROP(S): at 18:31

## 2023-04-03 RX ADMIN — Medication 200 MILLIGRAM(S): at 11:25

## 2023-04-03 RX ADMIN — Medication 4 MILLIGRAM(S): at 06:38

## 2023-04-03 RX ADMIN — Medication 4 MILLIGRAM(S): at 02:56

## 2023-04-03 RX ADMIN — AMLODIPINE BESYLATE 10 MILLIGRAM(S): 2.5 TABLET ORAL at 06:38

## 2023-04-03 NOTE — PROGRESS NOTE ADULT - SUBJECTIVE AND OBJECTIVE BOX
*******************************  Booige Soni MD (PGY-1)  Internal Medicine  Contact via [employer] TEAMS  [facility] Pager: [phone]  [facility] Pager: [phone]  *******************************    PROGRESS NOTE:     Patient is a 57y old  Male who presents with a chief complaint of alcohol withdrawal (02 Apr 2023 16:05)    INTERVAL EVENTS: No acute overnight events.     SUBJECTIVE: Patient seen and examined at bedside. This morning, the patient is comfortable and doing well. No acute complaints. Denies fevers, chills, N/V/D, chest pain, SOB, abdominal pain.    MEDICATIONS  (STANDING):  amLODIPine   Tablet 10 milliGRAM(s) Oral daily  artificial  tears Solution 1 Drop(s) Both EYES four times a day  atorvastatin 40 milliGRAM(s) Oral at bedtime  dorzolamide 2% Ophthalmic Solution 2 Drop(s) Both EYES three times a day  folic acid 1 milliGRAM(s) Oral daily  latanoprost 0.005% Ophthalmic Solution 1 Drop(s) Both EYES at bedtime  LORazepam     Tablet   Oral   LORazepam     Tablet 4 milliGRAM(s) Oral every 4 hours  LORazepam     Tablet 3 milliGRAM(s) Oral every 4 hours  multivitamin 1 Tablet(s) Oral daily  sodium chloride 0.9%. 1000 milliLiter(s) (75 mL/Hr) IV Continuous <Continuous>  thiamine Injectable 200 milliGRAM(s) IV Push daily    MEDICATIONS  (PRN):  acetaminophen     Tablet .. 650 milliGRAM(s) Oral every 6 hours PRN Temp greater or equal to 38C (100.4F), Mild Pain (1 - 3)  LORazepam   Injectable 4 milliGRAM(s) IV Push every 1 hour PRN Symptom-triggered: each CIWA -Ar score 8 or GREATER    CAPILLARY BLOOD GLUCOSE    I&O's Summary    PHYSICAL EXAM:  Vital Signs Last 24 Hrs  T(C): 37.1 (03 Apr 2023 06:00), Max: 37.2 (02 Apr 2023 12:55)  T(F): 98.8 (03 Apr 2023 06:00), Max: 98.9 (02 Apr 2023 12:55)  HR: 89 (03 Apr 2023 06:00) (75 - 101)  BP: 119/68 (03 Apr 2023 06:00) (107/66 - 131/69)  BP(mean): --  RR: 18 (03 Apr 2023 06:00) (16 - 19)  SpO2: 100% (03 Apr 2023 06:00) (96% - 100%)    Parameters below as of 03 Apr 2023 06:00  Patient On (Oxygen Delivery Method): room air    GENERAL: NAD, appears tired  HEENT: +tongue fasciculations  HEART: S1, S2, regular rate and rhythm, no murmurs, rubs, or gallops  LUNGS: Unlabored respirations.  Clear to auscultation bilaterally, no crackles, wheezing, or rhonchi  ABDOMEN: +epigastric tenderness, no rebound or guading, +BS  EXTREMITIES: 2+ peripheral pulses bilaterally. 1-2+ pitting edema b/l LE, tender to touch. b/l knees w/ scrapes  NERVOUS SYSTEM:  A&Ox3, no focal deficits, tremulous   SKIN: chronic venous statis changes    LABS:                        12.0   6.36  )-----------( 250      ( 02 Apr 2023 13:21 )             35.8     04-02    137  |  100  |  15  ----------------------------<  73  4.5   |  19<L>  |  0.68    Ca    8.2<L>      02 Apr 2023 13:21  Mg     1.90     04-02    TPro  7.5  /  Alb  4.0  /  TBili  0.5  /  DBili  x   /  AST  67<H>  /  ALT  55<H>  /  AlkPhos  61  04-02    CARDIAC MARKERS ( 02 Apr 2023 13:21 )  x     / x     / 651 U/L / x     / x        RADIOLOGY & ADDITIONAL TESTS:  Results Reviewed:   Imaging Personally Reviewed:  Electrocardiogram Personally Reviewed:  Tele:    COORDINATION OF CARE:  Care Discussed with Consultants/Other Providers [Y/N]:  Prior or Outpatient Records Reviewed [Y/N]: *******************************  Boogie Soni MD (PGY-1)  Internal Medicine  Contact via [employer] TEAMS  [facility] Pager: [phone]  [facility] Pager: [phone]  *******************************    PROGRESS NOTE:     Patient is a 57y old  Male who presents with a chief complaint of alcohol withdrawal (02 Apr 2023 16:05)    INTERVAL EVENTS: No acute overnight events.     SUBJECTIVE: Patient seen and examined at bedside. This morning, the patient is comfortable and doing well. Pt. reports feeling better but still has lower leg pain, worse w/ palpation. Pt. expresses interest in potential rehab. Denies fevers, chills, N/V/D, chest pain, SOB, abdominal pain.    MEDICATIONS  (STANDING):  amLODIPine   Tablet 10 milliGRAM(s) Oral daily  artificial  tears Solution 1 Drop(s) Both EYES four times a day  atorvastatin 40 milliGRAM(s) Oral at bedtime  dorzolamide 2% Ophthalmic Solution 2 Drop(s) Both EYES three times a day  folic acid 1 milliGRAM(s) Oral daily  latanoprost 0.005% Ophthalmic Solution 1 Drop(s) Both EYES at bedtime  LORazepam     Tablet   Oral   LORazepam     Tablet 4 milliGRAM(s) Oral every 4 hours  LORazepam     Tablet 3 milliGRAM(s) Oral every 4 hours  multivitamin 1 Tablet(s) Oral daily  sodium chloride 0.9%. 1000 milliLiter(s) (75 mL/Hr) IV Continuous <Continuous>  thiamine Injectable 200 milliGRAM(s) IV Push daily    MEDICATIONS  (PRN):  acetaminophen     Tablet .. 650 milliGRAM(s) Oral every 6 hours PRN Temp greater or equal to 38C (100.4F), Mild Pain (1 - 3)  LORazepam   Injectable 4 milliGRAM(s) IV Push every 1 hour PRN Symptom-triggered: each CIWA -Ar score 8 or GREATER    CAPILLARY BLOOD GLUCOSE    I&O's Summary    PHYSICAL EXAM:  Vital Signs Last 24 Hrs  T(C): 37.1 (03 Apr 2023 06:00), Max: 37.2 (02 Apr 2023 12:55)  T(F): 98.8 (03 Apr 2023 06:00), Max: 98.9 (02 Apr 2023 12:55)  HR: 89 (03 Apr 2023 06:00) (75 - 101)  BP: 119/68 (03 Apr 2023 06:00) (107/66 - 131/69)  BP(mean): --  RR: 18 (03 Apr 2023 06:00) (16 - 19)  SpO2: 100% (03 Apr 2023 06:00) (96% - 100%)    Parameters below as of 03 Apr 2023 06:00  Patient On (Oxygen Delivery Method): room air    GENERAL: NAD, appears tired  HEENT: +tongue fasciculations  HEART: S1, S2, regular rate and rhythm, no murmurs, rubs, or gallops  LUNGS: Unlabored respirations.  Clear to auscultation bilaterally, no crackles, wheezing, or rhonchi  ABDOMEN: +epigastric tenderness, no rebound or guading, +BS  EXTREMITIES: 2+ peripheral pulses bilaterally. 1-2+ pitting edema b/l LE, tender to touch. b/l knees w/ scrapes  NERVOUS SYSTEM:  A&Ox3, no focal deficits, tremulous   SKIN: chronic venous statis changes    LABS:                            11.9   5.27  )-----------( 196      ( 03 Apr 2023 06:45 )             36.0     04-03    135  |  101  |  18  ----------------------------<  62<L>  4.0   |  18<L>  |  0.70    Ca    8.0<L>      03 Apr 2023 06:45  Phos  2.2     04-03  Mg     2.00     04-03    TPro  6.7  /  Alb  3.4  /  TBili  1.1  /  DBili  x   /  AST  45<H>  /  ALT  44<H>  /  AlkPhos  62  04-03    Creatine Kinase, Serum in AM (04.03.23 @ 06:45)    Creatine Kinase, Serum: 395: SPECIMEN MILDLY HEMOLYZED U/L    RADIOLOGY & ADDITIONAL TESTS:  Results Reviewed:   Imaging Personally Reviewed:  Electrocardiogram Personally Reviewed:  Tele:    COORDINATION OF CARE:  Care Discussed with Consultants/Other Providers [Y/N]:  Prior or Outpatient Records Reviewed [Y/N]: *******************************  Boogie Soni MD (PGY-1)  Internal Medicine  Contact via [employer] TEAMS  [facility] Pager: [phone]  [facility] Pager: [phone]  *******************************    PROGRESS NOTE:     Patient is a 57y old  Male who presents with a chief complaint of alcohol withdrawal (02 Apr 2023 16:05)    INTERVAL EVENTS: No acute overnight events.     SUBJECTIVE: Patient seen and examined at bedside. This morning, the patient is comfortable and doing well. Pt. reports feeling better but still has lower leg pain, worse w/ palpation. Pt. expresses interest in potential rehab. Denies fevers, chills, N/V/D, chest pain, SOB, abdominal pain.    MEDICATIONS  (STANDING):  amLODIPine   Tablet 10 milliGRAM(s) Oral daily  artificial  tears Solution 1 Drop(s) Both EYES four times a day  atorvastatin 40 milliGRAM(s) Oral at bedtime  dorzolamide 2% Ophthalmic Solution 2 Drop(s) Both EYES three times a day  folic acid 1 milliGRAM(s) Oral daily  latanoprost 0.005% Ophthalmic Solution 1 Drop(s) Both EYES at bedtime  LORazepam     Tablet   Oral   LORazepam     Tablet 3 milliGRAM(s) Oral every 4 hours  multivitamin 1 Tablet(s) Oral daily  sodium chloride 0.9%. 1000 milliLiter(s) (75 mL/Hr) IV Continuous <Continuous>  thiamine Injectable 200 milliGRAM(s) IV Push daily    MEDICATIONS  (PRN):  acetaminophen     Tablet .. 650 milliGRAM(s) Oral every 6 hours PRN Temp greater or equal to 38C (100.4F), Mild Pain (1 - 3)  LORazepam   Injectable 4 milliGRAM(s) IV Push every 1 hour PRN Symptom-triggered: each CIWA -Ar score 8 or GREATER    CAPILLARY BLOOD GLUCOSE    I&O's Summary    PHYSICAL EXAM:  Vital Signs Last 24 Hrs  T(C): 37.1 (03 Apr 2023 06:00), Max: 37.2 (02 Apr 2023 12:55)  T(F): 98.8 (03 Apr 2023 06:00), Max: 98.9 (02 Apr 2023 12:55)  HR: 89 (03 Apr 2023 06:00) (75 - 101)  BP: 119/68 (03 Apr 2023 06:00) (107/66 - 131/69)  BP(mean): --  RR: 18 (03 Apr 2023 06:00) (16 - 19)  SpO2: 100% (03 Apr 2023 06:00) (96% - 100%)    Parameters below as of 03 Apr 2023 06:00  Patient On (Oxygen Delivery Method): room air    GENERAL: NAD, appears tired  HEENT: +tongue fasciculations  HEART: S1, S2, regular rate and rhythm, no murmurs, rubs, or gallops  LUNGS: Unlabored respirations.  Clear to auscultation bilaterally, no crackles, wheezing, or rhonchi  ABDOMEN: soft, nontender, nondistended  EXTREMITIES: 2+ peripheral pulses bilaterally. 1-2+ pitting edema b/l LE, tender to touch. b/l knees w/ scrapes  NERVOUS SYSTEM:  A&Ox3, no focal deficits, tremulous   SKIN: chronic venous statis changes    LABS:                            11.9   5.27  )-----------( 196      ( 03 Apr 2023 06:45 )             36.0     04-03    135  |  101  |  18  ----------------------------<  62<L>  4.0   |  18<L>  |  0.70    Ca    8.0<L>      03 Apr 2023 06:45  Phos  2.2     04-03  Mg     2.00     04-03    TPro  6.7  /  Alb  3.4  /  TBili  1.1  /  DBili  x   /  AST  45<H>  /  ALT  44<H>  /  AlkPhos  62  04-03    Creatine Kinase, Serum in AM (04.03.23 @ 06:45)    Creatine Kinase, Serum: 395: SPECIMEN MILDLY HEMOLYZED U/L    RADIOLOGY & ADDITIONAL TESTS:  Results Reviewed:   < from: US Duplex Venous Lower Ext Complete, Bilateral (04.03.23 @ 10:39) >  RIGHT:  Normal compressibility of the RIGHT common femoral, femoral and popliteal   veins.  Doppler examination shows normal spontaneous and phasic flow.  No RIGHT calf vein thrombosis is detected.    LEFT:  Normal compressibility of the LEFT common femoral, femoral and popliteal   veins.  Doppler examination shows normal spontaneous and phasic flow.  No LEFT calfvein thrombosis is detected.    IMPRESSION:  No evidence of deep venous thrombosis in either lower extremity.    Imaging Personally Reviewed:  Electrocardiogram Personally Reviewed:  Tele:    COORDINATION OF CARE:  Care Discussed with Consultants/Other Providers [Y/N]:  Prior or Outpatient Records Reviewed [Y/N]:

## 2023-04-03 NOTE — DISCHARGE NOTE PROVIDER - ATTENDING DISCHARGE PHYSICAL EXAMINATION:
PHYSICAL EXAM:  Vital Signs Last 24 Hrs  T(C): 36.8 (07 Apr 2023 12:15), Max: 36.8 (07 Apr 2023 12:15)  T(F): 98.2 (07 Apr 2023 12:15), Max: 98.2 (07 Apr 2023 12:15)  HR: 69 (07 Apr 2023 12:15) (69 - 83)  BP: 110/72 (07 Apr 2023 12:15) (101/67 - 110/72)  BP(mean): --  RR: 17 (07 Apr 2023 12:15) (17 - 18)  SpO2: 98% (07 Apr 2023 12:15) (98% - 99%)    Parameters below as of 07 Apr 2023 12:15  Patient On (Oxygen Delivery Method): room air      CONSTITUTIONAL: NAD, well-developed, well-groomed  EYES: PERRLA; conjunctiva and sclera clear  ENMT: Moist oral mucosa, no pharyngeal injection or exudates; normal dentition  NECK: Supple, no palpable masses; no thyromegaly  RESPIRATORY: Normal respiratory effort; lungs are clear to auscultation bilaterally  CARDIOVASCULAR: Regular rate and rhythm, normal S1 and S2, no murmur/rub/gallop; No lower extremity edema; Peripheral pulses are 2+ bilaterally  ABDOMEN: Nontender to palpation, normoactive bowel sounds, no rebound/guarding; No hepatosplenomegaly  MUSCULOSKELETAL:  no clubbing or cyanosis of digits; no joint swelling or tenderness to palpation  PSYCH: A+O to person, place, and time; affect appropriate  NEUROLOGY: CN 2-12 are intact and symmetric; no gross sensory deficits   SKIN: No rashes; no palpable lesions

## 2023-04-03 NOTE — PHYSICAL THERAPY INITIAL EVALUATION ADULT - ADDITIONAL COMMENTS
Pt stated he lives with his cousin in an apartment with 0 stairs to enter; Pt resides on the first floor. prior to admission Pt was independent with all mobility and ambulated without an assistive device.     Pt. left comfortable in bed with all tubes/lines intact, +bed alarm, call bell in reach and in NAD.

## 2023-04-03 NOTE — PHYSICAL THERAPY INITIAL EVALUATION ADULT - PERTINENT HX OF CURRENT PROBLEM, REHAB EVAL
Pt is a 57 year old male who presented with nausea, abdominal pain, anxiety, and lower extremity pain/swelling, admitted for alcohol withdrawal being treated with Ativan taper.

## 2023-04-03 NOTE — PROGRESS NOTE ADULT - PROBLEM SELECTOR PLAN 2
Pt. w/ 1-2+ pitting edema of b/l LE, painful to touch w/ associated venous stasis skin changes  - no tobacco use, recent travel, CHF hx however reports recent fall onto his knees  - f/u venous dopplers to r/o DVT  - f/u TTE to evaluate for alcohol-induced cardiomyopathy (although proBNP 33)  - leg elevation, compression stockings  - continue to monitor Pt. w/ 1-2+ pitting edema of b/l LE, painful to touch w/ associated venous stasis skin changes  - no tobacco use, recent travel, CHF hx however reports recent fall onto his knees  - venous duplex (4/3): no DVT  - f/u TTE to evaluate for alcohol-induced cardiomyopathy (although proBNP 33)  - leg elevation, compression stockings  - continue to monitor

## 2023-04-03 NOTE — DISCHARGE NOTE PROVIDER - HOSPITAL COURSE
57M w/ alcohol use disorder w/ multiple episodes of DTs and prior hospitalizations for alcohol withdrawal, HTN, HLD, and glaucoma who presented w/ nausea, abdominal pain, anxiety, and lower extremity pain/swelling. In the ED, the patient was tremulous and had fasciculations with initial CIWA scores in the 7s. He received Ativan 1mg IVP x1, Librium 50mg PO x1, thiamine 100mg IVP x1, and folic acid 1mg IVP x1 and was started on a high-risk CIWA taper with Ativan. Over the course of his hospitalization, his CIWA scores improved and he remained hemodynamically stable. He was continued on thiamine, folic acid, and multivitamin. Social work was also consulted to assist with resources for alcohol cessation; he was recommended for ________.     On admission, the patient was also found to have mild bilateral lower extremity swelling, painful to touch. Venous duplex was negative for DVT. TTE was done to r/o alcoholic cardiomyopathy which showed ________. The symptoms improved over the course of his hospitalization. The patient was also seen by physical therapy who recommended home PT for further care.    The patient is afebrile, hemodynamically stable and medically optimized for discharge to home with follow up with his PCP. On day of discharge, patient is clinically stable with no new exam findings or acute symptoms compared to prior. The patient was seen by the attending physician on the date of discharge and deemed stable and acceptable for discharge. The patient's chronic medical conditions were treated accordingly per the patient's home medication regimen. The patient's medication reconciliation (with changes made to chronic medications), follow up appointments, discharge orders, instructions, and significant lab and diagnostic studies are as noted. 57M w/ alcohol use disorder w/ multiple episodes of DTs and prior hospitalizations for alcohol withdrawal, HTN, HLD, and glaucoma who presented w/ nausea, abdominal pain, anxiety, and lower extremity pain/swelling. In the ED, the patient was tremulous and had fasciculations with initial CIWA scores in the 7s. He received Ativan 1mg IVP x1, Librium 50mg PO x1, thiamine 100mg IVP x1, and folic acid 1mg IVP x1 and was started on a high-risk CIWA taper with Ativan. Over the course of his hospitalization, his CIWA scores improved and he remained hemodynamically stable. He was continued on thiamine, folic acid, and multivitamin. Social work was also consulted to assist with resources for alcohol cessation; pt. will continue with group sessions as an outpatient.     On admission, the patient was also found to have mild bilateral lower extremity swelling, painful to touch. Venous duplex was negative for DVT. TTE was done to r/o alcoholic cardiomyopathy which was normal. The symptoms improved over the course of his hospitalization. The patient was also seen by physical therapy who recommended home PT for further care, although the patient declined.     The patient is afebrile, hemodynamically stable and medically optimized for discharge to home with follow up with his PCP. On day of discharge, patient is clinically stable with no new exam findings or acute symptoms compared to prior. The patient was seen by the attending physician on the date of discharge and deemed stable and acceptable for discharge. The patient's chronic medical conditions were treated accordingly per the patient's home medication regimen. The patient's medication reconciliation (with changes made to chronic medications), follow up appointments, discharge orders, instructions, and significant lab and diagnostic studies are as noted. 57M w/ alcohol use disorder w/ multiple episodes of DTs and prior hospitalizations for alcohol withdrawal, HTN, HLD, and glaucoma who presented w/ nausea, abdominal pain, anxiety, and lower extremity pain/swelling. In the ED, the patient was tremulous and had fasciculations with initial CIWA scores in the 7s. He received Ativan 1mg IVP x1, Librium 50mg PO x1, thiamine 100mg IVP x1, and folic acid 1mg IVP x1 and was started on a high-risk CIWA taper with Ativan. Over the course of his hospitalization, his CIWA scores improved and he remained hemodynamically stable. He was continued on thiamine, folic acid, and multivitamin. Social work was also consulted to assist with resources for alcohol cessation; pt. will continue with group sessions as an outpatient.     On admission, the patient was also found to have mild bilateral lower extremity swelling, painful to touch. Venous duplex was negative for DVT. TTE was done to r/o alcoholic cardiomyopathy which was normal. The symptoms improved over the course of his hospitalization and after dose reduction of amlodipine to 5mg qd. The patient was also seen by physical therapy who recommended home PT for further care, although the patient declined.     The patient is afebrile, hemodynamically stable and medically optimized for discharge to home with follow up with his PCP. On day of discharge, patient is clinically stable with no new exam findings or acute symptoms compared to prior. The patient was seen by the attending physician on the date of discharge and deemed stable and acceptable for discharge. The patient's chronic medical conditions were treated accordingly per the patient's home medication regimen. The patient's medication reconciliation (with changes made to chronic medications), follow up appointments, discharge orders, instructions, and significant lab and diagnostic studies are as noted.

## 2023-04-03 NOTE — DISCHARGE NOTE PROVIDER - NSDCCPTREATMENT_GEN_ALL_CORE_FT
PRINCIPAL PROCEDURE  Procedure: Venous duplex bilateral lower  Findings and Treatment: FINDINGS:  RIGHT:  Normal compressibility of the RIGHT common femoral, femoral and popliteal   veins.  Doppler examination shows normal spontaneous and phasic flow.  No RIGHT calf vein thrombosis is detected.  LEFT:  Normal compressibility of the LEFT common femoral, femoral and popliteal   veins.  Doppler examination shows normal spontaneous and phasic flow.  No LEFT calfvein thrombosis is detected.  IMPRESSION:  No evidence of deep venous thrombosis in either lower extremity.     PRINCIPAL PROCEDURE  Procedure: Venous duplex bilateral lower  Findings and Treatment: FINDINGS:  RIGHT:  Normal compressibility of the RIGHT common femoral, femoral and popliteal   veins.  Doppler examination shows normal spontaneous and phasic flow.  No RIGHT calf vein thrombosis is detected.  LEFT:  Normal compressibility of the LEFT common femoral, femoral and popliteal   veins.  Doppler examination shows normal spontaneous and phasic flow.  No LEFT calfvein thrombosis is detected.  IMPRESSION:  No evidence of deep venous thrombosis in either lower extremity.      SECONDARY PROCEDURE  Procedure: Transthoracic echocardiography (TTE)  Findings and Treatment: Ejection Fraction (Modified Ryan Rule): 66 %  ------------------------------------------------------------------------  OBSERVATIONS:  Mitral Valve: Normal mitral valve. Minimal mitral  regurgitation.  Aortic Root: Normal aortic root.  Aortic Valve: Aortic valve leaflet morphology not well  visualized.  Left Atrium: Normal left atrium.  Left Ventricle: Endocardium not well visualized; grossly  normal left ventricular systolic function.  Endocardial  visualization enhanced with intravenous injection of echo  contrast (Definity). Normal left ventricular internal  dimensions and wall thicknesses.  Right Heart: Normal right atrium. The right ventricle is  not well visualized. Normal tricuspid valve. Minimal  tricuspid regurgitation. Normal pulmonic valve.  Pericardium/PleuraNormal pericardium with no pericardial  effusion.  ------------------------------------------------------------------------  CONCLUSIONS:  Technically difficult study.  1. Normal mitral valve. Minimal mitral regurgitation.  2. Normal left ventricular internal dimensions and wall  thicknesses.  3. Endocardium not well visualized; grossly normal left  ventricular systolic function.  Endocardial visualization  enhanced with intravenous injection of echo contrast  (Definity).  4. The right ventricle is not well visualized.

## 2023-04-03 NOTE — PHYSICAL THERAPY INITIAL EVALUATION ADULT - GENERAL OBSERVATIONS, REHAB EVAL
Pt received semi-supine, +bed alarm, +IV, all lines/tubes intact, NAD. Seated BP: 126/75, HR: 105 beats per minute

## 2023-04-03 NOTE — PROGRESS NOTE ADULT - PROBLEM SELECTOR PLAN 4
Hx of HLD on atorvastatin 40mg qd at home  - f/u lipid panel in AM  - c/w atorvastatin 40mg qd Hx of HLD on atorvastatin 40mg qd at home  - lipid panel: chol 163, , HDL 76, LDL 45  - c/w atorvastatin 40mg qd

## 2023-04-03 NOTE — DISCHARGE NOTE PROVIDER - NSDCCPCAREPLAN_GEN_ALL_CORE_FT
PRINCIPAL DISCHARGE DIAGNOSIS  Diagnosis: Alcohol dependence with withdrawal  Assessment and Plan of Treatment: You have a history of chronic alcohol use with prior hospitalizations for alcohol withdrawal. You came in to the hospital with nausea, abdominal pain and nausea and were found to have an elevated alcohol level with severe shaking. As this was concerning for alcohol withdrawal, you were started on a medication called Ativan to help treat your symptoms and you felt better. You were also given several vitamins including thiamine, folic acid and multivitamin, which you should continue when you leave the hospital. When you leave the hospital, try to abstain or limit your alcohol intake. Continue to attend group meetings such as Alcoholic Anonymous for further support. Please follow-up with your PCP within 2 weeks of hospital discharge. If you have similar symptoms of severe shaking, palpitations, sweating, agitation, shortness of breath, nausea, vomiting, seizures, please return back to the ED.      SECONDARY DISCHARGE DIAGNOSES  Diagnosis: Leg swelling  Assessment and Plan of Treatment: On admission, you were found to have painful leg swelling, most likely due to recent trauma from the fall. We scanned your legs for a blood clot but the study was negative. A sonogram of your heart was also done which showed _______. You were seen by physical therapy who recommended physical therapy at home. Please follow up with your PCP within 2 weeks of hospital discharge. If you have sudden onset leg/calf pain, redness, pain to touch, fevers, chills, shortness of breath, please return back to the ED immediately.     PRINCIPAL DISCHARGE DIAGNOSIS  Diagnosis: Alcohol dependence with withdrawal  Assessment and Plan of Treatment: You have a history of chronic alcohol use with prior hospitalizations for alcohol withdrawal. You came in to the hospital with nausea, abdominal pain and nausea and were found to have an elevated alcohol level with severe shaking. As this was concerning for alcohol withdrawal, you were started on a medication called Ativan to help treat your symptoms and you felt better. You were also given several vitamins including thiamine, folic acid and multivitamin, which you should continue when you leave the hospital. When you leave the hospital, try to abstain or limit your alcohol intake. Continue to attend group meetings such as Alcoholic Anonymous for further support. Please follow-up with your PCP within 2 weeks of hospital discharge. If you have similar symptoms of severe shaking, palpitations, sweating, agitation, shortness of breath, nausea, vomiting, seizures, please return back to the ED.      SECONDARY DISCHARGE DIAGNOSES  Diagnosis: Leg swelling  Assessment and Plan of Treatment: On admission, you were found to have painful leg swelling, most likely due to recent trauma from the fall versus your home blood pressure medication (amlodipine). We scanned your legs for a blood clot but the study was negative. A sonogram of your heart was also done which was normal. Your blood pressure medication dose was reduced to 5mg daily from 10mg daily. You were seen by physical therapy who recommended physical therapy at home. Please follow up with your PCP within 2 weeks of hospital discharge. If you have sudden onset leg/calf pain, redness, pain to touch, fevers, chills, shortness of breath, please return back to the ED immediately.

## 2023-04-03 NOTE — PROGRESS NOTE ADULT - PROBLEM SELECTOR PLAN 1
Hx of chronic alcohol use (1-2 bottles of vodka, 10-12 beers daily) w/ hx of DTs, multiple hospitalizations for withdrawal in the past (most recently 1/2023). Alcohol level 200 on admission. Currently w/ tremors and fasciculations on exam but w/ improving CIWA  - no hallucinations, overt agitation, signs of DT currently  - s/p Ativan 1mg IVP, Librium 50mg PO x1, thiamine 100mg IVP x1, folic acid 1mg IVP x1 in ED  - c/w high-risk CIWA taper w/ Ativan  - c/w IV thiamine 200mg qd for 5d (4/2-4/6) followed by PO thiamine 100mg qd starting 4/7  - c/w folic acid 1mg qd  - c/w multivitamin  - rescue PRN Ativan 4mg IVP ordered for severe elevations in CIWA score  - low threshold for phenobarbital and MICU consult given prior hx  - monitor CIWA scores/vital signs Hx of chronic alcohol use (1-2 bottles of vodka, 10-12 beers daily) w/ hx of DTs, multiple hospitalizations for withdrawal in the past (most recently 1/2023). Alcohol level 200 on admission. Currently w/ tremors and fasciculations on exam but w/ improving CIWA  - no hallucinations, overt agitation, signs of DT currently  - s/p Ativan 1mg IVP, Librium 50mg PO x1, thiamine 100mg IVP x1, folic acid 1mg IVP x1 in ED  - c/w high-risk CIWA taper w/ Ativan  - c/w IV thiamine 200mg qd for 5d (4/2-4/6) followed by PO thiamine 100mg qd starting 4/7  - c/w folic acid 1mg qd  - c/w multivitamin  - rescue PRN Ativan 4mg IVP ordered for severe elevations in CIWA score  - low threshold for phenobarbital and MICU consult given prior hx  - upon discussion w/ patient, he expresses interest in potential rehab if possible; will consult social work for further resources  - monitor CIWA scores/vital signs Hx of chronic alcohol use (1-2 bottles of vodka, 10-12 beers daily) w/ hx of DTs, multiple hospitalizations for withdrawal in the past (most recently 1/2023). Alcohol level 200 on admission. Currently w/ tremors and fasciculations on exam but w/ improving CIWA  - no hallucinations, overt agitation, signs of DT currently  - s/p Ativan 1mg IVP, Librium 50mg PO x1, thiamine 100mg IVP x1, folic acid 1mg IVP x1 in ED  - c/w high-risk CIWA taper w/ Ativan  - c/w IV thiamine 200mg qd for 5d (4/2-4/6) followed by PO thiamine 100mg qd starting 4/7  - c/w folic acid 1mg qd  - c/w multivitamin qd  - rescue PRN Ativan 4mg IVP ordered for severe elevations in CIWA score  - low threshold for phenobarbital and MICU consult given prior hx  - upon discussion w/ patient, he expresses interest in potential rehab if possible; will consult social work for further resources  - monitor CIWA scores/vital signs Hx of chronic alcohol use (1-2 bottles of vodka, 10-12 beers daily) w/ hx of DTs, multiple hospitalizations for withdrawal in the past (most recently 1/2023). Alcohol level 200 on admission. Currently w/ tremors and fasciculations on exam but w/ improving CIWA  - no hallucinations, overt agitation, signs of DT currently  - s/p Ativan 1mg IVP, Librium 50mg PO x1, thiamine 100mg IVP x1, folic acid 1mg IVP x1 in ED  - CTAP (12/2022): mild hepatic steatosis; repeat liver imaging in 6 months (6/2023)  - c/w high-risk CIWA taper w/ Ativan  - c/w IV thiamine 200mg qd for 5d (4/2-4/6) followed by PO thiamine 100mg qd starting 4/7  - c/w folic acid 1mg qd  - c/w multivitamin qd  - rescue PRN Ativan 4mg IVP ordered for severe elevations in CIWA score  - low threshold for phenobarbital and MICU consult given prior hx  - upon discussion w/ patient, he expresses interest in potential rehab if possible; will consult social work for further resources  - monitor CIWA scores/vital signs

## 2023-04-03 NOTE — DISCHARGE NOTE PROVIDER - NSFOLLOWUPCLINICS_GEN_ALL_ED_FT
[facility]  Internal Medicine  [id number] [place] [address]  Phone: [phone]  Fax: [phone]  Follow Up Time: 2 weeks

## 2023-04-03 NOTE — PHYSICAL THERAPY INITIAL EVALUATION ADULT - NSPTDISCHREC_GEN_A_CORE
anticipated discharge to home with home PT services to address current functional limitations to optimize safety within the home environment with the use of a rolling walker./Home PT anticipated discharge to home with home PT services when medically cleared to address current functional limitations to optimize safety within the home environment with the use of a rolling walker. please continue to follow PT notes for updated functional status/Home PT

## 2023-04-03 NOTE — DISCHARGE NOTE PROVIDER - NSDCMRMEDTOKEN_GEN_ALL_CORE_FT
amLODIPine 10 mg oral tablet: 1 tab(s) orally once a day  Artificial Tears ophthalmic solution: 1 drop(s) to each affected eye 4 times a day, As Needed  folic acid 1 mg oral tablet: 1 tab(s) orally once a day  latanoprost 0.005% ophthalmic solution: 1 drop(s) to each affected eye once a day (at bedtime)  Lipitor 40 mg oral tablet: 1 tab(s) orally once a day (at bedtime)  Multiple Vitamins oral tablet: 1 tab(s) orally once a day  Trusopt 2% ophthalmic solution: 1 drop(s) to each affected eye 2 times a day  Vitamin B1 100 mg oral tablet: 1 tab(s) orally once a day   amLODIPine 5 mg oral tablet: 1 tab(s) orally once a day  Artificial Tears ophthalmic solution: 1 drop(s) to each affected eye 4 times a day, As Needed  folic acid 1 mg oral tablet: 1 tab(s) orally once a day  latanoprost 0.005% ophthalmic solution: 1 drop(s) to each affected eye once a day (at bedtime)  loperamide 2 mg oral capsule: 1 cap(s) orally once a day  LORazepam 1 mg oral tablet: 1 tab(s) orally once a day MDD: 1  Multiple Vitamins oral tablet: 1 tab(s) orally once a day  Trusopt 2% ophthalmic solution: 1 drop(s) to each affected eye 2 times a day  Vitamin B1 100 mg oral tablet: 1 tab(s) orally once a day

## 2023-04-04 DIAGNOSIS — R19.7 DIARRHEA, UNSPECIFIED: ICD-10-CM

## 2023-04-04 LAB
ALBUMIN SERPL ELPH-MCNC: 3.7 G/DL — SIGNIFICANT CHANGE UP (ref 3.3–5)
ALP SERPL-CCNC: 65 U/L — SIGNIFICANT CHANGE UP (ref 40–120)
ALT FLD-CCNC: 40 U/L — SIGNIFICANT CHANGE UP (ref 4–41)
ANION GAP SERPL CALC-SCNC: 13 MMOL/L — SIGNIFICANT CHANGE UP (ref 7–14)
AST SERPL-CCNC: 36 U/L — SIGNIFICANT CHANGE UP (ref 4–40)
BILIRUB SERPL-MCNC: 1.1 MG/DL — SIGNIFICANT CHANGE UP (ref 0.2–1.2)
BUN SERPL-MCNC: 9 MG/DL — SIGNIFICANT CHANGE UP (ref 7–23)
CALCIUM SERPL-MCNC: 8.3 MG/DL — LOW (ref 8.4–10.5)
CHLORIDE SERPL-SCNC: 102 MMOL/L — SIGNIFICANT CHANGE UP (ref 98–107)
CK SERPL-CCNC: 199 U/L — SIGNIFICANT CHANGE UP (ref 30–200)
CO2 SERPL-SCNC: 20 MMOL/L — LOW (ref 22–31)
CREAT SERPL-MCNC: 0.6 MG/DL — SIGNIFICANT CHANGE UP (ref 0.5–1.3)
EGFR: 111 ML/MIN/1.73M2 — SIGNIFICANT CHANGE UP
GI PCR PANEL: SIGNIFICANT CHANGE UP
GLUCOSE SERPL-MCNC: 102 MG/DL — HIGH (ref 70–99)
HCT VFR BLD CALC: 34.6 % — LOW (ref 39–50)
HGB BLD-MCNC: 11.7 G/DL — LOW (ref 13–17)
MAGNESIUM SERPL-MCNC: 2 MG/DL — SIGNIFICANT CHANGE UP (ref 1.6–2.6)
MCHC RBC-ENTMCNC: 29.9 PG — SIGNIFICANT CHANGE UP (ref 27–34)
MCHC RBC-ENTMCNC: 33.8 GM/DL — SIGNIFICANT CHANGE UP (ref 32–36)
MCV RBC AUTO: 88.5 FL — SIGNIFICANT CHANGE UP (ref 80–100)
NRBC # BLD: 0 /100 WBCS — SIGNIFICANT CHANGE UP (ref 0–0)
NRBC # FLD: 0 K/UL — SIGNIFICANT CHANGE UP (ref 0–0)
PHOSPHATE SERPL-MCNC: 2.1 MG/DL — LOW (ref 2.5–4.5)
PLATELET # BLD AUTO: 197 K/UL — SIGNIFICANT CHANGE UP (ref 150–400)
POTASSIUM SERPL-MCNC: 3.2 MMOL/L — LOW (ref 3.5–5.3)
POTASSIUM SERPL-SCNC: 3.2 MMOL/L — LOW (ref 3.5–5.3)
PROT SERPL-MCNC: 6.9 G/DL — SIGNIFICANT CHANGE UP (ref 6–8.3)
RBC # BLD: 3.91 M/UL — LOW (ref 4.2–5.8)
RBC # FLD: 13.5 % — SIGNIFICANT CHANGE UP (ref 10.3–14.5)
SODIUM SERPL-SCNC: 135 MMOL/L — SIGNIFICANT CHANGE UP (ref 135–145)
WBC # BLD: 8.09 K/UL — SIGNIFICANT CHANGE UP (ref 3.8–10.5)
WBC # FLD AUTO: 8.09 K/UL — SIGNIFICANT CHANGE UP (ref 3.8–10.5)

## 2023-04-04 PROCEDURE — 99233 SBSQ HOSP IP/OBS HIGH 50: CPT

## 2023-04-04 RX ORDER — SODIUM,POTASSIUM PHOSPHATES 278-250MG
1 POWDER IN PACKET (EA) ORAL EVERY 4 HOURS
Refills: 0 | Status: COMPLETED | OUTPATIENT
Start: 2023-04-04 | End: 2023-04-04

## 2023-04-04 RX ORDER — AMLODIPINE BESYLATE 2.5 MG/1
5 TABLET ORAL DAILY
Refills: 0 | Status: DISCONTINUED | OUTPATIENT
Start: 2023-04-04 | End: 2023-04-07

## 2023-04-04 RX ADMIN — Medication 3 MILLIGRAM(S): at 21:59

## 2023-04-04 RX ADMIN — Medication 650 MILLIGRAM(S): at 20:45

## 2023-04-04 RX ADMIN — Medication 3 MILLIGRAM(S): at 06:11

## 2023-04-04 RX ADMIN — Medication 3 MILLIGRAM(S): at 02:18

## 2023-04-04 RX ADMIN — Medication 1 TABLET(S): at 12:01

## 2023-04-04 RX ADMIN — Medication 3 MILLIGRAM(S): at 10:05

## 2023-04-04 RX ADMIN — LATANOPROST 1 DROP(S): 0.05 SOLUTION/ DROPS OPHTHALMIC; TOPICAL at 21:54

## 2023-04-04 RX ADMIN — Medication 1 MILLIGRAM(S): at 12:01

## 2023-04-04 RX ADMIN — DORZOLAMIDE HYDROCHLORIDE 2 DROP(S): 20 SOLUTION/ DROPS OPHTHALMIC at 15:10

## 2023-04-04 RX ADMIN — Medication 1 PACKET(S): at 15:25

## 2023-04-04 RX ADMIN — DORZOLAMIDE HYDROCHLORIDE 2 DROP(S): 20 SOLUTION/ DROPS OPHTHALMIC at 06:11

## 2023-04-04 RX ADMIN — ATORVASTATIN CALCIUM 40 MILLIGRAM(S): 80 TABLET, FILM COATED ORAL at 21:59

## 2023-04-04 RX ADMIN — Medication 1 DROP(S): at 06:11

## 2023-04-04 RX ADMIN — Medication 2 MILLIGRAM(S): at 19:06

## 2023-04-04 RX ADMIN — Medication 650 MILLIGRAM(S): at 21:40

## 2023-04-04 RX ADMIN — AMLODIPINE BESYLATE 10 MILLIGRAM(S): 2.5 TABLET ORAL at 06:11

## 2023-04-04 RX ADMIN — AMLODIPINE BESYLATE 5 MILLIGRAM(S): 2.5 TABLET ORAL at 12:00

## 2023-04-04 RX ADMIN — Medication 1 DROP(S): at 12:00

## 2023-04-04 RX ADMIN — DORZOLAMIDE HYDROCHLORIDE 2 DROP(S): 20 SOLUTION/ DROPS OPHTHALMIC at 21:53

## 2023-04-04 RX ADMIN — Medication 1 DROP(S): at 00:38

## 2023-04-04 RX ADMIN — Medication 2 MILLIGRAM(S): at 15:09

## 2023-04-04 RX ADMIN — Medication 2 MILLIGRAM(S): at 22:00

## 2023-04-04 RX ADMIN — Medication 1 PACKET(S): at 10:05

## 2023-04-04 RX ADMIN — Medication 1 DROP(S): at 19:07

## 2023-04-04 RX ADMIN — Medication 200 MILLIGRAM(S): at 12:00

## 2023-04-04 NOTE — PROGRESS NOTE ADULT - PROBLEM SELECTOR PLAN 6
DVT PPx: IMPROVE-DD score 0; chemical VTE ppx not indicated  Diet: DASH/TLC  Dispo: pending clinical improvement  Code Status: full code - c/w artificial tears  - c/w latanoprost 0.005% solution  - c/w dorzolamide 2% solution

## 2023-04-04 NOTE — PROGRESS NOTE ADULT - PROBLEM SELECTOR PLAN 2
Pt. w/ 1-2+ pitting edema of b/l LE, painful to touch w/ associated venous stasis skin changes  - no tobacco use, recent travel, CHF hx however reports recent fall onto his knees, pt. also takes amlodipine for HTN which is associated w/ b/l LE edema  - venous duplex (4/3): no DVT  - TTE (4/3): EF 66%, grossly normal findings  - leg elevation, compression stockings  - continue to monitor Pt. reporting 4x eps of loose stools, nonbloody, nonmelanotic w/ associated mid-epigastric discomfort. ddx includes infectious, inflammatory, drug-induced, etc. No recent abx use- low suspicion for c diff  - f/u GI PCR, stool culture, stool O+P  - monitor symptoms, WBC count, temperature curve

## 2023-04-04 NOTE — PROGRESS NOTE ADULT - SUBJECTIVE AND OBJECTIVE BOX
*******************************  Boogie Soni MD (PGY-1)  Internal Medicine  Contact via [employer] TEAMS  [facility] Pager: [phone]  [facility] Pager: [phone]  *******************************    PROGRESS NOTE:     Patient is a 57y old  Male who presents with a chief complaint of alcohol withdrawal (03 Apr 2023 13:17)    INTERVAL EVENTS: No acute overnight events.     SUBJECTIVE: Patient seen and examined at bedside. This morning, the patient is comfortable and doing well. No acute complaints. Denies fevers, chills, N/V/D, chest pain, SOB, abdominal pain.    MEDICATIONS  (STANDING):  amLODIPine   Tablet 10 milliGRAM(s) Oral daily  artificial  tears Solution 1 Drop(s) Both EYES four times a day  atorvastatin 40 milliGRAM(s) Oral at bedtime  dorzolamide 2% Ophthalmic Solution 2 Drop(s) Both EYES three times a day  folic acid 1 milliGRAM(s) Oral daily  latanoprost 0.005% Ophthalmic Solution 1 Drop(s) Both EYES at bedtime  LORazepam     Tablet   Oral   LORazepam     Tablet 3 milliGRAM(s) Oral every 4 hours  LORazepam     Tablet 2 milliGRAM(s) Oral every 4 hours  melatonin 3 milliGRAM(s) Oral at bedtime  multivitamin 1 Tablet(s) Oral daily  thiamine Injectable 200 milliGRAM(s) IV Push daily    MEDICATIONS  (PRN):  acetaminophen     Tablet .. 650 milliGRAM(s) Oral every 6 hours PRN Temp greater or equal to 38C (100.4F), Mild Pain (1 - 3)  LORazepam   Injectable 4 milliGRAM(s) IV Push every 1 hour PRN Symptom-triggered: each CIWA -Ar score 8 or GREATER    CAPILLARY BLOOD GLUCOSE    I&O's Summary    PHYSICAL EXAM:  Vital Signs Last 24 Hrs  T(C): 36.8 (04 Apr 2023 05:17), Max: 37.2 (03 Apr 2023 15:20)  T(F): 98.2 (04 Apr 2023 05:17), Max: 98.9 (03 Apr 2023 15:20)  HR: 72 (04 Apr 2023 05:17) (67 - 92)  BP: 125/66 (04 Apr 2023 05:17) (123/67 - 148/94)  BP(mean): --  RR: 17 (04 Apr 2023 05:17) (17 - 20)  SpO2: 95% (04 Apr 2023 05:17) (95% - 99%)    Parameters below as of 04 Apr 2023 05:17  Patient On (Oxygen Delivery Method): room air    GENERAL: NAD, appears tired  HEENT: +tongue fasciculations  HEART: S1, S2, regular rate and rhythm, no murmurs, rubs, or gallops  LUNGS: Unlabored respirations.  Clear to auscultation bilaterally, no crackles, wheezing, or rhonchi  ABDOMEN: soft, nontender, nondistended  EXTREMITIES: 2+ peripheral pulses bilaterally. 1-2+ pitting edema b/l LE, tender to touch. b/l knees w/ scrapes  NERVOUS SYSTEM:  A&Ox3, no focal deficits, tremulous   SKIN: chronic venous statis changes    LABS:                        11.9   5.27  )-----------( 196      ( 03 Apr 2023 06:45 )             36.0     04-03    135  |  101  |  18  ----------------------------<  62<L>  4.0   |  18<L>  |  0.70    Ca    8.0<L>      03 Apr 2023 06:45  Phos  2.2     04-03  Mg     2.00     04-03    TPro  6.7  /  Alb  3.4  /  TBili  1.1  /  DBili  x   /  AST  45<H>  /  ALT  44<H>  /  AlkPhos  62  04-03    CARDIAC MARKERS ( 03 Apr 2023 06:45 )  x     / x     / 395 U/L / x     / x      CARDIAC MARKERS ( 02 Apr 2023 13:21 )  x     / x     / 651 U/L / x     / x        RADIOLOGY & ADDITIONAL TESTS:  Results Reviewed:   < from: TTE with Doppler (w/Cont) (04.03.23 @ 17:57) >  Ejection Fraction (Modified Ryan Rule): 66 %  ------------------------------------------------------------------------  OBSERVATIONS:  Mitral Valve: Normal mitral valve. Minimal mitral  regurgitation.  Aortic Root: Normal aortic root.  Aortic Valve: Aortic valve leaflet morphology not well  visualized.  Left Atrium: Normal left atrium.  Left Ventricle: Endocardium not well visualized; grossly  normal left ventricular systolic function.  Endocardial  visualization enhanced with intravenous injection of echo  contrast (Definity). Normal left ventricular internal  dimensions and wall thicknesses.  Right Heart: Normal right atrium. The right ventricle is  not well visualized. Normal tricuspid valve. Minimal  tricuspid regurgitation. Normal pulmonic valve.  Pericardium/PleuraNormal pericardium with no pericardial  effusion.  ------------------------------------------------------------------------  CONCLUSIONS:  Technically difficult study.  1. Normal mitral valve. Minimal mitral regurgitation.  2. Normal left ventricular internal dimensions and wall  thicknesses.  3. Endocardium not well visualized; grossly normal left  ventricular systolic function.  Endocardial visualization  enhanced with intravenous injection of echo contrast  (Definity).  4. The right ventricle is not well visualized.    Imaging Personally Reviewed:  Electrocardiogram Personally Reviewed:  Tele: *******************************  Boogie Soni MD (PGY-1)  Internal Medicine  Contact via [employer] TEAMS  [facility] Pager: [phone]  [facility] Pager: [phone]  *******************************    PROGRESS NOTE:     Patient is a 57y old  Male who presents with a chief complaint of alcohol withdrawal (03 Apr 2023 13:17)    INTERVAL EVENTS: No acute overnight events.     SUBJECTIVE: Patient seen and examined at bedside. This morning, the patient is comfortable and doing well. States his tremors are improving but now reports 4x loose stools, nonbloody. Denies fevers, chills, N/V, chest pain, SOB, abdominal pain, palpitations.     MEDICATIONS  (STANDING):  amLODIPine   Tablet 10 milliGRAM(s) Oral daily  artificial  tears Solution 1 Drop(s) Both EYES four times a day  atorvastatin 40 milliGRAM(s) Oral at bedtime  dorzolamide 2% Ophthalmic Solution 2 Drop(s) Both EYES three times a day  folic acid 1 milliGRAM(s) Oral daily  latanoprost 0.005% Ophthalmic Solution 1 Drop(s) Both EYES at bedtime  LORazepam     Tablet   Oral   LORazepam     Tablet 3 milliGRAM(s) Oral every 4 hours  LORazepam     Tablet 2 milliGRAM(s) Oral every 4 hours  melatonin 3 milliGRAM(s) Oral at bedtime  multivitamin 1 Tablet(s) Oral daily  thiamine Injectable 200 milliGRAM(s) IV Push daily    MEDICATIONS  (PRN):  acetaminophen     Tablet .. 650 milliGRAM(s) Oral every 6 hours PRN Temp greater or equal to 38C (100.4F), Mild Pain (1 - 3)  LORazepam   Injectable 4 milliGRAM(s) IV Push every 1 hour PRN Symptom-triggered: each CIWA -Ar score 8 or GREATER    CAPILLARY BLOOD GLUCOSE    I&O's Summary    PHYSICAL EXAM:  Vital Signs Last 24 Hrs  T(C): 36.8 (04 Apr 2023 05:17), Max: 37.2 (03 Apr 2023 15:20)  T(F): 98.2 (04 Apr 2023 05:17), Max: 98.9 (03 Apr 2023 15:20)  HR: 72 (04 Apr 2023 05:17) (67 - 92)  BP: 125/66 (04 Apr 2023 05:17) (123/67 - 148/94)  BP(mean): --  RR: 17 (04 Apr 2023 05:17) (17 - 20)  SpO2: 95% (04 Apr 2023 05:17) (95% - 99%)    Parameters below as of 04 Apr 2023 05:17  Patient On (Oxygen Delivery Method): room air    GENERAL: NAD, appears tired  HEENT: +tongue fasciculations  HEART: S1, S2, regular rate and rhythm, no murmurs, rubs, or gallops  LUNGS: Unlabored respirations.  Clear to auscultation bilaterally, no crackles, wheezing, or rhonchi  ABDOMEN: soft, nondistended, +mild TTP midepigastrium  EXTREMITIES: 2+ peripheral pulses bilaterally. 1-2+ pitting edema b/l LE, tender to touch. b/l knees w/ scrapes  NERVOUS SYSTEM:  A&Ox3, no focal deficits, tremulous   SKIN: chronic venous statis changes    LABS:                        11.9   5.27  )-----------( 196      ( 03 Apr 2023 06:45 )             36.0     04-03    135  |  101  |  18  ----------------------------<  62<L>  4.0   |  18<L>  |  0.70    Ca    8.0<L>      03 Apr 2023 06:45  Phos  2.2     04-03  Mg     2.00     04-03    TPro  6.7  /  Alb  3.4  /  TBili  1.1  /  DBili  x   /  AST  45<H>  /  ALT  44<H>  /  AlkPhos  62  04-03    CARDIAC MARKERS ( 03 Apr 2023 06:45 )  x     / x     / 395 U/L / x     / x      CARDIAC MARKERS ( 02 Apr 2023 13:21 )  x     / x     / 651 U/L / x     / x        RADIOLOGY & ADDITIONAL TESTS:  Results Reviewed:   < from: TTE with Doppler (w/Cont) (04.03.23 @ 17:57) >  Ejection Fraction (Modified Ryan Rule): 66 %  ------------------------------------------------------------------------  OBSERVATIONS:  Mitral Valve: Normal mitral valve. Minimal mitral  regurgitation.  Aortic Root: Normal aortic root.  Aortic Valve: Aortic valve leaflet morphology not well  visualized.  Left Atrium: Normal left atrium.  Left Ventricle: Endocardium not well visualized; grossly  normal left ventricular systolic function.  Endocardial  visualization enhanced with intravenous injection of echo  contrast (Definity). Normal left ventricular internal  dimensions and wall thicknesses.  Right Heart: Normal right atrium. The right ventricle is  not well visualized. Normal tricuspid valve. Minimal  tricuspid regurgitation. Normal pulmonic valve.  Pericardium/PleuraNormal pericardium with no pericardial  effusion.  ------------------------------------------------------------------------  CONCLUSIONS:  Technically difficult study.  1. Normal mitral valve. Minimal mitral regurgitation.  2. Normal left ventricular internal dimensions and wall  thicknesses.  3. Endocardium not well visualized; grossly normal left  ventricular systolic function.  Endocardial visualization  enhanced with intravenous injection of echo contrast  (Definity).  4. The right ventricle is not well visualized.    Imaging Personally Reviewed:  Electrocardiogram Personally Reviewed:  Tele: *******************************  Boogie Soni MD (PGY-1)  Internal Medicine  Contact via [employer] TEAMS  [facility] Pager: [phone]  [facility] Pager: [phone]  *******************************    PROGRESS NOTE:     Patient is a 57y old  Male who presents with a chief complaint of alcohol withdrawal (03 Apr 2023 13:17)    INTERVAL EVENTS: No acute overnight events.     SUBJECTIVE: Patient seen and examined at bedside. This morning, the patient is comfortable and doing well. States his tremors are improving but now reports 4x loose stools, nonbloody. Denies fevers, chills, N/V, chest pain, SOB, abdominal pain, palpitations.     MEDICATIONS  (STANDING):  amLODIPine   Tablet 10 milliGRAM(s) Oral daily  artificial  tears Solution 1 Drop(s) Both EYES four times a day  atorvastatin 40 milliGRAM(s) Oral at bedtime  dorzolamide 2% Ophthalmic Solution 2 Drop(s) Both EYES three times a day  folic acid 1 milliGRAM(s) Oral daily  latanoprost 0.005% Ophthalmic Solution 1 Drop(s) Both EYES at bedtime  LORazepam     Tablet   Oral   LORazepam     Tablet 2 milliGRAM(s) Oral every 4 hours  melatonin 3 milliGRAM(s) Oral at bedtime  multivitamin 1 Tablet(s) Oral daily  potassium phosphate / sodium phosphate Powder (PHOS-NaK) 1 Packet(s) Oral every 4 hours  thiamine Injectable 200 milliGRAM(s) IV Push daily    MEDICATIONS  (PRN):  acetaminophen     Tablet .. 650 milliGRAM(s) Oral every 6 hours PRN Temp greater or equal to 38C (100.4F), Mild Pain (1 - 3)  LORazepam   Injectable 4 milliGRAM(s) IV Push every 1 hour PRN Symptom-triggered: each CIWA -Ar score 8 or GREATER    CAPILLARY BLOOD GLUCOSE    I&O's Summary    PHYSICAL EXAM:  Vital Signs Last 24 Hrs  T(C): 36.8 (04 Apr 2023 05:17), Max: 37.2 (03 Apr 2023 15:20)  T(F): 98.2 (04 Apr 2023 05:17), Max: 98.9 (03 Apr 2023 15:20)  HR: 72 (04 Apr 2023 05:17) (67 - 92)  BP: 125/66 (04 Apr 2023 05:17) (123/67 - 148/94)  BP(mean): --  RR: 17 (04 Apr 2023 05:17) (17 - 20)  SpO2: 95% (04 Apr 2023 05:17) (95% - 99%)    Parameters below as of 04 Apr 2023 05:17  Patient On (Oxygen Delivery Method): room air    GENERAL: NAD, appears tired  HEENT: +tongue fasciculations  HEART: S1, S2, regular rate and rhythm, no murmurs, rubs, or gallops  LUNGS: Unlabored respirations.  Clear to auscultation bilaterally, no crackles, wheezing, or rhonchi  ABDOMEN: soft, nondistended, +mild TTP midepigastrium  EXTREMITIES: 2+ peripheral pulses bilaterally. 1-2+ pitting edema b/l LE, tender to touch. b/l knees w/ scrapes  NERVOUS SYSTEM:  A&Ox3, no focal deficits, tremulous   SKIN: chronic venous statis changes    LABS:                        11.7   8.09  )-----------( 197      ( 04 Apr 2023 07:13 )             34.6     04-04    135  |  102  |  9   ----------------------------<  102<H>  3.2<L>   |  20<L>  |  0.60    Ca    8.3<L>      04 Apr 2023 07:13  Phos  2.1     04-04  Mg     2.00     04-04    TPro  6.9  /  Alb  3.7  /  TBili  1.1  /  DBili  x   /  AST  36  /  ALT  40  /  AlkPhos  65  04-04    CARDIAC MARKERS ( 03 Apr 2023 06:45 )  x     / x     / 395 U/L / x     / x      CARDIAC MARKERS ( 02 Apr 2023 13:21 )  x     / x     / 651 U/L / x     / x        RADIOLOGY & ADDITIONAL TESTS:  Results Reviewed:   < from: TTE with Doppler (w/Cont) (04.03.23 @ 17:57) >  Ejection Fraction (Modified Ryan Rule): 66 %  ------------------------------------------------------------------------  OBSERVATIONS:  Mitral Valve: Normal mitral valve. Minimal mitral  regurgitation.  Aortic Root: Normal aortic root.  Aortic Valve: Aortic valve leaflet morphology not well  visualized.  Left Atrium: Normal left atrium.  Left Ventricle: Endocardium not well visualized; grossly  normal left ventricular systolic function.  Endocardial  visualization enhanced with intravenous injection of echo  contrast (Definity). Normal left ventricular internal  dimensions and wall thicknesses.  Right Heart: Normal right atrium. The right ventricle is  not well visualized. Normal tricuspid valve. Minimal  tricuspid regurgitation. Normal pulmonic valve.  Pericardium/PleuraNormal pericardium with no pericardial  effusion.  ------------------------------------------------------------------------  CONCLUSIONS:  Technically difficult study.  1. Normal mitral valve. Minimal mitral regurgitation.  2. Normal left ventricular internal dimensions and wall  thicknesses.  3. Endocardium not well visualized; grossly normal left  ventricular systolic function.  Endocardial visualization  enhanced with intravenous injection of echo contrast  (Definity).  4. The right ventricle is not well visualized.    Imaging Personally Reviewed:  Electrocardiogram Personally Reviewed:  Tele:

## 2023-04-04 NOTE — PROGRESS NOTE ADULT - PROBLEM SELECTOR PLAN 3
Hx of HTN on amlodipine 10mg qd at home  - SBPs 120s on admission  - c/w amlodipine 10mg qd Pt. w/ 1-2+ pitting edema of b/l LE, painful to touch w/ associated venous stasis skin changes  - no tobacco use, recent travel, CHF hx however reports recent fall onto his knees, pt. also takes amlodipine for HTN which is associated w/ b/l LE edema  - venous duplex (4/3): no DVT  - TTE (4/3): EF 66%, grossly normal findings  - leg elevation, compression stockings  - continue to monitor

## 2023-04-04 NOTE — PROGRESS NOTE ADULT - PROBLEM SELECTOR PLAN 4
Hx of HLD on atorvastatin 40mg qd at home  - lipid panel: chol 163, , HDL 76, LDL 45  - c/w atorvastatin 40mg qd Hx of HTN on amlodipine 10mg qd at home  - SBPs 120s on admission  - c/w amlodipine 10mg qd

## 2023-04-04 NOTE — PROGRESS NOTE ADULT - PROBLEM SELECTOR PLAN 5
- c/w artificial tears  - c/w latanoprost 0.005% solution  - c/w dorzolamide 2% solution Hx of HLD on atorvastatin 40mg qd at home  - lipid panel: chol 163, , HDL 76, LDL 45  - c/w atorvastatin 40mg qd

## 2023-04-04 NOTE — PROGRESS NOTE ADULT - PROBLEM SELECTOR PLAN 1
Hx of chronic alcohol use (1-2 bottles of vodka, 10-12 beers daily) w/ hx of DTs, multiple hospitalizations for withdrawal in the past (most recently 1/2023). Alcohol level 200 on admission. Currently w/ tremors and fasciculations on exam but w/ improving CIWA  - no hallucinations, overt agitation, signs of DT currently  - s/p Ativan 1mg IVP, Librium 50mg PO x1, thiamine 100mg IVP x1, folic acid 1mg IVP x1 in ED  - CTAP (12/2022): mild hepatic steatosis; repeat liver imaging in 6 months (6/2023)  - c/w CIWA taper w/ Ativan  - c/w IV thiamine 200mg qd for 5d (4/2-4/6) followed by PO thiamine 100mg qd starting 4/7  - c/w folic acid 1mg qd  - c/w multivitamin qd  - rescue PRN Ativan 4mg IVP ordered for severe elevations in CIWA score  - low threshold for phenobarbital and MICU consult given prior hx  - upon discussion w/ patient, he expresses interest in potential rehab if possible; social work consulted for further resources  - monitor CIWA scores/vital signs

## 2023-04-05 LAB
ALBUMIN SERPL ELPH-MCNC: 3.8 G/DL — SIGNIFICANT CHANGE UP (ref 3.3–5)
ALP SERPL-CCNC: 65 U/L — SIGNIFICANT CHANGE UP (ref 40–120)
ALT FLD-CCNC: 51 U/L — HIGH (ref 4–41)
ANION GAP SERPL CALC-SCNC: 13 MMOL/L — SIGNIFICANT CHANGE UP (ref 7–14)
AST SERPL-CCNC: 52 U/L — HIGH (ref 4–40)
BILIRUB SERPL-MCNC: 0.7 MG/DL — SIGNIFICANT CHANGE UP (ref 0.2–1.2)
BUN SERPL-MCNC: 12 MG/DL — SIGNIFICANT CHANGE UP (ref 7–23)
CALCIUM SERPL-MCNC: 8.6 MG/DL — SIGNIFICANT CHANGE UP (ref 8.4–10.5)
CHLORIDE SERPL-SCNC: 104 MMOL/L — SIGNIFICANT CHANGE UP (ref 98–107)
CO2 SERPL-SCNC: 20 MMOL/L — LOW (ref 22–31)
CREAT SERPL-MCNC: 0.56 MG/DL — SIGNIFICANT CHANGE UP (ref 0.5–1.3)
EGFR: 114 ML/MIN/1.73M2 — SIGNIFICANT CHANGE UP
GLUCOSE SERPL-MCNC: 99 MG/DL — SIGNIFICANT CHANGE UP (ref 70–99)
HCT VFR BLD CALC: 36.7 % — LOW (ref 39–50)
HGB BLD-MCNC: 12.2 G/DL — LOW (ref 13–17)
MAGNESIUM SERPL-MCNC: 1.8 MG/DL — SIGNIFICANT CHANGE UP (ref 1.6–2.6)
MCHC RBC-ENTMCNC: 29.5 PG — SIGNIFICANT CHANGE UP (ref 27–34)
MCHC RBC-ENTMCNC: 33.2 GM/DL — SIGNIFICANT CHANGE UP (ref 32–36)
MCV RBC AUTO: 88.9 FL — SIGNIFICANT CHANGE UP (ref 80–100)
NRBC # BLD: 0 /100 WBCS — SIGNIFICANT CHANGE UP (ref 0–0)
NRBC # FLD: 0 K/UL — SIGNIFICANT CHANGE UP (ref 0–0)
PHOSPHATE SERPL-MCNC: 3.2 MG/DL — SIGNIFICANT CHANGE UP (ref 2.5–4.5)
PLATELET # BLD AUTO: 219 K/UL — SIGNIFICANT CHANGE UP (ref 150–400)
POTASSIUM SERPL-MCNC: 3.1 MMOL/L — LOW (ref 3.5–5.3)
POTASSIUM SERPL-SCNC: 3.1 MMOL/L — LOW (ref 3.5–5.3)
PROT SERPL-MCNC: 7 G/DL — SIGNIFICANT CHANGE UP (ref 6–8.3)
RBC # BLD: 4.13 M/UL — LOW (ref 4.2–5.8)
RBC # FLD: 13.4 % — SIGNIFICANT CHANGE UP (ref 10.3–14.5)
SODIUM SERPL-SCNC: 137 MMOL/L — SIGNIFICANT CHANGE UP (ref 135–145)
WBC # BLD: 5.43 K/UL — SIGNIFICANT CHANGE UP (ref 3.8–10.5)
WBC # FLD AUTO: 5.43 K/UL — SIGNIFICANT CHANGE UP (ref 3.8–10.5)

## 2023-04-05 PROCEDURE — 99233 SBSQ HOSP IP/OBS HIGH 50: CPT

## 2023-04-05 RX ORDER — POTASSIUM CHLORIDE 20 MEQ
40 PACKET (EA) ORAL EVERY 4 HOURS
Refills: 0 | Status: COMPLETED | OUTPATIENT
Start: 2023-04-05 | End: 2023-04-05

## 2023-04-05 RX ORDER — DIPHENHYDRAMINE HYDROCHLORIDE AND LIDOCAINE HYDROCHLORIDE AND ALUMINUM HYDROXIDE AND MAGNESIUM HYDRO
5 KIT EVERY 4 HOURS
Refills: 0 | Status: DISCONTINUED | OUTPATIENT
Start: 2023-04-05 | End: 2023-04-07

## 2023-04-05 RX ORDER — BENZOCAINE AND MENTHOL 5; 1 G/100ML; G/100ML
1 LIQUID ORAL
Refills: 0 | Status: DISCONTINUED | OUTPATIENT
Start: 2023-04-05 | End: 2023-04-07

## 2023-04-05 RX ORDER — LOPERAMIDE HCL 2 MG
2 TABLET ORAL DAILY
Refills: 0 | Status: DISCONTINUED | OUTPATIENT
Start: 2023-04-05 | End: 2023-04-07

## 2023-04-05 RX ADMIN — Medication 2 MILLIGRAM(S): at 10:15

## 2023-04-05 RX ADMIN — Medication 200 MILLIGRAM(S): at 12:32

## 2023-04-05 RX ADMIN — DORZOLAMIDE HYDROCHLORIDE 2 DROP(S): 20 SOLUTION/ DROPS OPHTHALMIC at 22:26

## 2023-04-05 RX ADMIN — Medication 1 DROP(S): at 00:59

## 2023-04-05 RX ADMIN — Medication 1 DROP(S): at 05:24

## 2023-04-05 RX ADMIN — Medication 3 MILLIGRAM(S): at 22:25

## 2023-04-05 RX ADMIN — BENZOCAINE AND MENTHOL 1 LOZENGE: 5; 1 LIQUID ORAL at 22:25

## 2023-04-05 RX ADMIN — AMLODIPINE BESYLATE 5 MILLIGRAM(S): 2.5 TABLET ORAL at 05:24

## 2023-04-05 RX ADMIN — Medication 650 MILLIGRAM(S): at 10:24

## 2023-04-05 RX ADMIN — Medication 1 DROP(S): at 12:32

## 2023-04-05 RX ADMIN — DORZOLAMIDE HYDROCHLORIDE 2 DROP(S): 20 SOLUTION/ DROPS OPHTHALMIC at 05:23

## 2023-04-05 RX ADMIN — DORZOLAMIDE HYDROCHLORIDE 2 DROP(S): 20 SOLUTION/ DROPS OPHTHALMIC at 14:37

## 2023-04-05 RX ADMIN — LATANOPROST 1 DROP(S): 0.05 SOLUTION/ DROPS OPHTHALMIC; TOPICAL at 22:26

## 2023-04-05 RX ADMIN — Medication 1 MILLIGRAM(S): at 14:35

## 2023-04-05 RX ADMIN — Medication 1 DROP(S): at 18:16

## 2023-04-05 RX ADMIN — Medication 1 MILLIGRAM(S): at 22:26

## 2023-04-05 RX ADMIN — Medication 1 MILLIGRAM(S): at 18:16

## 2023-04-05 RX ADMIN — ATORVASTATIN CALCIUM 40 MILLIGRAM(S): 80 TABLET, FILM COATED ORAL at 22:26

## 2023-04-05 RX ADMIN — Medication 40 MILLIEQUIVALENT(S): at 14:37

## 2023-04-05 RX ADMIN — Medication 2 MILLIGRAM(S): at 12:32

## 2023-04-05 RX ADMIN — Medication 40 MILLIEQUIVALENT(S): at 10:15

## 2023-04-05 RX ADMIN — Medication 2 MILLIGRAM(S): at 02:52

## 2023-04-05 RX ADMIN — Medication 1 TABLET(S): at 12:32

## 2023-04-05 RX ADMIN — Medication 650 MILLIGRAM(S): at 11:24

## 2023-04-05 RX ADMIN — Medication 1 MILLIGRAM(S): at 12:32

## 2023-04-05 RX ADMIN — Medication 2 MILLIGRAM(S): at 06:12

## 2023-04-05 NOTE — PROGRESS NOTE ADULT - PROBLEM SELECTOR PLAN 5
Hx of HLD on atorvastatin 40mg qd at home  - lipid panel: chol 163, , HDL 76, LDL 45  - c/w atorvastatin 40mg qd

## 2023-04-05 NOTE — PROGRESS NOTE ADULT - PROBLEM SELECTOR PLAN 6
Hx on glaucoma on latanoprost and dorzolamide at home  - c/w artificial tears  - c/w latanoprost 0.005% solution  - c/w dorzolamide 2% solution

## 2023-04-05 NOTE — PROGRESS NOTE ADULT - PROBLEM SELECTOR PLAN 3
Pt. w/ 1-2+ pitting edema of b/l LE, painful to touch w/ associated venous stasis skin changes  - no tobacco use, recent travel, CHF hx however reports recent fall onto his knees, pt. also takes amlodipine for HTN which is associated w/ b/l LE edema  - venous duplex (4/3): no DVT  - TTE (4/3): EF 66%, grossly normal findings  - leg elevation, compression stockings  - continue to monitor

## 2023-04-05 NOTE — PROGRESS NOTE ADULT - ASSESSMENT
57M w/ alcohol use disorder w/ multiple episodes of DTs and prior hospitalizations for alcohol withdrawal, HTN, HLD, and glaucoma who presents w/ nausea, abdominal pain, anxiety, and lower extremity pain/swelling, admitted for alcohol withdrawal being treated w/ Ativan taper. 57M w/ alcohol use disorder w/ multiple episodes of DTs and prior hospitalizations for alcohol withdrawal, HTN, HLD, and glaucoma who presents w/ nausea, abdominal pain, anxiety, and lower extremity pain/swelling, admitted for alcohol withdrawal being treated w/ Ativan taper, currently improving.

## 2023-04-05 NOTE — PROGRESS NOTE ADULT - SUBJECTIVE AND OBJECTIVE BOX
*******************************  Boogie Soni MD (PGY-1)  Internal Medicine  Contact via [employer] TEAMS  [facility] Pager: [phone]  [facility] Pager: [phone]  *******************************    PROGRESS NOTE:     Patient is a 57y old  Male who presents with a chief complaint of alcohol withdrawal (04 Apr 2023 07:25)    INTERVAL EVENTS: No acute overnight events.     SUBJECTIVE: Patient seen and examined at bedside. This morning, the patient is comfortable and doing well. No acute complaints. Denies fevers, chills, N/V/D, chest pain, SOB, abdominal pain.    MEDICATIONS  (STANDING):  amLODIPine   Tablet 5 milliGRAM(s) Oral daily  artificial  tears Solution 1 Drop(s) Both EYES four times a day  atorvastatin 40 milliGRAM(s) Oral at bedtime  dorzolamide 2% Ophthalmic Solution 2 Drop(s) Both EYES three times a day  folic acid 1 milliGRAM(s) Oral daily  latanoprost 0.005% Ophthalmic Solution 1 Drop(s) Both EYES at bedtime  LORazepam     Tablet   Oral   LORazepam     Tablet 2 milliGRAM(s) Oral every 4 hours  LORazepam     Tablet 1 milliGRAM(s) Oral every 4 hours  melatonin 3 milliGRAM(s) Oral at bedtime  multivitamin 1 Tablet(s) Oral daily  thiamine Injectable 200 milliGRAM(s) IV Push daily    MEDICATIONS  (PRN):  acetaminophen     Tablet .. 650 milliGRAM(s) Oral every 6 hours PRN Temp greater or equal to 38C (100.4F), Mild Pain (1 - 3)  LORazepam   Injectable 4 milliGRAM(s) IV Push every 1 hour PRN Symptom-triggered: each CIWA -Ar score 8 or GREATER    CAPILLARY BLOOD GLUCOSE    I&O's Summary    PHYSICAL EXAM:  Vital Signs Last 24 Hrs  T(C): 36.7 (05 Apr 2023 04:46), Max: 36.7 (04 Apr 2023 19:20)  T(F): 98 (05 Apr 2023 04:46), Max: 98 (04 Apr 2023 19:20)  HR: 65 (05 Apr 2023 04:46) (65 - 90)  BP: 111/66 (05 Apr 2023 04:46) (111/66 - 145/77)  BP(mean): --  RR: 17 (05 Apr 2023 04:46) (17 - 20)  SpO2: 99% (05 Apr 2023 04:46) (97% - 100%)    Parameters below as of 05 Apr 2023 04:46  Patient On (Oxygen Delivery Method): room air    GENERAL: NAD, appears tired  HEENT: +tongue fasciculations  HEART: S1, S2, regular rate and rhythm, no murmurs, rubs, or gallops  LUNGS: Unlabored respirations.  Clear to auscultation bilaterally, no crackles, wheezing, or rhonchi  ABDOMEN: soft, nondistended, +mild TTP midepigastrium  EXTREMITIES: 2+ peripheral pulses bilaterally. 1-2+ pitting edema b/l LE, tender to touch. b/l knees w/ scrapes  NERVOUS SYSTEM:  A&Ox3, no focal deficits, tremulous   SKIN: chronic venous statis changes    LABS:                        11.7   8.09  )-----------( 197      ( 04 Apr 2023 07:13 )             34.6     04-04    135  |  102  |  9   ----------------------------<  102<H>  3.2<L>   |  20<L>  |  0.60    Ca    8.3<L>      04 Apr 2023 07:13  Phos  2.1     04-04  Mg     2.00     04-04    TPro  6.9  /  Alb  3.7  /  TBili  1.1  /  DBili  x   /  AST  36  /  ALT  40  /  AlkPhos  65  04-04    CARDIAC MARKERS ( 04 Apr 2023 07:13 )  x     / x     / 199 U/L / x     / x        RADIOLOGY & ADDITIONAL TESTS:  Results Reviewed:   Imaging Personally Reviewed:  Electrocardiogram Personally Reviewed:  Tele: *******************************  Boogie Soni MD (PGY-1)  Internal Medicine  Contact via [employer] TEAMS  [facility] Pager: [phone]  [facility] Pager: [phone]  *******************************    PROGRESS NOTE:     Patient is a 57y old  Male who presents with a chief complaint of alcohol withdrawal (04 Apr 2023 07:25)    INTERVAL EVENTS: No acute overnight events.     SUBJECTIVE: Patient seen and examined at bedside. This morning, the patient is comfortable and doing well. Still reporting 3-4 loose, watery stools, nonbloody, nonmucoid. Reports improving tremors, anxiety, and leg pain. Denies fevers, chills, N/V, chest pain, SOB, abdominal pain.    MEDICATIONS  (STANDING):  amLODIPine   Tablet 5 milliGRAM(s) Oral daily  artificial  tears Solution 1 Drop(s) Both EYES four times a day  atorvastatin 40 milliGRAM(s) Oral at bedtime  dorzolamide 2% Ophthalmic Solution 2 Drop(s) Both EYES three times a day  folic acid 1 milliGRAM(s) Oral daily  latanoprost 0.005% Ophthalmic Solution 1 Drop(s) Both EYES at bedtime  loperamide 2 milliGRAM(s) Oral daily  LORazepam     Tablet   Oral   LORazepam     Tablet 2 milliGRAM(s) Oral every 4 hours  LORazepam     Tablet 1 milliGRAM(s) Oral every 4 hours  melatonin 3 milliGRAM(s) Oral at bedtime  multivitamin 1 Tablet(s) Oral daily  potassium chloride    Tablet ER 40 milliEquivalent(s) Oral every 4 hours  thiamine Injectable 200 milliGRAM(s) IV Push daily    MEDICATIONS  (PRN):  acetaminophen     Tablet .. 650 milliGRAM(s) Oral every 6 hours PRN Temp greater or equal to 38C (100.4F), Mild Pain (1 - 3)  LORazepam   Injectable 4 milliGRAM(s) IV Push every 1 hour PRN Symptom-triggered: each CIWA -Ar score 8 or GREATER    CAPILLARY BLOOD GLUCOSE    I&O's Summary    PHYSICAL EXAM:  Vital Signs Last 24 Hrs  T(C): 36.7 (05 Apr 2023 04:46), Max: 36.7 (04 Apr 2023 19:20)  T(F): 98 (05 Apr 2023 04:46), Max: 98 (04 Apr 2023 19:20)  HR: 65 (05 Apr 2023 04:46) (65 - 90)  BP: 111/66 (05 Apr 2023 04:46) (111/66 - 145/77)  BP(mean): --  RR: 17 (05 Apr 2023 04:46) (17 - 20)  SpO2: 99% (05 Apr 2023 04:46) (97% - 100%)    Parameters below as of 05 Apr 2023 04:46  Patient On (Oxygen Delivery Method): room air    GENERAL: NAD, appears tired  HEENT: +tongue fasciculations  HEART: S1, S2, regular rate and rhythm, no murmurs, rubs, or gallops  LUNGS: Unlabored respirations.  Clear to auscultation bilaterally, no crackles, wheezing, or rhonchi  ABDOMEN: soft, nondistended, +mild TTP midepigastrium  EXTREMITIES: 2+ peripheral pulses bilaterally. 1-2+ pitting edema b/l LE, tender to touch. b/l knees w/ scrapes  NERVOUS SYSTEM:  A&Ox3, no focal deficits, tremulous   SKIN: chronic venous statis changes    LABS:                                12.2   5.43  )-----------( 219      ( 05 Apr 2023 06:41 )             36.7     04-05    137  |  104  |  12  ----------------------------<  99  3.1<L>   |  20<L>  |  0.56    Ca    8.6      05 Apr 2023 06:41  Phos  3.2     04-05  Mg     1.80     04-05    TPro  7.0  /  Alb  3.8  /  TBili  0.7  /  DBili  x   /  AST  52<H>  /  ALT  51<H>  /  AlkPhos  65  04-05    CARDIAC MARKERS ( 04 Apr 2023 07:13 )  x     / x     / 199 U/L / x     / x        RADIOLOGY & ADDITIONAL TESTS:  Results Reviewed:   Imaging Personally Reviewed:  Electrocardiogram Personally Reviewed:  Tele:

## 2023-04-05 NOTE — PROGRESS NOTE ADULT - PROBLEM SELECTOR PLAN 2
Pt. reporting 4x eps of loose stools, nonbloody, nonmelanotic w/ associated mid-epigastric discomfort. ddx includes infectious, inflammatory, drug-induced, etc. No recent abx use- low suspicion for c diff  - f/u GI PCR, stool culture, stool O+P  - monitor symptoms, WBC count, temperature curve Pt. reporting multiple eps of loose stools, nonbloody, nonmelanotic w/ associated mid-epigastric discomfort. ddx includes infectious, inflammatory, drug-induced, 2/2 EToH withdrawal, etc. No recent abx use or hospitalizations- low suspicion for c diff  - GI PCR negative; f/u stool culture, stool O+P  - start loperamide 2mg qd  - monitor symptoms, WBC count, temperature curve

## 2023-04-05 NOTE — PROGRESS NOTE ADULT - PROBLEM SELECTOR PLAN 1
Hx of chronic alcohol use (1-2 bottles of vodka, 10-12 beers daily) w/ hx of DTs, multiple hospitalizations for withdrawal in the past (most recently 1/2023). Alcohol level 200 on admission. Currently w/ tremors and fasciculations on exam but w/ improving CIWAs  - no hallucinations, overt agitation, signs of DT currently  - s/p Ativan 1mg IVP, Librium 50mg PO x1, thiamine 100mg IVP x1, folic acid 1mg IVP x1 in ED  - CTAP (12/2022): mild hepatic steatosis; repeat liver imaging in 6 months (6/2023)  - c/w CIWA taper w/ Ativan  - c/w IV thiamine 200mg qd for 5d (4/2-4/6) followed by PO thiamine 100mg qd starting 4/7  - c/w folic acid 1mg qd  - c/w multivitamin qd  - rescue PRN Ativan 4mg IVP ordered for severe elevations in CIWA score  - low threshold for phenobarbital and MICU consult given prior hx  - upon discussion w/ patient, he expresses interest in potential rehab if possible; social work consulted for further resources; appreciate recs  - monitor CIWA scores/vital signs

## 2023-04-06 LAB
ALBUMIN SERPL ELPH-MCNC: 3.8 G/DL — SIGNIFICANT CHANGE UP (ref 3.3–5)
ALP SERPL-CCNC: 63 U/L — SIGNIFICANT CHANGE UP (ref 40–120)
ALT FLD-CCNC: 66 U/L — HIGH (ref 4–41)
ANION GAP SERPL CALC-SCNC: 13 MMOL/L — SIGNIFICANT CHANGE UP (ref 7–14)
AST SERPL-CCNC: 63 U/L — HIGH (ref 4–40)
BILIRUB SERPL-MCNC: 0.5 MG/DL — SIGNIFICANT CHANGE UP (ref 0.2–1.2)
BUN SERPL-MCNC: 12 MG/DL — SIGNIFICANT CHANGE UP (ref 7–23)
CALCIUM SERPL-MCNC: 8.8 MG/DL — SIGNIFICANT CHANGE UP (ref 8.4–10.5)
CHLORIDE SERPL-SCNC: 105 MMOL/L — SIGNIFICANT CHANGE UP (ref 98–107)
CO2 SERPL-SCNC: 20 MMOL/L — LOW (ref 22–31)
CREAT SERPL-MCNC: 0.61 MG/DL — SIGNIFICANT CHANGE UP (ref 0.5–1.3)
CULTURE RESULTS: SIGNIFICANT CHANGE UP
EGFR: 111 ML/MIN/1.73M2 — SIGNIFICANT CHANGE UP
GLUCOSE SERPL-MCNC: 97 MG/DL — SIGNIFICANT CHANGE UP (ref 70–99)
HCT VFR BLD CALC: 38 % — LOW (ref 39–50)
HGB BLD-MCNC: 12.6 G/DL — LOW (ref 13–17)
MAGNESIUM SERPL-MCNC: 1.9 MG/DL — SIGNIFICANT CHANGE UP (ref 1.6–2.6)
MCHC RBC-ENTMCNC: 29.7 PG — SIGNIFICANT CHANGE UP (ref 27–34)
MCHC RBC-ENTMCNC: 33.2 GM/DL — SIGNIFICANT CHANGE UP (ref 32–36)
MCV RBC AUTO: 89.6 FL — SIGNIFICANT CHANGE UP (ref 80–100)
NRBC # BLD: 0 /100 WBCS — SIGNIFICANT CHANGE UP (ref 0–0)
NRBC # FLD: 0 K/UL — SIGNIFICANT CHANGE UP (ref 0–0)
PHOSPHATE SERPL-MCNC: 3.4 MG/DL — SIGNIFICANT CHANGE UP (ref 2.5–4.5)
PLATELET # BLD AUTO: 246 K/UL — SIGNIFICANT CHANGE UP (ref 150–400)
POTASSIUM SERPL-MCNC: 3.7 MMOL/L — SIGNIFICANT CHANGE UP (ref 3.5–5.3)
POTASSIUM SERPL-SCNC: 3.7 MMOL/L — SIGNIFICANT CHANGE UP (ref 3.5–5.3)
PROT SERPL-MCNC: 7.5 G/DL — SIGNIFICANT CHANGE UP (ref 6–8.3)
RBC # BLD: 4.24 M/UL — SIGNIFICANT CHANGE UP (ref 4.2–5.8)
RBC # FLD: 13.6 % — SIGNIFICANT CHANGE UP (ref 10.3–14.5)
SODIUM SERPL-SCNC: 138 MMOL/L — SIGNIFICANT CHANGE UP (ref 135–145)
SPECIMEN SOURCE: SIGNIFICANT CHANGE UP
WBC # BLD: 6.66 K/UL — SIGNIFICANT CHANGE UP (ref 3.8–10.5)
WBC # FLD AUTO: 6.66 K/UL — SIGNIFICANT CHANGE UP (ref 3.8–10.5)

## 2023-04-06 PROCEDURE — 99232 SBSQ HOSP IP/OBS MODERATE 35: CPT

## 2023-04-06 RX ORDER — ONDANSETRON 8 MG/1
4 TABLET, FILM COATED ORAL ONCE
Refills: 0 | Status: COMPLETED | OUTPATIENT
Start: 2023-04-06 | End: 2023-04-06

## 2023-04-06 RX ORDER — KETOROLAC TROMETHAMINE 30 MG/ML
15 SYRINGE (ML) INJECTION ONCE
Refills: 0 | Status: DISCONTINUED | OUTPATIENT
Start: 2023-04-06 | End: 2023-04-06

## 2023-04-06 RX ADMIN — BENZOCAINE AND MENTHOL 1 LOZENGE: 5; 1 LIQUID ORAL at 16:40

## 2023-04-06 RX ADMIN — Medication 1 TABLET(S): at 12:19

## 2023-04-06 RX ADMIN — Medication 1 MILLIGRAM(S): at 02:29

## 2023-04-06 RX ADMIN — Medication 1 MILLIGRAM(S): at 06:39

## 2023-04-06 RX ADMIN — LATANOPROST 1 DROP(S): 0.05 SOLUTION/ DROPS OPHTHALMIC; TOPICAL at 21:30

## 2023-04-06 RX ADMIN — Medication 3 MILLIGRAM(S): at 21:31

## 2023-04-06 RX ADMIN — Medication 1 DROP(S): at 12:19

## 2023-04-06 RX ADMIN — Medication 1 DROP(S): at 17:33

## 2023-04-06 RX ADMIN — Medication 1 DROP(S): at 05:49

## 2023-04-06 RX ADMIN — Medication 1 DROP(S): at 23:26

## 2023-04-06 RX ADMIN — DORZOLAMIDE HYDROCHLORIDE 2 DROP(S): 20 SOLUTION/ DROPS OPHTHALMIC at 21:30

## 2023-04-06 RX ADMIN — Medication 25 MILLIGRAM(S): at 17:44

## 2023-04-06 RX ADMIN — DORZOLAMIDE HYDROCHLORIDE 2 DROP(S): 20 SOLUTION/ DROPS OPHTHALMIC at 05:48

## 2023-04-06 RX ADMIN — Medication 15 MILLIGRAM(S): at 10:05

## 2023-04-06 RX ADMIN — Medication 1 MILLIGRAM(S): at 10:51

## 2023-04-06 RX ADMIN — AMLODIPINE BESYLATE 5 MILLIGRAM(S): 2.5 TABLET ORAL at 05:48

## 2023-04-06 RX ADMIN — Medication 200 MILLIGRAM(S): at 12:20

## 2023-04-06 RX ADMIN — Medication 15 MILLIGRAM(S): at 09:50

## 2023-04-06 RX ADMIN — Medication 4 MILLIGRAM(S): at 21:30

## 2023-04-06 RX ADMIN — DORZOLAMIDE HYDROCHLORIDE 2 DROP(S): 20 SOLUTION/ DROPS OPHTHALMIC at 12:19

## 2023-04-06 RX ADMIN — Medication 1 MILLIGRAM(S): at 12:20

## 2023-04-06 RX ADMIN — Medication 2 MILLIGRAM(S): at 12:20

## 2023-04-06 NOTE — PROGRESS NOTE ADULT - PROBLEM SELECTOR PLAN 4
Hx of HTN on amlodipine 10mg qd at home  - SBPs 120s on admission  - c/w amlodipine 10mg qd Hx of HTN on amlodipine 10mg qd at home  - SBPs 120s on admission  - c/w amlodipine 5mg qd

## 2023-04-06 NOTE — PROGRESS NOTE ADULT - ASSESSMENT
57M w/ alcohol use disorder w/ multiple episodes of DTs and prior hospitalizations for alcohol withdrawal, HTN, HLD, and glaucoma who presents w/ nausea, abdominal pain, anxiety, and lower extremity pain/swelling, admitted for alcohol withdrawal being treated w/ Ativan taper, currently improving.

## 2023-04-06 NOTE — PROGRESS NOTE ADULT - TIME BILLING
Time-based billing (NON-critical care).     50 minutes spent on total encounter; more than 50% of the visit was spent counseling and / or coordinating care by the attending physician.  The necessity of the time spent during the encounter on this date of service was due to:     review of laboratory data, radiology results, consultants' recommendations, documentation in [place], discussion with patient, resident team, and interdisciplinary staff (such as , social workers, etc). Interventions were performed as documented above.
Time-based billing (NON-critical care).     35 minutes spent on total encounter; more than 50% of the visit was spent counseling and / or coordinating care by the attending physician.  The necessity of the time spent during the encounter on this date of service was due to:     review of laboratory data, radiology results, consultants' recommendations, documentation in [place], discussion with patient, resident team, and interdisciplinary staff (such as , social workers, etc). Interventions were performed as documented above.

## 2023-04-06 NOTE — PROGRESS NOTE ADULT - SUBJECTIVE AND OBJECTIVE BOX
*******************************  Boogie Soni MD (PGY-1)  Internal Medicine  Contact via [employer] TEAMS  [facility] Pager: [phone]  [facility] Pager: [phone]  *******************************    PROGRESS NOTE:     Patient is a 57y old  Male who presents with a chief complaint of alcohol withdrawal (05 Apr 2023 06:56)    INTERVAL EVENTS: No acute overnight events.     SUBJECTIVE: Patient seen and examined at bedside. This morning, the patient is comfortable and doing well. No acute complaints. Denies fevers, chills, N/V/D, chest pain, SOB, abdominal pain.    MEDICATIONS  (STANDING):  amLODIPine   Tablet 5 milliGRAM(s) Oral daily  artificial  tears Solution 1 Drop(s) Both EYES four times a day  atorvastatin 40 milliGRAM(s) Oral at bedtime  dorzolamide 2% Ophthalmic Solution 2 Drop(s) Both EYES three times a day  folic acid 1 milliGRAM(s) Oral daily  latanoprost 0.005% Ophthalmic Solution 1 Drop(s) Both EYES at bedtime  loperamide 2 milliGRAM(s) Oral daily  LORazepam     Tablet   Oral   LORazepam     Tablet 1 milliGRAM(s) Oral every 4 hours  LORazepam     Tablet 1 milliGRAM(s) Oral every 12 hours  melatonin 3 milliGRAM(s) Oral at bedtime  multivitamin 1 Tablet(s) Oral daily  thiamine Injectable 200 milliGRAM(s) IV Push daily    MEDICATIONS  (PRN):  acetaminophen     Tablet .. 650 milliGRAM(s) Oral every 6 hours PRN Temp greater or equal to 38C (100.4F), Mild Pain (1 - 3)  benzocaine/menthol Lozenge 1 Lozenge Oral five times a day PRN Sore Throat  FIRST- Mouthwash  BLM 5 milliLiter(s) Swish and Swallow every 4 hours PRN sore throat  LORazepam   Injectable 4 milliGRAM(s) IV Push every 1 hour PRN Symptom-triggered: each CIWA -Ar score 8 or GREATER    CAPILLARY BLOOD GLUCOSE    I&O's Summary    PHYSICAL EXAM:  Vital Signs Last 24 Hrs  T(C): 37 (06 Apr 2023 06:05), Max: 37 (05 Apr 2023 08:00)  T(F): 98.6 (06 Apr 2023 06:05), Max: 98.6 (05 Apr 2023 08:00)  HR: 85 (06 Apr 2023 06:05) (69 - 85)  BP: 100/89 (06 Apr 2023 06:05) (100/89 - 131/77)  BP(mean): 72 (05 Apr 2023 08:00) (72 - 72)  RR: 18 (06 Apr 2023 06:05) (16 - 18)  SpO2: 99% (06 Apr 2023 06:05) (97% - 100%)    Parameters below as of 06 Apr 2023 06:05  Patient On (Oxygen Delivery Method): room air    GENERAL: NAD, appears tired  HEENT: +tongue fasciculations, improving  HEART: S1, S2, regular rate and rhythm, no murmurs, rubs, or gallops  LUNGS: Unlabored respirations.  Clear to auscultation bilaterally, no crackles, wheezing, or rhonchi  ABDOMEN: soft, nondistended, +mild TTP midepigastrium  EXTREMITIES: 2+ peripheral pulses bilaterally. 1-2+ pitting edema b/l LE, tender to touch. b/l knees w/ scrapes  NERVOUS SYSTEM:  A&Ox3, no focal deficits, +b/l arm tremors, improving   SKIN: chronic venous statis changes    LABS:                        12.2   5.43  )-----------( 219      ( 05 Apr 2023 06:41 )             36.7     04-05    137  |  104  |  12  ----------------------------<  99  3.1<L>   |  20<L>  |  0.56    Ca    8.6      05 Apr 2023 06:41  Phos  3.2     04-05  Mg     1.80     04-05    TPro  7.0  /  Alb  3.8  /  TBili  0.7  /  DBili  x   /  AST  52<H>  /  ALT  51<H>  /  AlkPhos  65  04-05    CARDIAC MARKERS ( 04 Apr 2023 07:13 )  x     / x     / 199 U/L / x     / x        Culture - Stool (collected 04 Apr 2023 12:20)  Source: .Stool Feces  Preliminary Report (05 Apr 2023 14:30):    No enteric pathogens to date: Final culture pending        RADIOLOGY & ADDITIONAL TESTS:  Results Reviewed:   Imaging Personally Reviewed:  Electrocardiogram Personally Reviewed:  Tele: *******************************  Boogie Soni MD (PGY-1)  Internal Medicine  Contact via [employer] TEAMS  [facility] Pager: [phone]  [facility] Pager: [phone]  *******************************    PROGRESS NOTE:     Patient is a 57y old  Male who presents with a chief complaint of alcohol withdrawal (05 Apr 2023 06:56)    INTERVAL EVENTS: No acute overnight events.     SUBJECTIVE: Patient seen and examined at bedside. This morning, the patient is comfortable and doing well. No acute complaints. States his diarrhea has improved after imodium. States his tremors are improving. Denies fevers, chills, N/V/D, chest pain, SOB, abdominal pain.    MEDICATIONS  (STANDING):  amLODIPine   Tablet 5 milliGRAM(s) Oral daily  artificial  tears Solution 1 Drop(s) Both EYES four times a day  atorvastatin 40 milliGRAM(s) Oral at bedtime  dorzolamide 2% Ophthalmic Solution 2 Drop(s) Both EYES three times a day  folic acid 1 milliGRAM(s) Oral daily  latanoprost 0.005% Ophthalmic Solution 1 Drop(s) Both EYES at bedtime  loperamide 2 milliGRAM(s) Oral daily  LORazepam     Tablet   Oral   LORazepam     Tablet 1 milliGRAM(s) Oral every 4 hours  LORazepam     Tablet 1 milliGRAM(s) Oral every 12 hours  melatonin 3 milliGRAM(s) Oral at bedtime  multivitamin 1 Tablet(s) Oral daily  thiamine Injectable 200 milliGRAM(s) IV Push daily    MEDICATIONS  (PRN):  acetaminophen     Tablet .. 650 milliGRAM(s) Oral every 6 hours PRN Temp greater or equal to 38C (100.4F), Mild Pain (1 - 3)  benzocaine/menthol Lozenge 1 Lozenge Oral five times a day PRN Sore Throat  FIRST- Mouthwash  BLM 5 milliLiter(s) Swish and Swallow every 4 hours PRN sore throat  LORazepam   Injectable 4 milliGRAM(s) IV Push every 1 hour PRN Symptom-triggered: each CIWA -Ar score 8 or GREATER    CAPILLARY BLOOD GLUCOSE    I&O's Summary    PHYSICAL EXAM:  Vital Signs Last 24 Hrs  T(C): 37 (06 Apr 2023 06:05), Max: 37 (05 Apr 2023 08:00)  T(F): 98.6 (06 Apr 2023 06:05), Max: 98.6 (05 Apr 2023 08:00)  HR: 85 (06 Apr 2023 06:05) (69 - 85)  BP: 100/89 (06 Apr 2023 06:05) (100/89 - 131/77)  BP(mean): 72 (05 Apr 2023 08:00) (72 - 72)  RR: 18 (06 Apr 2023 06:05) (16 - 18)  SpO2: 99% (06 Apr 2023 06:05) (97% - 100%)    Parameters below as of 06 Apr 2023 06:05  Patient On (Oxygen Delivery Method): room air    GENERAL: NAD, appears tired  HEENT: +tongue fasciculations, improving  HEART: S1, S2, regular rate and rhythm, no murmurs, rubs, or gallops  LUNGS: Unlabored respirations.  Clear to auscultation bilaterally, no crackles, wheezing, or rhonchi  ABDOMEN: soft, nondistended, +mild TTP midepigastrium  EXTREMITIES: 2+ peripheral pulses bilaterally. 1-2+ pitting edema b/l LE, tender to touch. b/l knees w/ scrapes  NERVOUS SYSTEM:  A&Ox3, no focal deficits, +b/l arm tremors, improving   SKIN: chronic venous statis changes    LABS:                        12.6   6.66  )-----------( 246      ( 06 Apr 2023 06:30 )             38.0     04-06    138  |  105  |  12  ----------------------------<  97  3.7   |  20<L>  |  0.61    Ca    8.8      06 Apr 2023 06:30  Phos  3.4     04-06  Mg     1.90     04-06    TPro  7.5  /  Alb  3.8  /  TBili  0.5  /  DBili  x   /  AST  63<H>  /  ALT  66<H>  /  AlkPhos  63  04-06    Culture - Stool (collected 04 Apr 2023 12:20)  Source: .Stool Feces  Preliminary Report (05 Apr 2023 14:30):    No enteric pathogens to date: Final culture pending    RADIOLOGY & ADDITIONAL TESTS:  Results Reviewed:   Imaging Personally Reviewed:  Electrocardiogram Personally Reviewed:  Tele: *******************************  Boogie Soni MD (PGY-1)  Internal Medicine  Contact via [employer] TEAMS  [facility] Pager: [phone]  [facility] Pager: [phone]  *******************************    PROGRESS NOTE:     Patient is a 57y old  Male who presents with a chief complaint of alcohol withdrawal (05 Apr 2023 06:56)    INTERVAL EVENTS: No acute overnight events.     SUBJECTIVE: Patient seen and examined at bedside. This morning, the patient is comfortable and doing well. No acute complaints. States his diarrhea has improved after imodium. States his tremors are improving. Denies fevers, chills, N/V/D, chest pain, SOB, abdominal pain.    MEDICATIONS  (STANDING):  amLODIPine   Tablet 5 milliGRAM(s) Oral daily  artificial  tears Solution 1 Drop(s) Both EYES four times a day  atorvastatin 40 milliGRAM(s) Oral at bedtime  chlordiazePOXIDE 25 milliGRAM(s) Oral once  dorzolamide 2% Ophthalmic Solution 2 Drop(s) Both EYES three times a day  folic acid 1 milliGRAM(s) Oral daily  latanoprost 0.005% Ophthalmic Solution 1 Drop(s) Both EYES at bedtime  loperamide 2 milliGRAM(s) Oral daily  LORazepam     Tablet   Oral   LORazepam     Tablet 1 milliGRAM(s) Oral every 12 hours  melatonin 3 milliGRAM(s) Oral at bedtime  multivitamin 1 Tablet(s) Oral daily  ondansetron Injectable 4 milliGRAM(s) IV Push once  thiamine Injectable 200 milliGRAM(s) IV Push daily    MEDICATIONS  (PRN):  acetaminophen     Tablet .. 650 milliGRAM(s) Oral every 6 hours PRN Temp greater or equal to 38C (100.4F), Mild Pain (1 - 3)  benzocaine/menthol Lozenge 1 Lozenge Oral five times a day PRN Sore Throat  FIRST- Mouthwash  BLM 5 milliLiter(s) Swish and Swallow every 4 hours PRN sore throat  LORazepam   Injectable 4 milliGRAM(s) IV Push every 1 hour PRN Symptom-triggered: each CIWA -Ar score 8 or GREATER    CAPILLARY BLOOD GLUCOSE    I&O's Summary    PHYSICAL EXAM:  Vital Signs Last 24 Hrs  T(C): 37 (06 Apr 2023 06:05), Max: 37 (05 Apr 2023 08:00)  T(F): 98.6 (06 Apr 2023 06:05), Max: 98.6 (05 Apr 2023 08:00)  HR: 85 (06 Apr 2023 06:05) (69 - 85)  BP: 100/89 (06 Apr 2023 06:05) (100/89 - 131/77)  BP(mean): 72 (05 Apr 2023 08:00) (72 - 72)  RR: 18 (06 Apr 2023 06:05) (16 - 18)  SpO2: 99% (06 Apr 2023 06:05) (97% - 100%)    Parameters below as of 06 Apr 2023 06:05  Patient On (Oxygen Delivery Method): room air    GENERAL: NAD, appears tired  HEENT: +tongue fasciculations, improving  HEART: S1, S2, regular rate and rhythm, no murmurs, rubs, or gallops  LUNGS: Unlabored respirations.  Clear to auscultation bilaterally, no crackles, wheezing, or rhonchi  ABDOMEN: soft, nondistended, +mild TTP midepigastrium  EXTREMITIES: 2+ peripheral pulses bilaterally. 1-2+ pitting edema b/l LE, tender to touch. b/l knees w/ scrapes  NERVOUS SYSTEM:  A&Ox3, no focal deficits, +b/l arm tremors, improving   SKIN: chronic venous statis changes    LABS:                        12.6   6.66  )-----------( 246      ( 06 Apr 2023 06:30 )             38.0     04-06    138  |  105  |  12  ----------------------------<  97  3.7   |  20<L>  |  0.61    Ca    8.8      06 Apr 2023 06:30  Phos  3.4     04-06  Mg     1.90     04-06    TPro  7.5  /  Alb  3.8  /  TBili  0.5  /  DBili  x   /  AST  63<H>  /  ALT  66<H>  /  AlkPhos  63  04-06    Culture - Stool (collected 04 Apr 2023 12:20)  Source: .Stool Feces  Preliminary Report (05 Apr 2023 14:30):    No enteric pathogens to date: Final culture pending    RADIOLOGY & ADDITIONAL TESTS:  Results Reviewed:   Imaging Personally Reviewed:  Electrocardiogram Personally Reviewed:  Tele:

## 2023-04-06 NOTE — PROGRESS NOTE ADULT - PROBLEM SELECTOR PLAN 2
Pt. reporting multiple eps of loose stools, nonbloody, nonmelanotic w/ associated mid-epigastric discomfort. ddx includes infectious, inflammatory, drug-induced, 2/2 EToH withdrawal, etc. No recent abx use or hospitalizations- low suspicion for c diff  - GI PCR negative; f/u stool culture, stool O+P  - c/w loperamide 2mg qd  - monitor symptoms, WBC count, temperature curve Pt. reporting multiple eps of loose stools, nonbloody, nonmelanotic w/ associated mid-epigastric discomfort. ddx includes infectious, inflammatory, drug-induced, 2/2 EToH withdrawal, etc. No recent abx use or hospitalizations- low suspicion for c diff, improved  - GI PCR negative; f/u stool culture, stool O+P  - c/w loperamide 2mg qd  - monitor symptoms, WBC count, temperature curve

## 2023-04-06 NOTE — PROGRESS NOTE ADULT - PROBLEM SELECTOR PLAN 1
Hx of chronic alcohol use (1-2 bottles of vodka, 10-12 beers daily) w/ hx of DTs, multiple hospitalizations for withdrawal in the past (most recently 1/2023). Alcohol level 200 on admission. Currently w/ improving tremors and fasciculations on exam and stable CIWAs  - no hallucinations, overt agitation, signs of DT currently  - s/p Ativan 1mg IVP, Librium 50mg PO x1, thiamine 100mg IVP x1, folic acid 1mg IVP x1 in ED  - CTAP (12/2022): mild hepatic steatosis; repeat liver imaging in 6 months (6/2023)  - c/w CIWA taper w/ Ativan  - s/p IV thiamine 200mg qd for 5d (4/2-4/6); start PO thiamine 100mg qd 4/7  - c/w folic acid 1mg qd  - c/w multivitamin qd  - rescue PRN Ativan 4mg IVP ordered for severe elevations in CIWA score  - upon discussion w/ patient, he expresses interest in potential rehab if possible; social work consulted for further resources; appreciate recs  - monitor CIWA scores/vital signs Hx of chronic alcohol use (1-2 bottles of vodka, 10-12 beers daily) w/ hx of DTs, multiple hospitalizations for withdrawal in the past (most recently 1/2023). Alcohol level 200 on admission. Currently w/ improving tremors and fasciculations on exam and stable CIWAs  - no hallucinations, overt agitation, signs of DT currently  - s/p Ativan 1mg IVP, Librium 50mg PO x1, thiamine 100mg IVP x1, folic acid 1mg IVP x1 in ED  - CTAP (12/2022): mild hepatic steatosis; repeat liver imaging in 6 months (6/2023)  - c/w CIWA taper w/ Ativan  - give Librium 25mg x1 for additional coverage for withdrawal  - s/p IV thiamine 200mg qd for 5d (4/2-4/6); start PO thiamine 100mg qd 4/7  - c/w folic acid 1mg qd  - c/w multivitamin qd  - rescue PRN Ativan 4mg IVP ordered for severe elevations in CIWA score  - upon discussion w/ patient, he expresses interest in potential rehab if possible; social work consulted for further resources; appreciate recs  - monitor CIWA scores/vital signs

## 2023-04-07 ENCOUNTER — TRANSCRIPTION ENCOUNTER (OUTPATIENT)
Age: 58
End: 2023-04-07

## 2023-04-07 VITALS
HEART RATE: 69 BPM | DIASTOLIC BLOOD PRESSURE: 72 MMHG | TEMPERATURE: 98 F | RESPIRATION RATE: 17 BRPM | SYSTOLIC BLOOD PRESSURE: 110 MMHG | OXYGEN SATURATION: 98 %

## 2023-04-07 LAB
ALBUMIN SERPL ELPH-MCNC: 3.9 G/DL — SIGNIFICANT CHANGE UP (ref 3.3–5)
ALP SERPL-CCNC: 64 U/L — SIGNIFICANT CHANGE UP (ref 40–120)
ALT FLD-CCNC: 72 U/L — HIGH (ref 4–41)
ANION GAP SERPL CALC-SCNC: 12 MMOL/L — SIGNIFICANT CHANGE UP (ref 7–14)
AST SERPL-CCNC: 60 U/L — HIGH (ref 4–40)
BILIRUB SERPL-MCNC: 0.4 MG/DL — SIGNIFICANT CHANGE UP (ref 0.2–1.2)
BUN SERPL-MCNC: 13 MG/DL — SIGNIFICANT CHANGE UP (ref 7–23)
CALCIUM SERPL-MCNC: 8.9 MG/DL — SIGNIFICANT CHANGE UP (ref 8.4–10.5)
CHLORIDE SERPL-SCNC: 104 MMOL/L — SIGNIFICANT CHANGE UP (ref 98–107)
CO2 SERPL-SCNC: 22 MMOL/L — SIGNIFICANT CHANGE UP (ref 22–31)
CREAT SERPL-MCNC: 0.63 MG/DL — SIGNIFICANT CHANGE UP (ref 0.5–1.3)
EGFR: 110 ML/MIN/1.73M2 — SIGNIFICANT CHANGE UP
GLUCOSE SERPL-MCNC: 98 MG/DL — SIGNIFICANT CHANGE UP (ref 70–99)
HCT VFR BLD CALC: 37.2 % — LOW (ref 39–50)
HGB BLD-MCNC: 12.4 G/DL — LOW (ref 13–17)
MAGNESIUM SERPL-MCNC: 1.9 MG/DL — SIGNIFICANT CHANGE UP (ref 1.6–2.6)
MCHC RBC-ENTMCNC: 29.8 PG — SIGNIFICANT CHANGE UP (ref 27–34)
MCHC RBC-ENTMCNC: 33.3 GM/DL — SIGNIFICANT CHANGE UP (ref 32–36)
MCV RBC AUTO: 89.4 FL — SIGNIFICANT CHANGE UP (ref 80–100)
NRBC # BLD: 0 /100 WBCS — SIGNIFICANT CHANGE UP (ref 0–0)
NRBC # FLD: 0 K/UL — SIGNIFICANT CHANGE UP (ref 0–0)
PHOSPHATE SERPL-MCNC: 3.6 MG/DL — SIGNIFICANT CHANGE UP (ref 2.5–4.5)
PLATELET # BLD AUTO: 242 K/UL — SIGNIFICANT CHANGE UP (ref 150–400)
POTASSIUM SERPL-MCNC: 3.5 MMOL/L — SIGNIFICANT CHANGE UP (ref 3.5–5.3)
POTASSIUM SERPL-SCNC: 3.5 MMOL/L — SIGNIFICANT CHANGE UP (ref 3.5–5.3)
PROT SERPL-MCNC: 6.8 G/DL — SIGNIFICANT CHANGE UP (ref 6–8.3)
RBC # BLD: 4.16 M/UL — LOW (ref 4.2–5.8)
RBC # FLD: 13.6 % — SIGNIFICANT CHANGE UP (ref 10.3–14.5)
SODIUM SERPL-SCNC: 138 MMOL/L — SIGNIFICANT CHANGE UP (ref 135–145)
WBC # BLD: 5.22 K/UL — SIGNIFICANT CHANGE UP (ref 3.8–10.5)
WBC # FLD AUTO: 5.22 K/UL — SIGNIFICANT CHANGE UP (ref 3.8–10.5)

## 2023-04-07 PROCEDURE — 99239 HOSP IP/OBS DSCHRG MGMT >30: CPT

## 2023-04-07 RX ORDER — LOPERAMIDE HCL 2 MG
1 TABLET ORAL
Qty: 7 | Refills: 0
Start: 2023-04-07 | End: 2023-04-13

## 2023-04-07 RX ORDER — AMLODIPINE BESYLATE 2.5 MG/1
1 TABLET ORAL
Qty: 0 | Refills: 0 | DISCHARGE

## 2023-04-07 RX ORDER — FAMOTIDINE 10 MG/ML
20 INJECTION INTRAVENOUS DAILY
Refills: 0 | Status: DISCONTINUED | OUTPATIENT
Start: 2023-04-07 | End: 2023-04-07

## 2023-04-07 RX ORDER — AMLODIPINE BESYLATE 2.5 MG/1
1 TABLET ORAL
Qty: 30 | Refills: 0
Start: 2023-04-07 | End: 2023-05-06

## 2023-04-07 RX ORDER — FAMOTIDINE 10 MG/ML
1 INJECTION INTRAVENOUS
Qty: 30 | Refills: 0
Start: 2023-04-07 | End: 2023-05-06

## 2023-04-07 RX ADMIN — DORZOLAMIDE HYDROCHLORIDE 2 DROP(S): 20 SOLUTION/ DROPS OPHTHALMIC at 05:40

## 2023-04-07 RX ADMIN — Medication 2 MILLIGRAM(S): at 11:58

## 2023-04-07 RX ADMIN — Medication 1 TABLET(S): at 11:57

## 2023-04-07 RX ADMIN — DORZOLAMIDE HYDROCHLORIDE 2 DROP(S): 20 SOLUTION/ DROPS OPHTHALMIC at 13:19

## 2023-04-07 RX ADMIN — FAMOTIDINE 20 MILLIGRAM(S): 10 INJECTION INTRAVENOUS at 11:59

## 2023-04-07 RX ADMIN — Medication 1 MILLIGRAM(S): at 11:57

## 2023-04-07 RX ADMIN — Medication 1 DROP(S): at 05:38

## 2023-04-07 RX ADMIN — Medication 1 MILLIGRAM(S): at 05:40

## 2023-04-07 RX ADMIN — Medication 100 MILLIGRAM(S): at 11:58

## 2023-04-07 RX ADMIN — Medication 1 DROP(S): at 11:58

## 2023-04-07 RX ADMIN — AMLODIPINE BESYLATE 5 MILLIGRAM(S): 2.5 TABLET ORAL at 05:37

## 2023-04-07 NOTE — DISCHARGE NOTE NURSING/CASE MANAGEMENT/SOCIAL WORK - NSDCPEFALRISK_GEN_ALL_CORE
For information on Fall & Injury Prevention, visit: [email or web address] OR  [email or web address] OR  https://www.cdc.gov/steadi/patient.html

## 2023-04-07 NOTE — PROGRESS NOTE ADULT - ATTENDING COMMENTS
Patient is a 58yo M with PMH of EtOH use disorder with multiple episodes of DTs, prior hospitalizations for EtOH withdrawal, HTN, HLD, and glaucoma who presented with EtOH withdrawal and b/l LE swelling/pain. LE dopplers negative for DVT. Kept on CIWA protocol and started on Ativan taper, to be completed by 4/7. CIWA scores improving, around 1. Still tremulous on exam with bilateral upper extremity shaking. Patient is open about his alcohol problem. Also being supplemented with thiamine/folate/MVI.    Diarrhea noted, likely also causing hypokalemia. GI PCR negative. No hx of antibiotic use to suggest Cdiff. Potentially colitis, especially given some abdominal tenderness. Monitor for now. Supportive care. Potentially simply from EtOH withdrawal.    Today still with some mild tremors b/l UEs. CIWA score documented overnight at 9. On my assessment and resident assessment, CIWA 1-2. Given 25mg po of chlordiazepoxide yesterday.  Can give 3 additional days of lorazepam 1mg po daily on discharge.    No longer in window for DTs.  Okay to discharge to home today. Has care established for outpatient EtOH dependence management.
Patient is a 56yo M with PMH of EtOH use disorder with multiple episodes of DTs, prior hospitalizations for EtOH withdrawal, HTN, HLD, and glaucoma who presented with EtOH withdrawal and b/l LE swelling/pain. LE dopplers negative for DVT. Kept on CIWA protocol and started on Ativan taper, to be completed by 4/7. CIWA scores improving, around 1. Still tremulous on exam with bilateral upper extremity shaking. Patient is open about his alcohol problem. Also being supplemented with thiamine/folate/MVI.    Diarrhea noted, likely also causing hypokalemia. GI PCR negative. No hx of antibiotic use to suggest Cdiff. Potentially colitis, especially given some abdominal tenderness. Monitor for now. Supportive care. Potentially simply from EtOH withdrawal.    Discussed with , , nursing manager, and resident team.
Patient is a 58yo M with PMH of EtOH use disorder with multiple episodes of DTs, prior hospitalizations for EtOH withdrawal, HTN, HLD, and glaucoma who presented with EtOH withdrawal and b/l LE swelling/pain. LE dopplers negative for DVT. Kept on CIWA protocol and started on Ativan taper. CIWA scores improving, ranging now from 1-3. Patient is open about his alcohol problem. Also being supplemented with thiamine/folate/MVI.    Discussed with , , nursing manager, and resident team.
Patient is a 56yo M with PMH of EtOH use disorder with multiple episodes of DTs, prior hospitalizations for EtOH withdrawal, HTN, HLD, and glaucoma who presented with EtOH withdrawal and b/l LE swelling/pain. LE dopplers negative for DVT. Kept on CIWA protocol and started on Ativan taper, to be completed by 4/7. CIWA scores improving, around 1. Still tremulous on exam with bilateral upper extremity shaking. Patient is open about his alcohol problem. Also being supplemented with thiamine/folate/MVI.    Diarrhea noted, likely also causing hypokalemia. GI PCR negative. No hx of antibiotic use to suggest Cdiff. Potentially colitis, especially given some abdominal tenderness. Monitor for now. Supportive care. Potentially simply from EtOH withdrawal.    Today still with some tremors. Also with nausea and 2 episodes of emesis.  CIWA scores still around 1.   Can offer a dose of chlordiazepoxide today as Ativan taper is almost completed.     Discussed with , , nursing manager, and resident team.
Patient is a 56yo M with PMH of EtOH use disorder with multiple episodes of DTs, prior hospitalizations for EtOH withdrawal, HTN, HLD, and glaucoma who presented with EtOH withdrawal and b/l LE swelling/pain. LE dopplers negative for DVT. Kept on CIWA protocol and started on Ativan taper, to be completed by 4/7. CIWA scores improving, around 1. Still tremulous on exam with bilateral upper extremity shaking. Patient is open about his alcohol problem. Also being supplemented with thiamine/folate/MVI.    Discussed with , , nursing manager, and resident team.

## 2023-04-07 NOTE — PROGRESS NOTE ADULT - PROBLEM SELECTOR PLAN 2
Pt. reporting multiple eps of loose stools, nonbloody, nonmelanotic w/ associated mid-epigastric discomfort. ddx includes infectious, inflammatory, drug-induced, 2/2 EToH withdrawal, etc. No recent abx use or hospitalizations- low suspicion for c diff, improved  - GI PCR negative, stool cx negative  - c/w loperamide 2mg qd  - monitor symptoms, WBC count, temperature curve

## 2023-04-07 NOTE — PROGRESS NOTE ADULT - PROBLEM SELECTOR PLAN 5
Hx of HLD on atorvastatin 40mg qd at home  - lipid panel: chol 163, , HDL 76, LDL 45  - d/c'd atorvastatin as ASCVD score is 0 and pt has rising LFTs

## 2023-04-07 NOTE — DISCHARGE NOTE NURSING/CASE MANAGEMENT/SOCIAL WORK - NSDCVIVACCINE_GEN_ALL_CORE_FT
influenza, injectable, quadrivalent, preservative free; 14-Oct-2021 13:35; Nany Guerrero (RN); [employer]; [id number] (Exp. Date: 30-Jun-2022); IntraMuscular; Deltoid Right.; 0.5 milliLiter(s); VIS (VIS Published: 06-Aug-2021, VIS Presented: 14-Oct-2021);   Tdap; 22-Dec-2021 19:23; Clementina Koehler (RN); [employer]; [id number] (Exp. Date: 09-Sep-2023); IntraMuscular; Deltoid Left.; 0.5 milliLiter(s); VIS (VIS Published: 09-May-2013, VIS Presented: 22-Dec-2021);   Tdap; 15-Aug-2022 16:08; Marianne Pollock (RN); [employer]; [id number]   (Exp. Date: 18-Apr-2024); IntraMuscular; Deltoid Left.; 0.5 milliLiter(s); VIS (VIS Published: 09-May-2013, VIS Presented: 15-Aug-2022);   Tdap; 16-Nov-2022 15:48; Parish Avila (RN); [employer]; [id number] (Exp. Date: 01-Jun-2024); IntraMuscular; Deltoid Right.; 0.5 milliLiter(s); VIS (VIS Published: 09-May-2013, VIS Presented: 16-Nov-2022);

## 2023-04-07 NOTE — PROGRESS NOTE ADULT - PROBLEM SELECTOR PLAN 3
Pt. w/ 1-2+ pitting edema of b/l LE, painful to touch w/ associated venous stasis skin changes  - no tobacco use, recent travel, CHF hx however reports recent fall onto his knees, pt. also takes amlodipine for HTN which is associated w/ b/l LE edema  - venous duplex (4/3): no DVT  - TTE (4/3): EF 66%, grossly normal findings  - leg elevation, compression stockings  - pain control w/ Tylenol  - continue to monitor

## 2023-04-07 NOTE — PROGRESS NOTE ADULT - PROBLEM SELECTOR PLAN 1
Hx of chronic alcohol use (1-2 bottles of vodka, 10-12 beers daily) w/ hx of DTs, multiple hospitalizations for withdrawal in the past (most recently 1/2023). Alcohol level 200 on admission. Currently w/ improving tremors and fasciculations on exam and stable CIWAs  - no hallucinations, overt agitation, signs of DT currently  - s/p Ativan 1mg IVP, Librium 50mg PO x1, thiamine 100mg IVP x1, folic acid 1mg IVP x1 in ED  - CTAP (12/2022): mild hepatic steatosis; repeat liver imaging in 6 months (6/2023)  - c/w Ativan taper (to finish 4/7 PM)  - s/p Librium 25mg x1 4/6 for continued withdrawal; may consider Librium taper if pt. w/ continued vomiting  - c/w thiamine 100mg qd  - c/w folic acid 1mg qd  - c/w multivitamin qd  - rescue PRN Ativan 4mg IVP ordered for severe withdrawal  - upon discussion w/ patient, he expresses interest in potential rehab if possible; social work consulted for further resources; appreciate recs  - monitor symptoms and vital signs Hx of chronic alcohol use (1-2 bottles of vodka, 10-12 beers daily) w/ hx of DTs, multiple hospitalizations for withdrawal in the past (most recently 1/2023). Alcohol level 200 on admission. Currently w/ improving tremors and fasciculations on exam and stable CIWAs  - no hallucinations, overt agitation, signs of DT currently  - s/p Ativan 1mg IVP, Librium 50mg PO x1, thiamine 100mg IVP x1, folic acid 1mg IVP x1 in ED  - CTAP (12/2022): mild hepatic steatosis; repeat liver imaging in 6 months (6/2023)  - c/w Ativan taper (to finish 4/7 PM)  - s/p Librium 25mg x1 4/6 for continued withdrawal  - c/w thiamine 100mg qd  - c/w folic acid 1mg qd  - c/w multivitamin qd  - rescue PRN Ativan 4mg IVP ordered for severe withdrawal  - upon discussion w/ patient, he expresses interest in potential rehab if possible; social work consulted for further resources; appreciate recs  - monitor symptoms and vital signs

## 2023-04-07 NOTE — PROGRESS NOTE ADULT - ASSESSMENT
57M w/ alcohol use disorder w/ multiple episodes of DTs and prior hospitalizations for alcohol withdrawal, HTN, HLD, and glaucoma who presents w/ nausea, abdominal pain, anxiety, and lower extremity pain/swelling, admitted for alcohol withdrawal being treated w/ Ativan taper, currently improving. 57M w/ alcohol use disorder w/ multiple episodes of DTs and prior hospitalizations for alcohol withdrawal, HTN, HLD, and glaucoma who presents w/ nausea, abdominal pain, anxiety, and lower extremity pain/swelling, admitted for alcohol withdrawal being treated w/ Ativan taper, currently improving, pending dispo.

## 2023-04-07 NOTE — DISCHARGE NOTE NURSING/CASE MANAGEMENT/SOCIAL WORK - PATIENT PORTAL LINK FT
You can access the FollowMyHealth Patient Portal offered by [facility] by registering at the following website: [email or web address]. By joining [employer]’s FollowMyHealth portal, you will also be able to view your health information using other applications (apps) compatible with our system.

## 2023-04-09 ENCOUNTER — INPATIENT (INPATIENT)
Facility: HOSPITAL | Age: 58
LOS: 6 days | Discharge: HOME | End: 2023-04-16
Attending: STUDENT IN AN ORGANIZED HEALTH CARE EDUCATION/TRAINING PROGRAM | Admitting: STUDENT IN AN ORGANIZED HEALTH CARE EDUCATION/TRAINING PROGRAM
Payer: MEDICAID

## 2023-04-09 VITALS
HEIGHT: 68 IN | TEMPERATURE: 99 F | HEART RATE: 98 BPM | DIASTOLIC BLOOD PRESSURE: 91 MMHG | OXYGEN SATURATION: 98 % | RESPIRATION RATE: 18 BRPM | SYSTOLIC BLOOD PRESSURE: 123 MMHG | WEIGHT: 184.97 LBS

## 2023-04-09 PROCEDURE — 99285 EMERGENCY DEPT VISIT HI MDM: CPT

## 2023-04-09 RX ORDER — KETAMINE HYDROCHLORIDE 100 MG/ML
250 INJECTION INTRAMUSCULAR; INTRAVENOUS ONCE
Refills: 0 | Status: DISCONTINUED | OUTPATIENT
Start: 2023-04-09 | End: 2023-04-09

## 2023-04-09 RX ORDER — MIDAZOLAM HYDROCHLORIDE 1 MG/ML
2 INJECTION, SOLUTION INTRAMUSCULAR; INTRAVENOUS ONCE
Refills: 0 | Status: DISCONTINUED | OUTPATIENT
Start: 2023-04-09 | End: 2023-04-09

## 2023-04-09 RX ADMIN — MIDAZOLAM HYDROCHLORIDE 2 MILLIGRAM(S): 1 INJECTION, SOLUTION INTRAMUSCULAR; INTRAVENOUS at 21:11

## 2023-04-09 RX ADMIN — Medication 2 MILLIGRAM(S): at 21:57

## 2023-04-09 RX ADMIN — KETAMINE HYDROCHLORIDE 250 MILLIGRAM(S): 100 INJECTION INTRAMUSCULAR; INTRAVENOUS at 22:19

## 2023-04-09 NOTE — ED ADULT NURSE NOTE - NSIMPLEMENTINTERV_GEN_ALL_ED
Implemented All Fall Risk Interventions:  [place] to call system. Call bell, personal items and telephone within reach. Instruct patient to call for assistance. Room bathroom lighting operational. Non-slip footwear when patient is off stretcher. Physically safe environment: no spills, clutter or unnecessary equipment. Stretcher in lowest position, wheels locked, appropriate side rails in place. Provide visual cue, wrist band, yellow gown, etc. Monitor gait and stability. Monitor for mental status changes and reorient to person, place, and time. Review medications for side effects contributing to fall risk. Reinforce activity limits and safety measures with patient and family.

## 2023-04-09 NOTE — ED ADULT NURSE NOTE - OBJECTIVE STATEMENT
59 yo M HTN, HLD, known etoh abuse and hx admission for DTs here for etoh intoxication. Found sitting in parking lot drinking vodka. No trauma reported. Pt without obvious injuries. Pt attempting numerous times to get out of stretcher to urinate but has unsteady gait due to apparent intoxication. Pt given urinal and placed on enhanced observation, however, still attempting to get out of stretcher and is on fall risk, pt moved closer to nursing station for observation. Respirations equal and unlabored. No acute distress noted at this time.

## 2023-04-09 NOTE — ED PROVIDER NOTE - CLINICAL SUMMARY MEDICAL DECISION MAKING FREE TEXT BOX
Pt here with etoh intoxication with vodka bottle seen by EMS. Hx etoh abuse and DTs. Attempting to get out of stretcher and walk in ed with unsteady gait. Clinically intoxicated. Will order versed for pt safety and reassess for sobriety. Pt here with etoh intoxication with vodka bottle seen by EMS. Hx etoh abuse and DTs. Attempting to get out of stretcher and walk in ed with unsteady gait. Clinically intoxicated. Will order versed for pt safety and reassess for sobriety.    Pt required versed/ativan/ketamine as continuously attempting to get out of stretcher and too inebriated to walk. Continued to reassess for sobriety. Pt with ciwa 6 and librium ordered. On reassessment ciwa 16. Pt ordered for versed iv and admitted for etoh w/d. Hospitalist aware. ICU team evaluated pt, stable for floors.

## 2023-04-09 NOTE — ED PROVIDER NOTE - CONTACT TIME
09-Apr-2023 21:19 On CIWA with librium taper and ativan PRN for breakthrough withdrawal symptoms. Last drink Sat 8am, monitor closely, no hx of withdrawal seizures but has had hallucinations and tremors in the past.   - Continue Librium taper, last day today.   - Continue IV Thiamine   - Appreciate social work involvement, plan for DC Home.

## 2023-04-09 NOTE — ED PROVIDER NOTE - OBJECTIVE STATEMENT
59 yo M HTN, HLD, known etoh abuse and hx admission for DTs here for etoh intoxication. Found si 59 yo M HTN, HLD, known etoh abuse and hx admission for DTs here for etoh intoxication. Found sitting in parking lot drinking vodka. No trauma reported. Pt without obvious injuries. Pt attempting numerous times to get out of stretcher to urinate but has unsteady gait. Pt given urinal and placed on enhanced observation, however still attempting to get out of stretcher and is on fall risk. 2mg versed ordered as pt not verbally redirectable, keeps stating "I can walk". Will observe until clinically sober.

## 2023-04-09 NOTE — ED ADULT NURSE REASSESSMENT NOTE - NS ED NURSE REASSESS COMMENT FT1
Pt constantly attempting to jump out of bed. Pt displays unsteady gait, and does not accept attempts at redirection. MD made aware Versed 2mg ordered and administered. Safety measures implemented. Assessment ongoing.

## 2023-04-09 NOTE — ED PROVIDER NOTE - CARE PLAN
Principal Discharge DX:	Alcohol intoxication   1 Principal Discharge DX:	Alcohol dependence with withdrawal

## 2023-04-09 NOTE — ED ADULT TRIAGE NOTE - NSWEIGHTCALCTOOLDRUG_GEN_A_CORE
used
Alert and oriented to person, place, time/situation. normal mood and affect. no apparent risk to self or others.

## 2023-04-10 DIAGNOSIS — R63.8 OTHER SYMPTOMS AND SIGNS CONCERNING FOOD AND FLUID INTAKE: ICD-10-CM

## 2023-04-10 DIAGNOSIS — F10.239 ALCOHOL DEPENDENCE WITH WITHDRAWAL, UNSPECIFIED: ICD-10-CM

## 2023-04-10 DIAGNOSIS — H40.9 UNSPECIFIED GLAUCOMA: ICD-10-CM

## 2023-04-10 DIAGNOSIS — R74.01 ELEVATION OF LEVELS OF LIVER TRANSAMINASE LEVELS: ICD-10-CM

## 2023-04-10 DIAGNOSIS — I10 ESSENTIAL (PRIMARY) HYPERTENSION: ICD-10-CM

## 2023-04-10 LAB
ALBUMIN SERPL ELPH-MCNC: 3.3 G/DL — SIGNIFICANT CHANGE UP (ref 3.3–5)
ALBUMIN SERPL ELPH-MCNC: 3.6 G/DL — SIGNIFICANT CHANGE UP (ref 3.3–5)
ALP SERPL-CCNC: 66 U/L — SIGNIFICANT CHANGE UP (ref 40–120)
ALP SERPL-CCNC: 72 U/L — SIGNIFICANT CHANGE UP (ref 40–120)
ALT FLD-CCNC: 106 U/L — HIGH (ref 12–78)
ALT FLD-CCNC: 123 U/L — HIGH (ref 12–78)
ANION GAP SERPL CALC-SCNC: 6 MMOL/L — SIGNIFICANT CHANGE UP (ref 5–17)
ANION GAP SERPL CALC-SCNC: 8 MMOL/L — SIGNIFICANT CHANGE UP (ref 5–17)
AST SERPL-CCNC: 76 U/L — HIGH (ref 15–37)
AST SERPL-CCNC: 90 U/L — HIGH (ref 15–37)
BASOPHILS # BLD AUTO: 0 K/UL — SIGNIFICANT CHANGE UP (ref 0–0.2)
BASOPHILS # BLD AUTO: 0.04 K/UL — SIGNIFICANT CHANGE UP (ref 0–0.2)
BASOPHILS NFR BLD AUTO: 0 % — SIGNIFICANT CHANGE UP (ref 0–2)
BASOPHILS NFR BLD AUTO: 0.9 % — SIGNIFICANT CHANGE UP (ref 0–2)
BILIRUB DIRECT SERPL-MCNC: 0.2 MG/DL — SIGNIFICANT CHANGE UP (ref 0–0.3)
BILIRUB INDIRECT FLD-MCNC: 0.1 MG/DL — LOW (ref 0.2–1)
BILIRUB SERPL-MCNC: 0.3 MG/DL — SIGNIFICANT CHANGE UP (ref 0.2–1.2)
BILIRUB SERPL-MCNC: 0.3 MG/DL — SIGNIFICANT CHANGE UP (ref 0.2–1.2)
BUN SERPL-MCNC: 10 MG/DL — SIGNIFICANT CHANGE UP (ref 7–23)
BUN SERPL-MCNC: 10 MG/DL — SIGNIFICANT CHANGE UP (ref 7–23)
CALCIUM SERPL-MCNC: 8.2 MG/DL — LOW (ref 8.5–10.1)
CALCIUM SERPL-MCNC: 8.4 MG/DL — LOW (ref 8.5–10.1)
CHLORIDE SERPL-SCNC: 109 MMOL/L — HIGH (ref 96–108)
CHLORIDE SERPL-SCNC: 114 MMOL/L — HIGH (ref 96–108)
CO2 SERPL-SCNC: 25 MMOL/L — SIGNIFICANT CHANGE UP (ref 22–31)
CO2 SERPL-SCNC: 27 MMOL/L — SIGNIFICANT CHANGE UP (ref 22–31)
CREAT SERPL-MCNC: 0.7 MG/DL — SIGNIFICANT CHANGE UP (ref 0.5–1.3)
CREAT SERPL-MCNC: 0.92 MG/DL — SIGNIFICANT CHANGE UP (ref 0.5–1.3)
EGFR: 105 ML/MIN/1.73M2 — SIGNIFICANT CHANGE UP
EGFR: 95 ML/MIN/1.73M2 — SIGNIFICANT CHANGE UP
EOSINOPHIL # BLD AUTO: 0 K/UL — SIGNIFICANT CHANGE UP (ref 0–0.5)
EOSINOPHIL # BLD AUTO: 0.05 K/UL — SIGNIFICANT CHANGE UP (ref 0–0.5)
EOSINOPHIL NFR BLD AUTO: 0 % — SIGNIFICANT CHANGE UP (ref 0–6)
EOSINOPHIL NFR BLD AUTO: 1.1 % — SIGNIFICANT CHANGE UP (ref 0–6)
ETHANOL SERPL-MCNC: 275 MG/DL — HIGH (ref 0–10)
FLUAV AG NPH QL: SIGNIFICANT CHANGE UP
FLUBV AG NPH QL: SIGNIFICANT CHANGE UP
GLUCOSE SERPL-MCNC: 71 MG/DL — SIGNIFICANT CHANGE UP (ref 70–99)
GLUCOSE SERPL-MCNC: 82 MG/DL — SIGNIFICANT CHANGE UP (ref 70–99)
HCT VFR BLD CALC: 37.8 % — LOW (ref 39–50)
HCT VFR BLD CALC: 41.4 % — SIGNIFICANT CHANGE UP (ref 39–50)
HGB BLD-MCNC: 12.3 G/DL — LOW (ref 13–17)
HGB BLD-MCNC: 13.5 G/DL — SIGNIFICANT CHANGE UP (ref 13–17)
IMM GRANULOCYTES NFR BLD AUTO: 0.4 % — SIGNIFICANT CHANGE UP (ref 0–0.9)
LYMPHOCYTES # BLD AUTO: 1.94 K/UL — SIGNIFICANT CHANGE UP (ref 1–3.3)
LYMPHOCYTES # BLD AUTO: 2.66 K/UL — SIGNIFICANT CHANGE UP (ref 1–3.3)
LYMPHOCYTES # BLD AUTO: 41 % — SIGNIFICANT CHANGE UP (ref 13–44)
LYMPHOCYTES # BLD AUTO: 42.2 % — SIGNIFICANT CHANGE UP (ref 13–44)
MAGNESIUM SERPL-MCNC: 1.9 MG/DL — SIGNIFICANT CHANGE UP (ref 1.6–2.6)
MCHC RBC-ENTMCNC: 29.8 PG — SIGNIFICANT CHANGE UP (ref 27–34)
MCHC RBC-ENTMCNC: 30 PG — SIGNIFICANT CHANGE UP (ref 27–34)
MCHC RBC-ENTMCNC: 32.5 G/DL — SIGNIFICANT CHANGE UP (ref 32–36)
MCHC RBC-ENTMCNC: 32.6 G/DL — SIGNIFICANT CHANGE UP (ref 32–36)
MCV RBC AUTO: 91.4 FL — SIGNIFICANT CHANGE UP (ref 80–100)
MCV RBC AUTO: 92.2 FL — SIGNIFICANT CHANGE UP (ref 80–100)
MONOCYTES # BLD AUTO: 0.66 K/UL — SIGNIFICANT CHANGE UP (ref 0–0.9)
MONOCYTES # BLD AUTO: 1.1 K/UL — HIGH (ref 0–0.9)
MONOCYTES NFR BLD AUTO: 14.3 % — HIGH (ref 2–14)
MONOCYTES NFR BLD AUTO: 17 % — HIGH (ref 2–14)
NEUTROPHILS # BLD AUTO: 1.89 K/UL — SIGNIFICANT CHANGE UP (ref 1.8–7.4)
NEUTROPHILS # BLD AUTO: 2.73 K/UL — SIGNIFICANT CHANGE UP (ref 1.8–7.4)
NEUTROPHILS NFR BLD AUTO: 41.1 % — LOW (ref 43–77)
NEUTROPHILS NFR BLD AUTO: 42 % — LOW (ref 43–77)
NRBC # BLD: 0 /100 WBCS — SIGNIFICANT CHANGE UP (ref 0–0)
NRBC # BLD: SIGNIFICANT CHANGE UP /100 WBCS (ref 0–0)
PHOSPHATE SERPL-MCNC: 3.2 MG/DL — SIGNIFICANT CHANGE UP (ref 2.5–4.5)
PLATELET # BLD AUTO: 228 K/UL — SIGNIFICANT CHANGE UP (ref 150–400)
PLATELET # BLD AUTO: 259 K/UL — SIGNIFICANT CHANGE UP (ref 150–400)
POTASSIUM SERPL-MCNC: 4 MMOL/L — SIGNIFICANT CHANGE UP (ref 3.5–5.3)
POTASSIUM SERPL-MCNC: 4.2 MMOL/L — SIGNIFICANT CHANGE UP (ref 3.5–5.3)
POTASSIUM SERPL-SCNC: 4 MMOL/L — SIGNIFICANT CHANGE UP (ref 3.5–5.3)
POTASSIUM SERPL-SCNC: 4.2 MMOL/L — SIGNIFICANT CHANGE UP (ref 3.5–5.3)
PROT SERPL-MCNC: 7.3 GM/DL — SIGNIFICANT CHANGE UP (ref 6–8.3)
PROT SERPL-MCNC: 8.4 GM/DL — HIGH (ref 6–8.3)
RBC # BLD: 4.1 M/UL — LOW (ref 4.2–5.8)
RBC # BLD: 4.53 M/UL — SIGNIFICANT CHANGE UP (ref 4.2–5.8)
RBC # FLD: 14.9 % — HIGH (ref 10.3–14.5)
RBC # FLD: 14.9 % — HIGH (ref 10.3–14.5)
SARS-COV-2 RNA SPEC QL NAA+PROBE: SIGNIFICANT CHANGE UP
SODIUM SERPL-SCNC: 144 MMOL/L — SIGNIFICANT CHANGE UP (ref 135–145)
SODIUM SERPL-SCNC: 145 MMOL/L — SIGNIFICANT CHANGE UP (ref 135–145)
WBC # BLD: 4.6 K/UL — SIGNIFICANT CHANGE UP (ref 3.8–10.5)
WBC # BLD: 6.49 K/UL — SIGNIFICANT CHANGE UP (ref 3.8–10.5)
WBC # FLD AUTO: 4.6 K/UL — SIGNIFICANT CHANGE UP (ref 3.8–10.5)
WBC # FLD AUTO: 6.49 K/UL — SIGNIFICANT CHANGE UP (ref 3.8–10.5)

## 2023-04-10 PROCEDURE — 12345: CPT | Mod: NC

## 2023-04-10 PROCEDURE — 99222 1ST HOSP IP/OBS MODERATE 55: CPT

## 2023-04-10 RX ORDER — SODIUM CHLORIDE 0.65 %
1 AEROSOL, SPRAY (ML) NASAL
Refills: 0 | Status: DISCONTINUED | OUTPATIENT
Start: 2023-04-10 | End: 2023-04-16

## 2023-04-10 RX ORDER — THIAMINE MONONITRATE (VIT B1) 100 MG
100 TABLET ORAL DAILY
Refills: 0 | Status: DISCONTINUED | OUTPATIENT
Start: 2023-04-10 | End: 2023-04-16

## 2023-04-10 RX ORDER — ENOXAPARIN SODIUM 100 MG/ML
40 INJECTION SUBCUTANEOUS EVERY 24 HOURS
Refills: 0 | Status: DISCONTINUED | OUTPATIENT
Start: 2023-04-10 | End: 2023-04-16

## 2023-04-10 RX ORDER — SODIUM CHLORIDE 9 MG/ML
1000 INJECTION INTRAMUSCULAR; INTRAVENOUS; SUBCUTANEOUS
Refills: 0 | Status: DISCONTINUED | OUTPATIENT
Start: 2023-04-10 | End: 2023-04-16

## 2023-04-10 RX ORDER — ACETAMINOPHEN 500 MG
650 TABLET ORAL EVERY 6 HOURS
Refills: 0 | Status: DISCONTINUED | OUTPATIENT
Start: 2023-04-10 | End: 2023-04-16

## 2023-04-10 RX ORDER — LATANOPROST 0.05 MG/ML
1 SOLUTION/ DROPS OPHTHALMIC; TOPICAL AT BEDTIME
Refills: 0 | Status: DISCONTINUED | OUTPATIENT
Start: 2023-04-10 | End: 2023-04-16

## 2023-04-10 RX ORDER — FOLIC ACID 0.8 MG
1 TABLET ORAL DAILY
Refills: 0 | Status: DISCONTINUED | OUTPATIENT
Start: 2023-04-10 | End: 2023-04-16

## 2023-04-10 RX ORDER — SODIUM CHLORIDE 9 MG/ML
1000 INJECTION INTRAMUSCULAR; INTRAVENOUS; SUBCUTANEOUS ONCE
Refills: 0 | Status: COMPLETED | OUTPATIENT
Start: 2023-04-10 | End: 2023-04-10

## 2023-04-10 RX ADMIN — Medication 2 MILLIGRAM(S): at 14:15

## 2023-04-10 RX ADMIN — Medication 2 MILLIGRAM(S): at 04:12

## 2023-04-10 RX ADMIN — SODIUM CHLORIDE 1000 MILLILITER(S): 9 INJECTION INTRAMUSCULAR; INTRAVENOUS; SUBCUTANEOUS at 04:19

## 2023-04-10 RX ADMIN — Medication 1 MILLIGRAM(S): at 14:15

## 2023-04-10 RX ADMIN — LATANOPROST 1 DROP(S): 0.05 SOLUTION/ DROPS OPHTHALMIC; TOPICAL at 21:13

## 2023-04-10 RX ADMIN — Medication 100 MILLIGRAM(S): at 14:15

## 2023-04-10 RX ADMIN — Medication 1 TABLET(S): at 14:15

## 2023-04-10 RX ADMIN — Medication 1 SPRAY(S): at 22:13

## 2023-04-10 RX ADMIN — Medication 2 MILLIGRAM(S): at 17:49

## 2023-04-10 RX ADMIN — Medication 75 MILLIGRAM(S): at 03:54

## 2023-04-10 RX ADMIN — SODIUM CHLORIDE 100 MILLILITER(S): 9 INJECTION INTRAMUSCULAR; INTRAVENOUS; SUBCUTANEOUS at 06:02

## 2023-04-10 RX ADMIN — ENOXAPARIN SODIUM 40 MILLIGRAM(S): 100 INJECTION SUBCUTANEOUS at 08:23

## 2023-04-10 RX ADMIN — Medication 2 MILLIGRAM(S): at 21:19

## 2023-04-10 NOTE — H&P ADULT - ASSESSMENT
58 y M PMH: alcohol use disorder w/ multiple episodes of DTs and prior hospitalizations for alcohol withdrawal, HTN, HLD, and glaucoma, recent admission to [facility] for ETOH withdrawal presents to the ED for ETOH withdrawal  ICU consulted for CIWA of 14, deemed stable for RMF   Admitted for further management

## 2023-04-10 NOTE — PATIENT PROFILE ADULT - FALL HARM RISK - HARM RISK INTERVENTIONS
Assistance with ambulation/Assistance OOB with selected safe patient handling equipment/Communicate Risk of Fall with Harm to all staff/Monitor for mental status changes/Monitor gait and stability/Reinforce activity limits and safety measures with patient and family/Tailored Fall Risk Interventions/Toileting schedule using arm’s reach rule for commode and bathroom/Use of alarms - bed, chair and/or voice tab/Visual Cue: Yellow wristband and red socks/Bed in lowest position, wheels locked, appropriate side rails in place/Call bell, personal items and telephone in reach/Instruct patient to call for assistance before getting out of bed or chair/Non-slip footwear when patient is out of bed/[place] to call system/Physically safe environment - no spills, clutter or unnecessary equipment/Purposeful Proactive Rounding/Room/bathroom lighting operational, light cord in reach

## 2023-04-10 NOTE — ED PROVIDER NOTE - IV ALTEPLASE INCLUSION HIDDEN
Called and LVM to schedule a NEW WM appt with provider and RD.   If family calls back schedule soonest available with provider and RD.  (ASK WHAT LOCATION WOULD BE BEST FOR FAMILY)    Thank you   Heather   
show

## 2023-04-10 NOTE — H&P ADULT - PROBLEM SELECTOR PLAN 1
Currently CIWA < 8  -Routine neuro checks  -ICU recs appreciated  -Will start ativan taper when patient is more awake  -High risk CIWA protocol   -Ativan 2 mg q2 PRN for CIWA >8  -Continue thiamine/MV/FA  -SW consult  -Fall precaution  -Aspiration precaution  -Will keep NPO until patient is more awake and start gentle hydration

## 2023-04-10 NOTE — PROVIDER CONTACT NOTE (EICU) - SITUATION
59 yo m pmhx ETOH abuse with several admissions for withdrawal, HTN, HLD and glaucoma brought in after being found drinking vodka in a parking lot.  Since presentation at ~1900 this evening patient has received total 2100: ativan 2mg, versed 2mg, 2200 ketamine 250mg IM, 0350 Librium 75mg PO, Ativan 2mg IV. Patient with CIWA of 14 prompting ICU eval.  Patient seen at bedside, currently sleeping, lethargic when woken up, HR 80s-90s, BP 100s, spo 99% on RA, nonlabored breathing.  CIWA <8 at this time.   Case discussed with the ICU PA.

## 2023-04-10 NOTE — CHART NOTE - NSCHARTNOTEFT_GEN_A_CORE
HPI:  58 y M PMH: alcohol use disorder w/ multiple episodes of DTs and prior hospitalizations for alcohol withdrawal, HTN, HLD, and glaucoma, recent admission to [facility] for ETOH withdrawal presents to the ED for ETOH withdrawal. Admitted to medicine in early AM    Physical:    VS: Afebrile; HR 74; /73; RR 18    General: in no acute distress  Eyes: PERRLA, EOMI; conjunctiva and sclera clear  Head: Normocephalic; atraumatic  ENMT: No nasal discharge; airway clear  Neck: Supple; non tender; no masses  Respiratory: No wheezes, rales or rhonchi  Cardiovascular: Regular rate and rhythm. S1 and S2 Normal; No murmurs, gallops or rubs  Gastrointestinal: Soft non-tender non-distended; Normal bowel sounds  Genitourinary: No costovertebral angle tenderness  Extremities: Normal range of motion, No clubbing, cyanosis or edema  Vascular: Peripheral pulses palpable 2+ bilaterally  Neurological: Alert and oriented x4, tremulous extremities, tongue fasciculations  Skin: Warm and dry. No acute rash  Lymph Nodes: No acute cervical adenopathy  Musculoskeletal: Normal gait, tone, without deformities  Psychiatric: Cooperative and appropriate    A/P:  59 y/o M w/ hx of ETOH abuse here for ETOH use w/ withdrawal    #ETOH use w/ withdrawal  Agree w/ admitting assessment and plan  c/w ativan taper, hold for sedation  replete electrolytes aggressively  maintain Mg >2, Phos >4, K >4  c/w home meds  DVT PPx

## 2023-04-10 NOTE — CONSULT NOTE ADULT - ASSESSMENT
59 yo m pmhx ETOH abuse with several admissions for withdrawal, HTN, HLD and glaucoma presenting with     1. ETOH intoxication     RECOMMENDATIONS:   -suggest Ativan IV taper  -suggest multivitamin  -suggest folic acid  -suggest thiamine  -suggest crystalloid hydration   -suggest prn zofran  -abdomen slightly tender on exam consider repeat exams/imaging if needed      Patient seen and examined at bedside, vitals/chart/meds reviewed, case discussed with eICU attending Dr. Quesada.  At this time patient does not require ICU admission, recs as above. Please reconsult as needed.

## 2023-04-10 NOTE — PATIENT PROFILE ADULT - FUNCTIONAL SCREEN CURRENT LEVEL: COMMUNICATION, MLM
Patient sedated 2/2 CIWA of 14- 16 in ED/3 = unable to understand or speak (not related to language barrier)

## 2023-04-10 NOTE — H&P ADULT - HISTORY OF PRESENT ILLNESS
58 y M PMH: alcohol use disorder w/ multiple episodes of DTs and prior hospitalizations for alcohol withdrawal, HTN, HLD, and glaucoma, recent admission to [facility] for ETOH withdrawal presents to the ED for ETOH withdrawal    Patient seen and examined at bedside- in no acute distress however cannot provide history (see medications given below). Patient is asleep, wakes up to painful stimuli.   As per ED provider: "Found sitting in parking lot drinking vodka. No trauma reported. Pt without obvious injuries. Pt attempting numerous times to get out of stretcher to urinate but has unsteady gait. Pt given urinal and placed on enhanced observation, however still attempting to get out of stretcher and is on fall risk. 2mg versed ordered as pt not verbally redirectable, keeps stating "I can walk". Will observe until clinically sober."    VS: Afebrile, HR: 98, BP: 123/91, saturating 98% on RA  Medications: librium 75 mg, ketamine 250 mg IM, atirvan 2 mg IV x 2, versed 2 mg IM, NS 1 L

## 2023-04-10 NOTE — H&P ADULT - NSHPLABSRESULTS_GEN_ALL_CORE
LABS:                         13.5   4.60  )-----------( 259      ( 10 Apr 2023 04:12 )             41.4     04-10    144  |  109<H>  |  10  ----------------------------<  82  4.2   |  27  |  0.92    Ca    8.4<L>      10 Apr 2023 04:12    TPro  8.4<H>  /  Alb  3.6  /  TBili  0.3  /  DBili  x   /  AST  90<H>  /  ALT  123<H>  /  AlkPhos  72  04-10        Records reviewed from prior hospitalization.  Labs reviewed remarkable for   EKG personally reviewed   CXR personally reviewed

## 2023-04-10 NOTE — PATIENT PROFILE ADULT - LANGUAGE ASSISTANCE NEEDED
Patient sedated 2/2 CIWA of 14- 16 in ED/No-Patient/Caregiver offered and refused free interpretation services.

## 2023-04-10 NOTE — H&P ADULT - NSHPPHYSICALEXAM_GEN_ALL_CORE
VITALS:   T(C): 36.9 (04-10-23 @ 03:58), Max: 37.2 (04-10-23 @ 00:09)  HR: 103 (04-10-23 @ 03:58) (98 - 106)  BP: 133/84 (04-10-23 @ 03:58) (123/91 - 142/75)  RR: 18 (04-10-23 @ 03:58) (17 - 20)  SpO2: 98% (04-10-23 @ 03:58) (98% - 99%)    GENERAL: NAD, lying in bed comfortably  HEAD:  Atraumatic, Normocephalic  EYES: EOMI, PERRLA, conjunctiva and sclera clear  ENT: Moist mucous membranes  NECK: Supple, No JVD  CHEST/LUNG: Clear to auscultation bilaterally; No rales, rhonchi, wheezing, or rubs. Unlabored respirations  HEART: Regular rate and rhythm; No murmurs, rubs, or gallops  ABDOMEN: BSx4; Soft, some tenderness in RLQ area  EXTREMITIES:  2+ Peripheral Pulses, brisk capillary refill. No clubbing, cyanosis, or edema  NERVOUS SYSTEM:  Patient asleep, wakes up to pain  SKIN: No rashes or lesions

## 2023-04-10 NOTE — CONSULT NOTE ADULT - SUBJECTIVE AND OBJECTIVE BOX
57 yo m pmhx ETOH abuse with several admissions for withdrawal, HTN, HLD and glaucoma brought in after being found drinking vodka in a parking lot.  Since presentation at ~1900 this evening patient has received total 2100: ativan 2mg, versed 2mg, 2200 ketamine 250mg IM, 0350 Librium 75mg PO, Ativan 2mg IV. Patient with CIWA of 14 prompting ICU eval.  Patient seen at bedside, currently sleeping, lethargic when woken up, HR 80s-90s, BP 100s, spo 99% on RA, nonlabored breathing.  CIWA <8 at this time.      PAST MEDICAL & SURGICAL HISTORY:  Glaucoma      HTN (hypertension)      Alcohol dependence      Blindness of right eye      HLD (hyperlipidemia)      No significant past surgical history        Allergies    No Known Allergies    Intolerances      FAMILY HISTORY:  FH: type 2 diabetes        Social History:   ETOH abuse      Review of Systems:  Unable to obtain 2/2 lethargy      Physical Examination:    General: Adult male, lying in bed, nad    HEENT: NC/AT    PULM: Grossly cta b/l    CVS: Sinus tach    ABD: Soft, nondistended, mildly tender upon deep palpation, normoactive bowel sounds    EXT: No edema, nontender    SKIN: Warm    NEURO: Lethargic      Medications:        ICU Vital Signs Last 24 Hrs  T(C): 36.9 (10 Apr 2023 03:58), Max: 37.2 (10 Apr 2023 00:09)  T(F): 98.5 (10 Apr 2023 03:58), Max: 98.9 (10 Apr 2023 00:09)  HR: 103 (10 Apr 2023 03:58) (98 - 106)  BP: 133/84 (10 Apr 2023 03:58) (123/91 - 142/75)  BP(mean): 97 (09 Apr 2023 23:02) (97 - 97)  ABP: --  ABP(mean): --  RR: 18 (10 Apr 2023 03:58) (17 - 20)  SpO2: 98% (10 Apr 2023 03:58) (98% - 99%)    O2 Parameters below as of 10 Apr 2023 03:58  Patient On (Oxygen Delivery Method): nasal cannula          Vital Signs Last 24 Hrs  T(C): 36.9 (10 Apr 2023 03:58), Max: 37.2 (10 Apr 2023 00:09)  T(F): 98.5 (10 Apr 2023 03:58), Max: 98.9 (10 Apr 2023 00:09)  HR: 103 (10 Apr 2023 03:58) (98 - 106)  BP: 133/84 (10 Apr 2023 03:58) (123/91 - 142/75)  BP(mean): 97 (09 Apr 2023 23:02) (97 - 97)  RR: 18 (10 Apr 2023 03:58) (17 - 20)  SpO2: 98% (10 Apr 2023 03:58) (98% - 99%)    Parameters below as of 10 Apr 2023 03:58  Patient On (Oxygen Delivery Method): nasal cannula            I&O's Detail        LABS:          CAPILLARY BLOOD GLUCOSE  POCT Blood Glucose.: 90 mg/dL (10 Apr 2023 01:12)      CULTURES:  Culture Results:   No enteric pathogens isolated.  (Stool culture examined for Salmonella,  Shigella, Campylobacter, Aeromonas, Plesiomonas,  Vibrio, E.coli O157 and Yersinia) (04-04 @ 12:20)      RADIOLOGY:   --      SUPPLEMENTAL O2:   LINES:  IVF:  PONCE:   PPx:   CONTACT:

## 2023-04-11 LAB
ANION GAP SERPL CALC-SCNC: 6 MMOL/L — SIGNIFICANT CHANGE UP (ref 5–17)
ANION GAP SERPL CALC-SCNC: 8 MMOL/L — SIGNIFICANT CHANGE UP (ref 5–17)
BUN SERPL-MCNC: 5 MG/DL — LOW (ref 7–23)
BUN SERPL-MCNC: 7 MG/DL — SIGNIFICANT CHANGE UP (ref 7–23)
CALCIUM SERPL-MCNC: 7.9 MG/DL — LOW (ref 8.5–10.1)
CALCIUM SERPL-MCNC: 8.1 MG/DL — LOW (ref 8.5–10.1)
CHLORIDE SERPL-SCNC: 100 MMOL/L — SIGNIFICANT CHANGE UP (ref 96–108)
CHLORIDE SERPL-SCNC: 104 MMOL/L — SIGNIFICANT CHANGE UP (ref 96–108)
CO2 SERPL-SCNC: 27 MMOL/L — SIGNIFICANT CHANGE UP (ref 22–31)
CO2 SERPL-SCNC: 28 MMOL/L — SIGNIFICANT CHANGE UP (ref 22–31)
CREAT SERPL-MCNC: 0.66 MG/DL — SIGNIFICANT CHANGE UP (ref 0.5–1.3)
CREAT SERPL-MCNC: 0.72 MG/DL — SIGNIFICANT CHANGE UP (ref 0.5–1.3)
EGFR: 105 ML/MIN/1.73M2 — SIGNIFICANT CHANGE UP
EGFR: 107 ML/MIN/1.73M2 — SIGNIFICANT CHANGE UP
GLUCOSE SERPL-MCNC: 117 MG/DL — HIGH (ref 70–99)
GLUCOSE SERPL-MCNC: 118 MG/DL — HIGH (ref 70–99)
HCT VFR BLD CALC: 33.9 % — LOW (ref 39–50)
HGB BLD-MCNC: 11.3 G/DL — LOW (ref 13–17)
MAGNESIUM SERPL-MCNC: 1.4 MG/DL — LOW (ref 1.6–2.6)
MAGNESIUM SERPL-MCNC: 2.8 MG/DL — HIGH (ref 1.6–2.6)
MCHC RBC-ENTMCNC: 29.7 PG — SIGNIFICANT CHANGE UP (ref 27–34)
MCHC RBC-ENTMCNC: 33.3 G/DL — SIGNIFICANT CHANGE UP (ref 32–36)
MCV RBC AUTO: 89 FL — SIGNIFICANT CHANGE UP (ref 80–100)
NRBC # BLD: 0 /100 WBCS — SIGNIFICANT CHANGE UP (ref 0–0)
PHOSPHATE SERPL-MCNC: 1.4 MG/DL — LOW (ref 2.5–4.5)
PHOSPHATE SERPL-MCNC: 3 MG/DL — SIGNIFICANT CHANGE UP (ref 2.5–4.5)
PLATELET # BLD AUTO: 206 K/UL — SIGNIFICANT CHANGE UP (ref 150–400)
POTASSIUM SERPL-MCNC: 3 MMOL/L — LOW (ref 3.5–5.3)
POTASSIUM SERPL-MCNC: 3.5 MMOL/L — SIGNIFICANT CHANGE UP (ref 3.5–5.3)
POTASSIUM SERPL-SCNC: 3 MMOL/L — LOW (ref 3.5–5.3)
POTASSIUM SERPL-SCNC: 3.5 MMOL/L — SIGNIFICANT CHANGE UP (ref 3.5–5.3)
RBC # BLD: 3.81 M/UL — LOW (ref 4.2–5.8)
RBC # FLD: 14 % — SIGNIFICANT CHANGE UP (ref 10.3–14.5)
SODIUM SERPL-SCNC: 135 MMOL/L — SIGNIFICANT CHANGE UP (ref 135–145)
SODIUM SERPL-SCNC: 138 MMOL/L — SIGNIFICANT CHANGE UP (ref 135–145)
WBC # BLD: 5.19 K/UL — SIGNIFICANT CHANGE UP (ref 3.8–10.5)
WBC # FLD AUTO: 5.19 K/UL — SIGNIFICANT CHANGE UP (ref 3.8–10.5)

## 2023-04-11 PROCEDURE — 99233 SBSQ HOSP IP/OBS HIGH 50: CPT

## 2023-04-11 PROCEDURE — 76700 US EXAM ABDOM COMPLETE: CPT | Mod: 26

## 2023-04-11 RX ORDER — MAGNESIUM SULFATE 500 MG/ML
2 VIAL (ML) INJECTION
Refills: 0 | Status: COMPLETED | OUTPATIENT
Start: 2023-04-11 | End: 2023-04-11

## 2023-04-11 RX ORDER — POTASSIUM CHLORIDE 20 MEQ
40 PACKET (EA) ORAL ONCE
Refills: 0 | Status: COMPLETED | OUTPATIENT
Start: 2023-04-11 | End: 2023-04-11

## 2023-04-11 RX ORDER — POTASSIUM CHLORIDE 20 MEQ
40 PACKET (EA) ORAL ONCE
Refills: 0 | Status: DISCONTINUED | OUTPATIENT
Start: 2023-04-11 | End: 2023-04-11

## 2023-04-11 RX ORDER — POTASSIUM PHOSPHATE, MONOBASIC POTASSIUM PHOSPHATE, DIBASIC 236; 224 MG/ML; MG/ML
30 INJECTION, SOLUTION INTRAVENOUS ONCE
Refills: 0 | Status: COMPLETED | OUTPATIENT
Start: 2023-04-11 | End: 2023-04-11

## 2023-04-11 RX ORDER — SODIUM,POTASSIUM PHOSPHATES 278-250MG
2 POWDER IN PACKET (EA) ORAL EVERY 4 HOURS
Refills: 0 | Status: COMPLETED | OUTPATIENT
Start: 2023-04-11 | End: 2023-04-11

## 2023-04-11 RX ADMIN — Medication 25 GRAM(S): at 11:49

## 2023-04-11 RX ADMIN — Medication 2 PACKET(S): at 18:31

## 2023-04-11 RX ADMIN — Medication 2 MILLIGRAM(S): at 01:06

## 2023-04-11 RX ADMIN — Medication 2 PACKET(S): at 09:43

## 2023-04-11 RX ADMIN — ENOXAPARIN SODIUM 40 MILLIGRAM(S): 100 INJECTION SUBCUTANEOUS at 09:26

## 2023-04-11 RX ADMIN — Medication 1 TABLET(S): at 11:49

## 2023-04-11 RX ADMIN — Medication 40 MILLIEQUIVALENT(S): at 09:26

## 2023-04-11 RX ADMIN — Medication 2 PACKET(S): at 13:51

## 2023-04-11 RX ADMIN — SODIUM CHLORIDE 100 MILLILITER(S): 9 INJECTION INTRAMUSCULAR; INTRAVENOUS; SUBCUTANEOUS at 18:36

## 2023-04-11 RX ADMIN — Medication 1 MILLIGRAM(S): at 11:49

## 2023-04-11 RX ADMIN — POTASSIUM PHOSPHATE, MONOBASIC POTASSIUM PHOSPHATE, DIBASIC 83.33 MILLIMOLE(S): 236; 224 INJECTION, SOLUTION INTRAVENOUS at 09:42

## 2023-04-11 RX ADMIN — LATANOPROST 1 DROP(S): 0.05 SOLUTION/ DROPS OPHTHALMIC; TOPICAL at 21:29

## 2023-04-11 RX ADMIN — Medication 1.5 MILLIGRAM(S): at 10:38

## 2023-04-11 RX ADMIN — Medication 1.5 MILLIGRAM(S): at 05:11

## 2023-04-11 RX ADMIN — Medication 1.5 MILLIGRAM(S): at 18:30

## 2023-04-11 RX ADMIN — Medication 25 GRAM(S): at 10:41

## 2023-04-11 RX ADMIN — Medication 100 MILLIGRAM(S): at 13:51

## 2023-04-11 RX ADMIN — Medication 1.5 MILLIGRAM(S): at 13:50

## 2023-04-11 RX ADMIN — Medication 650 MILLIGRAM(S): at 03:48

## 2023-04-11 RX ADMIN — Medication 1.5 MILLIGRAM(S): at 21:34

## 2023-04-12 LAB
ANION GAP SERPL CALC-SCNC: 5 MMOL/L — SIGNIFICANT CHANGE UP (ref 5–17)
BUN SERPL-MCNC: 5 MG/DL — LOW (ref 7–23)
CALCIUM SERPL-MCNC: 8.4 MG/DL — LOW (ref 8.5–10.1)
CHLORIDE SERPL-SCNC: 106 MMOL/L — SIGNIFICANT CHANGE UP (ref 96–108)
CO2 SERPL-SCNC: 27 MMOL/L — SIGNIFICANT CHANGE UP (ref 22–31)
CREAT SERPL-MCNC: 0.67 MG/DL — SIGNIFICANT CHANGE UP (ref 0.5–1.3)
EGFR: 107 ML/MIN/1.73M2 — SIGNIFICANT CHANGE UP
GLUCOSE SERPL-MCNC: 110 MG/DL — HIGH (ref 70–99)
HCT VFR BLD CALC: 35.4 % — LOW (ref 39–50)
HGB BLD-MCNC: 11.8 G/DL — LOW (ref 13–17)
MAGNESIUM SERPL-MCNC: 2.1 MG/DL — SIGNIFICANT CHANGE UP (ref 1.6–2.6)
MCHC RBC-ENTMCNC: 29.8 PG — SIGNIFICANT CHANGE UP (ref 27–34)
MCHC RBC-ENTMCNC: 33.3 G/DL — SIGNIFICANT CHANGE UP (ref 32–36)
MCV RBC AUTO: 89.4 FL — SIGNIFICANT CHANGE UP (ref 80–100)
NRBC # BLD: 0 /100 WBCS — SIGNIFICANT CHANGE UP (ref 0–0)
PHOSPHATE SERPL-MCNC: 2.6 MG/DL — SIGNIFICANT CHANGE UP (ref 2.5–4.5)
PLATELET # BLD AUTO: 210 K/UL — SIGNIFICANT CHANGE UP (ref 150–400)
POTASSIUM SERPL-MCNC: 3.3 MMOL/L — LOW (ref 3.5–5.3)
POTASSIUM SERPL-SCNC: 3.3 MMOL/L — LOW (ref 3.5–5.3)
RBC # BLD: 3.96 M/UL — LOW (ref 4.2–5.8)
RBC # FLD: 14.2 % — SIGNIFICANT CHANGE UP (ref 10.3–14.5)
SODIUM SERPL-SCNC: 138 MMOL/L — SIGNIFICANT CHANGE UP (ref 135–145)
WBC # BLD: 5.95 K/UL — SIGNIFICANT CHANGE UP (ref 3.8–10.5)
WBC # FLD AUTO: 5.95 K/UL — SIGNIFICANT CHANGE UP (ref 3.8–10.5)

## 2023-04-12 PROCEDURE — 71045 X-RAY EXAM CHEST 1 VIEW: CPT | Mod: 26

## 2023-04-12 PROCEDURE — 99232 SBSQ HOSP IP/OBS MODERATE 35: CPT

## 2023-04-12 RX ORDER — POTASSIUM CHLORIDE 20 MEQ
40 PACKET (EA) ORAL EVERY 4 HOURS
Refills: 0 | Status: COMPLETED | OUTPATIENT
Start: 2023-04-12 | End: 2023-04-12

## 2023-04-12 RX ORDER — DORZOLAMIDE HYDROCHLORIDE 20 MG/ML
1 SOLUTION/ DROPS OPHTHALMIC THREE TIMES A DAY
Refills: 0 | Status: DISCONTINUED | OUTPATIENT
Start: 2023-04-12 | End: 2023-04-16

## 2023-04-12 RX ORDER — AMLODIPINE BESYLATE 2.5 MG/1
10 TABLET ORAL EVERY 24 HOURS
Refills: 0 | Status: DISCONTINUED | OUTPATIENT
Start: 2023-04-12 | End: 2023-04-16

## 2023-04-12 RX ORDER — TIMOLOL 0.5 %
1 DROPS OPHTHALMIC (EYE)
Refills: 0 | Status: DISCONTINUED | OUTPATIENT
Start: 2023-04-12 | End: 2023-04-16

## 2023-04-12 RX ADMIN — Medication 1 MILLIGRAM(S): at 16:03

## 2023-04-12 RX ADMIN — Medication 40 MILLIEQUIVALENT(S): at 17:44

## 2023-04-12 RX ADMIN — Medication 1 MILLIGRAM(S): at 11:55

## 2023-04-12 RX ADMIN — SODIUM CHLORIDE 100 MILLILITER(S): 9 INJECTION INTRAMUSCULAR; INTRAVENOUS; SUBCUTANEOUS at 22:00

## 2023-04-12 RX ADMIN — LATANOPROST 1 DROP(S): 0.05 SOLUTION/ DROPS OPHTHALMIC; TOPICAL at 21:53

## 2023-04-12 RX ADMIN — Medication 200 MILLIGRAM(S): at 13:30

## 2023-04-12 RX ADMIN — DORZOLAMIDE HYDROCHLORIDE 1 DROP(S): 20 SOLUTION/ DROPS OPHTHALMIC at 21:54

## 2023-04-12 RX ADMIN — Medication 40 MILLIEQUIVALENT(S): at 13:29

## 2023-04-12 RX ADMIN — Medication 1 DROP(S): at 17:46

## 2023-04-12 RX ADMIN — Medication 200 MILLIGRAM(S): at 22:14

## 2023-04-12 RX ADMIN — DORZOLAMIDE HYDROCHLORIDE 1 DROP(S): 20 SOLUTION/ DROPS OPHTHALMIC at 13:29

## 2023-04-12 RX ADMIN — Medication 1.5 MILLIGRAM(S): at 03:35

## 2023-04-12 RX ADMIN — Medication 1 TABLET(S): at 11:56

## 2023-04-12 RX ADMIN — Medication 650 MILLIGRAM(S): at 20:57

## 2023-04-12 RX ADMIN — Medication 100 MILLIGRAM(S): at 11:56

## 2023-04-12 RX ADMIN — Medication 1 MILLIGRAM(S): at 07:48

## 2023-04-12 RX ADMIN — Medication 1 MILLIGRAM(S): at 20:30

## 2023-04-12 RX ADMIN — ENOXAPARIN SODIUM 40 MILLIGRAM(S): 100 INJECTION SUBCUTANEOUS at 08:57

## 2023-04-12 RX ADMIN — AMLODIPINE BESYLATE 10 MILLIGRAM(S): 2.5 TABLET ORAL at 18:00

## 2023-04-12 RX ADMIN — Medication 1 MILLIGRAM(S): at 11:54

## 2023-04-12 RX ADMIN — SODIUM CHLORIDE 100 MILLILITER(S): 9 INJECTION INTRAMUSCULAR; INTRAVENOUS; SUBCUTANEOUS at 03:39

## 2023-04-12 RX ADMIN — Medication 650 MILLIGRAM(S): at 20:27

## 2023-04-13 LAB
ANION GAP SERPL CALC-SCNC: 6 MMOL/L — SIGNIFICANT CHANGE UP (ref 5–17)
BUN SERPL-MCNC: 6 MG/DL — LOW (ref 7–23)
CALCIUM SERPL-MCNC: 8.9 MG/DL — SIGNIFICANT CHANGE UP (ref 8.5–10.1)
CHLORIDE SERPL-SCNC: 109 MMOL/L — HIGH (ref 96–108)
CO2 SERPL-SCNC: 24 MMOL/L — SIGNIFICANT CHANGE UP (ref 22–31)
CREAT SERPL-MCNC: 0.59 MG/DL — SIGNIFICANT CHANGE UP (ref 0.5–1.3)
EGFR: 111 ML/MIN/1.73M2 — SIGNIFICANT CHANGE UP
GLUCOSE SERPL-MCNC: 102 MG/DL — HIGH (ref 70–99)
MAGNESIUM SERPL-MCNC: 2 MG/DL — SIGNIFICANT CHANGE UP (ref 1.6–2.6)
PHOSPHATE SERPL-MCNC: 2.8 MG/DL — SIGNIFICANT CHANGE UP (ref 2.5–4.5)
POTASSIUM SERPL-MCNC: 3.7 MMOL/L — SIGNIFICANT CHANGE UP (ref 3.5–5.3)
POTASSIUM SERPL-SCNC: 3.7 MMOL/L — SIGNIFICANT CHANGE UP (ref 3.5–5.3)
SODIUM SERPL-SCNC: 139 MMOL/L — SIGNIFICANT CHANGE UP (ref 135–145)

## 2023-04-13 PROCEDURE — 99232 SBSQ HOSP IP/OBS MODERATE 35: CPT

## 2023-04-13 RX ADMIN — DORZOLAMIDE HYDROCHLORIDE 1 DROP(S): 20 SOLUTION/ DROPS OPHTHALMIC at 22:11

## 2023-04-13 RX ADMIN — Medication 1 MILLIGRAM(S): at 04:42

## 2023-04-13 RX ADMIN — Medication 0.5 MILLIGRAM(S): at 14:43

## 2023-04-13 RX ADMIN — SODIUM CHLORIDE 100 MILLILITER(S): 9 INJECTION INTRAMUSCULAR; INTRAVENOUS; SUBCUTANEOUS at 19:53

## 2023-04-13 RX ADMIN — Medication 1 MILLIGRAM(S): at 00:29

## 2023-04-13 RX ADMIN — Medication 1 TABLET(S): at 11:29

## 2023-04-13 RX ADMIN — Medication 0.5 MILLIGRAM(S): at 07:50

## 2023-04-13 RX ADMIN — AMLODIPINE BESYLATE 10 MILLIGRAM(S): 2.5 TABLET ORAL at 17:45

## 2023-04-13 RX ADMIN — Medication 200 MILLIGRAM(S): at 22:09

## 2023-04-13 RX ADMIN — Medication 0.5 MILLIGRAM(S): at 17:48

## 2023-04-13 RX ADMIN — LATANOPROST 1 DROP(S): 0.05 SOLUTION/ DROPS OPHTHALMIC; TOPICAL at 22:12

## 2023-04-13 RX ADMIN — DORZOLAMIDE HYDROCHLORIDE 1 DROP(S): 20 SOLUTION/ DROPS OPHTHALMIC at 05:06

## 2023-04-13 RX ADMIN — Medication 1 DROP(S): at 05:05

## 2023-04-13 RX ADMIN — Medication 0.5 MILLIGRAM(S): at 22:15

## 2023-04-13 RX ADMIN — Medication 1 MILLIGRAM(S): at 11:28

## 2023-04-13 RX ADMIN — Medication 0.5 MILLIGRAM(S): at 11:25

## 2023-04-13 RX ADMIN — ENOXAPARIN SODIUM 40 MILLIGRAM(S): 100 INJECTION SUBCUTANEOUS at 07:48

## 2023-04-13 RX ADMIN — Medication 100 MILLIGRAM(S): at 11:29

## 2023-04-13 RX ADMIN — Medication 1 DROP(S): at 17:45

## 2023-04-13 RX ADMIN — DORZOLAMIDE HYDROCHLORIDE 1 DROP(S): 20 SOLUTION/ DROPS OPHTHALMIC at 14:40

## 2023-04-14 PROCEDURE — 99232 SBSQ HOSP IP/OBS MODERATE 35: CPT

## 2023-04-14 RX ORDER — LANOLIN ALCOHOL/MO/W.PET/CERES
3 CREAM (GRAM) TOPICAL AT BEDTIME
Refills: 0 | Status: DISCONTINUED | OUTPATIENT
Start: 2023-04-14 | End: 2023-04-16

## 2023-04-14 RX ADMIN — Medication 1 DROP(S): at 05:23

## 2023-04-14 RX ADMIN — DORZOLAMIDE HYDROCHLORIDE 1 DROP(S): 20 SOLUTION/ DROPS OPHTHALMIC at 05:22

## 2023-04-14 RX ADMIN — DORZOLAMIDE HYDROCHLORIDE 1 DROP(S): 20 SOLUTION/ DROPS OPHTHALMIC at 21:31

## 2023-04-14 RX ADMIN — SODIUM CHLORIDE 100 MILLILITER(S): 9 INJECTION INTRAMUSCULAR; INTRAVENOUS; SUBCUTANEOUS at 21:31

## 2023-04-14 RX ADMIN — AMLODIPINE BESYLATE 10 MILLIGRAM(S): 2.5 TABLET ORAL at 17:54

## 2023-04-14 RX ADMIN — Medication 3 MILLIGRAM(S): at 22:37

## 2023-04-14 RX ADMIN — Medication 200 MILLIGRAM(S): at 23:19

## 2023-04-14 RX ADMIN — LATANOPROST 1 DROP(S): 0.05 SOLUTION/ DROPS OPHTHALMIC; TOPICAL at 21:35

## 2023-04-14 RX ADMIN — Medication 1 MILLIGRAM(S): at 12:21

## 2023-04-14 RX ADMIN — Medication 200 MILLIGRAM(S): at 05:47

## 2023-04-14 RX ADMIN — Medication 0.5 MILLIGRAM(S): at 17:54

## 2023-04-14 RX ADMIN — SODIUM CHLORIDE 100 MILLILITER(S): 9 INJECTION INTRAMUSCULAR; INTRAVENOUS; SUBCUTANEOUS at 18:00

## 2023-04-14 RX ADMIN — ENOXAPARIN SODIUM 40 MILLIGRAM(S): 100 INJECTION SUBCUTANEOUS at 08:52

## 2023-04-14 RX ADMIN — Medication 1 DROP(S): at 17:56

## 2023-04-14 RX ADMIN — SODIUM CHLORIDE 100 MILLILITER(S): 9 INJECTION INTRAMUSCULAR; INTRAVENOUS; SUBCUTANEOUS at 06:58

## 2023-04-14 RX ADMIN — DORZOLAMIDE HYDROCHLORIDE 1 DROP(S): 20 SOLUTION/ DROPS OPHTHALMIC at 13:40

## 2023-04-14 RX ADMIN — Medication 100 MILLIGRAM(S): at 12:21

## 2023-04-14 RX ADMIN — Medication 0.5 MILLIGRAM(S): at 02:00

## 2023-04-14 RX ADMIN — Medication 1 TABLET(S): at 12:22

## 2023-04-14 NOTE — PROGRESS NOTE ADULT - NS ATTEST RISK PROBLEM GEN_ALL_CORE FT
ETOH withdrawal requiring parenteral controlled substances

## 2023-04-15 LAB
ALBUMIN SERPL ELPH-MCNC: 3 G/DL — LOW (ref 3.3–5)
ALP SERPL-CCNC: 55 U/L — SIGNIFICANT CHANGE UP (ref 40–120)
ALT FLD-CCNC: 112 U/L — HIGH (ref 12–78)
ANION GAP SERPL CALC-SCNC: 6 MMOL/L — SIGNIFICANT CHANGE UP (ref 5–17)
AST SERPL-CCNC: 72 U/L — HIGH (ref 15–37)
BILIRUB SERPL-MCNC: 0.5 MG/DL — SIGNIFICANT CHANGE UP (ref 0.2–1.2)
BUN SERPL-MCNC: 10 MG/DL — SIGNIFICANT CHANGE UP (ref 7–23)
CALCIUM SERPL-MCNC: 9.4 MG/DL — SIGNIFICANT CHANGE UP (ref 8.5–10.1)
CHLORIDE SERPL-SCNC: 109 MMOL/L — HIGH (ref 96–108)
CO2 SERPL-SCNC: 23 MMOL/L — SIGNIFICANT CHANGE UP (ref 22–31)
CREAT SERPL-MCNC: 0.68 MG/DL — SIGNIFICANT CHANGE UP (ref 0.5–1.3)
EGFR: 106 ML/MIN/1.73M2 — SIGNIFICANT CHANGE UP
GLUCOSE SERPL-MCNC: 96 MG/DL — SIGNIFICANT CHANGE UP (ref 70–99)
HCT VFR BLD CALC: 37.9 % — LOW (ref 39–50)
HGB BLD-MCNC: 12.7 G/DL — LOW (ref 13–17)
MAGNESIUM SERPL-MCNC: 1.8 MG/DL — SIGNIFICANT CHANGE UP (ref 1.6–2.6)
MCHC RBC-ENTMCNC: 30.1 PG — SIGNIFICANT CHANGE UP (ref 27–34)
MCHC RBC-ENTMCNC: 33.5 G/DL — SIGNIFICANT CHANGE UP (ref 32–36)
MCV RBC AUTO: 89.8 FL — SIGNIFICANT CHANGE UP (ref 80–100)
NRBC # BLD: 0 /100 WBCS — SIGNIFICANT CHANGE UP (ref 0–0)
PHOSPHATE SERPL-MCNC: 3.2 MG/DL — SIGNIFICANT CHANGE UP (ref 2.5–4.5)
PLATELET # BLD AUTO: 268 K/UL — SIGNIFICANT CHANGE UP (ref 150–400)
POTASSIUM SERPL-MCNC: 3.2 MMOL/L — LOW (ref 3.5–5.3)
POTASSIUM SERPL-SCNC: 3.2 MMOL/L — LOW (ref 3.5–5.3)
PROT SERPL-MCNC: 7.5 GM/DL — SIGNIFICANT CHANGE UP (ref 6–8.3)
RBC # BLD: 4.22 M/UL — SIGNIFICANT CHANGE UP (ref 4.2–5.8)
RBC # FLD: 14.2 % — SIGNIFICANT CHANGE UP (ref 10.3–14.5)
SODIUM SERPL-SCNC: 138 MMOL/L — SIGNIFICANT CHANGE UP (ref 135–145)
WBC # BLD: 5.68 K/UL — SIGNIFICANT CHANGE UP (ref 3.8–10.5)
WBC # FLD AUTO: 5.68 K/UL — SIGNIFICANT CHANGE UP (ref 3.8–10.5)

## 2023-04-15 PROCEDURE — 99232 SBSQ HOSP IP/OBS MODERATE 35: CPT

## 2023-04-15 RX ORDER — POTASSIUM CHLORIDE 20 MEQ
40 PACKET (EA) ORAL ONCE
Refills: 0 | Status: COMPLETED | OUTPATIENT
Start: 2023-04-15 | End: 2023-04-15

## 2023-04-15 RX ADMIN — Medication 100 MILLIGRAM(S): at 12:14

## 2023-04-15 RX ADMIN — SODIUM CHLORIDE 100 MILLILITER(S): 9 INJECTION INTRAMUSCULAR; INTRAVENOUS; SUBCUTANEOUS at 16:23

## 2023-04-15 RX ADMIN — Medication 1 TABLET(S): at 12:17

## 2023-04-15 RX ADMIN — Medication 0.5 MILLIGRAM(S): at 05:39

## 2023-04-15 RX ADMIN — Medication 1 DROP(S): at 18:24

## 2023-04-15 RX ADMIN — ENOXAPARIN SODIUM 40 MILLIGRAM(S): 100 INJECTION SUBCUTANEOUS at 12:14

## 2023-04-15 RX ADMIN — Medication 1 MILLIGRAM(S): at 12:17

## 2023-04-15 RX ADMIN — Medication 3 MILLIGRAM(S): at 21:28

## 2023-04-15 RX ADMIN — LATANOPROST 1 DROP(S): 0.05 SOLUTION/ DROPS OPHTHALMIC; TOPICAL at 21:27

## 2023-04-15 RX ADMIN — SODIUM CHLORIDE 100 MILLILITER(S): 9 INJECTION INTRAMUSCULAR; INTRAVENOUS; SUBCUTANEOUS at 05:44

## 2023-04-15 RX ADMIN — DORZOLAMIDE HYDROCHLORIDE 1 DROP(S): 20 SOLUTION/ DROPS OPHTHALMIC at 21:26

## 2023-04-15 RX ADMIN — Medication 40 MILLIEQUIVALENT(S): at 12:14

## 2023-04-15 RX ADMIN — DORZOLAMIDE HYDROCHLORIDE 1 DROP(S): 20 SOLUTION/ DROPS OPHTHALMIC at 05:38

## 2023-04-15 RX ADMIN — Medication 1 DROP(S): at 05:39

## 2023-04-15 RX ADMIN — Medication 1 APPLICATION(S): at 19:03

## 2023-04-15 RX ADMIN — Medication 0.5 MILLIGRAM(S): at 18:23

## 2023-04-15 RX ADMIN — DORZOLAMIDE HYDROCHLORIDE 1 DROP(S): 20 SOLUTION/ DROPS OPHTHALMIC at 14:39

## 2023-04-15 NOTE — PROGRESS NOTE ADULT - SUBJECTIVE AND OBJECTIVE BOX
HPI:  58 y M PMH: alcohol use disorder w/ multiple episodes of DTs and prior hospitalizations for alcohol withdrawal, HTN, HLD, and glaucoma, recent admission to [facility] for ETOH withdrawal presents to the ED for ETOH withdrawal    Patient seen and examined at bedside- in no acute distress however cannot provide history (see medications given below). Patient is asleep, wakes up to painful stimuli.   As per ED provider: "Found sitting in parking lot drinking vodka. No trauma reported. Pt without obvious injuries. Pt attempting numerous times to get out of stretcher to urinate but has unsteady gait. Pt given urinal and placed on enhanced observation, however still attempting to get out of stretcher and is on fall risk. 2mg versed ordered as pt not verbally redirectable, keeps stating "I can walk". Will observe until clinically sober."    VS: Afebrile, HR: 98, BP: 123/91, saturating 98% on RA  Medications: librium 75 mg, ketamine 250 mg IM, atirvan 2 mg IV x 2, versed 2 mg IM, NS 1 L  (10 Apr 2023 05:14)    Patient is a 58y old  Male who presents with a chief complaint of     INTERVAL HPI/OVERNIGHT EVENTS: continues to have tremors     MEDICATIONS  (STANDING):  amLODIPine   Tablet 10 milliGRAM(s) Oral every 24 hours  dorzolamide 2% Ophthalmic Solution 1 Drop(s) Both EYES three times a day  enoxaparin Injectable 40 milliGRAM(s) SubCutaneous every 24 hours  folic acid 1 milliGRAM(s) Oral daily  latanoprost 0.005% Ophthalmic Solution 1 Drop(s) Both EYES at bedtime  LORazepam   Injectable 0.5 milliGRAM(s) IV Push every 12 hours  LORazepam   Injectable   IV Push   multivitamin 1 Tablet(s) Oral daily  sodium chloride 0.9%. 1000 milliLiter(s) (100 mL/Hr) IV Continuous <Continuous>  thiamine 100 milliGRAM(s) Oral daily  timolol 0.5% Solution 1 Drop(s) Both EYES two times a day    MEDICATIONS  (PRN):  acetaminophen     Tablet .. 650 milliGRAM(s) Oral every 6 hours PRN Temp greater or equal to 38C (100.4F), Mild Pain (1 - 3)  artificial  tears Solution 1 Drop(s) Both EYES two times a day PRN Dry Eyes  guaiFENesin Oral Liquid (Sugar-Free) 200 milliGRAM(s) Oral every 6 hours PRN Cough  LORazepam   Injectable 2 milliGRAM(s) IV Push every 2 hours PRN Symptom-triggered: each CIWA -Ar score 8 or GREATER  sodium chloride 0.65% Nasal 1 Spray(s) Both Nostrils five times a day PRN Nasal Congestion      Allergies    No Known Allergies    Intolerances        REVIEW OF SYSTEMS:  CONSTITUTIONAL: No fever, weight loss, or fatigue  EYES: No eye pain, visual disturbances, or discharge  ENMT:  No difficulty hearing, tinnitus, vertigo; No sinus or throat pain  NECK: No pain or stiffness  BREASTS: No pain, masses, or nipple discharge  RESPIRATORY: No cough, wheezing, chills or hemoptysis; No shortness of breath  CARDIOVASCULAR: No chest pain, palpitations, dizziness, or leg swelling  GASTROINTESTINAL: No abdominal or epigastric pain. No nausea, vomiting, or hematemesis; No diarrhea or constipation. No melena or hematochezia.  GENITOURINARY: No dysuria, frequency, hematuria, or incontinence  NEUROLOGICAL: No headaches, memory loss, loss of strength, numbness, or tremors  SKIN: No itching, burning, rashes, or lesions   LYMPH NODES: No enlarged glands  ENDOCRINE: No heat or cold intolerance; No hair loss  MUSCULOSKELETAL: No joint pain or swelling; No muscle, back, or extremity pain  PSYCHIATRIC: No depression, anxiety, mood swings, or difficulty sleeping  HEME/LYMPH: No easy bruising, or bleeding gums  ALLERGY AND IMMUNOLOGIC: No hives or eczema    Vital Signs Last 24 Hrs  T(C): 36.4 (14 Apr 2023 11:19), Max: 37.1 (13 Apr 2023 20:32)  T(F): 97.5 (14 Apr 2023 11:19), Max: 98.8 (13 Apr 2023 20:32)  HR: 66 (14 Apr 2023 11:19) (60 - 76)  BP: 133/76 (14 Apr 2023 11:19) (123/67 - 148/78)  RR: 18 (14 Apr 2023 11:19) (18 - 18)  SpO2: 95% (14 Apr 2023 11:19) (95% - 98%)    Parameters below as of 14 Apr 2023 11:19  Patient On (Oxygen Delivery Method): room air        PHYSICAL EXAM:  GENERAL: NAD,   HEAD:  Atraumatic, Normocephalic  EYES: EOMI, PERRLA, conjunctiva and sclera clear  ENMT: No tonsillar erythema, exudates, or enlargement; Moist mucous membranes, Good dentition, No lesions  NECK: Supple, No JVD, Normal thyroid  NERVOUS SYSTEM:  Alert & Oriented X3, fair concentration tremor   CHEST/LUNG: Clear to ascultation  bilaterally; No rales, rhonchi, wheezing, or rubs  HEART: Regular rate and rhythm; No murmurs, rubs, or gallops  ABDOMEN: Soft, Nontender, Nondistended; Bowel sounds present  EXTREMITIES:  2+ Peripheral Pulses, No clubbing, cyanosis, or edema  LYMPH: No lymphadenopathy noted  SKIN: No rashes or lesions    LABS:    04-13    139  |  109<H>  |  6<L>  ----------------------------<  102<H>  3.7   |  24  |  0.59    Ca    8.9      13 Apr 2023 07:55  Phos  2.8     04-13  Mg     2.0     04-13          CAPILLARY BLOOD GLUCOSE          RADIOLOGY & ADDITIONAL TESTS:    Imaging Personally Reviewed:  [ ] YES  [ ] NO    Consultant(s) Notes Reviewed:  [ ] YES  [ ] NO    Care Discussed with Consultants/Other Providers [ ] YES  [ ] NO
Hospitalist Progress Note    Chief Complaint:  ETOH W/ Withdrawal    SUBJECTIVE / OVERNIGHT EVENTS:  No events overnight, patient seen at bedside, tremulous, mildly anxious, AAOx3, complaining of a cough. Patient denies chest pain, SOB, abd pain, N/V, fever, chills, dysuria or any other complaints. All remainder ROS negative.     MEDICATIONS  (STANDING):  amLODIPine   Tablet 10 milliGRAM(s) Oral every 24 hours  dorzolamide 2% Ophthalmic Solution 1 Drop(s) Both EYES three times a day  enoxaparin Injectable 40 milliGRAM(s) SubCutaneous every 24 hours  folic acid 1 milliGRAM(s) Oral daily  latanoprost 0.005% Ophthalmic Solution 1 Drop(s) Both EYES at bedtime  LORazepam   Injectable 0.5 milliGRAM(s) IV Push every 4 hours  LORazepam   Injectable   IV Push   multivitamin 1 Tablet(s) Oral daily  sodium chloride 0.9%. 1000 milliLiter(s) (100 mL/Hr) IV Continuous <Continuous>  thiamine 100 milliGRAM(s) Oral daily  timolol 0.5% Solution 1 Drop(s) Both EYES two times a day    MEDICATIONS  (PRN):  acetaminophen     Tablet .. 650 milliGRAM(s) Oral every 6 hours PRN Temp greater or equal to 38C (100.4F), Mild Pain (1 - 3)  artificial  tears Solution 1 Drop(s) Both EYES two times a day PRN Dry Eyes  guaiFENesin Oral Liquid (Sugar-Free) 200 milliGRAM(s) Oral every 6 hours PRN Cough  LORazepam   Injectable 2 milliGRAM(s) IV Push every 2 hours PRN Symptom-triggered: each CIWA -Ar score 8 or GREATER  sodium chloride 0.65% Nasal 1 Spray(s) Both Nostrils five times a day PRN Nasal Congestion                 ICU Vital Signs Last 24 Hrs  T(C): 36.4 (13 Apr 2023 16:52), Max: 36.6 (13 Apr 2023 11:13)  T(F): 97.6 (13 Apr 2023 16:52), Max: 97.8 (13 Apr 2023 11:13)  HR: 76 (13 Apr 2023 16:52) (55 - 76)  BP: 127/77 (13 Apr 2023 16:52) (123/71 - 156/84)  RR: 18 (13 Apr 2023 16:52) (18 - 19)  SpO2: 98% (13 Apr 2023 16:52) (95% - 98%)    O2 Parameters below as of 13 Apr 2023 16:52  Patient On (Oxygen Delivery Method): room air                     11.8   5.95  )-----------( 210      ( 12 Apr 2023 06:33 )             35.4     04-13    139  |  109<H>  |  6<L>  ----------------------------<  102<H>  3.7   |  24  |  0.59    Ca    8.9      13 Apr 2023 07:55  Phos  2.8     04-13  Mg     2.0     04-13                      General: in no acute distress  Eyes: PERRLA, EOMI; conjunctiva and sclera clear  Head: Normocephalic; atraumatic  ENMT: No nasal discharge; airway clear  Neck: Supple; non tender; no masses  Respiratory: No wheezes, rales or rhonchi  Cardiovascular: Regular rate and rhythm. S1 and S2 Normal; No murmurs, gallops or rubs  Gastrointestinal: Soft non-tender non-distended; Normal bowel sounds  Genitourinary: No costovertebral angle tenderness  Extremities: Normal range of motion, No clubbing, cyanosis or edema  Vascular: Peripheral pulses palpable 2+ bilaterally  Neurological: Alert and oriented x4, tremulous extremities, tongue fasciculations, CIWA 3  Skin: Warm and dry. No acute rash  Lymph Nodes: No acute cervical adenopathy  Musculoskeletal: Normal gait, tone, without deformities  Psychiatric: Cooperative and appropriate                                                            RADIOLOGY & ADDITIONAL TESTS:  Results Reviewed: Y  Imaging Personally Reviewed: N  Electrocardiogram Personally Reviewed: N                                          
Hospitalist Progress Note    Chief Complaint:  ETOH W/ Withdrawal    SUBJECTIVE / OVERNIGHT EVENTS:  No events overnight, patient seen at bedside, tremulous, mildly anxious, AAOx3, complaining of a cough. Patient denies chest pain, SOB, abd pain, N/V, fever, chills, dysuria or any other complaints. All remainder ROS negative.     MEDICATIONS  (STANDING):  enoxaparin Injectable 40 milliGRAM(s) SubCutaneous every 24 hours  folic acid 1 milliGRAM(s) Oral daily  latanoprost 0.005% Ophthalmic Solution 1 Drop(s) Both EYES at bedtime  LORazepam   Injectable 1.5 milliGRAM(s) IV Push every 4 hours  LORazepam   Injectable   IV Push   magnesium sulfate  IVPB 2 Gram(s) IV Intermittent every 2 hours  multivitamin 1 Tablet(s) Oral daily  potassium phosphate / sodium phosphate Powder (PHOS-NaK) 2 Packet(s) Oral every 4 hours  sodium chloride 0.9%. 1000 milliLiter(s) (100 mL/Hr) IV Continuous <Continuous>  thiamine 100 milliGRAM(s) Oral daily    MEDICATIONS  (PRN):  acetaminophen     Tablet .. 650 milliGRAM(s) Oral every 6 hours PRN Temp greater or equal to 38C (100.4F), Mild Pain (1 - 3)  LORazepam   Injectable 2 milliGRAM(s) IV Push every 2 hours PRN Symptom-triggered: each CIWA -Ar score 8 or GREATER  sodium chloride 0.65% Nasal 1 Spray(s) Both Nostrils five times a day PRN Nasal Congestion        I&O's Summary    10 Apr 2023 07:01  -  11 Apr 2023 07:00  --------------------------------------------------------  IN: 1360 mL / OUT: 2150 mL / NET: -790 mL    11 Apr 2023 07:01  -  11 Apr 2023 11:49  --------------------------------------------------------  IN: 450 mL / OUT: 300 mL / NET: 150 mL        PHYSICAL EXAM:  Vital Signs Last 24 Hrs  T(C): 37.4 (11 Apr 2023 11:44), Max: 38.9 (11 Apr 2023 05:05)  T(F): 99.4 (11 Apr 2023 11:44), Max: 102.1 (11 Apr 2023 05:05)  HR: 91 (11 Apr 2023 11:44) (70 - 92)  BP: 123/75 (11 Apr 2023 11:44) (120/67 - 148/74)  BP(mean): --  RR: 18 (11 Apr 2023 11:44) (18 - 18)  SpO2: 94% (11 Apr 2023 11:44) (94% - 96%)    Parameters below as of 11 Apr 2023 11:44  Patient On (Oxygen Delivery Method): room air          General: in no acute distress  Eyes: PERRLA, EOMI; conjunctiva and sclera clear  Head: Normocephalic; atraumatic  ENMT: No nasal discharge; airway clear  Neck: Supple; non tender; no masses  Respiratory: No wheezes, rales or rhonchi  Cardiovascular: Regular rate and rhythm. S1 and S2 Normal; No murmurs, gallops or rubs  Gastrointestinal: Soft non-tender non-distended; Normal bowel sounds  Genitourinary: No costovertebral angle tenderness  Extremities: Normal range of motion, No clubbing, cyanosis or edema  Vascular: Peripheral pulses palpable 2+ bilaterally  Neurological: Alert and oriented x4, tremulous extremities, tongue fasciculations, CIWA 3  Skin: Warm and dry. No acute rash  Lymph Nodes: No acute cervical adenopathy  Musculoskeletal: Normal gait, tone, without deformities  Psychiatric: Cooperative and appropriate    LABS:                        11.3   5.19  )-----------( 206      ( 11 Apr 2023 06:05 )             33.9     04-11    135  |  100  |  7   ----------------------------<  118<H>  3.0<L>   |  27  |  0.66    Ca    7.9<L>      11 Apr 2023 06:05  Phos  1.4     04-11  Mg     1.4     04-11    TPro  7.3  /  Alb  3.3  /  TBili  0.3  /  DBili  0.2  /  AST  76<H>  /  ALT  106<H>  /  AlkPhos  66  04-10              CAPILLARY BLOOD GLUCOSE            RADIOLOGY & ADDITIONAL TESTS:  Results Reviewed: Y  Imaging Personally Reviewed: N  Electrocardiogram Personally Reviewed: MARSHALL                                          
Patient is a 58y old  Male who presents with a chief complaint of ETOH withdrawal (14 Apr 2023 15:38)      SUBJECTIVE / OVERNIGHT EVENTS: Patient seen and examined at [facility]. State he is having loose stools since yesterday, 2 episodes today, tolerating diet well, denies n/v.    ROS:  All other review of systems negative    Allergies    No Known Allergies    Intolerances        MEDICATIONS  (STANDING):  amLODIPine   Tablet 10 milliGRAM(s) Oral every 24 hours  dorzolamide 2% Ophthalmic Solution 1 Drop(s) Both EYES three times a day  enoxaparin Injectable 40 milliGRAM(s) SubCutaneous every 24 hours  folic acid 1 milliGRAM(s) Oral daily  latanoprost 0.005% Ophthalmic Solution 1 Drop(s) Both EYES at bedtime  LORazepam   Injectable 0.5 milliGRAM(s) IV Push every 12 hours  LORazepam   Injectable   IV Push   melatonin 3 milliGRAM(s) Oral at bedtime  multivitamin 1 Tablet(s) Oral daily  potassium chloride    Tablet ER 40 milliEquivalent(s) Oral once  sodium chloride 0.9%. 1000 milliLiter(s) (100 mL/Hr) IV Continuous <Continuous>  thiamine 100 milliGRAM(s) Oral daily  timolol 0.5% Solution 1 Drop(s) Both EYES two times a day    MEDICATIONS  (PRN):  acetaminophen     Tablet .. 650 milliGRAM(s) Oral every 6 hours PRN Temp greater or equal to 38C (100.4F), Mild Pain (1 - 3)  artificial  tears Solution 1 Drop(s) Both EYES two times a day PRN Dry Eyes  guaiFENesin Oral Liquid (Sugar-Free) 200 milliGRAM(s) Oral every 6 hours PRN Cough  LORazepam   Injectable 2 milliGRAM(s) IV Push every 2 hours PRN Symptom-triggered: each CIWA -Ar score 8 or GREATER  sodium chloride 0.65% Nasal 1 Spray(s) Both Nostrils five times a day PRN Nasal Congestion      Vital Signs Last 24 Hrs  T(C): 36.2 (15 Apr 2023 05:17), Max: 36.9 (14 Apr 2023 17:00)  T(F): 97.2 (15 Apr 2023 05:17), Max: 98.4 (14 Apr 2023 17:00)  HR: 55 (15 Apr 2023 05:17) (55 - 62)  BP: 113/71 (15 Apr 2023 05:17) (113/71 - 121/74)  BP(mean): --  RR: 18 (15 Apr 2023 05:17) (18 - 18)  SpO2: 97% (15 Apr 2023 05:17) (95% - 97%)    Parameters below as of 15 Apr 2023 05:17  Patient On (Oxygen Delivery Method): room air      CAPILLARY BLOOD GLUCOSE        I&O's Summary    14 Apr 2023 07:01  -  15 Apr 2023 07:00  --------------------------------------------------------  IN: 0 mL / OUT: 800 mL / NET: -800 mL        PHYSICAL EXAM:  GENERAL: NAD, well-developed  HEAD:  Atraumatic, Normocephalic  EYES: EOMI, PERRLA, conjunctiva and sclera clear  NECK: Supple, No JVD  CHEST/LUNG: Clear to auscultation bilaterally; No wheeze  HEART: Regular rate and rhythm; No murmurs, rubs, or gallops  ABDOMEN: Soft, Nontender, Nondistended; Bowel sounds present  EXTREMITIES:  2+ Peripheral Pulses, No clubbing, cyanosis, or edema  NEUROLOGY: AAOx3, non-focal, mild termors       LABS:                        12.7   5.68  )-----------( 268      ( 15 Apr 2023 07:05 )             37.9     04-15    138  |  109<H>  |  10  ----------------------------<  96  3.2<L>   |  23  |  0.68    Ca    9.4      15 Apr 2023 07:05  Phos  3.2     04-15  Mg     1.8     04-15    TPro  7.5  /  Alb  3.0<L>  /  TBili  0.5  /  DBili  x   /  AST  72<H>  /  ALT  112<H>  /  AlkPhos  55  04-15            
Hospitalist Progress Note    Chief Complaint:  ETOH W/ Withdrawal    SUBJECTIVE / OVERNIGHT EVENTS:  No events overnight, patient seen at bedside, tremulous, mildly anxious, AAOx3, complaining of a cough. Patient denies chest pain, SOB, abd pain, N/V, fever, chills, dysuria or any other complaints. All remainder ROS negative.     MEDICATIONS  (STANDING):  enoxaparin Injectable 40 milliGRAM(s) SubCutaneous every 24 hours  folic acid 1 milliGRAM(s) Oral daily  latanoprost 0.005% Ophthalmic Solution 1 Drop(s) Both EYES at bedtime  LORazepam   Injectable 1.5 milliGRAM(s) IV Push every 4 hours  LORazepam   Injectable   IV Push   magnesium sulfate  IVPB 2 Gram(s) IV Intermittent every 2 hours  multivitamin 1 Tablet(s) Oral daily  potassium phosphate / sodium phosphate Powder (PHOS-NaK) 2 Packet(s) Oral every 4 hours  sodium chloride 0.9%. 1000 milliLiter(s) (100 mL/Hr) IV Continuous <Continuous>  thiamine 100 milliGRAM(s) Oral daily    MEDICATIONS  (PRN):  acetaminophen     Tablet .. 650 milliGRAM(s) Oral every 6 hours PRN Temp greater or equal to 38C (100.4F), Mild Pain (1 - 3)  LORazepam   Injectable 2 milliGRAM(s) IV Push every 2 hours PRN Symptom-triggered: each CIWA -Ar score 8 or GREATER  sodium chloride 0.65% Nasal 1 Spray(s) Both Nostrils five times a day PRN Nasal Congestion        I&O's Summary    10 Apr 2023 07:01  -  11 Apr 2023 07:00  --------------------------------------------------------  IN: 1360 mL / OUT: 2150 mL / NET: -790 mL    11 Apr 2023 07:01  -  11 Apr 2023 11:49  --------------------------------------------------------  IN: 450 mL / OUT: 300 mL / NET: 150 mL        PHYSICAL EXAM:  Vital Signs Last 24 Hrs  T(C): 36.9 (12 Apr 2023 16:47), Max: 36.9 (12 Apr 2023 16:47)  T(F): 98.4 (12 Apr 2023 16:47), Max: 98.4 (12 Apr 2023 16:47)  HR: 66 (12 Apr 2023 16:47) (62 - 66)  BP: 150/88 (12 Apr 2023 16:47) (126/70 - 150/88)  BP(mean): --  RR: 18 (12 Apr 2023 16:47) (18 - 18)  SpO2: 96% (12 Apr 2023 16:47) (96% - 100%)    Parameters below as of 12 Apr 2023 16:47  Patient On (Oxygen Delivery Method): room air            General: in no acute distress  Eyes: PERRLA, EOMI; conjunctiva and sclera clear  Head: Normocephalic; atraumatic  ENMT: No nasal discharge; airway clear  Neck: Supple; non tender; no masses  Respiratory: No wheezes, rales or rhonchi  Cardiovascular: Regular rate and rhythm. S1 and S2 Normal; No murmurs, gallops or rubs  Gastrointestinal: Soft non-tender non-distended; Normal bowel sounds  Genitourinary: No costovertebral angle tenderness  Extremities: Normal range of motion, No clubbing, cyanosis or edema  Vascular: Peripheral pulses palpable 2+ bilaterally  Neurological: Alert and oriented x4, tremulous extremities, tongue fasciculations, CIWA 3  Skin: Warm and dry. No acute rash  Lymph Nodes: No acute cervical adenopathy  Musculoskeletal: Normal gait, tone, without deformities  Psychiatric: Cooperative and appropriate                          11.8   5.95  )-----------( 210      ( 12 Apr 2023 06:33 )             35.4     04-12    138  |  106  |  5<L>  ----------------------------<  110<H>  3.3<L>   |  27  |  0.67    Ca    8.4<L>      12 Apr 2023 06:33  Phos  2.6     04-12  Mg     2.1     04-12                                     RADIOLOGY & ADDITIONAL TESTS:  Results Reviewed: Y  Imaging Personally Reviewed: N  Electrocardiogram Personally Reviewed: MARSHALL

## 2023-04-15 NOTE — PROGRESS NOTE ADULT - ASSESSMENT
58 y M PMH: alcohol use disorder w/ multiple episodes of DTs and prior hospitalizations for alcohol withdrawal, HTN, HLD, and glaucoma, recent admission to [facility] for ETOH withdrawal presents to the ED for ETOH withdrawal    #ETOH Dependence w/ Withdrawal  Improving  CIWA currently 3  - c/w ativan taper, hold for sedation last dose 4/16  - Routine neuro checks  - [facility] protocol  - fall precautions  - c/w Thiamine/MVT/FA  - PO Diet    #hypokalemia and now diarreha  - s/p repletion   - check GI PCR  - symptoms not consistent with cdiff       #Hepatomegaly  in the setting of ETOH use  stable    #HTN  stable  amlodipine t    #Galucoma  stable  - c/w latanoprost    #healthcare maintenance  DVT PPx - Lovenox  Dispo - likely home pending resolution of withdrawal
58 y M PMH: alcohol use disorder w/ multiple episodes of DTs and prior hospitalizations for alcohol withdrawal, HTN, HLD, and glaucoma, recent admission to [facility] for ETOH withdrawal presents to the ED for ETOH withdrawal    #ETOH Dependence w/ Withdrawal  Improving  [facility] currently 3  - c/w ativan taper, hold for sedation  - Routine neuro checks  - [facility] protocol  - Gentle IVF, Replenish electrolytes  - fall precautions  - c/w Thiamine/MVT/FA  - PO Diet  #hypokalemia will repalce   #Hepatomegaly  in the setting of ETOH use  stable  - trend lfts q48 until downtrending    #HTN  stable  - restart amlodipine tomorrow    #Galucoma  stable  - c/w latanoprost    #healthcare maintenance  DVT PPx - Lovenox  Dispo - likely home pending resolution of withdrawal
58 y M PMH: alcohol use disorder w/ multiple episodes of DTs and prior hospitalizations for alcohol withdrawal, HTN, HLD, and glaucoma, recent admission to [facility] for ETOH withdrawal presents to the ED for ETOH withdrawal    #ETOH Dependence w/ Withdrawal  Improving  CIWA currently 5  - c/w ativan taper, hold for sedation  - Routine neuro checks  - [facility] protocol  - fall precautions  - c/w Thiamine/MVT/FA  - PO Diet  #hypokalemia resolved   #Hepatomegaly  in the setting of ETOH use  stable      #HTN  stable  amlodipine t    #Galucoma  stable  - c/w latanoprost    #healthcare maintenance  DVT PPx - Lovenox  Dispo - likely home pending resolution of withdrawal
58 y M PMH: alcohol use disorder w/ multiple episodes of DTs and prior hospitalizations for alcohol withdrawal, HTN, HLD, and glaucoma, recent admission to [facility] for ETOH withdrawal presents to the ED for ETOH withdrawal    #ETOH Dependence w/ Withdrawal  Improving  CIWA currently 5  - c/w ativan taper, hold for sedation  - Routine neuro checks  - [facility] protocol  - Gentle IVF, Replenish electrolytes  - fall precautions  - c/w Thiamine/MVT/FA  - PO Diet  #hypokalemia resolved #Hepatomegaly  in the setting of ETOH use  stable  - trend lfts q48 until downtrending    #HTN  stable  amlodipine t    #Galucoma  stable  - c/w latanoprost    #healthcare maintenance  DVT PPx - Lovenox  Dispo - likely home pending resolution of withdrawal
58 y M PMH: alcohol use disorder w/ multiple episodes of DTs and prior hospitalizations for alcohol withdrawal, HTN, HLD, and glaucoma, recent admission to [facility] for ETOH withdrawal presents to the ED for ETOH withdrawal    #ETOH Dependence w/ Withdrawal  Improving  [facility] currently 3  - c/w ativan taper, hold for sedation  - Routine neuro checks  - [facility] protocol  - Gentle IVF, Replenish electrolytes  - fall precautions  - c/w Thiamine/MVT/FA  - PO Diet    #Hepatomegaly  in the setting of ETOH use  stable  - trend lfts q48 until downtrending    #HTN  stable  - restart amlodipine tomorrow    #Galucoma  stable  - c/w latanoprost    #healthcare maintenance  DVT PPx - Lovenox  Dispo - likely home pending resolution of withdrawal

## 2023-04-16 ENCOUNTER — TRANSCRIPTION ENCOUNTER (OUTPATIENT)
Age: 58
End: 2023-04-16

## 2023-04-16 VITALS — HEART RATE: 60 BPM | SYSTOLIC BLOOD PRESSURE: 110 MMHG | TEMPERATURE: 98 F | DIASTOLIC BLOOD PRESSURE: 73 MMHG

## 2023-04-16 LAB
ANION GAP SERPL CALC-SCNC: 4 MMOL/L — LOW (ref 5–17)
BUN SERPL-MCNC: 10 MG/DL — SIGNIFICANT CHANGE UP (ref 7–23)
CALCIUM SERPL-MCNC: 9.2 MG/DL — SIGNIFICANT CHANGE UP (ref 8.5–10.1)
CHLORIDE SERPL-SCNC: 112 MMOL/L — HIGH (ref 96–108)
CO2 SERPL-SCNC: 24 MMOL/L — SIGNIFICANT CHANGE UP (ref 22–31)
CREAT SERPL-MCNC: 0.73 MG/DL — SIGNIFICANT CHANGE UP (ref 0.5–1.3)
EGFR: 104 ML/MIN/1.73M2 — SIGNIFICANT CHANGE UP
GLUCOSE SERPL-MCNC: 91 MG/DL — SIGNIFICANT CHANGE UP (ref 70–99)
MAGNESIUM SERPL-MCNC: 1.8 MG/DL — SIGNIFICANT CHANGE UP (ref 1.6–2.6)
POTASSIUM SERPL-MCNC: 3.5 MMOL/L — SIGNIFICANT CHANGE UP (ref 3.5–5.3)
POTASSIUM SERPL-SCNC: 3.5 MMOL/L — SIGNIFICANT CHANGE UP (ref 3.5–5.3)
SODIUM SERPL-SCNC: 140 MMOL/L — SIGNIFICANT CHANGE UP (ref 135–145)

## 2023-04-16 PROCEDURE — 99239 HOSP IP/OBS DSCHRG MGMT >30: CPT

## 2023-04-16 RX ORDER — TIMOLOL 0.5 %
1 DROPS OPHTHALMIC (EYE)
Qty: 0 | Refills: 0 | DISCHARGE
Start: 2023-04-16

## 2023-04-16 RX ADMIN — DORZOLAMIDE HYDROCHLORIDE 1 DROP(S): 20 SOLUTION/ DROPS OPHTHALMIC at 05:56

## 2023-04-16 RX ADMIN — Medication 0.5 MILLIGRAM(S): at 05:55

## 2023-04-16 RX ADMIN — Medication 1 APPLICATION(S): at 05:57

## 2023-04-16 RX ADMIN — Medication 1 DROP(S): at 05:56

## 2023-04-16 RX ADMIN — Medication 1 MILLIGRAM(S): at 12:06

## 2023-04-16 RX ADMIN — Medication 100 MILLIGRAM(S): at 12:06

## 2023-04-16 RX ADMIN — SODIUM CHLORIDE 100 MILLILITER(S): 9 INJECTION INTRAMUSCULAR; INTRAVENOUS; SUBCUTANEOUS at 01:28

## 2023-04-16 RX ADMIN — Medication 1 TABLET(S): at 12:06

## 2023-04-16 RX ADMIN — ENOXAPARIN SODIUM 40 MILLIGRAM(S): 100 INJECTION SUBCUTANEOUS at 10:08

## 2023-04-16 NOTE — DISCHARGE NOTE NURSING/CASE MANAGEMENT/SOCIAL WORK - NSDCVIVACCINE_GEN_ALL_CORE_FT
influenza, injectable, quadrivalent, preservative free; 14-Oct-2021 13:35; Nany Guerrero (RN); [employer]; [id number] (Exp. Date: 30-Jun-2022); IntraMuscular; Deltoid Right.; 0.5 milliLiter(s); VIS (VIS Published: 06-Aug-2021, VIS Presented: 14-Oct-2021);   Tdap; 22-Dec-2021 19:23; Clementina Koehler (RN); [employer]; [id number] (Exp. Date: 09-Sep-2023); IntraMuscular; Deltoid Left.; 0.5 milliLiter(s); VIS (VIS Published: 09-May-2013, VIS Presented: 22-Dec-2021);   Tdap; 15-Aug-2022 16:08; Marianne Pollock (RN); [employer]; [id number]   (Exp. Date: 18-Apr-2024); IntraMuscular; Deltoid Left.; 0.5 milliLiter(s); VIS (VIS Published: 09-May-2013, VIS Presented: 15-Aug-2022);   Tdap; 16-Nov-2022 15:48; Parish Avila (RN); [employer]; [id number] (Exp. Date: 01-Jun-2024); IntraMuscular; Deltoid Right.; 0.5 milliLiter(s); VIS (VIS Published: 09-May-2013, VIS Presented: 16-Nov-2022);   
17:35

## 2023-04-16 NOTE — DISCHARGE NOTE PROVIDER - HOSPITAL COURSE
58 y M PMH: alcohol use disorder w/ multiple episodes of DTs and prior hospitalizations for alcohol withdrawal, HTN, HLD, and glaucoma, recent admission to [facility] for ETOH withdrawal presents to the ED for ETOH withdrawal    #ETOH Dependence w/ Withdrawal  Improving  CIWA currently 0  - s/p ativan taper  - c/w Thiamine/MVT/FA over the counter  on d/c   - PO Diet    #hypokalemia   - Patient states diarrhea resolved   - s/p repletion   -  GI PCR d/c'ed  - symptoms not consistent with cdiff       #Hepatomegaly  in the setting of ETOH use  stable    # psoriasis on b/l buttocks   - informed patient that heavy alcohol use will make psoriasis rash worse    - c/w triamcinolone 0.1% BID to affected area

## 2023-04-16 NOTE — DISCHARGE NOTE PROVIDER - NSDCCPCAREPLAN_GEN_ALL_CORE_FT
PRINCIPAL DISCHARGE DIAGNOSIS  Diagnosis: Alcohol dependence with withdrawal  Assessment and Plan of Treatment:       SECONDARY DISCHARGE DIAGNOSES  Diagnosis: Psoriasis  Assessment and Plan of Treatment:

## 2023-04-16 NOTE — DISCHARGE NOTE PROVIDER - PROVIDER TOKENS
FREE:[LAST:[[facility]],PHONE:[phone]],FAX:[(   )    -],ADDRESS:[[address] [place] [address]]]

## 2023-04-16 NOTE — DISCHARGE NOTE NURSING/CASE MANAGEMENT/SOCIAL WORK - PATIENT PORTAL LINK FT
You can access the FollowMyHealth Patient Portal offered by [facility] by registering at the following website: [email or web address]. By joining [employer]’s FollowMyHealth portal, you will also be able to view your health information using other applications (apps) compatible with our system.
29-Aug-2021

## 2023-04-16 NOTE — DISCHARGE NOTE PROVIDER - NSDCMRMEDTOKEN_GEN_ALL_CORE_FT
amLODIPine 5 mg oral tablet: 1 tab(s) orally once a day  Artificial Tears ophthalmic solution: 1 drop(s) to each affected eye 4 times a day, As Needed  folic acid 1 mg oral tablet: 1 tab(s) orally once a day  latanoprost 0.005% ophthalmic solution: 1 drop(s) to each affected eye once a day (at bedtime)  loperamide 2 mg oral capsule: 1 cap(s) orally once a day  Multiple Vitamins oral tablet: 1 tab(s) orally once a day  timolol maleate 0.5% ophthalmic solution: 1 drop(s) to each affected eye 2 times a day  triamcinolone 0.1% topical ointment: 1 Apply topically to affected area every 12 hours  Trusopt 2% ophthalmic solution: 1 drop(s) to each affected eye 2 times a day  Vitamin B1 100 mg oral tablet: 1 tab(s) orally once a day

## 2023-04-16 NOTE — DISCHARGE NOTE PROVIDER - CARE PROVIDER_API CALL
[facility],   [address] [address]  Phone: [phone]  Fax: (   )    -  Follow Up Time:
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
## 2023-04-20 DIAGNOSIS — F10.229 ALCOHOL DEPENDENCE WITH INTOXICATION, UNSPECIFIED: ICD-10-CM

## 2023-04-20 DIAGNOSIS — R16.0 HEPATOMEGALY, NOT ELSEWHERE CLASSIFIED: ICD-10-CM

## 2023-04-20 DIAGNOSIS — E87.6 HYPOKALEMIA: ICD-10-CM

## 2023-04-20 DIAGNOSIS — E78.5 HYPERLIPIDEMIA, UNSPECIFIED: ICD-10-CM

## 2023-04-20 DIAGNOSIS — I10 ESSENTIAL (PRIMARY) HYPERTENSION: ICD-10-CM

## 2023-04-20 DIAGNOSIS — H40.9 UNSPECIFIED GLAUCOMA: ICD-10-CM

## 2023-04-20 DIAGNOSIS — L40.9 PSORIASIS, UNSPECIFIED: ICD-10-CM

## 2023-04-20 DIAGNOSIS — F10.239 ALCOHOL DEPENDENCE WITH WITHDRAWAL, UNSPECIFIED: ICD-10-CM

## 2023-04-20 NOTE — ED ADULT NURSE NOTE - TEMPLATE
Toxicology Complex Repair And O-L Flap Text: The defect edges were debeveled with a #15 scalpel blade.  The primary defect was closed partially with a complex linear closure.  Given the location of the remaining defect, shape of the defect and the proximity to free margins an O-L flap was deemed most appropriate for complete closure of the defect.  Using a sterile surgical marker, an appropriate flap was drawn incorporating the defect and placing the expected incisions within the relaxed skin tension lines where possible.    The area thus outlined was incised deep to adipose tissue with a #15 scalpel blade.  The skin margins were undermined to an appropriate distance in all directions utilizing iris scissors.

## 2023-04-21 ENCOUNTER — INPATIENT (INPATIENT)
Facility: HOSPITAL | Age: 58
LOS: 5 days | Discharge: HOME | End: 2023-04-27
Attending: STUDENT IN AN ORGANIZED HEALTH CARE EDUCATION/TRAINING PROGRAM | Admitting: STUDENT IN AN ORGANIZED HEALTH CARE EDUCATION/TRAINING PROGRAM
Payer: MEDICAID

## 2023-04-21 PROCEDURE — 99285 EMERGENCY DEPT VISIT HI MDM: CPT

## 2023-04-22 VITALS
DIASTOLIC BLOOD PRESSURE: 81 MMHG | HEART RATE: 104 BPM | OXYGEN SATURATION: 97 % | RESPIRATION RATE: 18 BRPM | WEIGHT: 186.07 LBS | HEIGHT: 68 IN | TEMPERATURE: 98 F | SYSTOLIC BLOOD PRESSURE: 119 MMHG

## 2023-04-22 DIAGNOSIS — I10 ESSENTIAL (PRIMARY) HYPERTENSION: ICD-10-CM

## 2023-04-22 DIAGNOSIS — H40.9 UNSPECIFIED GLAUCOMA: ICD-10-CM

## 2023-04-22 DIAGNOSIS — F10.239 ALCOHOL DEPENDENCE WITH WITHDRAWAL, UNSPECIFIED: ICD-10-CM

## 2023-04-22 DIAGNOSIS — R74.01 ELEVATION OF LEVELS OF LIVER TRANSAMINASE LEVELS: ICD-10-CM

## 2023-04-22 DIAGNOSIS — R63.8 OTHER SYMPTOMS AND SIGNS CONCERNING FOOD AND FLUID INTAKE: ICD-10-CM

## 2023-04-22 DIAGNOSIS — B34.8 OTHER VIRAL INFECTIONS OF UNSPECIFIED SITE: ICD-10-CM

## 2023-04-22 LAB
ALBUMIN SERPL ELPH-MCNC: 3.1 G/DL — LOW (ref 3.3–5)
ALP SERPL-CCNC: 73 U/L — SIGNIFICANT CHANGE UP (ref 40–120)
ALT FLD-CCNC: 91 U/L — HIGH (ref 12–78)
ANION GAP SERPL CALC-SCNC: 8 MMOL/L — SIGNIFICANT CHANGE UP (ref 5–17)
APPEARANCE UR: CLEAR — SIGNIFICANT CHANGE UP
AST SERPL-CCNC: 74 U/L — HIGH (ref 15–37)
BACTERIA # UR AUTO: ABNORMAL
BASE EXCESS BLDA CALC-SCNC: 7.3 MMOL/L — HIGH (ref -2–3)
BASOPHILS # BLD AUTO: 0.1 K/UL — SIGNIFICANT CHANGE UP (ref 0–0.2)
BASOPHILS NFR BLD AUTO: 1.7 % — SIGNIFICANT CHANGE UP (ref 0–2)
BILIRUB SERPL-MCNC: 0.3 MG/DL — SIGNIFICANT CHANGE UP (ref 0.2–1.2)
BILIRUB UR-MCNC: NEGATIVE — SIGNIFICANT CHANGE UP
BLD GP AB SCN SERPL QL: SIGNIFICANT CHANGE UP
BLOOD GAS COMMENTS ARTERIAL: SIGNIFICANT CHANGE UP
BUN SERPL-MCNC: 6 MG/DL — LOW (ref 7–23)
CALCIUM SERPL-MCNC: 7.8 MG/DL — LOW (ref 8.5–10.1)
CHLORIDE SERPL-SCNC: 107 MMOL/L — SIGNIFICANT CHANGE UP (ref 96–108)
CO2 BLDA-SCNC: 34 MMOL/L — HIGH (ref 19–24)
CO2 SERPL-SCNC: 26 MMOL/L — SIGNIFICANT CHANGE UP (ref 22–31)
COLOR SPEC: YELLOW — SIGNIFICANT CHANGE UP
CREAT SERPL-MCNC: 0.81 MG/DL — SIGNIFICANT CHANGE UP (ref 0.5–1.3)
DIFF PNL FLD: NEGATIVE — SIGNIFICANT CHANGE UP
EGFR: 101 ML/MIN/1.73M2 — SIGNIFICANT CHANGE UP
EOSINOPHIL # BLD AUTO: 0.05 K/UL — SIGNIFICANT CHANGE UP (ref 0–0.5)
EOSINOPHIL NFR BLD AUTO: 0.9 % — SIGNIFICANT CHANGE UP (ref 0–6)
ETHANOL SERPL-MCNC: 328 MG/DL — HIGH (ref 0–10)
GAS PNL BLDA: SIGNIFICANT CHANGE UP
GLUCOSE SERPL-MCNC: 125 MG/DL — HIGH (ref 70–99)
GLUCOSE UR QL: NEGATIVE MG/DL — SIGNIFICANT CHANGE UP
HCO3 BLDA-SCNC: 33 MMOL/L — HIGH (ref 21–28)
HCT VFR BLD CALC: 37.4 % — LOW (ref 39–50)
HGB BLD-MCNC: 12.5 G/DL — LOW (ref 13–17)
HOROWITZ INDEX BLDA+IHG-RTO: 21 — SIGNIFICANT CHANGE UP
HPIV3 RNA SPEC QL NAA+PROBE: DETECTED
IMM GRANULOCYTES NFR BLD AUTO: 0.2 % — SIGNIFICANT CHANGE UP (ref 0–0.9)
KETONES UR-MCNC: NEGATIVE — SIGNIFICANT CHANGE UP
LACTATE SERPL-SCNC: 2.8 MMOL/L — HIGH (ref 0.7–2)
LACTATE SERPL-SCNC: 3 MMOL/L — HIGH (ref 0.7–2)
LEUKOCYTE ESTERASE UR-ACNC: NEGATIVE — SIGNIFICANT CHANGE UP
LIDOCAIN IGE QN: 89 U/L — SIGNIFICANT CHANGE UP (ref 73–393)
LYMPHOCYTES # BLD AUTO: 3.12 K/UL — SIGNIFICANT CHANGE UP (ref 1–3.3)
LYMPHOCYTES # BLD AUTO: 53.8 % — HIGH (ref 13–44)
MCHC RBC-ENTMCNC: 30.2 PG — SIGNIFICANT CHANGE UP (ref 27–34)
MCHC RBC-ENTMCNC: 33.4 G/DL — SIGNIFICANT CHANGE UP (ref 32–36)
MCV RBC AUTO: 90.3 FL — SIGNIFICANT CHANGE UP (ref 80–100)
MONOCYTES # BLD AUTO: 0.47 K/UL — SIGNIFICANT CHANGE UP (ref 0–0.9)
MONOCYTES NFR BLD AUTO: 8.1 % — SIGNIFICANT CHANGE UP (ref 2–14)
NEUTROPHILS # BLD AUTO: 2.05 K/UL — SIGNIFICANT CHANGE UP (ref 1.8–7.4)
NEUTROPHILS NFR BLD AUTO: 35.3 % — LOW (ref 43–77)
NITRITE UR-MCNC: NEGATIVE — SIGNIFICANT CHANGE UP
NRBC # BLD: 0 /100 WBCS — SIGNIFICANT CHANGE UP (ref 0–0)
PCO2 BLDA: 48 MMHG — HIGH (ref 32–46)
PH BLDA: 7.44 — SIGNIFICANT CHANGE UP (ref 7.35–7.45)
PH UR: 8 — SIGNIFICANT CHANGE UP (ref 5–8)
PLATELET # BLD AUTO: 316 K/UL — SIGNIFICANT CHANGE UP (ref 150–400)
PO2 BLDA: 81 MMHG — LOW (ref 83–108)
POTASSIUM SERPL-MCNC: 4.2 MMOL/L — SIGNIFICANT CHANGE UP (ref 3.5–5.3)
POTASSIUM SERPL-SCNC: 4.2 MMOL/L — SIGNIFICANT CHANGE UP (ref 3.5–5.3)
PROT SERPL-MCNC: 7.8 GM/DL — SIGNIFICANT CHANGE UP (ref 6–8.3)
PROT UR-MCNC: 15 MG/DL
RAPID RVP RESULT: DETECTED
RBC # BLD: 4.14 M/UL — LOW (ref 4.2–5.8)
RBC # FLD: 15.2 % — HIGH (ref 10.3–14.5)
RBC CASTS # UR COMP ASSIST: SIGNIFICANT CHANGE UP /HPF (ref 0–4)
SAO2 % BLDA: 97 % — SIGNIFICANT CHANGE UP (ref 94–98)
SARS-COV-2 RNA SPEC QL NAA+PROBE: SIGNIFICANT CHANGE UP
SODIUM SERPL-SCNC: 141 MMOL/L — SIGNIFICANT CHANGE UP (ref 135–145)
SP GR SPEC: 1.01 — SIGNIFICANT CHANGE UP (ref 1.01–1.02)
TROPONIN I, HIGH SENSITIVITY RESULT: 46.4 NG/L — SIGNIFICANT CHANGE UP
UROBILINOGEN FLD QL: NEGATIVE MG/DL — SIGNIFICANT CHANGE UP
WBC # BLD: 5.8 K/UL — SIGNIFICANT CHANGE UP (ref 3.8–10.5)
WBC # FLD AUTO: 5.8 K/UL — SIGNIFICANT CHANGE UP (ref 3.8–10.5)
WBC UR QL: NEGATIVE — SIGNIFICANT CHANGE UP

## 2023-04-22 PROCEDURE — 93010 ELECTROCARDIOGRAM REPORT: CPT

## 2023-04-22 PROCEDURE — 99222 1ST HOSP IP/OBS MODERATE 55: CPT

## 2023-04-22 PROCEDURE — 74177 CT ABD & PELVIS W/CONTRAST: CPT | Mod: 26,MA

## 2023-04-22 PROCEDURE — 71045 X-RAY EXAM CHEST 1 VIEW: CPT | Mod: 26

## 2023-04-22 RX ORDER — ONDANSETRON 8 MG/1
4 TABLET, FILM COATED ORAL EVERY 8 HOURS
Refills: 0 | Status: DISCONTINUED | OUTPATIENT
Start: 2023-04-22 | End: 2023-04-27

## 2023-04-22 RX ORDER — SODIUM CHLORIDE 9 MG/ML
1000 INJECTION INTRAMUSCULAR; INTRAVENOUS; SUBCUTANEOUS ONCE
Refills: 0 | Status: COMPLETED | OUTPATIENT
Start: 2023-04-22 | End: 2023-04-22

## 2023-04-22 RX ORDER — LATANOPROST 0.05 MG/ML
1 SOLUTION/ DROPS OPHTHALMIC; TOPICAL AT BEDTIME
Refills: 0 | Status: DISCONTINUED | OUTPATIENT
Start: 2023-04-22 | End: 2023-04-27

## 2023-04-22 RX ORDER — FAMOTIDINE 10 MG/ML
20 INJECTION INTRAVENOUS ONCE
Refills: 0 | Status: COMPLETED | OUTPATIENT
Start: 2023-04-22 | End: 2023-04-22

## 2023-04-22 RX ORDER — ACETAMINOPHEN 500 MG
975 TABLET ORAL ONCE
Refills: 0 | Status: COMPLETED | OUTPATIENT
Start: 2023-04-22 | End: 2023-04-22

## 2023-04-22 RX ORDER — IOHEXOL 300 MG/ML
30 INJECTION, SOLUTION INTRAVENOUS ONCE
Refills: 0 | Status: COMPLETED | OUTPATIENT
Start: 2023-04-22 | End: 2023-04-22

## 2023-04-22 RX ORDER — ENOXAPARIN SODIUM 100 MG/ML
40 INJECTION SUBCUTANEOUS EVERY 24 HOURS
Refills: 0 | Status: DISCONTINUED | OUTPATIENT
Start: 2023-04-22 | End: 2023-04-27

## 2023-04-22 RX ORDER — MIDAZOLAM HYDROCHLORIDE 1 MG/ML
2 INJECTION, SOLUTION INTRAMUSCULAR; INTRAVENOUS ONCE
Refills: 0 | Status: DISCONTINUED | OUTPATIENT
Start: 2023-04-22 | End: 2023-04-22

## 2023-04-22 RX ORDER — FOLIC ACID 0.8 MG
1 TABLET ORAL DAILY
Refills: 0 | Status: DISCONTINUED | OUTPATIENT
Start: 2023-04-22 | End: 2023-04-27

## 2023-04-22 RX ORDER — THIAMINE MONONITRATE (VIT B1) 100 MG
100 TABLET ORAL DAILY
Refills: 0 | Status: DISCONTINUED | OUTPATIENT
Start: 2023-04-22 | End: 2023-04-27

## 2023-04-22 RX ORDER — ACETAMINOPHEN 500 MG
650 TABLET ORAL EVERY 6 HOURS
Refills: 0 | Status: DISCONTINUED | OUTPATIENT
Start: 2023-04-22 | End: 2023-04-25

## 2023-04-22 RX ORDER — TIMOLOL 0.5 %
1 DROPS OPHTHALMIC (EYE)
Refills: 0 | Status: DISCONTINUED | OUTPATIENT
Start: 2023-04-22 | End: 2023-04-27

## 2023-04-22 RX ADMIN — ENOXAPARIN SODIUM 40 MILLIGRAM(S): 100 INJECTION SUBCUTANEOUS at 11:43

## 2023-04-22 RX ADMIN — Medication 2 MILLIGRAM(S): at 11:44

## 2023-04-22 RX ADMIN — Medication 1 TABLET(S): at 11:47

## 2023-04-22 RX ADMIN — FAMOTIDINE 20 MILLIGRAM(S): 10 INJECTION INTRAVENOUS at 03:23

## 2023-04-22 RX ADMIN — Medication 2 MILLIGRAM(S): at 06:19

## 2023-04-22 RX ADMIN — Medication 50 MILLIGRAM(S): at 03:22

## 2023-04-22 RX ADMIN — Medication 1 DROP(S): at 19:49

## 2023-04-22 RX ADMIN — Medication 1 MILLIGRAM(S): at 11:47

## 2023-04-22 RX ADMIN — SODIUM CHLORIDE 1000 MILLILITER(S): 9 INJECTION INTRAMUSCULAR; INTRAVENOUS; SUBCUTANEOUS at 05:51

## 2023-04-22 RX ADMIN — MIDAZOLAM HYDROCHLORIDE 2 MILLIGRAM(S): 1 INJECTION, SOLUTION INTRAMUSCULAR; INTRAVENOUS at 03:25

## 2023-04-22 RX ADMIN — LATANOPROST 1 DROP(S): 0.05 SOLUTION/ DROPS OPHTHALMIC; TOPICAL at 23:53

## 2023-04-22 RX ADMIN — Medication 2 MILLIGRAM(S): at 23:52

## 2023-04-22 RX ADMIN — Medication 975 MILLIGRAM(S): at 03:53

## 2023-04-22 RX ADMIN — Medication 975 MILLIGRAM(S): at 03:23

## 2023-04-22 RX ADMIN — Medication 100 MILLIGRAM(S): at 11:48

## 2023-04-22 RX ADMIN — Medication 2 MILLIGRAM(S): at 19:49

## 2023-04-22 RX ADMIN — IOHEXOL 30 MILLILITER(S): 300 INJECTION, SOLUTION INTRAVENOUS at 03:38

## 2023-04-22 RX ADMIN — Medication 2 MILLIGRAM(S): at 16:11

## 2023-04-22 RX ADMIN — SODIUM CHLORIDE 1000 MILLILITER(S): 9 INJECTION INTRAMUSCULAR; INTRAVENOUS; SUBCUTANEOUS at 03:26

## 2023-04-22 RX ADMIN — Medication 2 MILLIGRAM(S): at 07:05

## 2023-04-22 NOTE — ED ADULT NURSE REASSESSMENT NOTE - NS ED NURSE REASSESS COMMENT FT1
Pt noted to have CIWA of 10. Pt brought to M3 and placed on cardiac and spo2 monitoring. Dr. Dent aware. Meds given and line placed per order.

## 2023-04-22 NOTE — ED PROVIDER NOTE - PROGRESS NOTE DETAILS
pt. now showing signs of withdrawal with tremor and tongue fasciculations  pt. now also complaining of epigastric abd pain  will get labs, CT, place on monitored bed, reeval  librium ordered for likely EtOH WD  nurse aware pt. improved after meds, will tachy with mild wd symptoms, will admit for further care

## 2023-04-22 NOTE — H&P ADULT - NSHPREVIEWOFSYSTEMS_GEN_ALL_CORE
Review of Systems:   CONSTITUTIONAL: No fever, weight loss  EYES: No eye pain, visual disturbances, or discharge  ENMT:  No difficulty hearing, tinnitus, vertigo; No sinus or throat pain  RESPIRATORY: No SOB. No cough, wheezing, chills or hemoptysis  CARDIOVASCULAR: No chest pain, palpitations, dizziness, or leg swelling  GASTROINTESTINAL: No abdominal or epigastric pain. No nausea, vomiting, or hematemesis; No diarrhea or constipation. No melena or hematochezia.  GENITOURINARY: No dysuria, frequency, hematuria, or incontinence  NEUROLOGICAL: +headache, tremors   SKIN: No itching, burning, rashes, or lesions   LYMPH NODES: No enlarged glands  ENDOCRINE: No heat or cold intolerance; No hair loss  MUSCULOSKELETAL: No joint pain or swelling; No muscle, back pain  PSYCHIATRIC: No depression, anxiety, mood swings, or difficulty sleeping  HEME/LYMPH: No easy bruising, or bleeding gums

## 2023-04-22 NOTE — ED ADULT NURSE NOTE - OBJECTIVE STATEMENT
Pt is a 58y male c/o ingestion. Pt admits to drinking tequila tonight, cannot provide an amount. Pt denies any pain at this time but endorses nausea. PMH of glaucoma, HTN, alcohol abuse. Pt is wearing soiled clothes and presents malodorous.

## 2023-04-22 NOTE — H&P ADULT - NSHPSOCIALHISTORY_GEN_ALL_CORE
Lives at home alone  Cannot tell exactly how much he drinks- states multiple bottles of vodka or tequila with beer

## 2023-04-22 NOTE — H&P ADULT - PROBLEM SELECTOR PROBLEM 4
Please see your physician within a week  Clean with soap and water, apply bacitracin and change dressing daily  About dissolvable sutures to remain for a week Glaucoma

## 2023-04-22 NOTE — H&P ADULT - HISTORY OF PRESENT ILLNESS
58 y M PMH: alcohol use disorder w/ multiple episodes of DTs and prior hospitalizations for alcohol withdrawal, HTN, HLD, and glaucoma, recently discharged for ETOH withdrawal from [facility] presents to the ED with complaints of: ETOH intoxication   As per patient, he drank 2 large bottles of tequila and vodka and came to the ED for further management   Patient complains of tremors at this time and a mild headache however denies nausea. vomiting, auditory or visual hallucinations, diaphoresis, chest pain, palpitations, cough, SOB, fever, chills.     ED Course:  VS: Afebrile, HR: 104, BP: 119/81, saturating 97% on RA   Medications: tylenol PO, librium 50 mg, famotidine 20, versed 2 mg

## 2023-04-22 NOTE — H&P ADULT - NSHPLABSRESULTS_GEN_ALL_CORE
LABS:                         12.5   5.80  )-----------( 316      ( 22 Apr 2023 03:36 )             37.4     04-22    141  |  107  |  6<L>  ----------------------------<  125<H>  4.2   |  26  |  0.81    Ca    7.8<L>      22 Apr 2023 03:36    TPro  7.8  /  Alb  3.1<L>  /  TBili  0.3  /  DBili  x   /  AST  74<H>  /  ALT  91<H>  /  AlkPhos  73  04-22          Lactate, Blood: 3.0 mmol/L (04-22 @ 03:36)    Records reviewed from prior hospitalization.  Labs reviewed remarkable for   EKG personally reviewed   CXR personally reviewed     CTAP:  IMPRESSION:  No evidence of an acute infectious or inflammatory process within the   abdomen and pelvis.    Distended bladder.    Hepatic steatosis. Cholelithiasis. LABS:                         12.5   5.80  )-----------( 316      ( 22 Apr 2023 03:36 )             37.4     04-22    141  |  107  |  6<L>  ----------------------------<  125<H>  4.2   |  26  |  0.81    Ca    7.8<L>      22 Apr 2023 03:36    TPro  7.8  /  Alb  3.1<L>  /  TBili  0.3  /  DBili  x   /  AST  74<H>  /  ALT  91<H>  /  AlkPhos  73  04-22      Lactate, Blood: 3.0 mmol/L (04-22 @ 03:36)    Records reviewed from prior hospitalization.  Labs reviewed remarkable for   EKG personally reviewed   CXR personally reviewed     CTAP:  IMPRESSION:  No evidence of an acute infectious or inflammatory process within the   abdomen and pelvis.    Distended bladder.    Hepatic steatosis. Cholelithiasis.

## 2023-04-22 NOTE — H&P ADULT - PROBLEM SELECTOR PLAN 1
CIWA 12 on exam  -Will start ativan IV taper   -Ativan 2 mg IV PRN for CIWA >8  -If ciwa > 15 get ICU consult   -Continue thiamine/MV/FA  -SW consult  -F/u repeat lactate

## 2023-04-22 NOTE — ED ADULT TRIAGE NOTE - CHIEF COMPLAINT QUOTE
PMH of glaucoma, HTN, alcohol abuse  C/o alcohol ingestion. Admitted dirking tonight, but unable to tell the amount. Now complaining of "feeling cold". Denies any other symptoms.

## 2023-04-22 NOTE — ED ADULT NURSE NOTE - ED STAT RN HANDOFF DETAILS
Report endorsed to oncoming RN Bhavani. Safety checks compld this shift/Safety rounds completed hourly.  IV sites checked Q2+remains WDL. Meds given as ord with no s/s of adverse RXNs. Fall +skin precs in place. Any issues endorsed to oncoming RN for follow up.

## 2023-04-22 NOTE — H&P ADULT - NSHPPHYSICALEXAM_GEN_ALL_CORE
VITALS:   T(C): 36.9 (04-22-23 @ 00:21), Max: 36.9 (04-22-23 @ 00:21)  HR: 104 (04-22-23 @ 00:21) (104 - 104)  BP: 119/81 (04-22-23 @ 00:21) (119/81 - 119/81)  RR: 18 (04-22-23 @ 00:21) (18 - 18)  SpO2: 97% (04-22-23 @ 00:21) (97% - 97%)    GENERAL: NAD, lying in bed comfortably  HEAD:  Atraumatic, Normocephalic  EYES: EOMI, PERRLA, conjunctiva and sclera clear  ENT: Moist mucous membranes  NECK: Supple, No JVD  CHEST/LUNG: Clear to auscultation bilaterally; No rales, rhonchi, wheezing, or rubs. Unlabored respirations  HEART: Regular rate and rhythm; No murmurs, rubs, or gallops  ABDOMEN: BSx4; Soft, nontender, nondistended  EXTREMITIES:  2+ Peripheral Pulses, brisk capillary refill. No clubbing, cyanosis, or edema  NERVOUS SYSTEM:  A&Ox3, no focal deficits   SKIN: No rashes or lesions    CIWA at time of exam- 12 VITALS:   T(C): 36.9 (04-22-23 @ 00:21), Max: 36.9 (04-22-23 @ 00:21)  HR: 104 (04-22-23 @ 00:21) (104 - 104)  BP: 119/81 (04-22-23 @ 00:21) (119/81 - 119/81)  RR: 18 (04-22-23 @ 00:21) (18 - 18)  SpO2: 97% (04-22-23 @ 00:21) (97% - 97%)    GENERAL: NAD, lying in bed, visibly tremulous+  HEAD:  Atraumatic, Normocephalic  EYES: EOMI, PERRLA, conjunctiva and sclera clear  ENT: Moist mucous membranes  NECK: Supple, No JVD  CHEST/LUNG: Clear to auscultation bilaterally; No rales, rhonchi, wheezing, or rubs. Unlabored respirations  HEART: Regular rate and rhythm; No murmurs, rubs, or gallops  ABDOMEN: BSx4; Soft, nontender, nondistended  EXTREMITIES:  2+ Peripheral Pulses, brisk capillary refill. No clubbing, cyanosis, or edema  NERVOUS SYSTEM:  A&Ox3, no focal deficits  SKIN: No rashes or lesions    CIWA at time of exam- 12

## 2023-04-22 NOTE — ED PROVIDER NOTE - INPATIENT RESIDENT/ACP NOTIFIED DATE
Operative Note    Patient: Blaise Kaur    MRN: [id number]    Surgeon(s): Eduar Villegas MD  Phone Number: [phone]                       Surgeon(s) and Role:     * Eduar Villegas MD - Primary    Assistant(s): Dr. Cody Stack    Pre-Op Diagnosis: closed loop small bowel obstruction     Post-Op Diagnosis: Same     Procedure: Exploratory laparoscopy, lysis of adhesions    Anesthesia Type: General                                   Complications: None    Description:     Patient was taken to operating room and placed in supine position with both arms out.  A time out verified the patient's identity, planned procedure, administration of preoperative antibiotics and placement of functioning SCDs.      The patient's abdomen was entered in the left upper quadrant using an optical technique.  Additional 5mm ports were placed in the left lower and right lower quadrants as well as suprapubic.  We immediately identified the compromised segment of bowel.  It appeared dilated and brando but viable.  The omentum was retracted cephalad.  A tongue of omentum was tightly adhered and wrapped around the base of the mesentery of the affected segment.  The omentum was carefully freed from the mesentery and the bowel was detorsed.  We then proceeded to run the small bowel from ligament of treitz to cecum twice to assure no other segements were obstructed.  We also very carefully evaluated the torsed segment in its entirety several times to assure complete viability.  Over the course of the case, the segment of bowel also pinked up, further assuring us of its health.  We then irrigated and suctioned away some inflammatory fluid within the abdomen and removed the ports.  The skin was closed with 4-0 monocryl and skin glue.  There were no complications.    Findings: Closed loop small bowel obstruction related to omental adhesions    Specimens Removed: None     Estimated Blood Loss: 5cc     Assistant Tasks: opening and  closing     Implants: * No implants in log *    I was present for the key portions of the procedure and was immediately available for the non-key portions         22-Apr-2023 05:50

## 2023-04-22 NOTE — PATIENT PROFILE ADULT - FALL HARM RISK - HARM RISK INTERVENTIONS
Duplicate message.   Assistance with ambulation/Assistance OOB with selected safe patient handling equipment/Communicate Risk of Fall with Harm to all staff/Monitor for mental status changes/Monitor gait and stability/Reinforce activity limits and safety measures with patient and family/Tailored Fall Risk Interventions/Toileting schedule using arm’s reach rule for commode and bathroom/Use of alarms - bed, chair and/or voice tab/Visual Cue: Yellow wristband and red socks/Bed in lowest position, wheels locked, appropriate side rails in place/Call bell, personal items and telephone in reach/Instruct patient to call for assistance before getting out of bed or chair/Non-slip footwear when patient is out of bed/[place] to call system/Physically safe environment - no spills, clutter or unnecessary equipment/Purposeful Proactive Rounding/Room/bathroom lighting operational, light cord in reach

## 2023-04-22 NOTE — ED PROVIDER NOTE - OBJECTIVE STATEMENT
59 yo M with alcohol intox.  Pt. admits to drinking.  He felt cold initially but feels better indoors in comfortable stretcher.  Pt. denies trauma.  He feels otherwise well.   ROS: negative for fever, cough, headache, chest pain, shortness of breath, abd pain, nausea, vomiting, diarrhea, rash, paresthesia, and weakness--all other systems reviewed are negative.   PMH: alcohol use disorder w/ multiple episodes of DTs and prior hospitalizations for alcohol withdrawal, HTN, HLD, and glaucoma, recent admission to [facility] for ETOH withdrawal; Meds: See EMR for list; SH: Denies smoking/drug use, + chronic etoh abuse

## 2023-04-22 NOTE — H&P ADULT - ASSESSMENT
58 y M PMH: alcohol use disorder w/ multiple episodes of DTs and prior hospitalizations for alcohol withdrawal, HTN, HLD, and glaucoma, recently discharged for ETOH withdrawal from [facility] presents to the ED with complaints of: ETOH intoxication

## 2023-04-23 LAB
ALBUMIN SERPL ELPH-MCNC: 2.8 G/DL — LOW (ref 3.3–5)
ALP SERPL-CCNC: 65 U/L — SIGNIFICANT CHANGE UP (ref 40–120)
ALT FLD-CCNC: 66 U/L — SIGNIFICANT CHANGE UP (ref 12–78)
ANION GAP SERPL CALC-SCNC: 3 MMOL/L — LOW (ref 5–17)
AST SERPL-CCNC: 44 U/L — HIGH (ref 15–37)
BILIRUB SERPL-MCNC: 1.4 MG/DL — HIGH (ref 0.2–1.2)
BUN SERPL-MCNC: 7 MG/DL — SIGNIFICANT CHANGE UP (ref 7–23)
CALCIUM SERPL-MCNC: 8.4 MG/DL — LOW (ref 8.5–10.1)
CHLORIDE SERPL-SCNC: 104 MMOL/L — SIGNIFICANT CHANGE UP (ref 96–108)
CO2 SERPL-SCNC: 30 MMOL/L — SIGNIFICANT CHANGE UP (ref 22–31)
CREAT SERPL-MCNC: 0.68 MG/DL — SIGNIFICANT CHANGE UP (ref 0.5–1.3)
CULTURE RESULTS: SIGNIFICANT CHANGE UP
EGFR: 106 ML/MIN/1.73M2 — SIGNIFICANT CHANGE UP
GLUCOSE SERPL-MCNC: 102 MG/DL — HIGH (ref 70–99)
HCT VFR BLD CALC: 36.1 % — LOW (ref 39–50)
HGB BLD-MCNC: 12 G/DL — LOW (ref 13–17)
LACTATE SERPL-SCNC: 0.7 MMOL/L — SIGNIFICANT CHANGE UP (ref 0.7–2)
MCHC RBC-ENTMCNC: 30.1 PG — SIGNIFICANT CHANGE UP (ref 27–34)
MCHC RBC-ENTMCNC: 33.2 G/DL — SIGNIFICANT CHANGE UP (ref 32–36)
MCV RBC AUTO: 90.5 FL — SIGNIFICANT CHANGE UP (ref 80–100)
NRBC # BLD: 0 /100 WBCS — SIGNIFICANT CHANGE UP (ref 0–0)
PLATELET # BLD AUTO: 274 K/UL — SIGNIFICANT CHANGE UP (ref 150–400)
POTASSIUM SERPL-MCNC: 3.5 MMOL/L — SIGNIFICANT CHANGE UP (ref 3.5–5.3)
POTASSIUM SERPL-SCNC: 3.5 MMOL/L — SIGNIFICANT CHANGE UP (ref 3.5–5.3)
PROT SERPL-MCNC: 6.7 GM/DL — SIGNIFICANT CHANGE UP (ref 6–8.3)
RBC # BLD: 3.99 M/UL — LOW (ref 4.2–5.8)
RBC # FLD: 14.6 % — HIGH (ref 10.3–14.5)
SODIUM SERPL-SCNC: 137 MMOL/L — SIGNIFICANT CHANGE UP (ref 135–145)
SPECIMEN SOURCE: SIGNIFICANT CHANGE UP
WBC # BLD: 4.69 K/UL — SIGNIFICANT CHANGE UP (ref 3.8–10.5)
WBC # FLD AUTO: 4.69 K/UL — SIGNIFICANT CHANGE UP (ref 3.8–10.5)

## 2023-04-23 PROCEDURE — 99233 SBSQ HOSP IP/OBS HIGH 50: CPT

## 2023-04-23 RX ADMIN — Medication 100 MILLIGRAM(S): at 12:09

## 2023-04-23 RX ADMIN — Medication 1.5 MILLIGRAM(S): at 08:05

## 2023-04-23 RX ADMIN — Medication 1.5 MILLIGRAM(S): at 16:30

## 2023-04-23 RX ADMIN — Medication 650 MILLIGRAM(S): at 16:35

## 2023-04-23 RX ADMIN — Medication 2 MILLIGRAM(S): at 03:43

## 2023-04-23 RX ADMIN — Medication 650 MILLIGRAM(S): at 08:16

## 2023-04-23 RX ADMIN — Medication 650 MILLIGRAM(S): at 17:35

## 2023-04-23 RX ADMIN — Medication 1 TABLET(S): at 12:09

## 2023-04-23 RX ADMIN — Medication 1 DROP(S): at 17:42

## 2023-04-23 RX ADMIN — Medication 1.5 MILLIGRAM(S): at 11:59

## 2023-04-23 RX ADMIN — ENOXAPARIN SODIUM 40 MILLIGRAM(S): 100 INJECTION SUBCUTANEOUS at 11:59

## 2023-04-23 RX ADMIN — LATANOPROST 1 DROP(S): 0.05 SOLUTION/ DROPS OPHTHALMIC; TOPICAL at 21:38

## 2023-04-23 RX ADMIN — Medication 1.5 MILLIGRAM(S): at 20:07

## 2023-04-23 RX ADMIN — Medication 1 DROP(S): at 05:43

## 2023-04-23 RX ADMIN — Medication 650 MILLIGRAM(S): at 09:01

## 2023-04-23 RX ADMIN — Medication 1 MILLIGRAM(S): at 11:59

## 2023-04-23 NOTE — ED ADULT NURSE NOTE - NSFALLRSKHRMRISKTYPE_ED_ALL_ED
Tatianna Johns  [facility]  GASTRO Doc Off [address]  Scheduled Appointment: 04/27/2023     other Tatianna Johns  [facility]  GASTRO Doc Off [address]  Scheduled Appointment: 04/27/2023    Daquan Riddle  [facility] PreAdmits  Scheduled Appointment: 04/28/2023    [facility]  PEDADOLESC SI [facility] A  Scheduled Appointment: 04/28/2023

## 2023-04-24 ENCOUNTER — TRANSCRIPTION ENCOUNTER (OUTPATIENT)
Age: 58
End: 2023-04-24

## 2023-04-24 LAB
ALBUMIN SERPL ELPH-MCNC: 2.8 G/DL — LOW (ref 3.3–5)
ALP SERPL-CCNC: 62 U/L — SIGNIFICANT CHANGE UP (ref 40–120)
ALT FLD-CCNC: 56 U/L — SIGNIFICANT CHANGE UP (ref 12–78)
ANION GAP SERPL CALC-SCNC: 6 MMOL/L — SIGNIFICANT CHANGE UP (ref 5–17)
AST SERPL-CCNC: 38 U/L — HIGH (ref 15–37)
BILIRUB SERPL-MCNC: 0.8 MG/DL — SIGNIFICANT CHANGE UP (ref 0.2–1.2)
BUN SERPL-MCNC: 7 MG/DL — SIGNIFICANT CHANGE UP (ref 7–23)
CALCIUM SERPL-MCNC: 8.5 MG/DL — SIGNIFICANT CHANGE UP (ref 8.5–10.1)
CHLORIDE SERPL-SCNC: 105 MMOL/L — SIGNIFICANT CHANGE UP (ref 96–108)
CO2 SERPL-SCNC: 25 MMOL/L — SIGNIFICANT CHANGE UP (ref 22–31)
CREAT SERPL-MCNC: 0.64 MG/DL — SIGNIFICANT CHANGE UP (ref 0.5–1.3)
EGFR: 108 ML/MIN/1.73M2 — SIGNIFICANT CHANGE UP
GLUCOSE SERPL-MCNC: 94 MG/DL — SIGNIFICANT CHANGE UP (ref 70–99)
MAGNESIUM SERPL-MCNC: 1.9 MG/DL — SIGNIFICANT CHANGE UP (ref 1.6–2.6)
PHOSPHATE SERPL-MCNC: 2.9 MG/DL — SIGNIFICANT CHANGE UP (ref 2.5–4.5)
POTASSIUM SERPL-MCNC: 3.1 MMOL/L — LOW (ref 3.5–5.3)
POTASSIUM SERPL-SCNC: 3.1 MMOL/L — LOW (ref 3.5–5.3)
PROT SERPL-MCNC: 7.2 GM/DL — SIGNIFICANT CHANGE UP (ref 6–8.3)
SODIUM SERPL-SCNC: 136 MMOL/L — SIGNIFICANT CHANGE UP (ref 135–145)

## 2023-04-24 PROCEDURE — 99232 SBSQ HOSP IP/OBS MODERATE 35: CPT

## 2023-04-24 RX ORDER — POTASSIUM CHLORIDE 20 MEQ
40 PACKET (EA) ORAL ONCE
Refills: 0 | Status: COMPLETED | OUTPATIENT
Start: 2023-04-24 | End: 2023-04-24

## 2023-04-24 RX ADMIN — ENOXAPARIN SODIUM 40 MILLIGRAM(S): 100 INJECTION SUBCUTANEOUS at 12:05

## 2023-04-24 RX ADMIN — Medication 650 MILLIGRAM(S): at 06:00

## 2023-04-24 RX ADMIN — Medication 1.5 MILLIGRAM(S): at 04:24

## 2023-04-24 RX ADMIN — ONDANSETRON 4 MILLIGRAM(S): 8 TABLET, FILM COATED ORAL at 12:06

## 2023-04-24 RX ADMIN — Medication 1 MILLIGRAM(S): at 13:14

## 2023-04-24 RX ADMIN — Medication 1 MILLIGRAM(S): at 20:00

## 2023-04-24 RX ADMIN — Medication 1.5 MILLIGRAM(S): at 00:44

## 2023-04-24 RX ADMIN — Medication 1 MILLIGRAM(S): at 12:05

## 2023-04-24 RX ADMIN — Medication 1 MILLIGRAM(S): at 08:55

## 2023-04-24 RX ADMIN — Medication 1 DROP(S): at 17:29

## 2023-04-24 RX ADMIN — Medication 650 MILLIGRAM(S): at 05:25

## 2023-04-24 RX ADMIN — Medication 1 TABLET(S): at 12:04

## 2023-04-24 RX ADMIN — Medication 40 MILLIEQUIVALENT(S): at 12:06

## 2023-04-24 RX ADMIN — Medication 1 DROP(S): at 05:26

## 2023-04-24 RX ADMIN — LATANOPROST 1 DROP(S): 0.05 SOLUTION/ DROPS OPHTHALMIC; TOPICAL at 22:19

## 2023-04-24 RX ADMIN — Medication 100 MILLIGRAM(S): at 12:04

## 2023-04-24 RX ADMIN — Medication 1 MILLIGRAM(S): at 17:29

## 2023-04-24 NOTE — DISCHARGE NOTE PROVIDER - ATTENDING DISCHARGE PHYSICAL EXAMINATION:
GENERAL: NAD, lying in bed  HEAD:  Atraumatic, Normocephalic  EYES: EOMI, PERRLA, conjunctiva and sclera clear  ENT: Moist mucous membranes  NECK: Supple, No JVD  CHEST/LUNG: Clear to auscultation bilaterally; No rales, rhonchi, wheezing, or rubs. Unlabored respirations  HEART: Regular rate and rhythm; No murmurs, rubs, or gallops  ABDOMEN: BSx4; Soft, nontender, nondistended  EXTREMITIES:  2+ Peripheral Pulses, brisk capillary refill. No clubbing, cyanosis, or edema  NERVOUS SYSTEM:  A&Ox3, no focal deficits  SKIN: No rashes or lesions

## 2023-04-24 NOTE — DISCHARGE NOTE PROVIDER - NSDCCPCAREPLAN_GEN_ALL_CORE_FT
PRINCIPAL DISCHARGE DIAGNOSIS  Diagnosis: Alcohol intoxication, uncomplicated  Assessment and Plan of Treatment:

## 2023-04-24 NOTE — DISCHARGE NOTE PROVIDER - NSDCMRMEDTOKEN_GEN_ALL_CORE_FT
acetaminophen 325 mg oral tablet: 2 tab(s) orally every 6 hours As needed Mild Pain (1 - 3), Moderate Pain (4 - 6)  folic acid 1 mg oral tablet: 1 tab(s) orally once a day  latanoprost 0.005% ophthalmic solution: 1 drop(s) to each affected eye once a day (at bedtime)  Multiple Vitamins oral tablet: 1 tab(s) orally once a day  thiamine 100 mg oral tablet: 1 tab(s) orally once a day  timolol maleate 0.5% ophthalmic solution: 1 drop(s) to each affected eye 2 times a day

## 2023-04-25 PROCEDURE — 99239 HOSP IP/OBS DSCHRG MGMT >30: CPT

## 2023-04-25 RX ORDER — ACETAMINOPHEN 500 MG
650 TABLET ORAL EVERY 6 HOURS
Refills: 0 | Status: DISCONTINUED | OUTPATIENT
Start: 2023-04-25 | End: 2023-04-27

## 2023-04-25 RX ORDER — LANOLIN ALCOHOL/MO/W.PET/CERES
3 CREAM (GRAM) TOPICAL AT BEDTIME
Refills: 0 | Status: DISCONTINUED | OUTPATIENT
Start: 2023-04-25 | End: 2023-04-27

## 2023-04-25 RX ADMIN — Medication 1 MILLIGRAM(S): at 05:12

## 2023-04-25 RX ADMIN — ENOXAPARIN SODIUM 40 MILLIGRAM(S): 100 INJECTION SUBCUTANEOUS at 12:15

## 2023-04-25 RX ADMIN — Medication 1 MILLIGRAM(S): at 00:01

## 2023-04-25 RX ADMIN — Medication 1 DROP(S): at 05:10

## 2023-04-25 RX ADMIN — Medication 0.5 MILLIGRAM(S): at 09:47

## 2023-04-25 RX ADMIN — Medication 100 MILLIGRAM(S): at 12:15

## 2023-04-25 RX ADMIN — Medication 3 MILLIGRAM(S): at 22:38

## 2023-04-25 RX ADMIN — Medication 1 MILLIGRAM(S): at 12:14

## 2023-04-25 RX ADMIN — LATANOPROST 1 DROP(S): 0.05 SOLUTION/ DROPS OPHTHALMIC; TOPICAL at 22:39

## 2023-04-25 RX ADMIN — Medication 1 DROP(S): at 17:39

## 2023-04-25 RX ADMIN — Medication 1 TABLET(S): at 12:15

## 2023-04-26 PROCEDURE — 99232 SBSQ HOSP IP/OBS MODERATE 35: CPT

## 2023-04-26 RX ORDER — SODIUM CHLORIDE 9 MG/ML
1000 INJECTION INTRAMUSCULAR; INTRAVENOUS; SUBCUTANEOUS
Refills: 0 | Status: DISCONTINUED | OUTPATIENT
Start: 2023-04-26 | End: 2023-04-27

## 2023-04-26 RX ADMIN — SODIUM CHLORIDE 150 MILLILITER(S): 9 INJECTION INTRAMUSCULAR; INTRAVENOUS; SUBCUTANEOUS at 14:29

## 2023-04-26 RX ADMIN — LATANOPROST 1 DROP(S): 0.05 SOLUTION/ DROPS OPHTHALMIC; TOPICAL at 21:29

## 2023-04-26 RX ADMIN — ONDANSETRON 4 MILLIGRAM(S): 8 TABLET, FILM COATED ORAL at 16:32

## 2023-04-26 RX ADMIN — Medication 3 MILLIGRAM(S): at 21:29

## 2023-04-26 RX ADMIN — Medication 1 DROP(S): at 17:23

## 2023-04-26 RX ADMIN — Medication 1 MILLIGRAM(S): at 11:01

## 2023-04-26 RX ADMIN — Medication 1 TABLET(S): at 11:02

## 2023-04-26 RX ADMIN — ENOXAPARIN SODIUM 40 MILLIGRAM(S): 100 INJECTION SUBCUTANEOUS at 11:01

## 2023-04-26 RX ADMIN — Medication 1 DROP(S): at 06:47

## 2023-04-26 RX ADMIN — Medication 100 MILLIGRAM(S): at 11:01

## 2023-04-26 NOTE — PROGRESS NOTE ADULT - ASSESSMENT
58 y M PMH: alcohol use disorder w/ multiple episodes of DTs and prior hospitalizations for alcohol withdrawal, HTN, HLD, and glaucoma, recently discharged for ETOH withdrawal from [facility] presents to the ED with complaints of: ETOH intoxication       Problem/Plan - 1:  ·  Problem: Alcohol dependence with withdrawal.   ·  Plan:   - (4/23) Clinically improving. CIWA this AM is a 6.   - ativan IV taper   -Ativan 2 mg IV PRN for CIWA >8  -If ciwa > 15 get ICU consult   -Continue thiamine/MV/FA  -SW consult    Problem/Plan - 2:  ·  Problem: Parainfluenza infection.   ·  Plan: -Droplet precaution  -Supportive care (patient asymptomatic at this time).    Problem/Plan - 3:  ·  Problem: HTN (hypertension).   ·  Plan: -Resume amlodipine when BP uptrending (currently normotensive).    Problem/Plan - 4:  ·  Problem: Glaucoma.   ·  Plan: -Continue latanoprost eye drops  -Continue timolol eye drops.    Problem/Plan - 5:  ·  Problem: Transaminitis.   ·  Plan: 2/2 to ETOH   -Trend LFTs.    Problem/Plan - 6:  ·  Problem: Nutrition, metabolism, and development symptoms.   ·  Plan: F: not indicated  E: replete PRN  N: DASH diet    DVT ppx: lovenox    Full Code.  
58 y M PMH: alcohol use disorder w/ multiple episodes of DTs and prior hospitalizations for alcohol withdrawal, HTN, HLD, and glaucoma, recently discharged for ETOH withdrawal from [facility] presents to the ED with complaints of: ETOH intoxication now clinically stable for discharge home.     #Dizziness likely related to influenza infection   [ ] F/u orthostatics and gentle hydration   - encourage PO   - nutrition consult   Problem/Plan - 1:  ·  Problem: Alcohol dependence with withdrawal.   ·  Plan:   - (4/23) Clinically improving. CIWA this AM is a 6.   (4/24) Clinically improved. Stable for discharge home   - s/p ativan IV taper   -Ativan 2 mg IV PRN for CIWA >8  -Continue thiamine/MV/FA  -SW consult    Problem/Plan - 2:  ·  Problem: Parainfluenza infection.   ·  Plan: -Droplet precaution  -Supportive care (patient asymptomatic at this time).    Problem/Plan - 3:  ·  Problem: HTN (hypertension).   ·  Plan: -Resume amlodipine when BP up-trending (currently normotensive).    Problem/Plan - 4:  ·  Problem: Glaucoma.   ·  Plan: -Continue latanoprost eye drops  -Continue timolol eye drops.    Problem/Plan - 5:  ·  Problem: Transaminitis.   ·  Plan: 2/2 to ETOH     Problem/Plan - 6:  ·  Problem: Nutrition, metabolism, and development symptoms.   ·  Plan: F: not indicated  E: replete PRN  N: DASH diet    DVT ppx: lovenox    Full Code.  Dispo; pending acceptance to facility   
58 y M PMH: alcohol use disorder w/ multiple episodes of DTs and prior hospitalizations for alcohol withdrawal, HTN, HLD, and glaucoma, recently discharged for ETOH withdrawal from  presents to the ED with complaints of: ETOH intoxication now clinically stable for discharge home.       Problem/Plan - 1:  ·  Problem: Alcohol dependence with withdrawal.   ·  Plan:   - (4/23) Clinically improving. CIWA this AM is a 6.   (4/24) Clinically improved. Stable for discharge home   - ativan IV taper   -Ativan 2 mg IV PRN for CIWA >8  -Continue thiamine/MV/FA  -SW consult    Problem/Plan - 2:  ·  Problem: Parainfluenza infection.   ·  Plan: -Droplet precaution  -Supportive care (patient asymptomatic at this time).    Problem/Plan - 3:  ·  Problem: HTN (hypertension).   ·  Plan: -Resume amlodipine when BP uptrending (currently normotensive).    Problem/Plan - 4:  ·  Problem: Glaucoma.   ·  Plan: -Continue latanoprost eye drops  -Continue timolol eye drops.    Problem/Plan - 5:  ·  Problem: Transaminitis.   ·  Plan: 2/2 to ETOH     Problem/Plan - 6:  ·  Problem: Nutrition, metabolism, and development symptoms.   ·  Plan: F: not indicated  E: replete PRN  N: DASH diet    DVT ppx: lovenox    Full Code.

## 2023-04-26 NOTE — PROGRESS NOTE ADULT - SUBJECTIVE AND OBJECTIVE BOX
Patient is a 58y old  Male who presents with a chief complaint of ETOH intoxication (2023 08:23)    INTERVAL HPI/OVERNIGHT EVENTS: Patients seen and examined at bedside this morning. No acute events overnight.     MEDICATIONS  (STANDING):  enoxaparin Injectable 40 milliGRAM(s) SubCutaneous every 24 hours  folic acid 1 milliGRAM(s) Oral daily  latanoprost 0.005% Ophthalmic Solution 1 Drop(s) Both EYES at bedtime  multivitamin 1 Tablet(s) Oral daily  thiamine 100 milliGRAM(s) Oral daily  timolol 0.5% Solution 1 Drop(s) Both EYES two times a day    MEDICATIONS  (PRN):  acetaminophen     Tablet .. 650 milliGRAM(s) Oral every 6 hours PRN Temp greater or equal to 38C (100.4F), Mild Pain (1 - 3)  ondansetron Injectable 4 milliGRAM(s) IV Push every 8 hours PRN Nausea and/or Vomiting    Allergies    No Known Allergies    Intolerances      REVIEW OF SYSTEMS:  All other systems reviewed and are negative    Vital Signs Last 24 Hrs  T(C): 36.6 (2023 11:37), Max: 36.9 (2023 16:11)  T(F): 97.8 (2023 11:37), Max: 98.5 (2023 16:11)  HR: 69 (2023 11:37) (56 - 69)  BP: 139/79 (2023 11:37) (115/67 - 163/90)  BP(mean): --  RR: 18 (2023 11:37) (18 - 18)  SpO2: 97% (2023 11:37) (96% - 99%)    Parameters below as of 2023 11:37  Patient On (Oxygen Delivery Method): room air      Daily     Daily Weight in k.9 (2023 04:40)  I&O's Summary    2023 07:01  -  2023 07:00  --------------------------------------------------------  IN: 840 mL / OUT: 900 mL / NET: -60 mL    2023 07:01  -  2023 12:04  --------------------------------------------------------  IN: 360 mL / OUT: 0 mL / NET: 360 mL      CAPILLARY BLOOD GLUCOSE        PHYSICAL EXAM:  GENERAL: NAD, well-groomed, well-developed  HEAD:  Atraumatic, Normocephalic  EYES: EOMI, PERRLA, conjunctiva and sclera clear  ENMT: No tonsillar erythema, exudates, or enlargement; Moist mucous membranes, Good dentition, No lesions  NECK: Supple, No JVD, Normal thyroid  NERVOUS SYSTEM:  Alert & Oriented X3, Good concentration; Motor Strength 5/5 B/L upper and lower extremities; DTRs 2+ intact and symmetric  CHEST/LUNG: Clear to percussion bilaterally; No rales, rhonchi, wheezing, or rubs  HEART: Regular rate and rhythm; No murmurs, rubs, or gallops  ABDOMEN: Soft, Nontender, Nondistended; Bowel sounds present  EXTREMITIES:  2+ Peripheral Pulses, No clubbing, cyanosis, or edema  LYMPH: No lymphadenopathy noted  SKIN: No rashes or lesions    Labs          136  |  105  |  7   ----------------------------<  94  3.1<L>   |  25  |  0.64    Ca    8.5      2023 07:01  Phos  2.9       Mg     1.9         TPro  7.2  /  Alb  2.8<L>  /  TBili  0.8  /  DBili  x   /  AST  38<H>  /  ALT  56  /  AlkPhos  62                Culture - Urine (collected 2023 18:00)  Source: Clean Catch Clean Catch (Midstream)  Final Report (2023 19:55):    <10,000 CFU/mL Normal Urogenital Barbara                    Radiology and Imaging reviewed.
[facility]  Division of Hospital Medicine  Carmencita Dejesus MD  Pager: [phone]      Patient is a 58y old  Male who presents with a chief complaint of ETOH intoxication (25 Apr 2023 12:04)      SUBJECTIVE / OVERNIGHT EVENTS:Patient examined at bedside. Reports some dizziness and poor Po intake. Discussed parainfluenza infection and encourage Po intake and fluids. Will check orthostatics and give gentle hydration   ADDITIONAL REVIEW OF SYSTEMS:    MEDICATIONS  (STANDING):  enoxaparin Injectable 40 milliGRAM(s) SubCutaneous every 24 hours  folic acid 1 milliGRAM(s) Oral daily  latanoprost 0.005% Ophthalmic Solution 1 Drop(s) Both EYES at bedtime  multivitamin 1 Tablet(s) Oral daily  sodium chloride 0.9%. 1000 milliLiter(s) (150 mL/Hr) IV Continuous <Continuous>  thiamine 100 milliGRAM(s) Oral daily  timolol 0.5% Solution 1 Drop(s) Both EYES two times a day    MEDICATIONS  (PRN):  acetaminophen     Tablet .. 650 milliGRAM(s) Oral every 6 hours PRN Temp greater or equal to 38C (100.4F)  acetaminophen     Tablet .. 650 milliGRAM(s) Oral every 6 hours PRN Mild Pain (1 - 3), Moderate Pain (4 - 6)  melatonin 3 milliGRAM(s) Oral at bedtime PRN Insomnia  ondansetron Injectable 4 milliGRAM(s) IV Push every 8 hours PRN Nausea and/or Vomiting      CAPILLARY BLOOD GLUCOSE        I&O's Summary    25 Apr 2023 07:01  -  26 Apr 2023 07:00  --------------------------------------------------------  IN: 1040 mL / OUT: 750 mL / NET: 290 mL    26 Apr 2023 07:01  -  26 Apr 2023 13:38  --------------------------------------------------------  IN: 480 mL / OUT: 0 mL / NET: 480 mL        PHYSICAL EXAM:  Vital Signs Last 24 Hrs  T(C): 36.6 (26 Apr 2023 10:45), Max: 36.7 (25 Apr 2023 17:30)  T(F): 97.9 (26 Apr 2023 10:45), Max: 98 (25 Apr 2023 17:30)  HR: 60 (26 Apr 2023 10:45) (53 - 60)  BP: 146/79 (26 Apr 2023 10:45) (111/63 - 151/89)  BP(mean): --  RR: 18 (26 Apr 2023 10:45) (18 - 18)  SpO2: 99% (26 Apr 2023 10:45) (94% - 99%)    Parameters below as of 26 Apr 2023 10:45  Patient On (Oxygen Delivery Method): room air      CONSTITUTIONAL: Middle age man in NAD, well-developed, well-groomed  RESPIRATORY: Normal respiratory effort; lungs are clear to auscultation bilaterally  CARDIOVASCULAR: Regular rate and rhythm, normal S1 and S2, no murmur/rub/gallop; No lower extremity edema; Peripheral pulses are 2+ bilaterally  ABDOMEN: Nontender to palpation, normoactive bowel sounds, no rebound/guarding; No hepatosplenomegaly  PSYCH: A+O to person, place, and time; affect appropriate  NEUROLOGY: CN 2-12 are intact and symmetric; no gross sensory deficits   SKIN: No rashes; no palpable lesions    LABS:                      RADIOLOGY & ADDITIONAL TESTS:  Results Reviewed:   Imaging Personally Reviewed:  Electrocardiogram Personally Reviewed:    COORDINATION OF CARE:  Care Discussed with Consultants/Other Providers [Y/N]:  Prior or Outpatient Records Reviewed [Y/N]:  
Patient is a 58y old  Male who presents with a chief complaint of ETOH intoxication (2023 06:04)    INTERVAL HPI/OVERNIGHT EVENTS: Patients seen and examined at bedside this morning. No acute events overnight.    MEDICATIONS  (STANDING):  enoxaparin Injectable 40 milliGRAM(s) SubCutaneous every 24 hours  folic acid 1 milliGRAM(s) Oral daily  latanoprost 0.005% Ophthalmic Solution 1 Drop(s) Both EYES at bedtime  LORazepam   Injectable   IV Push   LORazepam   Injectable 1.5 milliGRAM(s) IV Push every 4 hours  multivitamin 1 Tablet(s) Oral daily  thiamine 100 milliGRAM(s) Oral daily  timolol 0.5% Solution 1 Drop(s) Both EYES two times a day    MEDICATIONS  (PRN):  acetaminophen     Tablet .. 650 milliGRAM(s) Oral every 6 hours PRN Temp greater or equal to 38C (100.4F), Mild Pain (1 - 3)  LORazepam   Injectable 2 milliGRAM(s) IV Push every 1 hour PRN Symptom-triggered: each CIWA -Ar score 8 or GREATER  ondansetron Injectable 4 milliGRAM(s) IV Push every 8 hours PRN Nausea and/or Vomiting    Allergies    No Known Allergies    Intolerances      REVIEW OF SYSTEMS:  All other systems reviewed and are negative    Vital Signs Last 24 Hrs  T(C): 36.9 (2023 07:58), Max: 37.2 (2023 23:17)  T(F): 98.4 (2023 07:58), Max: 99 (2023 23:17)  HR: 83 (2023 05:41) (63 - 84)  BP: 163/75 (2023 05:41) (120/67 - 163/75)  BP(mean): --  RR: 18 (2023 05:41) (18 - 18)  SpO2: 99% (2023 05:41) (97% - 99%)    Parameters below as of 2023 05:41  Patient On (Oxygen Delivery Method): room air      Daily Height in cm: 172.72 (2023 10:30)    Daily   I&O's Summary    2023 07:01  -  2023 07:00  --------------------------------------------------------  IN: 1020 mL / OUT: 2030 mL / NET: -1010 mL    2023 07:01  -  2023 08:57  --------------------------------------------------------  IN: 0 mL / OUT: 300 mL / NET: -300 mL      CAPILLARY BLOOD GLUCOSE        PHYSICAL EXAM:  GENERAL: NAD, lying in bed  HEAD:  Atraumatic, Normocephalic  EYES: EOMI, PERRLA, conjunctiva and sclera clear  ENT: Moist mucous membranes  NECK: Supple, No JVD  CHEST/LUNG: Clear to auscultation bilaterally; No rales, rhonchi, wheezing, or rubs. Unlabored respirations  HEART: Regular rate and rhythm; No murmurs, rubs, or gallops  ABDOMEN: BSx4; Soft, nontender, nondistended  EXTREMITIES:  2+ Peripheral Pulses, brisk capillary refill. No clubbing, cyanosis, or edema  NERVOUS SYSTEM:  A&Ox3, no focal deficits  SKIN: No rashes or lesions    Labs                          12.0   4.69  )-----------( 274      ( 2023 06:30 )             36.1     04-23    137  |  104  |  7   ----------------------------<  102<H>  3.5   |  30  |  0.68    Ca    8.4<L>      2023 06:30    TPro  6.7  /  Alb  2.8<L>  /  TBili  1.4<H>  /  DBili  x   /  AST  44<H>  /  ALT  66  /  AlkPhos  65            Urinalysis Basic - ( 2023 18:00 )    Color: Yellow / Appearance: Clear / S.015 / pH: x  Gluc: x / Ketone: Negative  / Bili: Negative / Urobili: Negative mg/dL   Blood: x / Protein: 15 mg/dL / Nitrite: Negative   Leuk Esterase: Negative / RBC: 0-2 /HPF / WBC Negative   Sq Epi: x / Non Sq Epi: x / Bacteria: Occasional                      Radiology and Imaging reviewed.

## 2023-04-27 ENCOUNTER — TRANSCRIPTION ENCOUNTER (OUTPATIENT)
Age: 58
End: 2023-04-27

## 2023-04-27 VITALS
RESPIRATION RATE: 18 BRPM | HEART RATE: 56 BPM | TEMPERATURE: 98 F | WEIGHT: 192.9 LBS | SYSTOLIC BLOOD PRESSURE: 149 MMHG | DIASTOLIC BLOOD PRESSURE: 79 MMHG | OXYGEN SATURATION: 95 %

## 2023-04-27 PROCEDURE — 99239 HOSP IP/OBS DSCHRG MGMT >30: CPT

## 2023-04-27 PROCEDURE — 99238 HOSP IP/OBS DSCHRG MGMT 30/<: CPT

## 2023-04-27 RX ORDER — TIMOLOL 0.5 %
1 DROPS OPHTHALMIC (EYE)
Qty: 1 | Refills: 0
Start: 2023-04-27

## 2023-04-27 RX ORDER — FOLIC ACID 0.8 MG
1 TABLET ORAL
Qty: 0 | Refills: 0 | DISCHARGE
Start: 2023-04-27

## 2023-04-27 RX ORDER — THIAMINE MONONITRATE (VIT B1) 100 MG
1 TABLET ORAL
Qty: 0 | Refills: 0 | DISCHARGE
Start: 2023-04-27

## 2023-04-27 RX ORDER — TIMOLOL 0.5 %
1 DROPS OPHTHALMIC (EYE)
Qty: 1 | Refills: 0
Start: 2023-04-27 | End: 2023-05-26

## 2023-04-27 RX ORDER — ACETAMINOPHEN 500 MG
2 TABLET ORAL
Qty: 0 | Refills: 0 | DISCHARGE
Start: 2023-04-27

## 2023-04-27 RX ORDER — LATANOPROST 0.05 MG/ML
1 SOLUTION/ DROPS OPHTHALMIC; TOPICAL
Qty: 1 | Refills: 0
Start: 2023-04-27

## 2023-04-27 RX ORDER — LATANOPROST 0.05 MG/ML
1 SOLUTION/ DROPS OPHTHALMIC; TOPICAL
Qty: 1 | Refills: 0
Start: 2023-04-27 | End: 2023-05-26

## 2023-04-27 RX ADMIN — Medication 1 TABLET(S): at 13:07

## 2023-04-27 RX ADMIN — Medication 1 DROP(S): at 06:20

## 2023-04-27 RX ADMIN — Medication 100 MILLIGRAM(S): at 13:07

## 2023-04-27 RX ADMIN — Medication 1 MILLIGRAM(S): at 13:07

## 2023-04-27 NOTE — DISCHARGE NOTE NURSING/CASE MANAGEMENT/SOCIAL WORK - PATIENT PORTAL LINK FT
You can access the FollowMyHealth Patient Portal offered by [facility] by registering at the following website: [email or web address]. By joining [facility]’s FollowMyHealth portal, you will also be able to view your health information using other applications (apps) compatible with our system.

## 2023-04-27 NOTE — DISCHARGE NOTE NURSING/CASE MANAGEMENT/SOCIAL WORK - NSDCVIVACCINE_GEN_ALL_CORE_FT
influenza, injectable, quadrivalent, preservative free; 14-Oct-2021 13:35; Nany Guerrero (RN); [employer]; [id number] (Exp. Date: 30-Jun-2022); IntraMuscular; Deltoid Right.; 0.5 milliLiter(s); VIS (VIS Published: 06-Aug-2021, VIS Presented: 14-Oct-2021);   Tdap; 22-Dec-2021 19:23; Clementina Koehler (RN); [employer]; [id number] (Exp. Date: 09-Sep-2023); IntraMuscular; Deltoid Left.; 0.5 milliLiter(s); VIS (VIS Published: 09-May-2013, VIS Presented: 22-Dec-2021);   Tdap; 15-Aug-2022 16:08; Marianne Pollock (RN); [employer]; [id number]   (Exp. Date: 18-Apr-2024); IntraMuscular; Deltoid Left.; 0.5 milliLiter(s); VIS (VIS Published: 09-May-2013, VIS Presented: 15-Aug-2022);   Tdap; 16-Nov-2022 15:48; Parish Avila (RN); [employer]; [id number] (Exp. Date: 01-Jun-2024); IntraMuscular; Deltoid Right.; 0.5 milliLiter(s); VIS (VIS Published: 09-May-2013, VIS Presented: 16-Nov-2022);   
4 = No assist / stand by assistance

## 2023-04-27 NOTE — CHART NOTE - NSCHARTNOTEFT_GEN_A_CORE
No isolation precautions needed. Patient medically stable for discharge from hospital/placement
Patient seen and examined at bedside. Reports resolution of dizziness. Feeling better. Medically stable for discharge 35 minutes spent discharge planning.
Pt seen and examined, reports feeling better, some nausea no emesis.     VITALS:   T(C): 36.4 (04-22-23 @ 10:30), Max: 36.9 (04-22-23 @ 00:21)  HR: 84 (04-22-23 @ 10:30) (82 - 104)  BP: 120/67 (04-22-23 @ 10:30) (109/68 - 132/66)  RR: 18 (04-22-23 @ 10:30) (17 - 20)  SpO2: 97% (04-22-23 @ 10:30) (93% - 97%)    GENERAL: NAD, lying in bed comfortably  HEAD:  Atraumatic, Normocephalic  EYES: EOMI, PERRLA, conjunctiva and sclera clear  ENT: Moist mucous membranes  NECK: Supple, No JVD  CHEST/LUNG: Clear to auscultation bilaterally; No rales  HEART: Regular rate and rhythm; No murmurs, rubs, or gallops  ABDOMEN: BSx4; Soft, mild TTP in epigastric and RUQ, no rebound  EXTREMITIES:  2B/l UE tremors, No LE edema  NERVOUS SYSTEM:  A&Ox3, no focal deficits     #ETOH withdrawal, high risk for DT  #Paraflu +  -c/w Ativan taper  -supportive care for Paraflu  -rest of care per HPI

## 2023-04-30 ENCOUNTER — INPATIENT (INPATIENT)
Facility: HOSPITAL | Age: 58
LOS: 8 days | Discharge: HOME | End: 2023-05-09
Attending: STUDENT IN AN ORGANIZED HEALTH CARE EDUCATION/TRAINING PROGRAM | Admitting: STUDENT IN AN ORGANIZED HEALTH CARE EDUCATION/TRAINING PROGRAM
Payer: COMMERCIAL

## 2023-04-30 VITALS
SYSTOLIC BLOOD PRESSURE: 133 MMHG | DIASTOLIC BLOOD PRESSURE: 50 MMHG | TEMPERATURE: 98 F | RESPIRATION RATE: 20 BRPM | OXYGEN SATURATION: 94 % | HEART RATE: 92 BPM

## 2023-04-30 DIAGNOSIS — E78.5 HYPERLIPIDEMIA, UNSPECIFIED: ICD-10-CM

## 2023-04-30 DIAGNOSIS — H40.9 UNSPECIFIED GLAUCOMA: ICD-10-CM
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
## 2023-04-30 DIAGNOSIS — B34.8 OTHER VIRAL INFECTIONS OF UNSPECIFIED SITE: ICD-10-CM

## 2023-04-30 DIAGNOSIS — Z74.3 NEED FOR CONTINUOUS SUPERVISION: ICD-10-CM

## 2023-04-30 DIAGNOSIS — F10.230 ALCOHOL DEPENDENCE WITH WITHDRAWAL, UNCOMPLICATED: ICD-10-CM

## 2023-04-30 DIAGNOSIS — Z79.899 OTHER LONG TERM (CURRENT) DRUG THERAPY: ICD-10-CM

## 2023-04-30 DIAGNOSIS — R74.01 ELEVATION OF LEVELS OF LIVER TRANSAMINASE LEVELS: ICD-10-CM

## 2023-04-30 DIAGNOSIS — Z71.41 ALCOHOL ABUSE COUNSELING AND SURVEILLANCE OF ALCOHOLIC: ICD-10-CM

## 2023-04-30 DIAGNOSIS — I10 ESSENTIAL (PRIMARY) HYPERTENSION: ICD-10-CM

## 2023-04-30 LAB
ALBUMIN SERPL ELPH-MCNC: 4.4 G/DL — SIGNIFICANT CHANGE UP (ref 3.3–5)
ALBUMIN SERPL ELPH-MCNC: 4.4 G/DL — SIGNIFICANT CHANGE UP (ref 3.3–5)
ALP SERPL-CCNC: 68 U/L — SIGNIFICANT CHANGE UP (ref 40–120)
ALP SERPL-CCNC: 68 U/L — SIGNIFICANT CHANGE UP (ref 40–120)
ALT FLD-CCNC: 84 U/L — HIGH (ref 4–41)
ALT FLD-CCNC: 84 U/L — HIGH (ref 4–41)
ANION GAP SERPL CALC-SCNC: 17 MMOL/L — HIGH (ref 7–14)
ANION GAP SERPL CALC-SCNC: 17 MMOL/L — HIGH (ref 7–14)
APTT BLD: 29.3 SEC — SIGNIFICANT CHANGE UP (ref 27–36.3)
APTT BLD: 29.3 SEC — SIGNIFICANT CHANGE UP (ref 27–36.3)
AST SERPL-CCNC: 66 U/L — HIGH (ref 4–40)
AST SERPL-CCNC: 66 U/L — HIGH (ref 4–40)
BASE EXCESS BLDV CALC-SCNC: -0.8 MMOL/L — SIGNIFICANT CHANGE UP (ref -2–3)
BASE EXCESS BLDV CALC-SCNC: -0.8 MMOL/L — SIGNIFICANT CHANGE UP (ref -2–3)
BASOPHILS # BLD AUTO: 0.06 K/UL — SIGNIFICANT CHANGE UP (ref 0–0.2)
BASOPHILS # BLD AUTO: 0.06 K/UL — SIGNIFICANT CHANGE UP (ref 0–0.2)
BASOPHILS NFR BLD AUTO: 0.9 % — SIGNIFICANT CHANGE UP (ref 0–2)
BASOPHILS NFR BLD AUTO: 0.9 % — SIGNIFICANT CHANGE UP (ref 0–2)
BILIRUB SERPL-MCNC: 0.3 MG/DL — SIGNIFICANT CHANGE UP (ref 0.2–1.2)
BILIRUB SERPL-MCNC: 0.3 MG/DL — SIGNIFICANT CHANGE UP (ref 0.2–1.2)
BLOOD GAS VENOUS COMPREHENSIVE RESULT: SIGNIFICANT CHANGE UP
BLOOD GAS VENOUS COMPREHENSIVE RESULT: SIGNIFICANT CHANGE UP
BUN SERPL-MCNC: 9 MG/DL — SIGNIFICANT CHANGE UP (ref 7–23)
BUN SERPL-MCNC: 9 MG/DL — SIGNIFICANT CHANGE UP (ref 7–23)
CALCIUM SERPL-MCNC: 8.6 MG/DL — SIGNIFICANT CHANGE UP (ref 8.4–10.5)
CALCIUM SERPL-MCNC: 8.6 MG/DL — SIGNIFICANT CHANGE UP (ref 8.4–10.5)
CHLORIDE BLDV-SCNC: 103 MMOL/L — SIGNIFICANT CHANGE UP (ref 96–108)
CHLORIDE BLDV-SCNC: 103 MMOL/L — SIGNIFICANT CHANGE UP (ref 96–108)
CHLORIDE SERPL-SCNC: 101 MMOL/L — SIGNIFICANT CHANGE UP (ref 98–107)
CHLORIDE SERPL-SCNC: 101 MMOL/L — SIGNIFICANT CHANGE UP (ref 98–107)
CO2 BLDV-SCNC: 27.3 MMOL/L — HIGH (ref 22–26)
CO2 BLDV-SCNC: 27.3 MMOL/L — HIGH (ref 22–26)
CO2 SERPL-SCNC: 23 MMOL/L — SIGNIFICANT CHANGE UP (ref 22–31)
CO2 SERPL-SCNC: 23 MMOL/L — SIGNIFICANT CHANGE UP (ref 22–31)
CREAT SERPL-MCNC: 0.68 MG/DL — SIGNIFICANT CHANGE UP (ref 0.5–1.3)
CREAT SERPL-MCNC: 0.68 MG/DL — SIGNIFICANT CHANGE UP (ref 0.5–1.3)
EGFR: 106 ML/MIN/1.73M2 — SIGNIFICANT CHANGE UP
EGFR: 106 ML/MIN/1.73M2 — SIGNIFICANT CHANGE UP
EOSINOPHIL # BLD AUTO: 0.03 K/UL — SIGNIFICANT CHANGE UP (ref 0–0.5)
EOSINOPHIL # BLD AUTO: 0.03 K/UL — SIGNIFICANT CHANGE UP (ref 0–0.5)
EOSINOPHIL NFR BLD AUTO: 0.4 % — SIGNIFICANT CHANGE UP (ref 0–6)
EOSINOPHIL NFR BLD AUTO: 0.4 % — SIGNIFICANT CHANGE UP (ref 0–6)
ETHANOL SERPL-MCNC: 364 MG/DL — HIGH
ETHANOL SERPL-MCNC: 364 MG/DL — HIGH
GAS PNL BLDV: 142 MMOL/L — SIGNIFICANT CHANGE UP (ref 136–145)
GAS PNL BLDV: 142 MMOL/L — SIGNIFICANT CHANGE UP (ref 136–145)
GLUCOSE BLDV-MCNC: 108 MG/DL — HIGH (ref 70–99)
GLUCOSE BLDV-MCNC: 108 MG/DL — HIGH (ref 70–99)
GLUCOSE SERPL-MCNC: 116 MG/DL — HIGH (ref 70–99)
GLUCOSE SERPL-MCNC: 116 MG/DL — HIGH (ref 70–99)
HCO3 BLDV-SCNC: 26 MMOL/L — SIGNIFICANT CHANGE UP (ref 22–29)
HCO3 BLDV-SCNC: 26 MMOL/L — SIGNIFICANT CHANGE UP (ref 22–29)
HCT VFR BLD CALC: 41.6 % — SIGNIFICANT CHANGE UP (ref 39–50)
HCT VFR BLD CALC: 41.6 % — SIGNIFICANT CHANGE UP (ref 39–50)
HCT VFR BLDA CALC: 42 % — SIGNIFICANT CHANGE UP (ref 39–51)
HCT VFR BLDA CALC: 42 % — SIGNIFICANT CHANGE UP (ref 39–51)
HGB BLD CALC-MCNC: 14 G/DL — SIGNIFICANT CHANGE UP (ref 12.6–17.4)
HGB BLD CALC-MCNC: 14 G/DL — SIGNIFICANT CHANGE UP (ref 12.6–17.4)
HGB BLD-MCNC: 14.3 G/DL — SIGNIFICANT CHANGE UP (ref 13–17)
HGB BLD-MCNC: 14.3 G/DL — SIGNIFICANT CHANGE UP (ref 13–17)
IANC: 3.48 K/UL — SIGNIFICANT CHANGE UP (ref 1.8–7.4)
IANC: 3.48 K/UL — SIGNIFICANT CHANGE UP (ref 1.8–7.4)
IMM GRANULOCYTES NFR BLD AUTO: 0.6 % — SIGNIFICANT CHANGE UP (ref 0–0.9)
IMM GRANULOCYTES NFR BLD AUTO: 0.6 % — SIGNIFICANT CHANGE UP (ref 0–0.9)
INR BLD: 0.94 RATIO — SIGNIFICANT CHANGE UP (ref 0.88–1.16)
INR BLD: 0.94 RATIO — SIGNIFICANT CHANGE UP (ref 0.88–1.16)
LACTATE BLDV-MCNC: 4.8 MMOL/L — CRITICAL HIGH (ref 0.5–2)
LACTATE BLDV-MCNC: 4.8 MMOL/L — CRITICAL HIGH (ref 0.5–2)
LYMPHOCYTES # BLD AUTO: 2.22 K/UL — SIGNIFICANT CHANGE UP (ref 1–3.3)
LYMPHOCYTES # BLD AUTO: 2.22 K/UL — SIGNIFICANT CHANGE UP (ref 1–3.3)
LYMPHOCYTES # BLD AUTO: 33 % — SIGNIFICANT CHANGE UP (ref 13–44)
LYMPHOCYTES # BLD AUTO: 33 % — SIGNIFICANT CHANGE UP (ref 13–44)
MAGNESIUM SERPL-MCNC: 2 MG/DL — SIGNIFICANT CHANGE UP (ref 1.6–2.6)
MAGNESIUM SERPL-MCNC: 2 MG/DL — SIGNIFICANT CHANGE UP (ref 1.6–2.6)
MCHC RBC-ENTMCNC: 31.3 PG — SIGNIFICANT CHANGE UP (ref 27–34)
MCHC RBC-ENTMCNC: 31.3 PG — SIGNIFICANT CHANGE UP (ref 27–34)
MCHC RBC-ENTMCNC: 34.4 GM/DL — SIGNIFICANT CHANGE UP (ref 32–36)
MCHC RBC-ENTMCNC: 34.4 GM/DL — SIGNIFICANT CHANGE UP (ref 32–36)
MCV RBC AUTO: 91 FL — SIGNIFICANT CHANGE UP (ref 80–100)
MCV RBC AUTO: 91 FL — SIGNIFICANT CHANGE UP (ref 80–100)
MONOCYTES # BLD AUTO: 0.89 K/UL — SIGNIFICANT CHANGE UP (ref 0–0.9)
MONOCYTES # BLD AUTO: 0.89 K/UL — SIGNIFICANT CHANGE UP (ref 0–0.9)
MONOCYTES NFR BLD AUTO: 13.2 % — SIGNIFICANT CHANGE UP (ref 2–14)
MONOCYTES NFR BLD AUTO: 13.2 % — SIGNIFICANT CHANGE UP (ref 2–14)
NEUTROPHILS # BLD AUTO: 3.48 K/UL — SIGNIFICANT CHANGE UP (ref 1.8–7.4)
NEUTROPHILS # BLD AUTO: 3.48 K/UL — SIGNIFICANT CHANGE UP (ref 1.8–7.4)
NEUTROPHILS NFR BLD AUTO: 51.9 % — SIGNIFICANT CHANGE UP (ref 43–77)
NEUTROPHILS NFR BLD AUTO: 51.9 % — SIGNIFICANT CHANGE UP (ref 43–77)
NRBC # BLD: 0 /100 WBCS — SIGNIFICANT CHANGE UP (ref 0–0)
NRBC # BLD: 0 /100 WBCS — SIGNIFICANT CHANGE UP (ref 0–0)
NRBC # FLD: 0 K/UL — SIGNIFICANT CHANGE UP (ref 0–0)
NRBC # FLD: 0 K/UL — SIGNIFICANT CHANGE UP (ref 0–0)
PCO2 BLDV: 49 MMHG — SIGNIFICANT CHANGE UP (ref 42–55)
PCO2 BLDV: 49 MMHG — SIGNIFICANT CHANGE UP (ref 42–55)
PH BLDV: 7.33 — SIGNIFICANT CHANGE UP (ref 7.32–7.43)
PH BLDV: 7.33 — SIGNIFICANT CHANGE UP (ref 7.32–7.43)
PHOSPHATE SERPL-MCNC: 2.3 MG/DL — LOW (ref 2.5–4.5)
PHOSPHATE SERPL-MCNC: 2.3 MG/DL — LOW (ref 2.5–4.5)
PLATELET # BLD AUTO: 233 K/UL — SIGNIFICANT CHANGE UP (ref 150–400)
PLATELET # BLD AUTO: 233 K/UL — SIGNIFICANT CHANGE UP (ref 150–400)
PO2 BLDV: 52 MMHG — HIGH (ref 25–45)
PO2 BLDV: 52 MMHG — HIGH (ref 25–45)
POTASSIUM BLDV-SCNC: 3.8 MMOL/L — SIGNIFICANT CHANGE UP (ref 3.5–5.1)
POTASSIUM BLDV-SCNC: 3.8 MMOL/L — SIGNIFICANT CHANGE UP (ref 3.5–5.1)
POTASSIUM SERPL-MCNC: 3.6 MMOL/L — SIGNIFICANT CHANGE UP (ref 3.5–5.3)
POTASSIUM SERPL-MCNC: 3.6 MMOL/L — SIGNIFICANT CHANGE UP (ref 3.5–5.3)
POTASSIUM SERPL-SCNC: 3.6 MMOL/L — SIGNIFICANT CHANGE UP (ref 3.5–5.3)
POTASSIUM SERPL-SCNC: 3.6 MMOL/L — SIGNIFICANT CHANGE UP (ref 3.5–5.3)
PROT SERPL-MCNC: 8.1 G/DL — SIGNIFICANT CHANGE UP (ref 6–8.3)
PROT SERPL-MCNC: 8.1 G/DL — SIGNIFICANT CHANGE UP (ref 6–8.3)
PROTHROM AB SERPL-ACNC: 10.9 SEC — SIGNIFICANT CHANGE UP (ref 10.5–13.4)
PROTHROM AB SERPL-ACNC: 10.9 SEC — SIGNIFICANT CHANGE UP (ref 10.5–13.4)
RBC # BLD: 4.57 M/UL — SIGNIFICANT CHANGE UP (ref 4.2–5.8)
RBC # BLD: 4.57 M/UL — SIGNIFICANT CHANGE UP (ref 4.2–5.8)
RBC # FLD: 15.7 % — HIGH (ref 10.3–14.5)
RBC # FLD: 15.7 % — HIGH (ref 10.3–14.5)
SAO2 % BLDV: 77.7 % — SIGNIFICANT CHANGE UP (ref 67–88)
SAO2 % BLDV: 77.7 % — SIGNIFICANT CHANGE UP (ref 67–88)
SODIUM SERPL-SCNC: 141 MMOL/L — SIGNIFICANT CHANGE UP (ref 135–145)
SODIUM SERPL-SCNC: 141 MMOL/L — SIGNIFICANT CHANGE UP (ref 135–145)
TSH SERPL-MCNC: 0.4 UIU/ML — SIGNIFICANT CHANGE UP (ref 0.27–4.2)
TSH SERPL-MCNC: 0.4 UIU/ML — SIGNIFICANT CHANGE UP (ref 0.27–4.2)
WBC # BLD: 6.72 K/UL — SIGNIFICANT CHANGE UP (ref 3.8–10.5)
WBC # BLD: 6.72 K/UL — SIGNIFICANT CHANGE UP (ref 3.8–10.5)
WBC # FLD AUTO: 6.72 K/UL — SIGNIFICANT CHANGE UP (ref 3.8–10.5)
WBC # FLD AUTO: 6.72 K/UL — SIGNIFICANT CHANGE UP (ref 3.8–10.5)

## 2023-04-30 PROCEDURE — 99285 EMERGENCY DEPT VISIT HI MDM: CPT

## 2023-04-30 RX ORDER — FLUCONAZOLE 150 MG/1
150 TABLET ORAL ONCE
Refills: 0 | Status: COMPLETED | OUTPATIENT
Start: 2023-04-30 | End: 2023-04-30

## 2023-04-30 RX ORDER — THIAMINE MONONITRATE (VIT B1) 100 MG
100 TABLET ORAL ONCE
Refills: 0 | Status: COMPLETED | OUTPATIENT
Start: 2023-04-30 | End: 2023-04-30

## 2023-04-30 RX ORDER — POTASSIUM PHOSPHATE, MONOBASIC POTASSIUM PHOSPHATE, DIBASIC 236; 224 MG/ML; MG/ML
15 INJECTION, SOLUTION INTRAVENOUS ONCE
Refills: 0 | Status: COMPLETED | OUTPATIENT
Start: 2023-04-30 | End: 2023-05-01

## 2023-04-30 RX ORDER — THIAMINE MONONITRATE (VIT B1) 100 MG
100 TABLET ORAL ONCE
Refills: 0 | Status: DISCONTINUED | OUTPATIENT
Start: 2023-04-30 | End: 2023-04-30

## 2023-04-30 RX ORDER — FOLIC ACID 0.8 MG
1 TABLET ORAL DAILY
Refills: 0 | Status: DISCONTINUED | OUTPATIENT
Start: 2023-04-30 | End: 2023-05-09

## 2023-04-30 RX ORDER — SODIUM CHLORIDE 9 MG/ML
1000 INJECTION INTRAMUSCULAR; INTRAVENOUS; SUBCUTANEOUS ONCE
Refills: 0 | Status: COMPLETED | OUTPATIENT
Start: 2023-04-30 | End: 2023-04-30

## 2023-04-30 RX ADMIN — SODIUM CHLORIDE 1000 MILLILITER(S): 9 INJECTION INTRAMUSCULAR; INTRAVENOUS; SUBCUTANEOUS at 21:46

## 2023-04-30 RX ADMIN — Medication 1 TABLET(S): at 21:02

## 2023-04-30 RX ADMIN — Medication 2 MILLIGRAM(S): at 22:37

## 2023-04-30 RX ADMIN — Medication 100 MILLIGRAM(S): at 21:02

## 2023-04-30 RX ADMIN — Medication 1 APPLICATION(S): at 22:37

## 2023-04-30 RX ADMIN — Medication 100 MILLIGRAM(S): at 21:46

## 2023-04-30 RX ADMIN — FLUCONAZOLE 150 MILLIGRAM(S): 150 TABLET ORAL at 22:37

## 2023-04-30 RX ADMIN — Medication 1 MILLIGRAM(S): at 21:02

## 2023-04-30 NOTE — ED PROVIDER NOTE - PROGRESS NOTE DETAILS
rBenda Best MD (PGY3) - Pt was re-evaluated at bedside, feeling better overall. We discussed the results of ED workup with the patient including the need for hospitalization for further management. Time was taken to answer any questions that the patient had. Pt endorsed to hospitalist at this time. Brenda Best MD (PGY3) - Pt was re-evaluated at bedside, feeling better overall. We discussed the results of ED workup with the patient including the need for hospitalization for further management. Time was taken to answer any questions that the patient had. Pt endorsed to hospitalist at this time.

## 2023-04-30 NOTE — ED ADULT NURSE REASSESSMENT NOTE - NS ED NURSE REASSESS COMMENT FT1
patient is A&OX4, awake, alert, calm and cooperative. Pt breathing spontaneous and unlabored, denies SOB and chest pain. bed lowest position, call bell within reach. will continue to monitor.
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
## 2023-04-30 NOTE — ED ADULT NURSE NOTE - NSIMPLEMENTINTERV_GEN_ALL_ED
Implemented All Universal Safety Interventions:  [place] to call system. Call bell, personal items and telephone within reach. Instruct patient to call for assistance. Room bathroom lighting operational. Non-slip footwear when patient is off stretcher. Physically safe environment: no spills, clutter or unnecessary equipment. Stretcher in lowest position, wheels locked, appropriate side rails in place. Implemented All Universal Safety Interventions:  [place]y to call system. Call bell, personal items and telephone within reach. Instruct patient to call for assistance. Room bathroom lighting operational. Non-slip footwear when patient is off stretcher. Physically safe environment: no spills, clutter or unnecessary equipment. Stretcher in lowest position, wheels locked, appropriate side rails in place.
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
## 2023-04-30 NOTE — ED PROVIDER NOTE - PHYSICAL EXAMINATION
General: Patient awake alert NAD. Patient is cool to touch.  Patient is disheveled and shivering.   HEENT: normocephalic, atraumatic, EOMI, MMM. Patient has bilateral tremors to upper extremities.  Patient has tongue fasciculations on exam.  Cardiac: RRR, S1, S2, no murmur.   LUNGS: ctab, nwob, no wheeze, rhonchi, speaking full sentences.     Abdomen: soft NT, ND, no rebound no guarding.   EXT: Moving all extremities, no edema.   Neuro: A&Ox3, no focal neurological deficits   Skin: warm, dry, no rash.  : On full examination of the skin patient noted to have macule over his buttock which appeared to be coalescent tenia corporis.  There are areas of this macule that ringlike surrounding erythema.
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
## 2023-04-30 NOTE — ED PROVIDER NOTE - CLINICAL SUMMARY MEDICAL DECISION MAKING FREE TEXT BOX
Patient is a 58-year-old undomiciled male with a past medical history of alcohol abuse hypertension anxiety depression presents to the ER today due to c/f alcohol withdrawal.  Patient's vital signs on initial examination were nonactionable.  Patient is cool to touch.  Patient is disheveled shivering. Patient has bilateral tremors to upper extremities.  Patient has tongue fasciculations on exam.  C/f etoh withdrawal. On full examination of the skin patient noted to have macule over his buttock and BL thighs which appeared to be coalescing - c/f tenia corporis due to damp environment created by sleeping outside in the rain/wet clothes.  There are areas of this macule that ringlike surrounding erythema.  Plan to give patient Librium, Ativan, thiamine, multivitamin, folic acid, IV fluids, Diflucan and Mycelex for probable tinea corporis.  Will obtain basic blood work including liver function EKG and admit to hospital for further evaluation due to alcohol withdrawal.

## 2023-05-01 DIAGNOSIS — K76.0 FATTY (CHANGE OF) LIVER, NOT ELSEWHERE CLASSIFIED: ICD-10-CM

## 2023-05-01 DIAGNOSIS — Z59.00 HOMELESSNESS UNSPECIFIED: ICD-10-CM

## 2023-05-01 DIAGNOSIS — E83.39 OTHER DISORDERS OF PHOSPHORUS METABOLISM: ICD-10-CM

## 2023-05-01 DIAGNOSIS — E86.0 DEHYDRATION: ICD-10-CM

## 2023-05-01 DIAGNOSIS — F10.239 ALCOHOL DEPENDENCE WITH WITHDRAWAL, UNSPECIFIED: ICD-10-CM

## 2023-05-01 DIAGNOSIS — E87.20 ACIDOSIS, UNSPECIFIED: ICD-10-CM

## 2023-05-01 DIAGNOSIS — K59.01 SLOW TRANSIT CONSTIPATION: ICD-10-CM

## 2023-05-01 DIAGNOSIS — I10 ESSENTIAL (PRIMARY) HYPERTENSION: ICD-10-CM

## 2023-05-01 DIAGNOSIS — Z29.9 ENCOUNTER FOR PROPHYLACTIC MEASURES, UNSPECIFIED: ICD-10-CM

## 2023-05-01 DIAGNOSIS — F41.9 ANXIETY DISORDER, UNSPECIFIED: ICD-10-CM

## 2023-05-01 DIAGNOSIS — R21 RASH AND OTHER NONSPECIFIC SKIN ERUPTION: ICD-10-CM

## 2023-05-01 DIAGNOSIS — Z79.899 OTHER LONG TERM (CURRENT) DRUG THERAPY: ICD-10-CM

## 2023-05-01 LAB
24R-OH-CALCIDIOL SERPL-MCNC: 35.3 NG/ML — SIGNIFICANT CHANGE UP (ref 30–80)
24R-OH-CALCIDIOL SERPL-MCNC: 35.3 NG/ML — SIGNIFICANT CHANGE UP (ref 30–80)
A1C WITH ESTIMATED AVERAGE GLUCOSE RESULT: 5.2 % — SIGNIFICANT CHANGE UP (ref 4–5.6)
A1C WITH ESTIMATED AVERAGE GLUCOSE RESULT: 5.2 % — SIGNIFICANT CHANGE UP (ref 4–5.6)
ALBUMIN SERPL ELPH-MCNC: 3.6 G/DL — SIGNIFICANT CHANGE UP (ref 3.3–5)
ALBUMIN SERPL ELPH-MCNC: 3.6 G/DL — SIGNIFICANT CHANGE UP (ref 3.3–5)
ALP SERPL-CCNC: 57 U/L — SIGNIFICANT CHANGE UP (ref 40–120)
ALP SERPL-CCNC: 57 U/L — SIGNIFICANT CHANGE UP (ref 40–120)
ALT FLD-CCNC: 61 U/L — HIGH (ref 4–41)
ALT FLD-CCNC: 61 U/L — HIGH (ref 4–41)
ANION GAP SERPL CALC-SCNC: 15 MMOL/L — HIGH (ref 7–14)
ANION GAP SERPL CALC-SCNC: 15 MMOL/L — HIGH (ref 7–14)
AST SERPL-CCNC: 49 U/L — HIGH (ref 4–40)
AST SERPL-CCNC: 49 U/L — HIGH (ref 4–40)
BASE EXCESS BLDV CALC-SCNC: -0.5 MMOL/L — SIGNIFICANT CHANGE UP (ref -2–3)
BASE EXCESS BLDV CALC-SCNC: -0.5 MMOL/L — SIGNIFICANT CHANGE UP (ref -2–3)
BILIRUB SERPL-MCNC: 0.6 MG/DL — SIGNIFICANT CHANGE UP (ref 0.2–1.2)
BILIRUB SERPL-MCNC: 0.6 MG/DL — SIGNIFICANT CHANGE UP (ref 0.2–1.2)
BLOOD GAS VENOUS COMPREHENSIVE RESULT: SIGNIFICANT CHANGE UP
BLOOD GAS VENOUS COMPREHENSIVE RESULT: SIGNIFICANT CHANGE UP
BUN SERPL-MCNC: 7 MG/DL — SIGNIFICANT CHANGE UP (ref 7–23)
BUN SERPL-MCNC: 7 MG/DL — SIGNIFICANT CHANGE UP (ref 7–23)
CALCIUM SERPL-MCNC: 8.1 MG/DL — LOW (ref 8.4–10.5)
CALCIUM SERPL-MCNC: 8.1 MG/DL — LOW (ref 8.4–10.5)
CHLORIDE BLDV-SCNC: 106 MMOL/L — SIGNIFICANT CHANGE UP (ref 96–108)
CHLORIDE BLDV-SCNC: 106 MMOL/L — SIGNIFICANT CHANGE UP (ref 96–108)
CHLORIDE SERPL-SCNC: 104 MMOL/L — SIGNIFICANT CHANGE UP (ref 98–107)
CHLORIDE SERPL-SCNC: 104 MMOL/L — SIGNIFICANT CHANGE UP (ref 98–107)
CO2 BLDV-SCNC: 26.8 MMOL/L — HIGH (ref 22–26)
CO2 BLDV-SCNC: 26.8 MMOL/L — HIGH (ref 22–26)
CO2 SERPL-SCNC: 22 MMOL/L — SIGNIFICANT CHANGE UP (ref 22–31)
CO2 SERPL-SCNC: 22 MMOL/L — SIGNIFICANT CHANGE UP (ref 22–31)
CREAT SERPL-MCNC: 0.69 MG/DL — SIGNIFICANT CHANGE UP (ref 0.5–1.3)
CREAT SERPL-MCNC: 0.69 MG/DL — SIGNIFICANT CHANGE UP (ref 0.5–1.3)
EGFR: 106 ML/MIN/1.73M2 — SIGNIFICANT CHANGE UP
EGFR: 106 ML/MIN/1.73M2 — SIGNIFICANT CHANGE UP
ESTIMATED AVERAGE GLUCOSE: 103 — SIGNIFICANT CHANGE UP
ESTIMATED AVERAGE GLUCOSE: 103 — SIGNIFICANT CHANGE UP
GAS PNL BLDV: 143 MMOL/L — SIGNIFICANT CHANGE UP (ref 136–145)
GAS PNL BLDV: 143 MMOL/L — SIGNIFICANT CHANGE UP (ref 136–145)
GAS PNL BLDV: SIGNIFICANT CHANGE UP
GAS PNL BLDV: SIGNIFICANT CHANGE UP
GGT SERPL-CCNC: 58 U/L — SIGNIFICANT CHANGE UP (ref 8–61)
GGT SERPL-CCNC: 58 U/L — SIGNIFICANT CHANGE UP (ref 8–61)
GLUCOSE BLDV-MCNC: 89 MG/DL — SIGNIFICANT CHANGE UP (ref 70–99)
GLUCOSE BLDV-MCNC: 89 MG/DL — SIGNIFICANT CHANGE UP (ref 70–99)
GLUCOSE SERPL-MCNC: 84 MG/DL — SIGNIFICANT CHANGE UP (ref 70–99)
GLUCOSE SERPL-MCNC: 84 MG/DL — SIGNIFICANT CHANGE UP (ref 70–99)
HCO3 BLDV-SCNC: 25 MMOL/L — SIGNIFICANT CHANGE UP (ref 22–29)
HCO3 BLDV-SCNC: 25 MMOL/L — SIGNIFICANT CHANGE UP (ref 22–29)
HCT VFR BLDA CALC: 36 % — LOW (ref 39–51)
HCT VFR BLDA CALC: 36 % — LOW (ref 39–51)
HGB BLD CALC-MCNC: 11.9 G/DL — LOW (ref 12.6–17.4)
HGB BLD CALC-MCNC: 11.9 G/DL — LOW (ref 12.6–17.4)
LACTATE BLDV-MCNC: 3.1 MMOL/L — HIGH (ref 0.5–2)
LACTATE BLDV-MCNC: 3.1 MMOL/L — HIGH (ref 0.5–2)
LACTATE SERPL-SCNC: 3.2 MMOL/L — HIGH (ref 0.5–2)
LACTATE SERPL-SCNC: 3.2 MMOL/L — HIGH (ref 0.5–2)
MAGNESIUM SERPL-MCNC: 1.7 MG/DL — SIGNIFICANT CHANGE UP (ref 1.6–2.6)
MAGNESIUM SERPL-MCNC: 1.7 MG/DL — SIGNIFICANT CHANGE UP (ref 1.6–2.6)
NT-PROBNP SERPL-SCNC: 19 PG/ML — SIGNIFICANT CHANGE UP
NT-PROBNP SERPL-SCNC: 19 PG/ML — SIGNIFICANT CHANGE UP
PCO2 BLDV: 46 MMHG — SIGNIFICANT CHANGE UP (ref 42–55)
PCO2 BLDV: 46 MMHG — SIGNIFICANT CHANGE UP (ref 42–55)
PH BLDV: 7.35 — SIGNIFICANT CHANGE UP (ref 7.32–7.43)
PH BLDV: 7.35 — SIGNIFICANT CHANGE UP (ref 7.32–7.43)
PHOSPHATE SERPL-MCNC: 3.2 MG/DL — SIGNIFICANT CHANGE UP (ref 2.5–4.5)
PHOSPHATE SERPL-MCNC: 3.2 MG/DL — SIGNIFICANT CHANGE UP (ref 2.5–4.5)
PO2 BLDV: 52 MMHG — HIGH (ref 25–45)
PO2 BLDV: 52 MMHG — HIGH (ref 25–45)
POTASSIUM BLDV-SCNC: 3.5 MMOL/L — SIGNIFICANT CHANGE UP (ref 3.5–5.1)
POTASSIUM BLDV-SCNC: 3.5 MMOL/L — SIGNIFICANT CHANGE UP (ref 3.5–5.1)
POTASSIUM SERPL-MCNC: 3.9 MMOL/L — SIGNIFICANT CHANGE UP (ref 3.5–5.3)
POTASSIUM SERPL-MCNC: 3.9 MMOL/L — SIGNIFICANT CHANGE UP (ref 3.5–5.3)
POTASSIUM SERPL-SCNC: 3.9 MMOL/L — SIGNIFICANT CHANGE UP (ref 3.5–5.3)
POTASSIUM SERPL-SCNC: 3.9 MMOL/L — SIGNIFICANT CHANGE UP (ref 3.5–5.3)
PROT SERPL-MCNC: 6.3 G/DL — SIGNIFICANT CHANGE UP (ref 6–8.3)
PROT SERPL-MCNC: 6.3 G/DL — SIGNIFICANT CHANGE UP (ref 6–8.3)
SAO2 % BLDV: 79.9 % — SIGNIFICANT CHANGE UP (ref 67–88)
SAO2 % BLDV: 79.9 % — SIGNIFICANT CHANGE UP (ref 67–88)
SODIUM SERPL-SCNC: 141 MMOL/L — SIGNIFICANT CHANGE UP (ref 135–145)
SODIUM SERPL-SCNC: 141 MMOL/L — SIGNIFICANT CHANGE UP (ref 135–145)

## 2023-05-01 PROCEDURE — 99223 1ST HOSP IP/OBS HIGH 75: CPT

## 2023-05-01 PROCEDURE — 12345: CPT | Mod: NC

## 2023-05-01 PROCEDURE — 76700 US EXAM ABDOM COMPLETE: CPT | Mod: 26

## 2023-05-01 RX ORDER — NALTREXONE HYDROCHLORIDE 50 MG/1
50 TABLET, FILM COATED ORAL DAILY
Refills: 0 | Status: DISCONTINUED | OUTPATIENT
Start: 2023-05-01 | End: 2023-05-01

## 2023-05-01 RX ORDER — DONEPEZIL HYDROCHLORIDE 10 MG/1
5 TABLET, FILM COATED ORAL AT BEDTIME
Refills: 0 | Status: DISCONTINUED | OUTPATIENT
Start: 2023-05-01 | End: 2023-05-05

## 2023-05-01 RX ORDER — TIMOLOL 0.5 %
1 DROPS OPHTHALMIC (EYE)
Refills: 0 | Status: DISCONTINUED | OUTPATIENT
Start: 2023-05-01 | End: 2023-05-09

## 2023-05-01 RX ORDER — HEPARIN SODIUM 5000 [USP'U]/ML
5000 INJECTION INTRAVENOUS; SUBCUTANEOUS EVERY 12 HOURS
Refills: 0 | Status: DISCONTINUED | OUTPATIENT
Start: 2023-05-01 | End: 2023-05-01

## 2023-05-01 RX ORDER — ACETAMINOPHEN 500 MG
650 TABLET ORAL EVERY 6 HOURS
Refills: 0 | Status: DISCONTINUED | OUTPATIENT
Start: 2023-05-01 | End: 2023-05-09

## 2023-05-01 RX ORDER — ONDANSETRON 8 MG/1
4 TABLET, FILM COATED ORAL EVERY 8 HOURS
Refills: 0 | Status: DISCONTINUED | OUTPATIENT
Start: 2023-05-01 | End: 2023-05-09

## 2023-05-01 RX ORDER — ENOXAPARIN SODIUM 100 MG/ML
40 INJECTION SUBCUTANEOUS EVERY 24 HOURS
Refills: 0 | Status: DISCONTINUED | OUTPATIENT
Start: 2023-05-01 | End: 2023-05-09

## 2023-05-01 RX ORDER — SODIUM CHLORIDE 9 MG/ML
1000 INJECTION, SOLUTION INTRAVENOUS
Refills: 0 | Status: DISCONTINUED | OUTPATIENT
Start: 2023-05-01 | End: 2023-05-09

## 2023-05-01 RX ORDER — PANTOPRAZOLE SODIUM 20 MG/1
40 TABLET, DELAYED RELEASE ORAL ONCE
Refills: 0 | Status: COMPLETED | OUTPATIENT
Start: 2023-05-01 | End: 2023-05-01

## 2023-05-01 RX ORDER — THIAMINE MONONITRATE (VIT B1) 100 MG
100 TABLET ORAL DAILY
Refills: 0 | Status: DISCONTINUED | OUTPATIENT
Start: 2023-05-01 | End: 2023-05-09

## 2023-05-01 RX ORDER — SODIUM CHLORIDE 9 MG/ML
1000 INJECTION, SOLUTION INTRAVENOUS ONCE
Refills: 0 | Status: COMPLETED | OUTPATIENT
Start: 2023-05-01 | End: 2023-05-01

## 2023-05-01 RX ORDER — DORZOLAMIDE HYDROCHLORIDE 20 MG/ML
1 SOLUTION/ DROPS OPHTHALMIC THREE TIMES A DAY
Refills: 0 | Status: DISCONTINUED | OUTPATIENT
Start: 2023-05-01 | End: 2023-05-09

## 2023-05-01 RX ORDER — PANTOPRAZOLE SODIUM 20 MG/1
40 TABLET, DELAYED RELEASE ORAL
Refills: 0 | Status: DISCONTINUED | OUTPATIENT
Start: 2023-05-04 | End: 2023-05-09

## 2023-05-01 RX ORDER — PANTOPRAZOLE SODIUM 20 MG/1
40 TABLET, DELAYED RELEASE ORAL
Refills: 0 | Status: COMPLETED | OUTPATIENT
Start: 2023-05-02 | End: 2023-05-03

## 2023-05-01 RX ORDER — LANOLIN ALCOHOL/MO/W.PET/CERES
3 CREAM (GRAM) TOPICAL AT BEDTIME
Refills: 0 | Status: DISCONTINUED | OUTPATIENT
Start: 2023-05-01 | End: 2023-05-09

## 2023-05-01 RX ORDER — LATANOPROST 0.05 MG/ML
1 SOLUTION/ DROPS OPHTHALMIC; TOPICAL AT BEDTIME
Refills: 0 | Status: DISCONTINUED | OUTPATIENT
Start: 2023-05-01 | End: 2023-05-09

## 2023-05-01 RX ADMIN — LATANOPROST 1 DROP(S): 0.05 SOLUTION/ DROPS OPHTHALMIC; TOPICAL at 21:43

## 2023-05-01 RX ADMIN — Medication 50 MILLIGRAM(S): at 15:30

## 2023-05-01 RX ADMIN — DONEPEZIL HYDROCHLORIDE 5 MILLIGRAM(S): 10 TABLET, FILM COATED ORAL at 21:41

## 2023-05-01 RX ADMIN — PANTOPRAZOLE SODIUM 40 MILLIGRAM(S): 20 TABLET, DELAYED RELEASE ORAL at 01:02

## 2023-05-01 RX ADMIN — POTASSIUM PHOSPHATE, MONOBASIC POTASSIUM PHOSPHATE, DIBASIC 62.5 MILLIMOLE(S): 236; 224 INJECTION, SOLUTION INTRAVENOUS at 01:34

## 2023-05-01 RX ADMIN — Medication 1 MILLIGRAM(S): at 13:02

## 2023-05-01 RX ADMIN — Medication 650 MILLIGRAM(S): at 21:15

## 2023-05-01 RX ADMIN — Medication 1 APPLICATION(S): at 21:43

## 2023-05-01 RX ADMIN — Medication 1 APPLICATION(S): at 13:05

## 2023-05-01 RX ADMIN — DORZOLAMIDE HYDROCHLORIDE 1 DROP(S): 20 SOLUTION/ DROPS OPHTHALMIC at 20:17

## 2023-05-01 RX ADMIN — Medication 650 MILLIGRAM(S): at 20:15

## 2023-05-01 RX ADMIN — Medication 1 TABLET(S): at 13:02

## 2023-05-01 RX ADMIN — Medication 100 MILLIGRAM(S): at 13:02

## 2023-05-01 RX ADMIN — Medication 1 DROP(S): at 18:40

## 2023-05-01 RX ADMIN — Medication 50 MILLIGRAM(S): at 21:41

## 2023-05-01 RX ADMIN — Medication 50 MILLIGRAM(S): at 03:00

## 2023-05-01 RX ADMIN — ENOXAPARIN SODIUM 40 MILLIGRAM(S): 100 INJECTION SUBCUTANEOUS at 13:04

## 2023-05-01 RX ADMIN — HEPARIN SODIUM 5000 UNIT(S): 5000 INJECTION INTRAVENOUS; SUBCUTANEOUS at 06:15

## 2023-05-01 RX ADMIN — Medication 50 MILLIGRAM(S): at 09:28

## 2023-05-01 RX ADMIN — PANTOPRAZOLE SODIUM 40 MILLIGRAM(S): 20 TABLET, DELAYED RELEASE ORAL at 18:01

## 2023-05-01 RX ADMIN — SODIUM CHLORIDE 1000 MILLILITER(S): 9 INJECTION, SOLUTION INTRAVENOUS at 06:15

## 2023-05-01 SDOH — ECONOMIC STABILITY - HOUSING INSECURITY: HOMELESSNESS UNSPECIFIED: Z59.00

## 2023-05-01 NOTE — H&P ADULT - PROBLEM SELECTOR PLAN 9
1 - has been unable to comply with medication regimen due to current circumstances  - f/u Med-Hx Pharmacist for med verification and complete Med-Rec - no acute issues presently  - has been unable to adhere to OP medication regimen  - f/u w/ Med-Hx Pharmacist re med verification and complete Med-Rec

## 2023-05-01 NOTE — H&P ADULT - NSHPPHYSICALEXAM_GEN_ALL_CORE
Vital Signs Last 24 Hrs  T(C): 36.8 (30 Apr 2023 22:24), Max: 36.8 (30 Apr 2023 22:24)  T(F): 98.3 (30 Apr 2023 22:24), Max: 98.3 (30 Apr 2023 22:24)  HR: 105 (30 Apr 2023 22:24) (92 - 112)  BP: 113/49 (30 Apr 2023 22:24) (113/49 - 133/50)  BP(mean): --  RR: 20 (30 Apr 2023 22:24) (20 - 20)  SpO2: 100% (30 Apr 2023 22:24) (94% - 100%)    Parameters below as of 30 Apr 2023 22:24  Patient On (Oxygen Delivery Method): room air    ================================================= Vital Signs Last 24 Hrs  T(C): 36.8 (30 Apr 2023 22:24), Max: 36.8 (30 Apr 2023 22:24)  T(F): 98.3 (30 Apr 2023 22:24), Max: 98.3 (30 Apr 2023 22:24)  HR: 105 (30 Apr 2023 22:24) (92 - 112)  BP: 113/49 (30 Apr 2023 22:24) (113/49 - 133/50)  BP(mean): --  RR: 20 (30 Apr 2023 22:24) (20 - 20)  SpO2: 100% (30 Apr 2023 22:24) (94% - 100%)    Parameters below as of 30 Apr 2023 22:24  Patient On (Oxygen Delivery Method): room air    =================================================    PHYSICAL EXAMINATION:    APPEARANCE: Flushed facies and upper trunk area.  Adequately groomed.  Tremulous.  NAD  HEENT: Dry oral mucosa.  R-eye w/ cloudy film; loss of vision.  L-pupil reactive to light  LYMPHATIC: No lymphadenopathy appreciated  CARDIOVASCULAR: (+) S1 S2.  Tachycardic.  No JVD.  No murmurs.  No edema  RESPIRATORY: No wheezing, rhonchi, crackles appreciated  GASTROINTESTINAL: Soft.  (+) tenderness to mild/moderate palpation over the epigastric area.  (+) BS  GENITOURINARY: No suprapubic tenderness.  No CVA tenderness B/L.  Per Provider Note: "On full examination of the skin patient noted to have macule over his buttock which appeared to be coalescent tenia corporis.  There are areas of this macule that ringlike surrounding erythema."  EXTREMITIES: Normal range of motion.  No clubbing, cyanosis or edema  MUSCULOSKELETAL: No atrophy.  No asymmetry.  Good ROM  SKIN: Wounds over the B/L shin areas in different stages of healing.  (+) tenderness over the anterior shins.  No cyanosis  PSYCHIATRIC: A&O x 3.  Anxious.  Cooperative.  Mood & affect appropriate to situation  NEUROLOGICAL: Non-focal, HICKS x 4 against gravity  VASCULAR: Peripheral pulses palpable
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
## 2023-05-01 NOTE — PROGRESS NOTE ADULT - PROBLEM SELECTOR PLAN 10
- has been unable to comply with medication regimen due to current circumstances  - f/u Med-Hx Pharmacist for med verification and complete Med-Rec - no acute issues presently  - has been unable to adhere to OP medication regimen  - f/u w/ Med-Hx Pharmacist re med verification and complete Med-Rec
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
## 2023-05-01 NOTE — PROGRESS NOTE ADULT - PROBLEM SELECTOR PLAN 1
- drinking continuously w/o abatement, with last drink being prior to coming to the ED.  Non-partial re type of alcohol  - tremulous.  Unaware if ever had seizures  - alcohol level = 364, lactate = 4.8  - CIWA protocol, including that of symptom triggered, in effect   - on MVI, folic acid, thiamine prescribed  - IVF hydration in progress  - f/u electrolytes, GGT level  - f/u Social Work consult  - f/u SBIRT  - f/u ECG (ordered)  - Pls call Psychiatry/ consult (Depression, Anxiety, Alcohol abuse; patient amenable to consult)  - aspiration, seizure precautions - drinking continuously w/o abatement, with last drink being prior to coming to the ED.  Non-partial re type of alcohol  - tremulous.  Unaware if ever had seizures  - alcohol level = 364, lactate = 4.8  - CIWA protocol, including that of symptom triggered, in effect   - on MVI, folic acid, thiamine prescribed  - IVF hydration in progress  - f/u Social Work consult  - f/u SBIRT  - psychiatry consulted (Depression, Anxiety, Alcohol abuse; patient amenable to consult)  - aspiration, seizure precautions  - Librium taper, ativan PRN  - on Thiamine, folic acid, MV

## 2023-05-01 NOTE — H&P ADULT - PROBLEM SELECTOR PLAN 5
- patient voices being anxious and depressed  - was on medications for same, but has not been able to comply with regimen due to current circumstances  - f/u TSH, vitamin D level  - evaluate for any signs of acute decompensation  - amenable to Psychiatry/Behavioral Health consult in the AM (please call)

## 2023-05-01 NOTE — PHARMACOTHERAPY INTERVENTION NOTE - COMMENTS
Medication history is incomplete. No active medications on file per [facility].   Of Note, [facility] filled the following medications in December/2022 x 1 month supply only:   - Amlodipine 10mg QD  - Donepezil 5mg QHS  - Naltrexone 50mg QD  Medication history is incomplete. No active medications on file per [facility].   Of Note, [facility] filled the following medications in December/2022 x 1 month supply only:   - Amlodipine 10mg QD  - Donepezil 5mg QHS  - Naltrexone 50mg QD

## 2023-05-01 NOTE — H&P ADULT - PROBLEM SELECTOR PLAN 10
- heparin SQ; follow platelet trend (especially in the context of alcohol abuse) - has been unable to comply with medication regimen due to current circumstances  - f/u [facility] Pharmacist for med verification and complete Med-Rec

## 2023-05-01 NOTE — H&P ADULT - NSHPSOCIALHISTORY_GEN_ALL_CORE
SOCIAL HISTORY:    Marital Status:  (  )    (  ) Single        (  )        (  )        (  )   Lives with:          (  ) Alone      (  ) Spouse     (  ) Children         (  ) Parents             (  ) Other    No history of smoking  History of alcohol abuse; including tequila, beers  No history of illegal drug use    Occupation:     Patient is un-domiciled SOCIAL HISTORY:    Marital Status:  (  )    ( x ) Single        (  )        (  )        (  )   Lives with:          (  ) Alone      (  ) Spouse     (  ) Children         (  ) Parents             (  ) Other               (+) Un-domiciled/ lives on the streets    No history of smoking  History of alcohol abuse; including tequila, beers  No history of illegal drug use    Occupation:     Patient is un-domiciled

## 2023-05-01 NOTE — H&P ADULT - NSICDXPASTMEDICALHX_GEN_ALL_CORE_FT
PAST MEDICAL HISTORY:  Alcohol abuse     Anxiety and depression     Essential hypertension      PAST MEDICAL HISTORY:  Alcohol abuse     Anxiety and depression     Essential hypertension     Glaucoma, right eye

## 2023-05-01 NOTE — PROGRESS NOTE ADULT - PROBLEM SELECTOR PLAN 3
- lactate = 4.8, AG = 17  - in the setting of excessive alcohol intake and poor nutrition, as well as dehydration  - IVF hydration in progress  - f/u for improvement w/ repeat lab-work RUQ U/S with gallstones, no cholecystitis, hepatic steatosis+  likely in setting of chronic alcohol use

## 2023-05-01 NOTE — H&P ADULT - NSHPSOURCEINFORD_GEN_ALL_CORE
Per Dr. Rome: okay to refill clonazepam #5 wit zero refills and plan to discuss ongoing use at upcoming appointment on 01/13/2020.    Message routed to Dr. Rome for signing.       Chart(s)/Patient

## 2023-05-01 NOTE — PATIENT PROFILE ADULT - FALL HARM RISK - HARM RISK INTERVENTIONS
Assistance with ambulation/Communicate Risk of Fall with Harm to all staff/Monitor for mental status changes/Monitor gait and stability/Reinforce activity limits and safety measures with patient and family/Review medications for side effects contributing to fall risk/Tailored Fall Risk Interventions/Use of alarms - bed, chair and/or voice tab/Visual Cue: Yellow wristband and red socks/Bed in lowest position, wheels locked, appropriate side rails in place/Call bell, personal items and telephone in reach/Instruct patient to call for assistance before getting out of bed or chair/Non-slip footwear when patient is out of bed/[place] to call system/Physically safe environment - no spills, clutter or unnecessary equipment/Purposeful Proactive Rounding/Room/bathroom lighting operational, light cord in reach Assistance with ambulation/Communicate Risk of Fall with Harm to all staff/Monitor for mental status changes/Monitor gait and stability/Reinforce activity limits and safety measures with patient and family/Review medications for side effects contributing to fall risk/Tailored Fall Risk Interventions/Use of alarms - bed, chair and/or voice tab/Visual Cue: Yellow wristband and red socks/Bed in lowest position, wheels locked, appropriate side rails in place/Call bell, personal items and telephone in reach/Instruct patient to call for assistance before getting out of bed or chair/Non-slip footwear when patient is out of bed/[place] to call system/Physically safe environment - no spills, clutter or unnecessary equipment/Purposeful Proactive Rounding/Room/bathroom lighting operational, light cord in reach

## 2023-05-01 NOTE — PROGRESS NOTE ADULT - PROBLEM SELECTOR PLAN 4
- phosphorus = 2.3  - s/p K-phos 15 millimoles x one  - f/u for resolution on AM lab-work - dry oral mucosa, hemoconcentrated lab-work, lactic acidosis  - continuing w/ IVF hydration and encourage oral hydration, as tolerated  - f/u electrolytes

## 2023-05-01 NOTE — PROGRESS NOTE ADULT - ATTENDING COMMENTS
58 year old male, with past history significant for Alcohol abuse, Hypertension, Anxiety and Depression, presented to the ED secondary to alcohol withdrawal.  Diagnosed with Alcohol Dependence with Withdrawal in the ED.    - C/w Librium taper with symptom-triggered CIWA.   - CIWA relatively stable in 5-6 range due to significant tremor. If significantly worsen, will increase to high dose taper  - Psych consulted for alcohol use and anxiety/depression. Patient is amendable  - SW consulted for safe dispo  - Med rec completed - per patient, not taking any medications other than hydroxyzine 50 mg 2-3 times a day prn due to fear of interacting with ETOH.   - MVI, thiamine, folate supplements, add B12 if level is low  - Significant rash on BLE, s/p fluconazole x 1 and c/w topical cream; non-urgent derm eval  - Elevated lactate likely i/s/o ETOH use.   - RUQ (+) for hepatic steatosis, no cirrhosis  - Monitor BP 58 year old male, with past history significant for Alcohol abuse, Hypertension, Anxiety and Depression, presented to the ED secondary to alcohol withdrawal.  Diagnosed with Alcohol Dependence with Withdrawal in the ED.    - C/w Librium taper with symptom-triggered CIWA.   - CIWA relatively stable in 5-6 range due to significant tremor. If significantly worsen, will increase to high dose taper  - Psych consulted for alcohol use and anxiety/depression. Patient is amendable  - SW consulted for safe dispo  - Med rec completed - per patient, not taking any medications other than hydroxyzine 50 mg 2-3 times a day prn due to fear of interacting with ETOH.   - MVI, thiamine, folate supplements, add B12 if level is low  - Significant rash on BLE, s/p fluconazole x 1 and c/w topical cream; non-urgent derm eval  - Elevated lactate likely i/s/o ETOH use.   - RUQ (+) for hepatic steatosis, no cirrhosis  - Monitor BP  - Recent fall, low concern for severe trauma, will hold off imaging; PT consult

## 2023-05-01 NOTE — PROGRESS NOTE ADULT - PROBLEM SELECTOR PLAN 2
- dry oral mucosa, hemoconcentrated lab-work, lactic acidosis  - continuing w/ IVF hydration and encourage oral hydration, as tolerated  - f/u electrolytes - patient voices being anxious and depressed  - was on medications for same, but has not been able to comply with regimen due to current circumstances  - f/u TSH, vitamin D level  - evaluate for any signs of acute decompensation  - psych consulted

## 2023-05-01 NOTE — PHYSICAL THERAPY INITIAL EVALUATION ADULT - PERTINENT HX OF CURRENT PROBLEM, REHAB EVAL
Patient is 58 year old male, with past history significant for Alcohol abuse, Hypertension, Anxiety and Depression, presented to the ED secondary to alcohol withdrawal.

## 2023-05-01 NOTE — PROGRESS NOTE ADULT - SUBJECTIVE AND OBJECTIVE BOX
INTERVAL HPI/OVERNIGHT EVENTS:    SUBJECTIVE: Patient seen and examined at bedside.         OBJECTIVE:    VITAL SIGNS:  ICU Vital Signs Last 24 Hrs  T(C): 37.1 (01 May 2023 06:02), Max: 37.3 (01 May 2023 04:08)  T(F): 98.7 (01 May 2023 06:02), Max: 99.1 (01 May 2023 04:08)  HR: 99 (01 May 2023 06:02) (92 - 112)  BP: 108/72 (01 May 2023 06:02) (100/58 - 133/50)  BP(mean): --  ABP: --  ABP(mean): --  RR: 18 (01 May 2023 06:02) (18 - 20)  SpO2: 98% (01 May 2023 06:02) (94% - 100%)    O2 Parameters below as of 01 May 2023 06:02  Patient On (Oxygen Delivery Method): room air            Plateau pressure:   P/F ratio:     CAPILLARY BLOOD GLUCOSE        PHYSICAL EXAMINATION:    APPEARANCE: Flushed facies and upper trunk area.  Adequately groomed.  Tremulous.  NAD  HEENT: Dry oral mucosa.  R-eye w/ cloudy film; loss of vision.  L-pupil reactive to light  LYMPHATIC: No lymphadenopathy appreciated  CARDIOVASCULAR: (+) S1 S2.  Tachycardic.  No JVD.  No murmurs.  No edema  RESPIRATORY: No wheezing, rhonchi, crackles appreciated  GASTROINTESTINAL: Soft.  (+) tenderness to mild/moderate palpation over the epigastric area.  (+) BS  GENITOURINARY: No suprapubic tenderness.  No CVA tenderness B/L.  Per Provider Note: "On full examination of the skin patient noted to have macule over his buttock which appeared to be coalescent tenia corporis.  There are areas of this macule that ringlike surrounding erythema."  EXTREMITIES: Normal range of motion.  No clubbing, cyanosis or edema  MUSCULOSKELETAL: No atrophy.  No asymmetry.  Good ROM  SKIN: Wounds over the B/L shin areas in different stages of healing.  (+) tenderness over the anterior shins.  No cyanosis  PSYCHIATRIC: A&O x 3.  Anxious.  Cooperative.  Mood & affect appropriate to situation  NEUROLOGICAL: Non-focal, HICKS x 4 against gravity  VASCULAR: Peripheral pulses palpable    MEDICATIONS:  MEDICATIONS  (STANDING):  chlordiazePOXIDE   Oral   chlordiazePOXIDE 50 milliGRAM(s) Oral every 6 hours  clotrimazole 1% Cream 1 Application(s) Topical every 12 hours  folic acid 1 milliGRAM(s) Oral daily  heparin   Injectable 5000 Unit(s) SubCutaneous every 12 hours  lactated ringers. 1000 milliLiter(s) (125 mL/Hr) IV Continuous <Continuous>  multivitamin 1 Tablet(s) Oral daily  pantoprazole    Tablet 40 milliGRAM(s) Oral once    MEDICATIONS  (PRN):  acetaminophen     Tablet .. 650 milliGRAM(s) Oral every 6 hours PRN Mild Pain (1 - 3)  aluminum hydroxide/magnesium hydroxide/simethicone Suspension 30 milliLiter(s) Oral every 4 hours PRN Dyspepsia  bisacodyl 5 milliGRAM(s) Oral at bedtime PRN Constipation  LORazepam   Injectable 2 milliGRAM(s) IV Push every 1 hour PRN Symptom-triggered: each CIWA -Ar score 8 or GREATER  melatonin 3 milliGRAM(s) Oral at bedtime PRN Insomnia  ondansetron Injectable 4 milliGRAM(s) IV Push every 8 hours PRN Nausea and/or Vomiting      LABS:                        14.3   6.72  )-----------( 233      ( 30 Apr 2023 21:00 )             41.6     04-30    141  |  101  |  9   ----------------------------<  116<H>  3.6   |  23  |  0.68    Ca    8.6      30 Apr 2023 21:00  Phos  2.3     04-30  Mg     2.00     04-30    TPro  8.1  /  Alb  4.4  /  TBili  0.3  /  DBili  x   /  AST  66<H>  /  ALT  84<H>  /  AlkPhos  68  04-30    PT/INR - ( 30 Apr 2023 21:00 )   PT: 10.9 sec;   INR: 0.94 ratio         PTT - ( 30 Apr 2023 21:00 )  PTT:29.3 sec      RADIOLOGY & ADDITIONAL TESTS: Reviewed.     INTERVAL HPI/OVERNIGHT EVENTS:    SUBJECTIVE: Patient seen and examined at bedside. Pt AOx3 in AM. Appears tremulous. Reports fall 3 days prior, denied hitting his head.     OBJECTIVE:    VITAL SIGNS:  ICU Vital Signs Last 24 Hrs  T(C): 37.1 (01 May 2023 06:02), Max: 37.3 (01 May 2023 04:08)  T(F): 98.7 (01 May 2023 06:02), Max: 99.1 (01 May 2023 04:08)  HR: 99 (01 May 2023 06:02) (92 - 112)  BP: 108/72 (01 May 2023 06:02) (100/58 - 133/50)  BP(mean): --  ABP: --  ABP(mean): --  RR: 18 (01 May 2023 06:02) (18 - 20)  SpO2: 98% (01 May 2023 06:02) (94% - 100%)    O2 Parameters below as of 01 May 2023 06:02  Patient On (Oxygen Delivery Method): room air            Plateau pressure:   P/F ratio:     CAPILLARY BLOOD GLUCOSE        PHYSICAL EXAMINATION:    APPEARANCE: NAD  HEENT: Dry oral mucosa.  R-eye w/ cloudy film; loss of vision.  L-pupil reactive to light  CARDIOVASCULAR: (+) S1 S2.  Tachycardic.  No JVD.  No murmurs.  No edema  RESPIRATORY: No wheezing, rhonchi, crackles appreciated  GASTROINTESTINAL: Soft.  (+) tenderness to mild/moderate palpation over the epigastric area.  (+) BS  GENITOURINARY: No suprapubic tenderness.  No CVA tenderness B/L.  macule over his buttock which appeared to be coalescent tenia corporis.    EXTREMITIES: Normal range of motion.  No clubbing, cyanosis or edema  MUSCULOSKELETAL: No atrophy.  No asymmetry.  Good ROM  SKIN: Wounds over the B/L shin areas in different stages of healing.  (+) tenderness over the anterior shins.  No cyanosis  PSYCHIATRIC: A&O x 3.  Anxious.  Cooperative.  Mood & affect appropriate to situation  NEUROLOGICAL: Non-focal, HICKS x 4 against gravity  VASCULAR: Peripheral pulses palpable    MEDICATIONS:  MEDICATIONS  (STANDING):  chlordiazePOXIDE   Oral   chlordiazePOXIDE 50 milliGRAM(s) Oral every 6 hours  clotrimazole 1% Cream 1 Application(s) Topical every 12 hours  folic acid 1 milliGRAM(s) Oral daily  heparin   Injectable 5000 Unit(s) SubCutaneous every 12 hours  lactated ringers. 1000 milliLiter(s) (125 mL/Hr) IV Continuous <Continuous>  multivitamin 1 Tablet(s) Oral daily  pantoprazole    Tablet 40 milliGRAM(s) Oral once    MEDICATIONS  (PRN):  acetaminophen     Tablet .. 650 milliGRAM(s) Oral every 6 hours PRN Mild Pain (1 - 3)  aluminum hydroxide/magnesium hydroxide/simethicone Suspension 30 milliLiter(s) Oral every 4 hours PRN Dyspepsia  bisacodyl 5 milliGRAM(s) Oral at bedtime PRN Constipation  LORazepam   Injectable 2 milliGRAM(s) IV Push every 1 hour PRN Symptom-triggered: each CIWA -Ar score 8 or GREATER  melatonin 3 milliGRAM(s) Oral at bedtime PRN Insomnia  ondansetron Injectable 4 milliGRAM(s) IV Push every 8 hours PRN Nausea and/or Vomiting      LABS:                        14.3   6.72  )-----------( 233      ( 30 Apr 2023 21:00 )             41.6     04-30    141  |  101  |  9   ----------------------------<  116<H>  3.6   |  23  |  0.68    Ca    8.6      30 Apr 2023 21:00  Phos  2.3     04-30  Mg     2.00     04-30    TPro  8.1  /  Alb  4.4  /  TBili  0.3  /  DBili  x   /  AST  66<H>  /  ALT  84<H>  /  AlkPhos  68  04-30    PT/INR - ( 30 Apr 2023 21:00 )   PT: 10.9 sec;   INR: 0.94 ratio         PTT - ( 30 Apr 2023 21:00 )  PTT:29.3 sec      RADIOLOGY & ADDITIONAL TESTS: Reviewed.

## 2023-05-01 NOTE — H&P ADULT - HISTORY OF PRESENT ILLNESS
58 year old male, with past history significant for Alcohol abuse, Hypertension, Anxiety and Depression, presented to the ED secondary to alcohol withdrawal.  Seen and evaluated at bedside;    Vital signs upon ED presentation as follows: BP = 133/50, HR = 92 (to 112), RR = 20, T = 36.7 C (98 F), O2 Sat =94% on RA.  Diagnosed with Alcohol Dependence with Withdrawal and prescribed NaCl one liter, fluconazole 150 mg PO x one, librium 100 mg PO x one, folic acid 1 mg, lorazepam 2 mg IV, thiamine 100 mg IV, K-phos 15 mmol x one and multivitamin 1 tab in the ED. 58 year old male, with past history significant for Alcohol abuse, Hypertension, Anxiety and Depression, presented to the ED secondary to alcohol withdrawal.  Seen and evaluated at bedside; tremulous, but in NAD.  Patient reports calling EMS and coming to the Hospital because he has been "drinking too much."  Impartial to type of liquor; cites vodka, tequila, beer...  Last drink was in the AM prior to coming to the ED.  Started drinking alcohol approximately 24 years ago, but intake has increased/worsened.  Drinks continuously, and has not had nutritional intake for the past 6 days; complicated by living on the streets for the past ~ 2 years.  Unaware if he has suffered seizures and unknown recent falls - due to being intoxicated.  Positive for all of CAGE questions.  No report of fever, chills, headache, abdominal pain or dysuria.  Reports constipation, with last BM being ~ 2 days ago.  Unable to adhere to prescription medication regimen due to alcohol abuse and being homeless.    Vital signs upon ED presentation as follows: BP = 133/50, HR = 92 (to 112), RR = 20, T = 36.7 C (98 F), O2 Sat =94% on RA.  Diagnosed with Alcohol Dependence with Withdrawal and prescribed NaCl one liter, fluconazole 150 mg PO x one, librium 100 mg PO x one, folic acid 1 mg, lorazepam 2 mg IV, thiamine 100 mg IV, K-phos 15 mmol x one and multivitamin 1 tab in the ED.

## 2023-05-01 NOTE — H&P ADULT - ASSESSMENT
[ x ]  Lab studies personally reviewed  [ x ]  Radiology personally reviewed  [  ]  Old records personally reviewed    58 year old male, with past history significant for Alcohol abuse, Hypertension, Anxiety and Depression, presented to the ED secondary to alcohol withdrawal.  Diagnosed with Alcohol Dependence with Withdrawal in the ED.

## 2023-05-01 NOTE — PROGRESS NOTE ADULT - PROBLEM SELECTOR PLAN 7
- last BM ~ 2 days ago, but in the context of no nutritional intake x ~ 6 days  - could be sequela of dehydration  - f/u TSH level, electrolytes  - hydration in progress  - f/u for resolution  - dulcolax PRN - per report, "...macule over his buttock which appeared to be coalescent tenia corporis.  There are areas of this macule that ringlike surrounding erythema..."  - given fluconazole and Mycelex in the ED  - continuing wEstrellita Mycelex for now

## 2023-05-01 NOTE — H&P ADULT - PROBLEM SELECTOR PLAN 3
- lactate = 4.8, AG = 17  - in the setting of excessive alcohol intake and poor nutrition, as well as dehydration  - IVF hydration in progress  - f/u for improvement w/ repeat lab-work

## 2023-05-01 NOTE — H&P ADULT - PROBLEM SELECTOR PLAN 2
- dry oral mucosa, hemoconcentrated lab-work, lactic acidosis  - continuing w/ IVF hydration and encourage oral hydration, as tolerated  - f/u electrolytes

## 2023-05-01 NOTE — PROGRESS NOTE ADULT - PROBLEM SELECTOR PLAN 8
- has been living on the streets for the past ~ 2 years, now complicated by alcohol abuse  - states no family  - Social work consult - last BM ~ 2 days ago, but in the context of no nutritional intake x ~ 6 days  - could be sequela of dehydration  - f/u TSH level, electrolytes  - hydration in progress  - f/u for resolution  - dulcolax PRN

## 2023-05-01 NOTE — H&P ADULT - PROBLEM SELECTOR PLAN 6
- per report, "...macule over his buttock which appeared to be coalescent tenia corporis.  There are areas of this macule that ringlike surrounding erythema..."  - given fluconazole and Mycelex in the ED  - continuing w. Mycelex for now  - if signs of worsening, would get ID consult

## 2023-05-01 NOTE — PATIENT PROFILE ADULT - VISION (WITH CORRECTIVE LENSES IF THE PATIENT USUALLY WEARS THEM):
Patient/Family
Normal vision: sees adequately in most situations; can see medication labels, newsprint

## 2023-05-01 NOTE — H&P ADULT - PROBLEM SELECTOR PLAN 1
- drinking continuously w/o abatement, with last drink being prior to coming to the ED.  Non-partial re type of alcohol  - tremulous.  Unaware if ever had seizures  - alcohol level = 364, lactate = 4.8  - CIWA protocol, including that of symptom triggered, in effect   - on MVI, folic acid, thiamine prescribed  - IVF hydration in progress  - f/u electrolytes, GGT level  - f/u Social Work consult  - f/u SBIRT  - Pls call Psychiatry/ consult (Depression, Anxiety, Alcohol abuse; patient amenable to consult)  - aspiration, seizure precautions - drinking continuously w/o abatement, with last drink being prior to coming to the ED.  Non-partial re type of alcohol  - tremulous.  Unaware if ever had seizures  - alcohol level = 364, lactate = 4.8  - CIWA protocol, including that of symptom triggered, in effect   - on MVI, folic acid, thiamine prescribed  - IVF hydration in progress  - f/u electrolytes, GGT level  - f/u Social Work consult  - f/u SBIRT  - f/u ECG (ordered)  - Pls call Psychiatry/ consult (Depression, Anxiety, Alcohol abuse; patient amenable to consult)  - aspiration, seizure precautions

## 2023-05-01 NOTE — PROGRESS NOTE ADULT - PROBLEM SELECTOR PLAN 6
- per report, "...macule over his buttock which appeared to be coalescent tenia corporis.  There are areas of this macule that ringlike surrounding erythema..."  - given fluconazole and Mycelex in the ED  - continuing w. Mycelex for now  - if signs of worsening, would get ID consult - phosphorus = 2.3  - s/p K-phos 15 millimoles x one  - Ph improved

## 2023-05-01 NOTE — H&P ADULT - PROBLEM SELECTOR PLAN 7
- last BM ~ 2 days ago, but in the context of no nutritional intake x ~ 6 days  - could be sequela of dehydration  - f/u TSH level, electrolytes  - hydration in progress  - f/u for resolution  - dulcolax PRN

## 2023-05-01 NOTE — PROGRESS NOTE ADULT - ASSESSMENT
[ x ]  Lab studies personally reviewed  [ x ]  Radiology personally reviewed  [  ]  Old records personally reviewed    58 year old male, with past history significant for Alcohol abuse, Hypertension, Anxiety and Depression, presented to the ED secondary to alcohol withdrawal.  Diagnosed with Alcohol Dependence with Withdrawal in the ED. 58 year old male, with past history significant for Alcohol abuse, Hypertension, Anxiety and Depression, presented to the ED secondary to alcohol withdrawal.  Diagnosed with Alcohol Dependence with Withdrawal in the ED.

## 2023-05-01 NOTE — H&P ADULT - NSHPREVIEWOFSYSTEMS_GEN_ALL_CORE
REVIEW OF SYSTEMS:    CONSTITUTIONAL: No weakness, fever, chills or sweating  EYES/ENT: Blindness of R-eye - due to glaucoma.  No visual changes.  No dysphagia  NECK: No pain or stiffness  RESPIRATORY: No cough or hemoptysis.  No shortness of breath  CARDIOVASCULAR: No chest pain or palpitations.  No lower extremity edema  GASTROINTESTINAL: No epigastric or abdominal pain. No nausea, vomiting or hematemesis.  No diarrhea or constipation. No melena or hematochezia.  GENITOURINARY: States no urogenital complaints.  No dysuria, frequency or hematuria  MUSCULOSKELETAL: No joint pain, swelling, decreased ROM, erythema, warmth  NEUROLOGICAL: No numbness or weakness  PSYCHIATRY: Anxiety and depression; unable to adhere to medication regimen due to current circumstances  SKIN: No itching, burning, rashes, or lesions   All other review of systems is negative unless indicated above.

## 2023-05-01 NOTE — H&P ADULT - TIME BILLING
I have spent a total of 85 minutes or greater to prepare to see the patient, obtaining and reviewing history, physical examination, explaining the diagnosis/es, prognosis and treatment plan with the patient/family/caregiver.  I have also have spent time ordering studies and testing, interpreting results, performing medication reconciliation, submitting subspecialty consultation and documentation as above.

## 2023-05-01 NOTE — H&P ADULT - NSHPLABSRESULTS_GEN_ALL_CORE
LAB-WORK/STUDIES:                          14.3   6.72  )-----------( 233      ( 30 Apr 2023 21:00 )             41.6     30 Apr 2023 21:00    141    |  101    |  9      ----------------------------<  116    3.6     |  23     |  0.68     Ca    8.6        30 Apr 2023 21:00  Phos  2.3       30 Apr 2023 21:00  Mg     2.00      30 Apr 2023 21:00    TPro  8.1    /  Alb  4.4    /  TBili  0.3    /  DBili  x      /  AST  66     /  ALT  84     /  AlkPhos  68     30 Apr 2023 21:00    LIVER FUNCTIONS - ( 30 Apr 2023 21:00 )  Alb: 4.4 g/dL / Pro: 8.1 g/dL / ALK PHOS: 68 U/L / ALT: 84 U/L / AST: 66 U/L / GGT: x           PT/INR - ( 30 Apr 2023 21:00 )   PT: 10.9 sec;   INR: 0.94 ratio    PTT - ( 30 Apr 2023 21:00 )  PTT:29.3 sec    CAPILLARY BLOOD GLUCOSE    ======================================================================        ====================================================================== LAB-WORK/STUDIES:                          14.3   6.72  )-----------( 233      ( 30 Apr 2023 21:00 )             41.6     30 Apr 2023 21:00    141    |  101    |  9      ----------------------------<  116    3.6     |  23     |  0.68     Ca    8.6        30 Apr 2023 21:00  Phos  2.3       30 Apr 2023 21:00  Mg     2.00      30 Apr 2023 21:00    TPro  8.1    /  Alb  4.4    /  TBili  0.3    /  DBili  x      /  AST  66     /  ALT  84     /  AlkPhos  68     30 Apr 2023 21:00    LIVER FUNCTIONS - ( 30 Apr 2023 21:00 )  Alb: 4.4 g/dL / Pro: 8.1 g/dL / ALK PHOS: 68 U/L / ALT: 84 U/L / AST: 66 U/L / GGT: x           PT/INR - ( 30 Apr 2023 21:00 )   PT: 10.9 sec;   INR: 0.94 ratio    PTT - ( 30 Apr 2023 21:00 )  PTT:29.3 sec    CAPILLARY BLOOD GLUCOSE    ======================================================================    ECG --> ordered    ======================================================================

## 2023-05-01 NOTE — PROGRESS NOTE ADULT - PROBLEM SELECTOR PLAN 5
- patient voices being anxious and depressed  - was on medications for same, but has not been able to comply with regimen due to current circumstances  - f/u TSH, vitamin D level  - evaluate for any signs of acute decompensation  - amenable to Psychiatry/Behavioral Health consult in the AM (please call) - lactate = 4.8, AG = 17  - in the setting of excessive alcohol intake and poor nutrition, as well as dehydration  - IVF hydration in progress  - f/u for improvement w/ repeat lab-work

## 2023-05-02 LAB
ALBUMIN SERPL ELPH-MCNC: 3.6 G/DL — SIGNIFICANT CHANGE UP (ref 3.3–5)
ALBUMIN SERPL ELPH-MCNC: 3.6 G/DL — SIGNIFICANT CHANGE UP (ref 3.3–5)
ALP SERPL-CCNC: 55 U/L — SIGNIFICANT CHANGE UP (ref 40–120)
ALP SERPL-CCNC: 55 U/L — SIGNIFICANT CHANGE UP (ref 40–120)
ALT FLD-CCNC: 51 U/L — HIGH (ref 4–41)
ALT FLD-CCNC: 51 U/L — HIGH (ref 4–41)
ANION GAP SERPL CALC-SCNC: 14 MMOL/L — SIGNIFICANT CHANGE UP (ref 7–14)
ANION GAP SERPL CALC-SCNC: 14 MMOL/L — SIGNIFICANT CHANGE UP (ref 7–14)
APPEARANCE UR: CLEAR — SIGNIFICANT CHANGE UP
APPEARANCE UR: CLEAR — SIGNIFICANT CHANGE UP
AST SERPL-CCNC: 42 U/L — HIGH (ref 4–40)
AST SERPL-CCNC: 42 U/L — HIGH (ref 4–40)
BACTERIA # UR AUTO: NEGATIVE — SIGNIFICANT CHANGE UP
BACTERIA # UR AUTO: NEGATIVE — SIGNIFICANT CHANGE UP
BILIRUB SERPL-MCNC: 1.3 MG/DL — HIGH (ref 0.2–1.2)
BILIRUB SERPL-MCNC: 1.3 MG/DL — HIGH (ref 0.2–1.2)
BILIRUB UR-MCNC: NEGATIVE — SIGNIFICANT CHANGE UP
BILIRUB UR-MCNC: NEGATIVE — SIGNIFICANT CHANGE UP
BUN SERPL-MCNC: 9 MG/DL — SIGNIFICANT CHANGE UP (ref 7–23)
BUN SERPL-MCNC: 9 MG/DL — SIGNIFICANT CHANGE UP (ref 7–23)
CALCIUM SERPL-MCNC: 8.8 MG/DL — SIGNIFICANT CHANGE UP (ref 8.4–10.5)
CALCIUM SERPL-MCNC: 8.8 MG/DL — SIGNIFICANT CHANGE UP (ref 8.4–10.5)
CHLORIDE SERPL-SCNC: 101 MMOL/L — SIGNIFICANT CHANGE UP (ref 98–107)
CHLORIDE SERPL-SCNC: 101 MMOL/L — SIGNIFICANT CHANGE UP (ref 98–107)
CO2 SERPL-SCNC: 25 MMOL/L — SIGNIFICANT CHANGE UP (ref 22–31)
CO2 SERPL-SCNC: 25 MMOL/L — SIGNIFICANT CHANGE UP (ref 22–31)
COLOR SPEC: SIGNIFICANT CHANGE UP
COLOR SPEC: SIGNIFICANT CHANGE UP
CREAT SERPL-MCNC: 0.68 MG/DL — SIGNIFICANT CHANGE UP (ref 0.5–1.3)
CREAT SERPL-MCNC: 0.68 MG/DL — SIGNIFICANT CHANGE UP (ref 0.5–1.3)
DIFF PNL FLD: NEGATIVE — SIGNIFICANT CHANGE UP
DIFF PNL FLD: NEGATIVE — SIGNIFICANT CHANGE UP
EGFR: 106 ML/MIN/1.73M2 — SIGNIFICANT CHANGE UP
EGFR: 106 ML/MIN/1.73M2 — SIGNIFICANT CHANGE UP
EPI CELLS # UR: 2 /HPF — SIGNIFICANT CHANGE UP (ref 0–5)
EPI CELLS # UR: 2 /HPF — SIGNIFICANT CHANGE UP (ref 0–5)
FOLATE SERPL-MCNC: 14.2 NG/ML — SIGNIFICANT CHANGE UP (ref 3.1–17.5)
FOLATE SERPL-MCNC: 14.2 NG/ML — SIGNIFICANT CHANGE UP (ref 3.1–17.5)
GLUCOSE SERPL-MCNC: 83 MG/DL — SIGNIFICANT CHANGE UP (ref 70–99)
GLUCOSE SERPL-MCNC: 83 MG/DL — SIGNIFICANT CHANGE UP (ref 70–99)
GLUCOSE UR QL: NEGATIVE — SIGNIFICANT CHANGE UP
GLUCOSE UR QL: NEGATIVE — SIGNIFICANT CHANGE UP
HCT VFR BLD CALC: 36.9 % — LOW (ref 39–50)
HCT VFR BLD CALC: 36.9 % — LOW (ref 39–50)
HGB BLD-MCNC: 12.1 G/DL — LOW (ref 13–17)
HGB BLD-MCNC: 12.1 G/DL — LOW (ref 13–17)
HIV 1+2 AB+HIV1 P24 AG SERPL QL IA: SIGNIFICANT CHANGE UP
HIV 1+2 AB+HIV1 P24 AG SERPL QL IA: SIGNIFICANT CHANGE UP
INR BLD: 1.03 RATIO — SIGNIFICANT CHANGE UP (ref 0.88–1.16)
INR BLD: 1.03 RATIO — SIGNIFICANT CHANGE UP (ref 0.88–1.16)
KETONES UR-MCNC: NEGATIVE — SIGNIFICANT CHANGE UP
KETONES UR-MCNC: NEGATIVE — SIGNIFICANT CHANGE UP
LACTATE SERPL-SCNC: 1 MMOL/L — SIGNIFICANT CHANGE UP (ref 0.5–2)
LACTATE SERPL-SCNC: 1 MMOL/L — SIGNIFICANT CHANGE UP (ref 0.5–2)
LEUKOCYTE ESTERASE UR-ACNC: NEGATIVE — SIGNIFICANT CHANGE UP
LEUKOCYTE ESTERASE UR-ACNC: NEGATIVE — SIGNIFICANT CHANGE UP
MAGNESIUM SERPL-MCNC: 1.9 MG/DL — SIGNIFICANT CHANGE UP (ref 1.6–2.6)
MAGNESIUM SERPL-MCNC: 1.9 MG/DL — SIGNIFICANT CHANGE UP (ref 1.6–2.6)
MCHC RBC-ENTMCNC: 30.5 PG — SIGNIFICANT CHANGE UP (ref 27–34)
MCHC RBC-ENTMCNC: 30.5 PG — SIGNIFICANT CHANGE UP (ref 27–34)
MCHC RBC-ENTMCNC: 32.8 GM/DL — SIGNIFICANT CHANGE UP (ref 32–36)
MCHC RBC-ENTMCNC: 32.8 GM/DL — SIGNIFICANT CHANGE UP (ref 32–36)
MCV RBC AUTO: 92.9 FL — SIGNIFICANT CHANGE UP (ref 80–100)
MCV RBC AUTO: 92.9 FL — SIGNIFICANT CHANGE UP (ref 80–100)
MELD SCORE WITH DIALYSIS: 21 POINTS — SIGNIFICANT CHANGE UP
MELD SCORE WITH DIALYSIS: 21 POINTS — SIGNIFICANT CHANGE UP
MELD SCORE WITHOUT DIALYSIS: 8 POINTS — SIGNIFICANT CHANGE UP
MELD SCORE WITHOUT DIALYSIS: 8 POINTS — SIGNIFICANT CHANGE UP
NITRITE UR-MCNC: NEGATIVE — SIGNIFICANT CHANGE UP
NITRITE UR-MCNC: NEGATIVE — SIGNIFICANT CHANGE UP
NRBC # BLD: 0 /100 WBCS — SIGNIFICANT CHANGE UP (ref 0–0)
NRBC # BLD: 0 /100 WBCS — SIGNIFICANT CHANGE UP (ref 0–0)
NRBC # FLD: 0 K/UL — SIGNIFICANT CHANGE UP (ref 0–0)
NRBC # FLD: 0 K/UL — SIGNIFICANT CHANGE UP (ref 0–0)
PH UR: 8 — SIGNIFICANT CHANGE UP (ref 5–8)
PH UR: 8 — SIGNIFICANT CHANGE UP (ref 5–8)
PHOSPHATE SERPL-MCNC: 3.2 MG/DL — SIGNIFICANT CHANGE UP (ref 2.5–4.5)
PHOSPHATE SERPL-MCNC: 3.2 MG/DL — SIGNIFICANT CHANGE UP (ref 2.5–4.5)
PLATELET # BLD AUTO: 179 K/UL — SIGNIFICANT CHANGE UP (ref 150–400)
PLATELET # BLD AUTO: 179 K/UL — SIGNIFICANT CHANGE UP (ref 150–400)
POTASSIUM SERPL-MCNC: 3 MMOL/L — LOW (ref 3.5–5.3)
POTASSIUM SERPL-MCNC: 3 MMOL/L — LOW (ref 3.5–5.3)
POTASSIUM SERPL-SCNC: 3 MMOL/L — LOW (ref 3.5–5.3)
POTASSIUM SERPL-SCNC: 3 MMOL/L — LOW (ref 3.5–5.3)
PROT SERPL-MCNC: 6.9 G/DL — SIGNIFICANT CHANGE UP (ref 6–8.3)
PROT SERPL-MCNC: 6.9 G/DL — SIGNIFICANT CHANGE UP (ref 6–8.3)
PROT UR-MCNC: NEGATIVE — SIGNIFICANT CHANGE UP
PROT UR-MCNC: NEGATIVE — SIGNIFICANT CHANGE UP
PROTHROM AB SERPL-ACNC: 12 SEC — SIGNIFICANT CHANGE UP (ref 10.5–13.4)
PROTHROM AB SERPL-ACNC: 12 SEC — SIGNIFICANT CHANGE UP (ref 10.5–13.4)
RBC # BLD: 3.97 M/UL — LOW (ref 4.2–5.8)
RBC # BLD: 3.97 M/UL — LOW (ref 4.2–5.8)
RBC # FLD: 14.9 % — HIGH (ref 10.3–14.5)
RBC # FLD: 14.9 % — HIGH (ref 10.3–14.5)
RBC CASTS # UR COMP ASSIST: 1 /HPF — SIGNIFICANT CHANGE UP (ref 0–4)
RBC CASTS # UR COMP ASSIST: 1 /HPF — SIGNIFICANT CHANGE UP (ref 0–4)
SODIUM SERPL-SCNC: 140 MMOL/L — SIGNIFICANT CHANGE UP (ref 135–145)
SODIUM SERPL-SCNC: 140 MMOL/L — SIGNIFICANT CHANGE UP (ref 135–145)
SP GR SPEC: 1.01 — SIGNIFICANT CHANGE UP (ref 1.01–1.05)
SP GR SPEC: 1.01 — SIGNIFICANT CHANGE UP (ref 1.01–1.05)
UROBILINOGEN FLD QL: SIGNIFICANT CHANGE UP
UROBILINOGEN FLD QL: SIGNIFICANT CHANGE UP
VIT B12 SERPL-MCNC: 559 PG/ML — SIGNIFICANT CHANGE UP (ref 200–900)
VIT B12 SERPL-MCNC: 559 PG/ML — SIGNIFICANT CHANGE UP (ref 200–900)
WBC # BLD: 3.77 K/UL — LOW (ref 3.8–10.5)
WBC # BLD: 3.77 K/UL — LOW (ref 3.8–10.5)
WBC # FLD AUTO: 3.77 K/UL — LOW (ref 3.8–10.5)
WBC # FLD AUTO: 3.77 K/UL — LOW (ref 3.8–10.5)
WBC UR QL: 1 /HPF — SIGNIFICANT CHANGE UP (ref 0–5)
WBC UR QL: 1 /HPF — SIGNIFICANT CHANGE UP (ref 0–5)

## 2023-05-02 PROCEDURE — 99223 1ST HOSP IP/OBS HIGH 75: CPT | Mod: GC

## 2023-05-02 PROCEDURE — 93010 ELECTROCARDIOGRAM REPORT: CPT

## 2023-05-02 PROCEDURE — 99233 SBSQ HOSP IP/OBS HIGH 50: CPT

## 2023-05-02 PROCEDURE — 99223 1ST HOSP IP/OBS HIGH 75: CPT

## 2023-05-02 RX ORDER — FLUCONAZOLE 150 MG/1
200 TABLET ORAL ONCE
Refills: 0 | Status: DISCONTINUED | OUTPATIENT
Start: 2023-05-02 | End: 2023-05-02

## 2023-05-02 RX ORDER — POTASSIUM CHLORIDE 20 MEQ
40 PACKET (EA) ORAL EVERY 4 HOURS
Refills: 0 | Status: COMPLETED | OUTPATIENT
Start: 2023-05-02 | End: 2023-05-02

## 2023-05-02 RX ORDER — POTASSIUM CHLORIDE 20 MEQ
40 PACKET (EA) ORAL ONCE
Refills: 0 | Status: DISCONTINUED | OUTPATIENT
Start: 2023-05-02 | End: 2023-05-02

## 2023-05-02 RX ORDER — GUAIFENESIN/DEXTROMETHORPHAN 600MG-30MG
10 TABLET, EXTENDED RELEASE 12 HR ORAL EVERY 4 HOURS
Refills: 0 | Status: DISCONTINUED | OUTPATIENT
Start: 2023-05-02 | End: 2023-05-09

## 2023-05-02 RX ADMIN — Medication 50 MILLIGRAM(S): at 06:09

## 2023-05-02 RX ADMIN — Medication 2 MILLIGRAM(S): at 09:04

## 2023-05-02 RX ADMIN — DORZOLAMIDE HYDROCHLORIDE 1 DROP(S): 20 SOLUTION/ DROPS OPHTHALMIC at 14:36

## 2023-05-02 RX ADMIN — Medication 1 APPLICATION(S): at 09:05

## 2023-05-02 RX ADMIN — Medication 1 APPLICATION(S): at 21:23

## 2023-05-02 RX ADMIN — Medication 50 MILLIGRAM(S): at 14:39

## 2023-05-02 RX ADMIN — Medication 100 MILLIGRAM(S): at 14:35

## 2023-05-02 RX ADMIN — DORZOLAMIDE HYDROCHLORIDE 1 DROP(S): 20 SOLUTION/ DROPS OPHTHALMIC at 21:23

## 2023-05-02 RX ADMIN — ENOXAPARIN SODIUM 40 MILLIGRAM(S): 100 INJECTION SUBCUTANEOUS at 14:36

## 2023-05-02 RX ADMIN — LATANOPROST 1 DROP(S): 0.05 SOLUTION/ DROPS OPHTHALMIC; TOPICAL at 21:23

## 2023-05-02 RX ADMIN — DONEPEZIL HYDROCHLORIDE 5 MILLIGRAM(S): 10 TABLET, FILM COATED ORAL at 21:22

## 2023-05-02 RX ADMIN — Medication 650 MILLIGRAM(S): at 09:05

## 2023-05-02 RX ADMIN — Medication 3 MILLIGRAM(S): at 21:22

## 2023-05-02 RX ADMIN — Medication 50 MILLIGRAM(S): at 21:22

## 2023-05-02 RX ADMIN — Medication 650 MILLIGRAM(S): at 10:05

## 2023-05-02 RX ADMIN — Medication 40 MILLIEQUIVALENT(S): at 09:04

## 2023-05-02 RX ADMIN — Medication 1 DROP(S): at 18:02

## 2023-05-02 RX ADMIN — Medication 1 TABLET(S): at 14:36

## 2023-05-02 RX ADMIN — PANTOPRAZOLE SODIUM 40 MILLIGRAM(S): 20 TABLET, DELAYED RELEASE ORAL at 06:09

## 2023-05-02 RX ADMIN — Medication 1 DROP(S): at 06:10

## 2023-05-02 RX ADMIN — Medication 40 MILLIEQUIVALENT(S): at 14:39

## 2023-05-02 RX ADMIN — PANTOPRAZOLE SODIUM 40 MILLIGRAM(S): 20 TABLET, DELAYED RELEASE ORAL at 18:02

## 2023-05-02 RX ADMIN — DORZOLAMIDE HYDROCHLORIDE 1 DROP(S): 20 SOLUTION/ DROPS OPHTHALMIC at 06:10

## 2023-05-02 RX ADMIN — Medication 1 MILLIGRAM(S): at 14:35

## 2023-05-02 NOTE — BH CONSULTATION LIAISON ASSESSMENT NOTE - CURRENT MEDICATION
MEDICATIONS  (STANDING):  chlordiazePOXIDE 50 milliGRAM(s) Oral every 8 hours  chlordiazePOXIDE   Oral   clotrimazole 1% Cream 1 Application(s) Topical every 12 hours  donepezil 5 milliGRAM(s) Oral at bedtime  dorzolamide 2% Ophthalmic Solution 1 Drop(s) Both EYES three times a day  enoxaparin Injectable 40 milliGRAM(s) SubCutaneous every 24 hours  folic acid 1 milliGRAM(s) Oral daily  lactated ringers. 1000 milliLiter(s) (125 mL/Hr) IV Continuous <Continuous>  latanoprost 0.005% Ophthalmic Solution 1 Drop(s) Both EYES at bedtime  multivitamin 1 Tablet(s) Oral daily  pantoprazole    Tablet 40 milliGRAM(s) Oral two times a day  thiamine 100 milliGRAM(s) Oral daily  timolol 0.5% Solution 1 Drop(s) Both EYES two times a day    MEDICATIONS  (PRN):  acetaminophen     Tablet .. 650 milliGRAM(s) Oral every 6 hours PRN Mild Pain (1 - 3)  aluminum hydroxide/magnesium hydroxide/simethicone Suspension 30 milliLiter(s) Oral every 4 hours PRN Dyspepsia  bisacodyl 5 milliGRAM(s) Oral at bedtime PRN Constipation  LORazepam   Injectable 2 milliGRAM(s) IV Push every 2 hours PRN CIWA-Ar score increase by 2 points and a total score of 7 or less  LORazepam   Injectable 2 milliGRAM(s) IV Push every 1 hour PRN Symptom-triggered: each CIWA -Ar score 8 or GREATER  melatonin 3 milliGRAM(s) Oral at bedtime PRN Insomnia  ondansetron Injectable 4 milliGRAM(s) IV Push every 8 hours PRN Nausea and/or Vomiting

## 2023-05-02 NOTE — PROGRESS NOTE ADULT - PROBLEM SELECTOR PLAN 7
- per report, "...macule over his buttock which appeared to be coalescent tenia corporis.  There are areas of this macule that ringlike surrounding erythema..."  - given fluconazole and Mycelex in the ED  - continuing wEstrellita Mycelex for now - last BM ~ 2 days ago, but in the context of no nutritional intake x ~ 6 days  - could be sequela of dehydration  - f/u TSH level, electrolytes  - hydration in progress  - f/u for resolution  - dulcolax PRN

## 2023-05-02 NOTE — BH CONSULTATION LIAISON ASSESSMENT NOTE - NSBHATTESTCOMMENTATTENDFT_PSY_A_CORE
Chart reviewed. Seen with resident. AA O x 3, calm, though with mild withdrawal sx (tremors). Aware of chronic depression/anxiety and wants help with this alongside ETOH care. Agree with above assessment/recs. Will continue to follow.

## 2023-05-02 NOTE — PROGRESS NOTE ADULT - PROBLEM SELECTOR PLAN 2
- patient voices being anxious and depressed  - was on medications for same, but has not been able to comply with regimen due to current circumstances  - f/u TSH, vitamin D level  - evaluate for any signs of acute decompensation  - psych consulted - patient voices being anxious and depressed  - was on medications for same, but has not been able to comply with regimen due to current circumstances  - TSH 0.4; Vitamin D 0.35; WNL  - evaluate for any signs of acute decompensation  - F/U Psych Recs - patient voices being anxious and depressed  - was on medications for same, but has not been able to comply with regimen due to current circumstances  - TSH 0.4; Vitamin D 0.35; WNL  - evaluate for any signs of acute decompensation    Plan  - F/U Psych Recs

## 2023-05-02 NOTE — PROGRESS NOTE ADULT - SUBJECTIVE AND OBJECTIVE BOX
INCOMPLETE NOTE    Vasyl Harini | PGY1| Pager: [place] [phone]) -- RICH ([phone])  Interval Events:    REVIEW OF SYSTEMS:  CONSTITUTIONAL: No weakness, fevers or chills  EYES/ENT: No visual changes;  No vertigo or throat pain   NECK: No pain or stiffness  RESPIRATORY: No cough, wheezing, hemoptysis; No shortness of breath  CARDIOVASCULAR: No chest pain or palpitations  GASTROINTESTINAL: No abdominal or epigastric pain. No nausea, vomiting, or hematemesis; No diarrhea or constipation. No melena or hematochezia.  GENITOURINARY: No dysuria, frequency or hematuria  NEUROLOGICAL: No numbness or weakness  SKIN: No itching, burning, rashes, or lesions   All other review of systems is negative unless indicated above.    OBJECTIVE:  ICU Vital Signs Last 24 Hrs  T(C): 37 (02 May 2023 04:02), Max: 37.3 (01 May 2023 12:00)  T(F): 98.6 (02 May 2023 04:02), Max: 99.2 (01 May 2023 12:00)  HR: 61 (02 May 2023 04:02) (61 - 98)  BP: 141/64 (02 May 2023 04:02) (112/69 - 146/76)  BP(mean): --  ABP: --  ABP(mean): --  RR: 17 (02 May 2023 04:02) (17 - 18)  SpO2: 98% (02 May 2023 04:02) (96% - 100%)    O2 Parameters below as of 02 May 2023 04:02  Patient On (Oxygen Delivery Method): room air              CAPILLARY BLOOD GLUCOSE          PHYSICAL EXAM:  General: WN/WD NAD  Neurology: A&Ox3, nonfocal, HICKS x 4  Eyes: PERRLA/ EOMI, Gross vision intact  ENT/Neck: Neck supple, trachea midline, No JVD, Gross hearing intact  Respiratory: CTA B/L, No wheezing, rales, rhonchi  CV: RRR, +S1/S2, -S3/S4, no murmurs, rubs or gallops  Abdominal: Soft, NT, ND +BS, No HSM  MSK: 5/5 strength UE/LE bilaterally  Extremities: No edema, 2+ peripheral pulses  Skin: No Rashes, Hematoma, Ecchymosis  Incisions:   Tubes:    HOSPITAL MEDICATIONS:  MEDICATIONS  (STANDING):  chlordiazePOXIDE   Oral   chlordiazePOXIDE 50 milliGRAM(s) Oral every 8 hours  clotrimazole 1% Cream 1 Application(s) Topical every 12 hours  donepezil 5 milliGRAM(s) Oral at bedtime  dorzolamide 2% Ophthalmic Solution 1 Drop(s) Both EYES three times a day  enoxaparin Injectable 40 milliGRAM(s) SubCutaneous every 24 hours  folic acid 1 milliGRAM(s) Oral daily  lactated ringers. 1000 milliLiter(s) (125 mL/Hr) IV Continuous <Continuous>  latanoprost 0.005% Ophthalmic Solution 1 Drop(s) Both EYES at bedtime  multivitamin 1 Tablet(s) Oral daily  pantoprazole    Tablet 40 milliGRAM(s) Oral two times a day  thiamine 100 milliGRAM(s) Oral daily  timolol 0.5% Solution 1 Drop(s) Both EYES two times a day    MEDICATIONS  (PRN):  acetaminophen     Tablet .. 650 milliGRAM(s) Oral every 6 hours PRN Mild Pain (1 - 3)  aluminum hydroxide/magnesium hydroxide/simethicone Suspension 30 milliLiter(s) Oral every 4 hours PRN Dyspepsia  bisacodyl 5 milliGRAM(s) Oral at bedtime PRN Constipation  LORazepam   Injectable 2 milliGRAM(s) IV Push every 2 hours PRN CIWA-Ar score increase by 2 points and a total score of 7 or less  LORazepam   Injectable 2 milliGRAM(s) IV Push every 1 hour PRN Symptom-triggered: each CIWA -Ar score 8 or GREATER  melatonin 3 milliGRAM(s) Oral at bedtime PRN Insomnia  ondansetron Injectable 4 milliGRAM(s) IV Push every 8 hours PRN Nausea and/or Vomiting      LABS:                        14.3   6.72  )-----------( 233      ( 30 Apr 2023 21:00 )             41.6     Hgb Trend: 14.3<--  05-01    141  |  104  |  7   ----------------------------<  84  3.9   |  22  |  0.69    Ca    8.1<L>      01 May 2023 06:40  Phos  3.2     05-01  Mg     1.70     05-01    TPro  6.3  /  Alb  3.6  /  TBili  0.6  /  DBili  x   /  AST  49<H>  /  ALT  61<H>  /  AlkPhos  57  05-01    Creatinine Trend: 0.69<--, 0.68<--  PT/INR - ( 30 Apr 2023 21:00 )   PT: 10.9 sec;   INR: 0.94 ratio         PTT - ( 30 Apr 2023 21:00 )  PTT:29.3 sec      Venous Blood Gas:  05-01 @ 01:00  7.35/46/52/25/79.9  VBG Lactate: 3.1  Venous Blood Gas:  04-30 @ 21:42  7.33/49/52/26/77.7  VBG Lactate: 4.8      MICROBIOLOGY:          INCOMPLETE NOTE    Vasyl Harini | PGY1| Pager: [place] [phone]) -- RICH ([phone])  Interval Events:    REVIEW OF SYSTEMS:  CONSTITUTIONAL: No weakness, fevers or chills  EYES/ENT: No visual changes;  No vertigo or throat pain   NECK: No pain or stiffness  RESPIRATORY: No cough, wheezing, hemoptysis; No shortness of breath  CARDIOVASCULAR: No chest pain or palpitations  GASTROINTESTINAL: No abdominal or epigastric pain. No nausea, vomiting, or hematemesis; No diarrhea or constipation. No melena or hematochezia.  GENITOURINARY: No dysuria, frequency or hematuria  NEUROLOGICAL: No numbness or weakness  SKIN: No itching, burning, rashes, or lesions   All other review of systems is negative unless indicated above.    OBJECTIVE:  ICU Vital Signs Last 24 Hrs  T(C): 37 (02 May 2023 04:02), Max: 37.3 (01 May 2023 12:00)  T(F): 98.6 (02 May 2023 04:02), Max: 99.2 (01 May 2023 12:00)  HR: 61 (02 May 2023 04:02) (61 - 98)  BP: 141/64 (02 May 2023 04:02) (112/69 - 146/76)  BP(mean): --  ABP: --  ABP(mean): --  RR: 17 (02 May 2023 04:02) (17 - 18)  SpO2: 98% (02 May 2023 04:02) (96% - 100%)    O2 Parameters below as of 02 May 2023 04:02  Patient On (Oxygen Delivery Method): room air              CAPILLARY BLOOD GLUCOSE          PHYSICAL EXAM:  General: WN/WD NAD  Neurology: A&Ox3, nonfocal, HICKS x 4  Eyes: PERRLA/ EOMI, Gross vision intact  ENT/Neck: Neck supple, trachea midline, No JVD, Gross hearing intact  Respiratory: CTA B/L, No wheezing, rales, rhonchi  CV: RRR, +S1/S2, -S3/S4, no murmurs, rubs or gallops  Abdominal: Soft, NT, ND +BS, No HSM  MSK: 5/5 strength UE/LE bilaterally  Extremities: No edema, 2+ peripheral pulses  Skin: No Rashes, Hematoma, Ecchymosis  Incisions:   Tubes:    HOSPITAL MEDICATIONS:  MEDICATIONS  (STANDING):  chlordiazePOXIDE   Oral   chlordiazePOXIDE 50 milliGRAM(s) Oral every 8 hours  clotrimazole 1% Cream 1 Application(s) Topical every 12 hours  donepezil 5 milliGRAM(s) Oral at bedtime  dorzolamide 2% Ophthalmic Solution 1 Drop(s) Both EYES three times a day  enoxaparin Injectable 40 milliGRAM(s) SubCutaneous every 24 hours  folic acid 1 milliGRAM(s) Oral daily  lactated ringers. 1000 milliLiter(s) (125 mL/Hr) IV Continuous <Continuous>  latanoprost 0.005% Ophthalmic Solution 1 Drop(s) Both EYES at bedtime  multivitamin 1 Tablet(s) Oral daily  pantoprazole    Tablet 40 milliGRAM(s) Oral two times a day  thiamine 100 milliGRAM(s) Oral daily  timolol 0.5% Solution 1 Drop(s) Both EYES two times a day    MEDICATIONS  (PRN):  acetaminophen     Tablet .. 650 milliGRAM(s) Oral every 6 hours PRN Mild Pain (1 - 3)  aluminum hydroxide/magnesium hydroxide/simethicone Suspension 30 milliLiter(s) Oral every 4 hours PRN Dyspepsia  bisacodyl 5 milliGRAM(s) Oral at bedtime PRN Constipation  LORazepam   Injectable 2 milliGRAM(s) IV Push every 2 hours PRN CIWA-Ar score increase by 2 points and a total score of 7 or less  LORazepam   Injectable 2 milliGRAM(s) IV Push every 1 hour PRN Symptom-triggered: each CIWA -Ar score 8 or GREATER  melatonin 3 milliGRAM(s) Oral at bedtime PRN Insomnia  ondansetron Injectable 4 milliGRAM(s) IV Push every 8 hours PRN Nausea and/or Vomiting      LABS:                        14.3   6.72  )-----------( 233      ( 30 Apr 2023 21:00 )             41.6     Hgb Trend: 14.3<--  05-01    141  |  104  |  7   ----------------------------<  84  3.9   |  22  |  0.69    Ca    8.1<L>      01 May 2023 06:40  Phos  3.2     05-01  Mg     1.70     05-01    TPro  6.3  /  Alb  3.6  /  TBili  0.6  /  DBili  x   /  AST  49<H>  /  ALT  61<H>  /  AlkPhos  57  05-01    Creatinine Trend: 0.69<--, 0.68<--  PT/INR - ( 30 Apr 2023 21:00 )   PT: 10.9 sec;   INR: 0.94 ratio         PTT - ( 30 Apr 2023 21:00 )  PTT:29.3 sec      Venous Blood Gas:  05-01 @ 01:00  7.35/46/52/25/79.9  VBG Lactate: 3.1  Venous Blood Gas:  04-30 @ 21:42  7.33/49/52/26/77.7  VBG Lactate: 4.8      MICROBIOLOGY:          Vasyl Harini | PGY1| Pager: [place] [phone]) -- RICH ([phone])  Interval Events: Patient denies any acute complaints, endorses mild anxiety whenever he feels the librium wearing off; CIWA approx 9-14;       OBJECTIVE:  ICU Vital Signs Last 24 Hrs  T(C): 37 (02 May 2023 04:02), Max: 37.3 (01 May 2023 12:00)  T(F): 98.6 (02 May 2023 04:02), Max: 99.2 (01 May 2023 12:00)  HR: 61 (02 May 2023 04:02) (61 - 98)  BP: 141/64 (02 May 2023 04:02) (112/69 - 146/76)  BP(mean): --  ABP: --  ABP(mean): --  RR: 17 (02 May 2023 04:02) (17 - 18)  SpO2: 98% (02 May 2023 04:02) (96% - 100%)    O2 Parameters below as of 02 May 2023 04:02  Patient On (Oxygen Delivery Method): room air    CAPILLARY BLOOD GLUCOSE      PHYSICAL EXAM:  General: Calm in bed  Neurology: A&Ox3, nonfocal, HICKS x 4  Eyes: PERRLA/ EOMI, Gross vision intact  ENT/Neck: Neck supple, trachea midline, No JVD, Gross hearing intact  Respiratory: CTA B/L, No wheezing, rales, rhonchi  CV: RRR, +S1/S2, -S3/S4, no murmurs, rubs or gallops  Abdominal: Soft, NT, ND +BS, No HSM  Extremities: No edema, 2+ peripheral pulses  Skin: No Rashes, Hematoma, Ecchymosis      HOSPITAL MEDICATIONS:  MEDICATIONS  (STANDING):  chlordiazePOXIDE   Oral   chlordiazePOXIDE 50 milliGRAM(s) Oral every 8 hours  clotrimazole 1% Cream 1 Application(s) Topical every 12 hours  donepezil 5 milliGRAM(s) Oral at bedtime  dorzolamide 2% Ophthalmic Solution 1 Drop(s) Both EYES three times a day  enoxaparin Injectable 40 milliGRAM(s) SubCutaneous every 24 hours  folic acid 1 milliGRAM(s) Oral daily  lactated ringers. 1000 milliLiter(s) (125 mL/Hr) IV Continuous <Continuous>  latanoprost 0.005% Ophthalmic Solution 1 Drop(s) Both EYES at bedtime  multivitamin 1 Tablet(s) Oral daily  pantoprazole    Tablet 40 milliGRAM(s) Oral two times a day  thiamine 100 milliGRAM(s) Oral daily  timolol 0.5% Solution 1 Drop(s) Both EYES two times a day    MEDICATIONS  (PRN):  acetaminophen     Tablet .. 650 milliGRAM(s) Oral every 6 hours PRN Mild Pain (1 - 3)  aluminum hydroxide/magnesium hydroxide/simethicone Suspension 30 milliLiter(s) Oral every 4 hours PRN Dyspepsia  bisacodyl 5 milliGRAM(s) Oral at bedtime PRN Constipation  LORazepam   Injectable 2 milliGRAM(s) IV Push every 2 hours PRN CIWA-Ar score increase by 2 points and a total score of 7 or less  LORazepam   Injectable 2 milliGRAM(s) IV Push every 1 hour PRN Symptom-triggered: each CIWA -Ar score 8 or GREATER  melatonin 3 milliGRAM(s) Oral at bedtime PRN Insomnia  ondansetron Injectable 4 milliGRAM(s) IV Push every 8 hours PRN Nausea and/or Vomiting      LABS:                        14.3   6.72  )-----------( 233      ( 30 Apr 2023 21:00 )             41.6     Hgb Trend: 14.3<--  05-01    141  |  104  |  7   ----------------------------<  84  3.9   |  22  |  0.69    Ca    8.1<L>      01 May 2023 06:40  Phos  3.2     05-01  Mg     1.70     05-01    TPro  6.3  /  Alb  3.6  /  TBili  0.6  /  DBili  x   /  AST  49<H>  /  ALT  61<H>  /  AlkPhos  57  05-01    Creatinine Trend: 0.69<--, 0.68<--  PT/INR - ( 30 Apr 2023 21:00 )   PT: 10.9 sec;   INR: 0.94 ratio         PTT - ( 30 Apr 2023 21:00 )  PTT:29.3 sec      Venous Blood Gas:  05-01 @ 01:00  7.35/46/52/25/79.9  VBG Lactate: 3.1  Venous Blood Gas:  04-30 @ 21:42  7.33/49/52/26/77.7  VBG Lactate: 4.8      MICROBIOLOGY:          Vasyl Harini | PGY1| Pager: [place] [phone]) -- RICH ([phone])  Interval Events: Patient denies any acute complaints, endorses mild anxiety whenever he feels the librium wearing off; CIWA approx 9-14;       OBJECTIVE:  ICU Vital Signs Last 24 Hrs  T(C): 37 (02 May 2023 04:02), Max: 37.3 (01 May 2023 12:00)  T(F): 98.6 (02 May 2023 04:02), Max: 99.2 (01 May 2023 12:00)  HR: 61 (02 May 2023 04:02) (61 - 98)  BP: 141/64 (02 May 2023 04:02) (112/69 - 146/76)  BP(mean): --  ABP: --  ABP(mean): --  RR: 17 (02 May 2023 04:02) (17 - 18)  SpO2: 98% (02 May 2023 04:02) (96% - 100%)    O2 Parameters below as of 02 May 2023 04:02  Patient On (Oxygen Delivery Method): room air    CAPILLARY BLOOD GLUCOSE      PHYSICAL EXAM:  General: Calm in bed  Neurology: A&Ox3, nonfocal, HICKS x 4  Eyes: PERRLA/ EOMI, Gross vision intact  ENT/Neck: Neck supple, trachea midline, No JVD, Gross hearing intact  Respiratory: CTA B/L, No wheezing, rales, rhonchi  CV: RRR, +S1/S2, -S3/S4, no murmurs, rubs or gallops  Abdominal: Soft, NT, ND +BS, No HSM  Extremities: No edema, 2+ peripheral pulses  Skin: No Rashes, Hematoma, Ecchymosis      HOSPITAL MEDICATIONS:  MEDICATIONS  (STANDING):  chlordiazePOXIDE   Oral   chlordiazePOXIDE 50 milliGRAM(s) Oral every 8 hours  clotrimazole 1% Cream 1 Application(s) Topical every 12 hours  donepezil 5 milliGRAM(s) Oral at bedtime  dorzolamide 2% Ophthalmic Solution 1 Drop(s) Both EYES three times a day  enoxaparin Injectable 40 milliGRAM(s) SubCutaneous every 24 hours  folic acid 1 milliGRAM(s) Oral daily  lactated ringers. 1000 milliLiter(s) (125 mL/Hr) IV Continuous <Continuous>  latanoprost 0.005% Ophthalmic Solution 1 Drop(s) Both EYES at bedtime  multivitamin 1 Tablet(s) Oral daily  pantoprazole    Tablet 40 milliGRAM(s) Oral two times a day  thiamine 100 milliGRAM(s) Oral daily  timolol 0.5% Solution 1 Drop(s) Both EYES two times a day    MEDICATIONS  (PRN):  acetaminophen     Tablet .. 650 milliGRAM(s) Oral every 6 hours PRN Mild Pain (1 - 3)  aluminum hydroxide/magnesium hydroxide/simethicone Suspension 30 milliLiter(s) Oral every 4 hours PRN Dyspepsia  bisacodyl 5 milliGRAM(s) Oral at bedtime PRN Constipation  LORazepam   Injectable 2 milliGRAM(s) IV Push every 2 hours PRN CIWA-Ar score increase by 2 points and a total score of 7 or less  LORazepam   Injectable 2 milliGRAM(s) IV Push every 1 hour PRN Symptom-triggered: each CIWA -Ar score 8 or GREATER  melatonin 3 milliGRAM(s) Oral at bedtime PRN Insomnia  ondansetron Injectable 4 milliGRAM(s) IV Push every 8 hours PRN Nausea and/or Vomiting      LABS:                        14.3   6.72  )-----------( 233      ( 30 Apr 2023 21:00 )             41.6     Hgb Trend: 14.3<--  05-01    141  |  104  |  7   ----------------------------<  84  3.9   |  22  |  0.69    Ca    8.1<L>      01 May 2023 06:40  Phos  3.2     05-01  Mg     1.70     05-01    TPro  6.3  /  Alb  3.6  /  TBili  0.6  /  DBili  x   /  AST  49<H>  /  ALT  61<H>  /  AlkPhos  57  05-01    Creatinine Trend: 0.69<--, 0.68<--  PT/INR - ( 30 Apr 2023 21:00 )   PT: 10.9 sec;   INR: 0.94 ratio         PTT - ( 30 Apr 2023 21:00 )  PTT:29.3 sec      Venous Blood Gas:  05-01 @ 01:00  7.35/46/52/25/79.9  VBG Lactate: 3.1  Venous Blood Gas:  04-30 @ 21:42  7.33/49/52/26/77.7  VBG Lactate: 4.8      MICROBIOLOGY:          Vasyl Harini | PGY1| Pager: [place] [phone]) -- RICH ([phone])  Interval Events: Patient denies any acute complaints, endorses mild anxiety whenever he feels the librium wearing off; CIWA approx 9-14;       OBJECTIVE:  ICU Vital Signs Last 24 Hrs  T(C): 37 (02 May 2023 04:02), Max: 37.3 (01 May 2023 12:00)  T(F): 98.6 (02 May 2023 04:02), Max: 99.2 (01 May 2023 12:00)  HR: 61 (02 May 2023 04:02) (61 - 98)  BP: 141/64 (02 May 2023 04:02) (112/69 - 146/76)  BP(mean): --  ABP: --  ABP(mean): --  RR: 17 (02 May 2023 04:02) (17 - 18)  SpO2: 98% (02 May 2023 04:02) (96% - 100%)    O2 Parameters below as of 02 May 2023 04:02  Patient On (Oxygen Delivery Method): room air    CAPILLARY BLOOD GLUCOSE      PHYSICAL EXAM:  General: Calm in bed  Neurology: A&Ox3, nonfocal, HICKS x 4  Eyes: PERRLA/ EOMI, Gross vision intact  ENT/Neck: Neck supple, trachea midline, No JVD, Gross hearing intact  Respiratory: CTA B/L, No wheezing, rales, rhonchi  CV: RRR, +S1/S2, -S3/S4, no murmurs, rubs or gallops  Abdominal: Soft, NT, ND +BS, No HSM  Extremities: Visible Tremors, 2+ peripheral pulses  Skin: Rash over posterior thighs and bilateral glutes      HOSPITAL MEDICATIONS:  MEDICATIONS  (STANDING):  chlordiazePOXIDE   Oral   chlordiazePOXIDE 50 milliGRAM(s) Oral every 8 hours  clotrimazole 1% Cream 1 Application(s) Topical every 12 hours  donepezil 5 milliGRAM(s) Oral at bedtime  dorzolamide 2% Ophthalmic Solution 1 Drop(s) Both EYES three times a day  enoxaparin Injectable 40 milliGRAM(s) SubCutaneous every 24 hours  folic acid 1 milliGRAM(s) Oral daily  lactated ringers. 1000 milliLiter(s) (125 mL/Hr) IV Continuous <Continuous>  latanoprost 0.005% Ophthalmic Solution 1 Drop(s) Both EYES at bedtime  multivitamin 1 Tablet(s) Oral daily  pantoprazole    Tablet 40 milliGRAM(s) Oral two times a day  thiamine 100 milliGRAM(s) Oral daily  timolol 0.5% Solution 1 Drop(s) Both EYES two times a day    MEDICATIONS  (PRN):  acetaminophen     Tablet .. 650 milliGRAM(s) Oral every 6 hours PRN Mild Pain (1 - 3)  aluminum hydroxide/magnesium hydroxide/simethicone Suspension 30 milliLiter(s) Oral every 4 hours PRN Dyspepsia  bisacodyl 5 milliGRAM(s) Oral at bedtime PRN Constipation  LORazepam   Injectable 2 milliGRAM(s) IV Push every 2 hours PRN CIWA-Ar score increase by 2 points and a total score of 7 or less  LORazepam   Injectable 2 milliGRAM(s) IV Push every 1 hour PRN Symptom-triggered: each CIWA -Ar score 8 or GREATER  melatonin 3 milliGRAM(s) Oral at bedtime PRN Insomnia  ondansetron Injectable 4 milliGRAM(s) IV Push every 8 hours PRN Nausea and/or Vomiting      LABS:                        14.3   6.72  )-----------( 233      ( 30 Apr 2023 21:00 )             41.6     Hgb Trend: 14.3<--  05-01    141  |  104  |  7   ----------------------------<  84  3.9   |  22  |  0.69    Ca    8.1<L>      01 May 2023 06:40  Phos  3.2     05-01  Mg     1.70     05-01    TPro  6.3  /  Alb  3.6  /  TBili  0.6  /  DBili  x   /  AST  49<H>  /  ALT  61<H>  /  AlkPhos  57  05-01    Creatinine Trend: 0.69<--, 0.68<--  PT/INR - ( 30 Apr 2023 21:00 )   PT: 10.9 sec;   INR: 0.94 ratio         PTT - ( 30 Apr 2023 21:00 )  PTT:29.3 sec      Venous Blood Gas:  05-01 @ 01:00  7.35/46/52/25/79.9  VBG Lactate: 3.1  Venous Blood Gas:  04-30 @ 21:42  7.33/49/52/26/77.7  VBG Lactate: 4.8      MICROBIOLOGY:          Vasyl Harini | PGY1| Pager: [place] [phone]) -- RICH ([phone])  Interval Events: Patient denies any acute complaints, endorses mild anxiety whenever he feels the librium wearing off; CIWA approx 9-14;       OBJECTIVE:  ICU Vital Signs Last 24 Hrs  T(C): 37 (02 May 2023 04:02), Max: 37.3 (01 May 2023 12:00)  T(F): 98.6 (02 May 2023 04:02), Max: 99.2 (01 May 2023 12:00)  HR: 61 (02 May 2023 04:02) (61 - 98)  BP: 141/64 (02 May 2023 04:02) (112/69 - 146/76)  BP(mean): --  ABP: --  ABP(mean): --  RR: 17 (02 May 2023 04:02) (17 - 18)  SpO2: 98% (02 May 2023 04:02) (96% - 100%)    O2 Parameters below as of 02 May 2023 04:02  Patient On (Oxygen Delivery Method): room air    CAPILLARY BLOOD GLUCOSE      PHYSICAL EXAM:  General: Calm in bed  Neurology: A&Ox3, nonfocal, HICKS x 4  Eyes: PERRLA/ EOMI, Gross vision intact  ENT/Neck: Neck supple, trachea midline, No JVD, Gross hearing intact  Respiratory: CTA B/L, No wheezing, rales, rhonchi  CV: RRR, +S1/S2, -S3/S4, no murmurs, rubs or gallops  Abdominal: Soft, NT, ND +BS, No HSM  Extremities: Visible Tremors, 2+ peripheral pulses  Skin: Rash over posterior thighs and bilateral glutes      HOSPITAL MEDICATIONS:  MEDICATIONS  (STANDING):  chlordiazePOXIDE   Oral   chlordiazePOXIDE 50 milliGRAM(s) Oral every 8 hours  clotrimazole 1% Cream 1 Application(s) Topical every 12 hours  donepezil 5 milliGRAM(s) Oral at bedtime  dorzolamide 2% Ophthalmic Solution 1 Drop(s) Both EYES three times a day  enoxaparin Injectable 40 milliGRAM(s) SubCutaneous every 24 hours  folic acid 1 milliGRAM(s) Oral daily  lactated ringers. 1000 milliLiter(s) (125 mL/Hr) IV Continuous <Continuous>  latanoprost 0.005% Ophthalmic Solution 1 Drop(s) Both EYES at bedtime  multivitamin 1 Tablet(s) Oral daily  pantoprazole    Tablet 40 milliGRAM(s) Oral two times a day  thiamine 100 milliGRAM(s) Oral daily  timolol 0.5% Solution 1 Drop(s) Both EYES two times a day    MEDICATIONS  (PRN):  acetaminophen     Tablet .. 650 milliGRAM(s) Oral every 6 hours PRN Mild Pain (1 - 3)  aluminum hydroxide/magnesium hydroxide/simethicone Suspension 30 milliLiter(s) Oral every 4 hours PRN Dyspepsia  bisacodyl 5 milliGRAM(s) Oral at bedtime PRN Constipation  LORazepam   Injectable 2 milliGRAM(s) IV Push every 2 hours PRN CIWA-Ar score increase by 2 points and a total score of 7 or less  LORazepam   Injectable 2 milliGRAM(s) IV Push every 1 hour PRN Symptom-triggered: each CIWA -Ar score 8 or GREATER  melatonin 3 milliGRAM(s) Oral at bedtime PRN Insomnia  ondansetron Injectable 4 milliGRAM(s) IV Push every 8 hours PRN Nausea and/or Vomiting      LABS:                        14.3   6.72  )-----------( 233      ( 30 Apr 2023 21:00 )             41.6     Hgb Trend: 14.3<--  05-01    141  |  104  |  7   ----------------------------<  84  3.9   |  22  |  0.69    Ca    8.1<L>      01 May 2023 06:40  Phos  3.2     05-01  Mg     1.70     05-01    TPro  6.3  /  Alb  3.6  /  TBili  0.6  /  DBili  x   /  AST  49<H>  /  ALT  61<H>  /  AlkPhos  57  05-01    Creatinine Trend: 0.69<--, 0.68<--  PT/INR - ( 30 Apr 2023 21:00 )   PT: 10.9 sec;   INR: 0.94 ratio         PTT - ( 30 Apr 2023 21:00 )  PTT:29.3 sec      Venous Blood Gas:  05-01 @ 01:00  7.35/46/52/25/79.9  VBG Lactate: 3.1  Venous Blood Gas:  04-30 @ 21:42  7.33/49/52/26/77.7  VBG Lactate: 4.8      MICROBIOLOGY:

## 2023-05-02 NOTE — PROGRESS NOTE ADULT - ASSESSMENT
58 year old male, with past history significant for Alcohol abuse, Hypertension, Anxiety and Depression, presented to the ED secondary to alcohol withdrawal.  Diagnosed with Alcohol Dependence with Withdrawal in the ED. 58 year old male, with past history significant for Alcohol abuse, Hypertension, Anxiety and Depression, presented to the ED secondary to alcohol withdrawal c/b anxiety and depression, currently on Librium taper

## 2023-05-02 NOTE — PROGRESS NOTE ADULT - ATTENDING COMMENTS
58 year old male, with past history significant for Alcohol abuse, Hypertension, Anxiety and Depression, presented to the ED secondary to alcohol withdrawal.  Diagnosed with Alcohol Dependence with Withdrawal in the ED.     - C/w Librium taper with symptom-triggered CIWA.   - CIWA relatively stable in 4-7 range due to significant tremor and headache. If significantly worsen, will increase to high dose taper  - Psych consulted for alcohol use and anxiety/depression. Patient is amendable; f/u rec  - SW consulted for safe dispo  - Med rec completed - per patient, not taking any medications other than hydroxyzine 50 mg 2-3 times a day prn due to fear of interacting with ETOH.   - MVI, thiamine, folate supplements; B12 wnl  - Significant rash on BLE, s/p fluconazole x 1 and c/w topical cream; Will start PO fluconazole x 4 weeks per derm rec given extensive LE involvement  - (+) dysuria, f/u UA and Ucx  - Elevated lactate likely i/s/o ETOH use. Normalized  - RUQ (+) for hepatic steatosis, no cirrhosis; uptrending Bili, will monitor  - Recent fall, low concern for severe trauma, will hold off imaging; PT consult - no skilled PT needs

## 2023-05-02 NOTE — PROGRESS NOTE ADULT - PROBLEM SELECTOR PLAN 3
RUQ U/S with gallstones, no cholecystitis, hepatic steatosis+  likely in setting of chronic alcohol use RUQ U/S with gallstones, no cholecystitis, hepatic steatosis+  likely in setting of chronic alcohol use    No acute management changes LUIS ARMANDO - per report, "...macule over his buttock which appeared to be coalescent tenia corporis.  There are areas of this macule that ringlike surrounding erythema..."  - given fluconazole and Mycelex in the ED  - continuing wEstrellita Mycelex for now

## 2023-05-02 NOTE — PROGRESS NOTE ADULT - PROBLEM SELECTOR PLAN 6
- phosphorus = 2.3  - s/p K-phos 15 millimoles x one  - Ph improved - per report, "...macule over his buttock which appeared to be coalescent tenia corporis.  There are areas of this macule that ringlike surrounding erythema..."  - given fluconazole and Mycelex in the ED  - continuing wEstrellita Mycelex for now - lactate = 4.8, AG = 17  - in the setting of excessive alcohol intake and poor nutrition, as well as dehydration  - IVF hydration in progress    - Lactate= 1 WNL

## 2023-05-02 NOTE — PROGRESS NOTE ADULT - PROBLEM SELECTOR PLAN 10
- no acute issues presently  - has been unable to adhere to OP medication regimen  - f/u w/ Med-Hx Pharmacist re med verification and complete Med-Rec Diet: DASH Diet  Psych/Sleep: None LUIS ARMANDO  GI: Protonix Dulcolax  DVT ppx: Lovenox  Code Status:  Dispo: Diet: DASH Diet  Psych/Sleep: None LUIS ARMANDO  GI: Protonix Dulcolax  DVT ppx: Lovenox  Code Status: Full Code  Dispo: Pending Librium Taper

## 2023-05-02 NOTE — BH CONSULTATION LIAISON ASSESSMENT NOTE - RISK ASSESSMENT
Acute risk factors include excessive alcohol use, depression, anxiety. Chronic risk factors include homelessness, tenuous employment, history of substance misuse, history of depression, not being in treatment, male gender, age. Protective factors include help-seeking behaviors, future-orientedness.

## 2023-05-02 NOTE — PROGRESS NOTE ADULT - PROBLEM SELECTOR PLAN 9
- has been living on the streets for the past ~ 2 years, now complicated by alcohol abuse  - states no family  - Social work consult - no acute issues presently  - has been unable to adhere to OP medication regimen  - f/u w/ Med-Hx Pharmacist re med verification and complete Med-Rec

## 2023-05-02 NOTE — BH CONSULTATION LIAISON ASSESSMENT NOTE - NSICDXPASTMEDICALHX_GEN_ALL_CORE_FT
PAST MEDICAL HISTORY:  Alcohol abuse     Anxiety and depression     Essential hypertension     Glaucoma, right eye

## 2023-05-02 NOTE — BH CONSULTATION LIAISON ASSESSMENT NOTE - OTHER PAST PSYCHIATRIC HISTORY (INCLUDE DETAILS REGARDING ONSET, COURSE OF ILLNESS, INPATIENT/OUTPATIENT TREATMENT)
no formal psych history   reported depression and anxiety  no psych hospitalizations  past tx w/ atarax  been in rehab for substance  past tx w/ naltrexone

## 2023-05-02 NOTE — PROGRESS NOTE ADULT - PROBLEM SELECTOR PLAN 1
- drinking continuously w/o abatement, with last drink being prior to coming to the ED.  Non-partial re type of alcohol  - tremulous.  Unaware if ever had seizures  - alcohol level = 364, lactate = 4.8  - CIWA protocol, including that of symptom triggered, in effect   - on MVI, folic acid, thiamine prescribed  - IVF hydration in progress  - f/u Social Work consult  - f/u SBIRT  - psychiatry consulted (Depression, Anxiety, Alcohol abuse; patient amenable to consult)  - aspiration, seizure precautions  - Librium taper, ativan PRN  - on Thiamine, folic acid, MV - drinking continuously w/o abatement, with last drink being prior to coming to the ED.  Non-partial re type of alcohol  - tremulous.  Unaware if ever had seizures  - alcohol level = 364, lactate = 4.8  - CIWA protocol, including that of symptom triggered, in effect   - on MVI, folic acid, thiamine  - IVF hydration in progress  - f/u Social Work consult  - f/u SBIRT  - psychiatry consulted (Depression, Anxiety, Alcohol abuse; patient amenable to consult)  - aspiration, seizure precautions  - Librium taper, ativan PRN  - on Thiamine, folic acid, MV - drinking continuously w/o abatement, with last drink being prior to coming to the ED.  Non-partial re type of alcohol  - tremulous.  Unaware if ever had seizures  - alcohol level = 364, lactate = 4.8  - CIWA protocol, including that of symptom triggered, in effect   - on MVI, folic acid, thiamine  - IVF hydration in progress  - f/u Social Work consult  - f/u SBIRT  - psychiatry consulted (Depression, Anxiety, Alcohol abuse; patient amenable to consult)  - aspiration, seizure precautions    Plan:  - C/W Librium taper, ativan PRN  - C/W Thiamine, folic acid, MV

## 2023-05-02 NOTE — BH CONSULTATION LIAISON ASSESSMENT NOTE - NSACTIVEVENT_PSY_ALL_CORE
Triggering events leading to humiliation, shame, and/or despair (e.g., Loss of relationship, financial or health status) (real or anticipated)/Current or pending social isolation/Substance intoxication or withdrawal/Pending incarceration or homelessness

## 2023-05-02 NOTE — CONSULT NOTE ADULT - ASSESSMENT
Favor tinea pedis and tinea cruris. These entities represent superficial dermatophyte infection of the skin.  given extent of involvement recommend PO tx. Consider 200mg fluconazole weekly x 4 weeks. Would check EKG prior to administration to ensure no prolonged QTc    The patient's chart was reviewed in addition to being seen and examined at bedside with the dermatology attending Dr. Hughes. Recommendations were communicated with the primary team.  Please page [phone] w/10 digit call back number for further related questions.    Princess Bonds MD  Resident Physician, PGY3  [facility] Dermatology  Pager: [phone]  Office: [phone]     Favor tinea pedis and tinea cruris. These entities represent superficial dermatophyte infection of the skin.  given extent of involvement recommend PO tx. Consider 200mg fluconazole weekly x 4 weeks. Would check EKG prior to administration to ensure no prolonged QTc    The patient's chart was reviewed in addition to being seen and examined at bedside with the dermatology attending Dr. Hughes. Recommendations were communicated with the primary team.  Please page [phone] w/10 digit call back number for further related questions.    Princess Bonds MD  Resident Physician, PGY3  [facility] Dermatology  Pager: [phone]  Office: [phone]     Favor tinea pedis and tinea cruris. These entities represent superficial dermatophyte infection of the skin.  At this time recommend:  given extent of involvement recommend PO tx.  Consider 200mg fluconazole weekly x 4 weeks (4 doses total). Would check EKG prior to administration to ensure no prolonged QTc    The patient's chart was reviewed in addition to being seen and examined at bedside with the dermatology attending Dr. Hughes. Recommendations were communicated with the primary team- Dr. aVsyl Schuler. Informed primary team of patient c/o chest pain.	  Please page [phone] w/10 digit call back number for further related questions.    Princess Bonds MD  Resident Physician, PGY3  [facility] Dermatology  Pager: [phone]  Office: [phone]     Favor tinea pedis and tinea cruris. These entities represent superficial dermatophyte infection of the skin.  At this time recommend:  given extent of involvement recommend PO tx.  Consider 200mg fluconazole weekly x 4 weeks (4 doses total). Would check EKG prior to administration to ensure no prolonged QTc    The patient's chart was reviewed in addition to being seen and examined at bedside with the dermatology attending Dr. Hughes. Recommendations were communicated with the primary team- Dr. Vasyl Schuler. Informed primary team of patient c/o chest pain.	  Please page [phone] w/10 digit call back number for further related questions.    Princess Bonds MD  Resident Physician, PGY3  [facility] Dermatology  Pager: [phone]  Office: [phone]     Favor tinea pedis and tinea cruris. These entities represent superficial dermatophyte infection of the skin.  At this time recommend:  given extent of involvement recommend PO tx.  Consider 200mg fluconazole weekly x 4 weeks (4 doses total). Would check EKG prior to administration to ensure no prolonged QTc    -Recommend outpatient follow up at our clinic located at [address] [place] [address] [address], [place], [place] ([phone]) after discharge     The patient's chart was reviewed in addition to being seen and examined at bedside with the dermatology attending Dr. Hughes. Recommendations were communicated with the primary team- Dr. Vasyl Schuler. Informed primary team of patient c/o chest pain.	  Please page [phone] w/10 digit call back number for further related questions.    Princess Bonds MD  Resident Physician, PGY3  [facility] Dermatology  Pager: [phone]  Office: [phone]     Favor tinea pedis and tinea cruris. These entities represent superficial dermatophyte infection of the skin.  At this time recommend:  given extent of involvement recommend PO tx.  Consider 200mg fluconazole weekly x 4 weeks (4 doses total). Would check EKG prior to administration to ensure no prolonged QTc    -Recommend outpatient follow up at our clinic located at [address] [place] [address] [address], [place], [place] ([phone]) after discharge     The patient's chart was reviewed in addition to being seen and examined at bedside with the dermatology attending Dr. Hughes. Recommendations were communicated with the primary team- Dr. Vasyl Schuler. Informed primary team of patient c/o chest pain.	  Please page [phone] w/10 digit call back number for further related questions.    Princess Bonds MD  Resident Physician, PGY3  [facility] Dermatology  Pager: [phone]  Office: [phone]     Tinea pedis and tinea cruris. These entities represent superficial dermatophyte infection of the skin.  At this time recommend:  given extent of involvement recommend PO tx.  Consider 200mg fluconazole weekly x 4 weeks (4 doses total). Would check EKG prior to administration to ensure he does not have a prolonged QTc at baseline    -Recommend outpatient follow up at our clinic located at [address] [place] [address], [facility] [address], [place] ([phone]) after discharge     The patient's chart was reviewed in addition to being seen and examined at bedside with the dermatology attending Dr. Hughes. Recommendations were communicated with the primary team- Dr. Vasyl Schuler. Informed primary team of patient c/o chest pain.	  Please page [phone] w/10 digit call back number for further related questions.    Princess Bonds MD  Resident Physician, PGY3  [facility] Dermatology  Pager: [phone]  Office: [phone]     Tinea pedis and tinea cruris. These entities represent superficial dermatophyte infection of the skin.  At this time recommend:  given extent of involvement recommend PO tx.  Consider 200mg fluconazole weekly x 4 weeks (4 doses total). Would check EKG prior to administration to ensure he does not have a prolonged QTc at baseline    -Recommend outpatient follow up at our clinic located at [address] [place] [address], [facility] [address], [place] ([phone]) after discharge     The patient's chart was reviewed in addition to being seen and examined at bedside with the dermatology attending Dr. Hughes. Recommendations were communicated with the primary team- Dr. Vasyl Schuler. Informed primary team of patient c/o chest pain.	  Please page [phone] w/10 digit call back number for further related questions.    Princess Bonds MD  Resident Physician, PGY3  [facility] Dermatology  Pager: [phone]  Office: [phone]

## 2023-05-02 NOTE — BH CONSULTATION LIAISON ASSESSMENT NOTE - SUMMARY
58M, single, no dependents, homeless, tenuously employed, past psychiatric history of anxiety, depression, and alcohol misuse, recently discharged from [facility] for alcohol withdrawal, not currently in treatment, no known hospitalizations, history of inpatient rehab for alcohol, no other substance use, no prior SAs, no history of violence/aggression, legal issues, who presented to the ED w/ alcohol withdrawal. Psychiatry consulted for depression and anxiety.     Patient seen and evaluated at the bedside. AAO4. Patient reports ongoing depression and anxiety in the setting of substance use, homelessness, and tenuous employment. Patient dysphoric yet future-oriented. Hopeful about the future. Denies S/I/I/P and H/I/I/P. No evidence of psychosis or maricel. Presentation does not warrant psychiatric hospitalization at this time. Reports past response to Atarax for anxiety; can be given PRN. Will consider MAT for alcohol use disorder. Will continue to follow.     Plan:  - Routine observation appropriate; CO 1:1 NOT warranted   - Atarax 25-50mg PO PRN q6h for anxiety  - Will consider MAT for alcohol use disorder   - Continue to monitor withdrawal symptoms  - Dispo: inpatient rehab vs. outpatient substance treatment vs. outpatient dual diagnosis treatment; pending medical clearance  58M, single, no dependents, homeless, tenuously employed, past psychiatric history of anxiety, depression, and alcohol misuse, recently discharged from [facility] for alcohol withdrawal, not currently in treatment, no known hospitalizations, history of inpatient rehab for alcohol, no other substance use, no prior SAs, no history of violence/aggression, legal issues, who presented to the ED w/ alcohol withdrawal. Psychiatry consulted for depression and anxiety.     Patient seen and evaluated at the bedside. AAO4. Patient reports ongoing depression and anxiety in the setting of substance use, homelessness, and tenuous employment. Patient dysphoric yet future-oriented. Hopeful about the future. Denies S/I/I/P and H/I/I/P. No evidence of psychosis or maricel. Presentation does not warrant psychiatric hospitalization at this time. Reports past response to Atarax for anxiety; can be given PRN. Will consider MAT for alcohol use disorder. Will continue to follow.     Plan:  - Routine observation appropriate; CO 1:1 NOT warranted   - Atarax 25-50mg PO PRN q6h for anxiety  - Will consider MAT for alcohol use disorder   - Continue to monitor withdrawal symptoms  - Dispo: inpatient rehab vs. outpatient substance treatment vs. outpatient dual diagnosis treatment; pending medical clearance  58M, single, no dependents, homeless, tenuously employed, past psychiatric history of anxiety, depression, and alcohol misuse, recently [facility] for [facility] withdrawal, not currently in treatment, no known hospitalizations, history of inpatient rehab for alcohol, no other substance use, no prior SAs, no history of violence/aggression, legal issues, who presented to the ED w/ alcohol withdrawal. Psychiatry consulted for depression and anxiety.     Patient seen and evaluated at the bedside. AAO4. Patient reports ongoing depression and anxiety in the setting of substance use, homelessness, and tenuous employment. Patient dysphoric yet future-oriented. Hopeful about the future. Denies S/I/I/P and H/I/I/P. No evidence of psychosis or maricel. Presentation does not warrant psychiatric hospitalization at this time. Reports past response to Atarax for anxiety; can be given PRN. Will consider MAT for alcohol use disorder. Will continue to follow.     Plan:  - Routine observation appropriate; CO 1:1 NOT warranted   - Atarax 25-50mg PO PRN q6h for anxiety  - Will consider MAT for alcohol use disorder in subsequent visits  - Continue to monitor withdrawal symptoms  - Dispo: inpatient rehab vs. outpatient substance treatment vs. outpatient dual diagnosis treatment; pending medical clearance  58M, single, no dependents, homeless, tenuously employed, past psychiatric history of anxiety, depression, and alcohol misuse, recently discharged from [facility] for alcohol withdrawal, not currently in treatment, no known hospitalizations, history of inpatient rehab for alcohol, no other substance use, no prior SAs, no history of violence/aggression, legal issues, who presented to the ED w/ alcohol withdrawal. Psychiatry consulted for depression and anxiety.     Patient seen and evaluated at the bedside. AAO4. Patient reports ongoing depression and anxiety in the setting of substance use, homelessness, and tenuous employment. Patient dysphoric yet future-oriented. Hopeful about the future. Denies S/I/I/P and H/I/I/P. No evidence of psychosis or maricel. Presentation does not warrant psychiatric hospitalization at this time. Reports past response to Atarax for anxiety; can be given PRN. Will consider MAT for alcohol use disorder. Will continue to follow.     Plan:  - Routine observation appropriate; CO 1:1 NOT warranted   - Atarax 25-50mg PO PRN q6h for anxiety  - Will consider MAT for alcohol use disorder in subsequent visits  - Continue to monitor withdrawal symptoms  - Dispo: inpatient rehab vs. outpatient substance treatment vs. outpatient dual diagnosis treatment; pending medical clearance

## 2023-05-02 NOTE — PROGRESS NOTE ADULT - PROBLEM SELECTOR PLAN 4
- dry oral mucosa, hemoconcentrated lab-work, lactic acidosis  - continuing w/ IVF hydration and encourage oral hydration, as tolerated  - f/u electrolytes RUQ U/S with gallstones, no cholecystitis, hepatic steatosis+  likely in setting of chronic alcohol use    No acute management changes LUIS ARMANDO

## 2023-05-02 NOTE — CONSULT NOTE ADULT - ATTENDING COMMENTS
Patient with extensive tinea cruris and pedis. Recommend fluconazole once weekly X 4 weeks. Topical treatments may be less feasible based on living situation and the need to get multiple OTC products for 4-6 weeks.     Almas Hughes MD, PharmD, MPH  Co-Director, Inpatient Dermatology Consultation Service, [facility] Patient with extensive tinea cruris and pedis. Recommend fluconazole once weekly X 4 weeks. Topical treatments may be less feasible based on living situation and the need to get multiple OTC products for 4-6 weeks.     Almas Hughes MD, PharmD, MPH  Co-Director, Inpatient Dermatology Consultation Service, [facility]

## 2023-05-02 NOTE — PROGRESS NOTE ADULT - PROBLEM SELECTOR PLAN 8
- last BM ~ 2 days ago, but in the context of no nutritional intake x ~ 6 days  - could be sequela of dehydration  - f/u TSH level, electrolytes  - hydration in progress  - f/u for resolution  - dulcolax PRN - has been living on the streets for the past ~ 2 years, now complicated by alcohol abuse  - states no family  - Social work consult

## 2023-05-02 NOTE — PROGRESS NOTE ADULT - PROBLEM SELECTOR PLAN 5
- lactate = 4.8, AG = 17  - in the setting of excessive alcohol intake and poor nutrition, as well as dehydration  - IVF hydration in progress  - f/u for improvement w/ repeat lab-work - lactate = 4.8, AG = 17  - in the setting of excessive alcohol intake and poor nutrition, as well as dehydration  - IVF hydration in progress    - Lactate= 1 WNL - dry oral mucosa, hemoconcentrated lab-work, lactic acidosis  - continuing w/ IVF hydration and encourage oral hydration, as tolerated  - f/u electrolytes

## 2023-05-02 NOTE — CONSULT NOTE ADULT - SUBJECTIVE AND OBJECTIVE BOX
HPI:  58 year old male, with past history significant for Alcohol abuse, Hypertension, Anxiety and Depression, presented to the ED secondary to alcohol withdrawal.  Seen and evaluated at bedside; tremulous, but in NAD.  Patient reports calling EMS and coming to the Hospital because he has been "drinking too much."  Impartial to type of liquor; cites vodka, tequila, beer...  Last drink was in the AM prior to coming to the ED.  Started drinking alcohol approximately 24 years ago, but intake has increased/worsened.  Drinks continuously, and has not had nutritional intake for the past 6 days; complicated by living on the streets for the past ~ 2 years.  Unaware if he has suffered seizures and unknown recent falls - due to being intoxicated.  Positive for all of CAGE questions.  No report of fever, chills, headache, abdominal pain or dysuria.  Reports constipation, with last BM being ~ 2 days ago.  Unable to adhere to prescription medication regimen due to alcohol abuse and being homeless.    Vital signs upon ED presentation as follows: BP = 133/50, HR = 92 (to 112), RR = 20, T = 36.7 C (98 F), O2 Sat =94% on RA.  Diagnosed with Alcohol Dependence with Withdrawal and prescribed NaCl one liter, fluconazole 150 mg PO x one, librium 100 mg PO x one, folic acid 1 mg, lorazepam 2 mg IV, thiamine 100 mg IV, K-phos 15 mmol x one and multivitamin 1 tab in the ED. (01 May 2023 00:36)    Dermatology consulted for rash on the b/l lower extremities. Patient not sure how long it has been there. Not significantly itchy  PAST MEDICAL & SURGICAL HISTORY:  Alcohol abuse      Essential hypertension      Anxiety and depression      Glaucoma, right eye      No significant past surgical history          REVIEW OF SYSTEMS    General: denies f/c	  Skin/Breast: rash  Ophthalmologic: denies vision changes  ENMT:denies hearing changes, oral lesions  Respiratory and Thorax: denies worsening SOB or difficulty breathing  Cardiovascular:+ CP  Gastrointestinal:+ abdominal pain  Genitourinary: denies dysuria, hematuria  Musculoskeletal: + aches  Neurological: + HA      MEDICATIONS  (STANDING):  chlordiazePOXIDE   Oral   chlordiazePOXIDE 50 milliGRAM(s) Oral every 8 hours  clotrimazole 1% Cream 1 Application(s) Topical every 12 hours  donepezil 5 milliGRAM(s) Oral at bedtime  dorzolamide 2% Ophthalmic Solution 1 Drop(s) Both EYES three times a day  enoxaparin Injectable 40 milliGRAM(s) SubCutaneous every 24 hours  folic acid 1 milliGRAM(s) Oral daily  lactated ringers. 1000 milliLiter(s) (125 mL/Hr) IV Continuous <Continuous>  latanoprost 0.005% Ophthalmic Solution 1 Drop(s) Both EYES at bedtime  multivitamin 1 Tablet(s) Oral daily  pantoprazole    Tablet 40 milliGRAM(s) Oral two times a day  potassium chloride    Tablet ER 40 milliEquivalent(s) Oral every 4 hours  thiamine 100 milliGRAM(s) Oral daily  timolol 0.5% Solution 1 Drop(s) Both EYES two times a day    MEDICATIONS  (PRN):  acetaminophen     Tablet .. 650 milliGRAM(s) Oral every 6 hours PRN Mild Pain (1 - 3)  aluminum hydroxide/magnesium hydroxide/simethicone Suspension 30 milliLiter(s) Oral every 4 hours PRN Dyspepsia  bisacodyl 5 milliGRAM(s) Oral at bedtime PRN Constipation  LORazepam   Injectable 2 milliGRAM(s) IV Push every 2 hours PRN CIWA-Ar score increase by 2 points and a total score of 7 or less  LORazepam   Injectable 2 milliGRAM(s) IV Push every 1 hour PRN Symptom-triggered: each CIWA -Ar score 8 or GREATER  melatonin 3 milliGRAM(s) Oral at bedtime PRN Insomnia  ondansetron Injectable 4 milliGRAM(s) IV Push every 8 hours PRN Nausea and/or Vomiting      Allergies    No Known Allergies    Intolerances        SOCIAL HISTORY:    FAMILY HISTORY:  Family history of hypertension (Father, Mother)        Vital Signs Last 24 Hrs  T(C): 36.7 (02 May 2023 12:00), Max: 37.1 (01 May 2023 20:10)  T(F): 98.1 (02 May 2023 12:00), Max: 98.7 (01 May 2023 20:10)  HR: 67 (02 May 2023 12:00) (61 - 86)  BP: 142/73 (02 May 2023 12:00) (141/64 - 146/76)  BP(mean): --  RR: 18 (02 May 2023 12:00) (17 - 18)  SpO2: 98% (02 May 2023 12:00) (97% - 99%)    Parameters below as of 02 May 2023 12:00  Patient On (Oxygen Delivery Method): room air        PHYSICAL EXAM:      Constitutional:    Eyes:    ENMT:    Neck:    Breasts:    Back:    Respiratory:    Cardiovascular:    Gastrointestinal:    Genitourinary:    Rectal:    Extremities:    Vascular:    Neurological:    Skin:    Lymph Nodes:    Musculoskeletal:    Psychiatric:        LABS:                        12.1   3.77  )-----------( 179      ( 02 May 2023 06:10 )             36.9     05-02    140  |  101  |  9   ----------------------------<  83  3.0<L>   |  25  |  0.68    Ca    8.8      02 May 2023 06:10  Phos  3.2     05-02  Mg     1.90     05-02    TPro  6.9  /  Alb  3.6  /  TBili  1.3<H>  /  DBili  x   /  AST  42<H>  /  ALT  51<H>  /  AlkPhos  55  05-02    PT/INR - ( 02 May 2023 06:10 )   PT: 12.0 sec;   INR: 1.03 ratio         PTT - ( 30 Apr 2023 21:00 )  PTT:29.3 sec        RADIOLOGY & ADDITIONAL STUDIES:   HPI:  58 year old male, with past history significant for Alcohol abuse, Hypertension, Anxiety and Depression, presented to the ED secondary to alcohol withdrawal.  Seen and evaluated at bedside; tremulous, but in NAD.  Patient reports calling EMS and coming to the Hospital because he has been "drinking too much."  Impartial to type of liquor; cites vodka, tequila, beer...  Last drink was in the AM prior to coming to the ED.  Started drinking alcohol approximately 24 years ago, but intake has increased/worsened.  Drinks continuously, and has not had nutritional intake for the past 6 days; complicated by living on the streets for the past ~ 2 years.  Unaware if he has suffered seizures and unknown recent falls - due to being intoxicated.  Positive for all of CAGE questions.  No report of fever, chills, headache, abdominal pain or dysuria.  Reports constipation, with last BM being ~ 2 days ago.  Unable to adhere to prescription medication regimen due to alcohol abuse and being homeless.    Vital signs upon ED presentation as follows: BP = 133/50, HR = 92 (to 112), RR = 20, T = 36.7 C (98 F), O2 Sat =94% on RA.  Diagnosed with Alcohol Dependence with Withdrawal and prescribed NaCl one liter, fluconazole 150 mg PO x one, librium 100 mg PO x one, folic acid 1 mg, lorazepam 2 mg IV, thiamine 100 mg IV, K-phos 15 mmol x one and multivitamin 1 tab in the ED. (01 May 2023 00:36)    Dermatology consulted for rash on the b/l lower extremities. Patient not sure how long it has been there. Not significantly itchy. Using clotrimazole ointment x 1 day from ED but otherwise untreated.    PAST MEDICAL & SURGICAL HISTORY:  Alcohol abuse      Essential hypertension      Anxiety and depression      Glaucoma, right eye      No significant past surgical history          REVIEW OF SYSTEMS    General: denies f/c	  Skin/Breast: rash  Ophthalmologic: denies vision changes  ENMT:denies hearing changes, oral lesions  Respiratory and Thorax: denies worsening SOB or difficulty breathing  Cardiovascular:+ CP  Gastrointestinal:+ abdominal pain  Genitourinary: denies dysuria, hematuria  Musculoskeletal: + aches  Neurological: + HA      MEDICATIONS  (STANDING):  chlordiazePOXIDE   Oral   chlordiazePOXIDE 50 milliGRAM(s) Oral every 8 hours  clotrimazole 1% Cream 1 Application(s) Topical every 12 hours  donepezil 5 milliGRAM(s) Oral at bedtime  dorzolamide 2% Ophthalmic Solution 1 Drop(s) Both EYES three times a day  enoxaparin Injectable 40 milliGRAM(s) SubCutaneous every 24 hours  folic acid 1 milliGRAM(s) Oral daily  lactated ringers. 1000 milliLiter(s) (125 mL/Hr) IV Continuous <Continuous>  latanoprost 0.005% Ophthalmic Solution 1 Drop(s) Both EYES at bedtime  multivitamin 1 Tablet(s) Oral daily  pantoprazole    Tablet 40 milliGRAM(s) Oral two times a day  potassium chloride    Tablet ER 40 milliEquivalent(s) Oral every 4 hours  thiamine 100 milliGRAM(s) Oral daily  timolol 0.5% Solution 1 Drop(s) Both EYES two times a day    MEDICATIONS  (PRN):  acetaminophen     Tablet .. 650 milliGRAM(s) Oral every 6 hours PRN Mild Pain (1 - 3)  aluminum hydroxide/magnesium hydroxide/simethicone Suspension 30 milliLiter(s) Oral every 4 hours PRN Dyspepsia  bisacodyl 5 milliGRAM(s) Oral at bedtime PRN Constipation  LORazepam   Injectable 2 milliGRAM(s) IV Push every 2 hours PRN CIWA-Ar score increase by 2 points and a total score of 7 or less  LORazepam   Injectable 2 milliGRAM(s) IV Push every 1 hour PRN Symptom-triggered: each CIWA -Ar score 8 or GREATER  melatonin 3 milliGRAM(s) Oral at bedtime PRN Insomnia  ondansetron Injectable 4 milliGRAM(s) IV Push every 8 hours PRN Nausea and/or Vomiting      Allergies    No Known Allergies    Intolerances        SOCIAL HISTORY:    FAMILY HISTORY:  Family history of hypertension (Father, Mother)        Vital Signs Last 24 Hrs  T(C): 36.7 (02 May 2023 12:00), Max: 37.1 (01 May 2023 20:10)  T(F): 98.1 (02 May 2023 12:00), Max: 98.7 (01 May 2023 20:10)  HR: 67 (02 May 2023 12:00) (61 - 86)  BP: 142/73 (02 May 2023 12:00) (141/64 - 146/76)  BP(mean): --  RR: 18 (02 May 2023 12:00) (17 - 18)  SpO2: 98% (02 May 2023 12:00) (97% - 99%)    Parameters below as of 02 May 2023 12:00  Patient On (Oxygen Delivery Method): room air        PHYSICAL EXAM:      Constitutional:    Eyes:    ENMT:    Neck:    Breasts:    Back:    Respiratory:    Cardiovascular:    Gastrointestinal:    Genitourinary:    Rectal:    Extremities:    Vascular:    Neurological:    Skin:    Lymph Nodes:    Musculoskeletal:    Psychiatric:        LABS:                        12.1   3.77  )-----------( 179      ( 02 May 2023 06:10 )             36.9     05-02    140  |  101  |  9   ----------------------------<  83  3.0<L>   |  25  |  0.68    Ca    8.8      02 May 2023 06:10  Phos  3.2     05-02  Mg     1.90     05-02    TPro  6.9  /  Alb  3.6  /  TBili  1.3<H>  /  DBili  x   /  AST  42<H>  /  ALT  51<H>  /  AlkPhos  55  05-02    PT/INR - ( 02 May 2023 06:10 )   PT: 12.0 sec;   INR: 1.03 ratio         PTT - ( 30 Apr 2023 21:00 )  PTT:29.3 sec        RADIOLOGY & ADDITIONAL STUDIES:   HPI:  58 year old male, with past history significant for Alcohol abuse, Hypertension, Anxiety and Depression, presented to the ED secondary to alcohol withdrawal.  Seen and evaluated at bedside; tremulous, but in NAD.  Patient reports calling EMS and coming to the Hospital because he has been "drinking too much."  Impartial to type of liquor; cites vodka, tequila, beer...  Last drink was in the AM prior to coming to the ED.  Started drinking alcohol approximately 24 years ago, but intake has increased/worsened.  Drinks continuously, and has not had nutritional intake for the past 6 days; complicated by living on the streets for the past ~ 2 years.  Unaware if he has suffered seizures and unknown recent falls - due to being intoxicated.  Positive for all of CAGE questions.  No report of fever, chills, headache, abdominal pain or dysuria.  Reports constipation, with last BM being ~ 2 days ago.  Unable to adhere to prescription medication regimen due to alcohol abuse and being homeless.    Vital signs upon ED presentation as follows: BP = 133/50, HR = 92 (to 112), RR = 20, T = 36.7 C (98 F), O2 Sat =94% on RA.  Diagnosed with Alcohol Dependence with Withdrawal and prescribed NaCl one liter, fluconazole 150 mg PO x one, librium 100 mg PO x one, folic acid 1 mg, lorazepam 2 mg IV, thiamine 100 mg IV, K-phos 15 mmol x one and multivitamin 1 tab in the ED. (01 May 2023 00:36)    Dermatology consulted for rash on the b/l lower extremities. Patient not sure how long it has been there. Not significantly itchy. Using clotrimazole ointment x 1 day from ED but otherwise untreated.    PAST MEDICAL & SURGICAL HISTORY:  Alcohol abuse      Essential hypertension      Anxiety and depression      Glaucoma, right eye      No significant past surgical history          REVIEW OF SYSTEMS    General: denies f/c	  Skin/Breast: rash  Ophthalmologic: denies vision changes  ENMT:denies hearing changes, oral lesions  Respiratory and Thorax: denies worsening SOB or difficulty breathing  Cardiovascular:+ CP  Gastrointestinal:+ abdominal pain  Genitourinary: denies dysuria, hematuria  Musculoskeletal: + aches  Neurological: + HA      MEDICATIONS  (STANDING):  chlordiazePOXIDE   Oral   chlordiazePOXIDE 50 milliGRAM(s) Oral every 8 hours  clotrimazole 1% Cream 1 Application(s) Topical every 12 hours  donepezil 5 milliGRAM(s) Oral at bedtime  dorzolamide 2% Ophthalmic Solution 1 Drop(s) Both EYES three times a day  enoxaparin Injectable 40 milliGRAM(s) SubCutaneous every 24 hours  folic acid 1 milliGRAM(s) Oral daily  lactated ringers. 1000 milliLiter(s) (125 mL/Hr) IV Continuous <Continuous>  latanoprost 0.005% Ophthalmic Solution 1 Drop(s) Both EYES at bedtime  multivitamin 1 Tablet(s) Oral daily  pantoprazole    Tablet 40 milliGRAM(s) Oral two times a day  potassium chloride    Tablet ER 40 milliEquivalent(s) Oral every 4 hours  thiamine 100 milliGRAM(s) Oral daily  timolol 0.5% Solution 1 Drop(s) Both EYES two times a day    MEDICATIONS  (PRN):  acetaminophen     Tablet .. 650 milliGRAM(s) Oral every 6 hours PRN Mild Pain (1 - 3)  aluminum hydroxide/magnesium hydroxide/simethicone Suspension 30 milliLiter(s) Oral every 4 hours PRN Dyspepsia  bisacodyl 5 milliGRAM(s) Oral at bedtime PRN Constipation  LORazepam   Injectable 2 milliGRAM(s) IV Push every 2 hours PRN CIWA-Ar score increase by 2 points and a total score of 7 or less  LORazepam   Injectable 2 milliGRAM(s) IV Push every 1 hour PRN Symptom-triggered: each CIWA -Ar score 8 or GREATER  melatonin 3 milliGRAM(s) Oral at bedtime PRN Insomnia  ondansetron Injectable 4 milliGRAM(s) IV Push every 8 hours PRN Nausea and/or Vomiting      Allergies    No Known Allergies    Intolerances        SOCIAL HISTORY:  EtOH abuse      FAMILY HISTORY:  Family history of hypertension (Father, Mother)        Vital Signs Last 24 Hrs  T(C): 36.7 (02 May 2023 12:00), Max: 37.1 (01 May 2023 20:10)  T(F): 98.1 (02 May 2023 12:00), Max: 98.7 (01 May 2023 20:10)  HR: 67 (02 May 2023 12:00) (61 - 86)  BP: 142/73 (02 May 2023 12:00) (141/64 - 146/76)  BP(mean): --  RR: 18 (02 May 2023 12:00) (17 - 18)  SpO2: 98% (02 May 2023 12:00) (97% - 99%)    Parameters below as of 02 May 2023 12:00  Patient On (Oxygen Delivery Method): room air        PHYSICAL EXAM:  The patient was alert and oriented X 3, well nourished, and in no apparent distress. Oropharynx showed no ulcerations. There was no visible lymphadenopathy. Conjunctiva were non-injected. There was no clubbing or edema of extremities. The scalp, hair, face, eyebrows, lips, oropharynx , neck, chest, back, buttocks, extremities X 4, hands, feet, nails were examined. There was no hyperhidrosis or bromhidrosis.   The following lesions are noted:    large annular erythematous plaques over the b/l buttocks and posterior and medial thighs  scale and some erythematous patches in a moccasin distribution on the b/l feet  interdigital scale in between the 4th/5th toes bilaterally    LABS:                        12.1   3.77  )-----------( 179      ( 02 May 2023 06:10 )             36.9     05-02    140  |  101  |  9   ----------------------------<  83  3.0<L>   |  25  |  0.68    Ca    8.8      02 May 2023 06:10  Phos  3.2     05-02  Mg     1.90     05-02    TPro  6.9  /  Alb  3.6  /  TBili  1.3<H>  /  DBili  x   /  AST  42<H>  /  ALT  51<H>  /  AlkPhos  55  05-02    PT/INR - ( 02 May 2023 06:10 )   PT: 12.0 sec;   INR: 1.03 ratio         PTT - ( 30 Apr 2023 21:00 )  PTT:29.3 sec

## 2023-05-02 NOTE — BH CONSULTATION LIAISON ASSESSMENT NOTE - HPI (INCLUDE ILLNESS QUALITY, SEVERITY, DURATION, TIMING, CONTEXT, MODIFYING FACTORS, ASSOCIATED SIGNS AND SYMPTOMS)
58M, single, no dependents, homeless, tenuously employed, past psychiatric history of anxiety, depression, and alcohol misuse, recently discharged from [facility] for alcohol withdrawal, not currently in treatment, no known hospitalizations, history of inpatient rehab for alcohol, no other substance use, no prior SAs, no history of violence/aggression, legal issues, who presented to the ED w/ alcohol withdrawal. Psychiatry consulted for depression and anxiety.     Patient seen and evaluated at the bedside. AAO4. Reports ongoing depression and anxiety in the setting of escalating alcohol use. Says he has been drinking for decades -- and has been in and out of the hospital for dependence/withdrawal on numerous occasions. Recently discharged from [facility] after admission for withdrawal. Discharged w/ naltrexone; patient reports adherence. Comments that he "lost it all," and adds that he recently became homeless. He works in construction but only finds jobs sporadically. Overall, says social situation contributes to mood, anxiety, and substance misuse. Denies current and past suicidality/homicidality. No evidence of psychosis or maricel. Reports remote psychiatric treatment in the [place]. Says he was on Atarax, which helped anxiety. 58M, single, no dependents, homeless, tenuously employed, past psychiatric history of anxiety, depression, and alcohol misuse, recently discharged from [facility] for alcohol withdrawal, not currently in treatment, no known hospitalizations, history of inpatient rehab for alcohol, no other substance use, no prior SAs, no history of violence/aggression, legal issues, who presented to the ED w/ alcohol withdrawal. Psychiatry consulted for depression and anxiety.     Patient seen and evaluated at the bedside. AAO4. Reports ongoing depression and anxiety in the setting of escalating alcohol use. Says he has been drinking for decades -- and has been in and out of the hospital for dependence/withdrawal on numerous occasions. Recently discharged from [facility] after admission for withdrawal. Discharged w/ naltrexone; patient reports adherence. Comments that he "lost it all," and adds that he recently became homeless. He works in construction but only finds jobs sporadically. Overall, says social situation contributes to mood, anxiety, and substance misuse. Denies current and past suicidality/homicidality. No evidence of psychosis or maricel. Reports remote psychiatric treatment in the [place]. Says he was on Atarax, which helped anxiety.

## 2023-05-03 DIAGNOSIS — R19.7 DIARRHEA, UNSPECIFIED: ICD-10-CM

## 2023-05-03 LAB
ALBUMIN SERPL ELPH-MCNC: 3.8 G/DL — SIGNIFICANT CHANGE UP (ref 3.3–5)
ALBUMIN SERPL ELPH-MCNC: 3.8 G/DL — SIGNIFICANT CHANGE UP (ref 3.3–5)
ALP SERPL-CCNC: 56 U/L — SIGNIFICANT CHANGE UP (ref 40–120)
ALP SERPL-CCNC: 56 U/L — SIGNIFICANT CHANGE UP (ref 40–120)
ALT FLD-CCNC: 50 U/L — HIGH (ref 4–41)
ALT FLD-CCNC: 50 U/L — HIGH (ref 4–41)
ANION GAP SERPL CALC-SCNC: 13 MMOL/L — SIGNIFICANT CHANGE UP (ref 7–14)
ANION GAP SERPL CALC-SCNC: 13 MMOL/L — SIGNIFICANT CHANGE UP (ref 7–14)
AST SERPL-CCNC: 44 U/L — HIGH (ref 4–40)
AST SERPL-CCNC: 44 U/L — HIGH (ref 4–40)
BILIRUB SERPL-MCNC: 0.6 MG/DL — SIGNIFICANT CHANGE UP (ref 0.2–1.2)
BILIRUB SERPL-MCNC: 0.6 MG/DL — SIGNIFICANT CHANGE UP (ref 0.2–1.2)
BUN SERPL-MCNC: 10 MG/DL — SIGNIFICANT CHANGE UP (ref 7–23)
BUN SERPL-MCNC: 10 MG/DL — SIGNIFICANT CHANGE UP (ref 7–23)
CALCIUM SERPL-MCNC: 9.1 MG/DL — SIGNIFICANT CHANGE UP (ref 8.4–10.5)
CALCIUM SERPL-MCNC: 9.1 MG/DL — SIGNIFICANT CHANGE UP (ref 8.4–10.5)
CHLORIDE SERPL-SCNC: 103 MMOL/L — SIGNIFICANT CHANGE UP (ref 98–107)
CHLORIDE SERPL-SCNC: 103 MMOL/L — SIGNIFICANT CHANGE UP (ref 98–107)
CO2 SERPL-SCNC: 23 MMOL/L — SIGNIFICANT CHANGE UP (ref 22–31)
CO2 SERPL-SCNC: 23 MMOL/L — SIGNIFICANT CHANGE UP (ref 22–31)
CREAT SERPL-MCNC: 0.67 MG/DL — SIGNIFICANT CHANGE UP (ref 0.5–1.3)
CREAT SERPL-MCNC: 0.67 MG/DL — SIGNIFICANT CHANGE UP (ref 0.5–1.3)
CULTURE RESULTS: SIGNIFICANT CHANGE UP
CULTURE RESULTS: SIGNIFICANT CHANGE UP
EGFR: 107 ML/MIN/1.73M2 — SIGNIFICANT CHANGE UP
EGFR: 107 ML/MIN/1.73M2 — SIGNIFICANT CHANGE UP
GLUCOSE SERPL-MCNC: 101 MG/DL — HIGH (ref 70–99)
GLUCOSE SERPL-MCNC: 101 MG/DL — HIGH (ref 70–99)
HCT VFR BLD CALC: 40 % — SIGNIFICANT CHANGE UP (ref 39–50)
HCT VFR BLD CALC: 40 % — SIGNIFICANT CHANGE UP (ref 39–50)
HGB BLD-MCNC: 13.3 G/DL — SIGNIFICANT CHANGE UP (ref 13–17)
HGB BLD-MCNC: 13.3 G/DL — SIGNIFICANT CHANGE UP (ref 13–17)
MAGNESIUM SERPL-MCNC: 1.8 MG/DL — SIGNIFICANT CHANGE UP (ref 1.6–2.6)
MAGNESIUM SERPL-MCNC: 1.8 MG/DL — SIGNIFICANT CHANGE UP (ref 1.6–2.6)
MCHC RBC-ENTMCNC: 30.9 PG — SIGNIFICANT CHANGE UP (ref 27–34)
MCHC RBC-ENTMCNC: 30.9 PG — SIGNIFICANT CHANGE UP (ref 27–34)
MCHC RBC-ENTMCNC: 33.3 GM/DL — SIGNIFICANT CHANGE UP (ref 32–36)
MCHC RBC-ENTMCNC: 33.3 GM/DL — SIGNIFICANT CHANGE UP (ref 32–36)
MCV RBC AUTO: 93 FL — SIGNIFICANT CHANGE UP (ref 80–100)
MCV RBC AUTO: 93 FL — SIGNIFICANT CHANGE UP (ref 80–100)
NRBC # BLD: 0 /100 WBCS — SIGNIFICANT CHANGE UP (ref 0–0)
NRBC # BLD: 0 /100 WBCS — SIGNIFICANT CHANGE UP (ref 0–0)
NRBC # FLD: 0 K/UL — SIGNIFICANT CHANGE UP (ref 0–0)
NRBC # FLD: 0 K/UL — SIGNIFICANT CHANGE UP (ref 0–0)
PHOSPHATE SERPL-MCNC: 3.3 MG/DL — SIGNIFICANT CHANGE UP (ref 2.5–4.5)
PHOSPHATE SERPL-MCNC: 3.3 MG/DL — SIGNIFICANT CHANGE UP (ref 2.5–4.5)
PLATELET # BLD AUTO: 184 K/UL — SIGNIFICANT CHANGE UP (ref 150–400)
PLATELET # BLD AUTO: 184 K/UL — SIGNIFICANT CHANGE UP (ref 150–400)
POTASSIUM SERPL-MCNC: 3.6 MMOL/L — SIGNIFICANT CHANGE UP (ref 3.5–5.3)
POTASSIUM SERPL-MCNC: 3.6 MMOL/L — SIGNIFICANT CHANGE UP (ref 3.5–5.3)
POTASSIUM SERPL-SCNC: 3.6 MMOL/L — SIGNIFICANT CHANGE UP (ref 3.5–5.3)
POTASSIUM SERPL-SCNC: 3.6 MMOL/L — SIGNIFICANT CHANGE UP (ref 3.5–5.3)
PROT SERPL-MCNC: 7.5 G/DL — SIGNIFICANT CHANGE UP (ref 6–8.3)
PROT SERPL-MCNC: 7.5 G/DL — SIGNIFICANT CHANGE UP (ref 6–8.3)
RBC # BLD: 4.3 M/UL — SIGNIFICANT CHANGE UP (ref 4.2–5.8)
RBC # BLD: 4.3 M/UL — SIGNIFICANT CHANGE UP (ref 4.2–5.8)
RBC # FLD: 14.8 % — HIGH (ref 10.3–14.5)
RBC # FLD: 14.8 % — HIGH (ref 10.3–14.5)
SODIUM SERPL-SCNC: 139 MMOL/L — SIGNIFICANT CHANGE UP (ref 135–145)
SODIUM SERPL-SCNC: 139 MMOL/L — SIGNIFICANT CHANGE UP (ref 135–145)
SPECIMEN SOURCE: SIGNIFICANT CHANGE UP
SPECIMEN SOURCE: SIGNIFICANT CHANGE UP
WBC # BLD: 3.82 K/UL — SIGNIFICANT CHANGE UP (ref 3.8–10.5)
WBC # BLD: 3.82 K/UL — SIGNIFICANT CHANGE UP (ref 3.8–10.5)
WBC # FLD AUTO: 3.82 K/UL — SIGNIFICANT CHANGE UP (ref 3.8–10.5)
WBC # FLD AUTO: 3.82 K/UL — SIGNIFICANT CHANGE UP (ref 3.8–10.5)

## 2023-05-03 PROCEDURE — 99233 SBSQ HOSP IP/OBS HIGH 50: CPT

## 2023-05-03 RX ORDER — FLUCONAZOLE 150 MG/1
200 TABLET ORAL DAILY
Refills: 0 | Status: DISCONTINUED | OUTPATIENT
Start: 2023-05-03 | End: 2023-05-03

## 2023-05-03 RX ORDER — FLUCONAZOLE 150 MG/1
200 TABLET ORAL ONCE
Refills: 0 | Status: COMPLETED | OUTPATIENT
Start: 2023-05-07 | End: 2023-05-08

## 2023-05-03 RX ORDER — HYDROXYZINE HCL 10 MG
25 TABLET ORAL EVERY 6 HOURS
Refills: 0 | Status: DISCONTINUED | OUTPATIENT
Start: 2023-05-03 | End: 2023-05-09

## 2023-05-03 RX ADMIN — Medication 50 MILLIGRAM(S): at 18:08

## 2023-05-03 RX ADMIN — Medication 100 MILLIGRAM(S): at 12:28

## 2023-05-03 RX ADMIN — DORZOLAMIDE HYDROCHLORIDE 1 DROP(S): 20 SOLUTION/ DROPS OPHTHALMIC at 05:55

## 2023-05-03 RX ADMIN — Medication 650 MILLIGRAM(S): at 09:54

## 2023-05-03 RX ADMIN — PANTOPRAZOLE SODIUM 40 MILLIGRAM(S): 20 TABLET, DELAYED RELEASE ORAL at 05:54

## 2023-05-03 RX ADMIN — Medication 25 MILLIGRAM(S): at 21:36

## 2023-05-03 RX ADMIN — LATANOPROST 1 DROP(S): 0.05 SOLUTION/ DROPS OPHTHALMIC; TOPICAL at 21:39

## 2023-05-03 RX ADMIN — Medication 1 DROP(S): at 18:09

## 2023-05-03 RX ADMIN — Medication 1 MILLIGRAM(S): at 12:28

## 2023-05-03 RX ADMIN — Medication 1 APPLICATION(S): at 12:27

## 2023-05-03 RX ADMIN — ENOXAPARIN SODIUM 40 MILLIGRAM(S): 100 INJECTION SUBCUTANEOUS at 12:28

## 2023-05-03 RX ADMIN — DORZOLAMIDE HYDROCHLORIDE 1 DROP(S): 20 SOLUTION/ DROPS OPHTHALMIC at 21:39

## 2023-05-03 RX ADMIN — DONEPEZIL HYDROCHLORIDE 5 MILLIGRAM(S): 10 TABLET, FILM COATED ORAL at 21:37

## 2023-05-03 RX ADMIN — Medication 1 DROP(S): at 05:55

## 2023-05-03 RX ADMIN — Medication 1 TABLET(S): at 12:28

## 2023-05-03 RX ADMIN — Medication 650 MILLIGRAM(S): at 08:54

## 2023-05-03 RX ADMIN — Medication 1 APPLICATION(S): at 21:41

## 2023-05-03 RX ADMIN — PANTOPRAZOLE SODIUM 40 MILLIGRAM(S): 20 TABLET, DELAYED RELEASE ORAL at 18:08

## 2023-05-03 RX ADMIN — DORZOLAMIDE HYDROCHLORIDE 1 DROP(S): 20 SOLUTION/ DROPS OPHTHALMIC at 12:28

## 2023-05-03 NOTE — PROGRESS NOTE ADULT - ATTENDING COMMENTS
58 year old male, with past history significant for Alcohol abuse, Hypertension, Anxiety and Depression, presented to the ED secondary to alcohol withdrawal.  Diagnosed with Alcohol Dependence with Withdrawal in the ED.     - C/w Librium taper with symptom-triggered CIWA.   - CIWA relatively stable in 4-5 range due to significant tremor and headache. If significantly worsen, will increase to high dose taper  - Psych consulted for alcohol use and anxiety/depression. Patient is amendable; f/u rec; needs psych clearance before d/c  - SW consulted for safe dispo  - MVI, thiamine, folate supplements; B12 wnl  - Significant rash on BLE, s/p fluconazole x 1 and c/w topical cream; Will start PO fluconazole weekly x 4 weeks (total 4 doses) per derm rec given extensive LE involvement  - (+) dysuria, UA neg, f/u Ucx  - Elevated lactate likely i/s/o ETOH use. Normalized  - RUQ (+) for hepatic steatosis, no cirrhosis; uptrending Bili, will monitor  - (+) loose stool; d/c bowel regimen  - Recent fall, low concern for severe trauma, will hold off imaging; PT consult - no skilled PT needs

## 2023-05-03 NOTE — PROGRESS NOTE ADULT - PROBLEM SELECTOR PLAN 4
RUQ U/S with gallstones, no cholecystitis, hepatic steatosis+  likely in setting of chronic alcohol use    No acute management changes LUIS ARMANDO - per report, "...macule over his buttock which appeared to be coalescent tenia corporis.  There are areas of this macule that ringlike surrounding erythema..."  - given fluconazole and Mycelex in the ED  - continuing w. Mycelex for now  - per derm: tinea pedis/cruris, flucanozole 200mg PO weekly x4 weeks, next dose 5/7

## 2023-05-03 NOTE — PROGRESS NOTE ADULT - PROBLEM SELECTOR PLAN 2
- patient voices being anxious and depressed  - was on medications for same, but has not been able to comply with regimen due to current circumstances  - TSH 0.4; Vitamin D 0.35; WNL  - evaluate for any signs of acute decompensation    Plan  - F/U Psych Recs - 3 episodes of diarrhea 5/3  - fluid/electrolyte repletion   - GI PCR

## 2023-05-03 NOTE — PROGRESS NOTE ADULT - PROBLEM SELECTOR PLAN 6
- lactate = 4.8, AG = 17  - in the setting of excessive alcohol intake and poor nutrition, as well as dehydration  - IVF hydration in progress    - Lactate= 1 WNL - dry oral mucosa, hemoconcentrated lab-work, lactic acidosis  - continuing w/ IVF hydration and encourage oral hydration, as tolerated  - f/u electrolytes

## 2023-05-03 NOTE — PROGRESS NOTE ADULT - SUBJECTIVE AND OBJECTIVE BOX
Interval Events: Patient denies any acute complaints, endorses mild anxiety whenever he feels the librium wearing off; CIWA approx 9-14;       OBJECTIVE:  ICU Vital Signs Last 24 Hrs  T(C): 37 (02 May 2023 04:02), Max: 37.3 (01 May 2023 12:00)  T(F): 98.6 (02 May 2023 04:02), Max: 99.2 (01 May 2023 12:00)  HR: 61 (02 May 2023 04:02) (61 - 98)  BP: 141/64 (02 May 2023 04:02) (112/69 - 146/76)  BP(mean): --  ABP: --  ABP(mean): --  RR: 17 (02 May 2023 04:02) (17 - 18)  SpO2: 98% (02 May 2023 04:02) (96% - 100%)    O2 Parameters below as of 02 May 2023 04:02  Patient On (Oxygen Delivery Method): room air    CAPILLARY BLOOD GLUCOSE      PHYSICAL EXAM:  General: Calm in bed  Neurology: A&Ox3, nonfocal, HICKS x 4  Eyes: PERRLA/ EOMI, Gross vision intact  ENT/Neck: Neck supple, trachea midline, No JVD, Gross hearing intact  Respiratory: CTA B/L, No wheezing, rales, rhonchi  CV: RRR, +S1/S2, -S3/S4, no murmurs, rubs or gallops  Abdominal: Soft, NT, ND +BS, No HSM  Extremities: Visible Tremors, 2+ peripheral pulses  Skin: Rash over posterior thighs and bilateral glutes      HOSPITAL MEDICATIONS:  MEDICATIONS  (STANDING):  chlordiazePOXIDE   Oral   chlordiazePOXIDE 50 milliGRAM(s) Oral every 8 hours  clotrimazole 1% Cream 1 Application(s) Topical every 12 hours  donepezil 5 milliGRAM(s) Oral at bedtime  dorzolamide 2% Ophthalmic Solution 1 Drop(s) Both EYES three times a day  enoxaparin Injectable 40 milliGRAM(s) SubCutaneous every 24 hours  folic acid 1 milliGRAM(s) Oral daily  lactated ringers. 1000 milliLiter(s) (125 mL/Hr) IV Continuous <Continuous>  latanoprost 0.005% Ophthalmic Solution 1 Drop(s) Both EYES at bedtime  multivitamin 1 Tablet(s) Oral daily  pantoprazole    Tablet 40 milliGRAM(s) Oral two times a day  thiamine 100 milliGRAM(s) Oral daily  timolol 0.5% Solution 1 Drop(s) Both EYES two times a day    MEDICATIONS  (PRN):  acetaminophen     Tablet .. 650 milliGRAM(s) Oral every 6 hours PRN Mild Pain (1 - 3)  aluminum hydroxide/magnesium hydroxide/simethicone Suspension 30 milliLiter(s) Oral every 4 hours PRN Dyspepsia  bisacodyl 5 milliGRAM(s) Oral at bedtime PRN Constipation  LORazepam   Injectable 2 milliGRAM(s) IV Push every 2 hours PRN CIWA-Ar score increase by 2 points and a total score of 7 or less  LORazepam   Injectable 2 milliGRAM(s) IV Push every 1 hour PRN Symptom-triggered: each CIWA -Ar score 8 or GREATER  melatonin 3 milliGRAM(s) Oral at bedtime PRN Insomnia  ondansetron Injectable 4 milliGRAM(s) IV Push every 8 hours PRN Nausea and/or Vomiting      LABS:                        14.3   6.72  )-----------( 233      ( 30 Apr 2023 21:00 )             41.6     Hgb Trend: 14.3<--  05-01    141  |  104  |  7   ----------------------------<  84  3.9   |  22  |  0.69    Ca    8.1<L>      01 May 2023 06:40  Phos  3.2     05-01  Mg     1.70     05-01    TPro  6.3  /  Alb  3.6  /  TBili  0.6  /  DBili  x   /  AST  49<H>  /  ALT  61<H>  /  AlkPhos  57  05-01    Creatinine Trend: 0.69<--, 0.68<--  PT/INR - ( 30 Apr 2023 21:00 )   PT: 10.9 sec;   INR: 0.94 ratio         PTT - ( 30 Apr 2023 21:00 )  PTT:29.3 sec      Venous Blood Gas:  05-01 @ 01:00  7.35/46/52/25/79.9  VBG Lactate: 3.1  Venous Blood Gas:  04-30 @ 21:42  7.33/49/52/26/77.7  VBG Lactate: 4.8      MICROBIOLOGY:            Interval Events: CIWA 4-5 ON. Complains of headache, dizziness, and 3 episodes of brown diarrhea.       OBJECTIVE:  ICU Vital Signs Last 24 Hrs  T(C): 37 (02 May 2023 04:02), Max: 37.3 (01 May 2023 12:00)  T(F): 98.6 (02 May 2023 04:02), Max: 99.2 (01 May 2023 12:00)  HR: 61 (02 May 2023 04:02) (61 - 98)  BP: 141/64 (02 May 2023 04:02) (112/69 - 146/76)  BP(mean): --  ABP: --  ABP(mean): --  RR: 17 (02 May 2023 04:02) (17 - 18)  SpO2: 98% (02 May 2023 04:02) (96% - 100%)    O2 Parameters below as of 02 May 2023 04:02  Patient On (Oxygen Delivery Method): room air    CAPILLARY BLOOD GLUCOSE      PHYSICAL EXAM:  General: Calm in bed  Neurology: A&Ox3, nonfocal, HICKS x 4  Eyes: PERRLA/ EOMI, Gross vision intact  ENT/Neck: Neck supple, trachea midline, No JVD, Gross hearing intact  Respiratory: CTA B/L, No wheezing, rales, rhonchi  CV: RRR, +S1/S2, -S3/S4, no murmurs, rubs or gallops  Abdominal: Soft, NT, ND +BS, No HSM  Extremities: Visible Tremors, 2+ peripheral pulses  Skin: Rash over posterior thighs and bilateral glutes      HOSPITAL MEDICATIONS:  MEDICATIONS  (STANDING):  chlordiazePOXIDE   Oral   chlordiazePOXIDE 50 milliGRAM(s) Oral every 8 hours  clotrimazole 1% Cream 1 Application(s) Topical every 12 hours  donepezil 5 milliGRAM(s) Oral at bedtime  dorzolamide 2% Ophthalmic Solution 1 Drop(s) Both EYES three times a day  enoxaparin Injectable 40 milliGRAM(s) SubCutaneous every 24 hours  folic acid 1 milliGRAM(s) Oral daily  lactated ringers. 1000 milliLiter(s) (125 mL/Hr) IV Continuous <Continuous>  latanoprost 0.005% Ophthalmic Solution 1 Drop(s) Both EYES at bedtime  multivitamin 1 Tablet(s) Oral daily  pantoprazole    Tablet 40 milliGRAM(s) Oral two times a day  thiamine 100 milliGRAM(s) Oral daily  timolol 0.5% Solution 1 Drop(s) Both EYES two times a day    MEDICATIONS  (PRN):  acetaminophen     Tablet .. 650 milliGRAM(s) Oral every 6 hours PRN Mild Pain (1 - 3)  aluminum hydroxide/magnesium hydroxide/simethicone Suspension 30 milliLiter(s) Oral every 4 hours PRN Dyspepsia  bisacodyl 5 milliGRAM(s) Oral at bedtime PRN Constipation  LORazepam   Injectable 2 milliGRAM(s) IV Push every 2 hours PRN CIWA-Ar score increase by 2 points and a total score of 7 or less  LORazepam   Injectable 2 milliGRAM(s) IV Push every 1 hour PRN Symptom-triggered: each CIWA -Ar score 8 or GREATER  melatonin 3 milliGRAM(s) Oral at bedtime PRN Insomnia  ondansetron Injectable 4 milliGRAM(s) IV Push every 8 hours PRN Nausea and/or Vomiting      LABS:                        14.3   6.72  )-----------( 233      ( 30 Apr 2023 21:00 )             41.6     Hgb Trend: 14.3<--  05-01    141  |  104  |  7   ----------------------------<  84  3.9   |  22  |  0.69    Ca    8.1<L>      01 May 2023 06:40  Phos  3.2     05-01  Mg     1.70     05-01    TPro  6.3  /  Alb  3.6  /  TBili  0.6  /  DBili  x   /  AST  49<H>  /  ALT  61<H>  /  AlkPhos  57  05-01    Creatinine Trend: 0.69<--, 0.68<--  PT/INR - ( 30 Apr 2023 21:00 )   PT: 10.9 sec;   INR: 0.94 ratio         PTT - ( 30 Apr 2023 21:00 )  PTT:29.3 sec      Venous Blood Gas:  05-01 @ 01:00  7.35/46/52/25/79.9  VBG Lactate: 3.1  Venous Blood Gas:  04-30 @ 21:42  7.33/49/52/26/77.7  VBG Lactate: 4.8      MICROBIOLOGY:

## 2023-05-03 NOTE — PROGRESS NOTE ADULT - PROBLEM SELECTOR PLAN 11
Diet: DASH Diet  Psych/Sleep: None LUIS ARMANDO  GI: Protonix Dulcolax  DVT ppx: Lovenox  Code Status: Full Code  Dispo: Pending Librium Taper, to inpatient vs outpatient rehab

## 2023-05-03 NOTE — PROGRESS NOTE ADULT - PROBLEM SELECTOR PLAN 1
- drinking continuously w/o abatement, with last drink being prior to coming to the ED.  Non-partial re type of alcohol  - tremulous.  Unaware if ever had seizures  - alcohol level = 364, lactate = 4.8  - CIWA protocol, including that of symptom triggered, in effect   - on MVI, folic acid, thiamine  - IVF hydration in progress  - f/u Social Work consult  - f/u SBIRT  - psychiatry consulted (Depression, Anxiety, Alcohol abuse; patient amenable to consult)  - aspiration, seizure precautions    Plan:  - C/W Librium taper, ativan PRN  - C/W Thiamine, folic acid, MV - drinking continuously w/o abatement, with last drink being prior to coming to the ED.  Non-partial re type of alcohol  - tremulous.  Unaware if ever had seizures  - alcohol level = 364, lactate = 4.8  - CIWA protocol, including that of symptom triggered, in effect   - on MVI, folic acid, thiamine  - IVF hydration in progress  - f/u Social Work consult  - f/u SBIRT  - psychiatry consulted (Depression, Anxiety, Alcohol abuse; patient amenable to consult)  - aspiration, seizure precautions    Plan:  - C/W Librium taper, ativan PRN  - C/W Thiamine, folic acid, MV  - atarax PRN for anxiety   Dispo: inpatient vs outpatient rehab, MAT being considered

## 2023-05-03 NOTE — PROGRESS NOTE ADULT - PROBLEM SELECTOR PLAN 10
Diet: DASH Diet  Psych/Sleep: None LUIS ARMANDO  GI: Protonix Dulcolax  DVT ppx: Lovenox  Code Status: Full Code  Dispo: Pending Librium Taper - no acute issues presently  - has been unable to adhere to OP medication regimen  - f/u w/ Med-Hx Pharmacist re med verification and complete Med-Rec

## 2023-05-03 NOTE — BH CONSULTATION LIAISON PROGRESS NOTE - NSBHASSESSMENTFT_PSY_ALL_CORE
58M, single, no dependents, homeless, tenuously employed, past psychiatric history of anxiety, depression, and alcohol misuse, recently discharged from [facility] for alcohol withdrawal, not currently in treatment, no known hospitalizations, history of inpatient rehab for alcohol, no other substance use, no prior SAs, no history of violence/aggression, legal issues, who presented to the ED w/ alcohol withdrawal. Psychiatry consulted for depression and anxiety.     Patient seen and evaluated at the bedside. AAO4. Patient reports ongoing depression and anxiety in the setting of substance use, homelessness, and tenuous employment. Patient dysphoric yet future-oriented. Hopeful about the future. Denies S/I/I/P and H/I/I/P. No evidence of psychosis or maricel. Presentation does not warrant psychiatric hospitalization at this time. Reports past response to Atarax for anxiety; can be given PRN. Will consider MAT for alcohol use disorder.    5/3: patient w/ continued depressive and anxious symptoms. uncertain if alcohol drives mood disturbance or vice versa. most recent CIWA 4. mild withdrawal sx on exam -- tongue fasciculations and tremor.     Plan:  - Routine observation appropriate; CO 1:1 NOT warranted   - Atarax 25-50mg PO PRN q6h for anxiety  - Will consider MAT for alcohol use disorder in subsequent visits  - Continue to monitor withdrawal symptoms  - Dispo: inpatient rehab vs. outpatient substance treatment vs. outpatient dual diagnosis treatment; pending medical clearance   58M, single, no dependents, homeless, tenuously employed, past psychiatric history of anxiety, depression, and alcohol misuse, recently discharged from [facility] for alcohol withdrawal, not currently in treatment, no known hospitalizations, history of inpatient rehab for alcohol, no other substance use, no prior SAs, no history of violence/aggression, legal issues, who presented to the ED w/ alcohol withdrawal. Psychiatry consulted for depression and anxiety.     Patient seen and evaluated at the bedside. AAO4. Patient reports ongoing depression and anxiety in the setting of substance use, homelessness, and tenuous employment. Patient dysphoric yet future-oriented. Hopeful about the future. Denies S/I/I/P and H/I/I/P. No evidence of psychosis or maricel. Presentation does not warrant psychiatric hospitalization at this time. Reports past response to Atarax for anxiety; can be given PRN. Will consider MAT for alcohol use disorder.    5/3: patient w/ continued depressive and anxious symptoms. uncertain if alcohol drives mood disturbance or vice versa. most recent CIWA 4. mild withdrawal sx on exam -- tongue fasciculations and tremor.     Plan:  - Routine observation appropriate; CO 1:1 NOT warranted   - Atarax 25-50mg PO PRN q6h for anxiety  - Will consider MAT for alcohol use disorder in subsequent visits  - Continue to monitor withdrawal symptoms  - Dispo: inpatient rehab vs. outpatient substance treatment vs. outpatient dual diagnosis treatment; pending medical clearance   58M, single, no dependents, homeless, tenuously employed, past psychiatric history of anxiety, depression, and alcohol misuse, recently discharged from [facility] for alcohol withdrawal, not currently in treatment, no known hospitalizations, history of inpatient rehab for alcohol, no other substance use, no prior SAs, no history of violence/aggression, legal issues, who presented to the ED w/ alcohol withdrawal. Psychiatry consulted for depression and anxiety.     Patient seen and evaluated at the bedside. AAO4. Patient reports ongoing depression and anxiety in the setting of substance use, homelessness, and tenuous employment. Patient dysphoric yet future-oriented. Hopeful about the future. Denies S/I/I/P and H/I/I/P. No evidence of psychosis or maricel. Presentation does not warrant psychiatric hospitalization at this time. Reports past response to Atarax for anxiety; can be given PRN. Will consider MAT for alcohol use disorder.    5/3: patient w/ continued depressive and anxious symptoms. uncertain if alcohol drives mood disturbance or vice versa. most recent CIWA 4. mild withdrawal sx on exam -- tongue fasciculations and tremor.     Plan:  - Routine observation appropriate; CO 1:1 NOT warranted   - Atarax 25-50mg PO PRN q6h for anxiety  - Will consider MAT for alcohol use disorder in subsequent visits  - Continue to monitor withdrawal symptoms  - Dispo: inpatient rehab vs. outpatient substance treatment vs. outpatient dual diagnosis treatment; pending medical clearance  (does not warrant inpatient psych and lacks psych contraindications to discharge to rehab vs outpt setup when medically cleared). 58M, single, no dependents, homeless, tenuously employed, past psychiatric history of anxiety, depression, and alcohol misuse, recently discharged from [facility] for alcohol withdrawal, not currently in treatment, no known hospitalizations, history of inpatient rehab for alcohol, no other substance use, no prior SAs, no history of violence/aggression, legal issues, who presented to the ED w/ alcohol withdrawal. Psychiatry consulted for depression and anxiety.     Patient seen and evaluated at the bedside. AAO4. Patient reports ongoing depression and anxiety in the setting of substance use, homelessness, and tenuous employment. Patient dysphoric yet future-oriented. Hopeful about the future. Denies S/I/I/P and H/I/I/P. No evidence of psychosis or maricel. Presentation does not warrant psychiatric hospitalization at this time. Reports past response to Atarax for anxiety; can be given PRN. Will consider MAT for alcohol use disorder.    5/3: patient w/ continued depressive and anxious symptoms. uncertain if alcohol drives mood disturbance or vice versa. most recent CIWA 4. mild withdrawal sx on exam -- tongue fasciculations and tremor.     Plan:  - Routine observation appropriate; CO 1:1 NOT warranted   - Atarax 25-50mg PO PRN q6h for anxiety  - Will consider MAT for alcohol use disorder in subsequent visits  - Continue to monitor withdrawal symptoms  - Dispo: inpatient rehab vs. outpatient substance treatment vs. outpatient dual diagnosis treatment; pending medical clearance  (does not warrant inpatient psych and lacks psych contraindications to discharge to rehab vs outpt setup when medically cleared).

## 2023-05-03 NOTE — PROGRESS NOTE ADULT - PROBLEM SELECTOR PLAN 7
- last BM ~ 2 days ago, but in the context of no nutritional intake x ~ 6 days  - could be sequela of dehydration  - f/u TSH level, electrolytes  - hydration in progress  - f/u for resolution  - dulcolax PRN - lactate = 4.8, AG = 17  - in the setting of excessive alcohol intake and poor nutrition, as well as dehydration  - IVF hydration in progress    - Lactate= 1 WNL

## 2023-05-03 NOTE — PROGRESS NOTE ADULT - ASSESSMENT
58 year old male, with past history significant for Alcohol abuse, Hypertension, Anxiety and Depression, presented to the ED secondary to alcohol withdrawal c/b anxiety and depression, currently on Librium taper

## 2023-05-03 NOTE — PROGRESS NOTE ADULT - PROBLEM SELECTOR PLAN 3
- per report, "...macule over his buttock which appeared to be coalescent tenia corporis.  There are areas of this macule that ringlike surrounding erythema..."  - given fluconazole and Mycelex in the ED  - continuing wEstrellita Mycelex for now - patient voices being anxious and depressed  - was on medications for same, but has not been able to comply with regimen due to current circumstances  - TSH 0.4; Vitamin D 0.35; WNL  - evaluate for any signs of acute decompensation    Plan  - F/U Psych Recs- atarax PRN, rest as above

## 2023-05-04 LAB
GI PCR PANEL: DETECTED
GI PCR PANEL: DETECTED
NOROVIRUS GI+II RNA STL QL NAA+NON-PROBE: DETECTED
NOROVIRUS GI+II RNA STL QL NAA+NON-PROBE: DETECTED

## 2023-05-04 PROCEDURE — 99232 SBSQ HOSP IP/OBS MODERATE 35: CPT

## 2023-05-04 PROCEDURE — 71045 X-RAY EXAM CHEST 1 VIEW: CPT | Mod: 26

## 2023-05-04 PROCEDURE — 99233 SBSQ HOSP IP/OBS HIGH 50: CPT

## 2023-05-04 RX ADMIN — Medication 1 APPLICATION(S): at 22:07

## 2023-05-04 RX ADMIN — Medication 1 DROP(S): at 18:19

## 2023-05-04 RX ADMIN — LATANOPROST 1 DROP(S): 0.05 SOLUTION/ DROPS OPHTHALMIC; TOPICAL at 22:06

## 2023-05-04 RX ADMIN — Medication 1 MILLIGRAM(S): at 12:23

## 2023-05-04 RX ADMIN — PANTOPRAZOLE SODIUM 40 MILLIGRAM(S): 20 TABLET, DELAYED RELEASE ORAL at 06:28

## 2023-05-04 RX ADMIN — DORZOLAMIDE HYDROCHLORIDE 1 DROP(S): 20 SOLUTION/ DROPS OPHTHALMIC at 22:07

## 2023-05-04 RX ADMIN — DONEPEZIL HYDROCHLORIDE 5 MILLIGRAM(S): 10 TABLET, FILM COATED ORAL at 22:07

## 2023-05-04 RX ADMIN — Medication 1 TABLET(S): at 12:23

## 2023-05-04 RX ADMIN — DORZOLAMIDE HYDROCHLORIDE 1 DROP(S): 20 SOLUTION/ DROPS OPHTHALMIC at 14:57

## 2023-05-04 RX ADMIN — Medication 1 MILLIGRAM(S): at 22:05

## 2023-05-04 RX ADMIN — ENOXAPARIN SODIUM 40 MILLIGRAM(S): 100 INJECTION SUBCUTANEOUS at 12:24

## 2023-05-04 RX ADMIN — Medication 50 MILLIGRAM(S): at 06:29

## 2023-05-04 RX ADMIN — Medication 100 MILLIGRAM(S): at 12:24

## 2023-05-04 RX ADMIN — Medication 1 APPLICATION(S): at 10:33

## 2023-05-04 RX ADMIN — DORZOLAMIDE HYDROCHLORIDE 1 DROP(S): 20 SOLUTION/ DROPS OPHTHALMIC at 06:29

## 2023-05-04 RX ADMIN — Medication 1 DROP(S): at 06:29

## 2023-05-04 RX ADMIN — Medication 2 MILLIGRAM(S): at 01:18

## 2023-05-04 NOTE — BH CONSULTATION LIAISON PROGRESS NOTE - NSBHATTESTBILLING_PSY_A_CORE
00621-[id number] OBS or IP - moderate complexity OR 35-49 mins 46342-[id number] OBS or IP - moderate complexity OR 35-49 mins

## 2023-05-04 NOTE — PROGRESS NOTE ADULT - PROBLEM SELECTOR PLAN 4
- per report, "...macule over his buttock which appeared to be coalescent tenia corporis.  There are areas of this macule that ringlike surrounding erythema..."  - given fluconazole and Mycelex in the ED  - continuing w. Mycelex for now  - per derm: tinea pedis/cruris, flucanozole 200mg PO weekly x4 weeks, next dose 5/7

## 2023-05-04 NOTE — SBIRT NOTE ADULT - NSSBIRTALCPOSREINDET_GEN_A_CORE
SW met patient at bedside to complete SBIRT screening. Telephonic  Juan Miguel #[id number] from [employer] used. Patient stated he drinks 7 to 8 16oz beers and a bottle of vodka daily if he has money. Patient reported he has not been able to have stable job, lost his job, missed doctor's appointment  due to his drinking. Patient informed his friends expressed concerns about his drinking. Patient reported he successfully completed 28 days addition program with [facility] in [place] 1 1/2 months ago but did not follow up with his aftercare services. Patient stated he has knowledge of 28 day program but willing to accept resources as well. SW provided emotional support and encouraged patient to follow up with 28 day program and maintain sobriety.  SW met patient at bedside to complete SBIRT screening. Telephonic  Juan Miguel #[id number] from [employer] used. Patient stated he drinks 7 to 8 16oz beers and a bottle of vodka daily if he has money. Patient reported he has not been able to have stable job, lost his job, missed doctor's appointment  due to his drinking. Patient informed his friends expressed concerns about his drinking. Patient reported he successfully completed 28 days addition program with [facility] in [place] 1 1/2 months ago but did not follow up with his aftercare services. Patient stated he has knowledge of 28 day program but willing to accept resources as well. SW provided emotional support and encouraged patient to follow up with 28 day program and maintain sobriety.

## 2023-05-04 NOTE — PROGRESS NOTE ADULT - PROBLEM SELECTOR PLAN 1
- drinking continuously w/o abatement, with last drink being prior to coming to the ED.  Non-partial re type of alcohol  - tremulous.  Unaware if ever had seizures  - alcohol level = 364, lactate = 4.8  - CIWA protocol, including that of symptom triggered, in effect   - on MVI, folic acid, thiamine  - IVF hydration in progress  - f/u Social Work consult  - f/u SBIRT  - psychiatry consulted (Depression, Anxiety, Alcohol abuse; patient amenable to consult)  - aspiration, seizure precautions    Plan:  - C/W Librium taper, ativan PRN  - C/W Thiamine, folic acid, MV  - atarax PRN for anxiety   Dispo: inpatient vs outpatient rehab, MAT being considered - drinking continuously w/o abatement, with last drink being prior to coming to the ED.  Non-partial re type of alcohol  - tremulous.  Unaware if ever had seizures  - alcohol level = 364, lactate = 4.8  - CIWA protocol, including that of symptom triggered, in effect   - on MVI, folic acid, thiamine  - IVF hydration in progress  - f/u Social Work consult  - f/u SBIRT  - psychiatry consulted (Depression, Anxiety, Alcohol abuse; patient amenable to consult)  - aspiration, seizure precautions    Plan:  - C/W Librium taper, dc symptom triggered ativan PRN  - C/W Thiamine, folic acid, MV  - atarax PRN for anxiety   Dispo: inpatient vs outpatient rehab, MAT being considered

## 2023-05-04 NOTE — PROGRESS NOTE ADULT - PROBLEM SELECTOR PLAN 10
- no acute issues presently  - has been unable to adhere to OP medication regimen  - f/u w/ Med-Hx Pharmacist re med verification and complete Med-Rec

## 2023-05-04 NOTE — PROGRESS NOTE ADULT - ATTENDING COMMENTS
58 year old male, with past history significant for Alcohol abuse, Hypertension, Anxiety and Depression, presented to the ED secondary to alcohol withdrawal.  Diagnosed with Alcohol Dependence with Withdrawal in the ED.     - C/w Librium taper with symptom-triggered CIWA. Last dose 5/5 AM  - CIWA relatively stable in 2-3 range; may d/c after 72 hours.   - Psych consulted for alcohol use and anxiety/depression. Patient is amendable; f/u rec; cleared for d/c  - SW consulted for safe dispo - per patient, he lives with his cousin.  - MVI, thiamine, folate supplements; B12 wnl  - Significant rash on BLE, s/p fluconazole x 1 and c/w topical cream; Will start PO fluconazole weekly x 4 weeks (total 4 doses) per derm rec given extensive LE involvement  - (+) dysuria, UA/Ucx neg  - Elevated lactate likely i/s/o ETOH use. Normalized  - RUQ (+) for hepatic steatosis, no cirrhosis; Bili normalized, will monitor  - (+) loose stool; GI PCR (+) for norovirus, on contact isolation  - Recent fall, low concern for severe trauma, will hold off imaging; PT consult - no skilled PT needs    May be medically cleared on 5/5, but will possibility need to stay until isolation is lifted.

## 2023-05-04 NOTE — PROGRESS NOTE ADULT - PROBLEM SELECTOR PLAN 2
- 3 episodes of diarrhea 5/3  - fluid/electrolyte repletion   - GI PCR - 3 episodes of diarrhea 5/3  - fluid/electrolyte repletion   - GI PCR- Norovirus+, contact precautions

## 2023-05-04 NOTE — PROGRESS NOTE ADULT - PROBLEM SELECTOR PLAN 5
RUQ U/S with gallstones, no cholecystitis, hepatic steatosis+  likely in setting of chronic alcohol use    No acute management changes LUIS ARMANDO

## 2023-05-04 NOTE — PROGRESS NOTE ADULT - PROBLEM SELECTOR PLAN 9
- has been living on the streets for the past ~ 2 years, now complicated by alcohol abuse  - states no family  - Social work consult

## 2023-05-04 NOTE — PROGRESS NOTE ADULT - PROBLEM SELECTOR PLAN 3
- patient voices being anxious and depressed  - was on medications for same, but has not been able to comply with regimen due to current circumstances  - TSH 0.4; Vitamin D 0.35; WNL  - evaluate for any signs of acute decompensation    Plan  - F/U Psych Recs- atarax PRN, rest as above

## 2023-05-04 NOTE — PROGRESS NOTE ADULT - SUBJECTIVE AND OBJECTIVE BOX
Interval Events:    OBJECTIVE:  ICU Vital Signs Last 24 Hrs  T(C): 37 (02 May 2023 04:02), Max: 37.3 (01 May 2023 12:00)  T(F): 98.6 (02 May 2023 04:02), Max: 99.2 (01 May 2023 12:00)  HR: 61 (02 May 2023 04:02) (61 - 98)  BP: 141/64 (02 May 2023 04:02) (112/69 - 146/76)  BP(mean): --  ABP: --  ABP(mean): --  RR: 17 (02 May 2023 04:02) (17 - 18)  SpO2: 98% (02 May 2023 04:02) (96% - 100%)    O2 Parameters below as of 02 May 2023 04:02  Patient On (Oxygen Delivery Method): room air    CAPILLARY BLOOD GLUCOSE      PHYSICAL EXAM:  General: Calm in bed  Neurology: A&Ox3, nonfocal, HICKS x 4  Eyes: PERRLA/ EOMI, Gross vision intact  ENT/Neck: Neck supple, trachea midline, No JVD, Gross hearing intact  Respiratory: CTA B/L, No wheezing, rales, rhonchi  CV: RRR, +S1/S2, -S3/S4, no murmurs, rubs or gallops  Abdominal: Soft, NT, ND +BS, No HSM  Extremities: Visible Tremors, 2+ peripheral pulses  Skin: Rash over posterior thighs and bilateral glutes      HOSPITAL MEDICATIONS:  MEDICATIONS  (STANDING):  chlordiazePOXIDE   Oral   chlordiazePOXIDE 50 milliGRAM(s) Oral every 8 hours  clotrimazole 1% Cream 1 Application(s) Topical every 12 hours  donepezil 5 milliGRAM(s) Oral at bedtime  dorzolamide 2% Ophthalmic Solution 1 Drop(s) Both EYES three times a day  enoxaparin Injectable 40 milliGRAM(s) SubCutaneous every 24 hours  folic acid 1 milliGRAM(s) Oral daily  lactated ringers. 1000 milliLiter(s) (125 mL/Hr) IV Continuous <Continuous>  latanoprost 0.005% Ophthalmic Solution 1 Drop(s) Both EYES at bedtime  multivitamin 1 Tablet(s) Oral daily  pantoprazole    Tablet 40 milliGRAM(s) Oral two times a day  thiamine 100 milliGRAM(s) Oral daily  timolol 0.5% Solution 1 Drop(s) Both EYES two times a day    MEDICATIONS  (PRN):  acetaminophen     Tablet .. 650 milliGRAM(s) Oral every 6 hours PRN Mild Pain (1 - 3)  aluminum hydroxide/magnesium hydroxide/simethicone Suspension 30 milliLiter(s) Oral every 4 hours PRN Dyspepsia  bisacodyl 5 milliGRAM(s) Oral at bedtime PRN Constipation  LORazepam   Injectable 2 milliGRAM(s) IV Push every 2 hours PRN CIWA-Ar score increase by 2 points and a total score of 7 or less  LORazepam   Injectable 2 milliGRAM(s) IV Push every 1 hour PRN Symptom-triggered: each CIWA -Ar score 8 or GREATER  melatonin 3 milliGRAM(s) Oral at bedtime PRN Insomnia  ondansetron Injectable 4 milliGRAM(s) IV Push every 8 hours PRN Nausea and/or Vomiting      LABS:                        14.3   6.72  )-----------( 233      ( 30 Apr 2023 21:00 )             41.6     Hgb Trend: 14.3<--  05-01    141  |  104  |  7   ----------------------------<  84  3.9   |  22  |  0.69    Ca    8.1<L>      01 May 2023 06:40  Phos  3.2     05-01  Mg     1.70     05-01    TPro  6.3  /  Alb  3.6  /  TBili  0.6  /  DBili  x   /  AST  49<H>  /  ALT  61<H>  /  AlkPhos  57  05-01    Creatinine Trend: 0.69<--, 0.68<--  PT/INR - ( 30 Apr 2023 21:00 )   PT: 10.9 sec;   INR: 0.94 ratio         PTT - ( 30 Apr 2023 21:00 )  PTT:29.3 sec      Venous Blood Gas:  05-01 @ 01:00  7.35/46/52/25/79.9  VBG Lactate: 3.1  Venous Blood Gas:  04-30 @ 21:42  7.33/49/52/26/77.7  VBG Lactate: 4.8      MICROBIOLOGY:            Interval Events: Required ativan ON for agitation. 4 episodes of diarrhea. Hand tremors reported. CIWA 4 ON    OBJECTIVE:  ICU Vital Signs Last 24 Hrs  T(C): 37 (02 May 2023 04:02), Max: 37.3 (01 May 2023 12:00)  T(F): 98.6 (02 May 2023 04:02), Max: 99.2 (01 May 2023 12:00)  HR: 61 (02 May 2023 04:02) (61 - 98)  BP: 141/64 (02 May 2023 04:02) (112/69 - 146/76)  BP(mean): --  ABP: --  ABP(mean): --  RR: 17 (02 May 2023 04:02) (17 - 18)  SpO2: 98% (02 May 2023 04:02) (96% - 100%)    O2 Parameters below as of 02 May 2023 04:02  Patient On (Oxygen Delivery Method): room air    CAPILLARY BLOOD GLUCOSE      PHYSICAL EXAM:  General: Calm in bed  Neurology: A&Ox3, nonfocal, HICKS x 4  Eyes: PERRLA/ EOMI, Gross vision intact  ENT/Neck: Neck supple, trachea midline, No JVD, Gross hearing intact  Respiratory: CTA B/L, No wheezing, rales, rhonchi  CV: RRR, +S1/S2, -S3/S4, no murmurs, rubs or gallops  Abdominal: Soft, NT, ND +BS, No HSM  Extremities: Visible Tremors, 2+ peripheral pulses  Skin: Rash over posterior thighs and bilateral glutes      HOSPITAL MEDICATIONS:  MEDICATIONS  (STANDING):  chlordiazePOXIDE   Oral   chlordiazePOXIDE 50 milliGRAM(s) Oral every 8 hours  clotrimazole 1% Cream 1 Application(s) Topical every 12 hours  donepezil 5 milliGRAM(s) Oral at bedtime  dorzolamide 2% Ophthalmic Solution 1 Drop(s) Both EYES three times a day  enoxaparin Injectable 40 milliGRAM(s) SubCutaneous every 24 hours  folic acid 1 milliGRAM(s) Oral daily  lactated ringers. 1000 milliLiter(s) (125 mL/Hr) IV Continuous <Continuous>  latanoprost 0.005% Ophthalmic Solution 1 Drop(s) Both EYES at bedtime  multivitamin 1 Tablet(s) Oral daily  pantoprazole    Tablet 40 milliGRAM(s) Oral two times a day  thiamine 100 milliGRAM(s) Oral daily  timolol 0.5% Solution 1 Drop(s) Both EYES two times a day    MEDICATIONS  (PRN):  acetaminophen     Tablet .. 650 milliGRAM(s) Oral every 6 hours PRN Mild Pain (1 - 3)  aluminum hydroxide/magnesium hydroxide/simethicone Suspension 30 milliLiter(s) Oral every 4 hours PRN Dyspepsia  bisacodyl 5 milliGRAM(s) Oral at bedtime PRN Constipation  LORazepam   Injectable 2 milliGRAM(s) IV Push every 2 hours PRN CIWA-Ar score increase by 2 points and a total score of 7 or less  LORazepam   Injectable 2 milliGRAM(s) IV Push every 1 hour PRN Symptom-triggered: each CIWA -Ar score 8 or GREATER  melatonin 3 milliGRAM(s) Oral at bedtime PRN Insomnia  ondansetron Injectable 4 milliGRAM(s) IV Push every 8 hours PRN Nausea and/or Vomiting      LABS:                        14.3   6.72  )-----------( 233      ( 30 Apr 2023 21:00 )             41.6     Hgb Trend: 14.3<--  05-01    141  |  104  |  7   ----------------------------<  84  3.9   |  22  |  0.69    Ca    8.1<L>      01 May 2023 06:40  Phos  3.2     05-01  Mg     1.70     05-01    TPro  6.3  /  Alb  3.6  /  TBili  0.6  /  DBili  x   /  AST  49<H>  /  ALT  61<H>  /  AlkPhos  57  05-01    Creatinine Trend: 0.69<--, 0.68<--  PT/INR - ( 30 Apr 2023 21:00 )   PT: 10.9 sec;   INR: 0.94 ratio         PTT - ( 30 Apr 2023 21:00 )  PTT:29.3 sec      Venous Blood Gas:  05-01 @ 01:00  7.35/46/52/25/79.9  VBG Lactate: 3.1  Venous Blood Gas:  04-30 @ 21:42  7.33/49/52/26/77.7  VBG Lactate: 4.8      MICROBIOLOGY:

## 2023-05-04 NOTE — PROGRESS NOTE ADULT - PROBLEM SELECTOR PLAN 7
- lactate = 4.8, AG = 17  - in the setting of excessive alcohol intake and poor nutrition, as well as dehydration  - IVF hydration in progress    - Lactate= 1 WNL

## 2023-05-04 NOTE — BH CONSULTATION LIAISON PROGRESS NOTE - NSBHASSESSMENTFT_PSY_ALL_CORE
58M, single, no dependents, homeless, tenuously employed, past psychiatric history of anxiety, depression, and alcohol misuse, recently discharged from [facility] for alcohol withdrawal, not currently in treatment, no known hospitalizations, history of inpatient rehab for alcohol, no other substance use, no prior SAs, no history of violence/aggression, legal issues, who presented to the ED w/ alcohol withdrawal. Psychiatry consulted for depression and anxiety.     Patient seen and evaluated at the bedside. AAO4. Patient reports ongoing depression and anxiety in the setting of substance use, homelessness, and tenuous employment. Patient dysphoric yet future-oriented. Hopeful about the future. Denies S/I/I/P and H/I/I/P. No evidence of psychosis or maricel. Presentation does not warrant psychiatric hospitalization at this time. Reports past response to Atarax for anxiety; can be given PRN. Will consider MAT for alcohol use disorder.    5/3: patient w/ continued depressive and anxious symptoms. uncertain if alcohol drives mood disturbance or vice versa. most recent CIWA 4. mild withdrawal sx on exam -- tongue fasciculations and tremor.   5/4: continues to tolerate taper well with low CIWA scores and stable VS.     Plan:  - Routine observation appropriate; CO 1:1 NOT warranted   - Atarax 25-50mg PO PRN q6h for anxiety  - Will consider MAT for alcohol use disorder in subsequent visits  - Continue to monitor withdrawal symptoms  - Dispo: inpatient rehab vs. outpatient substance treatment vs. outpatient dual diagnosis treatment; pending medical clearance  (does not warrant inpatient psych and lacks psych contraindications to discharge to rehab vs outpt setup when medically cleared).     58M, single, no dependents, homeless, tenuously employed, past psychiatric history of anxiety, depression, and alcohol misuse, recently discharged from [facility] for alcohol withdrawal, not currently in treatment, no known hospitalizations, history of inpatient rehab for alcohol, no other substance use, no prior SAs, no history of violence/aggression, legal issues, who presented to the ED w/ alcohol withdrawal. Psychiatry consulted for depression and anxiety.     Patient seen and evaluated at the bedside. AAO4. Patient reports ongoing depression and anxiety in the setting of substance use, homelessness, and tenuous employment. Patient dysphoric yet future-oriented. Hopeful about the future. Denies S/I/I/P and H/I/I/P. No evidence of psychosis or maricel. Presentation does not warrant psychiatric hospitalization at this time. Reports past response to Atarax for anxiety; can be given PRN. Will consider MAT for alcohol use disorder.    5/3: patient w/ continued depressive and anxious symptoms. uncertain if alcohol drives mood disturbance or vice versa. most recent CIWA 4. mild withdrawal sx on exam -- tongue fasciculations and tremor.   5/4: continues to tolerate taper well with low CIWA scores and stable VS.     Plan:  - Routine observation appropriate; CO 1:1 NOT warranted   - Atarax 25-50mg PO PRN q6h for anxiety  - Will consider MAT for alcohol use disorder in subsequent visits  - Continue to monitor withdrawal symptoms  - Dispo: inpatient rehab vs. outpatient substance treatment vs. outpatient dual diagnosis treatment; pending medical clearance  (does not warrant inpatient psych and lacks psych contraindications to discharge to rehab vs outpt setup when medically cleared).

## 2023-05-05 LAB
ALBUMIN SERPL ELPH-MCNC: 3.8 G/DL — SIGNIFICANT CHANGE UP (ref 3.3–5)
ALBUMIN SERPL ELPH-MCNC: 3.8 G/DL — SIGNIFICANT CHANGE UP (ref 3.3–5)
ALP SERPL-CCNC: 55 U/L — SIGNIFICANT CHANGE UP (ref 40–120)
ALP SERPL-CCNC: 55 U/L — SIGNIFICANT CHANGE UP (ref 40–120)
ALT FLD-CCNC: 55 U/L — HIGH (ref 4–41)
ALT FLD-CCNC: 55 U/L — HIGH (ref 4–41)
ANION GAP SERPL CALC-SCNC: 14 MMOL/L — SIGNIFICANT CHANGE UP (ref 7–14)
ANION GAP SERPL CALC-SCNC: 14 MMOL/L — SIGNIFICANT CHANGE UP (ref 7–14)
AST SERPL-CCNC: 36 U/L — SIGNIFICANT CHANGE UP (ref 4–40)
AST SERPL-CCNC: 36 U/L — SIGNIFICANT CHANGE UP (ref 4–40)
BILIRUB SERPL-MCNC: 0.3 MG/DL — SIGNIFICANT CHANGE UP (ref 0.2–1.2)
BILIRUB SERPL-MCNC: 0.3 MG/DL — SIGNIFICANT CHANGE UP (ref 0.2–1.2)
BUN SERPL-MCNC: 16 MG/DL — SIGNIFICANT CHANGE UP (ref 7–23)
BUN SERPL-MCNC: 16 MG/DL — SIGNIFICANT CHANGE UP (ref 7–23)
CALCIUM SERPL-MCNC: 9.1 MG/DL — SIGNIFICANT CHANGE UP (ref 8.4–10.5)
CALCIUM SERPL-MCNC: 9.1 MG/DL — SIGNIFICANT CHANGE UP (ref 8.4–10.5)
CHLORIDE SERPL-SCNC: 102 MMOL/L — SIGNIFICANT CHANGE UP (ref 98–107)
CHLORIDE SERPL-SCNC: 102 MMOL/L — SIGNIFICANT CHANGE UP (ref 98–107)
CO2 SERPL-SCNC: 22 MMOL/L — SIGNIFICANT CHANGE UP (ref 22–31)
CO2 SERPL-SCNC: 22 MMOL/L — SIGNIFICANT CHANGE UP (ref 22–31)
CREAT SERPL-MCNC: 0.74 MG/DL — SIGNIFICANT CHANGE UP (ref 0.5–1.3)
CREAT SERPL-MCNC: 0.74 MG/DL — SIGNIFICANT CHANGE UP (ref 0.5–1.3)
EGFR: 104 ML/MIN/1.73M2 — SIGNIFICANT CHANGE UP
EGFR: 104 ML/MIN/1.73M2 — SIGNIFICANT CHANGE UP
GLUCOSE SERPL-MCNC: 104 MG/DL — HIGH (ref 70–99)
GLUCOSE SERPL-MCNC: 104 MG/DL — HIGH (ref 70–99)
HCT VFR BLD CALC: 40.1 % — SIGNIFICANT CHANGE UP (ref 39–50)
HCT VFR BLD CALC: 40.1 % — SIGNIFICANT CHANGE UP (ref 39–50)
HGB BLD-MCNC: 13.2 G/DL — SIGNIFICANT CHANGE UP (ref 13–17)
HGB BLD-MCNC: 13.2 G/DL — SIGNIFICANT CHANGE UP (ref 13–17)
MAGNESIUM SERPL-MCNC: 1.8 MG/DL — SIGNIFICANT CHANGE UP (ref 1.6–2.6)
MAGNESIUM SERPL-MCNC: 1.8 MG/DL — SIGNIFICANT CHANGE UP (ref 1.6–2.6)
MCHC RBC-ENTMCNC: 30.5 PG — SIGNIFICANT CHANGE UP (ref 27–34)
MCHC RBC-ENTMCNC: 30.5 PG — SIGNIFICANT CHANGE UP (ref 27–34)
MCHC RBC-ENTMCNC: 32.9 GM/DL — SIGNIFICANT CHANGE UP (ref 32–36)
MCHC RBC-ENTMCNC: 32.9 GM/DL — SIGNIFICANT CHANGE UP (ref 32–36)
MCV RBC AUTO: 92.6 FL — SIGNIFICANT CHANGE UP (ref 80–100)
MCV RBC AUTO: 92.6 FL — SIGNIFICANT CHANGE UP (ref 80–100)
NRBC # BLD: 0 /100 WBCS — SIGNIFICANT CHANGE UP (ref 0–0)
NRBC # BLD: 0 /100 WBCS — SIGNIFICANT CHANGE UP (ref 0–0)
NRBC # FLD: 0 K/UL — SIGNIFICANT CHANGE UP (ref 0–0)
NRBC # FLD: 0 K/UL — SIGNIFICANT CHANGE UP (ref 0–0)
PHOSPHATE SERPL-MCNC: 3.8 MG/DL — SIGNIFICANT CHANGE UP (ref 2.5–4.5)
PHOSPHATE SERPL-MCNC: 3.8 MG/DL — SIGNIFICANT CHANGE UP (ref 2.5–4.5)
PLATELET # BLD AUTO: 213 K/UL — SIGNIFICANT CHANGE UP (ref 150–400)
PLATELET # BLD AUTO: 213 K/UL — SIGNIFICANT CHANGE UP (ref 150–400)
POTASSIUM SERPL-MCNC: 3.2 MMOL/L — LOW (ref 3.5–5.3)
POTASSIUM SERPL-MCNC: 3.2 MMOL/L — LOW (ref 3.5–5.3)
POTASSIUM SERPL-SCNC: 3.2 MMOL/L — LOW (ref 3.5–5.3)
POTASSIUM SERPL-SCNC: 3.2 MMOL/L — LOW (ref 3.5–5.3)
PROT SERPL-MCNC: 7.1 G/DL — SIGNIFICANT CHANGE UP (ref 6–8.3)
PROT SERPL-MCNC: 7.1 G/DL — SIGNIFICANT CHANGE UP (ref 6–8.3)
PYRIDOXAL PHOS SERPL-MCNC: 13.9 UG/L — SIGNIFICANT CHANGE UP (ref 3.4–65.2)
PYRIDOXAL PHOS SERPL-MCNC: 13.9 UG/L — SIGNIFICANT CHANGE UP (ref 3.4–65.2)
RBC # BLD: 4.33 M/UL — SIGNIFICANT CHANGE UP (ref 4.2–5.8)
RBC # BLD: 4.33 M/UL — SIGNIFICANT CHANGE UP (ref 4.2–5.8)
RBC # FLD: 14.6 % — HIGH (ref 10.3–14.5)
RBC # FLD: 14.6 % — HIGH (ref 10.3–14.5)
SODIUM SERPL-SCNC: 138 MMOL/L — SIGNIFICANT CHANGE UP (ref 135–145)
SODIUM SERPL-SCNC: 138 MMOL/L — SIGNIFICANT CHANGE UP (ref 135–145)
WBC # BLD: 6.06 K/UL — SIGNIFICANT CHANGE UP (ref 3.8–10.5)
WBC # BLD: 6.06 K/UL — SIGNIFICANT CHANGE UP (ref 3.8–10.5)
WBC # FLD AUTO: 6.06 K/UL — SIGNIFICANT CHANGE UP (ref 3.8–10.5)
WBC # FLD AUTO: 6.06 K/UL — SIGNIFICANT CHANGE UP (ref 3.8–10.5)

## 2023-05-05 PROCEDURE — 99232 SBSQ HOSP IP/OBS MODERATE 35: CPT

## 2023-05-05 RX ORDER — POTASSIUM CHLORIDE 20 MEQ
40 PACKET (EA) ORAL ONCE
Refills: 0 | Status: COMPLETED | OUTPATIENT
Start: 2023-05-05 | End: 2023-05-05

## 2023-05-05 RX ORDER — NALTREXONE HYDROCHLORIDE 50 MG/1
50 TABLET, FILM COATED ORAL DAILY
Refills: 0 | Status: DISCONTINUED | OUTPATIENT
Start: 2023-05-05 | End: 2023-05-08

## 2023-05-05 RX ADMIN — Medication 1 DROP(S): at 05:22

## 2023-05-05 RX ADMIN — NALTREXONE HYDROCHLORIDE 50 MILLIGRAM(S): 50 TABLET, FILM COATED ORAL at 19:07

## 2023-05-05 RX ADMIN — Medication 1 MILLIGRAM(S): at 13:55

## 2023-05-05 RX ADMIN — Medication 1 TABLET(S): at 13:55

## 2023-05-05 RX ADMIN — PANTOPRAZOLE SODIUM 40 MILLIGRAM(S): 20 TABLET, DELAYED RELEASE ORAL at 05:20

## 2023-05-05 RX ADMIN — Medication 40 MILLIEQUIVALENT(S): at 09:18

## 2023-05-05 RX ADMIN — DORZOLAMIDE HYDROCHLORIDE 1 DROP(S): 20 SOLUTION/ DROPS OPHTHALMIC at 05:22

## 2023-05-05 RX ADMIN — Medication 1 APPLICATION(S): at 09:18

## 2023-05-05 RX ADMIN — Medication 100 MILLIGRAM(S): at 13:54

## 2023-05-05 RX ADMIN — Medication 3 MILLIGRAM(S): at 21:46

## 2023-05-05 RX ADMIN — DORZOLAMIDE HYDROCHLORIDE 1 DROP(S): 20 SOLUTION/ DROPS OPHTHALMIC at 21:46

## 2023-05-05 RX ADMIN — Medication 1 APPLICATION(S): at 21:47

## 2023-05-05 RX ADMIN — Medication 50 MILLIGRAM(S): at 05:20

## 2023-05-05 RX ADMIN — ENOXAPARIN SODIUM 40 MILLIGRAM(S): 100 INJECTION SUBCUTANEOUS at 13:55

## 2023-05-05 RX ADMIN — Medication 1 DROP(S): at 19:07

## 2023-05-05 RX ADMIN — DORZOLAMIDE HYDROCHLORIDE 1 DROP(S): 20 SOLUTION/ DROPS OPHTHALMIC at 13:56

## 2023-05-05 RX ADMIN — LATANOPROST 1 DROP(S): 0.05 SOLUTION/ DROPS OPHTHALMIC; TOPICAL at 21:47

## 2023-05-05 NOTE — PROGRESS NOTE ADULT - SUBJECTIVE AND OBJECTIVE BOX
Interval Events: Required ativan ON for agitation. 4 episodes of diarrhea. Hand tremors reported. CIWA 4 ON    OBJECTIVE:  ICU Vital Signs Last 24 Hrs  T(C): 37 (02 May 2023 04:02), Max: 37.3 (01 May 2023 12:00)  T(F): 98.6 (02 May 2023 04:02), Max: 99.2 (01 May 2023 12:00)  HR: 61 (02 May 2023 04:02) (61 - 98)  BP: 141/64 (02 May 2023 04:02) (112/69 - 146/76)  BP(mean): --  ABP: --  ABP(mean): --  RR: 17 (02 May 2023 04:02) (17 - 18)  SpO2: 98% (02 May 2023 04:02) (96% - 100%)    O2 Parameters below as of 02 May 2023 04:02  Patient On (Oxygen Delivery Method): room air    CAPILLARY BLOOD GLUCOSE      PHYSICAL EXAM:  General: Calm in bed  Neurology: A&Ox3, nonfocal, HICKS x 4  Eyes: PERRLA/ EOMI, Gross vision intact  ENT/Neck: Neck supple, trachea midline, No JVD, Gross hearing intact  Respiratory: CTA B/L, No wheezing, rales, rhonchi  CV: RRR, +S1/S2, -S3/S4, no murmurs, rubs or gallops  Abdominal: Soft, NT, ND +BS, No HSM  Extremities: Visible Tremors, 2+ peripheral pulses  Skin: Rash over posterior thighs and bilateral glutes      HOSPITAL MEDICATIONS:  MEDICATIONS  (STANDING):  chlordiazePOXIDE   Oral   chlordiazePOXIDE 50 milliGRAM(s) Oral every 8 hours  clotrimazole 1% Cream 1 Application(s) Topical every 12 hours  donepezil 5 milliGRAM(s) Oral at bedtime  dorzolamide 2% Ophthalmic Solution 1 Drop(s) Both EYES three times a day  enoxaparin Injectable 40 milliGRAM(s) SubCutaneous every 24 hours  folic acid 1 milliGRAM(s) Oral daily  lactated ringers. 1000 milliLiter(s) (125 mL/Hr) IV Continuous <Continuous>  latanoprost 0.005% Ophthalmic Solution 1 Drop(s) Both EYES at bedtime  multivitamin 1 Tablet(s) Oral daily  pantoprazole    Tablet 40 milliGRAM(s) Oral two times a day  thiamine 100 milliGRAM(s) Oral daily  timolol 0.5% Solution 1 Drop(s) Both EYES two times a day    MEDICATIONS  (PRN):  acetaminophen     Tablet .. 650 milliGRAM(s) Oral every 6 hours PRN Mild Pain (1 - 3)  aluminum hydroxide/magnesium hydroxide/simethicone Suspension 30 milliLiter(s) Oral every 4 hours PRN Dyspepsia  bisacodyl 5 milliGRAM(s) Oral at bedtime PRN Constipation  LORazepam   Injectable 2 milliGRAM(s) IV Push every 2 hours PRN CIWA-Ar score increase by 2 points and a total score of 7 or less  LORazepam   Injectable 2 milliGRAM(s) IV Push every 1 hour PRN Symptom-triggered: each CIWA -Ar score 8 or GREATER  melatonin 3 milliGRAM(s) Oral at bedtime PRN Insomnia  ondansetron Injectable 4 milliGRAM(s) IV Push every 8 hours PRN Nausea and/or Vomiting      LABS:                        14.3   6.72  )-----------( 233      ( 30 Apr 2023 21:00 )             41.6     Hgb Trend: 14.3<--  05-01    141  |  104  |  7   ----------------------------<  84  3.9   |  22  |  0.69    Ca    8.1<L>      01 May 2023 06:40  Phos  3.2     05-01  Mg     1.70     05-01    TPro  6.3  /  Alb  3.6  /  TBili  0.6  /  DBili  x   /  AST  49<H>  /  ALT  61<H>  /  AlkPhos  57  05-01    Creatinine Trend: 0.69<--, 0.68<--  PT/INR - ( 30 Apr 2023 21:00 )   PT: 10.9 sec;   INR: 0.94 ratio         PTT - ( 30 Apr 2023 21:00 )  PTT:29.3 sec      Venous Blood Gas:  05-01 @ 01:00  7.35/46/52/25/79.9  VBG Lactate: 3.1  Venous Blood Gas:  04-30 @ 21:42  7.33/49/52/26/77.7  VBG Lactate: 4.8      MICROBIOLOGY:            Interval Events: Required ativan ON for agitation. 3 episodes of diarrhea overnight. Hand tremors reported. CIWA 4 ON    OBJECTIVE:  Vital Signs Last 24 Hrs  T(C): 37 (02 May 2023 04:02), Max: 37.3 (01 May 2023 12:00)  T(F): 98.6 (02 May 2023 04:02), Max: 99.2 (01 May 2023 12:00)  HR: 61 (02 May 2023 04:02) (61 - 98)  BP: 141/64 (02 May 2023 04:02) (112/69 - 146/76)  BP(mean): --  ABP: --  ABP(mean): --  RR: 17 (02 May 2023 04:02) (17 - 18)  SpO2: 98% (02 May 2023 04:02) (96% - 100%)    O2 Parameters below as of 02 May 2023 04:02  Patient On (Oxygen Delivery Method): room air    CAPILLARY BLOOD GLUCOSE      PHYSICAL EXAM:  General: Calm in bed  Neurology: A&Ox3, nonfocal, HICKS x 4  Eyes: PERRLA/ EOMI, Gross vision intact  ENT/Neck: Neck supple, trachea midline, No JVD, Gross hearing intact  Respiratory: CTA B/L, No wheezing, rales, rhonchi  CV: RRR, +S1/S2, -S3/S4, no murmurs, rubs or gallops  Abdominal: Soft, NT, ND +BS, No HSM  Extremities: Visible Tremors, 2+ peripheral pulses  Skin: Rash over posterior thighs and bilateral glutes      HOSPITAL MEDICATIONS:  MEDICATIONS  (STANDING):  chlordiazePOXIDE   Oral   chlordiazePOXIDE 50 milliGRAM(s) Oral every 8 hours  clotrimazole 1% Cream 1 Application(s) Topical every 12 hours  donepezil 5 milliGRAM(s) Oral at bedtime  dorzolamide 2% Ophthalmic Solution 1 Drop(s) Both EYES three times a day  enoxaparin Injectable 40 milliGRAM(s) SubCutaneous every 24 hours  folic acid 1 milliGRAM(s) Oral daily  lactated ringers. 1000 milliLiter(s) (125 mL/Hr) IV Continuous <Continuous>  latanoprost 0.005% Ophthalmic Solution 1 Drop(s) Both EYES at bedtime  multivitamin 1 Tablet(s) Oral daily  pantoprazole    Tablet 40 milliGRAM(s) Oral two times a day  thiamine 100 milliGRAM(s) Oral daily  timolol 0.5% Solution 1 Drop(s) Both EYES two times a day    MEDICATIONS  (PRN):  acetaminophen     Tablet .. 650 milliGRAM(s) Oral every 6 hours PRN Mild Pain (1 - 3)  aluminum hydroxide/magnesium hydroxide/simethicone Suspension 30 milliLiter(s) Oral every 4 hours PRN Dyspepsia  bisacodyl 5 milliGRAM(s) Oral at bedtime PRN Constipation  LORazepam   Injectable 2 milliGRAM(s) IV Push every 2 hours PRN CIWA-Ar score increase by 2 points and a total score of 7 or less  LORazepam   Injectable 2 milliGRAM(s) IV Push every 1 hour PRN Symptom-triggered: each CIWA -Ar score 8 or GREATER  melatonin 3 milliGRAM(s) Oral at bedtime PRN Insomnia  ondansetron Injectable 4 milliGRAM(s) IV Push every 8 hours PRN Nausea and/or Vomiting      LABS:                        14.3   6.72  )-----------( 233      ( 30 Apr 2023 21:00 )             41.6     Hgb Trend: 14.3<--  05-01    141  |  104  |  7   ----------------------------<  84  3.9   |  22  |  0.69    Ca    8.1<L>      01 May 2023 06:40  Phos  3.2     05-01  Mg     1.70     05-01    TPro  6.3  /  Alb  3.6  /  TBili  0.6  /  DBili  x   /  AST  49<H>  /  ALT  61<H>  /  AlkPhos  57  05-01    Creatinine Trend: 0.69<--, 0.68<--  PT/INR - ( 30 Apr 2023 21:00 )   PT: 10.9 sec;   INR: 0.94 ratio         PTT - ( 30 Apr 2023 21:00 )  PTT:29.3 sec      Venous Blood Gas:  05-01 @ 01:00  7.35/46/52/25/79.9  VBG Lactate: 3.1  Venous Blood Gas:  04-30 @ 21:42  7.33/49/52/26/77.7  VBG Lactate: 4.8      MICROBIOLOGY:

## 2023-05-05 NOTE — PROGRESS NOTE ADULT - PROBLEM SELECTOR PLAN 2
- 3 episodes of diarrhea 5/3  - fluid/electrolyte repletion   - GI PCR- Norovirus+, contact precautions

## 2023-05-05 NOTE — PROGRESS NOTE ADULT - PROBLEM SELECTOR PLAN 1
- drinking continuously w/o abatement, with last drink being prior to coming to the ED.  Non-partial re type of alcohol  - tremulous.  Unaware if ever had seizures  - alcohol level = 364, lactate = 4.8  - CIWA protocol, including that of symptom triggered, in effect   - on MVI, folic acid, thiamine  - IVF hydration in progress  - f/u Social Work consult  - f/u SBIRT  - psychiatry consulted (Depression, Anxiety, Alcohol abuse; patient amenable to consult)  - aspiration, seizure precautions    Plan:  - C/W Librium taper, dc symptom triggered ativan PRN  - C/W Thiamine, folic acid, MV  - atarax PRN for anxiety   Dispo: inpatient vs outpatient rehab, MAT being considered - drinking continuously w/o abatement, with last drink being prior to coming to the ED.  Non-partial re type of alcohol  - tremulous.  Unaware if ever had seizures  - alcohol level = 364, lactate = 4.8  - CIWA protocol, including that of symptom triggered, in effect   - on MVI, folic acid, thiamine  - IVF hydration in progress  - f/u Social Work consult  - f/u SBIRT  - psychiatry consulted (Depression, Anxiety, Alcohol abuse; patient amenable to consult)  - aspiration, seizure precautions    Plan:  - C/W Librium taper, dc symptom triggered ativan PRN  - C/W Thiamine, folic acid, MV  - atarax PRN for anxiety   Dispo: seems outpatient rehab, MAT being considered

## 2023-05-05 NOTE — BH CONSULTATION LIAISON PROGRESS NOTE - NSBHASSESSMENTFT_PSY_ALL_CORE
58M, single, no dependents, homeless, tenuously employed, past psychiatric history of anxiety, depression, and alcohol misuse, recently discharged from [facility] for alcohol withdrawal, not currently in treatment, no known hospitalizations, history of inpatient rehab for alcohol, no other substance use, no prior SAs, no history of violence/aggression, legal issues, who presented to the ED w/ alcohol withdrawal. Psychiatry consulted for depression and anxiety.     Patient seen and evaluated at the bedside. AAO4. Patient reports ongoing depression and anxiety in the setting of substance use, homelessness, and tenuous employment. Patient dysphoric yet future-oriented. Hopeful about the future. Denies S/I/I/P and H/I/I/P. No evidence of psychosis or maricel. Presentation does not warrant psychiatric hospitalization at this time. Reports past response to Atarax for anxiety; can be given PRN. Will consider MAT for alcohol use disorder.    5/3: patient w/ continued depressive and anxious symptoms. uncertain if alcohol drives mood disturbance or vice versa. most recent CIWA 4. mild withdrawal sx on exam -- tongue fasciculations and tremor.   5/4: continues to tolerate taper well with low CIWA scores and stable VS.   5/5: tolerating taper. CIWA scores low. VSS. continued depression and anxiety. mentions wanting to pay ticket before going to rehab. MOCA performed. Score 13/30 although score in part due to limited English proficiency. however, limited evidence to suggest patient w/ alzheimer's. may have alcohol-related dementia, in which case, donepezil not warranted. can discontinue.     Plan:  - Routine observation appropriate; CO 1:1 NOT warranted   [ ] DC donepezil -- patient most likely w/ alchohol-related dementia, if in fact suffers from dementia disorder   [ ] can start naltrezone 50mg PO for alcohol misuse disorder   - Atarax 25-50mg PO PRN q6h for anxiety  - Will consider MAT for alcohol use disorder in subsequent visits  - Continue to monitor withdrawal symptoms  - Dispo: inpatient rehab vs. outpatient substance treatment vs. outpatient dual diagnosis treatment; pending medical clearance  (does not warrant inpatient psych and lacks psych contraindications to discharge to rehab vs outpt setup when medically cleared).     58M, single, no dependents, homeless, tenuously employed, past psychiatric history of anxiety, depression, and alcohol misuse, recently discharged from [facility] for alcohol withdrawal, not currently in treatment, no known hospitalizations, history of inpatient rehab for alcohol, no other substance use, no prior SAs, no history of violence/aggression, legal issues, who presented to the ED w/ alcohol withdrawal. Psychiatry consulted for depression and anxiety.     Patient seen and evaluated at the bedside. AAO4. Patient reports ongoing depression and anxiety in the setting of substance use, homelessness, and tenuous employment. Patient dysphoric yet future-oriented. Hopeful about the future. Denies S/I/I/P and H/I/I/P. No evidence of psychosis or maricel. Presentation does not warrant psychiatric hospitalization at this time. Reports past response to Atarax for anxiety; can be given PRN. Will consider MAT for alcohol use disorder.    5/3: patient w/ continued depressive and anxious symptoms. uncertain if alcohol drives mood disturbance or vice versa. most recent CIWA 4. mild withdrawal sx on exam -- tongue fasciculations and tremor.   5/4: continues to tolerate taper well with low CIWA scores and stable VS.   5/5: tolerating taper. CIWA scores low. VSS. continued depression and anxiety. mentions wanting to pay ticket before going to rehab. MOCA performed. Score 13/30 although score in part due to limited English proficiency. however, limited evidence to suggest patient w/ alzheimer's. may have alcohol-related dementia, in which case, donepezil not warranted. can discontinue.     Plan:  - Routine observation appropriate; CO 1:1 NOT warranted   [ ] DC donepezil -- patient most likely w/ alchohol-related dementia, if in fact suffers from dementia disorder   [ ] can start naltrezone 50mg PO for alcohol misuse disorder   - Atarax 25-50mg PO PRN q6h for anxiety  - Will consider MAT for alcohol use disorder in subsequent visits  - Continue to monitor withdrawal symptoms  - Dispo: inpatient rehab vs. outpatient substance treatment vs. outpatient dual diagnosis treatment; pending medical clearance  (does not warrant inpatient psych and lacks psych contraindications to discharge to rehab vs outpt setup when medically cleared).     58M, single, no dependents, homeless, tenuously employed, past psychiatric history of anxiety, depression, and alcohol misuse, recently discharged from [facility] for alcohol withdrawal, not currently in treatment, no known hospitalizations, history of inpatient rehab for alcohol, no other substance use, no prior SAs, no history of violence/aggression, legal issues, who presented to the ED w/ alcohol withdrawal. Psychiatry consulted for depression and anxiety.     Patient seen and evaluated at the bedside. AAO4. Patient reports ongoing depression and anxiety in the setting of substance use, homelessness, and tenuous employment. Patient dysphoric yet future-oriented. Hopeful about the future. Denies S/I/I/P and H/I/I/P. No evidence of psychosis or maricel. Presentation does not warrant psychiatric hospitalization at this time. Reports past response to Atarax for anxiety; can be given PRN. Will consider MAT for alcohol use disorder.    5/3: patient w/ continued depressive and anxious symptoms. uncertain if alcohol drives mood disturbance or vice versa. most recent CIWA 4. mild withdrawal sx on exam -- tongue fasciculations and tremor.   5/4: continues to tolerate taper well with low CIWA scores and stable VS.   5/5: tolerating taper. CIWA scores low. VSS. continued depression and anxiety. mentions wanting to pay ticket before going to rehab. MOCA performed. Score 13/30 although score in part due to limited English proficiency. however, limited evidence to suggest patient w/ alzheimer's. may have alcohol-related dementia, in which case, donepezil not warranted. can discontinue.     Plan:  - Routine observation appropriate; CO 1:1 NOT warranted   [ ] DC donepezil -- patient most likely w/ alcohol-related dementia (less/no evidence base to using donepezil)  [ ] Start naltrezone 50mg PO daily for alcohol misuse disorder; can be discharged on this  - Atarax 25-50mg PO PRN q6h for anxiety  - Continue to monitor withdrawal symptoms  - Dispo: inpatient rehab vs. outpatient substance treatment vs. outpatient dual diagnosis treatment; pending medical clearance  (does not warrant inpatient psych and lacks psych contraindications to discharge to rehab vs outpt setup when medically cleared).  No psych contraindications to discharge when med cleared. Will revisit on Monday 5/8 if still here but can leave from psych standpoint.   58M, single, no dependents, homeless, tenuously employed, past psychiatric history of anxiety, depression, and alcohol misuse, recently discharged from [facility] for alcohol withdrawal, not currently in treatment, no known hospitalizations, history of inpatient rehab for alcohol, no other substance use, no prior SAs, no history of violence/aggression, legal issues, who presented to the ED w/ alcohol withdrawal. Psychiatry consulted for depression and anxiety.     Patient seen and evaluated at the bedside. AAO4. Patient reports ongoing depression and anxiety in the setting of substance use, homelessness, and tenuous employment. Patient dysphoric yet future-oriented. Hopeful about the future. Denies S/I/I/P and H/I/I/P. No evidence of psychosis or maricel. Presentation does not warrant psychiatric hospitalization at this time. Reports past response to Atarax for anxiety; can be given PRN. Will consider MAT for alcohol use disorder.    5/3: patient w/ continued depressive and anxious symptoms. uncertain if alcohol drives mood disturbance or vice versa. most recent CIWA 4. mild withdrawal sx on exam -- tongue fasciculations and tremor.   5/4: continues to tolerate taper well with low CIWA scores and stable VS.   5/5: tolerating taper. CIWA scores low. VSS. continued depression and anxiety. mentions wanting to pay ticket before going to rehab. MOCA performed. Score 13/30 although score in part due to limited English proficiency. however, limited evidence to suggest patient w/ alzheimer's. may have alcohol-related dementia, in which case, donepezil not warranted. can discontinue.     Plan:  - Routine observation appropriate; CO 1:1 NOT warranted   [ ] DC donepezil -- patient most likely w/ alcohol-related dementia (less/no evidence base to using donepezil)  [ ] Start naltrezone 50mg PO daily for alcohol misuse disorder; can be discharged on this  - Atarax 25-50mg PO PRN q6h for anxiety  - Continue to monitor withdrawal symptoms  - Dispo: inpatient rehab vs. outpatient substance treatment vs. outpatient dual diagnosis treatment; pending medical clearance  (does not warrant inpatient psych and lacks psych contraindications to discharge to rehab vs outpt setup when medically cleared).  No psych contraindications to discharge when med cleared. Will revisit on Monday 5/8 if still here but can leave from psych standpoint.

## 2023-05-05 NOTE — BH CONSULTATION LIAISON PROGRESS NOTE - CURRENT MEDICATION
MEDICATIONS  (STANDING):  chlordiazePOXIDE   Oral   clotrimazole 1% Cream 1 Application(s) Topical every 12 hours  donepezil 5 milliGRAM(s) Oral at bedtime  dorzolamide 2% Ophthalmic Solution 1 Drop(s) Both EYES three times a day  enoxaparin Injectable 40 milliGRAM(s) SubCutaneous every 24 hours  folic acid 1 milliGRAM(s) Oral daily  lactated ringers. 1000 milliLiter(s) (125 mL/Hr) IV Continuous <Continuous>  latanoprost 0.005% Ophthalmic Solution 1 Drop(s) Both EYES at bedtime  multivitamin 1 Tablet(s) Oral daily  pantoprazole    Tablet 40 milliGRAM(s) Oral before breakfast  thiamine 100 milliGRAM(s) Oral daily  timolol 0.5% Solution 1 Drop(s) Both EYES two times a day    MEDICATIONS  (PRN):  acetaminophen     Tablet .. 650 milliGRAM(s) Oral every 6 hours PRN Mild Pain (1 - 3)  aluminum hydroxide/magnesium hydroxide/simethicone Suspension 30 milliLiter(s) Oral every 4 hours PRN Dyspepsia  bisacodyl 5 milliGRAM(s) Oral at bedtime PRN Constipation  guaifenesin/dextromethorphan Oral Liquid 10 milliLiter(s) Oral every 4 hours PRN Cough  hydrOXYzine hydrochloride 25 milliGRAM(s) Oral every 6 hours PRN Anxiety  LORazepam   Injectable 2 milliGRAM(s) IV Push every 2 hours PRN CIWA-Ar score increase by 2 points and a total score of 7 or less  LORazepam   Injectable 2 milliGRAM(s) IV Push every 1 hour PRN Symptom-triggered: each CIWA -Ar score 8 or GREATER  melatonin 3 milliGRAM(s) Oral at bedtime PRN Insomnia  ondansetron Injectable 4 milliGRAM(s) IV Push every 8 hours PRN Nausea and/or Vomiting  
MEDICATIONS  (STANDING):  clotrimazole 1% Cream 1 Application(s) Topical every 12 hours  donepezil 5 milliGRAM(s) Oral at bedtime  dorzolamide 2% Ophthalmic Solution 1 Drop(s) Both EYES three times a day  enoxaparin Injectable 40 milliGRAM(s) SubCutaneous every 24 hours  folic acid 1 milliGRAM(s) Oral daily  lactated ringers. 1000 milliLiter(s) (125 mL/Hr) IV Continuous <Continuous>  latanoprost 0.005% Ophthalmic Solution 1 Drop(s) Both EYES at bedtime  multivitamin 1 Tablet(s) Oral daily  pantoprazole    Tablet 40 milliGRAM(s) Oral before breakfast  thiamine 100 milliGRAM(s) Oral daily  timolol 0.5% Solution 1 Drop(s) Both EYES two times a day    MEDICATIONS  (PRN):  acetaminophen     Tablet .. 650 milliGRAM(s) Oral every 6 hours PRN Mild Pain (1 - 3)  aluminum hydroxide/magnesium hydroxide/simethicone Suspension 30 milliLiter(s) Oral every 4 hours PRN Dyspepsia  bisacodyl 5 milliGRAM(s) Oral at bedtime PRN Constipation  guaifenesin/dextromethorphan Oral Liquid 10 milliLiter(s) Oral every 4 hours PRN Cough  hydrOXYzine hydrochloride 25 milliGRAM(s) Oral every 6 hours PRN Anxiety  LORazepam   Injectable 2 milliGRAM(s) IV Push every 1 hour PRN Symptom-triggered: each CIWA -Ar score 8 or GREATER  LORazepam   Injectable 2 milliGRAM(s) IV Push every 2 hours PRN CIWA-Ar score increase by 2 points and a total score of 7 or less  melatonin 3 milliGRAM(s) Oral at bedtime PRN Insomnia  ondansetron Injectable 4 milliGRAM(s) IV Push every 8 hours PRN Nausea and/or Vomiting  
MEDICATIONS  (STANDING):  chlordiazePOXIDE 50 milliGRAM(s) Oral every 12 hours  chlordiazePOXIDE   Oral   clotrimazole 1% Cream 1 Application(s) Topical every 12 hours  donepezil 5 milliGRAM(s) Oral at bedtime  dorzolamide 2% Ophthalmic Solution 1 Drop(s) Both EYES three times a day  enoxaparin Injectable 40 milliGRAM(s) SubCutaneous every 24 hours  folic acid 1 milliGRAM(s) Oral daily  lactated ringers. 1000 milliLiter(s) (125 mL/Hr) IV Continuous <Continuous>  latanoprost 0.005% Ophthalmic Solution 1 Drop(s) Both EYES at bedtime  multivitamin 1 Tablet(s) Oral daily  pantoprazole    Tablet 40 milliGRAM(s) Oral two times a day  thiamine 100 milliGRAM(s) Oral daily  timolol 0.5% Solution 1 Drop(s) Both EYES two times a day    MEDICATIONS  (PRN):  acetaminophen     Tablet .. 650 milliGRAM(s) Oral every 6 hours PRN Mild Pain (1 - 3)  aluminum hydroxide/magnesium hydroxide/simethicone Suspension 30 milliLiter(s) Oral every 4 hours PRN Dyspepsia  bisacodyl 5 milliGRAM(s) Oral at bedtime PRN Constipation  guaifenesin/dextromethorphan Oral Liquid 10 milliLiter(s) Oral every 4 hours PRN Cough  hydrOXYzine hydrochloride 25 milliGRAM(s) Oral every 6 hours PRN Anxiety  LORazepam   Injectable 2 milliGRAM(s) IV Push every 2 hours PRN CIWA-Ar score increase by 2 points and a total score of 7 or less  LORazepam   Injectable 2 milliGRAM(s) IV Push every 1 hour PRN Symptom-triggered: each CIWA -Ar score 8 or GREATER  melatonin 3 milliGRAM(s) Oral at bedtime PRN Insomnia  ondansetron Injectable 4 milliGRAM(s) IV Push every 8 hours PRN Nausea and/or Vomiting

## 2023-05-05 NOTE — BH CONSULTATION LIAISON PROGRESS NOTE - ATTENDING COMMENTS
Chart reviewed. Tolerating taper well. Cognitive difficulties as assessed above. Discussed with resident. Agree with assessment/recs. No psych contraindications to discharge when medically cleared. Start Natrexone as above and suggest outpt compliance. Will revisit if still here on Monday 5/8 though can leave prior from a psych standpoint.
Chart reviewed. Tolerating detox. Discussed with resident. Agree with above assessment/recs. Will continue to follow.

## 2023-05-05 NOTE — BH CONSULTATION LIAISON PROGRESS NOTE - NSBHATTESTTYPEVISIT_PSY_A_CORE
Attending Only
Resident/Fellow with telephonic supervision
Resident/Fellow with telephonic supervision

## 2023-05-05 NOTE — BH CONSULTATION LIAISON PROGRESS NOTE - NSBHCHARTREVIEWVS_PSY_A_CORE FT
Vital Signs Last 24 Hrs  T(C): 36.7 (04 May 2023 14:30), Max: 36.7 (03 May 2023 16:00)  T(F): 98 (04 May 2023 14:30), Max: 98.1 (03 May 2023 20:00)  HR: 98 (04 May 2023 14:30) (64 - 98)  BP: 117/72 (04 May 2023 14:30) (117/72 - 128/84)  BP(mean): --  RR: 16 (04 May 2023 14:30) (16 - 18)  SpO2: 98% (04 May 2023 14:30) (97% - 100%)    Parameters below as of 04 May 2023 14:30  Patient On (Oxygen Delivery Method): room air    
Vital Signs Last 24 Hrs  T(C): 36.6 (05 May 2023 10:30), Max: 36.7 (04 May 2023 14:30)  T(F): 97.9 (05 May 2023 10:30), Max: 98.1 (05 May 2023 02:30)  HR: 65 (05 May 2023 10:30) (64 - 98)  BP: 124/65 (05 May 2023 10:30) (116/70 - 134/80)  BP(mean): --  RR: 18 (05 May 2023 10:30) (16 - 18)  SpO2: 99% (05 May 2023 10:30) (98% - 99%)    Parameters below as of 05 May 2023 10:30  Patient On (Oxygen Delivery Method): room air    
Vital Signs Last 24 Hrs  T(C): 36.7 (03 May 2023 08:00), Max: 36.9 (02 May 2023 20:00)  T(F): 98 (03 May 2023 08:00), Max: 98.5 (02 May 2023 20:00)  HR: 66 (03 May 2023 08:00) (61 - 69)  BP: 133/74 (03 May 2023 08:00) (119/65 - 144/89)  BP(mean): --  RR: 18 (03 May 2023 08:00) (17 - 18)  SpO2: 97% (03 May 2023 08:00) (97% - 100%)    Parameters below as of 03 May 2023 08:00  Patient On (Oxygen Delivery Method): room air

## 2023-05-05 NOTE — BH CONSULTATION LIAISON PROGRESS NOTE - MSE UNSTRUCTURED FT
Presents as calm, cooperative, though concrete in thinking. Says he wishes to pay a court ticket and then go to rehab after discharge. Is vague about his psychiatric sx (mild anxiety/depression, though no SI/safety concerns). Insight appears to be low.
Presents as calm, cooperative, though concrete in thinking. Says he wishes to pay a court ticket and then go to rehab after discharge. Is vague about his psychiatric sx (mild anxiety/depression, though no SI/safety concerns). Insight appears to be low.

## 2023-05-05 NOTE — BH CONSULTATION LIAISON PROGRESS NOTE - NSICDXBHPRIMARYDX_PSY_ALL_CORE
Alcohol-induced mood disorder   F10.94  

## 2023-05-05 NOTE — PROGRESS NOTE ADULT - ATTENDING COMMENTS
58 year old male, with past history significant for Alcohol abuse, Hypertension, Anxiety and Depression, presented to the ED secondary to alcohol withdrawal.  Diagnosed with Alcohol Dependence with Withdrawal in the ED.     - S/p Librium taper and CIWA protocol for ETOH withdrawal. No concern for withdrawal  - Psych consulted for alcohol use and anxiety/depression. cleared for d/c; hydroxyzine prn for anxiety  - SW consulted for safe dispo - per patient, he lives with his cousin, but will likely need to go to shelter after d/c, which will be limited by contact isolation.   - MVI, thiamine, folate supplements; B12 wnl  - Significant rash on BLE, s/p fluconazole x 1 and c/w topical cream; Will start PO fluconazole weekly x 4 weeks (total 4 doses) per derm rec given extensive LE involvement; next dose 5/7.   - (+) dysuria, UA/Ucx neg  - Elevated lactate likely i/s/o ETOH use. Normalized  - RUQ (+) for hepatic steatosis, no cirrhosis; Bili normalized, will monitor  - (+) loose stool - resolving with 2 BM's, last one was more formed; GI PCR (+) for norovirus, on contact isolation - keep isolation for at least 48 hours after symptom resolution.   - Recent fall, low concern for severe trauma, will hold off imaging; PT consult - no skilled PT needs    Medically cleared on 5/5, but will need to stay until isolation is lifted.

## 2023-05-05 NOTE — PROGRESS NOTE ADULT - PROBLEM SELECTOR PLAN 11
Diet: DASH Diet  Psych/Sleep: None LUIS ARMANDO  GI: Protonix Dulcolax  DVT ppx: Lovenox  Code Status: Full Code  Dispo: Pending Librium Taper, to inpatient vs outpatient rehab Diet: DASH Diet  Psych/Sleep: None LUIS ARMANDO  GI: Protonix Dulcolax  DVT ppx: Lovenox  Code Status: Full Code  Dispo: Pending Librium Taper, likely OP rehab, will need shelter setup and need to be off contact isolations for Norovirus

## 2023-05-05 NOTE — BH CONSULTATION LIAISON PROGRESS NOTE - NSBHFUPINTERVALHXFT_PSY_A_CORE
Chart reviewed. Patient seen and evaluated at bedside. Patient reports feeling physically better. Says his mood is "good" but complains of continued depression. Patient uncertain if depression drives alcohol use or vice versa. Continues to complain of anxiety. No SI, HI, or AVH. Tongue fasciculations and tremors in bilateral hands. 
Chart reviewed. Tolerating Librium taper, with low CIWA scores and VSS. Presents as calm, cooperative, though concrete in thinking. Would like to pay ticket before checking himself into inpatient rehab. Remains vague about psychiatric symptoms (mild anxiety/depression, though no SI/safety concerns). Insight appears to be low. 13/30 on MOCA although likely limited by English proficiency. 
Chart reviewed. Tolerating Librium taper, with low CIWA scores and VSS. Presents as calm, cooperative, though concrete in thinking. Says he wishes to pay a court ticket and then go to rehab after discharge. Is vague about his psychiatric sx (mild anxiety/depression, though no SI/safety concerns). Insight appears to be low.

## 2023-05-05 NOTE — BH CONSULTATION LIAISON PROGRESS NOTE - NSBHATTESTBILLING_PSY_A_CORE
93890-Rtqkzwysac OBS or IP - moderate complexity OR 35-49 mins 83157-Jewbwsaina OBS or IP - moderate complexity OR 35-49 mins

## 2023-05-06 LAB
ANION GAP SERPL CALC-SCNC: 11 MMOL/L — SIGNIFICANT CHANGE UP (ref 7–14)
ANION GAP SERPL CALC-SCNC: 11 MMOL/L — SIGNIFICANT CHANGE UP (ref 7–14)
BUN SERPL-MCNC: 12 MG/DL — SIGNIFICANT CHANGE UP (ref 7–23)
BUN SERPL-MCNC: 12 MG/DL — SIGNIFICANT CHANGE UP (ref 7–23)
CALCIUM SERPL-MCNC: 9.1 MG/DL — SIGNIFICANT CHANGE UP (ref 8.4–10.5)
CALCIUM SERPL-MCNC: 9.1 MG/DL — SIGNIFICANT CHANGE UP (ref 8.4–10.5)
CHLORIDE SERPL-SCNC: 105 MMOL/L — SIGNIFICANT CHANGE UP (ref 98–107)
CHLORIDE SERPL-SCNC: 105 MMOL/L — SIGNIFICANT CHANGE UP (ref 98–107)
CO2 SERPL-SCNC: 22 MMOL/L — SIGNIFICANT CHANGE UP (ref 22–31)
CO2 SERPL-SCNC: 22 MMOL/L — SIGNIFICANT CHANGE UP (ref 22–31)
CREAT SERPL-MCNC: 0.69 MG/DL — SIGNIFICANT CHANGE UP (ref 0.5–1.3)
CREAT SERPL-MCNC: 0.69 MG/DL — SIGNIFICANT CHANGE UP (ref 0.5–1.3)
EGFR: 106 ML/MIN/1.73M2 — SIGNIFICANT CHANGE UP
EGFR: 106 ML/MIN/1.73M2 — SIGNIFICANT CHANGE UP
GLUCOSE SERPL-MCNC: 116 MG/DL — HIGH (ref 70–99)
GLUCOSE SERPL-MCNC: 116 MG/DL — HIGH (ref 70–99)
HCT VFR BLD CALC: 39.8 % — SIGNIFICANT CHANGE UP (ref 39–50)
HCT VFR BLD CALC: 39.8 % — SIGNIFICANT CHANGE UP (ref 39–50)
HGB BLD-MCNC: 12.9 G/DL — LOW (ref 13–17)
HGB BLD-MCNC: 12.9 G/DL — LOW (ref 13–17)
MAGNESIUM SERPL-MCNC: 1.9 MG/DL — SIGNIFICANT CHANGE UP (ref 1.6–2.6)
MAGNESIUM SERPL-MCNC: 1.9 MG/DL — SIGNIFICANT CHANGE UP (ref 1.6–2.6)
MCHC RBC-ENTMCNC: 30.2 PG — SIGNIFICANT CHANGE UP (ref 27–34)
MCHC RBC-ENTMCNC: 30.2 PG — SIGNIFICANT CHANGE UP (ref 27–34)
MCHC RBC-ENTMCNC: 32.4 GM/DL — SIGNIFICANT CHANGE UP (ref 32–36)
MCHC RBC-ENTMCNC: 32.4 GM/DL — SIGNIFICANT CHANGE UP (ref 32–36)
MCV RBC AUTO: 93.2 FL — SIGNIFICANT CHANGE UP (ref 80–100)
MCV RBC AUTO: 93.2 FL — SIGNIFICANT CHANGE UP (ref 80–100)
NRBC # BLD: 0 /100 WBCS — SIGNIFICANT CHANGE UP (ref 0–0)
NRBC # BLD: 0 /100 WBCS — SIGNIFICANT CHANGE UP (ref 0–0)
NRBC # FLD: 0 K/UL — SIGNIFICANT CHANGE UP (ref 0–0)
NRBC # FLD: 0 K/UL — SIGNIFICANT CHANGE UP (ref 0–0)
PHOSPHATE SERPL-MCNC: 3.7 MG/DL — SIGNIFICANT CHANGE UP (ref 2.5–4.5)
PHOSPHATE SERPL-MCNC: 3.7 MG/DL — SIGNIFICANT CHANGE UP (ref 2.5–4.5)
PLATELET # BLD AUTO: 217 K/UL — SIGNIFICANT CHANGE UP (ref 150–400)
PLATELET # BLD AUTO: 217 K/UL — SIGNIFICANT CHANGE UP (ref 150–400)
POTASSIUM SERPL-MCNC: 3.7 MMOL/L — SIGNIFICANT CHANGE UP (ref 3.5–5.3)
POTASSIUM SERPL-MCNC: 3.7 MMOL/L — SIGNIFICANT CHANGE UP (ref 3.5–5.3)
POTASSIUM SERPL-SCNC: 3.7 MMOL/L — SIGNIFICANT CHANGE UP (ref 3.5–5.3)
POTASSIUM SERPL-SCNC: 3.7 MMOL/L — SIGNIFICANT CHANGE UP (ref 3.5–5.3)
RBC # BLD: 4.27 M/UL — SIGNIFICANT CHANGE UP (ref 4.2–5.8)
RBC # BLD: 4.27 M/UL — SIGNIFICANT CHANGE UP (ref 4.2–5.8)
RBC # FLD: 14.8 % — HIGH (ref 10.3–14.5)
RBC # FLD: 14.8 % — HIGH (ref 10.3–14.5)
SODIUM SERPL-SCNC: 138 MMOL/L — SIGNIFICANT CHANGE UP (ref 135–145)
SODIUM SERPL-SCNC: 138 MMOL/L — SIGNIFICANT CHANGE UP (ref 135–145)
WBC # BLD: 5.72 K/UL — SIGNIFICANT CHANGE UP (ref 3.8–10.5)
WBC # BLD: 5.72 K/UL — SIGNIFICANT CHANGE UP (ref 3.8–10.5)
WBC # FLD AUTO: 5.72 K/UL — SIGNIFICANT CHANGE UP (ref 3.8–10.5)
WBC # FLD AUTO: 5.72 K/UL — SIGNIFICANT CHANGE UP (ref 3.8–10.5)

## 2023-05-06 PROCEDURE — 99232 SBSQ HOSP IP/OBS MODERATE 35: CPT | Mod: GC

## 2023-05-06 RX ADMIN — Medication 3 MILLIGRAM(S): at 21:28

## 2023-05-06 RX ADMIN — NALTREXONE HYDROCHLORIDE 50 MILLIGRAM(S): 50 TABLET, FILM COATED ORAL at 16:31

## 2023-05-06 RX ADMIN — PANTOPRAZOLE SODIUM 40 MILLIGRAM(S): 20 TABLET, DELAYED RELEASE ORAL at 07:24

## 2023-05-06 RX ADMIN — ENOXAPARIN SODIUM 40 MILLIGRAM(S): 100 INJECTION SUBCUTANEOUS at 14:46

## 2023-05-06 RX ADMIN — Medication 1 APPLICATION(S): at 11:01

## 2023-05-06 RX ADMIN — Medication 1 TABLET(S): at 14:42

## 2023-05-06 RX ADMIN — DORZOLAMIDE HYDROCHLORIDE 1 DROP(S): 20 SOLUTION/ DROPS OPHTHALMIC at 14:43

## 2023-05-06 RX ADMIN — Medication 1 DROP(S): at 07:25

## 2023-05-06 RX ADMIN — Medication 100 MILLIGRAM(S): at 14:42

## 2023-05-06 RX ADMIN — Medication 1 DROP(S): at 18:51

## 2023-05-06 RX ADMIN — Medication 1 MILLIGRAM(S): at 14:42

## 2023-05-06 RX ADMIN — DORZOLAMIDE HYDROCHLORIDE 1 DROP(S): 20 SOLUTION/ DROPS OPHTHALMIC at 21:28

## 2023-05-06 RX ADMIN — Medication 1 APPLICATION(S): at 21:29

## 2023-05-06 RX ADMIN — DORZOLAMIDE HYDROCHLORIDE 1 DROP(S): 20 SOLUTION/ DROPS OPHTHALMIC at 07:25

## 2023-05-06 RX ADMIN — LATANOPROST 1 DROP(S): 0.05 SOLUTION/ DROPS OPHTHALMIC; TOPICAL at 21:29

## 2023-05-06 NOTE — DISCHARGE NOTE PROVIDER - CARE PROVIDERS DIRECT ADDRESSES
[email or web address]

## 2023-05-06 NOTE — DISCHARGE NOTE PROVIDER - HOSPITAL COURSE
58 year old male, with past history significant for Alcohol abuse, Hypertension, Anxiety and Depression, presented to the ED secondary to alcohol withdrawal c/b anxiety and depression. Treated for alcohol withdrawal with librium taper and CIWA protocol (completed 5/5). Course further complicated by multiple episodes of diarrhea with GI PCR showing Norovirus, currently on contact precautions and being treated supportively. Pt seen by Psych who said op rehab is ok, and started on Atarax PRN for anxiety, and MAT with Naltrexone. Pt also with tinea pedis/cruriris on admission seen by dermatology who recommended weekly fluconazole for 4 weeks (second dose 5/7). Pt with abdominal U/S showing gallstones, no cholecystitis, and hepatic steatosis+ likely in setting of chronic alcohol use. Pt pending diso to shelter to be setup by social work once diarrhea resolves and contact precautions no longer needed.      58 year old male, with past history significant for Alcohol abuse, Hypertension, Anxiety and Depression, presented to the ED secondary to alcohol withdrawal c/b anxiety and depression. Treated for alcohol withdrawal with librium taper and CIWA protocol (completed 5/5). Course further complicated by multiple episodes of diarrhea with GI PCR showing Norovirus, currently on contact precautions and being treated supportively. Pt seen by Psych who said op rehab is ok, and started on Atarax PRN for anxiety, and MAT with Naltrexone. Pt also with tinea pedis/cruriris on admission seen by dermatology who recommended weekly fluconazole for 4 weeks (second dose 5/7). Pt with abdominal U/S showing gallstones, no cholecystitis, and hepatic steatosis+ likely in setting of chronic alcohol use. Pt's diarrhea resolved, and on day of d/c was HDS, afebrile.

## 2023-05-06 NOTE — PROGRESS NOTE ADULT - PROBLEM SELECTOR PLAN 1
- drinking continuously w/o abatement, with last drink being prior to coming to the ED.  Non-partial re type of alcohol  - tremulous.  Unaware if ever had seizures  - alcohol level = 364, lactate = 4.8  - CIWA protocol, including that of symptom triggered, in effect   - on MVI, folic acid, thiamine  - IVF hydration in progress  - f/u Social Work consult  - f/u SBIRT  - psychiatry consulted (Depression, Anxiety, Alcohol abuse; patient amenable to consult)  - aspiration, seizure precautions    Plan:  - C/W Librium taper, dc symptom triggered ativan PRN  - C/W Thiamine, folic acid, MV  - atarax PRN for anxiety   Dispo: seems outpatient rehab, MAT being considered - drinking continuously w/o abatement, with last drink being prior to coming to the ED.  Non-partial re type of alcohol  - tremulous.  Unaware if ever had seizures  - alcohol level = 364, lactate = 4.8  - CIWA protocol, including that of symptom triggered, in effect   - on MVI, folic acid, thiamine  - IVF hydration in progress  - f/u Social Work consult  - f/u SBIRT  - psychiatry consulted (Depression, Anxiety, Alcohol abuse; patient amenable to consult)  - aspiration, seizure precautions    Plan:  - s/p Librium taper completed 5/5, dc symptom triggered ativan PRN  - C/W Thiamine, folic acid, MV  - atarax PRN for anxiety   Dispo: seems outpatient rehab, on Naltrexone MAT

## 2023-05-06 NOTE — DISCHARGE NOTE PROVIDER - CARE PROVIDER_API CALL
Clementina Estevez)  Internal Medicine  [address], [address]  [place] [address]  Phone: [phone]  Fax: [phone]  Follow Up Time:    Clementina Estevez)  Internal Medicine  [address], [address]  [place] [address]  Phone: [phone]  Fax: [phone]  Follow Up Time:

## 2023-05-06 NOTE — DISCHARGE NOTE PROVIDER - NSDCCPCAREPLAN_GEN_ALL_CORE_FT
PRINCIPAL DISCHARGE DIAGNOSIS  Diagnosis: Alcohol dependence with withdrawal  Assessment and Plan of Treatment: It was noted that your alcohol level on admission was extremely high. Your symptoms were deemed to be due to alcohol withdrawl. You were placed on a medication taper that would reduce symptoms from the alcohol withdrawl. On the day of your discharge, you were not exibiting any symptoms of withdrawl.      SECONDARY DISCHARGE DIAGNOSES  Diagnosis: Norovirus  Assessment and Plan of Treatment: You tested (+) for norovirus, which is causing symptoms of diarrhea. On day of discharge you had showed improvement. You will need to isolate for 48 hrs upon resolution of your diarrhea.     PRINCIPAL DISCHARGE DIAGNOSIS  Diagnosis: Alcohol dependence with withdrawal  Assessment and Plan of Treatment: It was noted that your alcohol level on admission was extremely high. Your symptoms were deemed to be due to alcohol withdrawl. You were placed on a medication taper that would reduce symptoms from the alcohol withdrawl. On the day of your discharge, you were not exibiting any symptoms of withdrawl.      SECONDARY DISCHARGE DIAGNOSES  Diagnosis: Skin rash  Assessment and Plan of Treatment: You were started on fluconazole during your stay. Please follow-up with your primary care doctor and complete the course of fluonazole as instructed.    Diagnosis: Norovirus  Assessment and Plan of Treatment: You tested (+) for norovirus, which is causing symptoms of diarrhea. On day of discharge you had showed improvement. You will need to isolate for 48 hrs upon resolution of your diarrhea.

## 2023-05-06 NOTE — PROGRESS NOTE ADULT - SUBJECTIVE AND OBJECTIVE BOX
Interval Events: Required ativan ON for agitation. 3 episodes of diarrhea overnight. Hand tremors reported. CIWA 4 ON    OBJECTIVE:  Vital Signs Last 24 Hrs  T(C): 37 (02 May 2023 04:02), Max: 37.3 (01 May 2023 12:00)  T(F): 98.6 (02 May 2023 04:02), Max: 99.2 (01 May 2023 12:00)  HR: 61 (02 May 2023 04:02) (61 - 98)  BP: 141/64 (02 May 2023 04:02) (112/69 - 146/76)  BP(mean): --  ABP: --  ABP(mean): --  RR: 17 (02 May 2023 04:02) (17 - 18)  SpO2: 98% (02 May 2023 04:02) (96% - 100%)    O2 Parameters below as of 02 May 2023 04:02  Patient On (Oxygen Delivery Method): room air    CAPILLARY BLOOD GLUCOSE      PHYSICAL EXAM:  General: Calm in bed  Neurology: A&Ox3, nonfocal, HICKS x 4  Eyes: PERRLA/ EOMI, Gross vision intact  ENT/Neck: Neck supple, trachea midline, No JVD, Gross hearing intact  Respiratory: CTA B/L, No wheezing, rales, rhonchi  CV: RRR, +S1/S2, -S3/S4, no murmurs, rubs or gallops  Abdominal: Soft, NT, ND +BS, No HSM  Extremities: Visible Tremors, 2+ peripheral pulses  Skin: Rash over posterior thighs and bilateral glutes      HOSPITAL MEDICATIONS:  MEDICATIONS  (STANDING):  chlordiazePOXIDE   Oral   chlordiazePOXIDE 50 milliGRAM(s) Oral every 8 hours  clotrimazole 1% Cream 1 Application(s) Topical every 12 hours  donepezil 5 milliGRAM(s) Oral at bedtime  dorzolamide 2% Ophthalmic Solution 1 Drop(s) Both EYES three times a day  enoxaparin Injectable 40 milliGRAM(s) SubCutaneous every 24 hours  folic acid 1 milliGRAM(s) Oral daily  lactated ringers. 1000 milliLiter(s) (125 mL/Hr) IV Continuous <Continuous>  latanoprost 0.005% Ophthalmic Solution 1 Drop(s) Both EYES at bedtime  multivitamin 1 Tablet(s) Oral daily  pantoprazole    Tablet 40 milliGRAM(s) Oral two times a day  thiamine 100 milliGRAM(s) Oral daily  timolol 0.5% Solution 1 Drop(s) Both EYES two times a day    MEDICATIONS  (PRN):  acetaminophen     Tablet .. 650 milliGRAM(s) Oral every 6 hours PRN Mild Pain (1 - 3)  aluminum hydroxide/magnesium hydroxide/simethicone Suspension 30 milliLiter(s) Oral every 4 hours PRN Dyspepsia  bisacodyl 5 milliGRAM(s) Oral at bedtime PRN Constipation  LORazepam   Injectable 2 milliGRAM(s) IV Push every 2 hours PRN CIWA-Ar score increase by 2 points and a total score of 7 or less  LORazepam   Injectable 2 milliGRAM(s) IV Push every 1 hour PRN Symptom-triggered: each CIWA -Ar score 8 or GREATER  melatonin 3 milliGRAM(s) Oral at bedtime PRN Insomnia  ondansetron Injectable 4 milliGRAM(s) IV Push every 8 hours PRN Nausea and/or Vomiting      LABS:                        14.3   6.72  )-----------( 233      ( 30 Apr 2023 21:00 )             41.6     Hgb Trend: 14.3<--  05-01    141  |  104  |  7   ----------------------------<  84  3.9   |  22  |  0.69    Ca    8.1<L>      01 May 2023 06:40  Phos  3.2     05-01  Mg     1.70     05-01    TPro  6.3  /  Alb  3.6  /  TBili  0.6  /  DBili  x   /  AST  49<H>  /  ALT  61<H>  /  AlkPhos  57  05-01    Creatinine Trend: 0.69<--, 0.68<--  PT/INR - ( 30 Apr 2023 21:00 )   PT: 10.9 sec;   INR: 0.94 ratio         PTT - ( 30 Apr 2023 21:00 )  PTT:29.3 sec      Venous Blood Gas:  05-01 @ 01:00  7.35/46/52/25/79.9  VBG Lactate: 3.1  Venous Blood Gas:  04-30 @ 21:42  7.33/49/52/26/77.7  VBG Lactate: 4.8      MICROBIOLOGY:              INTERVAL HPI/OVERNIGHT EVENTS:    SUBJECTIVE: Patient seen and examined at bedside.         OBJECTIVE:    VITAL SIGNS:  ICU Vital Signs Last 24 Hrs  T(C): 36.4 (05 May 2023 21:44), Max: 36.6 (05 May 2023 10:30)  T(F): 97.6 (05 May 2023 21:44), Max: 97.9 (05 May 2023 10:30)  HR: 60 (05 May 2023 21:44) (60 - 65)  BP: 145/85 (05 May 2023 21:44) (103/63 - 145/85)  BP(mean): --  ABP: --  ABP(mean): --  RR: 18 (05 May 2023 21:44) (18 - 18)  SpO2: 100% (05 May 2023 21:44) (99% - 100%)    O2 Parameters below as of 05 May 2023 21:44  Patient On (Oxygen Delivery Method): room air            Plateau pressure:   P/F ratio:     CAPILLARY BLOOD GLUCOSE        PHYSICAL EXAM:  General: Calm in bed  Neurology: A&Ox3, nonfocal, HICKS x 4  Eyes: PERRLA/ EOMI, Gross vision intact  ENT/Neck: Neck supple, trachea midline, No JVD, Gross hearing intact  Respiratory: CTA B/L, No wheezing, rales, rhonchi  CV: RRR, +S1/S2, -S3/S4, no murmurs, rubs or gallops  Abdominal: Soft, NT, ND +BS, No HSM  Extremities: Visible Tremors, 2+ peripheral pulses  Skin: Rash over posterior thighs and bilateral     MEDICATIONS:  MEDICATIONS  (STANDING):  clotrimazole 1% Cream 1 Application(s) Topical every 12 hours  dorzolamide 2% Ophthalmic Solution 1 Drop(s) Both EYES three times a day  enoxaparin Injectable 40 milliGRAM(s) SubCutaneous every 24 hours  folic acid 1 milliGRAM(s) Oral daily  lactated ringers. 1000 milliLiter(s) (125 mL/Hr) IV Continuous <Continuous>  latanoprost 0.005% Ophthalmic Solution 1 Drop(s) Both EYES at bedtime  multivitamin 1 Tablet(s) Oral daily  naltrexone 50 milliGRAM(s) Oral daily  pantoprazole    Tablet 40 milliGRAM(s) Oral before breakfast  thiamine 100 milliGRAM(s) Oral daily  timolol 0.5% Solution 1 Drop(s) Both EYES two times a day    MEDICATIONS  (PRN):  acetaminophen     Tablet .. 650 milliGRAM(s) Oral every 6 hours PRN Mild Pain (1 - 3)  aluminum hydroxide/magnesium hydroxide/simethicone Suspension 30 milliLiter(s) Oral every 4 hours PRN Dyspepsia  bisacodyl 5 milliGRAM(s) Oral at bedtime PRN Constipation  guaifenesin/dextromethorphan Oral Liquid 10 milliLiter(s) Oral every 4 hours PRN Cough  hydrOXYzine hydrochloride 25 milliGRAM(s) Oral every 6 hours PRN Anxiety  melatonin 3 milliGRAM(s) Oral at bedtime PRN Insomnia  ondansetron Injectable 4 milliGRAM(s) IV Push every 8 hours PRN Nausea and/or Vomiting      LABS:                        12.9   5.72  )-----------( 217      ( 06 May 2023 06:00 )             39.8     05-05    138  |  102  |  16  ----------------------------<  104<H>  3.2<L>   |  22  |  0.74    Ca    9.1      05 May 2023 07:14  Phos  3.8     05-05  Mg     1.80     05-05    TPro  7.1  /  Alb  3.8  /  TBili  0.3  /  DBili  x   /  AST  36  /  ALT  55<H>  /  AlkPhos  55  05-05          RADIOLOGY & ADDITIONAL TESTS: Reviewed.     INTERVAL HPI/OVERNIGHT EVENTS:    SUBJECTIVE: Patient seen and examined at bedside. No acute overnight events. pt states 3 episodes of diarrhea overnight. Still with hand tremors.     OBJECTIVE:    VITAL SIGNS:  ICU Vital Signs Last 24 Hrs  T(C): 36.4 (05 May 2023 21:44), Max: 36.6 (05 May 2023 10:30)  T(F): 97.6 (05 May 2023 21:44), Max: 97.9 (05 May 2023 10:30)  HR: 60 (05 May 2023 21:44) (60 - 65)  BP: 145/85 (05 May 2023 21:44) (103/63 - 145/85)  BP(mean): --  ABP: --  ABP(mean): --  RR: 18 (05 May 2023 21:44) (18 - 18)  SpO2: 100% (05 May 2023 21:44) (99% - 100%)    O2 Parameters below as of 05 May 2023 21:44  Patient On (Oxygen Delivery Method): room air            Plateau pressure:   P/F ratio:     CAPILLARY BLOOD GLUCOSE        PHYSICAL EXAM:  General: Calm in bed  Neurology: A&Ox3, nonfocal, HICKS x 4  Eyes: PERRLA/ EOMI, Gross vision intact  ENT/Neck: Neck supple, trachea midline, No JVD, Gross hearing intact  Respiratory: CTA B/L, No wheezing, rales, rhonchi  CV: RRR, +S1/S2, -S3/S4, no murmurs, rubs or gallops  Abdominal: Soft, NT, ND +BS, No HSM  Extremities: Visible Tremors, 2+ peripheral pulses  Skin: Rash over posterior thighs and bilateral     MEDICATIONS:  MEDICATIONS  (STANDING):  clotrimazole 1% Cream 1 Application(s) Topical every 12 hours  dorzolamide 2% Ophthalmic Solution 1 Drop(s) Both EYES three times a day  enoxaparin Injectable 40 milliGRAM(s) SubCutaneous every 24 hours  folic acid 1 milliGRAM(s) Oral daily  lactated ringers. 1000 milliLiter(s) (125 mL/Hr) IV Continuous <Continuous>  latanoprost 0.005% Ophthalmic Solution 1 Drop(s) Both EYES at bedtime  multivitamin 1 Tablet(s) Oral daily  naltrexone 50 milliGRAM(s) Oral daily  pantoprazole    Tablet 40 milliGRAM(s) Oral before breakfast  thiamine 100 milliGRAM(s) Oral daily  timolol 0.5% Solution 1 Drop(s) Both EYES two times a day    MEDICATIONS  (PRN):  acetaminophen     Tablet .. 650 milliGRAM(s) Oral every 6 hours PRN Mild Pain (1 - 3)  aluminum hydroxide/magnesium hydroxide/simethicone Suspension 30 milliLiter(s) Oral every 4 hours PRN Dyspepsia  bisacodyl 5 milliGRAM(s) Oral at bedtime PRN Constipation  guaifenesin/dextromethorphan Oral Liquid 10 milliLiter(s) Oral every 4 hours PRN Cough  hydrOXYzine hydrochloride 25 milliGRAM(s) Oral every 6 hours PRN Anxiety  melatonin 3 milliGRAM(s) Oral at bedtime PRN Insomnia  ondansetron Injectable 4 milliGRAM(s) IV Push every 8 hours PRN Nausea and/or Vomiting      LABS:                        12.9   5.72  )-----------( 217      ( 06 May 2023 06:00 )             39.8     05-05    138  |  102  |  16  ----------------------------<  104<H>  3.2<L>   |  22  |  0.74    Ca    9.1      05 May 2023 07:14  Phos  3.8     05-05  Mg     1.80     05-05    TPro  7.1  /  Alb  3.8  /  TBili  0.3  /  DBili  x   /  AST  36  /  ALT  55<H>  /  AlkPhos  55  05-05          RADIOLOGY & ADDITIONAL TESTS: Reviewed.

## 2023-05-06 NOTE — PROGRESS NOTE ADULT - PROBLEM SELECTOR PLAN 11
Diet: DASH Diet  Psych/Sleep: None LUIS ARMANDO  GI: Protonix Dulcolax  DVT ppx: Lovenox  Code Status: Full Code  Dispo: Pending Librium Taper, likely OP rehab, will need shelter setup and need to be off contact isolations for Norovirus Diet: DASH Diet  Psych/Sleep: None LUIS ARMANDO  GI: Protonix Dulcolax  DVT ppx: Lovenox  Code Status: Full Code  Dispo: likely OP rehab, will need shelter setup and need to be off contact isolations for Norovirus

## 2023-05-06 NOTE — DISCHARGE NOTE PROVIDER - NSDCMRMEDTOKEN_GEN_ALL_CORE_FT
amLODIPine 10 mg oral tablet: 1 orally once a day  donepezil 5 mg oral tablet, disintegratin tab(s) orally once a day  dorzolamide 2% ophthalmic solution: 1 in each eye 3 times a day  latanoprost 0.005% ophthalmic emulsion: 1 in each affected eye  latanoprost 0.005% ophthalmic solution: 1 in each eye once a day (at bedtime)  naltrexone 50 mg oral tablet: 1 orally once a day  timolol hemihydrate 0.5% ophthalmic solution: 1 in each affected eye 2 times a day   amLODIPine 10 mg oral tablet: 1 orally once a day  donepezil 5 mg oral tablet, disintegratin tab(s) orally once a day  dorzolamide 2% ophthalmic solution: 1 in each eye 3 times a day  latanoprost 0.005% ophthalmic emulsion: 1 emulsion in each affected eye once a day (at bedtime)  latanoprost 0.005% ophthalmic solution: 1 in each eye once a day (at bedtime)  naltrexone 50 mg oral tablet: 1 orally once a day  timolol hemihydrate 0.5% ophthalmic solution: 1 in each affected eye 2 times a day

## 2023-05-07 LAB
ALBUMIN SERPL ELPH-MCNC: 3.7 G/DL — SIGNIFICANT CHANGE UP (ref 3.3–5)
ALBUMIN SERPL ELPH-MCNC: 3.7 G/DL — SIGNIFICANT CHANGE UP (ref 3.3–5)
ALP SERPL-CCNC: 52 U/L — SIGNIFICANT CHANGE UP (ref 40–120)
ALP SERPL-CCNC: 52 U/L — SIGNIFICANT CHANGE UP (ref 40–120)
ALT FLD-CCNC: 107 U/L — HIGH (ref 4–41)
ALT FLD-CCNC: 107 U/L — HIGH (ref 4–41)
ANION GAP SERPL CALC-SCNC: 11 MMOL/L — SIGNIFICANT CHANGE UP (ref 7–14)
ANION GAP SERPL CALC-SCNC: 11 MMOL/L — SIGNIFICANT CHANGE UP (ref 7–14)
AST SERPL-CCNC: 64 U/L — HIGH (ref 4–40)
AST SERPL-CCNC: 64 U/L — HIGH (ref 4–40)
BILIRUB SERPL-MCNC: 0.3 MG/DL — SIGNIFICANT CHANGE UP (ref 0.2–1.2)
BILIRUB SERPL-MCNC: 0.3 MG/DL — SIGNIFICANT CHANGE UP (ref 0.2–1.2)
BUN SERPL-MCNC: 12 MG/DL — SIGNIFICANT CHANGE UP (ref 7–23)
BUN SERPL-MCNC: 12 MG/DL — SIGNIFICANT CHANGE UP (ref 7–23)
CALCIUM SERPL-MCNC: 9.2 MG/DL — SIGNIFICANT CHANGE UP (ref 8.4–10.5)
CALCIUM SERPL-MCNC: 9.2 MG/DL — SIGNIFICANT CHANGE UP (ref 8.4–10.5)
CHLORIDE SERPL-SCNC: 105 MMOL/L — SIGNIFICANT CHANGE UP (ref 98–107)
CHLORIDE SERPL-SCNC: 105 MMOL/L — SIGNIFICANT CHANGE UP (ref 98–107)
CO2 SERPL-SCNC: 21 MMOL/L — LOW (ref 22–31)
CO2 SERPL-SCNC: 21 MMOL/L — LOW (ref 22–31)
CREAT SERPL-MCNC: 0.77 MG/DL — SIGNIFICANT CHANGE UP (ref 0.5–1.3)
CREAT SERPL-MCNC: 0.77 MG/DL — SIGNIFICANT CHANGE UP (ref 0.5–1.3)
EGFR: 102 ML/MIN/1.73M2 — SIGNIFICANT CHANGE UP
EGFR: 102 ML/MIN/1.73M2 — SIGNIFICANT CHANGE UP
GLUCOSE SERPL-MCNC: 105 MG/DL — HIGH (ref 70–99)
GLUCOSE SERPL-MCNC: 105 MG/DL — HIGH (ref 70–99)
HCT VFR BLD CALC: 38.7 % — LOW (ref 39–50)
HCT VFR BLD CALC: 38.7 % — LOW (ref 39–50)
HGB BLD-MCNC: 12.5 G/DL — LOW (ref 13–17)
HGB BLD-MCNC: 12.5 G/DL — LOW (ref 13–17)
MCHC RBC-ENTMCNC: 29.3 PG — SIGNIFICANT CHANGE UP (ref 27–34)
MCHC RBC-ENTMCNC: 29.3 PG — SIGNIFICANT CHANGE UP (ref 27–34)
MCHC RBC-ENTMCNC: 32.3 GM/DL — SIGNIFICANT CHANGE UP (ref 32–36)
MCHC RBC-ENTMCNC: 32.3 GM/DL — SIGNIFICANT CHANGE UP (ref 32–36)
MCV RBC AUTO: 90.6 FL — SIGNIFICANT CHANGE UP (ref 80–100)
MCV RBC AUTO: 90.6 FL — SIGNIFICANT CHANGE UP (ref 80–100)
NRBC # BLD: 0 /100 WBCS — SIGNIFICANT CHANGE UP (ref 0–0)
NRBC # BLD: 0 /100 WBCS — SIGNIFICANT CHANGE UP (ref 0–0)
NRBC # FLD: 0 K/UL — SIGNIFICANT CHANGE UP (ref 0–0)
NRBC # FLD: 0 K/UL — SIGNIFICANT CHANGE UP (ref 0–0)
PLATELET # BLD AUTO: 226 K/UL — SIGNIFICANT CHANGE UP (ref 150–400)
PLATELET # BLD AUTO: 226 K/UL — SIGNIFICANT CHANGE UP (ref 150–400)
POTASSIUM SERPL-MCNC: 3.8 MMOL/L — SIGNIFICANT CHANGE UP (ref 3.5–5.3)
POTASSIUM SERPL-MCNC: 3.8 MMOL/L — SIGNIFICANT CHANGE UP (ref 3.5–5.3)
POTASSIUM SERPL-SCNC: 3.8 MMOL/L — SIGNIFICANT CHANGE UP (ref 3.5–5.3)
POTASSIUM SERPL-SCNC: 3.8 MMOL/L — SIGNIFICANT CHANGE UP (ref 3.5–5.3)
PROT SERPL-MCNC: 7.1 G/DL — SIGNIFICANT CHANGE UP (ref 6–8.3)
PROT SERPL-MCNC: 7.1 G/DL — SIGNIFICANT CHANGE UP (ref 6–8.3)
RBC # BLD: 4.27 M/UL — SIGNIFICANT CHANGE UP (ref 4.2–5.8)
RBC # BLD: 4.27 M/UL — SIGNIFICANT CHANGE UP (ref 4.2–5.8)
RBC # FLD: 14.7 % — HIGH (ref 10.3–14.5)
RBC # FLD: 14.7 % — HIGH (ref 10.3–14.5)
SODIUM SERPL-SCNC: 137 MMOL/L — SIGNIFICANT CHANGE UP (ref 135–145)
SODIUM SERPL-SCNC: 137 MMOL/L — SIGNIFICANT CHANGE UP (ref 135–145)
WBC # BLD: 5.73 K/UL — SIGNIFICANT CHANGE UP (ref 3.8–10.5)
WBC # BLD: 5.73 K/UL — SIGNIFICANT CHANGE UP (ref 3.8–10.5)
WBC # FLD AUTO: 5.73 K/UL — SIGNIFICANT CHANGE UP (ref 3.8–10.5)
WBC # FLD AUTO: 5.73 K/UL — SIGNIFICANT CHANGE UP (ref 3.8–10.5)

## 2023-05-07 PROCEDURE — 99232 SBSQ HOSP IP/OBS MODERATE 35: CPT | Mod: GC

## 2023-05-07 RX ADMIN — Medication 1 TABLET(S): at 12:39

## 2023-05-07 RX ADMIN — PANTOPRAZOLE SODIUM 40 MILLIGRAM(S): 20 TABLET, DELAYED RELEASE ORAL at 06:00

## 2023-05-07 RX ADMIN — Medication 3 MILLIGRAM(S): at 21:38

## 2023-05-07 RX ADMIN — Medication 1 MILLIGRAM(S): at 12:39

## 2023-05-07 RX ADMIN — Medication 1 DROP(S): at 05:58

## 2023-05-07 RX ADMIN — DORZOLAMIDE HYDROCHLORIDE 1 DROP(S): 20 SOLUTION/ DROPS OPHTHALMIC at 12:40

## 2023-05-07 RX ADMIN — LATANOPROST 1 DROP(S): 0.05 SOLUTION/ DROPS OPHTHALMIC; TOPICAL at 21:39

## 2023-05-07 RX ADMIN — DORZOLAMIDE HYDROCHLORIDE 1 DROP(S): 20 SOLUTION/ DROPS OPHTHALMIC at 21:38

## 2023-05-07 RX ADMIN — DORZOLAMIDE HYDROCHLORIDE 1 DROP(S): 20 SOLUTION/ DROPS OPHTHALMIC at 05:58

## 2023-05-07 RX ADMIN — Medication 1 APPLICATION(S): at 21:39

## 2023-05-07 RX ADMIN — ENOXAPARIN SODIUM 40 MILLIGRAM(S): 100 INJECTION SUBCUTANEOUS at 12:41

## 2023-05-07 RX ADMIN — Medication 100 MILLIGRAM(S): at 12:39

## 2023-05-07 RX ADMIN — NALTREXONE HYDROCHLORIDE 50 MILLIGRAM(S): 50 TABLET, FILM COATED ORAL at 12:41

## 2023-05-07 RX ADMIN — Medication 1 APPLICATION(S): at 09:56

## 2023-05-07 RX ADMIN — Medication 1 DROP(S): at 18:32

## 2023-05-07 NOTE — PROGRESS NOTE ADULT - SUBJECTIVE AND OBJECTIVE BOX
PROGRESS NOTE:   Authored by Haroldo Silva MD  Pager: [facility] [phone]; [facility] [phone]    Patient is a 58y old  Male who presents with a chief complaint of Alcohol dependence with withdrawal, Dehydration, Homelessness (06 May 2023 11:49)      SUBJECTIVE / OVERNIGHT EVENTS:  Patient denies any complaints overnight. The patient denies any fevers, chills, nausea, vomiting, or increased pain.     ADDITIONAL REVIEW OF SYSTEMS:    MEDICATIONS  (STANDING):  clotrimazole 1% Cream 1 Application(s) Topical every 12 hours  dorzolamide 2% Ophthalmic Solution 1 Drop(s) Both EYES three times a day  enoxaparin Injectable 40 milliGRAM(s) SubCutaneous every 24 hours  fluconAZOLE   Tablet 200 milliGRAM(s) Oral once  folic acid 1 milliGRAM(s) Oral daily  lactated ringers. 1000 milliLiter(s) (125 mL/Hr) IV Continuous <Continuous>  latanoprost 0.005% Ophthalmic Solution 1 Drop(s) Both EYES at bedtime  multivitamin 1 Tablet(s) Oral daily  naltrexone 50 milliGRAM(s) Oral daily  pantoprazole    Tablet 40 milliGRAM(s) Oral before breakfast  thiamine 100 milliGRAM(s) Oral daily  timolol 0.5% Solution 1 Drop(s) Both EYES two times a day    MEDICATIONS  (PRN):  acetaminophen     Tablet .. 650 milliGRAM(s) Oral every 6 hours PRN Mild Pain (1 - 3)  aluminum hydroxide/magnesium hydroxide/simethicone Suspension 30 milliLiter(s) Oral every 4 hours PRN Dyspepsia  bisacodyl 5 milliGRAM(s) Oral at bedtime PRN Constipation  guaifenesin/dextromethorphan Oral Liquid 10 milliLiter(s) Oral every 4 hours PRN Cough  hydrOXYzine hydrochloride 25 milliGRAM(s) Oral every 6 hours PRN Anxiety  melatonin 3 milliGRAM(s) Oral at bedtime PRN Insomnia  ondansetron Injectable 4 milliGRAM(s) IV Push every 8 hours PRN Nausea and/or Vomiting      CAPILLARY BLOOD GLUCOSE        I&O's Summary      PHYSICAL EXAM:  Vital Signs Last 24 Hrs  T(C): 36.6 (07 May 2023 05:12), Max: 36.7 (06 May 2023 21:21)  T(F): 97.9 (07 May 2023 05:12), Max: 98.1 (06 May 2023 21:21)  HR: 60 (07 May 2023 05:12) (54 - 64)  BP: 107/64 (07 May 2023 05:12) (107/64 - 133/74)  BP(mean): --  RR: 17 (07 May 2023 05:12) (17 - 19)  SpO2: 98% (07 May 2023 05:12) (98% - 98%)    Parameters below as of 07 May 2023 05:12  Patient On (Oxygen Delivery Method): room air    General: Calm in bed  Neurology: A&Ox3, nonfocal, HICKS x 4  Eyes: PERRLA/ EOMI, Gross vision intact  ENT/Neck: Neck supple, trachea midline, No JVD, Gross hearing intact  Respiratory: CTA B/L, No wheezing, rales, rhonchi  CV: RRR, +S1/S2, -S3/S4, no murmurs, rubs or gallops  Abdominal: Soft, NT, ND +BS, No HSM  Extremities: Visible Tremors, 2+ peripheral pulses  Skin: Rash over posterior thighs and bilateral    LABS:                        12.5   5.73  )-----------( 226      ( 07 May 2023 06:40 )             38.7     05-07    137  |  105  |  12  ----------------------------<  105<H>  3.8   |  21<L>  |  0.77    Ca    9.2      07 May 2023 06:40  Phos  3.7     05-06  Mg     1.90     05-06    TPro  7.1  /  Alb  3.7  /  TBili  0.3  /  DBili  x   /  AST  64<H>  /  ALT  107<H>  /  AlkPhos  52  05-07                RADIOLOGY & ADDITIONAL TESTS:  Results Reviewed:   Imaging Personally Reviewed:  Electrocardiogram Personally Reviewed:    COORDINATION OF CARE:  Care Discussed with Consultants/Other Providers [Y/N]:  Prior or Outpatient Records Reviewed [Y/N]:   PROGRESS NOTE:   Authored by Haroldo Silva MD  Pager: [facility] [phone]; [facility] [phone]    Patient is a 58y old  Male who presents with a chief complaint of Alcohol dependence with withdrawal, Dehydration, Homelessness (06 May 2023 11:49)      SUBJECTIVE / OVERNIGHT EVENTS:  Patient denies any complaints overnight. The patient denies any fevers, chills, nausea, vomiting, or increased pain.     ADDITIONAL REVIEW OF SYSTEMS:    MEDICATIONS  (STANDING):  clotrimazole 1% Cream 1 Application(s) Topical every 12 hours  dorzolamide 2% Ophthalmic Solution 1 Drop(s) Both EYES three times a day  enoxaparin Injectable 40 milliGRAM(s) SubCutaneous every 24 hours  fluconAZOLE   Tablet 200 milliGRAM(s) Oral once  folic acid 1 milliGRAM(s) Oral daily  lactated ringers. 1000 milliLiter(s) (125 mL/Hr) IV Continuous <Continuous>  latanoprost 0.005% Ophthalmic Solution 1 Drop(s) Both EYES at bedtime  multivitamin 1 Tablet(s) Oral daily  naltrexone 50 milliGRAM(s) Oral daily  pantoprazole    Tablet 40 milliGRAM(s) Oral before breakfast  thiamine 100 milliGRAM(s) Oral daily  timolol 0.5% Solution 1 Drop(s) Both EYES two times a day    MEDICATIONS  (PRN):  acetaminophen     Tablet .. 650 milliGRAM(s) Oral every 6 hours PRN Mild Pain (1 - 3)  aluminum hydroxide/magnesium hydroxide/simethicone Suspension 30 milliLiter(s) Oral every 4 hours PRN Dyspepsia  bisacodyl 5 milliGRAM(s) Oral at bedtime PRN Constipation  guaifenesin/dextromethorphan Oral Liquid 10 milliLiter(s) Oral every 4 hours PRN Cough  hydrOXYzine hydrochloride 25 milliGRAM(s) Oral every 6 hours PRN Anxiety  melatonin 3 milliGRAM(s) Oral at bedtime PRN Insomnia  ondansetron Injectable 4 milliGRAM(s) IV Push every 8 hours PRN Nausea and/or Vomiting      CAPILLARY BLOOD GLUCOSE        I&O's Summary      PHYSICAL EXAM:  Vital Signs Last 24 Hrs  T(C): 36.6 (07 May 2023 05:12), Max: 36.7 (06 May 2023 21:21)  T(F): 97.9 (07 May 2023 05:12), Max: 98.1 (06 May 2023 21:21)  HR: 60 (07 May 2023 05:12) (54 - 64)  BP: 107/64 (07 May 2023 05:12) (107/64 - 133/74)  BP(mean): --  RR: 17 (07 May 2023 05:12) (17 - 19)  SpO2: 98% (07 May 2023 05:12) (98% - 98%)    Parameters below as of 07 May 2023 05:12  Patient On (Oxygen Delivery Method): room air    General: Calm in bed  Neurology: A&Ox3, nonfocal, HICKS x 4  Eyes: PERRLA/ EOMI, Gross vision intact  ENT/Neck: Neck supple, trachea midline, No JVD, Gross hearing intact  Respiratory: CTA B/L, No wheezing, rales, rhonchi  CV: RRR, +S1/S2, -S3/S4, no murmurs, rubs or gallops  Abdominal: Soft, NT, ND +BS, No HSM  Extremities: Visible Tremors, 2+ peripheral pulses  Skin: Rash over posterior thighs and bilateral    LABS:                        12.5   5.73  )-----------( 226      ( 07 May 2023 06:40 )             38.7     05-07    137  |  105  |  12  ----------------------------<  105<H>  3.8   |  21<L>  |  0.77    Ca    9.2      07 May 2023 06:40  Phos  3.7     05-06  Mg     1.90     05-06    TPro  7.1  /  Alb  3.7  /  TBili  0.3  /  DBili  x   /  AST  64<H>  /  ALT  107<H>  /  AlkPhos  52  05-07                RADIOLOGY & ADDITIONAL TESTS:  Results Reviewed:   Imaging Personally Reviewed:  Electrocardiogram Personally Reviewed:    COORDINATION OF CARE:  Care Discussed with Consultants/Other Providers [Y/N]:  Prior or Outpatient Records Reviewed [Y/N]:

## 2023-05-07 NOTE — PROGRESS NOTE ADULT - PROBLEM SELECTOR PLAN 5
1.71 RUQ U/S with gallstones, no cholecystitis, hepatic steatosis+  likely in setting of chronic alcohol use    No acute management changes LUIS ARMANDO

## 2023-05-07 NOTE — PROGRESS NOTE ADULT - PROBLEM SELECTOR PLAN 1
- drinking continuously w/o abatement, with last drink being prior to coming to the ED.  Non-partial re type of alcohol  - tremulous.  Unaware if ever had seizures  - alcohol level = 364, lactate = 4.8  - CIWA protocol, including that of symptom triggered, in effect   - on MVI, folic acid, thiamine  - IVF hydration in progress  - f/u Social Work consult  - f/u SBIRT  - psychiatry consulted (Depression, Anxiety, Alcohol abuse; patient amenable to consult)  - aspiration, seizure precautions    Plan:  - s/p Librium taper completed 5/5, dc symptom triggered ativan PRN  - C/W Thiamine, folic acid, MV  - atarax PRN for anxiety   Dispo: seems outpatient rehab, on Naltrexone MAT

## 2023-05-08 LAB
ALBUMIN SERPL ELPH-MCNC: 3.6 G/DL — SIGNIFICANT CHANGE UP (ref 3.3–5)
ALBUMIN SERPL ELPH-MCNC: 3.6 G/DL — SIGNIFICANT CHANGE UP (ref 3.3–5)
ALP SERPL-CCNC: 50 U/L — SIGNIFICANT CHANGE UP (ref 40–120)
ALP SERPL-CCNC: 50 U/L — SIGNIFICANT CHANGE UP (ref 40–120)
ALT FLD-CCNC: 110 U/L — HIGH (ref 4–41)
ALT FLD-CCNC: 110 U/L — HIGH (ref 4–41)
ANION GAP SERPL CALC-SCNC: 11 MMOL/L — SIGNIFICANT CHANGE UP (ref 7–14)
ANION GAP SERPL CALC-SCNC: 11 MMOL/L — SIGNIFICANT CHANGE UP (ref 7–14)
AST SERPL-CCNC: 58 U/L — HIGH (ref 4–40)
AST SERPL-CCNC: 58 U/L — HIGH (ref 4–40)
BILIRUB SERPL-MCNC: 0.2 MG/DL — SIGNIFICANT CHANGE UP (ref 0.2–1.2)
BILIRUB SERPL-MCNC: 0.2 MG/DL — SIGNIFICANT CHANGE UP (ref 0.2–1.2)
BUN SERPL-MCNC: 12 MG/DL — SIGNIFICANT CHANGE UP (ref 7–23)
BUN SERPL-MCNC: 12 MG/DL — SIGNIFICANT CHANGE UP (ref 7–23)
CALCIUM SERPL-MCNC: 8.9 MG/DL — SIGNIFICANT CHANGE UP (ref 8.4–10.5)
CALCIUM SERPL-MCNC: 8.9 MG/DL — SIGNIFICANT CHANGE UP (ref 8.4–10.5)
CHLORIDE SERPL-SCNC: 106 MMOL/L — SIGNIFICANT CHANGE UP (ref 98–107)
CHLORIDE SERPL-SCNC: 106 MMOL/L — SIGNIFICANT CHANGE UP (ref 98–107)
CO2 SERPL-SCNC: 22 MMOL/L — SIGNIFICANT CHANGE UP (ref 22–31)
CO2 SERPL-SCNC: 22 MMOL/L — SIGNIFICANT CHANGE UP (ref 22–31)
CREAT SERPL-MCNC: 0.81 MG/DL — SIGNIFICANT CHANGE UP (ref 0.5–1.3)
CREAT SERPL-MCNC: 0.81 MG/DL — SIGNIFICANT CHANGE UP (ref 0.5–1.3)
EGFR: 101 ML/MIN/1.73M2 — SIGNIFICANT CHANGE UP
EGFR: 101 ML/MIN/1.73M2 — SIGNIFICANT CHANGE UP
GLUCOSE SERPL-MCNC: 91 MG/DL — SIGNIFICANT CHANGE UP (ref 70–99)
GLUCOSE SERPL-MCNC: 91 MG/DL — SIGNIFICANT CHANGE UP (ref 70–99)
HCT VFR BLD CALC: 38.7 % — LOW (ref 39–50)
HCT VFR BLD CALC: 38.7 % — LOW (ref 39–50)
HGB BLD-MCNC: 12.5 G/DL — LOW (ref 13–17)
HGB BLD-MCNC: 12.5 G/DL — LOW (ref 13–17)
MCHC RBC-ENTMCNC: 30.6 PG — SIGNIFICANT CHANGE UP (ref 27–34)
MCHC RBC-ENTMCNC: 30.6 PG — SIGNIFICANT CHANGE UP (ref 27–34)
MCHC RBC-ENTMCNC: 32.3 GM/DL — SIGNIFICANT CHANGE UP (ref 32–36)
MCHC RBC-ENTMCNC: 32.3 GM/DL — SIGNIFICANT CHANGE UP (ref 32–36)
MCV RBC AUTO: 94.6 FL — SIGNIFICANT CHANGE UP (ref 80–100)
MCV RBC AUTO: 94.6 FL — SIGNIFICANT CHANGE UP (ref 80–100)
NRBC # BLD: 0 /100 WBCS — SIGNIFICANT CHANGE UP (ref 0–0)
NRBC # BLD: 0 /100 WBCS — SIGNIFICANT CHANGE UP (ref 0–0)
NRBC # FLD: 0 K/UL — SIGNIFICANT CHANGE UP (ref 0–0)
NRBC # FLD: 0 K/UL — SIGNIFICANT CHANGE UP (ref 0–0)
PLATELET # BLD AUTO: 206 K/UL — SIGNIFICANT CHANGE UP (ref 150–400)
PLATELET # BLD AUTO: 206 K/UL — SIGNIFICANT CHANGE UP (ref 150–400)
POTASSIUM SERPL-MCNC: 3.5 MMOL/L — SIGNIFICANT CHANGE UP (ref 3.5–5.3)
POTASSIUM SERPL-MCNC: 3.5 MMOL/L — SIGNIFICANT CHANGE UP (ref 3.5–5.3)
POTASSIUM SERPL-SCNC: 3.5 MMOL/L — SIGNIFICANT CHANGE UP (ref 3.5–5.3)
POTASSIUM SERPL-SCNC: 3.5 MMOL/L — SIGNIFICANT CHANGE UP (ref 3.5–5.3)
PROT SERPL-MCNC: 6.8 G/DL — SIGNIFICANT CHANGE UP (ref 6–8.3)
PROT SERPL-MCNC: 6.8 G/DL — SIGNIFICANT CHANGE UP (ref 6–8.3)
RBC # BLD: 4.09 M/UL — LOW (ref 4.2–5.8)
RBC # BLD: 4.09 M/UL — LOW (ref 4.2–5.8)
RBC # FLD: 14.8 % — HIGH (ref 10.3–14.5)
RBC # FLD: 14.8 % — HIGH (ref 10.3–14.5)
SODIUM SERPL-SCNC: 139 MMOL/L — SIGNIFICANT CHANGE UP (ref 135–145)
SODIUM SERPL-SCNC: 139 MMOL/L — SIGNIFICANT CHANGE UP (ref 135–145)
WBC # BLD: 5.52 K/UL — SIGNIFICANT CHANGE UP (ref 3.8–10.5)
WBC # BLD: 5.52 K/UL — SIGNIFICANT CHANGE UP (ref 3.8–10.5)
WBC # FLD AUTO: 5.52 K/UL — SIGNIFICANT CHANGE UP (ref 3.8–10.5)
WBC # FLD AUTO: 5.52 K/UL — SIGNIFICANT CHANGE UP (ref 3.8–10.5)

## 2023-05-08 PROCEDURE — 99232 SBSQ HOSP IP/OBS MODERATE 35: CPT | Mod: GC

## 2023-05-08 RX ADMIN — Medication 1 APPLICATION(S): at 11:19

## 2023-05-08 RX ADMIN — Medication 1 TABLET(S): at 11:20

## 2023-05-08 RX ADMIN — Medication 650 MILLIGRAM(S): at 20:14

## 2023-05-08 RX ADMIN — Medication 1 MILLIGRAM(S): at 11:20

## 2023-05-08 RX ADMIN — Medication 650 MILLIGRAM(S): at 21:14

## 2023-05-08 RX ADMIN — NALTREXONE HYDROCHLORIDE 50 MILLIGRAM(S): 50 TABLET, FILM COATED ORAL at 11:21

## 2023-05-08 RX ADMIN — Medication 100 MILLIGRAM(S): at 11:20

## 2023-05-08 RX ADMIN — Medication 1 APPLICATION(S): at 21:39

## 2023-05-08 RX ADMIN — DORZOLAMIDE HYDROCHLORIDE 1 DROP(S): 20 SOLUTION/ DROPS OPHTHALMIC at 06:27

## 2023-05-08 RX ADMIN — LATANOPROST 1 DROP(S): 0.05 SOLUTION/ DROPS OPHTHALMIC; TOPICAL at 21:42

## 2023-05-08 RX ADMIN — ENOXAPARIN SODIUM 40 MILLIGRAM(S): 100 INJECTION SUBCUTANEOUS at 13:51

## 2023-05-08 RX ADMIN — Medication 1 DROP(S): at 18:05

## 2023-05-08 RX ADMIN — DORZOLAMIDE HYDROCHLORIDE 1 DROP(S): 20 SOLUTION/ DROPS OPHTHALMIC at 21:40

## 2023-05-08 RX ADMIN — Medication 1 DROP(S): at 06:27

## 2023-05-08 RX ADMIN — Medication 3 MILLIGRAM(S): at 21:39

## 2023-05-08 RX ADMIN — PANTOPRAZOLE SODIUM 40 MILLIGRAM(S): 20 TABLET, DELAYED RELEASE ORAL at 06:27

## 2023-05-08 RX ADMIN — FLUCONAZOLE 200 MILLIGRAM(S): 150 TABLET ORAL at 21:39

## 2023-05-08 RX ADMIN — DORZOLAMIDE HYDROCHLORIDE 1 DROP(S): 20 SOLUTION/ DROPS OPHTHALMIC at 13:51

## 2023-05-08 NOTE — PROGRESS NOTE ADULT - PROBLEM SELECTOR PLAN 6
-lactate = 4.8, AG = 17  -in the setting of excessive alcohol intake and poor nutrition, as well as dehydration  -Lactate= 1 WNL

## 2023-05-08 NOTE — DIETITIAN INITIAL EVALUATION ADULT - PERTINENT MEDS FT
MEDICATIONS  (STANDING):  clotrimazole 1% Cream 1 Application(s) Topical every 12 hours  dorzolamide 2% Ophthalmic Solution 1 Drop(s) Both EYES three times a day  enoxaparin Injectable 40 milliGRAM(s) SubCutaneous every 24 hours  fluconAZOLE   Tablet 200 milliGRAM(s) Oral once  folic acid 1 milliGRAM(s) Oral daily  lactated ringers. 1000 milliLiter(s) (125 mL/Hr) IV Continuous <Continuous>  latanoprost 0.005% Ophthalmic Solution 1 Drop(s) Both EYES at bedtime  multivitamin 1 Tablet(s) Oral daily  naltrexone 50 milliGRAM(s) Oral daily  pantoprazole    Tablet 40 milliGRAM(s) Oral before breakfast  thiamine 100 milliGRAM(s) Oral daily  timolol 0.5% Solution 1 Drop(s) Both EYES two times a day    MEDICATIONS  (PRN):  acetaminophen     Tablet .. 650 milliGRAM(s) Oral every 6 hours PRN Mild Pain (1 - 3)  aluminum hydroxide/magnesium hydroxide/simethicone Suspension 30 milliLiter(s) Oral every 4 hours PRN Dyspepsia  bisacodyl 5 milliGRAM(s) Oral at bedtime PRN Constipation  guaifenesin/dextromethorphan Oral Liquid 10 milliLiter(s) Oral every 4 hours PRN Cough  hydrOXYzine hydrochloride 25 milliGRAM(s) Oral every 6 hours PRN Anxiety  melatonin 3 milliGRAM(s) Oral at bedtime PRN Insomnia  ondansetron Injectable 4 milliGRAM(s) IV Push every 8 hours PRN Nausea and/or Vomiting

## 2023-05-08 NOTE — PROGRESS NOTE ADULT - PROBLEM SELECTOR PLAN 7
-no acute issues presently  -has been unable to adhere to OP medication regimen  > f/u w/ Med-Hx Pharmacist re med verification and complete Med-Rec

## 2023-05-08 NOTE — PROGRESS NOTE ADULT - PROBLEM SELECTOR PLAN 3
-patient voices being anxious and depressed  -was on medications for same, but has not been able to comply with regimen due to current circumstances  -TSH 0.4; Vitamin D 0.35; WNL  > F/U Psych Recs- atarax PRN

## 2023-05-08 NOTE — DIETITIAN INITIAL EVALUATION ADULT - PROBLEM SELECTOR PLAN 1
- drinking continuously w/o abatement, with last drink being prior to coming to the ED.  Non-partial re type of alcohol  - tremulous.  Unaware if ever had seizures  - alcohol level = 364, lactate = 4.8  - CIWA protocol, including that of symptom triggered, in effect   - on MVI, folic acid, thiamine prescribed  - IVF hydration in progress  - f/u electrolytes, GGT level  - f/u Social Work consult  - f/u SBIRT  - f/u ECG (ordered)  - Pls call Psychiatry/ consult (Depression, Anxiety, Alcohol abuse; patient amenable to consult)  - aspiration, seizure precautions

## 2023-05-08 NOTE — PROGRESS NOTE ADULT - PROBLEM SELECTOR PLAN 4
-"...macule over his buttock which appeared to be coalescent tenia corporis.  There are areas of this macule that ringlike surrounding erythema..."  -given fluconazole and Mycelex in the ED  > per derm: tinea pedis/cruris  > flucanozole 200mg PO weekly x4 weeks, next dose 5/7

## 2023-05-08 NOTE — DIETITIAN INITIAL EVALUATION ADULT - PERTINENT LABORATORY DATA
05-08    139  |  106  |  12  ----------------------------<  91  3.5   |  22  |  0.81    Ca    8.9      08 May 2023 05:57    TPro  6.8  /  Alb  3.6  /  TBili  0.2  /  DBili  x   /  AST  58<H>  /  ALT  110<H>  /  AlkPhos  50  05-08  A1C with Estimated Average Glucose Result: 5.2 % (05-01-23 @ 06:40)

## 2023-05-08 NOTE — DIETITIAN INITIAL EVALUATION ADULT - ORAL INTAKE PTA/DIET HISTORY
Patient seen for assessment. Reports good appetite PTA. Doesn't follow a diet at home. Per H&P, patient was living on the streets PTA and social work is following.

## 2023-05-08 NOTE — DIETITIAN INITIAL EVALUATION ADULT - OTHER INFO
58 year old male with a PMH of Alcohol abuse, Hypertension, Anxiety and Depression presented to the ED secondary to alcohol withdrawal c/b anxiety and depression, currently on Librium taper per chart.    Patient reports good appetite. Noted w/ intakes % per RN flow sheet. No food preferences at this time. Reports diarrhea 2/2 norovirus. No chewing/swallowing difficulties reported. Has no food allergies. Patient unsure of UBW, but denies any significant weight changes. No weights noted per HIE. ABW is 190.6 lbs. (5/1) per chart. Noted w/ +1 L/R wrist + ankle edema and no pressure injuries per RN flow sheet.    Patient declined nutrition education at this time.

## 2023-05-08 NOTE — PROGRESS NOTE ADULT - PROBLEM SELECTOR PLAN 1
-drinking continuously w/o abatement, with last drink being prior to coming to the ED.  Non-partial re type of alcohol  -tremulous.  Unaware if ever had seizures  -Admission alcohol level = 364, lactate = 4.8  -WA protocol, including that of symptom triggered, in effect   -s/p Librium taper completed 5/5, dc symptom triggered ativan PRN  > on MVI, folic acid, thiamine  > f/u psychiatry consulted (Depression, Anxiety, Alcohol abuse; patient amenable to consult)  > atarax PRN for anxiety

## 2023-05-08 NOTE — PROGRESS NOTE ADULT - SUBJECTIVE AND OBJECTIVE BOX
Tesfaye Chavez  PGY-1 Resident Physician     Patient is a 58y old  Male who presents with a chief complaint of Alcohol dependence with withdrawal     (08 May 2023 12:16)      SUBJECTIVE / OVERNIGHT EVENTS:  Had 6 episodes of diarrhea o/n.     MEDICATIONS  (STANDING):  clotrimazole 1% Cream 1 Application(s) Topical every 12 hours  dorzolamide 2% Ophthalmic Solution 1 Drop(s) Both EYES three times a day  enoxaparin Injectable 40 milliGRAM(s) SubCutaneous every 24 hours  fluconAZOLE   Tablet 200 milliGRAM(s) Oral once  folic acid 1 milliGRAM(s) Oral daily  lactated ringers. 1000 milliLiter(s) (125 mL/Hr) IV Continuous <Continuous>  latanoprost 0.005% Ophthalmic Solution 1 Drop(s) Both EYES at bedtime  multivitamin 1 Tablet(s) Oral daily  naltrexone 50 milliGRAM(s) Oral daily  pantoprazole    Tablet 40 milliGRAM(s) Oral before breakfast  thiamine 100 milliGRAM(s) Oral daily  timolol 0.5% Solution 1 Drop(s) Both EYES two times a day    MEDICATIONS  (PRN):  acetaminophen     Tablet .. 650 milliGRAM(s) Oral every 6 hours PRN Mild Pain (1 - 3)  aluminum hydroxide/magnesium hydroxide/simethicone Suspension 30 milliLiter(s) Oral every 4 hours PRN Dyspepsia  bisacodyl 5 milliGRAM(s) Oral at bedtime PRN Constipation  guaifenesin/dextromethorphan Oral Liquid 10 milliLiter(s) Oral every 4 hours PRN Cough  hydrOXYzine hydrochloride 25 milliGRAM(s) Oral every 6 hours PRN Anxiety  melatonin 3 milliGRAM(s) Oral at bedtime PRN Insomnia  ondansetron Injectable 4 milliGRAM(s) IV Push every 8 hours PRN Nausea and/or Vomiting    Allergies    No Known Allergies    Intolerances        Vital Signs Last 24 Hrs  T(C): 37 (08 May 2023 12:29), Max: 37.1 (08 May 2023 06:33)  T(F): 98.6 (08 May 2023 12:29), Max: 98.7 (08 May 2023 06:33)  HR: 60 (08 May 2023 12:29) (54 - 61)  BP: 110/68 (08 May 2023 12:29) (108/67 - 128/68)  BP(mean): --  RR: 18 (08 May 2023 12:29) (18 - 18)  SpO2: 100% (08 May 2023 12:29) (100% - 100%)    Parameters below as of 08 May 2023 12:29  Patient On (Oxygen Delivery Method): room air      Daily     Daily   I&O's Summary      Physical Exam  Neurology: A&Ox3, nonfocal, HICKS x 4  Eyes: PERRLA/ EOMI, Gross vision intact  ENT/Neck: Neck supple, trachea midline, No JVD, Gross hearing intact  Respiratory: CTA B/L, No wheezing, rales, rhonchi  CV: RRR, +S1/S2, -S3/S4, no murmurs, rubs or gallops  Abdominal: Soft, NT, ND +BS, No HSM  Extremities: Visible Tremors, 2+ peripheral pulses  Skin: Rash over posterior thighs and bilateral    DIAGNOSTICS:                         12.5   5.52  )-----------( 206      ( 08 May 2023 05:57 )             38.7     Hgb Trend: 12.5<--, 12.5<--, 12.9<--, 13.2<--, 13.3<--  05-08    139  |  106  |  12  ----------------------------<  91  3.5   |  22  |  0.81    Ca    8.9      08 May 2023 05:57    TPro  6.8  /  Alb  3.6  /  TBili  0.2  /  DBili  x   /  AST  58<H>  /  ALT  110<H>  /  AlkPhos  50  05-08    CAPILLARY BLOOD GLUCOSE        Creatinine Trend: 0.81<--, 0.77<--, 0.69<--, 0.74<--, 0.67<--, 0.68<--  LIVER FUNCTIONS - ( 08 May 2023 05:57 )  Alb: 3.6 g/dL / Pro: 6.8 g/dL / ALK PHOS: 50 U/L / ALT: 110 U/L / AST: 58 U/L / GGT: x

## 2023-05-08 NOTE — PROGRESS NOTE ADULT - PROBLEM SELECTOR PLAN 8
Diet: DASH Diet  GI: Protonix Dulcolax  DVT ppx: Lovenox  Code Status: Full Code  Dispo: likely OP rehab, will need shelter setup and need to be off contact isolations for Norovirus

## 2023-05-08 NOTE — PROGRESS NOTE ADULT - PROBLEM SELECTOR PLAN 5
-RUQ U/S with gallstones, no cholecystitis, hepatic steatosis+  -likely in setting of chronic alcohol use  > No acute management changes LUIS ARMANDO

## 2023-05-08 NOTE — PROGRESS NOTE ADULT - PROBLEM SELECTOR PLAN 2
-3 episodes of diarrhea 5/3  -fluid/electrolyte repletion   > GI PCR - Norovirus (+)  > contact precautions

## 2023-05-08 NOTE — PROGRESS NOTE ADULT - ATTENDING COMMENTS
58 year old male, with past history significant for Alcohol abuse, Hypertension, Anxiety and Depression, presented to the ED secondary to alcohol withdrawal.  Diagnosed with Alcohol Dependence with Withdrawal in the ED.     overnight had 3 BM. this AM 2 bm. still feels week    - S/p Librium taper and CIWA protocol for ETOH withdrawal. No concern for withdrawal  - Psych consulted for alcohol use and anxiety/depression. cleared for d/c; hydroxyzine prn for anxiety  - SW consulted for safe dispo - per patient, he lives with his cousin and would prefer to go back home to cousin  - MVI, thiamine, folate supplements; B12 wnl  - Significant rash on BLE, s/p fluconazole x 1 and c/w topical cream; Will start PO fluconazole weekly x 4 weeks (total 4 doses) per derm rec given extensive LE involvement; next dose next week   - (+) dysuria, UA/Ucx neg  - Elevated lactate likely i/s/o ETOH use. Normalized  - RUQ (+) for hepatic steatosis, no cirrhosis; Bili normalized, will monitor  - (+) loose stool - resolving with 2 BM's, last one was more formed; GI PCR (+) for norovirus, on contact isolation - keep isolation for at least 48 hours after symptom resolution.   - Recent fall, low concern for severe trauma, will hold off imaging; PT consult - no skilled PT needs    Medically cleared on 5/5, now going home to cousin so no need to wait for isolation clearance  likely discharge tomorrow 58 year old male, with past history significant for Alcohol abuse, Hypertension, Anxiety and Depression, presented to the ED secondary to alcohol withdrawal.  Diagnosed with Alcohol Dependence with Withdrawal in the ED.     overnight had 3 BM. this AM 2 bm. still feels weak    - S/p Librium taper and CIWA protocol for ETOH withdrawal. No concern for withdrawal  - Psych consulted for alcohol use and anxiety/depression. cleared for d/c; hydroxyzine prn for anxiety  - SW consulted for safe dispo - per patient, he lives with his cousin and would prefer to go back home to cousin  - MVI, thiamine, folate supplements; B12 wnl  - Significant rash on BLE, s/p fluconazole x 1 and c/w topical cream; Will start PO fluconazole weekly x 4 weeks (total 4 doses) per derm rec given extensive LE involvement; next dose next week   - (+) dysuria, UA/Ucx neg  - Elevated lactate likely i/s/o ETOH use. Normalized  - RUQ (+) for hepatic steatosis, no cirrhosis; Bili normalized, will monitor  - (+) loose stool - resolving with 2 BM's, last one was more formed; GI PCR (+) for norovirus, on contact isolation - keep isolation for at least 48 hours after symptom resolution.   - Recent fall, low concern for severe trauma, will hold off imaging; PT consult - no skilled PT needs    Medically cleared on 5/5, now going home to cousin so no need to wait for isolation clearance  likely discharge tomorrow

## 2023-05-09 VITALS
HEART RATE: 54 BPM | SYSTOLIC BLOOD PRESSURE: 100 MMHG | TEMPERATURE: 98 F | RESPIRATION RATE: 17 BRPM | OXYGEN SATURATION: 98 % | DIASTOLIC BLOOD PRESSURE: 57 MMHG

## 2023-05-09 LAB
ALBUMIN SERPL ELPH-MCNC: 3.7 G/DL — SIGNIFICANT CHANGE UP (ref 3.3–5)
ALBUMIN SERPL ELPH-MCNC: 3.7 G/DL — SIGNIFICANT CHANGE UP (ref 3.3–5)
ALP SERPL-CCNC: 56 U/L — SIGNIFICANT CHANGE UP (ref 40–120)
ALP SERPL-CCNC: 56 U/L — SIGNIFICANT CHANGE UP (ref 40–120)
ALT FLD-CCNC: 112 U/L — HIGH (ref 4–41)
ALT FLD-CCNC: 112 U/L — HIGH (ref 4–41)
ANION GAP SERPL CALC-SCNC: 11 MMOL/L — SIGNIFICANT CHANGE UP (ref 7–14)
ANION GAP SERPL CALC-SCNC: 11 MMOL/L — SIGNIFICANT CHANGE UP (ref 7–14)
AST SERPL-CCNC: 47 U/L — HIGH (ref 4–40)
AST SERPL-CCNC: 47 U/L — HIGH (ref 4–40)
BASOPHILS # BLD AUTO: 0.08 K/UL — SIGNIFICANT CHANGE UP (ref 0–0.2)
BASOPHILS # BLD AUTO: 0.08 K/UL — SIGNIFICANT CHANGE UP (ref 0–0.2)
BASOPHILS NFR BLD AUTO: 1.6 % — SIGNIFICANT CHANGE UP (ref 0–2)
BASOPHILS NFR BLD AUTO: 1.6 % — SIGNIFICANT CHANGE UP (ref 0–2)
BILIRUB SERPL-MCNC: 0.3 MG/DL — SIGNIFICANT CHANGE UP (ref 0.2–1.2)
BILIRUB SERPL-MCNC: 0.3 MG/DL — SIGNIFICANT CHANGE UP (ref 0.2–1.2)
BUN SERPL-MCNC: 11 MG/DL — SIGNIFICANT CHANGE UP (ref 7–23)
BUN SERPL-MCNC: 11 MG/DL — SIGNIFICANT CHANGE UP (ref 7–23)
CALCIUM SERPL-MCNC: 9 MG/DL — SIGNIFICANT CHANGE UP (ref 8.4–10.5)
CALCIUM SERPL-MCNC: 9 MG/DL — SIGNIFICANT CHANGE UP (ref 8.4–10.5)
CHLORIDE SERPL-SCNC: 105 MMOL/L — SIGNIFICANT CHANGE UP (ref 98–107)
CHLORIDE SERPL-SCNC: 105 MMOL/L — SIGNIFICANT CHANGE UP (ref 98–107)
CO2 SERPL-SCNC: 22 MMOL/L — SIGNIFICANT CHANGE UP (ref 22–31)
CO2 SERPL-SCNC: 22 MMOL/L — SIGNIFICANT CHANGE UP (ref 22–31)
CREAT SERPL-MCNC: 0.76 MG/DL — SIGNIFICANT CHANGE UP (ref 0.5–1.3)
CREAT SERPL-MCNC: 0.76 MG/DL — SIGNIFICANT CHANGE UP (ref 0.5–1.3)
EGFR: 103 ML/MIN/1.73M2 — SIGNIFICANT CHANGE UP
EGFR: 103 ML/MIN/1.73M2 — SIGNIFICANT CHANGE UP
EOSINOPHIL # BLD AUTO: 0.23 K/UL — SIGNIFICANT CHANGE UP (ref 0–0.5)
EOSINOPHIL # BLD AUTO: 0.23 K/UL — SIGNIFICANT CHANGE UP (ref 0–0.5)
EOSINOPHIL NFR BLD AUTO: 4.6 % — SIGNIFICANT CHANGE UP (ref 0–6)
EOSINOPHIL NFR BLD AUTO: 4.6 % — SIGNIFICANT CHANGE UP (ref 0–6)
GLUCOSE SERPL-MCNC: 94 MG/DL — SIGNIFICANT CHANGE UP (ref 70–99)
GLUCOSE SERPL-MCNC: 94 MG/DL — SIGNIFICANT CHANGE UP (ref 70–99)
HCT VFR BLD CALC: 39.7 % — SIGNIFICANT CHANGE UP (ref 39–50)
HCT VFR BLD CALC: 39.7 % — SIGNIFICANT CHANGE UP (ref 39–50)
HGB BLD-MCNC: 12.9 G/DL — LOW (ref 13–17)
HGB BLD-MCNC: 12.9 G/DL — LOW (ref 13–17)
IANC: 1.62 K/UL — LOW (ref 1.8–7.4)
IANC: 1.62 K/UL — LOW (ref 1.8–7.4)
IMM GRANULOCYTES NFR BLD AUTO: 0.6 % — SIGNIFICANT CHANGE UP (ref 0–0.9)
IMM GRANULOCYTES NFR BLD AUTO: 0.6 % — SIGNIFICANT CHANGE UP (ref 0–0.9)
INR BLD: 0.99 RATIO — SIGNIFICANT CHANGE UP (ref 0.88–1.16)
INR BLD: 0.99 RATIO — SIGNIFICANT CHANGE UP (ref 0.88–1.16)
LYMPHOCYTES # BLD AUTO: 2.34 K/UL — SIGNIFICANT CHANGE UP (ref 1–3.3)
LYMPHOCYTES # BLD AUTO: 2.34 K/UL — SIGNIFICANT CHANGE UP (ref 1–3.3)
LYMPHOCYTES # BLD AUTO: 46.7 % — HIGH (ref 13–44)
LYMPHOCYTES # BLD AUTO: 46.7 % — HIGH (ref 13–44)
MAGNESIUM SERPL-MCNC: 1.9 MG/DL — SIGNIFICANT CHANGE UP (ref 1.6–2.6)
MAGNESIUM SERPL-MCNC: 1.9 MG/DL — SIGNIFICANT CHANGE UP (ref 1.6–2.6)
MCHC RBC-ENTMCNC: 29.7 PG — SIGNIFICANT CHANGE UP (ref 27–34)
MCHC RBC-ENTMCNC: 29.7 PG — SIGNIFICANT CHANGE UP (ref 27–34)
MCHC RBC-ENTMCNC: 32.5 GM/DL — SIGNIFICANT CHANGE UP (ref 32–36)
MCHC RBC-ENTMCNC: 32.5 GM/DL — SIGNIFICANT CHANGE UP (ref 32–36)
MCV RBC AUTO: 91.3 FL — SIGNIFICANT CHANGE UP (ref 80–100)
MCV RBC AUTO: 91.3 FL — SIGNIFICANT CHANGE UP (ref 80–100)
MELD SCORE WITH DIALYSIS: 20 POINTS — SIGNIFICANT CHANGE UP
MELD SCORE WITH DIALYSIS: 20 POINTS — SIGNIFICANT CHANGE UP
MELD SCORE WITHOUT DIALYSIS: 6 POINTS — SIGNIFICANT CHANGE UP
MELD SCORE WITHOUT DIALYSIS: 6 POINTS — SIGNIFICANT CHANGE UP
MONOCYTES # BLD AUTO: 0.71 K/UL — SIGNIFICANT CHANGE UP (ref 0–0.9)
MONOCYTES # BLD AUTO: 0.71 K/UL — SIGNIFICANT CHANGE UP (ref 0–0.9)
MONOCYTES NFR BLD AUTO: 14.2 % — HIGH (ref 2–14)
MONOCYTES NFR BLD AUTO: 14.2 % — HIGH (ref 2–14)
NEUTROPHILS # BLD AUTO: 1.62 K/UL — LOW (ref 1.8–7.4)
NEUTROPHILS # BLD AUTO: 1.62 K/UL — LOW (ref 1.8–7.4)
NEUTROPHILS NFR BLD AUTO: 32.3 % — LOW (ref 43–77)
NEUTROPHILS NFR BLD AUTO: 32.3 % — LOW (ref 43–77)
NRBC # BLD: 0 /100 WBCS — SIGNIFICANT CHANGE UP (ref 0–0)
NRBC # BLD: 0 /100 WBCS — SIGNIFICANT CHANGE UP (ref 0–0)
NRBC # FLD: 0 K/UL — SIGNIFICANT CHANGE UP (ref 0–0)
NRBC # FLD: 0 K/UL — SIGNIFICANT CHANGE UP (ref 0–0)
PHOSPHATE SERPL-MCNC: 3.5 MG/DL — SIGNIFICANT CHANGE UP (ref 2.5–4.5)
PHOSPHATE SERPL-MCNC: 3.5 MG/DL — SIGNIFICANT CHANGE UP (ref 2.5–4.5)
PLATELET # BLD AUTO: 250 K/UL — SIGNIFICANT CHANGE UP (ref 150–400)
PLATELET # BLD AUTO: 250 K/UL — SIGNIFICANT CHANGE UP (ref 150–400)
POTASSIUM SERPL-MCNC: 3.5 MMOL/L — SIGNIFICANT CHANGE UP (ref 3.5–5.3)
POTASSIUM SERPL-MCNC: 3.5 MMOL/L — SIGNIFICANT CHANGE UP (ref 3.5–5.3)
POTASSIUM SERPL-SCNC: 3.5 MMOL/L — SIGNIFICANT CHANGE UP (ref 3.5–5.3)
POTASSIUM SERPL-SCNC: 3.5 MMOL/L — SIGNIFICANT CHANGE UP (ref 3.5–5.3)
PROT SERPL-MCNC: 7 G/DL — SIGNIFICANT CHANGE UP (ref 6–8.3)
PROT SERPL-MCNC: 7 G/DL — SIGNIFICANT CHANGE UP (ref 6–8.3)
PROTHROM AB SERPL-ACNC: 11.5 SEC — SIGNIFICANT CHANGE UP (ref 10.5–13.4)
PROTHROM AB SERPL-ACNC: 11.5 SEC — SIGNIFICANT CHANGE UP (ref 10.5–13.4)
RBC # BLD: 4.35 M/UL — SIGNIFICANT CHANGE UP (ref 4.2–5.8)
RBC # BLD: 4.35 M/UL — SIGNIFICANT CHANGE UP (ref 4.2–5.8)
RBC # FLD: 14.6 % — HIGH (ref 10.3–14.5)
RBC # FLD: 14.6 % — HIGH (ref 10.3–14.5)
SODIUM SERPL-SCNC: 138 MMOL/L — SIGNIFICANT CHANGE UP (ref 135–145)
SODIUM SERPL-SCNC: 138 MMOL/L — SIGNIFICANT CHANGE UP (ref 135–145)
WBC # BLD: 5.01 K/UL — SIGNIFICANT CHANGE UP (ref 3.8–10.5)
WBC # BLD: 5.01 K/UL — SIGNIFICANT CHANGE UP (ref 3.8–10.5)
WBC # FLD AUTO: 5.01 K/UL — SIGNIFICANT CHANGE UP (ref 3.8–10.5)
WBC # FLD AUTO: 5.01 K/UL — SIGNIFICANT CHANGE UP (ref 3.8–10.5)

## 2023-05-09 PROCEDURE — 99239 HOSP IP/OBS DSCHRG MGMT >30: CPT

## 2023-05-09 RX ORDER — FLUCONAZOLE 150 MG/1
1 TABLET ORAL
Qty: 2 | Refills: 0
Start: 2023-05-09

## 2023-05-09 RX ADMIN — DORZOLAMIDE HYDROCHLORIDE 1 DROP(S): 20 SOLUTION/ DROPS OPHTHALMIC at 06:08

## 2023-05-09 RX ADMIN — ENOXAPARIN SODIUM 40 MILLIGRAM(S): 100 INJECTION SUBCUTANEOUS at 12:37

## 2023-05-09 RX ADMIN — Medication 1 TABLET(S): at 12:38

## 2023-05-09 RX ADMIN — Medication 100 MILLIGRAM(S): at 12:38

## 2023-05-09 RX ADMIN — DORZOLAMIDE HYDROCHLORIDE 1 DROP(S): 20 SOLUTION/ DROPS OPHTHALMIC at 12:39

## 2023-05-09 RX ADMIN — Medication 1 APPLICATION(S): at 09:42

## 2023-05-09 RX ADMIN — Medication 1 MILLIGRAM(S): at 12:38

## 2023-05-09 RX ADMIN — Medication 1 DROP(S): at 06:08

## 2023-05-09 RX ADMIN — PANTOPRAZOLE SODIUM 40 MILLIGRAM(S): 20 TABLET, DELAYED RELEASE ORAL at 06:08

## 2023-05-09 NOTE — PROGRESS NOTE ADULT - REASON FOR ADMISSION
Alcohol dependence with withdrawal, Dehydration, Homelessness

## 2023-05-09 NOTE — PROGRESS NOTE ADULT - ATTENDING COMMENTS
58 year old male, with past history significant for Alcohol abuse, Hypertension, Anxiety and Depression, presented to the ED secondary to alcohol withdrawal.  Diagnosed with Alcohol Dependence with Withdrawal in the ED.     Feels better. Had 2 BMs today.    - S/p Librium taper and CIWA protocol for ETOH withdrawal. No concern for withdrawal  - Psych consulted for alcohol use and anxiety/depression. cleared for d/c; hydroxyzine prn for anxiety  - SW consulted for safe dispo - per patient, he lives with his cousin and would prefer to go back home to cousin  - MVI, thiamine, folate supplements; B12 wnl  - Significant rash on BLE, c/w topical cream; c/w PO fluconazole weekly x 4 weeks (total 4 doses) per derm rec given extensive LE involvement. Outpt f/u with pcp  - (+) dysuria, UA/Ucx neg  - Elevated lactate likely i/s/o ETOH use. Normalized  - RUQ (+) for hepatic steatosis, no cirrhosis; Bili normalized  - (+) loose stool - resolving with 2 BM's, last one was more formed; GI PCR (+) for norovirus, on contact isolation - keep isolation for at least 48 hours after symptom resolution.   - Recent fall, low concern for severe trauma, will hold off imaging; PT consult - no skilled PT needs    Medically cleared, now going home to cousin today so no need to wait for isolation clearance. 32 mins spent on coordinating care, educating alcohol avoidance and follow-up plan with patient.

## 2023-05-09 NOTE — DISCHARGE NOTE NURSING/CASE MANAGEMENT/SOCIAL WORK - NSDCPEFALRISK_GEN_ALL_CORE
For information on Fall & Injury Prevention, visit: [email or web address] OR  [email or web address] OR  https://www.cdc.gov/steadi/patient.html For information on Fall & Injury Prevention, visit: [email or web address] OR  [email or web address] OR  https://www.cdc.gov/steadi/patient.html

## 2023-05-09 NOTE — DISCHARGE NOTE NURSING/CASE MANAGEMENT/SOCIAL WORK - PATIENT PORTAL LINK FT
You can access the FollowMyHealth Patient Portal offered by [facility] by registering at the following website: [email or web address]. By joining [employer]’s FollowMyHealth portal, you will also be able to view your health information using other applications (apps) compatible with our system. You can access the FollowMyHealth Patient Portal offered by [facility] by registering at the following website: [email or web address]. By joining [employer]’s FollowMyHealth portal, you will also be able to view your health information using other applications (apps) compatible with our system.

## 2023-05-18 LAB
AMPHETAMINES UR QL SCN: NEGATIVE — SIGNIFICANT CHANGE UP
BARBITURATES UR QL SCN: NEGATIVE — SIGNIFICANT CHANGE UP
BARBITURATES UR-MCNC: NEGATIVE — SIGNIFICANT CHANGE UP
BENZODIAZ UR QL: SIGNIFICANT CHANGE UP
BZE UR QL: NEGATIVE — SIGNIFICANT CHANGE UP
CANNABINOIDS UR QL SCN: NEGATIVE — SIGNIFICANT CHANGE UP
ETHANOL CONFIRMATION, URINE RESULT: SIGNIFICANT CHANGE UP
ETHANOL UR-MCNC: SIGNIFICANT CHANGE UP
METHADONE UR QL SCN: NEGATIVE — SIGNIFICANT CHANGE UP
METHAQUALONE UR QL: NEGATIVE — SIGNIFICANT CHANGE UP
METHAQUALONE UR-MCNC: NEGATIVE — SIGNIFICANT CHANGE UP
OPIATES UR QL: NEGATIVE — SIGNIFICANT CHANGE UP
PCP UR QL: NEGATIVE — SIGNIFICANT CHANGE UP
PROPOXYPH UR QL: NEGATIVE — SIGNIFICANT CHANGE UP

## 2023-05-24 ENCOUNTER — EMERGENCY (EMERGENCY)
Facility: HOSPITAL | Age: 58
LOS: 0 days | Discharge: HOME | End: 2023-05-25
Attending: STUDENT IN AN ORGANIZED HEALTH CARE EDUCATION/TRAINING PROGRAM
Payer: MEDICAID

## 2023-05-24 VITALS
OXYGEN SATURATION: 100 % | DIASTOLIC BLOOD PRESSURE: 74 MMHG | WEIGHT: 164.91 LBS | HEART RATE: 78 BPM | RESPIRATION RATE: 16 BRPM | SYSTOLIC BLOOD PRESSURE: 109 MMHG | TEMPERATURE: 98 F | HEIGHT: 68 IN

## 2023-05-24 DIAGNOSIS — Z88.8 ALLERGY STATUS TO OTHER DRUGS, MEDICAMENTS AND BIOLOGICAL SUBSTANCES: ICD-10-CM

## 2023-05-24 DIAGNOSIS — E78.5 HYPERLIPIDEMIA, UNSPECIFIED: ICD-10-CM

## 2023-05-24 DIAGNOSIS — R51.9 HEADACHE, UNSPECIFIED: ICD-10-CM

## 2023-05-24 DIAGNOSIS — I10 ESSENTIAL (PRIMARY) HYPERTENSION: ICD-10-CM

## 2023-05-24 DIAGNOSIS — F10.220 ALCOHOL DEPENDENCE WITH INTOXICATION, UNCOMPLICATED: ICD-10-CM
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
## 2023-05-24 DIAGNOSIS — R10.9 UNSPECIFIED ABDOMINAL PAIN: ICD-10-CM

## 2023-05-24 LAB
BASOPHILS # BLD AUTO: 0.08 K/UL — SIGNIFICANT CHANGE UP (ref 0–0.2)
BASOPHILS NFR BLD AUTO: 1.5 % — SIGNIFICANT CHANGE UP (ref 0–2)
EOSINOPHIL # BLD AUTO: 0.09 K/UL — SIGNIFICANT CHANGE UP (ref 0–0.5)
EOSINOPHIL NFR BLD AUTO: 1.7 % — SIGNIFICANT CHANGE UP (ref 0–6)
HCT VFR BLD CALC: 41.9 % — SIGNIFICANT CHANGE UP (ref 39–50)
HGB BLD-MCNC: 13.7 G/DL — SIGNIFICANT CHANGE UP (ref 13–17)
IMM GRANULOCYTES NFR BLD AUTO: 0.2 % — SIGNIFICANT CHANGE UP (ref 0–0.9)
LYMPHOCYTES # BLD AUTO: 2.35 K/UL — SIGNIFICANT CHANGE UP (ref 1–3.3)
LYMPHOCYTES # BLD AUTO: 44.3 % — HIGH (ref 13–44)
MCHC RBC-ENTMCNC: 30 PG — SIGNIFICANT CHANGE UP (ref 27–34)
MCHC RBC-ENTMCNC: 32.7 G/DL — SIGNIFICANT CHANGE UP (ref 32–36)
MCV RBC AUTO: 91.7 FL — SIGNIFICANT CHANGE UP (ref 80–100)
MONOCYTES # BLD AUTO: 0.53 K/UL — SIGNIFICANT CHANGE UP (ref 0–0.9)
MONOCYTES NFR BLD AUTO: 10 % — SIGNIFICANT CHANGE UP (ref 2–14)
NEUTROPHILS # BLD AUTO: 2.25 K/UL — SIGNIFICANT CHANGE UP (ref 1.8–7.4)
NEUTROPHILS NFR BLD AUTO: 42.3 % — LOW (ref 43–77)
NRBC # BLD: 0 /100 WBCS — SIGNIFICANT CHANGE UP (ref 0–0)
PLATELET # BLD AUTO: 235 K/UL — SIGNIFICANT CHANGE UP (ref 150–400)
RBC # BLD: 4.57 M/UL — SIGNIFICANT CHANGE UP (ref 4.2–5.8)
RBC # FLD: 15 % — HIGH (ref 10.3–14.5)
WBC # BLD: 5.31 K/UL — SIGNIFICANT CHANGE UP (ref 3.8–10.5)
WBC # FLD AUTO: 5.31 K/UL — SIGNIFICANT CHANGE UP (ref 3.8–10.5)

## 2023-05-24 PROCEDURE — 99285 EMERGENCY DEPT VISIT HI MDM: CPT

## 2023-05-24 PROCEDURE — 70450 CT HEAD/BRAIN W/O DYE: CPT | Mod: 26,MC

## 2023-05-24 PROCEDURE — 72125 CT NECK SPINE W/O DYE: CPT | Mod: 26,MC

## 2023-05-24 RX ORDER — FAMOTIDINE 10 MG/ML
20 INJECTION INTRAVENOUS ONCE
Refills: 0 | Status: COMPLETED | OUTPATIENT
Start: 2023-05-24 | End: 2023-05-24

## 2023-05-24 RX ORDER — SODIUM CHLORIDE 9 MG/ML
1000 INJECTION INTRAMUSCULAR; INTRAVENOUS; SUBCUTANEOUS ONCE
Refills: 0 | Status: COMPLETED | OUTPATIENT
Start: 2023-05-24 | End: 2023-05-24

## 2023-05-24 RX ADMIN — SODIUM CHLORIDE 1000 MILLILITER(S): 9 INJECTION INTRAMUSCULAR; INTRAVENOUS; SUBCUTANEOUS at 22:36

## 2023-05-24 RX ADMIN — FAMOTIDINE 20 MILLIGRAM(S): 10 INJECTION INTRAVENOUS at 22:36

## 2023-05-24 NOTE — ED PROVIDER NOTE - NSFOLLOWUPCLINICS_GEN_ALL_ED_FT
[facility]  Internal Medicine  [address] [place] [address]  Phone: [phone]  Fax: [phone]  Follow Up Time: 7-10 Days

## 2023-05-24 NOTE — ED PROVIDER NOTE - PATIENT PORTAL LINK FT
You can access the FollowMyHealth Patient Portal offered by [facility] by registering at the following website: [email or web address]. By joining [employer]’s FollowMyHealth portal, you will also be able to view your health information using other applications (apps) compatible with our system. You can access the FollowMyHealth Patient Portal offered by [facility] by registering at the following website: [email or web address]. By joining [employer]’s FollowMyHealth portal, you will also be able to view your health information using other applications (apps) compatible with our system.

## 2023-05-24 NOTE — ED ADULT NURSE NOTE - OBJECTIVE STATEMENT
Pt BIBA from parking lot found with bottles of vodka. Pt admits to drinking vodka unsure how much, unsure of any falls. Pt alert and awake at time of assessment.  Breathing spontaneous and unlabored. Unknown PMH

## 2023-05-24 NOTE — ED ADULT TRIAGE NOTE - CHIEF COMPLAINT QUOTE
pt a&o x2 self, place,  biba from parking lot found with bottles of vodka. pt c.o of headache, abd pain. no apparent wounds, unknown falls.

## 2023-05-24 NOTE — ED PROVIDER NOTE - PHYSICAL EXAMINATION
GEN: Awake, alert, interactive, + clinically intoxicated / smells strongly of EtOH.   HEAD AND NECK: NC/AT. Airway patent. Neck supple.   EYES:  Clear b/l. EOMI. PERRL.   ENT: Moist mucus membranes.   CARDIAC: Regular rate, regular rhythm. No evident pedal edema.    RESP/CHEST: Normal respiratory effort with no use of accessory muscles or retractions. Clear throughout on auscultation.  ABD: Soft, non-distended, non-tender. No rebound, no guarding.   BACK: No midline spinal TTP. No CVAT.   EXTREMITIES: Moving all extremities with no apparent deformities.   SKIN: Warm, dry, intact normal color. No rash.   NEURO: CN II-XII grossly intact, no focal deficits.   PSYCH: Appropriate mood and affect.

## 2023-05-24 NOTE — ED ADULT NURSE NOTE - NSFALLUNIVINTERV_ED_ALL_ED
Bed/Stretcher in lowest position, wheels locked, appropriate side rails in place/Call bell, personal items and telephone in reach/Instruct patient to call for assistance before getting out of bed/chair/stretcher/Non-slip footwear applied when patient is off stretcher/[place] to call system/Physically safe environment - no spills, clutter or unnecessary equipment/Purposeful proactive rounding/Room/bathroom lighting operational, light cord in reach

## 2023-05-24 NOTE — ED PROVIDER NOTE - OBJECTIVE STATEMENT
58M PMH HTN, HLD, EtOH abuse BIBEMS d/t found on ground in parking lot, w/ bottle of vodka. Pt admits to drinking. Endorses h/a and abd pain. Pt states he did fall and points to back of head. Pt is clinically intoxicated and poor historian.

## 2023-05-24 NOTE — ED PROVIDER NOTE - PROGRESS NOTE DETAILS
Dr. Alcaraz (Attending): Patient seen by Dr. Aguila previously. Was informed patient was discharged with nothing to do librium given for slight withdrawal however, nurse concerned upon discharge that patient is unsteady on his feet needing a wheel chair.  Patient with noticeable tremor.  CIWA score 10.  Will give ativan and Re-eval. Patient allergic to ativan will give librium Patient states that he does not feel well would like detox.  SBIRT aware and at bedside with patient we discussed detox options.  Patient agreed to go to [facility] in [place].  Laura setting up for placement. Patient now refusing detox will be discharged

## 2023-05-24 NOTE — ED PROVIDER NOTE - IV ALTEPLASE DOOR HIDDEN
Hide Additional Notes?: No Include Location In Plan?: Yes Detail Level: Generalized Detail Level: Zone show

## 2023-05-24 NOTE — ED PROVIDER NOTE - NSFOLLOWUPCLINICSTOKEN_GEN_ALL_ED_FT
woke up saturday morning in hotel with unknown person, does not remember getting to hotel
[id number]:7-10 Days|| ||00\01||False;

## 2023-05-24 NOTE — ED PROVIDER NOTE - CLINICAL SUMMARY MEDICAL DECISION MAKING FREE TEXT BOX
58M PMH HTN, HLD, EtOH abuse BIBEMS d/t presumed EtOH intox, pt CC is h/a, abd pain. AF, VSS. Clinically intoxicated, w/o evidence trauma. Plan for CT head / neck, CBC, CMP, lipase, ORI. Give IVF, Pepcid. Re-eval. 58M PMH HTN, HLD, EtOH abuse BIBEMS d/t presumed EtOH intox, pt CC is h/a, abd pain. AF, VSS. Clinically intoxicated, w/o evidence trauma. Plan for CT head / neck, CBC, CMP, lipase, ORI. Give IVF, Pepcid. Re-eval.  W/u w/o significant abnormalities other than + . On re-eval, pt clinically sober w/ clear speech, steady gait and able to articulate reasonable plan to get home from the ED. Stable for d/c home. Recommend close outpatient PCP f/u. Return signs / symptoms d/w pt. He understands / agrees w/ this plan. 58M PMH HTN, HLD, EtOH abuse BIBEMS d/t presumed EtOH intox, pt CC is h/a, abd pain. AF, VSS. Clinically intoxicated, w/o evidence trauma. Plan for CT head / neck, CBC, CMP, lipase, ORI. Give IVF, Pepcid. Re-eval.  W/u w/o significant abnormalities other than + . On re-eval, pt clinically sober w/ clear speech and steady gait. Pt states he will go back to the streets s/p ED d/c, offered SW / shelter list, pt declines. Stable for d/c. Recommend close outpatient PCP f/u. Return signs / symptoms d/w pt. He understands / agrees w/ this plan.

## 2023-05-25 VITALS
OXYGEN SATURATION: 99 % | DIASTOLIC BLOOD PRESSURE: 74 MMHG | TEMPERATURE: 98 F | HEART RATE: 87 BPM | RESPIRATION RATE: 18 BRPM | SYSTOLIC BLOOD PRESSURE: 125 MMHG

## 2023-05-25 LAB
ALBUMIN SERPL ELPH-MCNC: 3.3 G/DL — SIGNIFICANT CHANGE UP (ref 3.3–5)
ALP SERPL-CCNC: 64 U/L — SIGNIFICANT CHANGE UP (ref 40–120)
ALT FLD-CCNC: 57 U/L — SIGNIFICANT CHANGE UP (ref 12–78)
ANION GAP SERPL CALC-SCNC: 12 MMOL/L — SIGNIFICANT CHANGE UP (ref 5–17)
AST SERPL-CCNC: 52 U/L — HIGH (ref 15–37)
BILIRUB SERPL-MCNC: 0.4 MG/DL — SIGNIFICANT CHANGE UP (ref 0.2–1.2)
BUN SERPL-MCNC: 10 MG/DL — SIGNIFICANT CHANGE UP (ref 7–23)
CALCIUM SERPL-MCNC: 8 MG/DL — LOW (ref 8.5–10.1)
CHLORIDE SERPL-SCNC: 106 MMOL/L — SIGNIFICANT CHANGE UP (ref 96–108)
CO2 SERPL-SCNC: 26 MMOL/L — SIGNIFICANT CHANGE UP (ref 22–31)
CREAT SERPL-MCNC: 0.79 MG/DL — SIGNIFICANT CHANGE UP (ref 0.5–1.3)
EGFR: 102 ML/MIN/1.73M2 — SIGNIFICANT CHANGE UP
ETHANOL SERPL-MCNC: 393 MG/DL — SIGNIFICANT CHANGE UP (ref 0–10)
GLUCOSE SERPL-MCNC: 73 MG/DL — SIGNIFICANT CHANGE UP (ref 70–99)
LIDOCAIN IGE QN: 47 U/L — LOW (ref 73–393)
POTASSIUM SERPL-MCNC: 3.8 MMOL/L — SIGNIFICANT CHANGE UP (ref 3.5–5.3)
POTASSIUM SERPL-SCNC: 3.8 MMOL/L — SIGNIFICANT CHANGE UP (ref 3.5–5.3)
PROT SERPL-MCNC: 7.4 GM/DL — SIGNIFICANT CHANGE UP (ref 6–8.3)
SODIUM SERPL-SCNC: 144 MMOL/L — SIGNIFICANT CHANGE UP (ref 135–145)

## 2023-05-25 RX ADMIN — Medication 50 MILLIGRAM(S): at 07:05

## 2023-05-25 RX ADMIN — Medication 50 MILLIGRAM(S): at 09:08

## 2023-05-25 NOTE — ED ADULT NURSE REASSESSMENT NOTE - NS ED NURSE REASSESS COMMENT FT1
Patient alert with unsteady gait unable to ambulate to bathroom on his own. CIWA scale of 10 done at 7:44am, MD made aware.

## 2023-05-25 NOTE — SBIRT NOTE ADULT - NSSBIRTSCREENAVAIL_GEN_A_CORE
Patient d/c. Discussed aftercare and pt stated he will be returning to [facility] provided [facility]. Discussed [facility] and patient stated he doesn't like that program and that he prefers [facility]. Patient aware [place] is a walk-in ED assessment. Long-term plan is to return to [facility]. Pt provided telephonic SBIRT flyer and encouraged to call. Patient d/c. Discussed aftercare and pt stated he will be returning to [facility]- provided [facility]. Discussed [facility] and patient stated he doesn't like that program and that he prefers [facility]. Patient aware [place] is a walk-in ED assessment. Long-term plan is to return to [facility]. Pt provided telephonic [facility] flyer and encouraged to call./Yes

## 2023-05-25 NOTE — SBIRT NOTE ADULT - NSSBIRTALCPASSREFTXDET_GEN_A_CORE
Referral for complete assessment and level of care determination at a certified treatment facility was completed by giving the patient information for treatment facilities that met their needs and encouraging them to call for an appointment. A call was made to a facility but patient was not accepted. Patient discharged from [facility] on 5/21/23. [facility] stated patient could not be accepted that closely to recent discharge. Patient provided [facility] information for future bed date. Encouraged to call [facility] or [facility].

## 2023-05-25 NOTE — SBIRT NOTE ADULT - NSSBIRTALCACTION/INTER_GEN_A_CORE
Nataly at [facility] completing phone screen. Nataly at [facility] completing phone screen./Passive referral to treatment

## 2023-05-27 ENCOUNTER — EMERGENCY (EMERGENCY)
Facility: HOSPITAL | Age: 58
LOS: 0 days | Discharge: HOME | End: 2023-05-28
Attending: STUDENT IN AN ORGANIZED HEALTH CARE EDUCATION/TRAINING PROGRAM
Payer: MEDICAID

## 2023-05-27 VITALS
WEIGHT: 179.9 LBS | HEART RATE: 86 BPM | HEIGHT: 68 IN | RESPIRATION RATE: 18 BRPM | SYSTOLIC BLOOD PRESSURE: 114 MMHG | TEMPERATURE: 99 F | DIASTOLIC BLOOD PRESSURE: 71 MMHG | OXYGEN SATURATION: 94 %
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
## 2023-05-27 DIAGNOSIS — E78.5 HYPERLIPIDEMIA, UNSPECIFIED: ICD-10-CM

## 2023-05-27 DIAGNOSIS — R51.9 HEADACHE, UNSPECIFIED: ICD-10-CM

## 2023-05-27 DIAGNOSIS — F10.229 ALCOHOL DEPENDENCE WITH INTOXICATION, UNSPECIFIED: ICD-10-CM

## 2023-05-27 DIAGNOSIS — I10 ESSENTIAL (PRIMARY) HYPERTENSION: ICD-10-CM

## 2023-05-27 DIAGNOSIS — Z88.8 ALLERGY STATUS TO OTHER DRUGS, MEDICAMENTS AND BIOLOGICAL SUBSTANCES: ICD-10-CM

## 2023-05-27 DIAGNOSIS — H54.7 UNSPECIFIED VISUAL LOSS: ICD-10-CM
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
## 2023-05-27 LAB — ETHANOL SERPL-MCNC: 387 MG/DL — HIGH (ref 0–10)

## 2023-05-27 PROCEDURE — 99284 EMERGENCY DEPT VISIT MOD MDM: CPT

## 2023-05-27 PROCEDURE — 70450 CT HEAD/BRAIN W/O DYE: CPT | Mod: 26,MA

## 2023-05-27 RX ADMIN — Medication 50 MILLIGRAM(S): at 20:06

## 2023-05-27 NOTE — ED ADULT NURSE REASSESSMENT NOTE - NS ED NURSE REASSESS COMMENT FT1
Repeat FS 89. Pt is still deeply asleep but will encourage oral intake when he is more alert. Will continue to monitor BGL

## 2023-05-27 NOTE — ED ADULT TRIAGE NOTE - CHIEF COMPLAINT QUOTE
[facility]. Patient was found on the street. Fs 97. Patient admits to drinking today and everyday with c/o headache.

## 2023-05-27 NOTE — ED ADULT NURSE NOTE - CHIEF COMPLAINT QUOTE
Patient BIB [facility] due to intox. Patient was found on the street. Fs 97. Patient admits to drinking today and everyday with c/o headache.

## 2023-05-27 NOTE — ED ADULT NURSE NOTE - OBJECTIVE STATEMENT
Per triage, Patient BIB [facility] due to intox. Patient was found on the street. Fs 97. Patient admits to drinking today and everyday with c/o headache.

## 2023-05-27 NOTE — ED ADULT NURSE REASSESSMENT NOTE - NS ED NURSE REASSESS COMMENT FT1
Pt asleep in bed with no acute distress noted. CIWA 2 as of now. Labs sent per order, meds given with no signs of adverse rxn. Pt placed in bed near nurses station and put in slight Trendelenburg position to prevent falls. Will continue to monitor

## 2023-05-27 NOTE — ED ADULT NURSE NOTE - NSFALLUNIVINTERV_ED_ALL_ED
Bed/Stretcher in lowest position, wheels locked, appropriate side rails in place/Call bell, personal items and telephone in reach/Instruct patient to call for assistance before getting out of bed/chair/stretcher/Non-slip footwear applied when patient is off stretcher/[place] to call system/Physically safe environment - no spills, clutter or unnecessary equipment/Purposeful proactive rounding/Room/bathroom lighting operational, light cord in reach

## 2023-05-27 NOTE — ED ADULT NURSE NOTE - ED STAT RN HANDOFF DETAILS
Report endorsed to oncoming RN. Safety checks compld this shift/Safety rounds completed hourly.  IV sites checked Q2+remains WDL. Meds given as ord with no s/s of adverse RXNs. Fall +skin precs in place. Any issues endorsed to oncoming RN for follow up. Report endorsed to oncoming RN. Safety checks compld this shift/Safety rounds completed hourly.  Meds given as ord with no s/s of adverse RXNs. Fall +skin precs in place. Any issues endorsed to oncoming RN for follow up.

## 2023-05-27 NOTE — ED ADULT NURSE NOTE - NSICDXNOPASTSURGICALHX_GEN_ALL_CORE
SHILPI received a phone call from Renu at [facility] [phone]. Renu reported that referral for IRTS admission was received and she would like to conduct an intake interview at [facility]. Early admit available by the end of the week/beginning of next week. The program has IRTS along with long term stay option. SHILPI will need to check with Dr Woods and the team to coordinate Herm[facility] being able to visit the Residence for an intake, SHILPI will call back before noon today.       SHILPI coordinated with Renu a date and time to conduct an intake interview with [facility] on the unit. Renu will be arriving on Friday 12/20/19 at 10:00am.    <-- Click to add NO significant Past Surgical History

## 2023-05-28 ENCOUNTER — INPATIENT (INPATIENT)
Facility: HOSPITAL | Age: 58
LOS: 4 days | Discharge: HOME | End: 2023-06-02
Attending: STUDENT IN AN ORGANIZED HEALTH CARE EDUCATION/TRAINING PROGRAM | Admitting: STUDENT IN AN ORGANIZED HEALTH CARE EDUCATION/TRAINING PROGRAM
Payer: MEDICAID

## 2023-05-28 VITALS
HEIGHT: 68 IN | DIASTOLIC BLOOD PRESSURE: 81 MMHG | SYSTOLIC BLOOD PRESSURE: 128 MMHG | OXYGEN SATURATION: 96 % | HEART RATE: 86 BPM | RESPIRATION RATE: 16 BRPM | TEMPERATURE: 98 F

## 2023-05-28 VITALS
DIASTOLIC BLOOD PRESSURE: 81 MMHG | RESPIRATION RATE: 16 BRPM | TEMPERATURE: 98 F | OXYGEN SATURATION: 94 % | SYSTOLIC BLOOD PRESSURE: 146 MMHG | HEART RATE: 97 BPM

## 2023-05-28 LAB
BASOPHILS # BLD AUTO: 0.06 K/UL — SIGNIFICANT CHANGE UP (ref 0–0.2)
BASOPHILS NFR BLD AUTO: 1.3 % — SIGNIFICANT CHANGE UP (ref 0–2)
EOSINOPHIL # BLD AUTO: 0.11 K/UL — SIGNIFICANT CHANGE UP (ref 0–0.5)
EOSINOPHIL NFR BLD AUTO: 2.4 % — SIGNIFICANT CHANGE UP (ref 0–6)
HCT VFR BLD CALC: 35.8 % — LOW (ref 39–50)
HGB BLD-MCNC: 11.9 G/DL — LOW (ref 13–17)
IANC: 1.57 K/UL — LOW (ref 1.8–7.4)
IMM GRANULOCYTES NFR BLD AUTO: 0 % — SIGNIFICANT CHANGE UP (ref 0–0.9)
LYMPHOCYTES # BLD AUTO: 2.41 K/UL — SIGNIFICANT CHANGE UP (ref 1–3.3)
LYMPHOCYTES # BLD AUTO: 53.2 % — HIGH (ref 13–44)
MCHC RBC-ENTMCNC: 30.4 PG — SIGNIFICANT CHANGE UP (ref 27–34)
MCHC RBC-ENTMCNC: 33.2 GM/DL — SIGNIFICANT CHANGE UP (ref 32–36)
MCV RBC AUTO: 91.6 FL — SIGNIFICANT CHANGE UP (ref 80–100)
MONOCYTES # BLD AUTO: 0.38 K/UL — SIGNIFICANT CHANGE UP (ref 0–0.9)
MONOCYTES NFR BLD AUTO: 8.4 % — SIGNIFICANT CHANGE UP (ref 2–14)
NEUTROPHILS # BLD AUTO: 1.57 K/UL — LOW (ref 1.8–7.4)
NEUTROPHILS NFR BLD AUTO: 34.7 % — LOW (ref 43–77)
NRBC # BLD: 0 /100 WBCS — SIGNIFICANT CHANGE UP (ref 0–0)
NRBC # FLD: 0 K/UL — SIGNIFICANT CHANGE UP (ref 0–0)
PLATELET # BLD AUTO: 127 K/UL — LOW (ref 150–400)
RBC # BLD: 3.91 M/UL — LOW (ref 4.2–5.8)
RBC # FLD: 14.7 % — HIGH (ref 10.3–14.5)
WBC # BLD: 4.53 K/UL — SIGNIFICANT CHANGE UP (ref 3.8–10.5)
WBC # FLD AUTO: 4.53 K/UL — SIGNIFICANT CHANGE UP (ref 3.8–10.5)

## 2023-05-28 PROCEDURE — 99285 EMERGENCY DEPT VISIT HI MDM: CPT

## 2023-05-28 RX ORDER — DEXTROSE 50 % IN WATER 50 %
25 SYRINGE (ML) INTRAVENOUS ONCE
Refills: 0 | Status: DISCONTINUED | OUTPATIENT
Start: 2023-05-28 | End: 2023-05-28

## 2023-05-28 RX ORDER — SODIUM CHLORIDE 9 MG/ML
1000 INJECTION INTRAMUSCULAR; INTRAVENOUS; SUBCUTANEOUS ONCE
Refills: 0 | Status: COMPLETED | OUTPATIENT
Start: 2023-05-28 | End: 2023-05-28

## 2023-05-28 RX ORDER — DEXTROSE 50 % IN WATER 50 %
25 SYRINGE (ML) INTRAVENOUS ONCE
Refills: 0 | Status: COMPLETED | OUTPATIENT
Start: 2023-05-28 | End: 2023-05-28

## 2023-05-28 RX ORDER — THIAMINE MONONITRATE (VIT B1) 100 MG
100 TABLET ORAL ONCE
Refills: 0 | Status: DISCONTINUED | OUTPATIENT
Start: 2023-05-28 | End: 2023-05-28

## 2023-05-28 RX ORDER — THIAMINE MONONITRATE (VIT B1) 100 MG
100 TABLET ORAL ONCE
Refills: 0 | Status: COMPLETED | OUTPATIENT
Start: 2023-05-28 | End: 2023-05-28

## 2023-05-28 RX ADMIN — Medication 25 MILLILITER(S): at 23:47

## 2023-05-28 RX ADMIN — Medication 25 MILLIGRAM(S): at 02:42

## 2023-05-28 RX ADMIN — Medication 100 MILLIGRAM(S): at 23:22

## 2023-05-28 RX ADMIN — Medication 50 MILLIGRAM(S): at 04:43

## 2023-05-28 RX ADMIN — SODIUM CHLORIDE 1000 MILLILITER(S): 9 INJECTION INTRAMUSCULAR; INTRAVENOUS; SUBCUTANEOUS at 23:06

## 2023-05-28 NOTE — ED PROVIDER NOTE - NSFOLLOWUPINSTRUCTIONS_ED_ALL_ED_FT
Alcohol Intoxication  Alcohol intoxication occurs when a person no longer thinks clearly or functions well (becomes impaired) after drinking alcohol. Intoxication can occur with even one drink. The level of impairment depends on:    The amount of alcohol the person had.  The person's age, gender, and weight.  How often the person drinks.  Whether the person has other medical conditions, such as diabetes, seizures, or a heart condition.    Alcohol intoxication can range in severity from mild to severe. The condition can be dangerous, especially when caused by drinking large amounts of alcohol in a short period of time (binge drinking) or if the person also took certain prescription medicines or recreational drugs.    What are the signs or symptoms?  Symptoms of mild alcohol intoxication include:    Feeling relaxed or sleepy.  Mild difficulty with:    Coordination.  Speech.  Memory.  Attention.      Symptoms of moderate alcohol intoxication include:    Extreme emotions, such as anger or sadness.  Moderate difficulty with:    Coordination.  Speech.  Memory.  Attention.      Symptoms of severe alcohol intoxication include:    Passing out.  Vomiting.  Confusion.  Slow breathing.  Coma.  Severe difficulty with:    Coordination.  Speech.  Memory.  Attention.      Intoxication, especially in people who are not exposed to alcohol often can progress from mild to severe quickly, and may even cause coma or death.    How is this diagnosed?  This condition may be diagnosed based on:    A medical history.  A physical exam.  A blood test that measures the concentration of alcohol in the blood (blood alcohol content, or ORI).  Whether there is a smell of alcohol on the breath.    Your health care provider will ask you how much alcohol you drank and what kind of alcohol you had.    How is this treated?  Usually, treatment is not needed for this condition. Most of the effects of alcohol are temporary and go away as the alcohol naturally leaves the body. Your health care provider may recommend monitoring until the alcohol level starts to drop and it is safe to go home. You may also get fluids through an IV tube to help prevent dehydration. If the intoxication is severe, a breathing machine called a ventilator may be needed to support your breathing.    Follow these instructions at home:  Do not drive after drinking alcohol.  Have someone stay with you while you are intoxicated. You should not be left alone.  Stay hydrated. Drink enough fluid to keep your urine clear or pale yellow.  Avoid caffeine because it can dehydrate you.  ImageTake over-the-counter and prescription medicines only as told by your health care provider.  How is this prevented?  To prevent alcohol intoxication:    Limit alcohol intake to no more than 1 drink a day for nonpregnant women and 2 drinks a day for men. One drink equals 12 oz of beer, 5 oz of wine, or 1½ oz of hard liquor.  Do not drink alcohol on an empty stomach.  Avoid drinking alcohol if:    You are under the legal drinking age.  You are pregnant or may be pregnant.  You are taking medicines that should not be taken with alcohol.  Your drinking causes your medical condition to get worse.  You need to drive or perform activities that require your attention.  You have substance use disorder.      To prevent potentially serious complications of alcohol intoxication, seek immediate medical care if you or someone you know has signs of moderate or severe alcohol intoxication. These include:    Moderate or severe difficulty with:    Coordination.  Speech.  Memory.  Attention.    Passing out.  Confusion.  Vomiting.    Do not leave someone alone if he or she is intoxicated.    Contact a health care provider if:  You do not feel better after a few days.  You are having problems at work, at school, or at home due to drinking.  Get help right away if:  You become shaky when you try to stop drinking.  You shake uncontrollably (have a seizure).  You vomit blood. Blood in vomit may look bright red, or it may look like coffee grounds.  You have blood in your stool. Blood in stool may be bright red, or it may make stool appear black and tarry and make it smell bad.  You become light-headed or you faint.  If you ever feel like you may hurt yourself or others, or have thoughts about taking your own life, get help right away. You can go to your nearest emergency department or call:     Your local emergency services (911 in the [place]   A suicide crisis helpline, such as the National Suicide Prevention Lifeline at 1-946.161.2895. This is open 24 hours a day.     This information is not intended to replace advice given to you by your health care provider. Make sure you discuss any questions you have with your health care provider.

## 2023-05-28 NOTE — ED PROVIDER NOTE - PROGRESS NOTE DETAILS
pt reassessed this am, with progressive withdrawal symptoms, unable to ambulate, will need admissioon

## 2023-05-28 NOTE — ED PROVIDER NOTE - ATTENDING CONTRIBUTION TO CARE
57 yo male with pMH HTN, HLD and EtOH abuse presents with alcohol intoxication reportrs "I drank too much."  PE: +intox, Well appearing, RRR, CTA BL lungs, abd soft NTND A/P Labs, imaging, medicate, reassess

## 2023-05-28 NOTE — ED PROVIDER NOTE - OBJECTIVE STATEMENT
58M pmhx htn, hld, etoh abuse, seen frequently in this ED, found by EMS in street intoxicated. Pt admits to drinking alcohol. He complains of headache, endorses fall. Denies nausea, vomiting, abd pain, pain of the back.

## 2023-05-28 NOTE — ED PROVIDER NOTE - CLINICAL SUMMARY MEDICAL DECISION MAKING FREE TEXT BOX
58M pmhx htn, hld, etoh abuse, seen frequently in this ED, found by EMS in street intoxicated. Pt admits to drinking alcohol. He complains of headache, endorses fall. Denies nausea, vomiting, abd pain, pain of the back.- noted intox - check etoh level, FS, ct brain for reported headache rule out ich, reassess, librium to prevent withdrawal 58M pmhx htn, hld, etoh abuse, seen frequently in this ED, found by EMS in street intoxicated. Pt admits to drinking alcohol. He complains of headache, endorses fall. Denies nausea, vomiting, abd pain, pain of the back.- noted intox - check etoh level, FS, ct brain for reported headache rule out ich, reassess, librium to prevent withdrawal    Pt awake and alert speaking full and complete sentences with steady gait. tolerating po. will d/c to follow up with detox.  patient ambulating with steady gait- denies any current complaints, substance abuse counseling

## 2023-05-28 NOTE — ED PROVIDER NOTE - PHYSICAL EXAMINATION
Vitals: I have reviewed the patients vital signs  General: disheveled w/foul odor   HEENT: Atraumatic, normocephalic, airway patent  Eyes: EOMI, tracking appropriately  Neck: no tracheal deviation, no JVD  Chest/Lungs: no trauma, symmetric chest rise, speaking in complete sentences, no WOB, CTA BL   Heart: skin and extremities well perfused, regular rate and rhythm  Abdomen: soft, nontender and nondistended   Neuro: acutely intoxicated, alert, answering questions appropriately and cooperative with exam, CN II-XI intact   MSK: strength at baseline in all extremities, no muscle wasting or atrophy, no tenderness noted over joints in all 4 extremities   Skin: old abrasions noted over BL knees

## 2023-05-28 NOTE — ED PROVIDER NOTE - PATIENT PORTAL LINK FT
You can access the FollowMyHealth Patient Portal offered by [facility] by registering at the following website: [email or web address]. By joining [facility]’s FollowMyHealth portal, you will also be able to view your health information using other applications (apps) compatible with our system.

## 2023-05-28 NOTE — ED PROVIDER NOTE - PROGRESS NOTE DETAILS
Arthur PGY2  Patient with history of alcohol withdrawal with heavy alcohol use, concerning for alcohol withdrawal once patient becomes more sober. Will give librium. Arthur PGY2  Patient is back from CT scan and reassessed - patient is tremulous w/reports of anxiety/nausea/headache and appears diaphoretic. CIWA score of 12.   CIWA protocol initiated and admitted to hospitalist.

## 2023-05-28 NOTE — ED PROVIDER NOTE - CLINICAL SUMMARY MEDICAL DECISION MAKING FREE TEXT BOX
Patient is a 58 year-old-male with history of HTN, HLD and EtOH abuse presents with alcohol intoxication, complaining of chest pain/nausea/BL knee pain and reporting of a fall today. Will obtain bloodwork including trop, ECG, xray of chest/pelvis, xray of BL knee and CT head/c-spine. Fluids and thiamine. MTF.

## 2023-05-28 NOTE — ED ADULT NURSE REASSESSMENT NOTE - NS ED NURSE REASSESS COMMENT FT1
Repeat  after giving pt juice. Pt sleeping in bed, no acute distress noted at this time. Will continue to monitor

## 2023-05-28 NOTE — ED ADULT TRIAGE NOTE - CHIEF COMPLAINT QUOTE
pt found outside ER walk in entrance, appears disheveled and is unsteady on feet. pt states "I drank too much" endorses drinking everyday. has medications that pt reports picking up from his pharmacy today.

## 2023-05-28 NOTE — ED PROVIDER NOTE - OBJECTIVE STATEMENT
Patient is a 58 year-old-male with history of HTN, HLD and EtOH abuse presents with alcohol intoxication. Patient was found outside the ER walk in entrance and states that "I drank too much." Patient reports that he has been drinking vodka, tequila and other liquor today. C/o fall today, unable to see if he had any head trauma or LOC. Reports pain in the midsternal area, with nausea, 1x episode of vomiting earlier. Also reports pain in BL knee and had scabs on the BL knees.

## 2023-05-29 DIAGNOSIS — F10.239 ALCOHOL DEPENDENCE WITH WITHDRAWAL, UNSPECIFIED: ICD-10-CM

## 2023-05-29 DIAGNOSIS — I10 ESSENTIAL (PRIMARY) HYPERTENSION: ICD-10-CM

## 2023-05-29 DIAGNOSIS — D61.818 OTHER PANCYTOPENIA: ICD-10-CM

## 2023-05-29 DIAGNOSIS — Z91.81 HISTORY OF FALLING: ICD-10-CM

## 2023-05-29 DIAGNOSIS — Z29.9 ENCOUNTER FOR PROPHYLACTIC MEASURES, UNSPECIFIED: ICD-10-CM

## 2023-05-29 DIAGNOSIS — H40.9 UNSPECIFIED GLAUCOMA: ICD-10-CM

## 2023-05-29 DIAGNOSIS — F10.939 ALCOHOL USE, UNSPECIFIED WITH WITHDRAWAL, UNSPECIFIED: ICD-10-CM

## 2023-05-29 LAB
ALBUMIN SERPL ELPH-MCNC: 3.7 G/DL — SIGNIFICANT CHANGE UP (ref 3.3–5)
ALP SERPL-CCNC: 65 U/L — SIGNIFICANT CHANGE UP (ref 40–120)
ALT FLD-CCNC: 34 U/L — SIGNIFICANT CHANGE UP (ref 4–41)
ANION GAP SERPL CALC-SCNC: 12 MMOL/L — SIGNIFICANT CHANGE UP (ref 7–14)
ANION GAP SERPL CALC-SCNC: 17 MMOL/L — HIGH (ref 7–14)
AST SERPL-CCNC: 36 U/L — SIGNIFICANT CHANGE UP (ref 4–40)
BILIRUB SERPL-MCNC: 0.8 MG/DL — SIGNIFICANT CHANGE UP (ref 0.2–1.2)
BUN SERPL-MCNC: 3 MG/DL — LOW (ref 7–23)
BUN SERPL-MCNC: 4 MG/DL — LOW (ref 7–23)
CALCIUM SERPL-MCNC: 7.8 MG/DL — LOW (ref 8.4–10.5)
CALCIUM SERPL-MCNC: 8.4 MG/DL — SIGNIFICANT CHANGE UP (ref 8.4–10.5)
CHLORIDE SERPL-SCNC: 103 MMOL/L — SIGNIFICANT CHANGE UP (ref 98–107)
CHLORIDE SERPL-SCNC: 104 MMOL/L — SIGNIFICANT CHANGE UP (ref 98–107)
CO2 SERPL-SCNC: 22 MMOL/L — SIGNIFICANT CHANGE UP (ref 22–31)
CO2 SERPL-SCNC: 25 MMOL/L — SIGNIFICANT CHANGE UP (ref 22–31)
CREAT SERPL-MCNC: 0.63 MG/DL — SIGNIFICANT CHANGE UP (ref 0.5–1.3)
CREAT SERPL-MCNC: 0.69 MG/DL — SIGNIFICANT CHANGE UP (ref 0.5–1.3)
EGFR: 106 ML/MIN/1.73M2 — SIGNIFICANT CHANGE UP
EGFR: 109 ML/MIN/1.73M2 — SIGNIFICANT CHANGE UP
ETHANOL SERPL-MCNC: 129 MG/DL — HIGH
GLUCOSE SERPL-MCNC: 76 MG/DL — SIGNIFICANT CHANGE UP (ref 70–99)
GLUCOSE SERPL-MCNC: 89 MG/DL — SIGNIFICANT CHANGE UP (ref 70–99)
HCT VFR BLD CALC: 37.8 % — LOW (ref 39–50)
HGB BLD-MCNC: 12.2 G/DL — LOW (ref 13–17)
MAGNESIUM SERPL-MCNC: 1.6 MG/DL — SIGNIFICANT CHANGE UP (ref 1.6–2.6)
MAGNESIUM SERPL-MCNC: 1.7 MG/DL — SIGNIFICANT CHANGE UP (ref 1.6–2.6)
MCHC RBC-ENTMCNC: 29.3 PG — SIGNIFICANT CHANGE UP (ref 27–34)
MCHC RBC-ENTMCNC: 32.3 GM/DL — SIGNIFICANT CHANGE UP (ref 32–36)
MCV RBC AUTO: 90.6 FL — SIGNIFICANT CHANGE UP (ref 80–100)
NRBC # BLD: 0 /100 WBCS — SIGNIFICANT CHANGE UP (ref 0–0)
NRBC # FLD: 0 K/UL — SIGNIFICANT CHANGE UP (ref 0–0)
PHOSPHATE SERPL-MCNC: 3.3 MG/DL — SIGNIFICANT CHANGE UP (ref 2.5–4.5)
PLATELET # BLD AUTO: 110 K/UL — LOW (ref 150–400)
POTASSIUM SERPL-MCNC: 3.2 MMOL/L — LOW (ref 3.5–5.3)
POTASSIUM SERPL-MCNC: 3.7 MMOL/L — SIGNIFICANT CHANGE UP (ref 3.5–5.3)
POTASSIUM SERPL-SCNC: 3.2 MMOL/L — LOW (ref 3.5–5.3)
POTASSIUM SERPL-SCNC: 3.7 MMOL/L — SIGNIFICANT CHANGE UP (ref 3.5–5.3)
PROT SERPL-MCNC: 6.9 G/DL — SIGNIFICANT CHANGE UP (ref 6–8.3)
RBC # BLD: 4.17 M/UL — LOW (ref 4.2–5.8)
RBC # FLD: 14.6 % — HIGH (ref 10.3–14.5)
SODIUM SERPL-SCNC: 137 MMOL/L — SIGNIFICANT CHANGE UP (ref 135–145)
SODIUM SERPL-SCNC: 146 MMOL/L — HIGH (ref 135–145)
TROPONIN T, HIGH SENSITIVITY RESULT: 13 NG/L — SIGNIFICANT CHANGE UP
TROPONIN T, HIGH SENSITIVITY RESULT: 16 NG/L — SIGNIFICANT CHANGE UP
WBC # BLD: 3.61 K/UL — LOW (ref 3.8–10.5)
WBC # FLD AUTO: 3.61 K/UL — LOW (ref 3.8–10.5)

## 2023-05-29 PROCEDURE — 72170 X-RAY EXAM OF PELVIS: CPT | Mod: 26

## 2023-05-29 PROCEDURE — 71045 X-RAY EXAM CHEST 1 VIEW: CPT | Mod: 26

## 2023-05-29 PROCEDURE — 99223 1ST HOSP IP/OBS HIGH 75: CPT | Mod: GC

## 2023-05-29 PROCEDURE — 73564 X-RAY EXAM KNEE 4 OR MORE: CPT | Mod: 26,50

## 2023-05-29 PROCEDURE — 72125 CT NECK SPINE W/O DYE: CPT | Mod: 26,MA

## 2023-05-29 PROCEDURE — 70450 CT HEAD/BRAIN W/O DYE: CPT | Mod: 26,MA

## 2023-05-29 RX ORDER — THIAMINE MONONITRATE (VIT B1) 100 MG
500 TABLET ORAL EVERY 8 HOURS
Refills: 0 | Status: COMPLETED | OUTPATIENT
Start: 2023-05-29 | End: 2023-06-01

## 2023-05-29 RX ORDER — POTASSIUM CHLORIDE 20 MEQ
10 PACKET (EA) ORAL
Refills: 0 | Status: COMPLETED | OUTPATIENT
Start: 2023-05-29 | End: 2023-05-29

## 2023-05-29 RX ORDER — ACETAMINOPHEN 500 MG
650 TABLET ORAL EVERY 6 HOURS
Refills: 0 | Status: DISCONTINUED | OUTPATIENT
Start: 2023-05-29 | End: 2023-06-02

## 2023-05-29 RX ORDER — DEXTROSE 50 % IN WATER 50 %
15 SYRINGE (ML) INTRAVENOUS ONCE
Refills: 0 | Status: DISCONTINUED | OUTPATIENT
Start: 2023-05-29 | End: 2023-06-02

## 2023-05-29 RX ORDER — THIAMINE MONONITRATE (VIT B1) 100 MG
100 TABLET ORAL ONCE
Refills: 0 | Status: DISCONTINUED | OUTPATIENT
Start: 2023-05-29 | End: 2023-05-29

## 2023-05-29 RX ORDER — TIMOLOL 0.5 %
1 DROPS OPHTHALMIC (EYE)
Refills: 0 | Status: DISCONTINUED | OUTPATIENT
Start: 2023-05-29 | End: 2023-06-02

## 2023-05-29 RX ORDER — LATANOPROST 0.05 MG/ML
1 SOLUTION/ DROPS OPHTHALMIC; TOPICAL AT BEDTIME
Refills: 0 | Status: DISCONTINUED | OUTPATIENT
Start: 2023-05-29 | End: 2023-06-02

## 2023-05-29 RX ORDER — SODIUM CHLORIDE 9 MG/ML
1000 INJECTION INTRAMUSCULAR; INTRAVENOUS; SUBCUTANEOUS
Refills: 0 | Status: DISCONTINUED | OUTPATIENT
Start: 2023-05-29 | End: 2023-05-30

## 2023-05-29 RX ORDER — DEXTROSE 50 % IN WATER 50 %
25 SYRINGE (ML) INTRAVENOUS ONCE
Refills: 0 | Status: DISCONTINUED | OUTPATIENT
Start: 2023-05-29 | End: 2023-06-02

## 2023-05-29 RX ORDER — GLUCAGON INJECTION, SOLUTION 0.5 MG/.1ML
1 INJECTION, SOLUTION SUBCUTANEOUS ONCE
Refills: 0 | Status: DISCONTINUED | OUTPATIENT
Start: 2023-05-29 | End: 2023-06-02

## 2023-05-29 RX ORDER — SODIUM CHLORIDE 9 MG/ML
1000 INJECTION, SOLUTION INTRAVENOUS
Refills: 0 | Status: DISCONTINUED | OUTPATIENT
Start: 2023-05-29 | End: 2023-05-30

## 2023-05-29 RX ORDER — ENOXAPARIN SODIUM 100 MG/ML
40 INJECTION SUBCUTANEOUS EVERY 24 HOURS
Refills: 0 | Status: DISCONTINUED | OUTPATIENT
Start: 2023-05-29 | End: 2023-06-02

## 2023-05-29 RX ORDER — DEXTROSE 50 % IN WATER 50 %
12.5 SYRINGE (ML) INTRAVENOUS ONCE
Refills: 0 | Status: DISCONTINUED | OUTPATIENT
Start: 2023-05-29 | End: 2023-06-02

## 2023-05-29 RX ORDER — FOLIC ACID 0.8 MG
1 TABLET ORAL DAILY
Refills: 0 | Status: DISCONTINUED | OUTPATIENT
Start: 2023-05-29 | End: 2023-06-02

## 2023-05-29 RX ADMIN — Medication 10 MILLIEQUIVALENT(S): at 07:01

## 2023-05-29 RX ADMIN — Medication 2 MILLIGRAM(S): at 16:02

## 2023-05-29 RX ADMIN — Medication 100 MILLIEQUIVALENT(S): at 01:33

## 2023-05-29 RX ADMIN — SODIUM CHLORIDE 125 MILLILITER(S): 9 INJECTION, SOLUTION INTRAVENOUS at 12:25

## 2023-05-29 RX ADMIN — Medication 105 MILLIGRAM(S): at 13:34

## 2023-05-29 RX ADMIN — ENOXAPARIN SODIUM 40 MILLIGRAM(S): 100 INJECTION SUBCUTANEOUS at 12:25

## 2023-05-29 RX ADMIN — Medication 50 MILLIGRAM(S): at 11:19

## 2023-05-29 RX ADMIN — Medication 50 MILLIGRAM(S): at 06:14

## 2023-05-29 RX ADMIN — Medication 1 TABLET(S): at 11:19

## 2023-05-29 RX ADMIN — Medication 100 MILLIEQUIVALENT(S): at 03:54

## 2023-05-29 RX ADMIN — Medication 50 MILLIGRAM(S): at 17:36

## 2023-05-29 RX ADMIN — Medication 50 MILLIGRAM(S): at 23:25

## 2023-05-29 RX ADMIN — Medication 105 MILLIGRAM(S): at 22:35

## 2023-05-29 RX ADMIN — Medication 2 MILLIGRAM(S): at 08:30

## 2023-05-29 RX ADMIN — Medication 1 MILLIGRAM(S): at 11:19

## 2023-05-29 RX ADMIN — Medication 100 MILLIEQUIVALENT(S): at 02:45

## 2023-05-29 RX ADMIN — Medication 10 MILLIEQUIVALENT(S): at 03:59

## 2023-05-29 RX ADMIN — Medication 1 DROP(S): at 17:37

## 2023-05-29 RX ADMIN — SODIUM CHLORIDE 125 MILLILITER(S): 9 INJECTION INTRAMUSCULAR; INTRAVENOUS; SUBCUTANEOUS at 01:33

## 2023-05-29 RX ADMIN — Medication 2 MILLIGRAM(S): at 06:50

## 2023-05-29 NOTE — H&P ADULT - PROBLEM SELECTOR PLAN 2
h/o EtOH abuse with multiple admissions for DT and alcohol withdrawal (most recent 4/27) presents with alcohol intoxication and after fall   imaging unremarkable    Plan:  [] PT eval

## 2023-05-29 NOTE — ED ADULT NURSE REASSESSMENT NOTE - SYMPTOMS
Pt st" I drank too much." pt foul smelling, Pt is cooperative,  vss see flow sheet charting./weakness Pt st" I drank too much." pt foul smelling, Pt is cooperative,  vss see flow sheet charting.

## 2023-05-29 NOTE — ED ADULT NURSE REASSESSMENT NOTE - ANCILLARY STATUS
ct , xray/awaiting radiology/EKG at bedside ct , xray K=3.2 KCL 10/100cc intiated, NS at 125 cc via left wrist access ./awaiting radiology/EKG at bedside

## 2023-05-29 NOTE — H&P ADULT - ASSESSMENT
58 year-old-male with history of HTN, HLD, glaucoma and EtOH abuse with multiple admissions for DT and alcohol withdrawal (most recent 4/27) presents with alcohol intoxication and fall.

## 2023-05-29 NOTE — H&P ADULT - PROBLEM SELECTOR PLAN 1
h/o EtOH abuse with multiple admissions for DT and alcohol withdrawal (most recent 4/27) presents with alcohol intoxication  CIWA 12 on admission  started on librium taper     Plan:  [] c/w taper  [] monitor CIWA q 2 hours   [] c/w thiamine, MV h/o EtOH abuse with multiple admissions for DT and alcohol withdrawal (most recent 4/27) presents with alcohol intoxication  CIWA 12 on admission  started on librium taper   CIWA 7    Plan:  [] c/w taper  [] monitor CIWA q 2 hours   [] c/w thiamine, MV, folic acid h/o EtOH abuse with multiple admissions for DT and alcohol withdrawal (most recent 4/27) presents with alcohol intoxication  CIWA 12 on admission  started on librium taper   CIWA 7    Plan:  [] c/w taper  [] monitor CIWA q 2 hours   [] c/w thiamine, MV, folic acid, IVF

## 2023-05-29 NOTE — ED ADULT NURSE NOTE - NS PRO AD NO ADVANCE DIRECTIVE
Physical Therapy  Visit Type: treatment  Precautions:  Medical precautions:  fall risk; standard precautions.      SUBJECTIVE  Patient agreed to participate in therapy this date.     OBJECTIVE     Oriented to person, place, time and situation     Bed Mobility:    Rolling left: modified independent    Rolling right: modified independent    Repositioning in bed: modified independent      Side-lying to sit: modified independent    Sit to side-lying: modified independent  Transfers:    Assistive devices: gait belt    Sit to stand: supervision  Gait/Ambulation:     Assistance: stand by assist   Assistive device: gait belt and 2-wheeled walker    Distance (ft): 150      Interventions     Seated    Lower Extremity: Bilateral: seated hip flexion, knee flexion, toe raises, heel raises, knee extensions, hip adduction and hip abduction, AROM,   Standing    Lower Extremity: Bilateral: marching, heel raises and knee flexion, AROM,        ASSESSMENT    Pt improved with therapy, she lives with her son and uses walker PRN for ambulation.       Discharge Recommendations   Recommendation for Discharge: PT IL: Patient requires  intermittent assistance to perform mobility and/or ADLs safely, Patient is appropriate for Physical Therapy 1-3 times per week        PT/OT Mobility Equipment for Discharge: has walker                Documented in the chart in the following areas: Assessment.         No

## 2023-05-29 NOTE — H&P ADULT - PROBLEM SELECTOR PLAN 3
WBC 3.61, Hb 12.1,    MCV normal   Neutropenia and anemia appear chronic, thrombocytopenia new this admission   likely in setting of ETOH abuse     Plan:  [] blue top   [] iron studies   [] coags   [] f/u haptoglobin, LDH, fibrinogen   [] HIV

## 2023-05-29 NOTE — H&P ADULT - HISTORY OF PRESENT ILLNESS
58 year-old-male with history of HTN, HLD, glaucoma and EtOH abuse with multiple admissions for DT and alcohol withdrawal (most recent 4/27) presents with alcohol intoxication. Patient was found outside the ER walk in entrance and states that "I drank too much." Patient reports that he has been drinking vodka, tequila and other liquor - last drink ***. C/o fall, unable to see if he had any head trauma or LOC. Reports pain in the midsternal area, with nausea, 1x episode of vomiting earlier. Also reports pain in BL knee and had scabs on the BL knees.    All other ROS negative.     In  ED, VSS WNL. CIWA 12. Labs significant for mild pancytopenia, mild hypernatremia, hypokalemia, Blood alcohol 387. CTH/ cervical spine unremarkable. XR Chest clear. XR pelvis and BL knee unremarkable. Started on librium taper. Admitted to medicine for alcohol withdrawal. 58 year-old-male with history of HTN, HLD, glaucoma and EtOH abuse with multiple admissions for DT and alcohol withdrawal (most recent 4/27) presents with alcohol intoxication. Patient was found outside the ER walk in entrance and states that "I drank too much." Patient reports that he has been drinking vodka, tequila and other liquor - last drink 5/28 AM. C/o fall, unable to see if he had any head trauma or LOC. Reports pain in the midsternal area, with nausea, 1x episode of vomiting earlier. Also reports pain in BL knee and had scabs on the BL knees.Of note, patient lives with cousin and was sober for 2.5 years post rehab until 1.5 years ago when he started drinking again.  All other ROS negative.     In  ED, VSS WNL. CIWA 12. Labs significant for mild pancytopenia, mild hypernatremia, hypokalemia, Blood alcohol 387. CTH/ cervical spine unremarkable. XR Chest clear. XR pelvis and BL knee unremarkable. Started on librium taper. Admitted to medicine for alcohol withdrawal.

## 2023-05-29 NOTE — H&P ADULT - NSHPPHYSICALEXAM_GEN_ALL_CORE
VITALS:   T(C): 36.7 (05-29-23 @ 09:00), Max: 36.7 (05-29-23 @ 00:52)  HR: 84 (05-29-23 @ 09:00) (81 - 86)  BP: 125/67 (05-29-23 @ 09:00) (110/68 - 128/81)  RR: 17 (05-29-23 @ 09:00) (16 - 20)  SpO2: 98% (05-29-23 @ 09:00) (96% - 98%)    GENERAL: NAD, lying in bed comfortably  HEAD:  Atraumatic, Normocephalic  EYES: EOMI, PERRLA, conjunctiva and sclera clear  ENT: Moist mucous membranes  NECK: Supple, No JVD  CHEST/LUNG: Clear to auscultation bilaterally; No rales, rhonchi, wheezing, or rubs. Unlabored respirations  HEART: Regular rate and rhythm; No murmurs, rubs, or gallops  ABDOMEN: BSx4; Soft, nontender, nondistended  EXTREMITIES:  2+ Peripheral Pulses, brisk capillary refill. No clubbing, cyanosis, or edema  NERVOUS SYSTEM:  A&Ox3, no focal deficits   SKIN: No rashes or lesions  psych: normal behavior, normal affect

## 2023-05-29 NOTE — H&P ADULT - ATTENDING COMMENTS
58 year-old-male with PMH of HTN, HLD, glaucoma and alcohol use disorder with multiple admissions for DT and alcohol withdrawal (most recent 4/27) presents with alcohol intoxication and fall. Patient states he is here because he was too drunk. Patient does acknowledge he would like to stop drinking. No known withdrawal seizures. ETOH level in the 300s on admission. Placed on librium taper. High dose symptom trigger ativan IV also ordered. Start high dose thiamine, MV, folic acid, IVF. SW consult.     Rest as above   Discussed with HS

## 2023-05-29 NOTE — ED ADULT NURSE NOTE - NSFALLOOBATTEMPT_ED_ALL_ED
Cardiology Cardiology Infectious Disease Internal Medicine Internal Medicine Nephrology Nephrology Cardiology Cardiology Cardiology Cardiology Infectious Disease Internal Medicine Internal Medicine Nephrology Nephrology Nephrology Nephrology Cardiology Cardiology Cardiology Cardiology Cardiology Internal Medicine Internal Medicine Internal Medicine Nephrology Nephrology Endocrinology Internal Medicine Nephrology Nephrology Nephrology Endocrinology Internal Medicine Internal Medicine Internal Medicine No

## 2023-05-29 NOTE — ED ADULT NURSE NOTE - NSFALLRISKINTERV_ED_ALL_ED
Assistance OOB with selected safe patient handling equipment if applicable/Assistance with ambulation/Communicate fall risk and risk factors to all staff, patient, and family/Monitor gait and stability/Monitor for mental status changes and reorient to person, place, and time, as needed/Provide visual cue: yellow wristband, yellow gown, etc/Reinforce activity limits and safety measures with patient and family/Toileting schedule using arm’s reach rule for commode and bathroom/Use of alarms - bed, stretcher, chair and/or video monitoring/Call bell, personal items and telephone in reach/Instruct patient to call for assistance before getting out of bed/chair/stretcher/Non-slip footwear applied when patient is off stretcher/Bath to call system/Physically safe environment - no spills, clutter or unnecessary equipment/Purposeful Proactive Rounding/Room/bathroom lighting operational, light cord in reach

## 2023-05-29 NOTE — ED ADULT NURSE NOTE - CAS EDN DISCHARGE INTERVENTIONS
58 yo M w/ history of gout, ETOH abuse, chronic pancreatitis, hep C p/w YUNIEL & sepsis POA from gastric perforation and purulent peritonitis & had ex-lap, yusef patch repair, peritoneal lavage on 5/16. His post op course was complicated by intraabdominal collection s/p IR perc drainage on 5/22 & MSSA bacteremia & YUNIEL.      YUNIEL  - initially due to ATN, now renal suspects Gentamicin nephrotoxicity, AIN, septic ATN  - renal following, will start HD tomorrow     Poor PO intake  - nutrition consult  - patient reports abd pain when eating, will Follow up w/ surgery    Purulent peritonitis & gastric perforation w/ sepsis POA and persistent MSSA bacteremia  - leukocytosis improving  - retrogastric collection and/or lateral perihepatic/subcapsular collection drained by IR on 5/22 (250cc drained)  - OR & blood cultures grew MSSA, NGTD on repeat blood cx from 5/28  - nafcillin and flagyl witched to Ceftriaxone, due to worsening renal function  - TTE on 5/21 showed EF 60-65% w/ no evidence of valvular vegetation & ISAIAS on 6/4 was negative  - MRI of thoracolumbar spine was unremarkable & did not show diskitis /OM, Cervical spine MRI only showed chronic degenerative changes of C3-C6, with multilevel sac effacement   - CT showed a focal thickening at the hepatic flexure, GI will do colonoscopy tomorrow to evaluate for possible hepatic flexure mass  - CEA was 4.2  - colonoscopy on 6/30 had poor to fair prep and showed transverse colon extrinsic adhesions, GI recommended repeat in 1 year      Gastric perforation status post ex-lap, yusef patch repair, peritoneal lavage on 5/16  - status post extubation on 5/17  - UGIS showed no leak or extravasation on 5/23  - transferred out of ICU on 5/23  - care as per surgery  - c/w percocet for pain    Post operative anemia on chronic anemia  - received 3 units on 5/16 & 1 unit on 6/2    Prophylaxis:  DVT: heparin   GI: Carafate and Protonix IV intact

## 2023-05-29 NOTE — PATIENT PROFILE ADULT - VISION (WITH CORRECTIVE LENSES IF THE PATIENT USUALLY WEARS THEM):
Normal vision: sees adequately in most situations; can see medication labels, newsprint right eye glaucoma/blindness but pt states that he can see normally with left eye/Normal vision: sees adequately in most situations; can see medication labels, newsprint

## 2023-05-29 NOTE — PATIENT PROFILE ADULT - FALL HARM RISK - HARM RISK INTERVENTIONS
Assistance with ambulation/Assistance OOB with selected safe patient handling equipment/Communicate Risk of Fall with Harm to all staff/Monitor for mental status changes/Monitor gait and stability/Reinforce activity limits and safety measures with patient and family/Tailored Fall Risk Interventions/Toileting schedule using arm’s reach rule for commode and bathroom/Use of alarms - bed, chair and/or voice tab/Visual Cue: Yellow wristband and red socks/Bed in lowest position, wheels locked, appropriate side rails in place/Call bell, personal items and telephone in reach/Instruct patient to call for assistance before getting out of bed or chair/Non-slip footwear when patient is out of bed/Radiant to call system/Physically safe environment - no spills, clutter or unnecessary equipment/Purposeful Proactive Rounding/Room/bathroom lighting operational, light cord in reach

## 2023-05-29 NOTE — H&P ADULT - PROBLEM SELECTOR PLAN 5
on latanoprost eyedrops     Plan:  [] ctm home meds on latanoprost, timolol eyedrops     Plan:  [] ctm home meds

## 2023-05-29 NOTE — ED ADULT NURSE REASSESSMENT NOTE - GENERAL PATIENT STATE
Pt received awake alert oriented to time and place does not know year./comfortable appearance/resting/sleeping Pt received awake alert oriented to time and place does not know year./comfortable appearance/cooperative/resting/sleeping

## 2023-05-29 NOTE — H&P ADULT - NSHPLABSRESULTS_GEN_ALL_CORE
12.2   3.61  )-----------( 110      ( 29 May 2023 08:20 )             37.8       05-28    146<H>  |  104  |  4<L>  ----------------------------<  89  3.2<L>   |  25  |  0.69    Ca    8.4      28 May 2023 23:10  Mg     1.70     05-28    TPro  6.9  /  Alb  3.7  /  TBili  0.8  /  DBili  x   /  AST  36  /  ALT  34  /  AlkPhos  65  05-28      < from: CT Head No Cont (05.29.23 @ 05:50) >    CT head:    There is no acute intracranial hemorrhage, mass effect, midline shift,   extra-axial collection, hydrocephalus, or evidence of acute vascular   territorial infarction. Cerebral volume loss contributes to prominence of   the ventricles and sulci.    Mild scattered paranasal sinus mucosal thickening. Mastoid air cells are   clear. No calvarial fracture.. Status post right ocular lens replacement.    CT cervical spine:    No acute cervical spine fracture or evidence of traumatic malalignment.   Preservation of the normal cervical lordosis. Craniocervical junction is   unremarkable. No facet joint dislocation. No prevertebral soft tissue   swelling.    There are multilevel degenerative change of the spine characterized by   degenerative disc disease as well as uncovertebral and facet joint   arthrosis, contributing to varying degrees of mild neuroforaminal   stenosis, most prominent at the C4-C5 level on the right. Mild multilevel   spinal canal stenoses are suboptimally evaluated on this examination.    Visualized lung apices are clear.    IMPRESSION:    CT Head: No acute intracranial hemorrhage or calvarial fracture.    CT cervical spine: No acute cervical spine fracture or evidence of   traumatic malalignment.    < from: Xray Chest 1 View AP/PA (05.29.23 @ 04:52) >    FINDINGS:  The lungs are clear.  There is no pleural effusion or pneumothorax.  The heart size is not well evaluated on this projection.  The visualized osseous and soft tissue structures demonstrate no acute   pathology.    IMPRESSION:  Clear lungs.    < end of copied text >    < from: Xray Pelvis AP only (05.29.23 @ 04:53) >    FINDINGS:  No acute displaced fracture.  Extraosseous calcification near the right anterior superior iliac spine,   may be secondary to prior injury.  No soft tissue or intrapelvic pathology identified.    IMPRESSION:  No fracture. No dislocation.    < end of copied text >    < from: Xray Knee 4 Views, Bilateral (05.29.23 @ 04:53) >    FINDINGS:  No acute fracture or dislocation.  The joint spaces are maintained. Mild cortical thickening and   irregularity of the proximal third of the left fibula that appears to be   chronic in nature.    IMPRESSION:  No acute fracture or dislocation.    < end of copied text >      < end of copied text >

## 2023-05-29 NOTE — ED ADULT NURSE REASSESSMENT NOTE - NS ED NURSE REASSESS COMMENT FT1
As per report pt found outside ED unsteady gait , disheveled, Admitts to drinking too much everyday.

## 2023-05-30 LAB
ANION GAP SERPL CALC-SCNC: 14 MMOL/L — SIGNIFICANT CHANGE UP (ref 7–14)
APTT BLD: 29.1 SEC — SIGNIFICANT CHANGE UP (ref 27–36.3)
BUN SERPL-MCNC: 6 MG/DL — LOW (ref 7–23)
CALCIUM SERPL-MCNC: 8.3 MG/DL — LOW (ref 8.4–10.5)
CHLORIDE SERPL-SCNC: 101 MMOL/L — SIGNIFICANT CHANGE UP (ref 98–107)
CO2 SERPL-SCNC: 22 MMOL/L — SIGNIFICANT CHANGE UP (ref 22–31)
CREAT SERPL-MCNC: 0.69 MG/DL — SIGNIFICANT CHANGE UP (ref 0.5–1.3)
EGFR: 106 ML/MIN/1.73M2 — SIGNIFICANT CHANGE UP
FERRITIN SERPL-MCNC: 144 NG/ML — SIGNIFICANT CHANGE UP (ref 30–400)
GLUCOSE SERPL-MCNC: 77 MG/DL — SIGNIFICANT CHANGE UP (ref 70–99)
HAPTOGLOB SERPL-MCNC: 111 MG/DL — SIGNIFICANT CHANGE UP (ref 34–200)
HCT VFR BLD CALC: 35.9 % — LOW (ref 39–50)
HGB BLD-MCNC: 11.8 G/DL — LOW (ref 13–17)
INR BLD: 1.08 RATIO — SIGNIFICANT CHANGE UP (ref 0.88–1.16)
IRON SATN MFR SERPL: 257 UG/DL — HIGH (ref 45–165)
LDH SERPL L TO P-CCNC: 232 U/L — HIGH (ref 135–225)
MAGNESIUM SERPL-MCNC: 1.5 MG/DL — LOW (ref 1.6–2.6)
MCHC RBC-ENTMCNC: 29.1 PG — SIGNIFICANT CHANGE UP (ref 27–34)
MCHC RBC-ENTMCNC: 32.9 GM/DL — SIGNIFICANT CHANGE UP (ref 32–36)
MCV RBC AUTO: 88.4 FL — SIGNIFICANT CHANGE UP (ref 80–100)
NRBC # BLD: 0 /100 WBCS — SIGNIFICANT CHANGE UP (ref 0–0)
NRBC # FLD: 0 K/UL — SIGNIFICANT CHANGE UP (ref 0–0)
PHOSPHATE SERPL-MCNC: 3.1 MG/DL — SIGNIFICANT CHANGE UP (ref 2.5–4.5)
PLATELET # BLD AUTO: 111 K/UL — LOW (ref 150–400)
POTASSIUM SERPL-MCNC: 3 MMOL/L — LOW (ref 3.5–5.3)
POTASSIUM SERPL-SCNC: 3 MMOL/L — LOW (ref 3.5–5.3)
PROTHROM AB SERPL-ACNC: 12.5 SEC — SIGNIFICANT CHANGE UP (ref 10.5–13.4)
RBC # BLD: 4.06 M/UL — LOW (ref 4.2–5.8)
RBC # FLD: 13.8 % — SIGNIFICANT CHANGE UP (ref 10.3–14.5)
SODIUM SERPL-SCNC: 137 MMOL/L — SIGNIFICANT CHANGE UP (ref 135–145)
TIBC SERPL-MCNC: <287 UG/DL — SIGNIFICANT CHANGE UP (ref 220–430)
UIBC SERPL-MCNC: <30 UG/DL — LOW (ref 110–370)
WBC # BLD: 5.15 K/UL — SIGNIFICANT CHANGE UP (ref 3.8–10.5)
WBC # FLD AUTO: 5.15 K/UL — SIGNIFICANT CHANGE UP (ref 3.8–10.5)

## 2023-05-30 PROCEDURE — 99232 SBSQ HOSP IP/OBS MODERATE 35: CPT | Mod: GC

## 2023-05-30 RX ORDER — MAGNESIUM SULFATE 500 MG/ML
2 VIAL (ML) INJECTION ONCE
Refills: 0 | Status: DISCONTINUED | OUTPATIENT
Start: 2023-05-30 | End: 2023-05-30

## 2023-05-30 RX ORDER — MAGNESIUM SULFATE 500 MG/ML
1 VIAL (ML) INJECTION ONCE
Refills: 0 | Status: COMPLETED | OUTPATIENT
Start: 2023-05-30 | End: 2023-05-30

## 2023-05-30 RX ORDER — LANOLIN ALCOHOL/MO/W.PET/CERES
3 CREAM (GRAM) TOPICAL AT BEDTIME
Refills: 0 | Status: DISCONTINUED | OUTPATIENT
Start: 2023-05-30 | End: 2023-06-02

## 2023-05-30 RX ORDER — POTASSIUM CHLORIDE 20 MEQ
40 PACKET (EA) ORAL EVERY 4 HOURS
Refills: 0 | Status: COMPLETED | OUTPATIENT
Start: 2023-05-30 | End: 2023-05-30

## 2023-05-30 RX ADMIN — Medication 105 MILLIGRAM(S): at 06:43

## 2023-05-30 RX ADMIN — Medication 1 DROP(S): at 06:43

## 2023-05-30 RX ADMIN — Medication 3 MILLIGRAM(S): at 21:45

## 2023-05-30 RX ADMIN — LATANOPROST 1 DROP(S): 0.05 SOLUTION/ DROPS OPHTHALMIC; TOPICAL at 21:45

## 2023-05-30 RX ADMIN — Medication 40 MILLIEQUIVALENT(S): at 14:01

## 2023-05-30 RX ADMIN — Medication 1 MILLIGRAM(S): at 14:02

## 2023-05-30 RX ADMIN — Medication 1 DROP(S): at 17:39

## 2023-05-30 RX ADMIN — ENOXAPARIN SODIUM 40 MILLIGRAM(S): 100 INJECTION SUBCUTANEOUS at 14:02

## 2023-05-30 RX ADMIN — Medication 1 TABLET(S): at 14:02

## 2023-05-30 RX ADMIN — Medication 105 MILLIGRAM(S): at 14:01

## 2023-05-30 RX ADMIN — Medication 50 MILLIGRAM(S): at 17:39

## 2023-05-30 RX ADMIN — Medication 40 MILLIEQUIVALENT(S): at 09:45

## 2023-05-30 RX ADMIN — Medication 105 MILLIGRAM(S): at 21:44

## 2023-05-30 RX ADMIN — Medication 100 GRAM(S): at 10:21

## 2023-05-30 RX ADMIN — Medication 50 MILLIGRAM(S): at 09:45

## 2023-05-30 NOTE — PROGRESS NOTE ADULT - PROBLEM SELECTOR PLAN 3
WBC 3.61, Hb 12.1,    MCV normal   Neutropenia and anemia appear chronic, thrombocytopenia new this admission   likely in setting of ETOH abuse   iron studies wnl  haptoglobin, ferritin WNL,   coags normal     Plan:  [] ctm

## 2023-05-30 NOTE — PROGRESS NOTE ADULT - PROBLEM SELECTOR PLAN 1
h/o EtOH abuse with multiple admissions for DT and alcohol withdrawal (most recent 4/27) presents with alcohol intoxication  CIWA 12 on admission  started on librium taper   CIWA 9    Plan:  [] c/w taper  [] monitor CIWA  [] c/w thiamine, MV, folic acid, IVF

## 2023-05-30 NOTE — SBIRT NOTE ADULT - NSSBIRTBRIEFINTDET_GEN_A_CORE
Provided SBIRT services: Full screen positive. Brief Intervention Performed. Screening results were reviewed with the patient and patient was provided information about healthy guidelines and potential negative consequences associated with level of risk. Motivation and readiness to reduce or stop use was discussed and goals and activities to make changes were suggested/offered.  Pt stated he went to [facility] in past and is considering returning.

## 2023-05-30 NOTE — PROGRESS NOTE ADULT - ATTENDING COMMENTS
58 year-old-male with PMH of HTN, HLD, glaucoma and alcohol use disorder with multiple admissions for DT and alcohol withdrawal (most recent 4/27) presents with alcohol intoxication and fall. Patient states he is here because he was too drunk. Patient does acknowledge he would like to stop drinking. No known withdrawal seizures. ETOH level in the 300s on admission. Placed on librium taper. High dose symptom trigger ativan IV also ordered. Start high dose thiamine, MV, folic acid, IVF. SW consult.     Rest as above   Discussed with HS 58 year-old-male with PMH of HTN, HLD, glaucoma and alcohol use disorder with multiple admissions for DT and alcohol withdrawal (most recent 4/27) presents with alcohol intoxication and fall. Patient states he is here because he was too drunk. Patient does acknowledge he would like to stop drinking. No known withdrawal seizures. ETOH level in the 300s on admission. Placed on librium taper. High dose symptom trigger ativan IV also ordered. Start high dose thiamine, MV, folic acid, IVF. SW consult.     Rest as above. Discussed with HS

## 2023-05-30 NOTE — PROGRESS NOTE ADULT - SUBJECTIVE AND OBJECTIVE BOX
PROGRESS NOTE:     Patient is a 58y old  Male who presents with a chief complaint of     SUBJECTIVE / OVERNIGHT EVENTS:    ADDITIONAL REVIEW OF SYSTEMS:    MEDICATIONS  (STANDING):  chlordiazePOXIDE 50 milliGRAM(s) Oral every 8 hours  chlordiazePOXIDE   Oral   dextrose 50% Injectable 12.5 Gram(s) IV Push once  dextrose 50% Injectable 25 Gram(s) IV Push once  dextrose 50% Injectable 25 Gram(s) IV Push once  dextrose Oral Gel 15 Gram(s) Oral once  enoxaparin Injectable 40 milliGRAM(s) SubCutaneous every 24 hours  folic acid 1 milliGRAM(s) Oral daily  glucagon  Injectable 1 milliGRAM(s) IntraMuscular once  latanoprost 0.005% Ophthalmic Solution 1 Drop(s) Both EYES at bedtime  multivitamin 1 Tablet(s) Oral daily  potassium chloride    Tablet ER 40 milliEquivalent(s) Oral every 4 hours  thiamine IVPB 500 milliGRAM(s) IV Intermittent every 8 hours  timolol 0.5% Solution 1 Drop(s) Both EYES two times a day    MEDICATIONS  (PRN):  acetaminophen     Tablet .. 650 milliGRAM(s) Oral every 6 hours PRN Temp greater or equal to 38C (100.4F), Mild Pain (1 - 3)  LORazepam   Injectable 2 milliGRAM(s) IV Push every 1 hour PRN Symptom-triggered: each CIWA -Ar score 8 or GREATER      CAPILLARY BLOOD GLUCOSE        I&O's Summary    29 May 2023 07:01  -  30 May 2023 07:00  --------------------------------------------------------  IN: 980 mL / OUT: 1700 mL / NET: -720 mL        PHYSICAL EXAM:  Vital Signs Last 24 Hrs  T(C): 37.1 (30 May 2023 06:00), Max: 37.2 (29 May 2023 20:00)  T(F): 98.7 (30 May 2023 06:00), Max: 98.9 (29 May 2023 20:00)  HR: 68 (30 May 2023 06:00) (68 - 91)  BP: 135/83 (30 May 2023 06:00) (110/68 - 150/76)  BP(mean): --  RR: 18 (30 May 2023 06:00) (16 - 19)  SpO2: 96% (30 May 2023 06:00) (96% - 100%)    Parameters below as of 30 May 2023 06:00  Patient On (Oxygen Delivery Method): room air        GENERAL: No acute distress, well-developed  HEAD:  Atraumatic, Normocephalic  EYES: EOMI, PERRLA, conjunctiva and sclera clear  NECK: Supple, no lymphadenopathy, no JVD  CHEST/LUNG: CTAB; No wheezes, rales, or rhonchi  HEART: Regular rate and rhythm; No murmurs, rubs, or gallops  ABDOMEN: Soft, non-tender, non-distended; normal bowel sounds, no organomegaly  EXTREMITIES:  2+ peripheral pulses b/l, No clubbing, cyanosis, or edema  NEUROLOGY: A&O x 3, no focal deficits  SKIN: No rashes or lesions    LABS:                        11.8   5.15  )-----------( 111      ( 30 May 2023 05:49 )             35.9     05-30    137  |  101  |  6<L>  ----------------------------<  77  3.0<L>   |  22  |  0.69    Ca    8.3<L>      30 May 2023 05:49  Phos  3.3     05-29  Mg     1.60     05-29    TPro  6.9  /  Alb  3.7  /  TBili  0.8  /  DBili  x   /  AST  36  /  ALT  34  /  AlkPhos  65  05-28    PT/INR - ( 30 May 2023 05:49 )   PT: 12.5 sec;   INR: 1.08 ratio         PTT - ( 30 May 2023 05:49 )  PTT:29.1 sec            RADIOLOGY & ADDITIONAL TESTS:  Results Reviewed:   Imaging Personally Reviewed:  Electrocardiogram Personally Reviewed:    COORDINATION OF CARE:  Care Discussed with Consultants/Other Providers [Y/N]:  Prior or Outpatient Records Reviewed [Y/N]:   PROGRESS NOTE:     Patient is a 58y old  Male who presents with a chief complaint of     SUBJECTIVE / OVERNIGHT EVENTS:  OVN: SERGEY    Examined at bedside this AM. Feels well- denies CP, SOB, abdominal pain. leg pain, n/v/c/d- all ROS negative.     ADDITIONAL REVIEW OF SYSTEMS:    MEDICATIONS  (STANDING):  chlordiazePOXIDE 50 milliGRAM(s) Oral every 8 hours  chlordiazePOXIDE   Oral   dextrose 50% Injectable 12.5 Gram(s) IV Push once  dextrose 50% Injectable 25 Gram(s) IV Push once  dextrose 50% Injectable 25 Gram(s) IV Push once  dextrose Oral Gel 15 Gram(s) Oral once  enoxaparin Injectable 40 milliGRAM(s) SubCutaneous every 24 hours  folic acid 1 milliGRAM(s) Oral daily  glucagon  Injectable 1 milliGRAM(s) IntraMuscular once  latanoprost 0.005% Ophthalmic Solution 1 Drop(s) Both EYES at bedtime  multivitamin 1 Tablet(s) Oral daily  potassium chloride    Tablet ER 40 milliEquivalent(s) Oral every 4 hours  thiamine IVPB 500 milliGRAM(s) IV Intermittent every 8 hours  timolol 0.5% Solution 1 Drop(s) Both EYES two times a day    MEDICATIONS  (PRN):  acetaminophen     Tablet .. 650 milliGRAM(s) Oral every 6 hours PRN Temp greater or equal to 38C (100.4F), Mild Pain (1 - 3)  LORazepam   Injectable 2 milliGRAM(s) IV Push every 1 hour PRN Symptom-triggered: each CIWA -Ar score 8 or GREATER      CAPILLARY BLOOD GLUCOSE        I&O's Summary    29 May 2023 07:01  -  30 May 2023 07:00  --------------------------------------------------------  IN: 980 mL / OUT: 1700 mL / NET: -720 mL        PHYSICAL EXAM:  Vital Signs Last 24 Hrs  T(C): 37.1 (30 May 2023 06:00), Max: 37.2 (29 May 2023 20:00)  T(F): 98.7 (30 May 2023 06:00), Max: 98.9 (29 May 2023 20:00)  HR: 68 (30 May 2023 06:00) (68 - 91)  BP: 135/83 (30 May 2023 06:00) (110/68 - 150/76)  BP(mean): --  RR: 18 (30 May 2023 06:00) (16 - 19)  SpO2: 96% (30 May 2023 06:00) (96% - 100%)    Parameters below as of 30 May 2023 06:00  Patient On (Oxygen Delivery Method): room air        GENERAL: No acute distress, well-developed  HEAD:  Atraumatic, Normocephalic  EYES: EOMI, PERRLA, conjunctiva and sclera clear  NECK: Supple, no lymphadenopathy, no JVD  CHEST/LUNG: CTAB; No wheezes, rales, or rhonchi  HEART: Regular rate and rhythm; No murmurs, rubs, or gallops  ABDOMEN: Soft, non-tender, non-distended; normal bowel sounds, no organomegaly  EXTREMITIES:  2+ peripheral pulses b/l, No clubbing, cyanosis, or edema  NEUROLOGY: A&O x 3, no focal deficits  SKIN: No rashes or lesions    LABS:                        11.8   5.15  )-----------( 111      ( 30 May 2023 05:49 )             35.9     05-30    137  |  101  |  6<L>  ----------------------------<  77  3.0<L>   |  22  |  0.69    Ca    8.3<L>      30 May 2023 05:49  Phos  3.3     05-29  Mg     1.60     05-29    TPro  6.9  /  Alb  3.7  /  TBili  0.8  /  DBili  x   /  AST  36  /  ALT  34  /  AlkPhos  65  05-28    PT/INR - ( 30 May 2023 05:49 )   PT: 12.5 sec;   INR: 1.08 ratio         PTT - ( 30 May 2023 05:49 )  PTT:29.1 sec            RADIOLOGY & ADDITIONAL TESTS:  Results Reviewed:   Imaging Personally Reviewed:  Electrocardiogram Personally Reviewed:    COORDINATION OF CARE:  Care Discussed with Consultants/Other Providers [Y/N]:  Prior or Outpatient Records Reviewed [Y/N]:

## 2023-05-31 ENCOUNTER — TRANSCRIPTION ENCOUNTER (OUTPATIENT)
Age: 58
End: 2023-05-31

## 2023-05-31 LAB
ANION GAP SERPL CALC-SCNC: 12 MMOL/L — SIGNIFICANT CHANGE UP (ref 7–14)
BUN SERPL-MCNC: 8 MG/DL — SIGNIFICANT CHANGE UP (ref 7–23)
CALCIUM SERPL-MCNC: 8.6 MG/DL — SIGNIFICANT CHANGE UP (ref 8.4–10.5)
CHLORIDE SERPL-SCNC: 101 MMOL/L — SIGNIFICANT CHANGE UP (ref 98–107)
CO2 SERPL-SCNC: 21 MMOL/L — LOW (ref 22–31)
CREAT SERPL-MCNC: 0.69 MG/DL — SIGNIFICANT CHANGE UP (ref 0.5–1.3)
EGFR: 106 ML/MIN/1.73M2 — SIGNIFICANT CHANGE UP
GLUCOSE SERPL-MCNC: 88 MG/DL — SIGNIFICANT CHANGE UP (ref 70–99)
HCT VFR BLD CALC: 37.2 % — LOW (ref 39–50)
HGB BLD-MCNC: 12.3 G/DL — LOW (ref 13–17)
MAGNESIUM SERPL-MCNC: 1.8 MG/DL — SIGNIFICANT CHANGE UP (ref 1.6–2.6)
MCHC RBC-ENTMCNC: 30.4 PG — SIGNIFICANT CHANGE UP (ref 27–34)
MCHC RBC-ENTMCNC: 33.1 GM/DL — SIGNIFICANT CHANGE UP (ref 32–36)
MCV RBC AUTO: 91.9 FL — SIGNIFICANT CHANGE UP (ref 80–100)
NRBC # BLD: 0 /100 WBCS — SIGNIFICANT CHANGE UP (ref 0–0)
NRBC # FLD: 0 K/UL — SIGNIFICANT CHANGE UP (ref 0–0)
PHOSPHATE SERPL-MCNC: 3.6 MG/DL — SIGNIFICANT CHANGE UP (ref 2.5–4.5)
PLATELET # BLD AUTO: 101 K/UL — LOW (ref 150–400)
POTASSIUM SERPL-MCNC: 3.2 MMOL/L — LOW (ref 3.5–5.3)
POTASSIUM SERPL-SCNC: 3.2 MMOL/L — LOW (ref 3.5–5.3)
RBC # BLD: 4.05 M/UL — LOW (ref 4.2–5.8)
RBC # FLD: 14 % — SIGNIFICANT CHANGE UP (ref 10.3–14.5)
SODIUM SERPL-SCNC: 134 MMOL/L — LOW (ref 135–145)
WBC # BLD: 5.31 K/UL — SIGNIFICANT CHANGE UP (ref 3.8–10.5)
WBC # FLD AUTO: 5.31 K/UL — SIGNIFICANT CHANGE UP (ref 3.8–10.5)

## 2023-05-31 PROCEDURE — 99232 SBSQ HOSP IP/OBS MODERATE 35: CPT | Mod: GC

## 2023-05-31 RX ORDER — MAGNESIUM SULFATE 500 MG/ML
2 VIAL (ML) INJECTION ONCE
Refills: 0 | Status: COMPLETED | OUTPATIENT
Start: 2023-05-31 | End: 2023-05-31

## 2023-05-31 RX ORDER — POTASSIUM CHLORIDE 20 MEQ
40 PACKET (EA) ORAL ONCE
Refills: 0 | Status: COMPLETED | OUTPATIENT
Start: 2023-05-31 | End: 2023-05-31

## 2023-05-31 RX ADMIN — Medication 25 GRAM(S): at 11:33

## 2023-05-31 RX ADMIN — Medication 1 DROP(S): at 17:48

## 2023-05-31 RX ADMIN — Medication 50 MILLIGRAM(S): at 17:47

## 2023-05-31 RX ADMIN — Medication 1 MILLIGRAM(S): at 11:34

## 2023-05-31 RX ADMIN — Medication 3 MILLIGRAM(S): at 21:42

## 2023-05-31 RX ADMIN — Medication 1 DROP(S): at 06:27

## 2023-05-31 RX ADMIN — Medication 105 MILLIGRAM(S): at 21:46

## 2023-05-31 RX ADMIN — Medication 1 TABLET(S): at 11:34

## 2023-05-31 RX ADMIN — LATANOPROST 1 DROP(S): 0.05 SOLUTION/ DROPS OPHTHALMIC; TOPICAL at 21:38

## 2023-05-31 RX ADMIN — Medication 105 MILLIGRAM(S): at 06:28

## 2023-05-31 RX ADMIN — Medication 50 MILLIGRAM(S): at 01:40

## 2023-05-31 RX ADMIN — Medication 105 MILLIGRAM(S): at 13:10

## 2023-05-31 RX ADMIN — ENOXAPARIN SODIUM 40 MILLIGRAM(S): 100 INJECTION SUBCUTANEOUS at 11:34

## 2023-05-31 RX ADMIN — Medication 40 MILLIEQUIVALENT(S): at 08:55

## 2023-05-31 NOTE — PROGRESS NOTE ADULT - ATTENDING COMMENTS
58 year-old-male with PMH of HTN, HLD, glaucoma and alcohol use disorder with multiple admissions for DT and alcohol withdrawal (most recent 4/27) presents with alcohol intoxication and fall. Patient states he is here because he was too drunk. Patient does acknowledge he would like to stop drinking. No known withdrawal seizures. ETOH level in the 300s on admission. Placed on librium taper. High dose symptom trigger ativan IV also ordered. C/w high dose thiamine, MV, folic acid, IVF. SW consult. Pt. will be referred to rehab facilities but does not have to stay inpatient and be transferred from here.     Rest as above. Discussed with HS

## 2023-05-31 NOTE — DISCHARGE NOTE PROVIDER - NSDCCPTREATMENT_GEN_ALL_CORE_FT
PRINCIPAL PROCEDURE  Procedure: CT head  Findings and Treatment:   < end of copied text >  CT head:  There is no acute intracranial hemorrhage, mass effect, midline shift,   extra-axial collection, hydrocephalus, or evidence of acute vascular   territorial infarction. Cerebral volume loss contributes to prominence of   the ventricles and sulci.  Mild scattered paranasal sinus mucosal thickening. Mastoid air cells are   clear. No calvarial fracture.. Status post right ocular lens replacement.  CT cervical spine:  No acute cervical spine fracture or evidence of traumatic malalignment.   Preservation of the normal cervical lordosis. Craniocervical junction is   unremarkable. No facet joint dislocation. No prevertebral soft tissue   swelling.  There are multilevel degenerative change of the spine characterized by   degenerative disc disease as well as uncovertebral and facet joint   arthrosis, contributing to varying degrees of mild neuroforaminal   stenosis, most prominent at the C4-C5 level on the right. Mild multilevel   spinal canal stenoses are suboptimally evaluated on this examination.  Visualized lung apices are clear.  IMPRESSION:  CT Head: No acute intracranial hemorrhage or calvarial fracture.  CT cervical spine: No acute cervical spine fracture or evidence of   traumatic malalignment.< from: CT Head No Cont (05.29.23 @ 05:50) >        SECONDARY PROCEDURE  Procedure: XR pelvis AP view  Findings and Treatment:   < end of copied text >  FINDINGS:  No acute displaced fracture.  Extraosseous calcification near the right anterior superior iliac spine,   may be secondary to prior injury.  No soft tissue or intrapelvic pathology identified.  IMPRESSION:  No fracture. No dislocation.< from: Xray Pelvis AP only (05.29.23 @ 04:53) >

## 2023-05-31 NOTE — DISCHARGE NOTE PROVIDER - ATTENDING DISCHARGE PHYSICAL EXAMINATION:
.  VITAL SIGNS:  T(C): 36.3 (06-02-23 @ 09:58), Max: 37.2 (06-01-23 @ 18:00)  T(F): 97.4 (06-02-23 @ 09:58), Max: 98.9 (06-01-23 @ 18:00)  HR: 61 (06-02-23 @ 09:58) (60 - 68)  BP: 117/67 (06-02-23 @ 09:58) (107/71 - 150/84)  BP(mean): --  RR: 18 (06-02-23 @ 09:58) (17 - 18)  SpO2: 99% (06-02-23 @ 09:58) (98% - 100%)  Wt(kg): --    PHYSICAL EXAM:    Constitutional: Comfortable appearing male resting in bed; NAD  Head: NC/AT  Eyes: PERRL, EOMI, anicteric sclera  ENT: no nasal discharge; uvula midline, no oropharyngeal erythema or exudates; MMM  Neck: supple; no JVD or thyromegaly  Respiratory: CTA B/L; no W/R/R, no retractions  Cardiac: +S1/S2; RRR; no M/R/G; PMI non-displaced  Gastrointestinal: abdomen soft, NT/ND; no rebound or guarding; +BSx4  Back: spine midline, no bony tenderness or step-offs; no CVAT B/L  Extremities: WWP, no clubbing or cyanosis; no peripheral edema  Musculoskeletal: NROM x4; no joint swelling, tenderness or erythema  Vascular: 2+ radial, femoral, DP/PT pulses B/L  Dermatologic: skin warm, dry and intact; no rashes, wounds, or scars  Lymphatic: no submandibular or cervical LAD  Neurologic: AAOx3; CNII-XII grossly intact; no focal deficits. No tongue fasciculations - tremor improved.   Psychiatric: affect and characteristics of appearance, verbalizations, behaviors are appropriate

## 2023-05-31 NOTE — DISCHARGE NOTE PROVIDER - NSDCACTIVITY_GEN_ALL_CORE
Called [facility] and requested medical records and left message for Dr. Hendricks to speak with Dr. Fitzpatrick.   No restrictions

## 2023-05-31 NOTE — PROGRESS NOTE ADULT - SUBJECTIVE AND OBJECTIVE BOX
PROGRESS NOTE:     Patient is a 58y old  Male who presents with a chief complaint of     SUBJECTIVE / OVERNIGHT EVENTS:    ADDITIONAL REVIEW OF SYSTEMS:    MEDICATIONS  (STANDING):  chlordiazePOXIDE   Oral   chlordiazePOXIDE 50 milliGRAM(s) Oral every 12 hours  dextrose 50% Injectable 25 Gram(s) IV Push once  dextrose 50% Injectable 25 Gram(s) IV Push once  dextrose 50% Injectable 12.5 Gram(s) IV Push once  dextrose Oral Gel 15 Gram(s) Oral once  enoxaparin Injectable 40 milliGRAM(s) SubCutaneous every 24 hours  folic acid 1 milliGRAM(s) Oral daily  glucagon  Injectable 1 milliGRAM(s) IntraMuscular once  latanoprost 0.005% Ophthalmic Solution 1 Drop(s) Both EYES at bedtime  melatonin 3 milliGRAM(s) Oral at bedtime  multivitamin 1 Tablet(s) Oral daily  thiamine IVPB 500 milliGRAM(s) IV Intermittent every 8 hours  timolol 0.5% Solution 1 Drop(s) Both EYES two times a day    MEDICATIONS  (PRN):  acetaminophen     Tablet .. 650 milliGRAM(s) Oral every 6 hours PRN Temp greater or equal to 38C (100.4F), Mild Pain (1 - 3)  LORazepam   Injectable 2 milliGRAM(s) IV Push every 1 hour PRN Symptom-triggered: each CIWA -Ar score 8 or GREATER      CAPILLARY BLOOD GLUCOSE        I&O's Summary    30 May 2023 07:01  -  31 May 2023 07:00  --------------------------------------------------------  IN: 0 mL / OUT: 800 mL / NET: -800 mL        PHYSICAL EXAM:  Vital Signs Last 24 Hrs  T(C): 36.7 (31 May 2023 05:00), Max: 36.7 (30 May 2023 09:07)  T(F): 98.1 (31 May 2023 05:00), Max: 98.1 (30 May 2023 21:57)  HR: 88 (31 May 2023 05:00) (65 - 88)  BP: 120/83 (31 May 2023 05:00) (120/83 - 147/87)  BP(mean): --  RR: 18 (31 May 2023 01:00) (17 - 18)  SpO2: 96% (31 May 2023 01:00) (95% - 99%)    Parameters below as of 31 May 2023 05:00  Patient On (Oxygen Delivery Method): room air        GENERAL: No acute distress, well-developed  HEAD:  Atraumatic, Normocephalic  EYES: EOMI, PERRLA, conjunctiva and sclera clear  NECK: Supple, no lymphadenopathy, no JVD  CHEST/LUNG: CTAB; No wheezes, rales, or rhonchi  HEART: Regular rate and rhythm; No murmurs, rubs, or gallops  ABDOMEN: Soft, non-tender, non-distended; normal bowel sounds, no organomegaly  EXTREMITIES:  2+ peripheral pulses b/l, No clubbing, cyanosis, or edema  NEUROLOGY: A&O x 3, no focal deficits  SKIN: No rashes or lesions    LABS:                        12.3   5.31  )-----------( 101      ( 31 May 2023 04:55 )             37.2     05-31    134<L>  |  101  |  8   ----------------------------<  88  3.2<L>   |  21<L>  |  0.69    Ca    8.6      31 May 2023 04:55  Phos  3.6     05-31  Mg     1.80     05-31      PT/INR - ( 30 May 2023 05:49 )   PT: 12.5 sec;   INR: 1.08 ratio         PTT - ( 30 May 2023 05:49 )  PTT:29.1 sec            RADIOLOGY & ADDITIONAL TESTS:  Results Reviewed:   Imaging Personally Reviewed:  Electrocardiogram Personally Reviewed:    COORDINATION OF CARE:  Care Discussed with Consultants/Other Providers [Y/N]:  Prior or Outpatient Records Reviewed [Y/N]:   PROGRESS NOTE:     Patient is a 58y old  Male who presents with a chief complaint of     SUBJECTIVE / OVERNIGHT EVENTS:  No events overnight. Examined at bedside this AM- feels anxious but mildly improved from yesterday. No other complaints- all ROS negative.     ADDITIONAL REVIEW OF SYSTEMS:    MEDICATIONS  (STANDING):  chlordiazePOXIDE   Oral   chlordiazePOXIDE 50 milliGRAM(s) Oral every 12 hours  dextrose 50% Injectable 25 Gram(s) IV Push once  dextrose 50% Injectable 25 Gram(s) IV Push once  dextrose 50% Injectable 12.5 Gram(s) IV Push once  dextrose Oral Gel 15 Gram(s) Oral once  enoxaparin Injectable 40 milliGRAM(s) SubCutaneous every 24 hours  folic acid 1 milliGRAM(s) Oral daily  glucagon  Injectable 1 milliGRAM(s) IntraMuscular once  latanoprost 0.005% Ophthalmic Solution 1 Drop(s) Both EYES at bedtime  melatonin 3 milliGRAM(s) Oral at bedtime  multivitamin 1 Tablet(s) Oral daily  thiamine IVPB 500 milliGRAM(s) IV Intermittent every 8 hours  timolol 0.5% Solution 1 Drop(s) Both EYES two times a day    MEDICATIONS  (PRN):  acetaminophen     Tablet .. 650 milliGRAM(s) Oral every 6 hours PRN Temp greater or equal to 38C (100.4F), Mild Pain (1 - 3)  LORazepam   Injectable 2 milliGRAM(s) IV Push every 1 hour PRN Symptom-triggered: each CIWA -Ar score 8 or GREATER      CAPILLARY BLOOD GLUCOSE        I&O's Summary    30 May 2023 07:01  -  31 May 2023 07:00  --------------------------------------------------------  IN: 0 mL / OUT: 800 mL / NET: -800 mL        PHYSICAL EXAM:  Vital Signs Last 24 Hrs  T(C): 36.7 (31 May 2023 05:00), Max: 36.7 (30 May 2023 09:07)  T(F): 98.1 (31 May 2023 05:00), Max: 98.1 (30 May 2023 21:57)  HR: 88 (31 May 2023 05:00) (65 - 88)  BP: 120/83 (31 May 2023 05:00) (120/83 - 147/87)  BP(mean): --  RR: 18 (31 May 2023 01:00) (17 - 18)  SpO2: 96% (31 May 2023 01:00) (95% - 99%)    Parameters below as of 31 May 2023 05:00  Patient On (Oxygen Delivery Method): room air        GENERAL: No acute distress, well-developed  HEAD:  Atraumatic, Normocephalic  EYES: EOMI, PERRLA, conjunctiva and sclera clear  NECK: Supple, no lymphadenopathy, no JVD  CHEST/LUNG: CTAB; No wheezes, rales, or rhonchi  HEART: Regular rate and rhythm; No murmurs, rubs, or gallops  ABDOMEN: Soft, non-tender, non-distended; normal bowel sounds, no organomegaly  EXTREMITIES:  2+ peripheral pulses b/l, No clubbing, cyanosis, or edema  NEUROLOGY: A&O x 3, no focal deficits  SKIN: No rashes or lesions    LABS:                        12.3   5.31  )-----------( 101      ( 31 May 2023 04:55 )             37.2     05-31    134<L>  |  101  |  8   ----------------------------<  88  3.2<L>   |  21<L>  |  0.69    Ca    8.6      31 May 2023 04:55  Phos  3.6     05-31  Mg     1.80     05-31      PT/INR - ( 30 May 2023 05:49 )   PT: 12.5 sec;   INR: 1.08 ratio         PTT - ( 30 May 2023 05:49 )  PTT:29.1 sec      COORDINATION OF CARE:  Care Discussed with Consultants/Other Providers [Y/N]: Y  Prior or Outpatient Records Reviewed [Y/N]: Y

## 2023-05-31 NOTE — DISCHARGE NOTE PROVIDER - ATTENDING ATTESTATION STATEMENT
Patient notified and verbalized understanding of providers response. 3-way repeat back was completed on the information provided by the provider for verification and accuracy. Patient was in agreement and denied further questions or concerns.    Pt states she will await for GIs call. Phone number given to patient.    I have personally seen and examined the patient. I have collaborated with and supervised the

## 2023-05-31 NOTE — DISCHARGE NOTE PROVIDER - NSFOLLOWUPCLINICS_GEN_ALL_ED_FT
[facility]  Internal Medicine  [address] [place] [address]  Phone: [phone]  Fax: [phone]  Follow Up Time: 1 week

## 2023-05-31 NOTE — DISCHARGE NOTE PROVIDER - NSDCCPCAREPLAN_GEN_ALL_CORE_FT
PRINCIPAL DISCHARGE DIAGNOSIS  Diagnosis: Alcohol withdrawal  Assessment and Plan of Treatment: You came into the hospital for alcohol intoxication and withdrawal. You were started on a librium taper. Your symptoms improved. Please follow up with your primary care provider in 1 week.     PRINCIPAL DISCHARGE DIAGNOSIS  Diagnosis: Alcohol withdrawal  Assessment and Plan of Treatment: You came into the hospital for alcohol intoxication and withdrawal. You were started on a librium taper. Your symptoms improved. Please follow up with your primary care provider in 1 week. Drinking alcohol puts you at risk for liver cirrhosis. You should seek help for alcohol cessation. Resources were provided to you while you were in the hospital.

## 2023-05-31 NOTE — PROGRESS NOTE ADULT - PROBLEM SELECTOR PLAN 3
WBC 3.61, Hb 12.1,    MCV normal   Neutropenia and anemia appear chronic, thrombocytopenia new this admission   likely in setting of ETOH abuse   iron studies wnl  haptoglobin, ferritin WNL,   coags normal     Plan:  [] ctm WBC 3.61, Hb 12.1,    MCV normal   Neutropenia and anemia appear chronic, thrombocytopenia new this admission   likely myelosuppression in setting of ETOH abuse   iron studies wnl  haptoglobin, ferritin WNL,   coags normal     Plan:  [] ctm

## 2023-05-31 NOTE — PROGRESS NOTE ADULT - PROBLEM SELECTOR PLAN 1
h/o EtOH abuse with multiple admissions for DT and alcohol withdrawal (most recent 4/27) presents with alcohol intoxication  CIWA 12 on admission  started on librium taper   CIWA 9    Plan:  [] c/w taper  [] monitor CIWA  [] c/w thiamine, MV, folic acid, IVF h/o EtOH abuse with multiple admissions for DT and alcohol withdrawal (most recent 4/27) presents with alcohol intoxication  CIWA 12 on admission  started on librium taper   CIWA 6    Plan:  [] c/w taper  [] monitor CIWA  [] c/w thiamine, MV, folic acid, IVF

## 2023-05-31 NOTE — DISCHARGE NOTE PROVIDER - HOSPITAL COURSE
58 year-old-male with history of HTN, HLD, glaucoma and EtOH abuse with multiple admissions for DT and alcohol withdrawal (most recent 4/27) presents with alcohol intoxication. Patient was found outside the ER walk in entrance and states that "I drank too much." Patient reports that he has been drinking vodka, tequila and other liquor - last drink 5/28 AM. C/o fall, unable to see if he had any head trauma or LOC. Reports pain in the midsternal area, with nausea, 1x episode of vomiting earlier. Also reports pain in BL knee and had scabs on the BL knees.Of note, patient lives with cousin and was sober for 2.5 years post rehab until 1.5 years ago when he started drinking again.  All other ROS negative.     In  ED, VSS WNL. CIWA 12. Labs significant for mild pancytopenia, mild hypernatremia, hypokalemia, Blood alcohol 387. CTH/ cervical spine unremarkable. XR Chest clear. XR pelvis and BL knee unremarkable. Started on librium taper. Admitted to medicine for alcohol withdrawal. Completed librium taper. Clinically stable and ready for discharge.    Provider follow up:  1. PCP    Medication changes:  None      58 year-old-male with history of HTN, HLD, glaucoma and EtOH abuse with multiple admissions for DT and alcohol withdrawal (most recent 4/27) presents with alcohol intoxication. Patient was found outside the ER walk in entrance and states that "I drank too much." Patient reports that he has been drinking vodka, tequila and other liquor - last drink 5/28 AM. C/o fall, unable to see if he had any head trauma or LOC. Reports pain in the midsternal area, with nausea, 1x episode of vomiting earlier. Also reports pain in BL knee and had scabs on the BL knees. Of note, patient lives with cousin and was sober for 2.5 years post rehab until 1.5 years ago when he started drinking again.  All other ROS negative.     In  ED, VSS WNL. CIWA 12. Labs significant for mild pancytopenia, mild hypernatremia, hypokalemia, Blood alcohol 387. CTH/ cervical spine unremarkable. XR Chest clear. XR pelvis and BL knee unremarkable. Started on librium taper. Admitted to medicine for alcohol withdrawal. Completed librium taper. Clinically stable and ready for discharge.    Provider follow up:  1. PCP    Medication changes:  None

## 2023-05-31 NOTE — DISCHARGE NOTE PROVIDER - NSDCMRMEDTOKEN_GEN_ALL_CORE_FT
amLODIPine 10 mg oral tablet: 1 tab(s) orally once a day  latanoprost 0.005% ophthalmic solution: 1 drop(s) to each affected eye once a day (at bedtime)  timolol maleate 0.5% ophthalmic solution: 1 drop(s) to each affected eye 2 times a day

## 2023-06-01 LAB
ANION GAP SERPL CALC-SCNC: 14 MMOL/L — SIGNIFICANT CHANGE UP (ref 7–14)
BUN SERPL-MCNC: 8 MG/DL — SIGNIFICANT CHANGE UP (ref 7–23)
CALCIUM SERPL-MCNC: 8.6 MG/DL — SIGNIFICANT CHANGE UP (ref 8.4–10.5)
CHLORIDE SERPL-SCNC: 107 MMOL/L — SIGNIFICANT CHANGE UP (ref 98–107)
CO2 SERPL-SCNC: 20 MMOL/L — LOW (ref 22–31)
CREAT SERPL-MCNC: 0.72 MG/DL — SIGNIFICANT CHANGE UP (ref 0.5–1.3)
EGFR: 105 ML/MIN/1.73M2 — SIGNIFICANT CHANGE UP
GLUCOSE SERPL-MCNC: 79 MG/DL — SIGNIFICANT CHANGE UP (ref 70–99)
HCT VFR BLD CALC: 37.8 % — LOW (ref 39–50)
HGB BLD-MCNC: 12.6 G/DL — LOW (ref 13–17)
MAGNESIUM SERPL-MCNC: 2 MG/DL — SIGNIFICANT CHANGE UP (ref 1.6–2.6)
MCHC RBC-ENTMCNC: 30.4 PG — SIGNIFICANT CHANGE UP (ref 27–34)
MCHC RBC-ENTMCNC: 33.3 GM/DL — SIGNIFICANT CHANGE UP (ref 32–36)
MCV RBC AUTO: 91.3 FL — SIGNIFICANT CHANGE UP (ref 80–100)
NRBC # BLD: 0 /100 WBCS — SIGNIFICANT CHANGE UP (ref 0–0)
NRBC # FLD: 0 K/UL — SIGNIFICANT CHANGE UP (ref 0–0)
PHOSPHATE SERPL-MCNC: 2.8 MG/DL — SIGNIFICANT CHANGE UP (ref 2.5–4.5)
PLATELET # BLD AUTO: 150 K/UL — SIGNIFICANT CHANGE UP (ref 150–400)
POTASSIUM SERPL-MCNC: 3.7 MMOL/L — SIGNIFICANT CHANGE UP (ref 3.5–5.3)
POTASSIUM SERPL-SCNC: 3.7 MMOL/L — SIGNIFICANT CHANGE UP (ref 3.5–5.3)
RBC # BLD: 4.14 M/UL — LOW (ref 4.2–5.8)
RBC # FLD: 14.4 % — SIGNIFICANT CHANGE UP (ref 10.3–14.5)
SODIUM SERPL-SCNC: 141 MMOL/L — SIGNIFICANT CHANGE UP (ref 135–145)
WBC # BLD: 6.32 K/UL — SIGNIFICANT CHANGE UP (ref 3.8–10.5)
WBC # FLD AUTO: 6.32 K/UL — SIGNIFICANT CHANGE UP (ref 3.8–10.5)

## 2023-06-01 PROCEDURE — 99232 SBSQ HOSP IP/OBS MODERATE 35: CPT | Mod: GC

## 2023-06-01 RX ORDER — FOLIC ACID 0.8 MG
1 TABLET ORAL
Qty: 30 | Refills: 0
Start: 2023-06-01 | End: 2023-06-30

## 2023-06-01 RX ADMIN — Medication 650 MILLIGRAM(S): at 17:19

## 2023-06-01 RX ADMIN — Medication 1 TABLET(S): at 12:06

## 2023-06-01 RX ADMIN — Medication 50 MILLIGRAM(S): at 05:42

## 2023-06-01 RX ADMIN — Medication 1 DROP(S): at 05:43

## 2023-06-01 RX ADMIN — LATANOPROST 1 DROP(S): 0.05 SOLUTION/ DROPS OPHTHALMIC; TOPICAL at 22:44

## 2023-06-01 RX ADMIN — Medication 3 MILLIGRAM(S): at 21:45

## 2023-06-01 RX ADMIN — Medication 1 MILLIGRAM(S): at 12:06

## 2023-06-01 RX ADMIN — ENOXAPARIN SODIUM 40 MILLIGRAM(S): 100 INJECTION SUBCUTANEOUS at 12:06

## 2023-06-01 RX ADMIN — Medication 105 MILLIGRAM(S): at 05:28

## 2023-06-01 RX ADMIN — Medication 1 DROP(S): at 17:22

## 2023-06-01 RX ADMIN — Medication 650 MILLIGRAM(S): at 18:19

## 2023-06-01 NOTE — PROGRESS NOTE ADULT - PROBLEM SELECTOR PLAN 1
h/o EtOH abuse with multiple admissions for DT and alcohol withdrawal (most recent 4/27) presents with alcohol intoxication  CIWA 12 on admission  started on librium taper   CIWA 2    Plan:  [] c/w taper  [] monitor CIWA  [] c/w thiamine, MV, folic acid, IVF

## 2023-06-01 NOTE — PROGRESS NOTE ADULT - SUBJECTIVE AND OBJECTIVE BOX
PROGRESS NOTE:     Patient is a 58y old  Male who presents with a chief complaint of alcohol intoxication (31 May 2023 15:56)      SUBJECTIVE / OVERNIGHT EVENTS:  No events overnight. Examined at bedside- feels well this AM. CIWA 2. All other ROS negative.     ADDITIONAL REVIEW OF SYSTEMS:    MEDICATIONS  (STANDING):  chlordiazePOXIDE   Oral   dextrose 50% Injectable 25 Gram(s) IV Push once  dextrose 50% Injectable 12.5 Gram(s) IV Push once  dextrose 50% Injectable 25 Gram(s) IV Push once  dextrose Oral Gel 15 Gram(s) Oral once  enoxaparin Injectable 40 milliGRAM(s) SubCutaneous every 24 hours  folic acid 1 milliGRAM(s) Oral daily  glucagon  Injectable 1 milliGRAM(s) IntraMuscular once  latanoprost 0.005% Ophthalmic Solution 1 Drop(s) Both EYES at bedtime  melatonin 3 milliGRAM(s) Oral at bedtime  multivitamin 1 Tablet(s) Oral daily  timolol 0.5% Solution 1 Drop(s) Both EYES two times a day    MEDICATIONS  (PRN):  acetaminophen     Tablet .. 650 milliGRAM(s) Oral every 6 hours PRN Temp greater or equal to 38C (100.4F), Mild Pain (1 - 3)  LORazepam   Injectable 2 milliGRAM(s) IV Push every 1 hour PRN Symptom-triggered: each CIWA -Ar score 8 or GREATER      CAPILLARY BLOOD GLUCOSE        I&O's Summary      PHYSICAL EXAM:  Vital Signs Last 24 Hrs  T(C): 36.6 (01 Jun 2023 04:54), Max: 37.1 (31 May 2023 22:51)  T(F): 97.8 (01 Jun 2023 04:54), Max: 98.8 (31 May 2023 22:51)  HR: 65 (01 Jun 2023 04:54) (65 - 88)  BP: 115/69 (01 Jun 2023 04:54) (105/60 - 147/72)  BP(mean): --  RR: 18 (01 Jun 2023 04:54) (17 - 18)  SpO2: 98% (01 Jun 2023 04:54) (97% - 98%)    Parameters below as of 01 Jun 2023 04:54  Patient On (Oxygen Delivery Method): room air        GENERAL: No acute distress, well-developed  HEAD:  Atraumatic, Normocephalic  EYES: EOMI, PERRLA, conjunctiva and sclera clear  NECK: Supple, no lymphadenopathy, no JVD  CHEST/LUNG: CTAB; No wheezes, rales, or rhonchi  HEART: Regular rate and rhythm; No murmurs, rubs, or gallops  ABDOMEN: Soft, non-tender, non-distended; normal bowel sounds, no organomegaly  EXTREMITIES:  2+ peripheral pulses b/l, No clubbing, cyanosis, or edema  NEUROLOGY: A&O x 3, no focal deficits  SKIN: No rashes or lesions    LABS:                        12.3   5.31  )-----------( 101      ( 31 May 2023 04:55 )             37.2     05-31    134<L>  |  101  |  8   ----------------------------<  88  3.2<L>   |  21<L>  |  0.69    Ca    8.6      31 May 2023 04:55  Phos  3.6     05-31  Mg     1.80     05-31                  RADIOLOGY & ADDITIONAL TESTS:  Results Reviewed:   Imaging Personally Reviewed:  Electrocardiogram Personally Reviewed:    COORDINATION OF CARE:  Care Discussed with Consultants/Other Providers [Y/N]:  Prior or Outpatient Records Reviewed [Y/N]:

## 2023-06-01 NOTE — PROGRESS NOTE ADULT - PROBLEM SELECTOR PLAN 3
WBC 3.61, Hb 12.1,    MCV normal   Neutropenia and anemia appear chronic, thrombocytopenia new this admission   likely myelosuppression in setting of ETOH abuse   iron studies wnl  haptoglobin, ferritin WNL,   coags normal     Plan:  [] ctm

## 2023-06-01 NOTE — PROGRESS NOTE ADULT - ATTENDING COMMENTS
58 year-old-male with PMH of HTN, HLD, glaucoma and alcohol use disorder with multiple admissions for DT and alcohol withdrawal (most recent 4/27) presents with alcohol intoxication and fall. Patient states he is here because he was too drunk. Patient does acknowledge he would like to stop drinking. No known withdrawal seizures. ETOH level in the 300s on admission. Placed on librium taper. High dose symptom trigger ativan IV also ordered. C/w high dose thiamine, MV, folic acid, IVF. SW consult. Pt. will be referred to rehab facilities but does not have to stay inpatient and be transferred from here. Today, pt. still tremulous with tongue fasciculations. Today is ~72 hours from his last drink. Will complete librium taper tomorrow morning and assess for improvement in symptoms. Likely will d/c tomorrow to home.     Rest as above. Discussed with HS

## 2023-06-02 ENCOUNTER — TRANSCRIPTION ENCOUNTER (OUTPATIENT)
Age: 58
End: 2023-06-02

## 2023-06-02 VITALS
TEMPERATURE: 97 F | HEART RATE: 61 BPM | OXYGEN SATURATION: 99 % | SYSTOLIC BLOOD PRESSURE: 117 MMHG | RESPIRATION RATE: 18 BRPM | DIASTOLIC BLOOD PRESSURE: 67 MMHG

## 2023-06-02 LAB
ANION GAP SERPL CALC-SCNC: 13 MMOL/L — SIGNIFICANT CHANGE UP (ref 7–14)
BUN SERPL-MCNC: 7 MG/DL — SIGNIFICANT CHANGE UP (ref 7–23)
CALCIUM SERPL-MCNC: 8.8 MG/DL — SIGNIFICANT CHANGE UP (ref 8.4–10.5)
CHLORIDE SERPL-SCNC: 104 MMOL/L — SIGNIFICANT CHANGE UP (ref 98–107)
CO2 SERPL-SCNC: 20 MMOL/L — LOW (ref 22–31)
CREAT SERPL-MCNC: 0.63 MG/DL — SIGNIFICANT CHANGE UP (ref 0.5–1.3)
EGFR: 109 ML/MIN/1.73M2 — SIGNIFICANT CHANGE UP
GLUCOSE SERPL-MCNC: 97 MG/DL — SIGNIFICANT CHANGE UP (ref 70–99)
MAGNESIUM SERPL-MCNC: 1.9 MG/DL — SIGNIFICANT CHANGE UP (ref 1.6–2.6)
PHOSPHATE SERPL-MCNC: 3.3 MG/DL — SIGNIFICANT CHANGE UP (ref 2.5–4.5)
POTASSIUM SERPL-MCNC: 3.6 MMOL/L — SIGNIFICANT CHANGE UP (ref 3.5–5.3)
POTASSIUM SERPL-SCNC: 3.6 MMOL/L — SIGNIFICANT CHANGE UP (ref 3.5–5.3)
SODIUM SERPL-SCNC: 137 MMOL/L — SIGNIFICANT CHANGE UP (ref 135–145)

## 2023-06-02 PROCEDURE — 99239 HOSP IP/OBS DSCHRG MGMT >30: CPT | Mod: GC

## 2023-06-02 RX ADMIN — Medication 50 MILLIGRAM(S): at 05:36

## 2023-06-02 RX ADMIN — Medication 1 DROP(S): at 05:35

## 2023-06-02 RX ADMIN — Medication 650 MILLIGRAM(S): at 03:10

## 2023-06-02 RX ADMIN — Medication 650 MILLIGRAM(S): at 02:11

## 2023-06-02 RX ADMIN — Medication 1 TABLET(S): at 11:33

## 2023-06-02 RX ADMIN — Medication 1 MILLIGRAM(S): at 11:34

## 2023-06-02 RX ADMIN — ENOXAPARIN SODIUM 40 MILLIGRAM(S): 100 INJECTION SUBCUTANEOUS at 11:34

## 2023-06-02 NOTE — PROGRESS NOTE ADULT - PROBLEM SELECTOR PLAN 4
previously on amlodipine   BP WNL on admission     Plan:  [] ctm off BP meds

## 2023-06-02 NOTE — PROGRESS NOTE ADULT - PROBLEM SELECTOR PLAN 1
h/o EtOH abuse with multiple admissions for DT and alcohol withdrawal (most recent 4/27) presents with alcohol intoxication  CIWA 12 on admission  started on librium taper   CIWA 2  s/p librium taper     Plan:  [] monitor CIWA  [] c/w thiamine, MV, folic acid, IVF

## 2023-06-02 NOTE — PROGRESS NOTE ADULT - PROBLEM SELECTOR PLAN 2
h/o EtOH abuse with multiple admissions for DT and alcohol withdrawal (most recent 4/27) presents with alcohol intoxication and after fall   imaging unremarkable
h/o EtOH abuse with multiple admissions for DT and alcohol withdrawal (most recent 4/27) presents with alcohol intoxication and after fall   imaging unremarkable
h/o EtOH abuse with multiple admissions for DT and alcohol withdrawal (most recent 4/27) presents with alcohol intoxication and after fall   imaging unremarkable    Plan:  [] PT eval
h/o EtOH abuse with multiple admissions for DT and alcohol withdrawal (most recent 4/27) presents with alcohol intoxication and after fall   imaging unremarkable    Plan:  [] PT eval

## 2023-06-02 NOTE — DISCHARGE NOTE NURSING/CASE MANAGEMENT/SOCIAL WORK - NSDCVIVACCINE_GEN_ALL_CORE_FT
influenza, injectable, quadrivalent, preservative free; 14-Oct-2021 13:35; Nany Guerrero (RN); [employer]; [id number] (Exp. Date: 30-Jun-2022); IntraMuscular; Deltoid Right.; 0.5 milliLiter(s); VIS (VIS Published: 06-Aug-2021, VIS Presented: 14-Oct-2021);   Tdap; 22-Dec-2021 19:23; Clementina Koehler (RN); [employer]; [id number] (Exp. Date: 09-Sep-2023); IntraMuscular; Deltoid Left.; 0.5 milliLiter(s); VIS (VIS Published: 09-May-2013, VIS Presented: 22-Dec-2021);   Tdap; 15-Aug-2022 16:08; Marianne Pollock (RN); [employer]; [id number]   (Exp. Date: 18-Apr-2024); IntraMuscular; Deltoid Left.; 0.5 milliLiter(s); VIS (VIS Published: 09-May-2013, VIS Presented: 15-Aug-2022);   Tdap; 16-Nov-2022 15:48; Parish Avila (RN); [employer]; [id number] (Exp. Date: 01-Jun-2024); IntraMuscular; Deltoid Right.; 0.5 milliLiter(s); VIS (VIS Published: 09-May-2013, VIS Presented: 16-Nov-2022);

## 2023-06-02 NOTE — DISCHARGE NOTE NURSING/CASE MANAGEMENT/SOCIAL WORK - NSDCPEFALRISK_GEN_ALL_CORE
For information on Fall & Injury Prevention, visit: [email or web address] OR  [email or web address] OR  https://www.cdc.gov/steadi/patient.html

## 2023-06-02 NOTE — PROGRESS NOTE ADULT - PROBLEM SELECTOR PLAN 6
DVT prophylaxis: lovenox subcu   diet: DASH/TLC   dispo: pending clinical course, likely today
DVT prophylaxis: lovenox subcu   diet: DASH/TLC   dispo: pending clinical course, likely today
DVT prophylaxis: lovenox subcu   diet: DASH/TLC   dispo: pending clinical course  PT eval: pending
DVT prophylaxis: lovenox subcu   diet: DASH/TLC   dispo: pending clinical course  PT eval: pending

## 2023-06-02 NOTE — PROGRESS NOTE ADULT - PROBLEM SELECTOR PLAN 5
on latanoprost, timolol eyedrops     Plan:  [] ctm home meds

## 2023-06-02 NOTE — PROGRESS NOTE ADULT - SUBJECTIVE AND OBJECTIVE BOX
PROGRESS NOTE:     Patient is a 58y old  Male who presents with a chief complaint of alcohol intoxication (31 May 2023 15:56)      SUBJECTIVE / OVERNIGHT EVENTS:  No events overnight. Examined at bedside- feels well. All ROS negative.     ADDITIONAL REVIEW OF SYSTEMS:    MEDICATIONS  (STANDING):  dextrose 50% Injectable 25 Gram(s) IV Push once  dextrose 50% Injectable 12.5 Gram(s) IV Push once  dextrose 50% Injectable 25 Gram(s) IV Push once  dextrose Oral Gel 15 Gram(s) Oral once  enoxaparin Injectable 40 milliGRAM(s) SubCutaneous every 24 hours  folic acid 1 milliGRAM(s) Oral daily  glucagon  Injectable 1 milliGRAM(s) IntraMuscular once  latanoprost 0.005% Ophthalmic Solution 1 Drop(s) Both EYES at bedtime  melatonin 3 milliGRAM(s) Oral at bedtime  multivitamin 1 Tablet(s) Oral daily  timolol 0.5% Solution 1 Drop(s) Both EYES two times a day    MEDICATIONS  (PRN):  acetaminophen     Tablet .. 650 milliGRAM(s) Oral every 6 hours PRN Temp greater or equal to 38C (100.4F), Mild Pain (1 - 3)  LORazepam   Injectable 2 milliGRAM(s) IV Push every 1 hour PRN Symptom-triggered: each CIWA -Ar score 8 or GREATER      CAPILLARY BLOOD GLUCOSE        I&O's Summary      PHYSICAL EXAM:  Vital Signs Last 24 Hrs  T(C): 36.4 (02 Jun 2023 06:00), Max: 37.2 (01 Jun 2023 18:00)  T(F): 97.5 (02 Jun 2023 06:00), Max: 98.9 (01 Jun 2023 18:00)  HR: 60 (02 Jun 2023 06:00) (60 - 72)  BP: 109/60 (02 Jun 2023 06:00) (107/71 - 150/84)  BP(mean): --  RR: 17 (02 Jun 2023 06:00) (17 - 18)  SpO2: 98% (02 Jun 2023 06:00) (98% - 100%)    Parameters below as of 02 Jun 2023 06:00  Patient On (Oxygen Delivery Method): room air        GENERAL: No acute distress, well-developed  HEAD:  Atraumatic, Normocephalic  EYES: EOMI, PERRLA, conjunctiva and sclera clear  NECK: Supple, no lymphadenopathy, no JVD  CHEST/LUNG: CTAB; No wheezes, rales, or rhonchi  HEART: Regular rate and rhythm; No murmurs, rubs, or gallops  ABDOMEN: Soft, non-tender, non-distended; normal bowel sounds, no organomegaly  EXTREMITIES:  2+ peripheral pulses b/l, No clubbing, cyanosis, or edema  NEUROLOGY: A&O x 3, no focal deficits  SKIN: No rashes or lesions    LABS:                        12.6   6.32  )-----------( 150      ( 01 Jun 2023 05:15 )             37.8     06-01    141  |  107  |  8   ----------------------------<  79  3.7   |  20<L>  |  0.72    Ca    8.6      01 Jun 2023 05:15  Phos  2.8     06-01  Mg     2.00     06-01        COORDINATION OF CARE:  Care Discussed with Consultants/Other Providers [Y/N]: Y  Prior or Outpatient Records Reviewed [Y/N]: Y

## 2023-06-02 NOTE — DISCHARGE NOTE NURSING/CASE MANAGEMENT/SOCIAL WORK - PATIENT PORTAL LINK FT
You can access the FollowMyHealth Patient Portal offered by [facility] by registering at the following website: [email or web address]. By joining [employer]’s FollowMyHealth portal, you will also be able to view your health information using other applications (apps) compatible with our system.

## 2023-06-04 LAB — HIV 2 PROVIRAL DNA SERPL QL NAA+PROBE: SIGNIFICANT CHANGE UP

## 2023-07-07 ENCOUNTER — INPATIENT (INPATIENT)
Facility: HOSPITAL | Age: 58
LOS: 4 days | Discharge: HOME | End: 2023-07-12
Attending: STUDENT IN AN ORGANIZED HEALTH CARE EDUCATION/TRAINING PROGRAM | Admitting: STUDENT IN AN ORGANIZED HEALTH CARE EDUCATION/TRAINING PROGRAM
Payer: MEDICAID

## 2023-07-07 VITALS
TEMPERATURE: 99 F | DIASTOLIC BLOOD PRESSURE: 79 MMHG | HEIGHT: 68 IN | RESPIRATION RATE: 14 BRPM | WEIGHT: 179.9 LBS | OXYGEN SATURATION: 99 % | SYSTOLIC BLOOD PRESSURE: 146 MMHG | HEART RATE: 88 BPM

## 2023-07-07 LAB
ALBUMIN SERPL ELPH-MCNC: 3.5 G/DL — SIGNIFICANT CHANGE UP (ref 3.3–5)
ALP SERPL-CCNC: 82 U/L — SIGNIFICANT CHANGE UP (ref 40–120)
ALT FLD-CCNC: 54 U/L — SIGNIFICANT CHANGE UP (ref 12–78)
ANION GAP SERPL CALC-SCNC: 3 MMOL/L — LOW (ref 5–17)
AST SERPL-CCNC: 76 U/L — HIGH (ref 15–37)
BILIRUB SERPL-MCNC: 0.6 MG/DL — SIGNIFICANT CHANGE UP (ref 0.2–1.2)
BUN SERPL-MCNC: 3 MG/DL — LOW (ref 7–23)
CALCIUM SERPL-MCNC: 8.2 MG/DL — LOW (ref 8.5–10.1)
CHLORIDE SERPL-SCNC: 111 MMOL/L — HIGH (ref 96–108)
CO2 SERPL-SCNC: 27 MMOL/L — SIGNIFICANT CHANGE UP (ref 22–31)
CREAT SERPL-MCNC: 0.79 MG/DL — SIGNIFICANT CHANGE UP (ref 0.5–1.3)
EGFR: 102 ML/MIN/1.73M2 — SIGNIFICANT CHANGE UP
ETHANOL SERPL-MCNC: 412 MG/DL — SIGNIFICANT CHANGE UP (ref 0–10)
GLUCOSE SERPL-MCNC: 127 MG/DL — HIGH (ref 70–99)
HCT VFR BLD CALC: 36 % — LOW (ref 39–50)
HGB BLD-MCNC: 11.9 G/DL — LOW (ref 13–17)
LIDOCAIN IGE QN: 85 U/L — SIGNIFICANT CHANGE UP (ref 73–393)
MCHC RBC-ENTMCNC: 29.1 PG — SIGNIFICANT CHANGE UP (ref 27–34)
MCHC RBC-ENTMCNC: 33.1 G/DL — SIGNIFICANT CHANGE UP (ref 32–36)
MCV RBC AUTO: 88 FL — SIGNIFICANT CHANGE UP (ref 80–100)
NRBC # BLD: 0 /100 WBCS — SIGNIFICANT CHANGE UP (ref 0–0)
PLATELET # BLD AUTO: 193 K/UL — SIGNIFICANT CHANGE UP (ref 150–400)
POTASSIUM SERPL-MCNC: 3.7 MMOL/L — SIGNIFICANT CHANGE UP (ref 3.5–5.3)
POTASSIUM SERPL-SCNC: 3.7 MMOL/L — SIGNIFICANT CHANGE UP (ref 3.5–5.3)
PROT SERPL-MCNC: 7.9 GM/DL — SIGNIFICANT CHANGE UP (ref 6–8.3)
RBC # BLD: 4.09 M/UL — LOW (ref 4.2–5.8)
RBC # FLD: 15.4 % — HIGH (ref 10.3–14.5)
SODIUM SERPL-SCNC: 141 MMOL/L — SIGNIFICANT CHANGE UP (ref 135–145)
WBC # BLD: 4.88 K/UL — SIGNIFICANT CHANGE UP (ref 3.8–10.5)
WBC # FLD AUTO: 4.88 K/UL — SIGNIFICANT CHANGE UP (ref 3.8–10.5)

## 2023-07-07 PROCEDURE — 99285 EMERGENCY DEPT VISIT HI MDM: CPT

## 2023-07-07 PROCEDURE — 71045 X-RAY EXAM CHEST 1 VIEW: CPT | Mod: 26

## 2023-07-07 RX ORDER — SODIUM CHLORIDE 9 MG/ML
1000 INJECTION INTRAMUSCULAR; INTRAVENOUS; SUBCUTANEOUS ONCE
Refills: 0 | Status: COMPLETED | OUTPATIENT
Start: 2023-07-07 | End: 2023-07-07

## 2023-07-07 RX ADMIN — SODIUM CHLORIDE 1000 MILLILITER(S): 9 INJECTION INTRAMUSCULAR; INTRAVENOUS; SUBCUTANEOUS at 16:26

## 2023-07-07 RX ADMIN — SODIUM CHLORIDE 1000 MILLILITER(S): 9 INJECTION INTRAMUSCULAR; INTRAVENOUS; SUBCUTANEOUS at 15:26

## 2023-07-07 RX ADMIN — Medication 75 MILLIGRAM(S): at 15:26

## 2023-07-07 RX ADMIN — Medication 2 MILLIGRAM(S): at 15:25

## 2023-07-07 NOTE — ED PROVIDER NOTE - PHYSICAL EXAMINATION
General: well appearing but restless. Appears suntanned and states undomiciled and often in the sun. Pt

## 2023-07-07 NOTE — ED ADULT NURSE NOTE - HOW MANY DRINKS CONTAINING ALCOHOL DO YOU HAVE ON A TYPICAL DAY WHEN YOU ARE DRINKING?
7 to 9 Azithromycin Counseling:  I discussed with the patient the risks of azithromycin including but not limited to GI upset, allergic reaction, drug rash, diarrhea, and yeast infections.

## 2023-07-07 NOTE — ED ADULT NURSE NOTE - NSFALLRISKINTERV_ED_ALL_ED
Assistance OOB with selected safe patient handling equipment if applicable/Assistance with ambulation/Communicate fall risk and risk factors to all staff, patient, and family/Monitor gait and stability/Monitor for mental status changes and reorient to person, place, and time, as needed/Provide visual cue: yellow wristband, yellow gown, etc/Reinforce activity limits and safety measures with patient and family/Toileting schedule using arm’s reach rule for commode and bathroom/Use of alarms - bed, stretcher, chair and/or video monitoring/Call bell, personal items and telephone in reach/Instruct patient to call for assistance before getting out of bed/chair/stretcher/Non-slip footwear applied when patient is off stretcher/[place] to call system/Physically safe environment - no spills, clutter or unnecessary equipment/Purposeful Proactive Rounding/Room/bathroom lighting operational, light cord in reach

## 2023-07-07 NOTE — ED PROVIDER NOTE - OBJECTIVE STATEMENT
57 yo M PMH HTN, HLD, ETOH abuse, etoh w/d w/ DTs here as he is complaining of chest pain and abdominal pain. Noted to be flushed and diaphoretic. EKG 84 pbm nsr without ischemic change or arrhythmia. Pt AxOx2 (name and location) and keeps changing his complaint and then states he last drank 4 months ago then 2 months ago then yesterday. Pt CIWA 10. Vitals not consistent with w/d/DTs. Suspect etoh intoxication but pt likely withdraws at a high etoh level. Currently etoh 410. Will give librium to prevent w/d while in ED undergoing evaluation. If w/d worsening where needs iv benzos will admit.

## 2023-07-07 NOTE — ED PROVIDER NOTE - PROGRESS NOTE DETAILS
Salvador: patient still clinically intoxicated, will reassesses for sobriety. patient tremulous w/ tongue fasciculations. HR elevated relative to arrival. endorsing feeling shaky, likely etoh withdrawal. significant prior hx of DT and ETOH withdrawal. will give fluids, additional benzos and admit. -MD Salvador.

## 2023-07-07 NOTE — ED PROVIDER NOTE - CLINICAL SUMMARY MEDICAL DECISION MAKING FREE TEXT BOX
Salvador: 59 y/o M hx of HTN, etoh abuse, DTs presented initially to the day ER team for possible etoh intoxication/withdrawal. etoh level elevated on arrival but has prior hx of withdrawals at high etoh levels. was given benzos to prevent withdrawal and signed out to myself by dr. stovall to observe for clinical improvement vs need for additional benzos for etoh withdrawal. patient was admitted to medicine for etoh withdrawal s/p multiple rounds of benzos. tremulous/tongue fasciculations on exam upon my assessment.

## 2023-07-07 NOTE — ED ADULT NURSE NOTE - OBJECTIVE STATEMENT
Pt bibems for etoh withdrawal. +AOB. Pt states his last drink was "4 months ago". +tremors and +visible beads of sweat on pt's forehead noted. Pt c/o intermittent 10/10 epigastric pain as well. Pt denies sob, dizziness, n/v/d/c, fever/chills, numbness/tingling, weakness. Pt is tearful at times during assessment. No obvious signs of trauma/injury or bleeding noted.  in triage. 12 lead ekg completed. Cardiac and spo2 monitoring in place.

## 2023-07-07 NOTE — ED ADULT NURSE NOTE - ED STAT RN HANDOFF DETAILS
report from MARTÍNEZ Bates. Patient is sleeping comfortably in bed, easy to arouse, AOx4 when awake. No complaints at this time. Respirations equal and unlabored. No acute distress noted at this time. VSS.

## 2023-07-08 LAB
RAPID RVP RESULT: SIGNIFICANT CHANGE UP
SARS-COV-2 RNA SPEC QL NAA+PROBE: SIGNIFICANT CHANGE UP

## 2023-07-08 PROCEDURE — 99221 1ST HOSP IP/OBS SF/LOW 40: CPT

## 2023-07-08 RX ORDER — THIAMINE MONONITRATE (VIT B1) 100 MG
100 TABLET ORAL DAILY
Refills: 0 | Status: DISCONTINUED | OUTPATIENT
Start: 2023-07-08 | End: 2023-07-08

## 2023-07-08 RX ORDER — ACETAMINOPHEN 500 MG
1000 TABLET ORAL ONCE
Refills: 0 | Status: COMPLETED | OUTPATIENT
Start: 2023-07-08 | End: 2023-07-08

## 2023-07-08 RX ORDER — SODIUM CHLORIDE 9 MG/ML
1000 INJECTION, SOLUTION INTRAVENOUS
Refills: 0 | Status: DISCONTINUED | OUTPATIENT
Start: 2023-07-08 | End: 2023-07-10

## 2023-07-08 RX ORDER — DIAZEPAM 5 MG
10 TABLET ORAL
Refills: 0 | Status: DISCONTINUED | OUTPATIENT
Start: 2023-07-08 | End: 2023-07-12

## 2023-07-08 RX ORDER — METOCLOPRAMIDE HCL 10 MG
10 TABLET ORAL EVERY 6 HOURS
Refills: 0 | Status: DISCONTINUED | OUTPATIENT
Start: 2023-07-08 | End: 2023-07-10

## 2023-07-08 RX ORDER — SODIUM CHLORIDE 9 MG/ML
1000 INJECTION INTRAMUSCULAR; INTRAVENOUS; SUBCUTANEOUS ONCE
Refills: 0 | Status: COMPLETED | OUTPATIENT
Start: 2023-07-08 | End: 2023-07-08

## 2023-07-08 RX ORDER — LOPERAMIDE HCL 2 MG
2 TABLET ORAL ONCE
Refills: 0 | Status: COMPLETED | OUTPATIENT
Start: 2023-07-08 | End: 2023-07-08

## 2023-07-08 RX ORDER — TIMOLOL 0.5 %
1 DROPS OPHTHALMIC (EYE)
Refills: 0 | Status: DISCONTINUED | OUTPATIENT
Start: 2023-07-08 | End: 2023-07-12

## 2023-07-08 RX ORDER — ONDANSETRON 8 MG/1
8 TABLET, FILM COATED ORAL
Refills: 0 | Status: DISCONTINUED | OUTPATIENT
Start: 2023-07-08 | End: 2023-07-08

## 2023-07-08 RX ORDER — LATANOPROST 0.05 MG/ML
1 SOLUTION/ DROPS OPHTHALMIC; TOPICAL AT BEDTIME
Refills: 0 | Status: DISCONTINUED | OUTPATIENT
Start: 2023-07-08 | End: 2023-07-12

## 2023-07-08 RX ORDER — FOLIC ACID 0.8 MG
1 TABLET ORAL DAILY
Refills: 0 | Status: DISCONTINUED | OUTPATIENT
Start: 2023-07-08 | End: 2023-07-12

## 2023-07-08 RX ORDER — THIAMINE MONONITRATE (VIT B1) 100 MG
100 TABLET ORAL DAILY
Refills: 0 | Status: DISCONTINUED | OUTPATIENT
Start: 2023-07-09 | End: 2023-07-12

## 2023-07-08 RX ORDER — BNT162B2 ORIGINAL AND OMICRON BA.4/BA.5 .1125; .1125 MG/2.25ML; MG/2.25ML
0.3 INJECTION, SUSPENSION INTRAMUSCULAR ONCE
Refills: 0 | Status: DISCONTINUED | OUTPATIENT
Start: 2023-07-08 | End: 2023-07-08

## 2023-07-08 RX ORDER — ONDANSETRON 8 MG/1
4 TABLET, FILM COATED ORAL EVERY 6 HOURS
Refills: 0 | Status: DISCONTINUED | OUTPATIENT
Start: 2023-07-08 | End: 2023-07-08

## 2023-07-08 RX ORDER — PANTOPRAZOLE SODIUM 20 MG/1
40 TABLET, DELAYED RELEASE ORAL DAILY
Refills: 0 | Status: DISCONTINUED | OUTPATIENT
Start: 2023-07-08 | End: 2023-07-12

## 2023-07-08 RX ORDER — ENOXAPARIN SODIUM 100 MG/ML
40 INJECTION SUBCUTANEOUS EVERY 24 HOURS
Refills: 0 | Status: DISCONTINUED | OUTPATIENT
Start: 2023-07-08 | End: 2023-07-12

## 2023-07-08 RX ADMIN — Medication 10 MILLIGRAM(S): at 18:10

## 2023-07-08 RX ADMIN — SODIUM CHLORIDE 1000 MILLILITER(S): 9 INJECTION INTRAMUSCULAR; INTRAVENOUS; SUBCUTANEOUS at 00:46

## 2023-07-08 RX ADMIN — Medication 4 MILLIGRAM(S): at 21:43

## 2023-07-08 RX ADMIN — Medication 400 MILLIGRAM(S): at 14:06

## 2023-07-08 RX ADMIN — Medication 2 MILLIGRAM(S): at 05:59

## 2023-07-08 RX ADMIN — Medication 2 MILLIGRAM(S): at 21:42

## 2023-07-08 RX ADMIN — Medication 4 MILLIGRAM(S): at 12:44

## 2023-07-08 RX ADMIN — Medication 1 DROP(S): at 06:05

## 2023-07-08 RX ADMIN — Medication 1 DROP(S): at 18:10

## 2023-07-08 RX ADMIN — LATANOPROST 1 DROP(S): 0.05 SOLUTION/ DROPS OPHTHALMIC; TOPICAL at 21:43

## 2023-07-08 RX ADMIN — Medication 4 MILLIGRAM(S): at 08:57

## 2023-07-08 RX ADMIN — ENOXAPARIN SODIUM 40 MILLIGRAM(S): 100 INJECTION SUBCUTANEOUS at 05:59

## 2023-07-08 RX ADMIN — Medication 4 MILLIGRAM(S): at 21:42

## 2023-07-08 RX ADMIN — Medication 1 MILLIGRAM(S): at 12:44

## 2023-07-08 RX ADMIN — Medication 4 MILLIGRAM(S): at 18:10

## 2023-07-08 RX ADMIN — Medication 1000 MILLIGRAM(S): at 14:28

## 2023-07-08 RX ADMIN — Medication 100 MILLIGRAM(S): at 12:44

## 2023-07-08 RX ADMIN — PANTOPRAZOLE SODIUM 40 MILLIGRAM(S): 20 TABLET, DELAYED RELEASE ORAL at 14:06

## 2023-07-08 RX ADMIN — Medication 1 TABLET(S): at 12:44

## 2023-07-08 RX ADMIN — Medication 2 MILLIGRAM(S): at 00:47

## 2023-07-08 RX ADMIN — SODIUM CHLORIDE 1000 MILLILITER(S): 9 INJECTION INTRAMUSCULAR; INTRAVENOUS; SUBCUTANEOUS at 02:00

## 2023-07-08 NOTE — PATIENT PROFILE ADULT - FUNCTIONAL ASSESSMENT - BASIC MOBILITY 6.
2-calculated by average/Not able to assess (calculate score using [facility] averaging method)

## 2023-07-08 NOTE — PATIENT PROFILE ADULT - FALL HARM RISK - HARM RISK INTERVENTIONS
Assistance with ambulation/Assistance OOB with selected safe patient handling equipment/Communicate Risk of Fall with Harm to all staff/Discuss with provider need for PT consult/Monitor for mental status changes/Monitor gait and stability/Reinforce activity limits and safety measures with patient and family/Tailored Fall Risk Interventions/Toileting schedule using arm’s reach rule for commode and bathroom/Use of alarms - bed, chair and/or voice tab/Visual Cue: Yellow wristband and red socks/Bed in lowest position, wheels locked, appropriate side rails in place/Call bell, personal items and telephone in reach/Instruct patient to call for assistance before getting out of bed or chair/Non-slip footwear when patient is out of bed/[place] to call system/Physically safe environment - no spills, clutter or unnecessary equipment/Purposeful Proactive Rounding/Room/bathroom lighting operational, light cord in reach

## 2023-07-08 NOTE — H&P ADULT - HISTORY OF PRESENT ILLNESS
57 y/o M w/ PMH of HTN, HLD, and hx of ETOH abuse and withdrawals presents with c/o abdominal pain and tremors. Pt is currently confused and unable to provide proper history. CIWA 10 in ER, ETOH level 410.      59 y/o M w/ PMH of HTN, HLD, and hx of ETOH abuse and withdrawals presents with c/o abdominal pain and tremors. Pt is currently confused and unable to provide proper history. CIWA 10 in ER, ETOH level 412.

## 2023-07-08 NOTE — H&P ADULT - ASSESSMENT
ETOH intoxication/ Withdrawals   ETOH: 412 on arrival   will admit with CIWA protocol   Thiamine Folic acid for presumed deficiency     HTN  cont w/ Norvasc     DVTp: Lovenox  ETOH intoxication/ Withdrawals   ETOH: 412 on arrival   will admit with CIWA protocol   Thiamine Folic acid for presumed deficiency     HTN  cont w/ Norvasc     Glaucoma   cont w/ home eye drops     DVTp: Lovenox

## 2023-07-08 NOTE — H&P ADULT - NSHPPHYSICALEXAM_GEN_ALL_CORE
GENERAL: NAD, well-groomed, well-developed  HEAD:  Atraumatic, Normocephalic  EYES:  conjunctiva and sclera clear  ENMT: dry mucous membranes  NECK: Supple, No JVD, Normal thyroid  NERVOUS SYSTEM:  Oriented to self only +tremulous, Motor Strength appears to be 5/5 B/L upper and lower extremities  CHEST/LUNG: Clear to ascultation bilaterally; No rales, rhonchi, wheezing, or rubs  HEART: Regular rate and rhythm; No murmurs, rubs, or gallops  ABDOMEN: Soft, Nontender, Nondistended; Bowel sounds present  EXTREMITIES:  2+ Peripheral Pulses, No clubbing, cyanosis, or edema  SKIN: No rashes or lesions

## 2023-07-08 NOTE — H&P ADULT - NSHPLABSRESULTS_GEN_ALL_CORE
11.9   4.88  )-----------( 193      ( 07 Jul 2023 15:07 )             36.0     07-07    141  |  111<H>  |  3<L>  ----------------------------<  127<H>  3.7   |  27  |  0.79    Ca    8.2<L>      07 Jul 2023 15:07    TPro  7.9  /  Alb  3.5  /  TBili  0.6  /  DBili  x   /  AST  76<H>  /  ALT  54  /  AlkPhos  82  07-07      Urinalysis Basic - ( 07 Jul 2023 15:07 )    Color: x / Appearance: x / SG: x / pH: x  Gluc: 127 mg/dL / Ketone: x  / Bili: x / Urobili: x   Blood: x / Protein: x / Nitrite: x   Leuk Esterase: x / RBC: x / WBC x   Sq Epi: x / Non Sq Epi: x / Bacteria: x

## 2023-07-08 NOTE — PATIENT PROFILE ADULT - FUNCTIONAL ASSESSMENT - DAILY ACTIVITY 2.
Department of Anesthesiology  Postprocedure Note    Patient: Jesse Isaac  MRN: [id number]  YOB: 1964  Date of evaluation: 2/14/2023      Procedure Summary     Date: 02/14/23 Room / Location: [address] [place] [address]    Anesthesia Start: 3307 Anesthesia Stop: 2819    Procedure: BOWEL RESECTION SIGMOID COLECTOMY WITH COLOSTOMY CREATION (Abdomen) Diagnosis:       Sigmoid stricture ([address]      (SIGMOID STRICTURE)    Surgeons: Ghazal Mason MD Responsible Provider: Iam Avelar DO    Anesthesia Type: general ASA Status: 3 - Emergent          Anesthesia Type: No value filed.     Poppy Phase I: Poppy Score: 8    Poppy Phase II:        Anesthesia Post Evaluation    Patient location during evaluation: PACU  Patient participation: complete - patient participated  Level of consciousness: awake and alert  Pain score: 2  Airway patency: patent  Nausea & Vomiting: no nausea and no vomiting  Complications: no  Cardiovascular status: hemodynamically stable  Respiratory status: acceptable  Hydration status: stable 2 = A lot of assistance

## 2023-07-09 LAB
A1C WITH ESTIMATED AVERAGE GLUCOSE RESULT: 5.4 % — SIGNIFICANT CHANGE UP (ref 4–5.6)
ALBUMIN SERPL ELPH-MCNC: 2.9 G/DL — LOW (ref 3.3–5)
ALP SERPL-CCNC: 74 U/L — SIGNIFICANT CHANGE UP (ref 40–120)
ALT FLD-CCNC: 40 U/L — SIGNIFICANT CHANGE UP (ref 12–78)
ANION GAP SERPL CALC-SCNC: 7 MMOL/L — SIGNIFICANT CHANGE UP (ref 5–17)
AST SERPL-CCNC: 49 U/L — HIGH (ref 15–37)
BILIRUB SERPL-MCNC: 1.4 MG/DL — HIGH (ref 0.2–1.2)
BUN SERPL-MCNC: 7 MG/DL — SIGNIFICANT CHANGE UP (ref 7–23)
CALCIUM SERPL-MCNC: 7.9 MG/DL — LOW (ref 8.5–10.1)
CHLORIDE SERPL-SCNC: 104 MMOL/L — SIGNIFICANT CHANGE UP (ref 96–108)
CO2 SERPL-SCNC: 28 MMOL/L — SIGNIFICANT CHANGE UP (ref 22–31)
CREAT SERPL-MCNC: 0.64 MG/DL — SIGNIFICANT CHANGE UP (ref 0.5–1.3)
EGFR: 108 ML/MIN/1.73M2 — SIGNIFICANT CHANGE UP
ESTIMATED AVERAGE GLUCOSE: 108 MG/DL — SIGNIFICANT CHANGE UP (ref 68–114)
GLUCOSE SERPL-MCNC: 91 MG/DL — SIGNIFICANT CHANGE UP (ref 70–99)
HCT VFR BLD CALC: 36.8 % — LOW (ref 39–50)
HGB BLD-MCNC: 12.3 G/DL — LOW (ref 13–17)
MAGNESIUM SERPL-MCNC: 1.5 MG/DL — LOW (ref 1.6–2.6)
MCHC RBC-ENTMCNC: 29.2 PG — SIGNIFICANT CHANGE UP (ref 27–34)
MCHC RBC-ENTMCNC: 33.4 G/DL — SIGNIFICANT CHANGE UP (ref 32–36)
MCV RBC AUTO: 87.4 FL — SIGNIFICANT CHANGE UP (ref 80–100)
NRBC # BLD: 0 /100 WBCS — SIGNIFICANT CHANGE UP (ref 0–0)
PHOSPHATE SERPL-MCNC: 2.5 MG/DL — SIGNIFICANT CHANGE UP (ref 2.5–4.5)
PLATELET # BLD AUTO: 149 K/UL — LOW (ref 150–400)
POTASSIUM SERPL-MCNC: 3.3 MMOL/L — LOW (ref 3.5–5.3)
POTASSIUM SERPL-SCNC: 3.3 MMOL/L — LOW (ref 3.5–5.3)
PROT SERPL-MCNC: 7 GM/DL — SIGNIFICANT CHANGE UP (ref 6–8.3)
RBC # BLD: 4.21 M/UL — SIGNIFICANT CHANGE UP (ref 4.2–5.8)
RBC # FLD: 14.7 % — HIGH (ref 10.3–14.5)
SODIUM SERPL-SCNC: 139 MMOL/L — SIGNIFICANT CHANGE UP (ref 135–145)
WBC # BLD: 4.39 K/UL — SIGNIFICANT CHANGE UP (ref 3.8–10.5)
WBC # FLD AUTO: 4.39 K/UL — SIGNIFICANT CHANGE UP (ref 3.8–10.5)

## 2023-07-09 PROCEDURE — 99232 SBSQ HOSP IP/OBS MODERATE 35: CPT

## 2023-07-09 RX ORDER — POTASSIUM CHLORIDE 20 MEQ
40 PACKET (EA) ORAL ONCE
Refills: 0 | Status: COMPLETED | OUTPATIENT
Start: 2023-07-09 | End: 2023-07-09

## 2023-07-09 RX ORDER — ACETAMINOPHEN 500 MG
650 TABLET ORAL EVERY 6 HOURS
Refills: 0 | Status: DISCONTINUED | OUTPATIENT
Start: 2023-07-09 | End: 2023-07-12

## 2023-07-09 RX ADMIN — Medication 3 MILLIGRAM(S): at 18:34

## 2023-07-09 RX ADMIN — SODIUM CHLORIDE 75 MILLILITER(S): 9 INJECTION, SOLUTION INTRAVENOUS at 17:20

## 2023-07-09 RX ADMIN — Medication 3 MILLIGRAM(S): at 05:37

## 2023-07-09 RX ADMIN — Medication 100 MILLIGRAM(S): at 11:55

## 2023-07-09 RX ADMIN — Medication 650 MILLIGRAM(S): at 20:09

## 2023-07-09 RX ADMIN — Medication 1 MILLIGRAM(S): at 11:55

## 2023-07-09 RX ADMIN — Medication 2 MILLIGRAM(S): at 20:10

## 2023-07-09 RX ADMIN — Medication 1 DROP(S): at 18:34

## 2023-07-09 RX ADMIN — Medication 1 TABLET(S): at 11:55

## 2023-07-09 RX ADMIN — PANTOPRAZOLE SODIUM 40 MILLIGRAM(S): 20 TABLET, DELAYED RELEASE ORAL at 11:55

## 2023-07-09 RX ADMIN — Medication 1 DROP(S): at 05:37

## 2023-07-09 RX ADMIN — LATANOPROST 1 DROP(S): 0.05 SOLUTION/ DROPS OPHTHALMIC; TOPICAL at 21:27

## 2023-07-09 RX ADMIN — Medication 3 MILLIGRAM(S): at 13:56

## 2023-07-09 RX ADMIN — Medication 3 MILLIGRAM(S): at 21:26

## 2023-07-09 RX ADMIN — Medication 650 MILLIGRAM(S): at 21:09

## 2023-07-09 RX ADMIN — Medication 40 MILLIEQUIVALENT(S): at 10:18

## 2023-07-09 RX ADMIN — ENOXAPARIN SODIUM 40 MILLIGRAM(S): 100 INJECTION SUBCUTANEOUS at 05:36

## 2023-07-09 RX ADMIN — Medication 3 MILLIGRAM(S): at 10:18

## 2023-07-09 NOTE — PROGRESS NOTE ADULT - ASSESSMENT
59 y/o M w/ PMH of HTN, HLD, and hx of ETOH abuse and withdrawals presents with c/o abdominal pain and tremors. Pt is currently confused and unable to provide proper history. CIWA 10 in ER, ETOH level 412.       ETOH intoxication/ Withdrawals   ETOH: 412 on arrival   CIWA protocol   ativan taper and PRN valium for symptom trigger   thiamine, folic acid and MVI     HTN  cont w/ Norvasc     Glaucoma   cont w/ home eye drops     DVTp: Lovenox   fall precautions

## 2023-07-10 LAB
ALBUMIN SERPL ELPH-MCNC: 2.9 G/DL — LOW (ref 3.3–5)
ALP SERPL-CCNC: 69 U/L — SIGNIFICANT CHANGE UP (ref 40–120)
ALT FLD-CCNC: 42 U/L — SIGNIFICANT CHANGE UP (ref 12–78)
ANION GAP SERPL CALC-SCNC: 5 MMOL/L — SIGNIFICANT CHANGE UP (ref 5–17)
AST SERPL-CCNC: 50 U/L — HIGH (ref 15–37)
BILIRUB SERPL-MCNC: 0.6 MG/DL — SIGNIFICANT CHANGE UP (ref 0.2–1.2)
BUN SERPL-MCNC: 6 MG/DL — LOW (ref 7–23)
CALCIUM SERPL-MCNC: 8.4 MG/DL — LOW (ref 8.5–10.1)
CHLORIDE SERPL-SCNC: 103 MMOL/L — SIGNIFICANT CHANGE UP (ref 96–108)
CO2 SERPL-SCNC: 26 MMOL/L — SIGNIFICANT CHANGE UP (ref 22–31)
CREAT SERPL-MCNC: 0.59 MG/DL — SIGNIFICANT CHANGE UP (ref 0.5–1.3)
EGFR: 111 ML/MIN/1.73M2 — SIGNIFICANT CHANGE UP
GLUCOSE SERPL-MCNC: 108 MG/DL — HIGH (ref 70–99)
HCT VFR BLD CALC: 36.6 % — LOW (ref 39–50)
HGB BLD-MCNC: 12.2 G/DL — LOW (ref 13–17)
MAGNESIUM SERPL-MCNC: 1.6 MG/DL — SIGNIFICANT CHANGE UP (ref 1.6–2.6)
MCHC RBC-ENTMCNC: 29.5 PG — SIGNIFICANT CHANGE UP (ref 27–34)
MCHC RBC-ENTMCNC: 33.3 G/DL — SIGNIFICANT CHANGE UP (ref 32–36)
MCV RBC AUTO: 88.4 FL — SIGNIFICANT CHANGE UP (ref 80–100)
NRBC # BLD: 0 /100 WBCS — SIGNIFICANT CHANGE UP (ref 0–0)
PHOSPHATE SERPL-MCNC: 3.2 MG/DL — SIGNIFICANT CHANGE UP (ref 2.5–4.5)
PLATELET # BLD AUTO: 137 K/UL — LOW (ref 150–400)
POTASSIUM SERPL-MCNC: 3.3 MMOL/L — LOW (ref 3.5–5.3)
POTASSIUM SERPL-SCNC: 3.3 MMOL/L — LOW (ref 3.5–5.3)
PROT SERPL-MCNC: 7 GM/DL — SIGNIFICANT CHANGE UP (ref 6–8.3)
RBC # BLD: 4.14 M/UL — LOW (ref 4.2–5.8)
RBC # FLD: 14.9 % — HIGH (ref 10.3–14.5)
SODIUM SERPL-SCNC: 134 MMOL/L — LOW (ref 135–145)
WBC # BLD: 4.11 K/UL — SIGNIFICANT CHANGE UP (ref 3.8–10.5)
WBC # FLD AUTO: 4.11 K/UL — SIGNIFICANT CHANGE UP (ref 3.8–10.5)

## 2023-07-10 PROCEDURE — 99232 SBSQ HOSP IP/OBS MODERATE 35: CPT

## 2023-07-10 RX ORDER — AMLODIPINE BESYLATE 2.5 MG/1
5 TABLET ORAL DAILY
Refills: 0 | Status: DISCONTINUED | OUTPATIENT
Start: 2023-07-10 | End: 2023-07-12

## 2023-07-10 RX ORDER — METOCLOPRAMIDE HCL 10 MG
10 TABLET ORAL EVERY 6 HOURS
Refills: 0 | Status: DISCONTINUED | OUTPATIENT
Start: 2023-07-10 | End: 2023-07-12

## 2023-07-10 RX ORDER — MAGNESIUM SULFATE 500 MG/ML
1 VIAL (ML) INJECTION ONCE
Refills: 0 | Status: COMPLETED | OUTPATIENT
Start: 2023-07-10 | End: 2023-07-10

## 2023-07-10 RX ORDER — SODIUM CHLORIDE 9 MG/ML
1000 INJECTION, SOLUTION INTRAVENOUS
Refills: 0 | Status: DISCONTINUED | OUTPATIENT
Start: 2023-07-10 | End: 2023-07-12

## 2023-07-10 RX ORDER — POTASSIUM CHLORIDE 20 MEQ
40 PACKET (EA) ORAL ONCE
Refills: 0 | Status: COMPLETED | OUTPATIENT
Start: 2023-07-10 | End: 2023-07-10

## 2023-07-10 RX ADMIN — Medication 2 MILLIGRAM(S): at 21:54

## 2023-07-10 RX ADMIN — Medication 1 DROP(S): at 18:26

## 2023-07-10 RX ADMIN — Medication 2 MILLIGRAM(S): at 05:35

## 2023-07-10 RX ADMIN — Medication 650 MILLIGRAM(S): at 12:40

## 2023-07-10 RX ADMIN — ENOXAPARIN SODIUM 40 MILLIGRAM(S): 100 INJECTION SUBCUTANEOUS at 05:34

## 2023-07-10 RX ADMIN — Medication 1 MILLIGRAM(S): at 12:42

## 2023-07-10 RX ADMIN — Medication 100 MILLIGRAM(S): at 12:42

## 2023-07-10 RX ADMIN — Medication 2 MILLIGRAM(S): at 14:21

## 2023-07-10 RX ADMIN — Medication 10 MILLIGRAM(S): at 12:42

## 2023-07-10 RX ADMIN — Medication 10 MILLIGRAM(S): at 18:26

## 2023-07-10 RX ADMIN — Medication 2 MILLIGRAM(S): at 18:26

## 2023-07-10 RX ADMIN — Medication 1 TABLET(S): at 12:42

## 2023-07-10 RX ADMIN — SODIUM CHLORIDE 75 MILLILITER(S): 9 INJECTION, SOLUTION INTRAVENOUS at 21:54

## 2023-07-10 RX ADMIN — Medication 650 MILLIGRAM(S): at 11:42

## 2023-07-10 RX ADMIN — Medication 100 GRAM(S): at 11:23

## 2023-07-10 RX ADMIN — Medication 1 DROP(S): at 05:34

## 2023-07-10 RX ADMIN — LATANOPROST 1 DROP(S): 0.05 SOLUTION/ DROPS OPHTHALMIC; TOPICAL at 21:54

## 2023-07-10 RX ADMIN — Medication 3 MILLIGRAM(S): at 01:38

## 2023-07-10 RX ADMIN — PANTOPRAZOLE SODIUM 40 MILLIGRAM(S): 20 TABLET, DELAYED RELEASE ORAL at 12:42

## 2023-07-10 RX ADMIN — Medication 2 MILLIGRAM(S): at 09:42

## 2023-07-10 RX ADMIN — Medication 40 MILLIEQUIVALENT(S): at 11:23

## 2023-07-10 RX ADMIN — SODIUM CHLORIDE 75 MILLILITER(S): 9 INJECTION, SOLUTION INTRAVENOUS at 05:33

## 2023-07-10 NOTE — PROGRESS NOTE ADULT - ASSESSMENT
59 y/o M w/ PMH of HTN, HLD, and hx of ETOH abuse and withdrawals presents with c/o abdominal pain and tremors. Pt is currently confused and unable to provide proper history. CIWA 10 in ER, ETOH level 412.       ETOH intoxication/ Withdrawals   ETOH: 412 on arrival   CIWA protocol   ativan taper and PRN valium for symptom trigger   thiamine, folic acid and MVI     HTN  cont w/ Norvasc     Glaucoma   cont w/ home eye drops     DVTp: Lovenox   fall precautions  57 y/o M w/ PMH of HTN, HLD, and hx of ETOH abuse and withdrawals presents with c/o abdominal pain and tremors. Pt is currently confused and unable to provide proper history. CIWA 10 in ER, ETOH level 412.       ETOH intoxication/ Withdrawals   ETOH gastritis/esophagitis   ETOH: 412 on arrival   CIWA protocol   ativan taper and PRN valium for symptom trigger   thiamine, folic acid and MVI   continue with PPI     HTN  cont w/ Norvasc     Glaucoma   cont w/ home eye drops     Preventative Measures   DVTp: Lovenox SQ   fall precautions

## 2023-07-11 LAB
ANION GAP SERPL CALC-SCNC: 8 MMOL/L — SIGNIFICANT CHANGE UP (ref 5–17)
BUN SERPL-MCNC: 10 MG/DL — SIGNIFICANT CHANGE UP (ref 7–23)
CALCIUM SERPL-MCNC: 8.5 MG/DL — SIGNIFICANT CHANGE UP (ref 8.5–10.1)
CHLORIDE SERPL-SCNC: 103 MMOL/L — SIGNIFICANT CHANGE UP (ref 96–108)
CO2 SERPL-SCNC: 26 MMOL/L — SIGNIFICANT CHANGE UP (ref 22–31)
CREAT SERPL-MCNC: 0.63 MG/DL — SIGNIFICANT CHANGE UP (ref 0.5–1.3)
EGFR: 109 ML/MIN/1.73M2 — SIGNIFICANT CHANGE UP
GLUCOSE SERPL-MCNC: 104 MG/DL — HIGH (ref 70–99)
MAGNESIUM SERPL-MCNC: 1.8 MG/DL — SIGNIFICANT CHANGE UP (ref 1.6–2.6)
PHOSPHATE SERPL-MCNC: 3.2 MG/DL — SIGNIFICANT CHANGE UP (ref 2.5–4.5)
POTASSIUM SERPL-MCNC: 3.2 MMOL/L — LOW (ref 3.5–5.3)
POTASSIUM SERPL-SCNC: 3.2 MMOL/L — LOW (ref 3.5–5.3)
SODIUM SERPL-SCNC: 137 MMOL/L — SIGNIFICANT CHANGE UP (ref 135–145)

## 2023-07-11 PROCEDURE — 99232 SBSQ HOSP IP/OBS MODERATE 35: CPT

## 2023-07-11 RX ORDER — ACETAMINOPHEN 500 MG
1000 TABLET ORAL ONCE
Refills: 0 | Status: COMPLETED | OUTPATIENT
Start: 2023-07-11 | End: 2023-07-11

## 2023-07-11 RX ORDER — POTASSIUM CHLORIDE 20 MEQ
40 PACKET (EA) ORAL ONCE
Refills: 0 | Status: COMPLETED | OUTPATIENT
Start: 2023-07-11 | End: 2023-07-11

## 2023-07-11 RX ORDER — LANOLIN ALCOHOL/MO/W.PET/CERES
3 CREAM (GRAM) TOPICAL AT BEDTIME
Refills: 0 | Status: DISCONTINUED | OUTPATIENT
Start: 2023-07-11 | End: 2023-07-12

## 2023-07-11 RX ADMIN — PANTOPRAZOLE SODIUM 40 MILLIGRAM(S): 20 TABLET, DELAYED RELEASE ORAL at 12:21

## 2023-07-11 RX ADMIN — Medication 10 MILLIGRAM(S): at 00:24

## 2023-07-11 RX ADMIN — Medication 1 MILLIGRAM(S): at 21:24

## 2023-07-11 RX ADMIN — Medication 1 DROP(S): at 17:25

## 2023-07-11 RX ADMIN — Medication 10 MILLIGRAM(S): at 05:38

## 2023-07-11 RX ADMIN — Medication 1 MILLIGRAM(S): at 12:20

## 2023-07-11 RX ADMIN — Medication 1 DROP(S): at 05:38

## 2023-07-11 RX ADMIN — Medication 1 TABLET(S): at 12:20

## 2023-07-11 RX ADMIN — Medication 1000 MILLIGRAM(S): at 15:20

## 2023-07-11 RX ADMIN — Medication 1 MILLIGRAM(S): at 05:38

## 2023-07-11 RX ADMIN — Medication 10 MILLIGRAM(S): at 17:25

## 2023-07-11 RX ADMIN — Medication 3 MILLIGRAM(S): at 22:16

## 2023-07-11 RX ADMIN — Medication 400 MILLIGRAM(S): at 15:03

## 2023-07-11 RX ADMIN — LATANOPROST 1 DROP(S): 0.05 SOLUTION/ DROPS OPHTHALMIC; TOPICAL at 21:25

## 2023-07-11 RX ADMIN — AMLODIPINE BESYLATE 5 MILLIGRAM(S): 2.5 TABLET ORAL at 05:38

## 2023-07-11 RX ADMIN — Medication 10 MILLIGRAM(S): at 12:21

## 2023-07-11 RX ADMIN — Medication 100 MILLIGRAM(S): at 12:20

## 2023-07-11 RX ADMIN — Medication 1 MILLIGRAM(S): at 17:25

## 2023-07-11 RX ADMIN — Medication 1 MILLIGRAM(S): at 14:13

## 2023-07-11 RX ADMIN — ENOXAPARIN SODIUM 40 MILLIGRAM(S): 100 INJECTION SUBCUTANEOUS at 05:38

## 2023-07-11 RX ADMIN — Medication 40 MILLIEQUIVALENT(S): at 12:19

## 2023-07-11 RX ADMIN — Medication 1 MILLIGRAM(S): at 10:39

## 2023-07-11 RX ADMIN — Medication 2 MILLIGRAM(S): at 01:50

## 2023-07-11 NOTE — PROGRESS NOTE ADULT - TIME BILLING
min spent reviewing chart, examining patient, discussing plan with patient and family and staff, writing progress note and placing orders.

## 2023-07-11 NOTE — PROGRESS NOTE ADULT - SUBJECTIVE AND OBJECTIVE BOX
PROGRESS NOTE:     Patient is a 58y old  Male who presents with a chief complaint of ETOH withdrawals (08 Jul 2023 04:55)        SUBJECTIVE & OBJECTIVE:   Pt seen and examined at bedside in AM    no overnight events.   no new complaints  vomited once today NBNB   tolerated PO intake     REVIEW OF SYSTEMS: remaining ROS negative     PHYSICAL EXAM:  Vital Signs Last 24 Hrs  T(C): 36.9 (10 Jul 2023 17:29), Max: 37.2 (10 Jul 2023 10:52)  T(F): 98.5 (10 Jul 2023 17:29), Max: 98.9 (10 Jul 2023 10:52)  HR: 74 (10 Jul 2023 17:29) (64 - 77)  BP: 150/91 (10 Jul 2023 17:29) (123/71 - 155/81)  BP(mean): --  RR: 18 (10 Jul 2023 17:29) (17 - 19)  SpO2: 98% (10 Jul 2023 17:29) (97% - 99%)    Parameters below as of 10 Jul 2023 10:52  Patient On (Oxygen Delivery Method): room air            GENERAL: NAD,  no increased WOB  HEAD:  Atraumatic, Normocephalic  EYES: EOMI, PERRLA, conjunctiva and sclera clear  ENMT: Moist mucous membranes  NECK: Supple, No JVD  NERVOUS SYSTEM:  Alert & Oriented X3, no focal neuro deficits   CHEST/LUNG: Clear to auscultation bilaterally; No rales, rhonchi, wheezing, or rubs  HEART: Regular rate and rhythm; No murmurs, rubs, or gallops  ABDOMEN: Soft, Nontender, Nondistended; Bowel sounds present  EXTREMITIES:  2+ Peripheral Pulses b/l, No clubbing, cyanosis, calf tenderness or edema b/l. trace pedal edema b/l     MEDICATIONS  (STANDING):  enoxaparin Injectable 40 milliGRAM(s) SubCutaneous every 24 hours  folic acid 1 milliGRAM(s) Oral daily  lactated ringers. 1000 milliLiter(s) (75 mL/Hr) IV Continuous <Continuous>  latanoprost 0.005% Ophthalmic Solution 1 Drop(s) Both EYES at bedtime  LORazepam   Injectable 3 milliGRAM(s) IV Push every 4 hours  LORazepam   Injectable   IV Push   multivitamin 1 Tablet(s) Oral daily  pantoprazole  Injectable 40 milliGRAM(s) IV Push daily  thiamine 100 milliGRAM(s) Oral daily  timolol 0.5% Solution 1 Drop(s) Both EYES two times a day    MEDICATIONS  (PRN):  acetaminophen     Tablet .. 650 milliGRAM(s) Oral every 6 hours PRN Temp greater or equal to 38C (100.4F), Mild Pain (1 - 3)  diazepam  Injectable 10 milliGRAM(s) IV Push every 2 hours PRN if CIWA >8  LORazepam   Injectable 2 milliGRAM(s) IV Push every 1 hour PRN Symptom-triggered: each CIWA -Ar score 8 or GREATER  metoclopramide Injectable 10 milliGRAM(s) IV Push every 6 hours PRN nausea/vomiting      LABS:                                   12.2   4.11  )-----------( 137      ( 10 Jul 2023 05:20 )             36.6   07-10    134<L>  |  103  |  6<L>  ----------------------------<  108<H>  3.3<L>   |  26  |  0.59    Ca    8.4<L>      10 Jul 2023 05:20  Phos  3.2     07-10  Mg     1.6     07-10    TPro  7.0  /  Alb  2.9<L>  /  TBili  0.6  /  DBili  x   /  AST  50<H>  /  ALT  42  /  AlkPhos  69  07-10        Urinalysis Basic - ( 09 Jul 2023 05:15 )    Color: x / Appearance: x / SG: x / pH: x  Gluc: 91 mg/dL / Ketone: x  / Bili: x / Urobili: x   Blood: x / Protein: x / Nitrite: x   Leuk Esterase: x / RBC: x / WBC x   Sq Epi: x / Non Sq Epi: x / Bacteria: x      Magnesium: 1.5 mg/dL (07-09 @ 05:15)    CAPILLARY BLOOD GLUCOSE                RECENT CULTURES:    Clean Catch Clean Catch (Midstream)  04-22 @ 18:00   <10,000 CFU/mL Normal Urogenital Barbara  --  --            RADIOLOGY & ADDITIONAL TESTS:          Radiology reports read and imaging reviewed  :  [ x] YES  [ ] NO  (I am not a radiologist and therefore rely on Radiologist reports to facilitate with diagnosis and treatment plans)    Consultant(s) Notes Reviewed:  [x ] YES  [ ] NO    Care Discussed with Consultants/Other Providers [x ] YES  [ ] NO  Care plan and all findings were discussed in detail with patient.  All questions and concerns addressed  
PROGRESS NOTE:     Patient is a 58y old  Male who presents with a chief complaint of ETOH withdrawals (08 Jul 2023 04:55)        SUBJECTIVE & OBJECTIVE:   Pt seen and examined at bedside in AM    no overnight events.   no new complaints  doing better today   no further N/V or abd pain   tolerating diet     REVIEW OF SYSTEMS: remaining ROS negative     PHYSICAL EXAM:  T(C): 37.1 (07-09-23 @ 19:55), Max: 37.5 (07-09-23 @ 17:46)  HR: 77 (07-09-23 @ 19:55) (64 - 77)  BP: 151/88 (07-09-23 @ 19:55) (135/80 - 156/78)  RR: 19 (07-09-23 @ 19:55) (18 - 20)  SpO2: 97% (07-09-23 @ 19:55) (97% - 99%)  Wt(kg): --     I&O's Detail    08 Jul 2023 07:01  -  09 Jul 2023 07:00  --------------------------------------------------------  IN:    Lactated Ringers: 1000 mL    Oral Fluid: 240 mL  Total IN: 1240 mL    OUT:  Total OUT: 0 mL    Total NET: 1240 mL          08 Jul 2023 07:01  -  09 Jul 2023 07:00  --------------------------------------------------------  IN:    Lactated Ringers: 1000 mL    Oral Fluid: 240 mL  Total IN: 1240 mL    OUT:  Total OUT: 0 mL    Total NET: 1240 mL          GENERAL: NAD,  no increased WOB  HEAD:  Atraumatic, Normocephalic  EYES: EOMI, PERRLA, conjunctiva and sclera clear  ENMT: Moist mucous membranes  NECK: Supple, No JVD  NERVOUS SYSTEM:  Alert & Oriented X3, no focal neuro deficits   CHEST/LUNG: Clear to auscultation bilaterally; No rales, rhonchi, wheezing, or rubs  HEART: Regular rate and rhythm; No murmurs, rubs, or gallops  ABDOMEN: Soft, Nontender, Nondistended; Bowel sounds present  EXTREMITIES:  2+ Peripheral Pulses b/l, No clubbing, cyanosis, calf tenderness or edema b/l. trace pedal edema b/l     MEDICATIONS  (STANDING):  enoxaparin Injectable 40 milliGRAM(s) SubCutaneous every 24 hours  folic acid 1 milliGRAM(s) Oral daily  lactated ringers. 1000 milliLiter(s) (75 mL/Hr) IV Continuous <Continuous>  latanoprost 0.005% Ophthalmic Solution 1 Drop(s) Both EYES at bedtime  LORazepam   Injectable 3 milliGRAM(s) IV Push every 4 hours  LORazepam   Injectable   IV Push   multivitamin 1 Tablet(s) Oral daily  pantoprazole  Injectable 40 milliGRAM(s) IV Push daily  thiamine 100 milliGRAM(s) Oral daily  timolol 0.5% Solution 1 Drop(s) Both EYES two times a day    MEDICATIONS  (PRN):  acetaminophen     Tablet .. 650 milliGRAM(s) Oral every 6 hours PRN Temp greater or equal to 38C (100.4F), Mild Pain (1 - 3)  diazepam  Injectable 10 milliGRAM(s) IV Push every 2 hours PRN if CIWA >8  LORazepam   Injectable 2 milliGRAM(s) IV Push every 1 hour PRN Symptom-triggered: each CIWA -Ar score 8 or GREATER  metoclopramide Injectable 10 milliGRAM(s) IV Push every 6 hours PRN nausea/vomiting      LABS:                        12.3   4.39  )-----------( 149      ( 09 Jul 2023 05:15 )             36.8     07-09    139  |  104  |  7   ----------------------------<  91  3.3<L>   |  28  |  0.64    Ca    7.9<L>      09 Jul 2023 05:15  Phos  2.5     07-09  Mg     1.5     07-09    TPro  7.0  /  Alb  2.9<L>  /  TBili  1.4<H>  /  DBili  x   /  AST  49<H>  /  ALT  40  /  AlkPhos  74  07-09      Urinalysis Basic - ( 09 Jul 2023 05:15 )    Color: x / Appearance: x / SG: x / pH: x  Gluc: 91 mg/dL / Ketone: x  / Bili: x / Urobili: x   Blood: x / Protein: x / Nitrite: x   Leuk Esterase: x / RBC: x / WBC x   Sq Epi: x / Non Sq Epi: x / Bacteria: x      Magnesium: 1.5 mg/dL (07-09 @ 05:15)    CAPILLARY BLOOD GLUCOSE                RECENT CULTURES:    Clean Catch Clean Catch (Midstream)  04-22 @ 18:00   <10,000 CFU/mL Normal Urogenital Barbara  --  --            RADIOLOGY & ADDITIONAL TESTS:          Radiology reports read and imaging reviewed  :  [ x] YES  [ ] NO  (I am not a radiologist and therefore rely on Radiologist reports to facilitate with diagnosis and treatment plans)    Consultant(s) Notes Reviewed:  [x ] YES  [ ] NO    Care Discussed with Consultants/Other Providers [x ] YES  [ ] NO  Care plan and all findings were discussed in detail with patient.  All questions and concerns addressed  
PROGRESS NOTE:     Patient is a 58y old  Male who presents with a chief complaint of ETOH withdrawals (08 Jul 2023 04:55)        SUBJECTIVE & OBJECTIVE:   Pt seen and examined at bedside in AM    no overnight events.   no new complaints  nu further vomiting   tolerating PO intake   tremors improved   CIWA 4-5     REVIEW OF SYSTEMS: remaining ROS negative     PHYSICAL EXAM:  Vital Signs Last 24 Hrs  T(C): 36.9 (11 Jul 2023 17:13), Max: 37.1 (10 Jul 2023 23:38)  T(F): 98.4 (11 Jul 2023 17:13), Max: 98.8 (10 Jul 2023 23:38)  HR: 63 (11 Jul 2023 17:13) (59 - 65)  BP: 123/73 (11 Jul 2023 17:13) (115/74 - 155/80)  BP(mean): --  RR: 18 (11 Jul 2023 17:13) (17 - 18)  SpO2: 95% (11 Jul 2023 17:13) (95% - 97%)    Parameters below as of 11 Jul 2023 10:26  Patient On (Oxygen Delivery Method): room air        GENERAL: NAD,  no increased WOB  HEAD:  Atraumatic, Normocephalic  EYES: EOMI, PERRLA, conjunctiva and sclera clear  ENMT: Moist mucous membranes  NECK: Supple, No JVD  NERVOUS SYSTEM:  Alert & Oriented X3, no focal neuro deficits   CHEST/LUNG: Clear to auscultation bilaterally; No rales, rhonchi, wheezing, or rubs  HEART: Regular rate and rhythm; No murmurs, rubs, or gallops  ABDOMEN: Soft, Nontender, Nondistended; Bowel sounds present  EXTREMITIES:  2+ Peripheral Pulses b/l, No clubbing, cyanosis, calf tenderness or edema b/l. trace pedal edema b/l     MEDICATIONS  (STANDING):  enoxaparin Injectable 40 milliGRAM(s) SubCutaneous every 24 hours  folic acid 1 milliGRAM(s) Oral daily  lactated ringers. 1000 milliLiter(s) (75 mL/Hr) IV Continuous <Continuous>  latanoprost 0.005% Ophthalmic Solution 1 Drop(s) Both EYES at bedtime  LORazepam   Injectable 3 milliGRAM(s) IV Push every 4 hours  LORazepam   Injectable   IV Push   multivitamin 1 Tablet(s) Oral daily  pantoprazole  Injectable 40 milliGRAM(s) IV Push daily  thiamine 100 milliGRAM(s) Oral daily  timolol 0.5% Solution 1 Drop(s) Both EYES two times a day    MEDICATIONS  (PRN):  acetaminophen     Tablet .. 650 milliGRAM(s) Oral every 6 hours PRN Temp greater or equal to 38C (100.4F), Mild Pain (1 - 3)  diazepam  Injectable 10 milliGRAM(s) IV Push every 2 hours PRN if CIWA >8  LORazepam   Injectable 2 milliGRAM(s) IV Push every 1 hour PRN Symptom-triggered: each CIWA -Ar score 8 or GREATER  metoclopramide Injectable 10 milliGRAM(s) IV Push every 6 hours PRN nausea/vomiting      LABS:                                              12.2   4.11  )-----------( 137      ( 10 Jul 2023 05:20 )             36.6   07-11    137  |  103  |  10  ----------------------------<  104<H>  3.2<L>   |  26  |  0.63    Ca    8.5      11 Jul 2023 05:47  Phos  3.2     07-11  Mg     1.8     07-11    TPro  7.0  /  Alb  2.9<L>  /  TBili  0.6  /  DBili  x   /  AST  50<H>  /  ALT  42  /  AlkPhos  69  07-10      Urinalysis Basic - ( 09 Jul 2023 05:15 )    Color: x / Appearance: x / SG: x / pH: x  Gluc: 91 mg/dL / Ketone: x  / Bili: x / Urobili: x   Blood: x / Protein: x / Nitrite: x   Leuk Esterase: x / RBC: x / WBC x   Sq Epi: x / Non Sq Epi: x / Bacteria: x      Magnesium: 1.5 mg/dL (07-09 @ 05:15)    CAPILLARY BLOOD GLUCOSE                RECENT CULTURES:    Clean Catch Clean Catch (Midstream)  04-22 @ 18:00   <10,000 CFU/mL Normal Urogenital Barbara  --  --            RADIOLOGY & ADDITIONAL TESTS:          Radiology reports read and imaging reviewed  :  [ x] YES  [ ] NO  (I am not a radiologist and therefore rely on Radiologist reports to facilitate with diagnosis and treatment plans)    Consultant(s) Notes Reviewed:  [x ] YES  [ ] NO    Care Discussed with Consultants/Other Providers [x ] YES  [ ] NO  Care plan and all findings were discussed in detail with patient.  All questions and concerns addressed

## 2023-07-11 NOTE — PROGRESS NOTE ADULT - ASSESSMENT
57 y/o M w/ PMH of HTN, HLD, and hx of ETOH abuse and withdrawals presents with c/o abdominal pain and tremors. Pt is currently confused and unable to provide proper history. CIWA 10 in ER, ETOH level 412.       ETOH intoxication/ Withdrawals   ETOH gastritis/esophagitis   ETOH: 412 on arrival   CIWA protocol   ativan taper and PRN valium for symptom trigger   thiamine, folic acid and MVI   continue with PPI     HTN  cont w/ Norvasc     Glaucoma   cont w/ home eye drops     Preventative Measures   DVTp: Lovenox SQ   fall precautions

## 2023-07-12 ENCOUNTER — TRANSCRIPTION ENCOUNTER (OUTPATIENT)
Age: 58
End: 2023-07-12

## 2023-07-12 VITALS
RESPIRATION RATE: 18 BRPM | OXYGEN SATURATION: 96 % | DIASTOLIC BLOOD PRESSURE: 80 MMHG | TEMPERATURE: 98 F | SYSTOLIC BLOOD PRESSURE: 132 MMHG | HEART RATE: 61 BPM

## 2023-07-12 LAB
ANION GAP SERPL CALC-SCNC: 7 MMOL/L — SIGNIFICANT CHANGE UP (ref 5–17)
BUN SERPL-MCNC: 9 MG/DL — SIGNIFICANT CHANGE UP (ref 7–23)
CALCIUM SERPL-MCNC: 8.5 MG/DL — SIGNIFICANT CHANGE UP (ref 8.5–10.1)
CHLORIDE SERPL-SCNC: 105 MMOL/L — SIGNIFICANT CHANGE UP (ref 96–108)
CO2 SERPL-SCNC: 25 MMOL/L — SIGNIFICANT CHANGE UP (ref 22–31)
CREAT SERPL-MCNC: 0.58 MG/DL — SIGNIFICANT CHANGE UP (ref 0.5–1.3)
EGFR: 112 ML/MIN/1.73M2 — SIGNIFICANT CHANGE UP
GLUCOSE SERPL-MCNC: 98 MG/DL — SIGNIFICANT CHANGE UP (ref 70–99)
MAGNESIUM SERPL-MCNC: 1.7 MG/DL — SIGNIFICANT CHANGE UP (ref 1.6–2.6)
PHOSPHATE SERPL-MCNC: 3.1 MG/DL — SIGNIFICANT CHANGE UP (ref 2.5–4.5)
POTASSIUM SERPL-MCNC: 3.3 MMOL/L — LOW (ref 3.5–5.3)
POTASSIUM SERPL-SCNC: 3.3 MMOL/L — LOW (ref 3.5–5.3)
SODIUM SERPL-SCNC: 137 MMOL/L — SIGNIFICANT CHANGE UP (ref 135–145)

## 2023-07-12 PROCEDURE — 99239 HOSP IP/OBS DSCHRG MGMT >30: CPT

## 2023-07-12 RX ORDER — FOLIC ACID 0.8 MG
1 TABLET ORAL
Qty: 30 | Refills: 0
Start: 2023-07-12

## 2023-07-12 RX ORDER — THIAMINE MONONITRATE (VIT B1) 100 MG
1 TABLET ORAL
Qty: 30 | Refills: 0
Start: 2023-07-12

## 2023-07-12 RX ORDER — TIMOLOL 0.5 %
1 DROPS OPHTHALMIC (EYE)
Qty: 5 | Refills: 0
Start: 2023-07-12

## 2023-07-12 RX ORDER — DORZOLAMIDE HYDROCHLORIDE 20 MG/ML
1 SOLUTION/ DROPS OPHTHALMIC THREE TIMES A DAY
Refills: 0 | Status: DISCONTINUED | OUTPATIENT
Start: 2023-07-12 | End: 2023-07-12

## 2023-07-12 RX ORDER — POTASSIUM CHLORIDE 20 MEQ
40 PACKET (EA) ORAL EVERY 4 HOURS
Refills: 0 | Status: COMPLETED | OUTPATIENT
Start: 2023-07-12 | End: 2023-07-12

## 2023-07-12 RX ORDER — DORZOLAMIDE HYDROCHLORIDE 20 MG/ML
1 SOLUTION/ DROPS OPHTHALMIC
Qty: 10 | Refills: 0
Start: 2023-07-12

## 2023-07-12 RX ORDER — PANTOPRAZOLE SODIUM 20 MG/1
1 TABLET, DELAYED RELEASE ORAL
Qty: 15 | Refills: 0
Start: 2023-07-12

## 2023-07-12 RX ORDER — LATANOPROST 0.05 MG/ML
1 SOLUTION/ DROPS OPHTHALMIC; TOPICAL
Qty: 2.5 | Refills: 0
Start: 2023-07-12

## 2023-07-12 RX ORDER — AMLODIPINE BESYLATE 2.5 MG/1
1 TABLET ORAL
Qty: 30 | Refills: 0
Start: 2023-07-12

## 2023-07-12 RX ORDER — AMLODIPINE BESYLATE 2.5 MG/1
1 TABLET ORAL
Refills: 0 | DISCHARGE

## 2023-07-12 RX ADMIN — Medication 40 MILLIEQUIVALENT(S): at 10:07

## 2023-07-12 RX ADMIN — Medication 650 MILLIGRAM(S): at 12:23

## 2023-07-12 RX ADMIN — Medication 1 DROP(S): at 06:02

## 2023-07-12 RX ADMIN — Medication 100 MILLIGRAM(S): at 12:18

## 2023-07-12 RX ADMIN — Medication 40 MILLIEQUIVALENT(S): at 13:35

## 2023-07-12 RX ADMIN — Medication 650 MILLIGRAM(S): at 13:00

## 2023-07-12 RX ADMIN — Medication 1 MILLIGRAM(S): at 03:43

## 2023-07-12 RX ADMIN — Medication 10 MILLIGRAM(S): at 06:01

## 2023-07-12 RX ADMIN — Medication 1 TABLET(S): at 13:35

## 2023-07-12 RX ADMIN — Medication 10 MILLIGRAM(S): at 00:59

## 2023-07-12 RX ADMIN — Medication 1 MILLIGRAM(S): at 12:18

## 2023-07-12 RX ADMIN — Medication 10 MILLIGRAM(S): at 12:18

## 2023-07-12 RX ADMIN — PANTOPRAZOLE SODIUM 40 MILLIGRAM(S): 20 TABLET, DELAYED RELEASE ORAL at 12:18

## 2023-07-12 RX ADMIN — DORZOLAMIDE HYDROCHLORIDE 1 DROP(S): 20 SOLUTION/ DROPS OPHTHALMIC at 13:36

## 2023-07-12 RX ADMIN — ENOXAPARIN SODIUM 40 MILLIGRAM(S): 100 INJECTION SUBCUTANEOUS at 06:02

## 2023-07-12 RX ADMIN — AMLODIPINE BESYLATE 5 MILLIGRAM(S): 2.5 TABLET ORAL at 06:01

## 2023-07-12 NOTE — DISCHARGE NOTE PROVIDER - PROVIDER TOKENS
FREE:[LAST:[fidencio],FIRST:[twila],PHONE:[phone]],FAX:[(   [phone]    -],ADDRESS:[[address] [place] [address]]

## 2023-07-12 NOTE — DISCHARGE NOTE PROVIDER - CARE PROVIDER_API CALL
twila nicolas  [address] [facility]  Phone: [phone]  Fax: (   )    -  Follow Up Time:

## 2023-07-12 NOTE — DISCHARGE NOTE PROVIDER - NSDCMRMEDTOKEN_GEN_ALL_CORE_FT
amLODIPine 5 mg oral tablet: 1 tab(s) orally once a day  folic acid 1 mg oral tablet: 1 tab(s) orally once a day  latanoprost 0.005% ophthalmic solution: 1 drop(s) to each affected eye once a day (at bedtime)  Multiple Vitamins oral tablet: 1 tab(s) orally once a day  pantoprazole 40 mg oral delayed release tablet: 1 tab(s) orally once a day  timolol maleate 0.5% ophthalmic solution: 1 drop(s) to each affected eye 2 times a day  Vitamin B1 100 mg oral tablet: 1 tab(s) orally once a day   amLODIPine 5 mg oral tablet: 1 tab(s) orally once a day  dorzolamide 2% ophthalmic solution: 1 drop(s) to each affected eye 3 times a day  folic acid 1 mg oral tablet: 1 tab(s) orally once a day  latanoprost 0.005% ophthalmic solution: 1 drop(s) to each affected eye once a day (at bedtime)  Multiple Vitamins oral tablet: 1 tab(s) orally once a day  pantoprazole 40 mg oral delayed release tablet: 1 tab(s) orally once a day  timolol maleate 0.5% ophthalmic solution: 1 drop(s) to each affected eye 2 times a day  Vitamin B1 100 mg oral tablet: 1 tab(s) orally once a day

## 2023-07-12 NOTE — DISCHARGE NOTE NURSING/CASE MANAGEMENT/SOCIAL WORK - NSDCPEFALRISK_GEN_ALL_CORE
For information on Fall & Injury Prevention, visit: [email or web address] OR  [email or web address] OR  https://www.cdc.gov/steadi/patient.html

## 2023-07-12 NOTE — DISCHARGE NOTE NURSING/CASE MANAGEMENT/SOCIAL WORK - PATIENT PORTAL LINK FT
You can access the FollowMyHealth Patient Portal offered by [facility] by registering at the following website: [email or web address]. By joining [email or web address] FollowMyHealth portal, you will also be able to view your health information using other applications (apps) compatible with our system.

## 2023-07-12 NOTE — DISCHARGE NOTE PROVIDER - HOSPITAL COURSE
57 yo male pmh of htn, glaucoma alcohol use disorder admit with etoh withdrawal placed on ativan taper .pt improved on floor and was stable  for discharge on [id number]

## 2023-07-12 NOTE — DISCHARGE NOTE PROVIDER - NSDCCPCAREPLAN_GEN_ALL_CORE_FT
PRINCIPAL DISCHARGE DIAGNOSIS  Diagnosis: Alcohol withdrawal  Assessment and Plan of Treatment: counselled to go to [facility] and stop drinking thiamien and folic prescriptions sent to pharmacy      SECONDARY DISCHARGE DIAGNOSES  Diagnosis: Gastritis  Assessment and Plan of Treatment: take nexium for 15 days

## 2023-07-12 NOTE — DISCHARGE NOTE NURSING/CASE MANAGEMENT/SOCIAL WORK - NSDCVIVACCINE_GEN_ALL_CORE_FT
influenza, injectable, quadrivalent, preservative free; 14-Oct-2021 13:35; Nany Guerrero (RN); [employer]; [id number] (Exp. Date: 30-Jun-2022); IntraMuscular; Deltoid Right.; 0.5 milliLiter(s); VIS (VIS Published: 06-Aug-2021, VIS Presented: 14-Oct-2021);   Tdap; 22-Dec-2021 19:23; Celmentina Koehler (RN); [employer]; [id number] (Exp. Date: 09-Sep-2023); IntraMuscular; Deltoid Left.; 0.5 milliLiter(s); VIS (VIS Published: 09-May-2013, VIS Presented: 22-Dec-2021);   Tdap; 15-Aug-2022 16:08; Marianne Pollock (RN); [employer]; [id number]   (Exp. Date: 18-Apr-2024); IntraMuscular; Deltoid Left.; 0.5 milliLiter(s); VIS (VIS Published: 09-May-2013, VIS Presented: 15-Aug-2022);   Tdap; 16-Nov-2022 15:48; Parish Avila (RN); [employer]; [id number] (Exp. Date: 01-Jun-2024); IntraMuscular; Deltoid Right.; 0.5 milliLiter(s); VIS (VIS Published: 09-May-2013, VIS Presented: 16-Nov-2022);

## 2023-07-15 ENCOUNTER — INPATIENT (INPATIENT)
Facility: HOSPITAL | Age: 58
LOS: 3 days | Discharge: HOME | End: 2023-07-19
Attending: HOSPITALIST | Admitting: HOSPITALIST
Payer: MEDICAID

## 2023-07-15 VITALS
SYSTOLIC BLOOD PRESSURE: 142 MMHG | RESPIRATION RATE: 16 BRPM | DIASTOLIC BLOOD PRESSURE: 92 MMHG | OXYGEN SATURATION: 98 % | TEMPERATURE: 99 F | HEIGHT: 68 IN | HEART RATE: 101 BPM

## 2023-07-15 LAB
ALBUMIN SERPL ELPH-MCNC: 4 G/DL — SIGNIFICANT CHANGE UP (ref 3.3–5)
ALP SERPL-CCNC: 61 U/L — SIGNIFICANT CHANGE UP (ref 40–120)
ALT FLD-CCNC: 79 U/L — HIGH (ref 4–41)
ANION GAP SERPL CALC-SCNC: 14 MMOL/L — SIGNIFICANT CHANGE UP (ref 7–14)
AST SERPL-CCNC: 58 U/L — HIGH (ref 4–40)
BASE EXCESS BLDV CALC-SCNC: 0.9 MMOL/L — SIGNIFICANT CHANGE UP (ref -2–3)
BASOPHILS # BLD AUTO: 0.05 K/UL — SIGNIFICANT CHANGE UP (ref 0–0.2)
BASOPHILS NFR BLD AUTO: 1.3 % — SIGNIFICANT CHANGE UP (ref 0–2)
BILIRUB SERPL-MCNC: <0.2 MG/DL — SIGNIFICANT CHANGE UP (ref 0.2–1.2)
BLOOD GAS VENOUS COMPREHENSIVE RESULT: SIGNIFICANT CHANGE UP
BUN SERPL-MCNC: 8 MG/DL — SIGNIFICANT CHANGE UP (ref 7–23)
CALCIUM SERPL-MCNC: 8 MG/DL — LOW (ref 8.4–10.5)
CHLORIDE BLDV-SCNC: 113 MMOL/L — HIGH (ref 96–108)
CHLORIDE SERPL-SCNC: 111 MMOL/L — HIGH (ref 98–107)
CO2 BLDV-SCNC: 28.6 MMOL/L — HIGH (ref 22–26)
CO2 SERPL-SCNC: 23 MMOL/L — SIGNIFICANT CHANGE UP (ref 22–31)
CREAT SERPL-MCNC: 1.01 MG/DL — SIGNIFICANT CHANGE UP (ref 0.5–1.3)
EGFR: 85 ML/MIN/1.73M2 — SIGNIFICANT CHANGE UP
EOSINOPHIL # BLD AUTO: 0.17 K/UL — SIGNIFICANT CHANGE UP (ref 0–0.5)
EOSINOPHIL NFR BLD AUTO: 4.6 % — SIGNIFICANT CHANGE UP (ref 0–6)
ETHANOL SERPL-MCNC: 281 MG/DL — HIGH
GAS PNL BLDV: 147 MMOL/L — HIGH (ref 136–145)
GAS PNL BLDV: SIGNIFICANT CHANGE UP
GLUCOSE BLDV-MCNC: 117 MG/DL — HIGH (ref 70–99)
GLUCOSE SERPL-MCNC: 115 MG/DL — HIGH (ref 70–99)
HCO3 BLDV-SCNC: 27 MMOL/L — SIGNIFICANT CHANGE UP (ref 22–29)
HCT VFR BLD CALC: 35.2 % — LOW (ref 39–50)
HCT VFR BLDA CALC: 35 % — LOW (ref 39–51)
HGB BLD CALC-MCNC: 11.5 G/DL — LOW (ref 12.6–17.4)
HGB BLD-MCNC: 11.2 G/DL — LOW (ref 13–17)
IANC: 1.16 K/UL — LOW (ref 1.8–7.4)
IMM GRANULOCYTES NFR BLD AUTO: 0.3 % — SIGNIFICANT CHANGE UP (ref 0–0.9)
LACTATE BLDV-MCNC: 1.8 MMOL/L — SIGNIFICANT CHANGE UP (ref 0.5–2)
LIDOCAIN IGE QN: 28 U/L — SIGNIFICANT CHANGE UP (ref 7–60)
LYMPHOCYTES # BLD AUTO: 1.76 K/UL — SIGNIFICANT CHANGE UP (ref 1–3.3)
LYMPHOCYTES # BLD AUTO: 47.3 % — HIGH (ref 13–44)
MCHC RBC-ENTMCNC: 29.6 PG — SIGNIFICANT CHANGE UP (ref 27–34)
MCHC RBC-ENTMCNC: 31.8 GM/DL — LOW (ref 32–36)
MCV RBC AUTO: 92.9 FL — SIGNIFICANT CHANGE UP (ref 80–100)
MONOCYTES # BLD AUTO: 0.57 K/UL — SIGNIFICANT CHANGE UP (ref 0–0.9)
MONOCYTES NFR BLD AUTO: 15.3 % — HIGH (ref 2–14)
NEUTROPHILS # BLD AUTO: 1.16 K/UL — LOW (ref 1.8–7.4)
NEUTROPHILS NFR BLD AUTO: 31.2 % — LOW (ref 43–77)
NRBC # BLD: 0 /100 WBCS — SIGNIFICANT CHANGE UP (ref 0–0)
NRBC # FLD: 0 K/UL — SIGNIFICANT CHANGE UP (ref 0–0)
PCO2 BLDV: 49 MMHG — SIGNIFICANT CHANGE UP (ref 42–55)
PH BLDV: 7.35 — SIGNIFICANT CHANGE UP (ref 7.32–7.43)
PLATELET # BLD AUTO: 189 K/UL — SIGNIFICANT CHANGE UP (ref 150–400)
PO2 BLDV: 66 MMHG — HIGH (ref 25–45)
POTASSIUM BLDV-SCNC: 3.6 MMOL/L — SIGNIFICANT CHANGE UP (ref 3.5–5.1)
POTASSIUM SERPL-MCNC: 3.7 MMOL/L — SIGNIFICANT CHANGE UP (ref 3.5–5.3)
POTASSIUM SERPL-SCNC: 3.7 MMOL/L — SIGNIFICANT CHANGE UP (ref 3.5–5.3)
PROT SERPL-MCNC: 7.1 G/DL — SIGNIFICANT CHANGE UP (ref 6–8.3)
RBC # BLD: 3.79 M/UL — LOW (ref 4.2–5.8)
RBC # FLD: 16.5 % — HIGH (ref 10.3–14.5)
SAO2 % BLDV: 91.2 % — HIGH (ref 67–88)
SODIUM SERPL-SCNC: 148 MMOL/L — HIGH (ref 135–145)
TROPONIN T, HIGH SENSITIVITY RESULT: 13 NG/L — SIGNIFICANT CHANGE UP
WBC # BLD: 3.72 K/UL — LOW (ref 3.8–10.5)
WBC # FLD AUTO: 3.72 K/UL — LOW (ref 3.8–10.5)

## 2023-07-15 PROCEDURE — 99285 EMERGENCY DEPT VISIT HI MDM: CPT

## 2023-07-15 RX ORDER — THIAMINE MONONITRATE (VIT B1) 100 MG
100 TABLET ORAL ONCE
Refills: 0 | Status: COMPLETED | OUTPATIENT
Start: 2023-07-15 | End: 2023-07-15

## 2023-07-15 RX ORDER — FAMOTIDINE 10 MG/ML
20 INJECTION INTRAVENOUS ONCE
Refills: 0 | Status: COMPLETED | OUTPATIENT
Start: 2023-07-15 | End: 2023-07-15

## 2023-07-15 RX ORDER — SODIUM CHLORIDE 9 MG/ML
1000 INJECTION INTRAMUSCULAR; INTRAVENOUS; SUBCUTANEOUS ONCE
Refills: 0 | Status: COMPLETED | OUTPATIENT
Start: 2023-07-15 | End: 2023-07-15

## 2023-07-15 RX ADMIN — Medication 25 MILLIGRAM(S): at 22:04

## 2023-07-15 RX ADMIN — SODIUM CHLORIDE 1000 MILLILITER(S): 9 INJECTION INTRAMUSCULAR; INTRAVENOUS; SUBCUTANEOUS at 22:05

## 2023-07-15 RX ADMIN — Medication 100 MILLIGRAM(S): at 22:05

## 2023-07-15 RX ADMIN — FAMOTIDINE 20 MILLIGRAM(S): 10 INJECTION INTRAVENOUS at 22:05

## 2023-07-15 NOTE — ED PROVIDER NOTE - NEUROLOGICAL, MLM
Alert and oriented, no focal deficits, no motor or sensory deficits. Alert and oriented, but appears intoxicated, mildly tremulous; no asterixes

## 2023-07-15 NOTE — ED ADULT NURSE NOTE - NSFALLUNIVINTERV_ED_ALL_ED
Bed/Stretcher in lowest position, wheels locked, appropriate side rails in place/Call bell, personal items and telephone in reach/Instruct patient to call for assistance before getting out of bed/chair/stretcher/Non-slip footwear applied when patient is off stretcher/Liberal to call system/Physically safe environment - no spills, clutter or unnecessary equipment/Purposeful proactive rounding/Room/bathroom lighting operational, light cord in reach

## 2023-07-15 NOTE — ED PROVIDER NOTE - ATTENDING CONTRIBUTION TO CARE
Alisha is a 58-year-old man alcohol history also history of hypertension who was discharged from the hospital 3 days ago after being admitted for alcohol withdrawal.  Also history of high lipids states he is here with abdominal pain and withdrawal.  Patient awake and alert able to communicate appears intoxicated minimal tongue fasciculations.  He is able to move his arms and hands according to directions.  He is able to move his legs as well.    Patient has vital signs that are reasonable heart rate of 101.  Abdomen soft no rebound no guarding minimal peripheral edema mild epigastric tenderness.    Patient states he is also homeless.  We will send labs including an alcohol level I will give him 25 of Librium at this time    I performed a history and physical exam of the patient and discussed their management with the resident and /or advanced care provider. I reviewed the resident and /or ACP's note and agree with the documented findings and plan of care. My medical decison making and observations are found above.

## 2023-07-15 NOTE — ED PROVIDER NOTE - CLINICAL SUMMARY MEDICAL DECISION MAKING FREE TEXT BOX
Alisha is a 58-year-old man alcohol history also history of hypertension who was discharged from the hospital 3 days ago after being admitted for alcohol withdrawal.  Also history of high lipids states he is here with abdominal pain and withdrawal.  Patient awake and alert able to communicate appears intoxicated minimal tongue fasciculations.  He is able to move his arms and hands according to directions.  He is able to move his legs as well.    Patient has vital signs that are reasonable heart rate of 101.  Abdomen soft no rebound no guarding minimal peripheral edema mild epigastric tenderness.    Patient states he is also homeless.  We will send labs including an alcohol level I will give him 25 of Librium at this time

## 2023-07-15 NOTE — ED PROVIDER NOTE - OBJECTIVE STATEMENT
57 yo M, hx of alcoholism with withdrawal seizures, HTN, glaucoma, presents for intermittent CP that began roughly 6 hours ago with nausea and vomiting (3 episodes).  Patient is homeless, reports he is a daily drinker of beer and vodka, but due to rain today was unable to obtain alcohol.  States 3:30am today was his last drink.  Patient was seen 1 week ago for the same, admitted for alcohol withdrawal management and discharged 3 days ago.  Denies SOB, fever, cough.  Expresses vague SI, but when explicitly asked if he wanted to kill himself he stated no.

## 2023-07-15 NOTE — ED PROVIDER NOTE - PSYCHIATRIC, MLM
Alert and oriented to person, place, time/situation. normal mood and affect. no apparent risk to self or others. Alert and oriented to person, place, time/situation. endorses vague SI

## 2023-07-15 NOTE — ED ADULT NURSE NOTE - OBJECTIVE STATEMENT
Pt received in room 25. Pt alert and oriented x 4, ambulatory at baseline. Hx of HLD, glaucoma, HTN, alcohol dependence. Pt c/o intermittent abdominal pain rated 6/10 and alcohol withdrawal symptoms similar to the past such as tremors, stuttering when speaking, abdominal pain with N/V/D today. Pt reports last drink was 0300 today which was 2 quarts of vodka. Pt reports he stopped drinking because it was unaccessible today. Pt reports itchiness "all over". Pt denies auditory and visual hallucinations, nausea currently, SI/HI, and headache. Skin intact, warm and dry to touch. Respirations even and unlabored. Abdomen soft, round, tender in RLQ and LLQ on palpation. No edema x 4 extremities. Pt denies chest pain, shortness of breath, dizziness,  symptoms, fever, chills. Awaiting xray.

## 2023-07-15 NOTE — ED PROVIDER NOTE - CONSTITUTIONAL, MLM
normal... Well appearing, awake, alert, oriented to person, place, time/situation and in no apparent distress. Unkept-appearing, awake, alert, oriented to person, place, time/situation and in no apparent distress.

## 2023-07-15 NOTE — ED ADULT TRIAGE NOTE - CHIEF COMPLAINT QUOTE
c/o non-radiating left upper chest pain x 4 days. Pt noted to have tremors and stuttering when speaking. Pt c/o abdominal pain with N/V/D today. Endorses drinking daily. Pt last drank 2 quarts of vodka and "too many beers" at 0330.

## 2023-07-15 NOTE — ED PROVIDER NOTE - EYES, MLM
Clear bilaterally, pupils equal, round and reactive to light. Clear bilaterally, right pupil deviated (baseline per patient)

## 2023-07-15 NOTE — ED PROVIDER NOTE - PROGRESS NOTE DETAILS
Patient feeling improved.  Discussed with patient willingness to quit drinking and seek additional resources.  Patient states he needs to get home to sort through some affairs.  Will reassess after additional librium. Patient more tremulous, vomiting, and now c/o HA.  Will give ativan and admit.

## 2023-07-15 NOTE — ED PROVIDER NOTE - CARE PLAN
1 Principal Discharge DX:	Alcohol dependence   Principal Discharge DX:	Alcohol dependence with withdrawal

## 2023-07-16 DIAGNOSIS — Z86.79 PERSONAL HISTORY OF OTHER DISEASES OF THE CIRCULATORY SYSTEM: ICD-10-CM

## 2023-07-16 DIAGNOSIS — Z29.9 ENCOUNTER FOR PROPHYLACTIC MEASURES, UNSPECIFIED: ICD-10-CM

## 2023-07-16 DIAGNOSIS — M79.605 PAIN IN LEFT LEG: ICD-10-CM

## 2023-07-16 DIAGNOSIS — E87.6 HYPOKALEMIA: ICD-10-CM

## 2023-07-16 DIAGNOSIS — E83.51 HYPOCALCEMIA: ICD-10-CM

## 2023-07-16 DIAGNOSIS — R10.11 RIGHT UPPER QUADRANT PAIN: ICD-10-CM

## 2023-07-16 DIAGNOSIS — H40.9 UNSPECIFIED GLAUCOMA: ICD-10-CM

## 2023-07-16 DIAGNOSIS — F10.239 ALCOHOL DEPENDENCE WITH WITHDRAWAL, UNSPECIFIED: ICD-10-CM

## 2023-07-16 DIAGNOSIS — D72.819 DECREASED WHITE BLOOD CELL COUNT, UNSPECIFIED: ICD-10-CM

## 2023-07-16 LAB
ALBUMIN SERPL ELPH-MCNC: 3.7 G/DL — SIGNIFICANT CHANGE UP (ref 3.3–5)
ALP SERPL-CCNC: 61 U/L — SIGNIFICANT CHANGE UP (ref 40–120)
ALT FLD-CCNC: 65 U/L — HIGH (ref 4–41)
AMPHET UR-MCNC: NEGATIVE — SIGNIFICANT CHANGE UP
ANION GAP SERPL CALC-SCNC: 12 MMOL/L — SIGNIFICANT CHANGE UP (ref 7–14)
APTT BLD: 34.4 SEC — SIGNIFICANT CHANGE UP (ref 27–36.3)
AST SERPL-CCNC: 53 U/L — HIGH (ref 4–40)
BARBITURATES UR SCN-MCNC: NEGATIVE — SIGNIFICANT CHANGE UP
BENZODIAZ UR-MCNC: POSITIVE
BILIRUB SERPL-MCNC: 0.7 MG/DL — SIGNIFICANT CHANGE UP (ref 0.2–1.2)
BUN SERPL-MCNC: 6 MG/DL — LOW (ref 7–23)
CALCIUM SERPL-MCNC: 8.1 MG/DL — LOW (ref 8.4–10.5)
CHLORIDE SERPL-SCNC: 102 MMOL/L — SIGNIFICANT CHANGE UP (ref 98–107)
CO2 SERPL-SCNC: 23 MMOL/L — SIGNIFICANT CHANGE UP (ref 22–31)
COCAINE METAB.OTHER UR-MCNC: NEGATIVE — SIGNIFICANT CHANGE UP
CREAT SERPL-MCNC: 0.61 MG/DL — SIGNIFICANT CHANGE UP (ref 0.5–1.3)
CREATININE URINE RESULT, DAU: 22 MG/DL — SIGNIFICANT CHANGE UP
EGFR: 110 ML/MIN/1.73M2 — SIGNIFICANT CHANGE UP
FOLATE SERPL-MCNC: 13.2 NG/ML — SIGNIFICANT CHANGE UP (ref 3.1–17.5)
GLUCOSE SERPL-MCNC: 129 MG/DL — HIGH (ref 70–99)
HCT VFR BLD CALC: 33.8 % — LOW (ref 39–50)
HGB BLD-MCNC: 11.1 G/DL — LOW (ref 13–17)
INR BLD: 0.97 RATIO — SIGNIFICANT CHANGE UP (ref 0.88–1.16)
MAGNESIUM SERPL-MCNC: 1.6 MG/DL — SIGNIFICANT CHANGE UP (ref 1.6–2.6)
MCHC RBC-ENTMCNC: 29.4 PG — SIGNIFICANT CHANGE UP (ref 27–34)
MCHC RBC-ENTMCNC: 32.8 GM/DL — SIGNIFICANT CHANGE UP (ref 32–36)
MCV RBC AUTO: 89.7 FL — SIGNIFICANT CHANGE UP (ref 80–100)
METHADONE UR-MCNC: NEGATIVE — SIGNIFICANT CHANGE UP
NRBC # BLD: 0 /100 WBCS — SIGNIFICANT CHANGE UP (ref 0–0)
NRBC # FLD: 0 K/UL — SIGNIFICANT CHANGE UP (ref 0–0)
OPIATES UR-MCNC: NEGATIVE — SIGNIFICANT CHANGE UP
OXYCODONE UR-MCNC: NEGATIVE — SIGNIFICANT CHANGE UP
PCP SPEC-MCNC: SIGNIFICANT CHANGE UP
PCP UR-MCNC: NEGATIVE — SIGNIFICANT CHANGE UP
PHOSPHATE SERPL-MCNC: 1.7 MG/DL — LOW (ref 2.5–4.5)
PLATELET # BLD AUTO: 172 K/UL — SIGNIFICANT CHANGE UP (ref 150–400)
POTASSIUM SERPL-MCNC: 3.4 MMOL/L — LOW (ref 3.5–5.3)
POTASSIUM SERPL-SCNC: 3.4 MMOL/L — LOW (ref 3.5–5.3)
PROCALCITONIN SERPL-MCNC: 0.04 NG/ML — SIGNIFICANT CHANGE UP (ref 0.02–0.1)
PROT SERPL-MCNC: 6.8 G/DL — SIGNIFICANT CHANGE UP (ref 6–8.3)
PROTHROM AB SERPL-ACNC: 11.3 SEC — SIGNIFICANT CHANGE UP (ref 10.5–13.4)
RBC # BLD: 3.77 M/UL — LOW (ref 4.2–5.8)
RBC # FLD: 15.5 % — HIGH (ref 10.3–14.5)
SODIUM SERPL-SCNC: 137 MMOL/L — SIGNIFICANT CHANGE UP (ref 135–145)
THC UR QL: NEGATIVE — SIGNIFICANT CHANGE UP
VIT B12 SERPL-MCNC: 392 PG/ML — SIGNIFICANT CHANGE UP (ref 200–900)
WBC # BLD: 3.37 K/UL — LOW (ref 3.8–10.5)
WBC # FLD AUTO: 3.37 K/UL — LOW (ref 3.8–10.5)

## 2023-07-16 PROCEDURE — 99223 1ST HOSP IP/OBS HIGH 75: CPT | Mod: GC

## 2023-07-16 PROCEDURE — 99233 SBSQ HOSP IP/OBS HIGH 50: CPT

## 2023-07-16 PROCEDURE — 71045 X-RAY EXAM CHEST 1 VIEW: CPT | Mod: 26

## 2023-07-16 PROCEDURE — 99255 IP/OBS CONSLTJ NEW/EST HI 80: CPT | Mod: GC

## 2023-07-16 RX ORDER — ENOXAPARIN SODIUM 100 MG/ML
40 INJECTION SUBCUTANEOUS EVERY 24 HOURS
Refills: 0 | Status: DISCONTINUED | OUTPATIENT
Start: 2023-07-16 | End: 2023-07-19

## 2023-07-16 RX ORDER — CALCIUM CARBONATE 500(1250)
1 TABLET ORAL ONCE
Refills: 0 | Status: COMPLETED | OUTPATIENT
Start: 2023-07-16 | End: 2023-07-16

## 2023-07-16 RX ORDER — DORZOLAMIDE HYDROCHLORIDE 20 MG/ML
1 SOLUTION/ DROPS OPHTHALMIC THREE TIMES A DAY
Refills: 0 | Status: DISCONTINUED | OUTPATIENT
Start: 2023-07-16 | End: 2023-07-19

## 2023-07-16 RX ORDER — FOLIC ACID 0.8 MG
1 TABLET ORAL DAILY
Refills: 0 | Status: DISCONTINUED | OUTPATIENT
Start: 2023-07-16 | End: 2023-07-19

## 2023-07-16 RX ORDER — THIAMINE MONONITRATE (VIT B1) 100 MG
100 TABLET ORAL DAILY
Refills: 0 | Status: DISCONTINUED | OUTPATIENT
Start: 2023-07-16 | End: 2023-07-16

## 2023-07-16 RX ORDER — ONDANSETRON 8 MG/1
4 TABLET, FILM COATED ORAL EVERY 8 HOURS
Refills: 0 | Status: DISCONTINUED | OUTPATIENT
Start: 2023-07-16 | End: 2023-07-19

## 2023-07-16 RX ORDER — SODIUM,POTASSIUM PHOSPHATES 278-250MG
1 POWDER IN PACKET (EA) ORAL EVERY 6 HOURS
Refills: 0 | Status: COMPLETED | OUTPATIENT
Start: 2023-07-16 | End: 2023-07-17

## 2023-07-16 RX ORDER — THIAMINE MONONITRATE (VIT B1) 100 MG
500 TABLET ORAL DAILY
Refills: 0 | Status: COMPLETED | OUTPATIENT
Start: 2023-07-16 | End: 2023-07-18

## 2023-07-16 RX ORDER — LANOLIN ALCOHOL/MO/W.PET/CERES
3 CREAM (GRAM) TOPICAL AT BEDTIME
Refills: 0 | Status: DISCONTINUED | OUTPATIENT
Start: 2023-07-16 | End: 2023-07-19

## 2023-07-16 RX ORDER — LATANOPROST 0.05 MG/ML
1 SOLUTION/ DROPS OPHTHALMIC; TOPICAL AT BEDTIME
Refills: 0 | Status: DISCONTINUED | OUTPATIENT
Start: 2023-07-16 | End: 2023-07-19

## 2023-07-16 RX ORDER — AMLODIPINE BESYLATE 2.5 MG/1
5 TABLET ORAL DAILY
Refills: 0 | Status: DISCONTINUED | OUTPATIENT
Start: 2023-07-16 | End: 2023-07-19

## 2023-07-16 RX ORDER — ACETAMINOPHEN 500 MG
650 TABLET ORAL EVERY 8 HOURS
Refills: 0 | Status: DISCONTINUED | OUTPATIENT
Start: 2023-07-16 | End: 2023-07-16

## 2023-07-16 RX ORDER — TIMOLOL 0.5 %
1 DROPS OPHTHALMIC (EYE)
Refills: 0 | Status: DISCONTINUED | OUTPATIENT
Start: 2023-07-16 | End: 2023-07-19

## 2023-07-16 RX ORDER — POTASSIUM CHLORIDE 20 MEQ
40 PACKET (EA) ORAL ONCE
Refills: 0 | Status: DISCONTINUED | OUTPATIENT
Start: 2023-07-16 | End: 2023-07-16

## 2023-07-16 RX ORDER — ACETAMINOPHEN 500 MG
650 TABLET ORAL EVERY 8 HOURS
Refills: 0 | Status: DISCONTINUED | OUTPATIENT
Start: 2023-07-16 | End: 2023-07-19

## 2023-07-16 RX ORDER — MAGNESIUM SULFATE 500 MG/ML
1 VIAL (ML) INJECTION ONCE
Refills: 0 | Status: COMPLETED | OUTPATIENT
Start: 2023-07-16 | End: 2023-07-16

## 2023-07-16 RX ADMIN — Medication 1 MILLIGRAM(S): at 13:23

## 2023-07-16 RX ADMIN — Medication 650 MILLIGRAM(S): at 16:55

## 2023-07-16 RX ADMIN — DORZOLAMIDE HYDROCHLORIDE 1 DROP(S): 20 SOLUTION/ DROPS OPHTHALMIC at 21:59

## 2023-07-16 RX ADMIN — DORZOLAMIDE HYDROCHLORIDE 1 DROP(S): 20 SOLUTION/ DROPS OPHTHALMIC at 16:18

## 2023-07-16 RX ADMIN — LATANOPROST 1 DROP(S): 0.05 SOLUTION/ DROPS OPHTHALMIC; TOPICAL at 22:46

## 2023-07-16 RX ADMIN — Medication 1 DROP(S): at 17:17

## 2023-07-16 RX ADMIN — Medication 2 MILLIGRAM(S): at 10:13

## 2023-07-16 RX ADMIN — ENOXAPARIN SODIUM 40 MILLIGRAM(S): 100 INJECTION SUBCUTANEOUS at 13:56

## 2023-07-16 RX ADMIN — Medication 4 MILLIGRAM(S): at 21:59

## 2023-07-16 RX ADMIN — Medication 105 MILLIGRAM(S): at 17:18

## 2023-07-16 RX ADMIN — Medication 1 PACKET(S): at 13:23

## 2023-07-16 RX ADMIN — Medication 100 GRAM(S): at 14:25

## 2023-07-16 RX ADMIN — Medication 50 MILLIGRAM(S): at 00:34

## 2023-07-16 RX ADMIN — Medication 2 MILLIGRAM(S): at 14:25

## 2023-07-16 RX ADMIN — Medication 2 MILLIGRAM(S): at 15:55

## 2023-07-16 RX ADMIN — Medication 4 MILLIGRAM(S): at 18:12

## 2023-07-16 RX ADMIN — AMLODIPINE BESYLATE 5 MILLIGRAM(S): 2.5 TABLET ORAL at 14:25

## 2023-07-16 RX ADMIN — Medication 1 TABLET(S): at 14:24

## 2023-07-16 RX ADMIN — Medication 2 MILLIGRAM(S): at 09:05

## 2023-07-16 RX ADMIN — Medication 650 MILLIGRAM(S): at 15:55

## 2023-07-16 RX ADMIN — Medication 2 MILLIGRAM(S): at 13:09

## 2023-07-16 RX ADMIN — Medication 2 MILLIGRAM(S): at 13:20

## 2023-07-16 RX ADMIN — Medication 2 MILLIGRAM(S): at 03:55

## 2023-07-16 RX ADMIN — Medication 1 TABLET(S): at 13:23

## 2023-07-16 RX ADMIN — Medication 1 PACKET(S): at 17:17

## 2023-07-16 NOTE — H&P ADULT - HISTORY OF PRESENT ILLNESS
Patient is a 59 yo man presenting with signs and symptoms of alcohol withdrawal. Patient has PMHx of previous admission with accompanying seizures 2/2 alcohol withdrawal, in addition to HTN and glaucoma. Patient attests that he was drinking approximately at 3am on 7/15 when he said that he decided to quit drinking. Patient's last drink was approximately then. Patient currently attesting to anxiety, sweating, tremors, and pain within his legs. Patient has no history of substance use other than alcohol, including no IV drug use.     While in the ED patient attested to intermittent chest pain with nausea and vomiting.  Patient is a 59 yo man presenting with signs and symptoms of alcohol withdrawal. Patient has PMHx of previous admission with accompanying seizures 2/2 alcohol withdrawal, in addition to HTN and glaucoma. Patient attests that he was drinking approximately at 3am on 7/15 when he said that he decided to quit drinking. Patient's last drink was approximately then. Patient currently attesting to anxiety, sweating, tremors, and pain within his legs. Patient has no history of substance use other than alcohol, including no IV drug use.     While in the ED patient attested to intermittent chest pain with nausea and vomiting. Troponin WNL, given multiple doses of PO librium, 1 dose of Ativan in ED.

## 2023-07-16 NOTE — H&P ADULT - PROBLEM SELECTOR PLAN 3
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

## 2023-07-16 NOTE — CONSULT NOTE ADULT - ATTENDING COMMENTS
Acute alcohol withdrawal s/p lorazepam 2 mg IV, chlordiazepoxide 50 and 25 mg po. Currently with tremor, anxiety, diaphoresis. Agree with lorazepam taper starting at 3 mg IV q4h and decrease with improvement. Continue CIWA monitoring. Lorazepam prn. Thiamine, MVI, folic acid. Labs with transaminitis and he has abdominal pain consistent with acute alcoholic hepatitis. DF<32, does not require prednisolone at this time. Currently HD stable and protecting airway. Does not require ICU care at this time. Please call back if alcohol withdrawal worsens despite lorazepam.

## 2023-07-16 NOTE — H&P ADULT - NSHPPHYSICALEXAM_GEN_ALL_CORE
PHYSICAL EXAM:  GENERAL: Sitting comfortable in bed, in no acute distress  HENMT: Atraumatic, moist mucous membranes, no oropharyngeal exudates or vesicles, uvula is midline EYES: Clear bilaterally, PERRL, EOMs intact b/l  HEART: RRR, S1/S2, no murmur/gallops/rubs  RESPIRATORY: Clear to auscultation bilaterally, no wheezes/rhonchi/rales  ABDOMEN: +BS, soft, nontender, nondistended  EXTREMITIES: No lower extremity edema, +2 radial pulses b/l  NEURO:  A&Ox4, no focal motor deficits or sensory deficits; irregular gait; +tremors, +sweating  Heme/LYMPH: No ecchymosis or bruising, no anterior/posterior cervical or supraclavicular LAD  SKIN:  Skin normal color for race, warm, dry and intact. No evidence of rash.  Psych: +anxiety

## 2023-07-16 NOTE — CHART NOTE - NSCHARTNOTEFT_GEN_A_CORE
Came to the bedside for follow up evaluation. My CIWA as calculated below. Patient sleeping upon entering room.     Nausea/Vomiting---> 0  Tremor ---> 4  Paroxysmal Sweats ---> 0  Anxiety --> 1  Agitation --> 0  Tactile Disturbance -->0   Auditory Hallucinations --> 0  Visual Disturbances --> 0  Headache --> 0  Orientation --> 0    CIWA 5     Will Continue to Monitor per Protocol
Came to the bedside for follow up evaluation. My CIWA as calculated below. Patient sleeping upon entering room.     Nausea/Vomiting---> 0  Tremor ---> 4  Paroxysmal Sweats ---> 0  Anxiety --> 4  Agitation --> 0  Tactile Disturbance -->0   Auditory Hallucinations --> 0  Visual Disturbances --> 0  Headache --> 0  Orientation --> 0    CIWA 8    Will Continue to Monitor per Protocol.
Came to the bedside for initial attending evaluation. My CIWA as calculated below.     Nausea/Vomiting---> 3  Tremor ---> 7  Paroxysmal Sweats ---> 4  Anxiety --> 4  Agitation --> 4  Tactile Disturbance -->0   Auditory Hallucinations --> 0  Visual Disturbances --> 0  Headache --> 5  Orientation --> 0    CIWA 27     Notified ICU myself of rising CIWA. He is rejected from ICU at this time. Giving 4mg IV of ativan now. Will monitor patient closely.

## 2023-07-16 NOTE — H&P ADULT - NSHPLABSRESULTS_GEN_ALL_CORE
11.2   3.72  )-----------( 189      ( 15 Jul 2023 21:50 )             35.2   07-15    148<H>  |  111<H>  |  8   ----------------------------<  115<H>  3.7   |  23  |  1.01    Ca    8.0<L>      15 Jul 2023 21:50    TPro  7.1  /  Alb  4.0  /  TBili  <0.2  /  DBili  x   /  AST  58<H>  /  ALT  79<H>  /  AlkPhos  61  07-15

## 2023-07-16 NOTE — H&P ADULT - PROBLEM SELECTOR PLAN 6
Restarted home eye drops - Patient demonstrates leukopenia on repeat CBC  - Consistent with previous CBC from previous hospitalization, no concern for SIRS/sepsis at this time  - continue to trend

## 2023-07-16 NOTE — ED ADULT NURSE REASSESSMENT NOTE - NS ED NURSE REASSESS COMMENT FT1
Break RN: Pt is A&Ox4, resting in stretcher with no complaints at this time. Pt diaphoretic and has a CIWA score of 8 at this time. Respirations even and unlabored, chest rise equal b/l. NSR on cardiac monitor. VS as noted in flow sheets. Pt denies chest pain, SOB, fever, cough, chills, abdominal pain, N/V/D, h/a, dizziness, numbness/tingling or any urinary symptoms at this time. Medication administered as ordered, see MAR. Safety maintained throughout. Will continue to monitor.

## 2023-07-16 NOTE — H&P ADULT - PROBLEM SELECTOR PLAN 1
Patient presenting with signs and symptoms of withdrawal; Last drink 7/15 3am  +blood alcohol previously  - On CIWA ativan taper, breakthrough 2mg ativan for CIWA>8; taper starting at 4mg Ativan  - CIWA has been >18, called MICU consult

## 2023-07-16 NOTE — H&P ADULT - PROBLEM SELECTOR PLAN 5
- restarted home medication: amlodipine 5mg daily Hypercalcemia @ 8.1 w/ Albumin @3.7  - repleted with calcium carbonate

## 2023-07-16 NOTE — PATIENT PROFILE ADULT - INTERNATIONAL TRAVEL
No Provided SBIRT services: Full screen Negative. Positive reinforcement provided given patient currently within healthy guidelines. Education materials reviewed and given to patient.

## 2023-07-16 NOTE — H&P ADULT - ATTENDING COMMENTS
58 year old man admitted for alcohol withdrawal, reports a history of multiple ICU admissions related to alcohol, history of alcohol related seizures. Patient w/ additional history of HTN and glaucoma. Patient last drink at 3am, he is unable to quantify however uses hands to demonstrate a large bottle. Patient complaining of nausea (vomited this AM), severe headache, anxiety, tremors. Denies tactile disturbance or hallucination. Patient denies smoking or other drug use.     My CIWA is 27, Alcohol level 281 on arrival---> MICU Consulted awaiting evaluation, giving 4mg of Ativan x 1 stat     Concern for severe withdrawal high risk of alcohol related seizure and DTs. Will continue with high dose ativan taper.     Rest of plan of care as above. 58 year old man admitted for alcohol withdrawal, reports a history of multiple ICU admissions related to alcohol, history of alcohol related seizures. Patient w/ additional history of HTN and glaucoma. Patient last drink at 3am, he is unable to quantify however uses hands to demonstrate a large bottle. Patient complaining of nausea (vomited this AM), severe headache, anxiety, tremors. Denies tactile disturbance or hallucination. Patient denies smoking or other drug use.     My CIWA is 27, Alcohol level 281 on arrival---> MICU Consulted awaiting evaluation, giving 4mg of Ativan x 1 stat     Concern for severe withdrawal high risk of alcohol related seizure and DTs. Will continue with high dose ativan taper.     Rest of plan of care as above.    Despite elevated CIWA as I documented above his case has been rejected from the ICU. Low threshold for MICU reconsult. I alerted the ICU fellow of my rising CIWA.

## 2023-07-16 NOTE — H&P ADULT - PROBLEM SELECTOR PLAN 2
Physical exam +RUQ pain concern for hepatitis (likely alcoholic) or cirrhosis  - LFTs elevated  - f/u repeat cbc, cmp, folic acid, thiamine, b12  - sent hep b panel  - f/u RUQ U/S  - f/u Utox Physical exam +RUQ pain concern for hepatitis (likely alcoholic) or cirrhosis  - LFTs elevated  - AST/ALT slightly elevated  - f/u hep b panel  - f/u RUQ U/S  - f/u Utox Physical exam +RUQ pain concern for hepatitis (likely alcoholic) or cirrhosis  - LFTs elevated  - AST/ALT slightly elevated  - f/u hep b panel  - f/u RUQ U/S  - f/u Utox  - f/u PT/PTT/INR and add steroids depending on Maudrey score

## 2023-07-16 NOTE — H&P ADULT - PROBLEM SELECTOR PLAN 9
Diet: Regular, no swallowing issues at this time    DVT prophylaxis: lovenox subq    Aspiration precautions  Seizure precautions Diet: Regular, no swallowing issues at this time    DVT prophylaxis: lovenox subq    Aspiration precautions  Seizure precautions  PT- no skilled PT needs

## 2023-07-16 NOTE — CONSULT NOTE ADULT - ASSESSMENT
Patient is a 58 year old male with PMH significant for alcohol use disorder seizures 2/2 to withdrawal, hypertension, and glaucoma who was admitted to medicine for alcohol withdrawal.     Alcohol Withdrawal   - last drink was 7/15 at 3am  - blood alcohol level was 281   - in the ED, patient received 2mg of IV ativan and 2 doses of librium (50mg and 25mg)   - on prior admission, patient received a librium taper   - continue with 3mg Ativan taper as patient's LFTs slightly elevated   - would also have high CIWA symptom trigger available   - given elevated LFT's, please check PT and calculate Maddrey's discriminate funsction to see if steroids are warranted for alcoholic hepatitis    - would give f IV thiamine 500mg TID for 3 days, a multivitamin daily, and folic acid 1g daily   - replete lytes   - aspiration precautions   - not a MICU candidate at this time, please reconsult as needed

## 2023-07-16 NOTE — PATIENT PROFILE ADULT - BILL PAYMENT
L.v 06/18  No pending  A1c 10.8 08/17  Vit d 34.1 02/19 vit d on med list staes twice a week request is for weekly  Ok to fill with what sig?  Ok to fill both for 1 year?     no

## 2023-07-16 NOTE — H&P ADULT - TIME BILLING
Time spent on review of vital signs, labs, prior documentation. Patient interviewed and examined during this encounter. Plan of care was discussed with patient and resident. Encounter documented.

## 2023-07-16 NOTE — H&P ADULT - ASSESSMENT
Patient is a 57 yo man presenting with signs and symptoms of alcohol withdrawal. Patient has PMHx of previous admission with accompanying seizures 2/2 alcohol withdrawal, in addition to HTN and glaucoma. Patient attests that he was drinking approximately at 3am on 7/15 when he said that he decided to quit drinking. Patient's last drink was approximately then. Patient currently attesting to anxiety, sweating, tremors, and pain within his legs. Patient has no history of substance use other than alcohol, including no IV drug use.     Blood alcohol positive, latest CIWA >15, calcium < 8

## 2023-07-16 NOTE — H&P ADULT - PROBLEM SELECTOR PLAN 4
Hypercalcemia @ 8.1 w/ Albumin @3.7  - repleted with calcium carbonate Hypokalemia- level @ 3.4  - repleted with 40meq potassium chloride

## 2023-07-16 NOTE — CONSULT NOTE ADULT - SUBJECTIVE AND OBJECTIVE BOX
CHIEF COMPLAINT:    HPI: Patient is a 59 yo man presenting with signs and symptoms of alcohol withdrawal. Patient has PMHx of previous admission with accompanying seizures 2/2 alcohol withdrawal, in addition to HTN and glaucoma. Patient attests that he was drinking approximately at 3am on 7/15 when he said that he decided to quit drinking. Patient's last drink was approximately then. Patient currently attesting to anxiety, sweating, tremors, and pain within his legs. Patient has no history of substance use other than alcohol, including no IV drug use.       PAST MEDICAL & SURGICAL HISTORY:  Glaucoma      HTN (hypertension)      Alcohol dependence      Blindness of right eye      HLD (hyperlipidemia)      No significant past surgical history          FAMILY HISTORY:  FH: type 2 diabetes        SOCIAL HISTORY:  EtOH Use: 2 bottles of vodka a day   Substance Use: none, other than alcohol   Marital Status: single, lives with cousin        Allergies    No Known Allergies    Intolerances        HOME MEDICATIONS:    REVIEW OF SYSTEMS:    CONSTITUTIONAL: No weakness, fevers or chills   EYES/ENT: No visual changes;  No vertigo or throat pain   NECK: No pain or stiffness  RESPIRATORY: No cough, wheezing, hemoptysis; No shortness of breath  CARDIOVASCULAR: No chest pain or palpitations  GASTROINTESTINAL: + right upper quadrant abdominal pain, no epigastric pain. + nausea + vomiting, no hematemesis; No diarrhea or constipation. No melena or hematochezia.  GENITOURINARY: No dysuria, frequency or hematuria  NEUROLOGICAL: No numbness, + tremors  All other review of systems is negative unless indicated above.    OBJECTIVE:  ICU Vital Signs Last 24 Hrs  T(C): 37.1 (16 Jul 2023 12:43), Max: 37.2 (16 Jul 2023 09:43)  T(F): 98.8 (16 Jul 2023 12:43), Max: 98.9 (16 Jul 2023 09:43)  HR: 71 (16 Jul 2023 12:43) (71 - 101)  BP: 151/78 (16 Jul 2023 12:43) (128/56 - 160/80)  BP(mean): --  ABP: --  ABP(mean): --  RR: 17 (16 Jul 2023 12:43) (12 - 20)  SpO2: 97% (16 Jul 2023 12:43) (96% - 99%)    O2 Parameters below as of 16 Jul 2023 12:43  Patient On (Oxygen Delivery Method): room air              CAPILLARY BLOOD GLUCOSE      POCT Blood Glucose.: 106 mg/dL (15 Jul 2023 19:50)      PHYSICAL EXAM:  General: mild distress, diaphoretic  Head: atraumatic, normocephalic  Eyes: left eye is reactive to light, no evidence of scleral icterus, right eye is opaque   Neck: supple, no JVD, no tender lymphadenopathy   Respiratory: clear lung fields bilaterally, no wheezes, rales, or rhonchi   Cardiovascular: regular rate and rhythm, normal s1,s2, no murmurs, rubs, or gallops   Abdomen: soft, moderately tender in the RUQ on mild palpation, non-distended, normoactive bowel sounds   Extremities: warm, well perfused, no lower extremity edema   Neurological: AOx3, + tremulousness   Psychiatry: appropriate affect     HOSPITAL MEDICATIONS:  MEDICATIONS  (STANDING):  amLODIPine   Tablet 5 milliGRAM(s) Oral daily  calcium carbonate    500 mG (Tums) Chewable 1 Tablet(s) Chew once  dorzolamide 2% Ophthalmic Solution 1 Drop(s) Both EYES three times a day  enoxaparin Injectable 40 milliGRAM(s) SubCutaneous every 24 hours  folic acid 1 milliGRAM(s) Oral daily  latanoprost 0.005% Ophthalmic Solution 1 Drop(s) Both EYES at bedtime  LORazepam   Injectable   IV Push   LORazepam   Injectable 4 milliGRAM(s) IV Push every 4 hours  magnesium sulfate  IVPB 1 Gram(s) IV Intermittent once  multivitamin 1 Tablet(s) Oral daily  potassium phosphate / sodium phosphate Powder (PHOS-NaK) 1 Packet(s) Oral every 6 hours  thiamine IVPB 500 milliGRAM(s) IV Intermittent daily  timolol 0.5% Solution 1 Drop(s) Both EYES two times a day    MEDICATIONS  (PRN):  LORazepam     Tablet 2 milliGRAM(s) Oral every 2 hours PRN Symptom-triggered 2 point increase in CIWA-Ar  LORazepam   Injectable 2 milliGRAM(s) IV Push every 1 hour PRN Symptom-triggered: each CIWA -Ar score 8 or GREATER  melatonin 3 milliGRAM(s) Oral at bedtime PRN Insomnia  ondansetron Injectable 4 milliGRAM(s) IV Push every 8 hours PRN Nausea and/or Vomiting      LABS:                        11.1   3.37  )-----------( 172      ( 16 Jul 2023 10:25 )             33.8     07-16    137  |  102  |  6<L>  ----------------------------<  129<H>  3.4<L>   |  23  |  0.61    Ca    8.1<L>      16 Jul 2023 10:25  Phos  1.7     07-16  Mg     1.60     07-16    TPro  6.8  /  Alb  3.7  /  TBili  0.7  /  DBili  x   /  AST  53<H>  /  ALT  65<H>  /  AlkPhos  61  07-16      Urinalysis Basic - ( 16 Jul 2023 10:25 )    Color: x / Appearance: x / SG: x / pH: x  Gluc: 129 mg/dL / Ketone: x  / Bili: x / Urobili: x   Blood: x / Protein: x / Nitrite: x   Leuk Esterase: x / RBC: x / WBC x   Sq Epi: x / Non Sq Epi: x / Bacteria: x        Venous Blood Gas:  07-15 @ 21:50  7.35/49/66/27/91.2  VBG Lactate: 1.8      MICROBIOLOGY:     RADIOLOGY:  [ ] Reviewed and interpreted by me    EKG:

## 2023-07-16 NOTE — PATIENT PROFILE ADULT - FUNCTIONAL ASSESSMENT - BASIC MOBILITY 6.
2-calculated by average/Not able to assess (calculate score using [facility] averaging method)

## 2023-07-16 NOTE — H&P ADULT - REASON FOR ADMISSION
Alcohol withdrawal Propranolol Pregnancy And Lactation Text: This medication is Pregnancy Category C and it isn't known if it is safe during pregnancy. It is excreted in breast milk.

## 2023-07-16 NOTE — H&P ADULT - PROBLEM SELECTOR PLAN 7
Diet: Regular, no swallowing issues at this time    DVT prophylaxis: lovenox subq    Aspiration precautions  Seizure precautions - restarted home medication: amlodipine 5mg daily

## 2023-07-16 NOTE — H&P ADULT - NSHPREVIEWOFSYSTEMS_GEN_ALL_CORE
Constitutional: no fever and no chills  Eyes: no discharge, no irritation, no pain, no visual changes  ENMT: no ear pain or hearing loss, no dysphagia or throat pain  Neck: no pain, no stiffness, no swollen glands  CV: no chest pain, no palpitations, no edema  Resp: no cough, no shortness of breath  Abd: no abdominal pain, no nausea or vomiting, no diarrhea  : no dysuria, no hematuria  MSK: no back pain, no neck pain, no joint pain  Neuro: no LOC, no gait abnormality, no headache, no sensory deficits, no weakness  Skin: no rashes, no lacerations, no lesions Constitutional: no fever and no chills  Eyes: no discharge, no irritation, no pain, no visual changes  ENMT: no ear pain or hearing loss, no dysphagia or throat pain  Neck: no pain, no stiffness, no swollen glands  CV: no chest pain, no palpitations, no edema  Resp: no cough, no shortness of breath  Abd: no abdominal pain, no nausea or vomiting, no diarrhea  : no dysuria, no hematuria  MSK: no back pain, no neck pain, no joint pain  Neuro: no LOC, no gait abnormality, no headache, no sensory deficits, no weakness; +attestation to tremors and anxiety; +sharp pain down lateral legs  Skin: no rashes, no lacerations, no lesions

## 2023-07-16 NOTE — PATIENT PROFILE ADULT - FALL HARM RISK - HARM RISK INTERVENTIONS
Assistance with ambulation/Assistance OOB with selected safe patient handling equipment/Communicate Risk of Fall with Harm to all staff/Discuss with provider need for PT consult/Monitor for mental status changes/Monitor gait and stability/Reinforce activity limits and safety measures with patient and family/Tailored Fall Risk Interventions/Toileting schedule using arm’s reach rule for commode and bathroom/Use of alarms - bed, chair and/or voice tab/Visual Cue: Yellow wristband and red socks/Bed in lowest position, wheels locked, appropriate side rails in place/Call bell, personal items and telephone in reach/Instruct patient to call for assistance before getting out of bed or chair/Non-slip footwear when patient is out of bed/[place] to call system/Physically safe environment - no spills, clutter or unnecessary equipment/Purposeful Proactive Rounding/Room/bathroom lighting operational, light cord in reach

## 2023-07-17 LAB
A1C WITH ESTIMATED AVERAGE GLUCOSE RESULT: 5.3 % — SIGNIFICANT CHANGE UP (ref 4–5.6)
ALBUMIN SERPL ELPH-MCNC: 4 G/DL — SIGNIFICANT CHANGE UP (ref 3.3–5)
ALP SERPL-CCNC: 63 U/L — SIGNIFICANT CHANGE UP (ref 40–120)
ALT FLD-CCNC: 55 U/L — HIGH (ref 4–41)
ANION GAP SERPL CALC-SCNC: 13 MMOL/L — SIGNIFICANT CHANGE UP (ref 7–14)
AST SERPL-CCNC: 37 U/L — SIGNIFICANT CHANGE UP (ref 4–40)
BILIRUB SERPL-MCNC: 0.8 MG/DL — SIGNIFICANT CHANGE UP (ref 0.2–1.2)
BUN SERPL-MCNC: 7 MG/DL — SIGNIFICANT CHANGE UP (ref 7–23)
CALCIUM SERPL-MCNC: 8.8 MG/DL — SIGNIFICANT CHANGE UP (ref 8.4–10.5)
CHLORIDE SERPL-SCNC: 102 MMOL/L — SIGNIFICANT CHANGE UP (ref 98–107)
CHOLEST SERPL-MCNC: 238 MG/DL — HIGH
CO2 SERPL-SCNC: 22 MMOL/L — SIGNIFICANT CHANGE UP (ref 22–31)
CREAT SERPL-MCNC: 0.64 MG/DL — SIGNIFICANT CHANGE UP (ref 0.5–1.3)
EGFR: 108 ML/MIN/1.73M2 — SIGNIFICANT CHANGE UP
ESTIMATED AVERAGE GLUCOSE: 105 — SIGNIFICANT CHANGE UP
GLUCOSE SERPL-MCNC: 98 MG/DL — SIGNIFICANT CHANGE UP (ref 70–99)
HAV IGM SER-ACNC: SIGNIFICANT CHANGE UP
HBV CORE AB SER-ACNC: SIGNIFICANT CHANGE UP
HBV CORE IGM SER-ACNC: SIGNIFICANT CHANGE UP
HBV SURFACE AB SER-ACNC: SIGNIFICANT CHANGE UP
HBV SURFACE AG SER-ACNC: SIGNIFICANT CHANGE UP
HCT VFR BLD CALC: 38.6 % — LOW (ref 39–50)
HCV AB S/CO SERPL IA: 0.1 S/CO — SIGNIFICANT CHANGE UP (ref 0–0.99)
HCV AB SERPL-IMP: SIGNIFICANT CHANGE UP
HDLC SERPL-MCNC: 108 MG/DL — SIGNIFICANT CHANGE UP
HGB BLD-MCNC: 12.7 G/DL — LOW (ref 13–17)
LIPID PNL WITH DIRECT LDL SERPL: 105 MG/DL — HIGH
MAGNESIUM SERPL-MCNC: 2 MG/DL — SIGNIFICANT CHANGE UP (ref 1.6–2.6)
MCHC RBC-ENTMCNC: 29.1 PG — SIGNIFICANT CHANGE UP (ref 27–34)
MCHC RBC-ENTMCNC: 32.9 GM/DL — SIGNIFICANT CHANGE UP (ref 32–36)
MCV RBC AUTO: 88.3 FL — SIGNIFICANT CHANGE UP (ref 80–100)
NON HDL CHOLESTEROL: 130 MG/DL — HIGH
NRBC # BLD: 0 /100 WBCS — SIGNIFICANT CHANGE UP (ref 0–0)
NRBC # FLD: 0 K/UL — SIGNIFICANT CHANGE UP (ref 0–0)
PHOSPHATE SERPL-MCNC: 3.3 MG/DL — SIGNIFICANT CHANGE UP (ref 2.5–4.5)
PLATELET # BLD AUTO: 215 K/UL — SIGNIFICANT CHANGE UP (ref 150–400)
POTASSIUM SERPL-MCNC: 3.5 MMOL/L — SIGNIFICANT CHANGE UP (ref 3.5–5.3)
POTASSIUM SERPL-SCNC: 3.5 MMOL/L — SIGNIFICANT CHANGE UP (ref 3.5–5.3)
PROT SERPL-MCNC: 7 G/DL — SIGNIFICANT CHANGE UP (ref 6–8.3)
RBC # BLD: 4.37 M/UL — SIGNIFICANT CHANGE UP (ref 4.2–5.8)
RBC # FLD: 15.3 % — HIGH (ref 10.3–14.5)
SODIUM SERPL-SCNC: 137 MMOL/L — SIGNIFICANT CHANGE UP (ref 135–145)
TRIGL SERPL-MCNC: 127 MG/DL — SIGNIFICANT CHANGE UP
WBC # BLD: 3.9 K/UL — SIGNIFICANT CHANGE UP (ref 3.8–10.5)
WBC # FLD AUTO: 3.9 K/UL — SIGNIFICANT CHANGE UP (ref 3.8–10.5)

## 2023-07-17 PROCEDURE — 76705 ECHO EXAM OF ABDOMEN: CPT | Mod: 26

## 2023-07-17 PROCEDURE — 99233 SBSQ HOSP IP/OBS HIGH 50: CPT | Mod: GC

## 2023-07-17 RX ADMIN — Medication 105 MILLIGRAM(S): at 11:56

## 2023-07-17 RX ADMIN — Medication 4 MILLIGRAM(S): at 14:51

## 2023-07-17 RX ADMIN — Medication 650 MILLIGRAM(S): at 20:14

## 2023-07-17 RX ADMIN — AMLODIPINE BESYLATE 5 MILLIGRAM(S): 2.5 TABLET ORAL at 05:32

## 2023-07-17 RX ADMIN — LATANOPROST 1 DROP(S): 0.05 SOLUTION/ DROPS OPHTHALMIC; TOPICAL at 22:23

## 2023-07-17 RX ADMIN — DORZOLAMIDE HYDROCHLORIDE 1 DROP(S): 20 SOLUTION/ DROPS OPHTHALMIC at 11:57

## 2023-07-17 RX ADMIN — Medication 1 DROP(S): at 05:23

## 2023-07-17 RX ADMIN — Medication 1 DROP(S): at 17:16

## 2023-07-17 RX ADMIN — Medication 1 PACKET(S): at 00:13

## 2023-07-17 RX ADMIN — Medication 3 MILLIGRAM(S): at 22:23

## 2023-07-17 RX ADMIN — Medication 1 TABLET(S): at 11:54

## 2023-07-17 RX ADMIN — Medication 1 PACKET(S): at 05:34

## 2023-07-17 RX ADMIN — Medication 1 MILLIGRAM(S): at 11:54

## 2023-07-17 RX ADMIN — Medication 4 MILLIGRAM(S): at 06:37

## 2023-07-17 RX ADMIN — Medication 4 MILLIGRAM(S): at 09:42

## 2023-07-17 RX ADMIN — Medication 4 MILLIGRAM(S): at 02:30

## 2023-07-17 RX ADMIN — Medication 2 MILLIGRAM(S): at 20:14

## 2023-07-17 RX ADMIN — DORZOLAMIDE HYDROCHLORIDE 1 DROP(S): 20 SOLUTION/ DROPS OPHTHALMIC at 05:33

## 2023-07-17 RX ADMIN — Medication 650 MILLIGRAM(S): at 20:44

## 2023-07-17 RX ADMIN — DORZOLAMIDE HYDROCHLORIDE 1 DROP(S): 20 SOLUTION/ DROPS OPHTHALMIC at 22:23

## 2023-07-17 RX ADMIN — Medication 3 MILLIGRAM(S): at 17:31

## 2023-07-17 NOTE — PROGRESS NOTE ADULT - SUBJECTIVE AND OBJECTIVE BOX
PROGRESS NOTE:   Authored by Alec Bradley MD  Internal Medicine      Patient is a 58y old  Male who presents with a chief complaint of Alcohol withdrawal (16 Jul 2023 14:01)      SUBJECTIVE / OVERNIGHT EVENTS: NAEO, patient seen and examined at bedside    MEDICATIONS  (STANDING):  amLODIPine   Tablet 5 milliGRAM(s) Oral daily  dorzolamide 2% Ophthalmic Solution 1 Drop(s) Both EYES three times a day  enoxaparin Injectable 40 milliGRAM(s) SubCutaneous every 24 hours  folic acid 1 milliGRAM(s) Oral daily  latanoprost 0.005% Ophthalmic Solution 1 Drop(s) Both EYES at bedtime  LORazepam   Injectable 4 milliGRAM(s) IV Push every 4 hours  LORazepam   Injectable 3 milliGRAM(s) IV Push every 4 hours  LORazepam   Injectable   IV Push   multivitamin 1 Tablet(s) Oral daily  thiamine IVPB 500 milliGRAM(s) IV Intermittent daily  timolol 0.5% Solution 1 Drop(s) Both EYES two times a day    MEDICATIONS  (PRN):  acetaminophen     Tablet .. 650 milliGRAM(s) Oral every 8 hours PRN Mild Pain (1 - 3)  LORazepam     Tablet 2 milliGRAM(s) Oral every 2 hours PRN Symptom-triggered 2 point increase in CIWA-Ar  LORazepam   Injectable 2 milliGRAM(s) IV Push every 1 hour PRN Symptom-triggered: each CIWA -Ar score 8 or GREATER  melatonin 3 milliGRAM(s) Oral at bedtime PRN Insomnia  ondansetron Injectable 4 milliGRAM(s) IV Push every 8 hours PRN Nausea and/or Vomiting      CAPILLARY BLOOD GLUCOSE        I&O's Summary      PHYSICAL EXAM:  Vital Signs Last 24 Hrs  T(C): 37 (17 Jul 2023 06:46), Max: 37.2 (16 Jul 2023 09:43)  T(F): 98.6 (17 Jul 2023 06:46), Max: 98.9 (16 Jul 2023 09:43)  HR: 60 (17 Jul 2023 06:46) (60 - 89)  BP: 143/85 (17 Jul 2023 06:46) (128/56 - 160/80)  BP(mean): --  RR: 17 (17 Jul 2023 06:46) (16 - 18)  SpO2: 98% (17 Jul 2023 06:46) (96% - 100%)    Parameters below as of 17 Jul 2023 06:46  Patient On (Oxygen Delivery Method): room air      CONSTITUTIONAL: Well-groomed, in no apparent distress  EYES: No conjunctival or scleral injection, non-icteric;   ENMT: No external nasal lesions; MMM  NECK: Trachea midline without palpable neck mass; thyroid not enlarged and non-tender  RESPIRATORY: Breathing comfortably; no dullness to percussion; lungs CTA without wheeze/rhonchi/rales  CARDIOVASCULAR: +S1S2, RRR, no M/G/R; pedal pulses full and symmetric; no lower extremity edema  GASTROINTESTINAL: No palpable masses or tenderness, +BS throughout, no rebound/guarding; no hepatosplenomegaly; no hernia palpated  LYMPHATIC: No cervical LAD or tenderness  SKIN: No rashes or ulcers noted  NEUROLOGIC: CN II-XII intact; sensation intact in LEs b/l to light touch  PSYCHIATRIC: A+O x 3; mood and affect appropriate; appropriate insight and judgment    LABS:                        11.1   3.37  )-----------( 172      ( 16 Jul 2023 10:25 )             33.8     07-16    137  |  102  |  6<L>  ----------------------------<  129<H>  3.4<L>   |  23  |  0.61    Ca    8.1<L>      16 Jul 2023 10:25  Phos  1.7     07-16  Mg     1.60     07-16    TPro  6.8  /  Alb  3.7  /  TBili  0.7  /  DBili  x   /  AST  53<H>  /  ALT  65<H>  /  AlkPhos  61  07-16    PT/INR - ( 16 Jul 2023 18:19 )   PT: 11.3 sec;   INR: 0.97 ratio         PTT - ( 16 Jul 2023 18:19 )  PTT:34.4 sec      Urinalysis Basic - ( 16 Jul 2023 10:25 )    Color: x / Appearance: x / SG: x / pH: x  Gluc: 129 mg/dL / Ketone: x  / Bili: x / Urobili: x   Blood: x / Protein: x / Nitrite: x   Leuk Esterase: x / RBC: x / WBC x   Sq Epi: x / Non Sq Epi: x / Bacteria: x          RADIOLOGY & ADDITIONAL TESTS:  Results Reviewed:   Imaging Personally Reviewed:  Electrocardiogram Personally Reviewed:    COORDINATION OF CARE:  Care Discussed with Consultants/Other Providers [Y/N]:  Prior or Outpatient Records Reviewed [Y/N]:   PROGRESS NOTE:   Authored by Alec Bradley MD  Internal Medicine      Patient is a 58y old  Male who presents with a chief complaint of Alcohol withdrawal (16 Jul 2023 14:01)      SUBJECTIVE / OVERNIGHT EVENTS: NAEO, patient seen and examined at bedside. Says feeling better    MEDICATIONS  (STANDING):  amLODIPine   Tablet 5 milliGRAM(s) Oral daily  dorzolamide 2% Ophthalmic Solution 1 Drop(s) Both EYES three times a day  enoxaparin Injectable 40 milliGRAM(s) SubCutaneous every 24 hours  folic acid 1 milliGRAM(s) Oral daily  latanoprost 0.005% Ophthalmic Solution 1 Drop(s) Both EYES at bedtime  LORazepam   Injectable 4 milliGRAM(s) IV Push every 4 hours  LORazepam   Injectable 3 milliGRAM(s) IV Push every 4 hours  LORazepam   Injectable   IV Push   multivitamin 1 Tablet(s) Oral daily  thiamine IVPB 500 milliGRAM(s) IV Intermittent daily  timolol 0.5% Solution 1 Drop(s) Both EYES two times a day    MEDICATIONS  (PRN):  acetaminophen     Tablet .. 650 milliGRAM(s) Oral every 8 hours PRN Mild Pain (1 - 3)  LORazepam     Tablet 2 milliGRAM(s) Oral every 2 hours PRN Symptom-triggered 2 point increase in CIWA-Ar  LORazepam   Injectable 2 milliGRAM(s) IV Push every 1 hour PRN Symptom-triggered: each CIWA -Ar score 8 or GREATER  melatonin 3 milliGRAM(s) Oral at bedtime PRN Insomnia  ondansetron Injectable 4 milliGRAM(s) IV Push every 8 hours PRN Nausea and/or Vomiting      CAPILLARY BLOOD GLUCOSE        I&O's Summary      PHYSICAL EXAM:  Vital Signs Last 24 Hrs  T(C): 37 (17 Jul 2023 06:46), Max: 37.2 (16 Jul 2023 09:43)  T(F): 98.6 (17 Jul 2023 06:46), Max: 98.9 (16 Jul 2023 09:43)  HR: 60 (17 Jul 2023 06:46) (60 - 89)  BP: 143/85 (17 Jul 2023 06:46) (128/56 - 160/80)  BP(mean): --  RR: 17 (17 Jul 2023 06:46) (16 - 18)  SpO2: 98% (17 Jul 2023 06:46) (96% - 100%)    Parameters below as of 17 Jul 2023 06:46  Patient On (Oxygen Delivery Method): room air      CONSTITUTIONAL: Well-groomed, in no apparent distress  EYES: No conjunctival or scleral injection, non-icteric;   ENMT: No external nasal lesions; MMM  NECK: Trachea midline without palpable neck mass; thyroid not enlarged and non-tender  RESPIRATORY: Breathing comfortably; no dullness to percussion; lungs CTA without wheeze/rhonchi/rales  CARDIOVASCULAR: +S1S2, RRR, no M/G/R; pedal pulses full and symmetric; no lower extremity edema  GASTROINTESTINAL: No palpable masses or tenderness, +BS throughout, no rebound/guarding; no hepatosplenomegaly; no hernia palpated  LYMPHATIC: No cervical LAD or tenderness  SKIN: No rashes or ulcers noted  NEUROLOGIC: CN II-XII intact; sensation intact in LEs b/l to light touch; moderate tremor  PSYCHIATRIC: A+O x 3; mood and affect appropriate; appropriate insight and judgment; moderate anxiety    LABS:                        11.1   3.37  )-----------( 172      ( 16 Jul 2023 10:25 )             33.8     07-16    137  |  102  |  6<L>  ----------------------------<  129<H>  3.4<L>   |  23  |  0.61    Ca    8.1<L>      16 Jul 2023 10:25  Phos  1.7     07-16  Mg     1.60     07-16    TPro  6.8  /  Alb  3.7  /  TBili  0.7  /  DBili  x   /  AST  53<H>  /  ALT  65<H>  /  AlkPhos  61  07-16    PT/INR - ( 16 Jul 2023 18:19 )   PT: 11.3 sec;   INR: 0.97 ratio         PTT - ( 16 Jul 2023 18:19 )  PTT:34.4 sec      Urinalysis Basic - ( 16 Jul 2023 10:25 )    Color: x / Appearance: x / SG: x / pH: x  Gluc: 129 mg/dL / Ketone: x  / Bili: x / Urobili: x   Blood: x / Protein: x / Nitrite: x   Leuk Esterase: x / RBC: x / WBC x   Sq Epi: x / Non Sq Epi: x / Bacteria: x          RADIOLOGY & ADDITIONAL TESTS:  Results Reviewed:   Imaging Personally Reviewed:  Electrocardiogram Personally Reviewed:    COORDINATION OF CARE:  Care Discussed with Consultants/Other Providers [Y/N]:  Prior or Outpatient Records Reviewed [Y/N]:

## 2023-07-17 NOTE — PROGRESS NOTE ADULT - PROBLEM SELECTOR PLAN 6
- Patient demonstrates leukopenia on repeat CBC  - Consistent with previous CBC from previous hospitalization, no concern for SIRS/sepsis at this time  - continue to trend
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
## 2023-07-18 DIAGNOSIS — F10.139 ALCOHOL ABUSE WITH WITHDRAWAL, UNSPECIFIED: ICD-10-CM

## 2023-07-18 DIAGNOSIS — E83.42 HYPOMAGNESEMIA: ICD-10-CM

## 2023-07-18 DIAGNOSIS — H40.9 UNSPECIFIED GLAUCOMA: ICD-10-CM

## 2023-07-18 DIAGNOSIS — H54.413A BLINDNESS RIGHT EYE CATEGORY 3, NORMAL VISION LEFT EYE: ICD-10-CM

## 2023-07-18 DIAGNOSIS — K29.20 ALCOHOLIC GASTRITIS WITHOUT BLEEDING: ICD-10-CM

## 2023-07-18 DIAGNOSIS — K20.80 OTHER ESOPHAGITIS WITHOUT BLEEDING: ICD-10-CM

## 2023-07-18 DIAGNOSIS — I10 ESSENTIAL (PRIMARY) HYPERTENSION: ICD-10-CM

## 2023-07-18 DIAGNOSIS — E78.5 HYPERLIPIDEMIA, UNSPECIFIED: ICD-10-CM

## 2023-07-18 DIAGNOSIS — F10.231 ALCOHOL DEPENDENCE WITH WITHDRAWAL DELIRIUM: ICD-10-CM

## 2023-07-18 LAB
ALBUMIN SERPL ELPH-MCNC: 3.9 G/DL — SIGNIFICANT CHANGE UP (ref 3.3–5)
ALP SERPL-CCNC: 64 U/L — SIGNIFICANT CHANGE UP (ref 40–120)
ALT FLD-CCNC: 59 U/L — HIGH (ref 4–41)
ANION GAP SERPL CALC-SCNC: 10 MMOL/L — SIGNIFICANT CHANGE UP (ref 7–14)
AST SERPL-CCNC: 46 U/L — HIGH (ref 4–40)
BILIRUB SERPL-MCNC: 0.7 MG/DL — SIGNIFICANT CHANGE UP (ref 0.2–1.2)
BUN SERPL-MCNC: 9 MG/DL — SIGNIFICANT CHANGE UP (ref 7–23)
CALCIUM SERPL-MCNC: 9.4 MG/DL — SIGNIFICANT CHANGE UP (ref 8.4–10.5)
CHLORIDE SERPL-SCNC: 103 MMOL/L — SIGNIFICANT CHANGE UP (ref 98–107)
CO2 SERPL-SCNC: 22 MMOL/L — SIGNIFICANT CHANGE UP (ref 22–31)
CREAT SERPL-MCNC: 0.64 MG/DL — SIGNIFICANT CHANGE UP (ref 0.5–1.3)
EGFR: 108 ML/MIN/1.73M2 — SIGNIFICANT CHANGE UP
GLUCOSE SERPL-MCNC: 101 MG/DL — HIGH (ref 70–99)
HCT VFR BLD CALC: 40.7 % — SIGNIFICANT CHANGE UP (ref 39–50)
HGB BLD-MCNC: 13.1 G/DL — SIGNIFICANT CHANGE UP (ref 13–17)
MAGNESIUM SERPL-MCNC: 1.9 MG/DL — SIGNIFICANT CHANGE UP (ref 1.6–2.6)
MCHC RBC-ENTMCNC: 29.2 PG — SIGNIFICANT CHANGE UP (ref 27–34)
MCHC RBC-ENTMCNC: 32.2 GM/DL — SIGNIFICANT CHANGE UP (ref 32–36)
MCV RBC AUTO: 90.6 FL — SIGNIFICANT CHANGE UP (ref 80–100)
NRBC # BLD: 0 /100 WBCS — SIGNIFICANT CHANGE UP (ref 0–0)
NRBC # FLD: 0 K/UL — SIGNIFICANT CHANGE UP (ref 0–0)
PHOSPHATE SERPL-MCNC: 3.2 MG/DL — SIGNIFICANT CHANGE UP (ref 2.5–4.5)
PLATELET # BLD AUTO: 240 K/UL — SIGNIFICANT CHANGE UP (ref 150–400)
POTASSIUM SERPL-MCNC: 3.5 MMOL/L — SIGNIFICANT CHANGE UP (ref 3.5–5.3)
POTASSIUM SERPL-SCNC: 3.5 MMOL/L — SIGNIFICANT CHANGE UP (ref 3.5–5.3)
PROT SERPL-MCNC: 7.5 G/DL — SIGNIFICANT CHANGE UP (ref 6–8.3)
RBC # BLD: 4.49 M/UL — SIGNIFICANT CHANGE UP (ref 4.2–5.8)
RBC # FLD: 15.3 % — HIGH (ref 10.3–14.5)
SODIUM SERPL-SCNC: 135 MMOL/L — SIGNIFICANT CHANGE UP (ref 135–145)
WBC # BLD: 6.57 K/UL — SIGNIFICANT CHANGE UP (ref 3.8–10.5)
WBC # FLD AUTO: 6.57 K/UL — SIGNIFICANT CHANGE UP (ref 3.8–10.5)

## 2023-07-18 PROCEDURE — 99233 SBSQ HOSP IP/OBS HIGH 50: CPT | Mod: GC

## 2023-07-18 RX ADMIN — Medication 2 MILLIGRAM(S): at 22:11

## 2023-07-18 RX ADMIN — Medication 3 MILLIGRAM(S): at 09:58

## 2023-07-18 RX ADMIN — AMLODIPINE BESYLATE 5 MILLIGRAM(S): 2.5 TABLET ORAL at 06:13

## 2023-07-18 RX ADMIN — Medication 3 MILLIGRAM(S): at 13:39

## 2023-07-18 RX ADMIN — Medication 105 MILLIGRAM(S): at 10:38

## 2023-07-18 RX ADMIN — Medication 1 MILLIGRAM(S): at 10:08

## 2023-07-18 RX ADMIN — DORZOLAMIDE HYDROCHLORIDE 1 DROP(S): 20 SOLUTION/ DROPS OPHTHALMIC at 11:45

## 2023-07-18 RX ADMIN — Medication 2 MILLIGRAM(S): at 17:11

## 2023-07-18 RX ADMIN — LATANOPROST 1 DROP(S): 0.05 SOLUTION/ DROPS OPHTHALMIC; TOPICAL at 22:11

## 2023-07-18 RX ADMIN — ENOXAPARIN SODIUM 40 MILLIGRAM(S): 100 INJECTION SUBCUTANEOUS at 10:08

## 2023-07-18 RX ADMIN — Medication 3 MILLIGRAM(S): at 06:13

## 2023-07-18 RX ADMIN — Medication 1 TABLET(S): at 10:08

## 2023-07-18 RX ADMIN — Medication 1 DROP(S): at 17:09

## 2023-07-18 RX ADMIN — Medication 1 DROP(S): at 06:14

## 2023-07-18 RX ADMIN — Medication 3 MILLIGRAM(S): at 02:19

## 2023-07-18 RX ADMIN — DORZOLAMIDE HYDROCHLORIDE 1 DROP(S): 20 SOLUTION/ DROPS OPHTHALMIC at 22:11

## 2023-07-18 RX ADMIN — DORZOLAMIDE HYDROCHLORIDE 1 DROP(S): 20 SOLUTION/ DROPS OPHTHALMIC at 06:14

## 2023-07-18 NOTE — PROGRESS NOTE ADULT - SUBJECTIVE AND OBJECTIVE BOX
PROGRESS NOTE:   Authored by Alec Bradley MD  Internal Medicine      Patient is a 58y old  Male who presents with a chief complaint of Alcohol withdrawal (17 Jul 2023 07:04)      SUBJECTIVE / OVERNIGHT EVENTS: NAEO, patient seen and examined at bedside    MEDICATIONS  (STANDING):  amLODIPine   Tablet 5 milliGRAM(s) Oral daily  dorzolamide 2% Ophthalmic Solution 1 Drop(s) Both EYES three times a day  enoxaparin Injectable 40 milliGRAM(s) SubCutaneous every 24 hours  folic acid 1 milliGRAM(s) Oral daily  latanoprost 0.005% Ophthalmic Solution 1 Drop(s) Both EYES at bedtime  LORazepam   Injectable 3 milliGRAM(s) IV Push every 4 hours  LORazepam   Injectable   IV Push   LORazepam   Injectable 2 milliGRAM(s) IV Push every 4 hours  multivitamin 1 Tablet(s) Oral daily  thiamine IVPB 500 milliGRAM(s) IV Intermittent daily  timolol 0.5% Solution 1 Drop(s) Both EYES two times a day    MEDICATIONS  (PRN):  acetaminophen     Tablet .. 650 milliGRAM(s) Oral every 8 hours PRN Mild Pain (1 - 3)  LORazepam     Tablet 2 milliGRAM(s) Oral every 2 hours PRN Symptom-triggered 2 point increase in CIWA-Ar  LORazepam   Injectable 2 milliGRAM(s) IV Push every 1 hour PRN Symptom-triggered: each CIWA -Ar score 8 or GREATER  melatonin 3 milliGRAM(s) Oral at bedtime PRN Insomnia  ondansetron Injectable 4 milliGRAM(s) IV Push every 8 hours PRN Nausea and/or Vomiting      CAPILLARY BLOOD GLUCOSE        I&O's Summary      PHYSICAL EXAM:  Vital Signs Last 24 Hrs  T(C): 36.8 (18 Jul 2023 05:34), Max: 36.9 (17 Jul 2023 20:00)  T(F): 98.3 (18 Jul 2023 05:34), Max: 98.4 (17 Jul 2023 20:00)  HR: 63 (18 Jul 2023 05:34) (60 - 73)  BP: 121/68 (18 Jul 2023 05:34) (109/65 - 138/81)  BP(mean): --  RR: 17 (18 Jul 2023 05:34) (17 - 18)  SpO2: 99% (18 Jul 2023 05:34) (98% - 100%)    Parameters below as of 18 Jul 2023 05:34  Patient On (Oxygen Delivery Method): room air      CONSTITUTIONAL: Well-groomed, in no apparent distress  EYES: No conjunctival or scleral injection, non-icteric;   ENMT: No external nasal lesions; MMM  NECK: Trachea midline without palpable neck mass; thyroid not enlarged and non-tender  RESPIRATORY: Breathing comfortably; no dullness to percussion; lungs CTA without wheeze/rhonchi/rales  CARDIOVASCULAR: +S1S2, RRR, no M/G/R; pedal pulses full and symmetric; no lower extremity edema  GASTROINTESTINAL: No palpable masses or tenderness, +BS throughout, no rebound/guarding; no hepatosplenomegaly; no hernia palpated  LYMPHATIC: No cervical LAD or tenderness  SKIN: No rashes or ulcers noted  NEUROLOGIC: CN II-XII intact; sensation intact in LEs b/l to light touch  PSYCHIATRIC: A+O x 3; mood and affect appropriate; appropriate insight and judgment    LABS:                        12.7   3.90  )-----------( 215      ( 17 Jul 2023 06:20 )             38.6     07-17    137  |  102  |  7   ----------------------------<  98  3.5   |  22  |  0.64    Ca    8.8      17 Jul 2023 06:20  Phos  3.3     07-17  Mg     2.00     07-17    TPro  7.0  /  Alb  4.0  /  TBili  0.8  /  DBili  x   /  AST  37  /  ALT  55<H>  /  AlkPhos  63  07-17    PT/INR - ( 16 Jul 2023 18:19 )   PT: 11.3 sec;   INR: 0.97 ratio         PTT - ( 16 Jul 2023 18:19 )  PTT:34.4 sec      Urinalysis Basic - ( 17 Jul 2023 06:20 )    Color: x / Appearance: x / SG: x / pH: x  Gluc: 98 mg/dL / Ketone: x  / Bili: x / Urobili: x   Blood: x / Protein: x / Nitrite: x   Leuk Esterase: x / RBC: x / WBC x   Sq Epi: x / Non Sq Epi: x / Bacteria: x          RADIOLOGY & ADDITIONAL TESTS:  Results Reviewed:   Imaging Personally Reviewed:  Electrocardiogram Personally Reviewed:    COORDINATION OF CARE:  Care Discussed with Consultants/Other Providers [Y/N]:  Prior or Outpatient Records Reviewed [Y/N]:   PROGRESS NOTE:   Authored by Alec Bradley MD  Internal Medicine      Patient is a 58y old  Male who presents with a chief complaint of Alcohol withdrawal (17 Jul 2023 07:04)      SUBJECTIVE / OVERNIGHT EVENTS: NAEO, patient seen and examined at bedside    MEDICATIONS  (STANDING):  amLODIPine   Tablet 5 milliGRAM(s) Oral daily  dorzolamide 2% Ophthalmic Solution 1 Drop(s) Both EYES three times a day  enoxaparin Injectable 40 milliGRAM(s) SubCutaneous every 24 hours  folic acid 1 milliGRAM(s) Oral daily  latanoprost 0.005% Ophthalmic Solution 1 Drop(s) Both EYES at bedtime  LORazepam   Injectable 3 milliGRAM(s) IV Push every 4 hours  LORazepam   Injectable   IV Push   LORazepam   Injectable 2 milliGRAM(s) IV Push every 4 hours  multivitamin 1 Tablet(s) Oral daily  thiamine IVPB 500 milliGRAM(s) IV Intermittent daily  timolol 0.5% Solution 1 Drop(s) Both EYES two times a day    MEDICATIONS  (PRN):  acetaminophen     Tablet .. 650 milliGRAM(s) Oral every 8 hours PRN Mild Pain (1 - 3)  LORazepam     Tablet 2 milliGRAM(s) Oral every 2 hours PRN Symptom-triggered 2 point increase in CIWA-Ar  LORazepam   Injectable 2 milliGRAM(s) IV Push every 1 hour PRN Symptom-triggered: each CIWA -Ar score 8 or GREATER  melatonin 3 milliGRAM(s) Oral at bedtime PRN Insomnia  ondansetron Injectable 4 milliGRAM(s) IV Push every 8 hours PRN Nausea and/or Vomiting      CAPILLARY BLOOD GLUCOSE        I&O's Summary      PHYSICAL EXAM:  Vital Signs Last 24 Hrs  T(C): 36.8 (18 Jul 2023 05:34), Max: 36.9 (17 Jul 2023 20:00)  T(F): 98.3 (18 Jul 2023 05:34), Max: 98.4 (17 Jul 2023 20:00)  HR: 63 (18 Jul 2023 05:34) (60 - 73)  BP: 121/68 (18 Jul 2023 05:34) (109/65 - 138/81)  BP(mean): --  RR: 17 (18 Jul 2023 05:34) (17 - 18)  SpO2: 99% (18 Jul 2023 05:34) (98% - 100%)    Parameters below as of 18 Jul 2023 05:34  Patient On (Oxygen Delivery Method): room air      CONSTITUTIONAL: Well-groomed, in no apparent distress  EYES: No conjunctival or scleral injection, non-icteric;   ENMT: No external nasal lesions; MMM  NECK: Trachea midline without palpable neck mass; thyroid not enlarged and non-tender  RESPIRATORY: Breathing comfortably; no dullness to percussion; lungs CTA without wheeze/rhonchi/rales  CARDIOVASCULAR: +S1S2, RRR, no M/G/R; pedal pulses full and symmetric; no lower extremity edema  GASTROINTESTINAL: No palpable masses , +BS throughout, no rebound/guarding; no hepatosplenomegaly; no hernia palpated; +minimal RUQ TTP  LYMPHATIC: No cervical LAD or tenderness  SKIN: No rashes or ulcers noted  NEUROLOGIC: CN II-XII intact; sensation intact in LEs b/l to light touch; +minimal tremors  PSYCHIATRIC: A+O x 3; mood and affect appropriate; appropriate insight and judgment; +minimal anxiety    LABS:                        12.7   3.90  )-----------( 215      ( 17 Jul 2023 06:20 )             38.6     07-17    137  |  102  |  7   ----------------------------<  98  3.5   |  22  |  0.64    Ca    8.8      17 Jul 2023 06:20  Phos  3.3     07-17  Mg     2.00     07-17    TPro  7.0  /  Alb  4.0  /  TBili  0.8  /  DBili  x   /  AST  37  /  ALT  55<H>  /  AlkPhos  63  07-17    PT/INR - ( 16 Jul 2023 18:19 )   PT: 11.3 sec;   INR: 0.97 ratio         PTT - ( 16 Jul 2023 18:19 )  PTT:34.4 sec      Urinalysis Basic - ( 17 Jul 2023 06:20 )    Color: x / Appearance: x / SG: x / pH: x  Gluc: 98 mg/dL / Ketone: x  / Bili: x / Urobili: x   Blood: x / Protein: x / Nitrite: x   Leuk Esterase: x / RBC: x / WBC x   Sq Epi: x / Non Sq Epi: x / Bacteria: x          RADIOLOGY & ADDITIONAL TESTS:  Results Reviewed:   Imaging Personally Reviewed:  Electrocardiogram Personally Reviewed:    COORDINATION OF CARE:  Care Discussed with Consultants/Other Providers [Y/N]:  Prior or Outpatient Records Reviewed [Y/N]:

## 2023-07-18 NOTE — PROGRESS NOTE ADULT - PROBLEM SELECTOR PLAN 9
Diet: Regular, no swallowing issues at this time    DVT prophylaxis: lovenox subq    Aspiration precautions  Seizure precautions  PT- no skilled PT needs
Diet: Regular, no swallowing issues at this time    DVT prophylaxis: lovenox subq    Aspiration precautions  Seizure precautions  PT- no skilled PT needs

## 2023-07-18 NOTE — PROGRESS NOTE ADULT - PROBLEM SELECTOR PLAN 4
Hypokalemia- level @ 3.4  - repleted with 40meq potassium chloride - Patient demonstrates leukopenia on repeat CBC  - Consistent with previous CBC from previous hospitalization, no concern for SIRS/sepsis at this time  - continue to trend

## 2023-07-18 NOTE — PROGRESS NOTE ADULT - PROBLEM SELECTOR PLAN 7
- restarted home medication: amlodipine 5mg daily Diet: Regular, no swallowing issues at this time    DVT prophylaxis: lovenox subq    Aspiration precautions  Seizure precautions  PT- no skilled PT needs

## 2023-07-18 NOTE — SBIRT NOTE ADULT - NSSBIRTALCPASSREFTXDET_GEN_A_CORE
Services provided via Telephonic SBIRT line, [phone],  [id number] used during interaction. Screening results were reviewed with the patient and patient was provided information about healthy guidelines and potential negative consequences associated with level of risk. Motivation and readiness to reduce or stop use was discussed and goals and activities to make changes were suggested/offered. HC discussed referral to [facility], patient in agreement. Patient does not have a phone or his insurance documentation at this time. Patient will contact [facility], [phone], for assistance with scheduling and appointment once he has required documentation and can either verify his cousin's cell phone number, or get his own phone.

## 2023-07-18 NOTE — PROGRESS NOTE ADULT - PROBLEM SELECTOR PLAN 5
Hypercalcemia @ 8.1 w/ Albumin @3.7  - repleted with calcium carbonate - restarted home medication: amlodipine 5mg daily

## 2023-07-18 NOTE — SBIRT NOTE ADULT - NSSBIRTDRGPOSREINDET_GEN_A_CORE
[id number]. Services provided via Telephonic SBIRT line, [phone]. Positive reinforcement provided given patient currently within healthy guidelines. Education materials reviewed and given to patient.

## 2023-07-18 NOTE — PROGRESS NOTE ADULT - PROBLEM SELECTOR PLAN 6
- Patient demonstrates leukopenia on repeat CBC  - Consistent with previous CBC from previous hospitalization, no concern for SIRS/sepsis at this time  - continue to trend Restarted home eye drops

## 2023-07-19 ENCOUNTER — TRANSCRIPTION ENCOUNTER (OUTPATIENT)
Age: 58
End: 2023-07-19

## 2023-07-19 VITALS
DIASTOLIC BLOOD PRESSURE: 71 MMHG | RESPIRATION RATE: 17 BRPM | OXYGEN SATURATION: 98 % | TEMPERATURE: 98 F | SYSTOLIC BLOOD PRESSURE: 114 MMHG | HEART RATE: 68 BPM

## 2023-07-19 LAB
ANION GAP SERPL CALC-SCNC: 13 MMOL/L — SIGNIFICANT CHANGE UP (ref 7–14)
BUN SERPL-MCNC: 13 MG/DL — SIGNIFICANT CHANGE UP (ref 7–23)
CALCIUM SERPL-MCNC: 9.2 MG/DL — SIGNIFICANT CHANGE UP (ref 8.4–10.5)
CHLORIDE SERPL-SCNC: 101 MMOL/L — SIGNIFICANT CHANGE UP (ref 98–107)
CO2 SERPL-SCNC: 21 MMOL/L — LOW (ref 22–31)
CREAT SERPL-MCNC: 0.66 MG/DL — SIGNIFICANT CHANGE UP (ref 0.5–1.3)
EGFR: 107 ML/MIN/1.73M2 — SIGNIFICANT CHANGE UP
GLUCOSE SERPL-MCNC: 103 MG/DL — HIGH (ref 70–99)
POTASSIUM SERPL-MCNC: 3.5 MMOL/L — SIGNIFICANT CHANGE UP (ref 3.5–5.3)
POTASSIUM SERPL-SCNC: 3.5 MMOL/L — SIGNIFICANT CHANGE UP (ref 3.5–5.3)
SODIUM SERPL-SCNC: 135 MMOL/L — SIGNIFICANT CHANGE UP (ref 135–145)

## 2023-07-19 PROCEDURE — 99239 HOSP IP/OBS DSCHRG MGMT >30: CPT | Mod: GC

## 2023-07-19 RX ORDER — FOLIC ACID 0.8 MG
1 TABLET ORAL
Qty: 0 | Refills: 0 | DISCHARGE
Start: 2023-07-19

## 2023-07-19 RX ADMIN — Medication 2 MILLIGRAM(S): at 13:05

## 2023-07-19 RX ADMIN — DORZOLAMIDE HYDROCHLORIDE 1 DROP(S): 20 SOLUTION/ DROPS OPHTHALMIC at 13:04

## 2023-07-19 RX ADMIN — AMLODIPINE BESYLATE 5 MILLIGRAM(S): 2.5 TABLET ORAL at 05:56

## 2023-07-19 RX ADMIN — Medication 2 MILLIGRAM(S): at 09:27

## 2023-07-19 RX ADMIN — Medication 2 MILLIGRAM(S): at 05:56

## 2023-07-19 RX ADMIN — Medication 1 TABLET(S): at 09:30

## 2023-07-19 RX ADMIN — Medication 2 MILLIGRAM(S): at 02:05

## 2023-07-19 RX ADMIN — Medication 1 DROP(S): at 05:56

## 2023-07-19 RX ADMIN — ENOXAPARIN SODIUM 40 MILLIGRAM(S): 100 INJECTION SUBCUTANEOUS at 13:03

## 2023-07-19 RX ADMIN — DORZOLAMIDE HYDROCHLORIDE 1 DROP(S): 20 SOLUTION/ DROPS OPHTHALMIC at 05:55

## 2023-07-19 RX ADMIN — Medication 1 MILLIGRAM(S): at 09:30

## 2023-07-19 NOTE — DISCHARGE NOTE PROVIDER - NSDCMRMEDTOKEN_GEN_ALL_CORE_FT
amLODIPine 5 mg oral tablet: 1 tab(s) orally once a day  dorzolamide 2% ophthalmic solution: 1 drop(s) to each affected eye 3 times a day  folic acid 1 mg oral tablet: 1 tab(s) orally once a day  latanoprost 0.005% ophthalmic solution: 1 drop(s) to each affected eye once a day (at bedtime)  Multiple Vitamins oral tablet: 1 tab(s) orally once a day  timolol maleate 0.5% ophthalmic solution: 1 drop(s) to each affected eye 2 times a day

## 2023-07-19 NOTE — DISCHARGE NOTE PROVIDER - NSDCCPCAREPLAN_GEN_ALL_CORE_FT
PRINCIPAL DISCHARGE DIAGNOSIS  Diagnosis: Alcohol dependence with withdrawal  Assessment and Plan of Treatment:      PRINCIPAL DISCHARGE DIAGNOSIS  Diagnosis: Alcohol dependence with withdrawal  Assessment and Plan of Treatment: You originally presented with signs and symptoms of alcohol withdrawal. We started you on an gradually decreasing doses of Ativan (a taper) in addition to additional doses if your symptoms reached a certain level of severity. We gradually decreased your dose of Ativan during admission, and your symptoms of withdrawal improved significantly. Continue to take the medication we send you home on and follow up with your primary care provider appointment.  You can improve your life and health by stopping your use of alcohol or drugs. Ending dependency on alcohol or drugs may help you feel and sleep better and get along better with your family, friends, and co-workers. There are medicines and programs that can help.  How can you care for yourself at home?  If you have been given medicine to help keep you sober or reduce your cravings, be sure to take it exactly as prescribed.  Talk to your doctor about programs that can help you stop using drugs or drinking alcohol.  Do not tempt yourself by keeping alcohol or drugs in your home.  Learn how to say no when other people drink or use drugs. Or don't spend time with people who drink or use drugs.  Use the time and money spent on drinking or drugs to do something fun with your family or friends.  Preventing a relapse  Do not drink alcohol or use drugs at all. Using any amount of alcohol or drugs greatly increases your risk for relapse.  Seek help from organizations such as Alcoholics Anonymous, Narcotics Anonymous, [employer], or treatment facilities if you feel the need to drink alcohol or use drugs again.  Remember that recovery is a lifelong process.  Stay away from situations, friends, or places that may lead you to drink or use drugs.  Have a plan to spot and deal with relapse. Learn to recognize changes in your thinking that lead you to drink or use drugs. These are warning signs. Get help before you start to drink or use drugs again.  Get help as soon as you can if you relapse. Some people make a plan with another person that ou     PRINCIPAL DISCHARGE DIAGNOSIS  Diagnosis: Alcohol dependence with withdrawal  Assessment and Plan of Treatment: You originally presented with signs and symptoms of alcohol withdrawal. We started you on an gradually decreasing doses of Ativan (a taper) in addition to additional doses if your symptoms reached a certain level of severity. We gradually decreased your dose of Ativan during admission, and your symptoms of withdrawal improved significantly. Continue to take the medication we send you home on and follow up with your primary care provider appointment.  You can improve your life and health by stopping your use of alcohol or drugs. Ending dependency on alcohol or drugs may help you feel and sleep better and get along better with your family, friends, and co-workers. There are medicines and programs that can help.  How can you care for yourself at home?  If you have been given medicine to help keep you sober or reduce your cravings, be sure to take it exactly as prescribed.  Talk to your doctor about programs that can help you stop using drugs or drinking alcohol.  Do not tempt yourself by keeping alcohol or drugs in your home.  Learn how to say no when other people drink or use drugs. Or don't spend time with people who drink or use drugs.  Use the time and money spent on drinking or drugs to do something fun with your family or friends.  Preventing a relapse  Do not drink alcohol or use drugs at all. Using any amount of alcohol or drugs greatly increases your risk for relapse.  Seek help from organizations such as Alcoholics Anonymous, Narcotics Anonymous, [employer] [facility], or treatment facilities if you feel the need to drink alcohol or use drugs again.  Remember that recovery is a lifelong process.  Stay away from situations, friends, or places that may lead you to drink or use drugs.  Have a plan to spot and deal with relapse. Learn to recognize changes in your thinking that lead you to drink or use drugs. These are warning signs. Get help before you start to drink or use drugs again.       PRINCIPAL DISCHARGE DIAGNOSIS  Diagnosis: Alcohol dependence with withdrawal  Assessment and Plan of Treatment: You originally presented with signs and symptoms of alcohol withdrawal. We started you on an gradually decreasing doses of Ativan (a taper) in addition to additional doses if your symptoms reached a certain level of severity. We gradually decreased your dose of Ativan during admission, and your symptoms of withdrawal improved significantly. Continue to take the medication we send you home on and follow up with your primary care provider appointment.  You can improve your life and health by stopping your use of alcohol or drugs. Ending dependency on alcohol or drugs may help you feel and sleep better and get along better with your family, friends, and co-workers. There are medicines and programs that can help.  If you have been given medicine to help keep you sober or reduce your cravings, be sure to take it exactly as prescribed.  Talk to your doctor about programs that can help you stop using drugs or drinking alcohol.  Do not tempt yourself by keeping alcohol or drugs in your home.  Learn how to say no when other people drink or use drugs. Or don't spend time with people who drink or use drugs.  Use the time and money spent on drinking or drugs to do something fun with your family or friends.  Do not drink alcohol or use drugs at all. Using any amount of alcohol or drugs greatly increases your risk for relapse.  Seek help from organizations such as Alcoholics Anonymous, Narcotics Anonymous, [employer] [facility], or treatment facilities if you feel the need to drink alcohol or use drugs again.  Remember that recovery is a lifelong process.  Stay away from situations, friends, or places that may lead you to drink or use drugs.  Have a plan to spot and deal with relapse. Learn to recognize changes in your thinking that lead you to drink or use drugs. These are warning signs. Get help before you start to drink or use drugs again.  Would also recommend taking an over the counter multivitamin and folate tablets to continue to supplement your nutrition.       PRINCIPAL DISCHARGE DIAGNOSIS  Diagnosis: Alcohol dependence with withdrawal  Assessment and Plan of Treatment: You originally presented with signs and symptoms of alcohol withdrawal. We started you on an gradually decreasing doses of Ativan (a taper) in addition to additional doses if your symptoms reached a certain level of severity. We gradually decreased your dose of Ativan during admission, and your symptoms of withdrawal improved significantly. Continue to take the medication we send you home on and follow up with your primary care provider appointment.  You can improve your life and health by stopping your use of alcohol or drugs. Ending dependency on alcohol or drugs may help you feel and sleep better and get along better with your family, friends, and co-workers. There are medicines and programs that can help.  If you have been given medicine to help keep you sober or reduce your cravings, be sure to take it exactly as prescribed.  Talk to your doctor about programs that can help you stop using drugs or drinking alcohol.  Do not tempt yourself by keeping alcohol or drugs in your home.  Learn how to say no when other people drink or use drugs. Or don't spend time with people who drink or use drugs.  Use the time and money spent on drinking or drugs to do something fun with your family or friends.  Do not drink alcohol or use drugs at all. Using any amount of alcohol or drugs greatly increases your risk for relapse.  Seek help from organizations such as Alcoholics Anonymous, Narcotics Anonymous, [employer] [facility], or treatment facilities if you feel the need to drink alcohol or use drugs again.  Remember that recovery is a lifelong process.  Stay away from situations, friends, or places that may lead you to drink or use drugs.  Have a plan to spot and deal with relapse. Learn to recognize changes in your thinking that lead you to drink or use drugs. These are warning signs. Get help before you start to drink or use drugs again.  Would also recommend taking an over the counter multivitamin and folate tablets to continue to supplement your nutrition.

## 2023-07-19 NOTE — DISCHARGE NOTE NURSING/CASE MANAGEMENT/SOCIAL WORK - NSDCVIVACCINE_GEN_ALL_CORE_FT
influenza, injectable, quadrivalent, preservative free; 14-Oct-2021 13:35; Nany Guerrero (RN); [employer]; [id number] (Exp. Date: 30-Jun-2022); IntraMuscular; Deltoid Right.; 0.5 milliLiter(s); VIS (VIS Published: 06-Aug-2021, VIS Presented: 14-Oct-2021);   Tdap; 22-Dec-2021 19:23; Clementina Koehler (RN); [employer]; [id number] (Exp. Date: 09-Sep-2023); IntraMuscular; Deltoid Left.; 0.5 milliLiter(s); VIS (VIS Published: 09-May-2013, VIS Presented: 22-Dec-2021);   Tdap; 15-Aug-2022 16:08; Marianne Pollock (RN); [employer]; [id number]   (Exp. Date: 18-Apr-2024); IntraMuscular; Deltoid Left.; 0.5 milliLiter(s); VIS (VIS Published: 09-May-2013, VIS Presented: 15-Aug-2022);   Tdap; 16-Nov-2022 15:48; Parish Avila (RN); [employer]; [id number] (Exp. Date: 01-Jun-2024); IntraMuscular; Deltoid Right.; 0.5 milliLiter(s); VIS (VIS Published: 09-May-2013, VIS Presented: 16-Nov-2022);

## 2023-07-19 NOTE — DISCHARGE NOTE PROVIDER - NSDCCPTREATMENT_GEN_ALL_CORE_FT
PRINCIPAL PROCEDURE  Procedure: US abdomen RUQ  Findings and Treatment:   < end of copied text >  < from: US Abdomen Upper Quadrant Right (07.17.23 @ 09:06) >        SECONDARY PROCEDURE  Procedure: XR chest AP  Findings and Treatment:   < end of copied text >  < from: Xray Chest 1 View- PORTABLE-Routine (Xray Chest 1 View- PORTABLE-Routine .) (07.16.23 @ 00:21) >

## 2023-07-19 NOTE — PROGRESS NOTE ADULT - PROBLEM SELECTOR PLAN 6
Diet: Regular, no swallowing issues at this time    DVT prophylaxis: lovenox subq    Aspiration precautions  Seizure precautions  PT- no skilled PT needs

## 2023-07-19 NOTE — PROGRESS NOTE ADULT - PROBLEM SELECTOR PLAN 2
Physical exam +RUQ pain concern for hepatitis (likely alcoholic) or cirrhosis  - LFTs elevated  - AST/ALT slightly elevated  - Hep B panel negative  - f/u RUQ U/S  - Utox only positive for benzos  - Coags normal, no steroids needed at this time

## 2023-07-19 NOTE — DISCHARGE NOTE PROVIDER - CARE PROVIDER_API CALL
Josh De León  NP in Family Health  [address] [place] [address]  Phone: [phone]  Fax: [phone]  Follow Up Time: 1 week

## 2023-07-19 NOTE — PROGRESS NOTE ADULT - ATTENDING COMMENTS
Pt doing much better, ambulating in hallways independently. CIWA has been 1-3 over the last shift. Continue ativan taper today and plan for discharge home with alcohol abuse treatment options and PCP appointment. Time planning discharge 35 minutes.
Pt reports some improvement though still shaky. Requesting additional ativan at this time. Continue high-risk CIWA taper and symptom-triggered benzodiazepines. Continue to ensure adequate hydration and nutrition.
Pt reports improvement in withdrawal on ativan taper. He is still within a high-risk period, and he acknowledges this. Will complete taper over the next 48-72 hours in-house.

## 2023-07-19 NOTE — DISCHARGE NOTE NURSING/CASE MANAGEMENT/SOCIAL WORK - NSDCPEFALRISK_GEN_ALL_CORE
For information on Fall & Injury Prevention, visit: [email or web address] OR  [email or web address] OR  https://www.cdc.gov/steadi/patient.html

## 2023-07-19 NOTE — DISCHARGE NOTE PROVIDER - NSDCFUSCHEDAPPT_GEN_ALL_CORE_FT
[facility]  [facility] OP [id number]  Scheduled Appointment: 07/26/2023

## 2023-07-19 NOTE — PROGRESS NOTE ADULT - SUBJECTIVE AND OBJECTIVE BOX
PROGRESS NOTE:   Authored by Alec Bradley MD  Internal Medicine      Patient is a 58y old  Male who presents with a chief complaint of Alcohol withdrawal (18 Jul 2023 07:15)      SUBJECTIVE / OVERNIGHT EVENTS: NAEO, patient seen and examined at bedside    MEDICATIONS  (STANDING):  amLODIPine   Tablet 5 milliGRAM(s) Oral daily  dorzolamide 2% Ophthalmic Solution 1 Drop(s) Both EYES three times a day  enoxaparin Injectable 40 milliGRAM(s) SubCutaneous every 24 hours  folic acid 1 milliGRAM(s) Oral daily  latanoprost 0.005% Ophthalmic Solution 1 Drop(s) Both EYES at bedtime  LORazepam   Injectable 1 milliGRAM(s) IV Push every 4 hours  LORazepam   Injectable   IV Push   LORazepam   Injectable 2 milliGRAM(s) IV Push every 4 hours  multivitamin 1 Tablet(s) Oral daily  timolol 0.5% Solution 1 Drop(s) Both EYES two times a day    MEDICATIONS  (PRN):  acetaminophen     Tablet .. 650 milliGRAM(s) Oral every 8 hours PRN Mild Pain (1 - 3)  LORazepam     Tablet 2 milliGRAM(s) Oral every 2 hours PRN Symptom-triggered 2 point increase in CIWA-Ar  LORazepam   Injectable 2 milliGRAM(s) IV Push every 1 hour PRN Symptom-triggered: each CIWA -Ar score 8 or GREATER  melatonin 3 milliGRAM(s) Oral at bedtime PRN Insomnia  ondansetron Injectable 4 milliGRAM(s) IV Push every 8 hours PRN Nausea and/or Vomiting      CAPILLARY BLOOD GLUCOSE        I&O's Summary      PHYSICAL EXAM:  Vital Signs Last 24 Hrs  T(C): 36.4 (19 Jul 2023 05:12), Max: 36.9 (18 Jul 2023 12:00)  T(F): 97.5 (19 Jul 2023 05:12), Max: 98.5 (18 Jul 2023 12:00)  HR: 64 (19 Jul 2023 05:12) (64 - 72)  BP: 113/66 (19 Jul 2023 05:12) (106/67 - 126/67)  BP(mean): --  RR: 17 (19 Jul 2023 05:12) (17 - 17)  SpO2: 98% (19 Jul 2023 05:12) (97% - 99%)    Parameters below as of 19 Jul 2023 05:12  Patient On (Oxygen Delivery Method): room air      CONSTITUTIONAL: Well-groomed, in no apparent distress  EYES: No conjunctival or scleral injection, non-icteric;   ENMT: No external nasal lesions; MMM  NECK: Trachea midline without palpable neck mass; thyroid not enlarged and non-tender  RESPIRATORY: Breathing comfortably; no dullness to percussion; lungs CTA without wheeze/rhonchi/rales  CARDIOVASCULAR: +S1S2, RRR, no M/G/R; pedal pulses full and symmetric; no lower extremity edema  GASTROINTESTINAL: No palpable masses or tenderness, +BS throughout, no rebound/guarding; no hepatosplenomegaly; no hernia palpated  LYMPHATIC: No cervical LAD or tenderness  SKIN: No rashes or ulcers noted  NEUROLOGIC: CN II-XII intact; sensation intact in LEs b/l to light touch  PSYCHIATRIC: A+O x 3; mood and affect appropriate; appropriate insight and judgment    LABS:                        13.1   6.57  )-----------( 240      ( 18 Jul 2023 06:48 )             40.7     07-18    135  |  103  |  9   ----------------------------<  101<H>  3.5   |  22  |  0.64    Ca    9.4      18 Jul 2023 06:48  Phos  3.2     07-18  Mg     1.90     07-18    TPro  7.5  /  Alb  3.9  /  TBili  0.7  /  DBili  x   /  AST  46<H>  /  ALT  59<H>  /  AlkPhos  64  07-18          Urinalysis Basic - ( 18 Jul 2023 06:48 )    Color: x / Appearance: x / SG: x / pH: x  Gluc: 101 mg/dL / Ketone: x  / Bili: x / Urobili: x   Blood: x / Protein: x / Nitrite: x   Leuk Esterase: x / RBC: x / WBC x   Sq Epi: x / Non Sq Epi: x / Bacteria: x          RADIOLOGY & ADDITIONAL TESTS:  Results Reviewed:   Imaging Personally Reviewed:  Electrocardiogram Personally Reviewed:    COORDINATION OF CARE:  Care Discussed with Consultants/Other Providers [Y/N]:  Prior or Outpatient Records Reviewed [Y/N]:   PROGRESS NOTE:   Authored by Alec Bradley MD  Internal Medicine      Patient is a 58y old  Male who presents with a chief complaint of Alcohol withdrawal (18 Jul 2023 07:15)      SUBJECTIVE / OVERNIGHT EVENTS: NAEO, patient seen and examined at bedside    MEDICATIONS  (STANDING):  amLODIPine   Tablet 5 milliGRAM(s) Oral daily  dorzolamide 2% Ophthalmic Solution 1 Drop(s) Both EYES three times a day  enoxaparin Injectable 40 milliGRAM(s) SubCutaneous every 24 hours  folic acid 1 milliGRAM(s) Oral daily  latanoprost 0.005% Ophthalmic Solution 1 Drop(s) Both EYES at bedtime  LORazepam   Injectable 1 milliGRAM(s) IV Push every 4 hours  LORazepam   Injectable   IV Push   LORazepam   Injectable 2 milliGRAM(s) IV Push every 4 hours  multivitamin 1 Tablet(s) Oral daily  timolol 0.5% Solution 1 Drop(s) Both EYES two times a day    MEDICATIONS  (PRN):  acetaminophen     Tablet .. 650 milliGRAM(s) Oral every 8 hours PRN Mild Pain (1 - 3)  LORazepam     Tablet 2 milliGRAM(s) Oral every 2 hours PRN Symptom-triggered 2 point increase in CIWA-Ar  LORazepam   Injectable 2 milliGRAM(s) IV Push every 1 hour PRN Symptom-triggered: each CIWA -Ar score 8 or GREATER  melatonin 3 milliGRAM(s) Oral at bedtime PRN Insomnia  ondansetron Injectable 4 milliGRAM(s) IV Push every 8 hours PRN Nausea and/or Vomiting      CAPILLARY BLOOD GLUCOSE        I&O's Summary      PHYSICAL EXAM:  Vital Signs Last 24 Hrs  T(C): 36.4 (19 Jul 2023 05:12), Max: 36.9 (18 Jul 2023 12:00)  T(F): 97.5 (19 Jul 2023 05:12), Max: 98.5 (18 Jul 2023 12:00)  HR: 64 (19 Jul 2023 05:12) (64 - 72)  BP: 113/66 (19 Jul 2023 05:12) (106/67 - 126/67)  BP(mean): --  RR: 17 (19 Jul 2023 05:12) (17 - 17)  SpO2: 98% (19 Jul 2023 05:12) (97% - 99%)    Parameters below as of 19 Jul 2023 05:12  Patient On (Oxygen Delivery Method): room air      CONSTITUTIONAL: Well-groomed, in no apparent distress  EYES: No conjunctival or scleral injection, non-icteric;   ENMT: No external nasal lesions; MMM  NECK: Trachea midline without palpable neck mass; thyroid not enlarged and non-tender  RESPIRATORY: Breathing comfortably; no dullness to percussion; lungs CTA without wheeze/rhonchi/rales  CARDIOVASCULAR: +S1S2, RRR, no M/G/R; pedal pulses full and symmetric; no lower extremity edema  GASTROINTESTINAL: No palpable masses or tenderness, +BS throughout, no rebound/guarding; no hepatosplenomegaly; no hernia palpated  LYMPHATIC: No cervical LAD or tenderness  SKIN: No rashes or ulcers noted  NEUROLOGIC: CN II-XII intact; sensation intact in LEs b/l to light touch; minimal tremors of outstretched upper extremities  PSYCHIATRIC: A+O x 3; mood and affect appropriate; appropriate insight and judgment; minimal anxiety    LABS:                        13.1   6.57  )-----------( 240      ( 18 Jul 2023 06:48 )             40.7     07-18    135  |  103  |  9   ----------------------------<  101<H>  3.5   |  22  |  0.64    Ca    9.4      18 Jul 2023 06:48  Phos  3.2     07-18  Mg     1.90     07-18    TPro  7.5  /  Alb  3.9  /  TBili  0.7  /  DBili  x   /  AST  46<H>  /  ALT  59<H>  /  AlkPhos  64  07-18          Urinalysis Basic - ( 18 Jul 2023 06:48 )    Color: x / Appearance: x / SG: x / pH: x  Gluc: 101 mg/dL / Ketone: x  / Bili: x / Urobili: x   Blood: x / Protein: x / Nitrite: x   Leuk Esterase: x / RBC: x / WBC x   Sq Epi: x / Non Sq Epi: x / Bacteria: x          RADIOLOGY & ADDITIONAL TESTS:  Results Reviewed:   Imaging Personally Reviewed:  Electrocardiogram Personally Reviewed:    COORDINATION OF CARE:  Care Discussed with Consultants/Other Providers [Y/N]:  Prior or Outpatient Records Reviewed [Y/N]:

## 2023-07-19 NOTE — PROGRESS NOTE ADULT - PROBLEM SELECTOR PLAN 3
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

## 2023-07-19 NOTE — DISCHARGE NOTE PROVIDER - HOSPITAL COURSE
Patient was initially admitted to the hospital with chest pain and signs of alcohol withdrawal. Potential MI was ruled out wit ha normal troponin level and resolution of the chest pain, however patient continued to exhibit signs and symptoms of alcohol withdrawal upon admission. Patient initially had CIWA scores above 15, with MICU consulted promptly afterwards. MICU rejected the patient, and the patient continued on an Ativan taper with breakthrough Ativan for CIWA scores above 8. Patient's CIWA scores fell below 8 within 1 day of admission and continued to remain below 8 for the remainder of the taper during admission. Symptoms, such as tremor, anxiety, and sweats have continued to resolve with the taper, and the patient is currently coordinated with an outpatient follow up at the [facility].

## 2023-07-19 NOTE — PROGRESS NOTE ADULT - PROBLEM SELECTOR PLAN 1
Patient presenting with signs and symptoms of withdrawal; Last drink 7/15 3am  +blood alcohol previously  - On CIWA ativan taper, breakthrough 2mg ativan for CIWA>8 or greater than 2 point jump in CIWA; taper starting at 4mg Ativan  - Patient CIWA around 5 overnight
Patient presenting with signs and symptoms of withdrawal; Last drink 7/15 3am  +blood alcohol previously  - On CIWA ativan taper, breakthrough 2mg ativan for CIWA>8 or greater than 2 point jump in CIWA; taper starting at 4mg Ativan  - Patient CIWA around 1-3 overnight
Patient presenting with signs and symptoms of withdrawal; Last drink 7/15 3am  +blood alcohol previously  - On CIWA ativan taper, breakthrough 2mg ativan for CIWA>8 or greater than 2 point jump in CIWA; taper starting at 4mg Ativan  - Patient CIWA around 4-6 overnight

## 2023-07-22 ENCOUNTER — INPATIENT (INPATIENT)
Facility: HOSPITAL | Age: 58
LOS: 3 days | Discharge: HOME | End: 2023-07-26
Attending: STUDENT IN AN ORGANIZED HEALTH CARE EDUCATION/TRAINING PROGRAM | Admitting: STUDENT IN AN ORGANIZED HEALTH CARE EDUCATION/TRAINING PROGRAM
Payer: MEDICAID

## 2023-07-22 VITALS
HEIGHT: 68 IN | RESPIRATION RATE: 18 BRPM | HEART RATE: 92 BPM | SYSTOLIC BLOOD PRESSURE: 125 MMHG | OXYGEN SATURATION: 98 % | TEMPERATURE: 98 F | WEIGHT: 89.95 LBS | DIASTOLIC BLOOD PRESSURE: 77 MMHG

## 2023-07-22 LAB
ALBUMIN SERPL ELPH-MCNC: 3.6 G/DL — SIGNIFICANT CHANGE UP (ref 3.3–5)
ALP SERPL-CCNC: 78 U/L — SIGNIFICANT CHANGE UP (ref 40–120)
ALT FLD-CCNC: 90 U/L — HIGH (ref 12–78)
ANION GAP SERPL CALC-SCNC: 4 MMOL/L — LOW (ref 5–17)
AST SERPL-CCNC: 48 U/L — HIGH (ref 15–37)
BASOPHILS # BLD AUTO: 0.14 K/UL — SIGNIFICANT CHANGE UP (ref 0–0.2)
BASOPHILS NFR BLD AUTO: 1.8 % — SIGNIFICANT CHANGE UP (ref 0–2)
BILIRUB SERPL-MCNC: 0.3 MG/DL — SIGNIFICANT CHANGE UP (ref 0.2–1.2)
BUN SERPL-MCNC: 7 MG/DL — SIGNIFICANT CHANGE UP (ref 7–23)
CALCIUM SERPL-MCNC: 8 MG/DL — LOW (ref 8.5–10.1)
CHLORIDE SERPL-SCNC: 115 MMOL/L — HIGH (ref 96–108)
CO2 SERPL-SCNC: 26 MMOL/L — SIGNIFICANT CHANGE UP (ref 22–31)
CREAT SERPL-MCNC: 0.66 MG/DL — SIGNIFICANT CHANGE UP (ref 0.5–1.3)
EGFR: 107 ML/MIN/1.73M2 — SIGNIFICANT CHANGE UP
EOSINOPHIL # BLD AUTO: 0.12 K/UL — SIGNIFICANT CHANGE UP (ref 0–0.5)
EOSINOPHIL NFR BLD AUTO: 1.6 % — SIGNIFICANT CHANGE UP (ref 0–6)
ETHANOL SERPL-MCNC: 384 MG/DL — HIGH (ref 0–10)
GLUCOSE SERPL-MCNC: 118 MG/DL — HIGH (ref 70–99)
HCT VFR BLD CALC: 37.9 % — LOW (ref 39–50)
HGB BLD-MCNC: 12.4 G/DL — LOW (ref 13–17)
IMM GRANULOCYTES NFR BLD AUTO: 0.3 % — SIGNIFICANT CHANGE UP (ref 0–0.9)
LYMPHOCYTES # BLD AUTO: 2.38 K/UL — SIGNIFICANT CHANGE UP (ref 1–3.3)
LYMPHOCYTES # BLD AUTO: 31.4 % — SIGNIFICANT CHANGE UP (ref 13–44)
MAGNESIUM SERPL-MCNC: 2.1 MG/DL — SIGNIFICANT CHANGE UP (ref 1.6–2.6)
MCHC RBC-ENTMCNC: 29.5 PG — SIGNIFICANT CHANGE UP (ref 27–34)
MCHC RBC-ENTMCNC: 32.7 G/DL — SIGNIFICANT CHANGE UP (ref 32–36)
MCV RBC AUTO: 90 FL — SIGNIFICANT CHANGE UP (ref 80–100)
MONOCYTES # BLD AUTO: 0.66 K/UL — SIGNIFICANT CHANGE UP (ref 0–0.9)
MONOCYTES NFR BLD AUTO: 8.7 % — SIGNIFICANT CHANGE UP (ref 2–14)
NEUTROPHILS # BLD AUTO: 4.25 K/UL — SIGNIFICANT CHANGE UP (ref 1.8–7.4)
NEUTROPHILS NFR BLD AUTO: 56.2 % — SIGNIFICANT CHANGE UP (ref 43–77)
NRBC # BLD: 0 /100 WBCS — SIGNIFICANT CHANGE UP (ref 0–0)
PLATELET # BLD AUTO: 346 K/UL — SIGNIFICANT CHANGE UP (ref 150–400)
POTASSIUM SERPL-MCNC: 3.5 MMOL/L — SIGNIFICANT CHANGE UP (ref 3.5–5.3)
POTASSIUM SERPL-SCNC: 3.5 MMOL/L — SIGNIFICANT CHANGE UP (ref 3.5–5.3)
PROT SERPL-MCNC: 8.4 GM/DL — HIGH (ref 6–8.3)
RBC # BLD: 4.21 M/UL — SIGNIFICANT CHANGE UP (ref 4.2–5.8)
RBC # FLD: 16.1 % — HIGH (ref 10.3–14.5)
SODIUM SERPL-SCNC: 145 MMOL/L — SIGNIFICANT CHANGE UP (ref 135–145)
WBC # BLD: 7.57 K/UL — SIGNIFICANT CHANGE UP (ref 3.8–10.5)
WBC # FLD AUTO: 7.57 K/UL — SIGNIFICANT CHANGE UP (ref 3.8–10.5)

## 2023-07-22 PROCEDURE — 70450 CT HEAD/BRAIN W/O DYE: CPT | Mod: 26,MA

## 2023-07-22 PROCEDURE — 99285 EMERGENCY DEPT VISIT HI MDM: CPT

## 2023-07-22 PROCEDURE — 72125 CT NECK SPINE W/O DYE: CPT | Mod: 26,MA

## 2023-07-22 RX ORDER — CALCIUM GLUCONATE 100 MG/ML
1 VIAL (ML) INTRAVENOUS ONCE
Refills: 0 | Status: COMPLETED | OUTPATIENT
Start: 2023-07-22 | End: 2023-07-22

## 2023-07-22 RX ORDER — SODIUM CHLORIDE 9 MG/ML
1000 INJECTION INTRAMUSCULAR; INTRAVENOUS; SUBCUTANEOUS ONCE
Refills: 0 | Status: COMPLETED | OUTPATIENT
Start: 2023-07-22 | End: 2023-07-22

## 2023-07-22 RX ORDER — ENOXAPARIN SODIUM 100 MG/ML
40 INJECTION SUBCUTANEOUS EVERY 24 HOURS
Refills: 0 | Status: DISCONTINUED | OUTPATIENT
Start: 2023-07-22 | End: 2023-07-26

## 2023-07-22 RX ADMIN — Medication 50 MILLIGRAM(S): at 18:35

## 2023-07-22 RX ADMIN — Medication 2 MILLIGRAM(S): at 23:29

## 2023-07-22 RX ADMIN — Medication 100 GRAM(S): at 21:17

## 2023-07-22 RX ADMIN — SODIUM CHLORIDE 1000 MILLILITER(S): 9 INJECTION INTRAMUSCULAR; INTRAVENOUS; SUBCUTANEOUS at 13:35

## 2023-07-22 NOTE — ED PROVIDER NOTE - OBJECTIVE STATEMENT
58 years old male by ems sts pt was found in the street and alcohol intoxicated. Pt admits to drink vodka all day. Pt also c/o headache sts he fell at 4:00 am this morning. Pt denies harmful thoughts to himself or others, visual/auditory hallucinations, dizziness, loc, blurred visions, light sensitivities, focal/distal weakness or numbness., neck/back/hips/calfs pain, cough, difficulty to walk or keeping balance, sob, chest pain, nausea, vomiting, fever, chills, abd pain, dysuria, hematuria or irregular bowel movements.

## 2023-07-22 NOTE — ED ADULT NURSE REASSESSMENT NOTE - NS ED NURSE REASSESS COMMENT FT1
Covering primary MARTÍNEZ Flores. Report endorsed to MARTÍNEZ Beckham. Safety checks completed this shift. Safety rounds completed hourly.  IV sites checked Q2+remains WDL. Medications administered as ordered with no signs/symptoms of adverse reactions. Fall & skin precautions in place. Any issues endorsed to MARTÍNEZ Beckham for follow up.

## 2023-07-22 NOTE — ED PROVIDER NOTE - CARE PLAN
1 Principal Discharge DX:	Alcohol intoxication   Principal Discharge DX:	Alcohol intoxication  Secondary Diagnosis:	Alcohol dependence with withdrawal

## 2023-07-22 NOTE — ED ADULT TRIAGE NOTE - CHIEF COMPLAINT QUOTE
as per EMT pt found on the street c/o alcohol intoxication. pt states he was drinking Vodka all day.. no injury noted. history htn. fs 188

## 2023-07-22 NOTE — ED ADULT NURSE REASSESSMENT NOTE - NS ED NURSE REASSESS COMMENT FT1
Pt received from MARTÍNEZ Lehman. Pt has minor trembling in the both hands when touched. Pt able to ambulate with steady gait when assisted by RN to the bathroom. Pt returned to bed and resting comfortably.

## 2023-07-22 NOTE — ED PROVIDER NOTE - CLINICAL SUMMARY MEDICAL DECISION MAKING FREE TEXT BOX
hx, exam labs, ct head/cervical spines. ciwa of 4 Librium 50 mg po is ordered. currently pt is unable to ambulate with normal gaits pt's care is signed out to the next team

## 2023-07-22 NOTE — ED PROVIDER NOTE - CONSTITUTIONAL, MLM
Well appearing, awake, alert, oriented to person, place, time/situation and in no apparent distress. Speaking in clear full sentences no nasal flaring no shoulders retractions no diaphoresis, calm cooperative normal...

## 2023-07-23 DIAGNOSIS — R73.9 HYPERGLYCEMIA, UNSPECIFIED: ICD-10-CM

## 2023-07-23 DIAGNOSIS — D64.9 ANEMIA, UNSPECIFIED: ICD-10-CM

## 2023-07-23 DIAGNOSIS — F10.239 ALCOHOL DEPENDENCE WITH WITHDRAWAL, UNSPECIFIED: ICD-10-CM

## 2023-07-23 DIAGNOSIS — H40.9 UNSPECIFIED GLAUCOMA: ICD-10-CM

## 2023-07-23 DIAGNOSIS — I10 ESSENTIAL (PRIMARY) HYPERTENSION: ICD-10-CM

## 2023-07-23 LAB
A1C WITH ESTIMATED AVERAGE GLUCOSE RESULT: 5.3 % — SIGNIFICANT CHANGE UP (ref 4–5.6)
ALBUMIN SERPL ELPH-MCNC: 3 G/DL — LOW (ref 3.3–5)
ALP SERPL-CCNC: 75 U/L — SIGNIFICANT CHANGE UP (ref 40–120)
ALT FLD-CCNC: 68 U/L — SIGNIFICANT CHANGE UP (ref 12–78)
AMMONIA BLD-MCNC: 21 UMOL/L — SIGNIFICANT CHANGE UP (ref 11–32)
ANION GAP SERPL CALC-SCNC: 7 MMOL/L — SIGNIFICANT CHANGE UP (ref 5–17)
AST SERPL-CCNC: 33 U/L — SIGNIFICANT CHANGE UP (ref 15–37)
BASOPHILS # BLD AUTO: 0.09 K/UL — SIGNIFICANT CHANGE UP (ref 0–0.2)
BASOPHILS NFR BLD AUTO: 1.8 % — SIGNIFICANT CHANGE UP (ref 0–2)
BILIRUB SERPL-MCNC: 0.6 MG/DL — SIGNIFICANT CHANGE UP (ref 0.2–1.2)
BUN SERPL-MCNC: 6 MG/DL — LOW (ref 7–23)
CALCIUM SERPL-MCNC: 7.7 MG/DL — LOW (ref 8.5–10.1)
CHLORIDE SERPL-SCNC: 107 MMOL/L — SIGNIFICANT CHANGE UP (ref 96–108)
CO2 SERPL-SCNC: 27 MMOL/L — SIGNIFICANT CHANGE UP (ref 22–31)
CREAT SERPL-MCNC: 0.59 MG/DL — SIGNIFICANT CHANGE UP (ref 0.5–1.3)
EGFR: 111 ML/MIN/1.73M2 — SIGNIFICANT CHANGE UP
EOSINOPHIL # BLD AUTO: 0.21 K/UL — SIGNIFICANT CHANGE UP (ref 0–0.5)
EOSINOPHIL NFR BLD AUTO: 4.3 % — SIGNIFICANT CHANGE UP (ref 0–6)
ERYTHROCYTE [SEDIMENTATION RATE] IN BLOOD: 20 MM/HR — SIGNIFICANT CHANGE UP (ref 0–20)
ESTIMATED AVERAGE GLUCOSE: 105 MG/DL — SIGNIFICANT CHANGE UP (ref 68–114)
FERRITIN SERPL-MCNC: 98 NG/ML — SIGNIFICANT CHANGE UP (ref 30–400)
FOLATE SERPL-MCNC: 16.9 NG/ML — SIGNIFICANT CHANGE UP
GLUCOSE SERPL-MCNC: 81 MG/DL — SIGNIFICANT CHANGE UP (ref 70–99)
HCT VFR BLD CALC: 35.2 % — LOW (ref 39–50)
HGB BLD-MCNC: 11.9 G/DL — LOW (ref 13–17)
IMM GRANULOCYTES NFR BLD AUTO: 0.2 % — SIGNIFICANT CHANGE UP (ref 0–0.9)
IRON SATN MFR SERPL: 25 % — SIGNIFICANT CHANGE UP (ref 16–55)
IRON SATN MFR SERPL: 80 UG/DL — SIGNIFICANT CHANGE UP (ref 45–165)
LYMPHOCYTES # BLD AUTO: 1.35 K/UL — SIGNIFICANT CHANGE UP (ref 1–3.3)
LYMPHOCYTES # BLD AUTO: 27.6 % — SIGNIFICANT CHANGE UP (ref 13–44)
MAGNESIUM SERPL-MCNC: 1.6 MG/DL — SIGNIFICANT CHANGE UP (ref 1.6–2.6)
MCHC RBC-ENTMCNC: 30 PG — SIGNIFICANT CHANGE UP (ref 27–34)
MCHC RBC-ENTMCNC: 33.8 G/DL — SIGNIFICANT CHANGE UP (ref 32–36)
MCV RBC AUTO: 88.7 FL — SIGNIFICANT CHANGE UP (ref 80–100)
MONOCYTES # BLD AUTO: 0.6 K/UL — SIGNIFICANT CHANGE UP (ref 0–0.9)
MONOCYTES NFR BLD AUTO: 12.3 % — SIGNIFICANT CHANGE UP (ref 2–14)
NEUTROPHILS # BLD AUTO: 2.63 K/UL — SIGNIFICANT CHANGE UP (ref 1.8–7.4)
NEUTROPHILS NFR BLD AUTO: 53.8 % — SIGNIFICANT CHANGE UP (ref 43–77)
NRBC # BLD: 0 /100 WBCS — SIGNIFICANT CHANGE UP (ref 0–0)
PHOSPHATE SERPL-MCNC: 1.8 MG/DL — LOW (ref 2.5–4.5)
PLATELET # BLD AUTO: 304 K/UL — SIGNIFICANT CHANGE UP (ref 150–400)
POTASSIUM SERPL-MCNC: 3.2 MMOL/L — LOW (ref 3.5–5.3)
POTASSIUM SERPL-SCNC: 3.2 MMOL/L — LOW (ref 3.5–5.3)
PROT SERPL-MCNC: 7.1 GM/DL — SIGNIFICANT CHANGE UP (ref 6–8.3)
RBC # BLD: 3.97 M/UL — LOW (ref 4.2–5.8)
RBC # FLD: 15.6 % — HIGH (ref 10.3–14.5)
SODIUM SERPL-SCNC: 141 MMOL/L — SIGNIFICANT CHANGE UP (ref 135–145)
TIBC SERPL-MCNC: 317 UG/DL — SIGNIFICANT CHANGE UP (ref 220–430)
TSH SERPL-MCNC: 0.49 UU/ML — SIGNIFICANT CHANGE UP (ref 0.36–3.74)
UIBC SERPL-MCNC: 237 UG/DL — SIGNIFICANT CHANGE UP (ref 110–370)
VIT B12 SERPL-MCNC: 408 PG/ML — SIGNIFICANT CHANGE UP (ref 232–1245)
WBC # BLD: 4.89 K/UL — SIGNIFICANT CHANGE UP (ref 3.8–10.5)
WBC # FLD AUTO: 4.89 K/UL — SIGNIFICANT CHANGE UP (ref 3.8–10.5)

## 2023-07-23 PROCEDURE — 99223 1ST HOSP IP/OBS HIGH 75: CPT

## 2023-07-23 PROCEDURE — 99233 SBSQ HOSP IP/OBS HIGH 50: CPT

## 2023-07-23 RX ORDER — TIMOLOL 0.5 %
1 DROPS OPHTHALMIC (EYE)
Refills: 0 | Status: DISCONTINUED | OUTPATIENT
Start: 2023-07-23 | End: 2023-07-26

## 2023-07-23 RX ORDER — LATANOPROST 0.05 MG/ML
1 SOLUTION/ DROPS OPHTHALMIC; TOPICAL AT BEDTIME
Refills: 0 | Status: DISCONTINUED | OUTPATIENT
Start: 2023-07-23 | End: 2023-07-26

## 2023-07-23 RX ORDER — AMLODIPINE BESYLATE 2.5 MG/1
5 TABLET ORAL DAILY
Refills: 0 | Status: DISCONTINUED | OUTPATIENT
Start: 2023-07-23 | End: 2023-07-26

## 2023-07-23 RX ORDER — IBUPROFEN 200 MG
200 TABLET ORAL ONCE
Refills: 0 | Status: COMPLETED | OUTPATIENT
Start: 2023-07-23 | End: 2023-07-23

## 2023-07-23 RX ORDER — DORZOLAMIDE HYDROCHLORIDE 20 MG/ML
1 SOLUTION/ DROPS OPHTHALMIC THREE TIMES A DAY
Refills: 0 | Status: DISCONTINUED | OUTPATIENT
Start: 2023-07-23 | End: 2023-07-26

## 2023-07-23 RX ORDER — FOLIC ACID 0.8 MG
1 TABLET ORAL DAILY
Refills: 0 | Status: DISCONTINUED | OUTPATIENT
Start: 2023-07-23 | End: 2023-07-26

## 2023-07-23 RX ADMIN — Medication 50 MILLIGRAM(S): at 00:25

## 2023-07-23 RX ADMIN — Medication 200 MILLIGRAM(S): at 01:02

## 2023-07-23 RX ADMIN — Medication 50 MILLIGRAM(S): at 11:33

## 2023-07-23 RX ADMIN — Medication 50 MILLIGRAM(S): at 05:09

## 2023-07-23 RX ADMIN — Medication 200 MILLIGRAM(S): at 00:44

## 2023-07-23 RX ADMIN — ENOXAPARIN SODIUM 40 MILLIGRAM(S): 100 INJECTION SUBCUTANEOUS at 00:25

## 2023-07-23 RX ADMIN — Medication 1 MILLIGRAM(S): at 11:33

## 2023-07-23 RX ADMIN — Medication 50 MILLIGRAM(S): at 17:00

## 2023-07-23 RX ADMIN — Medication 1 TABLET(S): at 11:33

## 2023-07-23 RX ADMIN — Medication 2 MILLIGRAM(S): at 17:22

## 2023-07-23 RX ADMIN — Medication 1 DROP(S): at 17:23

## 2023-07-23 RX ADMIN — AMLODIPINE BESYLATE 5 MILLIGRAM(S): 2.5 TABLET ORAL at 10:37

## 2023-07-23 RX ADMIN — Medication 2 MILLIGRAM(S): at 10:37

## 2023-07-23 RX ADMIN — Medication 2 MILLIGRAM(S): at 21:09

## 2023-07-23 RX ADMIN — Medication 2 MILLIGRAM(S): at 08:56

## 2023-07-23 RX ADMIN — DORZOLAMIDE HYDROCHLORIDE 1 DROP(S): 20 SOLUTION/ DROPS OPHTHALMIC at 21:08

## 2023-07-23 RX ADMIN — DORZOLAMIDE HYDROCHLORIDE 1 DROP(S): 20 SOLUTION/ DROPS OPHTHALMIC at 11:37

## 2023-07-23 NOTE — H&P ADULT - NSHPPHYSICALEXAM_GEN_ALL_CORE
General:  Alert and oriented. No apparent distress.  Peripheral Pulse Assessment:  Good pulses equal in all extremities.    HENT:  Normocephalic. Atraumatic. Sclera anicteric.  Neck:  Supple, Non-tender.    Respiratory:  Lungs are clear to auscultation, Respirations are non-labored, Breath sounds are equal.    Cardiovascular:  Normal rate, Regular rhythm, No murmur, No gallop.    Gastrointestinal:  Soft, Non-tender, Non-distended, Normal bowel sounds.    Musculoskeletal:  Normal range of motion, Normal strength, No tenderness, No swelling.    Integumentary:  Warm, Dry.    Neurologic:  Alert, Oriented, Normal sensory, Normal motor function, No focal deficits.    Cognition and Speech:  Oriented, Speech clear and coherent.

## 2023-07-23 NOTE — H&P ADULT - NSHPLABSRESULTS_GEN_ALL_CORE
Magnesium (07.22.23 @ 13:15)   Magnesium: 2.1 mg/dL  Comprehensive Metabolic Panel (07.22.23 @ 13:15)   Sodium: 145 mmol/L  Potassium: 3.5 mmol/L  Chloride: 115 mmol/L  Carbon Dioxide: 26 mmol/L  Anion Gap: 4 mmol/L  Blood Urea Nitrogen: 7 mg/dL  Creatinine: 0.66 mg/dL  Glucose: 118 mg/dL  Calcium: 8.0 mg/dL  Protein Total: 8.4 gm/dL  Albumin: 3.6 g/dL  Bilirubin Total: 0.3 mg/dL  Alkaline Phosphatase: 78 U/L  Aspartate Aminotransferase (AST/SGOT): 48 U/L  Alanine Aminotransferase (ALT/SGPT): 90 U/L  eGFR: 107: The estimated glomerular filtration rate (eGFR) is calculated using the   2021 CKD-EPI creatinine equation, which does not have a coefficient for   race and is validated in individuals 18 years of age and older (N Engl J   Med 2021; 385:7658-7341). Creatinine-based eGFR may be inaccurate in   various situations including but not limited to extremes of muscle mass,   altered dietary protein intake, or medications that affect renal tubular   creatinine secretion. mL/min/1.73m2  Complete Blood Count + Automated Diff (07.22.23 @ 13:15)   WBC Count: 7.57 K/uL  RBC Count: 4.21 M/uL  Hemoglobin: 12.4 g/dL  Hematocrit: 37.9 %  Mean Cell Volume: 90.0 fl  Mean Cell Hemoglobin: 29.5 pg  Mean Cell Hemoglobin Conc: 32.7 g/dL  Red Cell Distrib Width: 16.1 %  Platelet Count - Automated: 346 K/uL  Auto Neutrophil #: 4.25 K/uL  Auto Lymphocyte #: 2.38 K/uL  Auto Monocyte #: 0.66 K/uL  Auto Eosinophil #: 0.12 K/uL  Auto Basophil #: 0.14 K/uL  Auto Neutrophil %: 56.2: Differential percentages must be correlated with absolute numbers for   clinical significance. %  Auto Lymphocyte %: 31.4 %  Auto Monocyte %: 8.7 %  Auto Eosinophil %: 1.6 %  Auto Basophil %: 1.8 %  Auto Immature Granulocyte %: 0.3: (Includes meta, myelo and promyelocytes). Mild elevations in immature   granulocytes may be seen with many inflammatory processes and pregnancy;   clinical correlation suggested. %  Nucleated RBC: 0 /100 WBCs  POCT Blood Glucose (07.22.23 @ 12:03)   POCT Blood Glucose.: 188 mg/dL

## 2023-07-23 NOTE — PATIENT PROFILE ADULT - FALL HARM RISK - HARM RISK INTERVENTIONS
Assistance with ambulation/Assistance OOB with selected safe patient handling equipment/Communicate Risk of Fall with Harm to all staff/Discuss with provider need for PT consult/Monitor for mental status changes/Monitor gait and stability/Reinforce activity limits and safety measures with patient and family/Tailored Fall Risk Interventions/Toileting schedule using arm’s reach rule for commode and bathroom/Use of alarms - bed, chair and/or voice tab/Visual Cue: Yellow wristband and red socks/Bed in lowest position, wheels locked, appropriate side rails in place/Call bell, personal items and telephone in reach/Instruct patient to call for assistance before getting out of bed or chair/Non-slip footwear when patient is out of bed/[place] to call system/Physically safe environment - no spills, clutter or unnecessary equipment/Purposeful Proactive Rounding/Room/bathroom lighting operational, light cord in reach

## 2023-07-23 NOTE — H&P ADULT - NSHPREVIEWOFSYSTEMS_GEN_ALL_CORE
Patient denies dysarthria, vision changes, hemiparesis, or sensory disturbances.     Constitutional:  Negative except as documented in history of present illness.    Eye:  Negative except as documented in history of present illness.    Ear/Nose/Mouth/Throat:  Negative except as documented in history of present illness.    Respiratory:  Negative except as documented in history of present illness.    Cardiovascular:  Negative except as documented in history of present illness.    Gastrointestinal:  Negative except as documented in history of present illness.    Genitourinary:  Negative except as documented in history of present illness.    Hematology/Lymphatics:  Negative except as documented in history of present illness.    Endocrine:  Negative except as documented in history of present illness.    Immunologic:  Negative except as documented in history of present illness.    Musculoskeletal:  Negative except as documented in history of present illness.    Integumentary:  Negative except as documented in history of present illness.    Neurologic:  Negative except as documented in history of present illness.    Psychiatric:  Negative except as documented in history of present illness.

## 2023-07-23 NOTE — H&P ADULT - ASSESSMENT
#Alcohol withdrawal  -Admit to medicine  -Telemetry monitoring  -Librium taper  -Monitor for worsening  -Consider ICU consult if worsens    #HTN  -Continue amlodipine    #Glaucoma  -Continue outpatient medications    #Hyperglycemia on BMP  –Follow-up hemoglobin A1c     #Anemia  -Follow up ferritin, B12, folate    #DVT PPx- Lovenox

## 2023-07-23 NOTE — H&P ADULT - HISTORY OF PRESENT ILLNESS
This is a 58 year old male patient with a PMH of _ / who presents to [facility] ER with complaints of _  brought by EMS after being found intoxicated on the street. The patient admits to drinking vodka throughout the day. He also complains of a headache, stating that he fell at 4:00 am this morning. The patient denies having any harmful thoughts towards himself or others, visual or auditory hallucinations, dizziness, loss of consciousness, blurred vision, light sensitivity, focal or distal weakness or numbness. He also denies experiencing pain in the neck, back, hips, or calves, as well as cough, difficulty walking or maintaining balance, shortness of breath, chest pain, nausea, vomiting, fever, chills, abdominal pain, dysuria, hematuria, or irregular bowel movements.    Patient denies any chest pain, nausea, vomiting, diarrhea, cough, shortness of breath, fevers, chills, sick contacts, recent travels or dysuria.                This is a 58 year old male patient with a PMH of HTN, Glaucoma (both eyes) who presents to [facility] ER after being brought in by EMS after being found intoxicated on the street. The patient admits to drinking vodka throughout the day. He reports that he usually has 2 medium sized bottles of vodka and drinks several beers daily. He reports that his last drink was about two days ago. He also complains of a headache, stating that he fell at 4:00 am this morning. Patient reports that he has nausea. The patient denies having any harmful thoughts towards himself or others, visual or auditory hallucinations, dizziness, loss of consciousness, blurred vision, light sensitivity, focal or distal weakness or numbness. Patient denies any chest pain, vomiting, diarrhea, cough, shortness of breath, fevers, chills, sick contacts, recent travels or dysuria.

## 2023-07-24 LAB
ANION GAP SERPL CALC-SCNC: 3 MMOL/L — LOW (ref 5–17)
BUN SERPL-MCNC: 8 MG/DL — SIGNIFICANT CHANGE UP (ref 7–23)
CALCIUM SERPL-MCNC: 8.6 MG/DL — SIGNIFICANT CHANGE UP (ref 8.5–10.1)
CHLORIDE SERPL-SCNC: 106 MMOL/L — SIGNIFICANT CHANGE UP (ref 96–108)
CO2 SERPL-SCNC: 29 MMOL/L — SIGNIFICANT CHANGE UP (ref 22–31)
CREAT SERPL-MCNC: 0.69 MG/DL — SIGNIFICANT CHANGE UP (ref 0.5–1.3)
EGFR: 106 ML/MIN/1.73M2 — SIGNIFICANT CHANGE UP
GLUCOSE SERPL-MCNC: 177 MG/DL — HIGH (ref 70–99)
POTASSIUM SERPL-MCNC: 3.6 MMOL/L — SIGNIFICANT CHANGE UP (ref 3.5–5.3)
POTASSIUM SERPL-SCNC: 3.6 MMOL/L — SIGNIFICANT CHANGE UP (ref 3.5–5.3)
SODIUM SERPL-SCNC: 138 MMOL/L — SIGNIFICANT CHANGE UP (ref 135–145)

## 2023-07-24 PROCEDURE — 99233 SBSQ HOSP IP/OBS HIGH 50: CPT

## 2023-07-24 RX ORDER — ACETAMINOPHEN 500 MG
650 TABLET ORAL EVERY 6 HOURS
Refills: 0 | Status: DISCONTINUED | OUTPATIENT
Start: 2023-07-24 | End: 2023-07-26

## 2023-07-24 RX ORDER — THIAMINE MONONITRATE (VIT B1) 100 MG
100 TABLET ORAL DAILY
Refills: 0 | Status: DISCONTINUED | OUTPATIENT
Start: 2023-07-24 | End: 2023-07-26

## 2023-07-24 RX ADMIN — ENOXAPARIN SODIUM 40 MILLIGRAM(S): 100 INJECTION SUBCUTANEOUS at 23:21

## 2023-07-24 RX ADMIN — Medication 1 MILLIGRAM(S): at 11:59

## 2023-07-24 RX ADMIN — Medication 1 MILLIGRAM(S): at 11:58

## 2023-07-24 RX ADMIN — Medication 50 MILLIGRAM(S): at 05:18

## 2023-07-24 RX ADMIN — Medication 1 TABLET(S): at 11:58

## 2023-07-24 RX ADMIN — DORZOLAMIDE HYDROCHLORIDE 1 DROP(S): 20 SOLUTION/ DROPS OPHTHALMIC at 05:20

## 2023-07-24 RX ADMIN — LATANOPROST 1 DROP(S): 0.05 SOLUTION/ DROPS OPHTHALMIC; TOPICAL at 21:03

## 2023-07-24 RX ADMIN — Medication 1 MILLIGRAM(S): at 21:08

## 2023-07-24 RX ADMIN — Medication 50 MILLIGRAM(S): at 21:02

## 2023-07-24 RX ADMIN — Medication 650 MILLIGRAM(S): at 11:58

## 2023-07-24 RX ADMIN — Medication 1 DROP(S): at 05:20

## 2023-07-24 RX ADMIN — DORZOLAMIDE HYDROCHLORIDE 1 DROP(S): 20 SOLUTION/ DROPS OPHTHALMIC at 21:03

## 2023-07-24 RX ADMIN — DORZOLAMIDE HYDROCHLORIDE 1 DROP(S): 20 SOLUTION/ DROPS OPHTHALMIC at 13:45

## 2023-07-24 RX ADMIN — Medication 1 DROP(S): at 18:03

## 2023-07-24 RX ADMIN — ENOXAPARIN SODIUM 40 MILLIGRAM(S): 100 INJECTION SUBCUTANEOUS at 05:19

## 2023-07-24 RX ADMIN — LATANOPROST 1 DROP(S): 0.05 SOLUTION/ DROPS OPHTHALMIC; TOPICAL at 05:19

## 2023-07-24 RX ADMIN — Medication 650 MILLIGRAM(S): at 12:58

## 2023-07-24 RX ADMIN — AMLODIPINE BESYLATE 5 MILLIGRAM(S): 2.5 TABLET ORAL at 05:18

## 2023-07-24 RX ADMIN — Medication 100 MILLIGRAM(S): at 13:45

## 2023-07-24 RX ADMIN — Medication 50 MILLIGRAM(S): at 13:45

## 2023-07-25 DIAGNOSIS — L03.90 CELLULITIS, UNSPECIFIED: ICD-10-CM

## 2023-07-25 LAB
ALBUMIN SERPL ELPH-MCNC: 3.1 G/DL — LOW (ref 3.3–5)
ALP SERPL-CCNC: 79 U/L — SIGNIFICANT CHANGE UP (ref 40–120)
ALT FLD-CCNC: 71 U/L — SIGNIFICANT CHANGE UP (ref 12–78)
ANION GAP SERPL CALC-SCNC: 4 MMOL/L — LOW (ref 5–17)
AST SERPL-CCNC: 52 U/L — HIGH (ref 15–37)
BILIRUB SERPL-MCNC: 0.5 MG/DL — SIGNIFICANT CHANGE UP (ref 0.2–1.2)
BUN SERPL-MCNC: 10 MG/DL — SIGNIFICANT CHANGE UP (ref 7–23)
CALCIUM SERPL-MCNC: 9.1 MG/DL — SIGNIFICANT CHANGE UP (ref 8.5–10.1)
CHLORIDE SERPL-SCNC: 108 MMOL/L — SIGNIFICANT CHANGE UP (ref 96–108)
CO2 SERPL-SCNC: 27 MMOL/L — SIGNIFICANT CHANGE UP (ref 22–31)
CREAT SERPL-MCNC: 0.65 MG/DL — SIGNIFICANT CHANGE UP (ref 0.5–1.3)
EGFR: 108 ML/MIN/1.73M2 — SIGNIFICANT CHANGE UP
GLUCOSE SERPL-MCNC: 107 MG/DL — HIGH (ref 70–99)
GRAM STN FLD: SIGNIFICANT CHANGE UP
HCT VFR BLD CALC: 41.6 % — SIGNIFICANT CHANGE UP (ref 39–50)
HGB BLD-MCNC: 13.3 G/DL — SIGNIFICANT CHANGE UP (ref 13–17)
MCHC RBC-ENTMCNC: 28.6 PG — SIGNIFICANT CHANGE UP (ref 27–34)
MCHC RBC-ENTMCNC: 32 G/DL — SIGNIFICANT CHANGE UP (ref 32–36)
MCV RBC AUTO: 89.5 FL — SIGNIFICANT CHANGE UP (ref 80–100)
NRBC # BLD: 0 /100 WBCS — SIGNIFICANT CHANGE UP (ref 0–0)
PLATELET # BLD AUTO: 213 K/UL — SIGNIFICANT CHANGE UP (ref 150–400)
POTASSIUM SERPL-MCNC: 3.8 MMOL/L — SIGNIFICANT CHANGE UP (ref 3.5–5.3)
POTASSIUM SERPL-SCNC: 3.8 MMOL/L — SIGNIFICANT CHANGE UP (ref 3.5–5.3)
PROT SERPL-MCNC: 7.8 GM/DL — SIGNIFICANT CHANGE UP (ref 6–8.3)
RBC # BLD: 4.65 M/UL — SIGNIFICANT CHANGE UP (ref 4.2–5.8)
RBC # FLD: 15.5 % — HIGH (ref 10.3–14.5)
SODIUM SERPL-SCNC: 139 MMOL/L — SIGNIFICANT CHANGE UP (ref 135–145)
SPECIMEN SOURCE: SIGNIFICANT CHANGE UP
WBC # BLD: 5.88 K/UL — SIGNIFICANT CHANGE UP (ref 3.8–10.5)
WBC # FLD AUTO: 5.88 K/UL — SIGNIFICANT CHANGE UP (ref 3.8–10.5)

## 2023-07-25 PROCEDURE — 99233 SBSQ HOSP IP/OBS HIGH 50: CPT

## 2023-07-25 RX ORDER — CEPHALEXIN 500 MG
500 CAPSULE ORAL EVERY 12 HOURS
Refills: 0 | Status: DISCONTINUED | OUTPATIENT
Start: 2023-07-25 | End: 2023-07-26

## 2023-07-25 RX ADMIN — Medication 1 DROP(S): at 05:13

## 2023-07-25 RX ADMIN — Medication 650 MILLIGRAM(S): at 09:05

## 2023-07-25 RX ADMIN — Medication 1 TABLET(S): at 11:34

## 2023-07-25 RX ADMIN — Medication 500 MILLIGRAM(S): at 17:14

## 2023-07-25 RX ADMIN — Medication 1 DROP(S): at 17:14

## 2023-07-25 RX ADMIN — Medication 100 MILLIGRAM(S): at 11:33

## 2023-07-25 RX ADMIN — Medication 650 MILLIGRAM(S): at 10:05

## 2023-07-25 RX ADMIN — DORZOLAMIDE HYDROCHLORIDE 1 DROP(S): 20 SOLUTION/ DROPS OPHTHALMIC at 16:30

## 2023-07-25 RX ADMIN — Medication 50 MILLIGRAM(S): at 09:05

## 2023-07-25 RX ADMIN — Medication 1 MILLIGRAM(S): at 11:34

## 2023-07-25 RX ADMIN — DORZOLAMIDE HYDROCHLORIDE 1 DROP(S): 20 SOLUTION/ DROPS OPHTHALMIC at 05:15

## 2023-07-25 RX ADMIN — AMLODIPINE BESYLATE 5 MILLIGRAM(S): 2.5 TABLET ORAL at 05:13

## 2023-07-25 RX ADMIN — ENOXAPARIN SODIUM 40 MILLIGRAM(S): 100 INJECTION SUBCUTANEOUS at 23:15

## 2023-07-25 RX ADMIN — Medication 1 MILLIGRAM(S): at 21:05

## 2023-07-25 RX ADMIN — DORZOLAMIDE HYDROCHLORIDE 1 DROP(S): 20 SOLUTION/ DROPS OPHTHALMIC at 21:04

## 2023-07-25 RX ADMIN — Medication 1 MILLIGRAM(S): at 11:33

## 2023-07-25 RX ADMIN — LATANOPROST 1 DROP(S): 0.05 SOLUTION/ DROPS OPHTHALMIC; TOPICAL at 21:04

## 2023-07-25 RX ADMIN — Medication 50 MILLIGRAM(S): at 17:14

## 2023-07-25 NOTE — PROGRESS NOTE ADULT - SUBJECTIVE AND OBJECTIVE BOX
Patient is a 58y old  Male who presents with a chief complaint of Alcohol withdrawal (23 Jul 2023 13:11)    INTERVAL HPI/OVERNIGHT EVENTS: No acute events overnight. HD stable.     MEDICATIONS  (STANDING):  amLODIPine   Tablet 5 milliGRAM(s) Oral daily  chlordiazePOXIDE   Oral   chlordiazePOXIDE 50 milliGRAM(s) Oral every 8 hours  dorzolamide 2% Ophthalmic Solution 1 Drop(s) Both EYES three times a day  enoxaparin Injectable 40 milliGRAM(s) SubCutaneous every 24 hours  folic acid 1 milliGRAM(s) Oral daily  latanoprost 0.005% Ophthalmic Solution 1 Drop(s) Both EYES at bedtime  multivitamin 1 Tablet(s) Oral daily  thiamine 100 milliGRAM(s) Oral daily  timolol 0.5% Solution 1 Drop(s) Both EYES two times a day    MEDICATIONS  (PRN):  acetaminophen     Tablet .. 650 milliGRAM(s) Oral every 6 hours PRN Temp greater or equal to 38C (100.4F), Mild Pain (1 - 3)  LORazepam   Injectable 1 milliGRAM(s) IV Push every 6 hours PRN Agitation      Allergies    No Known Allergies    Intolerances        REVIEW OF SYSTEMS: all negative with exception of above    Vital Signs Last 24 Hrs  T(C): 36.9 (24 Jul 2023 10:47), Max: 37.2 (23 Jul 2023 16:22)  T(F): 98.4 (24 Jul 2023 10:47), Max: 98.9 (23 Jul 2023 16:22)  HR: 66 (24 Jul 2023 13:00) (58 - 82)  BP: 127/82 (24 Jul 2023 10:47) (117/69 - 131/77)  BP(mean): --  RR: 18 (24 Jul 2023 10:47) (18 - 20)  SpO2: 97% (24 Jul 2023 10:47) (97% - 98%)    Parameters below as of 23 Jul 2023 16:22  Patient On (Oxygen Delivery Method): room air        PHYSICAL EXAM:  GENERAL: NAD, well-groomed  NERVOUS SYSTEM:  Alert & Oriented X3, Good concentration; Motor Strength 5/5 B/L upper and lower extremities; DTRs 2+ intact and symmetric  CHEST/LUNG: Clear to percussion bilaterally; No rales, rhonchi, wheezing, or rubs  HEART: Regular rate and rhythm; No murmurs, rubs, or gallops  ABDOMEN: Soft, Nontender, Nondistended; Bowel sounds present  EXTREMITIES:  2+ Peripheral Pulses, No clubbing, cyanosis, or edema      LABS:                        11.9   4.89  )-----------( 304      ( 23 Jul 2023 07:45 )             35.2     07-24    138  |  106  |  8   ----------------------------<  177<H>  3.6   |  29  |  0.69    Ca    8.6      24 Jul 2023 11:20  Phos  1.8     07-23  Mg     1.6     07-23    TPro  7.1  /  Alb  3.0<L>  /  TBili  0.6  /  DBili  x   /  AST  33  /  ALT  68  /  AlkPhos  75  07-23      Urinalysis Basic - ( 24 Jul 2023 11:20 )    Color: x / Appearance: x / SG: x / pH: x  Gluc: 177 mg/dL / Ketone: x  / Bili: x / Urobili: x   Blood: x / Protein: x / Nitrite: x   Leuk Esterase: x / RBC: x / WBC x   Sq Epi: x / Non Sq Epi: x / Bacteria: x      CAPILLARY BLOOD GLUCOSE          RADIOLOGY & ADDITIONAL TESTS:    Imaging Personally Reviewed:  [ ] YES  [ ] NO    Consultant(s) Notes Reviewed:  [ ] YES  [ ] NO    Care Discussed with Consultants/Other Providers [ ] YES  [ ] NO
Patient is a 58y old  Male who presents with a chief complaint of Alcohol withdrawal (24 Jul 2023 14:21)    INTERVAL HPI/OVERNIGHT EVENTS: No acute events overnight. HD stable.     MEDICATIONS  (STANDING):  amLODIPine   Tablet 5 milliGRAM(s) Oral daily  cephalexin 500 milliGRAM(s) Oral every 12 hours  chlordiazePOXIDE   Oral   chlordiazePOXIDE 50 milliGRAM(s) Oral every 12 hours  dorzolamide 2% Ophthalmic Solution 1 Drop(s) Both EYES three times a day  enoxaparin Injectable 40 milliGRAM(s) SubCutaneous every 24 hours  folic acid 1 milliGRAM(s) Oral daily  latanoprost 0.005% Ophthalmic Solution 1 Drop(s) Both EYES at bedtime  multivitamin 1 Tablet(s) Oral daily  thiamine 100 milliGRAM(s) Oral daily  timolol 0.5% Solution 1 Drop(s) Both EYES two times a day    MEDICATIONS  (PRN):  acetaminophen     Tablet .. 650 milliGRAM(s) Oral every 6 hours PRN Temp greater or equal to 38C (100.4F), Mild Pain (1 - 3)  LORazepam   Injectable 1 milliGRAM(s) IV Push every 6 hours PRN Agitation      Allergies    No Known Allergies    Intolerances        REVIEW OF SYSTEMS: all negative with exception of above    Vital Signs Last 24 Hrs  T(C): 36.6 (25 Jul 2023 10:13), Max: 36.9 (24 Jul 2023 16:25)  T(F): 97.9 (25 Jul 2023 10:13), Max: 98.5 (24 Jul 2023 16:25)  HR: 62 (25 Jul 2023 10:13) (54 - 67)  BP: 105/61 (25 Jul 2023 10:13) (100/61 - 120/65)  BP(mean): --  RR: 18 (25 Jul 2023 10:13) (18 - 18)  SpO2: 96% (25 Jul 2023 10:13) (96% - 98%)    Parameters below as of 25 Jul 2023 10:13  Patient On (Oxygen Delivery Method): room air    PHYSICAL EXAM:  GENERAL: NAD, well-groomed  NERVOUS SYSTEM:  Alert & Oriented X3, Good concentration; Motor Strength 5/5 B/L upper and lower extremities; DTRs 2+ intact and symmetric  CHEST/LUNG: Clear to percussion bilaterally; No rales, rhonchi, wheezing, or rubs  HEART: Regular rate and rhythm; No murmurs, rubs, or gallops  ABDOMEN: Soft, Nontender, Nondistended; Bowel sounds present  EXTREMITIES:  2+ Peripheral Pulses, No clubbing, cyanosis, or edema      LABS:                        13.3   5.88  )-----------( 213      ( 25 Jul 2023 07:51 )             41.6     07-25    139  |  108  |  10  ----------------------------<  107<H>  3.8   |  27  |  0.65    Ca    9.1      25 Jul 2023 07:51    TPro  7.8  /  Alb  3.1<L>  /  TBili  0.5  /  DBili  x   /  AST  52<H>  /  ALT  71  /  AlkPhos  79  07-25      Urinalysis Basic - ( 25 Jul 2023 07:51 )    Color: x / Appearance: x / SG: x / pH: x  Gluc: 107 mg/dL / Ketone: x  / Bili: x / Urobili: x   Blood: x / Protein: x / Nitrite: x   Leuk Esterase: x / RBC: x / WBC x   Sq Epi: x / Non Sq Epi: x / Bacteria: x      CAPILLARY BLOOD GLUCOSE          RADIOLOGY & ADDITIONAL TESTS:    Imaging Personally Reviewed:  [ ] YES  [ ] NO    Consultant(s) Notes Reviewed:  [ ] YES  [ ] NO    Care Discussed with Consultants/Other Providers [ ] YES  [ ] NO
[facility] Division of Hospital Medicine  Momo Huggins MD  Available via MS Teams  Pager [phone]    SUBJECTIVE / OVERNIGHT EVENTS: Patient seen and examined at bedside, resting comfortably and in no acute distress. Denies chest pain, palpitations. Denies nausea or vomiting. Denies tinnitus. Endorses tremor. Denies auditory or visual hallucinations ROS otherwise noncontributory.     MEDICATIONS  (STANDING):  amLODIPine   Tablet 5 milliGRAM(s) Oral daily  chlordiazePOXIDE   Oral   chlordiazePOXIDE 50 milliGRAM(s) Oral every 6 hours  dorzolamide 2% Ophthalmic Solution 1 Drop(s) Both EYES three times a day  enoxaparin Injectable 40 milliGRAM(s) SubCutaneous every 24 hours  folic acid 1 milliGRAM(s) Oral daily  latanoprost 0.005% Ophthalmic Solution 1 Drop(s) Both EYES at bedtime  multivitamin 1 Tablet(s) Oral daily  timolol 0.5% Solution 1 Drop(s) Both EYES two times a day    MEDICATIONS  (PRN):  LORazepam   Injectable 2 milliGRAM(s) IV Push every 4 hours PRN Agitation      I&O's Summary    22 Jul 2023 07:01  -  23 Jul 2023 07:00  --------------------------------------------------------  IN: 0 mL / OUT: 400 mL / NET: -400 mL        PHYSICAL EXAM:  Vital Signs Last 24 Hrs  T(C): 36.7 (23 Jul 2023 10:49), Max: 36.8 (22 Jul 2023 23:45)  T(F): 98 (23 Jul 2023 10:49), Max: 98.3 (22 Jul 2023 23:45)  HR: 84 (23 Jul 2023 10:49) (75 - 93)  BP: 138/50 (23 Jul 2023 10:49) (107/67 - 138/77)  BP(mean): --  RR: 20 (23 Jul 2023 10:49) (16 - 20)  SpO2: 97% (23 Jul 2023 10:49) (96% - 100%)    Parameters below as of 23 Jul 2023 05:01  Patient On (Oxygen Delivery Method): room air      CONSTITUTIONAL: NAD, well-groomed  EYES: PERRLA; conjunctiva and sclera clear  ENMT: Moist oral mucosa, no pharyngeal injection or exudates; normal dentition  NECK: Supple, no palpable masses; no thyromegaly  RESPIRATORY: Normal respiratory effort; lungs are clear to auscultation bilaterally  CARDIOVASCULAR: normal S1 and S2, no murmur/rub/gallop; No lower extremity edema  ABDOMEN: Nontender to palpation, normoactive bowel sounds, no rebound/guarding; No hepatosplenomegaly  MUSCULOSKELETAL:  no clubbing or cyanosis of digits; no joint swelling or tenderness to palpation  PSYCH: A+O to person, place, and time; affect appropriate  NEUROLOGY: CN 2-12 are intact and symmetric; significant tremor with hands outstretched  SKIN: No rashes; no palpable lesions, no diaphoresis     LABS:                        11.9   4.89  )-----------( 304      ( 23 Jul 2023 07:45 )             35.2     07-23    141  |  107  |  6<L>  ----------------------------<  81  3.2<L>   |  27  |  0.59    Ca    7.7<L>      23 Jul 2023 07:45  Phos  1.8     07-23  Mg     1.6     07-23    TPro  7.1  /  Alb  3.0<L>  /  TBili  0.6  /  DBili  x   /  AST  33  /  ALT  68  /  AlkPhos  75  07-23          Urinalysis Basic - ( 23 Jul 2023 07:45 )    Color: x / Appearance: x / SG: x / pH: x  Gluc: 81 mg/dL / Ketone: x  / Bili: x / Urobili: x   Blood: x / Protein: x / Nitrite: x   Leuk Esterase: x / RBC: x / WBC x   Sq Epi: x / Non Sq Epi: x / Bacteria: x        SARS-CoV-2: NotDetec (08 Jul 2023 14:30)  SARS-CoV-2: NotDetec (22 Apr 2023 03:36)  SARS-CoV-2: NotDetec (02 Apr 2023 13:21)

## 2023-07-25 NOTE — PROGRESS NOTE ADULT - ASSESSMENT
58M PMH HTN, glaucoma, presents after being found intoxicated, and found to be in alcohol withdrawal. Last drink reportedly on 7/21, though found with BAL of 384 on admission    #Alcohol withdrawal  Currently admitted to medicine service. Currently in alcohol withdrawal and with significantly elevated blood alcohol level on admission.   -Continue to monitor CIWA  -Librium taper with high risk CIWA protocol     #Abscess- perineal  - Will treat with PO keflex Q12H for 5 days  - F/u abscess cx    #HTN  -Continue amlodipine    #Glaucoma  -Continue outpatient medications    #DVT PPx- Lovenox  
58M PMH HTN, glaucoma, presents after being found intoxicated, and found to be in alcohol withdrawal. Last drink reportedly on 7/21, though found with BAL of 384 on admission    #Alcohol withdrawal  Currently admitted to medicine service. Currently in alcohol withdrawal and with significantly elevated blood alcohol level on admission.   -Continue to monitor CIWA  -Librium taper with high risk CIWA protocol     #HTN  -Continue amlodipine    #Glaucoma  -Continue outpatient medications    #DVT PPx- Lovenox  
58M PMH HTN, glaucoma, presents after being found intoxicated, and found to be in alcohol withdrawal. Last drink reportedly on 7/21, though found with BAL of 384 on admission    #Alcohol withdrawal  Currently admitted to medicine service. Currently in alcohol withdrawal and with significantly elevated blood alcohol level on admission.   -Continue to monitor CIWA  -Librium taper with high risk CIWA protocol     #HTN  -Continue amlodipine    #Glaucoma  -Continue outpatient medications    #DVT PPx- Lovenox

## 2023-07-25 NOTE — CHART NOTE - NSCHARTNOTEFT_GEN_A_CORE
Hospitalist Medicine nP     CC: Called by RN to evaluate pt for perineal abscess.     HPI: 58 year old male patient with a PMH of HTN, Glaucoma (both eyes) who presents to [facility] ER after being brought in by EMS after being found intoxicated on the street. The patient admits to drinking vodka throughout the day. He reports that he usually has 2 medium sized bottles of vodka and drinks several beers daily. He reports that his last drink was about two days ago. He also complains of a headache, stating that he fell at 4:00 am this morning. Patient reports that he has nausea. The patient denies having any harmful thoughts towards himself or others, visual or auditory hallucinations, dizziness, loss of consciousness, blurred vision, light sensitivity, focal or distal weakness or numbness. Admitted for alcohol withdrawal.       Pt seen and examined at bedside, alert, oriented, and in no acute distress.      REVIEW OF SYSTEMS:  Respiratory: Denies cough, wheezing, chills, hemoptysis, shortness of breath, difficulty breathing  Cardiovascular: Denies chest pain, palpitations, dizziness, leg swelling  Gastrointestinal: Denies abdominal pain, nausea, vomiting, hematemesis, diarrhea, constipation, melena, hematochezia  Genitourinary: Denies dysuria, frequency, hematuria, retention, incontinence  Neurological: Denies headaches, memory loss, loss of strength, numbness, tremors    VITALS:  T(C): 36.6 (07-25-23 @ 10:13), Max: 36.9 (07-24-23 @ 16:25)  HR: 62 (07-25-23 @ 10:13) (54 - 67)  BP: 105/61 (07-25-23 @ 10:13) (100/61 - 120/65)  RR: 18 (07-25-23 @ 10:13) (18 - 18)  SpO2: 96% (07-25-23 @ 10:13) (96% - 98%)      PHYSICAL EXAM:  General: NAD, well-groomed, well-developed.  Eyes: PERRLA, EOMI. Conjunctiva and sclera clear.  Lungs:  CTA B/L. Normal breath sounds. No wheezes, rales, rhonchi.  Heart: RRR. Normal S1/S2. No murmurs appreciated.  Abdomen: Soft, NT/ND. Normoactive BS x 4 quadrants.  Neurological:  AAOx3. mild tremors noted with full arm extension, follows commands, otherwise non-focal exam   Skin: 3 cm linear abscess located under the right side of the scrotum, draining purulent fluid, mild erythema of surrounding area, +TTP      LABS:                        13.3   5.88  )-----------( 213      ( 25 Jul 2023 07:51 )             41.6     07-25    139  |  108  |  10  ----------------------------<  107<H>  3.8   |  27  |  0.65    Ca    9.1      25 Jul 2023 07:51    TPro  7.8  /  Alb  3.1<L>  /  TBili  0.5  /  DBili  x   /  AST  52<H>  /  ALT  71  /  AlkPhos  79  07-25          LIVER FUNCTIONS - ( 25 Jul 2023 07:51 )  Alb: 3.1 g/dL / Pro: 7.8 gm/dL / ALK PHOS: 79 U/L / ALT: 71 U/L / AST: 52 U/L / GGT: x                                     ASSESSMENT: 59 y/o male with a pmhx of HTN, glaucoma, right eye blindness, and alcohol dependence, admitted for alcohol withdrawal, now with noted perineal abscess.      PLAN:  - wound culture sent   - MRSA swab sent   - sitz baths BID  - start Keflex 500 mg po bid x 10 days  -f/u culture and PCR results   - D/w Dr. Salazar aware and agree with the plan  - Will continue to follow up    Time spent on encounter 30Cellulitis and abscess [L03.90]     minutes.

## 2023-07-25 NOTE — ED ADULT NURSE NOTE - GENITOURINARY ASSESSMENT
Pt arrives to ED from [facility] (assisted living/memory care facility) after experiencing a near syncopal episode. Per EMS report pt is slightly confused at baseline and was outside (not sure for how long because of her confusion) and when she came back inside pt felt faint. Staff didn't think pt looked well so they called 911. Upon EMS arrival pt was hypotensive (90/60), but responded well to fluids. Upon arrival to ED pt is no longer feeling faint and BP is WDL. Pt is alert and slightly confused (poor short term memory and disoriented to time/slightly disoriented to situation).   
WDL

## 2023-07-26 ENCOUNTER — TRANSCRIPTION ENCOUNTER (OUTPATIENT)
Age: 58
End: 2023-07-26

## 2023-07-26 ENCOUNTER — APPOINTMENT (OUTPATIENT)
Dept: INTERNAL MEDICINE | Facility: CLINIC | Age: 58
End: 2023-07-26

## 2023-07-26 VITALS
TEMPERATURE: 98 F | SYSTOLIC BLOOD PRESSURE: 112 MMHG | RESPIRATION RATE: 18 BRPM | OXYGEN SATURATION: 97 % | HEART RATE: 59 BPM | DIASTOLIC BLOOD PRESSURE: 74 MMHG

## 2023-07-26 LAB
MRSA PCR RESULT.: SIGNIFICANT CHANGE UP
PHOSPHATE SERPL-MCNC: 3.1 MG/DL — SIGNIFICANT CHANGE UP (ref 2.5–4.5)
S AUREUS DNA NOSE QL NAA+PROBE: DETECTED

## 2023-07-26 PROCEDURE — 99239 HOSP IP/OBS DSCHRG MGMT >30: CPT

## 2023-07-26 RX ORDER — LATANOPROST 0.05 MG/ML
1 SOLUTION/ DROPS OPHTHALMIC; TOPICAL
Qty: 0 | Refills: 0 | DISCHARGE
Start: 2023-07-26

## 2023-07-26 RX ORDER — AMLODIPINE BESYLATE 2.5 MG/1
1 TABLET ORAL
Qty: 0 | Refills: 0 | DISCHARGE
Start: 2023-07-26

## 2023-07-26 RX ORDER — FOLIC ACID 0.8 MG
1 TABLET ORAL
Qty: 0 | Refills: 0 | DISCHARGE
Start: 2023-07-26

## 2023-07-26 RX ORDER — THIAMINE MONONITRATE (VIT B1) 100 MG
1 TABLET ORAL
Qty: 0 | Refills: 0 | DISCHARGE
Start: 2023-07-26

## 2023-07-26 RX ORDER — DORZOLAMIDE HYDROCHLORIDE 20 MG/ML
1 SOLUTION/ DROPS OPHTHALMIC
Qty: 0 | Refills: 0 | DISCHARGE
Start: 2023-07-26

## 2023-07-26 RX ORDER — CEPHALEXIN 500 MG
1 CAPSULE ORAL
Qty: 12 | Refills: 0
Start: 2023-07-26 | End: 2023-07-31

## 2023-07-26 RX ORDER — TIMOLOL 0.5 %
1 DROPS OPHTHALMIC (EYE)
Qty: 0 | Refills: 0 | DISCHARGE
Start: 2023-07-26

## 2023-07-26 RX ADMIN — Medication 1 TABLET(S): at 11:56

## 2023-07-26 RX ADMIN — Medication 1 MILLIGRAM(S): at 11:56

## 2023-07-26 RX ADMIN — DORZOLAMIDE HYDROCHLORIDE 1 DROP(S): 20 SOLUTION/ DROPS OPHTHALMIC at 14:18

## 2023-07-26 RX ADMIN — AMLODIPINE BESYLATE 5 MILLIGRAM(S): 2.5 TABLET ORAL at 05:11

## 2023-07-26 RX ADMIN — DORZOLAMIDE HYDROCHLORIDE 1 DROP(S): 20 SOLUTION/ DROPS OPHTHALMIC at 05:11

## 2023-07-26 RX ADMIN — Medication 100 MILLIGRAM(S): at 11:56

## 2023-07-26 RX ADMIN — Medication 500 MILLIGRAM(S): at 05:11

## 2023-07-26 RX ADMIN — Medication 1 DROP(S): at 05:13

## 2023-07-26 RX ADMIN — Medication 650 MILLIGRAM(S): at 10:05

## 2023-07-26 RX ADMIN — Medication 50 MILLIGRAM(S): at 05:11

## 2023-07-26 NOTE — DISCHARGE NOTE PROVIDER - CARE PROVIDER_API CALL
Josh De León  NP in Family Health  [address] [place] [address]  Phone: [phone]  Fax: [phone]  Follow Up Time: 1 week

## 2023-07-26 NOTE — DISCHARGE NOTE PROVIDER - HOSPITAL COURSE
58M PMH HTN, glaucoma, presents after being found intoxicated, and found to be in alcohol withdrawal. Last drink reportedly on 7/21, though found with BAL of 384 on admission    #Alcohol withdrawal  Currently admitted to medicine service. Currently in alcohol withdrawal and with significantly elevated blood alcohol level on admission.   -Continue to monitor CIWA  -Librium taper with high risk CIWA protocol     #Abscess- perineal  - Will treat with PO keflex Q12H for 5 days  - F/u abscess cx    #HTN  -Continue amlodipine    #Glaucoma  -Continue outpatient medications    #DVT PPx- Lovenox     58M PMH HTN, glaucoma, presents after being found intoxicated, and found to be in alcohol withdrawal. Last drink reportedly on 7/21, though found with BAL of 384 on admission    #Alcohol withdrawal  Currently admitted to medicine service. Currently in alcohol withdrawal and with significantly elevated blood alcohol level on admission.   -Continue to monitor CIWA  -Librium taper with high risk CIWA protocol     #Abscess- perineal  - Will treat with PO bactrim, x 5 days  - F/u abscess cx    #HTN  -Continue amlodipine    #Glaucoma  -Continue outpatient medications    #DVT PPx- Lovenox

## 2023-07-26 NOTE — DISCHARGE NOTE PROVIDER - ATTENDING DISCHARGE PHYSICAL EXAMINATION:
Vital Signs Last 24 Hrs  T(F): 97.9 (26 Jul 2023 10:50), Max: 98.1 (25 Jul 2023 23:34)  HR: 59 (26 Jul 2023 10:50) (58 - 60)  BP: 112/74 (26 Jul 2023 10:50) (108/68 - 112/74)  RR: 18 (26 Jul 2023 10:50) (17 - 18)  SpO2: 97% (26 Jul 2023 10:50) (95% - 98%)    Physical Exam:  Constitutional: NAD, awake and alert  Respiratory: cta b/l no wheezing no rhonchi  Cardiovascular: +s1/s2 no edema b/l le  Gastrointestinal: soft nt nd bs+  Vascular: 2+ peripheral pulses  Neurological: A/O x 3, no focal deficits

## 2023-07-26 NOTE — DISCHARGE NOTE NURSING/CASE MANAGEMENT/SOCIAL WORK - NSDCVIVACCINE_GEN_ALL_CORE_FT
influenza, injectable, quadrivalent, preservative free; 14-Oct-2021 13:35; Nany Guerrero (RN); [employer]; [id number] (Exp. Date: 30-Jun-2022); IntraMuscular; Deltoid Right.; 0.5 milliLiter(s); VIS (VIS Published: 06-Aug-2021, VIS Presented: 14-Oct-2021);   Tdap; 22-Dec-2021 19:23; Clementina Koehler (RN); [employer]; [id number] (Exp. Date: 09-Sep-2023); IntraMuscular; Deltoid Left.; 0.5 milliLiter(s); VIS (VIS Published: 09-May-2013, VIS Presented: 22-Dec-2021);   Tdap; 15-Aug-2022 16:08; Marianne Pollock (RN); [employer]; [id number]   (Exp. Date: 18-Apr-2024); IntraMuscular; Deltoid Left.; 0.5 milliLiter(s); VIS (VIS Published: 09-May-2013, VIS Presented: 15-Aug-2022);   Tdap; 16-Nov-2022 15:48; Parish Avila (RN); [employer]; [id number] (Exp. Date: 01-Jun-2024); IntraMuscular; Deltoid Right.; 0.5 milliLiter(s); VIS (VIS Published: 09-May-2013, VIS Presented: 16-Nov-2022);

## 2023-07-26 NOTE — DISCHARGE NOTE PROVIDER - NSDCMRMEDTOKEN_GEN_ALL_CORE_FT
amLODIPine 5 mg oral tablet: 1 tab(s) orally once a day  dorzolamide 2% ophthalmic solution: 1 drop(s) to each affected eye 3 times a day  folic acid 1 mg oral tablet: 1 tab(s) orally once a day  latanoprost 0.005% ophthalmic solution: 1 drop(s) to each affected eye once a day (at bedtime)  Multiple Vitamins oral tablet: 1 tab(s) orally once a day  timolol maleate 0.5% ophthalmic solution: 1 drop(s) to each affected eye 2 times a day   amLODIPine 5 mg oral tablet: 1 tab(s) orally once a day  cephalexin 500 mg oral capsule: 1 cap(s) orally every 12 hours  dorzolamide 2% ophthalmic solution: 1 drop(s) to each affected eye 3 times a day  folic acid 1 mg oral tablet: 1 tab(s) orally once a day  latanoprost 0.005% ophthalmic solution: 1 drop(s) to each affected eye once a day (at bedtime)  Multiple Vitamins oral tablet: 1 tab(s) orally once a day  thiamine 100 mg oral tablet: 1 tab(s) orally once a day  timolol maleate 0.5% ophthalmic solution: 1 drop(s) to each affected eye 2 times a day   amLODIPine 5 mg oral tablet: 1 tab(s) orally once a day  Bactrim  mg-160 mg oral tablet: 1 tab(s) orally 2 times a day  dorzolamide 2% ophthalmic solution: 1 drop(s) to each affected eye 3 times a day  folic acid 1 mg oral tablet: 1 tab(s) orally once a day  latanoprost 0.005% ophthalmic solution: 1 drop(s) to each affected eye once a day (at bedtime)  Multiple Vitamins oral tablet: 1 tab(s) orally once a day  thiamine 100 mg oral tablet: 1 tab(s) orally once a day  timolol maleate 0.5% ophthalmic solution: 1 drop(s) to each affected eye 2 times a day

## 2023-07-26 NOTE — DISCHARGE NOTE NURSING/CASE MANAGEMENT/SOCIAL WORK - PATIENT PORTAL LINK FT
You can access the FollowMyHealth Patient Portal offered by [facility] by registering at the following website: [email or web address]. By joining [employer]’s FollowMyHealth portal, you will also be able to view your health information using other applications (apps) compatible with our system.

## 2023-07-26 NOTE — DISCHARGE NOTE PROVIDER - NSDCCPCAREPLAN_GEN_ALL_CORE_FT
PRINCIPAL DISCHARGE DIAGNOSIS  Diagnosis: Alcohol intoxication  Assessment and Plan of Treatment: Librium taper completed. CIWA 1. Follow up with PCP for assistance with alcohol cessation.      SECONDARY DISCHARGE DIAGNOSES  Diagnosis: Glaucoma  Assessment and Plan of Treatment: Continue home eyedrops and follow up with Eye Doctor    Diagnosis: HTN (hypertension)  Assessment and Plan of Treatment: Continue Home Medication and follow up with PCP.    Diagnosis: Cellulitis and abscess  Assessment and Plan of Treatment: Take antibiotics until completed. Follow up with PCP.    Diagnosis: Anemia  Assessment and Plan of Treatment: Follow up with PCP    Diagnosis: Alcohol dependence with withdrawal  Assessment and Plan of Treatment: Resolved. Abstain from drinking alcohol.     PRINCIPAL DISCHARGE DIAGNOSIS  Diagnosis: Alcohol intoxication  Assessment and Plan of Treatment: Librium taper completed. CIWA 1. Follow up with PCP for assistance with alcohol cessation.      SECONDARY DISCHARGE DIAGNOSES  Diagnosis: Alcohol dependence with withdrawal  Assessment and Plan of Treatment: Resolved. Abstain from drinking alcohol.    Diagnosis: Glaucoma  Assessment and Plan of Treatment: Continue home eyedrops and follow up with Eye Doctor    Diagnosis: HTN (hypertension)  Assessment and Plan of Treatment: Continue Home Medication and follow up with PCP.    Diagnosis: Cellulitis and abscess  Assessment and Plan of Treatment: Take antibiotics until completed. Follow up with PCP. Please continue taking bactrim as prescribed.    Diagnosis: Anemia  Assessment and Plan of Treatment: Follow up with PCP

## 2023-07-26 NOTE — DISCHARGE NOTE PROVIDER - NSDCFUSCHEDAPPT_GEN_ALL_CORE_FT
[facility]  [facility] OP [id number]  Scheduled Appointment: 07/26/2023

## 2023-07-27 LAB
-  AMPICILLIN/SULBACTAM: SIGNIFICANT CHANGE UP
-  CEFAZOLIN: SIGNIFICANT CHANGE UP
-  CLINDAMYCIN: SIGNIFICANT CHANGE UP
-  ERYTHROMYCIN: SIGNIFICANT CHANGE UP
-  GENTAMICIN: SIGNIFICANT CHANGE UP
-  OXACILLIN: SIGNIFICANT CHANGE UP
-  PENICILLIN: SIGNIFICANT CHANGE UP
-  RIFAMPIN: SIGNIFICANT CHANGE UP
-  TETRACYCLINE: SIGNIFICANT CHANGE UP
-  TRIMETHOPRIM/SULFAMETHOXAZOLE: SIGNIFICANT CHANGE UP
-  VANCOMYCIN: SIGNIFICANT CHANGE UP
METHOD TYPE: SIGNIFICANT CHANGE UP

## 2023-07-28 ENCOUNTER — EMERGENCY (EMERGENCY)
Facility: HOSPITAL | Age: 58
LOS: 0 days | Discharge: HOME | End: 2023-07-29
Attending: STUDENT IN AN ORGANIZED HEALTH CARE EDUCATION/TRAINING PROGRAM
Payer: MEDICAID

## 2023-07-28 VITALS
TEMPERATURE: 98 F | OXYGEN SATURATION: 96 % | HEIGHT: 68 IN | RESPIRATION RATE: 18 BRPM | WEIGHT: 175.05 LBS | DIASTOLIC BLOOD PRESSURE: 69 MMHG | SYSTOLIC BLOOD PRESSURE: 107 MMHG | HEART RATE: 76 BPM

## 2023-07-28 DIAGNOSIS — F10.129 ALCOHOL ABUSE WITH INTOXICATION, UNSPECIFIED: ICD-10-CM

## 2023-07-28 PROCEDURE — 99284 EMERGENCY DEPT VISIT MOD MDM: CPT

## 2023-07-28 RX ADMIN — Medication 50 MILLIGRAM(S): at 22:53

## 2023-07-29 VITALS
OXYGEN SATURATION: 100 % | RESPIRATION RATE: 18 BRPM | TEMPERATURE: 98 F | HEART RATE: 71 BPM | SYSTOLIC BLOOD PRESSURE: 106 MMHG | DIASTOLIC BLOOD PRESSURE: 67 MMHG

## 2023-07-29 NOTE — ED PROVIDER NOTE - CLINICAL SUMMARY MEDICAL DECISION MAKING FREE TEXT BOX
57 y/o M presenting to the ED w/ alcohol intox.  Patient is known to the ED.  Vitals are stable.  Will treat w/ librium to prevent withdrawal.     Patient observed alert and ambulatory w/ steady gait in the ED. Will discharge.

## 2023-07-29 NOTE — ED PROVIDER NOTE - OBJECTIVE STATEMENT
59 y/o M w/ PMH ETOH abuse presenting to the ED w/ acute intoxication. Patient was found on the street. Endorses drinking alcohol. No other acute complaints. Patient appears intoxicated and cannot provide history. Hx limited.

## 2023-07-29 NOTE — ED PROVIDER NOTE - PHYSICAL EXAMINATION
GENERAL: Awake, alert, NAD  HEENT: NC/AT, moist mucous membranes  LUNGS: CTAB, no wheezes or crackles   CARDIAC: RRR, no m/r/g  ABDOMEN: Soft, normal BS, non tender, non distended, no rebound, no guarding  BACK: No midline spinal tenderness, no CVA tenderness  EXT: No edema, no calf tenderness, 2+ DP pulses bilaterally, no deformities.  NEURO: Moving all extremities.  SKIN: Warm and dry. No rash.  PSYCH: Appears intoxicated.

## 2023-07-29 NOTE — ED ADULT NURSE REASSESSMENT NOTE - NS ED NURSE REASSESS COMMENT FT1
pt has a steady gait, and walks out with a steady gait, paty RN recommended patient to stay until 6, pt refused, doctor tiff made aware

## 2023-07-29 NOTE — ED ADULT NURSE NOTE - OBJECTIVE STATEMENT
58 year old male Hebrew speaking came in bibems, pt found on floor of street for intox. arousable, pt is a [id number] flier, awaiting sobriety in the morning, FAREED serrato done, fluids provided  hx of ETOH abuse, nkda

## 2023-07-29 NOTE — ED ADULT NURSE NOTE - NSFALLRISKINTERV_ED_ALL_ED
Assistance OOB with selected safe patient handling equipment if applicable/Assistance with ambulation/Communicate fall risk and risk factors to all staff, patient, and family/Monitor gait and stability/Monitor for mental status changes and reorient to person, place, and time, as needed/Move patient closer to nursing station/within visual sight of ED staff/Provide visual cue: yellow wristband, yellow gown, etc/Reinforce activity limits and safety measures with patient and family/Toileting schedule using arm’s reach rule for commode and bathroom/Use of alarms - bed, stretcher, chair and/or video monitoring/Call bell, personal items and telephone in reach/Instruct patient to call for assistance before getting out of bed/chair/stretcher/Non-slip footwear applied when patient is off stretcher/[place] to call system/Physically safe environment - no spills, clutter or unnecessary equipment/Purposeful Proactive Rounding/Room/bathroom lighting operational, light cord in reach

## 2023-07-29 NOTE — ED PROVIDER NOTE - PATIENT PORTAL LINK FT
You can access the FollowMyHealth Patient Portal offered by [facility] by registering at the following website: [email or web address]. By joining [employer]’s FollowMyHealth portal, you will also be able to view your health information using other applications (apps) compatible with our system.

## 2023-07-30 ENCOUNTER — EMERGENCY (EMERGENCY)
Facility: HOSPITAL | Age: 58
LOS: 1 days | Discharge: HOME | End: 2023-07-30
Attending: EMERGENCY MEDICINE | Admitting: EMERGENCY MEDICINE
Payer: MEDICAID

## 2023-07-30 VITALS
HEIGHT: 68 IN | TEMPERATURE: 99 F | OXYGEN SATURATION: 100 % | RESPIRATION RATE: 16 BRPM | DIASTOLIC BLOOD PRESSURE: 85 MMHG | SYSTOLIC BLOOD PRESSURE: 141 MMHG | HEART RATE: 82 BPM

## 2023-07-30 LAB
CULTURE RESULTS: SIGNIFICANT CHANGE UP
GRAM STN FLD: SIGNIFICANT CHANGE UP
ORGANISM # SPEC MICROSCOPIC CNT: SIGNIFICANT CHANGE UP
ORGANISM # SPEC MICROSCOPIC CNT: SIGNIFICANT CHANGE UP
SPECIMEN SOURCE: SIGNIFICANT CHANGE UP

## 2023-07-30 PROCEDURE — 99285 EMERGENCY DEPT VISIT HI MDM: CPT

## 2023-07-30 PROCEDURE — 93010 ELECTROCARDIOGRAM REPORT: CPT

## 2023-07-30 RX ORDER — SODIUM CHLORIDE 9 MG/ML
1000 INJECTION INTRAMUSCULAR; INTRAVENOUS; SUBCUTANEOUS ONCE
Refills: 0 | Status: COMPLETED | OUTPATIENT
Start: 2023-07-30 | End: 2023-07-30

## 2023-07-30 RX ORDER — FAMOTIDINE 10 MG/ML
20 INJECTION INTRAVENOUS ONCE
Refills: 0 | Status: COMPLETED | OUTPATIENT
Start: 2023-07-30 | End: 2023-07-30

## 2023-07-30 NOTE — ED ADULT TRIAGE NOTE - CHIEF COMPLAINT QUOTE
Presents to ED for chest pain x 2 days. Pt also admits to drinking 3 bottles of vodka today and states he is seeking help for abuse, states he is homeless. Denies medical history.

## 2023-07-30 NOTE — ED ADULT NURSE NOTE - NSFALLRISKINTERV_ED_ALL_ED
Assistance OOB with selected safe patient handling equipment if applicable/Assistance with ambulation/Communicate fall risk and risk factors to all staff, patient, and family/Monitor gait and stability/Monitor for mental status changes and reorient to person, place, and time, as needed/Provide visual cue: yellow wristband, yellow gown, etc/Reinforce activity limits and safety measures with patient and family/Toileting schedule using arm’s reach rule for commode and bathroom/Use of alarms - bed, stretcher, chair and/or video monitoring/Call bell, personal items and telephone in reach/Instruct patient to call for assistance before getting out of bed/chair/stretcher/Non-slip footwear applied when patient is off stretcher/[place] to call system/Physically safe environment - no spills, clutter or unnecessary equipment/Purposeful Proactive Rounding/Room/bathroom lighting operational, light cord in reach

## 2023-07-30 NOTE — ED ADULT NURSE NOTE - OBJECTIVE STATEMENT
Pt is A&OX3. Pt c/o R side abdominal pain and nausea x2days. Pt states that he was drinking unknown amount of alcohol today. Respirations are even and unlabored on room air. Pt denies SOB, chest pain, headache, dizziness at this time

## 2023-07-31 VITALS
SYSTOLIC BLOOD PRESSURE: 137 MMHG | DIASTOLIC BLOOD PRESSURE: 75 MMHG | HEART RATE: 74 BPM | TEMPERATURE: 98 F | OXYGEN SATURATION: 100 % | RESPIRATION RATE: 16 BRPM

## 2023-07-31 LAB
ALBUMIN SERPL ELPH-MCNC: 3.7 G/DL — SIGNIFICANT CHANGE UP (ref 3.3–5)
ALP SERPL-CCNC: 64 U/L — SIGNIFICANT CHANGE UP (ref 40–120)
ALT FLD-CCNC: 43 U/L — HIGH (ref 4–41)
ANION GAP SERPL CALC-SCNC: 13 MMOL/L — SIGNIFICANT CHANGE UP (ref 7–14)
AST SERPL-CCNC: 41 U/L — HIGH (ref 4–40)
BASOPHILS # BLD AUTO: 0.09 K/UL — SIGNIFICANT CHANGE UP (ref 0–0.2)
BASOPHILS NFR BLD AUTO: 2.1 % — HIGH (ref 0–2)
BILIRUB SERPL-MCNC: 0.2 MG/DL — SIGNIFICANT CHANGE UP (ref 0.2–1.2)
BUN SERPL-MCNC: 8 MG/DL — SIGNIFICANT CHANGE UP (ref 7–23)
CALCIUM SERPL-MCNC: 7.9 MG/DL — LOW (ref 8.4–10.5)
CHLORIDE SERPL-SCNC: 109 MMOL/L — HIGH (ref 98–107)
CO2 SERPL-SCNC: 21 MMOL/L — LOW (ref 22–31)
CREAT SERPL-MCNC: 0.74 MG/DL — SIGNIFICANT CHANGE UP (ref 0.5–1.3)
EGFR: 104 ML/MIN/1.73M2 — SIGNIFICANT CHANGE UP
EOSINOPHIL # BLD AUTO: 0.22 K/UL — SIGNIFICANT CHANGE UP (ref 0–0.5)
EOSINOPHIL NFR BLD AUTO: 5.1 % — SIGNIFICANT CHANGE UP (ref 0–6)
ETHANOL SERPL-MCNC: 249 MG/DL — HIGH
GLUCOSE SERPL-MCNC: 75 MG/DL — SIGNIFICANT CHANGE UP (ref 70–99)
HCT VFR BLD CALC: 37.7 % — LOW (ref 39–50)
HGB BLD-MCNC: 12.1 G/DL — LOW (ref 13–17)
IANC: 0.82 K/UL — LOW (ref 1.8–7.4)
IMM GRANULOCYTES NFR BLD AUTO: 0.2 % — SIGNIFICANT CHANGE UP (ref 0–0.9)
LIDOCAIN IGE QN: 23 U/L — SIGNIFICANT CHANGE UP (ref 7–60)
LYMPHOCYTES # BLD AUTO: 2.79 K/UL — SIGNIFICANT CHANGE UP (ref 1–3.3)
LYMPHOCYTES # BLD AUTO: 65.2 % — HIGH (ref 13–44)
MCHC RBC-ENTMCNC: 29.8 PG — SIGNIFICANT CHANGE UP (ref 27–34)
MCHC RBC-ENTMCNC: 32.1 GM/DL — SIGNIFICANT CHANGE UP (ref 32–36)
MCV RBC AUTO: 92.9 FL — SIGNIFICANT CHANGE UP (ref 80–100)
MONOCYTES # BLD AUTO: 0.35 K/UL — SIGNIFICANT CHANGE UP (ref 0–0.9)
MONOCYTES NFR BLD AUTO: 8.2 % — SIGNIFICANT CHANGE UP (ref 2–14)
NEUTROPHILS # BLD AUTO: 0.82 K/UL — LOW (ref 1.8–7.4)
NEUTROPHILS NFR BLD AUTO: 19.2 % — LOW (ref 43–77)
NRBC # BLD: 0 /100 WBCS — SIGNIFICANT CHANGE UP (ref 0–0)
NRBC # FLD: 0 K/UL — SIGNIFICANT CHANGE UP (ref 0–0)
PLATELET # BLD AUTO: 225 K/UL — SIGNIFICANT CHANGE UP (ref 150–400)
POTASSIUM SERPL-MCNC: 4 MMOL/L — SIGNIFICANT CHANGE UP (ref 3.5–5.3)
POTASSIUM SERPL-SCNC: 4 MMOL/L — SIGNIFICANT CHANGE UP (ref 3.5–5.3)
PROT SERPL-MCNC: 7.1 G/DL — SIGNIFICANT CHANGE UP (ref 6–8.3)
RBC # BLD: 4.06 M/UL — LOW (ref 4.2–5.8)
RBC # FLD: 15.9 % — HIGH (ref 10.3–14.5)
SODIUM SERPL-SCNC: 143 MMOL/L — SIGNIFICANT CHANGE UP (ref 135–145)
TROPONIN T, HIGH SENSITIVITY RESULT: 10 NG/L — SIGNIFICANT CHANGE UP
WBC # BLD: 4.28 K/UL — SIGNIFICANT CHANGE UP (ref 3.8–10.5)
WBC # FLD AUTO: 4.28 K/UL — SIGNIFICANT CHANGE UP (ref 3.8–10.5)

## 2023-07-31 RX ADMIN — Medication 50 MILLIGRAM(S): at 05:55

## 2023-07-31 RX ADMIN — FAMOTIDINE 20 MILLIGRAM(S): 10 INJECTION INTRAVENOUS at 00:25

## 2023-07-31 RX ADMIN — Medication 50 MILLIGRAM(S): at 00:25

## 2023-07-31 RX ADMIN — SODIUM CHLORIDE 1000 MILLILITER(S): 9 INJECTION INTRAMUSCULAR; INTRAVENOUS; SUBCUTANEOUS at 00:25

## 2023-07-31 NOTE — ED ADULT NURSE REASSESSMENT NOTE - NS ED NURSE REASSESS COMMENT FT1
Break RN note- Patient resting quietly in bed, breathing even and nonlabored. No acute distress. Patient medicated as ordered for abdominal pain. CIWA as documented. Safety maintained. Patient stable upon exiting the room.

## 2023-07-31 NOTE — ED PROVIDER NOTE - OBJECTIVE STATEMENT
Patient is a 58-year-old male past medical history of EtOH abuse, HTN, HLD, glaucoma  brought in by EMS for evaluation of suspected alcohol intoxication.  Patient does admit to drinking 3 bottles of vodka today, his last drink was roughly 3:00 this afternoon.  He is also endorsing some chest discomfort for the last 2 days.  Patient was evaluated at [facility] for acute intoxication yesterday.

## 2023-07-31 NOTE — ED PROVIDER NOTE - ATTENDING APP SHARED VISIT CONTRIBUTION OF CARE
Patient is a 58-year-old male past medical history of EtOH abuse, HTN, HLD, glaucoma  brought in by EMS for evaluation of suspected alcohol intoxication.  Patient does admit to drinking 3 bottles of vodka today, his last drink was roughly 3:00 this afternoon.  He is also endorsing some chest discomfort for the last 2 days.  Patient was evaluated at [facility] for acute intoxication yesterday.

## 2023-07-31 NOTE — ED PROVIDER NOTE - CLINICAL SUMMARY MEDICAL DECISION MAKING FREE TEXT BOX
58-year-old male, past medical history of hypertension, hyperlipidemia, alcohol abuse brought in by EMS for suspected alcohol intoxication.  On presentation patient is acutely intoxicated, does admit to drinking 3 bottles of vodka, last drink was 3:00pm this afternoon.  Patient is also endorsing chest discomfort x2 days.  On presentation vital stable, no physical signs of trauma, EKG without any ischemic changes.  Chest pain possibly due to alcoholic induced gastritis, pancreatitis, ACS event.  Plan for routine labs including EtOH level, trial of Pepcid, Librium.

## 2023-07-31 NOTE — PROVIDER CONTACT NOTE (OTHER) - ASSESSMENT
Provider advised Sw that Pt needed information on Substance abuse . SW provided PT with referral to Telephonic SBIRT . Pt has no questions on the matter.   provided metro card number [id number]

## 2023-07-31 NOTE — ED PROVIDER NOTE - CPE EDP CARDIAC NORM
Airway    Date/Time: 5/22/2021 1:36 PM  Performed by: Miguel Ángel Ennis M.D.  Authorized by: Miguel Ángel Ennis M.D.     Location:  OR  Urgency:  Elective  Difficult Airway: No    Indications for Airway Management:  Anesthesia      Spontaneous Ventilation: absent    Sedation Level:  Deep  Preoxygenated: Yes    Patient Position:  Sniffing  Mask Difficulty Assessment:  0 - not attempted  Final Airway Type:  Endotracheal airway  Final Endotracheal Airway:  ETT  Cuffed: Yes    Technique Used for Successful ETT Placement:  Direct laryngoscopy    Insertion Site:  Oral  Blade Type:  Ayala  Laryngoscope Blade/Videolaryngoscope Blade Size:  2  ETT Size (mm):  7.0  Measured from:  Lips  ETT to Lips (cm):  21  Placement Verified by: auscultation and capnometry    Cormack-Lehane Classification:  Grade I - full view of glottis  Number of Attempts at Approach:  1           normal...

## 2023-07-31 NOTE — ED PROVIDER NOTE - PROGRESS NOTE DETAILS
PA Smartt: on reassessment patient AAOx3, ambulating steadily, no slurred speech, ataxia or tremors, vitals stable. patient provided list of detox programs.

## 2023-07-31 NOTE — ED PROVIDER NOTE - PATIENT PORTAL LINK FT
You can access the FollowMyHealth Patient Portal offered by [facility] by registering at the following website: [email or web address]. By joining [email or web address]’s FollowMyHealth portal, you will also be able to view your health information using other applications (apps) compatible with our system.
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
## 2023-08-01 DIAGNOSIS — E78.5 HYPERLIPIDEMIA, UNSPECIFIED: ICD-10-CM

## 2023-08-01 DIAGNOSIS — F10.229 ALCOHOL DEPENDENCE WITH INTOXICATION, UNSPECIFIED: ICD-10-CM

## 2023-08-01 DIAGNOSIS — L02.215 CUTANEOUS ABSCESS OF PERINEUM: ICD-10-CM

## 2023-08-01 DIAGNOSIS — D64.9 ANEMIA, UNSPECIFIED: ICD-10-CM

## 2023-08-01 DIAGNOSIS — F10.239 ALCOHOL DEPENDENCE WITH WITHDRAWAL, UNSPECIFIED: ICD-10-CM

## 2023-08-01 DIAGNOSIS — R73.9 HYPERGLYCEMIA, UNSPECIFIED: ICD-10-CM

## 2023-08-01 DIAGNOSIS — H54.413A BLINDNESS RIGHT EYE CATEGORY 3, NORMAL VISION LEFT EYE: ICD-10-CM

## 2023-08-01 DIAGNOSIS — H40.9 UNSPECIFIED GLAUCOMA: ICD-10-CM

## 2023-08-01 DIAGNOSIS — I10 ESSENTIAL (PRIMARY) HYPERTENSION: ICD-10-CM

## 2023-08-01 DIAGNOSIS — B96.89 OTHER SPECIFIED BACTERIAL AGENTS AS THE CAUSE OF DISEASES CLASSIFIED ELSEWHERE: ICD-10-CM

## 2023-08-12 ENCOUNTER — EMERGENCY (EMERGENCY)
Facility: HOSPITAL | Age: 58
LOS: 0 days | Discharge: HOME | End: 2023-08-12
Attending: STUDENT IN AN ORGANIZED HEALTH CARE EDUCATION/TRAINING PROGRAM
Payer: MEDICAID

## 2023-08-12 ENCOUNTER — INPATIENT (INPATIENT)
Facility: HOSPITAL | Age: 58
LOS: 7 days | Discharge: HOME | End: 2023-08-20
Attending: HOSPITALIST | Admitting: HOSPITALIST
Payer: MEDICAID

## 2023-08-12 VITALS
SYSTOLIC BLOOD PRESSURE: 129 MMHG | HEART RATE: 73 BPM | RESPIRATION RATE: 17 BRPM | OXYGEN SATURATION: 98 % | DIASTOLIC BLOOD PRESSURE: 75 MMHG

## 2023-08-12 VITALS
OXYGEN SATURATION: 96 % | RESPIRATION RATE: 16 BRPM | HEART RATE: 77 BPM | DIASTOLIC BLOOD PRESSURE: 82 MMHG | SYSTOLIC BLOOD PRESSURE: 138 MMHG | HEIGHT: 68 IN | TEMPERATURE: 98 F | WEIGHT: 199.96 LBS

## 2023-08-12 VITALS
TEMPERATURE: 99 F | DIASTOLIC BLOOD PRESSURE: 80 MMHG | HEART RATE: 96 BPM | OXYGEN SATURATION: 100 % | RESPIRATION RATE: 16 BRPM | SYSTOLIC BLOOD PRESSURE: 127 MMHG | HEIGHT: 68 IN

## 2023-08-12 DIAGNOSIS — I10 ESSENTIAL (PRIMARY) HYPERTENSION: ICD-10-CM

## 2023-08-12 DIAGNOSIS — R21 RASH AND OTHER NONSPECIFIC SKIN ERUPTION: ICD-10-CM

## 2023-08-12 DIAGNOSIS — H54.7 UNSPECIFIED VISUAL LOSS: ICD-10-CM

## 2023-08-12 DIAGNOSIS — E78.5 HYPERLIPIDEMIA, UNSPECIFIED: ICD-10-CM

## 2023-08-12 LAB
ALBUMIN SERPL ELPH-MCNC: 3.6 G/DL — SIGNIFICANT CHANGE UP (ref 3.3–5)
ALP SERPL-CCNC: 71 U/L — SIGNIFICANT CHANGE UP (ref 40–120)
ALT FLD-CCNC: 70 U/L — SIGNIFICANT CHANGE UP (ref 12–78)
ANION GAP SERPL CALC-SCNC: 6 MMOL/L — SIGNIFICANT CHANGE UP (ref 5–17)
AST SERPL-CCNC: 54 U/L — HIGH (ref 15–37)
BASOPHILS # BLD AUTO: 0.03 K/UL — SIGNIFICANT CHANGE UP (ref 0–0.2)
BASOPHILS NFR BLD AUTO: 0.6 % — SIGNIFICANT CHANGE UP (ref 0–2)
BILIRUB SERPL-MCNC: 0.4 MG/DL — SIGNIFICANT CHANGE UP (ref 0.2–1.2)
BUN SERPL-MCNC: 11 MG/DL — SIGNIFICANT CHANGE UP (ref 7–23)
CALCIUM SERPL-MCNC: 7.9 MG/DL — LOW (ref 8.5–10.1)
CHLORIDE SERPL-SCNC: 111 MMOL/L — HIGH (ref 96–108)
CO2 SERPL-SCNC: 28 MMOL/L — SIGNIFICANT CHANGE UP (ref 22–31)
CREAT SERPL-MCNC: 0.81 MG/DL — SIGNIFICANT CHANGE UP (ref 0.5–1.3)
CRP SERPL-MCNC: 8 MG/L — HIGH
EGFR: 101 ML/MIN/1.73M2 — SIGNIFICANT CHANGE UP
EOSINOPHIL # BLD AUTO: 0.33 K/UL — SIGNIFICANT CHANGE UP (ref 0–0.5)
EOSINOPHIL NFR BLD AUTO: 6.3 % — HIGH (ref 0–6)
ERYTHROCYTE [SEDIMENTATION RATE] IN BLOOD: 16 MM/HR — SIGNIFICANT CHANGE UP (ref 0–20)
GLUCOSE SERPL-MCNC: 94 MG/DL — SIGNIFICANT CHANGE UP (ref 70–99)
HCT VFR BLD CALC: 36.2 % — LOW (ref 39–50)
HGB BLD-MCNC: 12.1 G/DL — LOW (ref 13–17)
IMM GRANULOCYTES NFR BLD AUTO: 0 % — SIGNIFICANT CHANGE UP (ref 0–0.9)
LYMPHOCYTES # BLD AUTO: 1.76 K/UL — SIGNIFICANT CHANGE UP (ref 1–3.3)
LYMPHOCYTES # BLD AUTO: 33.7 % — SIGNIFICANT CHANGE UP (ref 13–44)
MCHC RBC-ENTMCNC: 29.5 PG — SIGNIFICANT CHANGE UP (ref 27–34)
MCHC RBC-ENTMCNC: 33.4 G/DL — SIGNIFICANT CHANGE UP (ref 32–36)
MCV RBC AUTO: 88.3 FL — SIGNIFICANT CHANGE UP (ref 80–100)
MONOCYTES # BLD AUTO: 0.52 K/UL — SIGNIFICANT CHANGE UP (ref 0–0.9)
MONOCYTES NFR BLD AUTO: 10 % — SIGNIFICANT CHANGE UP (ref 2–14)
NEUTROPHILS # BLD AUTO: 2.58 K/UL — SIGNIFICANT CHANGE UP (ref 1.8–7.4)
NEUTROPHILS NFR BLD AUTO: 49.4 % — SIGNIFICANT CHANGE UP (ref 43–77)
NRBC # BLD: 0 /100 WBCS — SIGNIFICANT CHANGE UP (ref 0–0)
PLATELET # BLD AUTO: 261 K/UL — SIGNIFICANT CHANGE UP (ref 150–400)
POTASSIUM SERPL-MCNC: 3.7 MMOL/L — SIGNIFICANT CHANGE UP (ref 3.5–5.3)
POTASSIUM SERPL-SCNC: 3.7 MMOL/L — SIGNIFICANT CHANGE UP (ref 3.5–5.3)
PROT SERPL-MCNC: 7.8 GM/DL — SIGNIFICANT CHANGE UP (ref 6–8.3)
RBC # BLD: 4.1 M/UL — LOW (ref 4.2–5.8)
RBC # FLD: 16.3 % — HIGH (ref 10.3–14.5)
SODIUM SERPL-SCNC: 145 MMOL/L — SIGNIFICANT CHANGE UP (ref 135–145)
WBC # BLD: 5.22 K/UL — SIGNIFICANT CHANGE UP (ref 3.8–10.5)
WBC # FLD AUTO: 5.22 K/UL — SIGNIFICANT CHANGE UP (ref 3.8–10.5)

## 2023-08-12 PROCEDURE — 99285 EMERGENCY DEPT VISIT HI MDM: CPT

## 2023-08-12 PROCEDURE — L9997: CPT

## 2023-08-12 PROCEDURE — 73090 X-RAY EXAM OF FOREARM: CPT | Mod: 26,LT

## 2023-08-12 RX ADMIN — Medication 1 APPLICATION(S): at 13:12

## 2023-08-12 NOTE — ED ADULT TRIAGE NOTE - CHIEF COMPLAINT QUOTE
pt here for L arm injury with red blistering. admits to alcohol intake. sleeping on the streets. L arm is red and blistered and swelling pt here for L arm injury with red blistering. admits to alcohol intake. sleeping on the streets. L arm is red and blistered and swelling. h/o alcohol abuse

## 2023-08-12 NOTE — ED ADULT NURSE NOTE - CHIEF COMPLAINT QUOTE
pt here for L arm injury with red blistering. admits to alcohol intake. sleeping on the streets. L arm is red and blistered and swelling. h/o alcohol abuse

## 2023-08-12 NOTE — ED ADULT NURSE NOTE - CHPI ED NUR SYMPTOMS NEG
no abrasion/no back pain/no bruising/no deformity/no difficulty bearing weight/no fever/no stiffness

## 2023-08-12 NOTE — ED PROVIDER NOTE - PROGRESS NOTE DETAILS
Chai: Labs and imaging non actionable.  Seems less cellulitis, will treat for dermatitis.  Will medicate and dc. Chai: Labs and imaging non actionable.  Seems less cellulitis, will treat for dermatitis.  Will medicate and dc.  Hx etoh abuse, clinically sober at this time and ambulating with steady gait.

## 2023-08-12 NOTE — ED ADULT NURSE NOTE - NSFALLRISKINTERV_ED_ALL_ED
Assistance OOB with selected safe patient handling equipment if applicable/Assistance with ambulation/Communicate fall risk and risk factors to all staff, patient, and family/Monitor gait and stability/Monitor for mental status changes and reorient to person, place, and time, as needed/Provide visual cue: yellow wristband, yellow gown, etc/Reinforce activity limits and safety measures with patient and family/Toileting schedule using arm’s reach rule for commode and bathroom/Use of alarms - bed, stretcher, chair and/or video monitoring/Call bell, personal items and telephone in reach/Instruct patient to call for assistance before getting out of bed/chair/stretcher/Non-slip footwear applied when patient is off stretcher/[place] to call system/Physically safe environment - no spills, clutter or unnecessary equipment/Purposeful Proactive Rounding/Room/bathroom lighting operational, light cord in reach

## 2023-08-12 NOTE — ED ADULT TRIAGE NOTE - CHIEF COMPLAINT QUOTE
intoxicated and rash    pt admits ti having 3 bottles of vodka. also c/o rash to left arm. has blisters, broken skin.  was seen and FL'ed from [place] earlier today.  pt walked in.  past medical history- htn, hyperlipidemia, glaucoma left eye. intoxicated and rash    pt admits to having 3 bottles of vodka. also c/o rash to left arm. has blisters, broken skin.  was seen and MD'ed from [place] earlier today.  pt walked in.  past medical history- htn, hyperlipidemia, glaucoma right eye.

## 2023-08-12 NOTE — ED ADULT NURSE NOTE - NURSING MUSC HAND GRASP
[facility]- Neurosurgery      We have the pleasure of seeing Adrian and his wife back in the office today.  Briefly he is a 76-year-old male who is now about 6-week status post L2-S1/ilium posterior spinal fusion with decompression.  He overall is doing quite well from that.  The pain he was having prior to surgery has largely resolved.  He does have some stiffness in his back which is bothersome for him but overall he is doing quite well.  He denies any new neurologic issues.  He was recently discharged from rehab.  He is wearing bilateral AFOs from his long-term foot weakness.    He is a male who appears his stated age.  He is no acute distress.  He maintains full strength in his bilateral proximal lower extremities with his bilateral distal weakness and he is wearing AFOs.    Standing lumbar films demonstrate the L2-S1/ilium posterior spinal fusion without evidence of instrumentation loosening or failure.    76-year-old male 6-week status post L2-S1/ilium posterior spinal fusion with decompression.  Overall he is doing relatively well and is making progress with physical therapy.  I encouraged him to remain active and continue making that progress.  We will plan to see him back in the office in about 6 weeks.    Total time required for this encounter: 20 minutes including evaluation of medical records, review of imaging, face to face contact with patient, and medical decision making.    This dictation was created using Dragon voice recognition software and thus transcription errors or variance may occur.            Bao Nixon MD       strong right, weak left

## 2023-08-12 NOTE — ED PROVIDER NOTE - NSFOLLOWUPINSTRUCTIONS_ED_ALL_ED_FT
Rest, drink plenty of fluids  Advance activity as tolerated  Continue all previously prescribed medications as directed  Follow up with your PMD - bring copies of your results  Return to the ER for worsening symptoms, fever, pain, or other new or concerning symptoms   Apply cream twice daily to affected area for 7 days

## 2023-08-12 NOTE — ED ADULT NURSE NOTE - OBJECTIVE STATEMENT
patient alert and oriented x4, came in for hand injury. pt doesn't recall event but does actively live on the street and is alcohol dependent, 2x days ago pain in his left forearm/ hand appeared with blisters. pt states the pain has been so bad that he drinks to numb the discomfort. pt poor historian. upon assessment, left arm is swollen with blisters. pt OOB with stand by assist. fall precautions initiated and safety precautions initiated. pt denies chest pain, SOB, nausea, vomiting, tremors, dizziness. pt in no acute respiratory distress at this time.

## 2023-08-12 NOTE — ED PROVIDER NOTE - PHYSICAL EXAMINATION
General appearance: Nontoxic appearing, conversant, afebrile    Eyes: anicteric sclerae, SHIV, EOMI   HENT: Atraumatic; oropharynx clear, MMM and no ulcerations, no pharyngeal erythema or exudate   Neck: Trachea midline; Full range of motion, supple   Pulm: normal respiratory effort and no intercostal retractions, normal work of breathing   Extremities: No peripheral edema, no gross deformities, FROM x4, 5/5 MS x4, gross sensation intact    Skin: Dry, normal temperature, turgor and texture; L forearm with erythema, ~4x6cm area urticarial type rash with <0.5cm blisters, non tender to palpation, no fluctuance, crepitus.   Psych: Appropriate affect, cooperative

## 2023-08-12 NOTE — ED PROVIDER NOTE - CLINICAL SUMMARY MEDICAL DECISION MAKING FREE TEXT BOX
59yo male with pmh etoh abuse, htn, hld, p/w LUE rash.  States he was sleeping in the street and woke up with LUE pruritic rash 3 days ago.  Began blistering so came to ED.  No fevers.  Some pain but mild right now, mainly itchy.  Located over distal L forearm.  No new medications, drugs.  No known trauma.  No numbness, weakness.  No other areas involved.  Exam with urticarial? vesicular rash without ttp, + surrounding erythema.  No fluctuance, crepitus.  Does not feel warm.  No joint involvement and + intact rom without pain.  Nontoxic appearing otherwise.  Patient poor historian and cannot recall inciting event or anything that could have caused it.  States he was drinking and woke up with it.  Unclear etiology, possible sunburn, poison ivy, contact dermatitis.  Minimally symptomatic now from pain, pruritis.  Will eval for infection, trauma.  Anticipate dc with outpatient meds.

## 2023-08-12 NOTE — ED ADULT TRIAGE NOTE - PATIENT ON (OXYGEN DELIVERY METHOD)
AMD (DRY), OU___:  PRESCRIBE AREDS 2 VITAMINS / AMSLER GRID QD/ UV PROTECTION. SMOKING CESSATION EMPHASIZED. RETURN FOR FOLLOW-UP AS SCHEDULED. room air

## 2023-08-12 NOTE — ED PROVIDER NOTE - PATIENT PORTAL LINK FT
You can access the FollowMyHealth Patient Portal offered by [facility] by registering at the following website: [email or web address]. By joining [employer]’s FollowMyHealth portal, you will also be able to view your health information using other applications (apps) compatible with our system.

## 2023-08-13 DIAGNOSIS — F10.939 ALCOHOL USE, UNSPECIFIED WITH WITHDRAWAL, UNSPECIFIED: ICD-10-CM

## 2023-08-13 LAB
ALBUMIN SERPL ELPH-MCNC: 3.8 G/DL — SIGNIFICANT CHANGE UP (ref 3.3–5)
ALBUMIN SERPL ELPH-MCNC: 3.8 G/DL — SIGNIFICANT CHANGE UP (ref 3.3–5)
ALP SERPL-CCNC: 66 U/L — SIGNIFICANT CHANGE UP (ref 40–120)
ALP SERPL-CCNC: 70 U/L — SIGNIFICANT CHANGE UP (ref 40–120)
ALT FLD-CCNC: 43 U/L — HIGH (ref 4–41)
ALT FLD-CCNC: 45 U/L — HIGH (ref 4–41)
ANION GAP SERPL CALC-SCNC: 13 MMOL/L — SIGNIFICANT CHANGE UP (ref 7–14)
ANION GAP SERPL CALC-SCNC: 15 MMOL/L — HIGH (ref 7–14)
ANISOCYTOSIS BLD QL: SLIGHT — SIGNIFICANT CHANGE UP
APAP SERPL-MCNC: <10 UG/ML — LOW (ref 15–25)
AST SERPL-CCNC: 41 U/L — HIGH (ref 4–40)
AST SERPL-CCNC: 42 U/L — HIGH (ref 4–40)
BASOPHILS # BLD AUTO: 0 K/UL — SIGNIFICANT CHANGE UP (ref 0–0.2)
BASOPHILS NFR BLD AUTO: 0 % — SIGNIFICANT CHANGE UP (ref 0–2)
BILIRUB DIRECT SERPL-MCNC: <0.2 MG/DL — SIGNIFICANT CHANGE UP (ref 0–0.3)
BILIRUB INDIRECT FLD-MCNC: >0.1 MG/DL — SIGNIFICANT CHANGE UP (ref 0–1)
BILIRUB SERPL-MCNC: 0.3 MG/DL — SIGNIFICANT CHANGE UP (ref 0.2–1.2)
BILIRUB SERPL-MCNC: <0.2 MG/DL — SIGNIFICANT CHANGE UP (ref 0.2–1.2)
BUN SERPL-MCNC: 11 MG/DL — SIGNIFICANT CHANGE UP (ref 7–23)
BUN SERPL-MCNC: 12 MG/DL — SIGNIFICANT CHANGE UP (ref 7–23)
CALCIUM SERPL-MCNC: 7.9 MG/DL — LOW (ref 8.4–10.5)
CALCIUM SERPL-MCNC: 8 MG/DL — LOW (ref 8.4–10.5)
CHLORIDE SERPL-SCNC: 106 MMOL/L — SIGNIFICANT CHANGE UP (ref 98–107)
CHLORIDE SERPL-SCNC: 108 MMOL/L — HIGH (ref 98–107)
CO2 SERPL-SCNC: 24 MMOL/L — SIGNIFICANT CHANGE UP (ref 22–31)
CO2 SERPL-SCNC: 25 MMOL/L — SIGNIFICANT CHANGE UP (ref 22–31)
CREAT SERPL-MCNC: 0.73 MG/DL — SIGNIFICANT CHANGE UP (ref 0.5–1.3)
CREAT SERPL-MCNC: 0.99 MG/DL — SIGNIFICANT CHANGE UP (ref 0.5–1.3)
EGFR: 104 ML/MIN/1.73M2 — SIGNIFICANT CHANGE UP
EGFR: 87 ML/MIN/1.73M2 — SIGNIFICANT CHANGE UP
EOSINOPHIL # BLD AUTO: 0.3 K/UL — SIGNIFICANT CHANGE UP (ref 0–0.5)
EOSINOPHIL NFR BLD AUTO: 6.1 % — HIGH (ref 0–6)
ETHANOL SERPL-MCNC: 308 MG/DL — HIGH
GIANT PLATELETS BLD QL SMEAR: PRESENT — SIGNIFICANT CHANGE UP
GLUCOSE SERPL-MCNC: 82 MG/DL — SIGNIFICANT CHANGE UP (ref 70–99)
GLUCOSE SERPL-MCNC: 91 MG/DL — SIGNIFICANT CHANGE UP (ref 70–99)
HCT VFR BLD CALC: 34.5 % — LOW (ref 39–50)
HGB BLD-MCNC: 11.4 G/DL — LOW (ref 13–17)
IANC: 1.49 K/UL — LOW (ref 1.8–7.4)
LYMPHOCYTES # BLD AUTO: 2.51 K/UL — SIGNIFICANT CHANGE UP (ref 1–3.3)
LYMPHOCYTES # BLD AUTO: 50.4 % — HIGH (ref 13–44)
MAGNESIUM SERPL-MCNC: 2.2 MG/DL — SIGNIFICANT CHANGE UP (ref 1.6–2.6)
MANUAL SMEAR VERIFICATION: SIGNIFICANT CHANGE UP
MCHC RBC-ENTMCNC: 29.5 PG — SIGNIFICANT CHANGE UP (ref 27–34)
MCHC RBC-ENTMCNC: 33 GM/DL — SIGNIFICANT CHANGE UP (ref 32–36)
MCV RBC AUTO: 89.4 FL — SIGNIFICANT CHANGE UP (ref 80–100)
MONOCYTES # BLD AUTO: 0.43 K/UL — SIGNIFICANT CHANGE UP (ref 0–0.9)
MONOCYTES NFR BLD AUTO: 8.7 % — SIGNIFICANT CHANGE UP (ref 2–14)
NEUTROPHILS # BLD AUTO: 1.51 K/UL — LOW (ref 1.8–7.4)
NEUTROPHILS NFR BLD AUTO: 30.4 % — LOW (ref 43–77)
NRBC # BLD: 1 /100 — HIGH (ref 0–0)
OVALOCYTES BLD QL SMEAR: SLIGHT — SIGNIFICANT CHANGE UP
PHOSPHATE SERPL-MCNC: 2.5 MG/DL — SIGNIFICANT CHANGE UP (ref 2.5–4.5)
PLAT MORPH BLD: NORMAL — SIGNIFICANT CHANGE UP
PLATELET # BLD AUTO: 271 K/UL — SIGNIFICANT CHANGE UP (ref 150–400)
PLATELET COUNT - ESTIMATE: NORMAL — SIGNIFICANT CHANGE UP
POIKILOCYTOSIS BLD QL AUTO: SLIGHT — SIGNIFICANT CHANGE UP
POLYCHROMASIA BLD QL SMEAR: SLIGHT — SIGNIFICANT CHANGE UP
POTASSIUM SERPL-MCNC: 3.8 MMOL/L — SIGNIFICANT CHANGE UP (ref 3.5–5.3)
POTASSIUM SERPL-MCNC: 3.8 MMOL/L — SIGNIFICANT CHANGE UP (ref 3.5–5.3)
POTASSIUM SERPL-SCNC: 3.8 MMOL/L — SIGNIFICANT CHANGE UP (ref 3.5–5.3)
POTASSIUM SERPL-SCNC: 3.8 MMOL/L — SIGNIFICANT CHANGE UP (ref 3.5–5.3)
PROT SERPL-MCNC: 7.2 G/DL — SIGNIFICANT CHANGE UP (ref 6–8.3)
PROT SERPL-MCNC: 7.4 G/DL — SIGNIFICANT CHANGE UP (ref 6–8.3)
RBC # BLD: 3.86 M/UL — LOW (ref 4.2–5.8)
RBC # FLD: 16.4 % — HIGH (ref 10.3–14.5)
RBC BLD AUTO: ABNORMAL
SALICYLATES SERPL-MCNC: <0.3 MG/DL — LOW (ref 15–30)
SODIUM SERPL-SCNC: 145 MMOL/L — SIGNIFICANT CHANGE UP (ref 135–145)
SODIUM SERPL-SCNC: 146 MMOL/L — HIGH (ref 135–145)
TOXICOLOGY SCREEN, DRUGS OF ABUSE, SERUM RESULT: SIGNIFICANT CHANGE UP
VARIANT LYMPHS # BLD: 4.4 % — SIGNIFICANT CHANGE UP (ref 0–6)
WBC # BLD: 4.98 K/UL — SIGNIFICANT CHANGE UP (ref 3.8–10.5)
WBC # FLD AUTO: 4.98 K/UL — SIGNIFICANT CHANGE UP (ref 3.8–10.5)

## 2023-08-13 PROCEDURE — 73080 X-RAY EXAM OF ELBOW: CPT | Mod: 26,LT

## 2023-08-13 PROCEDURE — 73100 X-RAY EXAM OF WRIST: CPT | Mod: 26,LT

## 2023-08-13 PROCEDURE — 99223 1ST HOSP IP/OBS HIGH 75: CPT

## 2023-08-13 RX ORDER — CEFAZOLIN SODIUM 1 G
2000 VIAL (EA) INJECTION EVERY 8 HOURS
Refills: 0 | Status: DISCONTINUED | OUTPATIENT
Start: 2023-08-13 | End: 2023-08-20

## 2023-08-13 RX ORDER — DIAZEPAM 5 MG
5 TABLET ORAL ONCE
Refills: 0 | Status: DISCONTINUED | OUTPATIENT
Start: 2023-08-13 | End: 2023-08-13

## 2023-08-13 RX ORDER — SODIUM CHLORIDE 9 MG/ML
1000 INJECTION, SOLUTION INTRAVENOUS
Refills: 0 | Status: DISCONTINUED | OUTPATIENT
Start: 2023-08-13 | End: 2023-08-14

## 2023-08-13 RX ORDER — CEFAZOLIN SODIUM 1 G
VIAL (EA) INJECTION
Refills: 0 | Status: DISCONTINUED | OUTPATIENT
Start: 2023-08-13 | End: 2023-08-20

## 2023-08-13 RX ORDER — ACETAMINOPHEN 500 MG
650 TABLET ORAL EVERY 6 HOURS
Refills: 0 | Status: DISCONTINUED | OUTPATIENT
Start: 2023-08-13 | End: 2023-08-20

## 2023-08-13 RX ORDER — CEFAZOLIN SODIUM 1 G
2000 VIAL (EA) INJECTION ONCE
Refills: 0 | Status: COMPLETED | OUTPATIENT
Start: 2023-08-13 | End: 2023-08-13

## 2023-08-13 RX ADMIN — Medication 650 MILLIGRAM(S): at 22:17

## 2023-08-13 RX ADMIN — SODIUM CHLORIDE 200 MILLILITER(S): 9 INJECTION, SOLUTION INTRAVENOUS at 14:44

## 2023-08-13 RX ADMIN — Medication 650 MILLIGRAM(S): at 15:15

## 2023-08-13 RX ADMIN — Medication 5 MILLIGRAM(S): at 09:47

## 2023-08-13 RX ADMIN — Medication 650 MILLIGRAM(S): at 23:15

## 2023-08-13 RX ADMIN — Medication 2 MILLIGRAM(S): at 20:09

## 2023-08-13 RX ADMIN — Medication 2 MILLIGRAM(S): at 14:02

## 2023-08-13 RX ADMIN — Medication 100 MILLIGRAM(S): at 14:02

## 2023-08-13 RX ADMIN — Medication 2 MILLIGRAM(S): at 22:15

## 2023-08-13 RX ADMIN — Medication 650 MILLIGRAM(S): at 14:02

## 2023-08-13 RX ADMIN — Medication 100 MILLIGRAM(S): at 23:38

## 2023-08-13 NOTE — H&P ADULT - PROBLEM SELECTOR PLAN 1
-no evidence of trauma, xrays neg  -given c/f bug bite and worsening erythema, will treat for impetigo/cellulitis. MRSA swab neg. c/w cefazolin, monitor response   -consider derm eval if no improvement

## 2023-08-13 NOTE — ED ADULT NURSE NOTE - CHIEF COMPLAINT QUOTE
intoxicated and rash    pt admits to having 3 bottles of vodka. also c/o rash to left arm. has blisters, broken skin.  was seen and AZ'ed from [place] earlier today.  pt walked in.  past medical history- htn, hyperlipidemia, glaucoma right eye.

## 2023-08-13 NOTE — H&P ADULT - NSHPPHYSICALEXAM_GEN_ALL_CORE
GENERAL APPEARANCE: Well developed, alert and cooperative. NAD.   HEENT:  PERRL, EOMI. right eye haziness. External auditory canals normal, hearing grossly intact.  NECK: Neck supple, non-tender  CARDIAC: Normal S1 and S2. No S3, S4 or murmurs. Rhythm is regular.  LUNGS: Clear to auscultation and percussion without rales, rhonchi, wheezing or diminished breath sounds.  ABDOMEN: Positive bowel sounds. Soft, nondistended, nontender. No guarding or rebound.   MUSCULOSKELETAL: ROM intact spine and extremities. No joint erythema or tenderness.   BACK: normal posture, no spinal deformity, symmetry of spinal muscles, without tenderness, decreased range of motion.  EXTREMITIES: No significant deformity or joint abnormality. No edema. Peripheral pulses intact.   NEUROLOGICAL: CN II-XII intact. Strength and sensation symmetric and intact throughout. Tremors of outstretched hands   SKIN: blistering rash on left forearm with scabs on a erythematous base. Infiltration and erythema surrounding IV on right hand   PSYCHIATRIC: AOx3. anxious affect and behavior.

## 2023-08-13 NOTE — H&P ADULT - NSHPREVIEWOFSYSTEMS_GEN_ALL_CORE
CONSTITUTIONAL: +fatigue +chills No weight loss, fever  HEENT:  Eyes:  No visual loss, blurred vision, double vision or yellow sclerae. Ears, Nose, Throat:  No hearing loss, sneezing, congestion, runny nose or sore throat.  SKIN:  +rash  CARDIOVASCULAR:  No chest pain, chest pressure or chest discomfort. No palpitations or edema.  RESPIRATORY:  No shortness of breath, cough or sputum.  GASTROINTESTINAL:  No anorexia, nausea, vomiting or diarrhea. No abdominal pain or blood.  GENITOURINARY:  Denies hematuria, dysuria.   NEUROLOGICAL:  +tremors No headache, dizziness, syncope, paralysis, ataxia, numbness or tingling in the extremities. No change in bowel or bladder control.  MUSCULOSKELETAL:  No muscle, back pain, joint pain or stiffness.  PSYCHIATRIC:  +anxiety No history of depression  ENDOCRINOLOGIC:  No reports of sweating, cold or heat intolerance. No polyuria or polydipsia.  ALLERGIES:  No history of asthma, hives, eczema or rhinitis.

## 2023-08-13 NOTE — ED PROVIDER NOTE - PHYSICAL EXAMINATION
Dionna LEBLANC:  VITALS: Initial triage and subsequent vitals have been reviewed by me.  GEN APPEARANCE: Alert, cooperative. Non-toxic appearing. Well appearing. NAD.  HEAD: Atraumatic, normocephalic   EYES: PERRLa, EOMI, vision grossly intact.   EARS: Gross hearing intact.   NOSE: No nasal discharge, no external evidence of epistaxis.   NECK: Supple  CV: RRR, S1S2, no c/r/m/g. No cyanosis. Extremities warm, well perfused. Cap refill <2 seconds. No bruits.   LUNGS: CTAB. No wheezing. No rales. No rhonchi. No diminished breath sounds.   ABDOMEN: Soft, NTND. No guarding or rebound. No masses.   MSK/EXT: Spine appears normal, no spine point tenderness. No CVA ttp. Normal muscular development. Pelvis stable. No obvious joint or bony deformity, no peripheral edema.   NEURO: Alert, follows commands. Weight bearing normal. Speech normal. Sensation and motor normal x4 extremities.   SKIN: Left forearm with trace vesicular rash with exfoliation not erythematous not warm to touch appears consistent with possible poison oak or ivy or contact dermatitis does not appear like acute cellulitis.  PSYCH: Appears clinically intoxicated

## 2023-08-13 NOTE — H&P ADULT - PROBLEM SELECTOR PLAN 2
-CIWAs 6-10  -c/w high risk ativan taper as per CIWA protocol   -mild hepatitis noted  -c/w thiamine, folic acid and MV  - eval

## 2023-08-13 NOTE — H&P ADULT - NSHPLABSRESULTS_GEN_ALL_CORE
(08-13 @ 00:20)                      11.4  4.98 )-----------( 271                 34.5    Neutrophils = 1.51 (30.4%)  Lymphocytes = 2.51 (50.4%)  Eosinophils = 0.30 (6.1%)  Basophils = 0.00 (0.0%)  Monocytes = 0.43 (8.7%)  Bands = --%    08-13    145  |  106  |  11  ----------------------------<  82  3.8   |  24  |  0.73    Ca    8.0<L>      13 Aug 2023 08:22  Phos  2.5     08-13  Mg     2.20     08-13    TPro  7.4  /  Alb  3.8  /  TBili  0.3  /  DBili  <0.2  /  AST  42<H>  /  ALT  43<H>  /  AlkPhos  70  08-13            Tox:   (08-13 @ 00:20)  Acetaminophen Level, Serum: <10 [15 - 25]  Barbiturates Measurement: --  Benzodiazepines: --  Ethanol, Whole Blood: --  Salicylate Level, Serum: <0.3 [15.0 - 30.0]        Urinalysis Basic - ( 13 Aug 2023 08:22 )    Color: x / Appearance: x / SG: x / pH: x  Gluc: 82 mg/dL / Ketone: x  / Bili: x / Urobili: x   Blood: x / Protein: x / Nitrite: x   Leuk Esterase: x / RBC: x / WBC x   Sq Epi: x / Non Sq Epi: x / Bacteria: x    < from: Xray Wrist 2 Views, Left (08.13.23 @ 00:34) >      IMPRESSION:  No acute osseous evaluate the left wrist.    < end of copied text >    < from: Xray Elbow AP + Lateral + Oblique, Left (08.13.23 @ 00:33) >    IMPRESSION:  No acute osseous abnormality of the left elbow.    < end of copied text >        Labs personally reviewed  Imaging reviewed  EKG: NSR no acute st changes

## 2023-08-13 NOTE — ED ADULT NURSE NOTE - PRIMARY CARE PROVIDER
Pt with episodes of non-sustainable Vtach, Dr Smith aware, cardiology consult was placed.  
unknown
Other

## 2023-08-13 NOTE — ED ADULT NURSE NOTE - OBJECTIVE STATEMENT
Received pt in rm 19 with c/o intoxication and rash to left arm. Noted blisters and edema to left arm. Pt is A&Ox3. Denies CP, SOB, dizziness. IV heplock inserted to right lower arm #20. All labs collected. Nursing will continue to monitor. No signs of withdrawal noted. Safety maintained.

## 2023-08-13 NOTE — ED ADULT NURSE REASSESSMENT NOTE - NS ED NURSE REASSESS COMMENT FT1
Pt c/o pain at IV site, new #20g IV placed to R hand, pt tolerated well denies pain at this site. Old IV removed intact, pressure applied.
Pt resting in bed, complaining of worsening pain. Primary team paged. Will continue to monitor.
belongings secured, 2 bags paced in cabinet 1 envelope to security.
Received report from nightshift RN. Pt resting in bed, pending admission bed assignment. Will continue to monitor.

## 2023-08-13 NOTE — H&P ADULT - HISTORY OF PRESENT ILLNESS
58-year-old male, history of hypertension, alcohol abuse seen at [facility] earlier in same day for possible alcohol intoxication and rash to left forearm, re presents to ED with complaint of itchy rash to left forearm, Pt thinks he was bit my a bug a few days ago, Since then, has had a rash with worsening redness on left forearm. Redness seems to be tracking up, with associated chills. Denies fevers, chest pain, shortness of breath, abdominal pain, N/V. He reports history of alcohol w/d. Currently feels tremulous and anxious, denies A/V or tactile hallucinations . He admits to drinking 1L of vodka daily. Also notes a history of glaucoma to the right eye with vision deficits at that side, no recent trauma or falls per patient.

## 2023-08-13 NOTE — ED PROVIDER NOTE - PROGRESS NOTE DETAILS
Beba García MD (PGY3): Patient presenting with alcohol intoxication and forearm rash.  X-rays negative.  Likely poison ivy.   On reassessment by previous team patient with fasciculations.  On my reassessment  he had fasciculations, tremors,  anxious appearing.   Will give diazepam.  We will admit patient for alcohol withdrawal.   Admitted to hospitalist. ANT ordered.

## 2023-08-13 NOTE — ED PROVIDER NOTE - ATTENDING CONTRIBUTION TO CARE
I have personally performed a face to face medical and diagnostic evaluation of the patient. I have discussed with and reviewed the Resident's note and agree with the History, ROS, Physical Exam and MDM unless otherwise indicated. A brief summary of my personal evaluation and impression can be found below.    Dionna LEBLANC: Please see the HPI, ROS, PE and MDM as authored by me.

## 2023-08-13 NOTE — ED ADULT NURSE NOTE - NSFALLRISKINTERV_ED_ALL_ED
Assistance OOB with selected safe patient handling equipment if applicable/Assistance with ambulation/Communicate fall risk and risk factors to all staff, patient, and family/Monitor gait and stability/Monitor for mental status changes and reorient to person, place, and time, as needed/Provide visual cue: yellow wristband, yellow gown, etc/Reinforce activity limits and safety measures with patient and family/Toileting schedule using arm’s reach rule for commode and bathroom/Use of alarms - bed, stretcher, chair and/or video monitoring/Call bell, personal items and telephone in reach/Instruct patient to call for assistance before getting out of bed/chair/stretcher/Non-slip footwear applied when patient is off stretcher/[place] to call system/Physically safe environment - no spills, clutter or unnecessary equipment/Purposeful Proactive Rounding/Room/bathroom lighting operational, light cord in reach

## 2023-08-13 NOTE — ED PROVIDER NOTE - CLINICAL SUMMARY MEDICAL DECISION MAKING FREE TEXT BOX
Dionna LEBLANC:   Exam vital signs stable nontoxic-appearing though does appear clinically intoxicated with physical exam as above, DDx concern for possible alcohol intoxication rash to left forearm appears consistent with that of contact dermatitis or poison/oak ivy, however patient is denying alcohol consumption, given this we will check basic labs including tox screen, if confirmed with BAL will reassess and monitor when clinically sober get x-rays to eval for possible fracture given reported port of pain, reassess pending sobriety.

## 2023-08-13 NOTE — H&P ADULT - ASSESSMENT
58-year-old male, history of hypertension, alcohol abuse seen at [facility] earlier in same day for possible alcohol intoxication and rash to left forearm likely 2/2 impetigo vs contact dermatitis admitted for further management of alcohol w/d

## 2023-08-13 NOTE — ED PROVIDER NOTE - OBJECTIVE STATEMENT
Dionna LEBLANC: 58-year-old male, history of hypertension, seen at [facility] earlier in same day for possible alcohol intoxication and rash to left forearm, re presents to ED with complaint of itchy rash to left forearm, patient has no other acute complaints at this time, however appears acutely intoxicated however he denies any alcohol consumption over the last 2 days.  Notes a history of glaucoma to the right eye with vision deficits at that side, no recent trauma or falls per patient.  Patient reports the arm is itchy and is having some discomfort.       Interp [id number]

## 2023-08-14 DIAGNOSIS — H40.9 UNSPECIFIED GLAUCOMA: ICD-10-CM

## 2023-08-14 DIAGNOSIS — R21 RASH AND OTHER NONSPECIFIC SKIN ERUPTION: ICD-10-CM

## 2023-08-14 DIAGNOSIS — I10 ESSENTIAL (PRIMARY) HYPERTENSION: ICD-10-CM

## 2023-08-14 DIAGNOSIS — Z29.9 ENCOUNTER FOR PROPHYLACTIC MEASURES, UNSPECIFIED: ICD-10-CM

## 2023-08-14 DIAGNOSIS — F10.239 ALCOHOL DEPENDENCE WITH WITHDRAWAL, UNSPECIFIED: ICD-10-CM

## 2023-08-14 LAB
ALBUMIN SERPL ELPH-MCNC: 3.5 G/DL — SIGNIFICANT CHANGE UP (ref 3.3–5)
ALP SERPL-CCNC: 73 U/L — SIGNIFICANT CHANGE UP (ref 40–120)
ALT FLD-CCNC: 34 U/L — SIGNIFICANT CHANGE UP (ref 4–41)
ANION GAP SERPL CALC-SCNC: 11 MMOL/L — SIGNIFICANT CHANGE UP (ref 7–14)
AST SERPL-CCNC: 39 U/L — SIGNIFICANT CHANGE UP (ref 4–40)
BILIRUB SERPL-MCNC: 1 MG/DL — SIGNIFICANT CHANGE UP (ref 0.2–1.2)
BUN SERPL-MCNC: 8 MG/DL — SIGNIFICANT CHANGE UP (ref 7–23)
CALCIUM SERPL-MCNC: 8.2 MG/DL — LOW (ref 8.4–10.5)
CHLORIDE SERPL-SCNC: 102 MMOL/L — SIGNIFICANT CHANGE UP (ref 98–107)
CO2 SERPL-SCNC: 22 MMOL/L — SIGNIFICANT CHANGE UP (ref 22–31)
CREAT SERPL-MCNC: 0.63 MG/DL — SIGNIFICANT CHANGE UP (ref 0.5–1.3)
EGFR: 109 ML/MIN/1.73M2 — SIGNIFICANT CHANGE UP
GLUCOSE SERPL-MCNC: 95 MG/DL — SIGNIFICANT CHANGE UP (ref 70–99)
MAGNESIUM SERPL-MCNC: 1.9 MG/DL — SIGNIFICANT CHANGE UP (ref 1.6–2.6)
PHOSPHATE SERPL-MCNC: 2.5 MG/DL — SIGNIFICANT CHANGE UP (ref 2.5–4.5)
POTASSIUM SERPL-MCNC: 3.7 MMOL/L — SIGNIFICANT CHANGE UP (ref 3.5–5.3)
POTASSIUM SERPL-SCNC: 3.7 MMOL/L — SIGNIFICANT CHANGE UP (ref 3.5–5.3)
PROT SERPL-MCNC: 7.2 G/DL — SIGNIFICANT CHANGE UP (ref 6–8.3)
SODIUM SERPL-SCNC: 135 MMOL/L — SIGNIFICANT CHANGE UP (ref 135–145)

## 2023-08-14 PROCEDURE — 99232 SBSQ HOSP IP/OBS MODERATE 35: CPT

## 2023-08-14 RX ORDER — FOLIC ACID 0.8 MG
1 TABLET ORAL DAILY
Refills: 0 | Status: DISCONTINUED | OUTPATIENT
Start: 2023-08-14 | End: 2023-08-20

## 2023-08-14 RX ORDER — ENOXAPARIN SODIUM 100 MG/ML
40 INJECTION SUBCUTANEOUS EVERY 24 HOURS
Refills: 0 | Status: DISCONTINUED | OUTPATIENT
Start: 2023-08-14 | End: 2023-08-20

## 2023-08-14 RX ORDER — THIAMINE MONONITRATE (VIT B1) 100 MG
100 TABLET ORAL DAILY
Refills: 0 | Status: DISCONTINUED | OUTPATIENT
Start: 2023-08-14 | End: 2023-08-20

## 2023-08-14 RX ORDER — TIMOLOL 0.5 %
1 DROPS OPHTHALMIC (EYE)
Refills: 0 | Status: DISCONTINUED | OUTPATIENT
Start: 2023-08-14 | End: 2023-08-20

## 2023-08-14 RX ORDER — LANOLIN ALCOHOL/MO/W.PET/CERES
3 CREAM (GRAM) TOPICAL AT BEDTIME
Refills: 0 | Status: DISCONTINUED | OUTPATIENT
Start: 2023-08-14 | End: 2023-08-20

## 2023-08-14 RX ORDER — ONDANSETRON 8 MG/1
4 TABLET, FILM COATED ORAL EVERY 8 HOURS
Refills: 0 | Status: DISCONTINUED | OUTPATIENT
Start: 2023-08-14 | End: 2023-08-20

## 2023-08-14 RX ORDER — DORZOLAMIDE HYDROCHLORIDE 20 MG/ML
1 SOLUTION/ DROPS OPHTHALMIC THREE TIMES A DAY
Refills: 0 | Status: DISCONTINUED | OUTPATIENT
Start: 2023-08-14 | End: 2023-08-20

## 2023-08-14 RX ORDER — AMLODIPINE BESYLATE 2.5 MG/1
5 TABLET ORAL DAILY
Refills: 0 | Status: DISCONTINUED | OUTPATIENT
Start: 2023-08-14 | End: 2023-08-20

## 2023-08-14 RX ORDER — LATANOPROST 0.05 MG/ML
1 SOLUTION/ DROPS OPHTHALMIC; TOPICAL AT BEDTIME
Refills: 0 | Status: DISCONTINUED | OUTPATIENT
Start: 2023-08-14 | End: 2023-08-20

## 2023-08-14 RX ADMIN — Medication 2 MILLIGRAM(S): at 05:37

## 2023-08-14 RX ADMIN — AMLODIPINE BESYLATE 5 MILLIGRAM(S): 2.5 TABLET ORAL at 05:37

## 2023-08-14 RX ADMIN — Medication 650 MILLIGRAM(S): at 10:51

## 2023-08-14 RX ADMIN — Medication 100 MILLIGRAM(S): at 22:36

## 2023-08-14 RX ADMIN — Medication 1 DROP(S): at 18:53

## 2023-08-14 RX ADMIN — Medication 1 DROP(S): at 09:03

## 2023-08-14 RX ADMIN — DORZOLAMIDE HYDROCHLORIDE 1 DROP(S): 20 SOLUTION/ DROPS OPHTHALMIC at 05:37

## 2023-08-14 RX ADMIN — Medication 2 MILLIGRAM(S): at 11:31

## 2023-08-14 RX ADMIN — Medication 2 MILLIGRAM(S): at 13:34

## 2023-08-14 RX ADMIN — ENOXAPARIN SODIUM 40 MILLIGRAM(S): 100 INJECTION SUBCUTANEOUS at 05:37

## 2023-08-14 RX ADMIN — Medication 100 MILLIGRAM(S): at 13:14

## 2023-08-14 RX ADMIN — DORZOLAMIDE HYDROCHLORIDE 1 DROP(S): 20 SOLUTION/ DROPS OPHTHALMIC at 13:13

## 2023-08-14 RX ADMIN — Medication 2 MILLIGRAM(S): at 08:41

## 2023-08-14 RX ADMIN — Medication 100 MILLIGRAM(S): at 05:38

## 2023-08-14 RX ADMIN — Medication 1.5 MILLIGRAM(S): at 18:53

## 2023-08-14 RX ADMIN — LATANOPROST 1 DROP(S): 0.05 SOLUTION/ DROPS OPHTHALMIC; TOPICAL at 23:39

## 2023-08-14 RX ADMIN — Medication 650 MILLIGRAM(S): at 16:03

## 2023-08-14 RX ADMIN — Medication 1 MILLIGRAM(S): at 13:14

## 2023-08-14 RX ADMIN — Medication 650 MILLIGRAM(S): at 18:09

## 2023-08-14 RX ADMIN — Medication 1 TABLET(S): at 13:14

## 2023-08-14 RX ADMIN — Medication 650 MILLIGRAM(S): at 12:22

## 2023-08-14 RX ADMIN — Medication 1.5 MILLIGRAM(S): at 23:04

## 2023-08-14 RX ADMIN — DORZOLAMIDE HYDROCHLORIDE 1 DROP(S): 20 SOLUTION/ DROPS OPHTHALMIC at 22:36

## 2023-08-14 RX ADMIN — Medication 2 MILLIGRAM(S): at 01:20

## 2023-08-14 RX ADMIN — Medication 2 MILLIGRAM(S): at 15:19

## 2023-08-14 NOTE — PROGRESS NOTE ADULT - SUBJECTIVE AND OBJECTIVE BOX
[facility] Medicine  Pager [phone]    Patient is a 58y old  Male who presents with a chief complaint of rash, alcohol w/d (13 Aug 2023 22:45)      INTERVAL HPI/OVERNIGHT EVENTS:    MEDICATIONS  (STANDING):  amLODIPine   Tablet 5 milliGRAM(s) Oral daily  ceFAZolin   IVPB 2000 milliGRAM(s) IV Intermittent every 8 hours  ceFAZolin   IVPB      dorzolamide 2% Ophthalmic Solution 1 Drop(s) Both EYES three times a day  enoxaparin Injectable 40 milliGRAM(s) SubCutaneous every 24 hours  folic acid 1 milliGRAM(s) Oral daily  latanoprost 0.005% Ophthalmic Solution 1 Drop(s) Both EYES at bedtime  LORazepam   Injectable 1.5 milliGRAM(s) IV Push every 4 hours  LORazepam   Injectable   IV Push   multivitamin 1 Tablet(s) Oral daily  thiamine 100 milliGRAM(s) Oral daily  timolol 0.5% Solution 1 Drop(s) Both EYES two times a day    MEDICATIONS  (PRN):  acetaminophen     Tablet .. 650 milliGRAM(s) Oral every 6 hours PRN Mild Pain (1 - 3), Moderate Pain (4 - 6)  aluminum hydroxide/magnesium hydroxide/simethicone Suspension 30 milliLiter(s) Oral every 4 hours PRN Dyspepsia  LORazepam   Injectable 2 milliGRAM(s) IV Push every 1 hour PRN Symptom-triggered: each CIWA -Ar score 8 or GREATER  LORazepam   Injectable 2 milliGRAM(s) IV Push every 2 hours PRN CIWA-Ar score increase by 2 points and a total score of 7 or less  melatonin 3 milliGRAM(s) Oral at bedtime PRN Insomnia  ondansetron Injectable 4 milliGRAM(s) IV Push every 8 hours PRN Nausea and/or Vomiting      Allergies    No Known Allergies    Intolerances        REVIEW OF SYSTEMS:  Please see interval HPI:    Vital Signs Last 24 Hrs  T(C): 36.7 (14 Aug 2023 13:10), Max: 37.4 (13 Aug 2023 19:26)  T(F): 98.1 (14 Aug 2023 13:10), Max: 99.3 (13 Aug 2023 19:26)  HR: 86 (14 Aug 2023 13:10) (60 - 90)  BP: 139/70 (14 Aug 2023 13:10) (120/99 - 158/79)  BP(mean): 92 (14 Aug 2023 10:40) (92 - 92)  RR: 18 (14 Aug 2023 13:10) (16 - 18)  SpO2: 98% (14 Aug 2023 13:10) (95% - 100%)    Parameters below as of 14 Aug 2023 13:10  Patient On (Oxygen Delivery Method): room air      I&O's Detail    13 Aug 2023 07:01  -  14 Aug 2023 07:00  --------------------------------------------------------  IN:    IV PiggyBack: 50 mL    sodium chloride 0.9% w/ Additives: 2000 mL  Total IN: 2050 mL    OUT:    Voided (mL): 1200 mL  Total OUT: 1200 mL    Total NET: 850 mL      14 Aug 2023 07:01  -  14 Aug 2023 14:27  --------------------------------------------------------  IN:  Total IN: 0 mL    OUT:    Voided (mL): 650 mL  Total OUT: 650 mL    Total NET: -650 mL            PHYSICAL EXAM:  GENERAL:   HEAD:    EYES:   ENMT:   NECK:   NERVOUS SYSTEM:    CHEST/LUNG:   HEART:   ABDOMEN:   EXTREMITIES:    LYMPH:   SKIN:     LABS:    14 Aug 2023 06:30    135    |  102    |  8      ----------------------------<  95     3.7     |  22     |  0.63     Ca    8.2        14 Aug 2023 06:30  Phos  2.5       14 Aug 2023 06:30  Mg     1.90      14 Aug 2023 06:30    TPro  7.2    /  Alb  3.5    /  TBili  1.0    /  DBili  x      /  AST  39     /  ALT  34     /  AlkPhos  73     14 Aug 2023 06:30      CAPILLARY BLOOD GLUCOSE        BLOOD CULTURE    RADIOLOGY & ADDITIONAL TESTS:    Imaging Personally Reviewed:  [ ] YES     Consultant(s) Notes Reviewed:      Care Discussed with Consultants/Other Providers: [facility] Medicine  Pager [phone]    Patient is a 58y old  Male who presents with a chief complaint of rash, alcohol w/d (13 Aug 2023 22:45)      INTERVAL HPI/OVERNIGHT EVENTS:  Reports feeling better today than yesterday, feels arm is improving, described "pinching/biting sensation" prior to admission (unsure if some sort of animal/insect bite), denies any exposure to poison ivy, or other plants. Still tremulous, but reports is tolerating diet. Admits to drinking bottle vodka, few beers.     MEDICATIONS  (STANDING):  amLODIPine   Tablet 5 milliGRAM(s) Oral daily  ceFAZolin   IVPB 2000 milliGRAM(s) IV Intermittent every 8 hours  ceFAZolin   IVPB      dorzolamide 2% Ophthalmic Solution 1 Drop(s) Both EYES three times a day  enoxaparin Injectable 40 milliGRAM(s) SubCutaneous every 24 hours  folic acid 1 milliGRAM(s) Oral daily  latanoprost 0.005% Ophthalmic Solution 1 Drop(s) Both EYES at bedtime  LORazepam   Injectable 1.5 milliGRAM(s) IV Push every 4 hours  LORazepam   Injectable   IV Push   multivitamin 1 Tablet(s) Oral daily  thiamine 100 milliGRAM(s) Oral daily  timolol 0.5% Solution 1 Drop(s) Both EYES two times a day    MEDICATIONS  (PRN):  acetaminophen     Tablet .. 650 milliGRAM(s) Oral every 6 hours PRN Mild Pain (1 - 3), Moderate Pain (4 - 6)  aluminum hydroxide/magnesium hydroxide/simethicone Suspension 30 milliLiter(s) Oral every 4 hours PRN Dyspepsia  LORazepam   Injectable 2 milliGRAM(s) IV Push every 1 hour PRN Symptom-triggered: each CIWA -Ar score 8 or GREATER  LORazepam   Injectable 2 milliGRAM(s) IV Push every 2 hours PRN CIWA-Ar score increase by 2 points and a total score of 7 or less  melatonin 3 milliGRAM(s) Oral at bedtime PRN Insomnia  ondansetron Injectable 4 milliGRAM(s) IV Push every 8 hours PRN Nausea and/or Vomiting      Allergies  No Known Allergies    Intolerances    REVIEW OF SYSTEMS:  Please see interval HPI:    Vital Signs Last 24 Hrs  T(C): 36.7 (14 Aug 2023 13:10), Max: 37.4 (13 Aug 2023 19:26)  T(F): 98.1 (14 Aug 2023 13:10), Max: 99.3 (13 Aug 2023 19:26)  HR: 86 (14 Aug 2023 13:10) (60 - 90)  BP: 139/70 (14 Aug 2023 13:10) (120/99 - 158/79)  BP(mean): 92 (14 Aug 2023 10:40) (92 - 92)  RR: 18 (14 Aug 2023 13:10) (16 - 18)  SpO2: 98% (14 Aug 2023 13:10) (95% - 100%)    Parameters below as of 14 Aug 2023 13:10  Patient On (Oxygen Delivery Method): room air      I&O's Detail    13 Aug 2023 07:01  -  14 Aug 2023 07:00  --------------------------------------------------------  IN:    IV PiggyBack: 50 mL    sodium chloride 0.9% w/ Additives: 2000 mL  Total IN: 2050 mL    OUT:    Voided (mL): 1200 mL  Total OUT: 1200 mL    Total NET: 850 mL      14 Aug 2023 07:01  -  14 Aug 2023 14:27  --------------------------------------------------------  IN:  Total IN: 0 mL    OUT:    Voided (mL): 650 mL  Total OUT: 650 mL    Total NET: -650 mL    PHYSICAL EXAM:  GENERAL: NAD, lying in bed   HEAD:  NC/AT  EYES: EOMI, L eye clear sclera/conjunctiva, R eye cloudy  ENMT: MMM, hearing intact to voice  NECK: supple  NERVOUS SYSTEM: moving extremities, conversant, follows commands +visible tremor    CHEST/LUNG: CTAB, comfortable on RA, speaking in full sentences   HEART: S1S2 RRR  ABDOMEN: soft, non-tender  EXTREMITIES:  no c/c, no LE edema, +LUE erythema but is not extending past previously demarcated area, ?improving per patient    LABS:    14 Aug 2023 06:30    135    |  102    |  8      ----------------------------<  95     3.7     |  22     |  0.63     Ca    8.2        14 Aug 2023 06:30  Phos  2.5       14 Aug 2023 06:30  Mg     1.90      14 Aug 2023 06:30    TPro  7.2    /  Alb  3.5    /  TBili  1.0    /  DBili  x      /  AST  39     /  ALT  34     /  AlkPhos  73     14 Aug 2023 06:30      CAPILLARY BLOOD GLUCOSE        BLOOD CULTURE    RADIOLOGY & ADDITIONAL TESTS:    Imaging Personally Reviewed:  [ ] YES   < from: Xray Wrist 2 Views, Left (08.13.23 @ 00:34) >  FINDINGS:  No acute fracture or dislocation.  The joint spaces are preserved. Bony mineralization within normal limits.  Soft tissue edema noted about the distal forearm and wrist. No radiopaque   foreign body.    IMPRESSION:  No acute osseous evaluate the left wrist.    < end of copied text >      Consultant(s) Notes Reviewed:      Care Discussed with Consultants/Other Providers:

## 2023-08-15 LAB
ANION GAP SERPL CALC-SCNC: 11 MMOL/L — SIGNIFICANT CHANGE UP (ref 7–14)
BUN SERPL-MCNC: 7 MG/DL — SIGNIFICANT CHANGE UP (ref 7–23)
CALCIUM SERPL-MCNC: 8.7 MG/DL — SIGNIFICANT CHANGE UP (ref 8.4–10.5)
CHLORIDE SERPL-SCNC: 100 MMOL/L — SIGNIFICANT CHANGE UP (ref 98–107)
CO2 SERPL-SCNC: 24 MMOL/L — SIGNIFICANT CHANGE UP (ref 22–31)
CREAT SERPL-MCNC: 0.61 MG/DL — SIGNIFICANT CHANGE UP (ref 0.5–1.3)
EGFR: 110 ML/MIN/1.73M2 — SIGNIFICANT CHANGE UP
GLUCOSE SERPL-MCNC: 114 MG/DL — HIGH (ref 70–99)
HCT VFR BLD CALC: 38 % — LOW (ref 39–50)
HGB BLD-MCNC: 12.9 G/DL — LOW (ref 13–17)
MAGNESIUM SERPL-MCNC: 1.8 MG/DL — SIGNIFICANT CHANGE UP (ref 1.6–2.6)
MCHC RBC-ENTMCNC: 29.9 PG — SIGNIFICANT CHANGE UP (ref 27–34)
MCHC RBC-ENTMCNC: 33.9 GM/DL — SIGNIFICANT CHANGE UP (ref 32–36)
MCV RBC AUTO: 88 FL — SIGNIFICANT CHANGE UP (ref 80–100)
NRBC # BLD: 0 /100 WBCS — SIGNIFICANT CHANGE UP (ref 0–0)
NRBC # FLD: 0 K/UL — SIGNIFICANT CHANGE UP (ref 0–0)
PHOSPHATE SERPL-MCNC: 2.3 MG/DL — LOW (ref 2.5–4.5)
PLATELET # BLD AUTO: 263 K/UL — SIGNIFICANT CHANGE UP (ref 150–400)
POTASSIUM SERPL-MCNC: 3.8 MMOL/L — SIGNIFICANT CHANGE UP (ref 3.5–5.3)
POTASSIUM SERPL-SCNC: 3.8 MMOL/L — SIGNIFICANT CHANGE UP (ref 3.5–5.3)
RBC # BLD: 4.32 M/UL — SIGNIFICANT CHANGE UP (ref 4.2–5.8)
RBC # FLD: 15.5 % — HIGH (ref 10.3–14.5)
SODIUM SERPL-SCNC: 135 MMOL/L — SIGNIFICANT CHANGE UP (ref 135–145)
WBC # BLD: 4.85 K/UL — SIGNIFICANT CHANGE UP (ref 3.8–10.5)
WBC # FLD AUTO: 4.85 K/UL — SIGNIFICANT CHANGE UP (ref 3.8–10.5)

## 2023-08-15 PROCEDURE — 99232 SBSQ HOSP IP/OBS MODERATE 35: CPT

## 2023-08-15 RX ORDER — SODIUM,POTASSIUM PHOSPHATES 278-250MG
1 POWDER IN PACKET (EA) ORAL ONCE
Refills: 0 | Status: DISCONTINUED | OUTPATIENT
Start: 2023-08-15 | End: 2023-08-15

## 2023-08-15 RX ORDER — SODIUM,POTASSIUM PHOSPHATES 278-250MG
1 POWDER IN PACKET (EA) ORAL ONCE
Refills: 0 | Status: COMPLETED | OUTPATIENT
Start: 2023-08-15 | End: 2023-08-15

## 2023-08-15 RX ADMIN — Medication 1 MILLIGRAM(S): at 15:03

## 2023-08-15 RX ADMIN — ENOXAPARIN SODIUM 40 MILLIGRAM(S): 100 INJECTION SUBCUTANEOUS at 06:06

## 2023-08-15 RX ADMIN — LATANOPROST 1 DROP(S): 0.05 SOLUTION/ DROPS OPHTHALMIC; TOPICAL at 22:04

## 2023-08-15 RX ADMIN — Medication 1.5 MILLIGRAM(S): at 02:53

## 2023-08-15 RX ADMIN — Medication 100 MILLIGRAM(S): at 06:07

## 2023-08-15 RX ADMIN — Medication 100 MILLIGRAM(S): at 23:38

## 2023-08-15 RX ADMIN — Medication 1 MILLIGRAM(S): at 19:17

## 2023-08-15 RX ADMIN — Medication 1 PACKET(S): at 19:17

## 2023-08-15 RX ADMIN — Medication 100 MILLIGRAM(S): at 15:04

## 2023-08-15 RX ADMIN — Medication 1.5 MILLIGRAM(S): at 07:02

## 2023-08-15 RX ADMIN — Medication 1 TABLET(S): at 11:14

## 2023-08-15 RX ADMIN — Medication 1 MILLIGRAM(S): at 23:37

## 2023-08-15 RX ADMIN — Medication 1.5 MILLIGRAM(S): at 11:15

## 2023-08-15 RX ADMIN — AMLODIPINE BESYLATE 5 MILLIGRAM(S): 2.5 TABLET ORAL at 06:07

## 2023-08-15 RX ADMIN — Medication 1 DROP(S): at 06:07

## 2023-08-15 RX ADMIN — DORZOLAMIDE HYDROCHLORIDE 1 DROP(S): 20 SOLUTION/ DROPS OPHTHALMIC at 06:06

## 2023-08-15 RX ADMIN — DORZOLAMIDE HYDROCHLORIDE 1 DROP(S): 20 SOLUTION/ DROPS OPHTHALMIC at 15:03

## 2023-08-15 RX ADMIN — Medication 1 DROP(S): at 19:17

## 2023-08-15 RX ADMIN — Medication 100 MILLIGRAM(S): at 11:14

## 2023-08-15 RX ADMIN — DORZOLAMIDE HYDROCHLORIDE 1 DROP(S): 20 SOLUTION/ DROPS OPHTHALMIC at 22:04

## 2023-08-15 NOTE — PROGRESS NOTE ADULT - SUBJECTIVE AND OBJECTIVE BOX
[facility] Medicine  Pager [phone]    Patient is a 58y old  Male who presents with a chief complaint of rash, alcohol w/d (14 Aug 2023 14:27)      INTERVAL HPI/OVERNIGHT EVENTS:    MEDICATIONS  (STANDING):  amLODIPine   Tablet 5 milliGRAM(s) Oral daily  ceFAZolin   IVPB 2000 milliGRAM(s) IV Intermittent every 8 hours  ceFAZolin   IVPB      dorzolamide 2% Ophthalmic Solution 1 Drop(s) Both EYES three times a day  enoxaparin Injectable 40 milliGRAM(s) SubCutaneous every 24 hours  folic acid 1 milliGRAM(s) Oral daily  latanoprost 0.005% Ophthalmic Solution 1 Drop(s) Both EYES at bedtime  LORazepam   Injectable   IV Push   LORazepam   Injectable 1 milliGRAM(s) IV Push every 4 hours  multivitamin 1 Tablet(s) Oral daily  thiamine 100 milliGRAM(s) Oral daily  timolol 0.5% Solution 1 Drop(s) Both EYES two times a day    MEDICATIONS  (PRN):  acetaminophen     Tablet .. 650 milliGRAM(s) Oral every 6 hours PRN Mild Pain (1 - 3), Moderate Pain (4 - 6)  aluminum hydroxide/magnesium hydroxide/simethicone Suspension 30 milliLiter(s) Oral every 4 hours PRN Dyspepsia  LORazepam   Injectable 2 milliGRAM(s) IV Push every 1 hour PRN Symptom-triggered: each CIWA -Ar score 8 or GREATER  LORazepam   Injectable 2 milliGRAM(s) IV Push every 2 hours PRN CIWA-Ar score increase by 2 points and a total score of 7 or less  melatonin 3 milliGRAM(s) Oral at bedtime PRN Insomnia  ondansetron Injectable 4 milliGRAM(s) IV Push every 8 hours PRN Nausea and/or Vomiting      Allergies    No Known Allergies    Intolerances        REVIEW OF SYSTEMS:  Please see interval HPI:    Vital Signs Last 24 Hrs  T(C): 36.7 (15 Aug 2023 10:00), Max: 36.9 (14 Aug 2023 20:00)  T(F): 98 (15 Aug 2023 10:00), Max: 98.4 (14 Aug 2023 20:00)  HR: 63 (15 Aug 2023 10:00) (60 - 86)  BP: 151/84 (15 Aug 2023 10:00) (101/54 - 155/66)  BP(mean): --  RR: 17 (15 Aug 2023 10:00) (16 - 18)  SpO2: 99% (15 Aug 2023 10:00) (97% - 99%)    Parameters below as of 15 Aug 2023 10:00  Patient On (Oxygen Delivery Method): room air      I&O's Detail    14 Aug 2023 07:01  -  15 Aug 2023 07:00  --------------------------------------------------------  IN:  Total IN: 0 mL    OUT:    Voided (mL): 1050 mL  Total OUT: 1050 mL    Total NET: -1050 mL            PHYSICAL EXAM:  GENERAL:   HEAD:    EYES:   ENMT:   NECK:   NERVOUS SYSTEM:    CHEST/LUNG:   HEART:   ABDOMEN:   EXTREMITIES:    LYMPH:   SKIN:     LABS:                        12.9   4.85  )-----------( 263      ( 15 Aug 2023 10:21 )             38.0     15 Aug 2023 10:21    135    |  100    |  7      ----------------------------<  114    3.8     |  24     |  0.61     Ca    8.7        15 Aug 2023 10:21  Phos  2.3       15 Aug 2023 10:21  Mg     1.80      15 Aug 2023 10:21        CAPILLARY BLOOD GLUCOSE        BLOOD CULTURE    RADIOLOGY & ADDITIONAL TESTS:    Imaging Personally Reviewed:  [ ] YES     Consultant(s) Notes Reviewed:      Care Discussed with Consultants/Other Providers: [facility] Medicine  Pager [phone]    Patient is a 58y old  Male who presents with a chief complaint of rash, alcohol w/d (14 Aug 2023 14:27)      INTERVAL HPI/OVERNIGHT EVENTS:  Still tremulous, reports poor intake in AM, some headache. But overall feeling better, thinks arm improving, is less erythematous. Aware of plan to continue management of alcohol withdrawal, counseled on alcohol cessation.     MEDICATIONS  (STANDING):  amLODIPine   Tablet 5 milliGRAM(s) Oral daily  ceFAZolin   IVPB 2000 milliGRAM(s) IV Intermittent every 8 hours  ceFAZolin   IVPB      dorzolamide 2% Ophthalmic Solution 1 Drop(s) Both EYES three times a day  enoxaparin Injectable 40 milliGRAM(s) SubCutaneous every 24 hours  folic acid 1 milliGRAM(s) Oral daily  latanoprost 0.005% Ophthalmic Solution 1 Drop(s) Both EYES at bedtime  LORazepam   Injectable   IV Push   LORazepam   Injectable 1 milliGRAM(s) IV Push every 4 hours  multivitamin 1 Tablet(s) Oral daily  thiamine 100 milliGRAM(s) Oral daily  timolol 0.5% Solution 1 Drop(s) Both EYES two times a day    MEDICATIONS  (PRN):  acetaminophen     Tablet .. 650 milliGRAM(s) Oral every 6 hours PRN Mild Pain (1 - 3), Moderate Pain (4 - 6)  aluminum hydroxide/magnesium hydroxide/simethicone Suspension 30 milliLiter(s) Oral every 4 hours PRN Dyspepsia  LORazepam   Injectable 2 milliGRAM(s) IV Push every 1 hour PRN Symptom-triggered: each CIWA -Ar score 8 or GREATER  LORazepam   Injectable 2 milliGRAM(s) IV Push every 2 hours PRN CIWA-Ar score increase by 2 points and a total score of 7 or less  melatonin 3 milliGRAM(s) Oral at bedtime PRN Insomnia  ondansetron Injectable 4 milliGRAM(s) IV Push every 8 hours PRN Nausea and/or Vomiting      Allergies  No Known Allergies    Intolerances    REVIEW OF SYSTEMS:  Please see interval HPI:    Vital Signs Last 24 Hrs  T(C): 36.7 (15 Aug 2023 10:00), Max: 36.9 (14 Aug 2023 20:00)  T(F): 98 (15 Aug 2023 10:00), Max: 98.4 (14 Aug 2023 20:00)  HR: 63 (15 Aug 2023 10:00) (60 - 86)  BP: 151/84 (15 Aug 2023 10:00) (101/54 - 155/66)  BP(mean): --  RR: 17 (15 Aug 2023 10:00) (16 - 18)  SpO2: 99% (15 Aug 2023 10:00) (97% - 99%)    Parameters below as of 15 Aug 2023 10:00  Patient On (Oxygen Delivery Method): room air      I&O's Detail    14 Aug 2023 07:01  -  15 Aug 2023 07:00  --------------------------------------------------------  IN:  Total IN: 0 mL    OUT:    Voided (mL): 1050 mL  Total OUT: 1050 mL    Total NET: -1050 mL    PHYSICAL EXAM:  GENERAL: NAD, lying in bed   HEAD:  NC/AT  EYES: EOMI, L eye clear sclera/conjunctiva, R eye cloudy  ENMT: MMM, hearing intact to voice  NECK: supple  NERVOUS SYSTEM: moving extremities, conversant, follows commands +visible tremors still    CHEST/LUNG: CTAB, comfortable on RA, speaking in full sentences   HEART: S1S2 RRR  ABDOMEN: soft, non-tender  EXTREMITIES:  no c/c, no LE edema, LUE erythema appears to be improved, some crusting noted    LABS:                        12.9   4.85  )-----------( 263      ( 15 Aug 2023 10:21 )             38.0     15 Aug 2023 10:21    135    |  100    |  7      ----------------------------<  114    3.8     |  24     |  0.61     Ca    8.7        15 Aug 2023 10:21  Phos  2.3       15 Aug 2023 10:21  Mg     1.80      15 Aug 2023 10:21        CAPILLARY BLOOD GLUCOSE        BLOOD CULTURE    RADIOLOGY & ADDITIONAL TESTS:    Imaging Personally Reviewed:  [ ] YES     Consultant(s) Notes Reviewed:      Care Discussed with Consultants/Other Providers: JULIÁN Mattson re: c/w ANT, monitor LUE

## 2023-08-16 LAB
ANION GAP SERPL CALC-SCNC: 12 MMOL/L — SIGNIFICANT CHANGE UP (ref 7–14)
BUN SERPL-MCNC: 11 MG/DL — SIGNIFICANT CHANGE UP (ref 7–23)
CALCIUM SERPL-MCNC: 9.2 MG/DL — SIGNIFICANT CHANGE UP (ref 8.4–10.5)
CHLORIDE SERPL-SCNC: 100 MMOL/L — SIGNIFICANT CHANGE UP (ref 98–107)
CO2 SERPL-SCNC: 24 MMOL/L — SIGNIFICANT CHANGE UP (ref 22–31)
CREAT SERPL-MCNC: 0.7 MG/DL — SIGNIFICANT CHANGE UP (ref 0.5–1.3)
EGFR: 105 ML/MIN/1.73M2 — SIGNIFICANT CHANGE UP
GLUCOSE SERPL-MCNC: 102 MG/DL — HIGH (ref 70–99)
HCT VFR BLD CALC: 39.6 % — SIGNIFICANT CHANGE UP (ref 39–50)
HGB BLD-MCNC: 13.2 G/DL — SIGNIFICANT CHANGE UP (ref 13–17)
MAGNESIUM SERPL-MCNC: 1.7 MG/DL — SIGNIFICANT CHANGE UP (ref 1.6–2.6)
MCHC RBC-ENTMCNC: 29.5 PG — SIGNIFICANT CHANGE UP (ref 27–34)
MCHC RBC-ENTMCNC: 33.3 GM/DL — SIGNIFICANT CHANGE UP (ref 32–36)
MCV RBC AUTO: 88.4 FL — SIGNIFICANT CHANGE UP (ref 80–100)
NRBC # BLD: 0 /100 WBCS — SIGNIFICANT CHANGE UP (ref 0–0)
NRBC # FLD: 0 K/UL — SIGNIFICANT CHANGE UP (ref 0–0)
PHOSPHATE SERPL-MCNC: 3.5 MG/DL — SIGNIFICANT CHANGE UP (ref 2.5–4.5)
PLATELET # BLD AUTO: 276 K/UL — SIGNIFICANT CHANGE UP (ref 150–400)
POTASSIUM SERPL-MCNC: 3.7 MMOL/L — SIGNIFICANT CHANGE UP (ref 3.5–5.3)
POTASSIUM SERPL-SCNC: 3.7 MMOL/L — SIGNIFICANT CHANGE UP (ref 3.5–5.3)
RBC # BLD: 4.48 M/UL — SIGNIFICANT CHANGE UP (ref 4.2–5.8)
RBC # FLD: 15.9 % — HIGH (ref 10.3–14.5)
SODIUM SERPL-SCNC: 136 MMOL/L — SIGNIFICANT CHANGE UP (ref 135–145)
WBC # BLD: 5.35 K/UL — SIGNIFICANT CHANGE UP (ref 3.8–10.5)
WBC # FLD AUTO: 5.35 K/UL — SIGNIFICANT CHANGE UP (ref 3.8–10.5)

## 2023-08-16 PROCEDURE — 99232 SBSQ HOSP IP/OBS MODERATE 35: CPT

## 2023-08-16 RX ORDER — DIPHENHYDRAMINE HCL 50 MG
25 CAPSULE ORAL ONCE
Refills: 0 | Status: COMPLETED | OUTPATIENT
Start: 2023-08-16 | End: 2023-08-16

## 2023-08-16 RX ADMIN — Medication 0.5 MILLIGRAM(S): at 22:26

## 2023-08-16 RX ADMIN — Medication 100 MILLIGRAM(S): at 11:27

## 2023-08-16 RX ADMIN — Medication 1 MILLIGRAM(S): at 11:28

## 2023-08-16 RX ADMIN — ENOXAPARIN SODIUM 40 MILLIGRAM(S): 100 INJECTION SUBCUTANEOUS at 07:16

## 2023-08-16 RX ADMIN — DORZOLAMIDE HYDROCHLORIDE 1 DROP(S): 20 SOLUTION/ DROPS OPHTHALMIC at 07:15

## 2023-08-16 RX ADMIN — AMLODIPINE BESYLATE 5 MILLIGRAM(S): 2.5 TABLET ORAL at 07:15

## 2023-08-16 RX ADMIN — Medication 100 MILLIGRAM(S): at 07:24

## 2023-08-16 RX ADMIN — Medication 1 DROP(S): at 07:16

## 2023-08-16 RX ADMIN — Medication 1 DROP(S): at 18:17

## 2023-08-16 RX ADMIN — Medication 650 MILLIGRAM(S): at 09:17

## 2023-08-16 RX ADMIN — Medication 0.5 MILLIGRAM(S): at 14:23

## 2023-08-16 RX ADMIN — Medication 0.5 MILLIGRAM(S): at 18:16

## 2023-08-16 RX ADMIN — LATANOPROST 1 DROP(S): 0.05 SOLUTION/ DROPS OPHTHALMIC; TOPICAL at 23:23

## 2023-08-16 RX ADMIN — Medication 100 MILLIGRAM(S): at 23:24

## 2023-08-16 RX ADMIN — Medication 1 TABLET(S): at 11:27

## 2023-08-16 RX ADMIN — Medication 100 MILLIGRAM(S): at 14:23

## 2023-08-16 RX ADMIN — DORZOLAMIDE HYDROCHLORIDE 1 DROP(S): 20 SOLUTION/ DROPS OPHTHALMIC at 14:23

## 2023-08-16 RX ADMIN — Medication 25 MILLIGRAM(S): at 23:23

## 2023-08-16 RX ADMIN — Medication 650 MILLIGRAM(S): at 11:22

## 2023-08-16 RX ADMIN — Medication 1 MILLIGRAM(S): at 07:19

## 2023-08-16 RX ADMIN — DORZOLAMIDE HYDROCHLORIDE 1 DROP(S): 20 SOLUTION/ DROPS OPHTHALMIC at 22:26

## 2023-08-16 RX ADMIN — Medication 3 MILLIGRAM(S): at 23:23

## 2023-08-16 RX ADMIN — Medication 1 MILLIGRAM(S): at 03:42

## 2023-08-16 NOTE — PROGRESS NOTE ADULT - SUBJECTIVE AND OBJECTIVE BOX
[facility]  Pager [phone]    Patient is a 58y old  Male who presents with a chief complaint of rash, alcohol w/d (15 Aug 2023 12:36)      INTERVAL HPI/OVERNIGHT EVENTS:    MEDICATIONS  (STANDING):  amLODIPine   Tablet 5 milliGRAM(s) Oral daily  ceFAZolin   IVPB 2000 milliGRAM(s) IV Intermittent every 8 hours  ceFAZolin   IVPB      dorzolamide 2% Ophthalmic Solution 1 Drop(s) Both EYES three times a day  enoxaparin Injectable 40 milliGRAM(s) SubCutaneous every 24 hours  folic acid 1 milliGRAM(s) Oral daily  latanoprost 0.005% Ophthalmic Solution 1 Drop(s) Both EYES at bedtime  LORazepam   Injectable   IV Push   LORazepam   Injectable 0.5 milliGRAM(s) IV Push every 4 hours  multivitamin 1 Tablet(s) Oral daily  thiamine 100 milliGRAM(s) Oral daily  timolol 0.5% Solution 1 Drop(s) Both EYES two times a day    MEDICATIONS  (PRN):  acetaminophen     Tablet .. 650 milliGRAM(s) Oral every 6 hours PRN Mild Pain (1 - 3), Moderate Pain (4 - 6)  aluminum hydroxide/magnesium hydroxide/simethicone Suspension 30 milliLiter(s) Oral every 4 hours PRN Dyspepsia  LORazepam   Injectable 2 milliGRAM(s) IV Push every 1 hour PRN Symptom-triggered: each CIWA -Ar score 8 or GREATER  LORazepam   Injectable 2 milliGRAM(s) IV Push every 2 hours PRN CIWA-Ar score increase by 2 points and a total score of 7 or less  melatonin 3 milliGRAM(s) Oral at bedtime PRN Insomnia  ondansetron Injectable 4 milliGRAM(s) IV Push every 8 hours PRN Nausea and/or Vomiting      Allergies    No Known Allergies    Intolerances        REVIEW OF SYSTEMS:  Please see interval HPI:    Vital Signs Last 24 Hrs  T(C): 36.7 (16 Aug 2023 12:00), Max: 36.7 (15 Aug 2023 14:00)  T(F): 98.1 (16 Aug 2023 12:00), Max: 98.1 (15 Aug 2023 18:00)  HR: 57 (16 Aug 2023 12:00) (57 - 71)  BP: 129/63 (16 Aug 2023 12:00) (118/60 - 160/87)  BP(mean): --  RR: 17 (16 Aug 2023 12:00) (17 - 19)  SpO2: 98% (16 Aug 2023 12:00) (98% - 100%)    Parameters below as of 16 Aug 2023 12:00  Patient On (Oxygen Delivery Method): room air      I&O's Detail        PHYSICAL EXAM:  GENERAL:   HEAD:    EYES:   ENMT:   NECK:   NERVOUS SYSTEM:    CHEST/LUNG:   HEART:   ABDOMEN:   EXTREMITIES:    LYMPH:   SKIN:     LABS:                        13.2   5.35  )-----------( 276      ( 16 Aug 2023 07:50 )             39.6     16 Aug 2023 07:50    136    |  100    |  11     ----------------------------<  102    3.7     |  24     |  0.70     Ca    9.2        16 Aug 2023 07:50  Phos  3.5       16 Aug 2023 07:50  Mg     1.70      16 Aug 2023 07:50        CAPILLARY BLOOD GLUCOSE        BLOOD CULTURE    RADIOLOGY & ADDITIONAL TESTS:    Imaging Personally Reviewed:  [ ] YES     Consultant(s) Notes Reviewed:      Care Discussed with Consultants/Other Providers: [facility]  Pager [phone]    Patient is a 58y old  Male who presents with a chief complaint of rash, alcohol w/d (15 Aug 2023 12:36)      INTERVAL HPI/OVERNIGHT EVENTS:  Had headache this AM, improved following tylenol, still with tremors, but feeling better overall today compared to prior. LUE erythema improved, trying to avoid scratching, no extension past previously demarcated boundaries.     MEDICATIONS  (STANDING):  amLODIPine   Tablet 5 milliGRAM(s) Oral daily  ceFAZolin   IVPB 2000 milliGRAM(s) IV Intermittent every 8 hours  ceFAZolin   IVPB      dorzolamide 2% Ophthalmic Solution 1 Drop(s) Both EYES three times a day  enoxaparin Injectable 40 milliGRAM(s) SubCutaneous every 24 hours  folic acid 1 milliGRAM(s) Oral daily  latanoprost 0.005% Ophthalmic Solution 1 Drop(s) Both EYES at bedtime  LORazepam   Injectable   IV Push   LORazepam   Injectable 0.5 milliGRAM(s) IV Push every 4 hours  multivitamin 1 Tablet(s) Oral daily  thiamine 100 milliGRAM(s) Oral daily  timolol 0.5% Solution 1 Drop(s) Both EYES two times a day    MEDICATIONS  (PRN):  acetaminophen     Tablet .. 650 milliGRAM(s) Oral every 6 hours PRN Mild Pain (1 - 3), Moderate Pain (4 - 6)  aluminum hydroxide/magnesium hydroxide/simethicone Suspension 30 milliLiter(s) Oral every 4 hours PRN Dyspepsia  LORazepam   Injectable 2 milliGRAM(s) IV Push every 1 hour PRN Symptom-triggered: each CIWA -Ar score 8 or GREATER  LORazepam   Injectable 2 milliGRAM(s) IV Push every 2 hours PRN CIWA-Ar score increase by 2 points and a total score of 7 or less  melatonin 3 milliGRAM(s) Oral at bedtime PRN Insomnia  ondansetron Injectable 4 milliGRAM(s) IV Push every 8 hours PRN Nausea and/or Vomiting      Allergies  No Known Allergies    Intolerances    REVIEW OF SYSTEMS:  Please see interval HPI:    Vital Signs Last 24 Hrs  T(C): 36.7 (16 Aug 2023 12:00), Max: 36.7 (15 Aug 2023 14:00)  T(F): 98.1 (16 Aug 2023 12:00), Max: 98.1 (15 Aug 2023 18:00)  HR: 57 (16 Aug 2023 12:00) (57 - 71)  BP: 129/63 (16 Aug 2023 12:00) (118/60 - 160/87)  RR: 17 (16 Aug 2023 12:00) (17 - 19)  SpO2: 98% (16 Aug 2023 12:00) (98% - 100%)    Parameters below as of 16 Aug 2023 12:00  Patient On (Oxygen Delivery Method): room air      I&O's Detail    PHYSICAL EXAM:  GENERAL: NAD, lying in bed   HEAD:  NC/AT  EYES: EOMI, L eye clear sclera/conjunctiva, R eye cloudy  ENMT: MMM, hearing intact to voice  NECK: supple  NERVOUS SYSTEM: moving extremities, conversant, follows commands +visible tremors still    CHEST/LUNG: CTAB, comfortable on RA, speaking in full sentences   HEART: S1S2 RRR  ABDOMEN: soft, non-tender  EXTREMITIES:  no c/c, no LE edema, LUE erythema appears to be improved, some crusting noted      LABS:                        13.2   5.35  )-----------( 276      ( 16 Aug 2023 07:50 )             39.6     16 Aug 2023 07:50    136    |  100    |  11     ----------------------------<  102    3.7     |  24     |  0.70     Ca    9.2        16 Aug 2023 07:50  Phos  3.5       16 Aug 2023 07:50  Mg     1.70      16 Aug 2023 07:50        CAPILLARY BLOOD GLUCOSE        BLOOD CULTURE    RADIOLOGY & ADDITIONAL TESTS:    Imaging Personally Reviewed:  [ ] YES     Consultant(s) Notes Reviewed:      Care Discussed with Consultants/Other Providers: JULIÁN La re: monitoring on CIWA protocol, still with tremors

## 2023-08-17 LAB
ANION GAP SERPL CALC-SCNC: 9 MMOL/L — SIGNIFICANT CHANGE UP (ref 7–14)
BUN SERPL-MCNC: 12 MG/DL — SIGNIFICANT CHANGE UP (ref 7–23)
CALCIUM SERPL-MCNC: 8.7 MG/DL — SIGNIFICANT CHANGE UP (ref 8.4–10.5)
CHLORIDE SERPL-SCNC: 104 MMOL/L — SIGNIFICANT CHANGE UP (ref 98–107)
CO2 SERPL-SCNC: 25 MMOL/L — SIGNIFICANT CHANGE UP (ref 22–31)
CREAT SERPL-MCNC: 0.63 MG/DL — SIGNIFICANT CHANGE UP (ref 0.5–1.3)
EGFR: 109 ML/MIN/1.73M2 — SIGNIFICANT CHANGE UP
GLUCOSE SERPL-MCNC: 104 MG/DL — HIGH (ref 70–99)
MAGNESIUM SERPL-MCNC: 1.8 MG/DL — SIGNIFICANT CHANGE UP (ref 1.6–2.6)
PHOSPHATE SERPL-MCNC: 3.1 MG/DL — SIGNIFICANT CHANGE UP (ref 2.5–4.5)
POTASSIUM SERPL-MCNC: 3.1 MMOL/L — LOW (ref 3.5–5.3)
POTASSIUM SERPL-SCNC: 3.1 MMOL/L — LOW (ref 3.5–5.3)
SODIUM SERPL-SCNC: 138 MMOL/L — SIGNIFICANT CHANGE UP (ref 135–145)

## 2023-08-17 PROCEDURE — 99232 SBSQ HOSP IP/OBS MODERATE 35: CPT

## 2023-08-17 RX ORDER — DIPHENHYDRAMINE HCL 50 MG
25 CAPSULE ORAL ONCE
Refills: 0 | Status: COMPLETED | OUTPATIENT
Start: 2023-08-17 | End: 2023-08-17

## 2023-08-17 RX ORDER — POTASSIUM CHLORIDE 20 MEQ
40 PACKET (EA) ORAL EVERY 4 HOURS
Refills: 0 | Status: COMPLETED | OUTPATIENT
Start: 2023-08-17 | End: 2023-08-17

## 2023-08-17 RX ADMIN — Medication 1 TABLET(S): at 14:59

## 2023-08-17 RX ADMIN — Medication 1 MILLIGRAM(S): at 14:59

## 2023-08-17 RX ADMIN — Medication 100 MILLIGRAM(S): at 14:53

## 2023-08-17 RX ADMIN — Medication 100 MILLIGRAM(S): at 05:24

## 2023-08-17 RX ADMIN — LATANOPROST 1 DROP(S): 0.05 SOLUTION/ DROPS OPHTHALMIC; TOPICAL at 21:26

## 2023-08-17 RX ADMIN — Medication 40 MILLIEQUIVALENT(S): at 14:52

## 2023-08-17 RX ADMIN — Medication 0.5 MILLIGRAM(S): at 06:12

## 2023-08-17 RX ADMIN — Medication 1 DROP(S): at 05:23

## 2023-08-17 RX ADMIN — Medication 2 MILLIGRAM(S): at 15:05

## 2023-08-17 RX ADMIN — Medication 100 MILLIGRAM(S): at 21:26

## 2023-08-17 RX ADMIN — AMLODIPINE BESYLATE 5 MILLIGRAM(S): 2.5 TABLET ORAL at 05:22

## 2023-08-17 RX ADMIN — Medication 0.5 MILLIGRAM(S): at 02:02

## 2023-08-17 RX ADMIN — ENOXAPARIN SODIUM 40 MILLIGRAM(S): 100 INJECTION SUBCUTANEOUS at 05:22

## 2023-08-17 RX ADMIN — DORZOLAMIDE HYDROCHLORIDE 1 DROP(S): 20 SOLUTION/ DROPS OPHTHALMIC at 21:26

## 2023-08-17 RX ADMIN — DORZOLAMIDE HYDROCHLORIDE 1 DROP(S): 20 SOLUTION/ DROPS OPHTHALMIC at 05:23

## 2023-08-17 RX ADMIN — Medication 25 MILLIGRAM(S): at 21:25

## 2023-08-17 RX ADMIN — Medication 3 MILLIGRAM(S): at 21:25

## 2023-08-17 RX ADMIN — Medication 100 MILLIGRAM(S): at 14:59

## 2023-08-17 RX ADMIN — Medication 1 DROP(S): at 18:06

## 2023-08-17 RX ADMIN — Medication 0.5 MILLIGRAM(S): at 10:08

## 2023-08-17 RX ADMIN — DORZOLAMIDE HYDROCHLORIDE 1 DROP(S): 20 SOLUTION/ DROPS OPHTHALMIC at 14:53

## 2023-08-17 RX ADMIN — Medication 40 MILLIEQUIVALENT(S): at 18:06

## 2023-08-17 RX ADMIN — Medication 40 MILLIEQUIVALENT(S): at 10:10

## 2023-08-17 NOTE — CHART NOTE - NSCHARTNOTEFT_GEN_A_CORE
57 yo M w/ HTN, etoh abuse admitted for alcohol withdrawal, with LUE erythema concerning for impetigo/cellullitis. LUE healing very well, no erythema at this time. However, RUE swelling noticed, more pronounced in right hand with mild pain. Pt reports "I think IV was there, they took it out."    #RUE swelling, r/o DVT  -ordered RUE doppler  -keep RUE elevated   -instructed RN to apply cold packs for now.    # LUE erythema, concern for impetigo/cellulitis  -improved well  -c/w IV Abx therapy      Above discussed with attending.

## 2023-08-17 NOTE — PROGRESS NOTE ADULT - SUBJECTIVE AND OBJECTIVE BOX
[facility]  Pager [phone]    Patient is a 58y old  Male who presents with a chief complaint of rash, alcohol w/d (16 Aug 2023 12:55)      INTERVAL HPI/OVERNIGHT EVENTS:    MEDICATIONS  (STANDING):  amLODIPine   Tablet 5 milliGRAM(s) Oral daily  ceFAZolin   IVPB 2000 milliGRAM(s) IV Intermittent every 8 hours  ceFAZolin   IVPB      dorzolamide 2% Ophthalmic Solution 1 Drop(s) Both EYES three times a day  enoxaparin Injectable 40 milliGRAM(s) SubCutaneous every 24 hours  folic acid 1 milliGRAM(s) Oral daily  latanoprost 0.005% Ophthalmic Solution 1 Drop(s) Both EYES at bedtime  LORazepam   Injectable   IV Push   LORazepam   Injectable 0.5 milliGRAM(s) IV Push every 12 hours  multivitamin 1 Tablet(s) Oral daily  potassium chloride    Tablet ER 40 milliEquivalent(s) Oral every 4 hours  thiamine 100 milliGRAM(s) Oral daily  timolol 0.5% Solution 1 Drop(s) Both EYES two times a day    MEDICATIONS  (PRN):  acetaminophen     Tablet .. 650 milliGRAM(s) Oral every 6 hours PRN Mild Pain (1 - 3), Moderate Pain (4 - 6)  aluminum hydroxide/magnesium hydroxide/simethicone Suspension 30 milliLiter(s) Oral every 4 hours PRN Dyspepsia  LORazepam   Injectable 2 milliGRAM(s) IV Push every 2 hours PRN CIWA-Ar score increase by 2 points and a total score of 7 or less  LORazepam   Injectable 2 milliGRAM(s) IV Push every 1 hour PRN Symptom-triggered: each CIWA -Ar score 8 or GREATER  melatonin 3 milliGRAM(s) Oral at bedtime PRN Insomnia  ondansetron Injectable 4 milliGRAM(s) IV Push every 8 hours PRN Nausea and/or Vomiting      Allergies    No Known Allergies    Intolerances        REVIEW OF SYSTEMS:  Please see interval HPI:    Vital Signs Last 24 Hrs  T(C): 36.9 (17 Aug 2023 10:00), Max: 36.9 (17 Aug 2023 10:00)  T(F): 98.4 (17 Aug 2023 10:00), Max: 98.4 (17 Aug 2023 10:00)  HR: 64 (17 Aug 2023 10:00) (58 - 64)  BP: 102/68 (17 Aug 2023 10:00) (102/68 - 140/85)  BP(mean): --  RR: 18 (17 Aug 2023 10:00) (16 - 18)  SpO2: 97% (17 Aug 2023 10:00) (97% - 99%)    Parameters below as of 17 Aug 2023 10:00  Patient On (Oxygen Delivery Method): room air      I&O's Detail        PHYSICAL EXAM:  GENERAL:   HEAD:    EYES:   ENMT:   NECK:   NERVOUS SYSTEM:    CHEST/LUNG:   HEART:   ABDOMEN:   EXTREMITIES:    LYMPH:   SKIN:     LABS:    17 Aug 2023 06:40    138    |  104    |  12     ----------------------------<  104    3.1     |  25     |  0.63     Ca    8.7        17 Aug 2023 06:40  Phos  3.1       17 Aug 2023 06:40  Mg     1.80      17 Aug 2023 06:40        CAPILLARY BLOOD GLUCOSE        BLOOD CULTURE    RADIOLOGY & ADDITIONAL TESTS:    Imaging Personally Reviewed:  [ ] YES     Consultant(s) Notes Reviewed:      Care Discussed with Consultants/Other Providers: [facility]  Pager [phone]    Patient is a 58y old  Male who presents with a chief complaint of rash, alcohol w/d (16 Aug 2023 12:55)      INTERVAL HPI/OVERNIGHT EVENTS:  Still with tremors, otherwise doing well, no fever/chills, received benadryl overnight, reports helped with itch, has some R hand swelling, trying to use a cold pack (thinks ?site of former IV), no other questions/concerns at this time.      MEDICATIONS  (STANDING):  amLODIPine   Tablet 5 milliGRAM(s) Oral daily  ceFAZolin   IVPB 2000 milliGRAM(s) IV Intermittent every 8 hours  ceFAZolin   IVPB      dorzolamide 2% Ophthalmic Solution 1 Drop(s) Both EYES three times a day  enoxaparin Injectable 40 milliGRAM(s) SubCutaneous every 24 hours  folic acid 1 milliGRAM(s) Oral daily  latanoprost 0.005% Ophthalmic Solution 1 Drop(s) Both EYES at bedtime  LORazepam   Injectable   IV Push   LORazepam   Injectable 0.5 milliGRAM(s) IV Push every 12 hours  multivitamin 1 Tablet(s) Oral daily  potassium chloride    Tablet ER 40 milliEquivalent(s) Oral every 4 hours  thiamine 100 milliGRAM(s) Oral daily  timolol 0.5% Solution 1 Drop(s) Both EYES two times a day    MEDICATIONS  (PRN):  acetaminophen     Tablet .. 650 milliGRAM(s) Oral every 6 hours PRN Mild Pain (1 - 3), Moderate Pain (4 - 6)  aluminum hydroxide/magnesium hydroxide/simethicone Suspension 30 milliLiter(s) Oral every 4 hours PRN Dyspepsia  LORazepam   Injectable 2 milliGRAM(s) IV Push every 2 hours PRN CIWA-Ar score increase by 2 points and a total score of 7 or less  LORazepam   Injectable 2 milliGRAM(s) IV Push every 1 hour PRN Symptom-triggered: each CIWA -Ar score 8 or GREATER  melatonin 3 milliGRAM(s) Oral at bedtime PRN Insomnia  ondansetron Injectable 4 milliGRAM(s) IV Push every 8 hours PRN Nausea and/or Vomiting    Allergies  No Known Allergies    Intolerances    REVIEW OF SYSTEMS:  Please see interval HPI:    Vital Signs Last 24 Hrs  T(C): 36.9 (17 Aug 2023 10:00), Max: 36.9 (17 Aug 2023 10:00)  T(F): 98.4 (17 Aug 2023 10:00), Max: 98.4 (17 Aug 2023 10:00)  HR: 64 (17 Aug 2023 10:00) (58 - 64)  BP: 102/68 (17 Aug 2023 10:00) (102/68 - 140/85)  RR: 18 (17 Aug 2023 10:00) (16 - 18)  SpO2: 97% (17 Aug 2023 10:00) (97% - 99%)    Parameters below as of 17 Aug 2023 10:00  Patient On (Oxygen Delivery Method): room air    I&O's Detail    PHYSICAL EXAM:  GENERAL: NAD, lying in bed   HEAD:  NC/AT  EYES: EOMI, L eye clear sclera/conjunctiva, R eye cloudy  ENMT: MMM, hearing intact to voice  NECK: supple  NERVOUS SYSTEM: moving extremities, conversant, follows commands +visible tremors  CHEST/LUNG: CTAB, comfortable on RA, speaking in full sentences   HEART: S1S2 RRR  ABDOMEN: soft, non-tender  EXTREMITIES:  no c/c, no LE edema, LUE erythema appears to be improved, some crusting noted, +R hand edema (cold pack in place)    LABS:    17 Aug 2023 06:40    138    |  104    |  12     ----------------------------<  104    3.1     |  25     |  0.63     Ca    8.7        17 Aug 2023 06:40  Phos  3.1       17 Aug 2023 06:40  Mg     1.80      17 Aug 2023 06:40    CAPILLARY BLOOD GLUCOSE    BLOOD CULTURE    RADIOLOGY & ADDITIONAL TESTS:    Imaging Personally Reviewed:  [ ] YES     Consultant(s) Notes Reviewed:      Care Discussed with Consultants/Other Providers: JULIÁN Palomo re: RUE swelling, ?site of IV, plan for duplex, RUE elevation, symptomatic management, otherwise still on CIWA, LUE erythema improving

## 2023-08-17 NOTE — SBIRT NOTE ADULT - NSSBIRTBRIEFINTDET_GEN_A_CORE
SW met with patient at bedside to discuss alcohol use. Per patient, he drinks a bottle of vodka or more daily. Patient reports his last drink was 4 days ago, prior to admission. SW apprised patient of the national recommendation for alcohol use. Patient acknowledged discussion. SW offered and patient declined SBIRT resources, reporting that he is connected to an AA program @ [facility] and that he plans on further connecting to another program in [place] once discharge. Patient declined SW assistance with connecting to program.

## 2023-08-17 NOTE — SBIRT NOTE ADULT - NSSBIRTDRGPOSREINDET_GEN_A_CORE
SW met with patient at bedside to discuss drug use. Patient denies utilizes any drugs and denies any concerns. SW supported, offered and patient declined SBIRT resources.

## 2023-08-18 LAB
ANION GAP SERPL CALC-SCNC: 10 MMOL/L — SIGNIFICANT CHANGE UP (ref 7–14)
BUN SERPL-MCNC: 10 MG/DL — SIGNIFICANT CHANGE UP (ref 7–23)
CALCIUM SERPL-MCNC: 9.2 MG/DL — SIGNIFICANT CHANGE UP (ref 8.4–10.5)
CHLORIDE SERPL-SCNC: 103 MMOL/L — SIGNIFICANT CHANGE UP (ref 98–107)
CO2 SERPL-SCNC: 22 MMOL/L — SIGNIFICANT CHANGE UP (ref 22–31)
CREAT SERPL-MCNC: 0.6 MG/DL — SIGNIFICANT CHANGE UP (ref 0.5–1.3)
EGFR: 111 ML/MIN/1.73M2 — SIGNIFICANT CHANGE UP
GLUCOSE SERPL-MCNC: 97 MG/DL — SIGNIFICANT CHANGE UP (ref 70–99)
HCT VFR BLD CALC: 39.2 % — SIGNIFICANT CHANGE UP (ref 39–50)
HGB BLD-MCNC: 12.9 G/DL — LOW (ref 13–17)
MAGNESIUM SERPL-MCNC: 1.9 MG/DL — SIGNIFICANT CHANGE UP (ref 1.6–2.6)
MCHC RBC-ENTMCNC: 29.4 PG — SIGNIFICANT CHANGE UP (ref 27–34)
MCHC RBC-ENTMCNC: 32.9 GM/DL — SIGNIFICANT CHANGE UP (ref 32–36)
MCV RBC AUTO: 89.3 FL — SIGNIFICANT CHANGE UP (ref 80–100)
NRBC # BLD: 0 /100 WBCS — SIGNIFICANT CHANGE UP (ref 0–0)
NRBC # FLD: 0 K/UL — SIGNIFICANT CHANGE UP (ref 0–0)
PHOSPHATE SERPL-MCNC: 2.9 MG/DL — SIGNIFICANT CHANGE UP (ref 2.5–4.5)
PLATELET # BLD AUTO: 263 K/UL — SIGNIFICANT CHANGE UP (ref 150–400)
POTASSIUM SERPL-MCNC: 3.9 MMOL/L — SIGNIFICANT CHANGE UP (ref 3.5–5.3)
POTASSIUM SERPL-SCNC: 3.9 MMOL/L — SIGNIFICANT CHANGE UP (ref 3.5–5.3)
RBC # BLD: 4.39 M/UL — SIGNIFICANT CHANGE UP (ref 4.2–5.8)
RBC # FLD: 16.1 % — HIGH (ref 10.3–14.5)
SODIUM SERPL-SCNC: 135 MMOL/L — SIGNIFICANT CHANGE UP (ref 135–145)
WBC # BLD: 6 K/UL — SIGNIFICANT CHANGE UP (ref 3.8–10.5)
WBC # FLD AUTO: 6 K/UL — SIGNIFICANT CHANGE UP (ref 3.8–10.5)

## 2023-08-18 PROCEDURE — 99232 SBSQ HOSP IP/OBS MODERATE 35: CPT

## 2023-08-18 PROCEDURE — 93971 EXTREMITY STUDY: CPT | Mod: 26

## 2023-08-18 RX ORDER — DIPHENHYDRAMINE HCL 50 MG
25 CAPSULE ORAL ONCE
Refills: 0 | Status: COMPLETED | OUTPATIENT
Start: 2023-08-18 | End: 2023-08-18

## 2023-08-18 RX ADMIN — ENOXAPARIN SODIUM 40 MILLIGRAM(S): 100 INJECTION SUBCUTANEOUS at 05:42

## 2023-08-18 RX ADMIN — DORZOLAMIDE HYDROCHLORIDE 1 DROP(S): 20 SOLUTION/ DROPS OPHTHALMIC at 13:04

## 2023-08-18 RX ADMIN — Medication 0.5 MILLIGRAM(S): at 05:41

## 2023-08-18 RX ADMIN — Medication 100 MILLIGRAM(S): at 22:11

## 2023-08-18 RX ADMIN — Medication 1 DROP(S): at 17:25

## 2023-08-18 RX ADMIN — Medication 650 MILLIGRAM(S): at 06:40

## 2023-08-18 RX ADMIN — DORZOLAMIDE HYDROCHLORIDE 1 DROP(S): 20 SOLUTION/ DROPS OPHTHALMIC at 22:04

## 2023-08-18 RX ADMIN — Medication 1 MILLIGRAM(S): at 13:02

## 2023-08-18 RX ADMIN — Medication 3 MILLIGRAM(S): at 22:05

## 2023-08-18 RX ADMIN — Medication 100 MILLIGRAM(S): at 05:42

## 2023-08-18 RX ADMIN — Medication 650 MILLIGRAM(S): at 05:41

## 2023-08-18 RX ADMIN — Medication 100 MILLIGRAM(S): at 13:02

## 2023-08-18 RX ADMIN — LATANOPROST 1 DROP(S): 0.05 SOLUTION/ DROPS OPHTHALMIC; TOPICAL at 22:04

## 2023-08-18 RX ADMIN — Medication 1 TABLET(S): at 13:02

## 2023-08-18 RX ADMIN — Medication 0.5 MILLIGRAM(S): at 17:23

## 2023-08-18 RX ADMIN — Medication 25 MILLIGRAM(S): at 22:42

## 2023-08-18 RX ADMIN — DORZOLAMIDE HYDROCHLORIDE 1 DROP(S): 20 SOLUTION/ DROPS OPHTHALMIC at 05:42

## 2023-08-18 RX ADMIN — AMLODIPINE BESYLATE 5 MILLIGRAM(S): 2.5 TABLET ORAL at 05:43

## 2023-08-18 RX ADMIN — Medication 1 DROP(S): at 05:42

## 2023-08-18 RX ADMIN — Medication 100 MILLIGRAM(S): at 14:03

## 2023-08-18 NOTE — DIETITIAN INITIAL EVALUATION ADULT - SIGNS/SYMPTOMS
PO<75% of EER >1mo, mild fat loss and muscle wasting  PO<=75% of EER >3mo, mild fat loss and muscle wasting

## 2023-08-18 NOTE — DIETITIAN INITIAL EVALUATION ADULT - PERTINENT MEDS FT
MEDICATIONS  (STANDING):  amLODIPine   Tablet 5 milliGRAM(s) Oral daily  ceFAZolin   IVPB 2000 milliGRAM(s) IV Intermittent every 8 hours  ceFAZolin   IVPB      dorzolamide 2% Ophthalmic Solution 1 Drop(s) Both EYES three times a day  enoxaparin Injectable 40 milliGRAM(s) SubCutaneous every 24 hours  folic acid 1 milliGRAM(s) Oral daily  latanoprost 0.005% Ophthalmic Solution 1 Drop(s) Both EYES at bedtime  LORazepam   Injectable   IV Push   LORazepam   Injectable 0.5 milliGRAM(s) IV Push every 12 hours  multivitamin 1 Tablet(s) Oral daily  thiamine 100 milliGRAM(s) Oral daily  timolol 0.5% Solution 1 Drop(s) Both EYES two times a day    MEDICATIONS  (PRN):  acetaminophen     Tablet .. 650 milliGRAM(s) Oral every 6 hours PRN Mild Pain (1 - 3), Moderate Pain (4 - 6)  aluminum hydroxide/magnesium hydroxide/simethicone Suspension 30 milliLiter(s) Oral every 4 hours PRN Dyspepsia  LORazepam   Injectable 2 milliGRAM(s) IV Push every 1 hour PRN Symptom-triggered: each CIWA -Ar score 8 or GREATER  LORazepam   Injectable 2 milliGRAM(s) IV Push every 2 hours PRN CIWA-Ar score increase by 2 points and a total score of 7 or less  melatonin 3 milliGRAM(s) Oral at bedtime PRN Insomnia  ondansetron Injectable 4 milliGRAM(s) IV Push every 8 hours PRN Nausea and/or Vomiting

## 2023-08-18 NOTE — DIETITIAN INITIAL EVALUATION ADULT - HEIGHT FOR BMI (FEET)
Use Medication as directed    Patient advised to call for any lab results (if obtained during visit) within 2-3 days.   Drink plenty of fluids.     Prevention and treatment of UTI's discussed.  Signs and symptoms of pyelonephritis mentioned.  Follow up with primary care provider for recheck in 3 days if no improvement of urinary symptoms.   Patient to return to clinic sooner if not improving as expected.   Go to ER if any worsening symptoms or signs/symptoms of phylonephritis occur.         
5

## 2023-08-18 NOTE — CONSULT NOTE ADULT - SUBJECTIVE AND OBJECTIVE BOX
HPI:  58-year-old male, history of hypertension, alcohol abuse seen at [facility] earlier in same day for possible alcohol intoxication and rash to left forearm, re presents to ED with complaint of itchy rash to left forearm, Pt thinks he was bit my a bug a few days ago, Since then, has had a rash with worsening redness on left forearm. Redness seems to be tracking up, with associated chills. Denies fevers, chest pain, shortness of breath, abdominal pain, N/V. He reports history of alcohol w/d. Currently feels tremulous and anxious, denies A/V or tactile hallucinations . He admits to drinking 1L of vodka daily. Also notes a history of glaucoma to the right eye with vision deficits at that side, no recent trauma or falls per patient.   (13 Aug 2023 22:45)        PAST MEDICAL & SURGICAL HISTORY:  Glaucoma      HTN (hypertension)      Alcohol dependence      Blindness of right eye      HLD (hyperlipidemia)      No significant past surgical history          Review of Systems:    General: no fevers/chills, no fatigue 	  Skin/Breast: see HPI  Ophthalmologic: no change in vision  ENT: no change in hearing  Respiratory and Thorax: no SOB or cough  Cardiovascular: no palpitations or chest pain  Gastrointestinal: no abdominal pain or blood in stool   Genitourinary: no dysuria or frequency  Musculoskeletal: no joint pains or weakness	  Neurological: no weakness, numbness , or tingling    MEDICATIONS  (STANDING):  amLODIPine   Tablet 5 milliGRAM(s) Oral daily  ceFAZolin   IVPB 2000 milliGRAM(s) IV Intermittent every 8 hours  ceFAZolin   IVPB      dorzolamide 2% Ophthalmic Solution 1 Drop(s) Both EYES three times a day  enoxaparin Injectable 40 milliGRAM(s) SubCutaneous every 24 hours  folic acid 1 milliGRAM(s) Oral daily  latanoprost 0.005% Ophthalmic Solution 1 Drop(s) Both EYES at bedtime  LORazepam   Injectable   IV Push   LORazepam   Injectable 0.5 milliGRAM(s) IV Push every 12 hours  multivitamin 1 Tablet(s) Oral daily  thiamine 100 milliGRAM(s) Oral daily  timolol 0.5% Solution 1 Drop(s) Both EYES two times a day    ALLERGIES: No Known Allergies        SOCIAL HISTORY:  ____________________________________  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  OP showed no ulcerations  There was no visible lymphadenopathy.  Conjunctiva were non injected  There was no clubbing or edema of extremities.  The scalp, hair, face, eyebrows, lips, OP, neck, chest, back,   extremities X 4, nails were examined.  There was no hyperhidrosis or bromhidrosis.    Of note on skin exam:   ____________________________________    LABS:                        12.9   6.00  )-----------( 263      ( 18 Aug 2023 05:26 )             39.2     08-18    135  |  103  |  10  ----------------------------<  97  3.9   |  22  |  0.60    Ca    9.2      18 Aug 2023 05:26  Phos  2.9     08-18  Mg     1.90     08-18        Urinalysis Basic - ( 18 Aug 2023 05:26 )    Color: x / Appearance: x / SG: x / pH: x  Gluc: 97 mg/dL / Ketone: x  / Bili: x / Urobili: x   Blood: x / Protein: x / Nitrite: x   Leuk Esterase: x / RBC: x / WBC x   Sq Epi: x / Non Sq Epi: x / Bacteria: x     HPI:  58-year-old male, history of hypertension, alcohol abuse seen at [facility] earlier in same day for possible alcohol intoxication and rash to left forearm, re presents to ED with complaint of itchy rash to left forearm, Pt thinks he was bit my a bug a few days ago, Since then, has had a rash with worsening redness on left forearm. Redness seems to be tracking up, with associated chills. Denies fevers, chest pain, shortness of breath, abdominal pain, N/V. He reports history of alcohol w/d. Currently feels tremulous and anxious, denies A/V or tactile hallucinations . He admits to drinking 1L of vodka daily. Also notes a history of glaucoma to the right eye with vision deficits at that side, no recent trauma or falls per patient.   (13 Aug 2023 22:45)    Derm HPI:   Pt states he believes he had a bite and then had significant pruritus of the L distal forearm, no new products or exposures. States area was initially warm and painful and has since improved.  No tx prior to hospitalization. Denies wearing a watch/  new jewelry in this area. States he has scratched some of the skin off given his pruritus.     PAST MEDICAL & SURGICAL HISTORY:  Glaucoma      HTN (hypertension)      Alcohol dependence      Blindness of right eye      HLD (hyperlipidemia)      No significant past surgical history          Review of Systems:    CONSTITUTIONAL: +fatigue +chills No weight loss, fever  HEENT:  Eyes:  No visual loss, blurred vision, double vision or yellow sclerae. Ears, Nose, Throat:  No hearing loss, sneezing, congestion, runny nose or sore throat.  SKIN:  +rash  CARDIOVASCULAR:  No chest pain, chest pressure or chest discomfort. No palpitations or edema.  RESPIRATORY:  No shortness of breath, cough or sputum.  GASTROINTESTINAL:  No anorexia, nausea, vomiting or diarrhea. No abdominal pain or blood.  GENITOURINARY:  Denies hematuria, dysuria.   NEUROLOGICAL:  +tremors No headache, dizziness, syncope, paralysis, ataxia, numbness or tingling in the extremities. No change in bowel or bladder control.  MUSCULOSKELETAL:  No muscle, back pain, joint pain or stiffness.  PSYCHIATRIC:  +anxiety No history of depression  ENDOCRINOLOGIC:  No reports of sweating, cold or heat intolerance. No polyuria or polydipsia.  ALLERGIES:  No history of asthma, hives, eczema or rhinitis.    MEDICATIONS  (STANDING):  amLODIPine   Tablet 5 milliGRAM(s) Oral daily  ceFAZolin   IVPB 2000 milliGRAM(s) IV Intermittent every 8 hours  ceFAZolin   IVPB      dorzolamide 2% Ophthalmic Solution 1 Drop(s) Both EYES three times a day  enoxaparin Injectable 40 milliGRAM(s) SubCutaneous every 24 hours  folic acid 1 milliGRAM(s) Oral daily  latanoprost 0.005% Ophthalmic Solution 1 Drop(s) Both EYES at bedtime  LORazepam   Injectable   IV Push   LORazepam   Injectable 0.5 milliGRAM(s) IV Push every 12 hours  multivitamin 1 Tablet(s) Oral daily  thiamine 100 milliGRAM(s) Oral daily  timolol 0.5% Solution 1 Drop(s) Both EYES two times a day    ALLERGIES: No Known Allergies        SOCIAL HISTORY:  ____________________________________  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    Of note on skin exam:   hyperpigmented scale on L distal forearm   eroded plaques on L distal forearm   ____________________________________    LABS:                        12.9   6.00  )-----------( 263      ( 18 Aug 2023 05:26 )             39.2     08-18    135  |  103  |  10  ----------------------------<  97  3.9   |  22  |  0.60    Ca    9.2      18 Aug 2023 05:26  Phos  2.9     08-18  Mg     1.90     08-18        Urinalysis Basic - ( 18 Aug 2023 05:26 )    Color: x / Appearance: x / SG: x / pH: x  Gluc: 97 mg/dL / Ketone: x  / Bili: x / Urobili: x   Blood: x / Protein: x / Nitrite: x   Leuk Esterase: x / RBC: x / WBC x   Sq Epi: x / Non Sq Epi: x / Bacteria: x

## 2023-08-18 NOTE — DIETITIAN INITIAL EVALUATION ADULT - OTHER INFO
Per chart review, 59 yo M w/ HTN, etoh abuse admitted for alcohol withdrawal, with LUE erythema concerning for impetigo/cellullitis.     Patient seen at bedside. Endorses appetite has been improving in hospital, able to consume % of his meal in hospital. Food preferences explored and honored to encourage PO intake. Pt reports of feeling nauseous at times in hospital. Noted on Zofran. Denies other acute GI distress (nausea/vomiting/diarrhea/constipation.) Last BM today 8/18. Labs previous notable with low potassium/phos, s/p potassium chloride for repletion. Potassium/phos replenished. Pt noted on MVI, thiamine, & folic acid for micronutrient coverage. Encouraged patient to consume protein rich and nutrient dense food items in hospital. Pt is amenable to Ensure Clear 1x daily (240 jessica and 8 gm protein). RD to remain available for further nutritional interventions as indicated.  Per chart review, 57 yo M w/ HTN, etoh abuse admitted for alcohol withdrawal, with LUE erythema concerning for impetigo/cellullitis.     Patient seen at bedside. Endorses appetite has been improving in hospital, able to consume % of his meal in hospital. Food preferences explored and honored to encourage PO intake. Pt requesting no coffee/tea at all meals. Prefers ginerale instead. Will remove coffee and tea a per pt's request. Pt is amenable to receive double portions of fish at L+D. Pt reports of feeling nauseous at times in hospital. Noted on Zofran.   Denies other acute GI distress (nausea/vomiting/diarrhea/constipation.) Last BM today 8/18. Labs previous notable with low potassium/phos, s/p potassium chloride for repletion. Potassium/phos replenished. Pt noted on MVI, thiamine, & folic acid for micronutrient coverage. Encouraged patient to consume protein rich and nutrient dense food items in hospital.  consult placed. RD to remain available for further nutritional interventions as indicated.

## 2023-08-18 NOTE — DIETITIAN INITIAL EVALUATION ADULT - NUTRITION CONSULT
Detail Level: Zone Continue Regimen: Endocrinologist prescribed sprinolactone 50mg continue to take medication to help with acne no

## 2023-08-18 NOTE — CONSULT NOTE ADULT - ASSESSMENT
___________________________  ASSESSMENT AND PLAN  #Erosions with resolving dermatitis, possibly contact although with unknown trigger    Recommendations:   - Antibiotics per primary, no evidence of infection on exam today; however, this is after a number of days of antibiotics.   - Cover erosions with vaseline and nonstick gauze (tefla or adaptic) and secure with paper tape.   - If patient is still pruritic may use triamcinolone 0.1% ointment BID 2 weeks on / 1 week off to only intact skin; however, emollient (vaseline) should also help ameliorate pruritus.     The patient's chart was reviewed in addition to being seen and examined at bedside with the dermatology attending Dr. May.  Recommendations were communicated with the primary team.  Please page [phone] with a 10-digit call-back number for further related questions.    May Richardson MD  Resident Physician, PGY-3  [facility] Dermatology  Pager: [phone]  Office: [phone]

## 2023-08-18 NOTE — DIETITIAN INITIAL EVALUATION ADULT - ADD RECOMMEND
1) Recommend continue with current diet, which remains appropriate at this time.   2) Encourage PO intake and honor food preferences as able. Will provide double portions of fish at L+D as per pt's request.   3) Monitor PO intake, Labs, weights, BMs, and skin integrity.   4) RD to remain available for further nutritional interventions as indicated.

## 2023-08-18 NOTE — CONSULT NOTE ADULT - ATTENDING COMMENTS
Clinically favor contact dermatitis (suspect irritant contact dermatitis)  Not favoring any active infectious process at this time - okay from dermatology standpoint to discontinue antibiotics   Recommend wound care as above and topical steroids as needed for symptom management

## 2023-08-18 NOTE — DIETITIAN INITIAL EVALUATION ADULT - NS FNS WEIGHT CHANGE REASON
Pt reported UBW 215lbs in March. As per HIE, Pt previous weight 7/28/23-175lbs, 7/15/23-183lbs, 4/9/23-184lbs, 4/22/23-186lbs, 1/9/23-170lbs, 12/12/.1lbs/ Pt reported UBW 215lbs in March. As per HIE, Pt previous weight 7/28/23-175lbs, 7/15/23-183lbs, 4/9/23-184lbs, 4/22/23-186lbs, 1/9/23-170lbs, 12/12/.1lbs. Most recent adm weight 8/13-190lbs. UBW not accurate, likely due to recall error.

## 2023-08-18 NOTE — DIETITIAN INITIAL EVALUATION ADULT - PERTINENT LABORATORY DATA
08-18    135  |  103  |  10  ----------------------------<  97  3.9   |  22  |  0.60    Ca    9.2      18 Aug 2023 05:26  Phos  2.9     08-18  Mg     1.90     08-18    A1C with Estimated Average Glucose Result: 5.3 % (07-23-23 @ 07:45)  A1C with Estimated Average Glucose Result: 5.3 % (07-17-23 @ 06:20)  A1C with Estimated Average Glucose Result: 5.4 % (07-09-23 @ 05:15)

## 2023-08-18 NOTE — PROGRESS NOTE ADULT - SUBJECTIVE AND OBJECTIVE BOX
[facility] Medicine  Pager [phone]    Patient is a 58y old  Male who presents with a chief complaint of rash, alcohol w/d (17 Aug 2023 13:22)      INTERVAL HPI/OVERNIGHT EVENTS:    MEDICATIONS  (STANDING):  amLODIPine   Tablet 5 milliGRAM(s) Oral daily  ceFAZolin   IVPB 2000 milliGRAM(s) IV Intermittent every 8 hours  ceFAZolin   IVPB      dorzolamide 2% Ophthalmic Solution 1 Drop(s) Both EYES three times a day  enoxaparin Injectable 40 milliGRAM(s) SubCutaneous every 24 hours  folic acid 1 milliGRAM(s) Oral daily  latanoprost 0.005% Ophthalmic Solution 1 Drop(s) Both EYES at bedtime  LORazepam   Injectable   IV Push   LORazepam   Injectable 0.5 milliGRAM(s) IV Push every 12 hours  multivitamin 1 Tablet(s) Oral daily  thiamine 100 milliGRAM(s) Oral daily  timolol 0.5% Solution 1 Drop(s) Both EYES two times a day    MEDICATIONS  (PRN):  acetaminophen     Tablet .. 650 milliGRAM(s) Oral every 6 hours PRN Mild Pain (1 - 3), Moderate Pain (4 - 6)  aluminum hydroxide/magnesium hydroxide/simethicone Suspension 30 milliLiter(s) Oral every 4 hours PRN Dyspepsia  LORazepam   Injectable 2 milliGRAM(s) IV Push every 1 hour PRN Symptom-triggered: each CIWA -Ar score 8 or GREATER  LORazepam   Injectable 2 milliGRAM(s) IV Push every 2 hours PRN CIWA-Ar score increase by 2 points and a total score of 7 or less  melatonin 3 milliGRAM(s) Oral at bedtime PRN Insomnia  ondansetron Injectable 4 milliGRAM(s) IV Push every 8 hours PRN Nausea and/or Vomiting      Allergies    No Known Allergies    Intolerances        REVIEW OF SYSTEMS:  Please see interval HPI:    Vital Signs Last 24 Hrs  T(C): 36.6 (18 Aug 2023 10:00), Max: 36.8 (17 Aug 2023 18:00)  T(F): 97.9 (18 Aug 2023 10:00), Max: 98.2 (17 Aug 2023 18:00)  HR: 57 (18 Aug 2023 10:00) (56 - 62)  BP: 122/72 (18 Aug 2023 10:00) (109/64 - 152/82)  BP(mean): --  RR: 17 (18 Aug 2023 10:00) (17 - 18)  SpO2: 96% (18 Aug 2023 10:00) (96% - 100%)    Parameters below as of 18 Aug 2023 10:00  Patient On (Oxygen Delivery Method): room air      I&O's Detail        PHYSICAL EXAM:  GENERAL:   HEAD:    EYES:   ENMT:   NECK:   NERVOUS SYSTEM:    CHEST/LUNG:   HEART:   ABDOMEN:   EXTREMITIES:    LYMPH:   SKIN:     LABS:                        12.9   6.00  )-----------( 263      ( 18 Aug 2023 05:26 )             39.2     18 Aug 2023 05:26    135    |  103    |  10     ----------------------------<  97     3.9     |  22     |  0.60     Ca    9.2        18 Aug 2023 05:26  Phos  2.9       18 Aug 2023 05:26  Mg     1.90      18 Aug 2023 05:26        CAPILLARY BLOOD GLUCOSE        BLOOD CULTURE    RADIOLOGY & ADDITIONAL TESTS:    Imaging Personally Reviewed:  [ ] YES     Consultant(s) Notes Reviewed:      Care Discussed with Consultants/Other Providers: [facility] Medicine  Pager [phone]    Patient is a 58y old  Male who presents with a chief complaint of rash, alcohol w/d (17 Aug 2023 13:22)      INTERVAL HPI/OVERNIGHT EVENTS:  Right hand swelling improving today, able to make/close fist. Still w/ tremors, understands plan to continue to monitor for symptoms of alcohol withdrawal. LUE with some areas of erosions, itchy received benadryl overnight.     MEDICATIONS  (STANDING):  amLODIPine   Tablet 5 milliGRAM(s) Oral daily  ceFAZolin   IVPB 2000 milliGRAM(s) IV Intermittent every 8 hours  ceFAZolin   IVPB      dorzolamide 2% Ophthalmic Solution 1 Drop(s) Both EYES three times a day  enoxaparin Injectable 40 milliGRAM(s) SubCutaneous every 24 hours  folic acid 1 milliGRAM(s) Oral daily  latanoprost 0.005% Ophthalmic Solution 1 Drop(s) Both EYES at bedtime  LORazepam   Injectable   IV Push   LORazepam   Injectable 0.5 milliGRAM(s) IV Push every 12 hours  multivitamin 1 Tablet(s) Oral daily  thiamine 100 milliGRAM(s) Oral daily  timolol 0.5% Solution 1 Drop(s) Both EYES two times a day    MEDICATIONS  (PRN):  acetaminophen     Tablet .. 650 milliGRAM(s) Oral every 6 hours PRN Mild Pain (1 - 3), Moderate Pain (4 - 6)  aluminum hydroxide/magnesium hydroxide/simethicone Suspension 30 milliLiter(s) Oral every 4 hours PRN Dyspepsia  LORazepam   Injectable 2 milliGRAM(s) IV Push every 1 hour PRN Symptom-triggered: each CIWA -Ar score 8 or GREATER  LORazepam   Injectable 2 milliGRAM(s) IV Push every 2 hours PRN CIWA-Ar score increase by 2 points and a total score of 7 or less  melatonin 3 milliGRAM(s) Oral at bedtime PRN Insomnia  ondansetron Injectable 4 milliGRAM(s) IV Push every 8 hours PRN Nausea and/or Vomiting    Allergies  No Known Allergies    Intolerances    REVIEW OF SYSTEMS:  Please see interval HPI:    Vital Signs Last 24 Hrs  T(C): 36.6 (18 Aug 2023 10:00), Max: 36.8 (17 Aug 2023 18:00)  T(F): 97.9 (18 Aug 2023 10:00), Max: 98.2 (17 Aug 2023 18:00)  HR: 57 (18 Aug 2023 10:00) (56 - 62)  BP: 122/72 (18 Aug 2023 10:00) (109/64 - 152/82)  RR: 17 (18 Aug 2023 10:00) (17 - 18)  SpO2: 96% (18 Aug 2023 10:00) (96% - 100%)    Parameters below as of 18 Aug 2023 10:00  Patient On (Oxygen Delivery Method): room air    I&O's Detail    PHYSICAL EXAM:  GENERAL: NAD, lying in bed   HEAD:  NC/AT  EYES: EOMI, L eye clear sclera/conjunctiva, R eye cloudy  ENMT: MMM, hearing intact to voice  NECK: supple  NERVOUS SYSTEM: moving extremities, conversant, follows commands +visible tremors  CHEST/LUNG: CTAB, comfortable on RA, speaking in full sentences   HEART: S1S2 RRR  ABDOMEN: soft, non-tender  EXTREMITIES:  no c/c, no LE edema, LUE erythema appears to be improved, some of the crusting fell off, with skin erosions?, +R hand edema improving    LABS:                        12.9   6.00  )-----------( 263      ( 18 Aug 2023 05:26 )             39.2     18 Aug 2023 05:26    135    |  103    |  10     ----------------------------<  97     3.9     |  22     |  0.60     Ca    9.2        18 Aug 2023 05:26  Phos  2.9       18 Aug 2023 05:26  Mg     1.90      18 Aug 2023 05:26    CAPILLARY BLOOD GLUCOSE    BLOOD CULTURE    RADIOLOGY & ADDITIONAL TESTS:    Imaging Personally Reviewed:  [ ] YES   < from: [facility] Duplex Ext Veins Upper Comp, Right. (08.18.23 @ 07:43) >  Summary/Impressions:  No evidence of deep vein thrombosis in the right upper  extremity.    < end of copied text >      Consultant(s) Notes Reviewed:      Care Discussed with Consultants/Other Providers: JULIÁN Glover re: ?skin erosions/crusting, derm eval, c/w CIWA

## 2023-08-19 LAB
ALBUMIN SERPL ELPH-MCNC: 4 G/DL — SIGNIFICANT CHANGE UP (ref 3.3–5)
ALP SERPL-CCNC: 65 U/L — SIGNIFICANT CHANGE UP (ref 40–120)
ALT FLD-CCNC: 32 U/L — SIGNIFICANT CHANGE UP (ref 4–41)
ANION GAP SERPL CALC-SCNC: 9 MMOL/L — SIGNIFICANT CHANGE UP (ref 7–14)
AST SERPL-CCNC: 31 U/L — SIGNIFICANT CHANGE UP (ref 4–40)
BILIRUB SERPL-MCNC: 0.3 MG/DL — SIGNIFICANT CHANGE UP (ref 0.2–1.2)
BUN SERPL-MCNC: 14 MG/DL — SIGNIFICANT CHANGE UP (ref 7–23)
CALCIUM SERPL-MCNC: 9.3 MG/DL — SIGNIFICANT CHANGE UP (ref 8.4–10.5)
CHLORIDE SERPL-SCNC: 102 MMOL/L — SIGNIFICANT CHANGE UP (ref 98–107)
CO2 SERPL-SCNC: 24 MMOL/L — SIGNIFICANT CHANGE UP (ref 22–31)
CREAT SERPL-MCNC: 0.7 MG/DL — SIGNIFICANT CHANGE UP (ref 0.5–1.3)
EGFR: 105 ML/MIN/1.73M2 — SIGNIFICANT CHANGE UP
GLUCOSE SERPL-MCNC: 103 MG/DL — HIGH (ref 70–99)
HCT VFR BLD CALC: 40 % — SIGNIFICANT CHANGE UP (ref 39–50)
HGB BLD-MCNC: 13.4 G/DL — SIGNIFICANT CHANGE UP (ref 13–17)
MAGNESIUM SERPL-MCNC: 2.1 MG/DL — SIGNIFICANT CHANGE UP (ref 1.6–2.6)
MCHC RBC-ENTMCNC: 29.7 PG — SIGNIFICANT CHANGE UP (ref 27–34)
MCHC RBC-ENTMCNC: 33.5 GM/DL — SIGNIFICANT CHANGE UP (ref 32–36)
MCV RBC AUTO: 88.7 FL — SIGNIFICANT CHANGE UP (ref 80–100)
NRBC # BLD: 0 /100 WBCS — SIGNIFICANT CHANGE UP (ref 0–0)
NRBC # FLD: 0 K/UL — SIGNIFICANT CHANGE UP (ref 0–0)
PHOSPHATE SERPL-MCNC: 3.4 MG/DL — SIGNIFICANT CHANGE UP (ref 2.5–4.5)
PLATELET # BLD AUTO: 275 K/UL — SIGNIFICANT CHANGE UP (ref 150–400)
POTASSIUM SERPL-MCNC: 3.7 MMOL/L — SIGNIFICANT CHANGE UP (ref 3.5–5.3)
POTASSIUM SERPL-SCNC: 3.7 MMOL/L — SIGNIFICANT CHANGE UP (ref 3.5–5.3)
PROT SERPL-MCNC: 7.7 G/DL — SIGNIFICANT CHANGE UP (ref 6–8.3)
RBC # BLD: 4.51 M/UL — SIGNIFICANT CHANGE UP (ref 4.2–5.8)
RBC # FLD: 16 % — HIGH (ref 10.3–14.5)
SODIUM SERPL-SCNC: 135 MMOL/L — SIGNIFICANT CHANGE UP (ref 135–145)
WBC # BLD: 5.64 K/UL — SIGNIFICANT CHANGE UP (ref 3.8–10.5)
WBC # FLD AUTO: 5.64 K/UL — SIGNIFICANT CHANGE UP (ref 3.8–10.5)

## 2023-08-19 PROCEDURE — 99232 SBSQ HOSP IP/OBS MODERATE 35: CPT

## 2023-08-19 RX ORDER — DIPHENHYDRAMINE HCL 50 MG
25 CAPSULE ORAL ONCE
Refills: 0 | Status: COMPLETED | OUTPATIENT
Start: 2023-08-19 | End: 2023-08-19

## 2023-08-19 RX ORDER — LANOLIN ALCOHOL/MO/W.PET/CERES
6 CREAM (GRAM) TOPICAL AT BEDTIME
Refills: 0 | Status: COMPLETED | OUTPATIENT
Start: 2023-08-19 | End: 2023-08-19

## 2023-08-19 RX ORDER — PETROLATUM,WHITE
1 JELLY (GRAM) TOPICAL DAILY
Refills: 0 | Status: DISCONTINUED | OUTPATIENT
Start: 2023-08-19 | End: 2023-08-20

## 2023-08-19 RX ADMIN — Medication 100 MILLIGRAM(S): at 22:16

## 2023-08-19 RX ADMIN — DORZOLAMIDE HYDROCHLORIDE 1 DROP(S): 20 SOLUTION/ DROPS OPHTHALMIC at 06:14

## 2023-08-19 RX ADMIN — DORZOLAMIDE HYDROCHLORIDE 1 DROP(S): 20 SOLUTION/ DROPS OPHTHALMIC at 14:20

## 2023-08-19 RX ADMIN — Medication 0.5 MILLIGRAM(S): at 06:13

## 2023-08-19 RX ADMIN — Medication 650 MILLIGRAM(S): at 10:01

## 2023-08-19 RX ADMIN — DORZOLAMIDE HYDROCHLORIDE 1 DROP(S): 20 SOLUTION/ DROPS OPHTHALMIC at 22:16

## 2023-08-19 RX ADMIN — Medication 0.5 MILLIGRAM(S): at 18:52

## 2023-08-19 RX ADMIN — Medication 100 MILLIGRAM(S): at 14:16

## 2023-08-19 RX ADMIN — Medication 1 TABLET(S): at 14:20

## 2023-08-19 RX ADMIN — Medication 1 APPLICATION(S): at 16:00

## 2023-08-19 RX ADMIN — Medication 1 MILLIGRAM(S): at 14:19

## 2023-08-19 RX ADMIN — Medication 6 MILLIGRAM(S): at 22:16

## 2023-08-19 RX ADMIN — Medication 100 MILLIGRAM(S): at 14:19

## 2023-08-19 RX ADMIN — Medication 100 MILLIGRAM(S): at 06:18

## 2023-08-19 RX ADMIN — Medication 25 MILLIGRAM(S): at 23:54

## 2023-08-19 RX ADMIN — Medication 650 MILLIGRAM(S): at 11:01

## 2023-08-19 RX ADMIN — ENOXAPARIN SODIUM 40 MILLIGRAM(S): 100 INJECTION SUBCUTANEOUS at 06:15

## 2023-08-19 RX ADMIN — Medication 1 DROP(S): at 18:53

## 2023-08-19 RX ADMIN — LATANOPROST 1 DROP(S): 0.05 SOLUTION/ DROPS OPHTHALMIC; TOPICAL at 22:16

## 2023-08-19 RX ADMIN — Medication 1 DROP(S): at 06:15

## 2023-08-19 NOTE — PROGRESS NOTE ADULT - SUBJECTIVE AND OBJECTIVE BOX
[facility] Medicine  Pager [phone]    Patient is a 58y old  Male who presents with a chief complaint of rash, alcohol w/d (18 Aug 2023 13:02)      INTERVAL HPI/OVERNIGHT EVENTS:    MEDICATIONS  (STANDING):  amLODIPine   Tablet 5 milliGRAM(s) Oral daily  ceFAZolin   IVPB      ceFAZolin   IVPB 2000 milliGRAM(s) IV Intermittent every 8 hours  dorzolamide 2% Ophthalmic Solution 1 Drop(s) Both EYES three times a day  enoxaparin Injectable 40 milliGRAM(s) SubCutaneous every 24 hours  folic acid 1 milliGRAM(s) Oral daily  latanoprost 0.005% Ophthalmic Solution 1 Drop(s) Both EYES at bedtime  LORazepam   Injectable   IV Push   LORazepam   Injectable 0.5 milliGRAM(s) IV Push every 12 hours  multivitamin 1 Tablet(s) Oral daily  thiamine 100 milliGRAM(s) Oral daily  timolol 0.5% Solution 1 Drop(s) Both EYES two times a day    MEDICATIONS  (PRN):  acetaminophen     Tablet .. 650 milliGRAM(s) Oral every 6 hours PRN Mild Pain (1 - 3), Moderate Pain (4 - 6)  aluminum hydroxide/magnesium hydroxide/simethicone Suspension 30 milliLiter(s) Oral every 4 hours PRN Dyspepsia  LORazepam   Injectable 2 milliGRAM(s) IV Push every 2 hours PRN CIWA-Ar score increase by 2 points and a total score of 7 or less  LORazepam   Injectable 2 milliGRAM(s) IV Push every 1 hour PRN Symptom-triggered: each CIWA -Ar score 8 or GREATER  melatonin 3 milliGRAM(s) Oral at bedtime PRN Insomnia  ondansetron Injectable 4 milliGRAM(s) IV Push every 8 hours PRN Nausea and/or Vomiting      Allergies    No Known Allergies    Intolerances        REVIEW OF SYSTEMS:  Please see interval HPI:    Vital Signs Last 24 Hrs  T(C): 36.7 (19 Aug 2023 14:00), Max: 37.4 (18 Aug 2023 22:00)  T(F): 98 (19 Aug 2023 14:00), Max: 99.3 (18 Aug 2023 22:00)  HR: 61 (19 Aug 2023 14:00) (55 - 65)  BP: 117/71 (19 Aug 2023 14:00) (106/61 - 129/63)  BP(mean): --  RR: 18 (19 Aug 2023 14:00) (15 - 18)  SpO2: 95% (19 Aug 2023 14:00) (95% - 98%)    Parameters below as of 19 Aug 2023 14:00  Patient On (Oxygen Delivery Method): room air      I&O's Detail        PHYSICAL EXAM:  GENERAL:   HEAD:    EYES:   ENMT:   NECK:   NERVOUS SYSTEM:    CHEST/LUNG:   HEART:   ABDOMEN:   EXTREMITIES:    LYMPH:   SKIN:     LABS:                        13.4   5.64  )-----------( 275      ( 19 Aug 2023 07:43 )             40.0     19 Aug 2023 07:43    135    |  102    |  14     ----------------------------<  103    3.7     |  24     |  0.70     Ca    9.3        19 Aug 2023 07:43  Phos  3.4       19 Aug 2023 07:43  Mg     2.10      19 Aug 2023 07:43    TPro  7.7    /  Alb  4.0    /  TBili  0.3    /  DBili  x      /  AST  31     /  ALT  32     /  AlkPhos  65     19 Aug 2023 07:43      CAPILLARY BLOOD GLUCOSE        BLOOD CULTURE    RADIOLOGY & ADDITIONAL TESTS:    Imaging Personally Reviewed:  [ ] YES     Consultant(s) Notes Reviewed:      Care Discussed with Consultants/Other Providers: [facility] Medicine  Pager [phone]    Patient is a 58y old  Male who presents with a chief complaint of rash, alcohol w/d (18 Aug 2023 13:02)      INTERVAL HPI/OVERNIGHT EVENTS:  Feeling better, no fever, LUE improved following application of vaseline and nonstick gauze as per derm recs. Completing course of antibiotics and ativan taper, agreeable to going home tomorrow, reports staying with cousin in [place], requesting to talk to SW re: transport home.     MEDICATIONS  (STANDING):  amLODIPine   Tablet 5 milliGRAM(s) Oral daily  ceFAZolin   IVPB      ceFAZolin   IVPB 2000 milliGRAM(s) IV Intermittent every 8 hours  dorzolamide 2% Ophthalmic Solution 1 Drop(s) Both EYES three times a day  enoxaparin Injectable 40 milliGRAM(s) SubCutaneous every 24 hours  folic acid 1 milliGRAM(s) Oral daily  latanoprost 0.005% Ophthalmic Solution 1 Drop(s) Both EYES at bedtime  LORazepam   Injectable   IV Push   LORazepam   Injectable 0.5 milliGRAM(s) IV Push every 12 hours  multivitamin 1 Tablet(s) Oral daily  thiamine 100 milliGRAM(s) Oral daily  timolol 0.5% Solution 1 Drop(s) Both EYES two times a day    MEDICATIONS  (PRN):  acetaminophen     Tablet .. 650 milliGRAM(s) Oral every 6 hours PRN Mild Pain (1 - 3), Moderate Pain (4 - 6)  aluminum hydroxide/magnesium hydroxide/simethicone Suspension 30 milliLiter(s) Oral every 4 hours PRN Dyspepsia  LORazepam   Injectable 2 milliGRAM(s) IV Push every 2 hours PRN CIWA-Ar score increase by 2 points and a total score of 7 or less  LORazepam   Injectable 2 milliGRAM(s) IV Push every 1 hour PRN Symptom-triggered: each CIWA -Ar score 8 or GREATER  melatonin 3 milliGRAM(s) Oral at bedtime PRN Insomnia  ondansetron Injectable 4 milliGRAM(s) IV Push every 8 hours PRN Nausea and/or Vomiting    Allergies  No Known Allergies    Intolerances    REVIEW OF SYSTEMS:  Please see interval HPI:    Vital Signs Last 24 Hrs  T(C): 36.7 (19 Aug 2023 14:00), Max: 37.4 (18 Aug 2023 22:00)  T(F): 98 (19 Aug 2023 14:00), Max: 99.3 (18 Aug 2023 22:00)  HR: 61 (19 Aug 2023 14:00) (55 - 65)  BP: 117/71 (19 Aug 2023 14:00) (106/61 - 129/63)  RR: 18 (19 Aug 2023 14:00) (15 - 18)  SpO2: 95% (19 Aug 2023 14:00) (95% - 98%)    Parameters below as of 19 Aug 2023 14:00  Patient On (Oxygen Delivery Method): room air      I&O's Detail    PHYSICAL EXAM:  GENERAL: NAD, standing in room, able to ambulate independently  HEAD:  NC/AT  EYES: EOMI, L eye clear sclera/conjunctiva, R eye cloudy  ENMT: MMM, hearing intact to voice  NECK: supple  NERVOUS SYSTEM: moving extremities, conversant, follows commands  CHEST/LUNG: CTAB, comfortable on RA, speaking in full sentences   HEART: S1S2 RRR  ABDOMEN: soft, non-tender  EXTREMITIES:  no c/c, no LE edema, LUE w/o erythema, +areas of skin erosions, +R hand edema improved      LABS:                        13.4   5.64  )-----------( 275      ( 19 Aug 2023 07:43 )             40.0     19 Aug 2023 07:43    135    |  102    |  14     ----------------------------<  103    3.7     |  24     |  0.70     Ca    9.3        19 Aug 2023 07:43  Phos  3.4       19 Aug 2023 07:43  Mg     2.10      19 Aug 2023 07:43    TPro  7.7    /  Alb  4.0    /  TBili  0.3    /  DBili  x      /  AST  31     /  ALT  32     /  AlkPhos  65     19 Aug 2023 07:43      CAPILLARY BLOOD GLUCOSE    BLOOD CULTURE    RADIOLOGY & ADDITIONAL TESTS:    Imaging Personally Reviewed:  [ ] YES     Consultant(s) Notes Reviewed:  Derm    Care Discussed with Consultants/Other Providers: JULIÁN Glover re: derm recs, d/c planning for AM, patient requesting SW

## 2023-08-20 ENCOUNTER — TRANSCRIPTION ENCOUNTER (OUTPATIENT)
Age: 58
End: 2023-08-20

## 2023-08-20 VITALS
TEMPERATURE: 98 F | DIASTOLIC BLOOD PRESSURE: 57 MMHG | RESPIRATION RATE: 17 BRPM | HEART RATE: 55 BPM | SYSTOLIC BLOOD PRESSURE: 113 MMHG | OXYGEN SATURATION: 98 %

## 2023-08-20 PROCEDURE — 99239 HOSP IP/OBS DSCHRG MGMT >30: CPT | Mod: GC

## 2023-08-20 RX ORDER — LATANOPROST 0.05 MG/ML
1 SOLUTION/ DROPS OPHTHALMIC; TOPICAL
Qty: 2 | Refills: 0
Start: 2023-08-20 | End: 2023-08-29

## 2023-08-20 RX ORDER — TIMOLOL 0.5 %
1 DROPS OPHTHALMIC (EYE)
Qty: 2 | Refills: 0
Start: 2023-08-20 | End: 2023-08-29

## 2023-08-20 RX ORDER — DORZOLAMIDE HYDROCHLORIDE 20 MG/ML
1 SOLUTION/ DROPS OPHTHALMIC
Qty: 2 | Refills: 0
Start: 2023-08-20 | End: 2023-08-29

## 2023-08-20 RX ORDER — AMLODIPINE BESYLATE 2.5 MG/1
1 TABLET ORAL
Qty: 14 | Refills: 0
Start: 2023-08-20 | End: 2023-09-02

## 2023-08-20 RX ADMIN — Medication 100 MILLIGRAM(S): at 06:15

## 2023-08-20 RX ADMIN — DORZOLAMIDE HYDROCHLORIDE 1 DROP(S): 20 SOLUTION/ DROPS OPHTHALMIC at 06:16

## 2023-08-20 RX ADMIN — Medication 1 DROP(S): at 06:15

## 2023-08-20 RX ADMIN — DORZOLAMIDE HYDROCHLORIDE 1 DROP(S): 20 SOLUTION/ DROPS OPHTHALMIC at 12:25

## 2023-08-20 RX ADMIN — AMLODIPINE BESYLATE 5 MILLIGRAM(S): 2.5 TABLET ORAL at 06:16

## 2023-08-20 RX ADMIN — Medication 1 TABLET(S): at 12:23

## 2023-08-20 RX ADMIN — Medication 1 MILLIGRAM(S): at 12:23

## 2023-08-20 RX ADMIN — Medication 100 MILLIGRAM(S): at 12:23

## 2023-08-20 NOTE — PROGRESS NOTE ADULT - NUTRITIONAL ASSESSMENT
This patient has been assessed with a concern for Malnutrition and has been determined to have a diagnosis/diagnoses of Moderate protein-calorie malnutrition.    This patient is being managed with:   Diet Regular-  DASH/TLC {Sodium & Cholesterol Restricted} (DASH)  Entered: Aug 14 2023  1:38AM  
This patient has been assessed with a concern for Malnutrition and has been determined to have a diagnosis/diagnoses of Moderate protein-calorie malnutrition.    This patient is being managed with:   Diet Regular-  DASH/TLC {Sodium & Cholesterol Restricted} (DASH)  Entered: Aug 14 2023  1:38AM

## 2023-08-20 NOTE — DISCHARGE NOTE PROVIDER - DETAILS OF MALNUTRITION DIAGNOSIS/DIAGNOSES
This patient has been assessed with a concern for Malnutrition and was treated during this hospitalization for the following Nutrition diagnosis/diagnoses:     -  08/18/2023: Moderate protein-calorie malnutrition

## 2023-08-20 NOTE — DISCHARGE NOTE NURSING/CASE MANAGEMENT/SOCIAL WORK - NSDCVIVACCINE_GEN_ALL_CORE_FT
Notified Mother of Dr. Jesus Marrow recommendation. Mother agreed and verbalized her understanding. influenza, injectable, quadrivalent, preservative free; 14-Oct-2021 13:35; Nany Guerrero (RN); [employer]; [id number] (Exp. Date: 30-Jun-2022); IntraMuscular; Deltoid Right.; 0.5 milliLiter(s); VIS (VIS Published: 06-Aug-2021, VIS Presented: 14-Oct-2021);   Tdap; 22-Dec-2021 19:23; Clementina Koehler (RN); [employer]; [id number] (Exp. Date: 09-Sep-2023); IntraMuscular; Deltoid Left.; 0.5 milliLiter(s); VIS (VIS Published: 09-May-2013, VIS Presented: 22-Dec-2021);   Tdap; 15-Aug-2022 16:08; Marianne Pollock (RN); [employer]; [id number]   (Exp. Date: 18-Apr-2024); IntraMuscular; Deltoid Left.; 0.5 milliLiter(s); VIS (VIS Published: 09-May-2013, VIS Presented: 15-Aug-2022);   Tdap; 16-Nov-2022 15:48; Parish Avila (RN); [employer]; [id number] (Exp. Date: 01-Jun-2024); IntraMuscular; Deltoid Right.; 0.5 milliLiter(s); VIS (VIS Published: 09-May-2013, VIS Presented: 16-Nov-2022);

## 2023-08-20 NOTE — DISCHARGE NOTE PROVIDER - NSDCMRMEDTOKEN_GEN_ALL_CORE_FT
amLODIPine 5 mg oral tablet: 1 tab(s) orally once a day  dorzolamide 2% ophthalmic solution: 1 drop(s) to each affected eye 3 times a day  folic acid 1 mg oral tablet: 1 tab(s) orally once a day  latanoprost 0.005% ophthalmic solution: 1 drop(s) to each affected eye once a day (at bedtime)  Multiple Vitamins oral tablet: 1 tab(s) orally once a day  thiamine 100 mg oral tablet: 1 tab(s) orally once a day  timolol maleate 0.5% ophthalmic solution: 1 drop(s) to each affected eye 2 times a day

## 2023-08-20 NOTE — DISCHARGE NOTE NURSING/CASE MANAGEMENT/SOCIAL WORK - PATIENT PORTAL LINK FT
You can access the FollowMyHealth Patient Portal offered by [facility] by registering at the following website: [email or web address]. By joining [employer]’s FollowMyHealth portal, you will also be able to view your health information using other applications (apps) compatible with our system.

## 2023-08-20 NOTE — DISCHARGE NOTE PROVIDER - HOSPITAL COURSE
57 yo M w/ HTN, etoh abuse admitted for alcohol withdrawal, with LUE erythema concerning for impetigo/cellullitis. Patient did not meet criteria for sepsis. Patient was monitored on CIWA protocol and completed an ativan taper with improvement in CIWA scores. He was counseled on alcohol cessation. His LUE erythema improved following antibiotics, he completed a course of antibiotics (Ancef) in the hospital. Skin erosions were noted and patient was evaluated by dermatology, potentially resolving dermatitis (possibly contact) recommended non-stick gauze and vaseline to area. Patient continued on home medications for hypertension and glaucoma. Patient medically stable for discharge on 8/20, requested refill of BP med [facility].

## 2023-08-20 NOTE — DISCHARGE NOTE NURSING/CASE MANAGEMENT/SOCIAL WORK - NSDCPEFALRISK_GEN_ALL_CORE
For information on Fall & Injury Prevention, visit: [email or web address] OR  [email or web address] OR  https://www.cdc.gov/steadi/patient.html

## 2023-08-20 NOTE — PROGRESS NOTE ADULT - REASON FOR ADMISSION
rash, alcohol w/d

## 2023-08-20 NOTE — DISCHARGE NOTE PROVIDER - NSDCCPCAREPLAN_GEN_ALL_CORE_FT
PRINCIPAL DISCHARGE DIAGNOSIS  Diagnosis: Alcohol withdrawal  Assessment and Plan of Treatment: You were treated in the hospital for alcohol withdrawal, a potentially serious and life threatening condition. It is critically important that you stop drinking alcohol. Please followup with your primary doctor or use the resources provided by the  to help you stop drinking. Seek medical attention if you have seizures, tremors, confusion, headaches. anxiety, hallucinations or other signs of withdrawal.      SECONDARY DISCHARGE DIAGNOSES  Diagnosis: Cellulitis  Assessment and Plan of Treatment: You completed a course of antibiotics in the hospital for cellulitis (skin infection) and your symptoms improved. Seek medical attention if you have fever/chills or worsening pain/swelling/redness in your arm. You had some erosions in your skin and were seen by the dermatologist who recommended vaseline (can purchase over the counter) and non-stick gauze to the area.    Diagnosis: HTN (hypertension)  Assessment and Plan of Treatment: Please take blood pressure medications as directed and followup with your primary medical doctor for further mangement. Seek medical attention if you have chest pain/shortness of breath/severe headaches or blurry vision.    Diagnosis: Glaucoma  Assessment and Plan of Treatment: Take your eye drops as directed, followup with your eye doctor.

## 2023-08-20 NOTE — PROGRESS NOTE ADULT - ASSESSMENT
57 yo M w/ HTN, etoh abuse admitted for alcohol withdrawal, with LUE erythema concerning for impetigo/cellullitis.     # LUE erythema, concern for impetigo/cellulitis  - imaging negative for osseous abnormality, noting soft tissue edema  - not septic on admission or currently  - denies any exposure to poison ivy, other plants, thinks possibly bit by insect?  - on IV ancef, patient thinks is improving, will continue to monitor response (erythema appears better) potential transition to PO regimen in future if continues to improve  - if fails to improve, will consider derm evaluation to assess for alternate etiologies    # RUE edema   - ?site of former IV per patient, +discomfort in hand  - will f/u duplex to r/o DVT, c/w elevation, +packs     # alcohol withdrawal  - still  with tremors on exam  - c/w CIWA monitoring and ativan taper w/ symptom triggered prns  - recent CIWA scores ranging from 3-5  - continue MV/folic acid/thiamine  - counseled alcohol cessation, SBIRT assistance appreciated  - w/ mild transaminitis likely in setting of etoh, improving    # Essential HTN  - Dash/tlc diet  - c/w amlodipine  - monitor BP and adjust regimen as needed (could potentially also be elevated in setting of etoh withdrawal), currently in acceptable range    # Glaucoma  - c/w eye drops    # Hypokalemia  - replete K    # Need for prophylactic measure  - VTE ppx: lovenox  - Dispo: pending CIWA/management of etoh withdrawal        
59 yo M w/ HTN, etoh abuse admitted for alcohol withdrawal, with LUE erythema concerning for impetigo/cellullitis.     # LUE erythema, concern for impetigo/cellulitis  - imaging negative for osseous abnormality, noting soft tissue edema  - not septic on admission or currently  - on IV ancef, patient thinks is improving, will continue to monitor response, potential transition to PO regimen in future if continues to improve  - if fails to improve, will consider derm evaluation to assess for alternate etiologies    # alcohol withdrawal  - +tremors on exam  - c/w CIWA monitoring and ativan taper w/ symptom triggered prns  - recent CIWA scores max of 9, most recent 4  - continue MV/folic acid/thiamine  - counseled alcohol cessation, SBIRT assistance appreciated  - w/ mild transaminitis likely in setting of etoh, improving    # Essential HTN  - Dash/tlc diet  - c/w amlodipine  - monitor BP and adjust regimen as needed    # Glaucoma  - c/w eye drops    # Need for prophylactic measure  - VTE ppx: lovenox  - Dispo: pending CIWA/management of etoh withdrawal
57 yo M w/ HTN, etoh abuse admitted for alcohol withdrawal, with LUE erythema concerning for impetigo/cellullitis.     # LUE erythema, concern for impetigo/cellulitis  - imaging negative for osseous abnormality, noting soft tissue edema  - not septic on admission or currently  - denies any exposure to poison ivy, other plants, thinks possibly bit by insect?  - on IV ancef, patient thinks is improving, will continue to monitor response (erythema appears better) potential transition to PO regimen in future if continues to improve  - if fails to improve, will consider derm evaluation to assess for alternate etiologies    # alcohol withdrawal  - still +tremors on exam  - c/w CIWA monitoring and ativan taper w/ symptom triggered prns  - recent CIWA scores 4s (had 5 earlier this AM)  - continue MV/folic acid/thiamine  - counseled alcohol cessation, SBIRT assistance appreciated  - w/ mild transaminitis likely in setting of etoh, improving    # Essential HTN  - Dash/tlc diet  - c/w amlodipine  - monitor BP and adjust regimen as needed (could potentially also be elevated in setting of etoh withdrawal), currently in acceptable range    # Glaucoma  - c/w eye drops    # Need for prophylactic measure  - VTE ppx: lovenox  - Dispo: pending CIWA/management of etoh withdrawal      
57 yo M w/ HTN, etoh abuse admitted for alcohol withdrawal, with LUE erythema concerning for impetigo/cellullitis.     # LUE erythema, concern for impetigo/cellulitis, evaluating for other etiologies  - imaging negative for osseous abnormality, noting soft tissue edema  - not septic on admission or currently  - denies any exposure to poison ivy, other plants, thinks possibly bit by insect?  - on IV ancef, patient thinks is improving, will continue to monitor response (erythema appears better) potential transition to PO regimen in future if continues to improve  - with changes noted on skin exam today, will obtain derm evaluation for assistance (?alternate etiology), appreciate input, will f/u recs    # RUE edema   - ?site of former IV per patient, +discomfort in hand  - duplex negative for DVT, c/w elevation, +packs, edema appears better today    # alcohol withdrawal  - still  with tremors on exam  - c/w CIWA monitoring and ativan taper w/ symptom triggered prns  - recent CIWA scores ranging from 4-6  - continue MV/folic acid/thiamine  - counseled alcohol cessation, SBIRT assistance appreciated  - w/ mild transaminitis likely in setting of etoh, improving    # Essential HTN  - Dash/tlc diet  - c/w amlodipine  - monitor BP and adjust regimen as needed (could potentially also be elevated in setting of etoh withdrawal), currently in acceptable range    # Glaucoma  - c/w eye drops    # Need for prophylactic measure  - VTE ppx: lovenox  - Dispo: pending CIWA/management of etoh withdrawal          
59 yo M w/ HTN, etoh abuse admitted for alcohol withdrawal, with LUE erythema concerning for impetigo/cellullitis.     # LUE erythema, concern for impetigo/cellulitis  - imaging negative for osseous abnormality, noting soft tissue edema  - not septic on admission or currently  - denies any exposure to poison ivy, other plants, thinks possibly bit by insect?  - completing course of IV Ancef, given improvement, do not feel additional antibiotics required upon discharge  - derm input appreciated, erosions with resolving dermatitis (possibly contact although unknown trigger), c/w non-stick gauze and Vaseline (patient feels it helps)    # RUE edema (improved)  - ?site of former IV per patient, had discomfort in hand  - duplex negative for DVT, s/p elevation, +packs, edema improved, no current discomfort    # alcohol withdrawal  - c/w CIWA monitoring and ativan taper w/ symptom triggered prns  - completed ativan taper  - continue MV/folic acid/thiamine  - counseled alcohol cessation, SBIRT assistance appreciated  - w/ mild transaminitis likely in setting of etoh, improving    # Essential HTN  - Dash/tlc diet  - c/w amlodipine (patient requested refill)  - monitor BP and adjust regimen as needed (could potentially also be elevated in setting of etoh withdrawal), currently in acceptable range    # Glaucoma  - c/w eye drops    # Need for prophylactic measure  - VTE ppx: lovenox  - Dispo: completed ativan taper and course of antibiotics for LUE cellulitis, d/c home today, anticipatory guidance provided, counseled on etoh cessation, d/c paperwork complete    Discharge time 32 minutes             
59 yo M w/ HTN, etoh abuse admitted for alcohol withdrawal, with LUE erythema concerning for impetigo/cellullitis.     # LUE erythema, concern for impetigo/cellulitis  - imaging negative for osseous abnormality, noting soft tissue edema  - not septic on admission or currently  - denies any exposure to poison ivy, other plants, thinks possibly bit by insect?  - on IV ancef, patient thinks is improving, will continue to monitor response (erythema appears better) potential transition to PO regimen in future if continues to improve  - if fails to improve, will consider derm evaluation to assess for alternate etiologies    # alcohol withdrawal  - still +tremors on exam  - c/w CIWA monitoring and ativan taper w/ symptom triggered prns  - recent CIWA scores 4  - continue MV/folic acid/thiamine  - counseled alcohol cessation, SBIRT assistance appreciated  - w/ mild transaminitis likely in setting of etoh, improving    # Essential HTN  - Dash/tlc diet  - c/w amlodipine  - monitor BP and adjust regimen as needed (could potentially also be elevated in setting of etoh withdrawal)    # Glaucoma  - c/w eye drops    # Need for prophylactic measure  - VTE ppx: lovenox  - Dispo: pending CIWA/management of etoh withdrawal    
59 yo M w/ HTN, etoh abuse admitted for alcohol withdrawal, with LUE erythema concerning for impetigo/cellullitis.     # LUE erythema, concern for impetigo/cellulitis  - imaging negative for osseous abnormality, noting soft tissue edema  - not septic on admission or currently  - denies any exposure to poison ivy, other plants, thinks possibly bit by insect?  - completing course of IV Ancef, given improvement, do not feel additional antibiotics required upon discharge  - derm input appreciated, erosions with resolving dermatitis (possibly contact although unknown trigger), c/w non-stick gauze and Vaseline (patient feels it helps)    # RUE edema (improved)  - ?site of former IV per patient, had discomfort in hand  - duplex negative for DVT, s/p elevation, +packs, edema improved, no current discomfort    # alcohol withdrawal  - c/w CIWA monitoring and ativan taper w/ symptom triggered prns  - recent CIWA scores 1-2, is completing taper today  - continue MV/folic acid/thiamine  - counseled alcohol cessation, SBIRT assistance appreciated  - w/ mild transaminitis likely in setting of etoh, improving    # Essential HTN  - Dash/tlc diet  - c/w amlodipine  - monitor BP and adjust regimen as needed (could potentially also be elevated in setting of etoh withdrawal), currently in acceptable range    # Glaucoma  - c/w eye drops    # Need for prophylactic measure  - VTE ppx: lovenox  - Dispo: completing ativan taper and antibiotics today, anticipate d/c home tomorrow, appreciate CM/SW assistance

## 2023-08-29 ENCOUNTER — INPATIENT (INPATIENT)
Facility: HOSPITAL | Age: 58
LOS: 7 days | Discharge: HOME | End: 2023-09-06
Attending: STUDENT IN AN ORGANIZED HEALTH CARE EDUCATION/TRAINING PROGRAM | Admitting: STUDENT IN AN ORGANIZED HEALTH CARE EDUCATION/TRAINING PROGRAM
Payer: MEDICAID

## 2023-08-29 VITALS
DIASTOLIC BLOOD PRESSURE: 80 MMHG | SYSTOLIC BLOOD PRESSURE: 158 MMHG | TEMPERATURE: 98 F | WEIGHT: 175.05 LBS | OXYGEN SATURATION: 96 % | HEIGHT: 68 IN | RESPIRATION RATE: 22 BRPM | HEART RATE: 90 BPM

## 2023-08-29 LAB
ALBUMIN SERPL ELPH-MCNC: 3.4 G/DL — SIGNIFICANT CHANGE UP (ref 3.3–5)
ALP SERPL-CCNC: 75 U/L — SIGNIFICANT CHANGE UP (ref 40–120)
ALT FLD-CCNC: 44 U/L — SIGNIFICANT CHANGE UP (ref 12–78)
ANION GAP SERPL CALC-SCNC: 4 MMOL/L — LOW (ref 5–17)
APPEARANCE UR: CLEAR — SIGNIFICANT CHANGE UP
AST SERPL-CCNC: 57 U/L — HIGH (ref 15–37)
BACTERIA # UR AUTO: ABNORMAL
BASOPHILS # BLD AUTO: 0.1 K/UL — SIGNIFICANT CHANGE UP (ref 0–0.2)
BASOPHILS NFR BLD AUTO: 2.2 % — HIGH (ref 0–2)
BILIRUB DIRECT SERPL-MCNC: 0.1 MG/DL — SIGNIFICANT CHANGE UP (ref 0–0.3)
BILIRUB INDIRECT FLD-MCNC: 0.4 MG/DL — SIGNIFICANT CHANGE UP (ref 0.2–1)
BILIRUB SERPL-MCNC: 0.5 MG/DL — SIGNIFICANT CHANGE UP (ref 0.2–1.2)
BILIRUB UR-MCNC: NEGATIVE — SIGNIFICANT CHANGE UP
BUN SERPL-MCNC: 9 MG/DL — SIGNIFICANT CHANGE UP (ref 7–23)
CALCIUM SERPL-MCNC: 8.1 MG/DL — LOW (ref 8.5–10.1)
CHLORIDE SERPL-SCNC: 108 MMOL/L — SIGNIFICANT CHANGE UP (ref 96–108)
CO2 SERPL-SCNC: 30 MMOL/L — SIGNIFICANT CHANGE UP (ref 22–31)
COLOR SPEC: YELLOW — SIGNIFICANT CHANGE UP
CREAT SERPL-MCNC: 0.88 MG/DL — SIGNIFICANT CHANGE UP (ref 0.5–1.3)
DIFF PNL FLD: NEGATIVE — SIGNIFICANT CHANGE UP
EGFR: 98 ML/MIN/1.73M2 — SIGNIFICANT CHANGE UP
EOSINOPHIL # BLD AUTO: 0.11 K/UL — SIGNIFICANT CHANGE UP (ref 0–0.5)
EOSINOPHIL NFR BLD AUTO: 2.4 % — SIGNIFICANT CHANGE UP (ref 0–6)
EPI CELLS # UR: SIGNIFICANT CHANGE UP
ETHANOL SERPL-MCNC: 425 MG/DL — HIGH (ref 0–10)
GLUCOSE SERPL-MCNC: 104 MG/DL — HIGH (ref 70–99)
GLUCOSE UR QL: NEGATIVE MG/DL — SIGNIFICANT CHANGE UP
HCT VFR BLD CALC: 37.9 % — LOW (ref 39–50)
HGB BLD-MCNC: 12.3 G/DL — LOW (ref 13–17)
IMM GRANULOCYTES NFR BLD AUTO: 0 % — SIGNIFICANT CHANGE UP (ref 0–0.9)
KETONES UR-MCNC: NEGATIVE — SIGNIFICANT CHANGE UP
LEUKOCYTE ESTERASE UR-ACNC: NEGATIVE — SIGNIFICANT CHANGE UP
LIDOCAIN IGE QN: 27 U/L — SIGNIFICANT CHANGE UP (ref 13–75)
LYMPHOCYTES # BLD AUTO: 2.55 K/UL — SIGNIFICANT CHANGE UP (ref 1–3.3)
LYMPHOCYTES # BLD AUTO: 56.8 % — HIGH (ref 13–44)
MAGNESIUM SERPL-MCNC: 2 MG/DL — SIGNIFICANT CHANGE UP (ref 1.6–2.6)
MCHC RBC-ENTMCNC: 28.7 PG — SIGNIFICANT CHANGE UP (ref 27–34)
MCHC RBC-ENTMCNC: 32.5 G/DL — SIGNIFICANT CHANGE UP (ref 32–36)
MCV RBC AUTO: 88.3 FL — SIGNIFICANT CHANGE UP (ref 80–100)
MONOCYTES # BLD AUTO: 0.33 K/UL — SIGNIFICANT CHANGE UP (ref 0–0.9)
MONOCYTES NFR BLD AUTO: 7.3 % — SIGNIFICANT CHANGE UP (ref 2–14)
NEUTROPHILS # BLD AUTO: 1.4 K/UL — LOW (ref 1.8–7.4)
NEUTROPHILS NFR BLD AUTO: 31.3 % — LOW (ref 43–77)
NITRITE UR-MCNC: NEGATIVE — SIGNIFICANT CHANGE UP
NRBC # BLD: 0 /100 WBCS — SIGNIFICANT CHANGE UP (ref 0–0)
PH UR: 6 — SIGNIFICANT CHANGE UP (ref 5–8)
PHOSPHATE SERPL-MCNC: 3.1 MG/DL — SIGNIFICANT CHANGE UP (ref 2.5–4.5)
PLATELET # BLD AUTO: 214 K/UL — SIGNIFICANT CHANGE UP (ref 150–400)
POTASSIUM SERPL-MCNC: 4.2 MMOL/L — SIGNIFICANT CHANGE UP (ref 3.5–5.3)
POTASSIUM SERPL-SCNC: 4.2 MMOL/L — SIGNIFICANT CHANGE UP (ref 3.5–5.3)
PROT SERPL-MCNC: 8.2 GM/DL — SIGNIFICANT CHANGE UP (ref 6–8.3)
PROT UR-MCNC: 15 MG/DL
RBC # BLD: 4.29 M/UL — SIGNIFICANT CHANGE UP (ref 4.2–5.8)
RBC # FLD: 16.7 % — HIGH (ref 10.3–14.5)
RBC CASTS # UR COMP ASSIST: NEGATIVE /HPF — SIGNIFICANT CHANGE UP (ref 0–4)
SODIUM SERPL-SCNC: 142 MMOL/L — SIGNIFICANT CHANGE UP (ref 135–145)
SP GR SPEC: 1.01 — SIGNIFICANT CHANGE UP (ref 1.01–1.02)
TROPONIN I, HIGH SENSITIVITY RESULT: 13.2 NG/L — SIGNIFICANT CHANGE UP
UROBILINOGEN FLD QL: NEGATIVE MG/DL — SIGNIFICANT CHANGE UP
WBC # BLD: 4.49 K/UL — SIGNIFICANT CHANGE UP (ref 3.8–10.5)
WBC # FLD AUTO: 4.49 K/UL — SIGNIFICANT CHANGE UP (ref 3.8–10.5)
WBC UR QL: SIGNIFICANT CHANGE UP

## 2023-08-29 PROCEDURE — 71045 X-RAY EXAM CHEST 1 VIEW: CPT | Mod: 26

## 2023-08-29 PROCEDURE — 99223 1ST HOSP IP/OBS HIGH 75: CPT

## 2023-08-29 PROCEDURE — 99285 EMERGENCY DEPT VISIT HI MDM: CPT

## 2023-08-29 PROCEDURE — 93010 ELECTROCARDIOGRAM REPORT: CPT

## 2023-08-29 RX ORDER — THIAMINE MONONITRATE (VIT B1) 100 MG
100 TABLET ORAL ONCE
Refills: 0 | Status: COMPLETED | OUTPATIENT
Start: 2023-08-29 | End: 2023-08-29

## 2023-08-29 RX ORDER — DIAZEPAM 5 MG
10 TABLET ORAL ONCE
Refills: 0 | Status: DISCONTINUED | OUTPATIENT
Start: 2023-08-29 | End: 2023-08-29

## 2023-08-29 RX ORDER — FOLIC ACID 0.8 MG
1 TABLET ORAL DAILY
Refills: 0 | Status: DISCONTINUED | OUTPATIENT
Start: 2023-08-29 | End: 2023-09-06

## 2023-08-29 RX ORDER — THIAMINE MONONITRATE (VIT B1) 100 MG
100 TABLET ORAL DAILY
Refills: 0 | Status: COMPLETED | OUTPATIENT
Start: 2023-08-29 | End: 2023-09-01

## 2023-08-29 RX ORDER — SODIUM CHLORIDE 9 MG/ML
1000 INJECTION INTRAMUSCULAR; INTRAVENOUS; SUBCUTANEOUS ONCE
Refills: 0 | Status: COMPLETED | OUTPATIENT
Start: 2023-08-29 | End: 2023-08-29

## 2023-08-29 RX ORDER — ENOXAPARIN SODIUM 100 MG/ML
40 INJECTION SUBCUTANEOUS EVERY 24 HOURS
Refills: 0 | Status: DISCONTINUED | OUTPATIENT
Start: 2023-08-29 | End: 2023-09-02

## 2023-08-29 RX ADMIN — Medication 2 MILLIGRAM(S): at 19:30

## 2023-08-29 RX ADMIN — SODIUM CHLORIDE 1000 MILLILITER(S): 9 INJECTION INTRAMUSCULAR; INTRAVENOUS; SUBCUTANEOUS at 19:30

## 2023-08-29 RX ADMIN — Medication 100 MILLIGRAM(S): at 19:29

## 2023-08-29 RX ADMIN — SODIUM CHLORIDE 1000 MILLILITER(S): 9 INJECTION INTRAMUSCULAR; INTRAVENOUS; SUBCUTANEOUS at 19:47

## 2023-08-29 RX ADMIN — Medication 2 MILLIGRAM(S): at 22:47

## 2023-08-29 NOTE — ED PROVIDER NOTE - IV ALTEPLASE EXCL REL HIDDEN
Spiritual Plan of Care    Pt Name: Lacho Ervin  Pt : 1966  Date: 2021    Visit Type: In person  Referral Source: Interdisciplinary team  Reason for Visit: Trigger  Visited With: Patient  Length of Visit: 15 minutes  Spiritual Care Consult Needed: Spiritual Care eval completed  Spiritual Care Visit Preference:     Taxonomy:    · Intended Effects: Demonstrate caring and concern  · Methods: Offer spiritual/Lutheran support  · Interventions: Active listening      Patient Affect at Time of Visit: Not open to  Visit  Patient Assessment: Unable to assess  Patient  Intervention: Emotional Support,Empathic Listening  Spiritual Plan of Care: Follow up if requested     Pt declined  assessment. Additional  services available prn.        show

## 2023-08-29 NOTE — ED PROVIDER NOTE - MUSCULOSKELETAL, MLM
Spine appears normal, range of motion is not limited, (+) mild bilateral hand swelling with no warmth or erythema

## 2023-08-29 NOTE — ED ADULT NURSE NOTE - OBJECTIVE STATEMENT
Pt aaox3, bibems from home c/o anxiety, palpitations, headache, tremors and nausea, nbnb vomiting x1 today. Pt is frequently in ed for etoh withdawal. Pt is also c/o redness and swelling to LUE, concerning for cellulitis. Pmhx etoh dependence and abuse, HTN, glaucoma. Pt denies cp, sob, dizziness, fever/chills, numbness/tingling, abdominal pain, flank pain,. urinary s/s, bloody stools, weakness. CIWA 16 upon arrival to ed. Last drink of alcohol was 4am this morning.

## 2023-08-29 NOTE — H&P ADULT - NSHPPHYSICALEXAM_GEN_ALL_CORE
PHYSICAL EXAM:  Vital Signs:  Vital Signs (24 Hrs):  T(C): 36.8 (08-29-23 @ 19:22), Max: 36.9 (08-29-23 @ 16:08)  HR: 61 (08-29-23 @ 19:22) (61 - 90)  BP: 137/80 (08-29-23 @ 19:22) (137/80 - 158/80)  RR: 16 (08-29-23 @ 19:22) (16 - 22)  SpO2: 98% (08-29-23 @ 19:22) (96% - 98%)  Daily Height in cm: 172.72 (29 Aug 2023 16:08)        General: no acute distress and well developed/well nourished  HENT: atraumatic, normocephalic, oropharynx pink and moist, sinuses nontender, normal nasal mucosa/turbinates, thyroid not enlarged or tender, no radiating carotid murmur or bruit and no masses; no JVD  Eyes: pupil equally round and reactive to light (PERRLA) bilaterally, extra-ocular muscle intact (EOMI) bilaterally, lids/conjunctiva normal bilaterally and anicteric bilaterally  Neck: normal, supple, no adenopathy and thyroid normal in size, no nodules or tenderness  CV: normal s1, s2 , regular rhythm, no murmurs, no rubs and no gallops  Lungs: clear to auscultation no w/r/r  Abd: normal bowel sounds, no hepatosplenomegaly, non-tender, non-distended and no masses  Musculoskeletal: no clubbing, cyanosis and full range of motion of joints: right upper extremity, left upper extremity, right lower extremity and left lower extremity;  2+ Peripheral Pulses, No clubbing, cyanosis, or edema  Neuro: alert, awake & oriented times three (AA&O x 3), cranial Nerves II-XII intact and normal strength  Psych: normal judgment and insight, normal mood/affect and non-anxious  Vascular: 2+ radial and dorsalis pedis pulses B/L equal and symmetric  Skin: no rash, warm and dry PHYSICAL EXAM:  Vital Signs:  Vital Signs (24 Hrs):  T(C): 36.8 (08-29-23 @ 19:22), Max: 36.9 (08-29-23 @ 16:08)  HR: 61 (08-29-23 @ 19:22) (61 - 90)  BP: 137/80 (08-29-23 @ 19:22) (137/80 - 158/80)  RR: 16 (08-29-23 @ 19:22) (16 - 22)  SpO2: 98% (08-29-23 @ 19:22) (96% - 98%)  Daily Height in cm: 172.72 (29 Aug 2023 16:08)        General: no acute distress and well developed/well nourished, clammy and tremulous  HENT: atraumatic, normocephalic, oropharynx pink and moist, sinuses nontender, normal nasal mucosa/turbinates, thyroid not enlarged or tender, no radiating carotid murmur or bruit and no masses; no JVD  Eyes: pupil equally round and reactive to light (PERRLA) bilaterally, extra-ocular muscle intact (EOMI) bilaterally, lids/conjunctiva normal bilaterally and anicteric bilaterally  Neck: normal, supple, no adenopathy and thyroid normal in size, no nodules or tenderness  CV: normal s1, s2 , regular rhythm, no murmurs, no rubs and no gallops  Lungs: clear to auscultation no w/r/r  Abd: normal bowel sounds, no hepatosplenomegaly, non-tender, non-distended and no masses  Musculoskeletal: no clubbing, cyanosis and full range of motion of joints: right upper extremity, left upper extremity, right lower extremity and left lower extremity;  2+ Peripheral Pulses, No clubbing, cyanosis, or edema; B/L UE tremors L>R  Neuro: alert, awake & oriented times three (AA&O x 3), cranial Nerves II-XII intact and normal strength  Psych: normal judgment and insight, normal mood/affect and non-anxious  Vascular: 2+ radial and dorsalis pedis pulses B/L equal and symmetric  Skin: no rash, warm and dry No

## 2023-08-29 NOTE — H&P ADULT - HISTORY OF PRESENT ILLNESS
This is a 58 year old male w/ PMHx of HTN, HLD, alcohol abuse w/ DT presents for evaluation of  This is a 58 year old [place] speaking  male,  services used,  w/ PMHx of depression, anxiety, HTN, HLD, alcohol abuse w/ DT presents for evaluation after being found by EMS.  Patient states he had 2 L vodka and 24 oz of beer earlier morning of admission.  He was recently admitted 8/13/23-8/20/23 for a rash.  +chills X 9 days.  +nausea +bilious, nonbloody emesis every morning X 2 days.  His last episode was day of admission.

## 2023-08-29 NOTE — ED PROVIDER NOTE - CLINICAL SUMMARY MEDICAL DECISION MAKING FREE TEXT BOX
"Chief Complaint   Patient presents with     Prenatal Care     16w5d       Initial /78 (BP Location: Left arm, Cuff Size: Adult Regular)   Pulse 64   Wt 55.6 kg (122 lb 8 oz)   LMP 2019 (Exact Date)   BMI 22.05 kg/m   Estimated body mass index is 22.05 kg/m  as calculated from the following:    Height as of 19: 1.588 m (5' 2.5\").    Weight as of this encounter: 55.6 kg (122 lb 8 oz).  BP completed using cuff size: regular        PHQ-9 score:    PHQ-9 SCORE 2019   PHQ-9 Total Score MyChart -   PHQ-9 Total Score 1     Diane Tabares MA on 2020 at 4:05 PM        " HPI and PMH as above  Patient noted to be tremulous  With anxiety, tachycardia, headache  Last ETOH  use at 4am  Will treat for ETOH withdrawal HPI and PMH as above  Patient noted to be tremulous  With anxiety,  headache  Last ETOH  use at 4am  Will treat for ETOH withdrawal

## 2023-08-29 NOTE — ED ADULT NURSE NOTE - CHIEF COMPLAINT QUOTE
BIBA for alcohol intox. AOx2 in triage. Last alcohol intake was this morning. Denies PMH. Tremors at rest noted in triage.

## 2023-08-29 NOTE — H&P ADULT - ASSESSMENT
ASSESSMENT:  ***    PLAN:  1.Alcohol withdrawal w/ hx of alcohol abuse and current acute intoxication  -afebrile, no leukocytosis  -will start IVF  -will place on CIWA  -will start 1 tab po MVI daily  -will start 100mg IV thiamine daily  -will start 1mg IV folic acid daily  -will consult psychiatry for detox in AM  -will place on seizure precautions  -alcohol level  -S/P  in ED  -mention if clammy, diaphoretic, tremors in PE  -will start protonix 40mg IV daily    2.    DVT PPx     ASSESSMENT:  ***    PLAN:  1.Alcohol withdrawal w/ hx of alcohol abuse and current acute intoxication  -afebrile, no leukocytosis  -will start IVF  -will place on CIWA  -will start 1 tab po MVI daily  -will start 100mg IV thiamine daily  -will start 1mg IV folic acid daily  -will consult psychiatry for detox in AM  -will place on seizure precautions  -alcohol level 425  -S/P 1L NS in ED, will switch to LR  -mention if clammy, diaphoretic, tremors in PE  -will start protonix 40mg IV daily  -magnesium 2.9  -phos 3.1  -AST 57  -lipase 27  -initial troponin 13.2  -U/A as above  -S/P valium 10mg IV x 1, ativan 2mg IV X 1 and thiamine 100mg po X 1 in ED  -will f/u official report of CXRAY        2.    DVT PPx     ASSESSMENT:  This is a 58 year old [place] speaking  male,  services used,  w/ PMHx of depression, anxiety, HTN, HLD, alcohol abuse w/ DT presents for evaluation after being found by EMS.     PLAN:  1.Alcohol withdrawal w/ hx of alcohol abuse, DT and current acute intoxication  -afebrile, no leukocytosis  -will place on CIWA  -will start 1 tab po MVI daily  -will start 100mg po thiamine daily  -will start 1mg po folic acid daily  -would consider psychiatry consult for detox in AM  -alcohol level 425  -S/P 1L NS in ED, will switch to LR  -magnesium 2.0  -phos 3.1  -AST 57  -lipase 27  -initial troponin 13.2  -EKG as above  -U/A as above  -S/P valium 10mg IV x 1, ativan 2mg IV X 1 and thiamine 100mg po X 1 in ED  -will f/u official report of CXRAY    2.HTN  -non-compliant with meds  -will restart norvasc 5mg QD    3.HLD    4.Depression/Anxiety  -not on meds, lost to follow up    5.DVT PPx  -will start lovenox 40mg SQ QD and place B/L LE SCDs

## 2023-08-29 NOTE — ED PROVIDER NOTE - OBJECTIVE STATEMENT
58 year old male with hx of ETOH abuse and withdrawal patient was recently admitted for ETOH withdrawal and possible LUE cellulitis. Patient states he drinks everyday, admits to current anxiety, headache, nausea and vomiting x1 today. Last drink at 4am today. Patient denies blood in stool or emesis. Patient endorses tremulousness. Patient denies chest pain, shortness of breath, fever, chills. Patient endorses palpitations.

## 2023-08-29 NOTE — ED PROVIDER NOTE - PSYCHIATRIC AFFECT
[de-identified] : Examination of the left shoulder is as follows: \par INSPECTION: hypertrophy of AC joint, no swelling, no ecchymosis, no erythema, no atrophy, no deformity, no scapular winging.\par PALPATION: tenderness to palpation over bicipital groove, AC joint, lateral shoulder\par ROM: active forward flexion 170 degrees, active abduction 170 degrees, internal rotation L1, external rotation 75 degrees. \par -Motion is assessed: sitting \par -Pain at end of ROM: mild\par STRENGTH: pain with strength testing, but forward flexion 5/5, abduction 5/5, external rotation 5/5, internal rotation 5/5\par TESTS: negative impingement testing \par NEURO: motor and sensory intact distally. \par \par X-rays of the left shoulder is as follows:\par Shoulder 2+ View: Severe AC joint djd. There are no fractures, subluxations or dislocations. No significant abnormalities are seen. normal

## 2023-08-29 NOTE — ED ADULT TRIAGE NOTE - CHIEF COMPLAINT QUOTE
BIBA for alcohol intox. AOx2 in triage. Last alcohol intake was this morning. Denies PMH. Tremors at rest noted in triage. BIBA for alcohol intox. AOx2 in triage. Last alcohol intake was this morning. Denies PMH. Tremors at rest noted in triage.

## 2023-08-29 NOTE — H&P ADULT - NSICDXPASTMEDICALHX_GEN_ALL_CORE_FT
PAST MEDICAL HISTORY:  Alcohol dependence     Blindness of right eye     Glaucoma     HLD (hyperlipidemia)     HTN (hypertension)      PAST MEDICAL HISTORY:  Alcohol dependence     Anxiety and depression     Blindness of right eye     Glaucoma     HLD (hyperlipidemia)     HTN (hypertension)

## 2023-08-29 NOTE — H&P ADULT - NSHPREVIEWOFSYSTEMS_GEN_ALL_CORE
He denies any fever, chills, changes in vision, changes in hearing, headaches, cough, sore throat, rhinorrhea, chest pain, SOB, palpitations, abdominal pain, nausea, vomiting, dysuria, diarrhea, weakness, lethargy, paresthesias, lightheadedness, leg swelling, orthopnea, PND, sick contact or recent travel. At baseline, he/she does not use any devices to assist with ambulation. He denies any fever, cough, sore throat, rhinorrhea, chest pain, SOB, abdominal pain, dysuria, diarrhea,  At baseline, he/she does not use any devices to assist with ambulation.

## 2023-08-29 NOTE — ED ADULT NURSE NOTE - NSFALLRISKINTERV_ED_ALL_ED
Assistance OOB with selected safe patient handling equipment if applicable/Assistance with ambulation/Communicate fall risk and risk factors to all staff, patient, and family/Monitor gait and stability/Monitor for mental status changes and reorient to person, place, and time, as needed/Provide visual cue: yellow wristband, yellow gown, etc/Reinforce activity limits and safety measures with patient and family/Toileting schedule using arm’s reach rule for commode and bathroom/Use of alarms - bed, stretcher, chair and/or video monitoring/Call bell, personal items and telephone in reach/Instruct patient to call for assistance before getting out of bed/chair/stretcher/Non-slip footwear applied when patient is off stretcher/[place] to call system/Physically safe environment - no spills, clutter or unnecessary equipment/Purposeful Proactive Rounding/Room/bathroom lighting operational, light cord in reach

## 2023-08-29 NOTE — H&P ADULT - NSHPLABSRESULTS_GEN_ALL_CORE
labs personally reviewed and pertinent [facility] documents/labs/diagnostics reviewed                         12.3   4.49  )-----------( 214      ( 29 Aug 2023 19:37 )             37.9           142  |  108  |  9   ----------------------------<  104<H>  4.2   |  30  |  0.88    Ca    8.1<L>      29 Aug 2023 17:55  Phos  3.1       Mg     2.0         TPro  8.2  /  Alb  3.4  /  TBili  0.5  /  DBili  0.1  /  AST  57<H>  /  ALT  44  /  AlkPhos  75      Urinalysis Basic - ( 29 Aug 2023 20:36 )    Color: Yellow / Appearance: Clear / S.015 / pH: x  Gluc: x / Ketone: Negative  / Bili: Negative / Urobili: Negative mg/dL   Blood: x / Protein: 15 mg/dL / Nitrite: Negative   Leuk Esterase: Negative / RBC: Negative /HPF / WBC 0-2   Sq Epi: x / Non Sq Epi: x / Bacteria: Moderate      EKG:     RADIOLOGY: labs personally reviewed and pertinent [facility] documents/labs/diagnostics reviewed                         12.3   4.49  )-----------( 214      ( 29 Aug 2023 19:37 )             37.9           142  |  108  |  9   ----------------------------<  104<H>  4.2   |  30  |  0.88    Ca    8.1<L>      29 Aug 2023 17:55  Phos  3.1       Mg     2.0         TPro  8.2  /  Alb  3.4  /  TBili  0.5  /  DBili  0.1  /  AST  57<H>  /  ALT  44  /  AlkPhos  75      Urinalysis Basic - ( 29 Aug 2023 20:36 )    Color: Yellow / Appearance: Clear / S.015 / pH: x  Gluc: x / Ketone: Negative  / Bili: Negative / Urobili: Negative mg/dL   Blood: x / Protein: 15 mg/dL / Nitrite: Negative   Leuk Esterase: Negative / RBC: Negative /HPF / WBC 0-2   Sq Epi: x / Non Sq Epi: x / Bacteria: Moderate      EKG: read by me, NSR at 67 bpm, no ST changes or TWI    RADIOLOGY: labs personally reviewed and pertinent [facility] documents/labs/diagnostics reviewed                         12.3   4.49  )-----------( 214      ( 29 Aug 2023 19:37 )             37.9           142  |  108  |  9   ----------------------------<  104<H>  4.2   |  30  |  0.88    Ca    8.1<L>      29 Aug 2023 17:55  Phos  3.1       Mg     2.0         TPro  8.2  /  Alb  3.4  /  TBili  0.5  /  DBili  0.1  /  AST  57<H>  /  ALT  44  /  AlkPhos  75      Urinalysis Basic - ( 29 Aug 2023 20:36 )    Color: Yellow / Appearance: Clear / S.015 / pH: x  Gluc: x / Ketone: Negative  / Bili: Negative / Urobili: Negative mg/dL   Blood: x / Protein: 15 mg/dL / Nitrite: Negative   Leuk Esterase: Negative / RBC: Negative /HPF / WBC 0-2   Sq Epi: x / Non Sq Epi: x / Bacteria: Moderate      EKG: read by me, NSR at 67 bpm, no ST changes or TWI

## 2023-08-30 DIAGNOSIS — F10.20 ALCOHOL DEPENDENCE, UNCOMPLICATED: ICD-10-CM

## 2023-08-30 DIAGNOSIS — I10 ESSENTIAL (PRIMARY) HYPERTENSION: ICD-10-CM

## 2023-08-30 DIAGNOSIS — F41.9 ANXIETY DISORDER, UNSPECIFIED: ICD-10-CM

## 2023-08-30 LAB
ALBUMIN SERPL ELPH-MCNC: 2.9 G/DL — LOW (ref 3.3–5)
ALP SERPL-CCNC: 68 U/L — SIGNIFICANT CHANGE UP (ref 40–120)
ALT FLD-CCNC: 38 U/L — SIGNIFICANT CHANGE UP (ref 12–78)
ANION GAP SERPL CALC-SCNC: 6 MMOL/L — SIGNIFICANT CHANGE UP (ref 5–17)
ANION GAP SERPL CALC-SCNC: 9 MMOL/L — SIGNIFICANT CHANGE UP (ref 5–17)
AST SERPL-CCNC: 51 U/L — HIGH (ref 15–37)
BILIRUB DIRECT SERPL-MCNC: 0.2 MG/DL — SIGNIFICANT CHANGE UP (ref 0–0.3)
BILIRUB INDIRECT FLD-MCNC: 0.3 MG/DL — SIGNIFICANT CHANGE UP (ref 0.2–1)
BILIRUB SERPL-MCNC: 0.5 MG/DL — SIGNIFICANT CHANGE UP (ref 0.2–1.2)
BUN SERPL-MCNC: 10 MG/DL — SIGNIFICANT CHANGE UP (ref 7–23)
BUN SERPL-MCNC: 9 MG/DL — SIGNIFICANT CHANGE UP (ref 7–23)
CALCIUM SERPL-MCNC: 7.5 MG/DL — LOW (ref 8.5–10.1)
CALCIUM SERPL-MCNC: 7.6 MG/DL — LOW (ref 8.5–10.1)
CHLORIDE SERPL-SCNC: 110 MMOL/L — HIGH (ref 96–108)
CHLORIDE SERPL-SCNC: 111 MMOL/L — HIGH (ref 96–108)
CO2 SERPL-SCNC: 24 MMOL/L — SIGNIFICANT CHANGE UP (ref 22–31)
CO2 SERPL-SCNC: 28 MMOL/L — SIGNIFICANT CHANGE UP (ref 22–31)
CREAT SERPL-MCNC: 0.71 MG/DL — SIGNIFICANT CHANGE UP (ref 0.5–1.3)
CREAT SERPL-MCNC: 0.76 MG/DL — SIGNIFICANT CHANGE UP (ref 0.5–1.3)
EGFR: 103 ML/MIN/1.73M2 — SIGNIFICANT CHANGE UP
EGFR: 105 ML/MIN/1.73M2 — SIGNIFICANT CHANGE UP
GLUCOSE SERPL-MCNC: 72 MG/DL — SIGNIFICANT CHANGE UP (ref 70–99)
GLUCOSE SERPL-MCNC: 84 MG/DL — SIGNIFICANT CHANGE UP (ref 70–99)
MAGNESIUM SERPL-MCNC: 1.8 MG/DL — SIGNIFICANT CHANGE UP (ref 1.6–2.6)
MAGNESIUM SERPL-MCNC: 1.8 MG/DL — SIGNIFICANT CHANGE UP (ref 1.6–2.6)
PHOSPHATE SERPL-MCNC: 2.6 MG/DL — SIGNIFICANT CHANGE UP (ref 2.5–4.5)
PHOSPHATE SERPL-MCNC: 3 MG/DL — SIGNIFICANT CHANGE UP (ref 2.5–4.5)
POTASSIUM SERPL-MCNC: 3.7 MMOL/L — SIGNIFICANT CHANGE UP (ref 3.5–5.3)
POTASSIUM SERPL-MCNC: 3.7 MMOL/L — SIGNIFICANT CHANGE UP (ref 3.5–5.3)
POTASSIUM SERPL-SCNC: 3.7 MMOL/L — SIGNIFICANT CHANGE UP (ref 3.5–5.3)
POTASSIUM SERPL-SCNC: 3.7 MMOL/L — SIGNIFICANT CHANGE UP (ref 3.5–5.3)
PROT SERPL-MCNC: 7.1 GM/DL — SIGNIFICANT CHANGE UP (ref 6–8.3)
SODIUM SERPL-SCNC: 143 MMOL/L — SIGNIFICANT CHANGE UP (ref 135–145)
SODIUM SERPL-SCNC: 145 MMOL/L — SIGNIFICANT CHANGE UP (ref 135–145)

## 2023-08-30 PROCEDURE — 99232 SBSQ HOSP IP/OBS MODERATE 35: CPT

## 2023-08-30 RX ORDER — AMLODIPINE BESYLATE 2.5 MG/1
5 TABLET ORAL DAILY
Refills: 0 | Status: DISCONTINUED | OUTPATIENT
Start: 2023-08-30 | End: 2023-09-06

## 2023-08-30 RX ADMIN — Medication 2 MILLIGRAM(S): at 18:14

## 2023-08-30 RX ADMIN — Medication 2 MILLIGRAM(S): at 11:39

## 2023-08-30 RX ADMIN — Medication 2 MILLIGRAM(S): at 13:25

## 2023-08-30 RX ADMIN — Medication 100 MILLIGRAM(S): at 11:39

## 2023-08-30 RX ADMIN — Medication 50 MILLIGRAM(S): at 23:58

## 2023-08-30 RX ADMIN — Medication 2 MILLIGRAM(S): at 21:18

## 2023-08-30 RX ADMIN — Medication 2 MILLIGRAM(S): at 15:30

## 2023-08-30 RX ADMIN — Medication 1 MILLIGRAM(S): at 11:39

## 2023-08-30 RX ADMIN — Medication 2 MILLIGRAM(S): at 10:11

## 2023-08-30 RX ADMIN — ENOXAPARIN SODIUM 40 MILLIGRAM(S): 100 INJECTION SUBCUTANEOUS at 05:47

## 2023-08-30 RX ADMIN — AMLODIPINE BESYLATE 5 MILLIGRAM(S): 2.5 TABLET ORAL at 05:50

## 2023-08-30 RX ADMIN — Medication 1 TABLET(S): at 11:39

## 2023-08-30 RX ADMIN — Medication 2 MILLIGRAM(S): at 05:50

## 2023-08-30 RX ADMIN — Medication 2 MILLIGRAM(S): at 01:45

## 2023-08-30 NOTE — PATIENT PROFILE ADULT - FALL HARM RISK - HARM RISK INTERVENTIONS
Assistance with ambulation/Assistance OOB with selected safe patient handling equipment/Communicate Risk of Fall with Harm to all staff/Discuss with provider need for PT consult/Monitor for mental status changes/Monitor gait and stability/Reinforce activity limits and safety measures with patient and family/Tailored Fall Risk Interventions/Toileting schedule using arm’s reach rule for commode and bathroom/Use of alarms - bed, chair and/or voice tab/Visual Cue: Yellow wristband and red socks/Bed in lowest position, wheels locked, appropriate side rails in place/Call bell, personal items and telephone in reach/Instruct patient to call for assistance before getting out of bed or chair/Non-slip footwear when patient is out of bed/[place] to call system/Physically safe environment - no spills, clutter or unnecessary equipment/Purposeful Proactive Rounding/Room/bathroom lighting operational, light cord in reach

## 2023-08-30 NOTE — PROVIDER CONTACT NOTE (OTHER) - ACTION/TREATMENT ORDERED:
Pulmonary Consult Follow - up Note     Patient: Heather Serna    YOB: 1960    MRN: [id number]      Date of Service: 01/20/21  Reason for followup: cough, weight loss, abnormal CT chest with bronchiectasis     Chief Complaint:   cough     History of Present Illness:  A very pleasant 61 yo WF with a 30 year pack history, 1/2 PPD with a PMH of back pain only, who presents with chronic cough/weight loss and abnormal CT chest.      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 Black pickle jars that bust open and pateint picks them up.   Has been exposed for 4.5 years.   At least once a week. And reports standing water all around her. The mold is on cardboard boxes that are wet and sit around in open containers around her all the time as well.      Started having cough in march 2018.   From came from [address].   - No sick contacts  - no water activities.   - Got sick when she got back 1 month later. Was in [place] in 02/2018.      Cough is productive with green/gray. No blood.   No fevers/chills. Does get hot flashes.   50 lb weight loss unintentional due to no appetite.   Dyspnea with extreme work.   \"My lung hurts in the left lower lobe\".   Has pain with deep inspiration.   No antibiotics since march.   Never had a chronic cough prior to this and no prior dyspnea.      Sleeps for 3-4 hours due to malaise.      No hiking/hunting/fishing, no gardening. No recent construction/soil evacuation.       Bronchoscopy don to rule out non-tuberculosis mycobacterial disease and fungal infection due to history of chronic cough/weight loss and bronchiectasis on CT chest.   Bronchoscopy done on 7-19-18:   BAL AFB, fungal negative  BAL resp culture: positive for H. Influ  FNA of station 7 lymph node: negative for cancer, no organisms on stains.  BAL cytology: acute inflammation present.     Ordered antibiotics for H.influenza.   She improved, her cough and dyspnea improved.   PFTs and  Six minute walk done. (no obstruction, only mildly decreased DLCO) and no hypoxemia on six minute.     Admitted on 11/11/19 with pneumonia/bronchiectasis exacerbation.   Did a repeat bronchoscopy on 11/13/19   Lung, right lower lobe, transbronchial biopsy:     - Superficial fragments of benign bronchial wall and benign lung parenchyma     with respiratory bronchiolitis  BAL fungal, resp, AFB cultures negative.     Penicillium mold is growing form BAL.     Subjective:   28 lb weight gain.   Still smoking 10 cig/day.     Dyspnea with moderate exertion is stable.   No fevers/chills/night sweats.     Rate your current symptoms on a scale of 1 to 5: (5 being the worst symptoms).  (0 = best, feeling good)  (5 = worst, not feel well)  I never cough --> I cough all the time: 3  I have no phlegm --> My chest is completely full of mucus: 2  My chest does not feel tight all --> My chest feel very tight: 1  Walking up a hill or the stairs I am not breathless --> When I up a hill or one flight of stairs I am very breathless: 4  I am not limited doing any activities at home --> I am very limited doing activities at home: 1  I am confident leaving my home despite my lung condition --> I am not all confident leaving my home because of my lung condition: 0  I sleep soundly --> I do NOT sleep soundly because of my lung condition: 3  I have lots of energy --> I have no energy at all: 3    MMRC dyspnea scale (Modified medical research [place])   Patient responded that on a day that they feel their best, they are a:  Grade 1 - I get short of breath when hurring on level ground or walking up a slight hill.  Grade 2 - On level, ground, I walk slower than people of the same age because of breathlessness, or have to stop for breath when walking at my own pace.          Home Medications:   DULoxetine (CYMBALTA) 30 MG capsule  ipratropium-albuterol (DUONEB) 0.5-2.5 (3) MG/3ML nebulizer solution  Ferrous Sulfate (IRON) 325 (65 Fe) MG  Tab  ibuprofen (MOTRIN) 200 MG tablet    No current facility-administered medications on file prior to visit.       ALLERGIES:  No Known Allergies      Active Ambulatory Problems     Diagnosis Date Noted   • Chronic bilateral low back pain without sciatica 10/18/2019   • Chronic midline thoracic back pain 11/04/2019   • Chronic neck pain 11/04/2019   • Anemia 11/11/2019   • CAP (community acquired pneumonia) 11/11/2019   • Lung mass 11/11/2019   • COPD (chronic obstructive pulmonary disease) (CMS/[facility]) 11/11/2019   • Weight loss 11/11/2019   • Bronchiectasis without complication (CMS/Former[facility]) 12/08/2019     Resolved Ambulatory Problems     Diagnosis Date Noted   • No Resolved Ambulatory Problems     Past Medical History:   Diagnosis Date   • Bronchitis    • Bulging of lumbar intervertebral disc    • Chronic pain    • Pneumonia    • Urinary tract infection          Past Surgical History:   Procedure Laterality Date   • Colonoscopy     • Colonoscopy  09/11/2018    repeat in 5 yrs   • Tonsillectomy           Social History     Tobacco Use   • Smoking status: Current Every Day Smoker     Packs/day: 0.50     Years: 40.00     Pack years: 20.00     Types: Cigarettes   • Smokeless tobacco: Never Used   • Tobacco comment: smoking half pack per day.   Substance Use Topics   • Alcohol use: No   • Drug use: No     Social History     Social History Narrative   • Not on file         Family History   Problem Relation Age of Onset   • Cancer Mother         bone   • Diabetes Mother    • Diabetes Sister    • Diabetes Brother    • Diabetes Sister    • Parkinsonism Father        I personally reviewed patient's past medical history, current medications, allergies, past surgical history, family history and social history.   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 or family history of tuberculosis?   No     Have you had chronic exposure to the following?   Silicates (Asbestos, Silica,Talc, Beryllium)  Coal dust, Graphite  Metals: patric, Aluminum-powdered  Hard metal dust: cobalt, titanium, cadmium  Iron  Patient responded not to the rest of the above.   Except for the following:  None of the above     ==============================================  Does your Current or Past Home or Work place have the following?  Basement Mold   Central air/heating Mildew, must odor   Evaporative cooler Humidifier   Fireplace, wood or coal stove Sauna   Any previous water damage, flooding  Hot tub   Down or feather pillows/bedding/  furniture Sauna   Patient responded no to all of the above.   Except for the following:  Basement  central air/heating  Mold      REVIEW OF SYSTEMS:   10 point Review of systems completed, please see HPI for pertinent negatives and positives.     PHYSICAL EXAM:  Vital Signs (most recent):   Visit Vitals  /84   Pulse 91   Ht 5' 6\" (1.676 m)   Wt 60.1 kg   LMP  (LMP Unknown)   SpO2 97%   BMI 21.39 kg/m²     Constiutional: Appears stated age. Thin, NAD, non toxic appearing female. Patient speaks freely in full sentences. Patient is well groomed.  HEENT: PERRL, no scleral icterus or conjunctival pallor, no nasal discharge, MMM, oropharynx without erythema or exudate, Mallampati 1, poor dentition  Neck: trachea midline, supple, no cervical or supraclavicular lymphadenopathy or masses  Cardiovascular: RRR, normal S1 S2, no murmurs, no JVD, no carotid bruits. Non-displaced PMI.  Respiratory: Normal rate. No retractions or increased work of breathing. No wheeze, no rhonchi. Has inspiratory squeak and rhonchi with forced expiration.  Gastrointestinal: +BS, non-distended, soft, non-tender, no rebound or guarding  Musculoskeletal: no clubbing, cyanosis. no edema bilaterally.   Skin: no rashes, jaundice or other lesions  Neurologic: EOMI, CN 2-12 grossly intact. no gross  motor or sensory deficits  Psychiatric: affect and mood appropriate. The patient is alert, interactive, appropriate.    LABS:  All pertinent laboratory results were reviewed.  Lab Results   Component Value Date    SODIUM 143 08/20/2020    SODIUM 141 11/12/2019    POTASSIUM 5.0 08/20/2020    POTASSIUM 4.2 11/12/2019    CHLORIDE 103 08/20/2020    CHLORIDE 106 11/12/2019    CO2 29 08/20/2020    CO2 29 11/12/2019    BUN 14 08/20/2020    BUN 13 11/12/2019    CREATININE 0.73 08/20/2020    CREATININE 0.66 11/12/2019    GLUCOSE 91 08/20/2020    GLUCOSE 97 11/12/2019     Lab Results   Component Value Date    WBC 11.8 (H) 08/20/2020    WBC 6.5 11/23/2019    HCT 39.0 08/20/2020    HCT 33.1 (L) 11/23/2019    HGB 12.7 08/20/2020    HGB 10.6 (L) 11/23/2019     08/20/2020     11/23/2019     No results found for: INR  TSH (mcUnits/mL)   Date Value   07/03/2018 1.504     Lab Results   Component Value Date    AST 21 08/20/2020    AST 10 11/12/2019    GPT 19 08/20/2020    GPT 9 11/12/2019    ALKPT 87 08/20/2020    ALKPT 121 (H) 11/12/2019    BILIRUBIN 0.6 08/20/2020    BILIRUBIN 0.2 11/12/2019     Lab Results   Component Value Date    SODIUM 143 08/20/2020    SODIUM 141 11/12/2019    CHLORIDE 103 08/20/2020    CHLORIDE 106 11/12/2019    CO2 29 08/20/2020    CO2 29 11/12/2019    POTASSIUM 5.0 08/20/2020    POTASSIUM 4.2 11/12/2019    BUN 14 08/20/2020    BUN 13 11/12/2019    CREATININE 0.73 08/20/2020    CREATININE 0.66 11/12/2019    GLUCOSE 91 08/20/2020    GLUCOSE 97 11/12/2019         MICROBIOLOGY:  Respiratory Culture      IMAGING:  Personally reviewed CT chest 1-  Significant improvement in airspace disease bilaterally.   Chronic bronchiectasis changes persists.      CXR 2/8/19:   Left lower lobe bronchogram sign has resolved.     CT chest :   Worsening airspace disease bilaterally.      9-13-18  - stable station 7 lymphadenopathy  - resolved RLL acute airspace disease  - stable micronodules         7-13-18 :   - emphysematous changes in the apices and scarring.   Tree in bud opacities in the lingula and left lower lobe (with early signs of bronchiectasis)   - tree in bud in RLL.   - Station 7 lymphadenopathy      Results for orders placed during the hospital encounter of 07/13/18   CT Chest    Narrative EXAM: CT CHEST W CONTRAST    CLINICAL HISTORY:  COUGH, PERSISTENT, INVOLUNTARY WEIGHT LOSS, smoker x 40  years,     COMPARISON:  None    TECHNIQUE:  CT CHEST W CONTRAST  Contrast with    FINDINGS:    Lung parenchyma:  Moderate scarring and fibrosis is present involving both  upper lungs. There appears to be findings of early bronchiectasis.    Moderate bilateral upper lung zone centrilobular emphysema.    Patchy tree-in-bud and groundglass airspace opacities are present involving  the lingular segment, although this should be followed until resolution.    Tree-in-bud patchy airspace opacities are present involving both lung bases  LEFT greater than RIGHT. There is superimposed bronchiectasis,  peribronchial thickening and inflammation. The pattern is most consistent  with infectious etiologies, although would recommend continued attention to  these findings in total resolution.    Thoracic aorta:  Unremarkable    Pulmonary arteries:  Unremarkable    Pleural margins:  Unremarkable    Heart and pericardium:  Unremarkable    Rachana and mediastinum:  2.6 x 1.6 cm subcarinal lymph node.    Upper abdominal structures:  Unremarkable    Axilla and supraclavicular regions:  Unremarkable    Osseous structures:  Unremarkable    Subcutaneous tissues:  Unremarkable     Miscellaneous:  None      Impression IMPRESSION:    Imaging findings are most consistent with moderate multilobar pneumonitis.  There is moderate peribronchial thickening and peribronchial inflammation  with superimposed tree-in-bud groundglass opacities LEFT lung base greater  than RIGHT. Additional findings are present involving the lingular segment  and to  lesser extent bilateral apices. Would recommend continued attention  to these findings in total interval follow-up to document resolution with  follow-up chest CT within 8-12 weeks.    Bilateral upper lung zone centrilobular emphysema.    Borderline prominent subcarinal lymph node is present.             EKG Interpretation   Results for orders placed or performed during the hospital encounter of 11/11/19   Electrocardiogram 12-Lead   Result Value Ref Range    Ventricular Rate EKG/Min (BPM) 92     Atrial Rate (BPM) 92     ID-Interval (MSEC) 162     QRS-Interval (MSEC) 78     QT-Interval (MSEC) 340     QTc 421     P Axis (Degrees) 80     R Axis (Degrees) 82     T Axis (Degrees) 79     REPORT TEXT       Normal sinus rhythm  wtih early repolarization  Normal ECG  No previous ECGs available  Confirmed by BJ MCADAMS M.D. ([id number]),  Rina Gibson ([id number]) on 11/11/2019 10:13:43 AM         ECHOCARDIOGRAM:       PULMONARY FUNCTION TEST:    7-25-18 7-20-20     FVC (L/%) 2.59L 78% 2.69L 81% 2.88L 88%     FEV1 (L/%) 2.03L 82% 2.12L 86% 2.16L 89%     FEV1/FVC 79 79 75     TLC(%) 83  98     RV(%) 77  109     ERV(%) 98  134     DLCO (%) 65  86     Airway resistance 228  192       Six Minute Walk: 7-25-18  Distance ambulated: 445 meters  Oxygen saturations: 100% to 96% on room air      Polysomnography:       ASSESSMENT:   1. Dyspnea/cough   - cough improved.   - dyspnea improved, but still short of breath.     2. Bronchiectasis/tree in bud opacities   - s/p bronchoscopy   - BAL negative on AFB and fungal (no NTM, no fungal infection)  - was positive for H. Infleunza.     WORK UP FOR bronchiectasis  - LISA, RF, Ig panel all negative   - Alpha 1 antitrypsin levels WNL      Differential diagnosis:   BAL AFB negative)   BAL fungus negative)   C) doubt malignancy   D) cryptogenic organizing pneumonia    (no eosinophilia on BAL)     3. Weight loss - RESOLVED   - now gained 28 lb     4.   Lung Cancer Screening indications  Patient has  the followin) 55 to 80 years old  2) 30 year pack history  3) Current smoker or quit within past 15 years  Patient has all three and thus qualifies for annual low dose CT chest scan: will screen annually for 15 years from date of tobacco cessation.   We discussed the following about lung cancer screenin) Smoking cessation is more proven and powerful intervention for preventing death and complications form lugn cancer and other disease than the screening tests.   2) Lung cancer screening requires an ongoing commitment: need follow up studies.  3) A positive result on a CT chest scant will most likely be a benign nodule, that will need a biopsy to confirm.   4) CT scan can  other issues, heart disease, etc.   Patient understood about the above and that it may actually increased anxiety and will likely need to more invasive testing if any abnormalities are found.       RECOMMENDATIONS:  Overall doing well: no significant cough, dyspnea, weight loss has resolved, is gaining weight.   1. CT chest lung cancer screening this month.   2. Follow up in 6 months.       Ronen Cote MD  Pulmonary & Critical Care    ALE Hess made aware. As per PA Administer IV Ativan. No new orders at this time

## 2023-08-30 NOTE — PATIENT PROFILE ADULT - FUNCTIONAL ASSESSMENT - BASIC MOBILITY 6.
2-calculated by average/Not able to assess (calculate score using [facility] averaging method)

## 2023-08-30 NOTE — PROGRESS NOTE ADULT - ASSESSMENT
58 year old PMHx of depression, anxiety, HTN, HLD, alcohol abuse w/ DT presents for evaluation after being found by EMS.  Found to be in acute alcohol withdrawal.      PLAN:  1.Alcohol withdrawal w/ hx of alcohol abuse, DT and current acute intoxication  Continue CIWA monitoring.  Continue IV Ativan.  Added po LIbrium.  Continue Thiamine.  Counselled on cessation.    -will f/u official report of CXRAY    2.HTN  -non-compliant with meds  -will restart norvasc 5mg QD    3.HLD    4.Depression/Anxiety  -not on meds, lost to follow up    5.DVT PPx  -will start lovenox 40mg SQ QD and place B/L LE SCDs

## 2023-08-31 LAB
ALBUMIN SERPL ELPH-MCNC: 2.9 G/DL — LOW (ref 3.3–5)
ALP SERPL-CCNC: 72 U/L — SIGNIFICANT CHANGE UP (ref 40–120)
ALT FLD-CCNC: 38 U/L — SIGNIFICANT CHANGE UP (ref 12–78)
ANION GAP SERPL CALC-SCNC: 9 MMOL/L — SIGNIFICANT CHANGE UP (ref 5–17)
AST SERPL-CCNC: 53 U/L — HIGH (ref 15–37)
BILIRUB SERPL-MCNC: 1.7 MG/DL — HIGH (ref 0.2–1.2)
BUN SERPL-MCNC: 7 MG/DL — SIGNIFICANT CHANGE UP (ref 7–23)
CALCIUM SERPL-MCNC: 7.8 MG/DL — LOW (ref 8.5–10.1)
CHLORIDE SERPL-SCNC: 103 MMOL/L — SIGNIFICANT CHANGE UP (ref 96–108)
CO2 SERPL-SCNC: 23 MMOL/L — SIGNIFICANT CHANGE UP (ref 22–31)
CREAT SERPL-MCNC: 0.61 MG/DL — SIGNIFICANT CHANGE UP (ref 0.5–1.3)
EGFR: 110 ML/MIN/1.73M2 — SIGNIFICANT CHANGE UP
GLUCOSE SERPL-MCNC: 80 MG/DL — SIGNIFICANT CHANGE UP (ref 70–99)
HCT VFR BLD CALC: 36.9 % — LOW (ref 39–50)
HGB BLD-MCNC: 12.6 G/DL — LOW (ref 13–17)
MAGNESIUM SERPL-MCNC: 1.8 MG/DL — SIGNIFICANT CHANGE UP (ref 1.6–2.6)
MCHC RBC-ENTMCNC: 29.6 PG — SIGNIFICANT CHANGE UP (ref 27–34)
MCHC RBC-ENTMCNC: 34.1 G/DL — SIGNIFICANT CHANGE UP (ref 32–36)
MCV RBC AUTO: 86.6 FL — SIGNIFICANT CHANGE UP (ref 80–100)
NRBC # BLD: 0 /100 WBCS — SIGNIFICANT CHANGE UP (ref 0–0)
PHOSPHATE SERPL-MCNC: 2.4 MG/DL — LOW (ref 2.5–4.5)
PLATELET # BLD AUTO: 157 K/UL — SIGNIFICANT CHANGE UP (ref 150–400)
POTASSIUM SERPL-MCNC: 3 MMOL/L — LOW (ref 3.5–5.3)
POTASSIUM SERPL-SCNC: 3 MMOL/L — LOW (ref 3.5–5.3)
PROT SERPL-MCNC: 7.2 GM/DL — SIGNIFICANT CHANGE UP (ref 6–8.3)
RBC # BLD: 4.26 M/UL — SIGNIFICANT CHANGE UP (ref 4.2–5.8)
RBC # FLD: 15.6 % — HIGH (ref 10.3–14.5)
SODIUM SERPL-SCNC: 135 MMOL/L — SIGNIFICANT CHANGE UP (ref 135–145)
WBC # BLD: 4.56 K/UL — SIGNIFICANT CHANGE UP (ref 3.8–10.5)
WBC # FLD AUTO: 4.56 K/UL — SIGNIFICANT CHANGE UP (ref 3.8–10.5)

## 2023-08-31 PROCEDURE — 99232 SBSQ HOSP IP/OBS MODERATE 35: CPT

## 2023-08-31 RX ORDER — POTASSIUM PHOSPHATE, MONOBASIC POTASSIUM PHOSPHATE, DIBASIC 236; 224 MG/ML; MG/ML
15 INJECTION, SOLUTION INTRAVENOUS ONCE
Refills: 0 | Status: COMPLETED | OUTPATIENT
Start: 2023-08-31 | End: 2023-08-31

## 2023-08-31 RX ORDER — LATANOPROST 0.05 MG/ML
1 SOLUTION/ DROPS OPHTHALMIC; TOPICAL AT BEDTIME
Refills: 0 | Status: DISCONTINUED | OUTPATIENT
Start: 2023-08-31 | End: 2023-09-06

## 2023-08-31 RX ORDER — TIMOLOL 0.5 %
1 DROPS OPHTHALMIC (EYE)
Refills: 0 | Status: DISCONTINUED | OUTPATIENT
Start: 2023-08-31 | End: 2023-09-06

## 2023-08-31 RX ORDER — DORZOLAMIDE HYDROCHLORIDE 20 MG/ML
1 SOLUTION/ DROPS OPHTHALMIC THREE TIMES A DAY
Refills: 0 | Status: DISCONTINUED | OUTPATIENT
Start: 2023-08-31 | End: 2023-09-06

## 2023-08-31 RX ORDER — POTASSIUM CHLORIDE 20 MEQ
40 PACKET (EA) ORAL ONCE
Refills: 0 | Status: COMPLETED | OUTPATIENT
Start: 2023-08-31 | End: 2023-08-31

## 2023-08-31 RX ADMIN — Medication 1 TABLET(S): at 13:04

## 2023-08-31 RX ADMIN — Medication 40 MILLIEQUIVALENT(S): at 16:22

## 2023-08-31 RX ADMIN — LATANOPROST 1 DROP(S): 0.05 SOLUTION/ DROPS OPHTHALMIC; TOPICAL at 22:36

## 2023-08-31 RX ADMIN — Medication 2 MILLIGRAM(S): at 10:38

## 2023-08-31 RX ADMIN — DORZOLAMIDE HYDROCHLORIDE 1 DROP(S): 20 SOLUTION/ DROPS OPHTHALMIC at 16:22

## 2023-08-31 RX ADMIN — Medication 50 MILLIGRAM(S): at 23:31

## 2023-08-31 RX ADMIN — Medication 1 MILLIGRAM(S): at 13:04

## 2023-08-31 RX ADMIN — Medication 100 MILLIGRAM(S): at 13:04

## 2023-08-31 RX ADMIN — Medication 2 MILLIGRAM(S): at 09:25

## 2023-08-31 RX ADMIN — ENOXAPARIN SODIUM 40 MILLIGRAM(S): 100 INJECTION SUBCUTANEOUS at 06:25

## 2023-08-31 RX ADMIN — Medication 1 DROP(S): at 17:58

## 2023-08-31 RX ADMIN — Medication 50 MILLIGRAM(S): at 13:04

## 2023-08-31 RX ADMIN — Medication 50 MILLIGRAM(S): at 17:58

## 2023-08-31 RX ADMIN — POTASSIUM PHOSPHATE, MONOBASIC POTASSIUM PHOSPHATE, DIBASIC 62.5 MILLIMOLE(S): 236; 224 INJECTION, SOLUTION INTRAVENOUS at 16:22

## 2023-08-31 RX ADMIN — AMLODIPINE BESYLATE 5 MILLIGRAM(S): 2.5 TABLET ORAL at 06:25

## 2023-08-31 RX ADMIN — Medication 50 MILLIGRAM(S): at 06:25

## 2023-08-31 NOTE — PROGRESS NOTE ADULT - ASSESSMENT
58 year old PMHx of depression, anxiety, HTN, HLD, alcohol abuse w/ DT presents for evaluation after being found by EMS.  Found to be in acute alcohol withdrawal.      PLAN:  1.Alcohol withdrawal w/ hx of alcohol abuse, DT and current acute intoxication  Continue CIWA monitoring.  Continue IV Ativan.  Added po LIbrium.  Continue Thiamine.  Counselled on cessation.    -CXR unremarkable.   - supplement lytes.     2.HTN  -BP stable, continue Norvasc.     3.HLD    4.Depression/Anxiety  -not on meds, mood stable.     5.DVT PPx  -will start lovenox 40mg SQ QD and place B/L LE SCDs

## 2023-09-01 PROCEDURE — 99232 SBSQ HOSP IP/OBS MODERATE 35: CPT

## 2023-09-01 RX ORDER — POTASSIUM CHLORIDE 20 MEQ
40 PACKET (EA) ORAL EVERY 4 HOURS
Refills: 0 | Status: COMPLETED | OUTPATIENT
Start: 2023-09-01 | End: 2023-09-01

## 2023-09-01 RX ORDER — SODIUM,POTASSIUM PHOSPHATES 278-250MG
1 POWDER IN PACKET (EA) ORAL ONCE
Refills: 0 | Status: COMPLETED | OUTPATIENT
Start: 2023-09-01 | End: 2023-09-01

## 2023-09-01 RX ORDER — LANOLIN ALCOHOL/MO/W.PET/CERES
3 CREAM (GRAM) TOPICAL AT BEDTIME
Refills: 0 | Status: DISCONTINUED | OUTPATIENT
Start: 2023-09-01 | End: 2023-09-06

## 2023-09-01 RX ORDER — ACETAMINOPHEN 500 MG
650 TABLET ORAL ONCE
Refills: 0 | Status: COMPLETED | OUTPATIENT
Start: 2023-09-01 | End: 2023-09-01

## 2023-09-01 RX ADMIN — LATANOPROST 1 DROP(S): 0.05 SOLUTION/ DROPS OPHTHALMIC; TOPICAL at 21:34

## 2023-09-01 RX ADMIN — DORZOLAMIDE HYDROCHLORIDE 1 DROP(S): 20 SOLUTION/ DROPS OPHTHALMIC at 21:34

## 2023-09-01 RX ADMIN — Medication 1 MILLIGRAM(S): at 16:16

## 2023-09-01 RX ADMIN — Medication 50 MILLIGRAM(S): at 11:57

## 2023-09-01 RX ADMIN — AMLODIPINE BESYLATE 5 MILLIGRAM(S): 2.5 TABLET ORAL at 05:42

## 2023-09-01 RX ADMIN — ENOXAPARIN SODIUM 40 MILLIGRAM(S): 100 INJECTION SUBCUTANEOUS at 05:42

## 2023-09-01 RX ADMIN — Medication 40 MILLIEQUIVALENT(S): at 18:35

## 2023-09-01 RX ADMIN — Medication 1 DROP(S): at 05:42

## 2023-09-01 RX ADMIN — Medication 1 DROP(S): at 18:35

## 2023-09-01 RX ADMIN — Medication 40 MILLIEQUIVALENT(S): at 21:34

## 2023-09-01 RX ADMIN — Medication 1 TABLET(S): at 11:57

## 2023-09-01 RX ADMIN — Medication 2 MILLIGRAM(S): at 08:24

## 2023-09-01 RX ADMIN — Medication 50 MILLIGRAM(S): at 23:04

## 2023-09-01 RX ADMIN — Medication 1 PACKET(S): at 18:35

## 2023-09-01 RX ADMIN — Medication 650 MILLIGRAM(S): at 10:50

## 2023-09-01 RX ADMIN — DORZOLAMIDE HYDROCHLORIDE 1 DROP(S): 20 SOLUTION/ DROPS OPHTHALMIC at 05:42

## 2023-09-01 RX ADMIN — Medication 650 MILLIGRAM(S): at 09:54

## 2023-09-01 RX ADMIN — DORZOLAMIDE HYDROCHLORIDE 1 DROP(S): 20 SOLUTION/ DROPS OPHTHALMIC at 13:46

## 2023-09-01 RX ADMIN — Medication 3 MILLIGRAM(S): at 23:04

## 2023-09-01 RX ADMIN — Medication 1 MILLIGRAM(S): at 09:55

## 2023-09-01 RX ADMIN — Medication 50 MILLIGRAM(S): at 18:35

## 2023-09-01 RX ADMIN — Medication 1 MILLIGRAM(S): at 11:58

## 2023-09-01 RX ADMIN — Medication 100 MILLIGRAM(S): at 11:58

## 2023-09-01 NOTE — PROGRESS NOTE ADULT - ASSESSMENT
58 year old PMHx of depression, anxiety, HTN, HLD, alcohol abuse w/ DT presents for evaluation after being found by EMS.  Found to be in acute alcohol withdrawal.      PLAN:  1.Alcohol withdrawal w/ hx of alcohol abuse, DT and current acute intoxication  Continue CIWA monitoring.  Continue IV Ativan.  Will maintain current dose of Librium and manually taper.  Continue Thiamine.  Counselled on cessation.    -CXR unremarkable.   - supplement lytes.     2.HTN  -BP stable, continue Norvasc.     3.HLD    4.Depression/Anxiety  -not on meds, mood stable.     5.DVT PPx  -will start lovenox 40mg SQ QD and place B/L LE SCDs

## 2023-09-02 LAB
ALBUMIN SERPL ELPH-MCNC: 2.9 G/DL — LOW (ref 3.3–5)
ALP SERPL-CCNC: 66 U/L — SIGNIFICANT CHANGE UP (ref 40–120)
ALT FLD-CCNC: 44 U/L — SIGNIFICANT CHANGE UP (ref 12–78)
ANION GAP SERPL CALC-SCNC: 10 MMOL/L — SIGNIFICANT CHANGE UP (ref 5–17)
AST SERPL-CCNC: 44 U/L — HIGH (ref 15–37)
BILIRUB SERPL-MCNC: 0.6 MG/DL — SIGNIFICANT CHANGE UP (ref 0.2–1.2)
BUN SERPL-MCNC: 12 MG/DL — SIGNIFICANT CHANGE UP (ref 7–23)
CALCIUM SERPL-MCNC: 8.4 MG/DL — LOW (ref 8.5–10.1)
CHLORIDE SERPL-SCNC: 107 MMOL/L — SIGNIFICANT CHANGE UP (ref 96–108)
CO2 SERPL-SCNC: 22 MMOL/L — SIGNIFICANT CHANGE UP (ref 22–31)
CREAT SERPL-MCNC: 0.67 MG/DL — SIGNIFICANT CHANGE UP (ref 0.5–1.3)
EGFR: 107 ML/MIN/1.73M2 — SIGNIFICANT CHANGE UP
GLUCOSE SERPL-MCNC: 118 MG/DL — HIGH (ref 70–99)
HCT VFR BLD CALC: 39.6 % — SIGNIFICANT CHANGE UP (ref 39–50)
HGB BLD-MCNC: 13.1 G/DL — SIGNIFICANT CHANGE UP (ref 13–17)
MAGNESIUM SERPL-MCNC: 1.5 MG/DL — LOW (ref 1.6–2.6)
MCHC RBC-ENTMCNC: 28.9 PG — SIGNIFICANT CHANGE UP (ref 27–34)
MCHC RBC-ENTMCNC: 33.1 G/DL — SIGNIFICANT CHANGE UP (ref 32–36)
MCV RBC AUTO: 87.4 FL — SIGNIFICANT CHANGE UP (ref 80–100)
NRBC # BLD: 0 /100 WBCS — SIGNIFICANT CHANGE UP (ref 0–0)
PHOSPHATE SERPL-MCNC: 3.6 MG/DL — SIGNIFICANT CHANGE UP (ref 2.5–4.5)
PLATELET # BLD AUTO: 116 K/UL — LOW (ref 150–400)
POTASSIUM SERPL-MCNC: 3.3 MMOL/L — LOW (ref 3.5–5.3)
POTASSIUM SERPL-SCNC: 3.3 MMOL/L — LOW (ref 3.5–5.3)
PROT SERPL-MCNC: 7.6 GM/DL — SIGNIFICANT CHANGE UP (ref 6–8.3)
RBC # BLD: 4.53 M/UL — SIGNIFICANT CHANGE UP (ref 4.2–5.8)
RBC # FLD: 15.6 % — HIGH (ref 10.3–14.5)
SODIUM SERPL-SCNC: 139 MMOL/L — SIGNIFICANT CHANGE UP (ref 135–145)
WBC # BLD: 4.68 K/UL — SIGNIFICANT CHANGE UP (ref 3.8–10.5)
WBC # FLD AUTO: 4.68 K/UL — SIGNIFICANT CHANGE UP (ref 3.8–10.5)

## 2023-09-02 PROCEDURE — 99232 SBSQ HOSP IP/OBS MODERATE 35: CPT

## 2023-09-02 RX ORDER — MAGNESIUM SULFATE 500 MG/ML
2 VIAL (ML) INJECTION ONCE
Refills: 0 | Status: COMPLETED | OUTPATIENT
Start: 2023-09-02 | End: 2023-09-02

## 2023-09-02 RX ORDER — POTASSIUM CHLORIDE 20 MEQ
40 PACKET (EA) ORAL EVERY 4 HOURS
Refills: 0 | Status: COMPLETED | OUTPATIENT
Start: 2023-09-02 | End: 2023-09-02

## 2023-09-02 RX ORDER — ACETAMINOPHEN 500 MG
650 TABLET ORAL ONCE
Refills: 0 | Status: COMPLETED | OUTPATIENT
Start: 2023-09-02 | End: 2023-09-02

## 2023-09-02 RX ORDER — ONDANSETRON 8 MG/1
4 TABLET, FILM COATED ORAL EVERY 6 HOURS
Refills: 0 | Status: DISCONTINUED | OUTPATIENT
Start: 2023-09-02 | End: 2023-09-06

## 2023-09-02 RX ADMIN — Medication 40 MILLIEQUIVALENT(S): at 10:35

## 2023-09-02 RX ADMIN — DORZOLAMIDE HYDROCHLORIDE 1 DROP(S): 20 SOLUTION/ DROPS OPHTHALMIC at 05:47

## 2023-09-02 RX ADMIN — Medication 1 TABLET(S): at 11:49

## 2023-09-02 RX ADMIN — Medication 1 DROP(S): at 17:22

## 2023-09-02 RX ADMIN — Medication 650 MILLIGRAM(S): at 08:46

## 2023-09-02 RX ADMIN — Medication 1 DROP(S): at 05:47

## 2023-09-02 RX ADMIN — ONDANSETRON 4 MILLIGRAM(S): 8 TABLET, FILM COATED ORAL at 17:21

## 2023-09-02 RX ADMIN — DORZOLAMIDE HYDROCHLORIDE 1 DROP(S): 20 SOLUTION/ DROPS OPHTHALMIC at 21:03

## 2023-09-02 RX ADMIN — Medication 650 MILLIGRAM(S): at 09:40

## 2023-09-02 RX ADMIN — Medication 50 MILLIGRAM(S): at 05:46

## 2023-09-02 RX ADMIN — Medication 50 MILLIGRAM(S): at 17:22

## 2023-09-02 RX ADMIN — Medication 25 GRAM(S): at 10:35

## 2023-09-02 RX ADMIN — LATANOPROST 1 DROP(S): 0.05 SOLUTION/ DROPS OPHTHALMIC; TOPICAL at 21:03

## 2023-09-02 RX ADMIN — ENOXAPARIN SODIUM 40 MILLIGRAM(S): 100 INJECTION SUBCUTANEOUS at 05:46

## 2023-09-02 RX ADMIN — Medication 40 MILLIEQUIVALENT(S): at 14:03

## 2023-09-02 RX ADMIN — DORZOLAMIDE HYDROCHLORIDE 1 DROP(S): 20 SOLUTION/ DROPS OPHTHALMIC at 14:03

## 2023-09-02 RX ADMIN — AMLODIPINE BESYLATE 5 MILLIGRAM(S): 2.5 TABLET ORAL at 05:46

## 2023-09-02 RX ADMIN — Medication 3 MILLIGRAM(S): at 21:03

## 2023-09-02 RX ADMIN — Medication 1 MILLIGRAM(S): at 11:49

## 2023-09-02 RX ADMIN — Medication 50 MILLIGRAM(S): at 11:48

## 2023-09-02 NOTE — PROGRESS NOTE ADULT - ASSESSMENT
58 year old PMHx of depression, anxiety, HTN, HLD, alcohol abuse w/ DT presents for evaluation after being found by EMS.  Found to be in acute alcohol withdrawal.      PLAN:  1.Alcohol withdrawal w/ hx of alcohol abuse, DT and current acute intoxication  Continue CIWA monitoring.  Continue IV Ativan.  Will maintain current dose of Librium and manually taper.  Continue Thiamine.  Counselled on cessation.    -CXR unremarkable.   - supplement lytes.     # Thrombocytopenia - Drop in Plt noted.  Previous Plt were 270s.  Will check HIT, stop heparin.  Monitor CBC.     2.HTN  -BP stable, continue Norvasc.     3.HLD    4.Depression/Anxiety  -not on meds, mood stable.     5.DVT PPx - ICDs.        58 year old PMHx of depression, anxiety, HTN, HLD, alcohol abuse w/ DT presents for evaluation after being found by EMS.  Found to be in acute alcohol withdrawal.      PLAN:  1.Alcohol withdrawal w/ hx of alcohol abuse, DT and current acute intoxication  Continue CIWA monitoring.  Continue IV Ativan.  Will maintain current dose of Librium and manually taper.  Continue Thiamine.  Counselled on cessation.    -CXR unremarkable.   - supplement lytes.     # Hypophosphatemia, Hypokalemia - supplement lytes, monitor BMP  # Thrombocytopenia - Drop in Plt noted.  Previous Plt were 270s.  Will check HIT, stop heparin.  Monitor CBC.     2.HTN  -BP stable, continue Norvasc.     3.HLD    4.Depression/Anxiety  -not on meds, mood stable.     5.DVT PPx - ICDs.

## 2023-09-03 LAB
ANION GAP SERPL CALC-SCNC: 6 MMOL/L — SIGNIFICANT CHANGE UP (ref 5–17)
BUN SERPL-MCNC: 12 MG/DL — SIGNIFICANT CHANGE UP (ref 7–23)
CALCIUM SERPL-MCNC: 8.4 MG/DL — LOW (ref 8.5–10.1)
CHLORIDE SERPL-SCNC: 108 MMOL/L — SIGNIFICANT CHANGE UP (ref 96–108)
CO2 SERPL-SCNC: 24 MMOL/L — SIGNIFICANT CHANGE UP (ref 22–31)
CREAT SERPL-MCNC: 0.73 MG/DL — SIGNIFICANT CHANGE UP (ref 0.5–1.3)
EGFR: 104 ML/MIN/1.73M2 — SIGNIFICANT CHANGE UP
GLUCOSE SERPL-MCNC: 110 MG/DL — HIGH (ref 70–99)
HCT VFR BLD CALC: 39.5 % — SIGNIFICANT CHANGE UP (ref 39–50)
HEPARIN-PF4 AB RESULT: <0.6 U/ML — SIGNIFICANT CHANGE UP (ref 0–0.9)
HGB BLD-MCNC: 13 G/DL — SIGNIFICANT CHANGE UP (ref 13–17)
MAGNESIUM SERPL-MCNC: 1.9 MG/DL — SIGNIFICANT CHANGE UP (ref 1.6–2.6)
MCHC RBC-ENTMCNC: 29 PG — SIGNIFICANT CHANGE UP (ref 27–34)
MCHC RBC-ENTMCNC: 32.9 G/DL — SIGNIFICANT CHANGE UP (ref 32–36)
MCV RBC AUTO: 88 FL — SIGNIFICANT CHANGE UP (ref 80–100)
NRBC # BLD: 0 /100 WBCS — SIGNIFICANT CHANGE UP (ref 0–0)
PF4 HEPARIN CMPLX AB SER-ACNC: NEGATIVE — SIGNIFICANT CHANGE UP
PHOSPHATE SERPL-MCNC: 3.4 MG/DL — SIGNIFICANT CHANGE UP (ref 2.5–4.5)
PLATELET # BLD AUTO: 116 K/UL — LOW (ref 150–400)
POTASSIUM SERPL-MCNC: 3.9 MMOL/L — SIGNIFICANT CHANGE UP (ref 3.5–5.3)
POTASSIUM SERPL-SCNC: 3.9 MMOL/L — SIGNIFICANT CHANGE UP (ref 3.5–5.3)
RBC # BLD: 4.49 M/UL — SIGNIFICANT CHANGE UP (ref 4.2–5.8)
RBC # FLD: 15.7 % — HIGH (ref 10.3–14.5)
SODIUM SERPL-SCNC: 138 MMOL/L — SIGNIFICANT CHANGE UP (ref 135–145)
WBC # BLD: 4.73 K/UL — SIGNIFICANT CHANGE UP (ref 3.8–10.5)
WBC # FLD AUTO: 4.73 K/UL — SIGNIFICANT CHANGE UP (ref 3.8–10.5)

## 2023-09-03 PROCEDURE — 99232 SBSQ HOSP IP/OBS MODERATE 35: CPT

## 2023-09-03 RX ADMIN — Medication 25 MILLIGRAM(S): at 17:41

## 2023-09-03 RX ADMIN — Medication 50 MILLIGRAM(S): at 00:10

## 2023-09-03 RX ADMIN — Medication 1 DROP(S): at 17:40

## 2023-09-03 RX ADMIN — Medication 2 MILLIGRAM(S): at 15:54

## 2023-09-03 RX ADMIN — Medication 25 MILLIGRAM(S): at 11:31

## 2023-09-03 RX ADMIN — Medication 1 TABLET(S): at 11:29

## 2023-09-03 RX ADMIN — Medication 3 MILLIGRAM(S): at 21:32

## 2023-09-03 RX ADMIN — DORZOLAMIDE HYDROCHLORIDE 1 DROP(S): 20 SOLUTION/ DROPS OPHTHALMIC at 05:29

## 2023-09-03 RX ADMIN — Medication 1 MILLIGRAM(S): at 11:29

## 2023-09-03 RX ADMIN — AMLODIPINE BESYLATE 5 MILLIGRAM(S): 2.5 TABLET ORAL at 05:29

## 2023-09-03 RX ADMIN — Medication 50 MILLIGRAM(S): at 05:28

## 2023-09-03 RX ADMIN — LATANOPROST 1 DROP(S): 0.05 SOLUTION/ DROPS OPHTHALMIC; TOPICAL at 21:33

## 2023-09-03 RX ADMIN — DORZOLAMIDE HYDROCHLORIDE 1 DROP(S): 20 SOLUTION/ DROPS OPHTHALMIC at 14:00

## 2023-09-03 RX ADMIN — Medication 1 DROP(S): at 05:29

## 2023-09-03 NOTE — PROGRESS NOTE ADULT - ASSESSMENT
58 year old PMHx of depression, anxiety, HTN, HLD, alcohol abuse w/ DT presents for evaluation after being found by EMS.  Found to be in acute alcohol withdrawal.      PLAN:  1.Alcohol withdrawal w/ hx of alcohol abuse, DT and current acute intoxication  Continue CIWA monitoring.  Continue IV Ativan.  Will maintain current dose of Librium and manually taper.  Continue Thiamine.  Counselled on cessation.    -CXR unremarkable.   - supplement lytes.     # Hypophosphatemia, Hypokalemia - supplement lytes, monitor BMP  # Thrombocytopenia - Drop in Plt noted.  Previous Plt were 270s.  HIT Ab negative.  Plt have been stable.  Suspect due to alcohol abuse / splenic sequestration.  Will need f/u as outpatient, d/w pt.    2.HTN  -BP stable, continue Norvasc.     3.HLD    4.Depression/Anxiety  -not on meds, mood stable.     5.DVT PPx - ICDs.

## 2023-09-04 LAB
AMPHET UR-MCNC: NEGATIVE — SIGNIFICANT CHANGE UP
BARBITURATES UR SCN-MCNC: NEGATIVE — SIGNIFICANT CHANGE UP
BENZODIAZ UR-MCNC: POSITIVE — SIGNIFICANT CHANGE UP
COCAINE METAB.OTHER UR-MCNC: NEGATIVE — SIGNIFICANT CHANGE UP
METHADONE UR-MCNC: NEGATIVE — SIGNIFICANT CHANGE UP
OPIATES UR-MCNC: NEGATIVE — SIGNIFICANT CHANGE UP
PCP SPEC-MCNC: SIGNIFICANT CHANGE UP
PCP UR-MCNC: NEGATIVE — SIGNIFICANT CHANGE UP
THC UR QL: NEGATIVE — SIGNIFICANT CHANGE UP

## 2023-09-04 PROCEDURE — 99232 SBSQ HOSP IP/OBS MODERATE 35: CPT

## 2023-09-04 RX ORDER — POLYETHYLENE GLYCOL 3350 17 G/17G
17 POWDER, FOR SOLUTION ORAL DAILY
Refills: 0 | Status: DISCONTINUED | OUTPATIENT
Start: 2023-09-04 | End: 2023-09-06

## 2023-09-04 RX ADMIN — Medication 1 MILLIGRAM(S): at 11:55

## 2023-09-04 RX ADMIN — Medication 3 MILLIGRAM(S): at 21:18

## 2023-09-04 RX ADMIN — Medication 2 MILLIGRAM(S): at 19:05

## 2023-09-04 RX ADMIN — Medication 1 DROP(S): at 05:58

## 2023-09-04 RX ADMIN — Medication 25 MILLIGRAM(S): at 05:56

## 2023-09-04 RX ADMIN — Medication 1 TABLET(S): at 11:55

## 2023-09-04 RX ADMIN — DORZOLAMIDE HYDROCHLORIDE 1 DROP(S): 20 SOLUTION/ DROPS OPHTHALMIC at 13:57

## 2023-09-04 RX ADMIN — AMLODIPINE BESYLATE 5 MILLIGRAM(S): 2.5 TABLET ORAL at 05:57

## 2023-09-04 RX ADMIN — POLYETHYLENE GLYCOL 3350 17 GRAM(S): 17 POWDER, FOR SOLUTION ORAL at 14:46

## 2023-09-04 RX ADMIN — DORZOLAMIDE HYDROCHLORIDE 1 DROP(S): 20 SOLUTION/ DROPS OPHTHALMIC at 05:58

## 2023-09-04 RX ADMIN — Medication 2 MILLIGRAM(S): at 10:50

## 2023-09-04 RX ADMIN — LATANOPROST 1 DROP(S): 0.05 SOLUTION/ DROPS OPHTHALMIC; TOPICAL at 21:18

## 2023-09-04 RX ADMIN — Medication 50 MILLIGRAM(S): at 14:46

## 2023-09-04 RX ADMIN — Medication 1 DROP(S): at 17:17

## 2023-09-04 RX ADMIN — Medication 50 MILLIGRAM(S): at 21:18

## 2023-09-04 NOTE — PROVIDER CONTACT NOTE (OTHER) - ACTION/TREATMENT ORDERED:
restarted librium taper.  patient to get ativan for ciwa protocol.  continuing to monitor patient.  vss.

## 2023-09-04 NOTE — PROGRESS NOTE ADULT - ASSESSMENT
58 year old PMHx of depression, anxiety, HTN, HLD, alcohol abuse w/ DT presents for evaluation after being found by EMS.  Found to be in acute alcohol withdrawal.      PLAN:  1.Alcohol withdrawal w/ hx of alcohol abuse, DT and current acute intoxication  Continue CIWA monitoring.  Continue IV Ativan.  Continue Thiamine.  Counselled on cessation.  -CXR unremarkable.  - supplement lytes.  Initially attempted to taper Librium, but CIWA -> 13 today.  Will decrease rate of Librium taper.   # Hypophosphatemia, Hypokalemia - supplement lytes, monitor BMP  # Thrombocytopenia - Drop in Plt noted.  Previous Plt were 270s.  HIT Ab negative.  Plt have been stable.  Suspect due to alcohol abuse / splenic sequestration.  Will need f/u as outpatient, d/w pt.    2.HTN  -BP stable, continue Norvasc.     3.HLD    4.Depression/Anxiety  -not on meds, mood stable.     5.DVT PPx - ICDs.

## 2023-09-05 LAB
HCT VFR BLD CALC: 37 % — LOW (ref 39–50)
HGB BLD-MCNC: 12.4 G/DL — LOW (ref 13–17)
MCHC RBC-ENTMCNC: 29.5 PG — SIGNIFICANT CHANGE UP (ref 27–34)
MCHC RBC-ENTMCNC: 33.5 G/DL — SIGNIFICANT CHANGE UP (ref 32–36)
MCV RBC AUTO: 88.1 FL — SIGNIFICANT CHANGE UP (ref 80–100)
NRBC # BLD: 0 /100 WBCS — SIGNIFICANT CHANGE UP (ref 0–0)
PLATELET # BLD AUTO: 121 K/UL — LOW (ref 150–400)
RBC # BLD: 4.2 M/UL — SIGNIFICANT CHANGE UP (ref 4.2–5.8)
RBC # FLD: 16 % — HIGH (ref 10.3–14.5)
WBC # BLD: 5.01 K/UL — SIGNIFICANT CHANGE UP (ref 3.8–10.5)
WBC # FLD AUTO: 5.01 K/UL — SIGNIFICANT CHANGE UP (ref 3.8–10.5)

## 2023-09-05 PROCEDURE — 99232 SBSQ HOSP IP/OBS MODERATE 35: CPT

## 2023-09-05 RX ADMIN — Medication 50 MILLIGRAM(S): at 06:15

## 2023-09-05 RX ADMIN — POLYETHYLENE GLYCOL 3350 17 GRAM(S): 17 POWDER, FOR SOLUTION ORAL at 11:13

## 2023-09-05 RX ADMIN — Medication 1 DROP(S): at 06:16

## 2023-09-05 RX ADMIN — LATANOPROST 1 DROP(S): 0.05 SOLUTION/ DROPS OPHTHALMIC; TOPICAL at 21:07

## 2023-09-05 RX ADMIN — Medication 50 MILLIGRAM(S): at 17:13

## 2023-09-05 RX ADMIN — DORZOLAMIDE HYDROCHLORIDE 1 DROP(S): 20 SOLUTION/ DROPS OPHTHALMIC at 06:16

## 2023-09-05 RX ADMIN — Medication 2 MILLIGRAM(S): at 15:51

## 2023-09-05 RX ADMIN — AMLODIPINE BESYLATE 5 MILLIGRAM(S): 2.5 TABLET ORAL at 06:15

## 2023-09-05 RX ADMIN — Medication 1 DROP(S): at 17:13

## 2023-09-05 RX ADMIN — Medication 1 MILLIGRAM(S): at 11:13

## 2023-09-05 RX ADMIN — Medication 2 MILLIGRAM(S): at 23:59

## 2023-09-05 RX ADMIN — Medication 1 TABLET(S): at 11:13

## 2023-09-05 RX ADMIN — Medication 3 MILLIGRAM(S): at 21:07

## 2023-09-05 RX ADMIN — DORZOLAMIDE HYDROCHLORIDE 1 DROP(S): 20 SOLUTION/ DROPS OPHTHALMIC at 13:12

## 2023-09-05 NOTE — PROGRESS NOTE ADULT - SUBJECTIVE AND OBJECTIVE BOX
"ED Provider Note    CHIEF COMPLAINT  Chief Complaint   Patient presents with   • Dizziness     pt reports waking up earlier tonight and felt dizzy, pt states, \"I felt shaking and like I was seasick. my hands felt funny.\" pt check his BP and had 185/119. denies Headache or double vision. pt is not on BP meds.    • Hypertension     after he woke around 2230.        HPI  Ladarius Drummond is a 34 y.o. male here for evaluation of dizziness and hypertension. The patient states that he went to bed this evening and felt okay, but he subsequently awoke feeling lightheaded, with some nausea. He denied any vomiting, no chest pain, no shortness of breath. He states he does have some mild epigastric pain, but no diarrhea. He is supposed be taking blood pressure medications, but is not secondary to how they make him feel. He has no alleviation or exacerbation of his symptoms, and he has no radiation of his pain. He has not taken anything prior to coming in.    PAST MEDICAL HISTORY   hypertension    SOCIAL HISTORY  Social History     Social History Main Topics   • Smoking status: Never Smoker   • Smokeless tobacco: Never Used   • Alcohol use No   • Drug use: No   • Sexual activity: Not on file       SURGICAL HISTORY  patient denies any surgical history    CURRENT MEDICATIONS  Home Medications     Reviewed by Lan Lynn R.N. (Registered Nurse) on 06/14/18 at 0103  Med List Status: Complete   Medication Last Dose Status        Patient Stanley Taking any Medications                       ALLERGIES  No Known Allergies    REVIEW OF SYSTEMS  See HPI for further details. Review of systems as above, otherwise all other systems are negative.     PHYSICAL EXAM  Constitutional: Well developed, well nourished. Mild acute distress.  HEENT: Normocephalic, atraumatic. Posterior pharynx clear and moist.  Eyes:  EOMI. Normal sclera.  Neck: Supple, Full range of motion, nontender.  Chest/Pulmonary: clear to ausculation. Symmetrical expansion. "
Patient is a 58y old  Male who presents with a chief complaint of found down by EMS (04 Sep 2023 16:54)    INTERVAL HPI/OVERNIGHT EVENTS: No acute events overnight. HD stable.     MEDICATIONS  (STANDING):  amLODIPine   Tablet 5 milliGRAM(s) Oral daily  chlordiazePOXIDE 50 milliGRAM(s) Oral every 12 hours  chlordiazePOXIDE   Oral   dorzolamide 2% Ophthalmic Solution 1 Drop(s) Both EYES three times a day  folic acid 1 milliGRAM(s) Oral daily  latanoprost 0.005% Ophthalmic Solution 1 Drop(s) Both EYES at bedtime  melatonin 3 milliGRAM(s) Oral at bedtime  multivitamin 1 Tablet(s) Oral daily  polyethylene glycol 3350 17 Gram(s) Oral daily  timolol 0.5% Solution 1 Drop(s) Both EYES two times a day    MEDICATIONS  (PRN):  bisacodyl 5 milliGRAM(s) Oral every 12 hours PRN Constipation  LORazepam   Injectable 2 milliGRAM(s) IV Push every 1 hour PRN ciwa 7 or greater  ondansetron Injectable 4 milliGRAM(s) IV Push every 6 hours PRN Nausea and/or Vomiting      Allergies    No Known Allergies    Intolerances        REVIEW OF SYSTEMS: all negative with exception of above    Vital Signs Last 24 Hrs  T(C): 36.9 (05 Sep 2023 11:22), Max: 36.9 (05 Sep 2023 00:01)  T(F): 98.4 (05 Sep 2023 11:22), Max: 98.4 (05 Sep 2023 00:01)  HR: 63 (05 Sep 2023 11:22) (61 - 79)  BP: 119/79 (05 Sep 2023 11:22) (104/66 - 119/79)  BP(mean): --  RR: 17 (05 Sep 2023 11:22) (17 - 19)  SpO2: 99% (05 Sep 2023 11:22) (95% - 99%)    Parameters below as of 05 Sep 2023 05:20  Patient On (Oxygen Delivery Method): room air        PHYSICAL EXAM:  GENERAL:  NAD Alert and Oriented x 3.  Tremulous.    HEENT: NCAT  CARDIOVASCULAR:  S1, S2  LUNGS: CTAB  ABDOMEN:  soft, (-) tenderness, (-) distension, (+) bowel sounds, (-) guarding, (-) rebound (-) rigidity  EXTREMITIES:  no cyanosis / clubbing / edema    LABS:                        12.4   5.01  )-----------( 121      ( 05 Sep 2023 07:03 )             37.0               CAPILLARY BLOOD GLUCOSE          RADIOLOGY & ADDITIONAL TESTS:    Imaging Personally Reviewed:  [ ] YES  [ ] NO    Consultant(s) Notes Reviewed:  [ ] YES  [ ] NO    Care Discussed with Consultants/Other Providers [ ] YES  [ ] NO
                          Patient: TITUS VINSON [id number] 58y Male                            Hospitalist Attending Note    No complaints.   CIWA ~ 16 overnight, now improving.    ____________________PHYSICAL EXAM:  GENERAL:  NAD Alert and Oriented x 3.  Tremulous.    HEENT: NCAT  CARDIOVASCULAR:  S1, S2  LUNGS: CTAB  ABDOMEN:  soft, (-) tenderness, (-) distension, (+) bowel sounds, (-) guarding, (-) rebound (-) rigidity  EXTREMITIES:  no cyanosis / clubbing / edema.   ____________________     VITALS:  Vital Signs Last 24 Hrs  T(C): 37.5 (30 Aug 2023 17:20), Max: 37.5 (30 Aug 2023 17:20)  T(F): 99.5 (30 Aug 2023 17:20), Max: 99.5 (30 Aug 2023 17:20)  HR: 83 (30 Aug 2023 17:20) (73 - 90)  BP: 133/71 (30 Aug 2023 17:20) (113/93 - 154/82)  BP(mean): --  RR: 18 (30 Aug 2023 17:20) (17 - 19)  SpO2: 98% (30 Aug 2023 17:20) (93% - 99%)    Parameters below as of 30 Aug 2023 17:20  Patient On (Oxygen Delivery Method): room air     Daily Height in cm: 172.72 (30 Aug 2023 06:27)    Daily Weight in k (30 Aug 2023 06:27)  CAPILLARY BLOOD GLUCOSE        I&O's Summary      HISTORY:  PAST MEDICAL & SURGICAL HISTORY:  Glaucoma      HTN (hypertension)      Alcohol dependence      Blindness of right eye      HLD (hyperlipidemia)      Anxiety and depression      No significant past surgical history      Allergies    No Known Allergies    Intolerances       LABS:                        12.3   4.49  )-----------( 214      ( 29 Aug 2023 19:37 )             37.9     08-30    143  |  110<H>  |  10  ----------------------------<  72  3.7   |  24  |  0.71    Ca    7.6<L>      30 Aug 2023 05:14  Phos  2.6     08-30  Mg     1.8     08-30    TPro  7.1  /  Alb  2.9<L>  /  TBili  0.5  /  DBili  0.2  /  AST  51<H>  /  ALT  38  /  AlkPhos  68        LIVER FUNCTIONS - ( 30 Aug 2023 00:29 )  Alb: 2.9 g/dL / Pro: 7.1 gm/dL / ALK PHOS: 68 U/L / ALT: 38 U/L / AST: 51 U/L / GGT: x           Urinalysis Basic - ( 30 Aug 2023 05:14 )    Color: x / Appearance: x / SG: x / pH: x  Gluc: 72 mg/dL / Ketone: x  / Bili: x / Urobili: x   Blood: x / Protein: x / Nitrite: x   Leuk Esterase: x / RBC: x / WBC x   Sq Epi: x / Non Sq Epi: x / Bacteria: x              MEDICATIONS:  MEDICATIONS  (STANDING):  amLODIPine   Tablet 5 milliGRAM(s) Oral daily  chlordiazePOXIDE   Oral   enoxaparin Injectable 40 milliGRAM(s) SubCutaneous every 24 hours  folic acid 1 milliGRAM(s) Oral daily  multivitamin 1 Tablet(s) Oral daily  thiamine 100 milliGRAM(s) Oral daily    MEDICATIONS  (PRN):  LORazepam   Injectable 2 milliGRAM(s) IV Push every 1 hour PRN ciwa 7 or greater  
  Cardio: Regular rate and rhythm with no murmur.   Abdomen: Soft, mild epigastric tenderness, No peritoneal signs. No guarding. No palpable masses.  Musculoskeletal: No deformity, no edema, neurovascular intact.   Neuro: Clear speech, appropriate, cooperative, cranial nerves II-XII grossly intact.  Psych: Normal mood and affect    Results for orders placed or performed during the hospital encounter of 06/14/18   CBC WITH DIFFERENTIAL   Result Value Ref Range    WBC 7.7 4.8 - 10.8 K/uL    RBC 5.39 4.70 - 6.10 M/uL    Hemoglobin 15.8 14.0 - 18.0 g/dL    Hematocrit 46.8 42.0 - 52.0 %    MCV 86.8 81.4 - 97.8 fL    MCH 29.3 27.0 - 33.0 pg    MCHC 33.8 33.7 - 35.3 g/dL    RDW 40.2 35.9 - 50.0 fL    Platelet Count 305 164 - 446 K/uL    MPV 9.7 9.0 - 12.9 fL    Neutrophils-Polys 65.40 44.00 - 72.00 %    Lymphocytes 25.10 22.00 - 41.00 %    Monocytes 7.60 0.00 - 13.40 %    Eosinophils 0.70 0.00 - 6.90 %    Basophils 0.90 0.00 - 1.80 %    Immature Granulocytes 0.30 0.00 - 0.90 %    Nucleated RBC 0.00 /100 WBC    Neutrophils (Absolute) 5.02 1.82 - 7.42 K/uL    Lymphs (Absolute) 1.92 1.00 - 4.80 K/uL    Monos (Absolute) 0.58 0.00 - 0.85 K/uL    Eos (Absolute) 0.05 0.00 - 0.51 K/uL    Baso (Absolute) 0.07 0.00 - 0.12 K/uL    Immature Granulocytes (abs) 0.02 0.00 - 0.11 K/uL    NRBC (Absolute) 0.00 K/uL   COMP METABOLIC PANEL   Result Value Ref Range    Sodium 138 135 - 145 mmol/L    Potassium 4.1 3.6 - 5.5 mmol/L    Chloride 105 96 - 112 mmol/L    Co2 24 20 - 33 mmol/L    Anion Gap 9.0 0.0 - 11.9    Glucose 109 (H) 65 - 99 mg/dL    Bun 15 8 - 22 mg/dL    Creatinine 0.84 0.50 - 1.40 mg/dL    Calcium 10.2 8.5 - 10.5 mg/dL    AST(SGOT) 22 12 - 45 U/L    ALT(SGPT) 33 2 - 50 U/L    Alkaline Phosphatase 54 30 - 99 U/L    Total Bilirubin 0.3 0.1 - 1.5 mg/dL    Albumin 5.0 (H) 3.2 - 4.9 g/dL    Total Protein 8.7 (H) 6.0 - 8.2 g/dL    Globulin 3.7 (H) 1.9 - 3.5 g/dL    A-G Ratio 1.4 g/dL   TROPONIN   Result Value Ref Range    Troponin I 
                          Patient: TITUS VINSON [id number] 58y Male                            Hospitalist Attending Note    No complaints.   CIWA 5-9.    No bleeding.      ____________________PHYSICAL EXAM:  GENERAL:  NAD Alert and Oriented x 3.  Tremulous.    HEENT: NCAT  CARDIOVASCULAR:  S1, S2  LUNGS: CTAB  ABDOMEN:  soft, (-) tenderness, (-) distension, (+) bowel sounds, (-) guarding, (-) rebound (-) rigidity  EXTREMITIES:  no cyanosis / clubbing / edema.   ____________________      VITALS:  Vital Signs Last 24 Hrs  T(C): 36.9 (02 Sep 2023 17:02), Max: 36.9 (02 Sep 2023 17:02)  T(F): 98.4 (02 Sep 2023 17:02), Max: 98.4 (02 Sep 2023 17:02)  HR: 63 (02 Sep 2023 17:02) (62 - 85)  BP: 118/74 (02 Sep 2023 17:02) (114/74 - 121/77)  BP(mean): --  RR: 18 (02 Sep 2023 17:02) (17 - 18)  SpO2: 98% (02 Sep 2023 17:02) (98% - 98%)    Parameters below as of 02 Sep 2023 17:02  Patient On (Oxygen Delivery Method): room air     Daily     Daily Weight in k (02 Sep 2023 05:11)  CAPILLARY BLOOD GLUCOSE        I&O's Summary    01 Sep 2023 07:01  -  02 Sep 2023 07:00  --------------------------------------------------------  IN: 0 mL / OUT: 1500 mL / NET: -1500 mL        LABS:                        13.1   4.68  )-----------( 116      ( 02 Sep 2023 06:30 )             39.6     09-02    139  |  107  |  12  ----------------------------<  118<H>  3.3<L>   |  22  |  0.67    Ca    8.4<L>      02 Sep 2023 06:30  Phos  3.6     09-  Mg     1.5     09-    TPro  7.6  /  Alb  2.9<L>  /  TBili  0.6  /  DBili  x   /  AST  44<H>  /  ALT  44  /  AlkPhos  66  09-      LIVER FUNCTIONS - ( 02 Sep 2023 06:30 )  Alb: 2.9 g/dL / Pro: 7.6 gm/dL / ALK PHOS: 66 U/L / ALT: 44 U/L / AST: 44 U/L / GGT: x           Urinalysis Basic - ( 02 Sep 2023 06:30 )    Color: x / Appearance: x / SG: x / pH: x  Gluc: 118 mg/dL / Ketone: x  / Bili: x / Urobili: x   Blood: x / Protein: x / Nitrite: x   Leuk Esterase: x / RBC: x / WBC x   Sq Epi: x / Non Sq Epi: x / Bacteria: x              MEDICATIONS:  amLODIPine   Tablet 5 milliGRAM(s) Oral daily  chlordiazePOXIDE 50 milliGRAM(s) Oral every 6 hours  dorzolamide 2% Ophthalmic Solution 1 Drop(s) Both EYES three times a day  folic acid 1 milliGRAM(s) Oral daily  latanoprost 0.005% Ophthalmic Solution 1 Drop(s) Both EYES at bedtime  LORazepam   Injectable 2 milliGRAM(s) IV Push every 1 hour PRN  melatonin 3 milliGRAM(s) Oral at bedtime  multivitamin 1 Tablet(s) Oral daily  ondansetron Injectable 4 milliGRAM(s) IV Push every 6 hours PRN  timolol 0.5% Solution 1 Drop(s) Both EYES two times a day    
<0.01 0.00 - 0.04 ng/mL   ESTIMATED GFR   Result Value Ref Range    GFR If African American >60 >60 mL/min/1.73 m 2    GFR If Non African American >60 >60 mL/min/1.73 m 2   EKG (NOW)   Result Value Ref Range    Report       [facility] Emergency Dept.    Test Date:  2018  Pt Name:    GUILLERMINA GRAHAM                Department: ER  MRN:        [id number]                      Room:        10  Gender:     Male                         Technician: [id number]  :        1983                   Requested By:RAUL VALIENTE  Order #:    [id number]                    Reading MD:    Measurements  Intervals                                Axis  Rate:       87                           P:          -12  KY:         140                          QRS:        34  QRSD:       82                           T:          36  QT:         344  QTc:        414    Interpretive Statements  SINUS RHYTHM  Compared to ECG 10/25/2017 02:42:21  No significant changes       [id number] VIEW   Final Result         1.  Moderate stool in the colon suggests changes of constipation, otherwise nonspecific bowel gas pattern            PROCEDURES     MEDICAL RECORD  I have reviewed patient's medical record and pertinent results are listed above.    COURSE & MEDICAL DECISION MAKING  I have reviewed any medical record information, laboratory studies and radiographic results as noted above.    If you have had any blood pressure issues while here in the emergency department, please see your doctor for a further evaluation or work up.    2:36 AM  At this time, the pt reports feeling better.  His blood pressure is still mildly high, but he feels well enough to go home.   He will be given a dose of medications here (anti hypertensive) and when he is trending downward, he will be discharged home.      Differential diagnoses include but not limited to: hypertension, mi, pe, ptx,    This patient presents with hypertension .  At this time, I 
                          Patient: TITUS VINSON [id number] 58y Male                            Hospitalist Attending Note    No complaints.   CIWA 2-13  No bleeding.      ____________________PHYSICAL EXAM:  GENERAL:  NAD Alert and Oriented x 3.  Tremulous.    HEENT: NCAT  CARDIOVASCULAR:  S1, S2  LUNGS: CTAB  ABDOMEN:  soft, (-) tenderness, (-) distension, (+) bowel sounds, (-) guarding, (-) rebound (-) rigidity  EXTREMITIES:  no cyanosis / clubbing / edema.   ____________________      VITALS:  Vital Signs Last 24 Hrs  T(C): 36.4 (04 Sep 2023 16:42), Max: 36.9 (04 Sep 2023 08:05)  T(F): 97.6 (04 Sep 2023 16:42), Max: 98.5 (04 Sep 2023 08:05)  HR: 79 (04 Sep 2023 16:42) (57 - 79)  BP: 105/68 (04 Sep 2023 16:42) (104/68 - 110/75)  BP(mean): --  RR: 19 (04 Sep 2023 16:42) (18 - 20)  SpO2: 98% (04 Sep 2023 16:42) (95% - 98%)    Parameters below as of 04 Sep 2023 16:42  Patient On (Oxygen Delivery Method): room air     Daily     Daily Weight in k (04 Sep 2023 05:20)  CAPILLARY BLOOD GLUCOSE        I&O's Summary    03 Sep 2023 07:01  -  04 Sep 2023 07:00  --------------------------------------------------------  IN: 0 mL / OUT: 300 mL / NET: -300 mL        LABS:                        13.0   4.73  )-----------( 116      ( 03 Sep 2023 07:00 )             39.5     09-03    138  |  108  |  12  ----------------------------<  110<H>  3.9   |  24  |  0.73    Ca    8.4<L>      03 Sep 2023 07:00  Phos  3.4       Mg     1.9     09-03          Urinalysis Basic - ( 03 Sep 2023 07:00 )    Color: x / Appearance: x / SG: x / pH: x  Gluc: 110 mg/dL / Ketone: x  / Bili: x / Urobili: x   Blood: x / Protein: x / Nitrite: x   Leuk Esterase: x / RBC: x / WBC x   Sq Epi: x / Non Sq Epi: x / Bacteria: x              MEDICATIONS:  amLODIPine   Tablet 5 milliGRAM(s) Oral daily  bisacodyl 5 milliGRAM(s) Oral every 12 hours PRN  chlordiazePOXIDE 50 milliGRAM(s) Oral every 8 hours  chlordiazePOXIDE   Oral   dorzolamide 2% Ophthalmic Solution 1 Drop(s) Both EYES three times a day  folic acid 1 milliGRAM(s) Oral daily  latanoprost 0.005% Ophthalmic Solution 1 Drop(s) Both EYES at bedtime  LORazepam   Injectable 2 milliGRAM(s) IV Push every 1 hour PRN  melatonin 3 milliGRAM(s) Oral at bedtime  multivitamin 1 Tablet(s) Oral daily  ondansetron Injectable 4 milliGRAM(s) IV Push every 6 hours PRN  polyethylene glycol 3350 17 Gram(s) Oral daily  timolol 0.5% Solution 1 Drop(s) Both EYES two times a day    
                          Patient: TITUS VINSON [id number] 58y Male                            Hospitalist Attending Note    No complaints.   CIWA 3-6.    No bleeding.      ____________________PHYSICAL EXAM:  GENERAL:  NAD Alert and Oriented x 3.  Tremulous.    HEENT: NCAT  CARDIOVASCULAR:  S1, S2  LUNGS: CTAB  ABDOMEN:  soft, (-) tenderness, (-) distension, (+) bowel sounds, (-) guarding, (-) rebound (-) rigidity  EXTREMITIES:  no cyanosis / clubbing / edema.   ____________________      VITALS:  Vital Signs Last 24 Hrs  T(C): 36.4 (03 Sep 2023 12:43), Max: 36.5 (03 Sep 2023 05:12)  T(F): 97.6 (03 Sep 2023 12:43), Max: 97.7 (03 Sep 2023 05:12)  HR: 63 (03 Sep 2023 12:43) (62 - 65)  BP: 99/62 (03 Sep 2023 12:43) (95/60 - 99/62)  BP(mean): --  RR: 17 (03 Sep 2023 12:43) (17 - 18)  SpO2: 98% (03 Sep 2023 12:43) (97% - 98%)    Parameters below as of 03 Sep 2023 05:12  Patient On (Oxygen Delivery Method): room air     Daily     Daily Weight in k.1 (03 Sep 2023 05:12)  CAPILLARY BLOOD GLUCOSE        I&O's Summary    02 Sep 2023 07:01  -  03 Sep 2023 07:00  --------------------------------------------------------  IN: 240 mL / OUT: 0 mL / NET: 240 mL        LABS:                        13.0   4.73  )-----------( 116      ( 03 Sep 2023 07:00 )             39.5     09-03    138  |  108  |  12  ----------------------------<  110<H>  3.9   |  24  |  0.73    Ca    8.4<L>      03 Sep 2023 07:00  Phos  3.4     09-03  Mg     1.9     09-    TPro  7.6  /  Alb  2.9<L>  /  TBili  0.6  /  DBili  x   /  AST  44<H>  /  ALT  44  /  AlkPhos  66  09-      LIVER FUNCTIONS - ( 02 Sep 2023 06:30 )  Alb: 2.9 g/dL / Pro: 7.6 gm/dL / ALK PHOS: 66 U/L / ALT: 44 U/L / AST: 44 U/L / GGT: x           Urinalysis Basic - ( 03 Sep 2023 07:00 )    Color: x / Appearance: x / SG: x / pH: x  Gluc: 110 mg/dL / Ketone: x  / Bili: x / Urobili: x   Blood: x / Protein: x / Nitrite: x   Leuk Esterase: x / RBC: x / WBC x   Sq Epi: x / Non Sq Epi: x / Bacteria: x              MEDICATIONS:  amLODIPine   Tablet 5 milliGRAM(s) Oral daily  chlordiazePOXIDE   Oral   dorzolamide 2% Ophthalmic Solution 1 Drop(s) Both EYES three times a day  folic acid 1 milliGRAM(s) Oral daily  latanoprost 0.005% Ophthalmic Solution 1 Drop(s) Both EYES at bedtime  LORazepam   Injectable 2 milliGRAM(s) IV Push every 1 hour PRN  melatonin 3 milliGRAM(s) Oral at bedtime  multivitamin 1 Tablet(s) Oral daily  ondansetron Injectable 4 milliGRAM(s) IV Push every 6 hours PRN  timolol 0.5% Solution 1 Drop(s) Both EYES two times a day    
                          Patient: TITUS VINSON [id number] 58y Male                            Hospitalist Attending Note    No complaints.   CIWA ~ 6-10     ____________________PHYSICAL EXAM:  GENERAL:  NAD Alert and Oriented x 3.  Tremulous.    HEENT: NCAT  CARDIOVASCULAR:  S1, S2  LUNGS: CTAB  ABDOMEN:  soft, (-) tenderness, (-) distension, (+) bowel sounds, (-) guarding, (-) rebound (-) rigidity  EXTREMITIES:  no cyanosis / clubbing / edema.   ____________________    VITALS:  Vital Signs Last 24 Hrs  T(C): 36.9 (31 Aug 2023 18:02), Max: 37 (31 Aug 2023 11:37)  T(F): 98.5 (31 Aug 2023 18:02), Max: 98.6 (31 Aug 2023 11:37)  HR: 94 (31 Aug 2023 18:02) (61 - 94)  BP: 144/80 (31 Aug 2023 18:02) (124/69 - 144/80)  BP(mean): --  RR: 17 (31 Aug 2023 18:02) (17 - 18)  SpO2: 90% (31 Aug 2023 18:02) (90% - 97%)    Parameters below as of 31 Aug 2023 04:57  Patient On (Oxygen Delivery Method): room air     Daily     Daily Weight in k (31 Aug 2023 04:57)  CAPILLARY BLOOD GLUCOSE        I&O's Summary    30 Aug 2023 07:01  -  31 Aug 2023 07:00  --------------------------------------------------------  IN: 0 mL / OUT: 600 mL / NET: -600 mL        LABS:                        12.6   4.56  )-----------( 157      ( 31 Aug 2023 06:07 )             36.9         135  |  103  |  7   ----------------------------<  80  3.0<L>   |  23  |  0.61    Ca    7.8<L>      31 Aug 2023 06:07  Phos  2.4       Mg     1.8         TPro  7.2  /  Alb  2.9<L>  /  TBili  1.7<H>  /  DBili  x   /  AST  53<H>  /  ALT  38  /  AlkPhos  72        LIVER FUNCTIONS - ( 31 Aug 2023 06:07 )  Alb: 2.9 g/dL / Pro: 7.2 gm/dL / ALK PHOS: 72 U/L / ALT: 38 U/L / AST: 53 U/L / GGT: x           Urinalysis Basic - ( 31 Aug 2023 06:07 )    Color: x / Appearance: x / SG: x / pH: x  Gluc: 80 mg/dL / Ketone: x  / Bili: x / Urobili: x   Blood: x / Protein: x / Nitrite: x   Leuk Esterase: x / RBC: x / WBC x   Sq Epi: x / Non Sq Epi: x / Bacteria: x              MEDICATIONS:  amLODIPine   Tablet 5 milliGRAM(s) Oral daily  chlordiazePOXIDE   Oral   chlordiazePOXIDE 50 milliGRAM(s) Oral every 6 hours  dorzolamide 2% Ophthalmic Solution 1 Drop(s) Both EYES three times a day  enoxaparin Injectable 40 milliGRAM(s) SubCutaneous every 24 hours  folic acid 1 milliGRAM(s) Oral daily  latanoprost 0.005% Ophthalmic Solution 1 Drop(s) Both EYES at bedtime  LORazepam   Injectable 2 milliGRAM(s) IV Push every 1 hour PRN  multivitamin 1 Tablet(s) Oral daily  thiamine 100 milliGRAM(s) Oral daily  timolol 0.5% Solution 1 Drop(s) Both EYES two times a day    
                          Patient: TITUS VINSON [id number] 58y Male                            Hospitalist Attending Note    No complaints.   CIWA ~ 6-12    ____________________PHYSICAL EXAM:  GENERAL:  NAD Alert and Oriented x 3.  Tremulous.    HEENT: NCAT  CARDIOVASCULAR:  S1, S2  LUNGS: CTAB  ABDOMEN:  soft, (-) tenderness, (-) distension, (+) bowel sounds, (-) guarding, (-) rebound (-) rigidity  EXTREMITIES:  no cyanosis / clubbing / edema.   ____________________      VITALS:  Vital Signs Last 24 Hrs  T(C): 36.4 (01 Sep 2023 17:01), Max: 36.9 (31 Aug 2023 18:02)  T(F): 97.6 (01 Sep 2023 17:01), Max: 98.5 (31 Aug 2023 18:02)  HR: 61 (01 Sep 2023 17:) (59 - 94)  BP: 138/78 (01 Sep 2023 17:01) (113/65 - 151/70)  BP(mean): --  RR: 16 (01 Sep 2023 17:01) (16 - 18)  SpO2: 98% (01 Sep 2023 17:01) (90% - 98%)    Parameters below as of 01 Sep 2023 17:01  Patient On (Oxygen Delivery Method): room air     Daily     Daily Weight in k.2 (01 Sep 2023 04:44)  CAPILLARY BLOOD GLUCOSE        I&O's Summary    31 Aug 2023 07:  -  01 Sep 2023 07:00  --------------------------------------------------------  IN: 0 mL / OUT: 950 mL / NET: -950 mL    01 Sep 2023 07:01  -  01 Sep 2023 17:55  --------------------------------------------------------  IN: 0 mL / OUT: 600 mL / NET: -600 mL        LABS:                        12.6   4.56  )-----------( 157      ( 31 Aug 2023 06:07 )             36.9         135  |  103  |  7   ----------------------------<  80  3.0<L>   |  23  |  0.61    Ca    7.8<L>      31 Aug 2023 06:07  Phos  2.4       Mg     1.8         TPro  7.2  /  Alb  2.9<L>  /  TBili  1.7<H>  /  DBili  x   /  AST  53<H>  /  ALT  38  /  AlkPhos  72        LIVER FUNCTIONS - ( 31 Aug 2023 06:07 )  Alb: 2.9 g/dL / Pro: 7.2 gm/dL / ALK PHOS: 72 U/L / ALT: 38 U/L / AST: 53 U/L / GGT: x           Urinalysis Basic - ( 31 Aug 2023 06:07 )    Color: x / Appearance: x / SG: x / pH: x  Gluc: 80 mg/dL / Ketone: x  / Bili: x / Urobili: x   Blood: x / Protein: x / Nitrite: x   Leuk Esterase: x / RBC: x / WBC x   Sq Epi: x / Non Sq Epi: x / Bacteria: x              MEDICATIONS:  amLODIPine   Tablet 5 milliGRAM(s) Oral daily  chlordiazePOXIDE 50 milliGRAM(s) Oral every 6 hours  dorzolamide 2% Ophthalmic Solution 1 Drop(s) Both EYES three times a day  enoxaparin Injectable 40 milliGRAM(s) SubCutaneous every 24 hours  folic acid 1 milliGRAM(s) Oral daily  latanoprost 0.005% Ophthalmic Solution 1 Drop(s) Both EYES at bedtime  LORazepam   Injectable 2 milliGRAM(s) IV Push every 1 hour PRN  multivitamin 1 Tablet(s) Oral daily  potassium chloride   Powder 40 milliEquivalent(s) Oral every 4 hours  potassium phosphate / sodium phosphate Powder (PHOS-NaK) 1 Packet(s) Oral once  timolol 0.5% Solution 1 Drop(s) Both EYES two times a day    
have counseled the patient/family regarding their medications, pain control, and follow up.  They will continue their medications, if any, as prescribed.  They will return immediately for any worsening symptoms and/or any other medical concerns.  They will see their doctor, or contact the doctor provided, in 1-2 days for follow up.       FINAL IMPRESSION  1. Hypertension, unspecified type        Electronically signed by: Homer Bill, 6/14/2018 1:19 AM

## 2023-09-05 NOTE — PROGRESS NOTE ADULT - REASON FOR ADMISSION
found down by EMS

## 2023-09-05 NOTE — PROGRESS NOTE ADULT - ASSESSMENT
58 year old PMHx of depression, anxiety, HTN, HLD, alcohol abuse w/ DT presents for evaluation after being found by EMS.  Found to be in acute alcohol withdrawal.      PLAN:  1.Alcohol withdrawal w/ hx of alcohol abuse, DT and current acute intoxication  Continue CIWA monitoring.  Continue IV Ativan.  Continue Thiamine.  Counselled on cessation.  -CXR unremarkable.  - supplement lytes. C/w librium taper  # Hypophosphatemia, Hypokalemia - supplement lytes, monitor BMP  # Thrombocytopenia - Drop in Plt noted.  Previous Plt were 270s.  HIT Ab negative.  Plt have been stable.  Suspect due to alcohol abuse / splenic sequestration.  Will need f/u as outpatient, d/w pt.    2.HTN  -BP stable, continue Norvasc.     3.HLD    4.Depression/Anxiety  -not on meds, mood stable.     5.DVT PPx - ICDs.   Dispo- pending PT evaluation

## 2023-09-06 ENCOUNTER — TRANSCRIPTION ENCOUNTER (OUTPATIENT)
Age: 58
End: 2023-09-06

## 2023-09-06 VITALS
SYSTOLIC BLOOD PRESSURE: 104 MMHG | OXYGEN SATURATION: 98 % | RESPIRATION RATE: 18 BRPM | TEMPERATURE: 98 F | DIASTOLIC BLOOD PRESSURE: 71 MMHG | HEART RATE: 74 BPM

## 2023-09-06 PROCEDURE — 99239 HOSP IP/OBS DSCHRG MGMT >30: CPT

## 2023-09-06 RX ORDER — FOLIC ACID 0.8 MG
1 TABLET ORAL
Qty: 0 | Refills: 0 | DISCHARGE
Start: 2023-09-06

## 2023-09-06 RX ORDER — AMLODIPINE BESYLATE 2.5 MG/1
1 TABLET ORAL
Qty: 0 | Refills: 0 | DISCHARGE
Start: 2023-09-06

## 2023-09-06 RX ORDER — TIMOLOL 0.5 %
1 DROPS OPHTHALMIC (EYE)
Qty: 0 | Refills: 0 | DISCHARGE
Start: 2023-09-06

## 2023-09-06 RX ORDER — LATANOPROST 0.05 MG/ML
1 SOLUTION/ DROPS OPHTHALMIC; TOPICAL
Qty: 0 | Refills: 0 | DISCHARGE
Start: 2023-09-06

## 2023-09-06 RX ORDER — DORZOLAMIDE HYDROCHLORIDE 20 MG/ML
1 SOLUTION/ DROPS OPHTHALMIC
Qty: 0 | Refills: 0 | DISCHARGE
Start: 2023-09-06

## 2023-09-06 RX ADMIN — POLYETHYLENE GLYCOL 3350 17 GRAM(S): 17 POWDER, FOR SOLUTION ORAL at 13:15

## 2023-09-06 RX ADMIN — DORZOLAMIDE HYDROCHLORIDE 1 DROP(S): 20 SOLUTION/ DROPS OPHTHALMIC at 05:32

## 2023-09-06 RX ADMIN — Medication 1 DROP(S): at 05:32

## 2023-09-06 RX ADMIN — Medication 1 TABLET(S): at 13:14

## 2023-09-06 RX ADMIN — Medication 2 MILLIGRAM(S): at 09:59

## 2023-09-06 RX ADMIN — Medication 50 MILLIGRAM(S): at 05:32

## 2023-09-06 RX ADMIN — Medication 1 MILLIGRAM(S): at 13:14

## 2023-09-06 RX ADMIN — DORZOLAMIDE HYDROCHLORIDE 1 DROP(S): 20 SOLUTION/ DROPS OPHTHALMIC at 13:14

## 2023-09-06 RX ADMIN — AMLODIPINE BESYLATE 5 MILLIGRAM(S): 2.5 TABLET ORAL at 05:32

## 2023-09-06 NOTE — DIETITIAN INITIAL EVALUATION ADULT - PERTINENT MEDS FT
MEDICATIONS  (STANDING):  amLODIPine   Tablet 5 milliGRAM(s) Oral daily  dorzolamide 2% Ophthalmic Solution 1 Drop(s) Both EYES three times a day  folic acid 1 milliGRAM(s) Oral daily  latanoprost 0.005% Ophthalmic Solution 1 Drop(s) Both EYES at bedtime  melatonin 3 milliGRAM(s) Oral at bedtime  multivitamin 1 Tablet(s) Oral daily  polyethylene glycol 3350 17 Gram(s) Oral daily  timolol 0.5% Solution 1 Drop(s) Both EYES two times a day    MEDICATIONS  (PRN):  bisacodyl 5 milliGRAM(s) Oral every 12 hours PRN Constipation  ondansetron Injectable 4 milliGRAM(s) IV Push every 6 hours PRN Nausea and/or Vomiting

## 2023-09-06 NOTE — DIETITIAN INITIAL EVALUATION ADULT - ENERGY INTAKE
Pt reports fair appetite and PO intake during LOS; per flow sheets pt consuming % of documented meals. Amenable to Ensure Plus High Protein x 1/day (provides 350 kcal, 20 g protein)

## 2023-09-06 NOTE — DIETITIAN INITIAL EVALUATION ADULT - PHYSICAL ASSESSMENT TEMPLES
Is This A New Presentation, Or A Follow-Up?: Rash Additional History: It comes and goes. The cream makes it better but it always comes back. mild

## 2023-09-06 NOTE — DISCHARGE NOTE PROVIDER - NSDCCPCAREPLAN_GEN_ALL_CORE_FT
PRINCIPAL DISCHARGE DIAGNOSIS  Diagnosis: Alcohol withdrawal  Assessment and Plan of Treatment: Alcohol withdrawal occurs when someone who consumes a lot of alcohol suddenly stops doing so.  Mild symptoms of withdrawal include head ache, stomach ache, heart palpitations, tembling, feeling anxious, sweating and trouble sleeping.  More serious symptoms include hallucinations, seizures and delirium tremens.  The symptoms usually start about 2 days after a person stops drinking and can last up to 5 days.     PRINCIPAL DISCHARGE DIAGNOSIS  Diagnosis: Alcohol withdrawal  Assessment and Plan of Treatment: Alcohol withdrawal occurs when someone who consumes a lot of alcohol suddenly stops doing so.  Mild symptoms of withdrawal include head ache, stomach ache, heart palpitations, tembling, feeling anxious, sweating and trouble sleeping.  More serious symptoms include hallucinations, seizures and delirium tremens.  The symptoms usually start about 2 days after a person stops drinking and can last up to 5 days.      SECONDARY DISCHARGE DIAGNOSES  Diagnosis: HTN (hypertension)  Assessment and Plan of Treatment:     Diagnosis: Thrombocytopenia  Assessment and Plan of Treatment:

## 2023-09-06 NOTE — DIETITIAN INITIAL EVALUATION ADULT - OTHER INFO
Denies difficulty chewing/swallowing. Pt reports he is homeless PTA- social work aware as per pt. Pt reports food insecurity PTA. Discussed [facility]. Amenable to non-perishable food to be given upon discharge if discharged to a friends home. States weight stable; reports  pounds- ?accuracy of pt recall. Per previous RD notes pt with weight fluctuations likely due to history of alcohol use: (08/15/2023) 86.2kg (12/22/2022) 79.9kg.   Pt with no further questions at this time. Made aware RD remains available.

## 2023-09-06 NOTE — DIETITIAN NUTRITION RISK NOTIFICATION - TREATMENT: THE FOLLOWING DIET HAS BEEN RECOMMENDED
Diet, DASH/TLC:   Sodium & Cholesterol Restricted  Supplement Feeding Modality:  Oral  Ensure Plus High Protein Cans or Servings Per Day:  1       Frequency:  Daily (09-06-23 @ 12:22) [Pending Verification By Attending]  Diet, DASH/TLC:   Sodium & Cholesterol Restricted (08-30-23 @ 19:47) [Active]

## 2023-09-06 NOTE — DISCHARGE NOTE PROVIDER - DETAILS OF MALNUTRITION DIAGNOSIS/DIAGNOSES
This patient has been assessed with a concern for Malnutrition and was treated during this hospitalization for the following Nutrition diagnosis/diagnoses:     -  09/06/2023: Moderate protein-calorie malnutrition

## 2023-09-06 NOTE — DISCHARGE NOTE NURSING/CASE MANAGEMENT/SOCIAL WORK - NSDCVIVACCINE_GEN_ALL_CORE_FT
influenza, injectable, quadrivalent, preservative free; 14-Oct-2021 13:35; Nany Guerrero (RN); [employer]; [id number] (Exp. Date: 30-Jun-2022); IntraMuscular; Deltoid Right.; 0.5 milliLiter(s); VIS (VIS Published: 06-Aug-2021, VIS Presented: 14-Oct-2021);   Tdap; 22-Dec-2021 19:23; Clementina Koehler (RN); [employer]; [id number] (Exp. Date: 09-Sep-2023); IntraMuscular; Deltoid Left.; 0.5 milliLiter(s); VIS (VIS Published: 09-May-2013, VIS Presented: 22-Dec-2021);   Tdap; 15-Aug-2022 16:08; Marianne Pollock (RN); [employer]; [id number]   (Exp. Date: 18-Apr-2024); IntraMuscular; Deltoid Left.; 0.5 milliLiter(s); VIS (VIS Published: 09-May-2013, VIS Presented: 15-Aug-2022);   Tdap; 16-Nov-2022 15:48; Parish Avila (RN); [employer]; [id number] (Exp. Date: 01-Jun-2024); IntraMuscular; Deltoid Right.; 0.5 milliLiter(s); VIS (VIS Published: 09-May-2013, VIS Presented: 16-Nov-2022);

## 2023-09-06 NOTE — DISCHARGE NOTE PROVIDER - ATTENDING DISCHARGE PHYSICAL EXAMINATION:
Vital Signs Last 24 Hrs  T(F): 97.5 (06 Sep 2023 12:46), Max: 98.1 (06 Sep 2023 05:17)  HR: 74 (06 Sep 2023 12:46) (60 - 74)  BP: 104/71 (06 Sep 2023 12:46) (100/64 - 113/70)  RR: 18 (06 Sep 2023 12:46) (17 - 18)  SpO2: 98% (06 Sep 2023 12:46) (96% - 98%)    Physical Exam:  Constitutional: NAD, awake and alert  Respiratory: cta b/l no wheezing no rhonchi  Cardiovascular: +s1/s2 no edema b/l le  Gastrointestinal: soft nt nd bs+  Vascular: 2+ peripheral pulses  Neurological: A/O x 3, no focal deficits

## 2023-09-06 NOTE — PHYSICAL THERAPY INITIAL EVALUATION ADULT - AMBULATION SKILLS, REHAB EVAL
Provider called and discussed lab results with pt. Low vitamin D. Medication to be sent to the pharmacy. Further discussed starting medication for HTN. Pt would like to try low salt diet and exercise and follow up with new PCP in 1-2 weeks. States that his HA is improved. Pt also requesting work note, return to work tomorrow. Note printed and placed at  for patient .     Ella Lal PA-C  8/30/2022   independent

## 2023-09-06 NOTE — DISCHARGE NOTE NURSING/CASE MANAGEMENT/SOCIAL WORK - PATIENT PORTAL LINK FT
You can access the FollowMyHealth Patient Portal offered by [facility] by registering at the following website: [email or web address]. By joining [employer]’s FollowMyHealth portal, you will also be able to view your health information using other applications (apps) compatible with our system.

## 2023-09-06 NOTE — DIETITIAN INITIAL EVALUATION ADULT - NSICDXPASTMEDICALHX_GEN_ALL_CORE_FT
PAST MEDICAL HISTORY:  Alcohol dependence     Anxiety and depression     Blindness of right eye     Glaucoma     HLD (hyperlipidemia)     HTN (hypertension)

## 2023-09-06 NOTE — DISCHARGE NOTE PROVIDER - HOSPITAL COURSE
58 year old PMHx of depression, anxiety, HTN, HLD, alcohol abuse w/ DT presents for evaluation after being found by EMS.  Found to be in acute alcohol withdrawal.      PLAN:  1.Alcohol withdrawal w/ hx of alcohol abuse, DT and current acute intoxication  Continue CIWA monitoring.  Continue IV Ativan.  Continue Thiamine.  Counselled on cessation.  -CXR unremarkable.  - supplement lytes. C/w librium taper  # Hypophosphatemia, Hypokalemia - supplement lytes, monitor BMP  # Thrombocytopenia - Drop in Plt noted.  Previous Plt were 270s.  HIT Ab negative.  Plt have been stable.  Suspect due to alcohol abuse / splenic sequestration.  Will need f/u as outpatient, d/w pt.    2.HTN  -BP stable, continue Norvasc.     3.HLD    4.Depression/Anxiety  -not on meds, mood stable.     5.DVT PPx - ICDs.     Case discussed with attending.  Given patient's improved clinical status and current hemodynamic stability, the decision was made for discharge.    This is a summary of the patients hospitalization.  For full hospital course, please see medical record.   58 year old PMHx of depression, anxiety, HTN, HLD, alcohol abuse w/ DT presents for evaluation after being found by EMS.  Found to be in acute alcohol withdrawal.      PLAN:  1.Alcohol withdrawal w/ hx of alcohol abuse, DT and current acute intoxication  Continue CIWA monitoring.  Continue IV Ativan.  Continue Thiamine.  Counselled on cessation.  -CXR unremarkable- supplement lytes. C/w librium taper  # Hypophosphatemia, Hypokalemia - supplement lytes, monitor BMP  # Thrombocytopenia - Drop in Plt noted.  Previous Plt were 270s.  HIT Ab negative.  Plt have been stable.  Suspect due to alcohol abuse/splenic sequestration. Will need f/u as outpatient, d/w pt.    2.HTN  -BP stable, continue Norvasc.     3.HLD    4.Depression/Anxiety  -not on meds, mood stable.     5.DVT PPx - ICDs.     Case discussed with attending.  Given patient's improved clinical status and current hemodynamic stability, the decision was made for discharge.

## 2023-09-06 NOTE — DIETITIAN INITIAL EVALUATION ADULT - PERTINENT LABORATORY DATA
A1C with Estimated Average Glucose Result: 5.3 % (07-23-23 @ 07:45)  A1C with Estimated Average Glucose Result: 5.3 % (07-17-23 @ 06:20)  A1C with Estimated Average Glucose Result: 5.4 % (07-09-23 @ 05:15)

## 2023-09-06 NOTE — PHYSICAL THERAPY INITIAL EVALUATION ADULT - ADDITIONAL COMMENTS
pt encountered in bed supine, aaox4, + tremor during movement, pt indep in bed mob, transfers and ambulation w/o AD, pt able to amb w/o AD and able to negotiate 1 flight of stairs with single HR and reciprocating gait. pt transferred back to bed and left comfortably.

## 2023-09-06 NOTE — ED PROVIDER NOTE - MUSCULOSKELETAL NEGATIVE STATEMENT, MLM
4 Eyes Admission Assessment     I agree as the admission nurse that 2 RN's have performed a thorough Head to Toe Skin Assessment on the patient. ALL assessment sites listed below have been assessed on admission. Areas assessed by both nurses:   [x]   Head, Face, and Ears   [x]   Shoulders, Back, and Chest  [x]   Arms, Elbows, and Hands   [x]   Coccyx, Sacrum, and Ischium  [x]   Legs, Feet, and Heels        Does the Patient have Skin Breakdown?   Yes a wound was noted on the Admission Assessment and an LDA was Initiated documentation include the [place]-wound, Wound Assessment, Measurements, Dressing Treatment, Drainage, and Color\",         Isidro Prevention initiated:  Yes   Wound Care Orders initiated:  Yes      WOC nurse consulted for Pressure Injury (Stage 3,4, Unstageable, DTI, NWPT, and Complex wounds) or Isidro score 18 or lower:  Yes      Nurse 1 eSignature: Electronically signed by Rocael Morrow RN on 9/6/23 at 7:29 PM EDT    **SHARE this note so that the co-signing nurse is able to place an eSignature**    Nurse 2 eSignature: Electronically signed by Mare Britton RN on 9/6/23 at 10:42 PM EDT no back pain, no gout, no musculoskeletal pain, no neck pain, and no weakness.

## 2023-09-11 ENCOUNTER — INPATIENT (INPATIENT)
Facility: HOSPITAL | Age: 58
LOS: 5 days | Discharge: HOME | End: 2023-09-17
Attending: STUDENT IN AN ORGANIZED HEALTH CARE EDUCATION/TRAINING PROGRAM | Admitting: STUDENT IN AN ORGANIZED HEALTH CARE EDUCATION/TRAINING PROGRAM
Payer: MEDICAID

## 2023-09-11 VITALS
RESPIRATION RATE: 18 BRPM | OXYGEN SATURATION: 99 % | HEART RATE: 102 BPM | DIASTOLIC BLOOD PRESSURE: 84 MMHG | SYSTOLIC BLOOD PRESSURE: 136 MMHG | TEMPERATURE: 98 F | HEIGHT: 68 IN

## 2023-09-11 DIAGNOSIS — F10.139 ALCOHOL ABUSE WITH WITHDRAWAL, UNSPECIFIED: ICD-10-CM

## 2023-09-11 DIAGNOSIS — E44.0 MODERATE PROTEIN-CALORIE MALNUTRITION: ICD-10-CM

## 2023-09-11 DIAGNOSIS — E87.6 HYPOKALEMIA: ICD-10-CM

## 2023-09-11 DIAGNOSIS — D69.6 THROMBOCYTOPENIA, UNSPECIFIED: ICD-10-CM

## 2023-09-11 DIAGNOSIS — H40.9 UNSPECIFIED GLAUCOMA: ICD-10-CM

## 2023-09-11 DIAGNOSIS — E78.5 HYPERLIPIDEMIA, UNSPECIFIED: ICD-10-CM

## 2023-09-11 DIAGNOSIS — F10.129 ALCOHOL ABUSE WITH INTOXICATION, UNSPECIFIED: ICD-10-CM

## 2023-09-11 DIAGNOSIS — H54.413A BLINDNESS RIGHT EYE CATEGORY 3, NORMAL VISION LEFT EYE: ICD-10-CM

## 2023-09-11 DIAGNOSIS — F41.9 ANXIETY DISORDER, UNSPECIFIED: ICD-10-CM

## 2023-09-11 DIAGNOSIS — I10 ESSENTIAL (PRIMARY) HYPERTENSION: ICD-10-CM
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
## 2023-09-11 DIAGNOSIS — F10.929 ALCOHOL USE, UNSPECIFIED WITH INTOXICATION, UNSPECIFIED: ICD-10-CM

## 2023-09-11 DIAGNOSIS — E83.39 OTHER DISORDERS OF PHOSPHORUS METABOLISM: ICD-10-CM

## 2023-09-11 DIAGNOSIS — F32.A DEPRESSION, UNSPECIFIED: ICD-10-CM

## 2023-09-11 LAB
ALBUMIN SERPL ELPH-MCNC: 4.1 G/DL — SIGNIFICANT CHANGE UP (ref 3.3–5)
ALP SERPL-CCNC: 74 U/L — SIGNIFICANT CHANGE UP (ref 40–120)
ALT FLD-CCNC: 46 U/L — HIGH (ref 4–41)
ANION GAP SERPL CALC-SCNC: 16 MMOL/L — HIGH (ref 7–14)
AST SERPL-CCNC: 69 U/L — HIGH (ref 4–40)
BASOPHILS # BLD AUTO: 0.11 K/UL — SIGNIFICANT CHANGE UP (ref 0–0.2)
BASOPHILS NFR BLD AUTO: 1.9 % — SIGNIFICANT CHANGE UP (ref 0–2)
BILIRUB SERPL-MCNC: 0.2 MG/DL — SIGNIFICANT CHANGE UP (ref 0.2–1.2)
BUN SERPL-MCNC: 9 MG/DL — SIGNIFICANT CHANGE UP (ref 7–23)
CALCIUM SERPL-MCNC: 8.2 MG/DL — LOW (ref 8.4–10.5)
CHLORIDE SERPL-SCNC: 101 MMOL/L — SIGNIFICANT CHANGE UP (ref 98–107)
CO2 SERPL-SCNC: 27 MMOL/L — SIGNIFICANT CHANGE UP (ref 22–31)
CREAT SERPL-MCNC: 1.21 MG/DL — SIGNIFICANT CHANGE UP (ref 0.5–1.3)
EGFR: 69 ML/MIN/1.73M2 — SIGNIFICANT CHANGE UP
EOSINOPHIL # BLD AUTO: 0.12 K/UL — SIGNIFICANT CHANGE UP (ref 0–0.5)
EOSINOPHIL NFR BLD AUTO: 2 % — SIGNIFICANT CHANGE UP (ref 0–6)
GLUCOSE SERPL-MCNC: 108 MG/DL — HIGH (ref 70–99)
HCT VFR BLD CALC: 38.5 % — LOW (ref 39–50)
HGB BLD-MCNC: 12.8 G/DL — LOW (ref 13–17)
IANC: 3.21 K/UL — SIGNIFICANT CHANGE UP (ref 1.8–7.4)
IMM GRANULOCYTES NFR BLD AUTO: 0.2 % — SIGNIFICANT CHANGE UP (ref 0–0.9)
LYMPHOCYTES # BLD AUTO: 1.79 K/UL — SIGNIFICANT CHANGE UP (ref 1–3.3)
LYMPHOCYTES # BLD AUTO: 30.3 % — SIGNIFICANT CHANGE UP (ref 13–44)
MCHC RBC-ENTMCNC: 29.8 PG — SIGNIFICANT CHANGE UP (ref 27–34)
MCHC RBC-ENTMCNC: 33.2 GM/DL — SIGNIFICANT CHANGE UP (ref 32–36)
MCV RBC AUTO: 89.5 FL — SIGNIFICANT CHANGE UP (ref 80–100)
MONOCYTES # BLD AUTO: 0.67 K/UL — SIGNIFICANT CHANGE UP (ref 0–0.9)
MONOCYTES NFR BLD AUTO: 11.3 % — SIGNIFICANT CHANGE UP (ref 2–14)
NEUTROPHILS # BLD AUTO: 3.21 K/UL — SIGNIFICANT CHANGE UP (ref 1.8–7.4)
NEUTROPHILS NFR BLD AUTO: 54.3 % — SIGNIFICANT CHANGE UP (ref 43–77)
NRBC # BLD: 0 /100 WBCS — SIGNIFICANT CHANGE UP (ref 0–0)
NRBC # FLD: 0 K/UL — SIGNIFICANT CHANGE UP (ref 0–0)
PLATELET # BLD AUTO: 324 K/UL — SIGNIFICANT CHANGE UP (ref 150–400)
POTASSIUM SERPL-MCNC: 3.5 MMOL/L — SIGNIFICANT CHANGE UP (ref 3.5–5.3)
POTASSIUM SERPL-SCNC: 3.5 MMOL/L — SIGNIFICANT CHANGE UP (ref 3.5–5.3)
PROT SERPL-MCNC: 8.1 G/DL — SIGNIFICANT CHANGE UP (ref 6–8.3)
RBC # BLD: 4.3 M/UL — SIGNIFICANT CHANGE UP (ref 4.2–5.8)
RBC # FLD: 16.4 % — HIGH (ref 10.3–14.5)
SODIUM SERPL-SCNC: 144 MMOL/L — SIGNIFICANT CHANGE UP (ref 135–145)
WBC # BLD: 5.91 K/UL — SIGNIFICANT CHANGE UP (ref 3.8–10.5)
WBC # FLD AUTO: 5.91 K/UL — SIGNIFICANT CHANGE UP (ref 3.8–10.5)

## 2023-09-11 PROCEDURE — 70450 CT HEAD/BRAIN W/O DYE: CPT | Mod: 26,MA

## 2023-09-11 PROCEDURE — 99285 EMERGENCY DEPT VISIT HI MDM: CPT

## 2023-09-11 PROCEDURE — 70486 CT MAXILLOFACIAL W/O DYE: CPT | Mod: 26,MA

## 2023-09-11 PROCEDURE — 99223 1ST HOSP IP/OBS HIGH 75: CPT

## 2023-09-11 RX ORDER — AMLODIPINE BESYLATE 2.5 MG/1
5 TABLET ORAL DAILY
Refills: 0 | Status: DISCONTINUED | OUTPATIENT
Start: 2023-09-11 | End: 2023-09-17

## 2023-09-11 RX ORDER — LANOLIN ALCOHOL/MO/W.PET/CERES
3 CREAM (GRAM) TOPICAL AT BEDTIME
Refills: 0 | Status: DISCONTINUED | OUTPATIENT
Start: 2023-09-11 | End: 2023-09-17

## 2023-09-11 RX ORDER — PHENOBARBITAL 60 MG
130 TABLET ORAL ONCE
Refills: 0 | Status: DISCONTINUED | OUTPATIENT
Start: 2023-09-11 | End: 2023-09-11

## 2023-09-11 RX ORDER — LATANOPROST 0.05 MG/ML
1 SOLUTION/ DROPS OPHTHALMIC; TOPICAL AT BEDTIME
Refills: 0 | Status: DISCONTINUED | OUTPATIENT
Start: 2023-09-11 | End: 2023-09-17

## 2023-09-11 RX ORDER — TIMOLOL 0.5 %
1 DROPS OPHTHALMIC (EYE)
Refills: 0 | Status: DISCONTINUED | OUTPATIENT
Start: 2023-09-11 | End: 2023-09-17

## 2023-09-11 RX ORDER — FOLIC ACID 0.8 MG
1 TABLET ORAL ONCE
Refills: 0 | Status: COMPLETED | OUTPATIENT
Start: 2023-09-11 | End: 2023-09-11

## 2023-09-11 RX ORDER — ONDANSETRON 8 MG/1
4 TABLET, FILM COATED ORAL EVERY 8 HOURS
Refills: 0 | Status: DISCONTINUED | OUTPATIENT
Start: 2023-09-11 | End: 2023-09-17

## 2023-09-11 RX ORDER — TETANUS TOXOID, REDUCED DIPHTHERIA TOXOID AND ACELLULAR PERTUSSIS VACCINE, ADSORBED 5; 2.5; 8; 8; 2.5 [IU]/.5ML; [IU]/.5ML; UG/.5ML; UG/.5ML; UG/.5ML
0.5 SUSPENSION INTRAMUSCULAR ONCE
Refills: 0 | Status: COMPLETED | OUTPATIENT
Start: 2023-09-11 | End: 2023-09-11

## 2023-09-11 RX ORDER — SODIUM CHLORIDE 9 MG/ML
1000 INJECTION, SOLUTION INTRAVENOUS
Refills: 0 | Status: DISCONTINUED | OUTPATIENT
Start: 2023-09-11 | End: 2023-09-12

## 2023-09-11 RX ORDER — DORZOLAMIDE HYDROCHLORIDE 20 MG/ML
1 SOLUTION/ DROPS OPHTHALMIC THREE TIMES A DAY
Refills: 0 | Status: DISCONTINUED | OUTPATIENT
Start: 2023-09-11 | End: 2023-09-17

## 2023-09-11 RX ORDER — THIAMINE MONONITRATE (VIT B1) 100 MG
100 TABLET ORAL ONCE
Refills: 0 | Status: COMPLETED | OUTPATIENT
Start: 2023-09-11 | End: 2023-09-11

## 2023-09-11 RX ORDER — FOLIC ACID 0.8 MG
1 TABLET ORAL DAILY
Refills: 0 | Status: DISCONTINUED | OUTPATIENT
Start: 2023-09-11 | End: 2023-09-17

## 2023-09-11 RX ORDER — THIAMINE MONONITRATE (VIT B1) 100 MG
100 TABLET ORAL DAILY
Refills: 0 | Status: DISCONTINUED | OUTPATIENT
Start: 2023-09-11 | End: 2023-09-13

## 2023-09-11 RX ORDER — ACETAMINOPHEN 500 MG
650 TABLET ORAL EVERY 6 HOURS
Refills: 0 | Status: DISCONTINUED | OUTPATIENT
Start: 2023-09-11 | End: 2023-09-13

## 2023-09-11 RX ADMIN — SODIUM CHLORIDE 75 MILLILITER(S): 9 INJECTION, SOLUTION INTRAVENOUS at 17:10

## 2023-09-11 RX ADMIN — SODIUM CHLORIDE 75 MILLILITER(S): 9 INJECTION, SOLUTION INTRAVENOUS at 14:54

## 2023-09-11 RX ADMIN — TETANUS TOXOID, REDUCED DIPHTHERIA TOXOID AND ACELLULAR PERTUSSIS VACCINE, ADSORBED 0.5 MILLILITER(S): 5; 2.5; 8; 8; 2.5 SUSPENSION INTRAMUSCULAR at 16:32

## 2023-09-11 RX ADMIN — Medication 100 MILLIGRAM(S): at 14:51

## 2023-09-11 RX ADMIN — Medication 1 TABLET(S): at 17:08

## 2023-09-11 RX ADMIN — Medication 130 MILLIGRAM(S): at 14:50

## 2023-09-11 RX ADMIN — Medication 130 MILLIGRAM(S): at 17:20

## 2023-09-11 RX ADMIN — Medication 2 MILLIGRAM(S): at 20:56

## 2023-09-11 RX ADMIN — Medication 1 MILLIGRAM(S): at 14:49

## 2023-09-11 RX ADMIN — Medication 650 MILLIGRAM(S): at 22:40

## 2023-09-11 NOTE — ED PROVIDER NOTE - PHYSICAL EXAMINATION
GENERAL: no acute distress, non-toxic appearing  HEAD: +R forehead laceration V shaped 2cm each arm, appears slightly purulent, normocephalic, atraumatic  HEENT: +tongue fasiculations, normal conjunctiva, oral mucosa moist, neck supple  CARDIAC: regular rate and rhythm, normal S1 and S2,  no appreciable murmurs  PULM: clear to ascultation bilaterally, no crackles, rales, rhonchi, or wheezing  GI: abdomen nondistended, soft, nontender, no guarding or rebound tenderness  : no CVA tenderness, no suprapubic tenderness  NEURO: +mild tremor in hands, alert and oriented x 3, normal speech, EOMI, no focal motor or sensory deficits,   MSK: no visible deformities, no peripheral edema, calf tenderness/redness/swelling  SKIN: no visible rashes, dry, well-perfused  PSYCH: appropriate mood and affect

## 2023-09-11 NOTE — ED ADULT NURSE NOTE - OBJECTIVE STATEMENT
pt AOX 3 stated have been drinking x few days and fell yesterday while out on the rain, a large open laceration to right above the eye brown no active bleeding noted area length approx. 3" and deep approx 1/2", area no staples nor sutures applied due to time over and area is dry.   labs sent, IV to right AC 20g angio cath. place, medicated as ordered.  Alexandra Sung RN

## 2023-09-11 NOTE — ED PROVIDER NOTE - OBJECTIVE STATEMENT
59 yo M PMHx of ETOH abuse, prior admissions for withdrawal, seizures, DT, HTN, HLD, depression, anxiety presents with alcohol intoxication and facial trauma. Patient states he drank a shot at 9p yesterday and then does not remember the night. States he fells yesterday and also 1 week ago. States he sustained laceration last week. Unknown LOC. As per chart review, not on AC as of Sept 6 2023. Currently feels tremors, No cp, nausea, vomiting. No numbness, tingling. Weakness is generalized, not focal. Patient is blind at baseline in the R eye.

## 2023-09-11 NOTE — H&P ADULT - ASSESSMENT
59 yo M PMHx of ETOH abuse, prior admissions for withdrawal, seizures, DT, HTN, HLD, depression, right eye blindness, anxiety presents with alcohol intoxication and facial trauma admitted for further management of alcohol w/d

## 2023-09-11 NOTE — ED ADULT NURSE REASSESSMENT NOTE - NS ED NURSE REASSESS COMMENT FT1
Report received from day shift RN. Pt at baseline mental status, breathing even and unlabored in bed. Pt awaiting bed placement. Report given to ESS[facility] RN.

## 2023-09-11 NOTE — ED PROVIDER NOTE - CLINICAL SUMMARY MEDICAL DECISION MAKING FREE TEXT BOX
57 yo M hx of etoh abuse, DT presents s/p fall - tachycardic, has mild tremors, tongue fasciculations - otherwise neurologically intact. Patient is currently in mild withdrawal - will treat with phenobarbital. Will give folic acid, thiamine, D5. Will obtain CTH to rule out ICH. 57 yo M hx of etoh abuse, DT presents s/p fall - tachycardic, has mild tremors, tongue fasciculations - otherwise neurologically intact. Patient is currently in mild withdrawal - will treat with phenobarbital. Will give folic acid, thiamine, D5. Will obtain CTH to rule out ICH.    Attending MD Mcclain.  Agree with above.  Pt is a 57 yo male with limited PMHx reported - denies pmhx, denies meds.  PT BIBEMS to ED s/p bystander finding him down on the porch.  PT endorses similar episode 1 wk ago.  Pt endorses no consistent pain complaint.  Pt with waxing/waning complaints and limited to know recall of events leading to presentation.  Unclear pmhx 2/2 unclear exact name/Birthdate.  On exam pt with no focal midline spinal TTP, no focal deformities.  No snuffbox TTP bilaterally.  Planned trauma imaging, screening labs 2/2 intermittent report of CP without acute distress.   Stable at time of signout to incoming team pending above.

## 2023-09-11 NOTE — ED ADULT NURSE NOTE - MUSCULOSKELETAL ASSESSMENT
----- Message from Sarath eDl Valle MD sent at 3/25/2021  7:49 AM CDT -----  Urine culture is no growth.  No UTI  
Results conveyed.  
- - -

## 2023-09-11 NOTE — ED ADULT NURSE NOTE - NSFALLHARMRISKINTERV_ED_ALL_ED
Assistance OOB with selected safe patient handling equipment if applicable/Assistance with ambulation/Communicate risk of Fall with Harm to all staff, patient, and family/Monitor gait and stability/Monitor for mental status changes and reorient to person, place, and time, as needed/Provide visual cue: red socks, yellow wristband, yellow gown, etc/Reinforce activity limits and safety measures with patient and family/Toileting schedule using arm’s reach rule for commode and bathroom/Use of alarms - bed, stretcher, chair and/or video monitoring/Bed in lowest position, wheels locked, appropriate side rails in place/Call bell, personal items and telephone in reach/Instruct patient to call for assistance before getting out of bed/chair/stretcher/Non-slip footwear applied when patient is off stretcher/[place] to call system/Physically safe environment - no spills, clutter or unnecessary equipment/Purposeful Proactive Rounding/Room/bathroom lighting operational, light cord in reach

## 2023-09-11 NOTE — H&P ADULT - HISTORY OF PRESENT ILLNESS
59 yo M PMHx of ETOH abuse, prior admissions for withdrawal, seizures, DT, HTN, HLD, depression, right eye blindness, anxiety presents with alcohol intoxication and facial trauma. Patient states today he got up quickly after finishing his drink and felt dizzy, next think he remembers is waking up on the ground. Reports a similar fall last week, also got up quickly and felt lightheaded. Sustained laceration above eyebrow yesterday night. He reports being chronically blind in the right eye now for 10 years. No visual changes in left eye. He also reports feeling anxious and tremulous. No cp, shortness of breath, abdominal pain, nausea, vomiting. No numbness, tingling. Weakness is generalized, not focal. Pt admits to drinking 2 bottles of vodka + 8 beers, last drink this morning.

## 2023-09-11 NOTE — H&P ADULT - PROBLEM SELECTOR PLAN 3
-mild transaminitis   -counseled on need for alcohol cessation  -trend CMP -likely pre-renal  -c/w IVF  -trend BMP

## 2023-09-11 NOTE — ED PROVIDER NOTE - PROGRESS NOTE DETAILS
Patient HR 80s - sleeping comfortably    Danisha Nunn MD, PGY3 Patient would like to stay for detox     Danisha Nunn MD, PGY3 Josh: rec'd pt. as signout, ronniet. falls this wk., head injury, EtOH use. Pt. with laceration to R forehead/lat. orbit. stellate lac., explored to base, scant serous dc, no bleeding, wound edges thickened and not approximated. Likely no utility to suturing tonight, wound cleaned and dressed, poss. secondary intention repair.

## 2023-09-11 NOTE — H&P ADULT - PROBLEM SELECTOR PLAN 1
-pt w/ history of alcohol w/d w/ complications (seizures, DTs). Initial CIWA 7, remains with tremors despite phenobarb x2  -c/w high dose ativan taper for now w/ symptom triggered ativan   -Monitor ciwa score  -c/w thiamine, folic acid and MV   -SW eval

## 2023-09-11 NOTE — ED PROVIDER NOTE - ATTENDING CONTRIBUTION TO CARE
Attending MD Mcclain:  I performed a history and physical exam of the patient and discussed their management with the resident. I reviewed the resident's note and agree with the documented findings and plan of care. My medical decision making and observations are found above.

## 2023-09-11 NOTE — H&P ADULT - NSHPPHYSICALEXAM_GEN_ALL_CORE
GENERAL APPEARANCE: Well developed, alert and cooperative. disheveled, NAD.   HEENT:  Right eye periorbital edema. Whitest discoloration of right pupil. PERRL, EOMI. External auditory canals normal, hearing grossly intact.  NECK: Neck supple, non-tender   CARDIAC: Normal S1 and S2. No S3, S4 or murmurs. Rhythm is regular.  LUNGS: Clear to auscultation and percussion without rales, rhonchi, wheezing or diminished breath sounds.  ABDOMEN: Positive bowel sounds. Soft, nondistended, nontender. No guarding or rebound.   MUSCULOSKELETAL: ROM intact spine and extremities. No joint erythema or tenderness.   BACK: normal posture, no spinal deformity, symmetry of spinal muscles, without tenderness, decreased range of motion.  EXTREMITIES: No significant deformity or joint abnormality. No edema. Peripheral pulses intact.   NEUROLOGICAL: CN II-XII intact. Strength and sensation symmetric and intact throughout. +coarse tremors in both hands b/l  SKIN: y shaped gaping laceration above right eyebrow, serosanguinous drainage no purulence noted    PSYCHIATRIC: AOx3.Normal affect and behavior.

## 2023-09-11 NOTE — H&P ADULT - NSHPLABSRESULTS_GEN_ALL_CORE
(09-11 @ 13:27)                      12.8  5.91 )-----------( 324                 38.5    Neutrophils = 3.21 (54.3%)  Lymphocytes = 1.79 (30.3%)  Eosinophils = 0.12 (2.0%)  Basophils = 0.11 (1.9%)  Monocytes = 0.67 (11.3%)  Bands = --%    09-11    144  |  101  |  9   ----------------------------<  108<H>  3.5   |  27  |  1.21    Ca    8.2<L>      11 Sep 2023 13:27    TPro  8.1  /  Alb  4.1  /  TBili  0.2  /  DBili  x   /  AST  69<H>  /  ALT  46<H>  /  AlkPhos  74  09-11        Urinalysis Basic - ( 11 Sep 2023 13:27 )    Color: x / Appearance: x / SG: x / pH: x  Gluc: 108 mg/dL / Ketone: x  / Bili: x / Urobili: x   Blood: x / Protein: x / Nitrite: x   Leuk Esterase: x / RBC: x / WBC x   Sq Epi: x / Non Sq Epi: x / Bacteria: x      < from: CT Maxillofacial No Cont (09.11.23 @ 17:01) >    IMPRESSION:    CT brain:  No acute intracranial hemorrhage, brain edema, or mass effect.  No displaced calvarial fracture.    CT maxillofacial bones:  No acute fracture or traumatic subluxation.  Right periorbital soft tissue swelling and laceration.  No paranasal sinus air-fluid levels  Intraorbital contents unremarkable.    Periapical lucency surrounding the first right maxillary molar suggesting   the presence of periapical/periodontal disease.    < end of copied text >        Labs personally reviewed  Imaging reviewed  EKG: pending

## 2023-09-11 NOTE — H&P ADULT - NSHPREVIEWOFSYSTEMS_GEN_ALL_CORE
CONSTITUTIONAL:  +fatigue No weight loss, fever, chills  HEENT:  Eyes:  +chronic right visual loss No blurred vision, double vision or yellow sclerae. Ears, Nose, Throat:  No hearing loss, sneezing, congestion, runny nose or sore throat.  SKIN: +laceration over right eyebrow   CARDIOVASCULAR:  No chest pain, chest pressure or chest discomfort. No palpitations or edema.  RESPIRATORY:  No shortness of breath, cough or sputum.  GASTROINTESTINAL:  No anorexia, nausea, vomiting or diarrhea. No abdominal pain or blood.  GENITOURINARY:  Denies hematuria, dysuria.   NEUROLOGICAL:  No headache, dizziness, syncope, paralysis, ataxia, numbness or tingling in the extremities. No change in bowel or bladder control.  MUSCULOSKELETAL:  No muscle, back pain, joint pain or stiffness.  PSYCHIATRIC:  +depression   ENDOCRINOLOGIC:  No reports of sweating, cold or heat intolerance. No polyuria or polydipsia.  ALLERGIES:  No history of asthma, hives, eczema or rhinitis.

## 2023-09-11 NOTE — H&P ADULT - SOCIAL HISTORY: ALCOHOL USE
drinks 2 bottles of vodka + 8 beers Epidermal Autograft Text: The defect edges were debeveled with a #15 scalpel blade.  Given the location of the defect, shape of the defect and the proximity to free margins an epidermal autograft was deemed most appropriate.  Using a sterile surgical marker, the primary defect shape was transferred to the donor site. The epidermal graft was then harvested.  The skin graft was then placed in the primary defect and oriented appropriately.

## 2023-09-11 NOTE — ED ADULT TRIAGE NOTE - CHIEF COMPLAINT QUOTE
pt states he is intoxicated, hx of ETOH. reports drinking "8 beer and 2 bottles of vodka" a day. presents to ED for fall . pt states does not recall how he fell. +head injury laceration noted to right eye brow. +swelling and ecchymosis noted. last drink this morning. slight tremors noted in triage.

## 2023-09-12 DIAGNOSIS — I10 ESSENTIAL (PRIMARY) HYPERTENSION: ICD-10-CM

## 2023-09-12 DIAGNOSIS — N17.9 ACUTE KIDNEY FAILURE, UNSPECIFIED: ICD-10-CM

## 2023-09-12 DIAGNOSIS — F10.239 ALCOHOL DEPENDENCE WITH WITHDRAWAL, UNSPECIFIED: ICD-10-CM

## 2023-09-12 DIAGNOSIS — H40.9 UNSPECIFIED GLAUCOMA: ICD-10-CM

## 2023-09-12 DIAGNOSIS — K70.10 ALCOHOLIC HEPATITIS WITHOUT ASCITES: ICD-10-CM

## 2023-09-12 DIAGNOSIS — S01.81XA LACERATION WITHOUT FOREIGN BODY OF OTHER PART OF HEAD, INITIAL ENCOUNTER: ICD-10-CM

## 2023-09-12 DIAGNOSIS — Z29.9 ENCOUNTER FOR PROPHYLACTIC MEASURES, UNSPECIFIED: ICD-10-CM

## 2023-09-12 LAB
ALBUMIN SERPL ELPH-MCNC: 3.5 G/DL — SIGNIFICANT CHANGE UP (ref 3.3–5)
ALP SERPL-CCNC: 72 U/L — SIGNIFICANT CHANGE UP (ref 40–120)
ALT FLD-CCNC: 38 U/L — SIGNIFICANT CHANGE UP (ref 4–41)
ANION GAP SERPL CALC-SCNC: 12 MMOL/L — SIGNIFICANT CHANGE UP (ref 7–14)
AST SERPL-CCNC: 51 U/L — HIGH (ref 4–40)
BILIRUB SERPL-MCNC: 0.8 MG/DL — SIGNIFICANT CHANGE UP (ref 0.2–1.2)
BUN SERPL-MCNC: 7 MG/DL — SIGNIFICANT CHANGE UP (ref 7–23)
CALCIUM SERPL-MCNC: 8.3 MG/DL — LOW (ref 8.4–10.5)
CHLORIDE SERPL-SCNC: 99 MMOL/L — SIGNIFICANT CHANGE UP (ref 98–107)
CO2 SERPL-SCNC: 25 MMOL/L — SIGNIFICANT CHANGE UP (ref 22–31)
CREAT SERPL-MCNC: 0.64 MG/DL — SIGNIFICANT CHANGE UP (ref 0.5–1.3)
EGFR: 108 ML/MIN/1.73M2 — SIGNIFICANT CHANGE UP
GLUCOSE SERPL-MCNC: 112 MG/DL — HIGH (ref 70–99)
HCT VFR BLD CALC: 37.5 % — LOW (ref 39–50)
HGB BLD-MCNC: 12.4 G/DL — LOW (ref 13–17)
MAGNESIUM SERPL-MCNC: 1.7 MG/DL — SIGNIFICANT CHANGE UP (ref 1.6–2.6)
MCHC RBC-ENTMCNC: 29.3 PG — SIGNIFICANT CHANGE UP (ref 27–34)
MCHC RBC-ENTMCNC: 33.1 GM/DL — SIGNIFICANT CHANGE UP (ref 32–36)
MCV RBC AUTO: 88.7 FL — SIGNIFICANT CHANGE UP (ref 80–100)
NRBC # BLD: 0 /100 WBCS — SIGNIFICANT CHANGE UP (ref 0–0)
NRBC # FLD: 0 K/UL — SIGNIFICANT CHANGE UP (ref 0–0)
PHOSPHATE SERPL-MCNC: 2.4 MG/DL — LOW (ref 2.5–4.5)
PLATELET # BLD AUTO: 297 K/UL — SIGNIFICANT CHANGE UP (ref 150–400)
POTASSIUM SERPL-MCNC: 3.6 MMOL/L — SIGNIFICANT CHANGE UP (ref 3.5–5.3)
POTASSIUM SERPL-SCNC: 3.6 MMOL/L — SIGNIFICANT CHANGE UP (ref 3.5–5.3)
PROT SERPL-MCNC: 7.1 G/DL — SIGNIFICANT CHANGE UP (ref 6–8.3)
RBC # BLD: 4.23 M/UL — SIGNIFICANT CHANGE UP (ref 4.2–5.8)
RBC # FLD: 15.6 % — HIGH (ref 10.3–14.5)
SODIUM SERPL-SCNC: 136 MMOL/L — SIGNIFICANT CHANGE UP (ref 135–145)
WBC # BLD: 6.15 K/UL — SIGNIFICANT CHANGE UP (ref 3.8–10.5)
WBC # FLD AUTO: 6.15 K/UL — SIGNIFICANT CHANGE UP (ref 3.8–10.5)

## 2023-09-12 PROCEDURE — 99233 SBSQ HOSP IP/OBS HIGH 50: CPT

## 2023-09-12 RX ORDER — MAGNESIUM SULFATE 500 MG/ML
1 VIAL (ML) INJECTION ONCE
Refills: 0 | Status: COMPLETED | OUTPATIENT
Start: 2023-09-12 | End: 2023-09-12

## 2023-09-12 RX ORDER — ACETAMINOPHEN 500 MG
975 TABLET ORAL ONCE
Refills: 0 | Status: DISCONTINUED | OUTPATIENT
Start: 2023-09-12 | End: 2023-09-12

## 2023-09-12 RX ORDER — KETOROLAC TROMETHAMINE 30 MG/ML
30 SYRINGE (ML) INJECTION ONCE
Refills: 0 | Status: DISCONTINUED | OUTPATIENT
Start: 2023-09-12 | End: 2023-09-12

## 2023-09-12 RX ORDER — BACITRACIN ZINC NEOMYCIN SULFATE POLYMYXIN B SULFATE 400; 3.5; 5 [IU]/G; MG/G; [IU]/G
1 OINTMENT TOPICAL
Refills: 0 | Status: DISCONTINUED | OUTPATIENT
Start: 2023-09-12 | End: 2023-09-17

## 2023-09-12 RX ORDER — SODIUM,POTASSIUM PHOSPHATES 278-250MG
1 POWDER IN PACKET (EA) ORAL
Refills: 0 | Status: DISCONTINUED | OUTPATIENT
Start: 2023-09-12 | End: 2023-09-13

## 2023-09-12 RX ORDER — HEPARIN SODIUM 5000 [USP'U]/ML
5000 INJECTION INTRAVENOUS; SUBCUTANEOUS EVERY 8 HOURS
Refills: 0 | Status: DISCONTINUED | OUTPATIENT
Start: 2023-09-12 | End: 2023-09-17

## 2023-09-12 RX ORDER — ENOXAPARIN SODIUM 100 MG/ML
40 INJECTION SUBCUTANEOUS EVERY 24 HOURS
Refills: 0 | Status: DISCONTINUED | OUTPATIENT
Start: 2023-09-12 | End: 2023-09-12

## 2023-09-12 RX ADMIN — Medication 1 MILLIGRAM(S): at 09:25

## 2023-09-12 RX ADMIN — Medication 1 TABLET(S): at 12:17

## 2023-09-12 RX ADMIN — Medication 1 DROP(S): at 05:16

## 2023-09-12 RX ADMIN — Medication 2 MILLIGRAM(S): at 09:26

## 2023-09-12 RX ADMIN — Medication 650 MILLIGRAM(S): at 18:59

## 2023-09-12 RX ADMIN — Medication 30 MILLIGRAM(S): at 22:30

## 2023-09-12 RX ADMIN — DORZOLAMIDE HYDROCHLORIDE 1 DROP(S): 20 SOLUTION/ DROPS OPHTHALMIC at 05:15

## 2023-09-12 RX ADMIN — Medication 30 MILLIGRAM(S): at 21:37

## 2023-09-12 RX ADMIN — Medication 1.5 MILLIGRAM(S): at 21:39

## 2023-09-12 RX ADMIN — Medication 1 DROP(S): at 18:00

## 2023-09-12 RX ADMIN — BACITRACIN ZINC NEOMYCIN SULFATE POLYMYXIN B SULFATE 1 APPLICATION(S): 400; 3.5; 5 OINTMENT TOPICAL at 07:37

## 2023-09-12 RX ADMIN — BACITRACIN ZINC NEOMYCIN SULFATE POLYMYXIN B SULFATE 1 APPLICATION(S): 400; 3.5; 5 OINTMENT TOPICAL at 18:02

## 2023-09-12 RX ADMIN — Medication 100 MILLIGRAM(S): at 09:25

## 2023-09-12 RX ADMIN — Medication 2 MILLIGRAM(S): at 15:37

## 2023-09-12 RX ADMIN — Medication 100 GRAM(S): at 12:17

## 2023-09-12 RX ADMIN — HEPARIN SODIUM 5000 UNIT(S): 5000 INJECTION INTRAVENOUS; SUBCUTANEOUS at 15:37

## 2023-09-12 RX ADMIN — AMLODIPINE BESYLATE 5 MILLIGRAM(S): 2.5 TABLET ORAL at 05:22

## 2023-09-12 RX ADMIN — DORZOLAMIDE HYDROCHLORIDE 1 DROP(S): 20 SOLUTION/ DROPS OPHTHALMIC at 21:41

## 2023-09-12 RX ADMIN — Medication 2 MILLIGRAM(S): at 18:38

## 2023-09-12 RX ADMIN — LATANOPROST 1 DROP(S): 0.05 SOLUTION/ DROPS OPHTHALMIC; TOPICAL at 22:07

## 2023-09-12 RX ADMIN — Medication 1 TABLET(S): at 17:59

## 2023-09-12 RX ADMIN — Medication 650 MILLIGRAM(S): at 17:59

## 2023-09-12 RX ADMIN — Medication 650 MILLIGRAM(S): at 23:50

## 2023-09-12 RX ADMIN — Medication 2 MILLIGRAM(S): at 01:00

## 2023-09-12 RX ADMIN — HEPARIN SODIUM 5000 UNIT(S): 5000 INJECTION INTRAVENOUS; SUBCUTANEOUS at 05:22

## 2023-09-12 RX ADMIN — HEPARIN SODIUM 5000 UNIT(S): 5000 INJECTION INTRAVENOUS; SUBCUTANEOUS at 22:08

## 2023-09-12 RX ADMIN — Medication 2 MILLIGRAM(S): at 05:22

## 2023-09-12 RX ADMIN — DORZOLAMIDE HYDROCHLORIDE 1 DROP(S): 20 SOLUTION/ DROPS OPHTHALMIC at 18:00

## 2023-09-12 RX ADMIN — Medication 1 TABLET(S): at 09:26

## 2023-09-12 NOTE — PATIENT PROFILE ADULT - CENTRAL VENOUS CATHETER/PICC LINE
The patient elected to leave the emergency department after triage but before being evaluated by writer. They did not inform the emergency department staff of their desire to leave and subsequently did not receive evaluation, testing, treatment, follow up instructions, educational materials or treatment recommendations.     AWILDA Goodman  09/15/17 [id number]    
no

## 2023-09-12 NOTE — SBIRT NOTE ADULT - NSSBIRTALCPASSREFTXDET_GEN_A_CORE
Provided SBIRT services: Full screen positive. Referral to Treatment Performed. Screening results were reviewed with the patient and patient was provided information about healthy guidelines and potential negative consequences associated with level of risk. Motivation and readiness to reduce or stop use was discussed and goals and activities to make changes were suggested/offered.    Patient endorses motivation to cease alcohol use and to seek treatment. Patient states he was sober for 2 years when he attended long-term tx program, [facility], located in [place]. Pt states he plans to re-engage in tx with this program once discharged.  Pt enrolled in [facility] for follow-up and external support and navigation.

## 2023-09-12 NOTE — PROGRESS NOTE ADULT - PROBLEM SELECTOR PLAN 7
dvt ppx: hep sq given no active bleeding    Plan discussed with ACP dvt ppx: hep sq given no active bleeding    -Hypophosphatemia- replete phos    Plan discussed with ACP

## 2023-09-12 NOTE — PATIENT PROFILE ADULT - FALL HARM RISK - HARM RISK INTERVENTIONS
Assistance with ambulation/Assistance OOB with selected safe patient handling equipment/Communicate Risk of Fall with Harm to all staff/Discuss with provider need for PT consult/Monitor for mental status changes/Monitor gait and stability/Reinforce activity limits and safety measures with patient and family/Tailored Fall Risk Interventions/Toileting schedule using arm’s reach rule for commode and bathroom/Use of alarms - bed, chair and/or voice tab/Visual Cue: Yellow wristband and red socks/Bed in lowest position, wheels locked, appropriate side rails in place/Call bell, personal items and telephone in reach/Instruct patient to call for assistance before getting out of bed or chair/Non-slip footwear when patient is out of bed/[place] to call system/Physically safe environment - no spills, clutter or unnecessary equipment/Purposeful Proactive Rounding/Room/bathroom lighting operational, light cord in reach

## 2023-09-13 DIAGNOSIS — R10.9 UNSPECIFIED ABDOMINAL PAIN: ICD-10-CM

## 2023-09-13 LAB
ALBUMIN SERPL ELPH-MCNC: 3.6 G/DL — SIGNIFICANT CHANGE UP (ref 3.3–5)
ALP SERPL-CCNC: 72 U/L — SIGNIFICANT CHANGE UP (ref 40–120)
ALT FLD-CCNC: 39 U/L — SIGNIFICANT CHANGE UP (ref 4–41)
ANION GAP SERPL CALC-SCNC: 10 MMOL/L — SIGNIFICANT CHANGE UP (ref 7–14)
AST SERPL-CCNC: 42 U/L — HIGH (ref 4–40)
BILIRUB SERPL-MCNC: 0.6 MG/DL — SIGNIFICANT CHANGE UP (ref 0.2–1.2)
BUN SERPL-MCNC: 8 MG/DL — SIGNIFICANT CHANGE UP (ref 7–23)
CALCIUM SERPL-MCNC: 8.7 MG/DL — SIGNIFICANT CHANGE UP (ref 8.4–10.5)
CHLORIDE SERPL-SCNC: 102 MMOL/L — SIGNIFICANT CHANGE UP (ref 98–107)
CO2 SERPL-SCNC: 23 MMOL/L — SIGNIFICANT CHANGE UP (ref 22–31)
CREAT SERPL-MCNC: 0.64 MG/DL — SIGNIFICANT CHANGE UP (ref 0.5–1.3)
EGFR: 108 ML/MIN/1.73M2 — SIGNIFICANT CHANGE UP
GLUCOSE SERPL-MCNC: 95 MG/DL — SIGNIFICANT CHANGE UP (ref 70–99)
HCT VFR BLD CALC: 39.6 % — SIGNIFICANT CHANGE UP (ref 39–50)
HGB BLD-MCNC: 13.1 G/DL — SIGNIFICANT CHANGE UP (ref 13–17)
LIDOCAIN IGE QN: 21 U/L — SIGNIFICANT CHANGE UP (ref 7–60)
MAGNESIUM SERPL-MCNC: 2 MG/DL — SIGNIFICANT CHANGE UP (ref 1.6–2.6)
MCHC RBC-ENTMCNC: 29 PG — SIGNIFICANT CHANGE UP (ref 27–34)
MCHC RBC-ENTMCNC: 33.1 GM/DL — SIGNIFICANT CHANGE UP (ref 32–36)
MCV RBC AUTO: 87.8 FL — SIGNIFICANT CHANGE UP (ref 80–100)
NRBC # BLD: 0 /100 WBCS — SIGNIFICANT CHANGE UP (ref 0–0)
NRBC # FLD: 0 K/UL — SIGNIFICANT CHANGE UP (ref 0–0)
PHOSPHATE SERPL-MCNC: 2.9 MG/DL — SIGNIFICANT CHANGE UP (ref 2.5–4.5)
PLATELET # BLD AUTO: 272 K/UL — SIGNIFICANT CHANGE UP (ref 150–400)
POTASSIUM SERPL-MCNC: 3.4 MMOL/L — LOW (ref 3.5–5.3)
POTASSIUM SERPL-SCNC: 3.4 MMOL/L — LOW (ref 3.5–5.3)
PROT SERPL-MCNC: 7 G/DL — SIGNIFICANT CHANGE UP (ref 6–8.3)
RBC # BLD: 4.51 M/UL — SIGNIFICANT CHANGE UP (ref 4.2–5.8)
RBC # FLD: 15.3 % — HIGH (ref 10.3–14.5)
SODIUM SERPL-SCNC: 135 MMOL/L — SIGNIFICANT CHANGE UP (ref 135–145)
WBC # BLD: 5.52 K/UL — SIGNIFICANT CHANGE UP (ref 3.8–10.5)
WBC # FLD AUTO: 5.52 K/UL — SIGNIFICANT CHANGE UP (ref 3.8–10.5)

## 2023-09-13 PROCEDURE — 99233 SBSQ HOSP IP/OBS HIGH 50: CPT

## 2023-09-13 PROCEDURE — 76705 ECHO EXAM OF ABDOMEN: CPT | Mod: 26

## 2023-09-13 RX ORDER — KETOROLAC TROMETHAMINE 30 MG/ML
30 SYRINGE (ML) INJECTION ONCE
Refills: 0 | Status: DISCONTINUED | OUTPATIENT
Start: 2023-09-13 | End: 2023-09-13

## 2023-09-13 RX ORDER — POTASSIUM CHLORIDE 20 MEQ
40 PACKET (EA) ORAL ONCE
Refills: 0 | Status: DISCONTINUED | OUTPATIENT
Start: 2023-09-13 | End: 2023-09-13

## 2023-09-13 RX ORDER — ACETAMINOPHEN 500 MG
650 TABLET ORAL EVERY 6 HOURS
Refills: 0 | Status: DISCONTINUED | OUTPATIENT
Start: 2023-09-13 | End: 2023-09-17

## 2023-09-13 RX ORDER — PANTOPRAZOLE SODIUM 20 MG/1
40 TABLET, DELAYED RELEASE ORAL
Refills: 0 | Status: DISCONTINUED | OUTPATIENT
Start: 2023-09-13 | End: 2023-09-17

## 2023-09-13 RX ORDER — POTASSIUM CHLORIDE 20 MEQ
40 PACKET (EA) ORAL ONCE
Refills: 0 | Status: COMPLETED | OUTPATIENT
Start: 2023-09-13 | End: 2023-09-13

## 2023-09-13 RX ORDER — THIAMINE MONONITRATE (VIT B1) 100 MG
200 TABLET ORAL DAILY
Refills: 0 | Status: COMPLETED | OUTPATIENT
Start: 2023-09-13 | End: 2023-09-15

## 2023-09-13 RX ORDER — THIAMINE MONONITRATE (VIT B1) 100 MG
100 TABLET ORAL DAILY
Refills: 0 | Status: DISCONTINUED | OUTPATIENT
Start: 2023-09-16 | End: 2023-09-17

## 2023-09-13 RX ADMIN — DORZOLAMIDE HYDROCHLORIDE 1 DROP(S): 20 SOLUTION/ DROPS OPHTHALMIC at 21:23

## 2023-09-13 RX ADMIN — DORZOLAMIDE HYDROCHLORIDE 1 DROP(S): 20 SOLUTION/ DROPS OPHTHALMIC at 12:02

## 2023-09-13 RX ADMIN — Medication 75 MILLIGRAM(S): at 12:06

## 2023-09-13 RX ADMIN — Medication 1 DROP(S): at 18:03

## 2023-09-13 RX ADMIN — Medication 1 DROP(S): at 05:39

## 2023-09-13 RX ADMIN — Medication 30 MILLIGRAM(S): at 11:25

## 2023-09-13 RX ADMIN — Medication 1.5 MILLIGRAM(S): at 05:36

## 2023-09-13 RX ADMIN — AMLODIPINE BESYLATE 5 MILLIGRAM(S): 2.5 TABLET ORAL at 05:40

## 2023-09-13 RX ADMIN — Medication 1 TABLET(S): at 12:03

## 2023-09-13 RX ADMIN — HEPARIN SODIUM 5000 UNIT(S): 5000 INJECTION INTRAVENOUS; SUBCUTANEOUS at 12:03

## 2023-09-13 RX ADMIN — BACITRACIN ZINC NEOMYCIN SULFATE POLYMYXIN B SULFATE 1 APPLICATION(S): 400; 3.5; 5 OINTMENT TOPICAL at 05:52

## 2023-09-13 RX ADMIN — HEPARIN SODIUM 5000 UNIT(S): 5000 INJECTION INTRAVENOUS; SUBCUTANEOUS at 05:38

## 2023-09-13 RX ADMIN — Medication 650 MILLIGRAM(S): at 08:52

## 2023-09-13 RX ADMIN — Medication 30 MILLIGRAM(S): at 12:25

## 2023-09-13 RX ADMIN — DORZOLAMIDE HYDROCHLORIDE 1 DROP(S): 20 SOLUTION/ DROPS OPHTHALMIC at 06:57

## 2023-09-13 RX ADMIN — Medication 650 MILLIGRAM(S): at 07:52

## 2023-09-13 RX ADMIN — Medication 200 MILLIGRAM(S): at 12:39

## 2023-09-13 RX ADMIN — Medication 1.5 MILLIGRAM(S): at 01:23

## 2023-09-13 RX ADMIN — Medication 1 MILLIGRAM(S): at 12:03

## 2023-09-13 RX ADMIN — Medication 2 MILLIGRAM(S): at 07:24

## 2023-09-13 RX ADMIN — Medication 75 MILLIGRAM(S): at 18:04

## 2023-09-13 RX ADMIN — Medication 1 TABLET(S): at 08:54

## 2023-09-13 RX ADMIN — Medication 1.5 MILLIGRAM(S): at 09:01

## 2023-09-13 RX ADMIN — PANTOPRAZOLE SODIUM 40 MILLIGRAM(S): 20 TABLET, DELAYED RELEASE ORAL at 18:03

## 2023-09-13 RX ADMIN — Medication 75 MILLIGRAM(S): at 23:55

## 2023-09-13 RX ADMIN — LATANOPROST 1 DROP(S): 0.05 SOLUTION/ DROPS OPHTHALMIC; TOPICAL at 21:23

## 2023-09-13 RX ADMIN — Medication 30 MILLIGRAM(S): at 23:00

## 2023-09-13 RX ADMIN — Medication 650 MILLIGRAM(S): at 19:40

## 2023-09-13 RX ADMIN — Medication 40 MILLIEQUIVALENT(S): at 11:25

## 2023-09-13 RX ADMIN — HEPARIN SODIUM 5000 UNIT(S): 5000 INJECTION INTRAVENOUS; SUBCUTANEOUS at 21:22

## 2023-09-13 RX ADMIN — BACITRACIN ZINC NEOMYCIN SULFATE POLYMYXIN B SULFATE 1 APPLICATION(S): 400; 3.5; 5 OINTMENT TOPICAL at 17:11

## 2023-09-13 RX ADMIN — Medication 650 MILLIGRAM(S): at 00:50

## 2023-09-13 RX ADMIN — Medication 30 MILLIGRAM(S): at 22:00

## 2023-09-13 RX ADMIN — Medication 650 MILLIGRAM(S): at 18:40

## 2023-09-13 RX ADMIN — Medication 2 MILLIGRAM(S): at 21:22

## 2023-09-13 NOTE — ADVANCED PRACTICE NURSE CONSULT - ASSESSMENT
General: A&Ox4, ambulates independently, continent of urine and stool. Skin warm, dry, adequate skin turgor, scattered areas of hyperpigmentation and hypopigmentation to bilateral lower legs, Scab to right flank. Right periorbital edema.     Right forehead trauma wound s/p fall measuring 3shd8ftz1.7cm, 3 slits noted exposing 40% fibrinous and 60% pink moist agranular. Flaps noted, not approximated with steristrips due to fibrinous film. Moderate serous drainage, no odor. Periwound skin with edema and macerated edges. No induration, no erythema, no increased warmth. Goals of care: Antimicrobial protection, absorb/manage drainage, protect periwound skin, moist wound healing.

## 2023-09-13 NOTE — CHART NOTE - NSCHARTNOTEFT_GEN_A_CORE
[facility] on 9/13/23, would not close wound due to wound appears to be healing and is most likely more than 48 hours old, wound care to follow up.  Medical Attending notified

## 2023-09-13 NOTE — ADVANCED PRACTICE NURSE CONSULT - RECOMMEDATIONS
Recommend Plastic surgery consult for assessment of right forehead.    Topical recommendations:     Right forehead- Irrigate with NS using saline flush, pat dry. Apply Liquid barrier film to periwound skin (allow to dry). Apply hydrofiber (Aquacel) as secondary dressing to absorb. Cover with small silicone foam with border. Change daily.     Plan discussed with patient. Patient educated on topical wound therapy to optimize wound healing. Questions answered.     Findings and recs discussed with Jayson Farfan (ACP provider).     Please contact Wound/Ostomy Care Service Line if we can be of further assistance (ext [phone]).

## 2023-09-13 NOTE — ADVANCED PRACTICE NURSE CONSULT - REASON FOR CONSULT
Patient seen on skin care rounds after wound care referral received for assessment of skin impairment and recommendations of topical management for right forehead laceration. Patient H/O of ETOH abuse, prior admissions for withdrawal, seizures, DT, HTN, HLD, depression, right eye blindness, anxiety presents with alcohol intoxication and facial trauma admitted for further management of alcohol w/d.   Chart reviewed: WBC 5.52, H/H 13.1/39.6, platelets 272, Serum albumin 3.6, 5.3%, BMI 29.0, Leonid 21.   Patient interviewed stating he fell outside and was down for "15 minutes" in the rain on the street.  Patient seen on skin care rounds after wound care referral received for assessment of skin impairment and recommendations of topical management for right forehead laceration. Patient H/O of ETOH abuse, prior admissions for withdrawal, seizures, DT, HTN, HLD, depression, right eye blindness, anxiety presents with alcohol intoxication and facial trauma admitted for further management of alcohol w/d.   Chart reviewed: WBC 5.52, H/H 13.1/39.6, platelets 272, Serum albumin 3.6, A1C 5.3%, CT Maxillofacial (+)Right periorbital soft tissue swelling and laceration, BMI 29.0, Leonid 21.   Patient interviewed stating he fell outside and was down for "15 minutes" in the rain on the street.

## 2023-09-14 LAB
ANION GAP SERPL CALC-SCNC: 9 MMOL/L — SIGNIFICANT CHANGE UP (ref 7–14)
BASOPHILS # BLD AUTO: 0.08 K/UL — SIGNIFICANT CHANGE UP (ref 0–0.2)
BASOPHILS NFR BLD AUTO: 1.5 % — SIGNIFICANT CHANGE UP (ref 0–2)
BUN SERPL-MCNC: 10 MG/DL — SIGNIFICANT CHANGE UP (ref 7–23)
CALCIUM SERPL-MCNC: 8.7 MG/DL — SIGNIFICANT CHANGE UP (ref 8.4–10.5)
CHLORIDE SERPL-SCNC: 103 MMOL/L — SIGNIFICANT CHANGE UP (ref 98–107)
CO2 SERPL-SCNC: 22 MMOL/L — SIGNIFICANT CHANGE UP (ref 22–31)
CREAT SERPL-MCNC: 0.7 MG/DL — SIGNIFICANT CHANGE UP (ref 0.5–1.3)
EGFR: 105 ML/MIN/1.73M2 — SIGNIFICANT CHANGE UP
EOSINOPHIL # BLD AUTO: 0.3 K/UL — SIGNIFICANT CHANGE UP (ref 0–0.5)
EOSINOPHIL NFR BLD AUTO: 5.6 % — SIGNIFICANT CHANGE UP (ref 0–6)
GLUCOSE SERPL-MCNC: 99 MG/DL — SIGNIFICANT CHANGE UP (ref 70–99)
HCT VFR BLD CALC: 37.6 % — LOW (ref 39–50)
HGB BLD-MCNC: 12.7 G/DL — LOW (ref 13–17)
IANC: 2.95 K/UL — SIGNIFICANT CHANGE UP (ref 1.8–7.4)
IMM GRANULOCYTES NFR BLD AUTO: 0.4 % — SIGNIFICANT CHANGE UP (ref 0–0.9)
LYMPHOCYTES # BLD AUTO: 1.58 K/UL — SIGNIFICANT CHANGE UP (ref 1–3.3)
LYMPHOCYTES # BLD AUTO: 29.8 % — SIGNIFICANT CHANGE UP (ref 13–44)
MAGNESIUM SERPL-MCNC: 1.9 MG/DL — SIGNIFICANT CHANGE UP (ref 1.6–2.6)
MCHC RBC-ENTMCNC: 29.6 PG — SIGNIFICANT CHANGE UP (ref 27–34)
MCHC RBC-ENTMCNC: 33.8 GM/DL — SIGNIFICANT CHANGE UP (ref 32–36)
MCV RBC AUTO: 87.6 FL — SIGNIFICANT CHANGE UP (ref 80–100)
MONOCYTES # BLD AUTO: 0.38 K/UL — SIGNIFICANT CHANGE UP (ref 0–0.9)
MONOCYTES NFR BLD AUTO: 7.2 % — SIGNIFICANT CHANGE UP (ref 2–14)
NEUTROPHILS # BLD AUTO: 2.95 K/UL — SIGNIFICANT CHANGE UP (ref 1.8–7.4)
NEUTROPHILS NFR BLD AUTO: 55.5 % — SIGNIFICANT CHANGE UP (ref 43–77)
NRBC # BLD: 0 /100 WBCS — SIGNIFICANT CHANGE UP (ref 0–0)
NRBC # FLD: 0 K/UL — SIGNIFICANT CHANGE UP (ref 0–0)
PHOSPHATE SERPL-MCNC: 3.3 MG/DL — SIGNIFICANT CHANGE UP (ref 2.5–4.5)
PLATELET # BLD AUTO: 305 K/UL — SIGNIFICANT CHANGE UP (ref 150–400)
POTASSIUM SERPL-MCNC: 3.6 MMOL/L — SIGNIFICANT CHANGE UP (ref 3.5–5.3)
POTASSIUM SERPL-SCNC: 3.6 MMOL/L — SIGNIFICANT CHANGE UP (ref 3.5–5.3)
RBC # BLD: 4.29 M/UL — SIGNIFICANT CHANGE UP (ref 4.2–5.8)
RBC # FLD: 15.3 % — HIGH (ref 10.3–14.5)
SODIUM SERPL-SCNC: 134 MMOL/L — LOW (ref 135–145)
WBC # BLD: 5.31 K/UL — SIGNIFICANT CHANGE UP (ref 3.8–10.5)
WBC # FLD AUTO: 5.31 K/UL — SIGNIFICANT CHANGE UP (ref 3.8–10.5)

## 2023-09-14 PROCEDURE — 99232 SBSQ HOSP IP/OBS MODERATE 35: CPT

## 2023-09-14 RX ORDER — POTASSIUM CHLORIDE 20 MEQ
20 PACKET (EA) ORAL ONCE
Refills: 0 | Status: COMPLETED | OUTPATIENT
Start: 2023-09-14 | End: 2023-09-14

## 2023-09-14 RX ADMIN — BACITRACIN ZINC NEOMYCIN SULFATE POLYMYXIN B SULFATE 1 APPLICATION(S): 400; 3.5; 5 OINTMENT TOPICAL at 06:56

## 2023-09-14 RX ADMIN — HEPARIN SODIUM 5000 UNIT(S): 5000 INJECTION INTRAVENOUS; SUBCUTANEOUS at 21:29

## 2023-09-14 RX ADMIN — HEPARIN SODIUM 5000 UNIT(S): 5000 INJECTION INTRAVENOUS; SUBCUTANEOUS at 12:31

## 2023-09-14 RX ADMIN — PANTOPRAZOLE SODIUM 40 MILLIGRAM(S): 20 TABLET, DELAYED RELEASE ORAL at 06:30

## 2023-09-14 RX ADMIN — Medication 1 DROP(S): at 17:22

## 2023-09-14 RX ADMIN — BACITRACIN ZINC NEOMYCIN SULFATE POLYMYXIN B SULFATE 1 APPLICATION(S): 400; 3.5; 5 OINTMENT TOPICAL at 17:34

## 2023-09-14 RX ADMIN — Medication 50 MILLIGRAM(S): at 14:03

## 2023-09-14 RX ADMIN — DORZOLAMIDE HYDROCHLORIDE 1 DROP(S): 20 SOLUTION/ DROPS OPHTHALMIC at 21:31

## 2023-09-14 RX ADMIN — LATANOPROST 1 DROP(S): 0.05 SOLUTION/ DROPS OPHTHALMIC; TOPICAL at 21:30

## 2023-09-14 RX ADMIN — DORZOLAMIDE HYDROCHLORIDE 1 DROP(S): 20 SOLUTION/ DROPS OPHTHALMIC at 06:28

## 2023-09-14 RX ADMIN — Medication 650 MILLIGRAM(S): at 14:03

## 2023-09-14 RX ADMIN — Medication 650 MILLIGRAM(S): at 22:29

## 2023-09-14 RX ADMIN — Medication 3 MILLIGRAM(S): at 21:30

## 2023-09-14 RX ADMIN — Medication 650 MILLIGRAM(S): at 21:29

## 2023-09-14 RX ADMIN — Medication 650 MILLIGRAM(S): at 15:03

## 2023-09-14 RX ADMIN — AMLODIPINE BESYLATE 5 MILLIGRAM(S): 2.5 TABLET ORAL at 06:59

## 2023-09-14 RX ADMIN — HEPARIN SODIUM 5000 UNIT(S): 5000 INJECTION INTRAVENOUS; SUBCUTANEOUS at 06:30

## 2023-09-14 RX ADMIN — DORZOLAMIDE HYDROCHLORIDE 1 DROP(S): 20 SOLUTION/ DROPS OPHTHALMIC at 12:31

## 2023-09-14 RX ADMIN — Medication 50 MILLIGRAM(S): at 21:30

## 2023-09-14 RX ADMIN — Medication 75 MILLIGRAM(S): at 06:26

## 2023-09-14 RX ADMIN — Medication 1 DROP(S): at 06:27

## 2023-09-14 RX ADMIN — Medication 20 MILLIEQUIVALENT(S): at 14:03

## 2023-09-15 LAB
ANION GAP SERPL CALC-SCNC: 13 MMOL/L — SIGNIFICANT CHANGE UP (ref 7–14)
BASOPHILS # BLD AUTO: 0.08 K/UL — SIGNIFICANT CHANGE UP (ref 0–0.2)
BASOPHILS NFR BLD AUTO: 1.5 % — SIGNIFICANT CHANGE UP (ref 0–2)
BUN SERPL-MCNC: 13 MG/DL — SIGNIFICANT CHANGE UP (ref 7–23)
CALCIUM SERPL-MCNC: 8.6 MG/DL — SIGNIFICANT CHANGE UP (ref 8.4–10.5)
CHLORIDE SERPL-SCNC: 103 MMOL/L — SIGNIFICANT CHANGE UP (ref 98–107)
CO2 SERPL-SCNC: 22 MMOL/L — SIGNIFICANT CHANGE UP (ref 22–31)
CREAT SERPL-MCNC: 0.71 MG/DL — SIGNIFICANT CHANGE UP (ref 0.5–1.3)
EGFR: 105 ML/MIN/1.73M2 — SIGNIFICANT CHANGE UP
EOSINOPHIL # BLD AUTO: 0.25 K/UL — SIGNIFICANT CHANGE UP (ref 0–0.5)
EOSINOPHIL NFR BLD AUTO: 4.7 % — SIGNIFICANT CHANGE UP (ref 0–6)
GLUCOSE SERPL-MCNC: 118 MG/DL — HIGH (ref 70–99)
HCT VFR BLD CALC: 38.3 % — LOW (ref 39–50)
HGB BLD-MCNC: 12.5 G/DL — LOW (ref 13–17)
IANC: 2.84 K/UL — SIGNIFICANT CHANGE UP (ref 1.8–7.4)
IMM GRANULOCYTES NFR BLD AUTO: 0.6 % — SIGNIFICANT CHANGE UP (ref 0–0.9)
LYMPHOCYTES # BLD AUTO: 1.68 K/UL — SIGNIFICANT CHANGE UP (ref 1–3.3)
LYMPHOCYTES # BLD AUTO: 31.8 % — SIGNIFICANT CHANGE UP (ref 13–44)
MAGNESIUM SERPL-MCNC: 1.7 MG/DL — SIGNIFICANT CHANGE UP (ref 1.6–2.6)
MCHC RBC-ENTMCNC: 29 PG — SIGNIFICANT CHANGE UP (ref 27–34)
MCHC RBC-ENTMCNC: 32.6 GM/DL — SIGNIFICANT CHANGE UP (ref 32–36)
MCV RBC AUTO: 88.9 FL — SIGNIFICANT CHANGE UP (ref 80–100)
MONOCYTES # BLD AUTO: 0.41 K/UL — SIGNIFICANT CHANGE UP (ref 0–0.9)
MONOCYTES NFR BLD AUTO: 7.8 % — SIGNIFICANT CHANGE UP (ref 2–14)
NEUTROPHILS # BLD AUTO: 2.84 K/UL — SIGNIFICANT CHANGE UP (ref 1.8–7.4)
NEUTROPHILS NFR BLD AUTO: 53.6 % — SIGNIFICANT CHANGE UP (ref 43–77)
NRBC # BLD: 0 /100 WBCS — SIGNIFICANT CHANGE UP (ref 0–0)
NRBC # FLD: 0 K/UL — SIGNIFICANT CHANGE UP (ref 0–0)
PHOSPHATE SERPL-MCNC: 3.1 MG/DL — SIGNIFICANT CHANGE UP (ref 2.5–4.5)
PLATELET # BLD AUTO: 306 K/UL — SIGNIFICANT CHANGE UP (ref 150–400)
POTASSIUM SERPL-MCNC: 3.4 MMOL/L — LOW (ref 3.5–5.3)
POTASSIUM SERPL-SCNC: 3.4 MMOL/L — LOW (ref 3.5–5.3)
RBC # BLD: 4.31 M/UL — SIGNIFICANT CHANGE UP (ref 4.2–5.8)
RBC # FLD: 15.4 % — HIGH (ref 10.3–14.5)
SODIUM SERPL-SCNC: 138 MMOL/L — SIGNIFICANT CHANGE UP (ref 135–145)
WBC # BLD: 5.29 K/UL — SIGNIFICANT CHANGE UP (ref 3.8–10.5)
WBC # FLD AUTO: 5.29 K/UL — SIGNIFICANT CHANGE UP (ref 3.8–10.5)

## 2023-09-15 PROCEDURE — 99232 SBSQ HOSP IP/OBS MODERATE 35: CPT

## 2023-09-15 RX ORDER — MAGNESIUM OXIDE 400 MG ORAL TABLET 241.3 MG
800 TABLET ORAL ONCE
Refills: 0 | Status: COMPLETED | OUTPATIENT
Start: 2023-09-15 | End: 2023-09-15

## 2023-09-15 RX ORDER — POTASSIUM CHLORIDE 20 MEQ
20 PACKET (EA) ORAL ONCE
Refills: 0 | Status: COMPLETED | OUTPATIENT
Start: 2023-09-15 | End: 2023-09-15

## 2023-09-15 RX ADMIN — Medication 1 MILLIGRAM(S): at 09:11

## 2023-09-15 RX ADMIN — HEPARIN SODIUM 5000 UNIT(S): 5000 INJECTION INTRAVENOUS; SUBCUTANEOUS at 06:32

## 2023-09-15 RX ADMIN — HEPARIN SODIUM 5000 UNIT(S): 5000 INJECTION INTRAVENOUS; SUBCUTANEOUS at 11:52

## 2023-09-15 RX ADMIN — BACITRACIN ZINC NEOMYCIN SULFATE POLYMYXIN B SULFATE 1 APPLICATION(S): 400; 3.5; 5 OINTMENT TOPICAL at 17:08

## 2023-09-15 RX ADMIN — PANTOPRAZOLE SODIUM 40 MILLIGRAM(S): 20 TABLET, DELAYED RELEASE ORAL at 06:31

## 2023-09-15 RX ADMIN — Medication 3 MILLIGRAM(S): at 21:21

## 2023-09-15 RX ADMIN — DORZOLAMIDE HYDROCHLORIDE 1 DROP(S): 20 SOLUTION/ DROPS OPHTHALMIC at 21:22

## 2023-09-15 RX ADMIN — HEPARIN SODIUM 5000 UNIT(S): 5000 INJECTION INTRAVENOUS; SUBCUTANEOUS at 21:28

## 2023-09-15 RX ADMIN — LATANOPROST 1 DROP(S): 0.05 SOLUTION/ DROPS OPHTHALMIC; TOPICAL at 21:21

## 2023-09-15 RX ADMIN — Medication 1 TABLET(S): at 09:11

## 2023-09-15 RX ADMIN — Medication 200 MILLIGRAM(S): at 09:12

## 2023-09-15 RX ADMIN — DORZOLAMIDE HYDROCHLORIDE 1 DROP(S): 20 SOLUTION/ DROPS OPHTHALMIC at 11:51

## 2023-09-15 RX ADMIN — Medication 1 DROP(S): at 17:07

## 2023-09-15 RX ADMIN — MAGNESIUM OXIDE 400 MG ORAL TABLET 800 MILLIGRAM(S): 241.3 TABLET ORAL at 17:07

## 2023-09-15 RX ADMIN — AMLODIPINE BESYLATE 5 MILLIGRAM(S): 2.5 TABLET ORAL at 06:31

## 2023-09-15 RX ADMIN — Medication 25 MILLIGRAM(S): at 18:00

## 2023-09-15 RX ADMIN — Medication 1 DROP(S): at 06:30

## 2023-09-15 RX ADMIN — Medication 650 MILLIGRAM(S): at 21:34

## 2023-09-15 RX ADMIN — Medication 50 MILLIGRAM(S): at 06:35

## 2023-09-15 RX ADMIN — BACITRACIN ZINC NEOMYCIN SULFATE POLYMYXIN B SULFATE 1 APPLICATION(S): 400; 3.5; 5 OINTMENT TOPICAL at 06:51

## 2023-09-15 RX ADMIN — DORZOLAMIDE HYDROCHLORIDE 1 DROP(S): 20 SOLUTION/ DROPS OPHTHALMIC at 06:31

## 2023-09-15 RX ADMIN — Medication 650 MILLIGRAM(S): at 22:34

## 2023-09-15 RX ADMIN — Medication 20 MILLIEQUIVALENT(S): at 09:12

## 2023-09-15 NOTE — DIETITIAN INITIAL EVALUATION ADULT - PERTINENT LABORATORY DATA
09-15    138  |  103  |  13  ----------------------------<  118<H>  3.4<L>   |  22  |  0.71    Ca    8.6      15 Sep 2023 06:07  Phos  3.1     09-15  Mg     1.70     09-15    A1C with Estimated Average Glucose Result: 5.3 % (07-23-23 @ 07:45)  A1C with Estimated Average Glucose Result: 5.3 % (07-17-23 @ 06:20)  A1C with Estimated Average Glucose Result: 5.4 % (07-09-23 @ 05:15)

## 2023-09-15 NOTE — DIETITIAN INITIAL EVALUATION ADULT - OTHER INFO
57 yo M PMHx of ETOH abuse, prior admissions for withdrawal, seizures, DT, HTN, HLD, depression, right eye blindness, anxiety presents with alcohol intoxication and facial trauma admitted for further management of alcohol w/d, per chart.     Patient reports good appetite. As per RN flow sheet, patient is consuming % of meals. Patient denies any difficulty chewing or swallowing, any abdominal pain, nausea, vomiting, diarrhea, constipation during visit. Reports last bowel movement 9/14. Current weight: 86.4kg (9/12, per RN flow sheet). As per Kristopher SCHMIDT, weight history: 79.4kg (8/29), 81.6kg (7/8). Noted patient has weight gain of +7kg/+8%BW x 2 weeks, +4.8kg/5.8%BW x 2 months. Continue to monitor weight trend. Noted patient has low potassium- 3.4( 9/15), on KCl for repletion. Noted patient is on multivitamin, folic acid for micronutrient support. Heart healthy diet recommendations discussed with patient during visit, encouraged patient to avoid food high in sodium, saturated/trans fat. Well balanced meals with different food groups encouraged during visit. Noted patient is followed by  on food insecurity. Patient is qualified for a grocery bag from food insecurity project. RD informed patient on items in the grocery bag, educated on different ways in preparing the food items. Patient is receptive to the information provided.

## 2023-09-15 NOTE — DIETITIAN INITIAL EVALUATION ADULT - ORAL INTAKE PTA/DIET HISTORY
Patient reports usual body weight 190lbs. Reports intentional weight loss prior to hospitalization, due to portion control. Patient reports consuming 3 meals per day, reports adequate po intake,  mostly consuming takeout foods. Patient has no known food allergies.

## 2023-09-15 NOTE — DIETITIAN INITIAL EVALUATION ADULT - ADD RECOMMEND
1. Recommend adding thiamin, for micronutrient support.  2. Monitor and replete electrolytes.   3. SW to follow-up on food insecurity.   4. Monitor weight, labs, po intake and tolerance, bowel movement, skin integrity.

## 2023-09-15 NOTE — DIETITIAN INITIAL EVALUATION ADULT - NSFNSPHYEXAMSKINFT_GEN_A_CORE
As per RN flow sheet, patient has right forehead laceration. No pressure injury noted at this time.

## 2023-09-15 NOTE — DIETITIAN INITIAL EVALUATION ADULT - PERTINENT MEDS FT
MEDICATIONS  (STANDING):  amLODIPine   Tablet 5 milliGRAM(s) Oral daily  chlordiazePOXIDE   Oral   chlordiazePOXIDE 25 milliGRAM(s) Oral every 12 hours  dorzolamide 2% Ophthalmic Solution 1 Drop(s) Both EYES three times a day  folic acid 1 milliGRAM(s) Oral daily  heparin   Injectable 5000 Unit(s) SubCutaneous every 8 hours  latanoprost 0.005% Ophthalmic Solution 1 Drop(s) Both EYES at bedtime  multivitamin 1 Tablet(s) Oral daily  neomycin/bacitracin/polymyxin Topical Ointment 1 Application(s) Topical two times a day  pantoprazole    Tablet 40 milliGRAM(s) Oral before breakfast  timolol 0.5% Solution 1 Drop(s) Both EYES two times a day    MEDICATIONS  (PRN):  acetaminophen     Tablet .. 650 milliGRAM(s) Oral every 6 hours PRN Temp greater or equal to 38C (100.4F), Mild Pain (1 - 3), Moderate Pain (4 - 6), Severe Pain (7 - 10)  aluminum hydroxide/magnesium hydroxide/simethicone Suspension 30 milliLiter(s) Oral every 4 hours PRN Dyspepsia  LORazepam   Injectable 2 milliGRAM(s) IV Push every 1 hour PRN Symptom-triggered: each CIWA -Ar score 8 or GREATER  LORazepam   Injectable 2 milliGRAM(s) IV Push every 2 hours PRN CIWA-Ar score increase by 2 points and a total score of 7 or less  melatonin 3 milliGRAM(s) Oral at bedtime PRN Insomnia  ondansetron Injectable 4 milliGRAM(s) IV Push every 8 hours PRN Nausea and/or Vomiting

## 2023-09-16 LAB
ANION GAP SERPL CALC-SCNC: 13 MMOL/L — SIGNIFICANT CHANGE UP (ref 7–14)
BASOPHILS # BLD AUTO: 0.07 K/UL — SIGNIFICANT CHANGE UP (ref 0–0.2)
BASOPHILS NFR BLD AUTO: 1.1 % — SIGNIFICANT CHANGE UP (ref 0–2)
BUN SERPL-MCNC: 16 MG/DL — SIGNIFICANT CHANGE UP (ref 7–23)
CALCIUM SERPL-MCNC: 8.6 MG/DL — SIGNIFICANT CHANGE UP (ref 8.4–10.5)
CHLORIDE SERPL-SCNC: 102 MMOL/L — SIGNIFICANT CHANGE UP (ref 98–107)
CO2 SERPL-SCNC: 23 MMOL/L — SIGNIFICANT CHANGE UP (ref 22–31)
CREAT SERPL-MCNC: 0.72 MG/DL — SIGNIFICANT CHANGE UP (ref 0.5–1.3)
EGFR: 105 ML/MIN/1.73M2 — SIGNIFICANT CHANGE UP
EOSINOPHIL # BLD AUTO: 0.23 K/UL — SIGNIFICANT CHANGE UP (ref 0–0.5)
EOSINOPHIL NFR BLD AUTO: 3.7 % — SIGNIFICANT CHANGE UP (ref 0–6)
GLUCOSE SERPL-MCNC: 106 MG/DL — HIGH (ref 70–99)
HCT VFR BLD CALC: 39.2 % — SIGNIFICANT CHANGE UP (ref 39–50)
HGB BLD-MCNC: 13.2 G/DL — SIGNIFICANT CHANGE UP (ref 13–17)
IANC: 3.49 K/UL — SIGNIFICANT CHANGE UP (ref 1.8–7.4)
IMM GRANULOCYTES NFR BLD AUTO: 0.5 % — SIGNIFICANT CHANGE UP (ref 0–0.9)
LYMPHOCYTES # BLD AUTO: 1.85 K/UL — SIGNIFICANT CHANGE UP (ref 1–3.3)
LYMPHOCYTES # BLD AUTO: 29.8 % — SIGNIFICANT CHANGE UP (ref 13–44)
MAGNESIUM SERPL-MCNC: 2.1 MG/DL — SIGNIFICANT CHANGE UP (ref 1.6–2.6)
MCHC RBC-ENTMCNC: 29.9 PG — SIGNIFICANT CHANGE UP (ref 27–34)
MCHC RBC-ENTMCNC: 33.7 GM/DL — SIGNIFICANT CHANGE UP (ref 32–36)
MCV RBC AUTO: 88.7 FL — SIGNIFICANT CHANGE UP (ref 80–100)
MONOCYTES # BLD AUTO: 0.53 K/UL — SIGNIFICANT CHANGE UP (ref 0–0.9)
MONOCYTES NFR BLD AUTO: 8.5 % — SIGNIFICANT CHANGE UP (ref 2–14)
NEUTROPHILS # BLD AUTO: 3.49 K/UL — SIGNIFICANT CHANGE UP (ref 1.8–7.4)
NEUTROPHILS NFR BLD AUTO: 56.4 % — SIGNIFICANT CHANGE UP (ref 43–77)
NRBC # BLD: 0 /100 WBCS — SIGNIFICANT CHANGE UP (ref 0–0)
NRBC # FLD: 0 K/UL — SIGNIFICANT CHANGE UP (ref 0–0)
PHOSPHATE SERPL-MCNC: 3.1 MG/DL — SIGNIFICANT CHANGE UP (ref 2.5–4.5)
PLATELET # BLD AUTO: 311 K/UL — SIGNIFICANT CHANGE UP (ref 150–400)
POTASSIUM SERPL-MCNC: 3.7 MMOL/L — SIGNIFICANT CHANGE UP (ref 3.5–5.3)
POTASSIUM SERPL-SCNC: 3.7 MMOL/L — SIGNIFICANT CHANGE UP (ref 3.5–5.3)
RBC # BLD: 4.42 M/UL — SIGNIFICANT CHANGE UP (ref 4.2–5.8)
RBC # FLD: 15.5 % — HIGH (ref 10.3–14.5)
SODIUM SERPL-SCNC: 138 MMOL/L — SIGNIFICANT CHANGE UP (ref 135–145)
WBC # BLD: 6.2 K/UL — SIGNIFICANT CHANGE UP (ref 3.8–10.5)
WBC # FLD AUTO: 6.2 K/UL — SIGNIFICANT CHANGE UP (ref 3.8–10.5)

## 2023-09-16 PROCEDURE — 99232 SBSQ HOSP IP/OBS MODERATE 35: CPT

## 2023-09-16 RX ORDER — INFLUENZA VIRUS VACCINE 15; 15; 15; 15 UG/.5ML; UG/.5ML; UG/.5ML; UG/.5ML
0.5 SUSPENSION INTRAMUSCULAR ONCE
Refills: 0 | Status: DISCONTINUED | OUTPATIENT
Start: 2023-09-16 | End: 2023-09-17

## 2023-09-16 RX ADMIN — Medication 1 MILLIGRAM(S): at 13:14

## 2023-09-16 RX ADMIN — Medication 100 MILLIGRAM(S): at 13:13

## 2023-09-16 RX ADMIN — PANTOPRAZOLE SODIUM 40 MILLIGRAM(S): 20 TABLET, DELAYED RELEASE ORAL at 05:47

## 2023-09-16 RX ADMIN — Medication 650 MILLIGRAM(S): at 22:36

## 2023-09-16 RX ADMIN — DORZOLAMIDE HYDROCHLORIDE 1 DROP(S): 20 SOLUTION/ DROPS OPHTHALMIC at 05:49

## 2023-09-16 RX ADMIN — BACITRACIN ZINC NEOMYCIN SULFATE POLYMYXIN B SULFATE 1 APPLICATION(S): 400; 3.5; 5 OINTMENT TOPICAL at 18:04

## 2023-09-16 RX ADMIN — HEPARIN SODIUM 5000 UNIT(S): 5000 INJECTION INTRAVENOUS; SUBCUTANEOUS at 05:47

## 2023-09-16 RX ADMIN — LATANOPROST 1 DROP(S): 0.05 SOLUTION/ DROPS OPHTHALMIC; TOPICAL at 21:37

## 2023-09-16 RX ADMIN — Medication 1 TABLET(S): at 13:14

## 2023-09-16 RX ADMIN — DORZOLAMIDE HYDROCHLORIDE 1 DROP(S): 20 SOLUTION/ DROPS OPHTHALMIC at 21:37

## 2023-09-16 RX ADMIN — Medication 650 MILLIGRAM(S): at 21:36

## 2023-09-16 RX ADMIN — DORZOLAMIDE HYDROCHLORIDE 1 DROP(S): 20 SOLUTION/ DROPS OPHTHALMIC at 13:14

## 2023-09-16 RX ADMIN — HEPARIN SODIUM 5000 UNIT(S): 5000 INJECTION INTRAVENOUS; SUBCUTANEOUS at 13:13

## 2023-09-16 RX ADMIN — HEPARIN SODIUM 5000 UNIT(S): 5000 INJECTION INTRAVENOUS; SUBCUTANEOUS at 21:36

## 2023-09-16 RX ADMIN — Medication 3 MILLIGRAM(S): at 21:36

## 2023-09-16 RX ADMIN — BACITRACIN ZINC NEOMYCIN SULFATE POLYMYXIN B SULFATE 1 APPLICATION(S): 400; 3.5; 5 OINTMENT TOPICAL at 05:57

## 2023-09-16 RX ADMIN — Medication 1 DROP(S): at 18:01

## 2023-09-16 RX ADMIN — Medication 25 MILLIGRAM(S): at 05:49

## 2023-09-16 RX ADMIN — Medication 1 DROP(S): at 05:49

## 2023-09-16 RX ADMIN — Medication 2 MILLIGRAM(S): at 09:43

## 2023-09-16 NOTE — PROGRESS NOTE ADULT - PROBLEM SELECTOR PLAN 8
dvt ppx: hep sq given no active bleeding

## 2023-09-16 NOTE — PROGRESS NOTE ADULT - PROBLEM SELECTOR PLAN 2
-large laceration. CT max facial with No acute fracture or traumatic subluxation. Right periorbital soft tissue swelling and laceration. Intraorbital contents unremarkable. Pt also with chronic right eye blindness so unable to elucidate symptoms   -admitting team discussed and evaluated by ED providers, do not rec suturing at this time. Wound cleaned and dressed  -no purulence noted from wound, low c/f acute infection at this time. c/w topical antibiotics with wound care. Monitor for signs of infection, may require oral anti-biotics at that time  -Laceration appears old, monitor for signs of infection closely and start abx prn

## 2023-09-16 NOTE — PROGRESS NOTE ADULT - SUBJECTIVE AND OBJECTIVE BOX
Patient is a 58y old  Male who presents with a chief complaint of s/p fall (11 Sep 2023 22:59)    SUBJECTIVE / OVERNIGHT EVENTS: NAEO. CIWA score improving. pt reports doing well. denies n/v/abd pain. tremors improved.     MEDICATIONS  (STANDING):  amLODIPine   Tablet 5 milliGRAM(s) Oral daily  chlordiazePOXIDE 25 milliGRAM(s) Oral every 12 hours  chlordiazePOXIDE   Oral   dorzolamide 2% Ophthalmic Solution 1 Drop(s) Both EYES three times a day  folic acid 1 milliGRAM(s) Oral daily  heparin   Injectable 5000 Unit(s) SubCutaneous every 8 hours  latanoprost 0.005% Ophthalmic Solution 1 Drop(s) Both EYES at bedtime  magnesium oxide 800 milliGRAM(s) Oral once  multivitamin 1 Tablet(s) Oral daily  neomycin/bacitracin/polymyxin Topical Ointment 1 Application(s) Topical two times a day  pantoprazole    Tablet 40 milliGRAM(s) Oral before breakfast  timolol 0.5% Solution 1 Drop(s) Both EYES two times a day    MEDICATIONS  (PRN):  acetaminophen     Tablet .. 650 milliGRAM(s) Oral every 6 hours PRN Temp greater or equal to 38C (100.4F), Mild Pain (1 - 3), Moderate Pain (4 - 6), Severe Pain (7 - 10)  aluminum hydroxide/magnesium hydroxide/simethicone Suspension 30 milliLiter(s) Oral every 4 hours PRN Dyspepsia  LORazepam   Injectable 2 milliGRAM(s) IV Push every 1 hour PRN Symptom-triggered: each CIWA -Ar score 8 or GREATER  LORazepam   Injectable 2 milliGRAM(s) IV Push every 2 hours PRN CIWA-Ar score increase by 2 points and a total score of 7 or less  melatonin 3 milliGRAM(s) Oral at bedtime PRN Insomnia  ondansetron Injectable 4 milliGRAM(s) IV Push every 8 hours PRN Nausea and/or Vomiting    Vital Signs Last 24 Hrs  T(C): 36.9 (15 Sep 2023 13:00), Max: 37.8 (14 Sep 2023 21:00)  T(F): 98.4 (15 Sep 2023 13:00), Max: 100 (14 Sep 2023 21:00)  HR: 60 (15 Sep 2023 13:00) (52 - 61)  BP: 134/80 (15 Sep 2023 13:00) (115/75 - 155/81)  BP(mean): --  RR: 17 (15 Sep 2023 13:00) (16 - 20)  SpO2: 100% (15 Sep 2023 13:00) (97% - 100%)    Parameters below as of 15 Sep 2023 13:00  Patient On (Oxygen Delivery Method): room air    PHYSICAL EXAM:  GENERAL: moderate distress, marked discomfort  HEENT: right forehead laceration with wound care and dressing intact, Right Periorbital edema  CHEST/LUNG: Clear to auscultation bilaterally; No wheeze  HEART: Regular rate and rhythm  ABDOMEN: Soft, mild tenderness on the right upper quadrant  EXTREMITIES: no LE edema, visible bilateral hand tremor  PSYCH: anxious   NEUROLOGY:ANO3      LABS:                          12.5   5.29  )-----------( 306      ( 15 Sep 2023 06:07 )             38.3     09-15    138  |  103  |  13  ----------------------------<  118<H>  3.4<L>   |  22  |  0.71    Ca    8.6      15 Sep 2023 06:07  Phos  3.1     09-15  Mg     1.70     09-15        Urinalysis Basic - ( 15 Sep 2023 06:07 )    Color: x / Appearance: x / SG: x / pH: x  Gluc: 118 mg/dL / Ketone: x  / Bili: x / Urobili: x   Blood: x / Protein: x / Nitrite: x   Leuk Esterase: x / RBC: x / WBC x   Sq Epi: x / Non Sq Epi: x / Bacteria: x      CAPILLARY BLOOD GLUCOSE          RADIOLOGY & ADDITIONAL TESTS: Reviewed.                   Urinalysis Basic - ( 14 Sep 2023 06:58 )    Color: x / Appearance: x / SG: x / pH: x  Gluc: 99 mg/dL / Ketone: x  / Bili: x / Urobili: x   Blood: x / Protein: x / Nitrite: x   Leuk Esterase: x / RBC: x / WBC x   Sq Epi: x / Non Sq Epi: x / Bacteria: x      CAPILLARY BLOOD GLUCOSE          RADIOLOGY & ADDITIONAL TESTS: Reviewed.          
Patient is a 58y old  Male who presents with a chief complaint of s/p fall (11 Sep 2023 22:59)    SUBJECTIVE / OVERNIGHT EVENTS: NAEO. CIWA improving. patient reports slight tremors otherwise overall improving.     MEDICATIONS  (STANDING):  amLODIPine   Tablet 5 milliGRAM(s) Oral daily  chlordiazePOXIDE   Oral   chlordiazePOXIDE 25 milliGRAM(s) Oral daily  dorzolamide 2% Ophthalmic Solution 1 Drop(s) Both EYES three times a day  folic acid 1 milliGRAM(s) Oral daily  heparin   Injectable 5000 Unit(s) SubCutaneous every 8 hours  influenza   Vaccine 0.5 milliLiter(s) IntraMuscular once  latanoprost 0.005% Ophthalmic Solution 1 Drop(s) Both EYES at bedtime  multivitamin 1 Tablet(s) Oral daily  neomycin/bacitracin/polymyxin Topical Ointment 1 Application(s) Topical two times a day  pantoprazole    Tablet 40 milliGRAM(s) Oral before breakfast  thiamine 100 milliGRAM(s) Oral daily  timolol 0.5% Solution 1 Drop(s) Both EYES two times a day    MEDICATIONS  (PRN):  acetaminophen     Tablet .. 650 milliGRAM(s) Oral every 6 hours PRN Temp greater or equal to 38C (100.4F), Mild Pain (1 - 3), Moderate Pain (4 - 6), Severe Pain (7 - 10)  aluminum hydroxide/magnesium hydroxide/simethicone Suspension 30 milliLiter(s) Oral every 4 hours PRN Dyspepsia  LORazepam   Injectable 2 milliGRAM(s) IV Push every 1 hour PRN Symptom-triggered: each CIWA -Ar score 8 or GREATER  LORazepam   Injectable 2 milliGRAM(s) IV Push every 2 hours PRN CIWA-Ar score increase by 2 points and a total score of 7 or less  melatonin 3 milliGRAM(s) Oral at bedtime PRN Insomnia  ondansetron Injectable 4 milliGRAM(s) IV Push every 8 hours PRN Nausea and/or Vomiting    Vital Signs Last 24 Hrs  T(C): 37.2 (16 Sep 2023 09:00), Max: 37.3 (15 Sep 2023 21:00)  T(F): 98.9 (16 Sep 2023 09:00), Max: 99.2 (15 Sep 2023 21:00)  HR: 61 (16 Sep 2023 09:00) (53 - 66)  BP: 123/64 (16 Sep 2023 09:00) (111/68 - 133/72)  BP(mean): --  RR: 18 (16 Sep 2023 09:00) (16 - 18)  SpO2: 97% (16 Sep 2023 09:00) (97% - 100%)    Parameters below as of 16 Sep 2023 09:00  Patient On (Oxygen Delivery Method): room air    PHYSICAL EXAM:  GENERAL: moderate distress, marked discomfort  HEENT: right forehead laceration with wound care and dressing intact, Right Periorbital edema  CHEST/LUNG: Clear to auscultation bilaterally; No wheeze  HEART: Regular rate and rhythm  ABDOMEN: Soft, mild tenderness on the right upper quadrant  EXTREMITIES: no LE edema, improved tremors  NEUROLOGY:ANO3      LABS:               13.2   6.20  )-----------( 311      ( 16 Sep 2023 07:18 )             39.2     09-16    138  |  102  |  16  ----------------------------<  106<H>  3.7   |  23  |  0.72    Ca    8.6      16 Sep 2023 07:18  Phos  3.1     09-16  Mg     2.10     09-16        Urinalysis Basic - ( 16 Sep 2023 07:18 )    Color: x / Appearance: x / SG: x / pH: x  Gluc: 106 mg/dL / Ketone: x  / Bili: x / Urobili: x   Blood: x / Protein: x / Nitrite: x   Leuk Esterase: x / RBC: x / WBC x   Sq Epi: x / Non Sq Epi: x / Bacteria: x      CAPILLARY BLOOD GLUCOSE          RADIOLOGY & ADDITIONAL TESTS: Reviewed.          
Patient is a 58y old  Male who presents with a chief complaint of s/p fall (11 Sep 2023 22:59)      SUBJECTIVE / OVERNIGHT EVENTS: Pt seen and examined at 10:50am, no overnight events, pt c/o headache and generalized muscle pain, denies any anxiety or any other new complaints. no other new issues reported.    MEDICATIONS  (STANDING):  amLODIPine   Tablet 5 milliGRAM(s) Oral daily  dextrose 5% + sodium chloride 0.9%. 1000 milliLiter(s) (75 mL/Hr) IV Continuous <Continuous>  dorzolamide 2% Ophthalmic Solution 1 Drop(s) Both EYES three times a day  folic acid 1 milliGRAM(s) Oral daily  heparin   Injectable 5000 Unit(s) SubCutaneous every 8 hours  latanoprost 0.005% Ophthalmic Solution 1 Drop(s) Both EYES at bedtime  LORazepam   Injectable   IV Push   LORazepam   Injectable 1.5 milliGRAM(s) IV Push every 4 hours  LORazepam   Injectable 2 milliGRAM(s) IV Push every 4 hours  multivitamin 1 Tablet(s) Oral daily  neomycin/bacitracin/polymyxin Topical Ointment 1 Application(s) Topical two times a day  potassium phosphate / sodium phosphate Tablet (K-PHOS No. 2) 1 Tablet(s) Oral four times a day with meals  thiamine 100 milliGRAM(s) Oral daily  timolol 0.5% Solution 1 Drop(s) Both EYES two times a day    MEDICATIONS  (PRN):  acetaminophen     Tablet .. 650 milliGRAM(s) Oral every 6 hours PRN Temp greater or equal to 38C (100.4F), Mild Pain (1 - 3)  aluminum hydroxide/magnesium hydroxide/simethicone Suspension 30 milliLiter(s) Oral every 4 hours PRN Dyspepsia  LORazepam   Injectable 2 milliGRAM(s) IV Push every 2 hours PRN CIWA-Ar score increase by 2 points and a total score of 7 or less  LORazepam   Injectable 2 milliGRAM(s) IV Push every 1 hour PRN Symptom-triggered: each CIWA -Ar score 8 or GREATER  melatonin 3 milliGRAM(s) Oral at bedtime PRN Insomnia  ondansetron Injectable 4 milliGRAM(s) IV Push every 8 hours PRN Nausea and/or Vomiting      Vital Signs Last 24 Hrs  T(C): 36.7 (12 Sep 2023 11:21), Max: 37.1 (11 Sep 2023 22:20)  T(F): 98.1 (12 Sep 2023 11:21), Max: 98.8 (11 Sep 2023 22:20)  HR: 71 (12 Sep 2023 11:21) (71 - 92)  BP: 135/77 (12 Sep 2023 11:21) (115/76 - 145/65)  BP(mean): --  RR: 20 (12 Sep 2023 11:21) (18 - 20)  SpO2: 100% (12 Sep 2023 11:21) (99% - 100%)    Parameters below as of 12 Sep 2023 11:21  Patient On (Oxygen Delivery Method): room air      CAPILLARY BLOOD GLUCOSE        I&O's Summary      PHYSICAL EXAM:  GENERAL: NAD, well-developed  HEENT: rt eye lid closed, periorbital edema+  CHEST/LUNG: Clear to auscultation bilaterally; No wheeze  HEART: Regular rate and rhythm  ABDOMEN: Soft, Nontender, Nondistended  EXTREMITIES: no LE edema, + b/l hand tremors  PSYCH: Calm  NEUROLOGY: awake, Ox3, rt eye closed with periorbital edema  SKIN: rt eye brow with V shaped laceration appears old, some yellowish drainage on the guaze noted        LABS:                        12.4   6.15  )-----------( 297      ( 12 Sep 2023 07:48 )             37.5     09-12    136  |  99  |  7   ----------------------------<  112<H>  3.6   |  25  |  0.64    Ca    8.3<L>      12 Sep 2023 07:48  Phos  2.4     09-12  Mg     1.70     09-12    TPro  7.1  /  Alb  3.5  /  TBili  0.8  /  DBili  x   /  AST  51<H>  /  ALT  38  /  AlkPhos  72  09-12          Urinalysis Basic - ( 12 Sep 2023 07:48 )    Color: x / Appearance: x / SG: x / pH: x  Gluc: 112 mg/dL / Ketone: x  / Bili: x / Urobili: x   Blood: x / Protein: x / Nitrite: x   Leuk Esterase: x / RBC: x / WBC x   Sq Epi: x / Non Sq Epi: x / Bacteria: x        RADIOLOGY & ADDITIONAL TESTS:    Imaging Personally Reviewed:    Consultant(s) Notes Reviewed:      Care Discussed with Consultants/Other Providers:  
Patient is a 58y old  Male who presents with a chief complaint of s/p fall (11 Sep 2023 22:59)    SUBJECTIVE / OVERNIGHT EVENTS: NAEO. vss. ciwa overnight improved 4-8. required additional ativan 2mg prn once last night. patient reports feeling better. tremors are improving, denies nausea/vomiting, abdominal pain is improved.   patient states he is not interested in alcohol rehab or detox. states he has things to take care of at home.     MEDICATIONS  (STANDING):  amLODIPine   Tablet 5 milliGRAM(s) Oral daily  chlordiazePOXIDE 50 milliGRAM(s) Oral every 8 hours  chlordiazePOXIDE   Oral   dorzolamide 2% Ophthalmic Solution 1 Drop(s) Both EYES three times a day  folic acid 1 milliGRAM(s) Oral daily  heparin   Injectable 5000 Unit(s) SubCutaneous every 8 hours  latanoprost 0.005% Ophthalmic Solution 1 Drop(s) Both EYES at bedtime  multivitamin 1 Tablet(s) Oral daily  neomycin/bacitracin/polymyxin Topical Ointment 1 Application(s) Topical two times a day  pantoprazole    Tablet 40 milliGRAM(s) Oral before breakfast  potassium chloride    Tablet ER 20 milliEquivalent(s) Oral once  thiamine Injectable 200 milliGRAM(s) IV Push daily  timolol 0.5% Solution 1 Drop(s) Both EYES two times a day    MEDICATIONS  (PRN):  acetaminophen     Tablet .. 650 milliGRAM(s) Oral every 6 hours PRN Temp greater or equal to 38C (100.4F), Mild Pain (1 - 3), Moderate Pain (4 - 6), Severe Pain (7 - 10)  aluminum hydroxide/magnesium hydroxide/simethicone Suspension 30 milliLiter(s) Oral every 4 hours PRN Dyspepsia  LORazepam   Injectable 2 milliGRAM(s) IV Push every 1 hour PRN Symptom-triggered: each CIWA -Ar score 8 or GREATER  LORazepam   Injectable 2 milliGRAM(s) IV Push every 2 hours PRN CIWA-Ar score increase by 2 points and a total score of 7 or less  melatonin 3 milliGRAM(s) Oral at bedtime PRN Insomnia  ondansetron Injectable 4 milliGRAM(s) IV Push every 8 hours PRN Nausea and/or Vomiting      Vital Signs Last 24 Hrs  T(C): 36.6 (14 Sep 2023 11:00), Max: 36.8 (13 Sep 2023 17:00)  T(F): 97.9 (14 Sep 2023 11:00), Max: 98.3 (13 Sep 2023 17:00)  HR: 63 (14 Sep 2023 11:00) (54 - 67)  BP: 127/70 (14 Sep 2023 11:00) (107/63 - 147/82)  BP(mean): --  RR: 18 (14 Sep 2023 11:00) (18 - 18)  SpO2: 100% (14 Sep 2023 11:00) (98% - 100%)    Parameters below as of 14 Sep 2023 11:00  Patient On (Oxygen Delivery Method): room air    PHYSICAL EXAM:  GENERAL: moderate distress, marked discomfort  HEENT: right forehead laceration with wound care and dressing intact, Right Periorbital edema  CHEST/LUNG: Clear to auscultation bilaterally; No wheeze  HEART: Regular rate and rhythm  ABDOMEN: Soft, mild tenderness on the right upper quadrant  EXTREMITIES: no LE edema, visible bilateral hand tremor  PSYCH: anxious   NEUROLOGY:ANO3      LABS:                          12.7   5.31  )-----------( 305      ( 14 Sep 2023 06:58 )             37.6     09-14    134<L>  |  103  |  10  ----------------------------<  99  3.6   |  22  |  0.70    Ca    8.7      14 Sep 2023 06:58  Phos  3.3     09-14  Mg     1.90     09-14    TPro  7.0  /  Alb  3.6  /  TBili  0.6  /  DBili  x   /  AST  42<H>  /  ALT  39  /  AlkPhos  72  09-13      Urinalysis Basic - ( 14 Sep 2023 06:58 )    Color: x / Appearance: x / SG: x / pH: x  Gluc: 99 mg/dL / Ketone: x  / Bili: x / Urobili: x   Blood: x / Protein: x / Nitrite: x   Leuk Esterase: x / RBC: x / WBC x   Sq Epi: x / Non Sq Epi: x / Bacteria: x      CAPILLARY BLOOD GLUCOSE          RADIOLOGY & ADDITIONAL TESTS: Reviewed.          
Patient is a 58y old  Male who presents with a chief complaint of s/p fall (11 Sep 2023 22:59)    SUBJECTIVE / OVERNIGHT EVENTS: afebrile, hypertensive. CIWA around 6-8, required additional ativan prn. this morning patient is restless, state he doesn't feel good, noted to have visible tremors, reports nausea and right upper abdominal pain. denies auditory or visual hallucinations.     MEDICATIONS  (STANDING):  amLODIPine   Tablet 5 milliGRAM(s) Oral daily  chlordiazePOXIDE 75 milliGRAM(s) Oral every 6 hours  chlordiazePOXIDE   Oral   dorzolamide 2% Ophthalmic Solution 1 Drop(s) Both EYES three times a day  folic acid 1 milliGRAM(s) Oral daily  heparin   Injectable 5000 Unit(s) SubCutaneous every 8 hours  latanoprost 0.005% Ophthalmic Solution 1 Drop(s) Both EYES at bedtime  multivitamin 1 Tablet(s) Oral daily  neomycin/bacitracin/polymyxin Topical Ointment 1 Application(s) Topical two times a day  pantoprazole    Tablet 40 milliGRAM(s) Oral before breakfast  thiamine Injectable 200 milliGRAM(s) IV Push daily  timolol 0.5% Solution 1 Drop(s) Both EYES two times a day    MEDICATIONS  (PRN):  acetaminophen     Tablet .. 650 milliGRAM(s) Oral every 6 hours PRN Temp greater or equal to 38C (100.4F), Mild Pain (1 - 3), Moderate Pain (4 - 6), Severe Pain (7 - 10)  aluminum hydroxide/magnesium hydroxide/simethicone Suspension 30 milliLiter(s) Oral every 4 hours PRN Dyspepsia  LORazepam   Injectable 2 milliGRAM(s) IV Push every 1 hour PRN Symptom-triggered: each CIWA -Ar score 8 or GREATER  LORazepam   Injectable 2 milliGRAM(s) IV Push every 2 hours PRN CIWA-Ar score increase by 2 points and a total score of 7 or less  melatonin 3 milliGRAM(s) Oral at bedtime PRN Insomnia  ondansetron Injectable 4 milliGRAM(s) IV Push every 8 hours PRN Nausea and/or Vomiting    Vital Signs Last 24 Hrs  T(C): 36.8 (13 Sep 2023 11:00), Max: 37.2 (12 Sep 2023 17:00)  T(F): 98.3 (13 Sep 2023 11:00), Max: 98.9 (12 Sep 2023 17:00)  HR: 66 (13 Sep 2023 11:00) (57 - 75)  BP: 140/77 (13 Sep 2023 11:00) (119/63 - 148/82)  BP(mean): --  RR: 18 (13 Sep 2023 11:00) (18 - 20)  SpO2: 98% (13 Sep 2023 11:00) (98% - 100%)    Parameters below as of 13 Sep 2023 11:00  Patient On (Oxygen Delivery Method): room air    CAPILLARY BLOOD GLUCOSE    I&O's Summary    PHYSICAL EXAM:  GENERAL: moderate distress, marked discomfort  HEENT: right forehead laceration with wound care and dressing intact, Right Periorbital edema  CHEST/LUNG: Clear to auscultation bilaterally; No wheeze  HEART: Regular rate and rhythm  ABDOMEN: Soft, mild tenderness on the right upper quadrant  EXTREMITIES: no LE edema, visible bilateral hand tremor  PSYCH: anxious   NEUROLOGY:ANO3      LABS:                          13.1   5.52  )-----------( 272      ( 13 Sep 2023 08:00 )             39.6     09-13    135  |  102  |  8   ----------------------------<  95  3.4<L>   |  23  |  0.64    Ca    8.7      13 Sep 2023 08:00  Phos  2.9     09-13  Mg     2.00     09-13    TPro  7.0  /  Alb  3.6  /  TBili  0.6  /  DBili  x   /  AST  42<H>  /  ALT  39  /  AlkPhos  72  09-13      Urinalysis Basic - ( 13 Sep 2023 08:00 )    Color: x / Appearance: x / SG: x / pH: x  Gluc: 95 mg/dL / Ketone: x  / Bili: x / Urobili: x   Blood: x / Protein: x / Nitrite: x   Leuk Esterase: x / RBC: x / WBC x   Sq Epi: x / Non Sq Epi: x / Bacteria: x      CAPILLARY BLOOD GLUCOSE          RADIOLOGY & ADDITIONAL TESTS: Reviewed.    Care Discussed with Consultants/Other Providers:

## 2023-09-16 NOTE — PROGRESS NOTE ADULT - PROBLEM/PLAN-2
1. Diclofenac gel (Volatren) is a great cream to use on painful areas like the knee  2. Ibuprofen or naproxen can be used - can try for 1-2 weeks then decrease to only as needed  3. Injection to left hip bursa is possible   4. Physical therapy is certainly an option for trochanteric bursitis   
DISPLAY PLAN FREE TEXT

## 2023-09-16 NOTE — PROGRESS NOTE ADULT - NSPROGADDITIONALINFOA_GEN_ALL_CORE
#hypokalemia  #hypophos  - monitor for refeeding  - replete electrolytes prn
#hypokalemia  #hypophos  - monitor for refeeding  - replete electrolytes prn    Dispo: pending optimization. per patient he is not interested in alcohol rehab. accepted outpatient chem dep referral.

## 2023-09-16 NOTE — PROGRESS NOTE ADULT - PROBLEM SELECTOR PROBLEM 4
LILLY (acute kidney injury)
LILLY (acute kidney injury)
Alcoholic hepatitis
LILLY (acute kidney injury)
LILLY (acute kidney injury)

## 2023-09-16 NOTE — PROGRESS NOTE ADULT - PROBLEM SELECTOR PLAN 4
-likely pre-renal, s/p fluids. Improved.   -trend BMP, monitor for now
-likely pre-renal, s/p fluids. Improved.   -trend BMP, monitor for now
-mild transaminitis   -counseled on need for alcohol cessation  -trend CMP
-likely pre-renal, s/p fluids. Improved.   -trend BMP, monitor for now
-likely pre-renal, s/p fluids. Improved.   -trend BMP, monitor for now

## 2023-09-16 NOTE — PROGRESS NOTE ADULT - PROBLEM SELECTOR PROBLEM 2
Laceration of eyebrow and forehead

## 2023-09-16 NOTE — PROGRESS NOTE ADULT - PROBLEM SELECTOR PLAN 3
reports ruq abd pain and nausea. no signs of sepsis. likely gastritis related to etoh, possible PUD.   Improved.   - RUQ us - no acute abn  - maalox prn, start Protonix   - zofran prn
reports ruq abd pain and nausea. no signs of sepsis. likely gastritis related to etoh, possible PUD.   - fu RUQ us   - maalox prn, start Protonix   - zofran prn
-likely pre-renal  -dc IVF  -trend BMP, creatinine improved to 0.6 today
reports ruq abd pain and nausea. no signs of sepsis. likely gastritis related to etoh, possible PUD.   Improved.   - RUQ us - no acute abn  - maalox prn, cont Protonix   - zofran prn
reports ruq abd pain and nausea. no signs of sepsis. likely gastritis related to etoh, possible PUD.   Improved.   - RUQ us - no acute abn  - maalox prn, start Protonix   - zofran prn

## 2023-09-16 NOTE — PROGRESS NOTE ADULT - PROBLEM SELECTOR PLAN 1
-pt w/ history of alcohol w/d w/ complications (seizures, DTs). Initial CIWA 7, remains with tremors despite phenobarb x2. Last etoh use 9/11.   -start librium taper at 75mg q6hr (no previous hx of cirrhosis)  -Monitor ciwa score, q2hr ativan prn for ciwa >8  -c/w high dose thiamine, folic acid and MV   - eval
-pt w/ history of alcohol w/d w/ complications (seizures, DTs). Initial CIWA 7, remains with tremors despite phenobarb x2. Last etoh use 9/11.   -cont librium taper at 75mg q6hr (no previous hx of cirrhosis)  -Monitor ciwa score, q2hr ativan prn for ciwa >8  -c/w high dose thiamine, folic acid and MV   -SW eval
-pt w/ history of alcohol w/d w/ complications (seizures, DTs). Initial CIWA 7, remains with tremors despite phenobarb x2. Last etoh use 9/11.   -cont librium taper at 75mg q6hr (no previous hx of cirrhosis)  -Monitor ciwa score, q2hr ativan prn for ciwa >8  -c/w high dose thiamine, folic acid and MV   -SW eval
-pt w/ history of alcohol w/d w/ complications (seizures, DTs). Initial CIWA 7, remains with tremors despite phenobarb x2  -c/w high dose ativan taper for now w/ symptom triggered ativan   -Monitor ciwa score,   -c/w thiamine, folic acid and MV   -SW eval
-pt w/ history of alcohol w/d w/ complications (seizures, DTs). phenobarb x2. Last etoh use 9/11.   -complete librium taper at 75mg q6hr (no previous hx of cirrhosis)  -Monitor ciwa score, q2hr ativan prn for ciwa >8  -s/p high dose thiamine, folic acid and MV   -SW eval

## 2023-09-16 NOTE — PROGRESS NOTE ADULT - ASSESSMENT
59 yo M PMHx of ETOH abuse, prior admissions for withdrawal, seizures, DT, HTN, HLD, depression, right eye blindness, anxiety presents with alcohol intoxication and facial trauma admitted for further management of alcohol w/d 
57 yo M PMHx of ETOH abuse, prior admissions for withdrawal, seizures, DT, HTN, HLD, depression, right eye blindness, anxiety presents with alcohol intoxication and facial trauma admitted for further management of alcohol w/d 
59 yo M PMHx of ETOH abuse, prior admissions for withdrawal, seizures, DT, HTN, HLD, depression, right eye blindness, anxiety presents with alcohol intoxication and facial trauma admitted for further management of alcohol w/d

## 2023-09-17 ENCOUNTER — TRANSCRIPTION ENCOUNTER (OUTPATIENT)
Age: 58
End: 2023-09-17

## 2023-09-17 VITALS
RESPIRATION RATE: 18 BRPM | HEART RATE: 74 BPM | SYSTOLIC BLOOD PRESSURE: 147 MMHG | OXYGEN SATURATION: 98 % | DIASTOLIC BLOOD PRESSURE: 87 MMHG | TEMPERATURE: 97 F

## 2023-09-17 LAB
ANION GAP SERPL CALC-SCNC: 13 MMOL/L — SIGNIFICANT CHANGE UP (ref 7–14)
BASOPHILS # BLD AUTO: 0.08 K/UL — SIGNIFICANT CHANGE UP (ref 0–0.2)
BASOPHILS NFR BLD AUTO: 1.3 % — SIGNIFICANT CHANGE UP (ref 0–2)
BUN SERPL-MCNC: 18 MG/DL — SIGNIFICANT CHANGE UP (ref 7–23)
CALCIUM SERPL-MCNC: 8.8 MG/DL — SIGNIFICANT CHANGE UP (ref 8.4–10.5)
CHLORIDE SERPL-SCNC: 102 MMOL/L — SIGNIFICANT CHANGE UP (ref 98–107)
CO2 SERPL-SCNC: 21 MMOL/L — LOW (ref 22–31)
CREAT SERPL-MCNC: 0.73 MG/DL — SIGNIFICANT CHANGE UP (ref 0.5–1.3)
EGFR: 104 ML/MIN/1.73M2 — SIGNIFICANT CHANGE UP
EOSINOPHIL # BLD AUTO: 0.2 K/UL — SIGNIFICANT CHANGE UP (ref 0–0.5)
EOSINOPHIL NFR BLD AUTO: 3.3 % — SIGNIFICANT CHANGE UP (ref 0–6)
GLUCOSE SERPL-MCNC: 111 MG/DL — HIGH (ref 70–99)
HCT VFR BLD CALC: 38.7 % — LOW (ref 39–50)
HGB BLD-MCNC: 12.9 G/DL — LOW (ref 13–17)
IANC: 3.46 K/UL — SIGNIFICANT CHANGE UP (ref 1.8–7.4)
IMM GRANULOCYTES NFR BLD AUTO: 0.3 % — SIGNIFICANT CHANGE UP (ref 0–0.9)
LYMPHOCYTES # BLD AUTO: 1.92 K/UL — SIGNIFICANT CHANGE UP (ref 1–3.3)
LYMPHOCYTES # BLD AUTO: 31.5 % — SIGNIFICANT CHANGE UP (ref 13–44)
MAGNESIUM SERPL-MCNC: 1.8 MG/DL — SIGNIFICANT CHANGE UP (ref 1.6–2.6)
MCHC RBC-ENTMCNC: 29.5 PG — SIGNIFICANT CHANGE UP (ref 27–34)
MCHC RBC-ENTMCNC: 33.3 GM/DL — SIGNIFICANT CHANGE UP (ref 32–36)
MCV RBC AUTO: 88.6 FL — SIGNIFICANT CHANGE UP (ref 80–100)
MONOCYTES # BLD AUTO: 0.41 K/UL — SIGNIFICANT CHANGE UP (ref 0–0.9)
MONOCYTES NFR BLD AUTO: 6.7 % — SIGNIFICANT CHANGE UP (ref 2–14)
NEUTROPHILS # BLD AUTO: 3.46 K/UL — SIGNIFICANT CHANGE UP (ref 1.8–7.4)
NEUTROPHILS NFR BLD AUTO: 56.9 % — SIGNIFICANT CHANGE UP (ref 43–77)
NRBC # BLD: 0 /100 WBCS — SIGNIFICANT CHANGE UP (ref 0–0)
NRBC # FLD: 0 K/UL — SIGNIFICANT CHANGE UP (ref 0–0)
PHOSPHATE SERPL-MCNC: 3.4 MG/DL — SIGNIFICANT CHANGE UP (ref 2.5–4.5)
PLATELET # BLD AUTO: 300 K/UL — SIGNIFICANT CHANGE UP (ref 150–400)
POTASSIUM SERPL-MCNC: 4 MMOL/L — SIGNIFICANT CHANGE UP (ref 3.5–5.3)
POTASSIUM SERPL-SCNC: 4 MMOL/L — SIGNIFICANT CHANGE UP (ref 3.5–5.3)
RBC # BLD: 4.37 M/UL — SIGNIFICANT CHANGE UP (ref 4.2–5.8)
RBC # FLD: 15.4 % — HIGH (ref 10.3–14.5)
SODIUM SERPL-SCNC: 136 MMOL/L — SIGNIFICANT CHANGE UP (ref 135–145)
WBC # BLD: 6.09 K/UL — SIGNIFICANT CHANGE UP (ref 3.8–10.5)
WBC # FLD AUTO: 6.09 K/UL — SIGNIFICANT CHANGE UP (ref 3.8–10.5)

## 2023-09-17 PROCEDURE — 99239 HOSP IP/OBS DSCHRG MGMT >30: CPT

## 2023-09-17 RX ADMIN — Medication 650 MILLIGRAM(S): at 14:24

## 2023-09-17 RX ADMIN — DORZOLAMIDE HYDROCHLORIDE 1 DROP(S): 20 SOLUTION/ DROPS OPHTHALMIC at 11:49

## 2023-09-17 RX ADMIN — Medication 100 MILLIGRAM(S): at 11:45

## 2023-09-17 RX ADMIN — HEPARIN SODIUM 5000 UNIT(S): 5000 INJECTION INTRAVENOUS; SUBCUTANEOUS at 06:11

## 2023-09-17 RX ADMIN — Medication 1 MILLIGRAM(S): at 11:45

## 2023-09-17 RX ADMIN — Medication 1 TABLET(S): at 11:45

## 2023-09-17 RX ADMIN — Medication 25 MILLIGRAM(S): at 11:27

## 2023-09-17 RX ADMIN — Medication 1 DROP(S): at 06:11

## 2023-09-17 RX ADMIN — PANTOPRAZOLE SODIUM 40 MILLIGRAM(S): 20 TABLET, DELAYED RELEASE ORAL at 06:11

## 2023-09-17 RX ADMIN — DORZOLAMIDE HYDROCHLORIDE 1 DROP(S): 20 SOLUTION/ DROPS OPHTHALMIC at 06:11

## 2023-09-17 NOTE — DISCHARGE NOTE NURSING/CASE MANAGEMENT/SOCIAL WORK - NSDCVIVACCINE_GEN_ALL_CORE_FT
influenza, injectable, quadrivalent, preservative free; 14-Oct-2021 13:35; Nany Guerrero (RN); [employer]; [id number] (Exp. Date: 30-Jun-2022); IntraMuscular; Deltoid Right.; 0.5 milliLiter(s); VIS (VIS Published: 06-Aug-2021, VIS Presented: 14-Oct-2021);   Tdap; 22-Dec-2021 19:23; Clementina Koehler (RN); [employer]; [id number] (Exp. Date: 09-Sep-2023); IntraMuscular; Deltoid Left.; 0.5 milliLiter(s); VIS (VIS Published: 09-May-2013, VIS Presented: 22-Dec-2021);   Tdap; 15-Aug-2022 16:08; Marianne Pollock (RN); [employer]; [id number]   (Exp. Date: 18-Apr-2024); IntraMuscular; Deltoid Left.; 0.5 milliLiter(s); VIS (VIS Published: 09-May-2013, VIS Presented: 15-Aug-2022);   Tdap; 16-Nov-2022 15:48; Parish Avila (RN); [employer]; [id number] (Exp. Date: 01-Jun-2024); IntraMuscular; Deltoid Right.; 0.5 milliLiter(s); VIS (VIS Published: 09-May-2013, VIS Presented: 16-Nov-2022);   Tdap; 11-Sep-2023 16:32; Alexandra Sung (RN); [employer]; [id number] (Exp. Date: 03-Oct-2025); IntraMuscular; Deltoid Right.; 0.5 milliLiter(s); VIS (VIS Published: 09-May-2013, VIS Presented: 11-Sep-2023);

## 2023-09-17 NOTE — DISCHARGE NOTE PROVIDER - ATTENDING DISCHARGE PHYSICAL EXAMINATION:
VITAL SIGNS:  T(C): 36.3 (09-17-23 @ 09:00), Max: 36.8 (09-16-23 @ 21:00)  T(F): 97.4 (09-17-23 @ 09:00), Max: 98.3 (09-16-23 @ 21:00)  HR: 74 (09-17-23 @ 09:00) (52 - 74)  BP: 147/87 (09-17-23 @ 09:00) (126/68 - 147/87)  BP(mean): --  RR: 18 (09-17-23 @ 09:00) (16 - 18)  SpO2: 98% (09-17-23 @ 09:00) (97% - 100%)  Wt(kg): --    PHYSICAL EXAM:  Constitutional:  NAD  Head: Right forehead laceration, no active signs of infection  Eyes: PERRL, EOMI  Neck: supple; no JVD  Respiratory: CTA B/L; no W/R/R  Cardiac: +S1/S2; RRR; no M/R/G  Gastrointestinal: soft, NT/ND; no rebound or guarding; +BSx4  Extremities: WWP, No edema  Neurologic: AAOx3; CNII-XII grossly intact; no focal deficits

## 2023-09-17 NOTE — DISCHARGE NOTE NURSING/CASE MANAGEMENT/SOCIAL WORK - PATIENT PORTAL LINK FT
You can access the FollowMyHealth Patient Portal offered by [facility] by registering at the following website: [email or web address]. By joining [employer]’s FollowMyHealth portal, you will also be able to view your health information using other applications (apps) compatible with our system.

## 2023-09-17 NOTE — DISCHARGE NOTE PROVIDER - CARE PROVIDER_API CALL
Carlita Cheung  Family Medicine  [address] [place] [address]  Phone: [phone]  Fax: [phone]  Follow Up Time:

## 2023-09-17 NOTE — DISCHARGE NOTE PROVIDER - NSFOLLOWUPCLINICS_GEN_ALL_ED_FT
[facility] Psych Dept of Substance Abuse  Psychiatry Substance Abuse  [address] [place] [address]  Phone: [phone]  Fax:

## 2023-09-17 NOTE — DISCHARGE NOTE PROVIDER - HOSPITAL COURSE
59 yo M PMHx of ETOH abuse, prior admissions for withdrawal, seizures, DT, HTN, HLD, depression, right eye blindness, anxiety presents with alcohol intoxication and facial trauma admitted for further management of alcohol w/d. started on librium taper at 75mg q6hr, completed 25mg on 9/17. received high dose thiamine, mvi, folic acid. evaluated by wound care for Laceration of eyebrow and forehead. CT max facial showed No acute fracture or traumatic subluxation. Right periorbital soft tissue swelling and laceration. Intraorbital contents unremarkable. Pt also with chronic right eye blindness so unable to elucidate symptoms. discussed with plastic sgy, stated laceration is old and doesn;t requiring suturing, topical wound care and daily dressing provided. Patient declined alochol rehab or detox, provided resources and outpatient chemical dependency clinic information. patient is medicall stable for a discharge.    57 yo M PMHx of ETOH abuse, prior admissions for withdrawal, seizures, DT, HTN, HLD, depression, right eye blindness, anxiety presents with alcohol intoxication and facial trauma admitted for further management of alcohol w/d. started on librium taper at 75mg q6hr, completed 25mg on 9/17. received high dose thiamine, mvi, folic acid. evaluated by wound care for Laceration of eyebrow and forehead. CT max facial showed No acute fracture or traumatic subluxation. Right periorbital soft tissue swelling and laceration. Intraorbital contents unremarkable. Pt also with chronic right eye blindness so unable to elucidate symptoms. discussed with plastic sgy, stated laceration is old and doesn;t requiring suturing, topical wound care and daily dressing provided. Patient declined alochol rehab or detox, provided resources and outpatient chemical dependency clinic information. patient is medically stable for a discharge.

## 2023-09-17 NOTE — DISCHARGE NOTE PROVIDER - NSDCCPCAREPLAN_GEN_ALL_CORE_FT
PRINCIPAL DISCHARGE DIAGNOSIS  Diagnosis: Alcohol dependence with withdrawal  Assessment and Plan of Treatment: please abstain from drinking alcohol. please follow up with chemical dependency clinic.      SECONDARY DISCHARGE DIAGNOSES  Diagnosis: Laceration of eyebrow and forehead  Assessment and Plan of Treatment: Please follow wound care direction as below:   Irrigate with NS using saline flush, pat dry. Apply Liquid barrier film to periwound skin (allow to dry). Apply hydrofiber (Aquacel) as secondary dressing to absorb. Cover with small silicone foam with border. Change daily.

## 2023-10-02 NOTE — PATIENT PROFILE ADULT - BRADEN NUTRITION
(2) probably inadequate Calcipotriene Counseling:  I discussed with the patient the risks of calcipotriene including but not limited to erythema, scaling, itching, and irritation.

## 2023-10-03 ENCOUNTER — EMERGENCY (EMERGENCY)
Facility: HOSPITAL | Age: 58
LOS: 0 days | Discharge: HOME | End: 2023-10-03
Attending: STUDENT IN AN ORGANIZED HEALTH CARE EDUCATION/TRAINING PROGRAM
Payer: MEDICAID

## 2023-10-03 VITALS
SYSTOLIC BLOOD PRESSURE: 106 MMHG | HEIGHT: 68 IN | DIASTOLIC BLOOD PRESSURE: 61 MMHG | WEIGHT: 177.91 LBS | OXYGEN SATURATION: 94 % | TEMPERATURE: 99 F | RESPIRATION RATE: 18 BRPM | HEART RATE: 82 BPM

## 2023-10-03 VITALS
SYSTOLIC BLOOD PRESSURE: 114 MMHG | HEART RATE: 73 BPM | OXYGEN SATURATION: 96 % | RESPIRATION RATE: 18 BRPM | DIASTOLIC BLOOD PRESSURE: 62 MMHG | TEMPERATURE: 98 F

## 2023-10-03 DIAGNOSIS — R19.7 DIARRHEA, UNSPECIFIED: ICD-10-CM

## 2023-10-03 DIAGNOSIS — F32.A DEPRESSION, UNSPECIFIED: ICD-10-CM

## 2023-10-03 DIAGNOSIS — F10.229 ALCOHOL DEPENDENCE WITH INTOXICATION, UNSPECIFIED: ICD-10-CM

## 2023-10-03 DIAGNOSIS — R07.89 OTHER CHEST PAIN: ICD-10-CM

## 2023-10-03 DIAGNOSIS — H54.7 UNSPECIFIED VISUAL LOSS: ICD-10-CM

## 2023-10-03 DIAGNOSIS — R11.10 VOMITING, UNSPECIFIED: ICD-10-CM

## 2023-10-03 DIAGNOSIS — E78.5 HYPERLIPIDEMIA, UNSPECIFIED: ICD-10-CM

## 2023-10-03 DIAGNOSIS — F41.9 ANXIETY DISORDER, UNSPECIFIED: ICD-10-CM

## 2023-10-03 DIAGNOSIS — I10 ESSENTIAL (PRIMARY) HYPERTENSION: ICD-10-CM

## 2023-10-03 PROCEDURE — 72125 CT NECK SPINE W/O DYE: CPT | Mod: 26,MA

## 2023-10-03 PROCEDURE — 70450 CT HEAD/BRAIN W/O DYE: CPT | Mod: 26,MA

## 2023-10-03 PROCEDURE — 99284 EMERGENCY DEPT VISIT MOD MDM: CPT

## 2023-10-03 RX ORDER — IBUPROFEN 200 MG
600 TABLET ORAL ONCE
Refills: 0 | Status: COMPLETED | OUTPATIENT
Start: 2023-10-03 | End: 2023-10-03

## 2023-10-03 RX ADMIN — Medication 50 MILLIGRAM(S): at 16:17

## 2023-10-03 RX ADMIN — Medication 600 MILLIGRAM(S): at 16:10

## 2023-10-03 NOTE — ED PROVIDER NOTE - OBJECTIVE STATEMENT
59 y/o male with ETOH abuse, DT, seizures HTN, HLD, depression, right eye blindness, anxiety here for alcohol intoxication. Pt states he has been drinking for the last 4 days , last drink this morning a beer. Pt reports have not been able to sleep because he has been in the streets last few days. Pt also reports hitting his head yesterday, unsure LOC. Pt reports having vomiting and some chest discomfort after vomiting. Denies abdominal pain. Pt also has been having some diarrhea.

## 2023-10-03 NOTE — ED ADULT NURSE NOTE - NSFALLRISKINTERV_ED_ALL_ED
Assistance OOB with selected safe patient handling equipment if applicable/Assistance with ambulation/Communicate fall risk and risk factors to all staff, patient, and family/Monitor gait and stability/Monitor for mental status changes and reorient to person, place, and time, as needed/Provide visual cue: yellow wristband, yellow gown, etc/Reinforce activity limits and safety measures with patient and family/Toileting schedule using arm’s reach rule for commode and bathroom/Use of alarms - bed, stretcher, chair and/or video monitoring/Call bell, personal items and telephone in reach/Instruct patient to call for assistance before getting out of bed/chair/stretcher/Non-slip footwear applied when patient is off stretcher/[place] to call system/Physically safe environment - no spills, clutter or unnecessary equipment/Purposeful Proactive Rounding/Room/bathroom lighting operational, light cord in reach

## 2023-10-03 NOTE — ED PROVIDER NOTE - NS ED ROS FT
CONSTITUTIONAL: No fever, no chills, no fatigue  EYES: No visual changes  ENT: No ear pain, no sore throat  CARDIOVASCULAR: No chest pain, no palpitations  RESPIRATORY: No cough, no SOB  GI: No abdominal pain,  no constipation, no diarrhea  GENITOURINARY: No dysuria, no frequency, no hematuria  MUSKULOSKELETAL: No backpain, no joint pain, no myalgias  SKIN: No rash  NEURO: No headache    ALL OTHER SYSTEMS NEGATIVE.

## 2023-10-03 NOTE — ED PROVIDER NOTE - CLINICAL SUMMARY MEDICAL DECISION MAKING FREE TEXT BOX
57 y/o male with ETOH abuse, prior admissions for withdrawal, seizures, DT, HTN, HLD, depression, right eye blindness, anxiety here with mild  alcohol intoxication today. No signs of withdrawal at this time.   Will check fs , ct head/ c spine and observe for sobriety. 57 y/o male with ETOH abuse, prior admissions for withdrawal, seizures, DT, HTN, HLD, depression, right eye blindness, anxiety here with mild  alcohol intoxication today. No signs of withdrawal at this time.   Will check fs , ct head/ c spine and observe for sobriety.    Ct head and ct c spine no acute findings.   Pt ambulatory with steady gait. No signs of withdrawal.  Pt reports feeling better after ibuprofen. Librium ordered.    called and transport arranged for patient.

## 2023-10-03 NOTE — ED PROVIDER NOTE - PATIENT PORTAL LINK FT
You can access the FollowMyHealth Patient Portal offered by [facility] by registering at the following website: [email or web address]. By joining [employer]’s FollowMyHealth portal, you will also be able to view your health information using other applications (apps) compatible with our system.

## 2023-10-03 NOTE — ED ADULT NURSE REASSESSMENT NOTE - NS ED NURSE REASSESS COMMENT FT1
[employer]ber set up by . patient placed in uber.  reji Zhao. license plate number [id number]. patient being taken back to shelter in [place]. patient left the unit in a stable condition. ambulatory steady gait. no acute or respiratory distress noted.

## 2023-10-03 NOTE — ED ADULT TRIAGE NOTE - CHIEF COMPLAINT QUOTE
Patient BIBA:  reports "too much Alcohol" Admits to ingesting beer and vodka. Asked for Covid booster and Flu shot.

## 2023-10-03 NOTE — ED ADULT NURSE REASSESSMENT NOTE - NS ED NURSE REASSESS COMMENT FT1
contacted regarding patient's living situation. endorsed all concerns to . patient pending to be seen by .

## 2023-10-03 NOTE — ED PROVIDER NOTE - ATTENDING APP SHARED VISIT CONTRIBUTION OF CARE
59yo male with pmh etoh abuse presenting intoxicated.  Patient awake and conversant, clinically intoxicated.  States he has been drinking over past few days liquor and beer.  Recently admitted at [facility] for etoh withdrawal, currently no signs of withdrawal.  Plans to continue drinking.  Endorsing possible fall yesterday but denies pain or injuries.  Ambulatory but unsteady.  Will ct head and reevaluate for sobriety.

## 2023-10-16 NOTE — ED ADULT NURSE NOTE - NSSEPSISSUSPECTED_ED_A_ED
Health Maintenance Due   Topic Date Due   • Hepatitis B Vaccine (1 of 3 - 3-dose series) Never done   • Shingles Vaccine (1 of 2) Never done   • DTaP/Tdap/Td Vaccine (2 - Td or Tdap) 01/01/2022   • COVID-19 Vaccine (3 - Moderna series) 01/13/2022   • Depression Screening  04/11/2023   • Influenza Vaccine (1) Never done       Patient is due for topics listed above, he wishes to proceed with Depression Screening , but is not proceeding with Immunization(s) COVID-19, Dtap/Tdap/Td, Hep B, Influenza and Shingles at this time. The following has occurred: education given    Review Flowsheet  More data exists       10/16/2023   PHQ 2/9 Score   Adult PHQ 2 Score 0   Adult PHQ 2 Interpretation No further screening needed   Little interest or pleasure in activity? Not at all   Feeling down, depressed or hopeless? Not at all        No

## 2023-10-17 NOTE — ED PROVIDER NOTE - PROGRESS NOTE DETAILS
Shower only
ct with chronic lumbar fracture, per radiology no crvical injury, orthopedics concerned for possible stable cervical injury, c-collar provided in ED, patient alert, ambulatory, stable for dc. refusing to wear c-collar.

## 2023-10-21 ENCOUNTER — INPATIENT (INPATIENT)
Facility: HOSPITAL | Age: 58
LOS: 7 days | Discharge: HOME | End: 2023-10-29
Attending: HOSPITALIST | Admitting: HOSPITALIST
Payer: MEDICAID

## 2023-10-21 VITALS
HEIGHT: 68 IN | HEART RATE: 106 BPM | DIASTOLIC BLOOD PRESSURE: 80 MMHG | OXYGEN SATURATION: 97 % | RESPIRATION RATE: 18 BRPM | TEMPERATURE: 98 F | SYSTOLIC BLOOD PRESSURE: 133 MMHG

## 2023-10-21 LAB
ALBUMIN SERPL ELPH-MCNC: 3.7 G/DL — SIGNIFICANT CHANGE UP (ref 3.3–5)
ALBUMIN SERPL ELPH-MCNC: 3.7 G/DL — SIGNIFICANT CHANGE UP (ref 3.3–5)
ALBUMIN SERPL ELPH-MCNC: 4.5 G/DL — SIGNIFICANT CHANGE UP (ref 3.3–5)
ALBUMIN SERPL ELPH-MCNC: 4.5 G/DL — SIGNIFICANT CHANGE UP (ref 3.3–5)
ALP SERPL-CCNC: 67 U/L — SIGNIFICANT CHANGE UP (ref 40–120)
ALP SERPL-CCNC: 67 U/L — SIGNIFICANT CHANGE UP (ref 40–120)
ALP SERPL-CCNC: 79 U/L — SIGNIFICANT CHANGE UP (ref 40–120)
ALP SERPL-CCNC: 79 U/L — SIGNIFICANT CHANGE UP (ref 40–120)
ALT FLD-CCNC: 37 U/L — SIGNIFICANT CHANGE UP (ref 4–41)
ALT FLD-CCNC: 37 U/L — SIGNIFICANT CHANGE UP (ref 4–41)
ALT FLD-CCNC: 48 U/L — HIGH (ref 4–41)
ALT FLD-CCNC: 48 U/L — HIGH (ref 4–41)
ANION GAP SERPL CALC-SCNC: 15 MMOL/L — HIGH (ref 7–14)
ANION GAP SERPL CALC-SCNC: 15 MMOL/L — HIGH (ref 7–14)
ANION GAP SERPL CALC-SCNC: 17 MMOL/L — HIGH (ref 7–14)
ANION GAP SERPL CALC-SCNC: 17 MMOL/L — HIGH (ref 7–14)
APTT BLD: 27.3 SEC — SIGNIFICANT CHANGE UP (ref 24.5–35.6)
APTT BLD: 27.3 SEC — SIGNIFICANT CHANGE UP (ref 24.5–35.6)
AST SERPL-CCNC: 47 U/L — HIGH (ref 4–40)
AST SERPL-CCNC: 47 U/L — HIGH (ref 4–40)
AST SERPL-CCNC: 72 U/L — HIGH (ref 4–40)
AST SERPL-CCNC: 72 U/L — HIGH (ref 4–40)
BILIRUB DIRECT SERPL-MCNC: <0.2 MG/DL — SIGNIFICANT CHANGE UP (ref 0–0.3)
BILIRUB DIRECT SERPL-MCNC: <0.2 MG/DL — SIGNIFICANT CHANGE UP (ref 0–0.3)
BILIRUB INDIRECT FLD-MCNC: >0.2 MG/DL — SIGNIFICANT CHANGE UP (ref 0–1)
BILIRUB INDIRECT FLD-MCNC: >0.2 MG/DL — SIGNIFICANT CHANGE UP (ref 0–1)
BILIRUB SERPL-MCNC: 0.3 MG/DL — SIGNIFICANT CHANGE UP (ref 0.2–1.2)
BILIRUB SERPL-MCNC: 0.3 MG/DL — SIGNIFICANT CHANGE UP (ref 0.2–1.2)
BILIRUB SERPL-MCNC: 0.4 MG/DL — SIGNIFICANT CHANGE UP (ref 0.2–1.2)
BUN SERPL-MCNC: 10 MG/DL — SIGNIFICANT CHANGE UP (ref 7–23)
BUN SERPL-MCNC: 10 MG/DL — SIGNIFICANT CHANGE UP (ref 7–23)
BUN SERPL-MCNC: 11 MG/DL — SIGNIFICANT CHANGE UP (ref 7–23)
BUN SERPL-MCNC: 11 MG/DL — SIGNIFICANT CHANGE UP (ref 7–23)
CALCIUM SERPL-MCNC: 8.4 MG/DL — SIGNIFICANT CHANGE UP (ref 8.4–10.5)
CALCIUM SERPL-MCNC: 8.4 MG/DL — SIGNIFICANT CHANGE UP (ref 8.4–10.5)
CALCIUM SERPL-MCNC: 9 MG/DL — SIGNIFICANT CHANGE UP (ref 8.4–10.5)
CALCIUM SERPL-MCNC: 9 MG/DL — SIGNIFICANT CHANGE UP (ref 8.4–10.5)
CHLORIDE SERPL-SCNC: 104 MMOL/L — SIGNIFICANT CHANGE UP (ref 98–107)
CHLORIDE SERPL-SCNC: 104 MMOL/L — SIGNIFICANT CHANGE UP (ref 98–107)
CHLORIDE SERPL-SCNC: 107 MMOL/L — SIGNIFICANT CHANGE UP (ref 98–107)
CHLORIDE SERPL-SCNC: 107 MMOL/L — SIGNIFICANT CHANGE UP (ref 98–107)
CO2 SERPL-SCNC: 22 MMOL/L — SIGNIFICANT CHANGE UP (ref 22–31)
CO2 SERPL-SCNC: 22 MMOL/L — SIGNIFICANT CHANGE UP (ref 22–31)
CO2 SERPL-SCNC: 23 MMOL/L — SIGNIFICANT CHANGE UP (ref 22–31)
CO2 SERPL-SCNC: 23 MMOL/L — SIGNIFICANT CHANGE UP (ref 22–31)
CREAT SERPL-MCNC: 0.65 MG/DL — SIGNIFICANT CHANGE UP (ref 0.5–1.3)
CREAT SERPL-MCNC: 0.65 MG/DL — SIGNIFICANT CHANGE UP (ref 0.5–1.3)
CREAT SERPL-MCNC: 0.76 MG/DL — SIGNIFICANT CHANGE UP (ref 0.5–1.3)
CREAT SERPL-MCNC: 0.76 MG/DL — SIGNIFICANT CHANGE UP (ref 0.5–1.3)
EGFR: 103 ML/MIN/1.73M2 — SIGNIFICANT CHANGE UP
EGFR: 103 ML/MIN/1.73M2 — SIGNIFICANT CHANGE UP
EGFR: 108 ML/MIN/1.73M2 — SIGNIFICANT CHANGE UP
EGFR: 108 ML/MIN/1.73M2 — SIGNIFICANT CHANGE UP
ETHANOL SERPL-MCNC: 308 MG/DL — HIGH
ETHANOL SERPL-MCNC: 308 MG/DL — HIGH
GLUCOSE SERPL-MCNC: 91 MG/DL — SIGNIFICANT CHANGE UP (ref 70–99)
GLUCOSE SERPL-MCNC: 91 MG/DL — SIGNIFICANT CHANGE UP (ref 70–99)
GLUCOSE SERPL-MCNC: 98 MG/DL — SIGNIFICANT CHANGE UP (ref 70–99)
GLUCOSE SERPL-MCNC: 98 MG/DL — SIGNIFICANT CHANGE UP (ref 70–99)
HCT VFR BLD CALC: 38.6 % — LOW (ref 39–50)
HCT VFR BLD CALC: 38.6 % — LOW (ref 39–50)
HGB BLD-MCNC: 12.7 G/DL — LOW (ref 13–17)
HGB BLD-MCNC: 12.7 G/DL — LOW (ref 13–17)
INR BLD: <0.9 RATIO — SIGNIFICANT CHANGE UP (ref 0.85–1.18)
INR BLD: <0.9 RATIO — SIGNIFICANT CHANGE UP (ref 0.85–1.18)
MAGNESIUM SERPL-MCNC: 2 MG/DL — SIGNIFICANT CHANGE UP (ref 1.6–2.6)
MAGNESIUM SERPL-MCNC: 2 MG/DL — SIGNIFICANT CHANGE UP (ref 1.6–2.6)
MCHC RBC-ENTMCNC: 29.1 PG — SIGNIFICANT CHANGE UP (ref 27–34)
MCHC RBC-ENTMCNC: 29.1 PG — SIGNIFICANT CHANGE UP (ref 27–34)
MCHC RBC-ENTMCNC: 32.9 GM/DL — SIGNIFICANT CHANGE UP (ref 32–36)
MCHC RBC-ENTMCNC: 32.9 GM/DL — SIGNIFICANT CHANGE UP (ref 32–36)
MCV RBC AUTO: 88.5 FL — SIGNIFICANT CHANGE UP (ref 80–100)
MCV RBC AUTO: 88.5 FL — SIGNIFICANT CHANGE UP (ref 80–100)
NRBC # BLD: 0 /100 WBCS — SIGNIFICANT CHANGE UP (ref 0–0)
NRBC # BLD: 0 /100 WBCS — SIGNIFICANT CHANGE UP (ref 0–0)
NRBC # FLD: 0 K/UL — SIGNIFICANT CHANGE UP (ref 0–0)
NRBC # FLD: 0 K/UL — SIGNIFICANT CHANGE UP (ref 0–0)
PHOSPHATE SERPL-MCNC: 3.2 MG/DL — SIGNIFICANT CHANGE UP (ref 2.5–4.5)
PHOSPHATE SERPL-MCNC: 3.2 MG/DL — SIGNIFICANT CHANGE UP (ref 2.5–4.5)
PLATELET # BLD AUTO: 313 K/UL — SIGNIFICANT CHANGE UP (ref 150–400)
PLATELET # BLD AUTO: 313 K/UL — SIGNIFICANT CHANGE UP (ref 150–400)
POTASSIUM SERPL-MCNC: 3.9 MMOL/L — SIGNIFICANT CHANGE UP (ref 3.5–5.3)
POTASSIUM SERPL-MCNC: 3.9 MMOL/L — SIGNIFICANT CHANGE UP (ref 3.5–5.3)
POTASSIUM SERPL-MCNC: 4.4 MMOL/L — SIGNIFICANT CHANGE UP (ref 3.5–5.3)
POTASSIUM SERPL-MCNC: 4.4 MMOL/L — SIGNIFICANT CHANGE UP (ref 3.5–5.3)
POTASSIUM SERPL-SCNC: 3.9 MMOL/L — SIGNIFICANT CHANGE UP (ref 3.5–5.3)
POTASSIUM SERPL-SCNC: 3.9 MMOL/L — SIGNIFICANT CHANGE UP (ref 3.5–5.3)
POTASSIUM SERPL-SCNC: 4.4 MMOL/L — SIGNIFICANT CHANGE UP (ref 3.5–5.3)
POTASSIUM SERPL-SCNC: 4.4 MMOL/L — SIGNIFICANT CHANGE UP (ref 3.5–5.3)
PROT SERPL-MCNC: 7.1 G/DL — SIGNIFICANT CHANGE UP (ref 6–8.3)
PROT SERPL-MCNC: 7.1 G/DL — SIGNIFICANT CHANGE UP (ref 6–8.3)
PROT SERPL-MCNC: 8.5 G/DL — HIGH (ref 6–8.3)
PROT SERPL-MCNC: 8.5 G/DL — HIGH (ref 6–8.3)
PROTHROM AB SERPL-ACNC: 10 SEC — SIGNIFICANT CHANGE UP (ref 9.5–13)
PROTHROM AB SERPL-ACNC: 10 SEC — SIGNIFICANT CHANGE UP (ref 9.5–13)
RBC # BLD: 4.36 M/UL — SIGNIFICANT CHANGE UP (ref 4.2–5.8)
RBC # BLD: 4.36 M/UL — SIGNIFICANT CHANGE UP (ref 4.2–5.8)
RBC # FLD: 15.9 % — HIGH (ref 10.3–14.5)
RBC # FLD: 15.9 % — HIGH (ref 10.3–14.5)
SODIUM SERPL-SCNC: 143 MMOL/L — SIGNIFICANT CHANGE UP (ref 135–145)
SODIUM SERPL-SCNC: 143 MMOL/L — SIGNIFICANT CHANGE UP (ref 135–145)
SODIUM SERPL-SCNC: 145 MMOL/L — SIGNIFICANT CHANGE UP (ref 135–145)
SODIUM SERPL-SCNC: 145 MMOL/L — SIGNIFICANT CHANGE UP (ref 135–145)
WBC # BLD: 4.66 K/UL — SIGNIFICANT CHANGE UP (ref 3.8–10.5)
WBC # BLD: 4.66 K/UL — SIGNIFICANT CHANGE UP (ref 3.8–10.5)
WBC # FLD AUTO: 4.66 K/UL — SIGNIFICANT CHANGE UP (ref 3.8–10.5)
WBC # FLD AUTO: 4.66 K/UL — SIGNIFICANT CHANGE UP (ref 3.8–10.5)

## 2023-10-21 PROCEDURE — 99285 EMERGENCY DEPT VISIT HI MDM: CPT | Mod: 25

## 2023-10-21 PROCEDURE — 36000 PLACE NEEDLE IN VEIN: CPT

## 2023-10-21 PROCEDURE — 70450 CT HEAD/BRAIN W/O DYE: CPT | Mod: 26,MA

## 2023-10-21 PROCEDURE — 72125 CT NECK SPINE W/O DYE: CPT | Mod: 26,MA

## 2023-10-21 RX ORDER — SODIUM CHLORIDE 9 MG/ML
1000 INJECTION INTRAMUSCULAR; INTRAVENOUS; SUBCUTANEOUS ONCE
Refills: 0 | Status: COMPLETED | OUTPATIENT
Start: 2023-10-21 | End: 2023-10-21

## 2023-10-21 RX ORDER — THIAMINE MONONITRATE (VIT B1) 100 MG
100 TABLET ORAL ONCE
Refills: 0 | Status: COMPLETED | OUTPATIENT
Start: 2023-10-21 | End: 2023-10-21

## 2023-10-21 RX ADMIN — Medication 50 MILLIGRAM(S): at 14:25

## 2023-10-21 RX ADMIN — SODIUM CHLORIDE 1000 MILLILITER(S): 9 INJECTION INTRAMUSCULAR; INTRAVENOUS; SUBCUTANEOUS at 19:14

## 2023-10-21 RX ADMIN — Medication 100 MILLIGRAM(S): at 19:14

## 2023-10-21 NOTE — ED PROVIDER NOTE - PHYSICAL EXAMINATION
lethargic but arousable and oriented x3   HEENT scar right temple with opacification of right cornea and no vision in right eye.    Left eye pupil equal reactive to light full EOM   neck supple no C-spine tenderness   heart sounds normal lungs clear abdomen soft nontender   extremities  trace edema with some chronic venous stasis changes.  Right big toe with bandage nail peers to be loose   pulses intact   moving all extremities 5/5 and sensation intact.    Tremor noted, as well as hypertension and mild tachycardia

## 2023-10-21 NOTE — ED ADULT NURSE NOTE - NSFALLRISKINTERV_ED_ALL_ED
Assistance OOB with selected safe patient handling equipment if applicable/Assistance with ambulation/Communicate fall risk and risk factors to all staff, patient, and family/Monitor gait and stability/Monitor for mental status changes and reorient to person, place, and time, as needed/Provide visual cue: yellow wristband, yellow gown, etc/Reinforce activity limits and safety measures with patient and family/Toileting schedule using arm’s reach rule for commode and bathroom/Use of alarms - bed, stretcher, chair and/or video monitoring/Call bell, personal items and telephone in reach/Instruct patient to call for assistance before getting out of bed/chair/stretcher/Non-slip footwear applied when patient is off stretcher/[place] to call system/Physically safe environment - no spills, clutter or unnecessary equipment/Purposeful Proactive Rounding/Room/bathroom lighting operational, light cord in reach

## 2023-10-21 NOTE — ED ADULT NURSE REASSESSMENT NOTE - NS ED NURSE REASSESS COMMENT FT1
Received report from MARTÍNEZ porter. Patient received in spot 17a. A&o4. Ambulatory. Respirations even and unlabored. No signs of acute distress noted. patient appears well. patient lying in stretcher comfortably. Stretcher in lowest position.

## 2023-10-21 NOTE — ED PROVIDER NOTE - CLINICAL SUMMARY MEDICAL DECISION MAKING FREE TEXT BOX
58-year-old male with hypertension hyperlipidemia alcohol use disorder coming in with headache after fall 2 weeks ago, no neurodeficit.  Concern for occult head trauma will get CAT scan.  Of note last drink was at 4 AM patient is tremulous slightly tachycardic and slightly hypertensive.  Librium given and CIWA started with Ativan with CIWA score less than 8.

## 2023-10-21 NOTE — ED PROVIDER NOTE - ATTENDING CONTRIBUTION TO CARE
Dr Bloch-I performed the initial face to face bedside interview with this patient regarding history of present illness, review of symptoms and past medical, social and family history.  I completed an independent physical examination.  I was the initial provider who evaluated this patient.  The history, review of symptoms and examination was documented by me.  I have discussed the patient’s plan of care and disposition with the resident.

## 2023-10-21 NOTE — ED ADULT NURSE NOTE - OBJECTIVE STATEMENT
Received pt to bed 17a, A+Ox2, redirected to date, ambulatory @baseline. arrives intoxicated. reports drinking vodka and beer this morning, pt noted to somnolent. CIWA documented and provider aware. Respirations even and unlabored, normal work of breathing, no accessory muscle use, speaking in full clear uninterrupted sentences. ABD is soft, non tender, non distended. Pt denies any chest pain, SOB, N+V, diarrhea, fever, chills. pt endorsing fall days ago, with head injury. pt awaiting US line, Labs sent, Medicated as per Provider orders, will continue to monitor.

## 2023-10-21 NOTE — ED ADULT TRIAGE NOTE - CHIEF COMPLAINT QUOTE
pt intoxicated  states drank vodka and beer this morning , pt c/o falling and hitting head 15 days ago  and has pain   pt admits to being homeless

## 2023-10-21 NOTE — ED PROVIDER NOTE - OBJECTIVE STATEMENT
58-year-old male coming in with history of   Hypertension, hyperlipidemia, alcohol use disorder, last drink 4 AM, complaining of headache since fall 2 weeks ago denies nausea vomiting abdominal pain chest pain shortness of breath. patient sleepy but arousable and answering questions and following directions oriented x3 when aroused.  Patient also is homeless.

## 2023-10-22 DIAGNOSIS — F10.239 ALCOHOL DEPENDENCE WITH WITHDRAWAL, UNSPECIFIED: ICD-10-CM

## 2023-10-22 DIAGNOSIS — Z29.9 ENCOUNTER FOR PROPHYLACTIC MEASURES, UNSPECIFIED: ICD-10-CM

## 2023-10-22 DIAGNOSIS — I10 ESSENTIAL (PRIMARY) HYPERTENSION: ICD-10-CM

## 2023-10-22 DIAGNOSIS — H40.9 UNSPECIFIED GLAUCOMA: ICD-10-CM

## 2023-10-22 LAB
ANION GAP SERPL CALC-SCNC: 15 MMOL/L — HIGH (ref 7–14)
ANION GAP SERPL CALC-SCNC: 15 MMOL/L — HIGH (ref 7–14)
BUN SERPL-MCNC: 8 MG/DL — SIGNIFICANT CHANGE UP (ref 7–23)
BUN SERPL-MCNC: 8 MG/DL — SIGNIFICANT CHANGE UP (ref 7–23)
CALCIUM SERPL-MCNC: 7.9 MG/DL — LOW (ref 8.4–10.5)
CALCIUM SERPL-MCNC: 7.9 MG/DL — LOW (ref 8.4–10.5)
CHLORIDE SERPL-SCNC: 101 MMOL/L — SIGNIFICANT CHANGE UP (ref 98–107)
CHLORIDE SERPL-SCNC: 101 MMOL/L — SIGNIFICANT CHANGE UP (ref 98–107)
CO2 SERPL-SCNC: 22 MMOL/L — SIGNIFICANT CHANGE UP (ref 22–31)
CO2 SERPL-SCNC: 22 MMOL/L — SIGNIFICANT CHANGE UP (ref 22–31)
CREAT SERPL-MCNC: 0.63 MG/DL — SIGNIFICANT CHANGE UP (ref 0.5–1.3)
CREAT SERPL-MCNC: 0.63 MG/DL — SIGNIFICANT CHANGE UP (ref 0.5–1.3)
EGFR: 109 ML/MIN/1.73M2 — SIGNIFICANT CHANGE UP
EGFR: 109 ML/MIN/1.73M2 — SIGNIFICANT CHANGE UP
GLUCOSE SERPL-MCNC: 78 MG/DL — SIGNIFICANT CHANGE UP (ref 70–99)
GLUCOSE SERPL-MCNC: 78 MG/DL — SIGNIFICANT CHANGE UP (ref 70–99)
MAGNESIUM SERPL-MCNC: 1.5 MG/DL — LOW (ref 1.6–2.6)
MAGNESIUM SERPL-MCNC: 1.5 MG/DL — LOW (ref 1.6–2.6)
PHOSPHATE SERPL-MCNC: 2.2 MG/DL — LOW (ref 2.5–4.5)
PHOSPHATE SERPL-MCNC: 2.2 MG/DL — LOW (ref 2.5–4.5)
POTASSIUM SERPL-MCNC: 3.7 MMOL/L — SIGNIFICANT CHANGE UP (ref 3.5–5.3)
POTASSIUM SERPL-MCNC: 3.7 MMOL/L — SIGNIFICANT CHANGE UP (ref 3.5–5.3)
POTASSIUM SERPL-SCNC: 3.7 MMOL/L — SIGNIFICANT CHANGE UP (ref 3.5–5.3)
POTASSIUM SERPL-SCNC: 3.7 MMOL/L — SIGNIFICANT CHANGE UP (ref 3.5–5.3)
SODIUM SERPL-SCNC: 138 MMOL/L — SIGNIFICANT CHANGE UP (ref 135–145)
SODIUM SERPL-SCNC: 138 MMOL/L — SIGNIFICANT CHANGE UP (ref 135–145)

## 2023-10-22 PROCEDURE — 99223 1ST HOSP IP/OBS HIGH 75: CPT

## 2023-10-22 PROCEDURE — 93010 ELECTROCARDIOGRAM REPORT: CPT

## 2023-10-22 RX ORDER — THIAMINE MONONITRATE (VIT B1) 100 MG
100 TABLET ORAL DAILY
Refills: 0 | Status: DISCONTINUED | OUTPATIENT
Start: 2023-10-22 | End: 2023-10-23

## 2023-10-22 RX ORDER — MAGNESIUM SULFATE 500 MG/ML
1 VIAL (ML) INJECTION ONCE
Refills: 0 | Status: COMPLETED | OUTPATIENT
Start: 2023-10-22 | End: 2023-10-22

## 2023-10-22 RX ORDER — FOLIC ACID 0.8 MG
1 TABLET ORAL DAILY
Refills: 0 | Status: DISCONTINUED | OUTPATIENT
Start: 2023-10-22 | End: 2023-10-29

## 2023-10-22 RX ORDER — LATANOPROST 0.05 MG/ML
1 SOLUTION/ DROPS OPHTHALMIC; TOPICAL AT BEDTIME
Refills: 0 | Status: DISCONTINUED | OUTPATIENT
Start: 2023-10-22 | End: 2023-10-29

## 2023-10-22 RX ORDER — ACETAMINOPHEN 500 MG
975 TABLET ORAL EVERY 6 HOURS
Refills: 0 | Status: DISCONTINUED | OUTPATIENT
Start: 2023-10-22 | End: 2023-10-29

## 2023-10-22 RX ORDER — POTASSIUM PHOSPHATE, MONOBASIC POTASSIUM PHOSPHATE, DIBASIC 236; 224 MG/ML; MG/ML
15 INJECTION, SOLUTION INTRAVENOUS ONCE
Refills: 0 | Status: COMPLETED | OUTPATIENT
Start: 2023-10-22 | End: 2023-10-22

## 2023-10-22 RX ORDER — THIAMINE MONONITRATE (VIT B1) 100 MG
100 TABLET ORAL ONCE
Refills: 0 | Status: DISCONTINUED | OUTPATIENT
Start: 2023-10-22 | End: 2023-10-22

## 2023-10-22 RX ORDER — DIAZEPAM 5 MG
5 TABLET ORAL ONCE
Refills: 0 | Status: DISCONTINUED | OUTPATIENT
Start: 2023-10-22 | End: 2023-10-22

## 2023-10-22 RX ORDER — DORZOLAMIDE HYDROCHLORIDE 20 MG/ML
1 SOLUTION/ DROPS OPHTHALMIC THREE TIMES A DAY
Refills: 0 | Status: DISCONTINUED | OUTPATIENT
Start: 2023-10-22 | End: 2023-10-29

## 2023-10-22 RX ORDER — TIMOLOL 0.5 %
1 DROPS OPHTHALMIC (EYE)
Refills: 0 | Status: DISCONTINUED | OUTPATIENT
Start: 2023-10-22 | End: 2023-10-29

## 2023-10-22 RX ORDER — SODIUM CHLORIDE 9 MG/ML
1000 INJECTION, SOLUTION INTRAVENOUS ONCE
Refills: 0 | Status: COMPLETED | OUTPATIENT
Start: 2023-10-22 | End: 2023-10-22

## 2023-10-22 RX ORDER — FOLIC ACID 0.8 MG
1 TABLET ORAL ONCE
Refills: 0 | Status: COMPLETED | OUTPATIENT
Start: 2023-10-22 | End: 2023-10-22

## 2023-10-22 RX ORDER — ONDANSETRON 8 MG/1
4 TABLET, FILM COATED ORAL ONCE
Refills: 0 | Status: COMPLETED | OUTPATIENT
Start: 2023-10-22 | End: 2023-10-22

## 2023-10-22 RX ORDER — AMLODIPINE BESYLATE 2.5 MG/1
5 TABLET ORAL DAILY
Refills: 0 | Status: DISCONTINUED | OUTPATIENT
Start: 2023-10-22 | End: 2023-10-29

## 2023-10-22 RX ORDER — DIAZEPAM 5 MG
10 TABLET ORAL ONCE
Refills: 0 | Status: DISCONTINUED | OUTPATIENT
Start: 2023-10-22 | End: 2023-10-22

## 2023-10-22 RX ADMIN — Medication 1 DROP(S): at 17:44

## 2023-10-22 RX ADMIN — Medication 50 MILLIGRAM(S): at 19:45

## 2023-10-22 RX ADMIN — Medication 50 MILLIGRAM(S): at 10:31

## 2023-10-22 RX ADMIN — Medication 50 MILLIGRAM(S): at 09:09

## 2023-10-22 RX ADMIN — Medication 100 GRAM(S): at 11:43

## 2023-10-22 RX ADMIN — Medication 50 MILLIGRAM(S): at 14:40

## 2023-10-22 RX ADMIN — Medication 50 MILLIGRAM(S): at 15:44

## 2023-10-22 RX ADMIN — Medication 50 MILLIGRAM(S): at 16:45

## 2023-10-22 RX ADMIN — Medication 50 MILLIGRAM(S): at 11:33

## 2023-10-22 RX ADMIN — Medication 2 MILLIGRAM(S): at 04:32

## 2023-10-22 RX ADMIN — Medication 50 MILLIGRAM(S): at 08:06

## 2023-10-22 RX ADMIN — Medication 100 MILLIGRAM(S): at 11:33

## 2023-10-22 RX ADMIN — DORZOLAMIDE HYDROCHLORIDE 1 DROP(S): 20 SOLUTION/ DROPS OPHTHALMIC at 22:16

## 2023-10-22 RX ADMIN — Medication 50 MILLIGRAM(S): at 13:32

## 2023-10-22 RX ADMIN — ONDANSETRON 4 MILLIGRAM(S): 8 TABLET, FILM COATED ORAL at 03:15

## 2023-10-22 RX ADMIN — Medication 50 MILLIGRAM(S): at 12:38

## 2023-10-22 RX ADMIN — Medication 1 TABLET(S): at 11:34

## 2023-10-22 RX ADMIN — Medication 10 MILLIGRAM(S): at 02:47

## 2023-10-22 RX ADMIN — POTASSIUM PHOSPHATE, MONOBASIC POTASSIUM PHOSPHATE, DIBASIC 62.5 MILLIMOLE(S): 236; 224 INJECTION, SOLUTION INTRAVENOUS at 11:41

## 2023-10-22 RX ADMIN — Medication 50 MILLIGRAM(S): at 17:45

## 2023-10-22 RX ADMIN — AMLODIPINE BESYLATE 5 MILLIGRAM(S): 2.5 TABLET ORAL at 11:33

## 2023-10-22 RX ADMIN — Medication 50 MILLIGRAM(S): at 20:55

## 2023-10-22 RX ADMIN — SODIUM CHLORIDE 1000 MILLILITER(S): 9 INJECTION, SOLUTION INTRAVENOUS at 02:48

## 2023-10-22 RX ADMIN — Medication 1 TABLET(S): at 02:48

## 2023-10-22 RX ADMIN — Medication 2 MILLIGRAM(S): at 05:45

## 2023-10-22 RX ADMIN — Medication 2 MILLIGRAM(S): at 22:15

## 2023-10-22 RX ADMIN — Medication 1 MILLIGRAM(S): at 02:48

## 2023-10-22 RX ADMIN — Medication 50 MILLIGRAM(S): at 18:46

## 2023-10-22 RX ADMIN — DORZOLAMIDE HYDROCHLORIDE 1 DROP(S): 20 SOLUTION/ DROPS OPHTHALMIC at 13:33

## 2023-10-22 RX ADMIN — Medication 50 MILLIGRAM(S): at 07:01

## 2023-10-22 RX ADMIN — Medication 1 MILLIGRAM(S): at 11:33

## 2023-10-22 NOTE — PROGRESS NOTE ADULT - PROBLEM SELECTOR PLAN 1
high risk CIWA monitoring - CIWA scores has been 6-13  started on chlordiazepoxide taper with PRN benzos  folic acid, MV, thiamine supplementation   SW consult  mild AST elevation likely secondary to alcohol use, monitor/trend  fall/seizure precautions

## 2023-10-22 NOTE — ED ADULT NURSE REASSESSMENT NOTE - NS ED NURSE REASSESS COMMENT FT1
Received report from Luiza judd. Patient noted to be tremulous shaking. Per MARTÍNEZ Judd, patients last CIWA was 13, MD ash made aware. Patient to be admitted for alcohol withdrawal. Patient noted to be unsteady on feet. Side rails up.

## 2023-10-22 NOTE — H&P ADULT - PROBLEM SELECTOR PLAN 1
high risk CIWA monitoring   started on chlordiazepoxide taper with PRN benzos available PRN   folic acid, MV, thiamine supplementation   SW consult, patient wishes to be discharged to treatment program  check EKG (No EKG in chart)  mild AST elevation likely secondary to alcohol use, monitor/trend  fall/seizure precautions

## 2023-10-22 NOTE — H&P ADULT - PROBLEM SELECTOR PROBLEM 2
Essential hypertension I will SWITCH the dose or number of times a day I take the medications listed below when I get home from the hospital:  None

## 2023-10-22 NOTE — H&P ADULT - SOCIAL HISTORY: ALCOHOL USE
Drinks 2-3 bottels of vodka daily, drinks beer daily  Last drink was this morning (cannot specify timing)

## 2023-10-22 NOTE — H&P ADULT - PROBLEM SELECTOR PLAN 3
c/w eye gtt  R periorbital superior swelling noted on imaging, patient denies any falls or pain, CTM c/w eye gtt  R periorbital superior swelling noted on imaging, patient denies any falls or pain (noted on CT maxillofacial from 9/11/23), CTM

## 2023-10-22 NOTE — H&P ADULT - ASSESSMENT
58 -year-old male with history of anxiety, alcohol abuse with history of prior withdrawal seizure 2 months ago, no known history of DTs, R eye blindness, glaucoma, HTN, presenting with headaches, found to be intoxicated, then experiencing withdrawal in the ED, admitted for detox

## 2023-10-22 NOTE — H&P ADULT - NSHPPHYSICALEXAM_GEN_ALL_CORE
Vital Signs Last 24 Hrs  T(C): 36.6 (22 Oct 2023 08:02), Max: 37.1 (22 Oct 2023 03:56)  T(F): 97.9 (22 Oct 2023 08:02), Max: 98.8 (22 Oct 2023 03:56)  HR: 83 (22 Oct 2023 08:02) (77 - 106)  BP: 129/79 (22 Oct 2023 08:02) (118/88 - 181/80)  RR: 20 (22 Oct 2023 08:02) (16 - 20)  SpO2: 96% (22 Oct 2023 08:02) (96% - 100%)    Parameters below as of 22 Oct 2023 08:02  Patient On (Oxygen Delivery Method): room air    PHYSICAL EXAM:  GENERAL: NAD  HEAD:  Atraumatic, Normocephalic  EYES: EOMI, R eye with opacification of cornea and slight injection of sclera; Mild R ptosis (per patient unchanged from chronic condition); L eye unremarkable  NECK: Supple, No JVD  CHEST/LUNG: Clear to auscultation bilaterally; No wheezes, rales or rhonchi; normal work of breathing, speaking in full sentences  HEART: Regular rate and rhythm; No murmurs, rubs, or gallops, (+)S1, S2  ABDOMEN: Soft, Nontender, Nondistended; Normal Bowel sounds   EXTREMITIES:  2+ Peripheral Pulses, No clubbing, cyanosis, or edema  PSYCH: normal mood and affect, A&Ox3  NEUROLOGY: no focal neuro deficits; strength 5/5 x4 extremities, sensation grossly intact; CN II-XII intact, FTN intact b/l, no asterixis; (+)tremor b/l hands  SKIN: No rashes or lesions on limited exam

## 2023-10-22 NOTE — H&P ADULT - NSHPREVIEWOFSYSTEMS_GEN_ALL_CORE
REVIEW OF SYSTEMS:    CONSTITUTIONAL: No weakness, fevers or chills  EYES/ENT: No visual changes, blind in R eye; No dysphagia; No sore throat; No rhinorrhea; No sinus pain/pressure  NECK: No pain or stiffness  RESPIRATORY: No cough, wheezing, hemoptysis; No shortness of breath  CARDIOVASCULAR: No chest pain or palpitations; No lower extremity edema  GASTROINTESTINAL: No abdominal or epigastric pain. No nausea, vomiting, or hematemesis; No diarrhea or constipation. No melena or hematochezia. (+)GERD  GENITOURINARY: No dysuria, frequency or hematuria  NEUROLOGICAL: No numbness, paresthesias, or weakness; No HA; No LH/dizziness  MSK: ambulates without aid; no falls since prior hospitalization  SKIN: No itching, burning, rashes, or lesions   All other review of systems is negative unless indicated above.

## 2023-10-22 NOTE — H&P ADULT - NSHPLABSRESULTS_GEN_ALL_CORE
12.7   4.66  )-----------( 313      ( 21 Oct 2023 14:00 )             38.6     145  |  107  |  10  ----------------------------<  91     10-21  3.9   |  23  |  0.65    Ca    8.4      21 Oct 2023 17:45  Phos  3.2     10-21  Mg     2.00     10-21    TPro  7.1  /  Alb  3.7  /  TBili  0.3  /  DBili  x   /  AST  47<H>  /  ALT  37  /  AlkPhos  67  10-21    PT/INR: 10.0/<0.90 (10-21-23 @ 17:45)  PTT: 27.3 (10-21-23 @ 17:45)    Alcohol, Blood: 308 mg/dL (10.21.23 @ 14:00)    < from: CT Head No Cont (10.21.23 @ 15:39) >/< from: CT Cervical Spine No Cont (10.21.23 @ 15:53) >  CT HEAD: Gray-white differentiation is intact. There is sulcal and ventricular prominence consistent with age appropriate involutional change. There are periventricular and subcortical white matter hypodensities, consistent with mild   microvascular changes. There is no acute intracranial hemorrhage, mass effect, hydrocephalus, or midline shift.  There are no extra-axial collections. The visualized paranasal sinuses and mastoid air cells are clear. Mild right periorbital soft tissue swelling superior to the right orbit. Right-sided cataract surgery. The calvarium is intact.  CT CERVICAL SPINE: There is no prevertebral soft tissue swelling. There is no splaying of the spinous processes. The occipital condyles are normal. Lateral masses of C1 align normally with C2. No lucent fracture line is identified.   There is no spondylolisthesis. Facet joint alignments are maintained. Multilevel degenerative spondyloarthritis and degenerative disc disease are present. Findings include marginal osteophytes, loss of normal disc space height and endplate sclerosis, and facet joint space compartment narrowing at multiple levels. Canal contents are suboptimally evaluated inherent to CT technique. The lung apices are clear. The surrounding soft tissues are unremarkable.  IMPRESSION:   CT Head: No acute intra-cranial hemorrhage, mass effect, or midline shift.  CT Cervical Spine: No acute fracture or traumatic subluxation. There are multi-level degenerative changes of the cervical spine.  < end of copied text >    No EKG in chart

## 2023-10-22 NOTE — ED ADULT NURSE REASSESSMENT NOTE - NS ED NURSE REASSESS COMMENT FT1
Break: patient assessed, MD Jamel TAYLOR made aware, plan is to give medication and reassess. patient placed on tele NSR, RA, will continue to monitor.

## 2023-10-22 NOTE — H&P ADULT - HISTORY OF PRESENT ILLNESS
58 -year-old male with history of anxiety, alcohol abuse with history of prior withdrawal seizure 2 months ago, no known history of DTs, R eye blindness, glaucoma, HTN, presenting with headaches, found to be intoxicated. Patient reports headaches are intermittent, right sided, pressure-like. He experiences the HA when drinking. Denies any vision changes.    VS: 98.8    122-140/76-80  18  %RA, received 1L LR IVF and 1L NS IVF, folic acid 1mg PO x1, ondansetron 4mg IV x1, diazepam 10mg IV x1, MV PO x1 and thiamine 100mg IV x1

## 2023-10-23 PROCEDURE — 99233 SBSQ HOSP IP/OBS HIGH 50: CPT

## 2023-10-23 PROCEDURE — 99233 SBSQ HOSP IP/OBS HIGH 50: CPT | Mod: GC

## 2023-10-23 RX ORDER — THIAMINE MONONITRATE (VIT B1) 100 MG
500 TABLET ORAL EVERY 8 HOURS
Refills: 0 | Status: COMPLETED | OUTPATIENT
Start: 2023-10-23 | End: 2023-10-26

## 2023-10-23 RX ORDER — LANOLIN ALCOHOL/MO/W.PET/CERES
3 CREAM (GRAM) TOPICAL AT BEDTIME
Refills: 0 | Status: DISCONTINUED | OUTPATIENT
Start: 2023-10-23 | End: 2023-10-29

## 2023-10-23 RX ORDER — INFLUENZA VIRUS VACCINE 15; 15; 15; 15 UG/.5ML; UG/.5ML; UG/.5ML; UG/.5ML
0.5 SUSPENSION INTRAMUSCULAR ONCE
Refills: 0 | Status: COMPLETED | OUTPATIENT
Start: 2023-10-23 | End: 2023-10-29

## 2023-10-23 RX ORDER — KETOROLAC TROMETHAMINE 30 MG/ML
15 SYRINGE (ML) INJECTION ONCE
Refills: 0 | Status: DISCONTINUED | OUTPATIENT
Start: 2023-10-23 | End: 2023-10-23

## 2023-10-23 RX ADMIN — LATANOPROST 1 DROP(S): 0.05 SOLUTION/ DROPS OPHTHALMIC; TOPICAL at 23:39

## 2023-10-23 RX ADMIN — Medication 975 MILLIGRAM(S): at 09:45

## 2023-10-23 RX ADMIN — Medication 15 MILLIGRAM(S): at 12:30

## 2023-10-23 RX ADMIN — Medication 2 MILLIGRAM(S): at 15:35

## 2023-10-23 RX ADMIN — Medication 975 MILLIGRAM(S): at 15:36

## 2023-10-23 RX ADMIN — Medication 2 MILLIGRAM(S): at 08:45

## 2023-10-23 RX ADMIN — Medication 1 DROP(S): at 18:41

## 2023-10-23 RX ADMIN — AMLODIPINE BESYLATE 5 MILLIGRAM(S): 2.5 TABLET ORAL at 05:18

## 2023-10-23 RX ADMIN — Medication 50 MILLIGRAM(S): at 13:36

## 2023-10-23 RX ADMIN — Medication 3 MILLIGRAM(S): at 23:39

## 2023-10-23 RX ADMIN — Medication 15 MILLIGRAM(S): at 11:42

## 2023-10-23 RX ADMIN — Medication 975 MILLIGRAM(S): at 16:35

## 2023-10-23 RX ADMIN — Medication 100 MILLIGRAM(S): at 11:41

## 2023-10-23 RX ADMIN — Medication 2 MILLIGRAM(S): at 04:28

## 2023-10-23 RX ADMIN — Medication 975 MILLIGRAM(S): at 08:45

## 2023-10-23 RX ADMIN — Medication 2 MILLIGRAM(S): at 23:58

## 2023-10-23 RX ADMIN — Medication 1 TABLET(S): at 11:41

## 2023-10-23 RX ADMIN — Medication 75 MILLIGRAM(S): at 18:41

## 2023-10-23 RX ADMIN — DORZOLAMIDE HYDROCHLORIDE 1 DROP(S): 20 SOLUTION/ DROPS OPHTHALMIC at 13:35

## 2023-10-23 RX ADMIN — Medication 105 MILLIGRAM(S): at 21:38

## 2023-10-23 RX ADMIN — Medication 1 MILLIGRAM(S): at 11:41

## 2023-10-23 RX ADMIN — DORZOLAMIDE HYDROCHLORIDE 1 DROP(S): 20 SOLUTION/ DROPS OPHTHALMIC at 21:39

## 2023-10-23 RX ADMIN — Medication 2 MILLIGRAM(S): at 11:41

## 2023-10-23 NOTE — CHART NOTE - NSCHARTNOTEFT_GEN_A_CORE
Notified by RN pt arrived from ED with CIWA scores ranging 12-15, and already maxed out on Librium dose for today. Patient seen and assessed at bedside, resting comfortable NAD noted, CIWA score  12, pt appears calm and cooperative AO x 4, reports he wants to sleep. Given elevated scores and no available dose of librium due to multiple prns given throughout the day, Ativan prn was given for CIWA 15 when first arrived to the floor, Tylenol ordered for headache prn, MICU consulted for  possible Phenobarbital taper, as per RN and 5S ANM  - only up to 60mg of phenobarbital is permitted to start on the floor, will c/t monitor,   Vital Signs Last 24 Hrs  T(F): 98.4 (23 Oct 2023 01:30), Max: 98.8 (22 Oct 2023 03:56)  HR: 66 (23 Oct 2023 01:30) (60 - 91)  BP: 130/74 (23 Oct 2023 01:30) (118/88 - 181/80)  RR: 18 (23 Oct 2023 01:30) (16 - 20)  SpO2: 100% (23 Oct 2023 01:30) (96% - 100%) Notified by RN pt arrived from ED with CIWA scores ranging 12-15, and already maxed out on Librium dose for today. Patient seen and assessed at bedside, resting comfortable NAD noted, CIWA score  12, pt appears calm and cooperative AO x 4, reports he wants to sleep. Given elevated scores and no available dose of librium due to multiple prns given throughout the day, Ativan prn was given for CIWA 15 when first arrived to the floor, Tylenol ordered for headache prn, MICU consulted for  possible Phenobarbital taper, as per RN and 5S ANM  - only up to 60mg of phenobarbital is permitted to start on the floor, will c/t monitor.  Vital Signs Last 24 Hrs  T(F): 98.4 (23 Oct 2023 01:30), Max: 98.8 (22 Oct 2023 03:56)  HR: 66 (23 Oct 2023 01:30) (60 - 91)  BP: 130/74 (23 Oct 2023 01:30) (118/88 - 181/80)  RR: 18 (23 Oct 2023 01:30) (16 - 20)  SpO2: 100% (23 Oct 2023 01:30) (96% - 100%)

## 2023-10-23 NOTE — CONSULT NOTE ADULT - ASSESSMENT
58 -year-old male with history of anxiety, alcohol abuse with history of prior withdrawal seizure 2 months ago, no known history of DTs, R eye blindness, glaucoma requiring repeated symptom triggered librium doses, micu consulted for persistently high CIWA scores

## 2023-10-23 NOTE — CONSULT NOTE ADULT - PROBLEM SELECTOR RECOMMENDATION 9
Patient CIWA of 10 on interview. Endorsing 8/10 HA, endorsing tremors, sweats,. Denies anxiety, n/v/d/c. No signs of agitation, hallucinations, auditory disturbances.    RECOMMEND:   -2.5mg/kg of phenobarb of Ideal BW = 175mg over 10 min  -can give additional phenobarb if sx not improving   -hold further benzos  -phenobarb level in the AM    ***note not finalized until attending attestation is completed

## 2023-10-23 NOTE — PROVIDER CONTACT NOTE (OTHER) - SITUATION
Pt scoring a 15 on initial CIWA to floor from ED. C/o moderate headache and anxiety with severe tremors.
CIWA score remains greater than 8. Patient receiving PRN symptom-triggered every hour since 8am
Pt due for standing librium dose for taper, pt has already received max dosage of librium in a 24hr period. Per pharmacy max dose is 300mg in 24hrs.
Pt has received PRN for symptom-triggered librium 50mg every hour since 8am. Current CIWA score is 14

## 2023-10-23 NOTE — PATIENT PROFILE ADULT - FALL HARM RISK
Spoke with patient, received consent to speak with Rony.    Patient worked in today at [facility], sent to Jannet and Pam to schedule.    other ciwa/other

## 2023-10-23 NOTE — PROVIDER CONTACT NOTE (OTHER) - ASSESSMENT
Pt came to floor AOx4, VSS with no acute distress noted, pt scoring no at a 12. Ativan PRN given, with some symptom relief.
CIWA score 13
CIWA score is 14
Pt AOx4, VSS with no acute distress noted.

## 2023-10-23 NOTE — CHART NOTE - NSCHARTNOTEFT_GEN_A_CORE
Patient scoring 7-9 on CIWA overnight and during day 10/23.  consulted. Recommended restarting Librium taper at 75mg dose if CIWA score >8. Patient scored 9 on CIWA at 1500. Librium 75mg taper restarted - ordered to begin 6 hours after last dose. Will continue to monitor closely.     Yared Amaya PA-C  Department of Medicine  Pager #[phone]

## 2023-10-23 NOTE — CONSULT NOTE ADULT - SUBJECTIVE AND OBJECTIVE BOX
HPI: 58 -year-old male with history of anxiety, alcohol abuse with history of prior withdrawal seizure 2 months ago, no known history of DTs, R eye blindness, glaucoma, HTN, presenting with headaches, found to be intoxicated. Patient reports headaches are intermittent, right sided, pressure-like. He experiences the HA when drinking. Denies any vision changes.  VS: 98.8    122-140/76-80  18  %RA, received 1L LR IVF and 1L NS IVF, folic acid 1mg PO x1, ondansetron 4mg IV x1, diazepam 10mg IV x1, MV PO x1 and thiamine 100mg IV x1  Patient on 50mg librium q1hr symptom triggered with high CIWA scores, micu consulted. On interview patient AOx3, able to state, date, place, year, has insight into hospitalization. On interview, CIWA score of 10.      PAST MEDICAL & SURGICAL HISTORY:  Glaucoma      HTN (hypertension)      Alcohol dependence      Blindness of right eye      HLD (hyperlipidemia)      Anxiety and depression      No significant past surgical history          FAMILY HISTORY:  FH: type 2 diabetes        Allergies    No Known Allergies    Intolerances        HOME MEDICATIONS:    REVIEW OF SYSTEMS:  CONSTITUTIONAL: No weakness, fevers, chills, sick contacts, or unintended weight loss  EYES: No visual changes or vertigo  ENT: No throat pain, rhinorrhea, or hearing loss   NECK: No pain or stiffness  RESPIRATORY: No cough, wheezing, hemoptysis; No shortness of breath  CARDIOVASCULAR: No chest pain or palpitations  GASTROINTESTINAL: No abdominal or epigastric pain. No nausea, vomiting, or hematemesis; No diarrhea or constipation. No melena or hematochezia.  GENITOURINARY: No dysuria, frequency or hematuria  NEUROLOGICAL: No numbness or weakness  SKIN: No itching, rashes, or bruises  Psych: Good mood, no substance use      OBJECTIVE:  ICU Vital Signs Last 24 Hrs  T(C): 36.6 (22 Oct 2023 23:31), Max: 37.1 (22 Oct 2023 03:56)  T(F): 97.9 (22 Oct 2023 23:31), Max: 98.8 (22 Oct 2023 03:56)  HR: 65 (22 Oct 2023 23:31) (60 - 91)  BP: 121/70 (22 Oct 2023 23:31) (118/88 - 181/80)  BP(mean): --  ABP: --  ABP(mean): --  RR: 17 (22 Oct 2023 23:31) (16 - 20)  SpO2: 98% (22 Oct 2023 23:31) (96% - 100%)    O2 Parameters below as of 22 Oct 2023 23:31  Patient On (Oxygen Delivery Method): room air              10-22 @ 07:01  -  10-23 @ 00:33  --------------------------------------------------------  IN: 0 mL / OUT: 400 mL / NET: -400 mL      CAPILLARY BLOOD GLUCOSE          VITALS:   T(C): 36.6 (10-22-23 @ 23:31), Max: 37.1 (10-22-23 @ 03:56)  HR: 65 (10-22-23 @ 23:31) (60 - 91)  BP: 121/70 (10-22-23 @ 23:31) (118/88 - 181/80)  RR: 17 (10-22-23 @ 23:31) (16 - 20)  SpO2: 98% (10-22-23 @ 23:31) (96% - 100%)    GENERAL: NAD, lying in bed comfortably  HEAD:  Atraumatic, Normocephalic  EYES: EOMI, PERRLA, conjunctiva and sclera clear  ENT: Moist mucous membranes  NECK: Supple, No JVD  CHEST/LUNG: Clear to auscultation bilaterally; No rales, rhonchi, wheezing, or rubs. Unlabored respirations  HEART: Regular rate and rhythm; No murmurs, rubs, or gallops  ABDOMEN: BSx4; Soft, nontender, nondistended  EXTREMITIES:  2+ Peripheral Pulses, brisk capillary refill. No clubbing, cyanosis, or edema  NERVOUS SYSTEM:  A&Ox3, no focal deficits   SKIN: No rashes or lesions  Psych: Normal speech, normal behavior, normal affect    HOSPITAL MEDICATIONS:  MEDICATIONS  (STANDING):  amLODIPine   Tablet 5 milliGRAM(s) Oral daily  chlordiazePOXIDE   Oral   chlordiazePOXIDE 50 milliGRAM(s) Oral every 6 hours  chlordiazePOXIDE 50 milliGRAM(s) Oral every 8 hours  dorzolamide 2% Ophthalmic Solution 1 Drop(s) Both EYES three times a day  folic acid 1 milliGRAM(s) Oral daily  influenza   Vaccine 0.5 milliLiter(s) IntraMuscular once  latanoprost 0.005% Ophthalmic Solution 1 Drop(s) Both EYES at bedtime  multivitamin 1 Tablet(s) Oral daily  thiamine 100 milliGRAM(s) Oral daily  timolol 0.5% Solution 1 Drop(s) Both EYES two times a day    MEDICATIONS  (PRN):  acetaminophen     Tablet .. 975 milliGRAM(s) Oral every 6 hours PRN Moderate Pain (4 - 6), Severe Pain (7 - 10)  chlordiazePOXIDE 50 milliGRAM(s) Oral every 1 hour PRN Symptom-triggered: each CIWA -Ar score 8 or GREATER  LORazepam     Tablet 2 milliGRAM(s) Oral every 2 hours PRN Symptom-triggered 2 point increase in CIWA-Ar      LABS:                        12.7   4.66  )-----------( 313      ( 21 Oct 2023 14:00 )             38.6     10-22    138  |  101  |  8   ----------------------------<  78  3.7   |  22  |  0.63    Ca    7.9<L>      22 Oct 2023 07:50  Phos  2.2     10-22  Mg     1.50     10-22    TPro  7.1  /  Alb  3.7  /  TBili  0.3  /  DBili  x   /  AST  47<H>  /  ALT  37  /  AlkPhos  67  10-21    PT/INR - ( 21 Oct 2023 17:45 )   PT: 10.0 sec;   INR: <0.90 ratio         PTT - ( 21 Oct 2023 17:45 )  PTT:27.3 sec  Urinalysis Basic - ( 22 Oct 2023 07:50 )    Color: x / Appearance: x / SG: x / pH: x  Gluc: 78 mg/dL / Ketone: x  / Bili: x / Urobili: x   Blood: x / Protein: x / Nitrite: x   Leuk Esterase: x / RBC: x / WBC x   Sq Epi: x / Non Sq Epi: x / Bacteria: x            MICROBIOLOGY:     RADIOLOGY:  [ ] Reviewed and interpreted by me    EKG:

## 2023-10-23 NOTE — CONSULT NOTE ADULT - ATTENDING COMMENTS
58 M history of alcohol abuse, prior withdrawal seizure, hypertension admitted for alcohol withdrawal.  Currently on standing regimen of chlordiazepoxide with every 1 hour as needed doses.  Patient has been scoring CIWA of 12 without improvement, MICU consulted for recommendation.  Patient denies hallucination or anxiousness, complains of mild tremor and headache.    On exam patient is mildly tremulous, he is awake, alert and oriented, he is appropriate and cooperative with examination, no significant hypertension or tachycardia.    Laboratory studies grossly unremarkable.  Patient currently scoring 10 on CIWA scale on our evaluation.  Given high dose benzodiazepine, would suggest bolus phenobarbital 175 mg up to 2 doses over 10 minutes each as needed for agitation.  Avoid giving further benzodiazepines in conjunction with phenobarbital.  Obtain phenobarbital level in a.m.  Continue thiamine supplementation.  Patient does not require critical care services at this time, please call with questions.

## 2023-10-23 NOTE — PROGRESS NOTE ADULT - PROBLEM SELECTOR PLAN 1
high risk CIWA monitoring - CIWA scores has been 6-13  started on chlordiazepoxide taper with PRN benzos  folic acid, MV, thiamine supplementation   SW consult  mild AST elevation likely secondary to alcohol use, monitor/trend  fall/seizure precautions  ct head and cspine showed No acute intra-cranial hemorrhage, mass effect, or midline shift. CT Cervical Spine:  No acute fracture or traumatic subluxation. There are multi-level degenerative changes of the cervical spine.  MICU was consulted, rec phenobarb however high doses of phenobarb cannot be given on the floor per nsg  Psych consulted, discussed with psych team, rec to increase tapering dose of librium to 75mg q6h and taper if CIWA scores remain greater than 7 at the next CIWA scoring, if CIWA scores persists to be increasing will have to reconsult MICU again  Psych team to see pt tomorrow

## 2023-10-23 NOTE — SBIRT NOTE ADULT - NSSBIRTALCPASSREFTXDET_GEN_A_CORE
Patient expressed he's aware of the risks associated with excessive alcohol use. He declined inpatient rehab at this time and reported to  that he reached out to a community agency that was provided to him during his last admission and he plans on following up after discharge. He stated he doesn't remember the name of the agency and did not have the information with him. Patient declined additional resources at this time.

## 2023-10-23 NOTE — PATIENT PROFILE ADULT - FALL HARM RISK - HARM RISK INTERVENTIONS
10 year old girl with one day fever Tmax 104 taken temporally and fatigue. _congestion and runny nose. No sore thorat, cough,   No sick contacts . last took tylenol at midnight.  Mild abdominal pain with no associated vomiting/diarrhea.   No medical conditions. Had T/A 10 year old girl with one day fever Tmax 100.4 taken temporally and fatigue. She has associated congestion and runny nose. She had a headache earlier in the day that resolved after taking Motrin. No sore throat or cough. Mild abdominal pain with no associated vomiting/diarrhea. No sick contacts.     No medical conditions. Had T/A Assistance with ambulation/Assistance OOB with selected safe patient handling equipment/Communicate Risk of Fall with Harm to all staff/Discuss with provider need for PT consult/Monitor for mental status changes/Monitor gait and stability/Reinforce activity limits and safety measures with patient and family/Tailored Fall Risk Interventions/Toileting schedule using arm’s reach rule for commode and bathroom/Use of alarms - bed, chair and/or voice tab/Visual Cue: Yellow wristband and red socks/Bed in lowest position, wheels locked, appropriate side rails in place/Call bell, personal items and telephone in reach/Instruct patient to call for assistance before getting out of bed or chair/Non-slip footwear when patient is out of bed/[place] to call system/Physically safe environment - no spills, clutter or unnecessary equipment/Purposeful Proactive Rounding/Room/bathroom lighting operational, light cord in reach

## 2023-10-23 NOTE — PROVIDER CONTACT NOTE (OTHER) - BACKGROUND
Admitted for alcohol withdrawal
Pt admitted for alcohol withdrawal, PMHx alcohol abuse, anxiety, and prior withdrawal seizure 2 months ago.
Pt admitted for alcohol withdrawal
Pt admitted for alcohol withdrawal PMHx anxiety, glaucoma, withdrawal seizure 2 months ago.

## 2023-10-23 NOTE — PROVIDER CONTACT NOTE (OTHER) - REASON
CIWA score remains greater than 8. Patient receiving PRN symptom-triggered every hour since 8am
CIWA score 14. Pt receiving PRN for symptom-triggered every hour.
Pt due for standing librium dose for taper, pt has already received max dosage of librium in a 24hr period.
Pt scoring a 15 on initial CIWA to floor from ED.

## 2023-10-23 NOTE — PATIENT PROFILE ADULT - NSPROPTRIGHTNOTIFY_GEN_A_NUR
declines I have reviewed and confirmed nurses' notes for patient's medications, allergies, medical history, and surgical history.

## 2023-10-23 NOTE — PROVIDER CONTACT NOTE (OTHER) - ACTION/TREATMENT ORDERED:
No new orders at this time.
ACP notified, will follow up with regarding medication taper, push librium midnight dose to 1am until further notice.
ACP notified, PRN ativan given per ACP and as ordered, pt placed on . Will continue to monitor.
Provider made aware. continue to administer PRNs ordered.

## 2023-10-24 LAB
ANION GAP SERPL CALC-SCNC: 11 MMOL/L — SIGNIFICANT CHANGE UP (ref 7–14)
ANION GAP SERPL CALC-SCNC: 11 MMOL/L — SIGNIFICANT CHANGE UP (ref 7–14)
BUN SERPL-MCNC: 9 MG/DL — SIGNIFICANT CHANGE UP (ref 7–23)
BUN SERPL-MCNC: 9 MG/DL — SIGNIFICANT CHANGE UP (ref 7–23)
CALCIUM SERPL-MCNC: 8.5 MG/DL — SIGNIFICANT CHANGE UP (ref 8.4–10.5)
CALCIUM SERPL-MCNC: 8.5 MG/DL — SIGNIFICANT CHANGE UP (ref 8.4–10.5)
CHLORIDE SERPL-SCNC: 103 MMOL/L — SIGNIFICANT CHANGE UP (ref 98–107)
CHLORIDE SERPL-SCNC: 103 MMOL/L — SIGNIFICANT CHANGE UP (ref 98–107)
CO2 SERPL-SCNC: 24 MMOL/L — SIGNIFICANT CHANGE UP (ref 22–31)
CO2 SERPL-SCNC: 24 MMOL/L — SIGNIFICANT CHANGE UP (ref 22–31)
CREAT SERPL-MCNC: 0.64 MG/DL — SIGNIFICANT CHANGE UP (ref 0.5–1.3)
CREAT SERPL-MCNC: 0.64 MG/DL — SIGNIFICANT CHANGE UP (ref 0.5–1.3)
EGFR: 108 ML/MIN/1.73M2 — SIGNIFICANT CHANGE UP
EGFR: 108 ML/MIN/1.73M2 — SIGNIFICANT CHANGE UP
GLUCOSE SERPL-MCNC: 103 MG/DL — HIGH (ref 70–99)
GLUCOSE SERPL-MCNC: 103 MG/DL — HIGH (ref 70–99)
HCT VFR BLD CALC: 37.3 % — LOW (ref 39–50)
HCT VFR BLD CALC: 37.3 % — LOW (ref 39–50)
HGB BLD-MCNC: 12.1 G/DL — LOW (ref 13–17)
HGB BLD-MCNC: 12.1 G/DL — LOW (ref 13–17)
MAGNESIUM SERPL-MCNC: 1.8 MG/DL — SIGNIFICANT CHANGE UP (ref 1.6–2.6)
MAGNESIUM SERPL-MCNC: 1.8 MG/DL — SIGNIFICANT CHANGE UP (ref 1.6–2.6)
MCHC RBC-ENTMCNC: 28.9 PG — SIGNIFICANT CHANGE UP (ref 27–34)
MCHC RBC-ENTMCNC: 28.9 PG — SIGNIFICANT CHANGE UP (ref 27–34)
MCHC RBC-ENTMCNC: 32.4 GM/DL — SIGNIFICANT CHANGE UP (ref 32–36)
MCHC RBC-ENTMCNC: 32.4 GM/DL — SIGNIFICANT CHANGE UP (ref 32–36)
MCV RBC AUTO: 89.2 FL — SIGNIFICANT CHANGE UP (ref 80–100)
MCV RBC AUTO: 89.2 FL — SIGNIFICANT CHANGE UP (ref 80–100)
NRBC # BLD: 0 /100 WBCS — SIGNIFICANT CHANGE UP (ref 0–0)
NRBC # BLD: 0 /100 WBCS — SIGNIFICANT CHANGE UP (ref 0–0)
NRBC # FLD: 0 K/UL — SIGNIFICANT CHANGE UP (ref 0–0)
NRBC # FLD: 0 K/UL — SIGNIFICANT CHANGE UP (ref 0–0)
PHOSPHATE SERPL-MCNC: 3.5 MG/DL — SIGNIFICANT CHANGE UP (ref 2.5–4.5)
PHOSPHATE SERPL-MCNC: 3.5 MG/DL — SIGNIFICANT CHANGE UP (ref 2.5–4.5)
PLATELET # BLD AUTO: 230 K/UL — SIGNIFICANT CHANGE UP (ref 150–400)
PLATELET # BLD AUTO: 230 K/UL — SIGNIFICANT CHANGE UP (ref 150–400)
POTASSIUM SERPL-MCNC: 3.3 MMOL/L — LOW (ref 3.5–5.3)
POTASSIUM SERPL-MCNC: 3.3 MMOL/L — LOW (ref 3.5–5.3)
POTASSIUM SERPL-SCNC: 3.3 MMOL/L — LOW (ref 3.5–5.3)
POTASSIUM SERPL-SCNC: 3.3 MMOL/L — LOW (ref 3.5–5.3)
RBC # BLD: 4.18 M/UL — LOW (ref 4.2–5.8)
RBC # BLD: 4.18 M/UL — LOW (ref 4.2–5.8)
RBC # FLD: 14.8 % — HIGH (ref 10.3–14.5)
RBC # FLD: 14.8 % — HIGH (ref 10.3–14.5)
SODIUM SERPL-SCNC: 138 MMOL/L — SIGNIFICANT CHANGE UP (ref 135–145)
SODIUM SERPL-SCNC: 138 MMOL/L — SIGNIFICANT CHANGE UP (ref 135–145)
WBC # BLD: 5.59 K/UL — SIGNIFICANT CHANGE UP (ref 3.8–10.5)
WBC # BLD: 5.59 K/UL — SIGNIFICANT CHANGE UP (ref 3.8–10.5)
WBC # FLD AUTO: 5.59 K/UL — SIGNIFICANT CHANGE UP (ref 3.8–10.5)
WBC # FLD AUTO: 5.59 K/UL — SIGNIFICANT CHANGE UP (ref 3.8–10.5)

## 2023-10-24 PROCEDURE — 90792 PSYCH DIAG EVAL W/MED SRVCS: CPT

## 2023-10-24 PROCEDURE — 99233 SBSQ HOSP IP/OBS HIGH 50: CPT

## 2023-10-24 RX ORDER — POTASSIUM CHLORIDE 20 MEQ
40 PACKET (EA) ORAL ONCE
Refills: 0 | Status: COMPLETED | OUTPATIENT
Start: 2023-10-24 | End: 2023-10-24

## 2023-10-24 RX ADMIN — Medication 3 MILLIGRAM(S): at 21:17

## 2023-10-24 RX ADMIN — LATANOPROST 1 DROP(S): 0.05 SOLUTION/ DROPS OPHTHALMIC; TOPICAL at 21:17

## 2023-10-24 RX ADMIN — AMLODIPINE BESYLATE 5 MILLIGRAM(S): 2.5 TABLET ORAL at 05:21

## 2023-10-24 RX ADMIN — Medication 105 MILLIGRAM(S): at 14:34

## 2023-10-24 RX ADMIN — Medication 105 MILLIGRAM(S): at 21:17

## 2023-10-24 RX ADMIN — Medication 975 MILLIGRAM(S): at 09:30

## 2023-10-24 RX ADMIN — Medication 2 MILLIGRAM(S): at 17:48

## 2023-10-24 RX ADMIN — DORZOLAMIDE HYDROCHLORIDE 1 DROP(S): 20 SOLUTION/ DROPS OPHTHALMIC at 14:56

## 2023-10-24 RX ADMIN — Medication 75 MILLIGRAM(S): at 21:17

## 2023-10-24 RX ADMIN — Medication 1 DROP(S): at 17:48

## 2023-10-24 RX ADMIN — Medication 1 DROP(S): at 05:27

## 2023-10-24 RX ADMIN — Medication 105 MILLIGRAM(S): at 05:22

## 2023-10-24 RX ADMIN — Medication 2 MILLIGRAM(S): at 03:34

## 2023-10-24 RX ADMIN — Medication 40 MILLIEQUIVALENT(S): at 11:27

## 2023-10-24 RX ADMIN — Medication 1 MILLIGRAM(S): at 11:28

## 2023-10-24 RX ADMIN — Medication 75 MILLIGRAM(S): at 13:27

## 2023-10-24 RX ADMIN — Medication 75 MILLIGRAM(S): at 07:07

## 2023-10-24 RX ADMIN — Medication 75 MILLIGRAM(S): at 01:21

## 2023-10-24 RX ADMIN — Medication 975 MILLIGRAM(S): at 10:23

## 2023-10-24 RX ADMIN — Medication 975 MILLIGRAM(S): at 04:05

## 2023-10-24 RX ADMIN — Medication 975 MILLIGRAM(S): at 03:35

## 2023-10-24 RX ADMIN — DORZOLAMIDE HYDROCHLORIDE 1 DROP(S): 20 SOLUTION/ DROPS OPHTHALMIC at 21:17

## 2023-10-24 RX ADMIN — Medication 1 TABLET(S): at 11:28

## 2023-10-24 RX ADMIN — DORZOLAMIDE HYDROCHLORIDE 1 DROP(S): 20 SOLUTION/ DROPS OPHTHALMIC at 05:26

## 2023-10-24 NOTE — BH CONSULTATION LIAISON ASSESSMENT NOTE - NSBHCHARTREVIEWVS_PSY_A_CORE FT
Vital Signs Last 24 Hrs  T(C): 36.5 (24 Oct 2023 07:40), Max: 37.1 (23 Oct 2023 17:34)  T(F): 97.7 (24 Oct 2023 07:40), Max: 98.8 (23 Oct 2023 17:34)  HR: 64 (24 Oct 2023 07:40) (57 - 86)  BP: 104/53 (24 Oct 2023 07:40) (104/53 - 138/63)  BP(mean): --  RR: 18 (24 Oct 2023 07:40) (18 - 20)  SpO2: 98% (24 Oct 2023 07:40) (96% - 100%)    Parameters below as of 24 Oct 2023 07:40  Patient On (Oxygen Delivery Method): room air

## 2023-10-24 NOTE — PROGRESS NOTE ADULT - PROBLEM SELECTOR PLAN 1
high risk CIWA monitoring - CIWA scores has been 5 today  Cont chlordiazepoxide taper (of note pt has received multiple doses of librium on 22nd, discussed with Psych attending Dr. Farfan on 10/24, since pt has symptoms of active withdrawal rec to cont librium at current taper doses, discussed with Pharmacist Goldy, monitor lfts)  ativan prn  folic acid, MV, thiamine supplementation   SW consult  mild AST elevation likely secondary to alcohol use, monitor/trend  fall/seizure precautions  ct head and cspine showed No acute intra-cranial hemorrhage, mass effect, or midline shift. CT Cervical Spine:  No acute fracture or traumatic subluxation. There are multi-level degenerative changes of the cervical spine.  MICU was consulted, rec phenobarb however high doses of phenobarb cannot be given on the floor per nsg  Psych consulted and daniel

## 2023-10-24 NOTE — BH CONSULTATION LIAISON ASSESSMENT NOTE - SUMMARY
59 yo M PMHx of ETOH abuse, prior admissions for withdrawal, seizures, DT, HTN, HLD, depression, right eye blindness, anxiety presents with alcohol withdrawal symptoms. Psychiatry was consulted for alcohol withdrawal recommendations. On presentation, patient reports that he drinks “bottles” of alcohol, and that he tried to stop in the past but was not successful. He is interested in abstaining from alcohol drinking. Patient reports wanting to go to alcohol rehab, after he is discharged from here. He is interested in starting Naltrexone once his liver enzymes improve. He reports feeling tired, and bugs crawling on his skin, but he denies SI/HI/AVH.     Plan:   Continue CIWA protocol     Continue lorazepam per protocol     Continue Librium 75 mg PO q6h     Thiamine 100 mg IV TID for 2-3 days     Initiate SBIRT. Patient motivated to abstain, and willing to engage in conversation about resources.      Consider starting Naltrexone 50 mg when withdrawal symptoms improve.     Check liver function tests, as we continue Librium and consider Naltrexone.   57 yo M PMHx of ETOH abuse, prior admissions for withdrawal, seizures, DT, HTN, HLD, depression, right eye blindness, anxiety presents with alcohol withdrawal symptoms. Psychiatry was consulted for alcohol withdrawal recommendations. On presentation, patient reports that he drinks “bottles” of alcohol, and that he tried to stop in the past but was not successful. He is interested in abstaining from alcohol drinking. Patient reports wanting to go to alcohol rehab, after he is discharged from here. He is interested in starting Naltrexone once his liver enzymes improve. He reports feeling tired, and bugs crawling on his skin, but he denies SI/HI/AVH.     Plan:   Continue CIWA protocol and Continue lorazepam per protocol     Continue Librium taper as written    Thiamine 500 mg IV TID for 2-3 days     Initiate SBIRT. Patient motivated to abstain, and willing to engage in conversation about resources.      Consider starting Naltrexone 50 mg when withdrawal symptoms improve.     Check liver function tests, as we continue Librium and consider Naltrexone.

## 2023-10-24 NOTE — BH CONSULTATION LIAISON ASSESSMENT NOTE - NSICDXBHSECONDARYDX_PSY_ALL_CORE
Alcohol dependence with withdrawal   F10.239  Essential hypertension   I10  Glaucoma   H40.9  Need for prophylactic measure   Z29.9   Alcohol dependence with withdrawal   F10.239

## 2023-10-24 NOTE — BH CONSULTATION LIAISON ASSESSMENT NOTE - HPI (INCLUDE ILLNESS QUALITY, SEVERITY, DURATION, TIMING, CONTEXT, MODIFYING FACTORS, ASSOCIATED SIGNS AND SYMPTOMS)
57 yo M PMHx of ETOH abuse, prior admissions for withdrawal, seizures, DT, HTN, HLD, depression, right eye blindness, anxiety presents with alcohol withdrawal symptoms. Psychiatry was consulted for alcohol withdrawal recommendations. On presentation, patient reports that he drinks “bottles” of alcohol, and that he tried to stop in the past but was not successful. He is interested in abstaining from alcohol drinking. Patient reports wanting to go to alcohol rehab, after he is discharged from here. He is interested in starting Naltrexone once his liver enzymes improve.      He reports feeling tired, and bugs crawling on his skin, but he denies SI/HI/AVH.  59 yo M PMHx of ETOH abuse, prior admissions for withdrawal, seizures, DT, HTN, HLD, depression, right eye blindness, anxiety presents with alcohol withdrawal symptoms. Psychiatry was consulted for alcohol withdrawal recommendations. On presentation, patient reports that he drinks “bottles” of alcohol, and that he tried to stop in the past but was not successful. He is interested in abstaining from alcohol drinking. Patient reports wanting to go to alcohol rehab, after he is discharged from here. He is interested in starting Naltrexone once his detox is completed,  liver enzymes improve.      He reports feeling tired, and bugs crawling on his skin, but he denies SI/HI/AVH.     Denies past psychiatric history, denies h/o SA

## 2023-10-24 NOTE — BH CONSULTATION LIAISON ASSESSMENT NOTE - CURRENT MEDICATION
MEDICATIONS  (STANDING):  amLODIPine   Tablet 5 milliGRAM(s) Oral daily  chlordiazePOXIDE 75 milliGRAM(s) Oral every 8 hours  chlordiazePOXIDE 75 milliGRAM(s) Oral every 6 hours  chlordiazePOXIDE   Oral   dorzolamide 2% Ophthalmic Solution 1 Drop(s) Both EYES three times a day  folic acid 1 milliGRAM(s) Oral daily  influenza   Vaccine 0.5 milliLiter(s) IntraMuscular once  latanoprost 0.005% Ophthalmic Solution 1 Drop(s) Both EYES at bedtime  multivitamin 1 Tablet(s) Oral daily  thiamine IVPB 500 milliGRAM(s) IV Intermittent every 8 hours  timolol 0.5% Solution 1 Drop(s) Both EYES two times a day    MEDICATIONS  (PRN):  acetaminophen     Tablet .. 975 milliGRAM(s) Oral every 6 hours PRN Moderate Pain (4 - 6), Severe Pain (7 - 10)  LORazepam     Tablet 2 milliGRAM(s) Oral every 2 hours PRN Symptom-triggered 2 point increase in CIWA-Ar  melatonin 3 milliGRAM(s) Oral at bedtime PRN Insomnia

## 2023-10-24 NOTE — BH CONSULTATION LIAISON ASSESSMENT NOTE - ADDITIONAL PSYCHIATRIC MEDICATIONS
Patient received Librium as follows:   21st of October: 50 mg once.      22nd of October: 14 doses of x 50 mg = 700 mg of Librium total.     23rd of October: 2 doses of 50 mg, and 2 doses of 75 mg = 250 of Librium total.          Patient received Lorazepam as follows:   21st of October: 2 mg once.      22nd of October: 3 doses of x 2 mg = 6 mg of Lorazepam total.     23rd of October: 2 doses of 5 mg = 10 mg Lorazepam total.

## 2023-10-24 NOTE — BH CONSULTATION LIAISON ASSESSMENT NOTE - NSBHCONSULTFOLLOWAFTERCARE_PSY_A_CORE FT
[facility] ([facility] [phone]    [facility] Outpatient services  For acute crisis: [facility]  [address] [facility], [place] [address]  [phone]  [email or web address]

## 2023-10-24 NOTE — BH CONSULTATION LIAISON ASSESSMENT NOTE - NSBHATTESTCOMMENTATTENDFT_PSY_A_CORE
Chart reviewed, pt. seen/evaluated with Dr. Batres, I agree with above assessment/plan. Patient oriented/well engaged, linear/coherent, denies SI and HI, denies AVH, reports tactile hallucinations, headache, +tremors on exam.  Plan as above. case d/w Dr. Diaz.

## 2023-10-24 NOTE — BH CONSULTATION LIAISON ASSESSMENT NOTE - MSE UNSTRUCTURED FT
The patient presents as calm and cooperative. He engages well in conversation, expressing his thoughts coherently at a regular pace, rhythm, and volume. His mood is described as "not so good," aligning with a dysthymic affect that is congruent with his level of recovery. Otherwise, appropriate to the content discussed. There are no signs of suicidal or homicidal ideation, nor any indications of delusional thinking. The patient admits to feeling bugs crawling, but denies any other perceptual disturbances and does not seem to be internally preoccupied or responding to internal stimuli. He has bilateral upper extremity tremors, but no other abnormal movements or tics observed during the assessment, and his memory seems to be intact. Additionally, his insight, judgment, and impulse control are deemed appropriate during the evaluation. Patient is otherwise alert and oriented x 4. He is cognitively intact.

## 2023-10-24 NOTE — BH CONSULTATION LIAISON ASSESSMENT NOTE - VIOLENCE PROTECTIVE FACTORS:
Engagement in treatment Engagement in treatment/Insight into violence risk and need for management/treatment

## 2023-10-24 NOTE — BH CONSULTATION LIAISON ASSESSMENT NOTE - RISK ASSESSMENT
Taking into account the patient's history of substance use, and psychiatric disorders, there is a chronic elevated risk of self-harm. However, his current presentation does not indicate immediate intent or plan for self-harm, as he consistently denied active suicidal ideation during the interview. Despite his psychiatric diagnoses, he seems capable of self-care. Protective factors include his help-seeking behavior, strong support system, and focus on the future. Therefore, while he maintains a chronic elevated risk, there is no immediate need for hospitalization. He understands to seek immediate care or call 911 if his condition worsens in any aspect. Taking into account the patient's history of substance use,  there is a chronic elevated risk of self-harm. However, his current presentation does not indicate immediate intent or plan for self-harm, as he consistently denied active suicidal ideation during the interview. Despite his substance abuse, he seems capable of self-care. Protective factors include his help-seeking behavior, strong support system, and focus on the future. Therefore, while he maintains a chronic elevated risk, there is no immediate need for hospitalization. He understands to seek immediate care or call 911 if his condition worsens in any aspect.

## 2023-10-25 LAB
ALBUMIN SERPL ELPH-MCNC: 3.6 G/DL — SIGNIFICANT CHANGE UP (ref 3.3–5)
ALBUMIN SERPL ELPH-MCNC: 3.6 G/DL — SIGNIFICANT CHANGE UP (ref 3.3–5)
ALP SERPL-CCNC: 65 U/L — SIGNIFICANT CHANGE UP (ref 40–120)
ALP SERPL-CCNC: 65 U/L — SIGNIFICANT CHANGE UP (ref 40–120)
ALT FLD-CCNC: 37 U/L — SIGNIFICANT CHANGE UP (ref 4–41)
ALT FLD-CCNC: 37 U/L — SIGNIFICANT CHANGE UP (ref 4–41)
ANION GAP SERPL CALC-SCNC: 13 MMOL/L — SIGNIFICANT CHANGE UP (ref 7–14)
ANION GAP SERPL CALC-SCNC: 13 MMOL/L — SIGNIFICANT CHANGE UP (ref 7–14)
AST SERPL-CCNC: 43 U/L — HIGH (ref 4–40)
AST SERPL-CCNC: 43 U/L — HIGH (ref 4–40)
BILIRUB DIRECT SERPL-MCNC: <0.2 MG/DL — SIGNIFICANT CHANGE UP (ref 0–0.3)
BILIRUB DIRECT SERPL-MCNC: <0.2 MG/DL — SIGNIFICANT CHANGE UP (ref 0–0.3)
BILIRUB INDIRECT FLD-MCNC: >0.2 MG/DL — SIGNIFICANT CHANGE UP (ref 0–1)
BILIRUB INDIRECT FLD-MCNC: >0.2 MG/DL — SIGNIFICANT CHANGE UP (ref 0–1)
BILIRUB SERPL-MCNC: 0.4 MG/DL — SIGNIFICANT CHANGE UP (ref 0.2–1.2)
BILIRUB SERPL-MCNC: 0.4 MG/DL — SIGNIFICANT CHANGE UP (ref 0.2–1.2)
BUN SERPL-MCNC: 8 MG/DL — SIGNIFICANT CHANGE UP (ref 7–23)
BUN SERPL-MCNC: 8 MG/DL — SIGNIFICANT CHANGE UP (ref 7–23)
CALCIUM SERPL-MCNC: 8.8 MG/DL — SIGNIFICANT CHANGE UP (ref 8.4–10.5)
CALCIUM SERPL-MCNC: 8.8 MG/DL — SIGNIFICANT CHANGE UP (ref 8.4–10.5)
CHLORIDE SERPL-SCNC: 104 MMOL/L — SIGNIFICANT CHANGE UP (ref 98–107)
CHLORIDE SERPL-SCNC: 104 MMOL/L — SIGNIFICANT CHANGE UP (ref 98–107)
CO2 SERPL-SCNC: 22 MMOL/L — SIGNIFICANT CHANGE UP (ref 22–31)
CO2 SERPL-SCNC: 22 MMOL/L — SIGNIFICANT CHANGE UP (ref 22–31)
CREAT SERPL-MCNC: 0.66 MG/DL — SIGNIFICANT CHANGE UP (ref 0.5–1.3)
CREAT SERPL-MCNC: 0.66 MG/DL — SIGNIFICANT CHANGE UP (ref 0.5–1.3)
EGFR: 107 ML/MIN/1.73M2 — SIGNIFICANT CHANGE UP
EGFR: 107 ML/MIN/1.73M2 — SIGNIFICANT CHANGE UP
GLUCOSE SERPL-MCNC: 104 MG/DL — HIGH (ref 70–99)
GLUCOSE SERPL-MCNC: 104 MG/DL — HIGH (ref 70–99)
HCT VFR BLD CALC: 36.3 % — LOW (ref 39–50)
HCT VFR BLD CALC: 36.3 % — LOW (ref 39–50)
HGB BLD-MCNC: 12.3 G/DL — LOW (ref 13–17)
HGB BLD-MCNC: 12.3 G/DL — LOW (ref 13–17)
MAGNESIUM SERPL-MCNC: 1.8 MG/DL — SIGNIFICANT CHANGE UP (ref 1.6–2.6)
MAGNESIUM SERPL-MCNC: 1.8 MG/DL — SIGNIFICANT CHANGE UP (ref 1.6–2.6)
MCHC RBC-ENTMCNC: 29.8 PG — SIGNIFICANT CHANGE UP (ref 27–34)
MCHC RBC-ENTMCNC: 29.8 PG — SIGNIFICANT CHANGE UP (ref 27–34)
MCHC RBC-ENTMCNC: 33.9 GM/DL — SIGNIFICANT CHANGE UP (ref 32–36)
MCHC RBC-ENTMCNC: 33.9 GM/DL — SIGNIFICANT CHANGE UP (ref 32–36)
MCV RBC AUTO: 87.9 FL — SIGNIFICANT CHANGE UP (ref 80–100)
MCV RBC AUTO: 87.9 FL — SIGNIFICANT CHANGE UP (ref 80–100)
NRBC # BLD: 0 /100 WBCS — SIGNIFICANT CHANGE UP (ref 0–0)
NRBC # BLD: 0 /100 WBCS — SIGNIFICANT CHANGE UP (ref 0–0)
NRBC # FLD: 0 K/UL — SIGNIFICANT CHANGE UP (ref 0–0)
NRBC # FLD: 0 K/UL — SIGNIFICANT CHANGE UP (ref 0–0)
PHOSPHATE SERPL-MCNC: 3.5 MG/DL — SIGNIFICANT CHANGE UP (ref 2.5–4.5)
PHOSPHATE SERPL-MCNC: 3.5 MG/DL — SIGNIFICANT CHANGE UP (ref 2.5–4.5)
PLATELET # BLD AUTO: 220 K/UL — SIGNIFICANT CHANGE UP (ref 150–400)
PLATELET # BLD AUTO: 220 K/UL — SIGNIFICANT CHANGE UP (ref 150–400)
POTASSIUM SERPL-MCNC: 3.4 MMOL/L — LOW (ref 3.5–5.3)
POTASSIUM SERPL-MCNC: 3.4 MMOL/L — LOW (ref 3.5–5.3)
POTASSIUM SERPL-SCNC: 3.4 MMOL/L — LOW (ref 3.5–5.3)
POTASSIUM SERPL-SCNC: 3.4 MMOL/L — LOW (ref 3.5–5.3)
PROT SERPL-MCNC: 7 G/DL — SIGNIFICANT CHANGE UP (ref 6–8.3)
PROT SERPL-MCNC: 7 G/DL — SIGNIFICANT CHANGE UP (ref 6–8.3)
RBC # BLD: 4.13 M/UL — LOW (ref 4.2–5.8)
RBC # BLD: 4.13 M/UL — LOW (ref 4.2–5.8)
RBC # FLD: 15.1 % — HIGH (ref 10.3–14.5)
RBC # FLD: 15.1 % — HIGH (ref 10.3–14.5)
SODIUM SERPL-SCNC: 139 MMOL/L — SIGNIFICANT CHANGE UP (ref 135–145)
SODIUM SERPL-SCNC: 139 MMOL/L — SIGNIFICANT CHANGE UP (ref 135–145)
WBC # BLD: 5.71 K/UL — SIGNIFICANT CHANGE UP (ref 3.8–10.5)
WBC # BLD: 5.71 K/UL — SIGNIFICANT CHANGE UP (ref 3.8–10.5)
WBC # FLD AUTO: 5.71 K/UL — SIGNIFICANT CHANGE UP (ref 3.8–10.5)
WBC # FLD AUTO: 5.71 K/UL — SIGNIFICANT CHANGE UP (ref 3.8–10.5)

## 2023-10-25 PROCEDURE — 99233 SBSQ HOSP IP/OBS HIGH 50: CPT

## 2023-10-25 PROCEDURE — 70450 CT HEAD/BRAIN W/O DYE: CPT | Mod: 26

## 2023-10-25 RX ORDER — POTASSIUM CHLORIDE 20 MEQ
40 PACKET (EA) ORAL ONCE
Refills: 0 | Status: COMPLETED | OUTPATIENT
Start: 2023-10-25 | End: 2023-10-25

## 2023-10-25 RX ADMIN — Medication 3 MILLIGRAM(S): at 21:25

## 2023-10-25 RX ADMIN — Medication 1 TABLET(S): at 12:07

## 2023-10-25 RX ADMIN — Medication 40 MILLIEQUIVALENT(S): at 12:08

## 2023-10-25 RX ADMIN — DORZOLAMIDE HYDROCHLORIDE 1 DROP(S): 20 SOLUTION/ DROPS OPHTHALMIC at 21:26

## 2023-10-25 RX ADMIN — Medication 75 MILLIGRAM(S): at 13:37

## 2023-10-25 RX ADMIN — Medication 105 MILLIGRAM(S): at 05:07

## 2023-10-25 RX ADMIN — Medication 75 MILLIGRAM(S): at 05:07

## 2023-10-25 RX ADMIN — Medication 1 DROP(S): at 05:08

## 2023-10-25 RX ADMIN — Medication 105 MILLIGRAM(S): at 16:26

## 2023-10-25 RX ADMIN — Medication 1 MILLIGRAM(S): at 12:07

## 2023-10-25 RX ADMIN — Medication 105 MILLIGRAM(S): at 21:25

## 2023-10-25 RX ADMIN — LATANOPROST 1 DROP(S): 0.05 SOLUTION/ DROPS OPHTHALMIC; TOPICAL at 21:26

## 2023-10-25 RX ADMIN — DORZOLAMIDE HYDROCHLORIDE 1 DROP(S): 20 SOLUTION/ DROPS OPHTHALMIC at 13:39

## 2023-10-25 RX ADMIN — DORZOLAMIDE HYDROCHLORIDE 1 DROP(S): 20 SOLUTION/ DROPS OPHTHALMIC at 05:08

## 2023-10-25 RX ADMIN — AMLODIPINE BESYLATE 5 MILLIGRAM(S): 2.5 TABLET ORAL at 05:07

## 2023-10-25 RX ADMIN — Medication 1 DROP(S): at 17:26

## 2023-10-25 NOTE — PROGRESS NOTE ADULT - PROBLEM SELECTOR PLAN 1
high risk CIWA monitoring - CIWA scores has been 4 today  Cont chlordiazepoxide taper (of note pt has received multiple doses of librium on 22nd, discussed with Psych attending Dr. Farfan on 10/24, since pt has symptoms of active withdrawal rec to cont librium at current taper doses, discussed with Pharmacist Goldy, monitor lfts)  ativan prn  folic acid, MV, thiamine supplementation   SW consult  mild AST elevation likely secondary to alcohol use, monitor/trend  fall/seizure precautions  ct head and cspine showed No acute intra-cranial hemorrhage, mass effect, or midline shift. CT Cervical Spine:  No acute fracture or traumatic subluxation. There are multi-level degenerative changes of the cervical spine.  MICU was consulted, rec phenobarb however high doses of phenobarb cannot be given on the floor per nsg  Psych consulted and follg  Rpt head ct done was neg

## 2023-10-26 LAB
ANION GAP SERPL CALC-SCNC: 11 MMOL/L — SIGNIFICANT CHANGE UP (ref 7–14)
ANION GAP SERPL CALC-SCNC: 11 MMOL/L — SIGNIFICANT CHANGE UP (ref 7–14)
BUN SERPL-MCNC: 10 MG/DL — SIGNIFICANT CHANGE UP (ref 7–23)
BUN SERPL-MCNC: 10 MG/DL — SIGNIFICANT CHANGE UP (ref 7–23)
CALCIUM SERPL-MCNC: 8.7 MG/DL — SIGNIFICANT CHANGE UP (ref 8.4–10.5)
CALCIUM SERPL-MCNC: 8.7 MG/DL — SIGNIFICANT CHANGE UP (ref 8.4–10.5)
CHLORIDE SERPL-SCNC: 104 MMOL/L — SIGNIFICANT CHANGE UP (ref 98–107)
CHLORIDE SERPL-SCNC: 104 MMOL/L — SIGNIFICANT CHANGE UP (ref 98–107)
CO2 SERPL-SCNC: 22 MMOL/L — SIGNIFICANT CHANGE UP (ref 22–31)
CO2 SERPL-SCNC: 22 MMOL/L — SIGNIFICANT CHANGE UP (ref 22–31)
CREAT SERPL-MCNC: 0.7 MG/DL — SIGNIFICANT CHANGE UP (ref 0.5–1.3)
CREAT SERPL-MCNC: 0.7 MG/DL — SIGNIFICANT CHANGE UP (ref 0.5–1.3)
EGFR: 105 ML/MIN/1.73M2 — SIGNIFICANT CHANGE UP
EGFR: 105 ML/MIN/1.73M2 — SIGNIFICANT CHANGE UP
GLUCOSE SERPL-MCNC: 118 MG/DL — HIGH (ref 70–99)
GLUCOSE SERPL-MCNC: 118 MG/DL — HIGH (ref 70–99)
HCT VFR BLD CALC: 36.9 % — LOW (ref 39–50)
HCT VFR BLD CALC: 36.9 % — LOW (ref 39–50)
HGB BLD-MCNC: 12 G/DL — LOW (ref 13–17)
HGB BLD-MCNC: 12 G/DL — LOW (ref 13–17)
MAGNESIUM SERPL-MCNC: 1.7 MG/DL — SIGNIFICANT CHANGE UP (ref 1.6–2.6)
MAGNESIUM SERPL-MCNC: 1.7 MG/DL — SIGNIFICANT CHANGE UP (ref 1.6–2.6)
MCHC RBC-ENTMCNC: 29.3 PG — SIGNIFICANT CHANGE UP (ref 27–34)
MCHC RBC-ENTMCNC: 29.3 PG — SIGNIFICANT CHANGE UP (ref 27–34)
MCHC RBC-ENTMCNC: 32.5 GM/DL — SIGNIFICANT CHANGE UP (ref 32–36)
MCHC RBC-ENTMCNC: 32.5 GM/DL — SIGNIFICANT CHANGE UP (ref 32–36)
MCV RBC AUTO: 90.2 FL — SIGNIFICANT CHANGE UP (ref 80–100)
MCV RBC AUTO: 90.2 FL — SIGNIFICANT CHANGE UP (ref 80–100)
NRBC # BLD: 0 /100 WBCS — SIGNIFICANT CHANGE UP (ref 0–0)
NRBC # BLD: 0 /100 WBCS — SIGNIFICANT CHANGE UP (ref 0–0)
NRBC # FLD: 0 K/UL — SIGNIFICANT CHANGE UP (ref 0–0)
NRBC # FLD: 0 K/UL — SIGNIFICANT CHANGE UP (ref 0–0)
PHOSPHATE SERPL-MCNC: 3.6 MG/DL — SIGNIFICANT CHANGE UP (ref 2.5–4.5)
PHOSPHATE SERPL-MCNC: 3.6 MG/DL — SIGNIFICANT CHANGE UP (ref 2.5–4.5)
PLATELET # BLD AUTO: 203 K/UL — SIGNIFICANT CHANGE UP (ref 150–400)
PLATELET # BLD AUTO: 203 K/UL — SIGNIFICANT CHANGE UP (ref 150–400)
POTASSIUM SERPL-MCNC: 3.4 MMOL/L — LOW (ref 3.5–5.3)
POTASSIUM SERPL-MCNC: 3.4 MMOL/L — LOW (ref 3.5–5.3)
POTASSIUM SERPL-SCNC: 3.4 MMOL/L — LOW (ref 3.5–5.3)
POTASSIUM SERPL-SCNC: 3.4 MMOL/L — LOW (ref 3.5–5.3)
RBC # BLD: 4.09 M/UL — LOW (ref 4.2–5.8)
RBC # BLD: 4.09 M/UL — LOW (ref 4.2–5.8)
RBC # FLD: 15.3 % — HIGH (ref 10.3–14.5)
RBC # FLD: 15.3 % — HIGH (ref 10.3–14.5)
SODIUM SERPL-SCNC: 137 MMOL/L — SIGNIFICANT CHANGE UP (ref 135–145)
SODIUM SERPL-SCNC: 137 MMOL/L — SIGNIFICANT CHANGE UP (ref 135–145)
WBC # BLD: 5.92 K/UL — SIGNIFICANT CHANGE UP (ref 3.8–10.5)
WBC # BLD: 5.92 K/UL — SIGNIFICANT CHANGE UP (ref 3.8–10.5)
WBC # FLD AUTO: 5.92 K/UL — SIGNIFICANT CHANGE UP (ref 3.8–10.5)
WBC # FLD AUTO: 5.92 K/UL — SIGNIFICANT CHANGE UP (ref 3.8–10.5)

## 2023-10-26 PROCEDURE — 99232 SBSQ HOSP IP/OBS MODERATE 35: CPT

## 2023-10-26 RX ORDER — HYDROXYZINE HCL 10 MG
25 TABLET ORAL ONCE
Refills: 0 | Status: COMPLETED | OUTPATIENT
Start: 2023-10-26 | End: 2023-10-26

## 2023-10-26 RX ORDER — POTASSIUM CHLORIDE 20 MEQ
40 PACKET (EA) ORAL ONCE
Refills: 0 | Status: COMPLETED | OUTPATIENT
Start: 2023-10-26 | End: 2023-10-26

## 2023-10-26 RX ADMIN — Medication 1 MILLIGRAM(S): at 13:18

## 2023-10-26 RX ADMIN — DORZOLAMIDE HYDROCHLORIDE 1 DROP(S): 20 SOLUTION/ DROPS OPHTHALMIC at 06:29

## 2023-10-26 RX ADMIN — Medication 1 TABLET(S): at 13:18

## 2023-10-26 RX ADMIN — AMLODIPINE BESYLATE 5 MILLIGRAM(S): 2.5 TABLET ORAL at 06:25

## 2023-10-26 RX ADMIN — Medication 75 MILLIGRAM(S): at 13:18

## 2023-10-26 RX ADMIN — Medication 40 MILLIEQUIVALENT(S): at 13:18

## 2023-10-26 RX ADMIN — Medication 75 MILLIGRAM(S): at 01:30

## 2023-10-26 RX ADMIN — Medication 105 MILLIGRAM(S): at 06:25

## 2023-10-26 RX ADMIN — Medication 3 MILLIGRAM(S): at 21:27

## 2023-10-26 RX ADMIN — LATANOPROST 1 DROP(S): 0.05 SOLUTION/ DROPS OPHTHALMIC; TOPICAL at 21:12

## 2023-10-26 RX ADMIN — Medication 25 MILLIGRAM(S): at 20:46

## 2023-10-26 RX ADMIN — Medication 1 DROP(S): at 17:45

## 2023-10-26 RX ADMIN — DORZOLAMIDE HYDROCHLORIDE 1 DROP(S): 20 SOLUTION/ DROPS OPHTHALMIC at 21:14

## 2023-10-26 RX ADMIN — DORZOLAMIDE HYDROCHLORIDE 1 DROP(S): 20 SOLUTION/ DROPS OPHTHALMIC at 13:17

## 2023-10-26 RX ADMIN — Medication 105 MILLIGRAM(S): at 13:21

## 2023-10-26 RX ADMIN — Medication 1 DROP(S): at 06:29

## 2023-10-27 LAB
ALBUMIN SERPL ELPH-MCNC: 3.8 G/DL — SIGNIFICANT CHANGE UP (ref 3.3–5)
ALBUMIN SERPL ELPH-MCNC: 3.8 G/DL — SIGNIFICANT CHANGE UP (ref 3.3–5)
ALP SERPL-CCNC: 64 U/L — SIGNIFICANT CHANGE UP (ref 40–120)
ALP SERPL-CCNC: 64 U/L — SIGNIFICANT CHANGE UP (ref 40–120)
ALT FLD-CCNC: 70 U/L — HIGH (ref 4–41)
ALT FLD-CCNC: 70 U/L — HIGH (ref 4–41)
ANION GAP SERPL CALC-SCNC: 11 MMOL/L — SIGNIFICANT CHANGE UP (ref 7–14)
ANION GAP SERPL CALC-SCNC: 11 MMOL/L — SIGNIFICANT CHANGE UP (ref 7–14)
AST SERPL-CCNC: 70 U/L — HIGH (ref 4–40)
AST SERPL-CCNC: 70 U/L — HIGH (ref 4–40)
BILIRUB SERPL-MCNC: 0.4 MG/DL — SIGNIFICANT CHANGE UP (ref 0.2–1.2)
BILIRUB SERPL-MCNC: 0.4 MG/DL — SIGNIFICANT CHANGE UP (ref 0.2–1.2)
BUN SERPL-MCNC: 11 MG/DL — SIGNIFICANT CHANGE UP (ref 7–23)
BUN SERPL-MCNC: 11 MG/DL — SIGNIFICANT CHANGE UP (ref 7–23)
CALCIUM SERPL-MCNC: 9.2 MG/DL — SIGNIFICANT CHANGE UP (ref 8.4–10.5)
CALCIUM SERPL-MCNC: 9.2 MG/DL — SIGNIFICANT CHANGE UP (ref 8.4–10.5)
CHLORIDE SERPL-SCNC: 104 MMOL/L — SIGNIFICANT CHANGE UP (ref 98–107)
CHLORIDE SERPL-SCNC: 104 MMOL/L — SIGNIFICANT CHANGE UP (ref 98–107)
CO2 SERPL-SCNC: 22 MMOL/L — SIGNIFICANT CHANGE UP (ref 22–31)
CO2 SERPL-SCNC: 22 MMOL/L — SIGNIFICANT CHANGE UP (ref 22–31)
CREAT SERPL-MCNC: 0.73 MG/DL — SIGNIFICANT CHANGE UP (ref 0.5–1.3)
CREAT SERPL-MCNC: 0.73 MG/DL — SIGNIFICANT CHANGE UP (ref 0.5–1.3)
EGFR: 104 ML/MIN/1.73M2 — SIGNIFICANT CHANGE UP
EGFR: 104 ML/MIN/1.73M2 — SIGNIFICANT CHANGE UP
GLUCOSE SERPL-MCNC: 94 MG/DL — SIGNIFICANT CHANGE UP (ref 70–99)
GLUCOSE SERPL-MCNC: 94 MG/DL — SIGNIFICANT CHANGE UP (ref 70–99)
HCT VFR BLD CALC: 38.1 % — LOW (ref 39–50)
HCT VFR BLD CALC: 38.1 % — LOW (ref 39–50)
HGB BLD-MCNC: 12.5 G/DL — LOW (ref 13–17)
HGB BLD-MCNC: 12.5 G/DL — LOW (ref 13–17)
MAGNESIUM SERPL-MCNC: 1.9 MG/DL — SIGNIFICANT CHANGE UP (ref 1.6–2.6)
MAGNESIUM SERPL-MCNC: 1.9 MG/DL — SIGNIFICANT CHANGE UP (ref 1.6–2.6)
MCHC RBC-ENTMCNC: 29.8 PG — SIGNIFICANT CHANGE UP (ref 27–34)
MCHC RBC-ENTMCNC: 29.8 PG — SIGNIFICANT CHANGE UP (ref 27–34)
MCHC RBC-ENTMCNC: 32.8 GM/DL — SIGNIFICANT CHANGE UP (ref 32–36)
MCHC RBC-ENTMCNC: 32.8 GM/DL — SIGNIFICANT CHANGE UP (ref 32–36)
MCV RBC AUTO: 90.7 FL — SIGNIFICANT CHANGE UP (ref 80–100)
MCV RBC AUTO: 90.7 FL — SIGNIFICANT CHANGE UP (ref 80–100)
NRBC # BLD: 0 /100 WBCS — SIGNIFICANT CHANGE UP (ref 0–0)
NRBC # BLD: 0 /100 WBCS — SIGNIFICANT CHANGE UP (ref 0–0)
NRBC # FLD: 0 K/UL — SIGNIFICANT CHANGE UP (ref 0–0)
NRBC # FLD: 0 K/UL — SIGNIFICANT CHANGE UP (ref 0–0)
PHOSPHATE SERPL-MCNC: 3.7 MG/DL — SIGNIFICANT CHANGE UP (ref 2.5–4.5)
PHOSPHATE SERPL-MCNC: 3.7 MG/DL — SIGNIFICANT CHANGE UP (ref 2.5–4.5)
PLATELET # BLD AUTO: 171 K/UL — SIGNIFICANT CHANGE UP (ref 150–400)
PLATELET # BLD AUTO: 171 K/UL — SIGNIFICANT CHANGE UP (ref 150–400)
POTASSIUM SERPL-MCNC: 3.6 MMOL/L — SIGNIFICANT CHANGE UP (ref 3.5–5.3)
POTASSIUM SERPL-MCNC: 3.6 MMOL/L — SIGNIFICANT CHANGE UP (ref 3.5–5.3)
POTASSIUM SERPL-SCNC: 3.6 MMOL/L — SIGNIFICANT CHANGE UP (ref 3.5–5.3)
POTASSIUM SERPL-SCNC: 3.6 MMOL/L — SIGNIFICANT CHANGE UP (ref 3.5–5.3)
PROT SERPL-MCNC: 7.4 G/DL — SIGNIFICANT CHANGE UP (ref 6–8.3)
PROT SERPL-MCNC: 7.4 G/DL — SIGNIFICANT CHANGE UP (ref 6–8.3)
RBC # BLD: 4.2 M/UL — SIGNIFICANT CHANGE UP (ref 4.2–5.8)
RBC # BLD: 4.2 M/UL — SIGNIFICANT CHANGE UP (ref 4.2–5.8)
RBC # FLD: 15.5 % — HIGH (ref 10.3–14.5)
RBC # FLD: 15.5 % — HIGH (ref 10.3–14.5)
SODIUM SERPL-SCNC: 137 MMOL/L — SIGNIFICANT CHANGE UP (ref 135–145)
SODIUM SERPL-SCNC: 137 MMOL/L — SIGNIFICANT CHANGE UP (ref 135–145)
WBC # BLD: 5.15 K/UL — SIGNIFICANT CHANGE UP (ref 3.8–10.5)
WBC # BLD: 5.15 K/UL — SIGNIFICANT CHANGE UP (ref 3.8–10.5)
WBC # FLD AUTO: 5.15 K/UL — SIGNIFICANT CHANGE UP (ref 3.8–10.5)
WBC # FLD AUTO: 5.15 K/UL — SIGNIFICANT CHANGE UP (ref 3.8–10.5)

## 2023-10-27 PROCEDURE — 99232 SBSQ HOSP IP/OBS MODERATE 35: CPT

## 2023-10-27 RX ORDER — HYDROXYZINE HCL 10 MG
25 TABLET ORAL ONCE
Refills: 0 | Status: COMPLETED | OUTPATIENT
Start: 2023-10-27 | End: 2023-10-27

## 2023-10-27 RX ADMIN — Medication 1 MILLIGRAM(S): at 12:55

## 2023-10-27 RX ADMIN — DORZOLAMIDE HYDROCHLORIDE 1 DROP(S): 20 SOLUTION/ DROPS OPHTHALMIC at 05:31

## 2023-10-27 RX ADMIN — Medication 3 MILLIGRAM(S): at 22:02

## 2023-10-27 RX ADMIN — Medication 1 TABLET(S): at 12:56

## 2023-10-27 RX ADMIN — DORZOLAMIDE HYDROCHLORIDE 1 DROP(S): 20 SOLUTION/ DROPS OPHTHALMIC at 14:25

## 2023-10-27 RX ADMIN — Medication 1 DROP(S): at 17:33

## 2023-10-27 RX ADMIN — Medication 1 DROP(S): at 05:35

## 2023-10-27 RX ADMIN — Medication 75 MILLIGRAM(S): at 12:55

## 2023-10-27 RX ADMIN — AMLODIPINE BESYLATE 5 MILLIGRAM(S): 2.5 TABLET ORAL at 05:31

## 2023-10-27 RX ADMIN — Medication 25 MILLIGRAM(S): at 18:07

## 2023-10-27 RX ADMIN — LATANOPROST 1 DROP(S): 0.05 SOLUTION/ DROPS OPHTHALMIC; TOPICAL at 22:01

## 2023-10-27 RX ADMIN — DORZOLAMIDE HYDROCHLORIDE 1 DROP(S): 20 SOLUTION/ DROPS OPHTHALMIC at 22:01

## 2023-10-27 RX ADMIN — Medication 25 MILLIGRAM(S): at 22:02

## 2023-10-27 NOTE — PROGRESS NOTE ADULT - PROBLEM SELECTOR PLAN 1
high risk CIWA monitoring - CIWA scores has been 4 today  Cont chlordiazepoxide taper (of note pt has received multiple doses of librium on 22nd, discussed with Psych attending Dr. Farfan on 10/24, since pt has symptoms of active withdrawal rec to cont librium at current taper doses, discussed with Pharmacist Goldy, monitor lfts)  ativan prn  folic acid, MV, thiamine supplementation   SW consult  mild AST elevation again today, cont to trend if stable will plan for dc tomorrow  fall/seizure precautions  ct head and cspine showed No acute intra-cranial hemorrhage, mass effect, or midline shift. CT Cervical Spine:  No acute fracture or traumatic subluxation. There are multi-level degenerative changes of the cervical spine.  MICU was consulted, rec phenobarb however high doses of phenobarb cannot be given on the floor per nsg  Psych consulted and follg  Rpt head ct done was neg

## 2023-10-28 ENCOUNTER — TRANSCRIPTION ENCOUNTER (OUTPATIENT)
Age: 58
End: 2023-10-28

## 2023-10-28 LAB
ALBUMIN SERPL ELPH-MCNC: 3.7 G/DL — SIGNIFICANT CHANGE UP (ref 3.3–5)
ALBUMIN SERPL ELPH-MCNC: 3.7 G/DL — SIGNIFICANT CHANGE UP (ref 3.3–5)
ALP SERPL-CCNC: 62 U/L — SIGNIFICANT CHANGE UP (ref 40–120)
ALP SERPL-CCNC: 62 U/L — SIGNIFICANT CHANGE UP (ref 40–120)
ALT FLD-CCNC: 89 U/L — HIGH (ref 4–41)
ALT FLD-CCNC: 89 U/L — HIGH (ref 4–41)
ANION GAP SERPL CALC-SCNC: 10 MMOL/L — SIGNIFICANT CHANGE UP (ref 7–14)
ANION GAP SERPL CALC-SCNC: 10 MMOL/L — SIGNIFICANT CHANGE UP (ref 7–14)
AST SERPL-CCNC: 74 U/L — HIGH (ref 4–40)
AST SERPL-CCNC: 74 U/L — HIGH (ref 4–40)
BILIRUB DIRECT SERPL-MCNC: <0.2 MG/DL — SIGNIFICANT CHANGE UP (ref 0–0.3)
BILIRUB DIRECT SERPL-MCNC: <0.2 MG/DL — SIGNIFICANT CHANGE UP (ref 0–0.3)
BILIRUB INDIRECT FLD-MCNC: >0.1 MG/DL — SIGNIFICANT CHANGE UP (ref 0–1)
BILIRUB INDIRECT FLD-MCNC: >0.1 MG/DL — SIGNIFICANT CHANGE UP (ref 0–1)
BILIRUB SERPL-MCNC: 0.3 MG/DL — SIGNIFICANT CHANGE UP (ref 0.2–1.2)
BILIRUB SERPL-MCNC: 0.3 MG/DL — SIGNIFICANT CHANGE UP (ref 0.2–1.2)
BUN SERPL-MCNC: 11 MG/DL — SIGNIFICANT CHANGE UP (ref 7–23)
BUN SERPL-MCNC: 11 MG/DL — SIGNIFICANT CHANGE UP (ref 7–23)
CALCIUM SERPL-MCNC: 8.8 MG/DL — SIGNIFICANT CHANGE UP (ref 8.4–10.5)
CALCIUM SERPL-MCNC: 8.8 MG/DL — SIGNIFICANT CHANGE UP (ref 8.4–10.5)
CHLORIDE SERPL-SCNC: 104 MMOL/L — SIGNIFICANT CHANGE UP (ref 98–107)
CHLORIDE SERPL-SCNC: 104 MMOL/L — SIGNIFICANT CHANGE UP (ref 98–107)
CO2 SERPL-SCNC: 23 MMOL/L — SIGNIFICANT CHANGE UP (ref 22–31)
CO2 SERPL-SCNC: 23 MMOL/L — SIGNIFICANT CHANGE UP (ref 22–31)
CREAT SERPL-MCNC: 0.71 MG/DL — SIGNIFICANT CHANGE UP (ref 0.5–1.3)
CREAT SERPL-MCNC: 0.71 MG/DL — SIGNIFICANT CHANGE UP (ref 0.5–1.3)
EGFR: 105 ML/MIN/1.73M2 — SIGNIFICANT CHANGE UP
EGFR: 105 ML/MIN/1.73M2 — SIGNIFICANT CHANGE UP
GLUCOSE SERPL-MCNC: 112 MG/DL — HIGH (ref 70–99)
GLUCOSE SERPL-MCNC: 112 MG/DL — HIGH (ref 70–99)
MAGNESIUM SERPL-MCNC: 1.8 MG/DL — SIGNIFICANT CHANGE UP (ref 1.6–2.6)
MAGNESIUM SERPL-MCNC: 1.8 MG/DL — SIGNIFICANT CHANGE UP (ref 1.6–2.6)
PHOSPHATE SERPL-MCNC: 3.8 MG/DL — SIGNIFICANT CHANGE UP (ref 2.5–4.5)
PHOSPHATE SERPL-MCNC: 3.8 MG/DL — SIGNIFICANT CHANGE UP (ref 2.5–4.5)
POTASSIUM SERPL-MCNC: 3.4 MMOL/L — LOW (ref 3.5–5.3)
POTASSIUM SERPL-MCNC: 3.4 MMOL/L — LOW (ref 3.5–5.3)
POTASSIUM SERPL-SCNC: 3.4 MMOL/L — LOW (ref 3.5–5.3)
POTASSIUM SERPL-SCNC: 3.4 MMOL/L — LOW (ref 3.5–5.3)
PROT SERPL-MCNC: 7 G/DL — SIGNIFICANT CHANGE UP (ref 6–8.3)
PROT SERPL-MCNC: 7 G/DL — SIGNIFICANT CHANGE UP (ref 6–8.3)
SODIUM SERPL-SCNC: 137 MMOL/L — SIGNIFICANT CHANGE UP (ref 135–145)
SODIUM SERPL-SCNC: 137 MMOL/L — SIGNIFICANT CHANGE UP (ref 135–145)

## 2023-10-28 PROCEDURE — 99232 SBSQ HOSP IP/OBS MODERATE 35: CPT

## 2023-10-28 RX ORDER — POTASSIUM CHLORIDE 20 MEQ
40 PACKET (EA) ORAL ONCE
Refills: 0 | Status: COMPLETED | OUTPATIENT
Start: 2023-10-28 | End: 2023-10-28

## 2023-10-28 RX ORDER — FOLIC ACID 0.8 MG
1 TABLET ORAL
Qty: 0 | Refills: 0 | DISCHARGE
Start: 2023-10-28

## 2023-10-28 RX ORDER — HYDROXYZINE HCL 10 MG
25 TABLET ORAL ONCE
Refills: 0 | Status: COMPLETED | OUTPATIENT
Start: 2023-10-28 | End: 2023-10-28

## 2023-10-28 RX ADMIN — Medication 1 DROP(S): at 05:08

## 2023-10-28 RX ADMIN — DORZOLAMIDE HYDROCHLORIDE 1 DROP(S): 20 SOLUTION/ DROPS OPHTHALMIC at 13:23

## 2023-10-28 RX ADMIN — Medication 1 MILLIGRAM(S): at 13:23

## 2023-10-28 RX ADMIN — LATANOPROST 1 DROP(S): 0.05 SOLUTION/ DROPS OPHTHALMIC; TOPICAL at 21:22

## 2023-10-28 RX ADMIN — AMLODIPINE BESYLATE 5 MILLIGRAM(S): 2.5 TABLET ORAL at 05:07

## 2023-10-28 RX ADMIN — Medication 1 TABLET(S): at 13:23

## 2023-10-28 RX ADMIN — Medication 25 MILLIGRAM(S): at 10:23

## 2023-10-28 RX ADMIN — Medication 3 MILLIGRAM(S): at 21:22

## 2023-10-28 RX ADMIN — DORZOLAMIDE HYDROCHLORIDE 1 DROP(S): 20 SOLUTION/ DROPS OPHTHALMIC at 05:07

## 2023-10-28 RX ADMIN — Medication 1 DROP(S): at 17:46

## 2023-10-28 RX ADMIN — Medication 40 MILLIEQUIVALENT(S): at 10:22

## 2023-10-28 RX ADMIN — DORZOLAMIDE HYDROCHLORIDE 1 DROP(S): 20 SOLUTION/ DROPS OPHTHALMIC at 21:23

## 2023-10-28 RX ADMIN — Medication 25 MILLIGRAM(S): at 20:16

## 2023-10-28 NOTE — DISCHARGE NOTE PROVIDER - NSDCFUADDAPPT_GEN_ALL_CORE_FT
[facility]stance Abuse Outpatient [facility] ([facility] [phone]    [facility] Outpatient services  For acute crisis: [facility]  [address] [facility], [place] [address]  [phone]  [email or web address]    Follow up with your primary care physician for further monitoring in 1-2 weeks. Please call to arrange appointment.

## 2023-10-28 NOTE — DISCHARGE NOTE PROVIDER - NSDCCPCAREPLAN_GEN_ALL_CORE_FT
PRINCIPAL DISCHARGE DIAGNOSIS  Diagnosis: Alcohol dependence with withdrawal  Assessment and Plan of Treatment: You were monitored on CIWA protocol and completed Librium taper. You were seen by psychiatry and should follow up with substance abuse clinic upon discharge. Avoid drinking alcohol. You may take multivitamin and folic acid daily (available over the counter).      SECONDARY DISCHARGE DIAGNOSES  Diagnosis: Glaucoma  Assessment and Plan of Treatment: Continue eye drops.    Diagnosis: Essential hypertension  Assessment and Plan of Treatment: Low sodium and fat diet, continue anti-hypertensive medications, and follow up with primary care physician.       PRINCIPAL DISCHARGE DIAGNOSIS  Diagnosis: Alcohol dependence with withdrawal  Assessment and Plan of Treatment: You were monitored on CIWA protocol and completed Librium taper. You were seen by psychiatry and should follow up with substance abuse clinic upon discharge. Avoid drinking alcohol. You may take multivitamin and folic acid daily (available over the counter).      SECONDARY DISCHARGE DIAGNOSES  Diagnosis: Glaucoma  Assessment and Plan of Treatment: Continue eye drops.    Diagnosis: Essential hypertension  Assessment and Plan of Treatment: Low sodium and fat diet, continue anti-hypertensive medications, and follow up with primary care physician.      Diagnosis: Transaminitis  Assessment and Plan of Treatment: You had elevated liver enzymes. Avoid taking Tylenol or medications that metabolize through the liver. Follow up with PCP within 1 week to repeat as outpatient. Avoid drinking alcohol.     PRINCIPAL DISCHARGE DIAGNOSIS  Diagnosis: Alcohol dependence with withdrawal  Assessment and Plan of Treatment: You were monitored on CIWA protocol and completed Librium taper. You were seen by psychiatry and should follow up with substance abuse clinic upon discharge. Avoid drinking alcohol. You may take multivitamin and folic acid daily (available over the counter).      SECONDARY DISCHARGE DIAGNOSES  Diagnosis: Transaminitis  Assessment and Plan of Treatment: You had elevated liver enzymes. Avoid taking Tylenol or medications that metabolize through the liver. Your US of the abdomen shows Mild hepatic steatosis.  - Follow up with PCP within 1 week to repeat as outpatient. Avoid drinking alcohol.    Diagnosis: Essential hypertension  Assessment and Plan of Treatment: Low sodium and fat diet, continue anti-hypertensive medications, and follow up with primary care physician.      Diagnosis: Glaucoma  Assessment and Plan of Treatment: Continue eye drops.

## 2023-10-28 NOTE — DISCHARGE NOTE PROVIDER - HOSPITAL COURSE
58 -year-old male with history of anxiety, alcohol abuse with history of prior withdrawal seizure 2 months ago, no known history of DTs, R eye blindness, glaucoma, HTN, presenting with headaches, found to be intoxicated, then experiencing withdrawal in the ED, admitted for alcohol withdrawal    Alcohol dependence with withdrawal  - high risk CIWA monitoring - CIWA scores has been 4  - s/p chlordiazepoxide taper   - folic acid, MV, thiamine supplementation   - fall/seizure precautions  - psych consulted, f/u substance abuse clinic upon dc     Essential hypertension  - c/w amlodipine with hold parameters.    Glaucoma  - c/w eye gtt  - R periorbital superior swelling noted on imaging, patient denies any falls or pain (noted on CT maxillofacial from 9/11/23)    Lightheadedness  - fall precautions  - CTH neg   - orthostatics neg  - resolved fall precautions    On 10/28/23 this case was reviewed with Dr. Diaz. The patient is medically stable and optimized for discharge. All medications were reviewed and prescriptions were sent to mutually agreed upon pharmacy. 58 -year-old male with history of anxiety, alcohol abuse with history of prior withdrawal seizure 2 months ago, no known history of DTs, R eye blindness, glaucoma, HTN, presenting with headaches, found to be intoxicated, then experiencing withdrawal in the ED, admitted for alcohol withdrawal    Alcohol dependence with withdrawal  - high risk CIWA monitoring - CIWA scores has been 4  - s/p chlordiazepoxide taper   - folic acid, MV, thiamine supplementation   - fall/seizure precautions  - psych consulted, f/u substance abuse clinic upon dc     Essential hypertension  - c/w amlodipine with hold parameters.    Glaucoma  - c/w eye gtt  - R periorbital superior swelling noted on imaging, patient denies any falls or pain (noted on CT maxillofacial from 9/11/23)    Lightheadedness  - fall precautions  - CTH neg   - orthostatics neg  - resolved fall precautions    Transaminitis   - Monitor  - Avoid tylenol / hepatotoxic meds   - Outpt f/u PCP rpt LFTs    On 10/28/23 this case was reviewed with Dr. Diaz. The patient is medically stable and optimized for discharge. All medications were reviewed and prescriptions were sent to mutually agreed upon pharmacy. 58 -year-old male with history of anxiety, alcohol abuse with history of prior withdrawal seizure 2 months ago, no known history of DTs, R eye blindness, glaucoma, HTN, presenting with headaches, found to be intoxicated, then experiencing withdrawal in the ED, admitted for alcohol withdrawal    Alcohol dependence with withdrawal  - high risk CIWA monitoring, improved  - s/p chlordiazepoxide taper   - folic acid, MV, thiamine supplementation   - fall/seizure precautions  - psych consulted, f/u substance abuse clinic upon dc     Essential hypertension  - c/w amlodipine with hold parameters.    Glaucoma  - c/w eye gtt  - R periorbital superior swelling noted on imaging, patient denies any falls or pain (noted on CT maxillofacial from 9/11/23)    Lightheadedness  - fall precautions  - CTH neg   - orthostatics neg  - resolved fall precautions    Transaminitis   - Monitor  - Avoid tylenol / hepatotoxic meds   - Outpt f/u PCP rpt LFTs    On 10/28/23 this case was reviewed with Dr. Diaz. The patient is medically stable and optimized for discharge. All medications were reviewed and prescriptions were sent to mutually agreed upon pharmacy. 58 -year-old male with history of anxiety, alcohol abuse with history of prior withdrawal seizure 2 months ago, no known history of DTs, R eye blindness, glaucoma, HTN, presenting with headaches, found to be intoxicated, then experiencing withdrawal in the ED, admitted for alcohol withdrawal    Alcohol dependence with withdrawal  - high risk CIWA monitoring, improved  - s/p chlordiazepoxide taper   - folic acid, MV, thiamine supplementation   - fall/seizure precautions  - psych consulted, f/u substance abuse clinic upon dc     Essential hypertension  - c/w amlodipine with hold parameters.    Glaucoma  - c/w eye gtt  - R periorbital superior swelling noted on imaging, patient denies any falls or pain (noted on CT maxillofacial from 9/11/23)    Lightheadedness  - fall precautions  - CTH neg   - Orthostatics neg  - resolved fall precautions    Transaminitis   - Monitor  - Avoid tylenol / hepatotoxic meds   - US abdomen - Mild hepatic steatosis.  - Outpt f/u PCP rpt LFTs    On 10/29/23 this case was reviewed with Dr. Diaz. The patient is medically stable and optimized for discharge. All medications were reviewed and prescriptions were sent to mutually agreed upon pharmacy.   Patient instructed to follow up with PCP upon discharge and repeat LFTs within 1-2 weeks 58 -year-old male with history of anxiety, alcohol abuse with history of prior withdrawal seizure 2 months ago, no known history of DTs, R eye blindness, glaucoma, HTN, presenting with headaches, found to be intoxicated, then experiencing withdrawal in the ED, admitted for alcohol withdrawal    Alcohol dependence with withdrawal  - high risk CIWA monitoring, improved  - s/p chlordiazepoxide taper   - folic acid, MV, thiamine supplementation   - fall/seizure precautions  - psych consulted, f/u substance abuse clinic upon dc     Essential hypertension  - c/w amlodipine with hold parameters.    Glaucoma  - c/w eye gtt  - R periorbital superior swelling noted on imaging, patient denies any falls or pain (noted on CT maxillofacial from 9/11/23)    Lightheadedness  - fall precautions  - CTH neg   - Orthostatics neg  - resolved fall precautions    Transaminitis   - Monitor  - Avoid tylenol / hepatotoxic meds   - US abdomen - Mild hepatic steatosis.  - Outpt f/u PCP rpt LFTs.  Pt agrees to f/u with pcp    On 10/29/23 this case was reviewed with Dr. Diaz. The patient is medically stable and optimized for discharge. All medications were reviewed and prescriptions were sent to mutually agreed upon pharmacy.   Patient instructed to follow up with PCP upon discharge and repeat LFTs within 1-2 weeks

## 2023-10-28 NOTE — PROGRESS NOTE ADULT - PROBLEM SELECTOR PLAN 1
high risk CIWA monitoring - CIWA scores has been 4 today  Cont chlordiazepoxide taper (of note pt has received multiple doses of librium on 22nd, discussed with Psych attending Dr. Farfan on 10/24, since pt has symptoms of active withdrawal rec to cont librium at current taper doses, discussed with Pharmacist Goldy, monitor lfts)  ativan prn  folic acid, MV, thiamine supplementation   SW consult  fall/seizure precautions  ct head and cspine showed No acute intra-cranial hemorrhage, mass effect, or midline shift. CT Cervical Spine:  No acute fracture or traumatic subluxation. There are multi-level degenerative changes of the cervical spine.  MICU was consulted, rec phenobarb however high doses of phenobarb cannot be given on the floor per nsg  Psych consulted and follg  Rpt head ct done was neg  Called the MD on pt's header, per them pt does not see this MD, per pt he has no pcp to f/u lfts hence if lfts remain stable will plan for dc tomorrow high risk CIWA monitoring - CIWA scores has been 4 today  Cont chlordiazepoxide taper (of note pt has received multiple doses of librium on 22nd, discussed with Psych attending Dr. Farfan on 10/24, since pt has symptoms of active withdrawal rec to cont librium at current taper doses, discussed with Pharmacist Goldy, monitor lfts)  ativan prn  folic acid, MV, thiamine supplementation   SW consult  fall/seizure precautions  ct head and cspine showed No acute intra-cranial hemorrhage, mass effect, or midline shift. CT Cervical Spine:  No acute fracture or traumatic subluxation. There are multi-level degenerative changes of the cervical spine.  MICU was consulted, rec phenobarb however high doses of phenobarb cannot be given on the floor per nsg  Psych consulted and follg  Rpt head ct done was neg  Called the MD on pt's header, (Skye Zazueta) per them pt does not see this MD, per pt he has no pcp to f/u lfts hence if lfts remain stable will plan for dc tomorrow

## 2023-10-29 ENCOUNTER — TRANSCRIPTION ENCOUNTER (OUTPATIENT)
Age: 58
End: 2023-10-29

## 2023-10-29 VITALS
SYSTOLIC BLOOD PRESSURE: 105 MMHG | TEMPERATURE: 98 F | OXYGEN SATURATION: 99 % | HEART RATE: 65 BPM | DIASTOLIC BLOOD PRESSURE: 60 MMHG | RESPIRATION RATE: 17 BRPM

## 2023-10-29 LAB
ALBUMIN SERPL ELPH-MCNC: 3.8 G/DL — SIGNIFICANT CHANGE UP (ref 3.3–5)
ALBUMIN SERPL ELPH-MCNC: 3.8 G/DL — SIGNIFICANT CHANGE UP (ref 3.3–5)
ALP SERPL-CCNC: 62 U/L — SIGNIFICANT CHANGE UP (ref 40–120)
ALP SERPL-CCNC: 62 U/L — SIGNIFICANT CHANGE UP (ref 40–120)
ALT FLD-CCNC: 95 U/L — HIGH (ref 4–41)
ALT FLD-CCNC: 95 U/L — HIGH (ref 4–41)
ANION GAP SERPL CALC-SCNC: 11 MMOL/L — SIGNIFICANT CHANGE UP (ref 7–14)
ANION GAP SERPL CALC-SCNC: 11 MMOL/L — SIGNIFICANT CHANGE UP (ref 7–14)
AST SERPL-CCNC: 63 U/L — HIGH (ref 4–40)
AST SERPL-CCNC: 63 U/L — HIGH (ref 4–40)
BILIRUB SERPL-MCNC: 0.3 MG/DL — SIGNIFICANT CHANGE UP (ref 0.2–1.2)
BILIRUB SERPL-MCNC: 0.3 MG/DL — SIGNIFICANT CHANGE UP (ref 0.2–1.2)
BUN SERPL-MCNC: 19 MG/DL — SIGNIFICANT CHANGE UP (ref 7–23)
BUN SERPL-MCNC: 19 MG/DL — SIGNIFICANT CHANGE UP (ref 7–23)
CALCIUM SERPL-MCNC: 8.9 MG/DL — SIGNIFICANT CHANGE UP (ref 8.4–10.5)
CALCIUM SERPL-MCNC: 8.9 MG/DL — SIGNIFICANT CHANGE UP (ref 8.4–10.5)
CHLORIDE SERPL-SCNC: 104 MMOL/L — SIGNIFICANT CHANGE UP (ref 98–107)
CHLORIDE SERPL-SCNC: 104 MMOL/L — SIGNIFICANT CHANGE UP (ref 98–107)
CO2 SERPL-SCNC: 22 MMOL/L — SIGNIFICANT CHANGE UP (ref 22–31)
CO2 SERPL-SCNC: 22 MMOL/L — SIGNIFICANT CHANGE UP (ref 22–31)
CREAT SERPL-MCNC: 0.77 MG/DL — SIGNIFICANT CHANGE UP (ref 0.5–1.3)
CREAT SERPL-MCNC: 0.77 MG/DL — SIGNIFICANT CHANGE UP (ref 0.5–1.3)
EGFR: 102 ML/MIN/1.73M2 — SIGNIFICANT CHANGE UP
EGFR: 102 ML/MIN/1.73M2 — SIGNIFICANT CHANGE UP
GLUCOSE SERPL-MCNC: 102 MG/DL — HIGH (ref 70–99)
GLUCOSE SERPL-MCNC: 102 MG/DL — HIGH (ref 70–99)
MAGNESIUM SERPL-MCNC: 1.9 MG/DL — SIGNIFICANT CHANGE UP (ref 1.6–2.6)
MAGNESIUM SERPL-MCNC: 1.9 MG/DL — SIGNIFICANT CHANGE UP (ref 1.6–2.6)
PHOSPHATE SERPL-MCNC: 3.8 MG/DL — SIGNIFICANT CHANGE UP (ref 2.5–4.5)
PHOSPHATE SERPL-MCNC: 3.8 MG/DL — SIGNIFICANT CHANGE UP (ref 2.5–4.5)
POTASSIUM SERPL-MCNC: 3.7 MMOL/L — SIGNIFICANT CHANGE UP (ref 3.5–5.3)
POTASSIUM SERPL-MCNC: 3.7 MMOL/L — SIGNIFICANT CHANGE UP (ref 3.5–5.3)
POTASSIUM SERPL-SCNC: 3.7 MMOL/L — SIGNIFICANT CHANGE UP (ref 3.5–5.3)
POTASSIUM SERPL-SCNC: 3.7 MMOL/L — SIGNIFICANT CHANGE UP (ref 3.5–5.3)
PROT SERPL-MCNC: 7.3 G/DL — SIGNIFICANT CHANGE UP (ref 6–8.3)
PROT SERPL-MCNC: 7.3 G/DL — SIGNIFICANT CHANGE UP (ref 6–8.3)
SODIUM SERPL-SCNC: 137 MMOL/L — SIGNIFICANT CHANGE UP (ref 135–145)
SODIUM SERPL-SCNC: 137 MMOL/L — SIGNIFICANT CHANGE UP (ref 135–145)

## 2023-10-29 PROCEDURE — 99239 HOSP IP/OBS DSCHRG MGMT >30: CPT

## 2023-10-29 PROCEDURE — 76705 ECHO EXAM OF ABDOMEN: CPT | Mod: 26

## 2023-10-29 RX ORDER — HYDROXYZINE HCL 10 MG
25 TABLET ORAL ONCE
Refills: 0 | Status: COMPLETED | OUTPATIENT
Start: 2023-10-29 | End: 2023-10-29

## 2023-10-29 RX ADMIN — INFLUENZA VIRUS VACCINE 0.5 MILLILITER(S): 15; 15; 15; 15 SUSPENSION INTRAMUSCULAR at 16:38

## 2023-10-29 RX ADMIN — DORZOLAMIDE HYDROCHLORIDE 1 DROP(S): 20 SOLUTION/ DROPS OPHTHALMIC at 14:00

## 2023-10-29 RX ADMIN — Medication 25 MILLIGRAM(S): at 15:39

## 2023-10-29 RX ADMIN — Medication 1 DROP(S): at 16:42

## 2023-10-29 RX ADMIN — Medication 1 TABLET(S): at 14:00

## 2023-10-29 RX ADMIN — Medication 1 MILLIGRAM(S): at 14:04

## 2023-10-29 RX ADMIN — DORZOLAMIDE HYDROCHLORIDE 1 DROP(S): 20 SOLUTION/ DROPS OPHTHALMIC at 05:15

## 2023-10-29 RX ADMIN — Medication 1 DROP(S): at 05:22

## 2023-10-29 NOTE — PROGRESS NOTE ADULT - PROBLEM SELECTOR PLAN 3
c/w eye gtt  R periorbital superior swelling noted on imaging, patient denies any falls or pain (noted on CT maxillofacial from 9/11/23), CTM

## 2023-10-29 NOTE — PROGRESS NOTE ADULT - PROBLEM SELECTOR PLAN 4
SCDs for DVT ppx    Plan discussed with ACP
SCDs for DVT ppx      Lightheadedness- check orthostatics, fall precautions    Hypokalemia- replete k    Plan discussed with ACP
SCDs for DVT ppx      Lightheadedness- check orthostatics, fall precautions, ct head neg    Hypokalemia- replete k    Plan discussed with ACP
SCDs for DVT ppx      Lightheadedness- orthostatics neg, resolved now, fall precautions, ct head neg    Hypokalemia- replete k   dc planning for tomorrow if stable    Plan discussed with ACP
SCDs for DVT ppx      Lightheadedness- orthostatics neg, resolved now, fall precautions, ct head neg    Hypokalemia- replete k    Plan discussed with ACP
SCDs for DVT ppx      Lightheadedness- orthostatics neg, resolved now, fall precautions, ct head neg    Transaminitis likely due to alcohol abuse, RUQ sono showed Mild hepatic steatosis. Counselled on alcohol cessation. Advised pt to f/u with his pcp, says he is going to see MD in [place].   dc today, dc planning time spent in coordination 40mts ( preparing dc summary and plan)    Plan discussed with ACP
SCDs for DVT ppx    #HypoMg/hypophos  -replete  -f/u EKG
SCDs for DVT ppx      Lightheadedness- orthostatics neg, resolved now, fall precautions, ct head neg    Discussed with SW, pt will be given outpt alcohol rehab program information, dc planning for tomorrow    Plan discussed with ACP

## 2023-10-29 NOTE — PROGRESS NOTE ADULT - SUBJECTIVE AND OBJECTIVE BOX
Patient is a 58y old  Male who presents with a chief complaint of withdrawal (25 Oct 2023 14:49)      SUBJECTIVE / OVERNIGHT EVENTS: Pt seen and examined at 11:05am, no overnight events, pt reports mild hand tremors, no more headache or lightheadedness, asking when he will be able to leave, and wants a letter that he is in the hospital as he had a court date, no other new issues reported.        MEDICATIONS  (STANDING):  amLODIPine   Tablet 5 milliGRAM(s) Oral daily  chlordiazePOXIDE   Oral   dorzolamide 2% Ophthalmic Solution 1 Drop(s) Both EYES three times a day  folic acid 1 milliGRAM(s) Oral daily  influenza   Vaccine 0.5 milliLiter(s) IntraMuscular once  latanoprost 0.005% Ophthalmic Solution 1 Drop(s) Both EYES at bedtime  multivitamin 1 Tablet(s) Oral daily  timolol 0.5% Solution 1 Drop(s) Both EYES two times a day    MEDICATIONS  (PRN):  acetaminophen     Tablet .. 975 milliGRAM(s) Oral every 6 hours PRN Moderate Pain (4 - 6), Severe Pain (7 - 10)  LORazepam   Injectable 2 milliGRAM(s) IV Push every 2 hours PRN CIWA-Ar score increase by 2 points and a total score of 7 or less  LORazepam   Injectable 2 milliGRAM(s) IV Push every 1 hour PRN CIWA-Ar score 8 or greater  melatonin 3 milliGRAM(s) Oral at bedtime PRN Insomnia      Vital Signs Last 24 Hrs  T(C): 36.4 (26 Oct 2023 05:16), Max: 36.6 (25 Oct 2023 16:30)  T(F): 97.5 (26 Oct 2023 05:16), Max: 97.8 (25 Oct 2023 16:30)  HR: 65 (26 Oct 2023 05:16) (63 - 72)  BP: 106/62 (26 Oct 2023 05:16) (106/62 - 114/67)  BP(mean): --  RR: 18 (26 Oct 2023 05:16) (17 - 18)  SpO2: 95% (26 Oct 2023 05:16) (95% - 99%)    Parameters below as of 26 Oct 2023 05:16  Patient On (Oxygen Delivery Method): room air      CAPILLARY BLOOD GLUCOSE        I&O's Summary      PHYSICAL EXAM:  GENERAL: NAD, well-developed  Eyes: rt eye ptosis+  CHEST/LUNG: Clear to auscultation bilaterally; No wheeze  HEART: Regular rate and rhythm  ABDOMEN: Soft, Nontender, Nondistended  EXTREMITIES: no LE edema, + b/l hand tremors  PSYCH: Calm  NEUROLOGY: AAOx3  SKIN: No rashes or lesions    LABS:                        12.0   5.92  )-----------( 203      ( 26 Oct 2023 06:00 )             36.9     10-26    137  |  104  |  10  ----------------------------<  118<H>  3.4<L>   |  22  |  0.70    Ca    8.7      26 Oct 2023 06:00  Phos  3.6     10-26  Mg     1.70     10-26    TPro  6.9  /  Alb  3.8  /  TBili  0.3  /  DBili  <0.2  /  AST  41<H>  /  ALT  41  /  AlkPhos  65  10-26          Urinalysis Basic - ( 26 Oct 2023 06:00 )    Color: x / Appearance: x / SG: x / pH: x  Gluc: 118 mg/dL / Ketone: x  / Bili: x / Urobili: x   Blood: x / Protein: x / Nitrite: x   Leuk Esterase: x / RBC: x / WBC x   Sq Epi: x / Non Sq Epi: x / Bacteria: x        RADIOLOGY & ADDITIONAL TESTS:    Imaging Personally Reviewed:    Consultant(s) Notes Reviewed:      Care Discussed with Consultants/Other Providers:  
Patient is a 58y old  Male who presents with a chief complaint of withdrawal (26 Oct 2023 14:16)      SUBJECTIVE / OVERNIGHT EVENTS: Pt seen and examined at 11:45am, no overnight events, pt has no more headache or lightheadedness, Denies any abdominal pain, or any other complaints, no other new issues reported.        MEDICATIONS  (STANDING):  amLODIPine   Tablet 5 milliGRAM(s) Oral daily  dorzolamide 2% Ophthalmic Solution 1 Drop(s) Both EYES three times a day  folic acid 1 milliGRAM(s) Oral daily  influenza   Vaccine 0.5 milliLiter(s) IntraMuscular once  latanoprost 0.005% Ophthalmic Solution 1 Drop(s) Both EYES at bedtime  multivitamin 1 Tablet(s) Oral daily  timolol 0.5% Solution 1 Drop(s) Both EYES two times a day    MEDICATIONS  (PRN):  acetaminophen     Tablet .. 975 milliGRAM(s) Oral every 6 hours PRN Moderate Pain (4 - 6), Severe Pain (7 - 10)  melatonin 3 milliGRAM(s) Oral at bedtime PRN Insomnia      Vital Signs Last 24 Hrs  T(C): 36.5 (27 Oct 2023 05:25), Max: 36.9 (26 Oct 2023 21:15)  T(F): 97.7 (27 Oct 2023 05:25), Max: 98.4 (26 Oct 2023 21:15)  HR: 63 (27 Oct 2023 05:25) (63 - 72)  BP: 115/61 (27 Oct 2023 05:25) (110/66 - 116/63)  BP(mean): --  RR: 18 (27 Oct 2023 05:25) (18 - 18)  SpO2: 97% (27 Oct 2023 05:25) (95% - 97%)    Parameters below as of 27 Oct 2023 05:25  Patient On (Oxygen Delivery Method): room air      CAPILLARY BLOOD GLUCOSE        I&O's Summary      PHYSICAL EXAM:  GENERAL: NAD, well-developed  Eyes: rt eye ptosis+  CHEST/LUNG: Clear to auscultation bilaterally; No wheeze  HEART: Regular rate and rhythm  ABDOMEN: Soft, Nontender, Nondistended  EXTREMITIES: no LE edema  PSYCH: Calm  NEUROLOGY: AAOx3  SKIN: No rashes or lesions    LABS:                        12.5   5.15  )-----------( 171      ( 27 Oct 2023 05:46 )             38.1     10-27    137  |  104  |  11  ----------------------------<  94  3.6   |  22  |  0.73    Ca    9.2      27 Oct 2023 05:46  Phos  3.7     10-27  Mg     1.90     10-27    TPro  7.4  /  Alb  3.8  /  TBili  0.4  /  DBili  x   /  AST  70<H>  /  ALT  70<H>  /  AlkPhos  64  10-27          Urinalysis Basic - ( 27 Oct 2023 05:46 )    Color: x / Appearance: x / SG: x / pH: x  Gluc: 94 mg/dL / Ketone: x  / Bili: x / Urobili: x   Blood: x / Protein: x / Nitrite: x   Leuk Esterase: x / RBC: x / WBC x   Sq Epi: x / Non Sq Epi: x / Bacteria: x        RADIOLOGY & ADDITIONAL TESTS:    Imaging Personally Reviewed:    Consultant(s) Notes Reviewed:      Care Discussed with Consultants/Other Providers:  
Patient is a 58y old  Male who presents with a chief complaint of withdrawal (28 Oct 2023 07:46)      SUBJECTIVE / OVERNIGHT EVENTS: Pt seen and examined at 11:25am, no overnight events, pt has no more headache or lightheadedness, Denies any abdominal pain, or any other complaints, no other new issues reported. Pt reports that he has no pcp to f/u his [facility].         MEDICATIONS  (STANDING):  amLODIPine   Tablet 5 milliGRAM(s) Oral daily  dorzolamide 2% Ophthalmic Solution 1 Drop(s) Both EYES three times a day  folic acid 1 milliGRAM(s) Oral daily  influenza   Vaccine 0.5 milliLiter(s) IntraMuscular once  latanoprost 0.005% Ophthalmic Solution 1 Drop(s) Both EYES at bedtime  multivitamin 1 Tablet(s) Oral daily  timolol 0.5% Solution 1 Drop(s) Both EYES two times a day    MEDICATIONS  (PRN):  acetaminophen     Tablet .. 975 milliGRAM(s) Oral every 6 hours PRN Moderate Pain (4 - 6), Severe Pain (7 - 10)  melatonin 3 milliGRAM(s) Oral at bedtime PRN Insomnia      Vital Signs Last 24 Hrs  T(C): 36.7 (28 Oct 2023 13:16), Max: 36.8 (28 Oct 2023 05:06)  T(F): 98.1 (28 Oct 2023 13:16), Max: 98.2 (28 Oct 2023 05:06)  HR: 80 (28 Oct 2023 13:16) (62 - 80)  BP: 111/77 (28 Oct 2023 13:16) (111/77 - 118/74)  BP(mean): --  RR: 18 (28 Oct 2023 13:16) (17 - 18)  SpO2: 99% (28 Oct 2023 13:16) (99% - 99%)    Parameters below as of 28 Oct 2023 13:16  Patient On (Oxygen Delivery Method): room air      CAPILLARY BLOOD GLUCOSE        I&O's Summary      PHYSICAL EXAM:  GENERAL: NAD, well-developed  Eyes: rt eye ptosis+  CHEST/LUNG: Clear to auscultation bilaterally; No wheeze  HEART: Regular rate and rhythm  ABDOMEN: Soft, Nontender, Nondistended  EXTREMITIES: no LE edema  PSYCH: Calm  NEUROLOGY: AAOx3  SKIN: No rashes or lesions    LABS:                        12.5   5.15  )-----------( 171      ( 27 Oct 2023 05:46 )             38.1     10-28    137  |  104  |  11  ----------------------------<  112<H>  3.4<L>   |  23  |  0.71    Ca    8.8      28 Oct 2023 07:00  Phos  3.8     10-28  Mg     1.80     10-28    TPro  7.0  /  Alb  3.7  /  TBili  0.3  /  DBili  <0.2  /  AST  74<H>  /  ALT  89<H>  /  AlkPhos  62  10-28          Urinalysis Basic - ( 28 Oct 2023 07:00 )    Color: x / Appearance: x / SG: x / pH: x  Gluc: 112 mg/dL / Ketone: x  / Bili: x / Urobili: x   Blood: x / Protein: x / Nitrite: x   Leuk Esterase: x / RBC: x / WBC x   Sq Epi: x / Non Sq Epi: x / Bacteria: x        RADIOLOGY & ADDITIONAL TESTS:    Imaging Personally Reviewed:    Consultant(s) Notes Reviewed:      Care Discussed with Consultants/Other Providers:  
[facility] Division of Hospital Medicine  Rossy Mustafa MD  Available via MS Teams  Pager: [phone]    SUBJECTIVE / OVERNIGHT EVENTS:    ADDITIONAL REVIEW OF SYSTEMS:    MEDICATIONS  (STANDING):  amLODIPine   Tablet 5 milliGRAM(s) Oral daily  chlordiazePOXIDE   Oral   chlordiazePOXIDE 50 milliGRAM(s) Oral every 6 hours  dorzolamide 2% Ophthalmic Solution 1 Drop(s) Both EYES three times a day  folic acid 1 milliGRAM(s) Oral daily  latanoprost 0.005% Ophthalmic Solution 1 Drop(s) Both EYES at bedtime  multivitamin 1 Tablet(s) Oral daily  thiamine 100 milliGRAM(s) Oral daily  timolol 0.5% Solution 1 Drop(s) Both EYES two times a day    MEDICATIONS  (PRN):  chlordiazePOXIDE 50 milliGRAM(s) Oral every 1 hour PRN Symptom-triggered: each CIWA -Ar score 8 or GREATER  LORazepam     Tablet 2 milliGRAM(s) Oral every 2 hours PRN Symptom-triggered 2 point increase in CIWA-Ar      I&O's Summary    22 Oct 2023 07:01  -  22 Oct 2023 14:27  --------------------------------------------------------  IN: 0 mL / OUT: 400 mL / NET: -400 mL        PHYSICAL EXAM:  Vital Signs Last 24 Hrs  T(C): 36.9 (22 Oct 2023 13:29), Max: 37.1 (22 Oct 2023 03:56)  T(F): 98.4 (22 Oct 2023 13:29), Max: 98.8 (22 Oct 2023 03:56)  HR: 74 (22 Oct 2023 13:29) (68 - 91)  BP: 143/79 (22 Oct 2023 13:29) (118/88 - 181/80)  BP(mean): --  RR: 20 (22 Oct 2023 13:29) (16 - 20)  SpO2: 96% (22 Oct 2023 13:29) (96% - 100%)    Parameters below as of 22 Oct 2023 13:29  Patient On (Oxygen Delivery Method): room air      CONSTITUTIONAL: NAD  EYES: PERRLA; conjunctiva and sclera clear  ENMT: Moist oral mucosa, no pharyngeal injection or exudates  NECK: Supple, no palpable masses  RESPIRATORY: Normal respiratory effort; lungs are clear to auscultation bilaterally  CARDIOVASCULAR: Regular rate and rhythm, normal S1 and S2, no murmur/rub/gallop; No lower extremity edema; Peripheral pulses are 2+ bilaterally  ABDOMEN: Nontender to palpation, normoactive bowel sounds, no rebound/guarding  MUSCULOSKELETAL:  no clubbing or cyanosis of digits; no joint swelling or tenderness to palpation  PSYCH: A+O to person, place, and time; affect appropriate  NEUROLOGY: CN 2-12 are intact and symmetric; no gross sensory deficits   SKIN: No rashes; no palpable lesions    LABS:                        12.7   4.66  )-----------( 313      ( 21 Oct 2023 14:00 )             38.6     10-22    138  |  101  |  8   ----------------------------<  78  3.7   |  22  |  0.63    Ca    7.9<L>      22 Oct 2023 07:50  Phos  2.2     10-22  Mg     1.50     10-22    TPro  7.1  /  Alb  3.7  /  TBili  0.3  /  DBili  x   /  AST  47<H>  /  ALT  37  /  AlkPhos  67  10-21    PT/INR - ( 21 Oct 2023 17:45 )   PT: 10.0 sec;   INR: <0.90 ratio         PTT - ( 21 Oct 2023 17:45 )  PTT:27.3 sec      Urinalysis Basic - ( 22 Oct 2023 07:50 )    Color: x / Appearance: x / SG: x / pH: x  Gluc: 78 mg/dL / Ketone: x  / Bili: x / Urobili: x   Blood: x / Protein: x / Nitrite: x   Leuk Esterase: x / RBC: x / WBC x   Sq Epi: x / Non Sq Epi: x / Bacteria: x        SARS-CoV-2: NotDetec (08 Jul 2023 14:30)            
Patient is a 58y old  Male who presents with a chief complaint of withdrawal (23 Oct 2023 00:31)      SUBJECTIVE / OVERNIGHT EVENTS: Pt seen and examined at 11:50am, overnight events noted, pt reports headache and hand tremors, spoke through  Jeff ID:[id number], says that he will go to alcohol rehab in [place], no other new issues reported.    MEDICATIONS  (STANDING):  amLODIPine   Tablet 5 milliGRAM(s) Oral daily  chlordiazePOXIDE 50 milliGRAM(s) Oral every 8 hours  chlordiazePOXIDE   Oral   dorzolamide 2% Ophthalmic Solution 1 Drop(s) Both EYES three times a day  folic acid 1 milliGRAM(s) Oral daily  influenza   Vaccine 0.5 milliLiter(s) IntraMuscular once  latanoprost 0.005% Ophthalmic Solution 1 Drop(s) Both EYES at bedtime  multivitamin 1 Tablet(s) Oral daily  thiamine IVPB 500 milliGRAM(s) IV Intermittent every 8 hours  timolol 0.5% Solution 1 Drop(s) Both EYES two times a day    MEDICATIONS  (PRN):  acetaminophen     Tablet .. 975 milliGRAM(s) Oral every 6 hours PRN Moderate Pain (4 - 6), Severe Pain (7 - 10)  LORazepam     Tablet 2 milliGRAM(s) Oral every 2 hours PRN Symptom-triggered 2 point increase in CIWA-Ar      Vital Signs Last 24 Hrs  T(C): 37 (23 Oct 2023 15:31), Max: 37.1 (22 Oct 2023 16:30)  T(F): 98.6 (23 Oct 2023 15:31), Max: 98.8 (22 Oct 2023 17:30)  HR: 84 (23 Oct 2023 15:31) (60 - 86)  BP: 129/76 (23 Oct 2023 15:31) (111/66 - 146/75)  BP(mean): --  RR: 20 (23 Oct 2023 15:31) (17 - 20)  SpO2: 99% (23 Oct 2023 15:31) (96% - 100%)    Parameters below as of 23 Oct 2023 15:31  Patient On (Oxygen Delivery Method): room air      CAPILLARY BLOOD GLUCOSE        I&O's Summary    22 Oct 2023 07:01  -  23 Oct 2023 07:00  --------------------------------------------------------  IN: 0 mL / OUT: 400 mL / NET: -400 mL    23 Oct 2023 07:01  -  23 Oct 2023 15:53  --------------------------------------------------------  IN: 0 mL / OUT: 650 mL / NET: -650 mL        PHYSICAL EXAM:  GENERAL: NAD, well-developed  Eyes: rt eye ptosis+  CHEST/LUNG: Clear to auscultation bilaterally; No wheeze  HEART: Regular rate and rhythm  ABDOMEN: Soft, Nontender, Nondistended  EXTREMITIES: no LE edema, + b/l hand tremors  PSYCH: Calm  NEUROLOGY: AAOx3  SKIN: No rashes or lesions    LABS:    10-22    138  |  101  |  8   ----------------------------<  78  3.7   |  22  |  0.63    Ca    7.9<L>      22 Oct 2023 07:50  Phos  2.2     10-22  Mg     1.50     10-22    TPro  7.1  /  Alb  3.7  /  TBili  0.3  /  DBili  x   /  AST  47<H>  /  ALT  37  /  AlkPhos  67  10-21    PT/INR - ( 21 Oct 2023 17:45 )   PT: 10.0 sec;   INR: <0.90 ratio         PTT - ( 21 Oct 2023 17:45 )  PTT:27.3 sec      Urinalysis Basic - ( 22 Oct 2023 07:50 )    Color: x / Appearance: x / SG: x / pH: x  Gluc: 78 mg/dL / Ketone: x  / Bili: x / Urobili: x   Blood: x / Protein: x / Nitrite: x   Leuk Esterase: x / RBC: x / WBC x   Sq Epi: x / Non Sq Epi: x / Bacteria: x        RADIOLOGY & ADDITIONAL TESTS:  < from: CT Head No Cont (10.21.23 @ 15:39) >  CT CERVICAL SPINE   ORDERED BY: HELEN BLOCH     ACC: [id number] EXAM:  CT BRAIN   O    < end of copied text >  < from: CT Head No Cont (10.21.23 @ 15:39) >  CT Head:  No acute intra-cranial hemorrhage, mass effect, or midline shift.    CT Cervical Spine:  No acute fracture or traumatic subluxation.  There are multi-level degenerative changes of the cervical spine.    < end of copied text >    Imaging Personally Reviewed:    Consultant(s) Notes Reviewed:      Care Discussed with Consultants/Other Providers:  
Patient is a 58y old  Male who presents with a chief complaint of withdrawal (23 Oct 2023 15:53)      SUBJECTIVE / OVERNIGHT EVENTS: Pt seen and examined at 11:40am, no overnight events, pt reports headache, hand tremors and feels lightheaded today, no other new issues reported.        MEDICATIONS  (STANDING):  amLODIPine   Tablet 5 milliGRAM(s) Oral daily  chlordiazePOXIDE   Oral   chlordiazePOXIDE 75 milliGRAM(s) Oral every 8 hours  dorzolamide 2% Ophthalmic Solution 1 Drop(s) Both EYES three times a day  folic acid 1 milliGRAM(s) Oral daily  influenza   Vaccine 0.5 milliLiter(s) IntraMuscular once  latanoprost 0.005% Ophthalmic Solution 1 Drop(s) Both EYES at bedtime  multivitamin 1 Tablet(s) Oral daily  thiamine IVPB 500 milliGRAM(s) IV Intermittent every 8 hours  timolol 0.5% Solution 1 Drop(s) Both EYES two times a day    MEDICATIONS  (PRN):  acetaminophen     Tablet .. 975 milliGRAM(s) Oral every 6 hours PRN Moderate Pain (4 - 6), Severe Pain (7 - 10)  LORazepam     Tablet 2 milliGRAM(s) Oral every 2 hours PRN Symptom-triggered 2 point increase in CIWA-Ar  melatonin 3 milliGRAM(s) Oral at bedtime PRN Insomnia      Vital Signs Last 24 Hrs  T(C): 36.5 (24 Oct 2023 13:39), Max: 37.1 (23 Oct 2023 17:34)  T(F): 97.7 (24 Oct 2023 13:39), Max: 98.8 (23 Oct 2023 17:34)  HR: 65 (24 Oct 2023 13:39) (57 - 86)  BP: 119/75 (24 Oct 2023 13:39) (104/53 - 138/63)  BP(mean): --  RR: 18 (24 Oct 2023 13:39) (18 - 19)  SpO2: 97% (24 Oct 2023 13:39) (97% - 100%)    Parameters below as of 24 Oct 2023 07:40  Patient On (Oxygen Delivery Method): room air      CAPILLARY BLOOD GLUCOSE        I&O's Summary    23 Oct 2023 07:01  -  24 Oct 2023 07:00  --------------------------------------------------------  IN: 0 mL / OUT: 650 mL / NET: -650 mL            MEDICATIONS  (STANDING):  amLODIPine   Tablet 5 milliGRAM(s) Oral daily  chlordiazePOXIDE 50 milliGRAM(s) Oral every 8 hours  chlordiazePOXIDE   Oral   dorzolamide 2% Ophthalmic Solution 1 Drop(s) Both EYES three times a day  folic acid 1 milliGRAM(s) Oral daily  influenza   Vaccine 0.5 milliLiter(s) IntraMuscular once  latanoprost 0.005% Ophthalmic Solution 1 Drop(s) Both EYES at bedtime  multivitamin 1 Tablet(s) Oral daily  thiamine IVPB 500 milliGRAM(s) IV Intermittent every 8 hours  timolol 0.5% Solution 1 Drop(s) Both EYES two times a day    MEDICATIONS  (PRN):  acetaminophen     Tablet .. 975 milliGRAM(s) Oral every 6 hours PRN Moderate Pain (4 - 6), Severe Pain (7 - 10)  LORazepam     Tablet 2 milliGRAM(s) Oral every 2 hours PRN Symptom-triggered 2 point increase in CIWA-Ar      Vital Signs Last 24 Hrs  T(C): 37 (23 Oct 2023 15:31), Max: 37.1 (22 Oct 2023 16:30)  T(F): 98.6 (23 Oct 2023 15:31), Max: 98.8 (22 Oct 2023 17:30)  HR: 84 (23 Oct 2023 15:31) (60 - 86)  BP: 129/76 (23 Oct 2023 15:31) (111/66 - 146/75)  BP(mean): --  RR: 20 (23 Oct 2023 15:31) (17 - 20)  SpO2: 99% (23 Oct 2023 15:31) (96% - 100%)    Parameters below as of 23 Oct 2023 15:31  Patient On (Oxygen Delivery Method): room air      CAPILLARY BLOOD GLUCOSE        I&O's Summary    22 Oct 2023 07:01  -  23 Oct 2023 07:00  --------------------------------------------------------  IN: 0 mL / OUT: 400 mL / NET: -400 mL    23 Oct 2023 07:01  -  23 Oct 2023 15:53  --------------------------------------------------------  IN: 0 mL / OUT: 650 mL / NET: -650 mL        PHYSICAL EXAM:  GENERAL: NAD, well-developed  Eyes: rt eye ptosis+  CHEST/LUNG: Clear to auscultation bilaterally; No wheeze  HEART: Regular rate and rhythm  ABDOMEN: Soft, Nontender, Nondistended  EXTREMITIES: no LE edema, + b/l hand tremors  PSYCH: Calm  NEUROLOGY: AAOx3  SKIN: No rashes or lesions    LABS:                        12.1   5.59  )-----------( 230      ( 24 Oct 2023 07:22 )             37.3     10-24    138  |  103  |  9   ----------------------------<  103<H>  3.3<L>   |  24  |  0.64    Ca    8.5      24 Oct 2023 07:22  Phos  3.5     10-24  Mg     1.80     10-24    TPro  6.9  /  Alb  3.7  /  TBili  0.4  /  DBili  <0.2  /  AST  38  /  ALT  31  /  AlkPhos  64  10-24          Urinalysis Basic - ( 24 Oct 2023 07:22 )    Color: x / Appearance: x / SG: x / pH: x  Gluc: 103 mg/dL / Ketone: x  / Bili: x / Urobili: x   Blood: x / Protein: x / Nitrite: x   Leuk Esterase: x / RBC: x / WBC x   Sq Epi: x / Non Sq Epi: x / Bacteria: x        RADIOLOGY & ADDITIONAL TESTS:    Imaging Personally Reviewed:    Consultant(s) Notes Reviewed:      Care Discussed with Consultants/Other Providers:  
Patient is a 58y old  Male who presents with a chief complaint of withdrawal (28 Oct 2023 14:26)      SUBJECTIVE / OVERNIGHT EVENTS: Pt seen and examined at 11:25am, no overnight events, pt has no more headache or lightheadedness, Denies any abdominal pain, or any other complaints, is eager to go home, asking if transportation could be arranged, no other new issues reported.         MEDICATIONS  (STANDING):  amLODIPine   Tablet 5 milliGRAM(s) Oral daily  dorzolamide 2% Ophthalmic Solution 1 Drop(s) Both EYES three times a day  folic acid 1 milliGRAM(s) Oral daily  influenza   Vaccine 0.5 milliLiter(s) IntraMuscular once  latanoprost 0.005% Ophthalmic Solution 1 Drop(s) Both EYES at bedtime  multivitamin 1 Tablet(s) Oral daily  timolol 0.5% Solution 1 Drop(s) Both EYES two times a day    MEDICATIONS  (PRN):  acetaminophen     Tablet .. 975 milliGRAM(s) Oral every 6 hours PRN Moderate Pain (4 - 6), Severe Pain (7 - 10)  melatonin 3 milliGRAM(s) Oral at bedtime PRN Insomnia      Vital Signs Last 24 Hrs  T(C): 36.5 (29 Oct 2023 05:15), Max: 37.1 (28 Oct 2023 20:46)  T(F): 97.7 (29 Oct 2023 05:15), Max: 98.7 (28 Oct 2023 20:46)  HR: 62 (29 Oct 2023 05:15) (62 - 63)  BP: 100/52 (29 Oct 2023 05:15) (100/52 - 115/62)  BP(mean): --  RR: 17 (29 Oct 2023 05:15) (17 - 18)  SpO2: 98% (29 Oct 2023 05:15) (98% - 99%)    Parameters below as of 29 Oct 2023 05:15  Patient On (Oxygen Delivery Method): room air      CAPILLARY BLOOD GLUCOSE        I&O's Summary      PHYSICAL EXAM:  GENERAL: NAD, well-developed  Eyes: rt eye ptosis+  CHEST/LUNG: Clear to auscultation bilaterally; No wheeze  HEART: Regular rate and rhythm  ABDOMEN: Soft, Nontender, Nondistended  EXTREMITIES: no LE edema  PSYCH: Calm  NEUROLOGY: AAOx3  SKIN: No rashes or lesions    LABS:    10-29    137  |  104  |  19  ----------------------------<  102<H>  3.7   |  22  |  0.77    Ca    8.9      29 Oct 2023 06:05  Phos  3.8     10-29  Mg     1.90     10-29    TPro  7.3  /  Alb  3.8  /  TBili  0.3  /  DBili  x   /  AST  63<H>  /  ALT  95<H>  /  AlkPhos  62  10-29          Urinalysis Basic - ( 29 Oct 2023 06:05 )    Color: x / Appearance: x / SG: x / pH: x  Gluc: 102 mg/dL / Ketone: x  / Bili: x / Urobili: x   Blood: x / Protein: x / Nitrite: x   Leuk Esterase: x / RBC: x / WBC x   Sq Epi: x / Non Sq Epi: x / Bacteria: x        RADIOLOGY & ADDITIONAL TESTS:  < from: US Abdomen Upper Quadrant Right (10.29.23 @ 12:10) >  US ABDOMEN     < end of copied text >  < from: US Abdomen Upper Quadrant Right (10.29.23 @ 12:10) >  Mild hepatic steatosis.    < end of copied text >    Imaging Personally Reviewed:    Consultant(s) Notes Reviewed:      Care Discussed with Consultants/Other Providers:  
Patient is a 58y old  Male who presents with a chief complaint of withdrawal (24 Oct 2023 16:09)      SUBJECTIVE / OVERNIGHT EVENTS: Pt seen and examined at 10:50am, no overnight events, pt reports headache, hand tremors and feels lightheaded today, however ciwa scores are trending down, no other new issues reported.        MEDICATIONS  (STANDING):  amLODIPine   Tablet 5 milliGRAM(s) Oral daily  chlordiazePOXIDE   Oral   dorzolamide 2% Ophthalmic Solution 1 Drop(s) Both EYES three times a day  folic acid 1 milliGRAM(s) Oral daily  influenza   Vaccine 0.5 milliLiter(s) IntraMuscular once  latanoprost 0.005% Ophthalmic Solution 1 Drop(s) Both EYES at bedtime  multivitamin 1 Tablet(s) Oral daily  thiamine IVPB 500 milliGRAM(s) IV Intermittent every 8 hours  timolol 0.5% Solution 1 Drop(s) Both EYES two times a day    MEDICATIONS  (PRN):  acetaminophen     Tablet .. 975 milliGRAM(s) Oral every 6 hours PRN Moderate Pain (4 - 6), Severe Pain (7 - 10)  LORazepam   Injectable 2 milliGRAM(s) IV Push every 2 hours PRN CIWA-Ar score increase by 2 points and a total score of 7 or less  LORazepam   Injectable 2 milliGRAM(s) IV Push every 1 hour PRN CIWA-Ar score 8 or greater  melatonin 3 milliGRAM(s) Oral at bedtime PRN Insomnia      Vital Signs Last 24 Hrs  T(C): 37.1 (25 Oct 2023 12:30), Max: 37.1 (25 Oct 2023 12:30)  T(F): 98.7 (25 Oct 2023 12:30), Max: 98.7 (25 Oct 2023 12:30)  HR: 66 (25 Oct 2023 12:30) (61 - 66)  BP: 118/75 (25 Oct 2023 12:30) (110/64 - 134/77)  BP(mean): --  RR: 17 (25 Oct 2023 12:30) (17 - 18)  SpO2: 99% (25 Oct 2023 12:30) (95% - 100%)    Parameters below as of 25 Oct 2023 12:30  Patient On (Oxygen Delivery Method): room air      CAPILLARY BLOOD GLUCOSE        I&O's Summary      PHYSICAL EXAM:  GENERAL: NAD, well-developed  Eyes: rt eye ptosis+  CHEST/LUNG: Clear to auscultation bilaterally; No wheeze  HEART: Regular rate and rhythm  ABDOMEN: Soft, Nontender, Nondistended  EXTREMITIES: no LE edema, + b/l hand tremors  PSYCH: Calm  NEUROLOGY: AAOx3  SKIN: No rashes or lesions    LABS:                        12.3   5.71  )-----------( 220      ( 25 Oct 2023 07:19 )             36.3     10-25    139  |  104  |  8   ----------------------------<  104<H>  3.4<L>   |  22  |  0.66    Ca    8.8      25 Oct 2023 07:19  Phos  3.5     10-25  Mg     1.80     10-25    TPro  7.0  /  Alb  3.6  /  TBili  0.4  /  DBili  <0.2  /  AST  43<H>  /  ALT  37  /  AlkPhos  65  10-25          Urinalysis Basic - ( 25 Oct 2023 07:19 )    Color: x / Appearance: x / SG: x / pH: x  Gluc: 104 mg/dL / Ketone: x  / Bili: x / Urobili: x   Blood: x / Protein: x / Nitrite: x   Leuk Esterase: x / RBC: x / WBC x   Sq Epi: x / Non Sq Epi: x / Bacteria: x        RADIOLOGY & ADDITIONAL TESTS:    Imaging Personally Reviewed:    Consultant(s) Notes Reviewed:      Care Discussed with Consultants/Other Providers:

## 2023-10-29 NOTE — PROGRESS NOTE ADULT - PROBLEM SELECTOR PLAN 1
completed ciwa monitoring  Completed chlordiazepoxide taper  ativan prn  folic acid, MV, thiamine supplementation   SW consult  fall/seizure precautions  ct head and cspine showed No acute intra-cranial hemorrhage, mass effect, or midline shift. CT Cervical Spine:  No acute fracture or traumatic subluxation. There are multi-level degenerative changes of the cervical spine.  Psych consulted and follg  Rpt head ct done was neg

## 2023-10-29 NOTE — DISCHARGE NOTE NURSING/CASE MANAGEMENT/SOCIAL WORK - NSDCVIVACCINE_GEN_ALL_CORE_FT
influenza, injectable, quadrivalent, preservative free; 14-Oct-2021 13:35; Nany Guerrero (RN); [employer]; [id number] (Exp. Date: 30-Jun-2022); IntraMuscular; Deltoid Right.; 0.5 milliLiter(s); VIS (VIS Published: 06-Aug-2021, VIS Presented: 14-Oct-2021);   Tdap; 22-Dec-2021 19:23; Clementina Koehler (RN); [employer]; [id number] (Exp. Date: 09-Sep-2023); IntraMuscular; Deltoid Left.; 0.5 milliLiter(s); VIS (VIS Published: 09-May-2013, VIS Presented: 22-Dec-2021);   Tdap; 15-Aug-2022 16:08; Marianne Pollock (RN); [employer]; [id number]   (Exp. Date: 18-Apr-2024); IntraMuscular; Deltoid Left.; 0.5 milliLiter(s); VIS (VIS Published: 09-May-2013, VIS Presented: 15-Aug-2022);   Tdap; 16-Nov-2022 15:48; Parish Avila (RN); [employer]; [id number] (Exp. Date: 01-Jun-2024); IntraMuscular; Deltoid Right.; 0.5 milliLiter(s); VIS (VIS Published: 09-May-2013, VIS Presented: 16-Nov-2022);   Tdap; 11-Sep-2023 16:32; Alexandra Sung (RN); [employer]; [id number] (Exp. Date: 03-Oct-2025); IntraMuscular; Deltoid Right.; 0.5 milliLiter(s); VIS (VIS Published: 09-May-2013, VIS Presented: 11-Sep-2023);

## 2023-10-29 NOTE — PROGRESS NOTE ADULT - PROBLEM SELECTOR PLAN 2
c/w amlodipine with hold parameters

## 2023-10-29 NOTE — DISCHARGE NOTE NURSING/CASE MANAGEMENT/SOCIAL WORK - PATIENT PORTAL LINK FT
You can access the FollowMyHealth Patient Portal offered by [facility] by registering at the following website: [email or web address]. By joining [employer]’s FollowMyHealth portal, you will also be able to view your health information using other applications (apps) compatible with our system.

## 2023-10-29 NOTE — DISCHARGE NOTE NURSING/CASE MANAGEMENT/SOCIAL WORK - NSDCFUADDAPPT_GEN_ALL_CORE_FT
[facility] [phone]    [facility] Outpatient services  For acute crisis: [facility]  [address] [facility], [address], [place] [address]  [phone]  [email or web address]    Follow up with your primary care physician for further monitoring in 1-2 weeks. Please call to arrange appointment.

## 2023-10-29 NOTE — DISCHARGE NOTE NURSING/CASE MANAGEMENT/SOCIAL WORK - NSDCPEFALRISK_GEN_ALL_CORE
For information on Fall & Injury Prevention, visit: [email or web address] OR  [email or web address] OR  https://www.cdc.gov/steadi/patient.html

## 2023-10-29 NOTE — PROGRESS NOTE ADULT - ASSESSMENT
58 -year-old male with history of anxiety, alcohol abuse with history of prior withdrawal seizure 2 months ago, no known history of DTs, R eye blindness, glaucoma, HTN, presenting with headaches, found to be intoxicated, then experiencing withdrawal in the ED, admitted for alcohol withdrawal

## 2023-11-09 ENCOUNTER — INPATIENT (INPATIENT)
Facility: HOSPITAL | Age: 58
LOS: 5 days | Discharge: HOME | End: 2023-11-15
Attending: INTERNAL MEDICINE | Admitting: INTERNAL MEDICINE
Payer: MEDICAID

## 2023-11-09 VITALS
HEIGHT: 68 IN | WEIGHT: 164.91 LBS | DIASTOLIC BLOOD PRESSURE: 71 MMHG | HEART RATE: 94 BPM | OXYGEN SATURATION: 94 % | RESPIRATION RATE: 18 BRPM | SYSTOLIC BLOOD PRESSURE: 120 MMHG | TEMPERATURE: 99 F

## 2023-11-09 DIAGNOSIS — I10 ESSENTIAL (PRIMARY) HYPERTENSION: ICD-10-CM

## 2023-11-09 DIAGNOSIS — F10.929 ALCOHOL USE, UNSPECIFIED WITH INTOXICATION, UNSPECIFIED: ICD-10-CM

## 2023-11-09 LAB
ALBUMIN SERPL ELPH-MCNC: 3.4 G/DL — SIGNIFICANT CHANGE UP (ref 3.3–5)
ALBUMIN SERPL ELPH-MCNC: 3.4 G/DL — SIGNIFICANT CHANGE UP (ref 3.3–5)
ALP SERPL-CCNC: 77 U/L — SIGNIFICANT CHANGE UP (ref 40–120)
ALP SERPL-CCNC: 77 U/L — SIGNIFICANT CHANGE UP (ref 40–120)
ALT FLD-CCNC: 47 U/L — SIGNIFICANT CHANGE UP (ref 12–78)
ALT FLD-CCNC: 47 U/L — SIGNIFICANT CHANGE UP (ref 12–78)
ANION GAP SERPL CALC-SCNC: 9 MMOL/L — SIGNIFICANT CHANGE UP (ref 5–17)
ANION GAP SERPL CALC-SCNC: 9 MMOL/L — SIGNIFICANT CHANGE UP (ref 5–17)
APPEARANCE UR: CLEAR — SIGNIFICANT CHANGE UP
APPEARANCE UR: CLEAR — SIGNIFICANT CHANGE UP
APTT BLD: 27.8 SEC — SIGNIFICANT CHANGE UP (ref 24.5–35.6)
APTT BLD: 27.8 SEC — SIGNIFICANT CHANGE UP (ref 24.5–35.6)
AST SERPL-CCNC: 76 U/L — HIGH (ref 15–37)
AST SERPL-CCNC: 76 U/L — HIGH (ref 15–37)
BASOPHILS # BLD AUTO: 0.12 K/UL — SIGNIFICANT CHANGE UP (ref 0–0.2)
BASOPHILS # BLD AUTO: 0.12 K/UL — SIGNIFICANT CHANGE UP (ref 0–0.2)
BASOPHILS NFR BLD AUTO: 2.1 % — HIGH (ref 0–2)
BASOPHILS NFR BLD AUTO: 2.1 % — HIGH (ref 0–2)
BILIRUB SERPL-MCNC: 0.4 MG/DL — SIGNIFICANT CHANGE UP (ref 0.2–1.2)
BILIRUB SERPL-MCNC: 0.4 MG/DL — SIGNIFICANT CHANGE UP (ref 0.2–1.2)
BILIRUB UR-MCNC: NEGATIVE — SIGNIFICANT CHANGE UP
BILIRUB UR-MCNC: NEGATIVE — SIGNIFICANT CHANGE UP
BUN SERPL-MCNC: 11 MG/DL — SIGNIFICANT CHANGE UP (ref 7–23)
BUN SERPL-MCNC: 11 MG/DL — SIGNIFICANT CHANGE UP (ref 7–23)
CALCIUM SERPL-MCNC: 8 MG/DL — LOW (ref 8.5–10.1)
CALCIUM SERPL-MCNC: 8 MG/DL — LOW (ref 8.5–10.1)
CHLORIDE SERPL-SCNC: 101 MMOL/L — SIGNIFICANT CHANGE UP (ref 96–108)
CHLORIDE SERPL-SCNC: 101 MMOL/L — SIGNIFICANT CHANGE UP (ref 96–108)
CO2 SERPL-SCNC: 27 MMOL/L — SIGNIFICANT CHANGE UP (ref 22–31)
CO2 SERPL-SCNC: 27 MMOL/L — SIGNIFICANT CHANGE UP (ref 22–31)
COLOR SPEC: YELLOW — SIGNIFICANT CHANGE UP
COLOR SPEC: YELLOW — SIGNIFICANT CHANGE UP
CREAT SERPL-MCNC: 0.83 MG/DL — SIGNIFICANT CHANGE UP (ref 0.5–1.3)
CREAT SERPL-MCNC: 0.83 MG/DL — SIGNIFICANT CHANGE UP (ref 0.5–1.3)
DIFF PNL FLD: NEGATIVE — SIGNIFICANT CHANGE UP
DIFF PNL FLD: NEGATIVE — SIGNIFICANT CHANGE UP
EGFR: 100 ML/MIN/1.73M2 — SIGNIFICANT CHANGE UP
EGFR: 100 ML/MIN/1.73M2 — SIGNIFICANT CHANGE UP
EOSINOPHIL # BLD AUTO: 0.05 K/UL — SIGNIFICANT CHANGE UP (ref 0–0.5)
EOSINOPHIL # BLD AUTO: 0.05 K/UL — SIGNIFICANT CHANGE UP (ref 0–0.5)
EOSINOPHIL NFR BLD AUTO: 0.9 % — SIGNIFICANT CHANGE UP (ref 0–6)
EOSINOPHIL NFR BLD AUTO: 0.9 % — SIGNIFICANT CHANGE UP (ref 0–6)
ETHANOL SERPL-MCNC: 325 MG/DL — SIGNIFICANT CHANGE UP (ref 0–10)
ETHANOL SERPL-MCNC: 325 MG/DL — SIGNIFICANT CHANGE UP (ref 0–10)
FLUAV AG NPH QL: SIGNIFICANT CHANGE UP
FLUAV AG NPH QL: SIGNIFICANT CHANGE UP
FLUBV AG NPH QL: SIGNIFICANT CHANGE UP
FLUBV AG NPH QL: SIGNIFICANT CHANGE UP
GLUCOSE SERPL-MCNC: 95 MG/DL — SIGNIFICANT CHANGE UP (ref 70–99)
GLUCOSE SERPL-MCNC: 95 MG/DL — SIGNIFICANT CHANGE UP (ref 70–99)
GLUCOSE UR QL: NEGATIVE MG/DL — SIGNIFICANT CHANGE UP
GLUCOSE UR QL: NEGATIVE MG/DL — SIGNIFICANT CHANGE UP
HCT VFR BLD CALC: 34.3 % — LOW (ref 39–50)
HCT VFR BLD CALC: 34.3 % — LOW (ref 39–50)
HGB BLD-MCNC: 11.4 G/DL — LOW (ref 13–17)
HGB BLD-MCNC: 11.4 G/DL — LOW (ref 13–17)
IMM GRANULOCYTES NFR BLD AUTO: 0.2 % — SIGNIFICANT CHANGE UP (ref 0–0.9)
IMM GRANULOCYTES NFR BLD AUTO: 0.2 % — SIGNIFICANT CHANGE UP (ref 0–0.9)
INR BLD: 0.84 RATIO — LOW (ref 0.85–1.18)
INR BLD: 0.84 RATIO — LOW (ref 0.85–1.18)
KETONES UR-MCNC: NEGATIVE MG/DL — SIGNIFICANT CHANGE UP
KETONES UR-MCNC: NEGATIVE MG/DL — SIGNIFICANT CHANGE UP
LACTATE SERPL-SCNC: 3.4 MMOL/L — HIGH (ref 0.7–2)
LACTATE SERPL-SCNC: 3.4 MMOL/L — HIGH (ref 0.7–2)
LACTATE SERPL-SCNC: 3.9 MMOL/L — HIGH (ref 0.7–2)
LACTATE SERPL-SCNC: 3.9 MMOL/L — HIGH (ref 0.7–2)
LEUKOCYTE ESTERASE UR-ACNC: NEGATIVE — SIGNIFICANT CHANGE UP
LEUKOCYTE ESTERASE UR-ACNC: NEGATIVE — SIGNIFICANT CHANGE UP
LYMPHOCYTES # BLD AUTO: 2.25 K/UL — SIGNIFICANT CHANGE UP (ref 1–3.3)
LYMPHOCYTES # BLD AUTO: 2.25 K/UL — SIGNIFICANT CHANGE UP (ref 1–3.3)
LYMPHOCYTES # BLD AUTO: 38.5 % — SIGNIFICANT CHANGE UP (ref 13–44)
LYMPHOCYTES # BLD AUTO: 38.5 % — SIGNIFICANT CHANGE UP (ref 13–44)
MAGNESIUM SERPL-MCNC: 2.1 MG/DL — SIGNIFICANT CHANGE UP (ref 1.6–2.6)
MAGNESIUM SERPL-MCNC: 2.1 MG/DL — SIGNIFICANT CHANGE UP (ref 1.6–2.6)
MCHC RBC-ENTMCNC: 28.9 PG — SIGNIFICANT CHANGE UP (ref 27–34)
MCHC RBC-ENTMCNC: 28.9 PG — SIGNIFICANT CHANGE UP (ref 27–34)
MCHC RBC-ENTMCNC: 33.2 G/DL — SIGNIFICANT CHANGE UP (ref 32–36)
MCHC RBC-ENTMCNC: 33.2 G/DL — SIGNIFICANT CHANGE UP (ref 32–36)
MCV RBC AUTO: 87.1 FL — SIGNIFICANT CHANGE UP (ref 80–100)
MCV RBC AUTO: 87.1 FL — SIGNIFICANT CHANGE UP (ref 80–100)
MONOCYTES # BLD AUTO: 0.42 K/UL — SIGNIFICANT CHANGE UP (ref 0–0.9)
MONOCYTES # BLD AUTO: 0.42 K/UL — SIGNIFICANT CHANGE UP (ref 0–0.9)
MONOCYTES NFR BLD AUTO: 7.2 % — SIGNIFICANT CHANGE UP (ref 2–14)
MONOCYTES NFR BLD AUTO: 7.2 % — SIGNIFICANT CHANGE UP (ref 2–14)
NEUTROPHILS # BLD AUTO: 2.99 K/UL — SIGNIFICANT CHANGE UP (ref 1.8–7.4)
NEUTROPHILS # BLD AUTO: 2.99 K/UL — SIGNIFICANT CHANGE UP (ref 1.8–7.4)
NEUTROPHILS NFR BLD AUTO: 51.1 % — SIGNIFICANT CHANGE UP (ref 43–77)
NEUTROPHILS NFR BLD AUTO: 51.1 % — SIGNIFICANT CHANGE UP (ref 43–77)
NITRITE UR-MCNC: NEGATIVE — SIGNIFICANT CHANGE UP
NITRITE UR-MCNC: NEGATIVE — SIGNIFICANT CHANGE UP
NRBC # BLD: 0 /100 WBCS — SIGNIFICANT CHANGE UP (ref 0–0)
NRBC # BLD: 0 /100 WBCS — SIGNIFICANT CHANGE UP (ref 0–0)
PH UR: 6.5 — SIGNIFICANT CHANGE UP (ref 5–8)
PH UR: 6.5 — SIGNIFICANT CHANGE UP (ref 5–8)
PLATELET # BLD AUTO: 288 K/UL — SIGNIFICANT CHANGE UP (ref 150–400)
PLATELET # BLD AUTO: 288 K/UL — SIGNIFICANT CHANGE UP (ref 150–400)
POTASSIUM SERPL-MCNC: 4.2 MMOL/L — SIGNIFICANT CHANGE UP (ref 3.5–5.3)
POTASSIUM SERPL-MCNC: 4.2 MMOL/L — SIGNIFICANT CHANGE UP (ref 3.5–5.3)
POTASSIUM SERPL-SCNC: 4.2 MMOL/L — SIGNIFICANT CHANGE UP (ref 3.5–5.3)
POTASSIUM SERPL-SCNC: 4.2 MMOL/L — SIGNIFICANT CHANGE UP (ref 3.5–5.3)
PROT SERPL-MCNC: 7.8 GM/DL — SIGNIFICANT CHANGE UP (ref 6–8.3)
PROT SERPL-MCNC: 7.8 GM/DL — SIGNIFICANT CHANGE UP (ref 6–8.3)
PROT UR-MCNC: NEGATIVE MG/DL — SIGNIFICANT CHANGE UP
PROT UR-MCNC: NEGATIVE MG/DL — SIGNIFICANT CHANGE UP
PROTHROM AB SERPL-ACNC: 10.1 SEC — SIGNIFICANT CHANGE UP (ref 9.5–13)
PROTHROM AB SERPL-ACNC: 10.1 SEC — SIGNIFICANT CHANGE UP (ref 9.5–13)
RBC # BLD: 3.94 M/UL — LOW (ref 4.2–5.8)
RBC # BLD: 3.94 M/UL — LOW (ref 4.2–5.8)
RBC # FLD: 16.1 % — HIGH (ref 10.3–14.5)
RBC # FLD: 16.1 % — HIGH (ref 10.3–14.5)
SARS-COV-2 RNA SPEC QL NAA+PROBE: SIGNIFICANT CHANGE UP
SARS-COV-2 RNA SPEC QL NAA+PROBE: SIGNIFICANT CHANGE UP
SODIUM SERPL-SCNC: 137 MMOL/L — SIGNIFICANT CHANGE UP (ref 135–145)
SODIUM SERPL-SCNC: 137 MMOL/L — SIGNIFICANT CHANGE UP (ref 135–145)
SP GR SPEC: 1 — SIGNIFICANT CHANGE UP (ref 1–1.03)
SP GR SPEC: 1 — SIGNIFICANT CHANGE UP (ref 1–1.03)
UROBILINOGEN FLD QL: 0.2 MG/DL — SIGNIFICANT CHANGE UP (ref 0.2–1)
UROBILINOGEN FLD QL: 0.2 MG/DL — SIGNIFICANT CHANGE UP (ref 0.2–1)
WBC # BLD: 5.84 K/UL — SIGNIFICANT CHANGE UP (ref 3.8–10.5)
WBC # BLD: 5.84 K/UL — SIGNIFICANT CHANGE UP (ref 3.8–10.5)
WBC # FLD AUTO: 5.84 K/UL — SIGNIFICANT CHANGE UP (ref 3.8–10.5)
WBC # FLD AUTO: 5.84 K/UL — SIGNIFICANT CHANGE UP (ref 3.8–10.5)

## 2023-11-09 PROCEDURE — 74177 CT ABD & PELVIS W/CONTRAST: CPT | Mod: 26,MA

## 2023-11-09 PROCEDURE — 99285 EMERGENCY DEPT VISIT HI MDM: CPT

## 2023-11-09 PROCEDURE — 99223 1ST HOSP IP/OBS HIGH 75: CPT

## 2023-11-09 PROCEDURE — 93010 ELECTROCARDIOGRAM REPORT: CPT

## 2023-11-09 PROCEDURE — 71045 X-RAY EXAM CHEST 1 VIEW: CPT | Mod: 26

## 2023-11-09 RX ORDER — FOLIC ACID 0.8 MG
1 TABLET ORAL DAILY
Refills: 0 | Status: DISCONTINUED | OUTPATIENT
Start: 2023-11-09 | End: 2023-11-15

## 2023-11-09 RX ORDER — SODIUM CHLORIDE 9 MG/ML
1000 INJECTION INTRAMUSCULAR; INTRAVENOUS; SUBCUTANEOUS
Refills: 0 | Status: DISCONTINUED | OUTPATIENT
Start: 2023-11-09 | End: 2023-11-15

## 2023-11-09 RX ORDER — AMLODIPINE BESYLATE 2.5 MG/1
5 TABLET ORAL DAILY
Refills: 0 | Status: DISCONTINUED | OUTPATIENT
Start: 2023-11-09 | End: 2023-11-15

## 2023-11-09 RX ORDER — SODIUM CHLORIDE 9 MG/ML
1000 INJECTION INTRAMUSCULAR; INTRAVENOUS; SUBCUTANEOUS ONCE
Refills: 0 | Status: COMPLETED | OUTPATIENT
Start: 2023-11-09 | End: 2023-11-09

## 2023-11-09 RX ORDER — SODIUM CHLORIDE 9 MG/ML
2100 INJECTION, SOLUTION INTRAVENOUS ONCE
Refills: 0 | Status: COMPLETED | OUTPATIENT
Start: 2023-11-09 | End: 2023-11-09

## 2023-11-09 RX ORDER — THIAMINE MONONITRATE (VIT B1) 100 MG
100 TABLET ORAL DAILY
Refills: 0 | Status: DISCONTINUED | OUTPATIENT
Start: 2023-11-09 | End: 2023-11-15

## 2023-11-09 RX ORDER — THIAMINE MONONITRATE (VIT B1) 100 MG
500 TABLET ORAL ONCE
Refills: 0 | Status: COMPLETED | OUTPATIENT
Start: 2023-11-09 | End: 2023-11-09

## 2023-11-09 RX ORDER — ACETAMINOPHEN 500 MG
650 TABLET ORAL EVERY 6 HOURS
Refills: 0 | Status: DISCONTINUED | OUTPATIENT
Start: 2023-11-09 | End: 2023-11-15

## 2023-11-09 RX ORDER — KETOROLAC TROMETHAMINE 30 MG/ML
30 SYRINGE (ML) INJECTION ONCE
Refills: 0 | Status: DISCONTINUED | OUTPATIENT
Start: 2023-11-09 | End: 2023-11-09

## 2023-11-09 RX ORDER — SODIUM CHLORIDE 9 MG/ML
1000 INJECTION, SOLUTION INTRAVENOUS
Refills: 0 | Status: DISCONTINUED | OUTPATIENT
Start: 2023-11-09 | End: 2023-11-09

## 2023-11-09 RX ORDER — PHENOBARBITAL 60 MG
260 TABLET ORAL ONCE
Refills: 0 | Status: DISCONTINUED | OUTPATIENT
Start: 2023-11-09 | End: 2023-11-09

## 2023-11-09 RX ORDER — LANOLIN ALCOHOL/MO/W.PET/CERES
3 CREAM (GRAM) TOPICAL AT BEDTIME
Refills: 0 | Status: DISCONTINUED | OUTPATIENT
Start: 2023-11-09 | End: 2023-11-15

## 2023-11-09 RX ORDER — PANTOPRAZOLE SODIUM 20 MG/1
40 TABLET, DELAYED RELEASE ORAL DAILY
Refills: 0 | Status: DISCONTINUED | OUTPATIENT
Start: 2023-11-09 | End: 2023-11-10

## 2023-11-09 RX ORDER — ONDANSETRON 8 MG/1
4 TABLET, FILM COATED ORAL EVERY 8 HOURS
Refills: 0 | Status: DISCONTINUED | OUTPATIENT
Start: 2023-11-09 | End: 2023-11-15

## 2023-11-09 RX ADMIN — SODIUM CHLORIDE 1000 MILLILITER(S): 9 INJECTION INTRAMUSCULAR; INTRAVENOUS; SUBCUTANEOUS at 18:52

## 2023-11-09 RX ADMIN — Medication 105 MILLIGRAM(S): at 20:11

## 2023-11-09 RX ADMIN — SODIUM CHLORIDE 1000 MILLILITER(S): 9 INJECTION INTRAMUSCULAR; INTRAVENOUS; SUBCUTANEOUS at 20:14

## 2023-11-09 RX ADMIN — SODIUM CHLORIDE 2100 MILLILITER(S): 9 INJECTION, SOLUTION INTRAVENOUS at 19:40

## 2023-11-09 RX ADMIN — Medication 30 MILLIGRAM(S): at 20:12

## 2023-11-09 RX ADMIN — Medication 500 MILLIGRAM(S): at 21:58

## 2023-11-09 RX ADMIN — Medication 260 MILLIGRAM(S): at 19:59

## 2023-11-09 RX ADMIN — SODIUM CHLORIDE 2100 MILLILITER(S): 9 INJECTION, SOLUTION INTRAVENOUS at 21:58

## 2023-11-09 NOTE — ED PROVIDER NOTE - CLINICAL SUMMARY MEDICAL DECISION MAKING FREE TEXT BOX
alcohol intoxication. pt desires detox. given social situation, pt cannot likely succeed independently and will be high risk for DTs. will admit and give phenobarb in addition to fluids and thiamine. alcohol intoxication. pt desires detox. given social situation, pt cannot likely succeed independently and will be high risk for DTs. will admit and give phenobarb in addition to fluids and thiamine. As interpreted by ED physician, ECG is NSR with normal intervals/axis, no changes in QRS, no ST/T changes.

## 2023-11-09 NOTE — ED PROVIDER NOTE - WR ORDER NAME 1
just called back and has no way to come get the patient     Will Robles RN  11/21/20 1406     Xray Chest 1 View-PORTABLE IMMEDIATE

## 2023-11-09 NOTE — ED ADULT NURSE NOTE - OBJECTIVE STATEMENT
Pt is A&OX4,BIBA from street post alcohol ingestion, ambulates unsteadily. States drinking for 2 months excessively. Complaining of abdominal pain and generalized body pains.

## 2023-11-09 NOTE — H&P ADULT - ASSESSMENT
58 year old male with a PMH of alcohol use disorder presents to the ED for acute intoxication. Endorses 1 day history of abdominal pain and generalized body ache. Has been drinking excessively for the past 2 months, at least 1L of Vodka with a 6 pack beer in between daily. last drink was at 4AM.  Reports he wants to change his habit and stop drinking, he is scared if he continues that way he might die. (+) Nausea, dizziness and headache, Denies SI or HI, V/D, SOB, palpitation, diarrhea, Upon evaluation patient is AAOx3, appears anxious and tremulous, vitals stable.  In the ED labs remarkable for elevated Lactate (3.4-->3.9), blood alcohol level 325. Received 2L-NS, Thiamine, Phenobarbital, Ketorolac.

## 2023-11-09 NOTE — H&P ADULT - NSHPPHYSICALEXAM_GEN_ALL_CORE
PHYSICAL EXAM:  GENERAL: NAD, Anxious, comfortable on the stretcher   HEAD:  Atraumatic, Normocephalic  EYES: EOMI, PERRL, conjunctiva and sclera clear  NECK: Supple, No JVD  CHEST/LUNG: Clear to auscultation bilaterally; No wheezes, rales or rhonchi  HEART: Regular rate and rhythm; No murmurs, rubs, or gallops, (+)S1, S2  ABDOMEN: Soft, Nontender, Nondistended; Normal Bowel sounds   EXTREMITIES:  2+ Peripheral Pulses, No clubbing, cyanosis, or edema  PSYCH: normal mood and affect  NEUROLOGY: AAOx3, non-focal  SKIN: No rashes or lesions

## 2023-11-09 NOTE — ED ADULT TRIAGE NOTE - CHIEF COMPLAINT QUOTE
Patient BIB EMS from street post alcohol ingestion, ambulates unsteadily, placed on fall precaution.

## 2023-11-09 NOTE — ED PROVIDER NOTE - PHYSICAL EXAMINATION
Gen: Alert, NAD, disheveled   Head: NC, AT   Eyes: PERRL, EOMI, normal lids/conjunctiva  ENT: normal hearing, patent oropharynx without erythema/exudate, uvula midline  Neck: supple, no tenderness, Trachea midline  Pulm: Bilateral BS, normal resp effort, no wheeze/stridor/retractions  CV: RRR, no M/R/G, 2+ radial and dp pulses bl, no edema  Abd: soft, NT/ND, +BS, no hepatosplenomegaly  Mskel: extremities x4 with normal ROM and no joint effusions. no ctl spine ttp.   Skin: no rash, no bruising   Neuro: AAOx3, no sensory/motor deficits, CN 2-12 intact

## 2023-11-09 NOTE — H&P ADULT - PROBLEM SELECTOR PLAN 1
Drinks 1L vodka daily   Last drink was at 4AM   Blood alcohol level 325  Patient wants detox/rehab to help him quit   - Tele   - IVF  - CIWA protocol   - Ativan   - Librium   - PPI   - DVT prophylaxis   - Seizure precaution   - Monitor for withdrawal symptoms.   -  & case management

## 2023-11-09 NOTE — H&P ADULT - NSHPLABSRESULTS_GEN_ALL_CORE
11.4   5.84  )-----------( 288      ( 2023 18:20 )             34.3     137  |  101  |  11  ----------------------------<  95       4.2   |  27  |  0.83    Ca    8.0<L>      2023 18:20  Mg     2.1         TPro  7.8  /  Alb  3.4  /  TBili  0.4  /  DBili  x   /  AST  76<H>  /  ALT  47  /  AlkPhos  77      PT/INR: 10.1/0.84 (23 @ 18:20)  PTT: 27.8 (23 @ 18:20)    Urinalysis Basic - ( 2023 18:30 )  Color: Yellow / Appearance: Clear / S.005 / pH: 6.5  Gluc: Negative mg/dL / Ketone: Negative mg/dL  / Bili: Negative / Urobili: 0.2 mg/dL   Blood: Negative / Protein: Negative mg/dL / Nitrite: Negative   Leuk Esterase: Negative / RBC: x / WBC x   Sq Epi: x / Non Sq Epi: x / Bacteria: x

## 2023-11-10 LAB
A1C WITH ESTIMATED AVERAGE GLUCOSE RESULT: 5.5 % — SIGNIFICANT CHANGE UP (ref 4–5.6)
A1C WITH ESTIMATED AVERAGE GLUCOSE RESULT: 5.5 % — SIGNIFICANT CHANGE UP (ref 4–5.6)
ALBUMIN SERPL ELPH-MCNC: 2.7 G/DL — LOW (ref 3.3–5)
ALBUMIN SERPL ELPH-MCNC: 2.7 G/DL — LOW (ref 3.3–5)
ALP SERPL-CCNC: 68 U/L — SIGNIFICANT CHANGE UP (ref 40–120)
ALP SERPL-CCNC: 68 U/L — SIGNIFICANT CHANGE UP (ref 40–120)
ALT FLD-CCNC: 40 U/L — SIGNIFICANT CHANGE UP (ref 12–78)
ALT FLD-CCNC: 40 U/L — SIGNIFICANT CHANGE UP (ref 12–78)
ANION GAP SERPL CALC-SCNC: 9 MMOL/L — SIGNIFICANT CHANGE UP (ref 5–17)
ANION GAP SERPL CALC-SCNC: 9 MMOL/L — SIGNIFICANT CHANGE UP (ref 5–17)
AST SERPL-CCNC: 55 U/L — HIGH (ref 15–37)
AST SERPL-CCNC: 55 U/L — HIGH (ref 15–37)
BILIRUB SERPL-MCNC: 0.8 MG/DL — SIGNIFICANT CHANGE UP (ref 0.2–1.2)
BILIRUB SERPL-MCNC: 0.8 MG/DL — SIGNIFICANT CHANGE UP (ref 0.2–1.2)
BUN SERPL-MCNC: 10 MG/DL — SIGNIFICANT CHANGE UP (ref 7–23)
BUN SERPL-MCNC: 10 MG/DL — SIGNIFICANT CHANGE UP (ref 7–23)
CALCIUM SERPL-MCNC: 7.7 MG/DL — LOW (ref 8.5–10.1)
CALCIUM SERPL-MCNC: 7.7 MG/DL — LOW (ref 8.5–10.1)
CHLORIDE SERPL-SCNC: 104 MMOL/L — SIGNIFICANT CHANGE UP (ref 96–108)
CHLORIDE SERPL-SCNC: 104 MMOL/L — SIGNIFICANT CHANGE UP (ref 96–108)
CHOLEST SERPL-MCNC: 232 MG/DL — HIGH
CHOLEST SERPL-MCNC: 232 MG/DL — HIGH
CO2 SERPL-SCNC: 24 MMOL/L — SIGNIFICANT CHANGE UP (ref 22–31)
CO2 SERPL-SCNC: 24 MMOL/L — SIGNIFICANT CHANGE UP (ref 22–31)
CREAT SERPL-MCNC: 0.72 MG/DL — SIGNIFICANT CHANGE UP (ref 0.5–1.3)
CREAT SERPL-MCNC: 0.72 MG/DL — SIGNIFICANT CHANGE UP (ref 0.5–1.3)
EGFR: 105 ML/MIN/1.73M2 — SIGNIFICANT CHANGE UP
EGFR: 105 ML/MIN/1.73M2 — SIGNIFICANT CHANGE UP
ESTIMATED AVERAGE GLUCOSE: 111 MG/DL — SIGNIFICANT CHANGE UP (ref 68–114)
ESTIMATED AVERAGE GLUCOSE: 111 MG/DL — SIGNIFICANT CHANGE UP (ref 68–114)
GLUCOSE SERPL-MCNC: 140 MG/DL — HIGH (ref 70–99)
GLUCOSE SERPL-MCNC: 140 MG/DL — HIGH (ref 70–99)
HCT VFR BLD CALC: 34.1 % — LOW (ref 39–50)
HCT VFR BLD CALC: 34.1 % — LOW (ref 39–50)
HDLC SERPL-MCNC: 123 MG/DL — SIGNIFICANT CHANGE UP
HDLC SERPL-MCNC: 123 MG/DL — SIGNIFICANT CHANGE UP
HGB BLD-MCNC: 11 G/DL — LOW (ref 13–17)
HGB BLD-MCNC: 11 G/DL — LOW (ref 13–17)
LACTATE SERPL-SCNC: 1.5 MMOL/L — SIGNIFICANT CHANGE UP (ref 0.7–2)
LACTATE SERPL-SCNC: 1.5 MMOL/L — SIGNIFICANT CHANGE UP (ref 0.7–2)
LIPID PNL WITH DIRECT LDL SERPL: 86 MG/DL — SIGNIFICANT CHANGE UP
LIPID PNL WITH DIRECT LDL SERPL: 86 MG/DL — SIGNIFICANT CHANGE UP
MAGNESIUM SERPL-MCNC: 1.5 MG/DL — LOW (ref 1.6–2.6)
MAGNESIUM SERPL-MCNC: 1.5 MG/DL — LOW (ref 1.6–2.6)
MCHC RBC-ENTMCNC: 28.4 PG — SIGNIFICANT CHANGE UP (ref 27–34)
MCHC RBC-ENTMCNC: 28.4 PG — SIGNIFICANT CHANGE UP (ref 27–34)
MCHC RBC-ENTMCNC: 32.3 G/DL — SIGNIFICANT CHANGE UP (ref 32–36)
MCHC RBC-ENTMCNC: 32.3 G/DL — SIGNIFICANT CHANGE UP (ref 32–36)
MCV RBC AUTO: 87.9 FL — SIGNIFICANT CHANGE UP (ref 80–100)
MCV RBC AUTO: 87.9 FL — SIGNIFICANT CHANGE UP (ref 80–100)
NON HDL CHOLESTEROL: 109 MG/DL — SIGNIFICANT CHANGE UP
NON HDL CHOLESTEROL: 109 MG/DL — SIGNIFICANT CHANGE UP
NRBC # BLD: 0 /100 WBCS — SIGNIFICANT CHANGE UP (ref 0–0)
NRBC # BLD: 0 /100 WBCS — SIGNIFICANT CHANGE UP (ref 0–0)
PLATELET # BLD AUTO: 205 K/UL — SIGNIFICANT CHANGE UP (ref 150–400)
PLATELET # BLD AUTO: 205 K/UL — SIGNIFICANT CHANGE UP (ref 150–400)
POTASSIUM SERPL-MCNC: 3.8 MMOL/L — SIGNIFICANT CHANGE UP (ref 3.5–5.3)
POTASSIUM SERPL-MCNC: 3.8 MMOL/L — SIGNIFICANT CHANGE UP (ref 3.5–5.3)
POTASSIUM SERPL-SCNC: 3.8 MMOL/L — SIGNIFICANT CHANGE UP (ref 3.5–5.3)
POTASSIUM SERPL-SCNC: 3.8 MMOL/L — SIGNIFICANT CHANGE UP (ref 3.5–5.3)
PROT SERPL-MCNC: 6.8 GM/DL — SIGNIFICANT CHANGE UP (ref 6–8.3)
PROT SERPL-MCNC: 6.8 GM/DL — SIGNIFICANT CHANGE UP (ref 6–8.3)
RBC # BLD: 3.88 M/UL — LOW (ref 4.2–5.8)
RBC # BLD: 3.88 M/UL — LOW (ref 4.2–5.8)
RBC # FLD: 15.8 % — HIGH (ref 10.3–14.5)
RBC # FLD: 15.8 % — HIGH (ref 10.3–14.5)
SODIUM SERPL-SCNC: 137 MMOL/L — SIGNIFICANT CHANGE UP (ref 135–145)
SODIUM SERPL-SCNC: 137 MMOL/L — SIGNIFICANT CHANGE UP (ref 135–145)
TRIGL SERPL-MCNC: 139 MG/DL — SIGNIFICANT CHANGE UP
TRIGL SERPL-MCNC: 139 MG/DL — SIGNIFICANT CHANGE UP
WBC # BLD: 5.27 K/UL — SIGNIFICANT CHANGE UP (ref 3.8–10.5)
WBC # BLD: 5.27 K/UL — SIGNIFICANT CHANGE UP (ref 3.8–10.5)
WBC # FLD AUTO: 5.27 K/UL — SIGNIFICANT CHANGE UP (ref 3.8–10.5)
WBC # FLD AUTO: 5.27 K/UL — SIGNIFICANT CHANGE UP (ref 3.8–10.5)

## 2023-11-10 PROCEDURE — 99232 SBSQ HOSP IP/OBS MODERATE 35: CPT

## 2023-11-10 RX ORDER — LACTULOSE 10 G/15ML
20 SOLUTION ORAL ONCE
Refills: 0 | Status: COMPLETED | OUTPATIENT
Start: 2023-11-10 | End: 2023-11-15

## 2023-11-10 RX ORDER — MAGNESIUM SULFATE 500 MG/ML
1 VIAL (ML) INJECTION ONCE
Refills: 0 | Status: COMPLETED | OUTPATIENT
Start: 2023-11-10 | End: 2023-11-10

## 2023-11-10 RX ORDER — PANTOPRAZOLE SODIUM 20 MG/1
40 TABLET, DELAYED RELEASE ORAL
Refills: 0 | Status: DISCONTINUED | OUTPATIENT
Start: 2023-11-10 | End: 2023-11-15

## 2023-11-10 RX ADMIN — Medication 50 MILLIGRAM(S): at 11:43

## 2023-11-10 RX ADMIN — Medication 1 TABLET(S): at 11:43

## 2023-11-10 RX ADMIN — Medication 100 GRAM(S): at 11:47

## 2023-11-10 RX ADMIN — Medication 650 MILLIGRAM(S): at 07:54

## 2023-11-10 RX ADMIN — SODIUM CHLORIDE 100 MILLILITER(S): 9 INJECTION INTRAMUSCULAR; INTRAVENOUS; SUBCUTANEOUS at 00:56

## 2023-11-10 RX ADMIN — Medication 50 MILLIGRAM(S): at 05:56

## 2023-11-10 RX ADMIN — Medication 50 MILLIGRAM(S): at 00:55

## 2023-11-10 RX ADMIN — Medication 1 MILLIGRAM(S): at 11:43

## 2023-11-10 RX ADMIN — Medication 100 MILLIGRAM(S): at 11:45

## 2023-11-10 RX ADMIN — Medication 2 MILLIGRAM(S): at 15:14

## 2023-11-10 RX ADMIN — Medication 2 MILLIGRAM(S): at 10:01

## 2023-11-10 RX ADMIN — SODIUM CHLORIDE 100 MILLILITER(S): 9 INJECTION INTRAMUSCULAR; INTRAVENOUS; SUBCUTANEOUS at 10:01

## 2023-11-10 RX ADMIN — Medication 50 MILLIGRAM(S): at 17:11

## 2023-11-10 RX ADMIN — Medication 650 MILLIGRAM(S): at 08:50

## 2023-11-10 RX ADMIN — SODIUM CHLORIDE 100 MILLILITER(S): 9 INJECTION INTRAMUSCULAR; INTRAVENOUS; SUBCUTANEOUS at 07:35

## 2023-11-10 RX ADMIN — Medication 2 MILLIGRAM(S): at 03:26

## 2023-11-10 RX ADMIN — AMLODIPINE BESYLATE 5 MILLIGRAM(S): 2.5 TABLET ORAL at 05:56

## 2023-11-10 NOTE — PROGRESS NOTE ADULT - PROBLEM SELECTOR PLAN 1
Drinks 1L vodka daily   Last drink was at 4AM yesterday  Blood alcohol level 325 on admission   Patient wants detox/rehab to help him quit   - Tele   - IVF  - CIWA protocol   - Ativan   - Librium   - PPI   - DVT prophylaxis   - Seizure precaution   - Monitor for withdrawal symptoms.   -  & case management

## 2023-11-10 NOTE — PATIENT PROFILE ADULT - FALL HARM RISK - HARM RISK INTERVENTIONS
Assistance with ambulation/Assistance OOB with selected safe patient handling equipment/Communicate Risk of Fall with Harm to all staff/Monitor for mental status changes/Monitor gait and stability/Reinforce activity limits and safety measures with patient and family/Tailored Fall Risk Interventions/Toileting schedule using arm’s reach rule for commode and bathroom/Use of alarms - bed, chair and/or voice tab/Visual Cue: Yellow wristband and red socks/Bed in lowest position, wheels locked, appropriate side rails in place/Call bell, personal items and telephone in reach/Instruct patient to call for assistance before getting out of bed or chair/Non-slip footwear when patient is out of bed/[place] to call system/Physically safe environment - no spills, clutter or unnecessary equipment/Purposeful Proactive Rounding/Room/bathroom lighting operational, light cord in reach

## 2023-11-10 NOTE — PATIENT PROFILE ADULT - MEDICATION/VISITS - DETAILS
[FreeTextEntry1] : A discussion regarding available pain management treatment options occurred with the patient.  These included interventional, rehabilitative, pharmacological, and alternative modalities. We will proceed with the following:  \par \par Patient presenting for reevaluation of bilateral foot/ankle pain s/p bilateral Kidner procedure.  Patient currently displaying signs and symptoms consistent with CRPS based upon [place] criteria.  Currently appears to be moderate/high probability.\par \par Interventional treatment options:\par - Previously discussed lumbar sympathetic block with fluoroscopic guidance - she defers at this point\par - Also discussed neuromodulation options; informational materials provided\par - see additional instructions below  \par \par Rehabilitative options:\par - continue physical/aqua therapy\par - Reeducated regarding rehabilitative care as mainstay of treatment for CRPS symptomatology\par - participation in active HEP was discussed  \par \par Medication based treatment options:\par - Continue Lyrica 75mg BID; can up titrate to TID with prolonged CRPS exacerbations\par - continue Cymbalta 90 mg daily as per prescribing psychiatrist\par - Recently started on methotrexate by rheumatology\par - continue topical OTC analgesics as needed\par - see additional instructions below  \par \par Complementary treatment options:\par - Weight management and lifestyle modifications discussed\par - Continue vitamin supplementation (currently vitamin C, magnesium, and turmeric)\par - Online resources provided for CRPS\par \par Psychological treatment options:\par - Patient will continue follow-up with psychiatrist as directed\par - Continue biofeedback and behavior modification with treating psychiatrist\par \par Additional treatment recommendations as follows:\par - Patient will follow-up in 3 months or as needed basis\par \par The documentation recorded by the scribe, in my presence, accurately reflects the service I personally performed and the decisions made by me with my edits as appropriate. \par \par I, Clayton Bolton acting as scribe, attest that this documentation has been prepared under the direction and in the presence of Provider Jewel Davison DO.  sometimes skips medications to save money for alcohol

## 2023-11-10 NOTE — PROGRESS NOTE ADULT - SUBJECTIVE AND OBJECTIVE BOX
Patient is a 58y old  Male who presents with a chief complaint of Alcohol intoxication (09 Nov 2023 23:32)      INTERVAL HPI/OVERNIGHT EVENTS:  Pt was seen and examined, no acute events.      MEDICATIONS  (STANDING):  amLODIPine   Tablet 5 milliGRAM(s) Oral daily  chlordiazePOXIDE 50 milliGRAM(s) Oral every 6 hours  chlordiazePOXIDE   Oral   folic acid 1 milliGRAM(s) Oral daily  lactulose Syrup 20 Gram(s) Oral once  multivitamin 1 Tablet(s) Oral daily  pantoprazole    Tablet 40 milliGRAM(s) Oral before breakfast  sodium chloride 0.9%. 1000 milliLiter(s) (100 mL/Hr) IV Continuous <Continuous>  thiamine 100 milliGRAM(s) Oral daily    MEDICATIONS  (PRN):  acetaminophen     Tablet .. 650 milliGRAM(s) Oral every 6 hours PRN Temp greater or equal to 38C (100.4F), Mild Pain (1 - 3)  aluminum hydroxide/magnesium hydroxide/simethicone Suspension 30 milliLiter(s) Oral every 4 hours PRN Dyspepsia  LORazepam   Injectable 2 milliGRAM(s) IV Push every 1 hour PRN CIWA-Ar score 8 or greater  melatonin 3 milliGRAM(s) Oral at bedtime PRN Insomnia  ondansetron Injectable 4 milliGRAM(s) IV Push every 8 hours PRN Nausea and/or Vomiting      Allergies    No Known Allergies    Intolerances          Vital Signs Last 24 Hrs  T(C): 37.2 (10 Nov 2023 10:01), Max: 37.3 (09 Nov 2023 17:23)  T(F): 99 (10 Nov 2023 10:01), Max: 99.1 (09 Nov 2023 17:23)  HR: 97 (10 Nov 2023 10:01) (81 - 97)  BP: 151/84 (10 Nov 2023 10:01) (107/65 - 151/84)  BP(mean): --  RR: 17 (10 Nov 2023 10:01) (17 - 20)  SpO2: 95% (10 Nov 2023 10:01) (94% - 95%)    Parameters below as of 10 Nov 2023 10:01  Patient On (Oxygen Delivery Method): room air        PHYSICAL EXAM:  GENERAL: NAD  HEAD:  Atraumatic  EYES: PERRLA  ENMT: Mouth moist   NECK: Supple  NERVOUS SYSTEM:  Awake, alert  CHEST/LUNG: Clear  HEART: RRR  ABDOMEN: Soft, non tender  EXTREMITIES:  no edema BL LE  SKIN: No rash        LABS:                        11.0   5.27  )-----------( 205      ( 10 Nov 2023 10:40 )             34.1     11-10    137  |  104  |  10  ----------------------------<  140<H>  3.8   |  24  |  0.72    Ca    7.7<L>      10 Nov 2023 10:40  Mg     1.5     11-10    TPro  6.8  /  Alb  2.7<L>  /  TBili  0.8  /  DBili  x   /  AST  55<H>  /  ALT  40  /  AlkPhos  68  11-10    PT/INR - ( 09 Nov 2023 18:20 )   PT: 10.1 sec;   INR: 0.84 ratio         PTT - ( 09 Nov 2023 18:20 )  PTT:27.8 sec  Urinalysis Basic - ( 10 Nov 2023 10:40 )    Color: x / Appearance: x / SG: x / pH: x  Gluc: 140 mg/dL / Ketone: x  / Bili: x / Urobili: x   Blood: x / Protein: x / Nitrite: x   Leuk Esterase: x / RBC: x / WBC x   Sq Epi: x / Non Sq Epi: x / Bacteria: x      CAPILLARY BLOOD GLUCOSE      POCT Blood Glucose.: 107 mg/dL (09 Nov 2023 17:31)      RADIOLOGY & ADDITIONAL TESTS:    Imaging Personally Reviewed:  [ ] YES  [ ] NO    Consultant(s) Notes Reviewed:  [ ] YES  [ ] NO    Care Discussed with Consultants/Other Providers [ ] YES  [ ] NO

## 2023-11-11 LAB
ANION GAP SERPL CALC-SCNC: 5 MMOL/L — SIGNIFICANT CHANGE UP (ref 5–17)
ANION GAP SERPL CALC-SCNC: 5 MMOL/L — SIGNIFICANT CHANGE UP (ref 5–17)
BUN SERPL-MCNC: 7 MG/DL — SIGNIFICANT CHANGE UP (ref 7–23)
BUN SERPL-MCNC: 7 MG/DL — SIGNIFICANT CHANGE UP (ref 7–23)
CALCIUM SERPL-MCNC: 7.6 MG/DL — LOW (ref 8.5–10.1)
CALCIUM SERPL-MCNC: 7.6 MG/DL — LOW (ref 8.5–10.1)
CHLORIDE SERPL-SCNC: 105 MMOL/L — SIGNIFICANT CHANGE UP (ref 96–108)
CHLORIDE SERPL-SCNC: 105 MMOL/L — SIGNIFICANT CHANGE UP (ref 96–108)
CO2 SERPL-SCNC: 24 MMOL/L — SIGNIFICANT CHANGE UP (ref 22–31)
CO2 SERPL-SCNC: 24 MMOL/L — SIGNIFICANT CHANGE UP (ref 22–31)
CREAT SERPL-MCNC: 0.63 MG/DL — SIGNIFICANT CHANGE UP (ref 0.5–1.3)
CREAT SERPL-MCNC: 0.63 MG/DL — SIGNIFICANT CHANGE UP (ref 0.5–1.3)
CULTURE RESULTS: SIGNIFICANT CHANGE UP
CULTURE RESULTS: SIGNIFICANT CHANGE UP
EGFR: 109 ML/MIN/1.73M2 — SIGNIFICANT CHANGE UP
EGFR: 109 ML/MIN/1.73M2 — SIGNIFICANT CHANGE UP
GLUCOSE SERPL-MCNC: 91 MG/DL — SIGNIFICANT CHANGE UP (ref 70–99)
GLUCOSE SERPL-MCNC: 91 MG/DL — SIGNIFICANT CHANGE UP (ref 70–99)
MAGNESIUM SERPL-MCNC: 1.9 MG/DL — SIGNIFICANT CHANGE UP (ref 1.6–2.6)
MAGNESIUM SERPL-MCNC: 1.9 MG/DL — SIGNIFICANT CHANGE UP (ref 1.6–2.6)
PHOSPHATE SERPL-MCNC: 2.5 MG/DL — SIGNIFICANT CHANGE UP (ref 2.5–4.5)
PHOSPHATE SERPL-MCNC: 2.5 MG/DL — SIGNIFICANT CHANGE UP (ref 2.5–4.5)
POTASSIUM SERPL-MCNC: 3.6 MMOL/L — SIGNIFICANT CHANGE UP (ref 3.5–5.3)
POTASSIUM SERPL-MCNC: 3.6 MMOL/L — SIGNIFICANT CHANGE UP (ref 3.5–5.3)
POTASSIUM SERPL-SCNC: 3.6 MMOL/L — SIGNIFICANT CHANGE UP (ref 3.5–5.3)
POTASSIUM SERPL-SCNC: 3.6 MMOL/L — SIGNIFICANT CHANGE UP (ref 3.5–5.3)
SODIUM SERPL-SCNC: 134 MMOL/L — LOW (ref 135–145)
SODIUM SERPL-SCNC: 134 MMOL/L — LOW (ref 135–145)
SPECIMEN SOURCE: SIGNIFICANT CHANGE UP
SPECIMEN SOURCE: SIGNIFICANT CHANGE UP

## 2023-11-11 PROCEDURE — 99232 SBSQ HOSP IP/OBS MODERATE 35: CPT

## 2023-11-11 PROCEDURE — 93970 EXTREMITY STUDY: CPT | Mod: 26

## 2023-11-11 RX ADMIN — Medication 50 MILLIGRAM(S): at 13:47

## 2023-11-11 RX ADMIN — Medication 100 MILLIGRAM(S): at 11:45

## 2023-11-11 RX ADMIN — Medication 50 MILLIGRAM(S): at 21:42

## 2023-11-11 RX ADMIN — Medication 650 MILLIGRAM(S): at 09:43

## 2023-11-11 RX ADMIN — Medication 3 MILLIGRAM(S): at 21:42

## 2023-11-11 RX ADMIN — Medication 50 MILLIGRAM(S): at 05:47

## 2023-11-11 RX ADMIN — Medication 1 MILLIGRAM(S): at 11:45

## 2023-11-11 RX ADMIN — PANTOPRAZOLE SODIUM 40 MILLIGRAM(S): 20 TABLET, DELAYED RELEASE ORAL at 05:47

## 2023-11-11 RX ADMIN — Medication 650 MILLIGRAM(S): at 08:43

## 2023-11-11 RX ADMIN — SODIUM CHLORIDE 100 MILLILITER(S): 9 INJECTION INTRAMUSCULAR; INTRAVENOUS; SUBCUTANEOUS at 05:50

## 2023-11-11 RX ADMIN — AMLODIPINE BESYLATE 5 MILLIGRAM(S): 2.5 TABLET ORAL at 05:47

## 2023-11-11 RX ADMIN — Medication 1 TABLET(S): at 11:45

## 2023-11-11 RX ADMIN — Medication 2 MILLIGRAM(S): at 09:19

## 2023-11-11 NOTE — PROGRESS NOTE ADULT - ASSESSMENT
58 year old male with a PMH of alcohol use disorder presents to the ED for acute intoxication. Endorses 1 day history of abdominal pain and generalized body ache. Has been drinking excessively for the past 2 months, at least 1L of Vodka with a 6 pack beer in between daily. last drink was at 4AM.  Reports he wants to change his habit and stop drinking, he is scared if he continues that way he might die. (+) Nausea, dizziness and headache, Denies SI or HI, V/D, SOB, palpitation, diarrhea, Upon evaluation patient is AAOx3, appears anxious and tremulous, vitals stable.  In the ED labs remarkable for elevated Lactate (3.4-->3.9), blood alcohol level 325. Received 2L-NS, Thiamine, Phenobarbital, Ketorolac.       Double Z Plasty Text: The lesion was extirpated to the level of the fat with a #15 scalpel blade. Given the location of the defect, shape of the defect and the proximity to free margins a double Z-plasty was deemed most appropriate for repair. Using a sterile surgical marker, the appropriate transposition arms of the double Z-plasty were drawn incorporating the defect and placing the expected incisions within the relaxed skin tension lines where possible. The area thus outlined was incised deep to adipose tissue with a #15 scalpel blade. The skin margins were undermined to an appropriate distance in all directions utilizing iris scissors. The opposing transposition arms were then transposed and carried over into place in opposite direction and anchored with interrupted buried subcutaneous sutures.

## 2023-11-11 NOTE — PROGRESS NOTE ADULT - PROBLEM SELECTOR PLAN 1
Drinks 1L vodka daily   Patient wants detox/rehab to help him quit   - Tele   - IVF  - CIWA protocol   - Ativan   - Librium   - PPI   - DVT prophylaxis   - Seizure precaution   - Monitor for withdrawal symptoms.   -  & case management-- notified of inpt etoh rehab request Drinks 1L vodka daily   Patient wants detox/rehab to help him quit   - Tele   - IVF  - CIWA protocol   - Ativan   - Librium   - PPI   - DVT prophylaxis   - Seizure precaution   - Monitor for withdrawal symptoms.   -  & case management-- notified of inpt etoh rehab request    patient no family and only live with his cousin identified as Joaquin Villasenorzaira

## 2023-11-11 NOTE — PROGRESS NOTE ADULT - SUBJECTIVE AND OBJECTIVE BOX
Patient is a 58y old  Male who presents with a chief complaint of Alcohol intoxication (09 Nov 2023 23:32)      INTERVAL HPI/OVERNIGHT EVENTS:  Pt was seen and examined, no acute events.    MEDICATIONS  (STANDING):  amLODIPine   Tablet 5 milliGRAM(s) Oral daily  chlordiazePOXIDE 50 milliGRAM(s) Oral every 8 hours  chlordiazePOXIDE   Oral   folic acid 1 milliGRAM(s) Oral daily  lactulose Syrup 20 Gram(s) Oral once  multivitamin 1 Tablet(s) Oral daily  pantoprazole    Tablet 40 milliGRAM(s) Oral before breakfast  sodium chloride 0.9%. 1000 milliLiter(s) (100 mL/Hr) IV Continuous <Continuous>  thiamine 100 milliGRAM(s) Oral daily    MEDICATIONS  (PRN):  acetaminophen     Tablet .. 650 milliGRAM(s) Oral every 6 hours PRN Temp greater or equal to 38C (100.4F), Mild Pain (1 - 3)  aluminum hydroxide/magnesium hydroxide/simethicone Suspension 30 milliLiter(s) Oral every 4 hours PRN Dyspepsia  LORazepam   Injectable 2 milliGRAM(s) IV Push every 1 hour PRN CIWA-Ar score 8 or greater  melatonin 3 milliGRAM(s) Oral at bedtime PRN Insomnia  ondansetron Injectable 4 milliGRAM(s) IV Push every 8 hours PRN Nausea and/or Vomiting      Allergies    No Known Allergies    Intolerances      Vital Signs Last 24 Hrs  T(C): 37.2 (11 Nov 2023 10:39), Max: 37.3 (10 Nov 2023 16:10)  T(F): 98.9 (11 Nov 2023 10:39), Max: 99.1 (10 Nov 2023 16:10)  HR: 74 (11 Nov 2023 10:39) (64 - 88)  BP: 123/74 (11 Nov 2023 10:39) (123/74 - 156/82)  BP(mean): --  RR: 18 (11 Nov 2023 10:39) (18 - 20)  SpO2: 96% (11 Nov 2023 10:39) (95% - 98%)    Parameters below as of 11 Nov 2023 04:27  Patient On (Oxygen Delivery Method): room air        PHYSICAL EXAM:  GENERAL: NAD  HEAD:  Atraumatic  EYES: PERRLA  ENMT: Mouth moist   NECK: Supple  NERVOUS SYSTEM:  Awake, alert  CHEST/LUNG: Clear  HEART: RRR  ABDOMEN: Soft, non tender  EXTREMITIES:  no edema BL LE  SKIN: No rash        LABS:                        11.0   5.27  )-----------( 205      ( 10 Nov 2023 10:40 )             34.1   11-11    134<L>  |  105  |  7   ----------------------------<  91  3.6   |  24  |  0.63    Ca    7.6<L>      11 Nov 2023 06:10  Phos  2.5     11-11  Mg     1.9     11-11    TPro  6.8  /  Alb  2.7<L>  /  TBili  0.8  /  DBili  x   /  AST  55<H>  /  ALT  40  /  AlkPhos  68  11-10      CAPILLARY BLOOD GLUCOSE      RADIOLOGY & ADDITIONAL TESTS:      Imaging Personally Reviewed:  [ ] YES  [ ] NO    Consultant(s) Notes Reviewed:  [ ] YES  [ ] NO    Care Discussed with Consultants/Other Providers [ ] YES  [ ] NO Patient is a 58y old  Male who presents with a chief complaint of Alcohol intoxication (09 Nov 2023 23:32)      INTERVAL HPI/OVERNIGHT EVENTS:  Pt was seen and examined, no acute events.    MEDICATIONS  (STANDING):  amLODIPine   Tablet 5 milliGRAM(s) Oral daily  chlordiazePOXIDE 50 milliGRAM(s) Oral every 8 hours  chlordiazePOXIDE   Oral   folic acid 1 milliGRAM(s) Oral daily  lactulose Syrup 20 Gram(s) Oral once  multivitamin 1 Tablet(s) Oral daily  pantoprazole    Tablet 40 milliGRAM(s) Oral before breakfast  sodium chloride 0.9%. 1000 milliLiter(s) (100 mL/Hr) IV Continuous <Continuous>  thiamine 100 milliGRAM(s) Oral daily    MEDICATIONS  (PRN):  acetaminophen     Tablet .. 650 milliGRAM(s) Oral every 6 hours PRN Temp greater or equal to 38C (100.4F), Mild Pain (1 - 3)  aluminum hydroxide/magnesium hydroxide/simethicone Suspension 30 milliLiter(s) Oral every 4 hours PRN Dyspepsia  LORazepam   Injectable 2 milliGRAM(s) IV Push every 1 hour PRN CIWA-Ar score 8 or greater  melatonin 3 milliGRAM(s) Oral at bedtime PRN Insomnia  ondansetron Injectable 4 milliGRAM(s) IV Push every 8 hours PRN Nausea and/or Vomiting      Allergies    No Known Allergies    Intolerances      Vital Signs Last 24 Hrs  T(C): 37.2 (11 Nov 2023 10:39), Max: 37.3 (10 Nov 2023 16:10)  T(F): 98.9 (11 Nov 2023 10:39), Max: 99.1 (10 Nov 2023 16:10)  HR: 74 (11 Nov 2023 10:39) (64 - 88)  BP: 123/74 (11 Nov 2023 10:39) (123/74 - 156/82)  BP(mean): --  RR: 18 (11 Nov 2023 10:39) (18 - 20)  SpO2: 96% (11 Nov 2023 10:39) (95% - 98%)    Parameters below as of 11 Nov 2023 04:27  Patient On (Oxygen Delivery Method): room air        PHYSICAL EXAM:  GENERAL: NAD  HEAD:  Atraumatic  EYES: PERRLA  ENMT: Mouth moist   NECK: Supple  NERVOUS SYSTEM:  Awake, alert  CHEST/LUNG: Clear  HEART: RRR  ABDOMEN: Soft, non tender  EXTREMITIES:  no edema BL LE, mild tremors with hands extended  SKIN: No rash        LABS:                        11.0   5.27  )-----------( 205      ( 10 Nov 2023 10:40 )             34.1   11-11    134<L>  |  105  |  7   ----------------------------<  91  3.6   |  24  |  0.63    Ca    7.6<L>      11 Nov 2023 06:10  Phos  2.5     11-11  Mg     1.9     11-11    TPro  6.8  /  Alb  2.7<L>  /  TBili  0.8  /  DBili  x   /  AST  55<H>  /  ALT  40  /  AlkPhos  68  11-10      CAPILLARY BLOOD GLUCOSE      RADIOLOGY & ADDITIONAL TESTS:      Imaging Personally Reviewed:  [ ] YES  [ ] NO    Consultant(s) Notes Reviewed:  [ ] YES  [ ] NO    Care Discussed with Consultants/Other Providers [ ] YES  [ ] NO

## 2023-11-12 LAB
CK SERPL-CCNC: 203 U/L — SIGNIFICANT CHANGE UP (ref 26–308)
CK SERPL-CCNC: 203 U/L — SIGNIFICANT CHANGE UP (ref 26–308)
HCT VFR BLD CALC: 36.6 % — LOW (ref 39–50)
HCT VFR BLD CALC: 36.6 % — LOW (ref 39–50)
HGB BLD-MCNC: 12.1 G/DL — LOW (ref 13–17)
HGB BLD-MCNC: 12.1 G/DL — LOW (ref 13–17)
MAGNESIUM SERPL-MCNC: 1.6 MG/DL — SIGNIFICANT CHANGE UP (ref 1.6–2.6)
MAGNESIUM SERPL-MCNC: 1.6 MG/DL — SIGNIFICANT CHANGE UP (ref 1.6–2.6)
MCHC RBC-ENTMCNC: 29 PG — SIGNIFICANT CHANGE UP (ref 27–34)
MCHC RBC-ENTMCNC: 29 PG — SIGNIFICANT CHANGE UP (ref 27–34)
MCHC RBC-ENTMCNC: 33.1 G/DL — SIGNIFICANT CHANGE UP (ref 32–36)
MCHC RBC-ENTMCNC: 33.1 G/DL — SIGNIFICANT CHANGE UP (ref 32–36)
MCV RBC AUTO: 87.8 FL — SIGNIFICANT CHANGE UP (ref 80–100)
MCV RBC AUTO: 87.8 FL — SIGNIFICANT CHANGE UP (ref 80–100)
NRBC # BLD: 0 /100 WBCS — SIGNIFICANT CHANGE UP (ref 0–0)
NRBC # BLD: 0 /100 WBCS — SIGNIFICANT CHANGE UP (ref 0–0)
PHOSPHATE SERPL-MCNC: 2.9 MG/DL — SIGNIFICANT CHANGE UP (ref 2.5–4.5)
PHOSPHATE SERPL-MCNC: 2.9 MG/DL — SIGNIFICANT CHANGE UP (ref 2.5–4.5)
PLATELET # BLD AUTO: 152 K/UL — SIGNIFICANT CHANGE UP (ref 150–400)
PLATELET # BLD AUTO: 152 K/UL — SIGNIFICANT CHANGE UP (ref 150–400)
RBC # BLD: 4.17 M/UL — LOW (ref 4.2–5.8)
RBC # BLD: 4.17 M/UL — LOW (ref 4.2–5.8)
RBC # FLD: 15 % — HIGH (ref 10.3–14.5)
RBC # FLD: 15 % — HIGH (ref 10.3–14.5)
WBC # BLD: 4.76 K/UL — SIGNIFICANT CHANGE UP (ref 3.8–10.5)
WBC # BLD: 4.76 K/UL — SIGNIFICANT CHANGE UP (ref 3.8–10.5)
WBC # FLD AUTO: 4.76 K/UL — SIGNIFICANT CHANGE UP (ref 3.8–10.5)
WBC # FLD AUTO: 4.76 K/UL — SIGNIFICANT CHANGE UP (ref 3.8–10.5)

## 2023-11-12 PROCEDURE — 99231 SBSQ HOSP IP/OBS SF/LOW 25: CPT

## 2023-11-12 RX ORDER — MAGNESIUM SULFATE 500 MG/ML
1 VIAL (ML) INJECTION ONCE
Refills: 0 | Status: COMPLETED | OUTPATIENT
Start: 2023-11-12 | End: 2023-11-12

## 2023-11-12 RX ADMIN — Medication 2 MILLIGRAM(S): at 08:31

## 2023-11-12 RX ADMIN — SODIUM CHLORIDE 100 MILLILITER(S): 9 INJECTION INTRAMUSCULAR; INTRAVENOUS; SUBCUTANEOUS at 14:10

## 2023-11-12 RX ADMIN — Medication 2 MILLIGRAM(S): at 15:10

## 2023-11-12 RX ADMIN — Medication 1 TABLET(S): at 11:42

## 2023-11-12 RX ADMIN — Medication 650 MILLIGRAM(S): at 15:10

## 2023-11-12 RX ADMIN — Medication 100 MILLIGRAM(S): at 11:45

## 2023-11-12 RX ADMIN — PANTOPRAZOLE SODIUM 40 MILLIGRAM(S): 20 TABLET, DELAYED RELEASE ORAL at 05:20

## 2023-11-12 RX ADMIN — Medication 1 MILLIGRAM(S): at 11:42

## 2023-11-12 RX ADMIN — Medication 50 MILLIGRAM(S): at 17:52

## 2023-11-12 RX ADMIN — Medication 3 MILLIGRAM(S): at 21:11

## 2023-11-12 RX ADMIN — SODIUM CHLORIDE 100 MILLILITER(S): 9 INJECTION INTRAMUSCULAR; INTRAVENOUS; SUBCUTANEOUS at 05:21

## 2023-11-12 RX ADMIN — AMLODIPINE BESYLATE 5 MILLIGRAM(S): 2.5 TABLET ORAL at 05:20

## 2023-11-12 RX ADMIN — Medication 650 MILLIGRAM(S): at 16:10

## 2023-11-12 RX ADMIN — Medication 100 GRAM(S): at 11:42

## 2023-11-12 NOTE — PROGRESS NOTE ADULT - SUBJECTIVE AND OBJECTIVE BOX
Patient is a 58y old  Male who presents with a chief complaint of Alcohol intoxication (09 Nov 2023 23:32)      INTERVAL HPI/OVERNIGHT EVENTS:  Pt was seen and examined, no acute events.      MEDICATIONS  (STANDING):  amLODIPine   Tablet 5 milliGRAM(s) Oral daily  chlordiazePOXIDE 50 milliGRAM(s) Oral every 12 hours  chlordiazePOXIDE   Oral   folic acid 1 milliGRAM(s) Oral daily  lactulose Syrup 20 Gram(s) Oral once  multivitamin 1 Tablet(s) Oral daily  pantoprazole    Tablet 40 milliGRAM(s) Oral before breakfast  sodium chloride 0.9%. 1000 milliLiter(s) (100 mL/Hr) IV Continuous <Continuous>  thiamine 100 milliGRAM(s) Oral daily    MEDICATIONS  (PRN):  acetaminophen     Tablet .. 650 milliGRAM(s) Oral every 6 hours PRN Temp greater or equal to 38C (100.4F), Mild Pain (1 - 3)  aluminum hydroxide/magnesium hydroxide/simethicone Suspension 30 milliLiter(s) Oral every 4 hours PRN Dyspepsia  LORazepam   Injectable 2 milliGRAM(s) IV Push every 1 hour PRN CIWA-Ar score 8 or greater  melatonin 3 milliGRAM(s) Oral at bedtime PRN Insomnia  ondansetron Injectable 4 milliGRAM(s) IV Push every 8 hours PRN Nausea and/or Vomiting      Allergies    No Known Allergies    Intolerances    Vital Signs Last 24 Hrs  T(C): 36.5 (12 Nov 2023 05:18), Max: 36.8 (11 Nov 2023 23:43)  T(F): 97.7 (12 Nov 2023 05:18), Max: 98.2 (11 Nov 2023 23:43)  HR: 64 (12 Nov 2023 05:18) (63 - 67)  BP: 141/83 (12 Nov 2023 05:18) (123/73 - 141/83)  BP(mean): --  RR: 18 (12 Nov 2023 05:18) (18 - 19)  SpO2: 96% (12 Nov 2023 05:18) (96% - 98%)    Parameters below as of 12 Nov 2023 05:18  Patient On (Oxygen Delivery Method): room air        PHYSICAL EXAM:  GENERAL: NAD  HEAD:  Atraumatic  EYES: PERRLA  ENMT: Mouth moist   NECK: Supple  NERVOUS SYSTEM:  Awake, alert  CHEST/LUNG: Clear  HEART: RRR  ABDOMEN: Soft, non tender  EXTREMITIES:  no edema BL LE, mild tremors with hands extended  SKIN: No rash        LABS:                      12.1   4.76  )-----------( 152      ( 12 Nov 2023 07:39 )             36.6       11-11    134<L>  |  105  |  7   ----------------------------<  91  3.6   |  24  |  0.63    Ca    7.6<L>      11 Nov 2023 06:10  Phos  2.9     11-12  Mg     1.6     11-12      CAPILLARY BLOOD GLUCOSE      RADIOLOGY & ADDITIONAL TESTS:      Imaging Personally Reviewed:  [ ] YES  [ ] NO    Consultant(s) Notes Reviewed:  [ ] YES  [ ] NO    Care Discussed with Consultants/Other Providers [ ] YES  [ ] NO

## 2023-11-12 NOTE — DIETITIAN NUTRITION RISK NOTIFICATION - TREATMENT: THE FOLLOWING DIET HAS BEEN RECOMMENDED
Diet, Regular:   Low Sodium  Supplement Feeding Modality:  Oral  Ensure Plus High Protein Cans or Servings Per Day:  1       Frequency:  Daily (11-12-23 @ 13:49) [Pending Verification By Attending]  Diet, Regular (11-09-23 @ 23:32) [Active]

## 2023-11-12 NOTE — DIETITIAN INITIAL EVALUATION ADULT - PERTINENT LABORATORY DATA
11-11    134<L>  |  105  |  7   ----------------------------<  91  3.6   |  24  |  0.63    Ca    7.6<L>      11 Nov 2023 06:10  Phos  2.9     11-12  Mg     1.6     11-12    A1C with Estimated Average Glucose Result: 5.5 % (11-10-23 @ 10:40)  A1C with Estimated Average Glucose Result: 5.3 % (07-23-23 @ 07:45)  A1C with Estimated Average Glucose Result: 5.3 % (07-17-23 @ 06:20)

## 2023-11-12 NOTE — DIETITIAN INITIAL EVALUATION ADULT - OTHER INFO
Pt Romanian speaking, however also speaks English and able to communicate and answer questions effectively in English during interview with this clinician.  Pt reports he lives with his uncle. States often eats food from restaurant nearby, however reports food insecurity PTA. Provided pt with community resources information, including list of local food pantries. Amenable to non-perishable foods to be given upon discharge if discharged back home. Pending SW consult noted.  States mostly good appetite and PO intake PTA; abdominal pain and decreased appetite x 1 day PTA. Currently consuming % of documented meals as per flow sheet. Amenable to Ensure Plus High Protein nutrition supplement for additional nutrition. Denies any chew/swallowing difficulty.  Reports some diarrhea for past 2 days.  States wt 189 lbs. x months; per previous RD note, pt stated  lbs.

## 2023-11-12 NOTE — PROGRESS NOTE ADULT - PROBLEM SELECTOR PLAN 1
Drinks 1L vodka daily   Patient wants detox/rehab to help him quit   - Tele   - IVF  - CIWA protocol   - Ativan   - Librium   - PPI   - DVT prophylaxis   - Seizure precaution   - Monitor for withdrawal symptoms.   -  & case management-- notified of inpt etoh rehab request    patient no family and only live with his cousin identified as Joaquin Villasenorzaira

## 2023-11-12 NOTE — DIETITIAN INITIAL EVALUATION ADULT - REASON INDICATOR FOR ASSESSMENT
Patient verified .  Reminder call for stress test with instructions given.  Emphasis on NO caffeine for 12 hours. Patient verbalized understanding.    Nutrition Consult for Malnutrition Screening Tool Score 2 or >

## 2023-11-12 NOTE — DIETITIAN INITIAL EVALUATION ADULT - PERTINENT MEDS FT
MEDICATIONS  (STANDING):  amLODIPine   Tablet 5 milliGRAM(s) Oral daily  chlordiazePOXIDE 50 milliGRAM(s) Oral every 12 hours  chlordiazePOXIDE   Oral   folic acid 1 milliGRAM(s) Oral daily  lactulose Syrup 20 Gram(s) Oral once  multivitamin 1 Tablet(s) Oral daily  pantoprazole    Tablet 40 milliGRAM(s) Oral before breakfast  sodium chloride 0.9%. 1000 milliLiter(s) (100 mL/Hr) IV Continuous <Continuous>  thiamine 100 milliGRAM(s) Oral daily    MEDICATIONS  (PRN):  acetaminophen     Tablet .. 650 milliGRAM(s) Oral every 6 hours PRN Temp greater or equal to 38C (100.4F), Mild Pain (1 - 3)  aluminum hydroxide/magnesium hydroxide/simethicone Suspension 30 milliLiter(s) Oral every 4 hours PRN Dyspepsia  LORazepam   Injectable 2 milliGRAM(s) IV Push every 1 hour PRN CIWA-Ar score 8 or greater  melatonin 3 milliGRAM(s) Oral at bedtime PRN Insomnia  ondansetron Injectable 4 milliGRAM(s) IV Push every 8 hours PRN Nausea and/or Vomiting

## 2023-11-13 PROCEDURE — 99231 SBSQ HOSP IP/OBS SF/LOW 25: CPT

## 2023-11-13 RX ORDER — LATANOPROST 0.05 MG/ML
1 SOLUTION/ DROPS OPHTHALMIC; TOPICAL AT BEDTIME
Refills: 0 | Status: DISCONTINUED | OUTPATIENT
Start: 2023-11-13 | End: 2023-11-15

## 2023-11-13 RX ORDER — MAGNESIUM SULFATE 500 MG/ML
2 VIAL (ML) INJECTION ONCE
Refills: 0 | Status: COMPLETED | OUTPATIENT
Start: 2023-11-13 | End: 2023-11-13

## 2023-11-13 RX ORDER — TIMOLOL 0.5 %
1 DROPS OPHTHALMIC (EYE) DAILY
Refills: 0 | Status: DISCONTINUED | OUTPATIENT
Start: 2023-11-13 | End: 2023-11-15

## 2023-11-13 RX ORDER — DORZOLAMIDE HYDROCHLORIDE 20 MG/ML
1 SOLUTION/ DROPS OPHTHALMIC
Refills: 0 | Status: DISCONTINUED | OUTPATIENT
Start: 2023-11-13 | End: 2023-11-15

## 2023-11-13 RX ADMIN — SODIUM CHLORIDE 100 MILLILITER(S): 9 INJECTION INTRAMUSCULAR; INTRAVENOUS; SUBCUTANEOUS at 05:52

## 2023-11-13 RX ADMIN — Medication 2 MILLIGRAM(S): at 09:18

## 2023-11-13 RX ADMIN — LATANOPROST 1 DROP(S): 0.05 SOLUTION/ DROPS OPHTHALMIC; TOPICAL at 21:22

## 2023-11-13 RX ADMIN — Medication 100 MILLIGRAM(S): at 13:09

## 2023-11-13 RX ADMIN — Medication 25 GRAM(S): at 14:17

## 2023-11-13 RX ADMIN — Medication 50 MILLIGRAM(S): at 05:50

## 2023-11-13 RX ADMIN — ONDANSETRON 4 MILLIGRAM(S): 8 TABLET, FILM COATED ORAL at 09:18

## 2023-11-13 RX ADMIN — SODIUM CHLORIDE 100 MILLILITER(S): 9 INJECTION INTRAMUSCULAR; INTRAVENOUS; SUBCUTANEOUS at 13:15

## 2023-11-13 RX ADMIN — Medication 1 MILLIGRAM(S): at 13:09

## 2023-11-13 RX ADMIN — AMLODIPINE BESYLATE 5 MILLIGRAM(S): 2.5 TABLET ORAL at 05:50

## 2023-11-13 RX ADMIN — Medication 1 TABLET(S): at 13:09

## 2023-11-13 RX ADMIN — DORZOLAMIDE HYDROCHLORIDE 1 DROP(S): 20 SOLUTION/ DROPS OPHTHALMIC at 21:22

## 2023-11-13 RX ADMIN — Medication 2 MILLIGRAM(S): at 21:23

## 2023-11-13 RX ADMIN — PANTOPRAZOLE SODIUM 40 MILLIGRAM(S): 20 TABLET, DELAYED RELEASE ORAL at 05:50

## 2023-11-13 NOTE — PROGRESS NOTE ADULT - PROBLEM SELECTOR PLAN 1
Drinks 1L vodka daily   Patient wants detox/rehab to help him quit   - Tele   - IVF  - CIWA protocol   - Ativan   - Librium   - PPI   - DVT prophylaxis   - Seizure precaution   - Monitor for withdrawal symptoms.   -  & case management-- notified of inpt etoh rehab request  11/13/2023 [facility] is 8    patient no family and only live with his cousin identified as Joaquin Michael  How Severe Is It?: severe Is This A New Presentation, Or A Follow-Up?: Follow Up Isotretinoin

## 2023-11-13 NOTE — PROGRESS NOTE ADULT - SUBJECTIVE AND OBJECTIVE BOX
Patient is a 58y old  Male who presents with a chief complaint of Alcohol intoxication (09 Nov 2023 23:32)      INTERVAL HPI/OVERNIGHT EVENTS:  Pt was seen and examined, no acute events.      MEDICATIONS  (STANDING):  amLODIPine   Tablet 5 milliGRAM(s) Oral daily  chlordiazePOXIDE   Oral   folic acid 1 milliGRAM(s) Oral daily  lactulose Syrup 20 Gram(s) Oral once  magnesium sulfate  IVPB 2 Gram(s) IV Intermittent once  multivitamin 1 Tablet(s) Oral daily  pantoprazole    Tablet 40 milliGRAM(s) Oral before breakfast  sodium chloride 0.9%. 1000 milliLiter(s) (100 mL/Hr) IV Continuous <Continuous>  thiamine 100 milliGRAM(s) Oral daily    MEDICATIONS  (PRN):  acetaminophen     Tablet .. 650 milliGRAM(s) Oral every 6 hours PRN Temp greater or equal to 38C (100.4F), Mild Pain (1 - 3)  aluminum hydroxide/magnesium hydroxide/simethicone Suspension 30 milliLiter(s) Oral every 4 hours PRN Dyspepsia  LORazepam   Injectable 2 milliGRAM(s) IV Push every 1 hour PRN CIWA-Ar score 8 or greater  melatonin 3 milliGRAM(s) Oral at bedtime PRN Insomnia  ondansetron Injectable 4 milliGRAM(s) IV Push every 8 hours PRN Nausea and/or Vomiting    Allergies    No Known Allergies    Intolerances    Vital Signs Last 24 Hrs  T(C): 36.7 (13 Nov 2023 10:39), Max: 37.2 (12 Nov 2023 17:16)  T(F): 98.1 (13 Nov 2023 10:39), Max: 98.9 (12 Nov 2023 17:16)  HR: 67 (13 Nov 2023 10:39) (53 - 71)  BP: 102/63 (13 Nov 2023 10:39) (102/63 - 144/86)  BP(mean): --  RR: 18 (13 Nov 2023 10:39) (18 - 18)  SpO2: 96% (13 Nov 2023 10:39) (96% - 98%)    Parameters below as of 13 Nov 2023 05:18  Patient On (Oxygen Delivery Method): room air          PHYSICAL EXAM:  GENERAL: NAD  HEAD:  Atraumatic  EYES: PERRLA  ENMT: Mouth moist   NECK: Supple  NERVOUS SYSTEM:  Awake, alert  CHEST/LUNG: Clear  HEART: RRR  ABDOMEN: Soft, non tender  EXTREMITIES:  no edema BL LE, mild tremors with hands extended  SKIN: No rash        LABS:                                        12.1   4.76  )-----------( 152      ( 12 Nov 2023 07:39 )             36.6     Phos  2.9     11-12  Mg     1.6     11-12      CAPILLARY BLOOD GLUCOSE      RADIOLOGY & ADDITIONAL TESTS:      Imaging Personally Reviewed:  [ ] YES  [ ] NO    Consultant(s) Notes Reviewed:  [ ] YES  [ ] NO    Care Discussed with Consultants/Other Providers [ ] YES  [ ] NO

## 2023-11-14 ENCOUNTER — TRANSCRIPTION ENCOUNTER (OUTPATIENT)
Age: 58
End: 2023-11-14

## 2023-11-14 LAB
CULTURE RESULTS: SIGNIFICANT CHANGE UP
SPECIMEN SOURCE: SIGNIFICANT CHANGE UP

## 2023-11-14 PROCEDURE — 99231 SBSQ HOSP IP/OBS SF/LOW 25: CPT

## 2023-11-14 RX ADMIN — SODIUM CHLORIDE 100 MILLILITER(S): 9 INJECTION INTRAMUSCULAR; INTRAVENOUS; SUBCUTANEOUS at 11:12

## 2023-11-14 RX ADMIN — Medication 1 DROP(S): at 11:09

## 2023-11-14 RX ADMIN — SODIUM CHLORIDE 100 MILLILITER(S): 9 INJECTION INTRAMUSCULAR; INTRAVENOUS; SUBCUTANEOUS at 17:27

## 2023-11-14 RX ADMIN — LATANOPROST 1 DROP(S): 0.05 SOLUTION/ DROPS OPHTHALMIC; TOPICAL at 21:03

## 2023-11-14 RX ADMIN — PANTOPRAZOLE SODIUM 40 MILLIGRAM(S): 20 TABLET, DELAYED RELEASE ORAL at 05:20

## 2023-11-14 RX ADMIN — Medication 3 MILLIGRAM(S): at 21:03

## 2023-11-14 RX ADMIN — DORZOLAMIDE HYDROCHLORIDE 1 DROP(S): 20 SOLUTION/ DROPS OPHTHALMIC at 21:03

## 2023-11-14 RX ADMIN — Medication 2 MILLIGRAM(S): at 12:33

## 2023-11-14 RX ADMIN — Medication 50 MILLIGRAM(S): at 05:20

## 2023-11-14 RX ADMIN — Medication 1 TABLET(S): at 11:08

## 2023-11-14 RX ADMIN — Medication 1 MILLIGRAM(S): at 11:08

## 2023-11-14 RX ADMIN — AMLODIPINE BESYLATE 5 MILLIGRAM(S): 2.5 TABLET ORAL at 05:19

## 2023-11-14 RX ADMIN — Medication 100 MILLIGRAM(S): at 11:08

## 2023-11-14 NOTE — DISCHARGE NOTE PROVIDER - ATTENDING ATTESTATION STATEMENT
I have personally seen and examined the patient. I have collaborated with and supervised the detailed exam EOMI/conjunctiva clear

## 2023-11-14 NOTE — DISCHARGE NOTE PROVIDER - NSDCCPCAREPLAN_GEN_ALL_CORE_FT
PRINCIPAL DISCHARGE DIAGNOSIS  Diagnosis: Alcohol intoxication, uncomplicated  Assessment and Plan of Treatment:       SECONDARY DISCHARGE DIAGNOSES  Diagnosis: Hypertension  Assessment and Plan of Treatment:      PRINCIPAL DISCHARGE DIAGNOSIS  Diagnosis: Alcohol intoxication, uncomplicated  Assessment and Plan of Treatment:       SECONDARY DISCHARGE DIAGNOSES  Diagnosis: Hypertension  Assessment and Plan of Treatment:     Diagnosis: Protein calorie malnutrition  Assessment and Plan of Treatment:

## 2023-11-14 NOTE — PROGRESS NOTE ADULT - PROBLEM SELECTOR PLAN 1
Drinks 1L vodka daily   Patient wants detox/rehab to help him quit   - Tele   - IVF  - CIWA protocol   - Ativan   - Librium   - PPI   - DVT prophylaxis   - Seizure precaution   - Monitor for withdrawal symptoms.   -  & case management-- notified of inpt etoh rehab request  11/13/2023 [facility] is 8    patient no family and only live with his cousin identified as Joaquin Michael  Drinks 1L vodka daily   Patient wants detox/rehab to help him quit   - Tele   - IVF  - CIWA protocol   - Ativan   - Librium   - PPI   - DVT prophylaxis   - Seizure precaution   - Monitor for withdrawal symptoms.   -  & case management-- notified of inpt etoh rehab request  11/13/2023 [facility]    patient no family and only live with his cousin identified as Joaquin Rodriguez     11/14/2023 [place] 6 hopefully d/c in am . patient now wants to go home as he has a court case11/28/2023

## 2023-11-14 NOTE — DISCHARGE NOTE PROVIDER - DETAILS OF MALNUTRITION DIAGNOSIS/DIAGNOSES
This patient has been assessed with a concern for Malnutrition and was treated during this hospitalization for the following Nutrition diagnosis/diagnoses:     -  11/12/2023: Moderate protein-calorie malnutrition

## 2023-11-14 NOTE — DISCHARGE NOTE PROVIDER - NSDCFUADDAPPT_GEN_ALL_CORE_FT
APPTS ARE READY TO BE MADE: [X] YES    Best Family or Patient Contact (if needed):    Additional Information about above appointments (if needed):    1:   2:   3:     Other comments or requests:    APPTS ARE READY TO BE MADE: [X] YES    Best Family or Patient Contact (if needed):    Additional Information about above appointments (if needed):    1:   2:   3:     Other comments or requests:     Patient was outreached on 11/16, 11/17, and 11/22 and hung up on for every attempt. Any alternate numbers listed were either not in service, or unable to take voice messages.

## 2023-11-14 NOTE — PROGRESS NOTE ADULT - SUBJECTIVE AND OBJECTIVE BOX
Patient is a 58y old  Male who presents with a chief complaint of Alcohol intoxication (09 Nov 2023 23:32)      INTERVAL HPI/OVERNIGHT EVENTS:  Pt was seen and examined, no acute events.    MEDICATIONS  (STANDING):  amLODIPine   Tablet 5 milliGRAM(s) Oral daily  dorzolamide 2% Ophthalmic Solution 1 Drop(s) Both EYES <User Schedule>  folic acid 1 milliGRAM(s) Oral daily  lactulose Syrup 20 Gram(s) Oral once  latanoprost 0.005% Ophthalmic Solution 1 Drop(s) Both EYES at bedtime  multivitamin 1 Tablet(s) Oral daily  pantoprazole    Tablet 40 milliGRAM(s) Oral before breakfast  sodium chloride 0.9%. 1000 milliLiter(s) (100 mL/Hr) IV Continuous <Continuous>  thiamine 100 milliGRAM(s) Oral daily  timolol 0.5% Solution 1 Drop(s) Both EYES daily    MEDICATIONS  (PRN):  acetaminophen     Tablet .. 650 milliGRAM(s) Oral every 6 hours PRN Temp greater or equal to 38C (100.4F), Mild Pain (1 - 3)  aluminum hydroxide/magnesium hydroxide/simethicone Suspension 30 milliLiter(s) Oral every 4 hours PRN Dyspepsia  LORazepam   Injectable 2 milliGRAM(s) IV Push every 1 hour PRN CIWA-Ar score 8 or greater  melatonin 3 milliGRAM(s) Oral at bedtime PRN Insomnia  ondansetron Injectable 4 milliGRAM(s) IV Push every 8 hours PRN Nausea and/or Vomiting  Allergies    No Known Allergies    Intolerances    Vital Signs Last 24 Hrs  T(C): 36.8 (14 Nov 2023 10:25), Max: 36.8 (14 Nov 2023 10:25)  T(F): 98.2 (14 Nov 2023 10:25), Max: 98.2 (14 Nov 2023 10:25)  HR: 67 (14 Nov 2023 10:25) (63 - 67)  BP: 125/78 (14 Nov 2023 10:25) (120/74 - 147/81)  BP(mean): --  RR: 18 (14 Nov 2023 10:25) (17 - 18)  SpO2: 95% (14 Nov 2023 10:25) (95% - 98%)          PHYSICAL EXAM:  GENERAL: NAD  HEAD:  Atraumatic  EYES: PERRLA  ENMT: Mouth moist   NECK: Supple  NERVOUS SYSTEM:  Awake, alert  CHEST/LUNG: Clear  HEART: RRR  ABDOMEN: Soft, non tender  EXTREMITIES:  no edema BL LE, mild tremors with hands extended  SKIN: No rash        LABS:                    CAPILLARY BLOOD GLUCOSE      RADIOLOGY & ADDITIONAL TESTS:      Imaging Personally Reviewed:  [ ] YES  [ ] NO    Consultant(s) Notes Reviewed:  [ ] YES  [ ] NO    Care Discussed with Consultants/Other Providers [ ] YES  [ ] NO

## 2023-11-14 NOTE — DISCHARGE NOTE PROVIDER - HOSPITAL COURSE
· Assessment	  58 year old male with a PMH of alcohol use disorder presents to the ED for acute intoxication. Endorses 1 day history of abdominal pain and generalized body ache. Has been drinking excessively for the past 2 months, at least 1L of Vodka with a 6 pack beer in between daily. last drink was at 4AM.  Reports he wants to change his habit and stop drinking, he is scared if he continues that way he might die. (+) Nausea, dizziness and headache, Denies SI or HI, V/D, SOB, palpitation, diarrhea, Upon evaluation patient is AAOx3, appears anxious and tremulous, vitals stable.  In the ED labs remarkable for elevated Lactate (3.4-->3.9), blood alcohol level 325. Received 2L-NS, Thiamine, Phenobarbital, Ketorolac.             Nutritional Assessment:  · Nutritional Assessment	This patient has been assessed with a concern for Malnutrition and has been determined to have a diagnosis/diagnoses of Moderate protein-calorie malnutrition.    This patient is being managed with:   Diet Regular-  Low Sodium  Supplement Feeding Modality:  Oral  Ensure Plus High Protein Cans or Servings Per Day:  1       Frequency:  Daily  Entered: Nov 12 2023  1:49PM     Problem/Plan - 1:  ·  Problem: Alcohol intoxication.   ·  Plan: Drinks 1L vodka daily   Patient wants detox/rehab to help him quit   - Tele   - IVF  - [facility] protocol   - Ativan   - Librium   - PPI   - DVT prophylaxis   - Seizure precaution   - Monitor for withdrawal symptoms.   -  & case management-- notified of inpt etoh rehab request  11/13/2023 [facility] is 8    patient no family and only live with his cousin identified as Joaquin Villasenorzaira     11/14/2023 [facility] 6 hopefully d/c in am . patient now wants to go home as he has a court case11/28/2023.     Problem/Plan - 2:  ·  Problem: Hypertension.   ·  Plan: Normotensive   Continue home meds  - Amlodipine 5mg.       58 year old male with a PMH of alcohol use disorder presents to the ED for acute intoxication. Endorses 1 day history of abdominal pain and generalized body ache. Has been drinking excessively for the past 2 months, at least 1L of Vodka with a 6 pack beer in between daily. last drink was at 4AM.  Reports he wants to change his habit and stop drinking, he is scared if he continues that way he might die. (+) Nausea, dizziness and headache, Denies SI or HI, V/D, SOB, palpitation, diarrhea, Upon evaluation patient is AAOx3, appears anxious and tremulous, vitals stable.  In the ED labs remarkable for elevated Lactate (3.4-->3.9), blood alcohol level 325. Received 2L-NS, Thiamine, Phenobarbital, Ketorolac.      # Alcohol abuse and withdrawal - CIWA 1-2.  Pt reports Drinks 1L vodka daily.  Counselled on cessation.  SW involved for outpatient detox program.  Not in acute withdrawal.  Stable for d/c home.  # Essential HTN - Monitor BP.  Continue antihypertensives.  # MOderate Protein Calorie Malnutrition - Nutrition input, encourage po.  Supplements   # DVT Prophylaxis - OOB   patient no family and only live with his cousin identified as Joaquin Rodriguez       Disposition: Stable for discharge.  Outpatient followup discussed.  Total time spent on discharge is  35  minutes.

## 2023-11-14 NOTE — DISCHARGE NOTE PROVIDER - NSDCFUADDINST_GEN_ALL_CORE_FT
Ensure 1 can oral daily.    It is important to see your primary physician as well as any specialty physicians within the next week to perform a comprehensive medical review.  Call their offices for an appointment as soon as you leave the hospital.  You will also need to see them for renewal of your medications.  If have any difficulty following with a physician, contact the [facility] [phone] or via [email or web address].   To obtain your results, you can access the [facility] Patient Portal at [email or web address].  Your medical issues appear to be stable at this time, but if your symptoms recur or worsen, contact your physicians and/or return to the hospital if necessary.  If you encounter any issues or questions with your medication, call your physicians before stopping the medication.  Do not drive.  Limit your diet to 2 grams of sodium daily.

## 2023-11-14 NOTE — PROGRESS NOTE ADULT - PROBLEM SELECTOR PLAN 2
Normotensive   Continue home meds  - Amlodipine 5mg

## 2023-11-14 NOTE — DISCHARGE NOTE PROVIDER - NSDCMRMEDTOKEN_GEN_ALL_CORE_FT
amLODIPine 5 mg oral tablet: 1 tab(s) orally once a day  dorzolamide 2% ophthalmic solution: 1 drop(s) to each affected eye 3 times a day  folic acid 1 mg oral tablet: 1 tab(s) orally once a day  latanoprost 0.005% ophthalmic solution: 1 drop(s) to each affected eye once a day (at bedtime)  Multiple Vitamins oral tablet: 1 tab(s) orally once a day  timolol maleate 0.5% ophthalmic solution: 1 drop(s) to each affected eye 2 times a day   amLODIPine 5 mg oral tablet: 1 tab(s) orally once a day  dorzolamide 2% ophthalmic solution: 1 drop(s) to each affected eye 3 times a day  folic acid 1 mg oral tablet: 1 tab(s) orally once a day  latanoprost 0.005% ophthalmic solution: 1 drop(s) to each affected eye once a day (at bedtime)  Multiple Vitamins oral tablet: 1 tab(s) orally once a day  thiamine 100 mg oral tablet: 1 tab(s) orally once a day  timolol maleate 0.5% ophthalmic solution: 1 drop(s) to each affected eye 2 times a day

## 2023-11-15 ENCOUNTER — TRANSCRIPTION ENCOUNTER (OUTPATIENT)
Age: 58
End: 2023-11-15

## 2023-11-15 VITALS — SYSTOLIC BLOOD PRESSURE: 136 MMHG | OXYGEN SATURATION: 96 % | DIASTOLIC BLOOD PRESSURE: 84 MMHG | HEART RATE: 73 BPM

## 2023-11-15 PROCEDURE — 99238 HOSP IP/OBS DSCHRG MGMT 30/<: CPT

## 2023-11-15 RX ORDER — THIAMINE MONONITRATE (VIT B1) 100 MG
1 TABLET ORAL
Qty: 0 | Refills: 0 | DISCHARGE
Start: 2023-11-15

## 2023-11-15 RX ADMIN — Medication 1 TABLET(S): at 13:14

## 2023-11-15 RX ADMIN — Medication 1 MILLIGRAM(S): at 13:13

## 2023-11-15 RX ADMIN — AMLODIPINE BESYLATE 5 MILLIGRAM(S): 2.5 TABLET ORAL at 05:22

## 2023-11-15 RX ADMIN — Medication 100 MILLIGRAM(S): at 13:13

## 2023-11-15 RX ADMIN — Medication 1 DROP(S): at 13:14

## 2023-11-15 RX ADMIN — LACTULOSE 20 GRAM(S): 10 SOLUTION ORAL at 13:14

## 2023-11-15 RX ADMIN — PANTOPRAZOLE SODIUM 40 MILLIGRAM(S): 20 TABLET, DELAYED RELEASE ORAL at 05:22

## 2023-11-15 NOTE — PHYSICAL THERAPY INITIAL EVALUATION ADULT - PERTINENT HX OF CURRENT PROBLEM, REHAB EVAL
58 year old male with a PMH of alcohol use disorder presents to the ED for acute intoxication. Endorses 1 day history of abdominal pain and generalized body ache. Has been drinking excessively for the past 2 months, at least 1L of Vodka with a 6 pack beer in between daily. last drink was at 4AM.  Reports he wants to change his habit and stop drinking, he is scared if he continues that way he might die.

## 2023-11-15 NOTE — DISCHARGE NOTE NURSING/CASE MANAGEMENT/SOCIAL WORK - PATIENT PORTAL LINK FT
You can access the FollowMyHealth Patient Portal offered by [facility] by registering at the following website: [email or web address]. By joining [facility] FollowMyHealth portal, you will also be able to view your health information using other applications (apps) compatible with our system.

## 2023-11-15 NOTE — PHARMACOTHERAPY INTERVENTION NOTE - COMMENTS
Recommended changing administration route of pantoprazole to PO since pt tolerates PO diet. 
Recommended switching administration route of protonix from IV to PO since pt is on po diet.  
Recommended changing administration route of pantoprazole to PO since pt tolerates PO diet.

## 2023-11-15 NOTE — PHYSICAL THERAPY INITIAL EVALUATION ADULT - GAIT TRAINING, PT EVAL
Pt will be able to ambulate independently without assistive device up to 200 ft or more, be able to negotiate steps safely observing proper gait, posture and prevent falls.

## 2023-11-15 NOTE — DISCHARGE NOTE NURSING/CASE MANAGEMENT/SOCIAL WORK - NSDCVIVACCINE_GEN_ALL_CORE_FT
influenza, injectable, quadrivalent, preservative free; 14-Oct-2021 13:35; Nany Guerrero (RN); [employer]; [id number] (Exp. Date: 30-Jun-2022); IntraMuscular; Deltoid Right.; 0.5 milliLiter(s); VIS (VIS Published: 06-Aug-2021, VIS Presented: 14-Oct-2021);   influenza, injectable, quadrivalent, preservative free; 29-Oct-2023 16:38; Shira Coley (RN); [employer]; [id number] (Exp. Date: 30-Jun-2024); IntraMuscular; Deltoid Left.; 0.5 milliLiter(s); VIS (VIS Published: 06-Aug-2021, VIS Presented: 29-Oct-2023);   Tdap; 22-Dec-2021 19:23; Clementina Koehler (RN); [employer]; [id number] (Exp. Date: 09-Sep-2023); IntraMuscular; Deltoid Left.; 0.5 milliLiter(s); VIS (VIS Published: 09-May-2013, VIS Presented: 22-Dec-2021);   Tdap; 15-Aug-2022 16:08; Marianne Pollock (RN); [employer]; [id number]   (Exp. Date: 18-Apr-2024); IntraMuscular; Deltoid Left.; 0.5 milliLiter(s); VIS (VIS Published: 09-May-2013, VIS Presented: 15-Aug-2022);   Tdap; 16-Nov-2022 15:48; Parish Avila (RN); [employer]; [id number] (Exp. Date: 01-Jun-2024); IntraMuscular; Deltoid Right.; 0.5 milliLiter(s); VIS (VIS Published: 09-May-2013, VIS Presented: 16-Nov-2022);   Tdap; 11-Sep-2023 16:32; Alexandra Sung (RN); [employer]; [id number] (Exp. Date: 03-Oct-2025); IntraMuscular; Deltoid Right.; 0.5 milliLiter(s); VIS (VIS Published: 09-May-2013, VIS Presented: 11-Sep-2023);

## 2023-11-15 NOTE — PROGRESS NOTE ADULT - ASSESSMENT
58 year old male with a PMH of alcohol use disorder presents to the ED for acute intoxication. Endorses 1 day history of abdominal pain and generalized body ache. Has been drinking excessively for the past 2 months, at least 1L of Vodka with a 6 pack beer in between daily. last drink was at 4AM.  Reports he wants to change his habit and stop drinking, he is scared if he continues that way he might die. (+) Nausea, dizziness and headache, Denies SI or HI, V/D, SOB, palpitation, diarrhea, Upon evaluation patient is AAOx3, appears anxious and tremulous, vitals stable.  In the ED labs remarkable for elevated Lactate (3.4-->3.9), blood alcohol level 325. Received 2L-NS, Thiamine, Phenobarbital, Ketorolac.      # Alcohol abuse and withdrawal - CIWA 1-2.  Pt reports Drinks 1L vodka daily.  Counselled on cessation.  SW involved for outpatient detox program.  Not in acute withdrawal.  Stable for d/c home.  # Essential HTN - Monitor BP.  Continue antihypertensives.  # Severe Protein Calorie Malnutrition - Nutrition input, encourage po.  Supplements   # DVT Prophylaxis - OOB   patient no family and only live with his cousin identified as Joaquin Villasenorzaira        58 year old male with a PMH of alcohol use disorder presents to the ED for acute intoxication. Endorses 1 day history of abdominal pain and generalized body ache. Has been drinking excessively for the past 2 months, at least 1L of Vodka with a 6 pack beer in between daily. last drink was at 4AM.  Reports he wants to change his habit and stop drinking, he is scared if he continues that way he might die. (+) Nausea, dizziness and headache, Denies SI or HI, V/D, SOB, palpitation, diarrhea, Upon evaluation patient is AAOx3, appears anxious and tremulous, vitals stable.  In the ED labs remarkable for elevated Lactate (3.4-->3.9), blood alcohol level 325. Received 2L-NS, Thiamine, Phenobarbital, Ketorolac.      # Alcohol abuse and withdrawal - CIWA 1-2.  Pt reports Drinks 1L vodka daily.  Counselled on cessation.  SW involved for outpatient detox program.  Not in acute withdrawal.  Stable for d/c home.  # Essential HTN - Monitor BP.  Continue antihypertensives.  # MOderate Protein Calorie Malnutrition - Nutrition input, encourage po.  Supplements   # DVT Prophylaxis - OOB   patient no family and only live with his cousin identified as Joaquin Villasenorzaira

## 2023-11-15 NOTE — PHYSICAL THERAPY INITIAL EVALUATION ADULT - ADDITIONAL COMMENTS
Pt reports he rents a room in a house with his cousin. Pt resides on main level and no stairs to negotiate into or inside the house.  Pt indep with ADLS/ambulation without AD.

## 2023-11-15 NOTE — PROGRESS NOTE ADULT - SUBJECTIVE AND OBJECTIVE BOX
Patient: TITUS VINSON [id number] 58y Male                            Hospitalist Attending Note    no complaints.  Wishing d/c  ambulating freely.  Not tremulous.  CIWA 1-2    ____________________PHYSICAL EXAM:  GENERAL:  NAD Alert and Oriented x 3   HEENT: NCAT  CARDIOVASCULAR:  S1, S2  LUNGS: CTAB  ABDOMEN:  soft, (-) tenderness, (-) distension, (+) bowel sounds, (-) guarding, (-) rebound (-) rigidity  EXTREMITIES:  no cyanosis / clubbing / edema.   ____________________     VITALS:  Vital Signs Last 24 Hrs  T(C): 36.6 (15 Nov 2023 10:17), Max: 37.2 (2023 16:25)  T(F): 97.8 (15 Nov 2023 10:17), Max: 98.9 (2023 16:25)  HR: 73 (15 Nov 2023 10:40) (55 - 73)  BP: 136/84 (15 Nov 2023 10:40) (123/73 - 151/81)  BP(mean): --  RR: 18 (15 Nov 2023 10:17) (16 - 18)  SpO2: 96% (15 Nov 2023 10:40) (95% - 97%)    Parameters below as of 15 Nov 2023 10:40  Patient On (Oxygen Delivery Method): room air     Daily     Daily Weight in k.4 (15 Nov 2023 04:43)  CAPILLARY BLOOD GLUCOSE        I&O's Summary    2023 07:01  -  15 Nov 2023 07:00  --------------------------------------------------------  IN: 1200 mL / OUT: 0 mL / NET: 1200 mL        HISTORY:  PAST MEDICAL & SURGICAL HISTORY:  Glaucoma      HTN (hypertension)      Alcohol dependence      Blindness of right eye      HLD (hyperlipidemia)      Anxiety and depression      No significant past surgical history      Allergies    No Known Allergies    Intolerances       LABS:                        MEDICATIONS:  MEDICATIONS  (STANDING):  amLODIPine   Tablet 5 milliGRAM(s) Oral daily  dorzolamide 2% Ophthalmic Solution 1 Drop(s) Both EYES <User Schedule>  folic acid 1 milliGRAM(s) Oral daily  lactulose Syrup 20 Gram(s) Oral once  latanoprost 0.005% Ophthalmic Solution 1 Drop(s) Both EYES at bedtime  multivitamin 1 Tablet(s) Oral daily  pantoprazole    Tablet 40 milliGRAM(s) Oral before breakfast  sodium chloride 0.9%. 1000 milliLiter(s) (100 mL/Hr) IV Continuous <Continuous>  thiamine 100 milliGRAM(s) Oral daily  timolol 0.5% Solution 1 Drop(s) Both EYES daily    MEDICATIONS  (PRN):  acetaminophen     Tablet .. 650 milliGRAM(s) Oral every 6 hours PRN Temp greater or equal to 38C (100.4F), Mild Pain (1 - 3)  aluminum hydroxide/magnesium hydroxide/simethicone Suspension 30 milliLiter(s) Oral every 4 hours PRN Dyspepsia  LORazepam   Injectable 2 milliGRAM(s) IV Push every 1 hour PRN CIWA-Ar score 8 or greater  melatonin 3 milliGRAM(s) Oral at bedtime PRN Insomnia  ondansetron Injectable 4 milliGRAM(s) IV Push every 8 hours PRN Nausea and/or Vomiting

## 2023-11-15 NOTE — PROGRESS NOTE ADULT - NUTRITIONAL ASSESSMENT
This patient has been assessed with a concern for Malnutrition and has been determined to have a diagnosis/diagnoses of Moderate protein-calorie malnutrition.    This patient is being managed with:   Diet Regular-  Low Sodium  Supplement Feeding Modality:  Oral  Ensure Plus High Protein Cans or Servings Per Day:  1       Frequency:  Daily  Entered: Nov 12 2023  1:49PM  
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
## 2023-11-17 DIAGNOSIS — E44.0 MODERATE PROTEIN-CALORIE MALNUTRITION: ICD-10-CM

## 2023-11-17 DIAGNOSIS — H40.9 UNSPECIFIED GLAUCOMA: ICD-10-CM

## 2023-11-17 DIAGNOSIS — F10.129 ALCOHOL ABUSE WITH INTOXICATION, UNSPECIFIED: ICD-10-CM

## 2023-11-17 DIAGNOSIS — H54.7 UNSPECIFIED VISUAL LOSS: ICD-10-CM

## 2023-11-17 DIAGNOSIS — I10 ESSENTIAL (PRIMARY) HYPERTENSION: ICD-10-CM

## 2023-11-17 DIAGNOSIS — E78.5 HYPERLIPIDEMIA, UNSPECIFIED: ICD-10-CM

## 2023-11-18 ENCOUNTER — EMERGENCY (EMERGENCY)
Facility: HOSPITAL | Age: 58
LOS: 0 days | Discharge: HOME | End: 2023-11-19
Attending: STUDENT IN AN ORGANIZED HEALTH CARE EDUCATION/TRAINING PROGRAM
Payer: MEDICAID

## 2023-11-18 DIAGNOSIS — F10.129 ALCOHOL ABUSE WITH INTOXICATION, UNSPECIFIED: ICD-10-CM

## 2023-11-18 PROCEDURE — 99284 EMERGENCY DEPT VISIT MOD MDM: CPT

## 2023-11-19 VITALS
RESPIRATION RATE: 18 BRPM | TEMPERATURE: 98 F | HEART RATE: 88 BPM | DIASTOLIC BLOOD PRESSURE: 81 MMHG | HEIGHT: 68 IN | SYSTOLIC BLOOD PRESSURE: 133 MMHG | WEIGHT: 179.9 LBS | OXYGEN SATURATION: 96 %

## 2023-11-19 VITALS
OXYGEN SATURATION: 98 % | DIASTOLIC BLOOD PRESSURE: 70 MMHG | RESPIRATION RATE: 18 BRPM | SYSTOLIC BLOOD PRESSURE: 109 MMHG | HEART RATE: 79 BPM | TEMPERATURE: 98 F

## 2023-11-19 RX ADMIN — Medication 50 MILLIGRAM(S): at 02:09

## 2023-11-19 NOTE — ED ADULT TRIAGE NOTE - CHIEF COMPLAINT QUOTE
Patient BIBA: Patient reports "I drank 4 quart bottles of Vodka. I need to sleep here." Patient inconsistent in answering questions. No signs of trauma or injury.

## 2023-11-19 NOTE — ED PROVIDER NOTE - CLINICAL SUMMARY MEDICAL DECISION MAKING FREE TEXT BOX
59 y/o M presenting to the ED with alcohol intoxication.  Vitals stable.  He appears intoxicated.  Will allow MTF.

## 2023-11-19 NOTE — ED PROVIDER NOTE - PHYSICAL EXAMINATION
GENERAL: Awake, alert, NAD  HEENT: NC/AT, moist mucous membranes  LUNGS: CTAB, no wheezes or crackles   CARDIAC: RRR, no m/r/g  EXT: No edema, no deformities.  NEURO: Moving all extremities.  SKIN: Warm and dry. No rash.  PSYCH: Appears intoxicated.

## 2023-11-19 NOTE — ED PROVIDER NOTE - OBJECTIVE STATEMENT
57 y/o M presenting to the ED with alcohol intoxication.  Well known to this provider and ED.  Endorses drinking 4 bottles of vodka tonight.  No fever, chills, N/V/D, chest pain, dyspnea.

## 2023-11-19 NOTE — ED ADULT NURSE NOTE - OBJECTIVE STATEMENT
Patient BIBA for alcohol intoxication. Pt reports drinking "a lot" of vodka. Respirations spontaneous and unlabored. Patient inconsistent in answering questions. No signs of trauma or injury.

## 2023-11-19 NOTE — ED PROVIDER NOTE - PATIENT PORTAL LINK FT
You can access the FollowMyHealth Patient Portal offered by [facility] by registering at the following website: [email or web address]. By joining [email or web address]’s FollowMyHealth portal, you will also be able to view your health information using other applications (apps) compatible with our system.

## 2023-11-19 NOTE — ED ADULT NURSE NOTE - EXTENSIONS OF SELF_ADULT
Care Transitions Outreach Attempt    Call within 2 business days of discharge: Yes   Attempted to reach patient for transitions of care follow up. Unable to reach patient. Patient: Sherrie Tirado Patient : 1953 MRN: [id number]    Last Discharge [facility]       Date Complaint Diagnosis Description Type Department Provider    22 Fatigue; Chills Fatigue, unspecified type . .. ED to Hosp-Admission (Discharged) (ADMITTED) Ashley Garay MD; Dmitri Gamboa DO. ..          24 Hour Transition of Care Call:    1st Attempt to contact patient for Initial 24 Hour Transition from hospital to home Call:   Patient not reached. Left HIPAA Compliant message on Voice Mail to call [facility]N (number given) with any questions and an update on patient's condition since discharge. Needs 7 day discharge appointment. Will send message through [id number] that patient needs appointment. Will continue to follow. Chalmers Fabry, LPN    [phone]  Adriano Freeman / [employer]  Coordinator        Was this an external facility discharge?  No Discharge Facility: [facility]    Noted following upcoming appointments from discharge chart review:   [facility] follow up appointment(s):   Future Appointments   Date Time Provider Thom Lock   11/10/2022  2:40 PM Abdulaziz Murcia MD N [address] [facility]   3/9/2023  1:15 PM Janneth Leon MD N [facility] [address] [facility] follow up appointment(s):
LMTCB
Eyeglasses

## 2023-11-19 NOTE — ED PROVIDER NOTE - NS ED ROS FT
FEMALE SHWETHA;      GA 33 weeks;     Age: 7 d;   PMA: _____      Current Status: 33 weeker, RDS, thermal and nutritional insufficiencies; breech and arm presentation with limited left arm abduction    Weight (grams): 1794, +19      Intake(ml/kg/day): 130  Urine output  (ml/kg/hr):   x 8                      Stools (frequency): x 6  Other:     Interval Events: dc CPAP 6-5 pm, now RA.  Now normalabduction of left arm (o/w acceptable neurovascular exam); Feeds well raymond'd.  all PO, first day 6/7 to 8. No immature breathing.  Still needs thermal support.    *******************************************************  FEN: Nutritional insufficiencies - Feed FeHM to 22/Neo22 30 to ad leonarda  ml OG/PO (100%) every 3hrs ( 135+ ml/kg/day), fortification done 6-9.   Enable BF'g.  dc TPN/IL 6-6 pm.   ADWG:  ________ (G/kg/day / date); Riley %: _______  (%/date) ; HC:    Access:  none, PIV out 6-6  Respiratory: RDS. s/p CPAP 6-5 pm to RA.  Blood gases as needed.  CXR 6-2 c/w RDS  CV: Stable hemodynamics. Continue cardiorespiratory monitoring. Observe for the signs of PDA, once PVR decreases.  Hem: Hyperbilirubinemia due to prematurity, subthreshold, monitor levels ... now decreasing ______.   Monitor for anemia and thrombocytopenia.  ID: Monitor for signs and symptoms of sepsis.  Ruled out sepsis, s/p empiric ABx therapy. dc  ABx since 48 hrs neg BCx (last dose abx 6-4 am)  Thermal: Immature thermoregulation, requires incubator.   Ortho: Breech presentation at birth. Screening hip US at 44-46 weeks of PMA.  Resolved swelling of hand - right extremity XR shows no fracture.  Peds Ortho evaluation (6-4) see note, potential brachial plexus strain, likely self limiting and resolving.  f/u with ortho (Dr. Francisco Boothe, one after dc) as outpatient (d/w Peds neuro).  Some pain 6- 4 to 5 when prone with arms Abducted, responds to swaddling with arms Adducted ... will follow.  6-7 to 8, improved movement patterns, no evidence of pain... liberalizing positioning 6-8.  Neuro:  Peds Neuro - see note 6-5 - likely transient pressure neuropathy, follow with advanced imaging for persistent sx's if needed.  Social:  Labs/Images/Studies: 6-9 Saturday bili  Discharge planning for week of 6-11.  Awaiting sustained nutritional and thermal maturity.      PLAN: as above CONST: no fevers, no chills  EYES: no pain, no vision changes  ENT: no sore throat, no ear pain, no change in hearing  CV: no chest pain, no leg swelling  RESP: no shortness of breath, no cough  ABD: no abdominal pain, no nausea, no vomiting, no diarrhea  : no dysuria, no flank pain, no hematuria  MSK: no back pain, no extremity pain  NEURO: no headache or additional neurologic complaints  HEME: no easy bleeding  SKIN:  no rash

## 2023-11-19 NOTE — ED PROVIDER NOTE - NSDCPRINTRESULTS_ED_ALL_ED
Patient requests all Lab, Cardiology, and Radiology Results on their Discharge Instructions
not applicable

## 2023-11-24 NOTE — PROVIDER CONTACT NOTE (OTHER) - DATE AND TIME:
22-Oct-2023 21:35
She should have a follow up in the next 1-2 months and get her shingles shot .  She is a transplant pt and should check in frequently 
22-Oct-2023 23:00
22-Oct-2023 15:41
22-Oct-2023 11:50

## 2024-01-17 RX ORDER — DONEPEZIL HYDROCHLORIDE 10 MG/1
1 TABLET, FILM COATED ORAL
Refills: 0 | DISCHARGE

## 2024-01-17 RX ORDER — AMLODIPINE BESYLATE 2.5 MG/1
1 TABLET ORAL
Refills: 0 | DISCHARGE

## 2024-01-17 RX ORDER — DORZOLAMIDE HYDROCHLORIDE 20 MG/ML
1 SOLUTION/ DROPS OPHTHALMIC
Refills: 0 | DISCHARGE

## 2024-01-17 RX ORDER — NALTREXONE HYDROCHLORIDE 50 MG/1
1 TABLET, FILM COATED ORAL
Refills: 0 | DISCHARGE

## 2024-01-17 RX ORDER — TIMOLOL 0.5 %
1 DROPS OPHTHALMIC (EYE)
Refills: 0 | DISCHARGE

## 2024-01-17 RX ORDER — LATANOPROST 0.05 MG/ML
1 SOLUTION/ DROPS OPHTHALMIC; TOPICAL
Refills: 0 | DISCHARGE

## 2024-01-17 RX ORDER — LATANOPROST 0.05 MG/ML
1 SOLUTION/ DROPS OPHTHALMIC; TOPICAL
Qty: 0 | Refills: 0 | DISCHARGE

## 2024-02-16 NOTE — PATIENT PROFILE ADULT - NSPRESCRALCSCORE_GEN_A_NUR_CAL
Pt c/o upper and lower back pain that started a week ago. No injury or trauma that patient is aware of. Patient states pulled muscle. No medication PTA.   
11

## 2024-02-26 NOTE — PROVIDER CONTACT NOTE (CHANGE IN STATUS NOTIFICATION) - RECOMMENDATIONS
Anesthesia Evaluation     NPO Solid Status: > 8 hours  NPO Liquid Status: > 8 hours           Airway   Mallampati: I  No difficulty expected  Dental      Pulmonary    Cardiovascular   Exercise tolerance: good (4-7 METS)    (+) hypertension      Neuro/Psych  GI/Hepatic/Renal/Endo      Musculoskeletal     Abdominal    Substance History      OB/GYN          Other   arthritis,                 Anesthesia Plan    ASA 2     general     intravenous induction     Anesthetic plan, risks, benefits, and alternatives have been provided, discussed and informed consent has been obtained with: patient.    CODE STATUS:         
Ativan IV push
Continue to monitor- CIWA Protocol

## 2024-02-27 NOTE — ED PROVIDER NOTE - PATIENT PORTAL LINK FT
-- DO NOT REPLY / DO NOT REPLY ALL --  -- Message is from [employer] [facility] [employer] ([facility]) --    ONLY TO BE USED WITHIN A REFILL MEDICATION ENCOUNTER    Med Refill  Is the patient currently having any symptoms?: No/Non-Emergent symptoms    Name of medication requested: albuterol 108 (90 Base) MCG/ACT inhaler     Is this the first request for the medication in the last 48 hours?: Yes    Patient is requesting a medication refill - medication is on active medication list    Patient is currently OUT of the requested medication - sent as HIGH priority    Full name of the provider who ordered the medication: [facility] site name / Account # for provider: [facility] - [address]     Preferred Pharmacy: Pharmacy  [id number] - [place], [place] - [address]    Patient confirmed the above pharmacy as correct?  Yes    Caller Information         Type Contact Phone/Fax    02/27/2024 10:41 AM CST Phone (Incoming) Alonso Kwan (Self) [phone] (M)            Alternative phone number: [phone]    Can a detailed message be left?: Yes         You can access the FollowMyHealth Patient Portal offered by [facility] by registering at the following website: [email or web address]. By joining [employer]’s FollowMyHealth portal, you will also be able to view your health information using other applications (apps) compatible with our system.

## 2024-05-21 NOTE — DISCHARGE NOTE PROVIDER - NSDCCPGOAL_GEN_ALL_CORE_FT
Detail Level: Detailed
Introduction Text (Please End With A Colon): s/p pain clinic, nerve block x 1, declines further intervention, \"can live with it\" for now
Size Of Lesion In Cm (Optional): 0
To get better and follow your care plan as instructed.

## 2024-05-31 NOTE — PHYSICAL THERAPY INITIAL EVALUATION ADULT - LEVEL OF INDEPENDENCE: GAIT, REHAB EVAL
RAPID MEDICAL EVALUATION   5:25 PM  I have evaluated the patient as the Provider in Rapid Medical Evaluation (RME). I have reviewed her vital signs and the triage nurse assessment. I have talked with the patient and any available family and advised that I am the provider in triage and have ordered the appropriate study to initiate their work up based on the clinical presentation during my assessment. I have advised that the patient will be accommodated in the Main ED as soon as possible. I have also requested to contact the triage nurse or myself immediately if the patient experiences any changes in their condition during this brief waiting period.  Afsaneh Siddiqi PA-C    PMHx: Diabetes, hypertension    CC & brief HPI:  Presents with wound to the left great toe, fevers, Tylenol was taken prior to arrival  Also reports left calf pain and swelling    EXAM:   Vitals:    05/31/24 1718   BP: (!) 151/69   Pulse: 88   Temp: 98.6 °F (37 °C)   SpO2: 95%        No acute distress.  Normal work of breathing.  With regards to great toe --cellulitis versus osteomyelitis, have ordered inflammatory markers  Regards to left calf pain, have ordered ultrasound of the left lower extremity  Normal pedal pulses in the left foot, foot is warm and normal cap refill    PLAN:  Core bed   Initial labs and imaging ordered        Afsaneh Siddiqi PA-C  05/31/24 1725     supervision

## 2024-06-08 NOTE — PROGRESS NOTE ADULT - PROBLEM SELECTOR PLAN 11
Diet: DASH Diet  Psych/Sleep: None LUIS ARMANDO  GI: Protonix Dulcolax  DVT ppx: Lovenox  Code Status: Full Code  Dispo: likely OP rehab, will need shelter setup and need to be off contact isolations for Norovirus This patient has been assessed with a concern for Malnutrition and was treated during this hospitalization for the following Nutrition diagnosis/diagnoses:     -  06/08/2024: Severe protein-calorie malnutrition   -  06/08/2024: Underweight (BMI < 19)

## 2024-06-18 NOTE — PATIENT PROFILE ADULT - NSTOBACCONEVERSMOKERY/N_GEN_A
[Denies] : Denies [No] : No [Yes] : Yes [Informed consent documented in EHR.] : Informed consent documented in EHR. [Right upper extremity] : Right upper extremity [22g] : 22g [Start Time: ___] : Medication Start Time: [unfilled] [End Time: ___] : Medication End Time: [unfilled] [Medication Name: ___] : Medication Name: [unfilled] [Total Amount Administered: ___] : Total Amount Administered: [unfilled] [IV discontinued. Intact. No signs or symptoms of IV complications noted. Time: ___] : IV discontinued. Intact. No signs or symptoms of IV complications noted. Time: [unfilled] [Patient  instructed to seek medical attention with signs and symptoms of adverse effects] : Patient  instructed to seek medical attention with signs and symptoms of adverse effects [Patient left unit in no acute distress] : Patient left unit in no acute distress [Medications administered as ordered and tolerated well.] : Medications administered as ordered and tolerated well. [de-identified] : right arm radial vein  [de-identified] : No labs [de-identified] : Patient presents for scheduled Remicade infusion, ambulatory in [facility]. Today, patient reports to have up to 3 BM's daily, formed stool without blood or mucous present. Denies abdominal discomfort and no bloating. No other symptoms or concerns verbalized. Patient declined Zyrtec, premedicated with Tylenol. Medication administered as prescribed, and infusion tolerated well.  No

## 2024-06-29 NOTE — H&P ADULT - NSICDXPASTSURGICALHX_GEN_ALL_CORE_FT
Assessment completed at bedside. VSS.  Pt urinating well on own-adequate output achieved. Pt with some back pain- heat pack given for intermittent heat on back and tylenol given.  Dr. Whitmore at bedside discussing plan of care with pt. Pt aware of importance of finding prenatal care when she gets discharged. Pt states she wants to establish care with [facility]. Pt placed on monitors for NST. Call light within reach. No needs at this time.  Will monitor. Encouraged pt to call with any needs.      PAST SURGICAL HISTORY:  No significant past surgical history

## 2024-08-05 NOTE — PATIENT PROFILE ADULT - DO YOU FEEL UNSAFE AT HOME, WORK, OR SCHOOL?
Patient Quality Outreach    Patient is due for the following:   Hypertension -  BP check  IVD  -  BP Check  Physical Preventive Adult Physical-lipids, bmp      Topic Date Due    Pneumococcal Vaccine (1 of 2 - PCV) Never done    Zoster (Shingles) Vaccine (1 of 2) Never done    COVID-19 Vaccine (2 - 2023-24 season) 09/01/2023       Next Steps:   Schedule a Adult Preventative    Type of outreach:    Sent [employer] message.  Call in 1 week if not read/completed; send letter in 2 weeks if not returned/read.     Questions for provider review:    None           Heena Dimas, [facility]  Chart routed to Care Team.     no

## 2024-08-16 NOTE — PROGRESS NOTE ADULT - REASON FOR ADMISSION
[Non-Contributory] : Non-contributory
withdrawal

## 2024-08-19 NOTE — ED ADULT NURSE NOTE - NSIMPLEMENTINTERV_GEN_ALL_ED
Apply a broad spectrum SPF 30 sunscreen cream 15-30 minutes before going outside, reapply every 2 hours  Use clothing and shade for protection from the sun  Insect repellant with DEET can be used  Wash off at the end of the day  Flu vaccine in September  COVID in fall    Leave in own bed at night, don't go in his room  If he comes to parent room, take him back  Can try reward system to stay in own bed all night    Toilet training-take to bathroom every 30-60 min during the day and he should learn quickly  Positive reward may help        Tylenol/Acetaminophen Dosing    Please dose every 4 hours as needed, do not give more than 5 doses in any 24 hour period  Children's Oral Suspension= 160 mg/5ml  Childrens Chewable =80 mg  Jr Strength Chewables= 160 mg                                                              Tylenol suspension   Childrens Chewable   Jr. Strength Chewable                                                                                                                                                                           12-17 lbs               2.5 ml  18-23 lbs               3.75 ml  24-35 lbs               5 ml                          2                              1      Ibuprofen/Advil/Motrin Dosing    Ibuprofen is dosed every 6-8 hours as needed  Never give more than 4 doses in a 24 hour period  Please note the difference in the strengths between infant and children's ibuprofen  Do not give ibuprofen to children under 6 months of age unless advised by your doctor    Infant Concentrated drops = 50 mg/1.25ml  Children's suspension =100 mg/5 ml  Children's chewable = 100mg                                   Infant concentrated      Childrens               Chewables                                            Drops                      Suspension                12-17 lbs                1.25 ml  18-23 lbs                1.875 ml      3.75 ml  24-35 lbs                2.5 ml                             5 ml                            1    Implemented All Fall Risk Interventions:  [place] to call system. Call bell, personal items and telephone within reach. Instruct patient to call for assistance. Room bathroom lighting operational. Non-slip footwear when patient is off stretcher. Physically safe environment: no spills, clutter or unnecessary equipment. Stretcher in lowest position, wheels locked, appropriate side rails in place. Provide visual cue, wrist band, yellow gown, etc. Monitor gait and stability. Monitor for mental status changes and reorient to person, place, and time. Review medications for side effects contributing to fall risk. Reinforce activity limits and safety measures with patient and family.

## 2024-09-16 NOTE — DISCHARGE NOTE NURSING/CASE MANAGEMENT/SOCIAL WORK - PATIENT PORTAL LINK FT
From: Nasir Beauchamp  To: Staci Miguel  Sent: 9/16/2024 8:50 AM CDT  Subject: FOLLOW UP TO HOSPITAL STAY OF 9/10 TO 9/12    In connection to my hospital stay of last week, I was told to set up a follow up appointment with you as soon as possible. I tried to set up a visit on-line but the earliest date available was November 20th. Since that is over a month away but less than a month until our next scheduled appointment, I will wait until we meet again.   You can access the FollowMyHealth Patient Portal offered by [facility] by registering at the following website: [email or web address]. By joining [employer]’s FollowMyHealth portal, you will also be able to view your health information using other applications (apps) compatible with our system.

## 2024-10-22 NOTE — ED ADULT NURSE NOTE - IS THE PATIENT ABLE TO BE SCREENED?
DATE OF CONSULTATION:  10/22/2024    REASON FOR CONSULTATION:  Dr. Gore requested evaluation of suicide attempt.    HISTORY OF PRESENT ILLNESS:  The patient is a 24-year-old man with history of psychotherapy, who was brought to the ED by friends yesterday due to concern for overdose.  Unfortunately, the ED physician's note is not completed.  The patient's initial Tylenol level was 45 and it has trended down since.  His alcohol level in the emergency room was 65 and urine drug screen was positive for marijuana.  The patient was seen by the crisis worker who noted the patient has been grieving the loss of his best friend.  The patient's friend reported patient said something to the effect, \"I can't do this anymore\" and texted a picture of pills.  The patient has denied suicidal ideation since presentation to the emergency room.  The attending, Dr. Fonseca, told our team that patient adamantly denies suicidal ideation and his sister, who was visiting earlier, agreed with his statements.  The patient was seen via Telehealth video visit.  The patient is located at [facility] and writer is remote in [place].  He has a sitter.     The patient was alert and oriented x3.  He said that he \"had a weak moment\" and he drank too much yesterday.  The patient's best friend  suddenly on  and while his friend had a history of stomach blockage, the patient is not certain what the cause of death was.  The patient said he felt sad missing his friend.  He drank and he had thoughts about \"not wanting to be here.\"  The patient had argued with his boyfriend earlier, leading him to drink to excess.  The patient denied suicidal ideation yesterday and today.  The patient said he had a headache, so he took 2 Tylenol and then a few hours later, he took an additional 2.  The patient said he had texted a friend a picture of his dog's medications to complain about how expensive they were.  The patient said in the  picture there may have been an old bottle of metformin that was his mother's. Patient said mother gave him some Tylenol in her old medication bottle.  The patient's mood today is good. He denied sustained depressed mood but reports periods of sadness since his friend .  He denied anhedonia and the patient said sleep and appetite have been okay.  He denied suicidal or homicidal ideation.  He denied history of hallucinations.  He denied problems with excessive anxiety or worry.  The patient said he would never want to inflict pain upon himself or his family if he were to hurt himself.    MEDICATIONS:  Heparin 5000 units 3 times a day.    PAST PSYCHIATRIC HISTORY:  The patient denied history of seeing a psychiatrist or psychiatric hospitalization.  He started seeing a therapist after his friend's death and he has a history of seeing a therapist previously.  The patient denied any formal psychiatric diagnosis.  The crisis worker reported the patient overdosed in a suicide attempt while in college.  The patient minimized this today.  He said he had suicidal ideation in college when his grades were not doing well and patient was having difficulty coming out.  He denied taking an overdose, however.    PAST MEDICAL HISTORY:  Appendectomy, 1 kidney.    SOCIAL HISTORY:  The patient lives with his dogs and boyfriend.  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  He denied any tobacco or illicit drug use.  The patient uses marijuana on occasion.  He reported consuming 2 glasses of wine once or twice a month.    FAMILY HISTORY:  He denied any family psychiatric history.    REVIEW OF SYSTEMS:  The patient feels a little jittery, but otherwise back to his usual self.    MENTAL STATUS EXAM:  The patient was alert and oriented x3.  He was dressed in hospital gown, sitting up in bed, in no acute distress.  He appeared his recorded age and was well groomed.  No psychomotor abnormalities.  He was  cooperative and pleasant with questions and made good eye contact.  His mood this morning was good.  Affect was full.  Speech was regular rate, rhythm, and volume.  No suicidal or homicidal ideation.  No hallucinations.  Thought form was linear and coherent.  Insight and recent judgment were poor.    DIAGNOSIS:  Adjustment disorder with depressed mood.  Rule out alcohol use disorder.    ASSESSMENT:  This is a 24-year-old man with history of psychotherapy, who reports grieving the loss of his best friend suddenly last month.  Yesterday, the patient said he had an argument with his boyfriend and he drank to excess.  The patient had thoughts about \"not wanting to be here.\"  He denied any active suicidal ideation and said he would never want to inflict pain upon himself or his family.  The patient said his friends misconstrued a text of a picture of medication which patient alleges was his dog's medications.  Writer believes the patient said something concerning for suicidal ideation while under the influence.  His Tylenol level was minimally elevated.  While patient is grieving the loss of his friend, he denied symptoms meeting criteria for major depressive disorder.  There was a possible overdose in college when he was struggling with grades and his sexuality.  Dr. Fonseca told me the patient's sister is not concerned the patient is actively suicidal and agrees with discharge.  The patient told me she lives nearby.  Family history is negative.    PLAN:  The patient may be discharged from a psychiatric standpoint.  I discontinued the sitter and suicide precautions.  I recommended that the patient followup with his therapist and primary       care physician.  I recommended he minimize alcohol use during this difficult time.  The Integrated Behavioral Health Team may be reached through [facility] for continued collaboration.        Dictated By:  Rani Renteria MD        D:  10/22/2024 13:49:12  T:  10/22/2024  16:12:49  SHAHID/veena  Job:  [id number]      cc:   Yes

## 2024-11-05 NOTE — ED ADULT NURSE NOTE - CAS EDP DISCH TYPE
November 5, 2024      [facility] [address]  [place] [address]  Phone: [phone]  Fax: [phone]       Patient: Baron Millie Shannon   YOB: 1998  Date of Visit: 11/05/2024    To Whom It May Concern:    Lavonne Shannon  was at [facility] on 11/05/2024. The patient may return to work/school on 11/6/24 with no restrictions. If you have any questions or concerns, or if I can be of further assistance, please do not hesitate to contact me.    Sincerely,    Kathryn Hi NP     
Home

## 2024-11-18 NOTE — ED PROVIDER NOTE - CARDIAC, MLM
Patient called office, knee pain has not resolved, she was seen in office on 10/29/2024, they would like to proceed with left knee visco supplementation authorization.     Form filled out / process started for left knee Orthovisc authorization   Normal rate, regular rhythm.  Heart sounds S1, S2.  No murmurs, rubs or gallops.

## 2024-11-18 NOTE — H&P ADULT - PROBLEM SELECTOR PLAN 3
Work on diet and exercise  Check labs   - likely due to alcohol abuse  - check hepatitis panel  - check US abdomen

## 2024-11-19 NOTE — ED PROVIDER NOTE - SKIN COLOR
-Status post fall outside of her home yesterday evening after returning home from rehab, fell onto her right hip and tailbone, unable to bear weight after the fall, no head strike, takes ASA and 2.5 mg Xarelto twice daily  -Imaging including CT RLE and XR hip/pelvis significant for right inferior pubic ramus fracture  -Orthopedics consult   2 linear superficial abrasions to forehead. scaling to soles of feet consistent with athletes foot/normal for race

## 2024-12-06 NOTE — DISCHARGE NOTE ADULT - INSTRUCTIONS
INTERVENTIONS (from last visit with updates):  1.   Injections:      5/16/2024 left L3-L4, L4-L5, L5-S1 radiofrequency ablation Connie  12/04/23 bilateral L3-L4, L4-L5, L5-S1 facet block Dr. Rosales  8/14/2023  bilateral L3-L4, L4-L5, L5-S1 facet block Connie    2.   Physical Therapy: Not in a structured PT program.    3.   Surgeries (spine, joint, related to pain):   1/25/2024 left L4-5 and L5-S1 hemilaminectomy medial facetectomy foraminotomy Anthony     4.   Procedures:  No new procedures noted     If on contract (update):  Urine tox screen: None  Prescription Drug Monitoring Program verified this visit.  Opioid contract: No  
Regular diet

## 2024-12-17 NOTE — DISCHARGE NOTE PROVIDER - NSDCQMAMI_CARD_ALL_CORE
Airway  Date/Time: 12/17/2024 12:18 PM  Urgency: elective      Staffing  Performed: attending   Authorized by: Willie Willard DO    Performed by: BRIAN Francisco  Patient location during procedure: OR    Indications and Patient Condition  Indications for airway management: anesthesia  Spontaneous Ventilation: absent  Sedation level: deep  Preoxygenated: yes  Patient position: sniffing  Mask difficulty assessment: 2 - vent by mask + OA or adjuvant +/- NMBA  Planned trial extubation    Final Airway Details  Final airway type: supraglottic airway      Successful airway: classic  Size 4     Number of other approaches attempted: 0          
Peripheral Block    Patient location during procedure: pre-op  Start time: 12/17/2024 11:34 AM  End time: 12/17/2024 11:36 AM  Reason for block: at surgeon's request and post-op pain management  Staffing  Performed: attending   Authorized by: Willie Willard DO    Performed by: Willie Willard DO  Preanesthetic Checklist  Completed: patient identified, IV checked, site marked, risks and benefits discussed, surgical consent, monitors and equipment checked, pre-op evaluation and timeout performed   Timeout performed at: 12/17/2024 11:31 AM  Peripheral Block  Patient position: sitting  Prep: ChloraPrep  Patient monitoring: heart rate, cardiac monitor and continuous pulse ox  Block type: interscalene  Laterality: left  Injection technique: single-shot  Guidance: ultrasound guided  Local infiltration: ropivacaine  Infiltration strength: 0.5 %  Dose: 20 mL  Needle  Needle gauge: 22 G  Needle length: 5 cm  Needle localization: ultrasound guidance  Assessment  Injection assessment: negative aspiration for heme, no paresthesia on injection, incremental injection and local visualized surrounding nerve on ultrasound  Paresthesia pain: none  Heart rate change: no  Slow fractionated injection: yes  Additional Notes  With 4mg Decadron          
No

## 2024-12-26 NOTE — ED ADULT TRIAGE NOTE - HISTORY OF COVID-19 VACCINATION
Bed: 13  Expected date:   Expected time:   Means of arrival:   Comments:  Triage  
Vaccine status unknown

## 2024-12-31 NOTE — PATIENT PROFILE ADULT - FUNCTIONAL ASSESSMENT - DAILY ACTIVITY 5.
2 = A lot of assistance Attending statement:  I have personally seen this patient, and formed a face to face diagnostic evaluation on this patient on this date.  I have reviewed the PA, NP and or Medical/PA student and/or Resident documentation and agree with the history, physical exam and plan of care except if noted otherwise.      Very pleasant gentleman presents for a lumbar laminectomy from L2-L5 for underlying spinal stenosis with a resulting neurogenic claudication patient is aware of incomplete resolution of signs and symptoms development of adjacent segment disease.  Patient is understanding cannot completely 100% decompress the foraminal areas hence incomplete resolution.  Patient wishes to proceed.  Risks benefits questions comments concerns discussed with the patient satisfaction.

## 2025-01-05 NOTE — PATIENT PROFILE ADULT - FUNCTIONAL ASSESSMENT - BASIC MOBILITY 6.
[facility] Progress Note    2025      Patient:  Brandon Fischer  :  1955  MRN:  [id number]      Interval History: ***    ROS:  As noted above, otherwise remainder of 10-point ROS negative  ECOG PS:(3) Capable of limited self-care, confined to bed or chair > 50% of waking hours     KPS:  50%  Requires considerable assistance and frequent medical care    Isolation:  None     Medication:    Scheduled:   levoFLOXacin  250 mg Oral Every Other Day    digoxin  500 mcg IntraVENous Once    metoprolol tartrate  25 mg Oral BID    tamsulosin  0.4 mg Oral Daily    sirolimus  1 mg Oral Daily    nystatin  5 mL Oral 4x Daily    mycophenolate  1,000 mg Oral TID    pantoprazole  40 mg Oral QAM AC    epoetin laura-epbx  10,000 Units IntraVENous Once per day on     micafungin (MYCAMINE) 50 mg in sodium chloride 0.9 % 100 mL IVPB  50 mg IntraVENous Daily    filgrastim/filgrastim biosimilar  600 mcg SubCUTAneous QPM    acyclovir  5 mg/kg (Adjusted) IntraVENous Q24H    sodium chloride flush  5-40 mL IntraVENous 2 times per day    Saline Mouthwash  15 mL Swish & Spit 4x Daily AC & HS    ursodiol  500 mg Oral BID     Continuous Infusions:   sodium chloride      Amiodarone      sodium chloride      sodium chloride      sodium chloride      sodium chloride      sodium chloride 5 mL/hr at 25    potassium chloride 20 mEq (24 2140)     PRN:  sodium chloride, sodium chloride, mineral oil-hydrophilic petrolatum, simethicone, heparin (porcine), sulfur hexafluoride microspheres, lidocaine, sodium chloride, [DISCONTINUED] ondansetron **OR** ondansetron, melatonin, prochlorperazine **OR** prochlorperazine, oxyCODONE **OR** oxyCODONE, magnesium sulfate, potassium chloride, iopamidol, acetaminophen, sodium chloride, sennosides-docusate sodium, sodium chloride, sodium chloride flush, sodium chloride, potassium chloride, magnesium sulfate, Saline Mouthwash, ALTEplase (CATHFLO) 2 mg in sterile water             [facility] Progress Note    2024      Patient:  Brandon Fischer  :  1955  MRN:  [id number]      Interval History:  Mr. Fischer reports resolution of hematuria; states his dizziness has improved significantly.    As of the morning :  Day +13 (MMUD alloSCT)  First tacrolimus level check today  Slight decline in renal function with eGFR 40, Cr 1.8  WBC 0, Hgb 7.1, PLT 64  Last 24 hrs:  Afebrile   Intake 1.795 L  UOP 2.85 L  BM x 2 yesterday  4 lbs up from initial standing weight; but, unchanged from yesterday      ROS:  As noted above, otherwise remainder of 10-point ROS negative    ECOG PS:  (1) Restricted in physically strenuous activity, ambulatory and able to do work of light nature    KPS:  90% Able to carry on normal activity; minor signs or symptoms of disease    Isolation:  None     Medication:    Scheduled:   phosphorus  250 mg Oral Once    valACYclovir  500 mg Oral BID    levoFLOXacin  500 mg Oral QHS    tamsulosin  0.4 mg Oral Daily    OLANZapine  2.5 mg Oral Nightly    sirolimus  2 mg Oral Daily    micafungin  50 mg IntraVENous Daily    sodium chloride flush  5-40 mL IntraVENous 2 times per day    Saline Mouthwash  15 mL Swish & Spit 4x Daily AC & HS    ursodiol  500 mg Oral BID    mycophenolate  1,000 mg Oral TID    filgrastim/filgrastim biosimilar  300 mcg SubCUTAneous QPM    pantoprazole  40 mg Oral QAM AC     Continuous Infusions:   sodium chloride      sodium chloride      dextrose 125 mL/hr at 24 1642    sodium chloride      sodium chloride      sodium chloride      sodium chloride      sodium chloride 5 mL/hr at 24 0429    potassium chloride 20 mEq (12/10/24 2108)     PRN:  sodium chloride, sodium chloride, morphine **OR** morphine, sodium chloride, lidocaine, sodium chloride, [COMPLETED] loperamide **FOLLOWED BY** loperamide, ondansetron **OR** ondansetron, melatonin, prochlorperazine **OR** prochlorperazine, oxyCODONE **OR** oxyCODONE, magnesium sulfate,             [facility] Progress Note    2024      Patient:  Brandon Fischer  :  1955  MRN:  [id number]      Interval History:   - Creatinine continues to worsen, nephrology following  - Blood cultures positive for achromobacter, sensitive to Merrem, will consult ID  - CT chest w/o contrast to evaluate PNA        ROS:  As noted above, otherwise remainder of 10-point ROS negative    ECOG PS:  (1) Restricted in physically strenuous activity, ambulatory and able to do work of light nature    KPS:  90% Able to carry on normal activity; minor signs or symptoms of disease    Isolation:  None     Medication:    Scheduled:   valACYclovir  500 mg Oral Daily    fluconazole  200 mg Oral Daily    filgrastim/filgrastim biosimilar  480 mcg SubCUTAneous QPM    sirolimus  3 mg Oral Daily    meropenem  1,000 mg IntraVENous Q12H    phosphorus  250 mg Oral Once    tamsulosin  0.4 mg Oral Daily    OLANZapine  2.5 mg Oral Nightly    sodium chloride flush  5-40 mL IntraVENous 2 times per day    Saline Mouthwash  15 mL Swish & Spit 4x Daily AC & HS    ursodiol  500 mg Oral BID    mycophenolate  1,000 mg Oral TID    pantoprazole  40 mg Oral QAM AC     Continuous Infusions:   dextrose 75 mL/hr at 24 0810    sodium chloride      sodium chloride      sodium chloride      sodium chloride      sodium chloride      sodium chloride      sodium chloride 5 mL/hr at 24 0429    potassium chloride 20 mEq (24 2140)     PRN:  sodium chloride, sodium chloride, morphine **OR** morphine, sodium chloride, lidocaine, sodium chloride, [COMPLETED] loperamide **FOLLOWED BY** loperamide, ondansetron **OR** ondansetron, melatonin, prochlorperazine **OR** prochlorperazine, oxyCODONE **OR** oxyCODONE, magnesium sulfate, potassium chloride, iopamidol, acetaminophen, sodium chloride, sennosides-docusate sodium, sodium chloride, sodium chloride flush, sodium chloride, potassium chloride, magnesium sulfate, Saline Mouthwash, ALTEplase (CATHFLO)             [facility] Progress Note    2024      Patient:  Brandon Fischer  :  1955  MRN:  [id number]      Interval History:     - Creatinine continues to worsen, nephrology following  - May need renal biopsy  - Very insistent that he wants us to unhook everything and go home.      ROS:  As noted above, otherwise remainder of 10-point ROS negative    ECOG PS:  (1) Restricted in physically strenuous activity, ambulatory and able to do work of light nature    KPS:  90% Able to carry on normal activity; minor signs or symptoms of disease    Isolation:  None     Medication:    Scheduled:   valACYclovir  500 mg Oral Daily    fluconazole  200 mg Oral Daily    filgrastim/filgrastim biosimilar  480 mcg SubCUTAneous QPM    sirolimus  3 mg Oral Daily    meropenem  1,000 mg IntraVENous Q12H    tamsulosin  0.4 mg Oral Daily    OLANZapine  2.5 mg Oral Nightly    sodium chloride flush  5-40 mL IntraVENous 2 times per day    Saline Mouthwash  15 mL Swish & Spit 4x Daily AC & HS    ursodiol  500 mg Oral BID    mycophenolate  1,000 mg Oral TID    pantoprazole  40 mg Oral QAM AC     Continuous Infusions:   dextrose 42 mL/hr at 24 1844    sodium chloride      sodium chloride      sodium chloride      sodium chloride      sodium chloride      sodium chloride      sodium chloride 5 mL/hr at 24 0429    potassium chloride 20 mEq (24 2140)     PRN:  sulfur hexafluoride microspheres, sodium chloride, sodium chloride, morphine **OR** morphine, sodium chloride, lidocaine, sodium chloride, [COMPLETED] loperamide **FOLLOWED BY** loperamide, ondansetron **OR** ondansetron, melatonin, prochlorperazine **OR** prochlorperazine, oxyCODONE **OR** oxyCODONE, magnesium sulfate, potassium chloride, iopamidol, acetaminophen, sodium chloride, sennosides-docusate sodium, sodium chloride, sodium chloride flush, sodium chloride, potassium chloride, magnesium sulfate, Saline Mouthwash, ALTEplase (CATHFLO) 2 mg in sterile water 2 mL             [facility] Progress Note    2024      Patient:  Brandon Fischer  :  1955  MRN:  [id number]      Interval History:  Mr. Fischer states he feels generally \"crappy all over\";  reports intermittent stomach cramping & persistent dizziness/\"room spinning\" (started ~12/10/24; no nystagmus; not orthostatic; Antivert ineffective).  Head CT w/o contrast yesterday demonstrated trace sinus mucosal thickening, but no evident etiology to explain the persistent dizziness.      Day +8 (LAZARUS linda/flu f/b MUD alloSCT)  Worsening ARTEMIO.  Hypernatremia persists.  Will d/c tacrolimus & start sirolimus.   Nephrology is in agreement with the plan and advised patient to increase free water intake.  Hematuria of unclear etiology - BK virus vs thrombotic microangiopathy.  Does not have a Valdez cath.   UrCx & BK virus PCR-Ur ordered  Peripheral blood smear to investigate for schistocytes  NT-proBNP 26K (no prior for comparison)  Last 24 hrs:  Tmax 97.7 °F (last febrile on 452)  Cefepime day 7  Intake 4.84 --> 0.6 --> 2.54 L  UOP 1.745 --> 1.95 --> 1.225 L  BM 0.5 --> 0 --> 0 L  Wt - up by 5 lbs since yesterday  QTc 472 (improved)  WBC 0.1, Hgb 8.6, PLT 6      ROS:  As noted above, otherwise remainder of 10-point ROS negative    ECOG PS:  (1) Restricted in physically strenuous activity, ambulatory and able to do work of light nature    KPS:  90% Able to carry on normal activity; minor signs or symptoms of disease    Isolation:  None     Medication:    Scheduled:   phosphorus  500 mg Oral BID    OLANZapine  2.5 mg Oral Nightly    sirolimus  2 mg Oral Daily    [START ON 2024] cefepime  2,000 mg IntraVENous Q12H    micafungin  50 mg IntraVENous Daily    sodium chloride flush  5-40 mL IntraVENous 2 times per day    Saline Mouthwash  15 mL Swish & Spit 4x Daily AC & HS    valACYclovir  500 mg Oral BID    ursodiol  500 mg Oral BID    mycophenolate  1,000 mg Oral TID    filgrastim/filgrastim biosimilar  300 mcg             [facility] Progress Note    2024      Patient:  Brandon Fischer  :  1955  MRN:  [id number]      Interval History:  Not eating and drinking much. Feels fatigue and tired. Awaiting engraftment. Continues to have low grade fever.       Day +16 (MMUD alloSCT)  Yesterday:  AM-PAC PT 17  Cr 1.7 --> 1.7 --> 1.8  eGFR 42 --> 42 --> 40  Hypernatremia  Restart D5W gtt  Last 24 hrs:  Tmax 100 °F for which he received Tylenol   PanCx   CXR suggestive of recurrent lobar PNA  Will start cefepime (renally dose-adjusted)  Intake 1.1 L  UOP 1.7 L (1 unmeasured)  Emesis 50 mL  Stool 300 mL (1 unmeasured)  Weight - down by 4 lbs since yesterday      ROS:  As noted above, otherwise remainder of 10-point ROS negative    ECOG PS:  (1) Restricted in physically strenuous activity, ambulatory and able to do work of light nature    KPS:  90% Able to carry on normal activity; minor signs or symptoms of disease    Isolation:  None     Medication:    Scheduled:   cefepime  2,000 mg IntraVENous Q12H    fluconazole  400 mg Oral Daily    phosphorus  250 mg Oral Once    valACYclovir  500 mg Oral BID    tamsulosin  0.4 mg Oral Daily    OLANZapine  2.5 mg Oral Nightly    sirolimus  2 mg Oral Daily    sodium chloride flush  5-40 mL IntraVENous 2 times per day    Saline Mouthwash  15 mL Swish & Spit 4x Daily AC & HS    ursodiol  500 mg Oral BID    mycophenolate  1,000 mg Oral TID    filgrastim/filgrastim biosimilar  300 mcg SubCUTAneous QPM    pantoprazole  40 mg Oral QAM AC     Continuous Infusions:   dextrose 75 mL/hr at 24 0933    sodium chloride      sodium chloride      sodium chloride      sodium chloride      sodium chloride      sodium chloride      sodium chloride 5 mL/hr at 24 0429    potassium chloride 20 mEq (24 5888)     PRN:  sodium chloride, sodium chloride, morphine **OR** morphine, sodium chloride, lidocaine, sodium chloride, [COMPLETED] loperamide **FOLLOWED BY** loperamide, ondansetron **OR**             [facility] Progress Note    2024      Patient:  Brandon Fischer  :  1955  MRN:  [id number]      Interval History:  Mr. Fischer reports 2-3 bouts of diarrhea yesterday, and 1 so far this morning; 1 episode of emesis this morning. Discussed requesting Lomotil with each bout of diarrhea; increasing his oral intake at length, as well as the utility of Zyprexa qhs. Denies lightheadedness and dizziness.  Encouraged to work with PT, OT.    Day +14 (MMUD alloSCT)  Lasix 40 mg IV once and recheck electrolytes this afternoon  Phos 2.0 - repletion ordered.  Will d/c micafungin & start fluconazole 400 mg qd tomorrow ()  Last 24 hrs:  Afebrile   Intake 3.8 L  UOP 2.9 L  BM x 4 yesterday (unmeasured)      ROS:  As noted above, otherwise remainder of 10-point ROS negative    ECOG PS:  (1) Restricted in physically strenuous activity, ambulatory and able to do work of light nature    KPS:  90% Able to carry on normal activity; minor signs or symptoms of disease    Isolation:  None     Medication:    Scheduled:   phosphorus  250 mg Oral Once    valACYclovir  500 mg Oral BID    levoFLOXacin  500 mg Oral QHS    tamsulosin  0.4 mg Oral Daily    OLANZapine  2.5 mg Oral Nightly    sirolimus  2 mg Oral Daily    micafungin  50 mg IntraVENous Daily    sodium chloride flush  5-40 mL IntraVENous 2 times per day    Saline Mouthwash  15 mL Swish & Spit 4x Daily AC & HS    ursodiol  500 mg Oral BID    mycophenolate  1,000 mg Oral TID    filgrastim/filgrastim biosimilar  300 mcg SubCUTAneous QPM    pantoprazole  40 mg Oral QAM AC     Continuous Infusions:   sodium chloride      sodium chloride      dextrose 125 mL/hr at 24 2107    sodium chloride      sodium chloride      sodium chloride      sodium chloride      sodium chloride 5 mL/hr at 24 0429    potassium chloride 20 mEq (24 2348)     PRN:  sodium chloride, sodium chloride, morphine **OR** morphine, sodium chloride, lidocaine, sodium chloride,             [facility] Progress Note    2024      Patient:  Brandon Fischer  :  1955  MRN:  [id number]      Interval History:      Mr. Fischer reports stomach cramping & dizziness/\"room spinning\" starting yesterday evening.  Received Antivert this morning.  SBP sitting 145, standing upright 162 when checked on rounds; HR remained around 92-99.  EKG ordered.  Decreased urinary stream; ~60 mL on bladder scan; will stop scheduled Lomotil & Zyprexa.      Day +6 (reduced-intensity linda/flu f/b MUD alloSCT)  HyperNa - d/c NS gtt; start D5W 0.45 gtt  Last 24 hrs:  Tmax 98.0 °F (last febrile on 452)  Cefepime day 5   PanCx - NGTD  GI pathogens panel - negative   Intake 6.8 --> 5.8  --> 4.84 L  UOP 3.1 --> 5 --> 1.745 L  BM 0.65 --> 1.15 --> 0.5 L  Wt up by 3 lbs since yesterday  WBC 0, Hgb 9.3, PLT 9      ROS:  As noted above, otherwise remainder of 10-point ROS negative    ECOG PS:  (1) Restricted in physically strenuous activity, ambulatory and able to do work of light nature    KPS:  90% Able to carry on normal activity; minor signs or symptoms of disease    Isolation:  None     Medication:    Scheduled:   ondansetron  8 mg Oral q8h    Or    ondansetron  8 mg IntraVENous q8h    diphenoxylate-atropine  1 tablet Oral 4x Daily    loperamide  4 mg Oral Q6H    cefepime  2,000 mg IntraVENous Q8H    OLANZapine  5 mg Oral Nightly    [Held by provider] furosemide  20 mg IntraVENous Daily    sodium chloride flush  5-40 mL IntraVENous 2 times per day    Saline Mouthwash  15 mL Swish & Spit 4x Daily AC & HS    valACYclovir  500 mg Oral BID    fluconazole  400 mg Oral Daily    ursodiol  500 mg Oral BID    tacrolimus  3 mg Oral Q12H    mycophenolate  1,000 mg Oral TID    filgrastim/filgrastim biosimilar  300 mcg SubCUTAneous QPM    pantoprazole  40 mg Oral QAM AC     Continuous Infusions:   0.9% NaCl with KCl 20 mEq 75 mL/hr at 24 0144    sodium chloride      sodium chloride      sodium chloride 5 mL/hr at 12/10/24             [facility] Progress Note    1/3/2025      Patient:  Brandon Fischer  :  1955  MRN:  [id number]      Interval History:   - Abdominal pain better, CT shows acute diverticulitis and possible microperforation  - Surgery following  - HD changed to //Sat   - Also has urinary retention and has refused placement of a coker catheter repeatedly.    - Right now he is refusing all interventions.  - BM done yesterday and pending.      ROS:  As noted above, otherwise remainder of 10-point ROS negative  ECOG PS:(3) Capable of limited self-care, confined to bed or chair > 50% of waking hours     KPS:  50%  Requires considerable assistance and frequent medical care    Isolation:  None     Medication:    Scheduled:   tamsulosin  0.4 mg Oral Daily    sirolimus  1 mg Oral Daily    nystatin  5 mL Oral 4x Daily    amiodarone  200 mg Oral Daily    mycophenolate  1,000 mg Oral TID    pantoprazole  40 mg Oral QAM AC    dicyclomine  10 mg Oral 4x Daily AC & HS    epoetin laura-epbx  10,000 Units IntraVENous Once per day on     micafungin (MYCAMINE) 50 mg in sodium chloride 0.9 % 100 mL IVPB  50 mg IntraVENous Daily    filgrastim/filgrastim biosimilar  600 mcg SubCUTAneous QPM    meropenem  500 mg IntraVENous Q24H    acyclovir  5 mg/kg (Adjusted) IntraVENous Q24H    sodium chloride flush  5-40 mL IntraVENous 2 times per day    Saline Mouthwash  15 mL Swish & Spit 4x Daily AC & HS    ursodiol  500 mg Oral BID     Continuous Infusions:   sodium chloride      sodium chloride      sodium chloride      sodium chloride      sodium chloride 5 mL/hr at 25    potassium chloride 20 mEq (24 2140)     PRN:  sodium chloride, mineral oil-hydrophilic petrolatum, simethicone, heparin (porcine), sulfur hexafluoride microspheres, lidocaine, sodium chloride, [DISCONTINUED] ondansetron **OR** ondansetron, melatonin, prochlorperazine **OR** prochlorperazine, oxyCODONE **OR** oxyCODONE, magnesium sulfate,             [facility] Progress Note    2024      Patient:  Brandon Fischer  :  1955  MRN:  [id number]      Interval History: Not eating and drinking much. Feels fatigue and tired. Awaiting engraftment. Renal function worsened, planned for renal USG, urine studies, dose adjusted for his medication. Plan to do Dobhoff tube placement followed by initiation of TF and Free H20 flush.           ROS:  As noted above, otherwise remainder of 10-point ROS negative    ECOG PS:  (1) Restricted in physically strenuous activity, ambulatory and able to do work of light nature    KPS:  90% Able to carry on normal activity; minor signs or symptoms of disease    Isolation:  None     Medication:    Scheduled:   filgrastim/filgrastim biosimilar  480 mcg SubCUTAneous QPM    sirolimus  3 mg Oral Daily    meropenem  1,000 mg IntraVENous Q12H    fluconazole  400 mg Oral Daily    phosphorus  250 mg Oral Once    valACYclovir  500 mg Oral BID    tamsulosin  0.4 mg Oral Daily    OLANZapine  2.5 mg Oral Nightly    sodium chloride flush  5-40 mL IntraVENous 2 times per day    Saline Mouthwash  15 mL Swish & Spit 4x Daily AC & HS    ursodiol  500 mg Oral BID    mycophenolate  1,000 mg Oral TID    pantoprazole  40 mg Oral QAM AC     Continuous Infusions:   dextrose 75 mL/hr at 24 0747    sodium chloride      sodium chloride      sodium chloride      sodium chloride      sodium chloride      sodium chloride      sodium chloride 5 mL/hr at 24 0429    potassium chloride 20 mEq (24 2140)     PRN:  sodium chloride, sodium chloride, morphine **OR** morphine, sodium chloride, lidocaine, sodium chloride, [COMPLETED] loperamide **FOLLOWED BY** loperamide, ondansetron **OR** ondansetron, melatonin, prochlorperazine **OR** prochlorperazine, oxyCODONE **OR** oxyCODONE, magnesium sulfate, potassium chloride, iopamidol, acetaminophen, sodium chloride, sennosides-docusate sodium, sodium chloride, sodium chloride flush, sodium chloride,             [facility] Progress Note    2024      Patient:  Brandon Fischer  :  1955  MRN:  [id number]      Interval History:  Mr. Fischer reports 2 bouts of mostly watery stools this morning.  He was reminded to request Imodium as needed.  He states he was able to drink Sprite and water yesterday.  Discussed the high sodium level and encouraged drinking more water; and to eat more in general, as well.  He verbalized understanding and states he will work to do such.      Day +15 (MMUD alloSCT)  Yesterday:  AM-PAC PT 17  Cr 1.7 --> 1.7 --> 1.8  eGFR 42 --> 42 --> 40  Hypernatremia  Restart D5W gtt  Last 24 hrs:  Tmax 100 °F for which he received Tylenol   PanCx   CXR suggestive of recurrent lobar PNA  Will start cefepime (renally dose-adjusted)  Intake 1.1 L  UOP 1.7 L (1 unmeasured)  Emesis 50 mL  Stool 300 mL (1 unmeasured)  Weight - down by 4 lbs since yesterday      ROS:  As noted above, otherwise remainder of 10-point ROS negative    ECOG PS:  (1) Restricted in physically strenuous activity, ambulatory and able to do work of light nature    KPS:  90% Able to carry on normal activity; minor signs or symptoms of disease    Isolation:  None     Medication:    Scheduled:   fluconazole  400 mg Oral Daily    phosphorus  250 mg Oral Once    valACYclovir  500 mg Oral BID    levoFLOXacin  500 mg Oral QHS    tamsulosin  0.4 mg Oral Daily    OLANZapine  2.5 mg Oral Nightly    sirolimus  2 mg Oral Daily    sodium chloride flush  5-40 mL IntraVENous 2 times per day    Saline Mouthwash  15 mL Swish & Spit 4x Daily AC & HS    ursodiol  500 mg Oral BID    mycophenolate  1,000 mg Oral TID    filgrastim/filgrastim biosimilar  300 mcg SubCUTAneous QPM    pantoprazole  40 mg Oral QAM AC     Continuous Infusions:   dextrose      sodium chloride      sodium chloride      sodium chloride      sodium chloride      sodium chloride      sodium chloride      sodium chloride 5 mL/hr at 24 0429    potassium chloride 20 mEq             [facility] Progress Note    2024      Patient:  Brandon Fischer  :  1955  MRN:  [id number]      Interval History:     - Creatinine continues to worsen, nephrology following  - Renal biopsy today   - Will ask nephrology about HD catheter placement and HD starting today  - Remove CVC Beckman today. Use port -a -cath.    ROS:  As noted above, otherwise remainder of 10-point ROS negative    ECOG PS:  (1) Restricted in physically strenuous activity, ambulatory and able to do work of light nature    KPS:  90% Able to carry on normal activity; minor signs or symptoms of disease    Isolation:  None     Medication:    Scheduled:   calcium acetate  1 capsule Oral TID WC    sodium bicarbonate  650 mg Oral TID    meropenem  500 mg IntraVENous Q24H    sodium zirconium cyclosilicate  10 g Oral Daily    mycophenolate (CELLCEPT) 1,000 mg in dextrose 5 % 166.7 mL IVPB  1,000 mg IntraVENous TID    pantoprazole (PROTONIX) 40 mg in sodium chloride (PF) 0.9 % 10 mL injection  40 mg IntraVENous Daily    acyclovir  5 mg/kg (Adjusted) IntraVENous Q24H    fluconazole  200 mg IntraVENous Q24H    methylPREDNISolone  40 mg IntraVENous Daily    filgrastim/filgrastim biosimilar  480 mcg SubCUTAneous QPM    [Held by provider] sirolimus  3 mg Oral Daily    tamsulosin  0.4 mg Oral Daily    OLANZapine  2.5 mg Oral Nightly    sodium chloride flush  5-40 mL IntraVENous 2 times per day    Saline Mouthwash  15 mL Swish & Spit 4x Daily AC & HS    ursodiol  500 mg Oral BID     Continuous Infusions:   dextrose 5 % and 0.45 % NaCl 42 mL/hr at 24 0404    sodium chloride      sodium chloride      sodium chloride      sodium chloride      sodium chloride      sodium chloride      sodium chloride      sodium chloride      sodium chloride      sodium chloride 5 mL/hr at 24 0429    potassium chloride 20 mEq (24 2140)     PRN:  sodium chloride, sodium chloride, sodium chloride, sulfur hexafluoride microspheres, sodium chloride,             [facility] Progress Note    2024      Patient:  Brandon Fischer  :  1955  MRN:  [id number]      Interval History:   Tolerated hemodialysis well today. CVC removed for Bcx- Achromobacter. Oligouric. Mentation improved post dialysis. Afib better with Amio gtt.   Still has Ileus and NPO except ice chips, no vomiting. Does not want NG tube.     ROS:  As noted above, otherwise remainder of 10-point ROS negative    ECOG PS:  (1) Restricted in physically strenuous activity, ambulatory and able to do work of light nature    KPS:  90% Able to carry on normal activity; minor signs or symptoms of disease    Isolation:  None     Medication:    Scheduled:   amiodarone  150 mg IntraVENous Once    filgrastim/filgrastim biosimilar  600 mcg SubCUTAneous QPM    methylPREDNISolone  100 mg IntraVENous Daily    meropenem  500 mg IntraVENous Q24H    mycophenolate (CELLCEPT) 1,000 mg in dextrose 5 % 166.7 mL IVPB  1,000 mg IntraVENous TID    pantoprazole (PROTONIX) 40 mg in sodium chloride (PF) 0.9 % 10 mL injection  40 mg IntraVENous Daily    acyclovir  5 mg/kg (Adjusted) IntraVENous Q24H    fluconazole  200 mg IntraVENous Q24H    [Held by provider] sirolimus  3 mg Oral Daily    tamsulosin  0.4 mg Oral Daily    OLANZapine  2.5 mg Oral Nightly    sodium chloride flush  5-40 mL IntraVENous 2 times per day    Saline Mouthwash  15 mL Swish & Spit 4x Daily AC & HS    ursodiol  500 mg Oral BID     Continuous Infusions:   sodium chloride      sodium chloride      sodium chloride      sodium chloride      sodium chloride      sodium chloride      sodium chloride      sodium chloride      sodium chloride      sodium chloride      sodium chloride 5 mL/hr at 24 0427    potassium chloride 20 mEq (24 2140)     PRN:  sodium chloride, heparin (porcine), sodium chloride, sodium chloride, sodium chloride, sulfur hexafluoride microspheres, sodium chloride, sodium chloride, morphine **OR** morphine, sodium chloride,             [facility] Progress Note    12/15/2024      Patient:  Brandon Fischer  :  1955  MRN:  [id number]      Interval History:  he is having gross hematuria and urinary incontinence, he is frustrated and wants all his medications stopped. Discussed this is likely due to blood clot in his bladder, have consulted urology for coker placement and may need bladder irrigation, will also increase plt parameters to 50     Day + 10 (LAZARUS linda/flu f/b MUD alloSCT)        ROS:  As noted above, otherwise remainder of 10-point ROS negative    ECOG PS:  (1) Restricted in physically strenuous activity, ambulatory and able to do work of light nature    KPS:  90% Able to carry on normal activity; minor signs or symptoms of disease    Isolation:  None     Medication:    Scheduled:   oxyBUTYnin  5 mg Oral TID    valACYclovir  500 mg Oral Daily    OLANZapine  2.5 mg Oral Nightly    sirolimus  2 mg Oral Daily    cefepime  2,000 mg IntraVENous Q12H    micafungin  50 mg IntraVENous Daily    sodium chloride flush  5-40 mL IntraVENous 2 times per day    Saline Mouthwash  15 mL Swish & Spit 4x Daily AC & HS    ursodiol  500 mg Oral BID    mycophenolate  1,000 mg Oral TID    filgrastim/filgrastim biosimilar  300 mcg SubCUTAneous QPM    pantoprazole  40 mg Oral QAM AC     Continuous Infusions:   sodium chloride      dextrose 5% and 0.45% NaCl with KCl 20 mEq 75 mL/hr at 12/15/24 0030    sodium chloride      sodium chloride      sodium chloride 5 mL/hr at 12/10/24 0452    potassium chloride 20 mEq (12/10/24 2108)     PRN:  sodium chloride, [COMPLETED] loperamide **FOLLOWED BY** loperamide, [Held by provider] ondansetron **OR** [Held by provider] ondansetron, melatonin, prochlorperazine **OR** prochlorperazine, oxyCODONE **OR** oxyCODONE, magnesium sulfate, potassium chloride, iopamidol, acetaminophen, sodium chloride, sennosides-docusate sodium, sodium chloride, sodium chloride flush, sodium chloride, potassium chloride, magnesium sulfate,             [facility] Progress Note    2024      Patient:  Brandon Fischer  :  1955  MRN:  [id number]      Interval History:     - Creatinine continues to worsen, nephrology following  - Plan for renal biopsy tomorrow  - Very insistent that he wants us to unhook everything and go home and die.      ROS:  As noted above, otherwise remainder of 10-point ROS negative    ECOG PS:  (1) Restricted in physically strenuous activity, ambulatory and able to do work of light nature    KPS:  90% Able to carry on normal activity; minor signs or symptoms of disease    Isolation:  None     Medication:    Scheduled:   calcium acetate  1 capsule Oral TID WC    sodium bicarbonate  650 mg Oral TID    meropenem  500 mg IntraVENous Q24H    mycophenolate (CELLCEPT) 1,000 mg in dextrose 5 % 166.7 mL IVPB  1,000 mg IntraVENous TID    pantoprazole (PROTONIX) 40 mg in sodium chloride (PF) 0.9 % 10 mL injection  40 mg IntraVENous Daily    acyclovir  5 mg/kg (Adjusted) IntraVENous Q24H    fluconazole  200 mg IntraVENous Q24H    methylPREDNISolone  40 mg IntraVENous Daily    filgrastim/filgrastim biosimilar  480 mcg SubCUTAneous QPM    [Held by provider] sirolimus  3 mg Oral Daily    tamsulosin  0.4 mg Oral Daily    OLANZapine  2.5 mg Oral Nightly    sodium chloride flush  5-40 mL IntraVENous 2 times per day    Saline Mouthwash  15 mL Swish & Spit 4x Daily AC & HS    ursodiol  500 mg Oral BID     Continuous Infusions:   dextrose 5 % and 0.45 % NaCl 42 mL/hr at 24 1210    sodium chloride      sodium chloride      sodium chloride      sodium chloride      sodium chloride      sodium chloride      sodium chloride      sodium chloride      sodium chloride      sodium chloride 5 mL/hr at 24 0429    potassium chloride 20 mEq (24 2140)     PRN:  sodium chloride, sodium chloride, sodium chloride, sulfur hexafluoride microspheres, sodium chloride, sodium chloride, morphine **OR** morphine, sodium chloride, lidocaine, sodium             [facility] Progress Note    2024      Patient:  Brandon Fischer  :  1955  MRN:  [id number]      Interval History:  Mr. Fischer reports intermittent stomach cramping & persistent dizziness/\"room spinning\" starting 2 days ago.  He has not been orthostatic; no nystagmus; Antivert was ineffective.  Dizziness possibly 2/2 tacrolimus.  Will obtain CT head w/o contrast, decrease tacrolimus dose (also dose decreased for ARTEMIO), and a tacrolimus level check tomorrow morning. He reports 1 bout of diarrhea yesterday.  At present time, he denies nausea.    Day +7 (LAZARUS linda/flu f/b MUD alloSCT)  HyperNa - d/c NS gtt; start D5W 0.45 gtt  Last 24 hrs:  Tmax 97.6 °F (last febrile on 452)  Cefepime day 6  Intake 5.8  --> 4.84 --> 0.6 L  UOP 5 --> 1.745 --> 1.95 L  BM 1.15 --> 0.5 --> 0 L  Wt - pending this AM  WBC 0, Hgb 9.3, PLT 9      ROS:  As noted above, otherwise remainder of 10-point ROS negative    ECOG PS:  (1) Restricted in physically strenuous activity, ambulatory and able to do work of light nature    KPS:  90% Able to carry on normal activity; minor signs or symptoms of disease    Isolation:  None     Medication:    Scheduled:   micafungin  50 mg IntraVENous Daily    ondansetron  8 mg Oral q8h    Or    ondansetron  8 mg IntraVENous q8h    loperamide  4 mg Oral Q6H    cefepime  2,000 mg IntraVENous Q8H    sodium chloride flush  5-40 mL IntraVENous 2 times per day    Saline Mouthwash  15 mL Swish & Spit 4x Daily AC & HS    valACYclovir  500 mg Oral BID    ursodiol  500 mg Oral BID    tacrolimus  3 mg Oral Q12H    mycophenolate  1,000 mg Oral TID    filgrastim/filgrastim biosimilar  300 mcg SubCUTAneous QPM    pantoprazole  40 mg Oral QAM AC     Continuous Infusions:   dextrose 5% and 0.45% NaCl with KCl 20 mEq 75 mL/hr at 24 2331    sodium chloride      sodium chloride      sodium chloride 5 mL/hr at 12/10/24 0452    potassium chloride 20 mEq (12/10/24 2108)     PRN:  melatonin,             [facility] Progress Note    2024      Patient:  Brandon Fischer  :  1955  MRN:  [id number]      Interval History:   He does not appear to be confused this morning.  He is still quite fatigued.  He does not want to eat.  He is openly expressing suicidal ideation with a plan to cut his wrists.    ROS:  As noted above, otherwise remainder of 10-point ROS negative  ECOG PS:(3) Capable of limited self-care, confined to bed or chair > 50% of waking hours     KPS:  60% Requires occasional assistance, but is able to care for most of his personal needs    Isolation:  None     Medication:    Scheduled:   amiodarone  200 mg Oral Daily    methylPREDNISolone  40 mg IntraVENous Daily    mycophenolate  1,000 mg Oral TID    pantoprazole  40 mg Oral QAM AC    dicyclomine  10 mg Oral 4x Daily AC & HS    sirolimus  2.5 mg Oral Daily    epoetin laura-epbx  10,000 Units IntraVENous Once per day on     micafungin (MYCAMINE) 50 mg in sodium chloride 0.9 % 100 mL IVPB  50 mg IntraVENous Daily    filgrastim/filgrastim biosimilar  600 mcg SubCUTAneous QPM    meropenem  500 mg IntraVENous Q24H    acyclovir  5 mg/kg (Adjusted) IntraVENous Q24H    [Held by provider] tamsulosin  0.4 mg Oral Daily    OLANZapine  2.5 mg Oral Nightly    sodium chloride flush  5-40 mL IntraVENous 2 times per day    Saline Mouthwash  15 mL Swish & Spit 4x Daily AC & HS    ursodiol  500 mg Oral BID     Continuous Infusions:   Amiodarone 0.5 mg/min (24 0850)    sodium chloride      sodium chloride      sodium chloride      sodium chloride 5 mL/hr at 24 0427    potassium chloride 20 mEq (24 2140)     PRN:  oxymetazoline, simethicone, heparin (porcine), sulfur hexafluoride microspheres, morphine **OR** morphine, lidocaine, sodium chloride, [DISCONTINUED] ondansetron **OR** ondansetron, melatonin, prochlorperazine **OR** prochlorperazine, oxyCODONE **OR** oxyCODONE, magnesium sulfate, potassium chloride,            Patient refused dialysis after set up , stating \" I just want to die\" This nurse encouraged patient and told him dialysis is a necessity treatment that he needs. Patient then began attempting to pull his dialysis catheter out. This nurse redirected patient, along with his wife to intercede patient pulling at his lines. Dialysis treatment was canceled for today.  Report given to Ynes BACA, Dr Victoria made aware.         [facility]   Daily Progress Note      Admit Date:  11/29/2024    CC: \"    Interval history 12/30/2024  69-year-old male with AML is seen by cardiology for atrial fibrillation  Is currently on IV amiodarone drip drip with controlled ventricular rate  Patient is pancytopenic from his chemotherapy and currently refusing to take any oral medications    Objective:   /76   Pulse 99   Temp 97.5 °F (36.4 °C) (Oral)   Resp 16   Ht 1.92 m (6' 3.59\") Comment: standing, actual  Wt 120.6 kg (265 lb 14 oz)   SpO2 91%   BMI 32.71 kg/m²     Intake/Output Summary (Last 24 hours) at 12/30/2024 1156  Last data filed at 12/30/2024 0600  Gross per 24 hour   Intake 1484 ml   Output 0 ml   Net 1484 ml     Wt Readings from Last 3 Encounters:   12/29/24 120.6 kg (265 lb 14 oz)   11/27/24 113.4 kg (250 lb)   11/06/24 112.5 kg (248 lb)     Telemetry: Atrial fibrillation    Physical Exam:  General:  NAD, Awake, alert but sleepy  Skin:  Warm and dry  Neck:  Supple, no JVP appreciated, no bruit  Chest:  Clear to auscultation, no wheezes/rhonchi/rales  Cardiovascular: Irregularly irregular S1S2  Abdomen:  Soft, nontender, +bowel sounds  Extremities: Appears edematous    Cardiac Diagnosis:  atrial fibrillation    Medications:    methylPREDNISolone  60 mg IntraVENous Daily    mycophenolate  1,000 mg Oral TID    pantoprazole  40 mg Oral QAM AC    dicyclomine  10 mg Oral 4x Daily AC & HS    sirolimus  2.5 mg Oral Daily    epoetin laura-epbx  10,000 Units IntraVENous Once per day on Tuesday Thursday Saturday    micafungin (MYCAMINE) 50 mg in sodium chloride 0.9 % 100 mL IVPB  50 mg IntraVENous Daily    filgrastim/filgrastim biosimilar  600 mcg SubCUTAneous QPM    meropenem  500 mg IntraVENous Q24H    acyclovir  5 mg/kg (Adjusted) IntraVENous Q24H    [Held by provider] tamsulosin  0.4 mg Oral Daily    OLANZapine  2.5 mg Oral Nightly    sodium chloride flush  5-40 mL IntraVENous 2 times per day    Saline Mouthwash  15 mL       Urology Attending Progress Note      Subjective: He denies bladder discomfort, voiding yellow urine    Vitals:  BP (!) 140/67   Pulse 88   Temp 98.3 °F (36.8 °C) (Oral)   Resp 15   Ht 1.92 m (6' 3.59\") Comment: standing, actual  Wt 116.8 kg (257 lb 6.4 oz)   SpO2 94%   BMI 31.67 kg/m²   Temp  Av.2 °F (36.8 °C)  Min: 98 °F (36.7 °C)  Max: 98.4 °F (36.9 °C)    Intake/Output Summary (Last 24 hours) at 2024 0813  Last data filed at 2024 0755  Gross per 24 hour   Intake 3802.67 ml   Output 3100 ml   Net 702.67 ml     Labs:  WBC:    Lab Results   Component Value Date/Time    WBC 0.0 2024 03:15 AM     Hemoglobin/Hematocrit:    Lab Results   Component Value Date/Time    HGB 6.8 2024 03:15 AM    HCT 20.8 2024 03:15 AM     BMP:    Lab Results   Component Value Date/Time     2024 03:15 AM    K 4.4 2024 03:15 AM    K 3.9 10/08/2024 11:40 AM     2024 03:15 AM    CO2 21 2024 03:15 AM    BUN 23 2024 03:15 AM    CREATININE 1.7 2024 03:15 AM    CALCIUM 7.4 2024 03:15 AM    GFRAA >60 2021 08:36 AM    LABGLOM 42 2024 03:15 AM     PT/INR:    Lab Results   Component Value Date/Time    PROTIME 14.9 2024 03:15 AM    INR 1.14 2024 03:15 AM     PTT:    Lab Results   Component Value Date/Time    APTT 34.4 2024 03:15 AM   [APTT    CT abdomen    IMPRESSION:     This study is not adequate for exclusion of bladder mass. If the patient cannot  receive intravenous contrast or CT urogram, then consider evaluation with  ultrasound.     Urinary bladder is more collapsed than the prior study and the wall is uniformly  thickened. This is probably secondary to redundancy of the bladder wall,  although cystitis could have similar appearance.     Stable small right pleural effusion.     Impression    Gross hematuria with clots  Possible 2cm posterior wall bladder mass  Thrombocytopenia     -Can do cystoscopy to formally       Urology Attending Progress Note      Subjective: He had significant penile pain from catheter overnight/this AM. No issues with drainage. Urine has been clear overnight and this AM. Being given ativan/morphine for management.     Vitals:  /74   Pulse (!) 128   Temp 97.8 °F (36.6 °C) (Oral)   Resp 22   Ht 1.92 m (6' 3.59\") Comment: standing, actual  Wt 117.1 kg (258 lb 3.2 oz)   SpO2 92%   BMI 31.77 kg/m²   Temp  Av.9 °F (36.6 °C)  Min: 97.7 °F (36.5 °C)  Max: 98.9 °F (37.2 °C)    Intake/Output Summary (Last 24 hours) at 2024 0926  Last data filed at 2024 0715  Gross per 24 hour   Intake 3226.13 ml   Output 2100 ml   Net 1126.13 ml       Exam: Urine clear off CBI    Labs:  WBC:    Lab Results   Component Value Date/Time    WBC 0.0 2024 03:18 AM     Hemoglobin/Hematocrit:    Lab Results   Component Value Date/Time    HGB 7.7 2024 03:18 AM    HCT 22.7 2024 03:18 AM     BMP:    Lab Results   Component Value Date/Time     2024 03:18 AM    K 3.9 2024 03:18 AM    K 3.9 10/08/2024 11:40 AM     2024 03:18 AM    CO2 20 2024 03:18 AM    BUN 35 2024 03:18 AM    CREATININE 1.5 2024 03:18 AM    CALCIUM 7.8 2024 03:18 AM    GFRAA >60 2021 08:36 AM    LABGLOM 49 2024 03:18 AM     PT/INR:    Lab Results   Component Value Date/Time    PROTIME 16.1 2024 03:18 AM    INR 1.27 2024 03:18 AM     PTT:    Lab Results   Component Value Date/Time    APTT 36.9 2024 03:18 AM   [APTT      ImpressioGross hematuria with clots  Possible 2cm posterior wall bladder mass  Thrombocytopenia     -A 243way was placed yesterday for management. Per staff he has been unable to tolerate this in place despite multiple medications to help with pain  -Urine has been clear overnight/this AM. No major clots noted with irrigation yesterday  -I went ahead and removed his coker this morning due to significant pain. Patient reporting       Urology Attending Progress Note      Subjective: He has been able to void numerous times since coker removal. Having some urethral burning but no suprapubic pain. No major clots reported in urine.     Vitals:  BP (!) 141/68   Pulse 94   Temp 97.8 °F (36.6 °C) (Oral)   Resp 16   Ht 1.92 m (6' 3.59\") Comment: standing, actual  Wt 117.5 kg (259 lb) Comment: measured  SpO2 95%   BMI 31.87 kg/m²   Temp  Av.8 °F (36.6 °C)  Min: 97.5 °F (36.4 °C)  Max: 98.1 °F (36.7 °C)    Intake/Output Summary (Last 24 hours) at 2024 1015  Last data filed at 2024 0700  Gross per 24 hour   Intake 581.67 ml   Output 225 ml   Net 356.67 ml       Exam: Urine clear/light pink tinged in toilet    Labs:  WBC:    Lab Results   Component Value Date/Time    WBC 0.0 2024 02:56 AM     Hemoglobin/Hematocrit:    Lab Results   Component Value Date/Time    HGB 7.2 2024 02:56 AM    HCT 21.3 2024 02:56 AM     BMP:    Lab Results   Component Value Date/Time     2024 02:56 AM    K 4.3 2024 02:56 AM    K 3.9 10/08/2024 11:40 AM     2024 02:56 AM    CO2 21 2024 02:56 AM    BUN 33 2024 02:56 AM    CREATININE 1.6 2024 02:56 AM    CALCIUM 8.0 2024 02:56 AM    GFRAA >60 2021 08:36 AM    LABGLOM 46 2024 02:56 AM     PT/INR:    Lab Results   Component Value Date/Time    PROTIME 16.1 2024 03:18 AM    INR 1.27 2024 03:18 AM     PTT:    Lab Results   Component Value Date/Time    APTT 36.9 2024 03:18 AM   [APTT      ImpressioGross hematuria with clots  Possible 2cm posterior wall bladder mass  Thrombocytopenia     -Removed his coker yesterday due to significant penile pain. He was able to void numerous times. Having mild urethral burning but no other complaints. Urine appears clear/pink tinged  -Platelet count improved to 68  -Will plan for repeat CT scan in 1-2 days to reevaluate his bladder  -Call with any questions        Dinorah Johnson,       Urology Attending Progress Note      Subjective: He is out of the room in CT this morning. Urine reportedly clear per chart.    Vitals:  /62   Pulse 81   Temp 98.1 °F (36.7 °C) (Oral)   Resp 15   Ht 1.92 m (6' 3.59\") Comment: standing, actual  Wt 117.5 kg (259 lb) Comment: actual, standing  SpO2 94%   BMI 31.87 kg/m²   Temp  Av.1 °F (36.7 °C)  Min: 97.8 °F (36.6 °C)  Max: 98.2 °F (36.8 °C)    Intake/Output Summary (Last 24 hours) at 2024 0951  Last data filed at 2024 0354  Gross per 24 hour   Intake 1675 ml   Output 2850 ml   Net -1175 ml     Labs:  WBC:    Lab Results   Component Value Date/Time    WBC 0.0 2024 04:00 AM     Hemoglobin/Hematocrit:    Lab Results   Component Value Date/Time    HGB 7.1 2024 04:00 AM    HCT 20.9 2024 04:00 AM     BMP:    Lab Results   Component Value Date/Time     2024 04:00 AM    K 3.7 2024 04:00 AM    K 3.9 10/08/2024 11:40 AM     2024 04:00 AM    CO2 23 2024 04:00 AM    BUN 29 2024 04:00 AM    CREATININE 1.8 2024 04:00 AM    CALCIUM 7.8 2024 04:00 AM    GFRAA >60 2021 08:36 AM    LABGLOM 40 2024 04:00 AM     PT/INR:    Lab Results   Component Value Date/Time    PROTIME 16.1 2024 03:18 AM    INR 1.27 2024 03:18 AM     PTT:    Lab Results   Component Value Date/Time    APTT 36.9 2024 03:18 AM   [APTT      ImpressioGross hematuria with clots  Possible 2cm posterior wall bladder mass  Thrombocytopenia     -Out of room today down in CT. FU on results. If clot/mass has resolved, no indication for cystoscopy at this time.  -Platelet count improved to 64  -Call with any questions    Dinorah Johnson PA        Urology Attending Progress Note      Subjective: I feel comfortable    Vitals:  BP (!) 140/83   Pulse 97   Temp 98.2 °F (36.8 °C) (Oral)   Resp 10   Ht 1.92 m (6' 3.59\") Comment: standing, actual  Wt 113.8 kg (250 lb 14.1 oz)   SpO2 96%   BMI 30.87 kg/m²   Temp  Av.8 °F (36.6 °C)  Min: 97.2 °F (36.2 °C)  Max: 98.3 °F (36.8 °C)    Intake/Output Summary (Last 24 hours) at 2025 1804  Last data filed at 2025 1630  Gross per 24 hour   Intake 1722 ml   Output 2100 ml   Net -378 ml       Exam: nad  Declined gu exam    Labs:  WBC:    Lab Results   Component Value Date/Time    WBC 0.0 2025 03:53 AM     Hemoglobin/Hematocrit:    Lab Results   Component Value Date/Time    HGB 7.8 2025 03:53 AM    HCT 23.3 2025 03:53 AM     BMP:    Lab Results   Component Value Date/Time     2025 03:53 AM    K 4.5 2025 03:53 AM    K 3.9 10/08/2024 11:40 AM     2025 03:53 AM    CO2 18 2025 03:53 AM    BUN 81 2025 03:53 AM    CREATININE 5.7 2025 03:53 AM    CALCIUM 7.8 2025 03:53 AM    GFRAA >60 2021 08:36 AM    LABGLOM 10 2025 03:53 AM     PT/INR:    Lab Results   Component Value Date/Time    PROTIME 15.5 2025 03:53 AM    INR 1.21 2025 03:53 AM     PTT:    Lab Results   Component Value Date/Time    APTT 39.7 2025 03:53 AM   [APTT          Impression/Plan: 68 yo with bph, neutropenia, possible bladder mass and urinary retention.   -I came in to place coker as nursing tried but could not place coude  -patient is refusing cysto and any attempt at coker placement.  -he refused coker and we will not be able to place coker this evening.    RADU MATHEW MD        Urology Attending Progress Note      Subjective: Refusing all care this morning. MD attempted to place catheter last night but patient refused.    Vitals:  BP (!) 133/90   Pulse (!) 111   Temp 98.3 °F (36.8 °C) (Oral)   Resp 16   Ht 1.92 m (6' 3.59\") Comment: standing, actual  Wt 113.8 kg (250 lb 14.1 oz)   SpO2 94%   BMI 30.87 kg/m²   Temp  Av °F (36.7 °C)  Min: 97.2 °F (36.2 °C)  Max: 98.7 °F (37.1 °C)    Intake/Output Summary (Last 24 hours) at 1/3/2025 0938  Last data filed at 2025 1630  Gross per 24 hour   Intake 1952 ml   Output 2100 ml   Net -148 ml       Exam: Laying in bed, appears comfortable    Labs:  WBC:    Lab Results   Component Value Date/Time    WBC 0.0 2025 03:25 AM     Hemoglobin/Hematocrit:    Lab Results   Component Value Date/Time    HGB 7.4 2025 03:25 AM    HCT 22.0 2025 03:25 AM     BMP:    Lab Results   Component Value Date/Time     2025 03:25 AM    K 4.3 2025 03:25 AM    K 3.9 10/08/2024 11:40 AM    CL 97 2025 03:25 AM    CO2 19 2025 03:25 AM    BUN 58 2025 03:25 AM    CREATININE 4.3 2025 03:25 AM    CALCIUM 8.0 2025 03:25 AM    GFRAA >60 2021 08:36 AM    LABGLOM 14 2025 03:25 AM     PT/INR:    Lab Results   Component Value Date/Time    PROTIME 15.5 2025 03:53 AM    INR 1.21 2025 03:53 AM     PTT:    Lab Results   Component Value Date/Time    APTT 39.7 2025 03:53 AM   [APTT    Impression/Plan: 68yo M with AMS, RCC sp L nephrectomy, CKD with possible urinary retention.    -He refused coker last night. PVR in the 400-500 range. He is also coker placement this morning. Does not report any suprapubic pain or urge to void  -He has been receiving dialysis. Because he is not in any pain and denies urge to void, fine to avoid catheter placement at this time.  -Please call with any changes in his symptoms. We will sign off for now.     JENNA Herrera       [facility] Progress Note    12/10/2024     Patient:  Brandon Fischer  MRN: [id number]  : 1955      Interval History:    Day +4 reduced-intensity linda/flu f/b MUD alloSCT.   Last 24 hrs:  Persistently febrile overnight, t max 102.8  On Cefepime day +3, if continues to spike fevers will change to Merrem and add Doxycycline  Last day of PT Cytoxan today    ROS:  As noted above, otherwise remainder of 10-point ROS negative    ECOG PS:  (1) Restricted in physically strenuous activity, ambulatory and able to do work of light nature    KPS:  90% Able to carry on normal activity; minor signs or symptoms of disease    Isolation:  None    Medication:  Scheduled:   ondansetron  8 mg Oral q8h    Or    ondansetron  8 mg IntraVENous q8h    loperamide  4 mg Oral Q6H    cefepime  2,000 mg IntraVENous Q8H    OLANZapine  5 mg Oral Nightly    [Held by provider] furosemide  20 mg IntraVENous Daily    sodium chloride flush  5-40 mL IntraVENous 2 times per day    Saline Mouthwash  15 mL Swish & Spit 4x Daily AC & HS    valACYclovir  500 mg Oral BID    fluconazole  400 mg Oral Daily    ursodiol  500 mg Oral BID    tacrolimus  3 mg Oral Q12H    mesna (MESNEX) 4,340 mg in sodium chloride 0.9 % 500 mL IVPB  4,340 mg IntraVENous Q24H    mycophenolate  1,000 mg Oral TID    filgrastim/filgrastim biosimilar  300 mcg SubCUTAneous QPM    pantoprazole  40 mg Oral QAM AC     Continuous Infusions:   0.9% NaCl with KCl 20 mEq      sodium chloride      sodium chloride      sodium chloride 5 mL/hr at 12/10/24 0452    potassium chloride 20 mEq (12/10/24 0846)    sodium chloride 180 mL/hr at 24     PRN:  diphenoxylate-atropine, prochlorperazine **OR** prochlorperazine, magnesium sulfate, potassium chloride, iopamidol, acetaminophen, sodium chloride, sennosides-docusate sodium, sodium chloride, sodium chloride flush, sodium chloride, potassium chloride, magnesium sulfate, Saline Mouthwash, ALTEplase (CATHFLO) 2 mg in sterile water 2 mL      [facility] Progress Note    12/3/2024     Patient:  Brandon Fischer  MRN: [id number]  : 1955      Interval History:    Day -2 reduced-intensity linad/flu f/b MUD alloSCT.  No new complaints  Last 24 hrs:  Afebrile, hemodynamically stable  Eating and drinking well  Good UOP    ROS:  As noted above, otherwise remainder of 10-point ROS negative    ECOG PS:  (1) Restricted in physically strenuous activity, ambulatory and able to do work of light nature    KPS:  90% Able to carry on normal activity; minor signs or symptoms of disease    Isolation:  None    Medication:  Scheduled:   fluconazole  200 mg Oral Daily    levoFLOXacin  500 mg Oral QPM    [Held by provider] furosemide  20 mg IntraVENous Daily    sodium chloride flush  5-40 mL IntraVENous 2 times per day    Saline Mouthwash  15 mL Swish & Spit 4x Daily AC & HS    valACYclovir  500 mg Oral BID    [START ON 12/10/2024] fluconazole  400 mg Oral Daily    enoxaparin  30 mg SubCUTAneous BID    ursodiol  500 mg Oral BID    [START ON 12/10/2024] tacrolimus  3 mg Oral Q12H    ondansetron  12 mg Oral Q24H    fludarabine (FLUDARA) 65 mg in dextrose 5 % 100 mL chemo IVPB  65 mg IntraVENous Q24H    [START ON 2024] ondansetron  24 mg Oral Once    [START ON 2024] fosaprepitant (EMEND) 150 mg in sodium chloride 0.9 % 250 mL IVPB  150 mg IntraVENous Once    [START ON 2024] mesna (MESNEX) 860 mg in sodium chloride 0.9 % 50 mL IVPB  860 mg IntraVENous Once    [START ON 2024] mesna (MESNEX) 4,340 mg in sodium chloride 0.9 % 500 mL IVPB  4,340 mg IntraVENous Q24H    [START ON 2024] cycloPHOSphamide (CYTOXAN) 4,340 mg in sodium chloride 0.9 % 500 mL chemo infusion  4,340 mg IntraVENous Once    [START ON 2024] ondansetron  24 mg Oral Once    [START ON 2024] mesna (MESNEX) 860 mg in sodium chloride 0.9 % 50 mL IVPB  860 mg IntraVENous Once    [START ON 2024] cycloPHOSphamide (CYTOXAN) 4,340 mg in sodium chloride 0.9 % 500 mL chemo infusion       [facility] Progress Note    2024     Brandon Fischer    MRN: [id number]    : 1955    Referring MD: Saurabh Donaldson MD  [address] [place] [address]      Interval History:  The patient has no complaints and he is walking in the halls.    As of Sun. 24 morning:  Day -4 reduced-intensity linda/flu f/b MUD alloSCT  Last 24 hrs:  Afebrile, hemodynamically stable  Intake 3 L  UOP 3.9 L  1 BM  Wt is at baseline  WBC 7.6, ANC 0.4, Hgb 10.9,       ROS:  As noted above, otherwise remainder of 10-point ROS negative    ECOG PS:  (1) Restricted in physically strenuous activity, ambulatory and able to do work of light nature    KPS:  90% Able to carry on normal activity; minor signs or symptoms of disease    Isolation:  None    Medication:  Scheduled:   sodium chloride flush  5-40 mL IntraVENous 2 times per day    Saline Mouthwash  15 mL Swish & Spit 4x Daily AC & HS    valACYclovir  500 mg Oral BID    [START ON 12/10/2024] fluconazole  400 mg Oral Daily    enoxaparin  30 mg SubCUTAneous BID    ursodiol  500 mg Oral BID    furosemide  40 mg IntraVENous Q12H    [START ON 12/10/2024] tacrolimus  3 mg Oral Q12H    ondansetron  12 mg Oral Q24H    fludarabine (FLUDARA) 65 mg in dextrose 5 % 100 mL chemo IVPB  65 mg IntraVENous Q24H    [START ON 2024] ondansetron  24 mg Oral Once    [START ON 2024] fosaprepitant (EMEND) 150 mg in sodium chloride 0.9 % 250 mL IVPB  150 mg IntraVENous Once    [START ON 2024] mesna (MESNEX) 860 mg in sodium chloride 0.9 % 50 mL IVPB  860 mg IntraVENous Once    [START ON 2024] mesna (MESNEX) 4,340 mg in sodium chloride 0.9 % 500 mL IVPB  4,340 mg IntraVENous Q24H    [START ON 2024] cycloPHOSphamide (CYTOXAN) 4,340 mg in sodium chloride 0.9 % 500 mL chemo infusion  4,340 mg IntraVENous Once    [START ON 2024] ondansetron  24 mg Oral Once    [START ON 2024] mesna (MESNEX) 860 mg in sodium chloride 0.9 % 50 mL IVPB  860 mg      [facility] Progress Note    2024     Patient:  Brandon Fischer  MRN: [id number]  : 1955      Interval History:    Day -3 reduced-intensity linda/flu f/b MUD alloSCT  Last 24 hrs:  Afebrile, hemodynamically stable  Eating and drinking well  Good UOP    ROS:  As noted above, otherwise remainder of 10-point ROS negative    ECOG PS:  (1) Restricted in physically strenuous activity, ambulatory and able to do work of light nature    KPS:  90% Able to carry on normal activity; minor signs or symptoms of disease    Isolation:  None    Medication:  Scheduled:   furosemide  20 mg IntraVENous Daily    sodium chloride flush  5-40 mL IntraVENous 2 times per day    Saline Mouthwash  15 mL Swish & Spit 4x Daily AC & HS    valACYclovir  500 mg Oral BID    [START ON 12/10/2024] fluconazole  400 mg Oral Daily    enoxaparin  30 mg SubCUTAneous BID    ursodiol  500 mg Oral BID    [START ON 12/10/2024] tacrolimus  3 mg Oral Q12H    ondansetron  12 mg Oral Q24H    fludarabine (FLUDARA) 65 mg in dextrose 5 % 100 mL chemo IVPB  65 mg IntraVENous Q24H    [START ON 2024] ondansetron  24 mg Oral Once    [START ON 2024] fosaprepitant (EMEND) 150 mg in sodium chloride 0.9 % 250 mL IVPB  150 mg IntraVENous Once    [START ON 2024] mesna (MESNEX) 860 mg in sodium chloride 0.9 % 50 mL IVPB  860 mg IntraVENous Once    [START ON 2024] mesna (MESNEX) 4,340 mg in sodium chloride 0.9 % 500 mL IVPB  4,340 mg IntraVENous Q24H    [START ON 2024] cycloPHOSphamide (CYTOXAN) 4,340 mg in sodium chloride 0.9 % 500 mL chemo infusion  4,340 mg IntraVENous Once    [START ON 2024] ondansetron  24 mg Oral Once    [START ON 2024] mesna (MESNEX) 860 mg in sodium chloride 0.9 % 50 mL IVPB  860 mg IntraVENous Once    [START ON 2024] cycloPHOSphamide (CYTOXAN) 4,340 mg in sodium chloride 0.9 % 500 mL chemo infusion  4,340 mg IntraVENous Once    [START ON 12/10/2024] mycophenolate  1,000 mg Oral TID    [START ON 12/10/2024]      [facility] Progress Note    2024     Patient:  Brandon Fischer  MRN: [id number]  : 1955      Interval History:    Day -2 reduced-intensity linda/flu f/b MUD alloSCT.  No new complaints  Last 24 hrs:  Afebrile, hemodynamically stable  Eating and drinking well  Good UOP    ROS:  As noted above, otherwise remainder of 10-point ROS negative    ECOG PS:  (1) Restricted in physically strenuous activity, ambulatory and able to do work of light nature    KPS:  90% Able to carry on normal activity; minor signs or symptoms of disease    Isolation:  None    Medication:  Scheduled:   fluconazole  200 mg Oral Daily    levoFLOXacin  500 mg Oral QPM    [Held by provider] furosemide  20 mg IntraVENous Daily    sodium chloride flush  5-40 mL IntraVENous 2 times per day    Saline Mouthwash  15 mL Swish & Spit 4x Daily AC & HS    valACYclovir  500 mg Oral BID    [START ON 12/10/2024] fluconazole  400 mg Oral Daily    enoxaparin  30 mg SubCUTAneous BID    ursodiol  500 mg Oral BID    [START ON 12/10/2024] tacrolimus  3 mg Oral Q12H    [START ON 2024] ondansetron  24 mg Oral Once    [START ON 2024] fosaprepitant (EMEND) 150 mg in sodium chloride 0.9 % 250 mL IVPB  150 mg IntraVENous Once    [START ON 2024] mesna (MESNEX) 860 mg in sodium chloride 0.9 % 50 mL IVPB  860 mg IntraVENous Once    [START ON 2024] mesna (MESNEX) 4,340 mg in sodium chloride 0.9 % 500 mL IVPB  4,340 mg IntraVENous Q24H    [START ON 2024] cycloPHOSphamide (CYTOXAN) 4,340 mg in sodium chloride 0.9 % 500 mL chemo infusion  4,340 mg IntraVENous Once    [START ON 2024] ondansetron  24 mg Oral Once    [START ON 2024] mesna (MESNEX) 860 mg in sodium chloride 0.9 % 50 mL IVPB  860 mg IntraVENous Once    [START ON 2024] cycloPHOSphamide (CYTOXAN) 4,340 mg in sodium chloride 0.9 % 500 mL chemo infusion  4,340 mg IntraVENous Once    [START ON 12/10/2024] mycophenolate  1,000 mg Oral TID    [START ON 12/10/2024]      [facility] Progress Note    2024     Patient:  Brandon Fischer  MRN: [id number]  : 1955      Interval History:    Day +4 reduced-intensity linda/flu f/b MUD alloSCT.   Last 24 hrs:  Persistently febrile overnight, t max 102.8  On Cefepime day +3, if continues to spike fevers will change to Merrem and add Doxycycline  Last day of PT Cytoxan today    ROS:  As noted above, otherwise remainder of 10-point ROS negative    ECOG PS:  (1) Restricted in physically strenuous activity, ambulatory and able to do work of light nature    KPS:  90% Able to carry on normal activity; minor signs or symptoms of disease    Isolation:  None    Medication:  Scheduled:   vancomycin  1,000 mg IntraVENous Q12H    prochlorperazine  5 mg Oral Q6H    Or    prochlorperazine  5 mg IntraVENous Q6H    cefepime  2,000 mg IntraVENous Q8H    OLANZapine  5 mg Oral Nightly    [Held by provider] furosemide  20 mg IntraVENous Daily    sodium chloride flush  5-40 mL IntraVENous 2 times per day    Saline Mouthwash  15 mL Swish & Spit 4x Daily AC & HS    valACYclovir  500 mg Oral BID    [START ON 12/10/2024] fluconazole  400 mg Oral Daily    ursodiol  500 mg Oral BID    [START ON 12/10/2024] tacrolimus  3 mg Oral Q12H    mesna (MESNEX) 4,340 mg in sodium chloride 0.9 % 500 mL IVPB  4,340 mg IntraVENous Q24H    ondansetron  24 mg Oral Once    mesna (MESNEX) 860 mg in sodium chloride 0.9 % 50 mL IVPB  860 mg IntraVENous Once    cycloPHOSphamide (CYTOXAN) 4,340 mg in sodium chloride 0.9 % 500 mL chemo infusion  4,340 mg IntraVENous Once    [START ON 12/10/2024] mycophenolate  1,000 mg Oral TID    [START ON 12/10/2024] filgrastim/filgrastim biosimilar  300 mcg SubCUTAneous QPM    pantoprazole  40 mg Oral QAM AC     Continuous Infusions:   [START ON 12/10/2024] sodium chloride      sodium chloride      sodium chloride      sodium chloride      potassium chloride 20 mEq (24 0832)    sodium chloride 180 mL/hr at 24 0840     PRN:  magnesium      [facility] Progress Note    2024     Patient:  Brandon Fischer  MRN: [id number]  : 1955      Interval History:    Day 0 reduced-intensity linda/flu f/b MUD alloSCT.  No new complaints  Last 24 hrs:  Afebrile, hemodynamically stable  Eating and drinking well  Good UOP    ROS:  As noted above, otherwise remainder of 10-point ROS negative    ECOG PS:  (1) Restricted in physically strenuous activity, ambulatory and able to do work of light nature    KPS:  90% Able to carry on normal activity; minor signs or symptoms of disease    Isolation:  None    Medication:  Scheduled:   hydrocortisone sodium succinate PF  100 mg IntraVENous Once    diphenhydrAMINE  25 mg Oral Once    acetaminophen  650 mg Oral Once    fluconazole  200 mg Oral Daily    levoFLOXacin  500 mg Oral QPM    [Held by provider] furosemide  20 mg IntraVENous Daily    sodium chloride flush  5-40 mL IntraVENous 2 times per day    Saline Mouthwash  15 mL Swish & Spit 4x Daily AC & HS    valACYclovir  500 mg Oral BID    [START ON 12/10/2024] fluconazole  400 mg Oral Daily    enoxaparin  30 mg SubCUTAneous BID    ursodiol  500 mg Oral BID    [START ON 12/10/2024] tacrolimus  3 mg Oral Q12H    [START ON 2024] ondansetron  24 mg Oral Once    [START ON 2024] fosaprepitant (EMEND) 150 mg in sodium chloride 0.9 % 250 mL IVPB  150 mg IntraVENous Once    [START ON 2024] mesna (MESNEX) 860 mg in sodium chloride 0.9 % 50 mL IVPB  860 mg IntraVENous Once    [START ON 2024] mesna (MESNEX) 4,340 mg in sodium chloride 0.9 % 500 mL IVPB  4,340 mg IntraVENous Q24H    [START ON 2024] cycloPHOSphamide (CYTOXAN) 4,340 mg in sodium chloride 0.9 % 500 mL chemo infusion  4,340 mg IntraVENous Once    [START ON 2024] ondansetron  24 mg Oral Once    [START ON 2024] mesna (MESNEX) 860 mg in sodium chloride 0.9 % 50 mL IVPB  860 mg IntraVENous Once    [START ON 2024] cycloPHOSphamide (CYTOXAN) 4,340 mg in sodium chloride 0.9 % 500 mL chemo      [facility] Progress Note    2024     Brandon Fischer    MRN: [id number]    : 1955    Referring MD: Saurabh Donaldson MD  [address] [place] [address]      SUBJECTIVE:  The patient has no complaints and he is walking in the halls.    ECOG PS:  (1) Restricted in physically strenuous activity, ambulatory and able to do work of light nature    KPS: 90% Able to carry on normal activity; minor signs or symptoms of disease    Isolation: None    Medications    Scheduled Meds:   sodium chloride flush  5-40 mL IntraVENous 2 times per day    Saline Mouthwash  15 mL Swish & Spit 4x Daily AC & HS    valACYclovir  500 mg Oral BID    [START ON 12/10/2024] fluconazole  400 mg Oral Daily    enoxaparin  30 mg SubCUTAneous BID    ursodiol  500 mg Oral BID    furosemide  40 mg IntraVENous Q12H    [START ON 12/10/2024] tacrolimus  3 mg Oral Q12H    ondansetron  12 mg Oral Q24H    fludarabine (FLUDARA) 65 mg in dextrose 5 % 100 mL chemo IVPB  65 mg IntraVENous Q24H    [START ON 2024] ondansetron  24 mg Oral Once    [START ON 2024] fosaprepitant (EMEND) 150 mg in sodium chloride 0.9 % 250 mL IVPB  150 mg IntraVENous Once    [START ON 2024] mesna (MESNEX) 860 mg in sodium chloride 0.9 % 50 mL IVPB  860 mg IntraVENous Once    [START ON 2024] mesna (MESNEX) 4,340 mg in sodium chloride 0.9 % 500 mL IVPB  4,340 mg IntraVENous Q24H    [START ON 2024] cycloPHOSphamide (CYTOXAN) 4,340 mg in sodium chloride 0.9 % 500 mL chemo infusion  4,340 mg IntraVENous Once    [START ON 2024] ondansetron  24 mg Oral Once    [START ON 2024] mesna (MESNEX) 860 mg in sodium chloride 0.9 % 50 mL IVPB  860 mg IntraVENous Once    [START ON 2024] cycloPHOSphamide (CYTOXAN) 4,340 mg in sodium chloride 0.9 % 500 mL chemo infusion  4,340 mg IntraVENous Once    [START ON 12/10/2024] mycophenolate  1,000 mg Oral TID    [START ON 12/10/2024] filgrastim/filgrastim biosimilar  300 mcg SubCUTAneous      Pharmacist Review and Automatic Dose Adjustment of Prophylactic Enoxaparin         The reviewing pharmacist has made an adjustment to the ordered enoxaparin dose or converted to UFH per the approved [facility] protocol and table as identified below.        Brandon Fischer is a 69 y.o. male.     Recent Labs     11/29/24  1201   CREATININE 1.0       Estimated Creatinine Clearance: 92 mL/min (based on SCr of 1 mg/dL).    Recent Labs     11/29/24  1201   HGB 12.2*   HCT 37.0*        Recent Labs     11/27/24  0938 11/29/24  1201   INR 0.91 0.99       Height:   Ht Readings from Last 1 Encounters:   11/29/24 1.92 m (6' 3.59\")     Weight:  Wt Readings from Last 1 Encounters:   11/29/24 110.3 kg (243 lb 3.2 oz)               Plan: Based upon the patient's weight and renal function    Ordered: Enoxaparin 40mg SUBQ Daily    Changed/converted to    New Order: Enoxaparin 30mg SUBQ BID      Thank you,  Ivy Mosquera RP  11/29/2024, 2:28 PM        IMAGING SERVICES NURSING PROGRESS NOTE    Procedure:  Renal Biopsy  December 26, 2024  Brandon Fischer      Allergies:    Allergies   Allergen Reactions    Aspirin      NSAIDS due to history of ulcer        Vitals:    12/26/24 0945   BP: 125/74   Pulse: 84   Resp: 20   Temp:    SpO2: 98%       Recent lab work reviewed with MD: yes    Procedure explained to patient by MD: yes   Informed consent obtained:yes  Family with patient:no    Mental Status:  Normal  Readiness to learn:  Yes  Barriers to learning: No    Pain Assessment Pre-Procedure:  Pain Present:  no  Pain Score:  0  Pain Quality/Description:  na    Time out Procedure Verification with:  [x] RN  [x] Physician  [x] Patient  [x] Other: CT Technologist  Procedure site marked, if applicable:  Yes    Note: Patient arrived A & O x 4, breathing on room air, Spoke to Dr. Prajapati prior to procedure.  Procedural sedation:  Fentanyl:  25mcg  Versed:    0.5mg  Post Procedureal Note:  Patient tolerated procedure well.  Breathing easily on room air.  Report given to Yaniv RN and Jackson RN.  Patient transported in stable conditon to cath lab holding bay 5.    Pain Assessment Post-Procedure:  Pain Present:  no  Pain Score:  0  Pain Quality/Description:  na    Plan of Care Goals:  Safety measures met:  Yes  Patient understands explanation of procedure:  Yes    Time in:  0940  Time out:  1003  Jolly Hyde RN.  R.N. 12/26/2024                   Comprehensive Nutrition Assessment    RECOMMENDATIONS:  PO Diet: NPO  Nutrition Supplement: NPO  Nutrition Education: Education/Counseling completed (low microbial)     NUTRITION ASSESSMENT:   Nutritional summary & status: ALLO D +21. PO intake negligible. Consumed applesauce x2 yesterday. WBC 0, awaiting engraftment, granix dose increased. PO intake inadequate x 3 weeks. NGT placed and started on tube feeds on 12/22, removed 12/25. Pt desires to leave/unhook everything and go home. NGT was uncomfortable/irritating for pt. Deal was made by MD that NGT can be removed if he has hope and continues to fight. 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 Currently NPO @ IR for renal biopsy. Plan for vascath placement and starting HD today. KUB 12/24 showed ileus, plan for repeat KUB today. Will continue to follow and make recommendations PRN.     Admission // PMH: AML // CKD2, renal cell carcinoma s/p unilateral nephrectomy (20 years ago)    MALNUTRITION ASSESSMENT  Context of Malnutrition: Acute Illness (on chronic)   Malnutrition Status: Mild malnutrition  Findings of the 6 clinical characteristics of malnutrition (Minimum of 2 out of 6 clinical characteristics is required to make the diagnosis of moderate or severe Protein Calorie Malnutrition based on AND/ASPEN Guidelines):  Energy Intake:  50% or less of estimated energy requirements for 5 or more days  Weight Loss:  Unable to assess (fluid)     Body Fat Loss:  No body fat loss     Muscle Mass Loss:  No muscle mass loss        NUTRITION DIAGNOSIS   Inadequate oral intake related to altered GI function (ileus) as evidenced by NPO or clear liquid status due to medical condition    Nutrition Monitoring and Evaluation:   Food/Nutrient Intake Outcomes:  Diet Advancement/Tolerance  Physical Signs/Symptoms Outcomes:  Biochemical Data, Nutrition Focused Physical Findings, Skin, Weight, Nausea or Vomiting, GI Status, Diarrhea     OBJECTIVE DATA: Significant to nutrition assessment  Nutrition    Comprehensive Nutrition Assessment    RECOMMENDATIONS:  PO Diet: Regular; low microbial  Nutrition Supplement: Ensure +HP BID to TID  Nutrition Education: Education/Counseling completed (low microbial)     NUTRITION ASSESSMENT:   Nutritional summary & status: ALLO D+4. Pt in kristal. Febrile, t max 102 degrees F. PO intake understandably decreased. Weight stable since admit. Pt consuming Ensures 2x/day (700 kcal), will update per pr flavor preference and increase to TID. Encouraged small freuqent meals, hydrating with soup/broth/etc, focusing on protein-rich foods when tolerated. Nausea is a barrier to intake. Zyprexa and Compazine scheduled. Zyprexa and Ativan PRN, educated pt on this. Increased loose stools, taking immodium. Provided Banatrol to trial, if dislikes, recommend consuming soluble fiber such as bananas (K 3.0), educated on this. Will continue to follow.     Admission // PMH: AML // CKD2, renal cell carcinoma s/p unilateral nephrectomy (20 years ago)    MALNUTRITION ASSESSMENT  Context of Malnutrition: Acute Illness (on chronic)   Malnutrition Status: At risk for malnutrition  Findings of the 6 clinical characteristics of malnutrition (Minimum of 2 out of 6 clinical characteristics is required to make the diagnosis of moderate or severe Protein Calorie Malnutrition based on AND/ASPEN Guidelines):  Energy Intake:  Mild decrease in energy intake (2-3 days)  Weight Loss:  No weight loss     Body Fat Loss:  No body fat loss     Muscle Mass Loss:  No muscle mass loss        NUTRITION DIAGNOSIS   Increased nutrient needs related to catabolic illness as evidenced by Dariela/Flu prep for MUD ALLO SCT    Nutrition Monitoring and Evaluation:   Food/Nutrient Intake Outcomes:  Diet Advancement/Tolerance, IVF Intake, Food and Nutrient Intake, Supplement Intake  Physical Signs/Symptoms Outcomes:  Biochemical Data, Nutrition Focused Physical Findings, Skin, Weight, Nausea or Vomiting, GI Status, Diarrhea     OBJECTIVE    Comprehensive Nutrition Assessment    RECOMMENDATIONS:  PO Diet: Soft and Bite Sized; low microbial  Nutrition Supplement: Nepro TID  Nutrition Education: Education/Counseling completed (low microbial)     NUTRITION ASSESSMENT:   Nutritional summary & status: ALLO D+25. WBC 0. Continues on HD. Psychology meeting with pt has he has suicidal ideations. Psychiatry to be consulted. Continues w/ negligible intake. For example, 2 bites of a rootbeer float yesterday. Supplements ordered, pt not receiving. See previous RD note about nutriton support via TF; pt not agreeable. Unable to asssess wt hx due to +22 L since admit, weight up since admit. Unfortunately, all appropriate nutrition interventions in place. RD will continue to follow and provide support as appropriate.     Admission // PMH: AML // CKD2, renal cell carcinoma s/p unilateral nephrectomy (20 years ago)    MALNUTRITION ASSESSMENT  Context of Malnutrition: Acute Illness (on chronic)   Malnutrition Status: Mild malnutrition  Findings of the 6 clinical characteristics of malnutrition (Minimum of 2 out of 6 clinical characteristics is required to make the diagnosis of moderate or severe Protein Calorie Malnutrition based on AND/ASPEN Guidelines):  Energy Intake:  50% or less of estimated energy requirements for 5 or more days  Weight Loss:  Unable to assess (fluid)     Body Fat Loss:  No body fat loss     Muscle Mass Loss:  No muscle mass loss        NUTRITION DIAGNOSIS   Inadequate oral intake related to decreased appetite as evidenced by intake 0-25%    Nutrition Monitoring and Evaluation:   Food/Nutrient Intake Outcomes:  Diet Advancement/Tolerance, Supplement Intake, Food and Nutrient Intake  Physical Signs/Symptoms Outcomes:  Biochemical Data, Nutrition Focused Physical Findings, Skin, Weight, GI Status, Nausea or Vomiting     OBJECTIVE DATA: Significant to nutrition assessment  Nutrition Related Findings: phos 6.5, +22 L, BM x2 yesterday  Wounds:    Comprehensive Nutrition Assessment    RECOMMENDATIONS:  Recommend placing NGT and starting TF given inability to meet energy needs for ~ 2 weeks  PO Diet: Regular; low microbial  Nutrition Supplement: Ensure +HP TID to Ensure Clear 1x/day and Greek yogurt smoothie 1x/day  Nutrition Education: Education/Counseling completed (low microbial)     NUTRITION ASSESSMENT:   Nutritional summary & status: ALLO D+13. PO intake still negligible. Pt lethargic, weak, sleeping a lot it appears. Episode of emesis this AM. Nausea is also a barrier to intake. Diarrhea x 4 yesterday. Has not been taking Zyprexa nightly, discussed this. Created eating schedule with ideas of foods. Discussed viewing food as medicine and eating on a schedule to strengthen himself before counts recover. Discussed nutrition requirements for discharge. Pt not engaged in care, too lethargic (WBC 0) or disinterested. Agreeable to AM snack, consumed bites of a popsicle then went back to sleep. Recommended placing NGT and starting TF given inability to meet energy/protein needs for ~2 weeks. Pt of tall stature, catabolic process r/t ALLO SCT, developing nosocomial malnutrition. Unable to assess wt hx, net +8 L since admit. Pt not consuming Ensure supplements, will trial Ensure Clear and smoothie    Admission // PMH: AML // CKD2, renal cell carcinoma s/p unilateral nephrectomy (20 years ago)    MALNUTRITION ASSESSMENT  Context of Malnutrition: Acute Illness (on chronic)   Malnutrition Status: Mild malnutrition  Findings of the 6 clinical characteristics of malnutrition (Minimum of 2 out of 6 clinical characteristics is required to make the diagnosis of moderate or severe Protein Calorie Malnutrition based on AND/ASPEN Guidelines):  Energy Intake:  50% or less of estimated energy requirements for 5 or more days  Weight Loss:  Unable to assess (fluid)     Body Fat Loss:  No body fat loss     Muscle Mass Loss:  No muscle mass loss      NUTRITION DIAGNOSIS    Physician Progress Note      PATIENT:               LITA WAER  CSN #:                  [id number]  :                       1955  ADMIT DATE:       2024 8:40 AM  DISCH DATE:  RESPONDING  PROVIDER #:        Saurabh Donaldson MD          QUERY TEXT:    Pt admitted with   AML. Pt noted to have \"Febrile neutropenia secondary to   multilobar PNA\" MD notes . If possible, please document in the progress   notes and discharge summary if you are evaluating and /or treating any of the   following:  The medical record reflects the following:    Risk Factors chemotherapy  Clinical Indicators: -Temp=101.2, HR=, WBC=0.1, Platelets=21; MD   notes -Febrile neutropenia secondary to multilobar PNA  Treatment: CBC, CMP, Pan culture, CXR, Hematology/Nephrology consult, Meds=IV   Cefepime, Levaquin, V/S and I/O monitoring  Options provided:  -- This patient has sepsis due to PNA that developed following admission.  -- Sepsis was ruled out  -- Other - I will add my own diagnosis  -- Disagree - Not applicable / Not valid  -- Disagree - Clinically unable to determine / Unknown  -- Refer to Clinical Documentation Reviewer    PROVIDER RESPONSE TEXT:    This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 81* 73*   ALT 28 26   BILIDIR 0.5* 0.5*   BILITOT 0.7 0.7  0.7   ALKPHOS 233* 231*      No results for input(s): \"LIPASE\", \"AMYLASE\" in the last 72 hours.     Recent Labs     01/02/25  0353   INR 1.21*   APTT 39.7*      No results for input(s): \"CKTOTAL\", \"CKMB\", \"CKMBINDEX\", \"TROPONINI\" in the last 72 hours.      ASSESSMENT & PLAN:   This is a 69 y.o. male with Hx of acute myeloid leukemia, renal cell carcinoma s/p left nephrectomy, CKD stage 2, CHF, dilated left atrium, hemorrhagic cystitis, and  HD called this RN regarding potential dialysis, NP Roxanne notified and pt VS WNL, HD appropriate at this time per NP, HD notified.     Lab called with critical values, see new blood orders. NP Roxanne notified pt not voiding during shift at this time, no new orders.     Vascular asked this RN to place note in for pt to be NPO Monday morning.    I received a call from primary team regarding patient's AF with RVR.  Formal consult note to follow soon.    - Will start amiodarone drip without bolus and Lopressor 25 p.o. twice daily  - No anticoagulation due to patient's thrombocytopenia and anemia.   - I have notified Billie Blount (covering for Dr Donaldson) regarding the risk of QT prolongation and torsades in the setting of amiodarone and Levaquin and have requested changing Levaquin to another antibiotic.      I would like to thank you for providing me the opportunity to participate in the care of your patient. If you have any questions, please do not hesitate to contact me.     Jameson Hinds MD, [facility], [facility]  [facility] [place] [address]  Ph: [phone]  Fax: [phone]     ID Follow-up NOTE    CC:   Achromobacter bacteremia, febrile neutropenia   Antibiotics: Meropenem    Admit Date: 11/29/2024  Hospital Day: 28    Subjective:     IR today -    R renal bx, L chest tunnel line removal, new R IJ HD tunnel line, L PICC    Patient feels 'terrible'  Pt is weak, has oral pain, abd pain.      Objective:     Patient Vitals for the past 8 hrs:   BP Temp Temp src Pulse Resp SpO2 Weight   12/26/24 1435 -- -- -- -- 18 -- --   12/26/24 1346 (!) 92/57 98.3 °F (36.8 °C) Oral 80 20 97 % --   12/26/24 0956 105/73 -- -- 84 20 96 % --   12/26/24 0953 112/73 -- -- 83 20 96 % --   12/26/24 0949 122/73 -- -- 82 20 97 % --   12/26/24 0945 125/74 -- -- 84 20 98 % --   12/26/24 0859 -- -- -- 82 14 -- --   12/26/24 0855 -- -- -- 82 12 -- --   12/26/24 0800 -- -- -- -- -- -- 118.1 kg (260 lb 5.8 oz)   12/26/24 0741 (!) 110/93 97.4 °F (36.3 °C) Oral 82 22 95 % --     I/O last 3 completed shifts:  In: 6130.6 [P.O.:180; I.V.:4828; Blood:1122.6]  Out: 620 [Urine:620]  I/O this shift:  In: 60 [P.O.:60]  Out: 2 [Blood:2]    EXAM:  GENERAL: No apparent distress.  RA  HEENT: Membranes moist, no oral lesion  NECK:  Supple, no lymphadenopathy  LUNGS: Clear b/l, no rales, no dullness  CARDIAC: RRR, no murmur appreciated  ABD:  + BS, soft - diffuse abd tenderness  EXT:  No rash, no edema, no lesions  NEURO: No focal neurologic findings  LINES:  R chest HD line, R PICC - placed 12/26       Data Review:  Lab Results   Component Value Date    WBC 0.0 (LL) 12/26/2024    HGB 6.9 (LL) 12/26/2024    HCT 20.5 (LL) 12/26/2024    MCV 83.3 12/26/2024    PLT 81 (L) 12/26/2024     Lab Results   Component Value Date    CREATININE 6.4 (HH) 12/26/2024    BUN 88 (HH) 12/26/2024     12/26/2024    K 5.4 (H) 12/26/2024     12/26/2024    CO2 15 (L) 12/26/2024       Hepatic Function Panel:   Lab Results   Component Value Date/Time    ALKPHOS 209 12/26/2024 03:52 AM    ALT 37 12/26/2024 03:52 AM    AST 99 12/26/2024 03:52 AM     ID Follow-up NOTE    CC:   Achromobacter bacteremia, febrile neutropenia   Antibiotics: Meropenem    Admit Date: 11/29/2024  Hospital Day: 29    Subjective:     12/26 IR -    R renal bx, L chest tunnel line removal, new R IJ HD tunnel line, L PICC    Patient feels 'weak'  Ongoing  oral pain, abd pain.      Objective:     Patient Vitals for the past 8 hrs:   BP Temp Temp src Pulse Resp SpO2   12/27/24 0900 -- 98.3 °F (36.8 °C) -- -- -- --   12/27/24 0847 131/69 98 °F (36.7 °C) -- (!) 114 14 95 %   12/27/24 0728 104/64 97.7 °F (36.5 °C) Axillary (!) 120 18 93 %   12/27/24 0614 (!) 115/59 98 °F (36.7 °C) Axillary (!) 106 (!) 1 96 %   12/27/24 0335 (!) 114/57 98 °F (36.7 °C) Axillary (!) 110 22 96 %   12/27/24 0306 122/60 98.1 °F (36.7 °C) Axillary (!) 118 12 96 %     I/O last 3 completed shifts:  In: 87451.8 [P.O.:60; I.V.:8035; Blood:2174.8]  Out: 2 [Blood:2]  I/O this shift:  In: 390 [Blood:390]  Out: -     EXAM:  GENERAL: No apparent distress.  RA  HEENT: Membranes moist, no oral lesion  NECK:  Supple, no lymphadenopathy  LUNGS: Clear b/l, no rales, no dullness  CARDIAC: RRR, no murmur appreciated  ABD:  + BS, soft - diffuse abd tenderness  EXT:  No rash, no edema, no lesions  NEURO: No focal neurologic findings  LINES:  R chest HD line, R PICC - placed 12/26       Data Review:  Lab Results   Component Value Date    WBC 0.0 (LL) 12/27/2024    HGB 6.3 (LL) 12/27/2024    HCT 18.8 (LL) 12/27/2024    MCV 83.7 12/27/2024    PLT 76 (L) 12/27/2024     Lab Results   Component Value Date    CREATININE 5.6 (HH) 12/27/2024    BUN 75 (H) 12/27/2024     12/27/2024    K 4.7 12/27/2024     12/27/2024    CO2 17 (L) 12/27/2024       Hepatic Function Panel:   Lab Results   Component Value Date/Time    ALKPHOS 175 12/27/2024 03:35 AM    ALT 31 12/27/2024 03:35 AM    AST 81 12/27/2024 03:35 AM    BILITOT 1.4 12/27/2024 03:35 AM    BILIDIR 1.1 12/27/2024 03:35 AM    IBILI 0.3 12/27/2024 03:35 AM       Pre-Transplant Labs:     IR team called pt's wife for phone consent.   LYUBOV Modi notified of patient refusal to take 1500 Cellcept despite education from RN.    Lab work was obtained and the patients platelet and Hgb count warranted transfusions per protocol. Patient refused both the PRBCs and Platelet transfusions. Dr. Waterhouse notified.        [facility]  Cardiology Inpatient Consult Service  Daily Progress Note        Admit Date:  11/29/2024    Referring Physician: Saurabh Donaldson MD    Reason for Consultation/Chief Complaint:   AF w/RVR    Subjective:   Interval history:  VINOD  HR better    Medications:   methylPREDNISolone  60 mg IntraVENous Daily    mycophenolate  1,000 mg Oral TID    [START ON 12/30/2024] pantoprazole  40 mg Oral QAM AC    sirolimus  2.5 mg Oral Daily    epoetin laura-epbx  10,000 Units IntraVENous Once per day on Tuesday Thursday Saturday    micafungin (MYCAMINE) 50 mg in sodium chloride 0.9 % 100 mL IVPB  50 mg IntraVENous Daily    filgrastim/filgrastim biosimilar  600 mcg SubCUTAneous QPM    meropenem  500 mg IntraVENous Q24H    acyclovir  5 mg/kg (Adjusted) IntraVENous Q24H    [Held by provider] tamsulosin  0.4 mg Oral Daily    OLANZapine  2.5 mg Oral Nightly    sodium chloride flush  5-40 mL IntraVENous 2 times per day    Saline Mouthwash  15 mL Swish & Spit 4x Daily AC & HS    ursodiol  500 mg Oral BID       IV drips:   Amiodarone 0.5 mg/min (12/28/24 1730)    sodium chloride      sodium chloride      sodium chloride      sodium chloride 5 mL/hr at 12/27/24 0427    potassium chloride 20 mEq (12/21/24 2140)       PRN:  heparin (porcine), sulfur hexafluoride microspheres, morphine **OR** morphine, lidocaine, sodium chloride, [DISCONTINUED] ondansetron **OR** ondansetron, melatonin, prochlorperazine **OR** prochlorperazine, oxyCODONE **OR** oxyCODONE, magnesium sulfate, potassium chloride, iopamidol, acetaminophen, sodium chloride, sennosides-docusate sodium, sodium chloride, sodium chloride flush, sodium chloride, potassium chloride, magnesium sulfate, Saline Mouthwash, ALTEplase (CATHFLO) 2 mg in sterile water 2 mL injection, albuterol, LORazepam **OR** LORazepam, calcium carbonate      Objective:     Vitals:    12/29/24 0536 12/29/24 0600 12/29/24 0622 12/29/24 0823   BP: (!) 146/81 (!) 142/94  Nephrology Note                                                                                                                                                                                                                                                                                                                                                               Office : [phone]     Fax :[phone]    Patient's Name: Brandon Fischer  5:00 PM  11/30/2024    Reason for Consult:  CKD  Requesting Physician:  Claudia Anderson, LIOR - LALO  Chief Complaint:  No chief complaint on file.      Assessment/Plan     Solitary right kidney  Left sided RCC nephrectomy many many years ago  CKD-2   eGFR 80 stable   UA benign   We will monitor creatinine and RFP while on below chemotherapy  AML  Reduced Intensity Melphalan and Fludarabine followed by   Mismatched (9/10, DRB1 Mismatch) Allogeneic PBSC transplant scheduled on 12/05/24.    Post-transplant Cytoxan/Cellcept/Tacrolimus    History of Present Ilness:    Brandon Fischer is a 69 y.o. male with hx of RCC s/p unilateral nephrectomy -20 years ago and de-brenda AML dx in 6/2024.      He initially presented in June 2024 with lack of balance and fatigue and pancytopenia (WBC 1.1, Hgb 3.5, Plt 40).  BM bx (6/17/24) showed hypercellular marrow with myeloid blasts approaching 20%, no significant dysplastic features.  He had normal FISH and CG.  NGS showed positive EZH2 R690H.  He was initiated on Vidaza and Venetoclax on 7/8/24.  BM bx on 7/31/24 showed normal marrow with 1% myeloid blasts.  He then proceed with 3 more cycles of Vidaza/Venetoclax (4 total)     BM bx on 11/06/24 showed no evidence of AML or dysplasia, Flow negative for blasts.  He currently is in CR1.  He is being admitted for Reduced Intensity Melphalan and Fludarabine followed by Mismatched (9/10, DRB1 Mismatch) Allogeneic PBSC transplant scheduled on 12/05/24.    Donor is O neg and patient is AB neg.   Nephrology Note                                                                                                                                                                                                                                                                                                                                                               Office : [phone]     Fax :[phone]    Patient's Name: Brandon Fischer  7:26 PM  12/1/2024    Reason for Consult:  CKD  Requesting Physician:  Claudia Anderson, LIOR - [facility]ALO  Chief Complaint:  No chief complaint on file.      Assessment/Plan     ARTEMIO   Careful use of Lasix  For now 20 IV lasix daily in am if more than 500 +ve balance on I/O every 24 hours   Monitor creatinine   Solitary right kidney  Left sided RCC nephrectomy many many years ago  CKD-2   eGFR 80 at baseline   UA benign   We will monitor creatinine and RFP while on below chemotherapy  AML  Reduced Intensity Melphalan and Fludarabine followed by   Mismatched (9/10, DRB1 Mismatch) Allogeneic PBSC transplant scheduled on 12/05/24   Post-transplant Cytoxan/Cellcept/Tacrolimus    History of Present Ilness:    Brandon Fischer is a 69 y.o. male with hx of RCC s/p unilateral nephrectomy -20 years ago and de-brenda AML dx in 6/2024.      He initially presented in June 2024 with lack of balance and fatigue and pancytopenia (WBC 1.1, Hgb 3.5, Plt 40).  BM bx (6/17/24) showed hypercellular marrow with myeloid blasts approaching 20%, no significant dysplastic features.  He had normal FISH and CG.  NGS showed positive EZH2 R690H.  He was initiated on Vidaza and Venetoclax on 7/8/24.  BM bx on 7/31/24 showed normal marrow with 1% myeloid blasts.  He then proceed with 3 more cycles of Vidaza/Venetoclax (4 total)     BM bx on 11/06/24 showed no evidence of AML or dysplasia, Flow negative for blasts.  He currently is in CR1.  He is being admitted for Reduced Intensity Melphalan and Fludarabine  Nephrology Progress Note                                                                                                                                                                                                                                                                                                                                                               Office : [phone]     Fax :[phone]    Patient's Name: Brandon Fischer  12:25 PM  12/25/2024    Reason for Consult:  CKD  Requesting Physician:  Claudia Anderson, LIOR - LALO  Chief Complaint:  No chief complaint on file.      Assessment/Plan     ARTEMIO   Creatinine continues to worsen   RENAL BIOPSY ON THURSDAY MORNING,   Started medium dose steroids and SIROLIMUS stopped after discussing with hematology team   Check serology for BRISSA, ANCA, anti-GBM, C3, C4 - negative to date   No indication for acute RRT   Baseline creatinine close to 1.0  Etiology not entirely clear, vitals stable, no obstruction to urine flow   RICARDO without obstruction    Dose medications as per eGFR  Monitor creatinine   Strict I/O's  No acute indication for RRT     Volume overload   Diuretics PRN: gven lasix today by hem onc team   Hypernatremia  Off D5 and now on free water replacement   Electrolyte deficiency   Hyperphosphatmeia- mild, monitor   Hyper uricemia mild- monitor 2/2 ARTEMIO thoughh TLS may develop   Solitary right kidney  Left sided RCC nephrectomy many years ago  7. 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 Hematuria / posterior bladder mass   Urology consulted   Cystoscopy possible bladder biopsy once stable   9. Bacteremia   ID consulted         History of Present Ilness:    Brandon Fischer is a 69 y.o. male with hx of RCC s/p unilateral nephrectomy -20 years ago and de-brenda AML dx in 6/2024.      He initially presented in June 2024 with  Nephrology Progress Note                                                                                                                                                                                                                                                                                                                                                               Office : [phone]     Fax :[phone]    Patient's Name: Brandon Fischer  12:50 PM  12/28/2024    Reason for Consult:  CKD  Requesting Physician:  Claudia Anderson, LIOR - LALO  Chief Complaint:  No chief complaint on file.      Assessment/Plan     ARTEMIO   BIOPSY WITH SEVERE ATN, NO CRESCENTS SEEN IN GLOMERULUS, IMMUNOFLUORESCENCE IS PENDING   Steroids can be stopped  iHD today then Monday    Check serology for BRISSA, ANCA, anti-GBM, C3, C4 - negative to date   Baseline creatinine close to 1.0  Etiology not entirely clear, vitals stable, no obstruction to urine flow   Dose medications as per eGFR  Monitor creatinine   Strict I/O's  Volume overload   Diuretics PRN  Further volume management with HD  Hypernatremia  Off D5 and now on free water replacement   Electrolyte deficiency   Hyperphosphatmeia- mild, monitor   Hyper uricemia mild- monitor 2/2 ARTEMIO though TLS may develop   Solitary right kidney  Left sided RCC nephrectomy many years ago  7. 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 Hematuria / posterior bladder mass   Urology consulted   Cystoscopy possible bladder biopsy once stable   9. Bacteremia   ID consulted         History of Present Ilness:    Brandon Fischer is a 69 y.o. male with hx of RCC s/p unilateral nephrectomy -20 years ago and de-brenda AML dx in 6/2024.      He initially presented in June 2024 with lack of balance and fatigue and pancytopenia (WBC 1.1, Hgb 3.5, Plt 40).  BM bx (6/17/24) showed hypercellular  Nephrology Progress Note                                                                                                                                                                                                                                                                                                                                                               Office : [phone]     Fax :[phone]    Patient's Name: Brandon Fischer  10:28 AM  1/2/2025    Reason for Consult:  CKD  Requesting Physician:  Claudia Anderson, LIOR - LALO  Chief Complaint:  No chief complaint on file.      Assessment/Plan     ARTEMIO   BIOPSY WITH SEVERE ATN, NO CRESCENTS SEEN IN GLOMERULUS, IMMUNOFLUORESCENCE IS PENDING   Anticipate HD today   Baseline creatinine close to 1.0  Dose medications as per eGFR  Monitor creatinine   Strict I/O's  Volume overload   Volume management with HD  Hypernatremia- resolved  Off D5   Electrolyte deficiency   Hyperphosphatmeia- mild, monitor   Solitary right kidney  Left sided RCC nephrectomy many years ago  7. 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 Hematuria / posterior bladder mass   Urology consulted   Cystoscopy possible bladder biopsy once stable   9. Bacteremia   ID consulted   10. Acute diverticulitis   Gen surgery on board         History of Present Ilness:    Brandon Fischer is a 69 y.o. male with hx of RCC s/p unilateral nephrectomy -20 years ago and de-brenda AML dx in 6/2024.      He initially presented in June 2024 with lack of balance and fatigue and pancytopenia (WBC 1.1, Hgb 3.5, Plt 40).  BM bx (6/17/24) showed hypercellular marrow with myeloid blasts approaching 20%, no significant dysplastic features.  He had normal FISH and CG.  NGS showed positive EZH2 R690H.  He was initiated on Vidaza and Venetoclax on 7/8/24.  BM bx on 7/31/24 showed normal marrow  Nephrology Progress Note                                                                                                                                                                                                                                                                                                                                                               Office : [phone]     Fax :[phone]    Patient's Name: Brandon Fischer  11:21 AM  12/2/2024    Reason for Consult:  CKD  Requesting Physician:  Claudia Anderson, LIOR - LALO  Chief Complaint:  No chief complaint on file.      Assessment/Plan     ARTEMIO   Monitor creatinine   Hold IV lasix 20 mg as BP low today   Solitary right kidney  Left sided RCC nephrectomy many years ago  CKD-2   eGFR 80 at baseline   UA benign   We will monitor creatinine and RFP while on below chemotherapy  AML  Reduced Intensity Melphalan and Fludarabine followed by   Mismatched (9/10, DRB1 Mismatch) Allogeneic PBSC transplant scheduled on 12/05/24   Post-transplant Cytoxan/Cellcept/Tacrolimus    History of Present Ilness:    Brandon Fischer is a 69 y.o. male with hx of RCC s/p unilateral nephrectomy -20 years ago and de-brenda AML dx in 6/2024.      He initially presented in June 2024 with lack of balance and fatigue and pancytopenia (WBC 1.1, Hgb 3.5, Plt 40).  BM bx (6/17/24) showed hypercellular marrow with myeloid blasts approaching 20%, no significant dysplastic features.  He had normal FISH and CG.  NGS showed positive EZH2 R690H.  He was initiated on Vidaza and Venetoclax on 7/8/24.  BM bx on 7/31/24 showed normal marrow with 1% myeloid blasts.  He then proceed with 3 more cycles of Vidaza/Venetoclax (4 total)     BM bx on 11/06/24 showed no evidence of AML or dysplasia, Flow negative for blasts.  He currently is in CR1.  He is being admitted for Reduced Intensity Melphalan and Fludarabine followed by Mismatched (9/10, DRB1 Mismatch) Allogeneic PBSC  Nephrology Progress Note                                                                                                                                                                                                                                                                                                                                                               Office : [phone]     Fax :[phone]    Patient's Name: Brandon Fischer  3:58 PM  12/13/2024    Reason for Consult:  CKD  Requesting Physician:  Claudia Anderson, LIOR - LALO  Chief Complaint:  No chief complaint on file.      Assessment/Plan     ARTEMIO   Suspect sec to vol changes  On IVF.  Tacrolimus stopped. Changed to Sirolimus   Avoid nephrotoxins   Monitor creatinine   Volume overload  S/p IV Lasix  Hypokalemia  Sec to diarrhea and poor PO intake  Replacement added to IVF  Closely monitor   Hyponatremia  Encourage increasing free water intake   IVF changed to D5  Hypophosphatemia  Sec to poor PO intake  Replacement ordered   Solitary right kidney  Left sided RCC nephrectomy many years ago  CKD-2   eGFR 80 at baseline   UA benign   We will monitor creatinine and RFP while on below chemotherapy  AML  Reduced Intensity Melphalan and Fludarabine followed by   S/p Allogeneic PBSC transplant on 12/05/24   Post-transplant Cytoxan/Cellcept/Sirolimus      History of Present Ilness:    Brandon Fischer is a 69 y.o. male with hx of RCC s/p unilateral nephrectomy -20 years ago and de-brenda AML dx in 6/2024.      He initially presented in June 2024 with lack of balance and fatigue and pancytopenia (WBC 1.1, Hgb 3.5, Plt 40).  BM bx (6/17/24) showed hypercellular marrow with myeloid blasts approaching 20%, no significant dysplastic features.  He had normal FISH and CG.  NGS showed positive EZH2 R690H.  He was initiated on Vidaza and Venetoclax on 7/8/24.  BM bx on 7/31/24 showed normal marrow with 1% myeloid blasts.  He then proceed with 3 more  Nephrology Progress Note                                                                                                                                                                                                                                                                                                                                                               Office : [phone]     Fax :[phone]    Patient's Name: Brandon Fischer  10:25 AM  12/11/2024    Reason for Consult:  CKD  Requesting Physician:  Claudia Anderson APRN - LALO  Chief Complaint:  No chief complaint on file.      Assessment/Plan     ARTEMIO - Improving   Suspect sec to vol changes (recent diarrhea)  On IVF. Holding Lasix   Avoid nephrotoxins   Monitor creatinine   Hypokalemia  Sec to diarrhea and poor PO intake  Replacement added to IVF  Closely monitor   Hyponatremia  Encourage PO intake  IVF changed to D5  Solitary right kidney  Left sided RCC nephrectomy many years ago  CKD-2   eGFR 80 at baseline   UA benign   We will monitor creatinine and RFP while on below chemotherapy  AML  Reduced Intensity Melphalan and Fludarabine followed by   S/p Allogeneic PBSC transplant on 12/05/24   Post-transplant Cytoxan/Cellcept/Tacrolimus      History of Present Ilness:    Brandon Fischer is a 69 y.o. male with hx of RCC s/p unilateral nephrectomy -20 years ago and de-brenda AML dx in 6/2024.      He initially presented in June 2024 with lack of balance and fatigue and pancytopenia (WBC 1.1, Hgb 3.5, Plt 40).  BM bx (6/17/24) showed hypercellular marrow with myeloid blasts approaching 20%, no significant dysplastic features.  He had normal FISH and CG.  NGS showed positive EZH2 R690H.  He was initiated on Vidaza and Venetoclax on 7/8/24.  BM bx on 7/31/24 showed normal marrow with 1% myeloid blasts.  He then proceed with 3 more cycles of Vidaza/Venetoclax (4 total)     BM bx on 11/06/24 showed no evidence of AML or dysplasia, Flow  Nephrology Progress Note                                                                                                                                                                                                                                                                                                                                                               Office : [phone]     Fax :[phone]    Patient's Name: Brandon Fischer  9:19 AM  12/15/2024    Reason for Consult:  CKD  Requesting Physician:  Claudia Anderson APRN - [facility]  Chief Complaint:  No chief complaint on file.      Assessment/Plan     ARTEMIO   Suspect sec to sepsis, Cr stable    On IVF.  Tacrolimus stopped. Changed to Sirolimus   Avoid nephrotoxins   Monitor creatinine   Strict I.O   No indication for RRT   Volume overload- resolved   S/p IV Lasix  Hypokalemia- resolved   Sec to diarrhea and poor PO intake  closely monitor   Hypernatremia   Encourage increasing free water intake   IVF changed to D5 + 75 meq Na BC   Hypophosphatemia  Sec to poor PO intake  Replacement ordered   Solitary right kidney  Left sided RCC nephrectomy many years ago  7. 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 Hematuria   Discussed w Dr Stone- urology consults      History of Present Ilness:    Brandon Fischer is a 69 y.o. male with hx of RCC s/p unilateral nephrectomy -20 years ago and de-brenda AML dx in 6/2024.      He initially presented in June 2024 with lack of balance and fatigue and pancytopenia (WBC 1.1, Hgb 3.5, Plt 40).  BM bx (6/17/24) showed hypercellular marrow with myeloid blasts approaching 20%, no significant dysplastic features.  He had normal FISH and CG.  NGS showed positive EZH2 R690H.  He was initiated on Vidaza and Venetoclax on 7/8/24.  BM bx on 7/31/24 showed normal marrow with 1% myeloid blasts.  He then proceed  Nephrology Progress Note                                                                                                                                                                                                                                                                                                                                                               Office : [phone]     Fax :[phone]    Patient's Name: Brandon Fischer  1:55 PM  12/14/2024    Reason for Consult:  CKD  Requesting Physician:  Claudia Anderson APRN - [place]  Chief Complaint:  No chief complaint on file.      Assessment/Plan     ARTEMIO   Suspect sec to sepsis   On IVF.  Tacrolimus stopped. Changed to Sirolimus   Avoid nephrotoxins   Monitor creatinine   Strict I.O   No indication for RRT   Volume overload- resolved   S/p IV Lasix  Hypokalemia- resolved   Sec to diarrhea and poor PO intake  Replacement added to IVF  Closely monitor   Hypernatremia   Encourage increasing free water intake   IVF changed to D5 1/2 NS   Hypophosphatemia  Sec to poor PO intake  Replacement ordered   Solitary right kidney  Left sided RCC nephrectomy many years ago  7. AML  Reduced Intensity Melphalan and Fludarabine followed by   S/p Allogeneic PBSC transplant on 12/05/24   Post-transplant Cytoxan/Cellcept/Sirolimus      History of Present Ilness:    Brandon Fischer is a 69 y.o. male with hx of RCC s/p unilateral nephrectomy -20 years ago and de-brenda AML dx in 6/2024.      He initially presented in June 2024 with lack of balance and fatigue and pancytopenia (WBC 1.1, Hgb 3.5, Plt 40).  BM bx (6/17/24) showed hypercellular marrow with myeloid blasts approaching 20%, no significant dysplastic features.  He had normal FISH and CG.  NGS showed positive EZH2 R690H.  He was initiated on Vidaza and Venetoclax on 7/8/24.  BM bx on 7/31/24 showed normal marrow with 1% myeloid blasts.  He then proceed with 3 more cycles of Vidaza/Venetoclax (4 total)    Nephrology Progress Note                                                                                                                                                                                                                                                                                                                                                               Office : [phone]     Fax :[phone]    Patient's Name: Brandon Fischer  1:15 PM  12/22/2024    Reason for Consult:  CKD  Requesting Physician:  Claudia Anderson, LIOR - [place]  Chief Complaint:  No chief complaint on file.      Assessment/Plan     ARTEMIO   Creatinine at 2.4 today and is up trending  Baseline creatinine close to 1.0  Etiology not entirely clear, vitals stable, no obstruction  Check renal ultrasound   Dose medications as per eGFR  Monitor creatinine   Strict I/O's  No indication for RRT   Discussed with Dr Donaldson  Volume overload   Diuretics PRN  Hypernatremia  Encourage increasing oral free water intake   On D5W  Electrolyte deficiency   Replace prn   Solitary right kidney  Left sided RCC nephrectomy many years ago  7. 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 Hematuria / posterior bladder mass   Urology consulted   Cystoscopy possible bladder biopsy once stable         History of Present Ilness:    Brandon Fischer is a 69 y.o. male with hx of RCC s/p unilateral nephrectomy -20 years ago and de-brenda AML dx in 6/2024.      He initially presented in June 2024 with lack of balance and fatigue and pancytopenia (WBC 1.1, Hgb 3.5, Plt 40).  BM bx (6/17/24) showed hypercellular marrow with myeloid blasts approaching 20%, no significant dysplastic features.  He had normal FISH and CG.  NGS showed positive EZH2 R690H.  He was initiated on Vidaza and Venetoclax on 7/8/24.  BM bx on 7/31/24 showed normal marrow with 1%  Nephrology Progress Note                                                                                                                                                                                                                                                                                                                                                               Office : [phone]     Fax :[phone]    Patient's Name: Brandon Fischer  11:16 AM  12/31/2024    Reason for Consult:  CKD  Requesting Physician:  Claudia Anderson, LIOR - [place]  Chief Complaint:  No chief complaint on file.      Assessment/Plan     ARTEMIO   BIOPSY WITH SEVERE ATN, NO CRESCENTS SEEN IN GLOMERULUS, IMMUNOFLUORESCENCE IS PENDING   HD yesterday with 3L off. Anticipate next HD today   Baseline creatinine close to 1.0  Dose medications as per eGFR  Monitor creatinine   Strict I/O's  Volume overload   Volume management with HD  Hypernatremia- resolved  Off D5   Electrolyte deficiency   Hyperphosphatmeia- mild, monitor   Solitary right kidney  Left sided RCC nephrectomy many years ago  7. 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 Hematuria / posterior bladder mass   Urology consulted   Cystoscopy possible bladder biopsy once stable   9. Bacteremia   ID consulted         History of Present Ilness:    Brandon Fischer is a 69 y.o. male with hx of RCC s/p unilateral nephrectomy -20 years ago and de-brenda AML dx in 6/2024.      He initially presented in June 2024 with lack of balance and fatigue and pancytopenia (WBC 1.1, Hgb 3.5, Plt 40).  BM bx (6/17/24) showed hypercellular marrow with myeloid blasts approaching 20%, no significant dysplastic features.  He had normal FISH and CG.  NGS showed positive EZH2 R690H.  He was initiated on Vidaza and Venetoclax on 7/8/24.  BM bx on 7/31/24 showed normal marrow with 1% myeloid  Nephrology Progress Note                                                                                                                                                                                                                                                                                                                                                               Office : [phone]     Fax :[phone]    Patient's Name: Brandon Fischer  9:50 AM  12/12/2024    Reason for Consult:  CKD  Requesting Physician:  Claudia Anderson, LIOR - [place]  Chief Complaint:  No chief complaint on file.      Assessment/Plan     ARTEMIO   Suspect sec to vol changes (recent diarrhea)  On IVF. Holding Lasix   Now on Tacrolimus. Monitor levels  OHC may change to sirolimus   Avoid nephrotoxins   Monitor creatinine   Hypokalemia  Sec to diarrhea and poor PO intake  Replacement added to IVF  Closely monitor   Hyponatremia  Encourage PO intake  IVF changed to D5  Hypophosphatemia  Sec to poor PO intake  Replace PRN   Solitary right kidney  Left sided RCC nephrectomy many years ago  CKD-2   eGFR 80 at baseline   UA benign   We will monitor creatinine and RFP while on below chemotherapy  AML  Reduced Intensity Melphalan and Fludarabine followed by   S/p Allogeneic PBSC transplant on 12/05/24   Post-transplant Cytoxan/Cellcept/Tacrolimus      History of Present Ilness:    Brandon Fischer is a 69 y.o. male with hx of RCC s/p unilateral nephrectomy -20 years ago and de-brenda AML dx in 6/2024.      He initially presented in June 2024 with lack of balance and fatigue and pancytopenia (WBC 1.1, Hgb 3.5, Plt 40).  BM bx (6/17/24) showed hypercellular marrow with myeloid blasts approaching 20%, no significant dysplastic features.  He had normal FISH and CG.  NGS showed positive EZH2 R690H.  He was initiated on Vidaza and Venetoclax on 7/8/24.  BM bx on 7/31/24 showed normal marrow with 1% myeloid blasts.  He then proceed with 3 more  Nephrology Progress Note                                                                                                                                                                                                                                                                                                                                                               Office : [phone]     Fax :[phone]    Patient's Name: Brandon Fischer  9:19 AM  12/5/2024    Reason for Consult:  CKD  Requesting Physician:  Claudia Anderson, LIOR - [place]  Chief Complaint:  No chief complaint on file.      Assessment/Plan     ARTEMIO - Resolved   Monitor creatinine   Hold lasix if BP low   Cr at baseline ~ 1.00  Solitary right kidney  Left sided RCC nephrectomy many years ago  CKD-2   eGFR 80 at baseline   UA benign   We will monitor creatinine and RFP while on below chemotherapy  AML  Reduced Intensity Melphalan and Fludarabine followed by   Mismatched (9/10, DRB1 Mismatch) Allogeneic PBSC transplant scheduled on 12/05/24   Post-transplant Cytoxan/Cellcept/Tacrolimus      History of Present Ilness:    Brandon Fischer is a 69 y.o. male with hx of RCC s/p unilateral nephrectomy -20 years ago and de-brenda AML dx in 6/2024.      He initially presented in June 2024 with lack of balance and fatigue and pancytopenia (WBC 1.1, Hgb 3.5, Plt 40).  BM bx (6/17/24) showed hypercellular marrow with myeloid blasts approaching 20%, no significant dysplastic features.  He had normal FISH and CG.  NGS showed positive EZH2 R690H.  He was initiated on Vidaza and Venetoclax on 7/8/24.  BM bx on 7/31/24 showed normal marrow with 1% myeloid blasts.  He then proceed with 3 more cycles of Vidaza/Venetoclax (4 total)     BM bx on 11/06/24 showed no evidence of AML or dysplasia, Flow negative for blasts.  He currently is in CR1.  He is being admitted for Reduced Intensity Melphalan and Fludarabine followed by Mismatched (9/10, DRB1 Mismatch)  Nephrology Progress Note                                                                                                                                                                                                                                                                                                                                                               Office : [phone]     Fax :[phone]    Patient's Name: Brandon Fischer  9:49 AM  12/10/2024    Reason for Consult:  CKD  Requesting Physician:  Claudia Anderson, LIOR - [place]  Chief Complaint:  No chief complaint on file.      Assessment/Plan     ARTEMIO   Suspect sec to vol changes (recent diarrhea)  On IVF. Holding Lasix   Avoid nephrotoxins   Monitor creatinine   Hypokalemia  Sec to diarrhea and poor PO intake  Replacement added to IVF  Closely monitor   Solitary right kidney  Left sided RCC nephrectomy many years ago  CKD-2   eGFR 80 at baseline   UA benign   We will monitor creatinine and RFP while on below chemotherapy  AML  Reduced Intensity Melphalan and Fludarabine followed by   S/p Allogeneic PBSC transplant on 12/05/24   Post-transplant Cytoxan/Cellcept/Tacrolimus      History of Present Ilness:    Brandon Fischer is a 69 y.o. male with hx of RCC s/p unilateral nephrectomy -20 years ago and de-brenda AML dx in 6/2024.      He initially presented in June 2024 with lack of balance and fatigue and pancytopenia (WBC 1.1, Hgb 3.5, Plt 40).  BM bx (6/17/24) showed hypercellular marrow with myeloid blasts approaching 20%, no significant dysplastic features.  He had normal FISH and CG.  NGS showed positive EZH2 R690H.  He was initiated on Vidaza and Venetoclax on 7/8/24.  BM bx on 7/31/24 showed normal marrow with 1% myeloid blasts.  He then proceed with 3 more cycles of Vidaza/Venetoclax (4 total)     BM bx on 11/06/24 showed no evidence of AML or dysplasia, Flow negative for blasts.  He currently is in CR1.  He is being admitted for  Nephrology Progress Note                                                                                                                                                                                                                                                                                                                                                               Office : [phone]     Fax :[phone]    Patient's Name: Brandon Fischer  10:50 AM  12/7/2024    Reason for Consult:  CKD  Requesting Physician:  Claudia Anderson, LIOR - [place]  Chief Complaint:  No chief complaint on file.      Assessment/Plan     ARTEMIO - Resolved   Monitor creatinine   Hold lasix if BP low   Cr at baseline ~ 1.00  Solitary right kidney  Left sided RCC nephrectomy many years ago  CKD-2   eGFR 80 at baseline   UA benign   We will monitor creatinine and RFP while on below chemotherapy  AML  Reduced Intensity Melphalan and Fludarabine followed by   S/p Allogeneic PBSC transplant on 12/05/24   Post-transplant Cytoxan/Cellcept/Tacrolimus      History of Present Ilness:    Brandon Fischer is a 69 y.o. male with hx of RCC s/p unilateral nephrectomy -20 years ago and de-brenda AML dx in 6/2024.      He initially presented in June 2024 with lack of balance and fatigue and pancytopenia (WBC 1.1, Hgb 3.5, Plt 40).  BM bx (6/17/24) showed hypercellular marrow with myeloid blasts approaching 20%, no significant dysplastic features.  He had normal FISH and CG.  NGS showed positive EZH2 R690H.  He was initiated on Vidaza and Venetoclax on 7/8/24.  BM bx on 7/31/24 showed normal marrow with 1% myeloid blasts.  He then proceed with 3 more cycles of Vidaza/Venetoclax (4 total)     BM bx on 11/06/24 showed no evidence of AML or dysplasia, Flow negative for blasts.  He currently is in CR1.  He is being admitted for Reduced Intensity Melphalan and Fludarabine followed by Mismatched (9/10, DRB1 Mismatch) Allogeneic PBSC transplant scheduled on  Nephrology Progress Note                                                                                                                                                                                                                                                                                                                                                               Office : [phone]     Fax :[phone]    Patient's Name: Brandon Fischer  9:55 AM  12/3/2024    Reason for Consult:  CKD  Requesting Physician:  Claudia Anderson, LIOR - [place]  Chief Complaint:  No chief complaint on file.      Assessment/Plan     ARTEMIO   Monitor creatinine   Hold IV lasix 20 mg as BP low today   Solitary right kidney  Left sided RCC nephrectomy many years ago  CKD-2   eGFR 80 at baseline   UA benign   We will monitor creatinine and RFP while on below chemotherapy  AML  Reduced Intensity Melphalan and Fludarabine followed by   Mismatched (9/10, DRB1 Mismatch) Allogeneic PBSC transplant scheduled on 12/05/24   Post-transplant Cytoxan/Cellcept/Tacrolimus    History of Present Ilness:    Brandon Fischer is a 69 y.o. male with hx of RCC s/p unilateral nephrectomy -20 years ago and de-brenda AML dx in 6/2024.      He initially presented in June 2024 with lack of balance and fatigue and pancytopenia (WBC 1.1, Hgb 3.5, Plt 40).  BM bx (6/17/24) showed hypercellular marrow with myeloid blasts approaching 20%, no significant dysplastic features.  He had normal FISH and CG.  NGS showed positive EZH2 R690H.  He was initiated on Vidaza and Venetoclax on 7/8/24.  BM bx on 7/31/24 showed normal marrow with 1% myeloid blasts.  He then proceed with 3 more cycles of Vidaza/Venetoclax (4 total)     BM bx on 11/06/24 showed no evidence of AML or dysplasia, Flow negative for blasts.  He currently is in CR1.  He is being admitted for Reduced Intensity Melphalan and Fludarabine followed by Mismatched (9/10, DRB1 Mismatch) Allogeneic PBSC  Nephrology Progress Note                                                                                                                                                                                                                                                                                                                                                               Office : [phone]     Fax :[phone]    Patient's Name: Brandon Fischer  3:13 PM  12/23/2024    Reason for Consult:  CKD  Requesting Physician:  Claudia Anderson, LIOR - [place]  Chief Complaint:  No chief complaint on file.      Assessment/Plan     ARTEMIO   Creatinine continues to worsen   Check serology for BIRSSA, ANCA, anti-GBM, C3, C4  If Creatinine continues to trend up, he may need renal biopsy   Baseline creatinine close to 1.0  Etiology not entirely clear, vitals stable, no obstruction to urine flow   RICADRO without obstruction    Dose medications as per eGFR  Monitor creatinine   Strict I/O's  No acute indication for RRT   Volume overload   Diuretics PRN  Hypernatremia  On D5W  Electrolyte deficiency   Replace prn   Solitary right kidney  Left sided RCC nephrectomy many years ago  7. 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 Hematuria / posterior bladder mass   Urology consulted   Cystoscopy possible bladder biopsy once stable   9. Bacteremia   ID consulted         History of Present Ilness:    Brandon Fischer is a 69 y.o. male with hx of RCC s/p unilateral nephrectomy -20 years ago and de-brenda AML dx in 6/2024.      He initially presented in June 2024 with lack of balance and fatigue and pancytopenia (WBC 1.1, Hgb 3.5, Plt 40).  BM bx (6/17/24) showed hypercellular marrow with myeloid blasts approaching 20%, no significant dysplastic features.  He had normal FISH and CG.  NGS showed positive EZH2 R690H.  He was initiated on  Nephrology Progress Note                                                                                                                                                                                                                                                                                                                                                               Office : [phone]     Fax :[phone]    Patient's Name: Brandon Fischer  11:30 AM  12/30/2024    Reason for Consult:  CKD  Requesting Physician:  Claudia Anderson, LIOR - [place]  Chief Complaint:  No chief complaint on file.      Assessment/Plan     ARTEMIO   BIOPSY WITH SEVERE ATN, NO CRESCENTS SEEN IN GLOMERULUS, IMMUNOFLUORESCENCE IS PENDING   HD yesterday with 3L off. Anticipate next HD tomorrow   Baseline creatinine close to 1.0  Dose medications as per eGFR  Monitor creatinine   Strict I/O's  Volume overload   Volume management with HD  Hypernatremia  Off D5   Electrolyte deficiency   Hyperphosphatmeia- mild, monitor   Solitary right kidney  Left sided RCC nephrectomy many years ago  7. 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 Hematuria / posterior bladder mass   Urology consulted   Cystoscopy possible bladder biopsy once stable   9. Bacteremia   ID consulted         History of Present Ilness:    Brandon Fischer is a 69 y.o. male with hx of RCC s/p unilateral nephrectomy -20 years ago and de-brenda AML dx in 6/2024.      He initially presented in June 2024 with lack of balance and fatigue and pancytopenia (WBC 1.1, Hgb 3.5, Plt 40).  BM bx (6/17/24) showed hypercellular marrow with myeloid blasts approaching 20%, no significant dysplastic features.  He had normal FISH and CG.  NGS showed positive EZH2 R690H.  He was initiated on Vidaza and Venetoclax on 7/8/24.  BM bx on 7/31/24 showed normal marrow with 1% myeloid blasts.   Nephrology Progress Note                                                                                                                                                                                                                                                                                                                                                               Office : [phone]     Fax :[phone]    Patient's Name: Brandon Fischer  11:20 AM  12/27/2024    Reason for Consult:  CKD  Requesting Physician:  Claudia Anderson, LIOR - [place]  Chief Complaint:  No chief complaint on file.      Assessment/Plan     ARTEMIO   BIOPSY WITH SEVERE ATN, NO CRESCENTS SEEN IN GLOMERULUS, IMMUNOFLUORESCENCE IS PENDING   Steroids can be stopped  iHD 12/26 and 12/27  We will REPEAT iHD AGAIN TOMORROW   Check serology for BRISSA, ANCA, anti-GBM, C3, C4 - negative to date   Baseline creatinine close to 1.0  Etiology not entirely clear, vitals stable, no obstruction to urine flow   Dose medications as per eGFR  Monitor creatinine   Strict I/O's  Volume overload   Diuretics PRN  Further volume management with HD  Hypernatremia  Off D5 and now on free water replacement   Electrolyte deficiency   Hyperphosphatmeia- mild, monitor   Hyper uricemia mild- monitor 2/2 ARTEMIO though TLS may develop   Solitary right kidney  Left sided RCC nephrectomy many years ago  7. 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 Hematuria / posterior bladder mass   Urology consulted   Cystoscopy possible bladder biopsy once stable   9. Bacteremia   ID consulted         History of Present Ilness:    Brandon Fischer is a 69 y.o. male with hx of RCC s/p unilateral nephrectomy -20 years ago and de-brenda AML dx in 6/2024.      He initially presented in June 2024 with lack of balance and fatigue and pancytopenia (WBC 1.1, Hgb 3.5, Plt 40).  BM bx  Nephrology Progress Note                                                                                                                                                                                                                                                                                                                                                               Office : [phone]     Fax :[phone]    Patient's Name: Brandon Fischer  4:13 PM  12/18/2024    Reason for Consult:  CKD  Requesting Physician:  Claudia Anderson APRN - [place]  Chief Complaint:  No chief complaint on file.      Assessment/Plan     ARTEMIO   Tacrolimus stopped. Changed to Sirolimus   On hypotonic IVF because of hypernatremia   Cr with some mild fluctuation suspect sec to volume changes   Avoid nephrotoxins   Monitor creatinine   Strict I/O's  No indication for RRT   Volume overload - Resolved   Diuretics prn   Hypokalemia - Resolved   Sec to diarrhea and poor PO intake  Closely monitor   Hypernatremia   Encourage increasing free water intake   IVF changed to D5   Na slowly improving   Hypophosphatemia  Sec to poor PO intake  Replace PRN  Solitary right kidney  Left sided RCC nephrectomy many years ago  7. 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 Hematuria / posterior bladder mass    A. Urology consulted    B. Will need cystoscopy       History of Present Ilness:    Brandon Fischer is a 69 y.o. male with hx of RCC s/p unilateral nephrectomy -20 years ago and de-brenda AML dx in 6/2024.      He initially presented in June 2024 with lack of balance and fatigue and pancytopenia (WBC 1.1, Hgb 3.5, Plt 40).  BM bx (6/17/24) showed hypercellular marrow with myeloid blasts approaching 20%, no significant dysplastic features.  He had normal FISH and CG.  NGS showed positive EZH2 R690H.  He was initiated on Vidaza and  Nephrology Progress Note                                                                                                                                                                                                                                                                                                                                                               Office : [phone]     Fax :[phone]    Patient's Name: Brandon Fischer  10:06 AM  12/4/2024    Reason for Consult:  CKD  Requesting Physician:  Claudia Anderson, LIOR - [facility]  Chief Complaint:  No chief complaint on file.      Assessment/Plan     ARTEMIO   Monitor creatinine   Continue to hold IV lasix as BP's are soft  Cr at baseline ~ 1.00  Solitary right kidney  Left sided RCC nephrectomy many years ago  CKD-2   eGFR 80 at baseline   UA benign   We will monitor creatinine and RFP while on below chemotherapy  AML  Reduced Intensity Melphalan and Fludarabine followed by   Mismatched (9/10, DRB1 Mismatch) Allogeneic PBSC transplant scheduled on 12/05/24   Post-transplant Cytoxan/Cellcept/Tacrolimus    History of Present Ilness:    Brandon Fischer is a 69 y.o. male with hx of RCC s/p unilateral nephrectomy -20 years ago and de-brenda AML dx in 6/2024.      He initially presented in June 2024 with lack of balance and fatigue and pancytopenia (WBC 1.1, Hgb 3.5, Plt 40).  BM bx (6/17/24) showed hypercellular marrow with myeloid blasts approaching 20%, no significant dysplastic features.  He had normal FISH and CG.  NGS showed positive EZH2 R690H.  He was initiated on Vidaza and Venetoclax on 7/8/24.  BM bx on 7/31/24 showed normal marrow with 1% myeloid blasts.  He then proceed with 3 more cycles of Vidaza/Venetoclax (4 total)     BM bx on 11/06/24 showed no evidence of AML or dysplasia, Flow negative for blasts.  He currently is in CR1.  He is being admitted for Reduced Intensity Melphalan and Fludarabine followed by Mismatched (9/10, DRB1  Nephrology Progress Note                                                                                                                                                                                                                                                                                                                                                               Office : [phone]     Fax :[phone]    Patient's Name: Brandon Fischer  12:33 PM  12/21/2024    Reason for Consult:  CKD  Requesting Physician:  Claudia Anderson, LIOR - [facility]  Chief Complaint:  No chief complaint on file.      Assessment/Plan     ARTEMIO   Creatinine at 1.9 today and slowly up trending  Baseline creatinine close to 1.0  Etiology from hemodynamic and volume changes   Monitor creatinine   Strict I/O's  No indication for RRT   Volume overload   Diuretics PRN  Hypernatremia  Encourage increasing free water intake   On D5W  Electrolyte deficiency   Replace prn   Solitary right kidney  Left sided RCC nephrectomy many years ago  7. 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 Hematuria / posterior bladder mass    A. Urology consulted       History of Present Ilness:    Brandon Fischer is a 69 y.o. male with hx of RCC s/p unilateral nephrectomy -20 years ago and de-brenda AML dx in 6/2024.      He initially presented in June 2024 with lack of balance and fatigue and pancytopenia (WBC 1.1, Hgb 3.5, Plt 40).  BM bx (6/17/24) showed hypercellular marrow with myeloid blasts approaching 20%, no significant dysplastic features.  He had normal FISH and CG.  NGS showed positive EZH2 R690H.  He was initiated on Vidaza and Venetoclax on 7/8/24.  BM bx on 7/31/24 showed normal marrow with 1% myeloid blasts.  He then proceed with 3 more cycles of Vidaza/Venetoclax (4 total)     BM bx on 11/06/24 showed no evidence of AML or dysplasia,  Nephrology at bedside and placed order to change lasix order from 40 mg Q12 for positive I/O's of 500 ml or greater to lasix 20 mg Q24 for positive I/O's of 500 ml or greater. The nephrologist request that this be done at 8 am and all intake/ output be accounted for by the day shift nurse at 8 am daily.     Nurse placed order under Dr Wayne Fernando while he was at bedside.    Nurse then consulted the charge Nurse riley Costa to review this order as this patient is actively receiving chemo for an allo transplant.  Charge urged nurse to contact Shireen LANDERS the [facility] that is rounding today.    Shireen stated to keep the new order from nephrology and at 1800 this evening as a one time order, check the patient's intake and output, if he has an intake greater than output of 500 ml or more to call the on-call MD and see if they would like to order lasix.   Neutropenic Pathway  If patient oral temp > or = to 38.0 or if axillary temp > or = to 37.4 initiate protocol per orders.  Draw 2 sets of blood cultures from different sites. If patient remains febrile, redraw PAN culture every 68-72 hours.             Temp     Site(s) / Line Type Time Cultures obtained   Date 12/20/2024      Red lumen   Blue lumen  1055  1122         Pt afebrile during this shift a this time. 100F at 0555 12/20. MD Donaldson ordered pan cultures. POC ongoing.    Neutropenic Pathway  If patient oral temp > or = to 38.0 or if axillary temp > or = to 37.4 initiate protocol per orders.  Draw 2 sets of blood cultures from different sites. If patient remains febrile, redraw PAN culture every 68-72 hours.             Temp 100.4f    Site(s) / Line Type Time Cultures obtained   Date 12/7/2024  12:10 PM    White lumen amaro   Blue lumen amaro  [id number]        LYUBOV Modi made aware. Orders for cefepime placed.    New orders from Dr. Donaldson for one time dose solu-cortef d/t persistent fevers.   Nursing communication in by Dr. Hahn to D/C amio drip once bag finishes. RN communicated to this MD that patient is refusing PO meds and will not take PO amio. Per Dr. Hahn, keep patient on drip. Nursing communication d/c   Occupational / Physical Therapy  [facility] Mobility Program Check-In Note   Met with pt for weekly [facility] mobility program check in. Reports still managing in their self care IND ambulating to and from bathroom. Reporting completed shower this morning. Pt reminded and re-educated to goals of mobility program. Issued new activity log for the week. No OT intervention indicated at this time. Will cont to track progress. Pt in agreement with plan.    Michell Keating, MOT, OTR/L, CNS  Claudia Acuña, PT        Occupational / Physical Therapy  Attempt   Attempt to see this PM for therapies. Pt family present in room reporting \"you aren't going to get him up.\" Per RN pt has been refusing care most of the day. Pt family reporting pt in pain at this time and requesting RN. RN aware. Will follow up as treatment schedule allows.     Michell Keating, MOT, OTR/L, CNS    Delmar Juarez, PT, DPT          Occupational / Physical Therapy  Attempt   Pt off floor at cath lab. Will follow up as treatment schedule allows.     Michell Keating, MOT, OTR/L, CNS    Delmar Juarez, PT, DPT          Occupational / Physical Therapy  Hold   Family meeting with care team and palliative. Will hold therapies at this time. Follow up as treatment schedule allows.     Michell Keating, MOT, OTR/L, CNS       Occupational / Physical Therapy  Hold   Pt getting dialysis this AM. Will follow up as treatment schedule allows.     Michell Keating, MOT, OTR/L, CNS    Delmar Juarez, PT, DPT          Occupational / Physical Therapy  Refusal   Attempt to see for therapies this PM. Pt declining at this time, waving hands at therapist to go away. Covering self with blankets and closing eyes. Will follow up as treatment schedule allows.     Michell Keating, MOT, OTR/L, CNS    Delmar Juarez, PT, DPT          Occupational Therapy    Attempt note:    Attempted to work with pt for therapy. Pt was resting on arrival and declined OOB activity despite education on benefits of tx. Will follow up 12/24 as able.   Occupational Therapy  Facility/Department: [address]  Occupational Therapy Re-Assessment/Treatment    Name: Brandon Fischer  : 1955  MRN: [id number]  Date of Service: 2024    Discharge Recommendations:  24 hour supervision or assist  OT Equipment Recommendations  Equipment Needed: No       Past Medical History:  has a past medical history of Gastric ulcer, History of kidney cancer, Malignant neoplasm of kidney excluding renal pelvis (HCC), and Skin cancer.  Past Surgical History:  has a past surgical history that includes total nephrectomy (left); Wrist surgery (Left); Refractive surgery (Bilateral); IR PORT PLACEMENT > 5 YEARS (2024); CT BIOPSY BONE MARROW (2024); and IR TUNNELED CVC PLACE WO SQ PORT/PUMP > 5 YEARS (2024).    Treatment Diagnosis: impaired ADLs and functional mobility/transfers      Assessment  Performance deficits / Impairments: Decreased functional mobility ;Decreased ADL status;Decreased endurance;Decreased balance  Assessment: Pt is a 70 y/o M who presents below functional baseline. Pt is from home w/ spouse and reports being independent w/ all ADLs, transfers, and functional mobility at baseline. Currently, pt requires CGA for sit>stand transfers, Min A for stand to sit transfers, CGA for LE dressing, CGA for toileting, and SBA for grooming in stance at sink. Pt limtied by decreased endurance and balance. Pt will benefit from ongoing IP OT services during hospitalization to maximize safety and funcitonal independence. Rec 24hr A upon dc. Cont OT per POC  Treatment Diagnosis: impaired ADLs and functional mobility/transfers  Prognosis: Good  Decision Making: Medium Complexity  REQUIRES OT FOLLOW-UP: Yes  Activity Tolerance  Activity Tolerance: Patient Tolerated treatment well     Plan  Occupational Therapy Plan  Times Per Week: 2-5  Current Treatment Recommendations: Strengthening, Balance training, Functional mobility training, Safety education &  Occupational Therapy  Facility/Department: [address] [facility] [place]  Occupational Therapy Mobility Program Assessment    Name: Brandon Fischer  : 1955  MRN: [id number]  Date of Service: 2024    Discharge Recommendations:  24 hour supervision or assist  OT Equipment Recommendations  Equipment Needed: No         Past Medical History:  has a past medical history of Gastric ulcer, History of kidney cancer, Malignant neoplasm of kidney excluding renal pelvis (HCC), and Skin cancer.  Past Surgical History:  has a past surgical history that includes total nephrectomy (left); Wrist surgery (Left); Refractive surgery (Bilateral); IR PORT PLACEMENT > 5 YEARS (2024); CT BIOPSY BONE MARROW (2024); and IR TUNNELED CVC PLACE WO SQ PORT/PUMP > 5 YEARS (2024).           Assessment  Assessment: Pt from home, admit for transplant. Pt IND and active at baseline. Pt seen for [facility] mobility program introduction. Therapy initiated to educate on the importance of mobility, completion of ADLs, daily exercise, and activity log to promote fx mobility and performance with ADL throughout hospitalization. Pt verbalized understanding. Will cont to monitor 1x week for [facility] mobility program. Planning DC home.    Decision Making: Low Complexity  REQUIRES OT FOLLOW-UP: Yes  Activity Tolerance  Activity Tolerance: Patient Tolerated treatment well     Plan  Occupational Therapy Plan  Times Per Week: 1x wk check in    Restrictions  Position Activity Restriction  Other position/activity restrictions: Up as tolerated, Evaluate for mobility protocol    Subjective  General  Chart Reviewed: Yes  Additional Pertinent Hx: 69-year-old with MHx significant for CKD2, renal cell carcinoma s/p unilateral nephrectomy (20 years ago), and de-brneda AML (dx 2024), who is being admitted 24 for reduced-intensity melphalan/fludarabine followed by mismatched (9/10) unrelated alloSCT. Reduced-intensity Melphalan/Fludarabine preparative  Occupational/ Physical Therapy  Hold   Pt is on mobility program however reporting decline in status, new orders for check in reviewed. Per RN hold this date 2/2 pain/issues with coker. Will follow up as treatment schedule allows and as pt is able to participate. Cont POC- mobility check in     Michell Keating, MOT, OTR/L, CNS  Elizabeth Gabriel PT  [phone]       Original chemotherapy orders reviewed and acknowledged. Appropriateness of chemotherapy treatment regimen Dariela/Flu  f/b Allo SCT for diagnosis of AML was verified.  Patient educated on chemotherapy regimen.  Acknowledgement of informed consent for chemotherapy obtained.      Estimated body surface area is 2.43 meters squared as calculated from the following:    Height as of this encounter: 1.92 m (6' 3.59\").    Weight as of this encounter: 110.3 kg (243 lb 3.2 oz). verified.  Appropriate dosing calculations of chemotherapy based on above height, weight, and BSA verified.        Administration: Chemotherapy drug melphalen independently verified with Carly Garcia RN prior to administration.  Acknowledgement of informed consent for chemotherapy administration verified.  Original order, appropriateness of regimen, drug supplied, height, weight, BSA, dose calculations, expiration dates/times, drug appearance, and two patient identifiers were verified by both RNs.  Drug checked for vesicant/irritant status and for risk of hypersensitivity.  Most recent laboratory values and allergies, were reviewed.  Positive, brisk blood return via CVC was confirmed prior to administration. Chest x-ray for correct line placement reviewed. Darvin Carlos RN and Carly Garcia RN verified correct rate of chemotherapy and maintenance IV fluids.  Patient was educated on chemotherapy regimen prior to administration including indication for treatment related to disease & side effects of chemotherapy drug.  Patient verbalizes understanding of all instructions.        Completion of Chemotherapy: Monitoring during infusion done per policy, see Flowsheets.  Blood return verified before, during, and after infusion per policy; no signs of extravasation.  Pt tolerated chemotherapy well and without incident.  Chemotherapy infusion end time on the MAR.     Original chemotherapy orders reviewed and acknowledged. Appropriateness of chemotherapy treatment regimen Melphalan/Fludarabine-with post-transplant Cytoxan/Cellcept/Prog for diagnosis of AM: was verified.  Patient educated on chemotherapy regimen.  Consent for chemotherapy obtained.      Estimated body surface area is 2.43 meters squared as calculated from the following:    Height as of this encounter: 1.92 m (6' 3.59\").    Weight as of this encounter: 110.3 kg (243 lb 3.2 oz). verified.  Appropriate dosing calculations of chemotherapy based on above height, weight, and BSA verified.      Marcia Garcia, RN11/29/2024  4:14 PM   PCA called out, reporting that patient called out and is requesting pain medication. Patient snoring with eyes closed upon arrival to room. Responds to name. Patient refused blood pressure check and pain medication at this time. Call light within reach. Wife at bedside. Bed wheels locked and bed in low position, bed alarm on.   Patient admitted to [facility] via direct admit from home for diagnosis of AML.  Original chemotherapy orders reviewed and acknowledged.  Patient educated on chemotherapy regimen.  Consent for chemotherapy obtained.      Estimated body surface area is 2.43 meters squared as calculated from the following:    Height as of this encounter: 1.92 m (6' 3.59\").    Weight as of this encounter: 110.3 kg (243 lb 3.2 oz). verified.  Appropriate dosing calculations of chemotherapy based on above height, weight, and BSA verified.      Patient and wife oriented to patient room including call light and bed controls.  Admission assessment completed - see admission flowsheet documentation.  Patient is a low fall risk.  Safety measures instituted per policy.    Patient and wife oriented to unit policies and procedures including: pain management practices, unit safety precautions, family rapid response, q4h vital signs and assessments, daily 4am lab draws, weekly chest x-rays, daily chlorhexidine bathing, standing transfusion orders, and routine central line care.  Also discussed use of call light and how to get in touch with nursing staff.  Stressed the importance of calling out immediately for any changes in condition including but not limited to: pain, chills, fever, nausea, vomiting, diarrhea, chest pain, sob/whitehead, assistance with toileting, bleeding, or any other symptoms that are out of the ordinary for the patient.  Patient verbalizes understanding of all instructions and will call for assistance as needed.   Patient c/o SOB, O2 sat 90% on room air. Patient refusing to change position or elevate HOB. Placed on 1L O2 per nasal cannula, sats up to 93%.    Patient c/o trouble breathing. O2 sat at 97% on 2L per nasal cannula. CXR order placed. Order for Afrin spray placed per NP Adry.    Patient chose DNR/Hospice care and has been refusing meds, tele, labs.     -Will stop amio drip  -If willing, patient may continue lopressor 25 bid.   -no AC due to pancytopenia.       I would like to thank you for providing me the opportunity to participate in the care of your patient. If you have any questions, please do not hesitate to contact me.     Jameson Hinds MD, [facility], [facility]  [facility] [place] [address] [address]  Ph: [phone]  Fax: [phone]     Patient continues to have blood-tinged urine. RN observed blood clot in urinal at one point during shift. Pt admits to some pain during urination and is re RN notified NP. No new orders.    Patient continues to refuse care and medications. Re-iterated importance of complying and medication adherence. Patient stating \"It's my choice, I want to do hospice\".   Patient continues to refuse care. Patient refusing assessment, refusing incontinence check/care. BMT team aware.    Patient creatinine is 1.8 from afternoon labs and was 1.6 this AM. It has been trending up daily. He has blood in his urine (pink not matthieu) and now has new low/mid back pain that is tender to touch.Nurse updated nephrology on patient condition and is awaiting to hear back.    Patient had heart rate sustaining in 120's-130's this morning. Dr. Chavira notified and EKG performed. This RN notified Dr. Chavira of EKG results (see results). No further orders at this time.    Patient had short period of sinus tach that sustained in the 120-130s for about 20 minutes after ambulating to the bathroom. He eventually returned to the 80s and has no complaints of discomfort. He also had a small streak of blood in his urine. Nurse gave a report of this to Ly MAY. Will continue to monitor.    Patient has been non-compliant with the refusal of medications and VS. Lab work was obtained and PLT count was 28 which per protocol warranted transfusion. Initially Brandon was non compliant with \"NO NO NO just let me die. This RN explained the importance of receiving this PLT transfusion to prevent further nose bleeds which patient had around 1930 related to him picking his dry nose. Patient states\" I just want to die but I am scared. This isn't living what I am going through.\" This RN told patient that I thought he was a very strong man to endure all his setbacks. Brandon said it was the toughest and worst time of his life and it made him wish to die. I encouraged Brandon to help me let him continue to fight, and try and prevent further bleeding and the distress it could cause. Brandon was agreeable to transfusion and was compliant with VS and verification of transfusion per protocol.Transfusion completed without reaction   Patient having severe pain this morning, reporting 10/10 in bladder and penis. He is demanding that coker be removed. Urology notified via call number, and primary team notified as well. Patient screaming in pain, tachy up to 142 as observed by this RN. Reached out to primary for pain management. Attempted to irrigate for clots but did not get any. Awaiting urology response.    Patient noted to have a soiled brief and bed pad underneath him. Provided incontinence care and took soiled brief and bed pad out from underneath patient. Attempted to turn patient and place new brief and bed pad. Patient adamantly refusing to turn, stating he wants to be left alone. Patient stated, \"You're trying to kill me.\" Educated patient on importance of cleaning up after incontinence episode and needing to place new brief and pajamas on patient so patient was clean and reminded patient he was going home with hospice. Patient started to swing his arms when attempting to turn him again, continuing to refuse new brief and pajama placement. PICC line removed. Patient refusing to let this RN hold pressure at insertion site. Occlusive dressing placed, no blood noted to be oozing out of tegaderm. Continued to try and hold pressure but patient ripped his arm away and stated, \"Cover me up.\" Blankets placed over top of patient. One side of brief buttoned in place, unable to button other side due to patient refusing to turn. Call light within reach. Bed alarm engaged. Bed wheels locked and bed in low position.   Patient politely declined to be weighed at 1800. Endorsing stomach cramping and nausea at this time. Medication administered to help with side effects.    Patient refused evening medication and having his temperature taken. Patient also refusing RN to hook up amio drip to PICC line. LYUBOV Sandoval made aware. NP switched vital signs frequency to every shift and discontinued telemetry order.          Patient refusing RN assessment. Refusing scheduled medications. RN provided education regarding meds and reasoning for taking them. Patient continues to refuse stating \"I don't need to take them\". Patient refusing vitals and line care. Refusing bladder scan to check for retention as he has not voided.    Patient reported \"seeing bugs crawling\" on furniture that has been occurring \"for a while\". Denies hearing or feeling \"bugs\". Describes them as small, flaco-polys. Reports last saw them yesterday. RN notified Dr. Chavira. RN ordered to clean eyes of drainage and give one time dose of erythromycin to right eye.    Patient requested to turn on other side. Attempted to help patient turn. Patient stated it, \"hurts too much,\" and that he wants to be \"left here.\" Inquired if patient wanted PRN oxycodone. Patient refused, stating, \"It doesn't help.\" Informed patient of digoxin that was ordered and that this RN would need to take his blood pressure prior to administration. Educated patient on use of digoxin. Patient asked, \"Will it speed things up?\" Informed patient digoxin is used to control heart rate and that his heart rate on the telemetry monitor was in the 120s while he was laying supine. Patient verbalized understanding and continued to refuse medication. Patient continued to refuse the rest of his morning medications and vital signs after education on the importance of taking them. Call light within reach. Wife at bedside. Bed wheels locked. Bed in low position.   Patient seen and assessed for standard line care needs.  CVC site remains free of visible signs and symptoms of infection.  No drainage, edema, erythema, pain, itching, or warmth noted at and around the insertion site.  Line care performed by Marcia Garcia RN.  The need for continued use of the CVC is due to ongoing therapy.  Patient verbalized understanding of the line care education provided by line care RN.  Staff RNs will continue to monitor and assess the CVC site throughout the patient's hospital stay.  Sterile dressing changes will continue to be changed per policy.   Patient seen and assessed for standard line care needs.  CVC site remains free of visible signs and symptoms of infection.  No drainage, edema, erythema, pain, itching, or warmth noted at and around the insertion site.  Line care performed by Marcia Garcia RN.  The need for continued use of the CVC is due to ongoing therapy.  Patient verbalized understanding of the line care education provided by line care RN.  Staff RNs will continue to monitor and assess the CVC site throughout the patient's hospital stay.  Sterile dressing changes will continue to be changed per policy.   Patient seen and assessed for standard line care needs.  CVC site remains free of visible signs and symptoms of infection. Site w/ redness, pt denies tenderness, ecchymosis.  No drainage, edema,pain, itching, or warmth noted at and around the insertion site.  Line care performed by Marcia Garcia RN.  The need for continued use of the CVC is due to ongoing therapy.  Patient verbalized understanding of the line care education provided by line care RN.  Staff RNs will continue to monitor and assess the CVC site throughout the patient's hospital stay.  Sterile dressing changes will continue to be changed per policy.   Patient snoring with eyes closed upon entering room. Easy to arouse. Patient refused routine vital signs and scheduled medications. Patient inquired about pain medication when informing patient that this RN has his morning medications. Informed patient that PRN oxycodone is not due for another hour. Patient stated, \"go away.\" Obtained bed weight with patient lying on left side supine in bed. Wife at bedside. Call light within reach, bed alarm on. Bed wheels locked and bed in low position. When asked if patient needed anything, patient denied further needs.   Patient's blood pressure this am was 94/55 this am and had orders for IV lasix 20 mg. RN contacted nephrology via [employer] and orders given to hold lasix at this time.   Patient's procalcitonin resulted 2.3 at 1100. Yue Oliveira NP, notified. Order placed for repeat lab draw at 0600 tomorrow to trend.   Physical Therapy  Daily Treatment Note      Discharge Recommendation:  24 hour supervision/assist and Home PT  Equipment Needs:  Possible rolling walker (will continue to assess)    Assessment:  Pt needing SBA for bed mobility and CGA for transfers/gait with walker. Mobility slow and quite effortful. Needing rest breaks between bouts of activity. Pt would benefit from continued PT in hospital as well as regular activity with nursing staff. At D/C, pt would benefit from 24 hour assist and home PT. Will continue to assess DME needs.     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 Agreeable to working with PT after min encouragement.   \"I've been up already today. I sat in the chair for awhile.\"  C/o feeling tired.     Pain: No c/o voiced    Objective:    Bed mobility  Supine to sit: SBA, HOB flat with use of railing  Sit to Supine: SBA, HOB flat with use of railing    Transfers  Sit to stand: CGA from bed; CGA from chair  Stand to sit: CGA into chair; CGA onto bed. Decreased eccentric control noted.     Ambulation  Assistance Level: CGA  Assistive device: Rolling walker  Distance: 75 ft. 65 ft. Seated rest between walks.   Quality of gait: Effortful; decreased step height; moderate reliance on walker for support; decreased pace    Exercises  Seated in chair:  10 reps B LAQ, marching  15 reps B heel raises, toe raises  Other: Rest  Physical Therapy  Daily Treatment Note      Discharge Recommendation:  Skilled Nursing Facility  Equipment Needs:  Defer to next level of care    Assessment:  Pt needing increased assist for activity today. Pt needing ongoing encouragement to mobilize and participate in activities. Declining OOB activity today, but agreeable to sitting EOB for a few minutes and performing LE exercises. Needing at least Mod assist x 1 for bed mobility. Pt limited by discomfort, weakness, fatigue. Would benefit from continued IP PT at D/C to maximize strength and function prior to returning home.     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 Wife in/out during session.   \"Oh, you're going to make me walk?\" \"I don't have any strength in my legs.\"  \"I'm cold. I hate the cold.\"  \"Will you let my lie down now?\" (While seated EOB)    Pain: c/o pain \"on my L side.\" RN aware. Warm pack to area at start of session and replaced at end of session.     Objective:    Bed mobility  Supine to sit: Mod assist x 1 (mostly for trunk), HOB up partially   Scooting: CGA to EOB. Effortful.  Sit to Supine: Dependent (Min assist for trunk + Mod assist for LEs), HOB flat  Rolling: To R and L with SBA and use of railing  Scooting: Dependent assist x 2 towards HOB in supine    Balance  Sat EOB ~10 minutes with SBA. Mostly static, but performing some LE exercises while EOB.     Exercises  Seated EOB:  2 sets x 5 reps B SAQ  2 sets x 10 reps B heel raises  Other: Rest breaks between sets    Other  Pt attempted to side scoot (towards HOB) while seated EOB, but unable. \"I'm too weak.\"    Patient Education  Role of PT. Importance of mobility to  Physical Therapy  Facility/Department: [address]  Physical Therapy Re-evaluation / Treatment    Name: Brandon Fischer  : 1955  MRN: [id number]  Date of Service: 2024    Discharge Recommendations:  24 hour supervision or assist, Home with Home health PT   PT Equipment Recommendations  Other: may need RW - will continue to assess      Patient Diagnosis(es):   Past Medical History:  has a past medical history of Gastric ulcer, History of kidney cancer, Malignant neoplasm of kidney excluding renal pelvis (HCC), and Skin cancer.  Past Surgical History:  has a past surgical history that includes total nephrectomy (left); Wrist surgery (Left); Refractive surgery (Bilateral); IR PORT PLACEMENT > 5 YEARS (2024); CT BIOPSY BONE MARROW (2024); and IR TUNNELED CVC PLACE WO SQ PORT/PUMP > 5 YEARS (2024).    Assessment  Body Structures, Functions, Activity Limitations Requiring Skilled Therapeutic Intervention: Decreased functional mobility ;Decreased endurance  Assessment: Pt is 69 y.o. male seen for PT re-eval today after decline in mobility status. Pt requires assist x 1 for bed mobility, transfers, and amb. Fatigued with amb of short distances around room. Pt relies heavily on IV pole and would benefit from trial use of RW at future visit. Discussed with RN. Will increase PT POC to 2-5x/week to maximize safety and independence prior to return home. Rec 24hr assist and home PT. May need RW - will continue to assess.  Treatment Diagnosis: impaired gait and transfers  Therapy Prognosis: Good  Requires PT Follow-Up: Yes    Plan  Physical Therapy Plan  General Plan:  (2-5)  Current Treatment Recommendations: Balance training, Functional mobility training, Gait training, Transfer training, Stair training, Endurance training, Equipment evaluation, education, & procurement, Therapeutic activities, Patient/Caregiver education & training, Safety education & training  Safety Devices  Type  Physical Therapy  Facility/Department: [address] [facility] [place]  Physical Therapy Initial Assessment/Mobility program    Name: Brandon Fischer  : 1955  MRN: [id number]  Date of Service: 2024    Discharge Recommendations:  Home with assist PRN   PT Equipment Recommendations  Equipment Needed: No      Patient Diagnosis(es): AML - allograft.  Past Medical History:  has a past medical history of Gastric ulcer, History of kidney cancer, Malignant neoplasm of kidney excluding renal pelvis (HCC), and Skin cancer.  Past Surgical History:  has a past surgical history that includes total nephrectomy (left); Wrist surgery (Left); Refractive surgery (Bilateral); IR PORT PLACEMENT > 5 YEARS (2024); CT BIOPSY BONE MARROW (2024); and IR TUNNELED CVC PLACE WO SQ PORT/PUMP > 5 YEARS (2024).    Assessment  Assessment: Pt here for transplant and evaluated for mobility program.  Pt is doing well and is currently independent with all mobility, including ambulating in halls  multiple times a day.  Therapy initiated to educate pt on importanceof mobility, daily HEP and activity log to promote mobility throughout hospitalization.  Pt verbalized unerstanding of all education provided.  Will continue to monitor 1x/week throughout hospitalization for mobility program.  Activity Tolerance  Activity Tolerance: Patient tolerated evaluation without incident    Plan  Physical Therapy Plan  General Plan:  (1x/week)  Current Treatment Recommendations:  (Mobility Program)  Safety Devices  Type of Devices: Left in chair, Call light within reach, Nurse notified    Restrictions  Position Activity Restriction  Other position/activity restrictions: Up as tolerated, Evaluate for mobility protocol     Subjective  General  Additional Pertinent Hx: Pt admitted 24 for reduced-intensity melphalan/fludarabine followed by mismatched (9/10) unrelated alloSCT.  Referring Practitioner: Anika  Diagnosis:  Physical Therapy and Occupational Therapy  HOLD    Came to see pt for PT treatment.  Spoke with nursing who states pt presently getting an echo and then going off floor for testing.  Will hold at this time and return later as schedule allows.     14:06 Another attempt to see pt for therapy made for OT. RN stating that pt is leaving again for more testing. Will attempt later as schedule allows    Marcy Hernandez PT [id number]  Trisha TAMEZ/DOMINIC         Physical Therapy and Occupational Therapy  Pt lying on side upon arrival.  Reports having BM in bed.  \"I need cleaned up.\"  Reports not willing to work with therapy after being cleaned despite encouragement.  Call button pressed & relayed info to RN that pt needs cleaned.  Will continue attempts at therapy.  Marichuy Melvin, PT [id number]  Trisha COTA         Physical Therapy/Occupational Therapy  Attempt  Chart review completed.  Attempted at 1322 on 12/13/24.  RN requesting  hold therapy at this time to allow pt to rest, as he did not sleep well last night.  Will re-attempt as schedule allows.    Of note pt is on mobility program however reporting decline in status. Will follow up as treatment schedule allows and as pt is able to participate. Cont POC- mobility check in     Elda Adams, PT  Michell Keating, MOT, OTR/L, CNS         Point of Care Note  General Surgery        Time: 10:53 PM  Date: 1/01/2025    Serial abdominal exam   S: Patient resting comfortably on his left side. States that his abdominal pain is well controlled. Denies of nausea or vomiting. Having BM and passing gas    O: Abd soft, non-distended, no TTP, non-peritoneal     - Stable abd exam  - NPO, IV abx  - Will follow-up on AM labs    Nedra Young DO  PGY1, General Surgery  01/01/25  10:53 PM  [phone]     Point of Care Note  General Surgery        Time: 11:47 am  Date: 1/02/2025    Serial abdominal exam   S: Patient resting comfortably on his left side. No complaints of pain. Requesting an apple.     O: Abd soft, non-distended, no TTP, non-peritoneal     - Stable abd exam  - will advance to CLD  -will continue to follow       Chapis Damon CNP  General Surgery     Point of Care Note  General Surgery        Time: 11:47 am  Date: 1/02/2025    Serial abdominal exam   S: Patient resting comfortably on his right side. Reports slight abdomen discomfort. Had a bowel movement.     O: Abd soft, non-distended, no TTP, non-peritoneal     - Stable abd exam  -will continue to follow       Chapis Damon CNP  General Surgery     Pt confused during handoff with MD Saurabh Parker notified and at bedside. This RN contacted [facility] dialysis regarding HD, advised to hold 1700 and 1800 medications at this time until after HD, HD nurse at bedside. PHUONG Terry notified.    Pt has NG tube feed going at 30 ml/hr. Pt had c/o nausea earlier in the night with no stomach cramping, relieved with IV zofran. At 0611, pt called out with c/o of nausea, and refused anti nausea meds. Pt immediately vomited 200 mL of tan liquid. Jovanni Reed MD notified, responded to \"hold tube feeds\". This RN complied. Care continues    Pt is refusing his night time meds and all other care needs (see MAR for details). Pt educated on the importance of medication compliance. Pt seems increasingly agitated, using provacative language towards staff and continuously repeating \"give me a chunk of meat\". Pt educated on the reason for clear liquid diet and that solids are not an option at this time.  NP Little notified about refusing all care. No new orders at this time.    Pt refused morning meds this AM. He also refused a PICC dressing change and for his linens to be changed. Pt is continuously stating \"I am done\" and that \"I am in hell\". Pt educated on the importance of medication compliance.    Pt refused to take all oral pills. IV meds given.   Pt requesting something to help him sleep. LYUBOV Sandoval made aware. NP ordered nightly PRN Melatonin 5 mg.    Pts Phosphorus was 1.3 with AM labs. NP made aware. LYUBOV Modi ordered IV replacement. See new orders.     RN passed this information along to day shift RN's.    RN asked patient if it would be okay to check to make sure he was clean. Patient would not answer. Patients wife then asked him if he needed to be cleaned up and he responded \"No\". Patients wife felt the patients brief and expressed it felt dry. RN asked the patient if she could do anything for him and he responded \"Go away\". Patients wife and daughter at bedside.        RN educated patient on restarting amio drip since he is refusing to take PO meds. Patient refusing RN to hook up amio drip to PICC line. Patient and wife educated on reasoning for amio drip, patient continues to refuse and not acknowledge nurse. BMT on call NP Bunting made aware and Cardiology on call made aware of patient refusal.    RN in room to assess patient. Patient awake in bed, however closing eyes when nurse approaches. RN explained to patient that she would like to assess patient and obtain vitals. Patient refusing. Patient stating \"I'm not going to do anything unless you let me die, I wanna get out of here, let me leave\". Patient refusing vitals after multiple attempts to obtain and redirect, unable to redirect patient. Patient's wife at bedside. RN explained to patient and wife that MD will be in during rounds so that needs/concerns can be addressed.    RN notified Dr. Chavira of CT abdomen/pelvis results. Consult placed to general surgery. New orders for pt to be NPO (sips with meds) and for continuous IV fluids.    RN reached out to [facility] Cardiology on call per BMT request to make aware that patient is not taking PO amiodarone and if they want to switch patient back to amio drip. Per covering, will look into patient as cards has signed off.    RN received call from Dr. SELINA Chavira regarding patient code status should patient deteriorate overnight. RN addressed code status per Dr. Chavira's wishes with patient and wife. Patient verbalizing on multiple occasions that he is requesting hospice and does not want to stay in hospital. Per patient, he does not want life saving measures if it came to his heart arresting. No chest compressions, no shock, no medications and no intubation/mechanical ventilation. Patient is of capacity, alert/oriented. Patient and wife both agreeable and understanding code status change. Dr Bertha Chavira notified of patient's wishes, order to be placed to reflect code status by Dr. Chavira   Rapid response called at 2150 d/t hypotension and tachycardia. At 2154 500 ml of NS bolus as given and a stat EKG was done. D/t decrease of blood pressure, at 2215 phenylephrine IV push was given, see MAR for details. At 2220 pt hgb resulted at 4.2. Two units of PRBC ordered. Pt VSS at this time. Care continues.    Sirolimus level drawn from red lumen at 1711. Lab called and notified that RN sent blood down for stat order.    Spiritual Health History and Assessment/Progress Note  [facility]    (P) Initial Encounter, Spiritual/Emotional Needs,  ,  ,      Name: Brandon Fischer MRN: [id number]    Age: 69 y.o.     Sex: male   Language: English   Hoahaoism: Non-Pentecostal   AML (acute myeloid leukemia) in remission (HCC)     Date: 2024            Total Time Calculated: (P) 47 min              Spiritual Assessment began in [address] [facility]        Referral/Consult From: (P) Nurse   Encounter Overview/Reason: (P) Initial Encounter, Spiritual/Emotional Needs  Service Provided For: (P) Patient and family together    Pt requested to receive Holy Communion, but \"only gluten free\".  shared that there is no gluten free Holy Communion in the hospital. Pt is supported by his spouse and his daughters. Spouse shared that pt was raised Taoism but stopped going to [employer] when their daughter . Pt expressing desire to \"let him die\". Spouse said pt didn't really mean it because he is suffering a lot. Pt believes in God.  offered words of encouragement quoting from Levi' suffering and death as pt is Amish.  offered blessing at the end of visit. Pt's spouse tearful.  talked to her separately outside room to comfort her. No other needs at this time.  will refer other chaplains to follow up.    [place], Belief, Meaning:   Patient is connected with a [place] tradition or spiritual practice and has beliefs or practices that help with coping during difficult times  Family/Friends are connected with a [place] tradition or spiritual practice and have beliefs or practices that help with coping during difficult times      Importance and Influence:  Patient has spiritual/personal beliefs that influence decisions regarding their health  Family/Friends have spiritual/personal beliefs that influence decisions regarding the patient's health    Community:  Patient feels well-supported. Support  Tacro level drawn from patient's red lumen on [facility] CVC at 0837, prior to administration of PO tacro medication.   Team notified of RN assessment findings that deviate from yesterday's assessment, which include increased dizziness and issues starting urination. See new orders.    Tele- psych at bedside at this time.   [facility] - Clinical Pharmacy Note     Notification received from laboratory of positive blood culture results.    Organism(s) detected: Gram negative rods (still pending identification)  Applicable Antimicrobial Resistance Genes: none detected    Recommended change(s) to current antimicrobial regimen: none needed at this point, patient is on broad spectrum (meropenem)    Michele Barclay, PharmD  PGY1 Pharmacy Resident   Wireless: [id number]  12/21/24        [facility]  Cardiology Inpatient Consult Service  Daily Progress Note        Admit Date:  11/29/2024    Referring Physician: Saurabh Donaldson MD    Reason for Consultation/Chief Complaint:   AF w/RVR    Subjective:   Interval history:  VINOD  HR better    Medications:   [START ON 12/29/2024] methylPREDNISolone  80 mg IntraVENous Daily    sirolimus  6 mg Oral Once    micafungin (MYCAMINE) 50 mg in sodium chloride 0.9 % 100 mL IVPB  50 mg IntraVENous Daily    filgrastim/filgrastim biosimilar  600 mcg SubCUTAneous QPM    meropenem  500 mg IntraVENous Q24H    mycophenolate (CELLCEPT) 1,000 mg in dextrose 5 % 166.7 mL IVPB  1,000 mg IntraVENous TID    pantoprazole (PROTONIX) 40 mg in sodium chloride (PF) 0.9 % 10 mL injection  40 mg IntraVENous Daily    acyclovir  5 mg/kg (Adjusted) IntraVENous Q24H    sirolimus  3 mg Oral Daily    [Held by provider] tamsulosin  0.4 mg Oral Daily    OLANZapine  2.5 mg Oral Nightly    sodium chloride flush  5-40 mL IntraVENous 2 times per day    Saline Mouthwash  15 mL Swish & Spit 4x Daily AC & HS    ursodiol  500 mg Oral BID       IV drips:   Amiodarone 0.5 mg/min (12/28/24 0553)    sodium chloride      sodium chloride      sodium chloride      sodium chloride 5 mL/hr at 12/27/24 0427    potassium chloride 20 mEq (12/21/24 2140)       PRN:  heparin (porcine), sulfur hexafluoride microspheres, morphine **OR** morphine, lidocaine, sodium chloride, [DISCONTINUED] ondansetron **OR** ondansetron, melatonin, prochlorperazine **OR** prochlorperazine, oxyCODONE **OR** oxyCODONE, magnesium sulfate, potassium chloride, iopamidol, acetaminophen, sodium chloride, sennosides-docusate sodium, sodium chloride, sodium chloride flush, sodium chloride, potassium chloride, magnesium sulfate, Saline Mouthwash, ALTEplase (CATHFLO) 2 mg in sterile water 2 mL injection, albuterol, LORazepam **OR** LORazepam, calcium carbonate      Objective:     Vitals:    12/28/24 0313  This AM pt answered all orientation questions correctly and had correct safety awareness/judgement. After returning from cath lab, pt could correctly answer his name/birthday and the month but was unable to correctly state what hospital he is in, the pt answered saying he was at [facility]. Vital signs stable, care continues.   This RN went to check blood return of patient's CVC for chemotherapy administration and found none of the lumens able to give blood return. RN notified NP Ly who stated to hold off on cathflow until CXR was read. RN accessed port and administered chemotherapy through port.    This patient has had a discharge order placed. They are being discharged home with hospice and being picked [facility] by [facility]. Discharge paperwork has been printed and placed in [place] , MICHAEL completed by this RN. PICC line removed previously with occlusive dressing still in place. Right vas cath still in place. Dr. Waterhouse and [facility] aware of patient discharging with right vas cath still in place. Wife and daughter took all belongings in room home.   Treatment time: 2 hours  Net UF: 0 ml     Pre weight: 118.1 kg  Post weight:118.1 kg  EDW: TBD/ARTEMIO     Access used: RTDC    Access function: Well with  ml/min     Medications or blood products given: heparin dwells and albumin     Regular outpatient schedule: TBD/ARTEMIO     Summary of response to treatment: Was not able to remove fluid from the patient due to unstable BP. Dr. Hoff aware, albumin given as ordered. Patient remained asymptomatic throughout entire treatment and upon the dialysis RN exiting the ICU suite     Copy of dialysis treatment record placed in chart, to be scanned into EMR.   Treatment time: 2 hours  Net UF: 2000 ml     Pre weight: 143.4 kg  Post weight:141.4 kg  EDW: TBD     Access used: RTDC    Access function: well with -400 ml/min     Medications or blood products given: Retacrit 10,000 units     Regular outpatient schedule:ARTEMIO     Summary of response to treatment: Patient tolerated treatment well and without any complications. Patient remained stable the entire treatment.   Report given to PHUONG Canales and copy of dialysis treatment record placed in chart, to be scanned into EMR.   Treatment time: 2.5 hours  Net UF: 1500 ml     Pre weight: 118.1 kg  Post weight:116.6 kg  EDW: tbd kg    Access used: CVC    Access function: good with  ml/min     Medications or blood products given: na     Regular outpatient schedule: TBD     Summary of response to treatment: Patient tolerated treatment well and without any complications. Report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  epoetin laura-epbx (RETACRIT) injection 10,000 Units, Once per day on Tuesday Thursday Saturday  micafungin (MYCAMINE) 50 mg in sodium chloride 0.9 % 100 mL IVPB, Daily  filgrastim-aafi (NIVESTYM) injection 600 mcg, QPM  heparin (porcine) injection 1,000 Units, PRN  meropenem (MERREM) 500 mg in sodium chloride 0.9 % 100 mL IVPB (mini-bag), Q24H  acyclovir (ZOVIRAX) 500 mg in sodium chloride 0.9 % 100 mL IVPB, Q24H  sulfur hexafluoride microspheres (LUMASON) 60.7-25 MG injection 2 mL, ONCE PRN  [Held by provider] tamsulosin (FLOMAX) capsule 0.4 mg, Daily  lidocaine (XYLOCAINE) 2 % uro-jet, PRN  0.9 % sodium chloride infusion, PRN  OLANZapine (ZYPREXA) tablet 2.5 mg, Nightly  ondansetron (ZOFRAN) injection 8 mg, Q8H PRN  melatonin disintegrating tablet 5 mg, Nightly PRN  prochlorperazine (COMPAZINE) tablet 5 mg, Q6H PRN   Or  prochlorperazine (COMPAZINE) injection 5 mg, Q6H PRN  oxyCODONE (ROXICODONE) immediate release tablet 5 mg, Q4H PRN   Or  oxyCODONE  +30 - BM bx with FISH, CG, NGS, and FISH XY engraftment  Day +60 - PB FISH XY engraftment  Day +100 - BM bx with FISH, CG, NGS, and FISH XY engraftment     Post-Txp Maintenance: TBD     2. ID - afebrile, no e/o infection  Continue Valtrex ppx  Continue Diflucan 200 mg qd & Levaquin ppx 12/3/24  Increase Diflucan ppx to 400 mg on 12/10/24      Donor/Recipient CMV:  Neg/Neg  No routine surveillance     3.  Heme  Pancytopenia r/t recent chemo - stable   Transfuse for Hgb < 7 and PLT < 10K       4.  Metabolic, Renal  ARTEMIO on CKD2   Hx of RCC s/p L nephrectomy (~2001)  Cont NS @ 50 mL/hr  Replace K+ & Mg per PRN orders  Nephrology following       5.  Amrex-xxxpmc-kroc disease:  At risk post-txp   Post-transplant Cytoxan on Day + 3 and Day +4  Start Cellcept on Day +5 (12/10/24)  Start Tacro 3 mg BID on 12/10/24. First level 12/14/2024 (day +9), then qMWF     6.  Hepatic   Risk for VOD post-txp   Cont Actigall     Admission Weight: 110.3 kg (243 lb 3.2 oz)    Current Weight: Weight - Scale: 112.8 kg (248 lb 11.2 oz)  Recent Labs     12/06/24  0336   BILIDIR <0.1   BILITOT <0.2        7.  Pulmonary -  No active issues  Pre-Txp PFTs:  FEV1 87, DLCO(cor) 100  Encourage IS and ambulation      8.  Cardiac -  No active issues  TTE (10/8/24):  LVEF 55-60%, grade 1 diastolic dysfunction with normal LAP.  LA mildly dilated.  Mildly dilated ascending aorta 4.3 cm.     9.  GI, Nutrition - adequate oral intake   Nutrition: Appetite & intake is adequate   Low microbial diet  Dietitian following  GERD  Continue Protonix 40 mg daily  CINV  Start Zyprexa 5 mg nightly      10.  Psychosocial  Wife may have difficulties understanding medical terms/managing medications due to challenges with reading and writing.  Daughter may need to assist.  Remote h/o ETOH, cannabis, and nicotine use (quit 1997)  Primary Caregiver:  Ivis Fischer (spouse)  Back-up Caregiver:  Ivis Novak (daughter)  Lodging:  Lives in [place], [place] (45 minutes)    12/15-12/17 Zosyn   - 12/20 T 100, Cefepime,    - 12/21 Changed to Meropenem     IMP/  Fever and Neutropenia     GNR / Achromobacter bacteremia 12/20   - R Cefepime, Cipro/Levo, P/T [MDRO]   - on Meropenem to which organism is sensitive since 12/21   - fever down   - CVC can be source or seeded by Achromobacter - s/p removal today 12/26     ARTEMIO, s/p R renal bx 12/26    Plan:     Cont Meropenem     On Acyclovir, Micafungin (started 12/27) - iv given mucositis, GI issues    Heme, Psych, Care Coordination notes reviewed.  Family meeting tomorrow.    Medical Decision Making:  The following items were considered in medical decision making:  Discussion of patient care with other providers  Reviewed clinical lab tests  Reviewed radiology tests  Reviewed other diagnostic tests/interventions  Microbiology cultures and other micro tests reviewed      Discussed with pt, wife - Ivis Gutierrez MD         Ca/Mg/Phos:   Recent Labs     12/23/24  1621 12/24/24  0400 12/24/24  1628   CALCIUM 8.0* 8.1* 7.9*   MG 2.00 2.05 2.06   PHOS 3.3 4.3 4.5     Hepatic:   Recent Labs     12/22/24  0342 12/23/24  0320 12/24/24  0400   AST 32 39* 90*   ALT 19 18 33   BILITOT 0.7 0.6 1.0   ALKPHOS 155* 161* 210*     Troponin: No results for input(s): \"TROPONINI\" in the last 72 hours.  BNP: No results for input(s): \"BNP\" in the last 72 hours.  Lipids: No results for input(s): \"CHOL\", \"TRIG\", \"HDL\" in the last 72 hours.    Invalid input(s): \"LDLCALC\", \"LABVLDL\"  ABGs: No results for input(s): \"PHART\", \"PO2ART\", \"SYY5MDM\" in the last 72 hours.  INR:   Recent Labs     12/23/24  0320   INR 1.25*           UA:  Recent Labs     12/23/24  1130   COLORU Yellow   CLARITYU Clear   GLUCOSEU Negative   BILIRUBINUR Negative   KETUA TRACE*   BLOODU LARGE*   PHUR 6.0   PROTEINU >=300*   UROBILINOGEN 0.2   NITRU Negative   LEUKOCYTESUR Negative   URINETYPE NotGiven            Urine Microscopic:   Recent Labs     12/23/24  1130   BACTERIA 3+*   WBCUA 3-5   RBCUA *         Urine Culture:   No results for input(s): \"LABURIN\" in the last 72 hours.        Urine Chemistry:   Recent Labs     12/22/24  1538   NAUR <20         IMAGING:  XR ABDOMEN (KUB) (SINGLE AP VIEW)   Final Result   1. Gas-filled loops of large bowel suggesting colonic ileus.      Electronically signed by Dudley Prajapati MD      CT CHEST WO CONTRAST   Final Result      No significant change in subpleural interstitial scarring as well as patchy   groundglass in the right middle and right lower lobes.      12-month follow-up chest CT is indicated to document stability of right upper   lobe pulmonary nodule according to the Fleischner guidelines.            Electronically signed by Tyrone Sánchez      XR CHEST PORTABLE   Final Result   Support devices, as above.      Bibasilar pulmonary opacities.      Electronically signed by Seferino Vargas      XR ABDOMEN (KUB) (SINGLE AP VIEW)    PHUR 6.0   PROTEINU TRACE*   UROBILINOGEN 0.2   NITRU Negative   LEUKOCYTESUR Negative   URINETYPE NotGiven        Urine Microscopic:   Recent Labs     12/07/24  1750   BACTERIA 1+*   WBCUA 0-2   RBCUA 0-2     Urine Culture:   Recent Labs     12/07/24  1650   LABURIN No growth at 18 to 36 hours     Urine Chemistry: No results for input(s): \"CLUR\", \"LABCREA\", \"PROTEINUR\", \"NAUR\" in the last 72 hours.      IMAGING:  XR CHEST PORTABLE   Final Result      Borderline cardiomegaly.      Diffuse interstitial prominence which may reflect mild edema or chronic   interstitial lung disease.      Left jugular central venous catheter tip is obliquely oriented in the upper SVC.   Right jugular central venous catheter tip standardly positioned at the   cavoatrial junction.         Electronically signed by Tyrone Sánchez      CT CHEST WO CONTRAST   Final Result      Scattered ground glass opacities of both lungs, nonspecific. Pulmonary edema versus atypical infectious etiologies are considerations. Underlying fibrosis/interstitial lung disease is not excluded. These findings are new from remote prior study of May    13, 2012.      Pulmonary nodularity, as above. Lung Nodule Actionable Findings on Non Lung Screenings: SINGLE Pulmonary Nodule <6mm, Solid, Solitary - Following the Fleischner Society 2017 guidelines for single solid nodules < 6mm, low risk patients: no follow-up    suggested. If suspicious morphology or upper lobe location, consider 12 month follow-up. High risk patients: optional CT in 12 months.   qzxv      Electronically signed by Seferino Vargas MD      XR CHEST PORTABLE   Final Result      Mild patchy bibasilar airspace disease, atelectasis versus pneumonia. The findings are increased from December 2, 2024.      Electronically signed by Marshall Villar MD      XR CHEST PORTABLE   Final Result      Minimal patchy opacity in the right lung base which may represent atelectasis or scarring. Pneumonia is difficult  dysfunction with normal LAP.  LA mildly dilated.  Mildly dilated ascending aorta 4.3 cm.       9.   GI, NUTRITION   Nutrition  - decreased appetite  Dietitian is following  Low microbial diet    GERD -  well-controlled   Continue Protonix 40 mg qd    CINV -  well-controlled  Continue Compazine 5 mg q6h prn  Continue Ativan prn  Restart Zyprexa 2.5 mg qhs    Diarrhea  - decreasing volume    Lomotil was stopped d/t decreased urinary stream  Continue Imodium 4 mg q6h prn      10. NEURO  Dizziness - Started the evening of 12/10/24.  Not orthostatic; no nystagmus; Antivert has been ineffective.  CT head on 12/12/24 was unrevealing.  Suspect this is 2/2 tacrolimus.  Tacrolimus was d/c'd on 12/13/24 with level of 10.      11. MSK  Mid-low back pain - Started the evening of 12/12/24  CT abdomen/pelvis w/o contrast (worsening ARTEMIO) - ordered      11. PSYCHOSOCIAL  Wife may have difficulties understanding medical terms/managing medications due to challenges with reading and writing.  Daughter may need to assist.  Remote h/o ETOH, cannabis, and nicotine use (quit 1997)  Primary Caregiver:  Ivis Fischer (spouse)  Back-up Caregiver:  Ivisosei Novak (daughter)  Lodging:  Lives in [place], [place] (45 min.)      DVT Prophylaxis: Platelets <50,000 cells/dL - prophylactic lovenox on hold and mechanical prophylaxis with bilateral SCDs while in bed in place.  Contraindications to pharmacologic prophylaxis: Thrombocytopenia  Contraindications to mechanical prophylaxis: None    Disposition:  After transplant when ANC > 1 and recovered from toxicities.     The patient was seen and examined by JENNA Rushing     results for input(s): \"INR\" in the last 72 hours.    UA:  Recent Labs     12/07/24  1750   COLORU Yellow   CLARITYU Clear   GLUCOSEU Negative   BILIRUBINUR Negative   KETUA Negative   BLOODU Negative   PHUR 6.0   PROTEINU TRACE*   UROBILINOGEN 0.2   NITRU Negative   LEUKOCYTESUR Negative   URINETYPE NotGiven        Urine Microscopic:   Recent Labs     12/07/24  1750   BACTERIA 1+*   WBCUA 0-2   RBCUA 0-2     Urine Culture: No results for input(s): \"LABURIN\" in the last 72 hours.  Urine Chemistry: No results for input(s): \"CLUR\", \"LABCREA\", \"PROTEINUR\", \"NAUR\" in the last 72 hours.      IMAGING:  XR CHEST PORTABLE   Final Result      Mild patchy bibasilar airspace disease, atelectasis versus pneumonia. The findings are increased from December 2, 2024.      Electronically signed by Marshall Villar MD      XR CHEST PORTABLE   Final Result      Minimal patchy opacity in the right lung base which may represent atelectasis or scarring. Pneumonia is difficult to exclude.      Electronically signed by Po Pardo MD      XR CHEST (2 VW)   Final Result      Clear lungs.      Normal cardiomediastinal silhouette.      Lines and tubes in standard position.      Electronically signed by Tyrone Sánchez      XR CHEST PORTABLE    (Results Pending)       Medical Decision Making:  The following items were considered in medical decision making:  Discussion of patient care with other providers  Reviewed clinical lab tests  Reviewed radiology tests  Reviewed other diagnostic tests/interventions    Chapis Hammond PA-C   [facility]  Office : [phone] or through [employer]  Fax :[phone]      I have seen the patient independently from the PA/NP .I discussed the care with the PA/NP . I personally reviewed the HPI, PH, FH, SH, ROS and medications. I repeated pertinent portions of the examination and reviewed the relevant imaging and laboratory data. I agree with the findings, assessment  to assist.  Remote h/o ETOH, cannabis, and nicotine use (quit 1997)  Primary Caregiver:  Viis Avelino Aguilarers (spouse)  Back-up Caregiver:  Ivis Novak (daughter)  Lodging:  Lives in [place], [place] (45 minutes)       DVT Prophylaxis: Platelets <50,000 cells/dL - daily lovenox prophylaxis not ordered  Contraindications to pharmacologic prophylaxis: Thrombocytopenia   Contraindications to mechanical prophylaxis: None     Disposition:  After transplant when ANC is >1 and patient has recovered from chemo toxicities     The patient was seen and examined by Dr. Donaldson.    LIOR Odonnell - CNP     Saurabh Donaldson MD  Hematology, Bone marrow transplant and Cellular therapy  [facility]     Result   1. Gas-filled loops of large bowel suggesting colonic ileus.      Electronically signed by Dudley Prajapati MD      CT CHEST WO CONTRAST   Final Result      No significant change in subpleural interstitial scarring as well as patchy   groundglass in the right middle and right lower lobes.      12-month follow-up chest CT is indicated to document stability of right upper   lobe pulmonary nodule according to the Fleischner guidelines.            Electronically signed by Tyrone Sánchez      XR CHEST PORTABLE   Final Result   Support devices, as above.      Bibasilar pulmonary opacities.      Electronically signed by Seferino Vargas      XR ABDOMEN (KUB) (SINGLE AP VIEW)   Final Result      Feeding tube extends 6 cm into the proximal stomach at the left upper quadrant      Electronically signed by Emmanuel Garcia MD      US RENAL COMPLETE   Final Result         1. No hydronephrosis   2. Absent left kidney   3. Urinary bladder wall hypertrophy suspected, no pathologic distention of urinary bladder               Electronically signed by Emmanuel Garcia MD      XR CHEST PORTABLE   Final Result   Interval development of small left basilar pleural-parenchymal disease.      Mild right basilar atelectasis.      Electronically signed by Ro Young, DO      CT ABDOMEN PELVIS WO CONTRAST Additional Contrast? None   Final Result      This study is not adequate for exclusion of bladder mass. If the patient cannot   receive intravenous contrast or CT urogram, then consider evaluation with   ultrasound.      Urinary bladder is more collapsed than the prior study and the wall is uniformly   thickened. This is probably secondary to redundancy of the bladder wall,   although cystitis could have similar appearance.      Stable small right pleural effusion.          Electronically signed by Sharif Villeda      XR CHEST PORTABLE   Final Result      Similar appearance of right basilar airspace disease.      Electronically signed by  dialysis catheter.      Insertion of triple lumen power injectable right arm PICC.        Successful removal of a left chest tunneled central venous catheter.      Limited ultrasound examination demonstrates a patent right internal jugular vein   and right brachial vein.            DAP : 3139 mGycm?   Blood loss : minimal (less than 5 cc)   Specimens removed : none               Electronically signed by Tyrone Forresteroros      XR ABDOMEN (KUB) (SINGLE AP VIEW)   Final Result   Gas opacified loops of large and small bowel without significant   change      Electronically signed by Seferino Vargas      CT BIOPSY RENAL   Final Result   1. Successful CT-guided biopsy of the inferior pole of the right native kidney.   No acute complication.      Electronically signed by Dudley Prajapati      XR ABDOMEN (KUB) (SINGLE AP VIEW)   Final Result   1. Gas-filled loops of large bowel suggesting colonic ileus.      Electronically signed by Dudley Prajapati MD      CT CHEST WO CONTRAST   Final Result      No significant change in subpleural interstitial scarring as well as patchy   groundglass in the right middle and right lower lobes.      12-month follow-up chest CT is indicated to document stability of right upper   lobe pulmonary nodule according to the Fleischner guidelines.            Electronically signed by Tyrone Forresteroros      XR CHEST PORTABLE   Final Result   Support devices, as above.      Bibasilar pulmonary opacities.      Electronically signed by Seferino Vargas      XR ABDOMEN (KUB) (SINGLE AP VIEW)   Final Result      Feeding tube extends 6 cm into the proximal stomach at the left upper quadrant      Electronically signed by Emmanuel Garcia MD       RENAL COMPLETE   Final Result         1. No hydronephrosis   2. Absent left kidney   3. Urinary bladder wall hypertrophy suspected, no pathologic distention of urinary bladder               Electronically signed by Emmanuel Garcia MD      XR CHEST PORTABLE   Final Result  understanding medical terms/managing medications due to challenges with reading and writing.  Daughter may need to assist.  Remote h/o ETOH, cannabis, and nicotine use (quit 1997)  Primary Caregiver:  Ivis Fischer (spouse)  Back-up Caregiver:  Ivis Novak (daughter)  Lodging:  Lives in [place], [place] (45 min.)      DVT Prophylaxis: Platelets <50,000 cells/dL - prophylactic lovenox on hold and mechanical prophylaxis with bilateral SCDs while in bed in place.  Contraindications to pharmacologic prophylaxis: Thrombocytopenia  Contraindications to mechanical prophylaxis: None    Disposition:  After transplant when ANC > 1 and recovered from toxicities.     The patient was seen and examined by Dr. Donaldson.    Stefanai Alatorre MD, ILEANA, CCRC  Internal Medicine, PGY-3    Saurabh Donaldson MD  Hematology, Bone marrow transplant and Cellular therapy  [facility]       urology on 12/16/24 as urine was clear all night and morning with CBI and patient was unable to tolerate coker even with multiple different pain medications      DVT Prophylaxis: Platelets <50,000 cells/dL - prophylactic lovenox on hold and mechanical prophylaxis with bilateral SCDs while in bed in place.  Contraindications to pharmacologic prophylaxis: Thrombocytopenia  Contraindications to mechanical prophylaxis: None    Disposition:  After transplant when ANC > 1 and recovered from toxicities.     The patient was seen and examined by Dr. Donaldson.    LIOR Odonnell - CNP  Saurabh Donaldson MD  Hematology, Bone marrow transplant and Cellular therapy  [facility]         XR CHEST PORTABLE   Final Result      Mild patchy bibasilar airspace disease, atelectasis versus pneumonia. The findings are increased from December 2, 2024.      Electronically signed by Marshall Villar MD      XR CHEST PORTABLE   Final Result      Minimal patchy opacity in the right lung base which may represent atelectasis or scarring. Pneumonia is difficult to exclude.      Electronically signed by Po Pardo MD      XR CHEST (2 VW)   Final Result      Clear lungs.      Normal cardiomediastinal silhouette.      Lines and tubes in standard position.      Electronically signed by Tyrone Sánchez          Medical Decision Making:  The following items were considered in medical decision making:  Discussion of patient care with other providers  Reviewed clinical lab tests  Reviewed radiology tests  Reviewed other diagnostic tests/interventions    Abdullahi Hoff MD   [facility]  Office : [phone] or through [employer]  Fax :[phone]     Onset A.fib 12/27/24   -Amiodarone bolus    -Stat EKG  -Consult cardiology to assist with management        10. GI/Nutrition:    Protein Calorie Malnutrition- severe  - Dietitian is following  - Low microbial diet  - Clear liquid diet-    GERD   - Continue Protonix 40 mg qd    CINV   - Continue Compazine 5 mg q6h prn  - Continue Ativan prn      Diarrhea   - Lomotil was stopped d/t decreased urinary stream  - s/p  Imodium 4 mg q6h prn    Ileus  - KUB 12/24/24: Gas-filled loops of large bowel without significant small bowel distention. Findings suggesting colonic ileus   - Last BM 12/24/24- BM x 3 12/27/24  - s/p NPO except Ice chips and oral meds.   - Repeat KUB 12/26/24:Gas opacified loops of large and small bowel without significant change   - CT A/P 12/27/24:  Fat stranding seen adjacent to the right kidney likely due to recent biopsy. Small volume of ascites is present likely due to recent biopsy. No large hematoma is identified   - CT abd pelvis with oral contrast pending 1/1/25    11. Neuromuscular:    Dizziness 2/2 tacrolimus   - Resolved as of 12/19/24 after discontinuation of tacrolimus  - Started the evening of 12/10/24.    - Not orthostatic; no nystagmus; Antivert has been ineffective.    - CT head on 12/12/24 was unrevealing.    - Suspect this is 2/2 tacrolimus.    - Tacrolimus was d/c'd on 12/13/24 with level of 10.7.    Debility 2/2 chemotherapy  - PT, OT eval & tx      12. Psychosocial:  - Wife may have difficulties understanding medical terms/managing medications due to challenges with reading and writing.  Daughter may need to assist.  - Remote h/o ETOH, cannabis, and nicotine use (quit 1997)  - Primary Caregiver:  Ivis Fischer (spouse)  - Back-up Caregiver:  Ivis Novak (daughter)  - Lodging:  Lives in [place], [place] (45 min.)      DVT Prophylaxis: Platelets <50,000 cells/dL - prophylactic lovenox on hold and mechanical prophylaxis with bilateral SCDs while in bed in  WO CONTRAST   Final Result      No significant change in subpleural interstitial scarring as well as patchy   groundglass in the right middle and right lower lobes.      12-month follow-up chest CT is indicated to document stability of right upper   lobe pulmonary nodule according to the Fleischner guidelines.            Electronically signed by Tyrone Sánchez      XR CHEST PORTABLE   Final Result   Support devices, as above.      Bibasilar pulmonary opacities.      Electronically signed by Seferino Vargas      XR ABDOMEN (KUB) (SINGLE AP VIEW)   Final Result      Feeding tube extends 6 cm into the proximal stomach at the left upper quadrant      Electronically signed by Emmanuel Garcia MD      US RENAL COMPLETE   Final Result         1. No hydronephrosis   2. Absent left kidney   3. Urinary bladder wall hypertrophy suspected, no pathologic distention of urinary bladder               Electronically signed by Emmanuel Garcia MD      XR CHEST PORTABLE   Final Result   Interval development of small left basilar pleural-parenchymal disease.      Mild right basilar atelectasis.      Electronically signed by Ro Young, DO      CT ABDOMEN PELVIS WO CONTRAST Additional Contrast? None   Final Result      This study is not adequate for exclusion of bladder mass. If the patient cannot   receive intravenous contrast or CT urogram, then consider evaluation with   ultrasound.      Urinary bladder is more collapsed than the prior study and the wall is uniformly   thickened. This is probably secondary to redundancy of the bladder wall,   although cystitis could have similar appearance.      Stable small right pleural effusion.          Electronically signed by Sharif Villeda      XR CHEST PORTABLE   Final Result      Similar appearance of right basilar airspace disease.      Electronically signed by Pedro Martines      US RENAL COMPLETE   Final Result      1. A 2 cm isoechoic solid-appearing masslike abnormality along the  d/t decreased urinary stream  - s/p  Imodium 4 mg q6h prn    Ileus  - KUB 12/24/24: Gas-filled loops of large bowel without significant small bowel distention. Findings suggesting colonic ileus   - Last BM 12/24/24- BM x 3 12/27/24  - NPO except Ice chips and oral meds. Start TPN  - Repeat KUB 12/26/24:Gas opacified loops of large and small bowel without significant change   - CT A/P 12/27/24:  Fat stranding seen adjacent to the right kidney likely due to recent biopsy. Small volume of ascites is present likely due to recent biopsy. No large hematoma is identified     11. Neuromuscular:    Dizziness 2/2 tacrolimus   - Resolved as of 12/19/24 after discontinuation of tacrolimus  - Started the evening of 12/10/24.    - Not orthostatic; no nystagmus; Antivert has been ineffective.    - CT head on 12/12/24 was unrevealing.    - Suspect this is 2/2 tacrolimus.    - Tacrolimus was d/c'd on 12/13/24 with level of 10.7.    Debility 2/2 chemotherapy  - PT, OT eval & tx      12. Psychosocial:  - Wife may have difficulties understanding medical terms/managing medications due to challenges with reading and writing.  Daughter may need to assist.  - Remote h/o ETOH, cannabis, and nicotine use (quit 1997)  - Primary Caregiver:  Ivis Fischer (spouse)  - Back-up Caregiver:  Ivis D. Gonzaloar (daughter)  - Lodging:  Lives in [place], [place] (45 min.)      DVT Prophylaxis: Platelets <50,000 cells/dL - prophylactic lovenox on hold and mechanical prophylaxis with bilateral SCDs while in bed in place.  Contraindications to pharmacologic prophylaxis: Thrombocytopenia  Contraindications to mechanical prophylaxis: None    Disposition:  After transplant when ANC > 1 and recovered from toxicities.     The patient was seen and examined by Dr. Derek Fontenot, APRN - CNP     Jovanni Reed MD  May be reached through [email or web address]         increased from December 2, 2024.      Electronically signed by Marshall Villar MD      XR CHEST PORTABLE   Final Result      Minimal patchy opacity in the right lung base which may represent atelectasis or scarring. Pneumonia is difficult to exclude.      Electronically signed by Po Pardo MD      XR CHEST (2 VW)   Final Result      Clear lungs.      Normal cardiomediastinal silhouette.      Lines and tubes in standard position.      Electronically signed by Tyrone Sánchez          Medical Decision Making:  The following items were considered in medical decision making:  Discussion of patient care with other providers  Reviewed clinical lab tests  Reviewed radiology tests  Reviewed other diagnostic tests/interventions    Abdullahi Hoff MD   [facility]  Office : [phone] or through [employer]  Fax :[phone]     Prophylaxis: Platelets <50,000 cells/dL - prophylactic lovenox on hold and mechanical prophylaxis with bilateral SCDs while in bed in place.  Contraindications to pharmacologic prophylaxis: Thrombocytopenia  Contraindications to mechanical prophylaxis: None    Disposition:  After transplant when ANC > 1 and recovered from toxicities.     The patient was seen and examined by Dr. Murphy.     DO Henry Bruce MD  [facility]  [phone]               prophylaxis: Thrombocytopenia  Contraindications to mechanical prophylaxis: None    Disposition:  After transplant when ANC > 1 and recovered from toxicities.     The patient was seen and examined by Dr. Murphy.     LIOR Lockwood - NP     Henry Murphy MD  [facility]  [phone]               psychologic evaluation has been requested.  We will also ask that hospice meet with the patient and family.  While the wife would like to see him continue with treatment he clearly shows no desire to do so.    David M. Waterhouse, M.D., MPH  Director, Early Phase Clinical Trials  [facility])/ [place] [address]  Office [phone]  Cell [phone]  [email or web address]    \"Participating in a clinical trial is the first step to fighting cancer; not the last.\"                 3 = A little assistance

## 2025-01-21 NOTE — H&P ADULT - NSHPPHYSICALEXAM_GEN_ALL_CORE
Patient: Homer Duran Date: 2025   : 1959 Attending: No att. providers found   65 year old male      Homer Duran is a 64 year old male who has hx of prostate cancer s/p brachytherapy in .  He began having urinary urgency and dsyuria in . He then developed intermittent gross hematuria.  CT scan showed Right UPJ stone 2024.  He states after stent removal he is doing well and no longer has dysuria and hematuria.     I have reviewed the past medical, family and social history sections including the medications and allergies listed in the above medical record as well as the nursing notes.     Blood pressure (!) 152/94, height 5' 10\" (1.778 m), weight 116.6 kg (257 lb 0.9 oz).  Physical Exam:   GEN: Pt is well nourished, NAD  NECK: Neck supple  LUNGS: Normal respirations  PSYCH: Normal mood and affect    Office renal ultrasound without hydro.    No visits with results within 1 Day(s) from this visit.   Latest known visit with results is:   Lab Services on 2025   Component Date Value    Prostate Specific Antigen 2025 0.30        All available results from tests and procedures performed during the office visit was discussed with the patient.    Assessment:  1) Calcium oxalate (80%) and phosphate stones.  Pt has had multiple 24 hr urine studies,  and  with high urine calcium and uric acid levels in  that showed some improvement in  but with lower UOP and low magnesium.  Will re order 24 hr urine study.  He may benefit from medications.  2) Prostate cancer s/p brachytherapy in . PSA low.     Plan:  FU in one year with KUB prior and PSA prior if he has not had one.   24 hr urine study ordered. Will call with results.       Castillo Anguiano MD     
PHYSICAL EXAM:  GENERAL: NAD, well-groomed, well-developed  HEAD:  Atraumatic, Normocephalic  EYES: PERRLA, conjunctiva and sclera clear  ENMT: Intact  NECK: Supple,   NERVOUS SYSTEM:  Alert & Oriented X3; Motor Strength 5/5; + Tremors  CHEST/LUNG: Clear bilaterally; No rales, rhonchi, wheezing, or rubs  HEART: Regular rate and rhythm; No murmurs, rubs, or gallops  ABDOMEN: Soft, Nontender, Nondistended; Bowel sounds present  EXTREMITIES:  2+ Peripheral Pulses, No clubbing, cyanosis.   2 +edema with erthyma and tenderness R/LLE  SKIN: Cellulitis

## 2025-02-15 NOTE — PATIENT PROFILE ADULT - HOME ACCESSIBILITY CONCERNS
Patient's most recent potassium results are listed below.   Recent Labs     02/14/25  1539 02/14/25  2259 02/15/25  0934   K 2.5* 3.0* 4.1       Plan  - Replete potassium per protocol  - Monitor potassium Daily  - Patient's hypokalemia is improving   none

## 2025-02-18 NOTE — PATIENT PROFILE ADULT - NSPROIMPLANTSMEDDEV_GEN_A_NUR
SERVICE DATE: February 18, 2025      Assessment & Plan    Chronic noncardiac symptoms (infrequent, not with activity, present for over 30 years) with submaximal stress test limited by dyspnea suggestive of physical deconditioning.   No cardiac symptoms during regular physical activity. Normal ventricular function, normal cardiac valves, normal stress EKG and no wall motion abnormalities at obtained heart rate of 77% and 7.0 METS.  -Encourage increased physical activity and exercise.  -Reassured patient that I think his symptoms are noncardiac.  Patient is motivated to take up more exercise.  If episodic dyspnea does not improve with exercise, send me a chart message and we will consider a pharmacological nuclear stress test.      Hyperlipidemia  Slight increase in total cholesterol and LDL since 2023, likely due to decreased physical activity.  -Encourage increased physical activity and healthy diet to manage cholesterol levels.    Follow-up  As needed if symptoms worsen or do not improve with increased physical activity.      Tracy Gerardo MD  Cardiology    History of Present Illness  Don Venegas is a 72 year old male who presents with shortness of breath and an abnormal stress test. He was referred by his PA, Saurav Ashby, for evaluation of shortness of breath.    He has experienced occasional chest pain over the last 30 years, described as a 'little bit of a pain' sometimes over the sternum and sometimes slightly to the left. During a recent stress test, he was unable to continue beyond five minutes due to shortness of breath, achieving only 77% of his target heart rate.    He is retired and is trying to be more active. He walks around the [facility] during warmer months and uses a small trampoline, or rebounder, during colder months. No chest pain or shortness of breath during these activities.    His past medical history is significant for prostate cancer diagnosed three years ago, which has been  "treated. He is not on any prescription medications but takes over-the-counter herbal supplements. He reports a decline in physical activity over the past two years, partly due to side effects from prostate cancer therapy.    Family history is notable for a grandfather and a brother who both had heart attacks. His brother, who is about a year and a half older, had a heart attack three to four years ago.    No history of tobacco use.    Physical Exam  VITALS: P- 70, BP- 132/78  MEASUREMENTS: BMI- 20.0.  CARDIOVASCULAR: Heart sounds normal, no murmurs, no bruits.    Results  LABS  Total cholesterol: 200 (02/11/2025)  HDL: 63 (02/11/2025)  LDL: 111 (02/11/2025)  Triglycerides: 129 (02/11/2025)  Creatinine: 0.9 (02/11/2025)  GFR: 90 (02/11/2025)  Hb: 13.5 (02/11/2025)    RADIOLOGY  Head CT angiogram: Normal neck arteries, no plaque or blockages (2023)    DIAGNOSTIC  EKG: Normal sinus rhythm, 68 bpm, normal cardiac intervals, no ischemia (02/18/2025)  Stress echocardiogram: Submaximal, 77% target heart rate, normal left ventricular function, normal heart valves, no wall motion abnormalities, normal EKG, max heart rate 113, 7.0 METs (02/16/2025)      New patient.  45 minutes spent by me on the date of the encounter doing chart review, history and exam, documentation and further activities per the note. Moderate complexity medical decision making.      This note was completed in part using dictation via voice recognition software. Some word and grammatical errors may occur and must be interpreted in the appropriate clinical context. If there are any questions pertaining to this issue, please contact me for further clarification.     Orders Placed This Encounter   Procedures    EKG 12-lead complete w/read - Clinics (performed today)         Vitals: /78 (BP Location: Right arm, Patient Position: Sitting)   Pulse 71   Ht 1.778 m (5' 10\")   Wt 69.3 kg (152 lb 12.8 oz)   SpO2 100%   BMI 21.92 kg/m        CURRENT " MEDICATIONS:  Current Outpatient Medications   Medication Sig Dispense Refill    Multiple Minerals-Vitamins (PROSTEON) TABS Take 2 capsules by mouth 2 times daily      NONFORMULARY Take 2 capsules by mouth every evening (Intestinal cleanser by Dr. Kelley's)      NONFORMULARY Patient takes a mens herbal supplement daily which includes saw palmetto.      JUBLIA 10 % SOLN EXTERNALLY APPLY TOPICALLY DAILY TO AFFECTED NAIL BEDS (Patient not taking: Reported on 2/18/2025) 8 mL 0         ALLERGIES:  Allergies   Allergen Reactions    Enzalutamide Dizziness and Swelling    Gluten Meal Unknown    Tamsulosin Unknown          None

## 2025-02-28 NOTE — ED PROVIDER NOTE - NSCAREINITIATED _GEN_ER
Patient with Acute on chronic debility due to age-related physical debility. The patient's latest AMPAC (Activity Measure for Post Acute Care) Score is listed below.    AM-PAC Score - How much help does the patient need for each activity listed  Basic Mobility Total Score: 23  Turning over in bed (including adjusting bedclothes, sheets and blankets)?: None  Sitting down on and standing up from a chair with arms (e.g., wheelchair, bedside commode, etc.): None  Moving from lying on back to sitting on the side of the bed?: None  Moving to and from a bed to a chair (including a wheelchair)?: None  Need to walk in hospital room?: None  Climbing 3-5 steps with a railing?: A little    Plan  - Progressive mobility protocol initated  - PT/OT consulted  - Fall precautions in place    2/24:  Patient with increased weakness and debility since admission.  Continue PT/OT efforts.           Larry Walsh(Attending)

## 2025-03-17 NOTE — PATIENT PROFILE ADULT - BILL PAYMENT
0125  
no

## 2025-05-01 NOTE — BEHAVIORAL HEALTH ASSESSMENT NOTE - HPI (INCLUDE ILLNESS QUALITY, SEVERITY, DURATION, TIMING, CONTEXT, MODIFYING FACTORS, ASSOCIATED SIGNS AND SYMPTOMS)
52yoM, unmarried, originally from [place] (emigrated 2010), employed on competitive horse stable for ~28 years, domiciled in [place] with family friends (Jose Miguel Gabriel,  and coworker, [phone]), with PMHx HTN, with PPHx of alcohol abuse (mainly beers, whiskey; multiple 6 packs/day or bottle of whiskey), cannot recall longest period of sobriety, attended multiple AA meetings but no sponsor, 4 detox (prior 3 effective with Librium, last one 4 days Ativan taper in [facility] last week), in process of admittance to [facility] rehab in [place], and treated for depression and anxiety for 1mo with Vistaril (discontinued by pt 2/2 drowsiness), and no hx PPH/SA/paranoia/SI/HI/AH/VH/withdrawal seizures/abuse/PTSD/OCD sx. Pt admitted with reported 11 days since last drink, a/w tremors and anxiety and given Ativan in ED, consulted for EtOH withdrawal and med mgmt.    Upon evaluation, patient was laying in bed, AAOx4, cooperative with tremors but calm having received Ativan. Pt reported one month of worrying about multiple factors in his life (rent, bills, work), sad mood, low energy and motivation, hopelessness, poor sleep, but decent appetite and no suicidality since horse racing season has escalated September race in [place], [place]. Prior to [facility] admission, pt felt anxious, drank many 6 packs of beer, felt nauseous and tremulous, then drank a bottle of whiskey and was taken to [facility] about 18 days ago by friend 2/2 vomiting. As per patient, [facility] said he could not be detoxified in [facility] and was transferred to [facility] for detox. No reported current legal issues, reported no hx FDC, no hx Xanax or Ativan use before [facility] admission.  Reported hx of depression and anxiety since 2010 when he experienced somatic pains responding to anxiousness. Pt reported finishing university in [place], stated he is Gnosticism and Shinto because it uplifts his mood when he is in trouble. No known family psychiatric hx, substance abuse. No current SI/HI/AH/VH elicited. Patient agreeable with recommendations for outpatient care and hospital treatment plan.
minimum assist (75% patients effort)

## 2025-05-20 NOTE — ED ADULT NURSE REASSESSMENT NOTE - REASSESS COMMUNICATION
Ambulatory Care Coordination Note     2025 1:43 PM     Patient Current Location:  Home: [facility]  [address]  [address] [place] [address]     ACM contacted the patient by telephone. Verified name and  with patient as identifiers.         ACM: Gale Rivera RN    Pt is currently at [facility], unsure of dc date. Will follow for dc.     PCP/Specialist follow up:   Future Appointments         Provider Specialty Dept Phone    2025 4:30 PM Jevon Encarnacion III, MD Cardiology [phone]    2025 9:30 AM Sintia Underwood, APRN - NP; Conchita Lozano RN Wound Ostomy [phone]    2025 9:00 AM (Arrive by 8:45 AM) Tara Potter PA Pain Management [phone]            Follow Up:   Plan for next ACM outreach in approximately 1 week to complete:  - dc from rehab .   Patient  is agreeable to this plan.         ED physician notified

## 2025-05-27 NOTE — H&P ADULT - PROBLEM SELECTOR PLAN 3
No care due was identified.  [facility] Embedded Care Due Messages. Reference number: [id number].   5/27/2025 6:21:34 AM CDT  
Refill Routing Note   Medication(s) are not appropriate for processing by [facility] for the following reason(s):        Patient not seen by provider within 15 months  Required vitals abnormal    [facility] action(s):  Defer             Appointments  past 12m or future 3m with PCP    Date Provider   Last Visit   3/1/2024 Shannon Giang MD   Next Visit   7/23/2025 Shannon Giang MD   ED visits in past 90 days: 0        Note composed:7:30 AM 05/27/2025               
- s/p mechanical fall while intoxicated, weeks ago   - low suspicion for fracture  - Lumbar/Thoracic XRay  - Lidocaine Patch, pain control   - Able to ambulate without difficulty, no indication for Pt c/s currently
